# Patient Record
Sex: MALE | Race: WHITE | NOT HISPANIC OR LATINO | Employment: OTHER | URBAN - METROPOLITAN AREA
[De-identification: names, ages, dates, MRNs, and addresses within clinical notes are randomized per-mention and may not be internally consistent; named-entity substitution may affect disease eponyms.]

---

## 2017-01-02 ENCOUNTER — ANESTHESIA (OUTPATIENT)
Dept: SURGERY | Facility: AMBULATORY SURGERY CENTER | Age: 67
End: 2017-01-02

## 2017-01-02 RX ORDER — PROPOFOL 10 MG/ML
INJECTION, EMULSION INTRAVENOUS PRN
Status: DISCONTINUED | OUTPATIENT
Start: 2017-01-02 | End: 2017-01-02

## 2017-01-02 RX ORDER — SODIUM CHLORIDE, SODIUM LACTATE, POTASSIUM CHLORIDE, CALCIUM CHLORIDE 600; 310; 30; 20 MG/100ML; MG/100ML; MG/100ML; MG/100ML
INJECTION, SOLUTION INTRAVENOUS CONTINUOUS PRN
Status: DISCONTINUED | OUTPATIENT
Start: 2017-01-02 | End: 2017-01-02

## 2017-01-02 RX ORDER — FENTANYL CITRATE 50 UG/ML
INJECTION, SOLUTION INTRAMUSCULAR; INTRAVENOUS PRN
Status: DISCONTINUED | OUTPATIENT
Start: 2017-01-02 | End: 2017-01-02

## 2017-01-02 RX ADMIN — PROPOFOL 20 MG: 10 INJECTION, EMULSION INTRAVENOUS at 09:32

## 2017-01-02 RX ADMIN — FENTANYL CITRATE 25 MCG: 50 INJECTION, SOLUTION INTRAMUSCULAR; INTRAVENOUS at 09:21

## 2017-01-02 RX ADMIN — SODIUM CHLORIDE, SODIUM LACTATE, POTASSIUM CHLORIDE, CALCIUM CHLORIDE: 600; 310; 30; 20 INJECTION, SOLUTION INTRAVENOUS at 09:11

## 2017-01-06 ENCOUNTER — TELEPHONE (OUTPATIENT)
Dept: OTOLARYNGOLOGY | Facility: CLINIC | Age: 67
End: 2017-01-06

## 2017-01-06 NOTE — TELEPHONE ENCOUNTER
Patient was called to see how he was doing post operatively. He is stable, without complications, pain is well controlled. 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

## 2017-01-24 ENCOUNTER — OFFICE VISIT (OUTPATIENT)
Dept: ENDOCRINOLOGY | Facility: CLINIC | Age: 67
End: 2017-01-24

## 2017-01-24 ENCOUNTER — OFFICE VISIT (OUTPATIENT)
Dept: OTOLARYNGOLOGY | Facility: CLINIC | Age: 67
End: 2017-01-24

## 2017-01-24 VITALS
WEIGHT: 232 LBS | BODY MASS INDEX: 34.36 KG/M2 | HEIGHT: 69 IN | DIASTOLIC BLOOD PRESSURE: 78 MMHG | HEART RATE: 86 BPM | SYSTOLIC BLOOD PRESSURE: 120 MMHG

## 2017-01-24 DIAGNOSIS — K13.0 LIP LESION: Primary | ICD-10-CM

## 2017-01-24 DIAGNOSIS — R79.89 LOW TESTOSTERONE: Primary | ICD-10-CM

## 2017-01-24 ASSESSMENT — PAIN SCALES - GENERAL
PAINLEVEL: NO PAIN (0)
PAINLEVEL: NO PAIN (0)

## 2017-01-24 NOTE — NURSING NOTE
Chief Complaint   Patient presents with     RECHECK     Return Tumor      Pt states no pain today.    N John OWENS

## 2017-01-24 NOTE — MR AVS SNAPSHOT
After Visit Summary   1/24/2017    Deejay Dior    MRN: [id number]           Patient Information     Date Of Birth          1950        Visit Information        Provider Department      1/24/2017 3:15 PM Tim Yanez MD [facility] Ear Nose and Throat        Today's Diagnoses     Lip lesion    -  1      Care Instructions    Please follow up to see Dr Yanez in about 3 months.   For questions or concerns please call our nurse line @ [phone].        Follow-ups after your visit        Your next 10 appointments already scheduled     Mar 07, 2017  1:00 PM CST   [facility] with  [facility]   [facility] Draw ([facility])    [address]  2nd [facility] [id number] [phone]            Mar 07, 2017  1:30 PM CST   Return with  BMT DOM   [facility] Blood and Marrow Transplant ([facility])    [address] [place] [address]  2nd [facility] [id number] [phone]            Mar 14, 2017  2:00 PM CDT   (Arrive by 1:45 PM)   RETURN ENDOCRINE with Rd Chairez MD   [facility] Endocrinology ([facility])    [address] [place] [address]  3rd Floor  [facility] [id number] [phone]            Apr 25, 2017  3:15 PM CDT   (Arrive by 3:00 PM)   RETURN TUMOR VISIT with Tim Yanez MD   [facility] Ear Nose [facility] Throat ([facility])    [address] [place] [address]  4th Floor  [facility] [address] [phone]              Who to contact     Please call your clinic at [phone] to:    Ask questions about your health    Make or cancel appointments    Discuss your medicines    Learn about your test results    Speak to your doctor   If you have compliments or concerns about an experience at your clinic, or if you wish to file a complaint, please contact [facility] Patient Relations at [phone] or email us at  [email or web address]         Additional Information About Your Visit        MyChart Information     [employer] gives you secure access to your electronic health record. If you see a primary care provider, you can also send messages to your care team and make appointments. If you have questions, please call your primary care clinic.  If you do not have a primary care provider, please call [phone] and they will assist you.      [employer] is an electronic gateway that provides easy, online access to your medical records. With [employer], you can request a clinic appointment, read your test results, renew a prescription or communicate with your care team.     To access your existing account, please contact your [facility] or call [phone] for assistance.        Care EveryWhere ID     This is your Care EveryWhere ID. This could be used by other organizations to access your [place] medical records  [id number]         Blood Pressure from Last 3 Encounters:   02/02/17 128/78   01/24/17 120/78   01/02/17 106/69    Weight from Last 3 Encounters:   02/02/17 103 kg (227 lb)   01/24/17 105.2 kg (232 lb)   01/02/17 102.1 kg (225 lb)              Today, you had the following     No orders found for display       Primary Care Provider Office Phone # Fax #    Deejay Bonilla [phone]       [facility] [address] [place] [address]        Thank you!     Thank you for choosing [facility] EAR NOSE AND THROAT  for your care. Our goal is always to provide you with excellent care. Hearing back from our patients is one way we can continue to improve our services. Please take a few minutes to complete the written survey that you may receive in the mail after your visit with us. Thank you!             Your Updated Medication List - Protect others around you: Learn how to safely use, store and throw away your medicines at www.disposemymeds.org.           This list is accurate as of: 1/24/17 11:59 PM.  Always use your most recent med list.                   Brand Name Dispense Instructions for use    acyclovir 800 MG tablet    ZOVIRAX    150 tablet    Take 1 tablet (800 mg) by mouth 3 times daily       alfuzosin 10 MG 24 hr tablet    UROXATRAL    30 tablet    Take 1 tablet (10 mg) by mouth daily       bumetanide 0.5 MG tablet    BUMEX    60 tablet    1mg once or twice per week (w/ metolazone)       calcium carbonate-vitamin D 500-400 MG-UNIT Tabs per tablet     180 tablet    Take 1 tablet by mouth daily 2000 mg       finasteride 5 MG tablet    PROSCAR    30 tablet    Take 1 tablet (5 mg) by mouth daily       fluconazole 100 MG tablet    DIFLUCAN    60 tablet    Take 1 tablet (100 mg) by mouth daily       fluticasone 50 MCG/ACT spray    FLONASE    1 Bottle    Spray 1-2 sprays into both nostrils daily       gabapentin 300 MG capsule    NEURONTIN    270 capsule    Take 1 capsule (300 mg) by mouth 2 times daily       LEVOFLOXACIN PO          loratadine 10 MG capsule    CLARITIN    30 capsule    Take 10 mg by mouth daily       order for DME     1 Device    Please provide a NOVA cane offset with strap item number [id number]       pantoprazole 40 MG EC tablet    PROTONIX    60 tablet    Take 1 tablet (40 mg) by mouth daily       potassium chloride lee er    K-DUR     Take 15 mEq by mouth 2 times daily       predniSONE 5 MG tablet    DELTASONE    60 tablet    5 mg and 0 mg alternating every other day       sertraline 100 MG tablet    ZOLOFT    30 tablet    Take 1 tablet (100 mg) by mouth daily       sirolimus 0.5 MG tablet    RAPAMUNE - GENERIC EQUIVALENT    120 tablet    Take 3 tablets (1.5 mg) by mouth daily       sulfamethoxazole-trimethoprim 400-80 MG per tablet    BACTRIM/SEPTRA    60 tablet    Take 1 tablet by mouth daily

## 2017-01-24 NOTE — PATIENT INSTRUCTIONS
Please follow up to see Dr Yanez in about 3 months.   For questions or concerns please call our nurse line @ [phone].

## 2017-01-24 NOTE — PROGRESS NOTES
Deejay is a 66 year old male presents today for RECHECK    HPI  Deejay is a 66-year-old male who presents today for evaluation of low testosterone.  Patient has complicated past medical history including MDS status post BMT, xmpzj-ucxstp-ufhw disease   Patient was seen about a year ago by my colleague Dr. Hall for evaluation of fatigue.  Patient complains of disabling fatigue, reports that symptoms of low energy has have been ongoing for the last couple of years and have progressively gotten worse.  He has history of chronic steroid use, and in the past has had problems with fatigue with tapering prednisone dose.  He is currently taking prednisone 5 mg every other day.  He recently had a testosterone check level checked around 3 PM which was 138.  Patient has been reviewing symptoms of low testosterone online and feels that he has most of the symptoms that have been attributed to low testosterone.  He history of BPH and is treated with medications.  Patient also has history of sleep apnea which is treated with CPAP.  Patient is very frustrated with ongoing symptoms of fatigue with no clear etiology.  He is hoping that low testosterone may provide the explanation for his ongoing symptoms.  He also has history of depression and anxiety for which he takes Zoloft.  Also complains of low libido and ED  Past Medical History   Diagnosis Date     MDS (myelodysplastic syndrome) (H)      History of blood transfusion 3/2014     Pneumonia      Numbness and tingling      feet     Sleep apnea 1999     Immunosuppression (H) Sirolimus daily     Hearing problem Hearing aids 2015     Head injury 1964     Transplant July 1, 2014     Stem Cells     History of radiation therapy June 2014     Reduced vision August 2016     Cataract surgery     Obstructive sleep apnea 1999     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  A delay in compatible RBCs may occur.     Medications  Current Outpatient Prescriptions   Medication Sig Dispense Refill     potassium chloride lee er (K-DUR) Take 15 mEq by mouth 2 times daily       HYDROcodone-acetaminophen (NORCO) 5-325 MG per tablet Take 1-2 tablets by mouth every 4 hours as needed for other (Moderate to Severe Pain) 10 tablet 0     predniSONE (DELTASONE) 5 MG tablet 5 mg and 0 mg alternating every other day 60 tablet 3     fluticasone (FLONASE) 50 MCG/ACT spray Spray 1-2 sprays into both nostrils daily 1 Bottle 11     alfuzosin (UROXATRAL) 10 MG 24 hr tablet Take 1 tablet (10 mg) by mouth daily 30 tablet 3     fluconazole (DIFLUCAN) 100 MG tablet Take 1 tablet (100 mg) by mouth daily 60 tablet 6     finasteride (PROSCAR) 5 MG tablet Take 1 tablet (5 mg) by mouth daily 30 tablet 6     bumetanide (BUMEX) 0.5 MG tablet 1mg once or twice per week (w/ metolazone) 60 tablet 11     loratadine (CLARITIN) 10 MG capsule Take 10 mg by mouth daily 30 capsule 3     hydrochlorothiazide (HYDRODIURIL) 25 MG tablet Take 2 tablets (50 mg) by mouth daily 60 tablet 3     sirolimus (RAPAMUNE - GENERIC EQUIVALENT) 0.5 MG tablet Take 3 tablets (1.5 mg) by mouth daily 120 tablet 6     acyclovir (ZOVIRAX) 800 MG tablet Take 1 tablet (800 mg) by mouth 3 times daily 150 tablet 5     gabapentin (NEURONTIN) 300 MG capsule Take 1 capsule (300 mg) by mouth 2 times daily 270 capsule 3     LEVOFLOXACIN PO        sertraline (ZOLOFT) 100 MG tablet Take 1 tablet (100 mg) by mouth daily 30  "tablet 6     sulfamethoxazole-trimethoprim (BACTRIM,SEPTRA) 400-80 MG per tablet Take 1 tablet by mouth daily 60 tablet 6     pantoprazole (PROTONIX) 40 MG enteric coated tablet Take 1 tablet (40 mg) by mouth daily 60 tablet 5     ORDER FOR DME Please provide a NOVA cane offset with strap item number [id number] 1 Device 0     calcium carbonate-vitamin D 500-400 MG-UNIT TABS tablt Take 1 tablet by mouth daily 2000 mg 180 tablet 3     Family History  family history includes Breast Cancer in his mother; CANCER in his father and mother; CEREBROVASCULAR DISEASE in his mother; Depression in his brother; HEART DISEASE in his brother and brother; Hyperlipidemia in his brother; Hypertension in his brother and brother. There is no history of Glaucoma, Macular Degeneration, or Hypertension.  Social History     Social History     Marital Status:      Spouse Name: N/A     Number of Children: N/A     Years of Education: N/A     Occupational History     Not on file.     Social History Main Topics     Smoking status: Former Smoker -- 1.00 packs/day for 34 years     Types: Cigarettes     Start date: 12/01/1967     Quit date: 04/01/2001     Smokeless tobacco: Never Used      Comment: quit in 2000     Alcohol Use: 2.5 oz/week      Comment: Seldom     Drug Use: No     Sexual Activity:     Partners: Female     Birth Control/ Protection: Male Surgical, Female Surgical     Other Topics Concern     Not on file     Social History Narrative      ROS:8 point ROS neg other than the symptoms noted above in the HPI.     Physical Exam  /78 mmHg  Pulse 86  Ht 1.753 m (5' 9\")  Wt 105.235 kg (232 lb)  BMI 34.24 kg/m2  Body mass index is 34.24 kg/(m^2).    Constitutional: no distress, comfortable, pleasant   Eyes: anicteric, normal extra-ocular movements, No lig lag, retraction or proptosis  Ears, Nose and Throat: throat clear  Neck: supple, no thyromegaly.   Cardiovascular: regular rate and rhythm, normal S1 and S2, no " murmurs  Respiratory: clear to auscultation, no wheezes or crackles, normal breath sounds   Gastrointestinal: nontender, no hepatosplenomegaly, no masses   Skin: no jaundice   : testes ~ 15-20 ml b/l, no masses  Neurological: cranial nerves intact, normal strength, reflexes at patella and biceps normal, no tremor   Psychological: appropriate mood   Lymphatic: no cervical lymphadenopathy    RESULTS    TSH     0.40   10/12/2016  T4      1.03   6/30/2016             Results for TOM CARRASCO (MRN [id number]) as of 1/24/2017 17:15   Ref. Range 12/17/2015 14:12 12/14/2016 15:20   Testosterone Total Latest Ref Range: 240-950 ng/dL 223 (L) 138 (L)       ASSESSMENT:    1. Chronic fatigue:had detail discussion of multiple possible etiologies for chronic fatigue.  Will proceed with workup for possible low testosterone level at this time    2. Question of hypogonadism:testosterone level was checked in the afternoon.  Patient was counseled about the diurnal variation of testosterone level.  Will check morning total testosterone.  If morning testosterone level is low will proceed with further workup for etiology of low testosterone level.    PLAN:   Check morning testosterone level  If morning testosterone is low will repeat testosterone along with LH and prolactin level.    SEVERINO Tom  Total time 30 minutes more than 50% counseling patient on above and reviewing old records.

## 2017-01-24 NOTE — Clinical Note
1/24/2017       RE: Deejay Dior  [address]  [facility] [place] [address]     Dear Colleague,    Thank you for referring your patient, Deejay Diro, to the [facility] ENDOCRINOLOGY at [facility]. Please see a copy of my visit note below.    Deejay is a 66 year old male presents today for RECHECK    HPI  Deejay is a 66-year-old male who presents today for evaluation of low testosterone.  Patient has complicated past medical history including MDS status post BMT, tjctp-dsdndr-vzyo disease   Patient was seen about a year ago by my colleague Dr. Hall for evaluation of fatigue.  Patient complains of disabling fatigue, reports that symptoms of low energy has have been ongoing for the last couple of years and have progressively gotten worse.  He has history of chronic steroid use, and in the past has had problems with fatigue with tapering prednisone dose.  He is currently taking prednisone 5 mg every other day.  He recently had a testosterone check level checked around 3 PM which was 138.  Patient has been reviewing symptoms of low testosterone online and feels that he has most of the symptoms that have been attributed to low testosterone.  He history of BPH and is treated with medications.  Patient also has history of sleep apnea which is treated with CPAP.  Patient is very frustrated with ongoing symptoms of fatigue with no clear etiology.  He is hoping that low testosterone may provide the explanation for his ongoing symptoms.  He also has history of depression and anxiety for which he takes Zoloft.  Also complains of low libido and ED  Past Medical History   Diagnosis Date     MDS (myelodysplastic syndrome) (H)      History of blood transfusion 3/2014     Pneumonia      Numbness and tingling      feet     Sleep apnea 1999     Immunosuppression (H) Sirolimus daily     Hearing problem Hearing aids 2015     Head injury 1964     Transplant July 1, 2014     Stem Cells     History of  radiation therapy June 2014     Reduced vision August 2016     Cataract surgery     Obstructive sleep apnea 1999     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  A delay in compatible RBCs may occur.     Medications  Current Outpatient Prescriptions   Medication Sig Dispense Refill     potassium chloride lee er (K-DUR) Take 15 mEq by mouth 2 times daily       HYDROcodone-acetaminophen (NORCO) 5-325 MG per tablet Take 1-2 tablets by mouth every 4 hours as needed for other (Moderate to Severe Pain) 10 tablet 0     predniSONE (DELTASONE) 5 MG tablet 5 mg and 0 mg alternating every other day 60 tablet 3     fluticasone (FLONASE) 50 MCG/ACT spray Spray 1-2 sprays into both nostrils daily 1 Bottle 11     alfuzosin (UROXATRAL) 10 MG 24 hr tablet Take 1 tablet (10 mg) by mouth daily 30 tablet 3     fluconazole (DIFLUCAN) 100 MG tablet Take 1 tablet (100 mg) by mouth daily 60 tablet 6     finasteride (PROSCAR) 5 MG tablet Take 1 tablet (5 mg) by mouth daily 30 tablet 6     bumetanide (BUMEX) 0.5 MG tablet 1mg once or twice per week (w/ metolazone) 60 tablet 11     loratadine (CLARITIN) 10 MG capsule Take 10 mg by mouth daily 30 capsule 3     hydrochlorothiazide (HYDRODIURIL) 25 MG tablet Take 2 tablets (50 mg) by mouth daily 60 tablet 3     sirolimus (RAPAMUNE - GENERIC EQUIVALENT) 0.5 MG tablet Take 3 tablets (1.5 mg) by mouth daily 120 tablet 6     acyclovir (ZOVIRAX) 800  "MG tablet Take 1 tablet (800 mg) by mouth 3 times daily 150 tablet 5     gabapentin (NEURONTIN) 300 MG capsule Take 1 capsule (300 mg) by mouth 2 times daily 270 capsule 3     LEVOFLOXACIN PO        sertraline (ZOLOFT) 100 MG tablet Take 1 tablet (100 mg) by mouth daily 30 tablet 6     sulfamethoxazole-trimethoprim (BACTRIM,SEPTRA) 400-80 MG per tablet Take 1 tablet by mouth daily 60 tablet 6     pantoprazole (PROTONIX) 40 MG enteric coated tablet Take 1 tablet (40 mg) by mouth daily 60 tablet 5     ORDER FOR DME Please provide a NOVA cane offset with strap item number [id number] 1 Device 0     calcium carbonate-vitamin D 500-400 MG-UNIT TABS tablt Take 1 tablet by mouth daily 2000 mg 180 tablet 3     Family History  family history includes Breast Cancer in his mother; CANCER in his father and mother; CEREBROVASCULAR DISEASE in his mother; Depression in his brother; HEART DISEASE in his brother and brother; Hyperlipidemia in his brother; Hypertension in his brother and brother. There is no history of Glaucoma, Macular Degeneration, or Hypertension.  Social History     Social History     Marital Status:      Spouse Name: N/A     Number of Children: N/A     Years of Education: N/A     Occupational History     Not on file.     Social History Main Topics     Smoking status: Former Smoker -- 1.00 packs/day for 34 years     Types: Cigarettes     Start date: 12/01/1967     Quit date: 04/01/2001     Smokeless tobacco: Never Used      Comment: quit in 2000     Alcohol Use: 2.5 oz/week      Comment: Seldom     Drug Use: No     Sexual Activity:     Partners: Female     Birth Control/ Protection: Male Surgical, Female Surgical     Other Topics Concern     Not on file     Social History Narrative      ROS:8 point ROS neg other than the symptoms noted above in the HPI.     Physical Exam  /78 mmHg  Pulse 86  Ht 1.753 m (5' 9\")  Wt 105.235 kg (232 lb)  BMI 34.24 kg/m2  Body mass index is 34.24 " kg/(m^2).    Constitutional: no distress, comfortable, pleasant   Eyes: anicteric, normal extra-ocular movements, No lig lag, retraction or proptosis  Ears, Nose and Throat: throat clear  Neck: supple, no thyromegaly.   Cardiovascular: regular rate and rhythm, normal S1 and S2, no murmurs  Respiratory: clear to auscultation, no wheezes or crackles, normal breath sounds   Gastrointestinal: nontender, no hepatosplenomegaly, no masses   Skin: no jaundice   : testes ~ 15-20 ml b/l, no masses  Neurological: cranial nerves intact, normal strength, reflexes at patella and biceps normal, no tremor   Psychological: appropriate mood   Lymphatic: no cervical lymphadenopathy    RESULTS    TSH     0.40   10/12/2016  T4      1.03   6/30/2016             Results for TOM CARRASCO (MRN [id number]) as of 1/24/2017 17:15   Ref. Range 12/17/2015 14:12 12/14/2016 15:20   Testosterone Total Latest Ref Range: 240-950 ng/dL 223 (L) 138 (L)       ASSESSMENT:    1. Chronic fatigue:had detail discussion of multiple possible etiologies for chronic fatigue.  Will proceed with workup for possible low testosterone level at this time    2. Question of hypogonadism:testosterone level was checked in the afternoon.  Patient was counseled about the diurnal variation of testosterone level.  Will check morning total testosterone.  If morning testosterone level is low will proceed with further workup for etiology of low testosterone level.    PLAN:   Check morning testosterone level  If morning testosterone is low will repeat testosterone along with LH and prolactin level.    SEVERINO Tom  Total time 30 minutes more than 50% counseling patient on above and reviewing old records.      Again, thank you for allowing me to participate in the care of your patient.      Sincerely,    Rd Chairez MD

## 2017-01-24 NOTE — PROGRESS NOTES
HISTORY OF PRESENT ILLNESS:  Deejay Dior is 66 years of age.  He is a pleasant gentleman.  We did a lip shave operation on him about a month ago or so.  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  He is a transplant patient with former stem cell transplant.  He is a very light skinned individual.  He has no other new complaints at the present time today.  He feels that he has healed quite well.      PHYSICAL EXAMINATION:  The patient is alert, oriented x3 and pleasant.  Skin of the face, lips, and neck on him shows some age-related changes.  In looking at the lip today, everything is well-healed.  There is no facial adenopathy.  There is no neck adenopathy.  Oral cavity and oropharynx is clear.      ASSESSMENT:  Patient status post lip shave.  He is doing well presently.      PLAN:  We will see him again in about three months or so.  We talked today about good hygiene, and we also talked to him today about the fact that he maybe should go ahead and start seeing a dermatologist for a full body exam to make sure nothing needs to be biopsied.

## 2017-01-24 NOTE — MR AVS SNAPSHOT
After Visit Summary   1/24/2017    Deejay Dior    MRN: [id number]           Patient Information     Date Of Birth          1950        Visit Information        Provider Department      1/24/2017 3:30 PM Rd Chairez MD [facility]        Today's Diagnoses     Low testosterone    -  1        Follow-ups after your visit        Follow-up notes from your care team     Return if symptoms worsen or fail to improve.      Your next 10 appointments already scheduled     Feb 02, 2017  1:00 PM   [facility] with  [facility]   [facility] ([facility])    [address] [place] [address]  2nd [facility] [address]   [phone]            Feb 02, 2017  1:30 PM   Return with Aby Pinto MD   [facility] ([facility])    [address] [place] [address]   [phone]            Feb 07, 2017 10:15 AM   (Arrive by 10:00 AM)   RETURN TUMOR VISIT with Tim Yanez MD   [facility] ([facility])    [address] [place] [address]  4th [facility] [address] [phone]              Future tests that were ordered for you today     Open Future Orders        Priority Expected Expires Ordered    Testosterone Free and Total Routine 1/25/2017 1/24/2018 1/24/2017            Who to contact     Please call your clinic at [phone] to:    Ask questions about your health    Make or cancel appointments    Discuss your medicines    Learn about your test results    Speak to your doctor   If you have compliments or concerns about an experience at your clinic, or if you wish to file a complaint, please contact [facility] Patient Relations at [phone] or email us at [email or web address]         Additional Information About Your Visit        MyChart Information     Sheb[employer] gives you  "secure access to your electronic health record. If you see a primary care provider, you can also send messages to your care team and make appointments. If you have questions, please call your primary care clinic.  If you do not have a primary care provider, please call [phone] and they will assist you.      byUs is an electronic gateway that provides easy, online access to your medical records. With byUs, you can request a clinic appointment, read your test results, renew a prescription or communicate with your care team.     To access your existing account, please contact your [facility] or call [phone] for assistance.        Care EveryWhere ID     This is your Care EveryWhere ID. This could be used by other organizations to access your [place] medical records  [id number]        Your Vitals Were     Pulse Height BMI (Body Mass Index)             86 1.753 m (5' 9\") 34.24 kg/m2          Blood Pressure from Last 3 Encounters:   01/24/17 120/78   01/02/17 106/69   12/14/16 108/59    Weight from Last 3 Encounters:   01/24/17 105.235 kg (232 lb)   01/02/17 102.059 kg (225 lb)   12/14/16 115.168 kg (253 lb 14.4 oz)               Primary Care Provider Office Phone # Fax #    Deejay Bonilla [phone]       [facility] [address] [place] [address]        Thank you!     Thank you for choosing [facility]  for your care. Our goal is always to provide you with excellent care. Hearing back from our patients is one way we can continue to improve our services. Please take a few minutes to complete the written survey that you may receive in the mail after your visit with us. Thank you!             Your Updated Medication List - Protect others around you: Learn how to safely use, store and throw away your medicines at www.disposemymeds.org.          This list is accurate as of: 1/24/17  4:09 PM.  Always use your most recent med " list.                   Brand Name Dispense Instructions for use    acyclovir 800 MG tablet    ZOVIRAX    150 tablet    Take 1 tablet (800 mg) by mouth 3 times daily       alfuzosin 10 MG 24 hr tablet    UROXATRAL    30 tablet    Take 1 tablet (10 mg) by mouth daily       bumetanide 0.5 MG tablet    BUMEX    60 tablet    1mg once or twice per week (w/ metolazone)       calcium carbonate-vitamin D 500-400 MG-UNIT Tabs per tablet     180 tablet    Take 1 tablet by mouth daily 2000 mg       finasteride 5 MG tablet    PROSCAR    30 tablet    Take 1 tablet (5 mg) by mouth daily       fluconazole 100 MG tablet    DIFLUCAN    60 tablet    Take 1 tablet (100 mg) by mouth daily       fluticasone 50 MCG/ACT spray    FLONASE    1 Bottle    Spray 1-2 sprays into both nostrils daily       gabapentin 300 MG capsule    NEURONTIN    270 capsule    Take 1 capsule (300 mg) by mouth 2 times daily       hydrochlorothiazide 25 MG tablet    HYDRODIURIL    60 tablet    Take 2 tablets (50 mg) by mouth daily       HYDROcodone-acetaminophen 5-325 MG per tablet    NORCO    10 tablet    Take 1-2 tablets by mouth every 4 hours as needed for other (Moderate to Severe Pain)       LEVOFLOXACIN PO          loratadine 10 MG capsule    CLARITIN    30 capsule    Take 10 mg by mouth daily       order for DME     1 Device    Please provide a NOVA cane offset with strap item number [id number]       pantoprazole 40 MG EC tablet    PROTONIX    60 tablet    Take 1 tablet (40 mg) by mouth daily       potassium chloride lee er    K-DUR     Take 15 mEq by mouth 2 times daily       predniSONE 5 MG tablet    DELTASONE    60 tablet    5 mg and 0 mg alternating every other day       sertraline 100 MG tablet    ZOLOFT    30 tablet    Take 1 tablet (100 mg) by mouth daily       sirolimus 0.5 MG tablet    RAPAMUNE - GENERIC EQUIVALENT    120 tablet    Take 3 tablets (1.5 mg) by mouth daily       sulfamethoxazole-trimethoprim 400-80 MG per tablet    BACTRIM/SEPTRA    60  tablet    Take 1 tablet by mouth daily

## 2017-01-24 NOTE — LETTER
1/24/2017       RE: Deejay Dior  [address] [place] [address]     Dear Colleague,    Thank you for referring your patient, Deejay Dior, to the [facility] EAR NOSE AND THROAT at [facility]. Please see a copy of my visit note below.    HISTORY OF PRESENT ILLNESS:  Deejay Dior is 66 years of age.  He is a pleasant gentleman.  We did a lip shave operation on him about a month ago or so.  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  He is a transplant patient with former stem cell transplant.  He is a very light skinned individual.  He has no other new complaints at the present time today.  He feels that he has healed quite well.      PHYSICAL EXAMINATION:  The patient is alert, oriented x3 and pleasant.  Skin of the face, lips, and neck on him shows some age-related changes.  In looking at the lip today, everything is well-healed.  There is no facial adenopathy.  There is no neck adenopathy.  Oral cavity and oropharynx is clear.      ASSESSMENT:  Patient status post lip shave.  He is doing well presently.      PLAN:  We will see him again in about three months or so.  We talked today about good hygiene, and we also talked to him today about the fact that he maybe should go ahead and start seeing a dermatologist for a full body exam to make sure nothing needs to be biopsied.         Again, thank you for allowing me to participate in the care of your patient.      Sincerely,    Tim Yanez MD

## 2017-01-27 DIAGNOSIS — R79.89 LOW TESTOSTERONE: ICD-10-CM

## 2017-01-27 PROCEDURE — 84403 ASSAY OF TOTAL TESTOSTERONE: CPT | Performed by: INTERNAL MEDICINE

## 2017-01-27 PROCEDURE — 84270 ASSAY OF SEX HORMONE GLOBUL: CPT | Performed by: INTERNAL MEDICINE

## 2017-01-27 PROCEDURE — 36415 COLL VENOUS BLD VENIPUNCTURE: CPT | Performed by: INTERNAL MEDICINE

## 2017-01-31 LAB
SHBG SERPL-SCNC: 108 NMOL/L (ref 11–80)
TESTOST FREE SERPL-MCNC: 1.69 NG/DL (ref 4.7–24.4)
TESTOST SERPL-MCNC: 215 NG/DL (ref 240–950)

## 2017-02-02 ENCOUNTER — APPOINTMENT (OUTPATIENT)
Dept: LAB | Facility: CLINIC | Age: 67
End: 2017-02-02
Attending: INTERNAL MEDICINE
Payer: MEDICARE

## 2017-02-02 ENCOUNTER — ONCOLOGY VISIT (OUTPATIENT)
Dept: TRANSPLANT | Facility: CLINIC | Age: 67
End: 2017-02-02
Attending: INTERNAL MEDICINE
Payer: MEDICARE

## 2017-02-02 VITALS
DIASTOLIC BLOOD PRESSURE: 78 MMHG | RESPIRATION RATE: 18 BRPM | WEIGHT: 227 LBS | HEART RATE: 89 BPM | OXYGEN SATURATION: 98 % | BODY MASS INDEX: 33.51 KG/M2 | TEMPERATURE: 95.9 F | SYSTOLIC BLOOD PRESSURE: 128 MMHG

## 2017-02-02 DIAGNOSIS — D46.9 MDS (MYELODYSPLASTIC SYNDROME) (H): ICD-10-CM

## 2017-02-02 LAB
ALBUMIN SERPL-MCNC: 3.4 G/DL (ref 3.4–5)
ALP SERPL-CCNC: 91 U/L (ref 40–150)
ALT SERPL W P-5'-P-CCNC: 35 U/L (ref 0–70)
ANION GAP SERPL CALCULATED.3IONS-SCNC: 10 MMOL/L (ref 3–14)
AST SERPL W P-5'-P-CCNC: 40 U/L (ref 0–45)
BASOPHILS # BLD AUTO: 0 10E9/L (ref 0–0.2)
BASOPHILS NFR BLD AUTO: 0.5 %
BILIRUB SERPL-MCNC: 0.4 MG/DL (ref 0.2–1.3)
BUN SERPL-MCNC: 27 MG/DL (ref 7–30)
CALCIUM SERPL-MCNC: 8.8 MG/DL (ref 8.5–10.1)
CHLORIDE SERPL-SCNC: 107 MMOL/L (ref 94–109)
CO2 SERPL-SCNC: 22 MMOL/L (ref 20–32)
CREAT SERPL-MCNC: 1.09 MG/DL (ref 0.66–1.25)
DIFFERENTIAL METHOD BLD: ABNORMAL
EOSINOPHIL # BLD AUTO: 0.1 10E9/L (ref 0–0.7)
EOSINOPHIL NFR BLD AUTO: 2.3 %
ERYTHROCYTE [DISTWIDTH] IN BLOOD BY AUTOMATED COUNT: 15.3 % (ref 10–15)
GFR SERPL CREATININE-BSD FRML MDRD: 67 ML/MIN/1.7M2
GLUCOSE SERPL-MCNC: 87 MG/DL (ref 70–99)
HCT VFR BLD AUTO: 40.5 % (ref 40–53)
HGB BLD-MCNC: 13.1 G/DL (ref 13.3–17.7)
IMM GRANULOCYTES # BLD: 0 10E9/L (ref 0–0.4)
IMM GRANULOCYTES NFR BLD: 0.2 %
LYMPHOCYTES # BLD AUTO: 1.5 10E9/L (ref 0.8–5.3)
LYMPHOCYTES NFR BLD AUTO: 34.7 %
MAGNESIUM SERPL-MCNC: 2 MG/DL (ref 1.6–2.3)
MCH RBC QN AUTO: 25.9 PG (ref 26.5–33)
MCHC RBC AUTO-ENTMCNC: 32.3 G/DL (ref 31.5–36.5)
MCV RBC AUTO: 80 FL (ref 78–100)
MONOCYTES # BLD AUTO: 0.6 10E9/L (ref 0–1.3)
MONOCYTES NFR BLD AUTO: 12.8 %
NEUTROPHILS # BLD AUTO: 2.1 10E9/L (ref 1.6–8.3)
NEUTROPHILS NFR BLD AUTO: 49.5 %
NRBC # BLD AUTO: 0 10*3/UL
NRBC BLD AUTO-RTO: 0 /100
PLATELET # BLD AUTO: 140 10E9/L (ref 150–450)
POTASSIUM SERPL-SCNC: 3.7 MMOL/L (ref 3.4–5.3)
PROT SERPL-MCNC: 7.1 G/DL (ref 6.8–8.8)
RBC # BLD AUTO: 5.06 10E12/L (ref 4.4–5.9)
SIROLIMUS BLD-MCNC: 9.3 UG/L (ref 5–15)
SODIUM SERPL-SCNC: 138 MMOL/L (ref 133–144)
TME LAST DOSE: NORMAL H
WBC # BLD AUTO: 4.3 10E9/L (ref 4–11)

## 2017-02-02 PROCEDURE — 87799 DETECT AGENT NOS DNA QUANT: CPT | Performed by: INTERNAL MEDICINE

## 2017-02-02 PROCEDURE — 99212 OFFICE O/P EST SF 10 MIN: CPT | Mod: ZF

## 2017-02-02 PROCEDURE — 85025 COMPLETE CBC W/AUTO DIFF WBC: CPT | Performed by: INTERNAL MEDICINE

## 2017-02-02 PROCEDURE — 83735 ASSAY OF MAGNESIUM: CPT | Performed by: INTERNAL MEDICINE

## 2017-02-02 PROCEDURE — 36415 COLL VENOUS BLD VENIPUNCTURE: CPT

## 2017-02-02 PROCEDURE — 80195 ASSAY OF SIROLIMUS: CPT | Performed by: INTERNAL MEDICINE

## 2017-02-02 PROCEDURE — 80053 COMPREHEN METABOLIC PANEL: CPT | Performed by: INTERNAL MEDICINE

## 2017-02-02 ASSESSMENT — PAIN SCALES - GENERAL: PAINLEVEL: EXTREME PAIN (8)

## 2017-02-02 NOTE — MR AVS SNAPSHOT
After Visit Summary   2/2/2017    Deejay Dior    MRN: [id number]           Patient Information     Date Of Birth          1950        Visit Information        Provider Department      2/2/2017 1:30 PM Aby Pinto MD [facility] Blood and Marrow Transplant        Today's Diagnoses     MDS (myelodysplastic syndrome)               [facility]  [address] [place] [address]  Phone: [phone]  Clinic Hours:   Monday-Friday:    8am to 4:30pm   Weekends and holidays:    8am to noon (in general)  If your fever is 100.5  or greater,   call the clinic.  After hours call the   hospital at [phone] or   [phone]. Ask for the BMT   fellow for pediatric or adult patients           Care Instructions    3/7 1pm arrival with labs and         Follow-ups after your visit        Your next 10 appointments already scheduled     Mar 07, 2017  1:30 PM   Return with UC BMT DOM   [facility] Blood and Marrow Transplant ([facility])    [address] [place] [address] [phone]            Apr 25, 2017  3:15 PM   (Arrive by 3:00 PM)   RETURN TUMOR VISIT with Tim Yanez MD   [facility] Ear Nose and Throat ([facility])    [address] [place] [address] [phone]              Who to contact     If you have questions or need follow up information about today's clinic visit or your schedule please contact [facility]ANSPL[facility] directly at [phone].  Normal or non-critical lab and imaging results will be communicated to you by MyChart, letter or phone within 4 business days after the clinic has received the results. If you do not hear from us within 7 days, please contact the clinic through MyChart or phone. If you have a critical or abnormal lab result, we will notify you by phone as soon as possible.  Submit refill requests  through Tap2print or call your pharmacy and they will forward the refill request to us. Please allow 3 business days for your refill to be completed.          Additional Information About Your Visit        KIYATECharGruupMeet Information     Tap2print gives you secure access to your electronic health record. If you see a primary care provider, you can also send messages to your care team and make appointments. If you have questions, please call your primary care clinic.  If you do not have a primary care provider, please call [phone] and they will assist you.        Care EveryWhere ID     This is your Care EveryWhere ID. This could be used by other organizations to access your [place] medical records  [id number]        Your Vitals Were     Pulse Temperature Respirations Pulse Oximetry          89 95.9  F (35.5  C) 18 98%         Blood Pressure from Last 3 Encounters:   02/02/17 128/78   01/24/17 120/78   01/02/17 106/69    Weight from Last 3 Encounters:   02/02/17 102.967 kg (227 lb)   01/24/17 105.235 kg (232 lb)   01/02/17 102.059 kg (225 lb)              We Performed the Following     CBC with platelets differential     CMV DNA quantification     Comprehensive metabolic panel     EBV DNA PCR Quantitative Whole Blood     Magnesium     Sirolimus level          Today's Medication Changes          These changes are accurate as of: 2/2/17  2:30 PM.  If you have any questions, ask your nurse or doctor.               These medicines have changed or have updated prescriptions.        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 Please contact your care team if you have questions.     bumetanide 0.5 MG tablet   Commonly known as:  BUMEX   Stopped by:  Aby Pinto MD           potassium chloride lee er   Commonly known as:  K-DUR   Stopped by:  Aby Pinto MD                    Recent Review Flowsheet Data     BMT Recent Results Latest Ref Rng 9/8/2016 9/22/2016 10/12/2016 10/27/2016 11/17/2016 12/14/2016 2/2/2017    WBC 4.0 - 11.0 10e9/L 5.9 4.7 5.6 6.7 6.5 3.9(L) 4.3    Hemoglobin 13.3 - 17.7 g/dL 13.0(L) 13.0(L) 11.8(L) 11.9(L) 12.1(L) 12.5(L) 13.1(L)    Platelet Count 150 - 450 10e9/L 131(L) 138(L) 125(L) 143(L) 164 143(L) 140(L)    Neutrophils (Absolute) 1.6 - 8.3 10e9/L 4.9 3.8 4.1 4.5 4.9 2.2 2.1    Sodium 133 - 144 mmol/L 139 137 138 138 138 141 138    Potassium 3.4 - 5.3 mmol/L 4.5 4.1 3.6 3.6 4.0 3.2(L) 3.7    Chloride 94 - 109 mmol/L 106 103 104 102 100 105 107    Glucose 70 - 99 mg/dL 132(H) 146(H) 139(H) 114(H) 114(H) 114(H) 87    Urea Nitrogen 7 - 30 mg/dL 35(H) 27 32(H) 34(H) 34(H) 28 27    Creatinine 0.66 - 1.25 mg/dL 1.23 1.23 1.31(H) 1.48(H) 1.17 1.15 1.09    Calcium (Total) 8.5 - 10.1 mg/dL 9.0 9.2 8.9 9.1 9.5 9.1 8.8    Protein (Total) 6.8 - 8.8 g/dL 6.9 7.0 6.5(L) 7.2 7.5 6.9 7.1    Albumin 3.4 - 5.0 g/dL 3.3(L) 3.2(L) 3.0(L) 3.2(L) 3.3(L) 3.0(L) 3.4    Alkaline Phosphatase 40 - 150 U/L 58 60 62 67 77 71 91    AST 0 - 45 U/L Canceled, Test credited  Unsatisfactory specimen - hemolyzed  NOTIFIED SUDEEP ALMANZA IN BMT 9/8 1625 JM 37 37 37 41 39 40    ALT 0 - 70 U/L 47 40 30 36 40 31 35    MCV 78 - 100 fl 87 88 88 88 84 83 80               Primary Care Provider Office Phone # Fax #    Deejay REED Chad [phone]       [facility] [address] [place] [address]         Thank you!     Thank you for choosing [facility]ARROW TRANSPLANT  for your care. Our goal is always to provide you with excellent care. Hearing back from our patients is one way we can continue to improve our services. Please take a few minutes to complete the written survey that you may receive in the mail after your visit with us. Thank you!             Your Updated Medication List - Protect others around you: Learn how to safely use, store and throw away your medicines at www.disposemymeds.org.          This list is accurate as of: 2/2/17  2:30 PM.  Always use your most recent med list.                   Brand Name Dispense Instructions for use    acyclovir 800 MG tablet    ZOVIRAX    150 tablet    Take 1 tablet (800 mg) by mouth 3 times daily       alfuzosin 10 MG 24 hr tablet    UROXATRAL    30 tablet    Take 1 tablet (10 mg) by mouth daily       calcium carbonate-vitamin D 500-400 MG-UNIT Tabs per tablet     180 tablet    Take 1 tablet by mouth daily 2000 mg       finasteride 5 MG tablet    PROSCAR    30 tablet    Take 1 tablet (5 mg) by mouth daily       fluconazole 100 MG tablet    DIFLUCAN    60 tablet    Take 1 tablet (100 mg) by mouth daily       fluticasone 50 MCG/ACT spray    FLONASE    1 Bottle    Spray 1-2 sprays into both nostrils daily       gabapentin 300 MG capsule    NEURONTIN    270 capsule    Take 1 capsule (300 mg) by mouth 2 times daily       LEVOFLOXACIN PO          loratadine 10 MG capsule    CLARITIN    30 capsule    Take 10 mg by mouth daily       order for DME     1 Device    Please provide a NOVA cane offset with strap item number [id number]       pantoprazole 40 MG EC tablet    PROTONIX    60 tablet    Take 1 tablet (40 mg) by mouth daily       predniSONE 5 MG tablet    DELTASONE    60 tablet    5 mg and 0 mg alternating every other day       sertraline 100 MG tablet    ZOLOFT    30 tablet    Take 1 tablet (100 mg) by mouth daily       sirolimus 0.5 MG tablet    RAPAMUNE -  GENERIC EQUIVALENT    120 tablet    Take 3 tablets (1.5 mg) by mouth daily       sulfamethoxazole-trimethoprim 400-80 MG per tablet    BACTRIM/SEPTRA    60 tablet    Take 1 tablet by mouth daily

## 2017-02-02 NOTE — NURSING NOTE
BMT Heme Malignancy Rooming Note    Deejay Dior - 2/2/2017 1:38 PM     Chief Complaint   Patient presents with     Labs Only     venipuncture, vitals checked     RECHECK     Return: MDS        /78 mmHg  Pulse 89  Temp(Src) 95.9  F (35.5  C)  Resp 18  Wt 102.967 kg (227 lb)  SpO2 98%     Medications reviewed: Yes    Labs drawn: No    Dressing changed: Not applicable     Medications given: No    Staff time:6    Additional information if applicable: n/a    ANA MARÍA HOLM CMA

## 2017-02-03 LAB
CMV DNA SPEC NAA+PROBE-ACNC: NORMAL [IU]/ML
CMV DNA SPEC NAA+PROBE-LOG#: NORMAL {LOG_IU}/ML
SPECIMEN SOURCE: NORMAL

## 2017-02-04 LAB
EBV DNA # SPEC NAA+PROBE: 1539 {COPIES}/ML
EBV DNA SPEC NAA+PROBE-LOG#: 3.2 {LOG_COPIES}/ML

## 2017-02-05 NOTE — PROGRESS NOTES
BMT Daily Progress Note     ID/CC:  Mr. 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     HPI: Deejay is here with his wife Racquel.  Since last visit he remains on sirolimus and has tapered his pred to 5 mg EOD.   He saw a nephrologist and when off all of his diuretics.  BP has been ok and he has actually lost weight (down from 250--225 lbs).  Still using edema wraps. 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 Yanez 4/25/17.  There is a big canker sore on the inside of his lip (painful). Main issue is that he is still quite exhausted, easily tired and MERIDA. Saw endo and has low testosterone.  They are doing more tests and may start replacement therapy.  New F/C/S, on URI sx or new cough.  USing flonase/claritin.  No chest pain, no palp/dizziness. Stable bruises and skin tears. No N/V/D/C.  Sleeping ok w no change in PM urination.  Mood good (apart from being annoyed by fatigue). Planning a trip to [place] next week.    Review of Systems: 10 point ROS negative except as noted above.    Physical Exam:  /78 mmHg  Pulse 89  Temp(Src) 95.9  F (35.5  C)  Resp 18  Wt 102.967 kg (227 lb)  SpO2 98%   Wt Readings from Last 4 Encounters:   02/02/17 102.967 kg (227 lb)   01/24/17 105.235 kg (232 lb)   01/02/17 102.059 kg (225 lb)   12/14/16 115.168 kg (253 lb 14.4 oz)     General: NAD, continues to look cushingoid, but improvied  Eyes: SARIKA, sclera anicteric  Nose/Mouth/Throat: OP clear, buccal mucosa moist, no lichenoid changes.  Full upper plate  no pharyngeal erythema/drainage. Dime sized canker sore R inner lower lip, but surgical site well healed  Neck: Soft tissue swelling bilaterally has improved  Lungs: CTA Bilaterally, no wheezes or crackles.  Good air mvmt bilaterally.    Cardiovascular: RRR, no M/R/G   Abdominal: +BS, soft, NT, ND, No HSM central hernia   Lymphatics: +1-2 LE edema, stable from last visit.     Skin:   Generalized  hyperpigmentation.  Multiple ecchymoses & skin tears  Neuro: A&O, slight resting tremor (stable from last visit)  Line: NONE    Labs:  Lab Results   Component Value Date    WBC 4.3 02/02/2017    ANEU 2.1 02/02/2017    HGB 13.1* 02/02/2017    HCT 40.5 02/02/2017    * 02/02/2017     02/02/2017    POTASSIUM 3.7 02/02/2017    CHLORIDE 107 02/02/2017    CO2 22 02/02/2017    GLC 87 02/02/2017    BUN 27 02/02/2017    CR 1.09 02/02/2017    MAG 2.0 02/02/2017    INR 0.95 06/30/2016       ASSESSMENT AND PLAN:  Mr. 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 Total cell dose (from 7/1 & 7/2) 6.53 x 10^6 CD34+ cells/kg.   - 1 year anniversary (July 2015): 30% cellular, trilineage hematopoiesis, no abnormal blasts by morphology or flow , no dysplasia, 0-1 fibrosis, 100% donor (BM, CD3, CD15). CR  - 2 year anniversary (July 2016): 20-30% cellular, trilineage hematopoiesis, no abnormal blasts by morphology or flow , no dysplasia, NO fibrosis, 100% donor in BM (PB not sent). ISCN:  //46,XX[20] Complete Remission.    HEME:   4.3>13.1<140 ANC 2.1   0.1 Eos.  Stable.   -Transfuse to keep Hgb >8 & plt >50.   Not needing transfusions.  -Dx with RUE DVT 3/28/2016 and then a R femoral DVT was found incidentally on CT on 3/31/16 which developed while on Lovenox.  Bridged with Coumadin (started 4/5) through 6/30/16 (3 months).  Now off coumadin. 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 Rest of R and L venous system patent.  Follow.    GVHD: Being treated for cGVHD with fatigue, weakness, SOB/MERIDA.  No ev of pulm GVHD per Dr. Sandoval visit 6/7/16.  Worsening fatigue, which has been a major symptom of his GVHD flares. Want to taper pred as able.  - Currently  prednisone (since 8/18) and on Sirolimus to 1.5 mg daily (x8/18).  Level stable at 9.3 (2/2/17).    Continue pred 5/0 mg alternating and follow up after his trip. Try to d/c completely.    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  Rx with topical steroids/ Pred/CSA & completed prednisone taper on 11/17/14.  -- GVHD relapse: Skin bx on 1/22/15 showed Grade 2 GVH, resolved with topical steroids. Flare of GI GVH on 2/2/15 with +bx of the duodenum & rectum as well as rising LFT's. Rx with steroids with no response. S/P ATG 2/8-2/13/15  -- CGVHD: Stigmata of chronic oral GVHD but lip biopsy 8/19/15 negative. Throat pain with negative EGD. However, responded to steroids. LFTs normal. No rash, no joint sx or skin sxs of chronic GVH, but eyes are very dry & + Schirmer's test Oct 2015 and ocular GVHD per optho (11/6/15).   -- Moderate ocular GVHD: Restasis eye gtts and duct plugs per optho consult 11/6/15. Continue restasis.   --repeat lip biopsy 1/4/2016  showed GVHD.  Rx and responded well to sirolimus and Prednisone.    --Repeat lip biopsy 11/15/16 with Atypical squamous cell proliferation, transected at the base. Repeat surgery 12/28/16.  F/u Dr. Yanez 4/25/17.  --Reminded him about sun block and SPF chapstick and hats in [place]    PULM:  SOB/MERIDA/CT GGO:  Per Dr. Sandoval 6/7/16 findings and clinical course most consistent with bronchiolitis/organizing pneumonia post H1N1 infection. He followed up w/ Dr. Sandoval 7/19/16 who was happy with his good response to steroids, which can be tapered from pulm standpoing. CT much improved.   -Continue MDI  -Graduated from pulm rehab    ID:   Afebrile.  RSV/Flu/Viral culture 10/12 neg.   -  Recent hx of multiple episodes of PNA/URI/sinusitis.  Infl B on 5/5, Influenza A, H1N1 & HCAP in March 2016 & Geotrichim by BAL.  Please see Dr. Sandoval's note, he thinks his symptoms are consistent with bronchiolitis/organizing pneumonia post H1N1  infection & is recommending continuing the current dose of Pred [id number] & then re assess.    - IgG = 403, repleted 3/22/16, 5/8/16= 1270   - Prophy:  HD ACV (renal dosing), Fluconazole and SS daily Bactrim.   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  Titer has been fluctating with a low of 617 on 6/2. Up to 3180 log 3.5 on 6/16. Stable 6/30/16 (3118 log 3.5).  Follow frequently, no tx unless significantly higher or ev/concern for PTLD (none now). 3565 (log 3.6) on 7/7/16. Recheck 832 (log 2.9) on 8/18/16.  No detectable EBV (9/8/16). 660 (log 2.8) 9/22/16. Stable on  (10/12), and 629 copies (log 2.8)  (10/27) stable. 689 (log 2.8) 11/17/16.  (12/14/16) is undetectable (<500).  Today (2/2/17) 1539 (log 3.2). Follow each visit.  Flu shot 10/27/16    5. GI: No active issues. Appetite good. Occ sensation that food stuck in esophagus (less frequent). LFTs normal, alb better at 3.4.   Follow.   Cont Protonix daily.     6. FEN/Renal:  Cr much better and remains off diuretics.  Edema better, weight down (may be from pred taper).  Follow.   - He sometimes takes  K replacement but I don't think he needs it (K 3.7).  He will d/c and check next visit.    - Cont Ca/Vit D      7. CV/HTN: Echo 9/13/16 for ongoing edema, fatigue etc.  EF 60-65% w/ mild LVH and mild REMY and impaired LV relaxation. BP better today on no therapy.  Follow next visit.    8. Pain:  Neuropathy. More electrical shock feeling L foot (occ).  Follow.   - Continue  gabapentin 300 mg BID (still trying to taper)    9.  Depression: Mood seems good. Previously discussed whether fatigue could be related to depression and he is confident that is not the issue. He is looking forward to his trip.   Continue on Zoloft 100mg daily    10. Fatigue: w/u for extreme fatigue has included eval for hypothyroid & adrenal insuficciency, both of which have been normal.  Previous episodes of extreme fatigue Improved w/ steroids for cGVHD treatment.  Tapered  off and sx worsened.  He is using Bipap.  PM urinary frequency better w/ proscar.  Echo shows only mild diastolic dysfxn.  No active infections (low grade EBV- CFS?).  Thus by process of elimination, I think this is related to GVHD.  We restarted pred and increased sirolimus.  There has been some improvement, but now plateaued.  On mild diuresis w/ HCTZ and follow.   Tapering steroids.   Low testosterone and being followed in Endocrine who ordered more tests.  They will call him and perhaps try treatment.    11. Urinary frequency.   Seen by urology 8/15, doing well on Proscar 5 mg per day and Uroxatral 10 mg per day.    12. Congestion: Chronic (x years), no signs/sx infection.  Continue claritin/flonase.    13. MSK: Plantar faciitis (podiatry following, boot). Knee pain.  Better today. Wants PT/OT to also work on Shoulders.  I will ask Renu how to order that ([facility]).    PLAN:  RTC 3/7/16  Sooner if needed.

## 2017-02-08 ENCOUNTER — TELEPHONE (OUTPATIENT)
Dept: ENDOCRINOLOGY | Facility: CLINIC | Age: 67
End: 2017-02-08

## 2017-02-08 DIAGNOSIS — R79.89 LOW TESTOSTERONE: Primary | ICD-10-CM

## 2017-02-08 NOTE — TELEPHONE ENCOUNTER
Spoke with pt, the repeat testosterone was still low so he will do repeat testosterone with LH and Prolactin per Dr. Chairez last note. He will do the lab tomorrow at local .    ----- Message from Estefani Blanchard sent at 2/8/2017  1:47 PM CST -----  Regarding: Lab Results  Contact: [phone]  PT is requesting a call to discuss test results that Dr. Chairez ordered.     PT can be reached at: [phone]- okay to leave a message.    Thank you!  Estefani  Call Center    Please DO NOT send this message and/or reply back to sender.  Call Center Representatives DO NOT respond to messages.

## 2017-02-09 DIAGNOSIS — R79.89 LOW TESTOSTERONE: ICD-10-CM

## 2017-02-09 LAB
FSH SERPL-ACNC: 50.8 IU/L (ref 0.7–10.8)
LH SERPL-ACNC: 47.5 IU/L (ref 1.5–9.3)
PROLACTIN SERPL-MCNC: 10 UG/L (ref 2–18)

## 2017-02-09 PROCEDURE — 83002 ASSAY OF GONADOTROPIN (LH): CPT | Performed by: INTERNAL MEDICINE

## 2017-02-09 PROCEDURE — 36415 COLL VENOUS BLD VENIPUNCTURE: CPT | Performed by: INTERNAL MEDICINE

## 2017-02-09 PROCEDURE — 83001 ASSAY OF GONADOTROPIN (FSH): CPT | Performed by: INTERNAL MEDICINE

## 2017-02-09 PROCEDURE — 84270 ASSAY OF SEX HORMONE GLOBUL: CPT | Performed by: INTERNAL MEDICINE

## 2017-02-09 PROCEDURE — 84403 ASSAY OF TOTAL TESTOSTERONE: CPT | Performed by: INTERNAL MEDICINE

## 2017-02-09 PROCEDURE — 84146 ASSAY OF PROLACTIN: CPT | Performed by: INTERNAL MEDICINE

## 2017-02-11 LAB
SHBG SERPL-SCNC: 103 NMOL/L (ref 11–80)
TESTOST FREE SERPL-MCNC: 1.4 NG/DL (ref 4.7–24.4)
TESTOST SERPL-MCNC: 173 NG/DL (ref 240–950)

## 2017-02-16 ENCOUNTER — TELEPHONE (OUTPATIENT)
Dept: ENDOCRINOLOGY | Facility: CLINIC | Age: 67
End: 2017-02-16

## 2017-02-16 NOTE — TELEPHONE ENCOUNTER
Pt called re: testosterone labs. Received results, asking what next step is. Can be reached at [phone]. Detailed VM fine. Message sent to endo pool.

## 2017-02-20 NOTE — TELEPHONE ENCOUNTER
Message  Received: 4 days ago       Rd Chairez MD Schwendeman, Connie M RN       Caller: Unspecified (4 days ago, 11:36 AM)                     Would start on testosterone therapy, need to be seen in clinic for that.   can add on 2/20 at 11:45 MG   Or 2/28  At 2:30 CSc       Pt contacted, he is in [place] until March, scheduled 3/14 for clinic appt

## 2017-02-27 DIAGNOSIS — D89.813 GVH (GRAFT VERSUS HOST DISEASE) (H): ICD-10-CM

## 2017-02-27 RX ORDER — SIROLIMUS 0.5 MG/1
1.5 TABLET, FILM COATED ORAL DAILY
Qty: 120 TABLET | Refills: 2 | Status: SHIPPED | OUTPATIENT
Start: 2017-02-27 | End: 2017-07-09

## 2017-02-28 ENCOUNTER — TELEPHONE (OUTPATIENT)
Dept: TRANSPLANT | Facility: CLINIC | Age: 67
End: 2017-02-28

## 2017-03-01 ENCOUNTER — CARE COORDINATION (OUTPATIENT)
Dept: TRANSPLANT | Facility: CLINIC | Age: 67
End: 2017-03-01

## 2017-03-01 NOTE — PROGRESS NOTES
Contacted by patient regarding sirolimus prescription requiring a prior auth (PA). He is currently vacation in the [facility]. The PA was faxed to the PA team on 1/18/17. I called them and they indicated that they have a back load of faxed requests and have not initiated this request. They will now expedite the request. The patient purchased a 4 day supply while waiting for the PA approval. Dr Pinto was notified.

## 2017-03-06 NOTE — TELEPHONE ENCOUNTER
[facility] Prior Authorization Team   Phone: [phone]  Fax: [phone]    PA Initiation    Medication: sirolimus 0.5 MG tablet  Insurance Company: [employer] - Phone [phone] Fax [phone]  Pharmacy Filling the Rx: [address]  Filling Pharmacy Phone: [phone]  Filling Pharmacy Fax: [phone]  Start Date: 3/6/2017

## 2017-03-07 ENCOUNTER — ONCOLOGY VISIT (OUTPATIENT)
Dept: TRANSPLANT | Facility: CLINIC | Age: 67
End: 2017-03-07
Attending: INTERNAL MEDICINE
Payer: MEDICARE

## 2017-03-07 ENCOUNTER — APPOINTMENT (OUTPATIENT)
Dept: LAB | Facility: CLINIC | Age: 67
End: 2017-03-07
Attending: INTERNAL MEDICINE
Payer: MEDICARE

## 2017-03-07 VITALS
WEIGHT: 224.7 LBS | SYSTOLIC BLOOD PRESSURE: 125 MMHG | HEART RATE: 94 BPM | DIASTOLIC BLOOD PRESSURE: 74 MMHG | RESPIRATION RATE: 16 BRPM | TEMPERATURE: 97.5 F | OXYGEN SATURATION: 95 % | BODY MASS INDEX: 33.18 KG/M2

## 2017-03-07 DIAGNOSIS — R35.0 URINARY FREQUENCY: ICD-10-CM

## 2017-03-07 DIAGNOSIS — D89.813 GVH (GRAFT VERSUS HOST DISEASE) (H): ICD-10-CM

## 2017-03-07 DIAGNOSIS — J10.00 PNEUMONIA DUE TO INFLUENZA A VIRUS, UNSPECIFIED LATERALITY, UNSPECIFIED PART OF LUNG: ICD-10-CM

## 2017-03-07 DIAGNOSIS — D46.9 MDS (MYELODYSPLASTIC SYNDROME) (H): ICD-10-CM

## 2017-03-07 DIAGNOSIS — T86.09 OTHER COMPLICATION OF BONE MARROW TRANSPLANT (H): ICD-10-CM

## 2017-03-07 LAB
ALBUMIN SERPL-MCNC: 3.2 G/DL (ref 3.4–5)
ALP SERPL-CCNC: 96 U/L (ref 40–150)
ALT SERPL W P-5'-P-CCNC: 31 U/L (ref 0–70)
ANION GAP SERPL CALCULATED.3IONS-SCNC: 9 MMOL/L (ref 3–14)
AST SERPL W P-5'-P-CCNC: 31 U/L (ref 0–45)
BASOPHILS # BLD AUTO: 0 10E9/L (ref 0–0.2)
BASOPHILS NFR BLD AUTO: 0.4 %
BILIRUB SERPL-MCNC: 0.5 MG/DL (ref 0.2–1.3)
BUN SERPL-MCNC: 20 MG/DL (ref 7–30)
CALCIUM SERPL-MCNC: 8.8 MG/DL (ref 8.5–10.1)
CHLORIDE SERPL-SCNC: 110 MMOL/L (ref 94–109)
CO2 SERPL-SCNC: 22 MMOL/L (ref 20–32)
CREAT SERPL-MCNC: 0.93 MG/DL (ref 0.66–1.25)
DIFFERENTIAL METHOD BLD: ABNORMAL
EOSINOPHIL # BLD AUTO: 0.1 10E9/L (ref 0–0.7)
EOSINOPHIL NFR BLD AUTO: 2.5 %
ERYTHROCYTE [DISTWIDTH] IN BLOOD BY AUTOMATED COUNT: 16 % (ref 10–15)
GFR SERPL CREATININE-BSD FRML MDRD: 81 ML/MIN/1.7M2
GLUCOSE SERPL-MCNC: 91 MG/DL (ref 70–99)
HCT VFR BLD AUTO: 39.7 % (ref 40–53)
HGB BLD-MCNC: 12.8 G/DL (ref 13.3–17.7)
IMM GRANULOCYTES # BLD: 0 10E9/L (ref 0–0.4)
IMM GRANULOCYTES NFR BLD: 0.4 %
LYMPHOCYTES # BLD AUTO: 1.3 10E9/L (ref 0.8–5.3)
LYMPHOCYTES NFR BLD AUTO: 23.1 %
MAGNESIUM SERPL-MCNC: 2 MG/DL (ref 1.6–2.3)
MCH RBC QN AUTO: 26.3 PG (ref 26.5–33)
MCHC RBC AUTO-ENTMCNC: 32.2 G/DL (ref 31.5–36.5)
MCV RBC AUTO: 82 FL (ref 78–100)
MONOCYTES # BLD AUTO: 0.8 10E9/L (ref 0–1.3)
MONOCYTES NFR BLD AUTO: 13.9 %
NEUTROPHILS # BLD AUTO: 3.4 10E9/L (ref 1.6–8.3)
NEUTROPHILS NFR BLD AUTO: 59.7 %
NRBC # BLD AUTO: 0 10*3/UL
NRBC BLD AUTO-RTO: 0 /100
PLATELET # BLD AUTO: 153 10E9/L (ref 150–450)
POTASSIUM SERPL-SCNC: 3.6 MMOL/L (ref 3.4–5.3)
PROT SERPL-MCNC: 6.8 G/DL (ref 6.8–8.8)
RBC # BLD AUTO: 4.86 10E12/L (ref 4.4–5.9)
SIROLIMUS BLD-MCNC: 6.9 UG/L (ref 5–15)
SODIUM SERPL-SCNC: 141 MMOL/L (ref 133–144)
TME LAST DOSE: NORMAL H
WBC # BLD AUTO: 5.6 10E9/L (ref 4–11)

## 2017-03-07 PROCEDURE — 36415 COLL VENOUS BLD VENIPUNCTURE: CPT

## 2017-03-07 PROCEDURE — 80195 ASSAY OF SIROLIMUS: CPT | Performed by: INTERNAL MEDICINE

## 2017-03-07 PROCEDURE — 80053 COMPREHEN METABOLIC PANEL: CPT | Performed by: INTERNAL MEDICINE

## 2017-03-07 PROCEDURE — 87799 DETECT AGENT NOS DNA QUANT: CPT | Performed by: INTERNAL MEDICINE

## 2017-03-07 PROCEDURE — 85025 COMPLETE CBC W/AUTO DIFF WBC: CPT | Performed by: INTERNAL MEDICINE

## 2017-03-07 PROCEDURE — 99212 OFFICE O/P EST SF 10 MIN: CPT

## 2017-03-07 PROCEDURE — 83735 ASSAY OF MAGNESIUM: CPT | Performed by: INTERNAL MEDICINE

## 2017-03-07 RX ORDER — SULFAMETHOXAZOLE AND TRIMETHOPRIM 400; 80 MG/1; MG/1
1 TABLET ORAL DAILY
Qty: 60 TABLET | Refills: 6 | Status: SHIPPED | OUTPATIENT
Start: 2017-03-07 | End: 2018-02-21

## 2017-03-07 RX ORDER — SERTRALINE HYDROCHLORIDE 100 MG/1
100 TABLET, FILM COATED ORAL DAILY
Qty: 30 TABLET | Refills: 6 | Status: SHIPPED | OUTPATIENT
Start: 2017-03-07 | End: 2017-11-13

## 2017-03-07 RX ORDER — AMOXICILLIN 500 MG/1
CAPSULE ORAL
Qty: 4 CAPSULE | Refills: 11 | Status: SHIPPED | OUTPATIENT
Start: 2017-03-07 | End: 2018-04-26

## 2017-03-07 RX ORDER — PANTOPRAZOLE SODIUM 40 MG/1
40 TABLET, DELAYED RELEASE ORAL DAILY
Qty: 60 TABLET | Refills: 5 | Status: SHIPPED | OUTPATIENT
Start: 2017-03-07 | End: 2017-03-07

## 2017-03-07 RX ORDER — AMOXICILLIN 250 MG/1
CAPSULE ORAL
Qty: 4 CAPSULE | Refills: 11 | Status: SHIPPED | OUTPATIENT
Start: 2017-03-07 | End: 2017-03-07

## 2017-03-07 RX ORDER — PANTOPRAZOLE SODIUM 40 MG/1
40 TABLET, DELAYED RELEASE ORAL DAILY
Qty: 60 TABLET | Refills: 11 | Status: SHIPPED | OUTPATIENT
Start: 2017-03-07 | End: 2017-12-14

## 2017-03-07 ASSESSMENT — PAIN SCALES - GENERAL: PAINLEVEL: MODERATE PAIN (5)

## 2017-03-07 NOTE — NURSING NOTE
BMT Heme Malignancy Rooming Note    Deejay Dior - 3/7/2017 1:44 PM     Chief Complaint   Patient presents with     Labs Only     Peripheral labs     RECHECK     S/P bMT for MM--here for rtn        /74 (BP Location: Right arm, Patient Position: Chair, Cuff Size: Adult Large)  Pulse 94  Temp 97.5  F (36.4  C) (Oral)  Resp 16  Wt 101.9 kg (224 lb 11.2 oz)  SpO2 95%  BMI 33.18 kg/m2    Moderate Pain (5)     Medications reviewed: Yes    Labs drawn: Yes    Drawn by: Clinic Staff  Via: venipuncture  See Doc Flowsheet for details.      Dressing changed: Not applicable     Medications given: No    Staff time:6    Additional information if applicable: Pt is here for provider visit    Elizabeth Forrester MA

## 2017-03-07 NOTE — PROGRESS NOTES
BMT Daily Progress Note     ID/CC:  Mr. 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     HPI: Deejay is here with his wife Racqeul.  They went on vacation in [place] and had a great time. No new issues or complaints.  He continues on sirolimus and has tapered his pred to 5 mg EOD.   His main issue continues to be ongoing severe fatigue. He is easily tired and MERIDA. Saw endo and has low testosterone.  They have done more tests and may start replacement therapy next week.  He remains off all diuretics and edema is stable.  Still wrapping legs (not thighs).  Weight down to 224 lbs. 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 Yanez 4/25/17.  Canker sore has healed.  New F/C/S, on URI sx or new cough.  Using flonase/claritin.  No chest pain, no palp/dizziness. Stable bruises and skin tears. No N/V/D/C.  Sleeping ok w no change in PM urination.  Mood good (apart from being annoyed by fatigue). Has appts w/ a surgeon for abdominal hernia evaluation and EMG for bilateral arm tingling at night.     Review of Systems: 10 point ROS negative except as noted above.    Physical Exam:  /74 (BP Location: Right arm, Patient Position: Chair, Cuff Size: Adult Large)  Pulse 94  Temp 97.5  F (36.4  C) (Oral)  Resp 16  Wt 101.9 kg (224 lb 11.2 oz)  SpO2 95%  BMI 33.18 kg/m2   Wt Readings from Last 4 Encounters:   03/07/17 101.9 kg (224 lb 11.2 oz)   02/02/17 103 kg (227 lb)   01/24/17 105.2 kg (232 lb)   01/02/17 102.1 kg (225 lb)     General: NAD, continues to less cushingoid each visit  Eyes: SARIKA, sclera anicteric  Nose/Mouth/Throat: OP clear, buccal mucosa moist, no lichenoid changes.  Full upper plate  no pharyngeal erythema/drainage. Prior canker sore R inner lower lip has healed.  Surgical site well healed  Lungs: CTA Bilaterally, no wheezes or crackles.  Good air mvmt bilaterally.    Cardiovascular: RRR, no M/R/G   Abdominal: +BS, soft, NT, ND, No HSM central  hernia   Lymphatics: +2 LE edema ankles/feet, stable from last visit.     Skin:   Generalized hyperpigmentation.  Multiple ecchymoses & skin tears  Neuro: A&O, slight resting tremor (stable from last visit)  Line: NONE    Labs:  Lab Results   Component Value Date    WBC 5.6 03/07/2017    ANEU 3.4 03/07/2017    HGB 12.8 (L) 03/07/2017    HCT 39.7 (L) 03/07/2017     03/07/2017     03/07/2017    POTASSIUM 3.6 03/07/2017    CHLORIDE 110 (H) 03/07/2017    CO2 22 03/07/2017    GLC 91 03/07/2017    BUN 20 03/07/2017    CR 0.93 03/07/2017    MAG 2.0 03/07/2017    INR 0.95 06/30/2016       ASSESSMENT AND PLAN:  Mr. 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 Total cell dose (from 7/1 & 7/2) 6.53 x 10^6 CD34+ cells/kg.   - 1 year anniversary (July 2015): 30% cellular, trilineage hematopoiesis, no abnormal blasts by morphology or flow , no dysplasia, 0-1 fibrosis, 100% donor (BM, CD3, CD15). CR  - 2 year anniversary (July 2016): 20-30% cellular, trilineage hematopoiesis, no abnormal blasts by morphology or flow , no dysplasia, NO fibrosis, 100% donor in BM (PB not sent). ISCN:  //46,XX[20] Complete Remission.    HEME:   5.6>12.8<153 ANC 3.4  0.1 Eos.  Stable.   -Transfuse to keep Hgb >8 & plt >50.   Not needing transfusions.  -Dx with RUE DVT 3/28/2016 and then a R femoral DVT was found incidentally on CT on 3/31/16 which developed while on Lovenox.  Bridged with Coumadin (started 4/5) through 6/30/16 (3 months).  Now off coumadin. 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 Rest of R and L venous system patent.  Follow.    GVHD: Being treated for cGVHD with fatigue, weakness, SOB/MERIDA.  No ev of pulm GVHD per Dr. Sandoval visit 6/7/16.  Worsening  fatigue, which has been a major symptom of his GVHD flares. Want to taper pred as able.  - Currently prednisone (since 8/18) and on Sirolimus to 1.5 mg daily (x8/18).  Level stable at 9.3 (2/2/17).    Continue pred 5/0 mg alternating and follow up after his trip. Try to d/c completely. 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  Rx with topical steroids/ Pred/CSA & completed prednisone taper on 11/17/14.  -- GVHD relapse: Skin bx on 1/22/15 showed Grade 2 GVH, resolved with topical steroids. Flare of GI GVH on 2/2/15 with +bx of the duodenum & rectum as well as rising LFT's. Rx with steroids with no response. S/P ATG 2/8-2/13/15  -- CGVHD: Stigmata of chronic oral GVHD but lip biopsy 8/19/15 negative. Throat pain with negative EGD. However, responded to steroids. LFTs normal. No rash, no joint sx or skin sxs of chronic GVH, but eyes are very dry & + Schirmer's test Oct 2015 and ocular GVHD per optho (11/6/15).   -- Moderate ocular GVHD: Restasis eye gtts and duct plugs per optho consult 11/6/15. Continue restasis.   --repeat lip biopsy 1/4/2016  showed GVHD.  Rx and responded well to sirolimus and Prednisone.    --Repeat lip biopsy 11/15/16 with Atypical squamous cell proliferation, transected at the base. Repeat surgery 12/28/16.  F/u Dr. Yanez 4/25/17.    PULM:  SOB/MERIDA/CT GGO:  Per Dr. Sandoval 6/7/16 findings and clinical course most consistent with bronchiolitis/organizing pneumonia post H1N1 infection. He followed up w/ Dr. Sandoval 7/19/16 who was happy with his good response to steroids, which can be tapered from pulm standpoing. CT much improved.   -Graduated from pulm rehab    ID:   Afebrile.  RSV/Flu/Viral culture 10/12 neg.   -  Recent hx of multiple episodes of PNA/URI/sinusitis.  Infl B on 5/5, Influenza A, H1N1 & HCAP in March 2016 & Geotrichim by BAL.  Please see Dr. Sandoval's note, he thinks his symptoms are consistent with  bronchiolitis/organizing pneumonia post H1N1 infection & is recommending continuing the current dose of Pred [id number] & then re assess.    - IgG = 403, repleted 3/22/16, 5/8/16= 1270   - Prophy:  HD ACV (renal dosing), Fluconazole and SS daily Bactrim.   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  Titer has been fluctating with a low of 617 on 6/2. Up to 3180 log 3.5 on 6/16. Stable 6/30/16 (3118 log 3.5).  Follow frequently, no tx unless significantly higher or ev/concern for PTLD (none now). 3565 (log 3.6) on 7/7/16. Recheck 832 (log 2.9) on 8/18/16.  No detectable EBV (9/8/16). 660 (log 2.8) 9/22/16. Stable on  (10/12), and 629 copies (log 2.8)  (10/27) stable. 689 (log 2.8) 11/17/16.  (12/14/16) is undetectable (<500).  Today (2/2/17) 1539 (log 3.2). Follow each visit. PENDING TODAY  Flu shot 10/27/16    5. GI: No active issues. Appetite good. Occ sensation that food stuck in esophagus (less frequent). LFTs normal, alb down slightly at 3.2.   Follow.   Cont Protonix daily.     6. FEN/Renal:  Cr much better and remains off diuretics.  Edema better, weight down (may be from pred taper).  Follow.   -K and Mg good on no PO replacement.  - Cont Ca/Vit D      7. CV/HTN: Echo 9/13/16 for ongoing edema, fatigue etc.  EF 60-65% w/ mild LVH and mild REMY and impaired LV relaxation. BP better today on no therapy.  Follow next visit.    8. Pain:  Neuropathy. More electrical shock feeling L foot (occ).  Stable even after tapering off gabapentin.  Follow.     9.  Depression: Mood seems good. Previously discussed whether fatigue could be related to depression and he is confident that is not the issue. He is looking forward to his trip.   Continue on Zoloft 100mg daily    10. Fatigue: w/u for extreme fatigue has included eval for hypothyroid & adrenal insuficciency, both of which have been normal.  Previous episodes of extreme fatigue Improved w/ steroids for cGVHD treatment.  Tapered off and sx worsened.  He  is using Bipap.  PM urinary frequency better w/ proscar.  Echo shows only mild diastolic dysfxn.  No active infections (low grade EBV- CFS?).  Thus by process of elimination, I think this is related to GVHD.  We restarted pred and increased sirolimus.  There has been some improvement, but now plateaued.  On mild diuresis w/ HCTZ and follow.   Tapering steroids.   Low testosterone and being followed in Endocrine who ordered more tests.  They will call him and perhaps try treatment.    11. Urinary frequency.   Seen by urology 8/15, doing well on Proscar 5 mg per day and Uroxatral 10 mg per day.    12. Congestion: Chronic (x years), no signs/sx infection.  Continue claritin/flonase.    13. MSK: Plantar faciitis (podiatry following, boot). Knee pain.  Working with  PT/OT.    14. Bilat arm tingling when he wakes up: EMG next week    PLAN:  RTC 4/13/17  Sooner if needed.

## 2017-03-07 NOTE — TELEPHONE ENCOUNTER
Prior Authorization Approval    Authorization Effective Date: 3/6/2017  Authorization Expiration Date: 3/7/2018  Medication: sirolimus 0.5 MG tablet - Approved  Approved Dose/Quantity: Take 3 tablets (1.5 mg) by mouth daily  Reference #:     Insurance Company: [employer] [place] - Phone [phone] Fax [phone]  Expected CoPay:       CoPay Card Available:      Foundation Assistance Needed:    Which Pharmacy is filling the prescription (Not needed for infusion/clinic administered): [place] [address] [address]  Pharmacy Notified: Yes  Patient Notified: Yes

## 2017-03-07 NOTE — MR AVS SNAPSHOT
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  After hours call the   hospital at [phone] or   [phone]. Ask for the BMT   fellow for pediatric or adult patients           Care Instructions    4/13 @ 1pm        Follow-ups after your visit        Your next 10 appointments already scheduled     Mar 14, 2017  2:00 PM CDT   (Arrive by 1:45 PM)   RETURN ENDOCRINE with Rd Chairez MD   [facility] Endocrinology ([facility])    [address] [place] [address]  [address] [facility] [address] [phone]            Apr 13, 2017  1:00 PM CDT   [facility] with  [facility]   [facility] ([facility])    [address] [place] [address]   [phone]            Apr 13, 2017  1:30 PM CDT   Return with Aby Pinto MD   [facility] Blood and Marrow Transplant ([facility])    [address] [place] [address]   [phone]            Apr 25, 2017  3:15 PM CDT   (Arrive by 3:00 PM)   RETURN TUMOR VISIT with Tim Yanez  MD   [facility] Ear Nose and Throat ([facility])    [address] [facility]  4th Floor  [facility] [address] [phone]              Future tests that were ordered for you today     Open Future Orders        Priority Expected Expires Ordered    Comprehensive metabolic panel Routine 4/13/2017 6/7/2017 3/7/2017    Magnesium Routine 4/13/2017 6/7/2017 3/7/2017    CBC with platelets differential Routine 4/13/2017 6/7/2017 3/7/2017    CMV DNA quantification Routine 4/13/2017 6/7/2017 3/7/2017    EBV DNA PCR Quantitative Whole Blood Routine 4/13/2017 6/7/2017 3/7/2017    Sirolimus level Routine 4/13/2017 6/7/2017 3/7/2017            Who to contact     If you have questions or need follow up information about today's clinic visit or your schedule please contact [facility] BLOOD AND MARROW TRANSPLANT directly at [phone].  Normal or non-critical lab and imaging results will be communicated to you by [employer], letter or phone within 4 business days after the clinic has received the results. If you do not hear from us within 7 days, please contact the clinic through [employer] or phone. If you have a critical or abnormal lab result, we will notify you by phone as soon as possible.  Submit refill requests through [employer] or call your pharmacy and they will forward the refill request to us. Please allow 3 business days for your refill to be completed.          Additional Information About Your Visit        [employer] Information     The Shared Web gives you secure access to your electronic health record. If you see a primary care provider, you can also send messages to your care team and make appointments. If you have questions, please call your primary care clinic.  If you do not have a primary care provider, please call [phone] and they will assist you.        Care EveryWhere ID     This is your Care EveryWhere ID. This could be used by other organizations to access your [facility]  records  [id number]        Your Vitals Were     Pulse Temperature Respirations Pulse Oximetry BMI (Body Mass Index)       94 97.5  F (36.4  C) (Oral) 16 95% 33.18 kg/m2        Blood Pressure from Last 3 Encounters:   03/07/17 125/74   02/02/17 128/78   01/24/17 120/78    Weight from Last 3 Encounters:   03/07/17 101.9 kg (224 lb 11.2 oz)   02/02/17 103 kg (227 lb)   01/24/17 105.2 kg (232 lb)              We Performed the Following     CBC with platelets differential     CMV DNA quantification     Comprehensive metabolic panel     EBV DNA PCR Quantitative Whole Blood     Magnesium     Sirolimus level          Today's Medication Changes          These changes are accurate as of: 3/7/17  2:26 PM.  If you have any questions, ask your nurse or doctor.               Start taking these medicines.        Dose/Directions    amoxicillin 500 MG capsule   Commonly known as:  AMOXIL   Used for:  MDS (myelodysplastic syndrome) (H)        Take 4 pills (2 grams) day of dental work   Quantity:  4 capsule   Refills:  11       pantoprazole 40 MG EC tablet   Commonly known as:  PROTONIX   Used for:  MDS (myelodysplastic syndrome) (H)        Dose:  40 mg   Take 1 tablet (40 mg) by mouth daily   Quantity:  60 tablet   Refills:  11         These medicines have changed or have updated prescriptions.        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 Please contact your care team if you have questions.     gabapentin 300 MG capsule   Commonly known as:  NEURONTIN                Where to get your medicines      These medications were sent to [id number]  [facility] [address] [address]  [place] [address]     Phone:  [phone]     amoxicillin 500 MG capsule    pantoprazole 40 MG EC tablet    sertraline 100 MG tablet    sulfamethoxazole-trimethoprim 400-80 MG per tablet                Recent Review Flowsheet Data     BMT Recent Results Latest Ref Rng & Units 9/22/2016 10/12/2016 10/27/2016 11/17/2016 12/14/2016 2/2/2017 3/7/2017    WBC 4.0 - 11.0 10e9/L 4.7 5.6 6.7 6.5 3.9(L) 4.3 5.6    Hemoglobin 13.3 - 17.7 g/dL 13.0(L) 11.8(L) 11.9(L) 12.1(L) 12.5(L) 13.1(L) 12.8(L)    Platelet Count 150 - 450 10e9/L 138(L) 125(L) 143(L) 164 143(L) 140(L) 153    Neutrophils (Absolute) 1.6 - 8.3 10e9/L 3.8 4.1 4.5 4.9 2.2 2.1 3.4    INR 0.86 - 1.14 - - - - - - -    Sodium 133 - 144 mmol/L 137 138 138 138 141 138 141    Potassium 3.4 - 5.3 mmol/L 4.1 3.6 3.6 4.0 3.2(L) 3.7 3.6    Chloride 94 - 109 mmol/L 103 104 102 100 105 107 110(H)    Glucose 70 - 99 mg/dL 146(H) 139(H) 114(H) 114(H) 114(H) 87 91    Urea Nitrogen 7 - 30 mg/dL 27 32(H) 34(H) 34(H) 28 27 20    Creatinine 0.66 - 1.25 mg/dL 1.23 1.31(H) 1.48(H) 1.17 1.15 1.09 0.93    Calcium (Total) 8.5 - 10.1 mg/dL 9.2 8.9 9.1 9.5 9.1 8.8 8.8    Protein (Total) 6.8 - 8.8 g/dL 7.0 6.5(L) 7.2 7.5 6.9 7.1 6.8    Albumin 3.4 - 5.0 g/dL 3.2(L) 3.0(L) 3.2(L) 3.3(L) 3.0(L) 3.4 3.2(L)    Alkaline Phosphatase 40 - 150 U/L 60 62 67 77 71 91 96    AST 0 - 45 U/L 37 37 37 41 39 40 31    ALT 0 - 70 U/L 40 30 36 40 31 35 31    MCV 78 - 100 fl 88 88 88 84 83 80 82               Primary Care Provider Office Phone # Fax #    Deejay Bonilla [phone]       [facility] [address] [place] [address]        Thank you!     Thank you for choosing [facility] BLOOD [facility] MARROW TRANSPLANT  for your care. Our goal is always to provide you with excellent care. Hearing back from our patients is one way we can continue to improve our services. Please take a few minutes to complete the written survey that you may receive in the mail after your visit  with us. Thank you!             Your Updated Medication List - Protect others around you: Learn how to safely use, store and throw away your medicines at www.disposemymeds.org.          This list is accurate as of: 3/7/17  2:26 PM.  Always use your most recent med list.                   Brand Name Dispense Instructions for use    acyclovir 800 MG tablet    ZOVIRAX    150 tablet    Take 1 tablet (800 mg) by mouth 3 times daily       alfuzosin 10 MG 24 hr tablet    UROXATRAL    30 tablet    Take 1 tablet (10 mg) by mouth daily       amoxicillin 500 MG capsule    AMOXIL    4 capsule    Take 4 pills (2 grams) day of dental work       calcium carbonate-vitamin D 500-400 MG-UNIT Tabs per tablet     180 tablet    Take 1 tablet by mouth daily 2000 mg       finasteride 5 MG tablet    PROSCAR    30 tablet    Take 1 tablet (5 mg) by mouth daily       fluconazole 100 MG tablet    DIFLUCAN    60 tablet    Take 1 tablet (100 mg) by mouth daily       fluticasone 50 MCG/ACT spray    FLONASE    1 Bottle    Spray 1-2 sprays into both nostrils daily       LEVOFLOXACIN PO          loratadine 10 MG capsule    CLARITIN    30 capsule    Take 10 mg by mouth daily       order for DME     1 Device    Please provide a [employer] cane offset with strap item number [id number]       pantoprazole 40 MG EC tablet    PROTONIX    60 tablet    Take 1 tablet (40 mg) by mouth daily       predniSONE 5 MG tablet    DELTASONE    60 tablet    5 mg and 0 mg alternating every other day       sertraline 100 MG tablet    ZOLOFT    30 tablet    Take 1 tablet (100 mg) by mouth daily       sirolimus 0.5 MG tablet    RAPAMUNE - GENERIC EQUIVALENT    120 tablet    Take 3 tablets (1.5 mg) by mouth daily       sulfamethoxazole-trimethoprim 400-80 MG per tablet    BACTRIM/SEPTRA    60 tablet    Take 1 tablet by mouth daily

## 2017-03-08 LAB
CMV DNA SPEC NAA+PROBE-ACNC: NORMAL [IU]/ML
CMV DNA SPEC NAA+PROBE-LOG#: NORMAL {LOG_IU}/ML
EBV DNA # SPEC NAA+PROBE: ABNORMAL {COPIES}/ML
EBV DNA SPEC NAA+PROBE-LOG#: ABNORMAL {LOG_COPIES}/ML
SPECIMEN SOURCE: NORMAL

## 2017-03-14 ENCOUNTER — OFFICE VISIT (OUTPATIENT)
Dept: ENDOCRINOLOGY | Facility: CLINIC | Age: 67
End: 2017-03-14

## 2017-03-14 VITALS
HEART RATE: 94 BPM | DIASTOLIC BLOOD PRESSURE: 77 MMHG | BODY MASS INDEX: 34.24 KG/M2 | WEIGHT: 225.9 LBS | HEIGHT: 68 IN | SYSTOLIC BLOOD PRESSURE: 119 MMHG

## 2017-03-14 DIAGNOSIS — E29.1 PRIMARY HYPOGONADISM IN MALE: Primary | ICD-10-CM

## 2017-03-14 RX ORDER — TESTOSTERONE 1.62 MG/G
2 GEL TRANSDERMAL DAILY
Qty: 75 G | Refills: 3 | Status: SHIPPED | OUTPATIENT
Start: 2017-03-14 | End: 2017-03-21

## 2017-03-14 ASSESSMENT — PAIN SCALES - GENERAL: PAINLEVEL: NO PAIN (0)

## 2017-03-14 NOTE — LETTER
3/14/2017       RE: Deejay [place]ior  [address] [address] [facility] [place] [address]     Dear Colleague,    Thank you for referring your patient, Deejay Dior, to the [facility] ENDOCRINOLOGY at [facility]. Please see a copy of my visit note below.    Deejay is a 66 year old male presents today for RECHECK (low testosterone)    HPI  Deejay is a 66-year-old male who presents today to review labs done for evaluation of low testosterone.  Patient has complicated past medical history including MDS status post BMT, rgmhp-pxrllo-zyhb disease  Patient continues to complains of disabling fatigue, which has not changed much from last visit.   He history of BPH and is treated with medications.  Patient also has history of sleep apnea which is treated with CPAP.  He also has history of depression and anxiety for which he takes Zoloft.  Also complains of low libido and ED  Patient  continues to be very frustrated with ongoing symptoms of fatigue and is eager to start testosterone.     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  A delay in compatible RBCs may occur.     Medications  Current Outpatient Prescriptions   Medication Sig Dispense Refill     amoxicillin (AMOXIL) 500 MG capsule Take 4 pills (2 grams) day of dental work 4 capsule 11     pantoprazole (PROTONIX) 40 MG EC tablet Take 1 tablet (40 mg) by mouth daily 60 tablet 11     sertraline (ZOLOFT) 100 MG tablet Take 1 tablet (100 mg) by mouth daily 30 tablet 6     sulfamethoxazole-trimethoprim (BACTRIM/SEPTRA) 400-80 MG per tablet Take 1 tablet by mouth daily 60 tablet 6     sirolimus (RAPAMUNE - GENERIC EQUIVALENT) 0.5 MG tablet Take 3 tablets (1.5 mg) by mouth daily 120 tablet 2     predniSONE (DELTASONE) 5 MG tablet 5 mg and 0 mg alternating every other day 60 tablet 3     fluticasone (FLONASE) 50 MCG/ACT spray Spray 1-2 sprays into both nostrils daily 1 Bottle 11     alfuzosin (UROXATRAL) 10 MG 24 hr tablet Take 1 tablet (10 mg) by mouth daily 30 tablet 3     fluconazole (DIFLUCAN) 100 MG tablet Take 1 tablet (100 mg) by mouth daily (Patient taking differently: Take 100 mg by mouth daily Taking 2) 60 tablet 6     finasteride (PROSCAR) 5 MG tablet Take 1 tablet (5 mg) by mouth daily 30 tablet 6     loratadine (CLARITIN) 10 MG capsule Take 10 mg by mouth daily 30 capsule 3     acyclovir (ZOVIRAX) 800 MG tablet Take 1 tablet (800 mg) by mouth 3 times daily 150 tablet 5     LEVOFLOXACIN PO        ORDER FOR DME Please provide a [employer] cane offset with strap item number [id number] 1 Device 0     calcium carbonate-vitamin D 500-400 MG-UNIT TABS tablt Take 1 tablet by mouth daily 2000 mg 180 tablet 3     Family History  family history includes Breast Cancer in his mother; CANCER in his father and mother; CEREBROVASCULAR DISEASE in his mother; Depression in his brother; HEART DISEASE in his brother and brother; Hyperlipidemia in his brother; Hypertension in  "his brother and brother. There is no history of Glaucoma, Macular Degeneration, or Hypertension.  Social History     Social History     Marital status:      Spouse name: N/A     Number of children: N/A     Years of education: N/A     Occupational History     Not on file.     Social History Main Topics     Smoking status: Former Smoker     Packs/day: 1.00     Years: 34.00     Types: Cigarettes     Start date: 12/1/1967     Quit date: 4/1/2001     Smokeless tobacco: Never Used      Comment: quit in 2000     Alcohol use 2.5 oz/week      Comment: Seldom     Drug use: No     Sexual activity: Not Currently     Partners: Female     Birth control/ protection: Male Surgical, Female Surgical     Other Topics Concern     Not on file     Social History Narrative      ROS:8 point ROS neg other than the symptoms noted above in the HPI.     Physical Exam  /77  Pulse 94  Ht 1.727 m (5' 8\")  Wt 102.5 kg (225 lb 14.4 oz)  BMI 34.35 kg/m2  Body mass index is 34.35 kg/(m^2).    Constitutional: no distress, comfortable, pleasant   Neurological: alert and oriented   Psychological: appropriate mood     RESULTS  ENDO PITUITARY LABS-[facility] Latest Ref Rng & Units 2/9/2017 2/2/2017   FSH 0.7 - 10.8 IU/L 50.8 (H)    LUTROPIN 1.5 - 9.3 IU/L 47.5 (H)    TESTOSTERONE TOTAL 240 - 950 ng/dL 173 (L)      ENDO PITUITARY LABS-[facility] Latest Ref Rng & Units 1/27/2017   FSH 0.7 - 10.8 IU/L    LUTROPIN 1.5 - 9.3 IU/L    TESTOSTERONE TOTAL 240 - 950 ng/dL 215 (L)         ASSESSMENT:      1. Primary hypogonadism: labs are suggestive of primary hypogonadism. He was counseled about indications of testosterone replacement.  Side effects reviewed in detail, discussed it's effect on BPH, sleep Apnea, erthrocytosis, skin irritation, thromboembolism and possible CV risk. Different formulations reviewed, he would like to start Testosterone gel. Precaution and application method reviewed. he was shown link to application method video on Androgel website. He " was given written hand out about testosterone side effects.   Given hx of BPH he was counseled to review with his urologist before starting testosterone replacement.   Start Androgel 1.62% 2 pump daily.    2. Chronic fatigue: again discussed the multiple possible etiologies for chronic fatigue. Trial of testosterone to see if it improves his symptoms.     Recheck level in 3 months  RTC in 3 months,       SEVERINO Tom    Total time 25 minutes more than 50% counseling patient on above

## 2017-03-14 NOTE — PROGRESS NOTES
Deejay is a 66 year old male presents today for RECHECK (low testosterone)    HPI  Deejay is a 66-year-old male who presents today to review labs done for evaluation of low testosterone.  Patient has complicated past medical history including MDS status post BMT, ffobh-pxders-mzqy disease  Patient continues to complains of disabling fatigue, which has not changed much from last visit.   He history of BPH and is treated with medications.  Patient also has history of sleep apnea which is treated with CPAP.  He also has history of depression and anxiety for which he takes Zoloft.  Also complains of low libido and ED  Patient  continues to be very frustrated with ongoing symptoms of fatigue and is eager to start testosterone.     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  A delay in compatible RBCs may occur.     Medications  Current Outpatient Prescriptions   Medication Sig Dispense Refill     amoxicillin (AMOXIL)  500 MG capsule Take 4 pills (2 grams) day of dental work 4 capsule 11     pantoprazole (PROTONIX) 40 MG EC tablet Take 1 tablet (40 mg) by mouth daily 60 tablet 11     sertraline (ZOLOFT) 100 MG tablet Take 1 tablet (100 mg) by mouth daily 30 tablet 6     sulfamethoxazole-trimethoprim (BACTRIM/SEPTRA) 400-80 MG per tablet Take 1 tablet by mouth daily 60 tablet 6     sirolimus (RAPAMUNE - GENERIC EQUIVALENT) 0.5 MG tablet Take 3 tablets (1.5 mg) by mouth daily 120 tablet 2     predniSONE (DELTASONE) 5 MG tablet 5 mg and 0 mg alternating every other day 60 tablet 3     fluticasone (FLONASE) 50 MCG/ACT spray Spray 1-2 sprays into both nostrils daily 1 Bottle 11     alfuzosin (UROXATRAL) 10 MG 24 hr tablet Take 1 tablet (10 mg) by mouth daily 30 tablet 3     fluconazole (DIFLUCAN) 100 MG tablet Take 1 tablet (100 mg) by mouth daily (Patient taking differently: Take 100 mg by mouth daily Taking 2) 60 tablet 6     finasteride (PROSCAR) 5 MG tablet Take 1 tablet (5 mg) by mouth daily 30 tablet 6     loratadine (CLARITIN) 10 MG capsule Take 10 mg by mouth daily 30 capsule 3     acyclovir (ZOVIRAX) 800 MG tablet Take 1 tablet (800 mg) by mouth 3 times daily 150 tablet 5     LEVOFLOXACIN PO        ORDER FOR DME Please provide a [employer]OVA cane offset with strap item number [id number] 1 Device 0     calcium carbonate-vitamin D 500-400 MG-UNIT TABS tablt Take 1 tablet by mouth daily 2000 mg 180 tablet 3     Family History  family history includes Breast Cancer in his mother; CANCER in his father and mother; CEREBROVASCULAR DISEASE in his mother; Depression in his brother; HEART DISEASE in his brother and brother; Hyperlipidemia in his brother; Hypertension in his brother and brother. There is no history of Glaucoma, Macular Degeneration, or Hypertension.  Social History     Social History     Marital status:      Spouse name: N/A     Number of children: N/A     Years of education: N/A     Occupational History     Not on file.  "    Social History Main Topics     Smoking status: Former Smoker     Packs/day: 1.00     Years: 34.00     Types: Cigarettes     Start date: 12/1/1967     Quit date: 4/1/2001     Smokeless tobacco: Never Used      Comment: quit in 2000     Alcohol use 2.5 oz/week      Comment: Seldom     Drug use: No     Sexual activity: Not Currently     Partners: Female     Birth control/ protection: Male Surgical, Female Surgical     Other Topics Concern     Not on file     Social History Narrative      ROS:8 point ROS neg other than the symptoms noted above in the HPI.     Physical Exam  /77  Pulse 94  Ht 1.727 m (5' 8\")  Wt 102.5 kg (225 lb 14.4 oz)  BMI 34.35 kg/m2  Body mass index is 34.35 kg/(m^2).    Constitutional: no distress, comfortable, pleasant   Neurological: alert and oriented   Psychological: appropriate mood     RESULTS  ENDO PITUITARY LABS-[facility] Latest Ref Rng & Units 2/9/2017 2/2/2017   FSH 0.7 - 10.8 IU/L 50.8 (H)    LUTROPIN 1.5 - 9.3 IU/L 47.5 (H)    TESTOSTERONE TOTAL 240 - 950 ng/dL 173 (L)      ENDO PITUITARY LABS-[facility] Latest Ref Rng & Units 1/27/2017   FSH 0.7 - 10.8 IU/L    LUTROPIN 1.5 - 9.3 IU/L    TESTOSTERONE TOTAL 240 - 950 ng/dL 215 (L)         ASSESSMENT:      1. Primary hypogonadism: labs are suggestive of primary hypogonadism. He was counseled about indications of testosterone replacement.  Side effects reviewed in detail, discussed it's effect on BPH, sleep Apnea, erthrocytosis, skin irritation, thromboembolism and possible CV risk. Different formulations reviewed, he would like to start Testosterone gel. Precaution and application method reviewed. he was shown link to application method video on Androgel website. He was given written hand out about testosterone side effects.   Given hx of BPH he was counseled to review with his urologist before starting testosterone replacement.   Start Androgel 1.62% 2 pump daily.    2. Chronic fatigue: again discussed the multiple possible etiologies for " chronic fatigue. Trial of testosterone to see if it improves his symptoms.     Recheck level in 3 months  RTC in 3 months,       SEVERINO Tom    Total time 25 minutes more than 50% counseling patient on above

## 2017-03-14 NOTE — MR AVS SNAPSHOT
After Visit Summary   3/14/2017    Deejay Dior    MRN: [id number]           Patient Information     Date Of Birth          1950        Visit Information        Provider Department      3/14/2017 2:00 PM Rd Chairez MD [facility] Endocrinology        Today's Diagnoses     Primary hypogonadism in male    -  1       Follow-ups after your visit        Your next 10 appointments already scheduled     Apr 13, 2017  1:00 PM CDT   [facility] Draw with  [facility]   [facility] Lab Draw ([facility])    [address] [place] [address]  2nd [facility] [address]   [phone]            Apr 13, 2017  1:30 PM CDT   Return with Aby Pinto MD   [facility] Blood and Marrow Transplant ([facility])    [address] [place] [address]  2nd [facility] [address]   [phone]            Apr 25, 2017  3:15 PM CDT   (Arrive by 3:00 PM)   RETURN TUMOR VISIT with Tim Yanez MD   [facility] Ear Nose and Throat ([facility])    [address] [place] [address]  4th [facility] [id number] [phone]            Jun 20, 2017  3:00 PM CDT   (Arrive by 2:45 PM)   RETURN ENDOCRINE with Rd Chiarez MD   [facility] Endocrinology ([facility])    [address] [place] [address]  3rd [facility] [id number] [phone]              Who to contact     Please call your clinic at [phone] to:    Ask questions about your health    Make or cancel appointments    Discuss your medicines    Learn about your test results    Speak to your doctor   If you have compliments or concerns about an experience at your clinic, or if you wish to file a complaint, please contact [facility] Patient Relations at [phone] or email us at [email or web address]         Additional Information About Your Visit        MyChart Information     [employer] gives you secure access to  "your electronic health record. If you see a primary care provider, you can also send messages to your care team and make appointments. If you have questions, please call your primary care clinic.  If you do not have a primary care provider, please call [phone] and they will assist you.      [employer] is an electronic gateway that provides easy, online access to your medical records. With [employer], you can request a clinic appointment, read your test results, renew a prescription or communicate with your care team.     To access your existing account, please contact your [facility] or call [phone] for assistance.        Care EveryWhere ID     This is your Care EveryWhere ID. This could be used by other organizations to access your [place] medical records  [id number]        Your Vitals Were     Pulse Height BMI (Body Mass Index)             94 1.727 m (5' 8\") 34.35 kg/m2          Blood Pressure from Last 3 Encounters:   03/14/17 119/77   03/07/17 125/74   02/02/17 128/78    Weight from Last 3 Encounters:   03/14/17 102.5 kg (225 lb 14.4 oz)   03/07/17 101.9 kg (224 lb 11.2 oz)   02/02/17 103 kg (227 lb)              Today, you had the following     No orders found for display         Today's Medication Changes          These changes are accurate as of: 3/14/17  5:43 PM.  If you have any questions, ask your nurse or doctor.               Start taking these medicines.        Dose/Directions    testosterone 20.25 MG/ACT gel   Commonly known as:  ANDROGEL 1.62% PUMP   Used for:  Primary hypogonadism in male        Dose:  2 pump   Place 2 pumps (40.5 mg) onto the skin daily   Quantity:  75 g   Refills:  3         These medicines have changed or have updated prescriptions.        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  Ask your nurse if you have questions.     Bring a paper prescription for each of these medications     testosterone 20.25 MG/ACT gel                Primary Care Provider Office Phone # Fax #    Deejay Bonilla [phone]       [facility] [address] [place] [address]        Thank you!     Thank you for choosing [facility]  for your care. Our goal is always to provide you with excellent care. Hearing back from our patients is one way we can continue to improve our services. Please take a few minutes to complete the written survey that you may receive in the mail after your visit with us. Thank you!             Your Updated Medication List - Protect others around you: Learn how to safely use, store and throw away your medicines at www.disposemymeds.org.          This list is accurate as of: 3/14/17  5:43 PM.  Always use your most recent med list.                   Brand Name Dispense Instructions for use    acyclovir 800 MG tablet    ZOVIRAX    150 tablet    Take 1 tablet (800 mg) by mouth 3 times daily       alfuzosin 10 MG 24 hr tablet    UROXATRAL    30 tablet    Take 1 tablet (10 mg) by mouth daily       amoxicillin 500 MG capsule    AMOXIL    4 capsule    Take 4 pills (2 grams) day of dental work       calcium carbonate-vitamin D 500-400 MG-UNIT Tabs per tablet     180 tablet    Take 1 tablet by mouth daily 2000 mg       finasteride 5 MG tablet    PROSCAR    30 tablet    Take 1 tablet (5 mg) by mouth daily       fluconazole 100 MG tablet    DIFLUCAN    60 tablet    Take 1 tablet (100 mg) by mouth daily       fluticasone 50 MCG/ACT spray    FLONASE    1 Bottle    Spray 1-2 sprays into both nostrils daily       LEVOFLOXACIN PO           loratadine 10 MG capsule    CLARITIN    30 capsule    Take 10 mg by mouth daily       order for DME     1 Device    Please provide a [employer] cane offset with strap item number [id number]       pantoprazole 40 MG EC tablet    PROTONIX    60 tablet    Take 1 tablet (40 mg) by mouth daily       predniSONE 5 MG tablet    DELTASONE    60 tablet    5 mg and 0 mg alternating every other day       sertraline 100 MG tablet    ZOLOFT    30 tablet    Take 1 tablet (100 mg) by mouth daily       sirolimus 0.5 MG tablet    RAPAMUNE - GENERIC EQUIVALENT    120 tablet    Take 3 tablets (1.5 mg) by mouth daily       sulfamethoxazole-trimethoprim 400-80 MG per tablet    BACTRIM/SEPTRA    60 tablet    Take 1 tablet by mouth daily       testosterone 20.25 MG/ACT gel    ANDROGEL 1.62% PUMP    75 g    Place 2 pumps (40.5 mg) onto the skin daily

## 2017-03-17 ENCOUNTER — TELEPHONE (OUTPATIENT)
Dept: ENDOCRINOLOGY | Facility: CLINIC | Age: 67
End: 2017-03-17

## 2017-03-17 DIAGNOSIS — E29.1 PRIMARY HYPOGONADISM IN MALE: Primary | ICD-10-CM

## 2017-03-20 ENCOUNTER — TELEPHONE (OUTPATIENT)
Dept: ENDOCRINOLOGY | Facility: CLINIC | Age: 67
End: 2017-03-20

## 2017-03-20 DIAGNOSIS — E29.1 PRIMARY HYPOGONADISM IN MALE: Primary | ICD-10-CM

## 2017-03-20 NOTE — TELEPHONE ENCOUNTER
PRIOR AUTHORIZATION DENIED    Medication: Androgel- denied    Denial Date: 3/18/2017    Denial Rational: script is denied because pt neeeds to try/fail formulary alternative Androderm patch or Axiron (both require prior authorization)              Appeal Information:

## 2017-03-20 NOTE — TELEPHONE ENCOUNTER
Pt's insurance requiring PA for Androgel. Pt has been w/o med. He is asking if possible to get alternative to avoid situation. Please advise at [phone]. DVM fine. Sent to [employer].

## 2017-03-20 NOTE — TELEPHONE ENCOUNTER
Insurance requiring PA for Androgel. Please advise. Pt has been waiting,, w/o meds. Sent to PA pool

## 2017-03-21 RX ORDER — TESTOSTERONE 30 MG/1.5ML
SOLUTION TOPICAL
Qty: 90 ML | Refills: 3 | Status: SHIPPED | OUTPATIENT
Start: 2017-03-21 | End: 2017-04-05 | Stop reason: ALTCHOICE

## 2017-03-22 NOTE — TELEPHONE ENCOUNTER
Deejay sent a Appeal  request in for Androgel pump 1.62 %  It was approved but may cost him twice as much as the Axiron. I will notify patient and  pharmacy  of the  Approval  and if Deejay decides he wants the Axiron a PA will need to  be done but this is formulary. . Insurance  Phone   [phone] ID # [id number]

## 2017-03-23 NOTE — TELEPHONE ENCOUNTER
Prior Authorization Approval    Authorization Effective Date: 1/1/2017  Authorization Expiration Date: 3/22/2018  Medication: Androgel- APPROVED  Approved Dose/Quantity:    Reference #:     Insurance Company: Medicare [employer] - Phone [phone] Fax [phone]  Expected CoPay: $240     CoPay Card Available: Other (see comments)   Foundation Assistance Needed:    Which Pharmacy is filling the prescription (Not needed for infusion/clinic administered): [id number] - [place], [place] - [address] [address]  Pharmacy Notified: Yes  Patient Notified: Yes    SPOKE WITH PATIENT AND ADVISED HIM TO CALL INSURANCE TO SEE IF ANY OTHER TESTOSTERONE PRODUCT IS COVERED OR AVAILABLE FOR PA THAT MAY BE CHEAPER.  THEN I ADVISED PATIENT TO CALL AND DISCUSS THERAPY WITH DR. DANIEL.  PA TEAM NUMBER WAS GIVEN IN CASE OF THERAPY CHANGE AND PA WAS NEEDED.

## 2017-03-28 NOTE — TELEPHONE ENCOUNTER
Wife asking how Androgel, Axiron compare. If one had better results. Please advise at [phone]. DVM fine. Sent to endo pool.

## 2017-04-05 ENCOUNTER — TELEPHONE (OUTPATIENT)
Dept: ENDOCRINOLOGY | Facility: CLINIC | Age: 67
End: 2017-04-05

## 2017-04-05 DIAGNOSIS — E29.1 PRIMARY HYPOGONADISM IN MALE: Primary | ICD-10-CM

## 2017-04-05 RX ORDER — TESTOSTERONE CYPIONATE 200 MG/ML
200 INJECTION, SOLUTION INTRAMUSCULAR
Qty: 1 ML | Refills: 3 | Status: SHIPPED | OUTPATIENT
Start: 2017-04-05 | End: 2017-07-10

## 2017-04-05 NOTE — TELEPHONE ENCOUNTER
----- Message from Rd Chairez MD sent at 4/5/2017 10:06 AM CDT -----  Regarding: RE: prescinoemy  Would it be self administration, if so where will he get the teaching?   I have done prescription of testosterone injection  mg every 3 weeks.        ----- Message -----     From: Shea Heaton RN     Sent: 4/4/2017       To: Rd Chairez MD  Subject: presciptio                                       Spouse asking Axaron to be switched to injections. Injection is cheaper vs AXIRON--

## 2017-04-06 NOTE — TELEPHONE ENCOUNTER
[facility] Prior Authorization Team   Phone: [phone]  Fax: [phone]    PA Initiation    Medication: DEPOTESTOTERONE CYPIONATE 200 MG/ML injection  Insurance Company: Medicare Blue - Phone [phone] Fax [phone]  Pharmacy Filling the Rx: [facility][id number] - [facility] [address] [address]  Filling Pharmacy Phone: [phone]  Filling Pharmacy Fax:    Start Date: 4/6/2017

## 2017-04-13 ENCOUNTER — APPOINTMENT (OUTPATIENT)
Dept: LAB | Facility: CLINIC | Age: 67
End: 2017-04-13
Attending: INTERNAL MEDICINE
Payer: MEDICARE

## 2017-04-13 ENCOUNTER — ONCOLOGY VISIT (OUTPATIENT)
Dept: TRANSPLANT | Facility: CLINIC | Age: 67
End: 2017-04-13
Attending: INTERNAL MEDICINE
Payer: MEDICARE

## 2017-04-13 ENCOUNTER — CARE COORDINATION (OUTPATIENT)
Dept: ENDOCRINOLOGY | Facility: CLINIC | Age: 67
End: 2017-04-13

## 2017-04-13 VITALS
DIASTOLIC BLOOD PRESSURE: 76 MMHG | RESPIRATION RATE: 16 BRPM | HEART RATE: 69 BPM | TEMPERATURE: 97.7 F | BODY MASS INDEX: 33.49 KG/M2 | SYSTOLIC BLOOD PRESSURE: 122 MMHG | WEIGHT: 220.24 LBS | OXYGEN SATURATION: 95 %

## 2017-04-13 DIAGNOSIS — D46.9 MDS (MYELODYSPLASTIC SYNDROME) (H): ICD-10-CM

## 2017-04-13 LAB
ALBUMIN SERPL-MCNC: 3.5 G/DL (ref 3.4–5)
ALP SERPL-CCNC: 128 U/L (ref 40–150)
ALT SERPL W P-5'-P-CCNC: 32 U/L (ref 0–70)
ANION GAP SERPL CALCULATED.3IONS-SCNC: 8 MMOL/L (ref 3–14)
AST SERPL W P-5'-P-CCNC: 40 U/L (ref 0–45)
BASOPHILS # BLD AUTO: 0 10E9/L (ref 0–0.2)
BASOPHILS NFR BLD AUTO: 0.3 %
BILIRUB SERPL-MCNC: 0.7 MG/DL (ref 0.2–1.3)
BUN SERPL-MCNC: 27 MG/DL (ref 7–30)
CALCIUM SERPL-MCNC: 9.2 MG/DL (ref 8.5–10.1)
CHLORIDE SERPL-SCNC: 104 MMOL/L (ref 94–109)
CO2 SERPL-SCNC: 26 MMOL/L (ref 20–32)
CREAT SERPL-MCNC: 1.28 MG/DL (ref 0.66–1.25)
DIFFERENTIAL METHOD BLD: ABNORMAL
EOSINOPHIL # BLD AUTO: 0.1 10E9/L (ref 0–0.7)
EOSINOPHIL NFR BLD AUTO: 1.9 %
ERYTHROCYTE [DISTWIDTH] IN BLOOD BY AUTOMATED COUNT: 15.1 % (ref 10–15)
GFR SERPL CREATININE-BSD FRML MDRD: 56 ML/MIN/1.7M2
GLUCOSE SERPL-MCNC: 100 MG/DL (ref 70–99)
HCT VFR BLD AUTO: 38.5 % (ref 40–53)
HGB BLD-MCNC: 12.5 G/DL (ref 13.3–17.7)
IMM GRANULOCYTES # BLD: 0 10E9/L (ref 0–0.4)
IMM GRANULOCYTES NFR BLD: 0.3 %
LYMPHOCYTES # BLD AUTO: 1.6 10E9/L (ref 0.8–5.3)
LYMPHOCYTES NFR BLD AUTO: 26.4 %
MAGNESIUM SERPL-MCNC: 2.2 MG/DL (ref 1.6–2.3)
MCH RBC QN AUTO: 26.8 PG (ref 26.5–33)
MCHC RBC AUTO-ENTMCNC: 32.5 G/DL (ref 31.5–36.5)
MCV RBC AUTO: 82 FL (ref 78–100)
MONOCYTES # BLD AUTO: 0.6 10E9/L (ref 0–1.3)
MONOCYTES NFR BLD AUTO: 9.5 %
NEUTROPHILS # BLD AUTO: 3.6 10E9/L (ref 1.6–8.3)
NEUTROPHILS NFR BLD AUTO: 61.6 %
NRBC # BLD AUTO: 0 10*3/UL
NRBC BLD AUTO-RTO: 0 /100
PLATELET # BLD AUTO: 194 10E9/L (ref 150–450)
POTASSIUM SERPL-SCNC: 3.9 MMOL/L (ref 3.4–5.3)
PROT SERPL-MCNC: 7.5 G/DL (ref 6.8–8.8)
RBC # BLD AUTO: 4.67 10E12/L (ref 4.4–5.9)
SODIUM SERPL-SCNC: 137 MMOL/L (ref 133–144)
WBC # BLD AUTO: 5.9 10E9/L (ref 4–11)

## 2017-04-13 PROCEDURE — 80053 COMPREHEN METABOLIC PANEL: CPT | Performed by: INTERNAL MEDICINE

## 2017-04-13 PROCEDURE — 87799 DETECT AGENT NOS DNA QUANT: CPT | Performed by: INTERNAL MEDICINE

## 2017-04-13 PROCEDURE — 80195 ASSAY OF SIROLIMUS: CPT | Performed by: INTERNAL MEDICINE

## 2017-04-13 PROCEDURE — 99213 OFFICE O/P EST LOW 20 MIN: CPT | Mod: ZF

## 2017-04-13 PROCEDURE — 36415 COLL VENOUS BLD VENIPUNCTURE: CPT

## 2017-04-13 PROCEDURE — 83735 ASSAY OF MAGNESIUM: CPT | Performed by: INTERNAL MEDICINE

## 2017-04-13 PROCEDURE — 85025 COMPLETE CBC W/AUTO DIFF WBC: CPT | Performed by: INTERNAL MEDICINE

## 2017-04-13 RX ORDER — OXYCODONE AND ACETAMINOPHEN 5; 325 MG/1; MG/1
1-2 TABLET ORAL PRN
COMMUNITY
Start: 2017-04-04 | End: 2017-12-14

## 2017-04-13 RX ORDER — TESTOSTERONE 30 MG/1.5ML
60 SOLUTION TOPICAL DAILY
COMMUNITY
End: 2017-06-08

## 2017-04-13 RX ORDER — TESTOSTERONE 16.2 MG/G
GEL TRANSDERMAL
Refills: 3 | COMMUNITY
Start: 2017-03-20 | End: 2017-06-08

## 2017-04-13 ASSESSMENT — PAIN SCALES - GENERAL: PAINLEVEL: NO PAIN (0)

## 2017-04-13 NOTE — MR AVS SNAPSHOT
After Visit Summary   4/13/2017    Deejay Dior    MRN: [id number]           Patient Information     Date Of Birth          1950        Visit Information        Provider Department      4/13/2017 1:30 PM Aby Pinto MD [facility] Blood and Marrow Transplant        Today's Diagnoses     MDS (myelodysplastic syndrome)              [facility] Center ([facility] [place] [place])  [address] [place] [address]  Phone: [phone]  Clinic Hours:   Monday-Friday:    8am to 4:30pm   Weekends and holidays:    8am to noon (in general)  If your fever is 100.5  or greater,   call the clinic.  After hours call the   hospital at [phone] or   [phone]. Ask for the BMT   fellow for pediatric or adult patients            Follow-ups after your visit        Your next 10 appointments already scheduled     Apr 25, 2017  3:15 PM CDT   (Arrive by 3:00 PM)   RETURN TUMOR VISIT with Tim Yanez MD   [facility] Ear Nose and Throat ([facility])    [address] [place] [address] [phone]            Jun 20, 2017  3:00 PM CDT   (Arrive by 2:45 PM)   RETURN ENDOCRINE with Rd Chairez MD   [facility] Endocrinology ([facility])    [address] [place] [address] [phone]              Who to contact     If you have questions or need follow up information about today's clinic visit or your schedule please contact [facility]ARR[facility]ANSPL[facility] directly at [phone].  Normal or non-critical lab and imaging results will be communicated to you by MyChart, letter or phone within 4 business days after the clinic has received the results. If you do not hear from us within 7 days, please contact the clinic through MyChart or phone. If you have a critical or abnormal lab result, we will notify you by phone as soon as possible.  Submit refill requests through [employer] or call your  pharmacy and they will forward the refill request to us. Please allow 3 business days for your refill to be completed.          Additional Information About Your Visit        [employer] Information     [employer] gives you secure access to your electronic health record. If you see a primary care provider, you can also send messages to your care team and make appointments. If you have questions, please call your primary care clinic.  If you do not have a primary care provider, please call [phone] and they will assist you.        Care EveryWhere ID     This is your Care EveryWhere ID. This could be used by other organizations to access your [place] medical records  [id number]        Your Vitals Were     Pulse Temperature Respirations Pulse Oximetry BMI (Body Mass Index)       69 97.7  F (36.5  C) 16 95% 33.49 kg/m2        Blood Pressure from Last 3 Encounters:   04/13/17 122/76   03/14/17 119/77   03/07/17 125/74    Weight from Last 3 Encounters:   04/13/17 99.9 kg (220 lb 3.8 oz)   03/14/17 102.5 kg (225 lb 14.4 oz)   03/07/17 101.9 kg (224 lb 11.2 oz)              We Performed the Following     CBC with platelets differential     CMV DNA quantification     Comprehensive metabolic panel     EBV DNA PCR Quantitative Whole Blood     Magnesium     Sirolimus level          Today's Medication Changes          These changes are accurate as of: 4/13/17  2:33 PM.  If you have any questions, ask your nurse or doctor.               These medicines have changed or have updated prescriptions.        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)        Dose:  100 mg   Take 1 tablet (100 mg) by mouth daily   Quantity:  60 tablet   Refills:  6                Recent Review Flowsheet Data     BMT Recent Results Latest Ref Rng & Units 10/12/2016 10/27/2016 11/17/2016  12/14/2016 2/2/2017 3/7/2017 4/13/2017    WBC 4.0 - 11.0 10e9/L 5.6 6.7 6.5 3.9(L) 4.3 5.6 5.9    Hemoglobin 13.3 - 17.7 g/dL 11.8(L) 11.9(L) 12.1(L) 12.5(L) 13.1(L) 12.8(L) 12.5(L)    Platelet Count 150 - 450 10e9/L 125(L) 143(L) 164 143(L) 140(L) 153 194    Neutrophils (Absolute) 1.6 - 8.3 10e9/L 4.1 4.5 4.9 2.2 2.1 3.4 3.6    Sodium 133 - 144 mmol/L 138 138 138 141 138 141 -    Potassium 3.4 - 5.3 mmol/L 3.6 3.6 4.0 3.2(L) 3.7 3.6 -    Chloride 94 - 109 mmol/L 104 102 100 105 107 110(H) -    Glucose 70 - 99 mg/dL 139(H) 114(H) 114(H) 114(H) 87 91 -    Urea Nitrogen 7 - 30 mg/dL 32(H) 34(H) 34(H) 28 27 20 -    Creatinine 0.66 - 1.25 mg/dL 1.31(H) 1.48(H) 1.17 1.15 1.09 0.93 -    Calcium (Total) 8.5 - 10.1 mg/dL 8.9 9.1 9.5 9.1 8.8 8.8 -    Protein (Total) 6.8 - 8.8 g/dL 6.5(L) 7.2 7.5 6.9 7.1 6.8 -    Albumin 3.4 - 5.0 g/dL 3.0(L) 3.2(L) 3.3(L) 3.0(L) 3.4 3.2(L) -    Alkaline Phosphatase 40 - 150 U/L 62 67 77 71 91 96 -    AST 0 - 45 U/L 37 37 41 39 40 31 -    ALT 0 - 70 U/L 30 36 40 31 35 31 -    MCV 78 - 100 fl 88 88 84 83 80 82 82               Primary Care Provider Office Phone # Fax #    Deejay Bonilla [phone]       [facility] [address] [place] [address]        Thank you!     Thank you for choosing [facility] BLOOD AND MARROW TRANSPLANT  for your care. Our goal is always to provide you with excellent care. Hearing back from our patients is one way we can continue to improve our services. Please take a few minutes to complete the written survey that you may receive in the mail after your visit with us. Thank you!             Your Updated Medication List - Protect others around you: Learn how to safely use, store and throw away your medicines at www.disposemymeds.org.          This list is accurate as of: 4/13/17  2:33 PM.  Always use your most recent med list.                   Brand Name Dispense Instructions for use    acyclovir 800 MG tablet    ZOVIRAX    150  "tablet    Take 1 tablet (800 mg) by mouth 3 times daily       alfuzosin 10 MG 24 hr tablet    UROXATRAL    30 tablet    Take 1 tablet (10 mg) by mouth daily       amoxicillin 500 MG capsule    AMOXIL    4 capsule    Take 4 pills (2 grams) day of dental work       * testosterone 30 MG/ACT topical solution    AXIRON     60 mg daily       * ANDROGEL 1.62% PUMP 20.25 MG/ACT gel   Generic drug:  testosterone          calcium carbonate-vitamin D 500-400 MG-UNIT Tabs per tablet     180 tablet    Take 1 tablet by mouth daily 2000 mg       finasteride 5 MG tablet    PROSCAR    30 tablet    Take 1 tablet (5 mg) by mouth daily       fluconazole 100 MG tablet    DIFLUCAN    60 tablet    Take 1 tablet (100 mg) by mouth daily       fluticasone 50 MCG/ACT spray    FLONASE    1 Bottle    Spray 1-2 sprays into both nostrils daily       LEVOFLOXACIN PO          loratadine 10 MG capsule    CLARITIN    30 capsule    Take 10 mg by mouth daily       order for DME     1 Device    Please provide a [employer] cane offset with strap item number [id number]       oxyCODONE-acetaminophen 5-325 MG per tablet    PERCOCET     Take 1-2 tablets by mouth as needed       pantoprazole 40 MG EC tablet    PROTONIX    60 tablet    Take 1 tablet (40 mg) by mouth daily       predniSONE 5 MG tablet    DELTASONE    60 tablet    5 mg and 0 mg alternating every other day       sertraline 100 MG tablet    ZOLOFT    30 tablet    Take 1 tablet (100 mg) by mouth daily       sirolimus 0.5 MG tablet    RAPAMUNE - GENERIC EQUIVALENT    120 tablet    Take 3 tablets (1.5 mg) by mouth daily       sulfamethoxazole-trimethoprim 400-80 MG per tablet    BACTRIM/SEPTRA    60 tablet    Take 1 tablet by mouth daily       syringe/needle (disp) 23G X 1\" 3 ML Misc     3 each    Dispense 3ML syringes and 23Gx1\" needles for IM. Use 1 syringe every 21 days  to inject testosterone       testosterone cypionate 200 MG/ML injection    DEPOTESTOTERONE CYPIONATE    1 mL    Inject 1 mL (200 mg) into " the muscle every 21 days       * Notice:  This list has 2 medication(s) that are the same as other medications prescribed for you. Read the directions carefully, and ask your doctor or other care provider to review them with you.

## 2017-04-13 NOTE — PROGRESS NOTES
Deejay Dior comes into clinic today at the request of Dr. Chairez Ordering Provider for testosterone cypionate IM Injection.    This service provided today was under the supervising provider of the day Dr. Chairez, who was available if needed.    Reason for visit: Med Injection only testosterone cypionate    Shea Heaton      Teaching Flowsheet   Relevant Diagnosis: Primary hypogonadism in male   Teaching Topic: self injection testosterone cypionate (DEPOTESTOTERONE CYPIONATE) 1 mL (200 mg) IM   Person involved in teaching: pt  Motivation Level:  Asks Questions: YES   Eager to Learn:  YES   Cooperative: YES   Receptive (willing/able to accept information): YES   Any cultural factors/Sabianism beliefs that may influence understanding or compliance? NO   COMMENTS:Pt has many questions and is scheduled to see Dr. Chairez in clinic for further clarification   PATIENT demonstrates understanding of the following: IM testosterone cypionate Injection using vial/syringe   Reason for the appointment:  YES   Knowledge of proper use of medications and conditions for which they are ordered (with special attention to potential side effects or drug interactions): Questions to be reviewed with Dr. Chairez  Which situations necessitate calling provider and whom to contact: YES   Teaching Concerns Addressed: Use of testosterone cypionate (DEPOTESTOTERONE CYPIONATE) 200 MG/ML injection reviewed with written material and demonstrated. Pt returned demonstration with teaching pen with proficiency  Proper use and care of medication:  YES      Pain management techniques: NA   Wound Care:  YES signs of site infection reviewed  How and/when to access community resources:  YES      Instructional Materials Used/Given: Handouts testosterone IM Injection administration   Time Spent with pt 20 minutes

## 2017-04-13 NOTE — NURSING NOTE
Chief Complaint   Patient presents with     Blood Draw     Labs drawn by RN from VPT. VS taken.      IZABELA HERNADEZ RN

## 2017-04-13 NOTE — PROGRESS NOTES
BMT Daily Progress Note     ID/CC:  Mr. 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     HPI: Deejay is here with his wife Racquel.  Had umbilical hernia surgery, healing well.  Will have bilateral carpal tunnel surgery over next few weeks.  He continues on sirolimus and has tapered his pred to 5 mg EOD.   He has started testosterone, too soon to see to see if it has helped his ongoing main concern, ongoing severe fatigue. He remains off all diuretics and edema is stable.  Still wrapping legs (not thighs).  Weight down to 220 lbs. 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 Yanez 4/25/17.  New F/C/S, on URI sx or new cough.  Using flonase/claritin.  No chest pain, no palp/dizziness. Stable bruises and skin tears. No N/V/D/C.  Sleeping ok w no change in PM urination.  Mood good.     Review of Systems: 10 point ROS negative except as noted above.    Physical Exam:  /76  Pulse 69  Temp 97.7  F (36.5  C)  Resp 16  Wt 99.9 kg (220 lb 3.8 oz)  SpO2 95%  BMI 33.49 kg/m2   Wt Readings from Last 4 Encounters:   04/13/17 99.9 kg (220 lb 3.8 oz)   03/14/17 102.5 kg (225 lb 14.4 oz)   03/07/17 101.9 kg (224 lb 11.2 oz)   02/02/17 103 kg (227 lb)     General: NAD, continues to less cushingoid each visit  Eyes: SARIKA, sclera anicteric  Nose/Mouth/Throat: OP clear, buccal mucosa moist, no lichenoid changes.  Full upper plate  no pharyngeal erythema/drainage. Surgical site well healed  Lungs: CTA Bilaterally, no wheezes or crackles.  Good air mvmt bilaterally.    Cardiovascular: RRR, no M/R/G   Abdominal: +BS, soft, NT, ND, No HSM central hernia   Lymphatics: +1 LE edema ankles/feet, improved from last visit.     Skin:   Generalized hyperpigmentation.  Multiple ecchymoses & skin tears  Neuro: A&O, slight resting tremor (stable from last visit)  Line: NONE    Labs:  Lab Results   Component Value Date    WBC 5.9 04/13/2017    ANEU 3.6 04/13/2017    HGB 12.5  (L) 04/13/2017    HCT 38.5 (L) 04/13/2017     04/13/2017     03/07/2017    POTASSIUM 3.6 03/07/2017    CHLORIDE 110 (H) 03/07/2017    CO2 22 03/07/2017    GLC 91 03/07/2017    BUN 20 03/07/2017    CR 0.93 03/07/2017    MAG 2.0 03/07/2017    INR 0.95 06/30/2016       ASSESSMENT AND PLAN:  Mr. 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 Total cell dose (from 7/1 & 7/2) 6.53 x 10^6 CD34+ cells/kg.   - 1 year anniversary (July 2015): 30% cellular, trilineage hematopoiesis, no abnormal blasts by morphology or flow , no dysplasia, 0-1 fibrosis, 100% donor (BM, CD3, CD15). CR  - 2 year anniversary (July 2016): 20-30% cellular, trilineage hematopoiesis, no abnormal blasts by morphology or flow , no dysplasia, NO fibrosis, 100% donor in BM (PB not sent). ISCN:  //46,XX[20] Complete Remission.    HEME:   5.9>12.5<194 ANC 3.6  0.1 Eos.  Stable.   -Transfuse to keep Hgb >8 & plt >50.   Not needing transfusions.  -Dx with RUE DVT 3/28/2016 and then a R femoral DVT was found incidentally on CT on 3/31/16 which developed while on Lovenox.  Bridged with Coumadin (started 4/5) through 6/30/16 (3 months).  Now off coumadin. 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 Rest of R and L venous system patent.  Follow.    GVHD: Being treated for cGVHD with fatigue, weakness, SOB/MERIDA.  No ev of pulm GVHD per Dr. Sandoval visit 6/7/16.  Worsening fatigue, which has been a major symptom of his GVHD flares. Want to taper pred as able.  - Currently prednisone (since 8/18) and on Sirolimus to 1.5 mg daily (x8/18).  Level stable at 6.9 (3.7/17).    Continue pred 5/0 mg alternating and follow up after carpal tunnel surgery.  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  Rx with topical steroids/ Pred/CSA & completed prednisone taper on 11/17/14.  -- GVHD relapse: Skin bx on 1/22/15 showed Grade 2 GVH, resolved with topical steroids. Flare of GI GVH on 2/2/15 with +bx of the duodenum & rectum as well as rising LFT's. Rx with steroids with no response. S/P ATG 2/8-2/13/15  -- CGVHD: Stigmata of chronic oral GVHD but lip biopsy 8/19/15 negative. Throat pain with negative EGD. However, responded to steroids. LFTs normal. No rash, no joint sx or skin sxs of chronic GVH, but eyes are very dry & + Schirmer's test Oct 2015 and ocular GVHD per optho (11/6/15).   -- Moderate ocular GVHD: Restasis eye gtts and duct plugs per optho consult 11/6/15. Continue restasis.   --repeat lip biopsy 1/4/2016  showed GVHD.  Rx and responded well to sirolimus and Prednisone.    --Repeat lip biopsy 11/15/16 with Atypical squamous cell proliferation, transected at the base. Repeat surgery 12/28/16.  F/u Dr. Yanez 4/25/17.    PULM:  SOB/MERIDA/CT GGO:  Per Dr. Sandoval 6/7/16 findings and clinical course most consistent with bronchiolitis/organizing pneumonia post H1N1 infection. He followed up w/ Dr. Sandoval 7/19/16 who was happy with his good response to steroids, which can be tapered from pulm standpoing. CT much improved.   -Graduated from pulm rehab. No issues today.    ID:   Afebrile.  RSV/Flu/Viral culture 10/12 neg.   -  Recent hx of multiple episodes of PNA/URI/sinusitis.  Infl B on 5/5, Influenza A, H1N1 & HCAP in March 2016 & Geotrichim by BAL.  Please see Dr. Sandoval's note, he thinks his symptoms are consistent with bronchiolitis/organizing pneumonia post H1N1 infection & is recommending continuing the current dose of Pred [id number] & then re assess.    - IgG = 403, repleted 3/22/16, 5/8/16= 1270   - Prophy:  HD ACV (renal dosing), Fluconazole and SS daily Bactrim.   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  Titer has been fluctating with a  low of 617 on 6/2. Up to 3180 log 3.5 on 6/16. Stable 6/30/16 (3118 log 3.5).  Follow frequently, no tx unless significantly higher or ev/concern for PTLD (none now). 3565 (log 3.6) on 7/7/16. Recheck 832 (log 2.9) on 8/18/16.  No detectable EBV (9/8/16). 660 (log 2.8) 9/22/16. Stable on  (10/12), and 629 copies (log 2.8)  (10/27) stable. 689 (log 2.8) 11/17/16.  (12/14/16) is undetectable (<500).  Today (2/2/17) 1539 (log 3.2).  March 7, 2017 is undetectable.  Follow each visit. PENDING TODAY  Flu shot 10/27/16    5. GI: No active issues. Appetite good. Occ sensation that food stuck in esophagus (less frequent). LFTs normal, alb improved to 3.5, other LFTs normal.  Follow.   Cont Protonix daily.     6. FEN/Renal:  Cr much better off diuretics.  Cr up slightly to 1.28, lytes normal. Edema better, weight down (may be from pred taper).  Follow.   -K and Mg normal off PO replacement K 3.9, Mg 2.2  - Cont Ca/Vit D      7. CV/HTN: Echo 9/13/16 for ongoing edema, fatigue etc.  EF 60-65% w/ mild LVH and mild REMY and impaired LV relaxation. BP remains normal on therapy.  Follow Closely.    8. Pain:  Neuropathy. More electrical shock feeling L foot (occ).  Stable even after tapering off gabapentin.  Follow.     9.  Depression: Mood seems good. Previously discussed whether fatigue could be related to depression and he is confident that is not the issue. He is looking forward to his trip.   Continue on Zoloft 100mg daily    10. Fatigue: w/u for extreme fatigue has included eval for hypothyroid & adrenal insuficciency, both of which have been normal.  Previous episodes of extreme fatigue Improved w/ steroids for cGVHD treatment.  Tapered off and sx worsened.  He is using Bipap.  PM urinary frequency better w/ proscar.  Echo shows only mild diastolic dysfxn.  No active infections (low grade EBV- CFS?).  Thus by process of elimination, I think this is related to GVHD.  We restarted pred and increased sirolimus.  There has been  some improvement, but now plateaued.  On mild diuresis w/ HCTZ and follow.   Tapering steroids.   Low testosterone, therapy started by Endocrine.     11. Urinary frequency.   Seen by urology 8/15, doing well on Proscar 5 mg per day and Uroxatral 10 mg per day.    12. Congestion: Chronic (x years), no signs/sx infection.  Continue claritin/flonase.    13. MSK: Bilateral carpal tunnel surgery planned, s/p PT for plantar faciitis,  Knee pain.  F/u at [facility]        PLAN:  RTC 6/8/17  Sooner if needed.

## 2017-04-14 LAB
CMV DNA SPEC NAA+PROBE-ACNC: NORMAL [IU]/ML
CMV DNA SPEC NAA+PROBE-LOG#: NORMAL {LOG_IU}/ML
EBV DNA # SPEC NAA+PROBE: NORMAL {COPIES}/ML
EBV DNA SPEC NAA+PROBE-LOG#: NORMAL {LOG_COPIES}/ML
SIROLIMUS BLD-MCNC: 12 UG/L (ref 5–15)
SPECIMEN SOURCE: NORMAL
TME LAST DOSE: NORMAL H

## 2017-04-24 DIAGNOSIS — R35.0 URINARY FREQUENCY: ICD-10-CM

## 2017-04-24 DIAGNOSIS — N40.1 BENIGN NON-NODULAR PROSTATIC HYPERPLASIA WITH LOWER URINARY TRACT SYMPTOMS: ICD-10-CM

## 2017-04-24 RX ORDER — ALFUZOSIN HYDROCHLORIDE 10 MG/1
10 TABLET, EXTENDED RELEASE ORAL DAILY
Qty: 30 TABLET | Refills: 3 | Status: SHIPPED | OUTPATIENT
Start: 2017-04-24 | End: 2017-09-12

## 2017-04-25 ENCOUNTER — OFFICE VISIT (OUTPATIENT)
Dept: OTOLARYNGOLOGY | Facility: CLINIC | Age: 67
End: 2017-04-25

## 2017-04-25 VITALS — BODY MASS INDEX: 33.34 KG/M2 | HEIGHT: 68 IN | WEIGHT: 220 LBS

## 2017-04-25 DIAGNOSIS — K13.21 LEUKOPLAKIA, LIP: Primary | ICD-10-CM

## 2017-04-25 ASSESSMENT — PAIN SCALES - GENERAL: PAINLEVEL: NO PAIN (0)

## 2017-04-25 NOTE — PROGRESS NOTES
HISTORY OF PRESENT ILLNESS:  Deejay Dior is 66 years of age.  He is here for follow-up today.  He has a history of a lip lesion that was precancerous.  He has no new complaints at the present time today.  He is otherwise getting by quite well.  He is status post bone marrow transplant.      PHYSICAL EXAMINATION:  The patient is alert, oriented x3 and pleasant.  Skin of the face, lips, and neck on him is quite normal.  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  There are no other masses or lesions seen.  Extraocular motions are intact.  Facial nerve function is intact.  The remainder of the cranial nerve function is intact.  There is no other oral leukoplakia.  He has a little bit of a scaliness like a keratosis behind his right ear, but no other masses or adenopathy are seen.  Ear canals, tympanic membranes are normal as well.      ASSESSMENT:  Patient with a history of a lip precancerous lesion advanced.      PLAN:  We will see him again in three months.      FO/ms

## 2017-04-25 NOTE — LETTER
4/25/2017       RE: Deejay Dior  [address] [place] [address]     Dear Colleague,    Thank you for referring your patient, Deejay Dior, to the [facility] EAR NOSE AND THROAT at [facility]. Please see a copy of my visit note below.    HISTORY OF PRESENT ILLNESS:  Deejay Dior is 66 years of age.  He is here for follow-up today.  He has a history of a lip lesion that was precancerous.  He has no new complaints at the present time today.  He is otherwise getting by quite well.  He is status post bone marrow transplant.      PHYSICAL EXAMINATION:  The patient is alert, oriented x3 and pleasant.  Skin of the face, lips, and neck on him is quite normal.  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  There are no other masses or lesions seen.  Extraocular motions are intact.  Facial nerve function is intact.  The remainder of the cranial nerve function is intact.  There is no other oral leukoplakia.  He has a little bit of a scaliness like a keratosis behind his right ear, but no other masses or adenopathy are seen.  Ear canals, tympanic membranes are normal as well.      ASSESSMENT:  Patient with a history of a lip precancerous lesion advanced.      PLAN:  We will see him again in three months.      FO/ms       Again, thank you for allowing me to participate in the care of your patient.      Sincerely,    Tim Yanez MD

## 2017-04-25 NOTE — NURSING NOTE
Chief Complaint   Patient presents with     RECHECK     f/u     Lisseth Nashfer Medical Assistant

## 2017-04-25 NOTE — MR AVS SNAPSHOT
After Visit Summary   4/25/2017    Deejay Dior    MRN: [id number]           Patient Information     Date Of Birth          1950        Visit Information        Provider Department      4/25/2017 3:15 PM Tim Yanez MD [facility] Ear Nose and Throat        Care Instructions    Please follow up to see Dr Yanez in about 3 months.  Jerzy Lemon ,RN  [phone]            Follow-ups after your visit        Follow-up notes from your care team     Return in about 3 months (around 7/25/2017).      Your next 10 appointments already scheduled     Jun 08, 2017  1:00 PM CDT   [facility] with  [facility]   [facility] Draw ([facility])    [address] [place] [address]  [address] [facility] [id number] [phone]            Jun 08, 2017  1:30 PM CDT   Return with Aby Pinto MD   [facility] Blood and Marrow Transplant ([facility])    [address] [place] [id number] [phone]            Jun 20, 2017  3:00 PM CDT   (Arrive by 2:45 PM)   RETURN ENDOCRINE with Rd Chairez MD   [facility] Endocrinology ([facility])    [address] [place] [address]  [address] [facility] [id number] [phone]            Jul 25, 2017  2:00 PM CDT   (Arrive by 1:45 PM)   RETURN TUMOR VISIT with Tim Yanez MD   [facility] Ear Nose and Throat ([facility])    [address] [place] [address]  [address] [facility] [id number] [phone]              Who to contact     Please call your clinic at [phone] to:    Ask questions about your health    Make or cancel appointments    Discuss your medicines    Learn about your test results    Speak to your doctor   If you have compliments or concerns about an experience at your clinic, or if you wish to file a complaint, please contact [facility] Patient Relations at [phone] or email  "us at [email or web address]         Additional Information About Your Visit        [employer] Information     [employer] gives you secure access to your electronic health record. If you see a primary care provider, you can also send messages to your care team and make appointments. If you have questions, please call your primary care clinic.  If you do not have a primary care provider, please call [phone] and they will assist you.      [employer] is an electronic gateway that provides easy, online access to your medical records. With [employer], you can request a clinic appointment, read your test results, renew a prescription or communicate with your care team.     To access your existing account, please contact your [facility] or call [phone] for assistance.        Care EveryWhere ID     This is your Care EveryWhere ID. This could be used by other organizations to access your [facility] medical records  [id number]        Your Vitals Were     Height BMI (Body Mass Index)                1.727 m (5' 8\") 33.45 kg/m2           Blood Pressure from Last 3 Encounters:   04/13/17 122/76   03/14/17 119/77   03/07/17 125/74    Weight from Last 3 Encounters:   04/25/17 99.8 kg (220 lb)   04/13/17 99.9 kg (220 lb 3.8 oz)   03/14/17 102.5 kg (225 lb 14.4 oz)              Today, you had the following     No orders found for display         Today's Medication Changes          These changes are accurate as of: 4/25/17  3:41 PM.  If you have any questions, ask your nurse or doctor.               These medicines have changed or have updated prescriptions.        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)        Dose:  100 mg   Take 1 tablet (100 mg) by mouth daily   Quantity:  60 tablet   Refills:  6                Primary " Care Provider Office Phone # Fax #    Deejay Bonilla [phone]       [facility] [address] [place] [address]        Thank you!     Thank you for choosing [facility] EAR NOSE AND THROAT  for your care. Our goal is always to provide you with excellent care. Hearing back from our patients is one way we can continue to improve our services. Please take a few minutes to complete the written survey that you may receive in the mail after your visit with us. Thank you!             Your Updated Medication List - Protect others around you: Learn how to safely use, store and throw away your medicines at www.disposemymeds.org.          This list is accurate as of: 4/25/17  3:41 PM.  Always use your most recent med list.                   Brand Name Dispense Instructions for use    acyclovir 800 MG tablet    ZOVIRAX    150 tablet    Take 1 tablet (800 mg) by mouth 3 times daily       alfuzosin 10 MG 24 hr tablet    UROXATRAL    30 tablet    Take 1 tablet (10 mg) by mouth daily       amoxicillin 500 MG capsule    AMOXIL    4 capsule    Take 4 pills (2 grams) day of dental work       * testosterone 30 MG/ACT topical solution    AXIRON     60 mg daily       * ANDROGEL 1.62% PUMP 20.25 MG/ACT gel   Generic drug:  testosterone          calcium carbonate-vitamin D 500-400 MG-UNIT Tabs per tablet     180 tablet    Take 1 tablet by mouth daily 2000 mg       finasteride 5 MG tablet    PROSCAR    30 tablet    Take 1 tablet (5 mg) by mouth daily       fluconazole 100 MG tablet    DIFLUCAN    60 tablet    Take 1 tablet (100 mg) by mouth daily       fluticasone 50 MCG/ACT spray    FLONASE    1 Bottle    Spray 1-2 sprays into both nostrils daily       LEVOFLOXACIN PO          loratadine 10 MG capsule    CLARITIN    30 capsule    Take 10 mg by mouth daily       order for DME     1 Device    Please provide a [employer] cane offset with strap item number [id number]       oxyCODONE-acetaminophen 5-325 MG per tablet  "   PERCOCET     Take 1-2 tablets by mouth as needed       pantoprazole 40 MG EC tablet    PROTONIX    60 tablet    Take 1 tablet (40 mg) by mouth daily       predniSONE 5 MG tablet    DELTASONE    60 tablet    5 mg and 0 mg alternating every other day       sertraline 100 MG tablet    ZOLOFT    30 tablet    Take 1 tablet (100 mg) by mouth daily       sirolimus 0.5 MG tablet    RAPAMUNE - GENERIC EQUIVALENT    120 tablet    Take 3 tablets (1.5 mg) by mouth daily       sulfamethoxazole-trimethoprim 400-80 MG per tablet    BACTRIM/SEPTRA    60 tablet    Take 1 tablet by mouth daily       syringe/needle (disp) 23G X 1\" 3 ML Misc     3 each    Dispense 3ML syringes and 23Gx1\" needles for IM. Use 1 syringe every 21 days  to inject testosterone       testosterone cypionate 200 MG/ML injection    DEPOTESTOTERONE    1 mL    Inject 1 mL (200 mg) into the muscle every 21 days       * Notice:  This list has 2 medication(s) that are the same as other medications prescribed for you. Read the directions carefully, and ask your doctor or other care provider to review them with you.      "

## 2017-05-08 ENCOUNTER — TELEPHONE (OUTPATIENT)
Dept: TRANSPLANT | Facility: CLINIC | Age: 67
End: 2017-05-08

## 2017-05-08 NOTE — TELEPHONE ENCOUNTER
[facility] Prior Authorization Team   Phone: [phone]  Fax: [phone]    PA Initiation    Medication: PREDNISONE 5MG  Insurance Company: Medicare Blue - Phone [phone] Fax [phone]  Pharmacy Filling the Rx: [id number] [place] [address] [address]  Filling Pharmacy Phone: [phone]  Filling Pharmacy Fax:    Start Date: 5/8/2017

## 2017-05-09 NOTE — TELEPHONE ENCOUNTER
Called pharmacy and told pharmacist to process script under patients part B benefits. Pharmacist is getting a rejection stating pa is needed. Explained to pharm that we only do part d pa's and gave the # at bottom of letter. Pharmacist also stated that they had applied a discount card before my phone call and patients copay is $8.00- patient has been notified but hasn't p/u meds. Pharmacist will continue to try to get patients part b to process.      PRIOR AUTHORIZATION DENIED    Medication: PREDNISONE 5MG - Denied    Denial Date: 5/8/2017    Denial Rational: Drugs used to prevent transplant rejections are covered under Medicare Part B when the transplant was paid for by Medicare. Your transplant is considered a Medicare covered transplant because it was performed at a Medicare -approved facility and you were entitled to Medicare Part A at the time of your transplant. Your Medicare Part D drug plan cannot cover a drug already covered by Medicare Part B.  If you feel there is additional information related to this case that might affect this decision and an appeal is desired, please submit a written letter of medical necessity to our PA Team.              Appeal Information:

## 2017-06-08 ENCOUNTER — APPOINTMENT (OUTPATIENT)
Dept: LAB | Facility: CLINIC | Age: 67
End: 2017-06-08
Attending: INTERNAL MEDICINE
Payer: MEDICARE

## 2017-06-08 ENCOUNTER — ONCOLOGY VISIT (OUTPATIENT)
Dept: TRANSPLANT | Facility: CLINIC | Age: 67
End: 2017-06-08
Attending: INTERNAL MEDICINE
Payer: MEDICARE

## 2017-06-08 VITALS
HEART RATE: 85 BPM | WEIGHT: 215.61 LBS | BODY MASS INDEX: 32.78 KG/M2 | OXYGEN SATURATION: 97 % | DIASTOLIC BLOOD PRESSURE: 75 MMHG | TEMPERATURE: 97.1 F | RESPIRATION RATE: 18 BRPM | SYSTOLIC BLOOD PRESSURE: 125 MMHG

## 2017-06-08 DIAGNOSIS — D46.9 MDS (MYELODYSPLASTIC SYNDROME) (H): ICD-10-CM

## 2017-06-08 LAB
ALBUMIN SERPL-MCNC: 2.9 G/DL (ref 3.4–5)
ALP SERPL-CCNC: 134 U/L (ref 40–150)
ALT SERPL W P-5'-P-CCNC: 47 U/L (ref 0–70)
ANION GAP SERPL CALCULATED.3IONS-SCNC: 8 MMOL/L (ref 3–14)
AST SERPL W P-5'-P-CCNC: 44 U/L (ref 0–45)
BASOPHILS # BLD AUTO: 0 10E9/L (ref 0–0.2)
BASOPHILS NFR BLD AUTO: 0.2 %
BILIRUB SERPL-MCNC: 0.4 MG/DL (ref 0.2–1.3)
BUN SERPL-MCNC: 24 MG/DL (ref 7–30)
CALCIUM SERPL-MCNC: 8.7 MG/DL (ref 8.5–10.1)
CHLORIDE SERPL-SCNC: 107 MMOL/L (ref 94–109)
CO2 SERPL-SCNC: 24 MMOL/L (ref 20–32)
CREAT SERPL-MCNC: 1.15 MG/DL (ref 0.66–1.25)
DIFFERENTIAL METHOD BLD: ABNORMAL
EOSINOPHIL # BLD AUTO: 0 10E9/L (ref 0–0.7)
EOSINOPHIL NFR BLD AUTO: 0.9 %
ERYTHROCYTE [DISTWIDTH] IN BLOOD BY AUTOMATED COUNT: 14.8 % (ref 10–15)
GFR SERPL CREATININE-BSD FRML MDRD: 63 ML/MIN/1.7M2
GLUCOSE SERPL-MCNC: 94 MG/DL (ref 70–99)
HCT VFR BLD AUTO: 43.3 % (ref 40–53)
HGB BLD-MCNC: 13.9 G/DL (ref 13.3–17.7)
IMM GRANULOCYTES # BLD: 0 10E9/L (ref 0–0.4)
IMM GRANULOCYTES NFR BLD: 0.5 %
LYMPHOCYTES # BLD AUTO: 1.3 10E9/L (ref 0.8–5.3)
LYMPHOCYTES NFR BLD AUTO: 29.3 %
MCH RBC QN AUTO: 26.8 PG (ref 26.5–33)
MCHC RBC AUTO-ENTMCNC: 32.1 G/DL (ref 31.5–36.5)
MCV RBC AUTO: 84 FL (ref 78–100)
MONOCYTES # BLD AUTO: 0.5 10E9/L (ref 0–1.3)
MONOCYTES NFR BLD AUTO: 12.5 %
NEUTROPHILS # BLD AUTO: 2.5 10E9/L (ref 1.6–8.3)
NEUTROPHILS NFR BLD AUTO: 56.6 %
NRBC # BLD AUTO: 0 10*3/UL
NRBC BLD AUTO-RTO: 0 /100
PLATELET # BLD AUTO: 136 10E9/L (ref 150–450)
POTASSIUM SERPL-SCNC: 4 MMOL/L (ref 3.4–5.3)
PROT SERPL-MCNC: 6.7 G/DL (ref 6.8–8.8)
RBC # BLD AUTO: 5.18 10E12/L (ref 4.4–5.9)
SIROLIMUS BLD-MCNC: 9 UG/L (ref 5–15)
SODIUM SERPL-SCNC: 140 MMOL/L (ref 133–144)
TME LAST DOSE: NORMAL H
WBC # BLD AUTO: 4.3 10E9/L (ref 4–11)

## 2017-06-08 PROCEDURE — 36415 COLL VENOUS BLD VENIPUNCTURE: CPT | Performed by: INTERNAL MEDICINE

## 2017-06-08 PROCEDURE — 99212 OFFICE O/P EST SF 10 MIN: CPT | Mod: ZF

## 2017-06-08 PROCEDURE — 87799 DETECT AGENT NOS DNA QUANT: CPT | Performed by: INTERNAL MEDICINE

## 2017-06-08 PROCEDURE — 80195 ASSAY OF SIROLIMUS: CPT | Performed by: INTERNAL MEDICINE

## 2017-06-08 PROCEDURE — 80053 COMPREHEN METABOLIC PANEL: CPT | Performed by: INTERNAL MEDICINE

## 2017-06-08 PROCEDURE — 85025 COMPLETE CBC W/AUTO DIFF WBC: CPT | Performed by: INTERNAL MEDICINE

## 2017-06-08 NOTE — PROGRESS NOTES
BMT Daily Progress Note     ID/CC:  Mr. 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     HPI: Deejay is here with his wife Racquel.  Overall doing very well.  S/p carpal tunnel surgery bilaterally and healed well, sx improved.  Has had congestion x 3 weeks (no F/C/S, no cough, no sore throat).  May be allergies. Using flonase/claritin.  He continues on sirolimus and pred to 5 mg EOD.   He started testosterone, too soon to see to see if it has helped his ongoing main concern, ongoing severe fatigue. It is slightly better, finally. He will see endo 6/20/17.  He remains off all diuretics and edema is stable without needing to wrap legs.   Weight down to 215 lbs. 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 Yanez 7/25/17.  No chest pain, no palp/dizziness. Stable bruises and skin tears. No N/V/D/C.  Sleeping ok w no change in PM urination.  Mood good.  Plans a  river cruise in Aug and wedding in [place] early Sept.     Review of Systems: 10 point ROS negative except as noted above.    Physical Exam:  /75  Pulse 85  Temp 97.1  F (36.2  C) (Oral)  Resp 18  Wt 97.8 kg (215 lb 9.8 oz)  SpO2 97%  BMI 32.78 kg/m2   Wt Readings from Last 4 Encounters:   06/08/17 97.8 kg (215 lb 9.8 oz)   04/25/17 99.8 kg (220 lb)   04/13/17 99.9 kg (220 lb 3.8 oz)   03/14/17 102.5 kg (225 lb 14.4 oz)     General: NAD, continues to less cushingoid each visit  Eyes: SARIKA, sclera anicteric  Nose/Mouth/Throat: OP clear, buccal mucosa moist, no lichenoid changes.  Full upper plate  no pharyngeal erythema/drainage. Surgical site well healed  Lungs: CTA Bilaterally, no wheezes or crackles.  Good air mvmt bilaterally.    Cardiovascular: RRR, no M/R/G   Abdominal: +BS, soft, NT, ND, No HSM central hernia repaired  Lymphatics: +1 LE edema ankles/feet, stable from last visit.     Skin:   Generalized hyperpigmentation.  Multiple ecchymoses & skin tears Well healed Carpal  Tunnel scars.   Neuro: A&O, slight resting tremor (stable from last visit)  Line: NONE    Labs:  Lab Results   Component Value Date    WBC 4.3 06/08/2017    ANEU 2.5 06/08/2017    HGB 13.9 06/08/2017    HCT 43.3 06/08/2017     (L) 06/08/2017     06/08/2017    POTASSIUM 4.0 06/08/2017    CHLORIDE 107 06/08/2017    CO2 24 06/08/2017    GLC 94 06/08/2017    BUN 24 06/08/2017    CR 1.15 06/08/2017    MAG 2.2 04/13/2017    INR 0.95 06/30/2016       ASSESSMENT AND PLAN:  Mr. 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 Total cell dose (from 7/1 & 7/2) 6.53 x 10^6 CD34+ cells/kg.   - 1 year anniversary (July 2015): 30% cellular, trilineage hematopoiesis, no abnormal blasts by morphology or flow , no dysplasia, 0-1 fibrosis, 100% donor (BM, CD3, CD15). CR  - 2 year anniversary (July 2016): 20-30% cellular, trilineage hematopoiesis, no abnormal blasts by morphology or flow , no dysplasia, NO fibrosis, 100% donor in BM (PB not sent). ISCN:  //46,XX[20] Complete Remission.    HEME:   4.3>13.9<194-->136 ANC 2.5  0 Eos.  Hgb better, slight decrease in plts, follow.   -Transfuse to keep Hgb >8 & plt >50.   Not needing transfusions.  -Dx with RUE DVT 3/28/2016 and then a R femoral DVT was found incidentally on CT on 3/31/16 which developed while on Lovenox.  Bridged with Coumadin (started 4/5) through 6/30/16 (3 months).  Now off coumadin. 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 Rest of R and L venous system patent.  Follow.    GVHD: Being treated for cGVHD with fatigue, weakness, SOB/MERIDA.  No ev of pulm GVHD per Dr. Sandoval visit 6/7/16.  Stable fatigue, which has been a major symptom of his GVHD flares. No new sx of cGHVD (note plt and lower  albumin)  - Currently prednisone (since 8/18) and on Sirolimus to 1.5 mg daily (x8/18).  Level stable at 12 (4/13/17).      - Decrease and then d/c prednisone.  - If feels good July 4, (recheck labs) then drop sirolimus to 1.0 mg daily until I see him in Sept.   - Discussed signs/sx of adrenal insufficiency or cGVHD to watch for.    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  Rx with topical steroids/ Pred/CSA & completed prednisone taper on 11/17/14.  -- GVHD relapse: Skin bx on 1/22/15 showed Grade 2 GVH, resolved with topical steroids. Flare of GI GVH on 2/2/15 with +bx of the duodenum & rectum as well as rising LFT's. Rx with steroids with no response. S/P ATG 2/8-2/13/15  -- CGVHD: Stigmata of chronic oral GVHD but lip biopsy 8/19/15 negative. Throat pain with negative EGD. However, responded to steroids. LFTs normal. No rash, no joint sx or skin sxs of chronic GVH, but eyes are very dry & + Schirmer's test Oct 2015 and ocular GVHD per optho (11/6/15).   -- Moderate ocular GVHD: Restasis eye gtts and duct plugs per optho consult 11/6/15. Continue restasis.   --repeat lip biopsy 1/4/2016  showed GVHD.  Rx and responded well to sirolimus and Prednisone.    --Repeat lip biopsy 11/15/16 with Atypical squamous cell proliferation, transected at the base. Repeat surgery 12/28/16.  F/u Dr. Yanez 7/25/17.    PULM:  SOB/MERIDA/CT GGO:  Per Dr. Sandoval 6/7/16 findings and clinical course most consistent with bronchiolitis/organizing pneumonia post H1N1 infection. He followed up w/ Dr. Sandoval 7/19/16 who was happy with his good response to steroids, which can be tapered from pulm standpoing. CT much improved.   -Graduated from pulm rehab. No issues today.    ID:   Afebrile.  RSV/Flu/Viral culture 10/12 neg.   -  Recent hx of multiple episodes of PNA/URI/sinusitis.  Infl B on 5/5, Influenza A, H1N1 & HCAP in March 2016 & Geotrichim by BAL.  Please see Dr. Sandoval's note, he thinks his  symptoms are consistent with bronchiolitis/organizing pneumonia post H1N1 infection & is recommending continuing the current dose of Pred [id number] & then re assess.    - IgG = 403, repleted 3/22/16, 5/8/16= 1270   - Prophy:  HD ACV (renal dosing), Fluconazole and SS daily Bactrim.   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  Titer has been fluctating with a low of 617 on 6/2. Up to 3180 log 3.5 on 6/16. Stable 6/30/16 (3118 log 3.5).  Follow frequently, no tx unless significantly higher or ev/concern for PTLD (none now). 3565 (log 3.6) on 7/7/16. Recheck 832 (log 2.9) on 8/18/16.  No detectable EBV (9/8/16). 660 (log 2.8) 9/22/16. Stable on  (10/12), and 629 copies (log 2.8)  (10/27) stable. 689 (log 2.8) 11/17/16.  (12/14/16) is undetectable (<500).  Today (2/2/17) 1539 (log 3.2).  March 7, 2017 is undetectable.  April 13 undetectable. Follow each visit. PENDING TODAY  Flu shot 10/27/16    5. GI: No active issues. Appetite good. Occ sensation that food stuck in esophagus (less frequent). LFTs normal, alb improved to 3.5 but today down to 2.9 w/ no clear reason.  Other LFTs normal.  Recheck July and Follow.   Cont Protonix daily.     6. FEN/Renal:  Cr much better off diuretics.  Cr good at 1.15, lytes normal. Edema better, weight down.  Follow.   -K and Mg normal off PO replacement K 3.9, Mg 2.2  - Cont Ca/Vit D      7. CV/HTN: Echo 9/13/16 for ongoing edema, fatigue etc.  EF 60-65% w/ mild LVH and mild REMY and impaired LV relaxation. BP remains normal off therapy.  Follow Closely.    8. Pain:  Neuropathy. More electrical shock feeling L foot (occ).  Stable even after tapering off gabapentin.  Follow.     9.  Depression: Mood seems good. Previously discussed whether fatigue could be related to depression and he is confident that is not the issue. He is looking forward to his trip.   Continue on Zoloft 100mg daily    10. Fatigue: w/u for extreme fatigue has included eval for hypothyroid & adrenal  insuficciency, both of which have been normal.  Previous episodes of extreme fatigue Improved w/ steroids for cGVHD treatment.  Tapered off and sx worsened.  He is using Bipap.  PM urinary frequency better w/ proscar.  Echo shows only mild diastolic dysfxn.  No active infections (low grade EBV- CFS?).  Thus by process of elimination, I think this is related to GVHD.  We restarted pred and increased sirolimus.  There has been some improvement, but now plateaued.    Tapering steroids.   Low testosterone, therapy started by Endocrine.  SLIGHT IMPROVEMENT     11. Urinary frequency.   Seen by urology 8/15, doing well on Proscar 5 mg per day and Uroxatral 10 mg per day.    12. Congestion: Chronic (x years), no signs/sx infection.  Continue claritin/flonase.    13. MSK: Bilateral carpal tunnel surgery completed, s/p PT for plantar faciitis,  Knee pain.  F/u at [facility]        PLAN:  Labs July (f/u plt and Albumin)  Endo 6/20  ENT 7/25  RTC with Dr. Pinto 9/28/17  Sooner if needed.

## 2017-06-08 NOTE — PATIENT INSTRUCTIONS
Reduce pred to 5mg on 2 days off for 2 weeks and then d/c  If feel good on July 4, then decrease sirolimus to 2 pills per day (1 mg).  RTC Sept, sooner if needed.    9/28 1pm labs and visit

## 2017-06-08 NOTE — NURSING NOTE
"Oncology Rooming Note    June 8, 2017 1:45 PM   Deejay Dior is a 66 year old male who presents for:    Chief Complaint   Patient presents with     Lab Only     peripheral labs and vitals     RECHECK     MDS     Initial Vitals: /75  Pulse 85  Temp 97.1  F (36.2  C) (Oral)  Resp 18  Wt 97.8 kg (215 lb 9.8 oz)  SpO2 97%  BMI 32.78 kg/m2 Estimated body mass index is 32.78 kg/(m^2) as calculated from the following:    Height as of 4/25/17: 1.727 m (5' 8\").    Weight as of this encounter: 97.8 kg (215 lb 9.8 oz). Body surface area is 2.17 meters squared.  Data Unavailable Comment: Data Unavailable   No LMP for male patient.  Allergies reviewed: Yes  Medications reviewed: Yes    Medications: Medication refills not needed today.  Pharmacy name entered into [facility]:    [id number] - [place], [place] - [address] [id number] - [place], [place] - [address] [address] [facility]     Clinical concerns: n/a     6 minutes for nursing intake (face to face time)     ANA MARÍA HOLM CMA              "

## 2017-06-08 NOTE — NURSING NOTE
Chief Complaint   Patient presents with     Lab Only     peripheral labs and vitals   labs drawn from left arm

## 2017-06-09 LAB
EBV DNA # SPEC NAA+PROBE: NORMAL {COPIES}/ML
EBV DNA SPEC NAA+PROBE-LOG#: NORMAL {LOG_COPIES}/ML

## 2017-06-20 ENCOUNTER — OFFICE VISIT (OUTPATIENT)
Dept: ENDOCRINOLOGY | Facility: CLINIC | Age: 67
End: 2017-06-20

## 2017-06-20 VITALS
DIASTOLIC BLOOD PRESSURE: 81 MMHG | HEART RATE: 81 BPM | BODY MASS INDEX: 33.25 KG/M2 | SYSTOLIC BLOOD PRESSURE: 138 MMHG | WEIGHT: 218.7 LBS

## 2017-06-20 DIAGNOSIS — E29.1 PRIMARY HYPOGONADISM IN MALE: ICD-10-CM

## 2017-06-20 DIAGNOSIS — E29.1 PRIMARY HYPOGONADISM IN MALE: Primary | ICD-10-CM

## 2017-06-20 NOTE — PROGRESS NOTES
Deejay is a 66 year old male presents today for Fatigue    HPI  Deejay is a 66-year-old male who presents today for follow up of primary hypogonadism. Patient has complicated past medical history including MDS status post BMT, rbavq-elaqba-ygrc disease    He was started on testosterone therapy in April 2017. Initially started on T-gel but switched to injection due to cost.   Currently Testosterone 200 mg every 3 weeks.  Over all does not feel much different, may be some improvement in fatigue    Continues to struggle with fatigue     He history of BPH and is treated with medications.  Patient also has history of sleep apnea which is treated with CPAP.  He also has history of depression and anxiety for which he takes Zoloft.   Denies any worsening of BPH symptoms. Feels acne type of lesions started on face after starting testosterone.    No change in symptoms low libido and ED  Off prednisone      Past Medical History:   Diagnosis Date     Head injury 1964     Hearing problem Hearing aids 2015     History of blood transfusion 3/2014     History of radiation therapy June 2014     Immunosuppression (H) Sirolimus daily     MDS (myelodysplastic syndrome) (H)      Numbness and tingling     feet     Obstructive sleep apnea 1999     Pneumonia      Reduced vision August 2016    Cataract surgery     Sleep apnea 1999     Transplant July 1, 2014    Stem Cells     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)     Fatigue     Secondary adrenal insufficiency (H)     Influenza A (H1N1)     Fever, unspecified     Acute deep vein thrombosis (DVT) of distal vein of right lower extremity (H)     Long-term (current) use of anticoagulants [Z79.01]     Deep vein thrombosis (DVT) (H)     Primary hypogonadism in male     Allergies  Allergies   Allergen Reactions     Blood Transfusion Related (Informational Only) Other (See Comments)     Patient has a history  "of a clinically significant antibody against RBC antigens.  A delay in compatible RBCs may occur.     Medications  Current Outpatient Prescriptions   Medication Sig Dispense Refill     alfuzosin (UROXATRAL) 10 MG 24 hr tablet Take 1 tablet (10 mg) by mouth daily 30 tablet 3     oxyCODONE-acetaminophen (PERCOCET) 5-325 MG per tablet Take 1-2 tablets by mouth as needed       testosterone cypionate (DEPOTESTOTERONE CYPIONATE) 200 MG/ML injection Inject 1 mL (200 mg) into the muscle every 21 days 1 mL 3     syringe/needle, disp, 23G X 1\" 3 ML MISC Dispense 3ML syringes and 23Gx1\" needles for IM. Use 1 syringe every 21 days  to inject testosterone 3 each 3     amoxicillin (AMOXIL) 500 MG capsule Take 4 pills (2 grams) day of dental work 4 capsule 11     pantoprazole (PROTONIX) 40 MG EC tablet Take 1 tablet (40 mg) by mouth daily 60 tablet 11     sertraline (ZOLOFT) 100 MG tablet Take 1 tablet (100 mg) by mouth daily 30 tablet 6     sulfamethoxazole-trimethoprim (BACTRIM/SEPTRA) 400-80 MG per tablet Take 1 tablet by mouth daily 60 tablet 6     sirolimus (RAPAMUNE - GENERIC EQUIVALENT) 0.5 MG tablet Take 3 tablets (1.5 mg) by mouth daily 120 tablet 2     fluticasone (FLONASE) 50 MCG/ACT spray Spray 1-2 sprays into both nostrils daily 1 Bottle 11     fluconazole (DIFLUCAN) 100 MG tablet Take 1 tablet (100 mg) by mouth daily (Patient taking differently: Take 100 mg by mouth daily Taking 2) 60 tablet 6     finasteride (PROSCAR) 5 MG tablet Take 1 tablet (5 mg) by mouth daily 30 tablet 6     loratadine (CLARITIN) 10 MG capsule Take 10 mg by mouth daily 30 capsule 3     acyclovir (ZOVIRAX) 800 MG tablet Take 1 tablet (800 mg) by mouth 3 times daily 150 tablet 5     LEVOFLOXACIN PO        ORDER FOR DME Please provide a NOVA cane offset with strap item number [id number] 1 Device 0     calcium carbonate-vitamin D 500-400 MG-UNIT TABS tablt Take 1 tablet by mouth daily 2000 mg 180 tablet 3     Family History  family history includes " Breast Cancer in his mother; CANCER in his father and mother; CEREBROVASCULAR DISEASE in his mother; Depression in his brother; HEART DISEASE in his brother and brother; Hyperlipidemia in his brother; Hypertension in his brother and brother. There is no history of Glaucoma, Macular Degeneration, or Hypertension.  Social History     Social History     Marital status:      Spouse name: N/A     Number of children: N/A     Years of education: N/A     Occupational History     Not on file.     Social History Main Topics     Smoking status: Former Smoker     Packs/day: 1.00     Years: 34.00     Types: Cigarettes     Start date: 12/1/1967     Quit date: 4/1/2001     Smokeless tobacco: Never Used      Comment: quit in 2000     Alcohol use 2.5 oz/week      Comment: Seldom     Drug use: No     Sexual activity: Not Currently     Partners: Female     Birth control/ protection: Male Surgical, Female Surgical     Other Topics Concern     Not on file     Social History Narrative      ROS:8 point ROS neg other than the symptoms noted above in the HPI.     Physical Exam  /81  Pulse 81  Wt 99.2 kg (218 lb 11.2 oz)  BMI 33.25 kg/m2  Body mass index is 33.25 kg/(m^2).    Constitutional: no distress, comfortable, pleasant   Neurological: alert and oriented   Psychological: appropriate mood     RESULTS  ENDO PITUITARY LABS-[facility] Latest Ref Rng & Units 2/9/2017 2/2/2017   FSH 0.7 - 10.8 IU/L 50.8 (H)    LUTROPIN 1.5 - 9.3 IU/L 47.5 (H)    TESTOSTERONE TOTAL 240 - 950 ng/dL 173 (L)      ENDO PITUITARY LABS-P Latest Ref Rng & Units 1/27/2017   FSH 0.7 - 10.8 IU/L    LUTROPIN 1.5 - 9.3 IU/L    TESTOSTERONE TOTAL 240 - 950 ng/dL 215 (L)         ASSESSMENT:      1. Primary hypogonadism: last testosterone dose was 9 days ago. Will check testosterone level today.   Continue current regimen. Will adjust as needed.     2. Chronic fatigue:     RTC in 6 months but will check testosterone level between visit.     I spent 15 minutes  with this patient face to face and explained the conditions and plans (more than 50% of time was counseling/coordination of care, testosterone therapy)    SEVERINO Tom

## 2017-06-20 NOTE — LETTER
6/20/2017       RE: Deejay Dior  [address] [address] [place] [address]     Dear Colleague,    Thank you for referring your patient, Deejay Dior, to the [facility] ENDOCRINOLOGY at [facility]. Please see a copy of my visit note below.    Deejay is a 66 year old male presents today for Fatigue    HPI  Deejay is a 66-year-old male who presents today for follow up of primary hypogonadism. Patient has complicated past medical history including MDS status post BMT, ztvgt-mucqus-fkrz disease    He was started on testosterone therapy in April 2017. Initially started on T-gel but switched to injection due to cost.   Currently Testosterone 200 mg every 3 weeks.  Over all does not feel much different, may be some improvement in fatigue    Continues to struggle with fatigue     He history of BPH and is treated with medications.  Patient also has history of sleep apnea which is treated with CPAP.  He also has history of depression and anxiety for which he takes Zoloft.   Denies any worsening of BPH symptoms. Feels acne type of lesions started on face after starting testosterone.    No change in symptoms low libido and ED  Off prednisone      Past Medical History:   Diagnosis Date     Head injury 1964     Hearing problem Hearing aids 2015     History of blood transfusion 3/2014     History of radiation therapy June 2014     Immunosuppression (H) Sirolimus daily     MDS (myelodysplastic syndrome) (H)      Numbness and tingling     feet     Obstructive sleep apnea 1999     Pneumonia      Reduced vision August 2016    Cataract surgery     Sleep apnea 1999     Transplant July 1, 2014    Stem Cells     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)     Fatigue     Secondary adrenal insufficiency (H)     Influenza A (H1N1)     Fever, unspecified     Acute deep vein thrombosis (DVT) of distal vein of right lower  "extremity (H)     Long-term (current) use of anticoagulants [Z79.01]     Deep vein thrombosis (DVT) (H)     Primary hypogonadism in male     Allergies  Allergies   Allergen Reactions     Blood Transfusion Related (Informational Only) Other (See Comments)     Patient has a history of a clinically significant antibody against RBC antigens.  A delay in compatible RBCs may occur.     Medications  Current Outpatient Prescriptions   Medication Sig Dispense Refill     alfuzosin (UROXATRAL) 10 MG 24 hr tablet Take 1 tablet (10 mg) by mouth daily 30 tablet 3     oxyCODONE-acetaminophen (PERCOCET) 5-325 MG per tablet Take 1-2 tablets by mouth as needed       testosterone cypionate (DEPOTESTOTERONE CYPIONATE) 200 MG/ML injection Inject 1 mL (200 mg) into the muscle every 21 days 1 mL 3     syringe/needle, disp, 23G X 1\" 3 ML MISC Dispense 3ML syringes and 23Gx1\" needles for IM. Use 1 syringe every 21 days  to inject testosterone 3 each 3     amoxicillin (AMOXIL) 500 MG capsule Take 4 pills (2 grams) day of dental work 4 capsule 11     pantoprazole (PROTONIX) 40 MG EC tablet Take 1 tablet (40 mg) by mouth daily 60 tablet 11     sertraline (ZOLOFT) 100 MG tablet Take 1 tablet (100 mg) by mouth daily 30 tablet 6     sulfamethoxazole-trimethoprim (BACTRIM/SEPTRA) 400-80 MG per tablet Take 1 tablet by mouth daily 60 tablet 6     sirolimus (RAPAMUNE - GENERIC EQUIVALENT) 0.5 MG tablet Take 3 tablets (1.5 mg) by mouth daily 120 tablet 2     fluticasone (FLONASE) 50 MCG/ACT spray Spray 1-2 sprays into both nostrils daily 1 Bottle 11     fluconazole (DIFLUCAN) 100 MG tablet Take 1 tablet (100 mg) by mouth daily (Patient taking differently: Take 100 mg by mouth daily Taking 2) 60 tablet 6     finasteride (PROSCAR) 5 MG tablet Take 1 tablet (5 mg) by mouth daily 30 tablet 6     loratadine (CLARITIN) 10 MG capsule Take 10 mg by mouth daily 30 capsule 3     acyclovir (ZOVIRAX) 800 MG tablet Take 1 tablet (800 mg) by mouth 3 times daily 150 " tablet 5     LEVOFLOXACIN PO        ORDER FOR DME Please provide a NOVA cane offset with strap item number [id number] 1 Device 0     calcium carbonate-vitamin D 500-400 MG-UNIT TABS tablt Take 1 tablet by mouth daily 2000 mg 180 tablet 3     Family History  family history includes Breast Cancer in his mother; CANCER in his father and mother; CEREBROVASCULAR DISEASE in his mother; Depression in his brother; HEART DISEASE in his brother and brother; Hyperlipidemia in his brother; Hypertension in his brother and brother. There is no history of Glaucoma, Macular Degeneration, or Hypertension.  Social History     Social History     Marital status:      Spouse name: N/A     Number of children: N/A     Years of education: N/A     Occupational History     Not on file.     Social History Main Topics     Smoking status: Former Smoker     Packs/day: 1.00     Years: 34.00     Types: Cigarettes     Start date: 12/1/1967     Quit date: 4/1/2001     Smokeless tobacco: Never Used      Comment: quit in 2000     Alcohol use 2.5 oz/week      Comment: Seldom     Drug use: No     Sexual activity: Not Currently     Partners: Female     Birth control/ protection: Male Surgical, Female Surgical     Other Topics Concern     Not on file     Social History Narrative      ROS:8 point ROS neg other than the symptoms noted above in the HPI.     Physical Exam  /81  Pulse 81  Wt 99.2 kg (218 lb 11.2 oz)  BMI 33.25 kg/m2  Body mass index is 33.25 kg/(m^2).    Constitutional: no distress, comfortable, pleasant   Neurological: alert and oriented   Psychological: appropriate mood     RESULTS  ENDO PITUITARY LABS-[facility] Latest Ref Rng & Units 2/9/2017 2/2/2017   FSH 0.7 - 10.8 IU/L 50.8 (H)    LUTROPIN 1.5 - 9.3 IU/L 47.5 (H)    TESTOSTERONE TOTAL 240 - 950 ng/dL 173 (L)      ENDO PITUITARY LABS-[facility] Latest Ref Rng & Units 1/27/2017   FSH 0.7 - 10.8 IU/L    LUTROPIN 1.5 - 9.3 IU/L    TESTOSTERONE TOTAL 240 - 950 ng/dL 215 (L)          ASSESSMENT:      1. Primary hypogonadism: last testosterone dose was 9 days ago. Will check testosterone level today.   Continue current regimen. Will adjust as needed.     2. Chronic fatigue:     RTC in 6 months but will check testosterone level between visit.     I spent 15 minutes with this patient face to face and explained the conditions and plans (more than 50% of time was counseling/coordination of care, testosterone therapy)    SEVERINO Tom

## 2017-06-20 NOTE — MR AVS SNAPSHOT
After Visit Summary   6/20/2017    Deejay Dior    MRN: [id number]           Patient Information     Date Of Birth          1950        Visit Information        Provider Department      6/20/2017 3:00 PM Rd Chairez MD [facility] Endocrinology        Today's Diagnoses     Primary hypogonadism in male    -  1       Follow-ups after your visit        Follow-up notes from your care team     Return in about 6 months (around 12/20/2017).      Your next 10 appointments already scheduled     Jun 20, 2017  4:15 PM CDT   LAB with  LAB   [facility] Lab ([facility])    [address] [place] [address]  [facility] [facility] [id number] [phone]           Patient must bring picture ID.  Patient should be prepared to give a urine specimen  Please do not eat 10-12 hours before your appointment if you are coming in fasting for labs on lipids, cholesterol, or glucose (sugar).  Pregnant women should follow their Care Team instructions. Water with medications is okay. Do not drink coffee or other fluids.   If you have concerns about taking  your medications, please ask at office or if scheduling via [employer], send a message by clicking on Secure Messaging, Message Your Care Team.            Jul 25, 2017  2:00 PM CDT   (Arrive by 1:45 PM)   RETURN TUMOR VISIT with Tim Yanez MD   [facility] Ear Nose and Throat ([facility])    [address] [place] [address]  [facility] [facility] [id number] [phone]            Sep 28, 2017  1:00 PM CDT   [facility] Lab Draw with  [facility] LAB DRAW   [facility] Mason[facility] Lab Draw ([facility])    [address] [place] [id number] [phone]            Sep 28, 2017  1:30 PM CDT   Return with Aby Pinto MD   [facility] Blood and Marrow Transplant ([facility])    [address] [place] [id number] [phone]            Dec  19, 2017  1:30 PM CST   (Arrive by 1:15 PM)   RETURN ENDOCRINE with Rd Chairez MD   [facility] Endocrinology ([facility])    [address] [address] [phone]              Future tests that were ordered for you today     Open Future Orders        Priority Expected Expires Ordered    Testosterone Free and Total Routine  6/20/2018 6/20/2017            Who to contact     Please call your clinic at [phone] to:    Ask questions about your health    Make or cancel appointments    Discuss your medicines    Learn about your test results    Speak to your doctor   If you have compliments or concerns about an experience at your clinic, or if you wish to file a complaint, please contact [facility] Patient Relations at [phone] or email us at [email or web address]         Additional Information About Your Visit        [email or web address] Information     achvrt gives you secure access to your electronic health record. If you see a primary care provider, you can also send messages to your care team and make appointments. If you have questions, please call your primary care clinic.  If you do not have a primary care provider, please call [phone] and they will assist you.      ACTV8 is an electronic gateway that provides easy, online access to your medical records. With ACTV8, you can request a clinic appointment, read your test results, renew a prescription or communicate with your care team.     To access your existing account, please contact your [facility] or call [phone] for assistance.        Care EveryWhere ID     This is your Care EveryWhere ID. This could be used by other organizations to access your [place] medical records  [id number]        Your Vitals Were     Pulse BMI (Body Mass Index)                81 33.25 kg/m2           Blood Pressure from Last 3 Encounters:    06/20/17 138/81   06/08/17 125/75   04/13/17 122/76    Weight from Last 3 Encounters:   06/20/17 99.2 kg (218 lb 11.2 oz)   06/08/17 97.8 kg (215 lb 9.8 oz)   04/25/17 99.8 kg (220 lb)                 Today's Medication Changes          These changes are accurate as of: 6/20/17  3:46 PM.  If you have any questions, ask your nurse or doctor.               These medicines have changed or have updated prescriptions.        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        Thank you!     Thank you for choosing [facility]  for your care. Our goal is always to provide you with excellent care. Hearing back from our patients is one way we can continue to improve our services. Please take a few minutes to complete the written survey that you may receive in the mail after your visit with us. Thank you!             Your Updated Medication List - Protect others around you: Learn how to safely use, store and throw away your medicines at www.disposemymeds.org.          This list is accurate as of: 6/20/17  3:46 PM.  Always use your most recent med list.                   Brand Name Dispense Instructions for use    acyclovir 800 MG tablet    ZOVIRAX    150 tablet    Take 1 tablet (800 mg) by mouth 3 times daily       alfuzosin 10 MG 24 hr tablet    UROXATRAL    30 tablet    Take 1 tablet (10 mg) by mouth daily       amoxicillin 500 MG capsule    AMOXIL    4 capsule    Take 4 pills (2 grams) day of dental work       calcium carbonate-vitamin D 500-400 MG-UNIT Tabs per tablet     180 tablet    Take 1 tablet by mouth daily 2000 mg       finasteride 5 MG tablet     "PROSCAR    30 tablet    Take 1 tablet (5 mg) by mouth daily       fluconazole 100 MG tablet    DIFLUCAN    60 tablet    Take 1 tablet (100 mg) by mouth daily       fluticasone 50 MCG/ACT spray    FLONASE    1 Bottle    Spray 1-2 sprays into both nostrils daily       LEVOFLOXACIN PO          loratadine 10 MG capsule    CLARITIN    30 capsule    Take 10 mg by mouth daily       order for DME     1 Device    Please provide a NOVA cane offset with strap item number [id number]       oxyCODONE-acetaminophen 5-325 MG per tablet    PERCOCET     Take 1-2 tablets by mouth as needed       pantoprazole 40 MG EC tablet    PROTONIX    60 tablet    Take 1 tablet (40 mg) by mouth daily       sertraline 100 MG tablet    ZOLOFT    30 tablet    Take 1 tablet (100 mg) by mouth daily       sirolimus 0.5 MG tablet    RAPAMUNE - GENERIC EQUIVALENT    120 tablet    Take 3 tablets (1.5 mg) by mouth daily       sulfamethoxazole-trimethoprim 400-80 MG per tablet    BACTRIM/SEPTRA    60 tablet    Take 1 tablet by mouth daily       syringe/needle (disp) 23G X 1\" 3 ML Misc     3 each    Dispense 3ML syringes and 23Gx1\" needles for IM. Use 1 syringe every 21 days  to inject testosterone       testosterone cypionate 200 MG/ML injection    DEPOTESTOTERONE    1 mL    Inject 1 mL (200 mg) into the muscle every 21 days         "

## 2017-06-22 LAB
SHBG SERPL-SCNC: 68 NMOL/L (ref 11–80)
TESTOST FREE SERPL-MCNC: 5.54 NG/DL (ref 4.7–24.4)
TESTOST SERPL-MCNC: 447 NG/DL (ref 240–950)

## 2017-06-28 DIAGNOSIS — D46.9 MDS (MYELODYSPLASTIC SYNDROME) (H): ICD-10-CM

## 2017-06-28 DIAGNOSIS — R35.0 URINARY FREQUENCY: ICD-10-CM

## 2017-06-28 DIAGNOSIS — T86.00 COMPLICATIONS OF BONE MARROW TRANSPLANT (H): ICD-10-CM

## 2017-06-28 DIAGNOSIS — D89.813 GVH (GRAFT VERSUS HOST DISEASE) (H): ICD-10-CM

## 2017-06-28 RX ORDER — FINASTERIDE 5 MG/1
TABLET, FILM COATED ORAL
Qty: 30 TABLET | Refills: 6 | Status: SHIPPED | OUTPATIENT
Start: 2017-06-28 | End: 2018-04-26

## 2017-07-09 DIAGNOSIS — D89.813 GVH (GRAFT VERSUS HOST DISEASE) (H): ICD-10-CM

## 2017-07-10 DIAGNOSIS — E29.1 PRIMARY HYPOGONADISM IN MALE: ICD-10-CM

## 2017-07-10 RX ORDER — TESTOSTERONE CYPIONATE 200 MG/ML
200 INJECTION, SOLUTION INTRAMUSCULAR
Qty: 1 ML | Refills: 5 | Status: SHIPPED | OUTPATIENT
Start: 2017-07-10 | End: 2018-03-29

## 2017-07-10 RX ORDER — SIROLIMUS 0.5 MG/1
TABLET, FILM COATED ORAL
Qty: 90 TABLET | Refills: 3 | Status: SHIPPED | OUTPATIENT
Start: 2017-07-10 | End: 2017-09-28

## 2017-07-25 ENCOUNTER — OFFICE VISIT (OUTPATIENT)
Dept: OTOLARYNGOLOGY | Facility: CLINIC | Age: 67
End: 2017-07-25

## 2017-07-25 VITALS — BODY MASS INDEX: 33.15 KG/M2 | WEIGHT: 218 LBS

## 2017-07-25 DIAGNOSIS — K13.0 LIP LESION: Primary | ICD-10-CM

## 2017-07-25 ASSESSMENT — PAIN SCALES - GENERAL: PAINLEVEL: NO PAIN (0)

## 2017-07-25 NOTE — MR AVS SNAPSHOT
After Visit Summary   7/25/2017    Deejay Dior    MRN: [id number]           Patient Information     Date Of Birth          1950        Visit Information        Provider Department      7/25/2017 2:00 PM Tim Yanez MD [facility] Ear Nose and Throat         Follow-ups after your visit        Follow-up notes from your care team     Return in about 3 months (around 10/25/2017).      Your next 10 appointments already scheduled     Sep 28, 2017  1:00 PM CDT   [facility] Lab Draw with  MASONIC LAB DRAW   [facility] Masonic Lab Draw ([facility])    [address] [place] [address]  [address] [facility] [id number] [phone]            Sep 28, 2017  1:30 PM CDT   Return with Aby Pinto MD   [facility] Blood and Marrow Transplant ([facility])    [address] [place] [address]  [address] [facility] [id number] [phone]            Oct 31, 2017  2:00 PM CDT   (Arrive by 1:45 PM)   RETURN TUMOR VISIT with Tim Yanez MD   [facility] Ear Nose and Throat ([facility])    [address] [place] [address]  [address] [facility] [id number] [phone]            Dec 19, 2017  1:30 PM CST   (Arrive by 1:15 PM)   RETURN ENDOCRINE with Rd Chairez MD   [facility] Endocrinology ([facility])    [address] [place] [address]  [address] [facility] [id number] [phone]              Who to contact     Please call your clinic at [phone] to:    Ask questions about your health    Make or cancel appointments    Discuss your medicines    Learn about your test results    Speak to your doctor   If you have compliments or concerns about an experience at your clinic, or if you wish to file a complaint, please contact [facility] Patient Relations at [phone] or email us at [email or web address]         Additional Information About Your Visit        MyChart Information      ttwick gives you secure access to your electronic health record. If you see a primary care provider, you can also send messages to your care team and make appointments. If you have questions, please call your primary care clinic.  If you do not have a primary care provider, please call [phone] and they will assist you.      ttwick is an electronic gateway that provides easy, online access to your medical records. With ttwick, you can request a clinic appointment, read your test results, renew a prescription or communicate with your care team.     To access your existing account, please contact your [facility] or call [phone] for assistance.        Care EveryWhere ID     This is your Care EveryWhere ID. This could be used by other organizations to access your [place] medical records  [id number]        Your Vitals Were     BMI (Body Mass Index)                   33.15 kg/m2            Blood Pressure from Last 3 Encounters:   06/20/17 138/81   06/08/17 125/75   04/13/17 122/76    Weight from Last 3 Encounters:   07/25/17 98.9 kg (218 lb)   06/20/17 99.2 kg (218 lb 11.2 oz)   06/08/17 97.8 kg (215 lb 9.8 oz)              Today, you had the following     No orders found for display         Today's Medication Changes          These changes are accurate as of: 7/25/17  2:39 PM.  If you have any questions, ask your nurse or doctor.               These medicines have changed or have updated prescriptions.        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        Equal Access to Services     RABIA PEÑA AH: Edil shen  Ariellemorris, harshada luqadaha, qaybta karuth parada, corinna robledo rosalindakamron canowally snow. So [facility] [phone].    ATENCIÓN: Si myah fine, tiene a del toro disposición servicios gratuitos de asistencia lingüística. Reddy al [phone].    We comply with applicable federal civil rights laws and [place] laws. We do not discriminate on the basis of race, color, national origin, age, disability sex, sexual orientation or gender identity.            Thank you!     Thank you for choosing [facility] EAR NOSE AND THROAT  for your care. Our goal is always to provide you with excellent care. Hearing back from our patients is one way we can continue to improve our services. Please take a few minutes to complete the written survey that you may receive in the mail after your visit with us. Thank you!             Your Updated Medication List - Protect others around you: Learn how to safely use, store and throw away your medicines at www.disposemymeds.org.          This list is accurate as of: 7/25/17  2:39 PM.  Always use your most recent med list.                   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), MDS (myelodysplastic syndrome) (H)       alfuzosin 10 MG 24 hr tablet    UROXATRAL    30 tablet    Take 1 tablet (10 mg) by mouth daily    Benign non-nodular prostatic hyperplasia with lower urinary tract symptoms, Urinary frequency       amoxicillin 500 MG capsule    AMOXIL    4 capsule    Take 4 pills (2 grams) day of dental work    MDS (myelodysplastic syndrome) (H)       calcium carbonate-vitamin D 500-400 MG-UNIT Tabs per tablet     180 tablet    Take 1 tablet by mouth daily 2000 mg    MDS (myelodysplastic syndrome) (H), Acute vqsjs-qvsylt-njft disease (H), 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)       finasteride 5 MG tablet    PROSCAR    30 tablet    TAKE ONE TABLET BY  "MOUTH ONE TIME DAILY    MDS (myelodysplastic syndrome) (H), 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), Urinary frequency, Complications of bone marrow transplant (H)       fluconazole 100 MG tablet    DIFLUCAN    60 tablet    Take 1 tablet (100 mg) by mouth daily    MDS (myelodysplastic syndrome) (H), 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)       fluticasone 50 MCG/ACT spray    FLONASE    1 Bottle    Spray 1-2 sprays into both nostrils daily    MDS (myelodysplastic syndrome) (H)       LEVOFLOXACIN PO           loratadine 10 MG capsule    CLARITIN    30 capsule    Take 10 mg by mouth daily    MDS (myelodysplastic syndrome) (H)       order for DME     1 Device    Please provide a NOVA cane offset with strap item number [id number]    MDS (myelodysplastic syndrome) (H)       oxyCODONE-acetaminophen 5-325 MG per tablet    PERCOCET     Take 1-2 tablets by mouth as needed        pantoprazole 40 MG EC tablet    PROTONIX    60 tablet    Take 1 tablet (40 mg) by mouth daily    MDS (myelodysplastic syndrome) (H)       sertraline 100 MG tablet    ZOLOFT    30 tablet    Take 1 tablet (100 mg) by mouth daily    MDS (myelodysplastic syndrome) (H), 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), Urinary frequency, Other complication of bone marrow transplant (H)       sirolimus 0.5 MG tablet    RAPAMUNE - GENERIC EQUIVALENT    90 tablet    TAKE THREE TABLETS BY MOUTH DAILY    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 Use 1 syringe every 21 days  to inject testosterone    Primary hypogonadism in male       testosterone cypionate 200 MG/ML injection    DEPOTESTOTERONE    1 mL    " Inject 1 mL (200 mg) into the muscle every 21 days    Primary hypogonadism in male

## 2017-07-25 NOTE — NURSING NOTE
Chief Complaint   Patient presents with     RECHECK     3 month follow up     Contreras Fuchs LPN

## 2017-07-25 NOTE — LETTER
7/25/2017       RE: Deejay Dior   [address] [place] [address]     Dear Colleague,    Thank you for referring your patient, Deejay Dior, to the [facility] EAR NOSE AND THROAT at [facility]. Please see a copy of my visit note below.    HISTORY OF PRESENT ILLNESS:  Deejay Dior is 66 years of age.  He is here for 3 month follow-up today.  He has a history of a lip lesion that was precancerous.  He has no new complaints at the present time today except right scalp lesion. .  He is otherwise getting by quite well.  He is status post bone marrow transplant.      PHYSICAL EXAMINATION:  The patient is alert, oriented x3 and pleasant.  Skin of the face, lips, and neck on him is quite normal.  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  There are no other masses or lesions seen.  Extraocular motions are intact.  Facial nerve function is intact.  The remainder of the cranial nerve function is intact.  There is no other oral leukoplakia.  He has a little bit of a scaliness like a keratosis behind his right ear, but no other masses or adenopathy are seen.  Ear canals, tympanic membranes are normal as well. Today I used liquid N2 for the right scalp lesion, which was tolerated well.      ASSESSMENT:  Patient with a history of a lip precancerous lesion advanced.      PLAN:  We will see him again in three to 6 months.      FO/ms         Again, thank you for allowing me to participate in the care of your patient.      Sincerely,    Tim Yanez MD

## 2017-07-26 RX ORDER — LEVOFLOXACIN 250 MG/1
250 TABLET, FILM COATED ORAL DAILY
Qty: 30 TABLET | Refills: 6 | COMMUNITY
Start: 2017-07-26 | End: 2018-02-21

## 2017-08-08 ENCOUNTER — TELEPHONE (OUTPATIENT)
Dept: ENDOCRINOLOGY | Facility: CLINIC | Age: 67
End: 2017-08-08

## 2017-08-08 NOTE — TELEPHONE ENCOUNTER
Pt called re: 6/30/17 received from Dr. Chairez. Mentions testosterone levels WNL, pt says Sx's have no changed, asking if should continue w/ testosterone injections. Please advise at [phone]. Sent to [employer].

## 2017-08-08 NOTE — TELEPHONE ENCOUNTER
Deejay is questioning if he should stay on the testosterone , dose change? How should he proceed. ?

## 2017-08-18 DIAGNOSIS — D46.9 MDS (MYELODYSPLASTIC SYNDROME) (H): ICD-10-CM

## 2017-08-18 DIAGNOSIS — D89.813 GVH (GRAFT VERSUS HOST DISEASE) (H): ICD-10-CM

## 2017-08-18 RX ORDER — ACYCLOVIR 800 MG/1
800 TABLET ORAL 3 TIMES DAILY
Qty: 90 TABLET | Refills: 3 | Status: SHIPPED | OUTPATIENT
Start: 2017-08-18 | End: 2018-01-19

## 2017-09-12 ENCOUNTER — TELEPHONE (OUTPATIENT)
Dept: UROLOGY | Facility: CLINIC | Age: 67
End: 2017-09-12

## 2017-09-12 DIAGNOSIS — R35.0 URINARY FREQUENCY: ICD-10-CM

## 2017-09-12 DIAGNOSIS — N40.1 BENIGN NON-NODULAR PROSTATIC HYPERPLASIA WITH LOWER URINARY TRACT SYMPTOMS: ICD-10-CM

## 2017-09-12 RX ORDER — ALFUZOSIN HYDROCHLORIDE 10 MG/1
10 TABLET, EXTENDED RELEASE ORAL DAILY
Qty: 30 TABLET | Refills: 1 | Status: SHIPPED | OUTPATIENT
Start: 2017-09-12 | End: 2018-03-29

## 2017-09-12 NOTE — TELEPHONE ENCOUNTER
----- Message from Mimi Givens PA-C sent at 9/11/2017  5:30 PM CDT -----  Will approve refill of alfuzosin 10 mg daily x 30 days, 0 refills. Patient then needs to be seen in clinic for further refills.     Thanks!  Mimi Givens PA-C    ----- Message -----     From: Joya Rosas LPN     Sent: 9/11/2017   3:29 PM       To: Mimi Givens PA-C    Patient wants more refills it has been one year since seen  Wants alfuzosin joya

## 2017-09-28 ENCOUNTER — APPOINTMENT (OUTPATIENT)
Dept: LAB | Facility: CLINIC | Age: 67
End: 2017-09-28
Attending: INTERNAL MEDICINE
Payer: MEDICARE

## 2017-09-28 ENCOUNTER — ONCOLOGY VISIT (OUTPATIENT)
Dept: TRANSPLANT | Facility: CLINIC | Age: 67
End: 2017-09-28
Attending: INTERNAL MEDICINE
Payer: MEDICARE

## 2017-09-28 VITALS
HEART RATE: 86 BPM | TEMPERATURE: 97.8 F | SYSTOLIC BLOOD PRESSURE: 137 MMHG | BODY MASS INDEX: 33.45 KG/M2 | HEIGHT: 68 IN | DIASTOLIC BLOOD PRESSURE: 82 MMHG | OXYGEN SATURATION: 96 % | RESPIRATION RATE: 16 BRPM | WEIGHT: 220.7 LBS

## 2017-09-28 DIAGNOSIS — D89.813 GVH (GRAFT VERSUS HOST DISEASE) (H): ICD-10-CM

## 2017-09-28 DIAGNOSIS — D46.9 MDS (MYELODYSPLASTIC SYNDROME) (H): ICD-10-CM

## 2017-09-28 LAB
ALBUMIN SERPL-MCNC: 3.3 G/DL (ref 3.4–5)
ALP SERPL-CCNC: 119 U/L (ref 40–150)
ALT SERPL W P-5'-P-CCNC: 44 U/L (ref 0–70)
ANION GAP SERPL CALCULATED.3IONS-SCNC: 8 MMOL/L (ref 3–14)
AST SERPL W P-5'-P-CCNC: 39 U/L (ref 0–45)
BASOPHILS # BLD AUTO: 0 10E9/L (ref 0–0.2)
BASOPHILS NFR BLD AUTO: 0.4 %
BILIRUB SERPL-MCNC: 0.4 MG/DL (ref 0.2–1.3)
BUN SERPL-MCNC: 29 MG/DL (ref 7–30)
CALCIUM SERPL-MCNC: 8.6 MG/DL (ref 8.5–10.1)
CHLORIDE SERPL-SCNC: 105 MMOL/L (ref 94–109)
CO2 SERPL-SCNC: 23 MMOL/L (ref 20–32)
CREAT SERPL-MCNC: 1.44 MG/DL (ref 0.66–1.25)
DIFFERENTIAL METHOD BLD: ABNORMAL
EOSINOPHIL # BLD AUTO: 0.1 10E9/L (ref 0–0.7)
EOSINOPHIL NFR BLD AUTO: 1.4 %
ERYTHROCYTE [DISTWIDTH] IN BLOOD BY AUTOMATED COUNT: 15.6 % (ref 10–15)
GFR SERPL CREATININE-BSD FRML MDRD: 49 ML/MIN/1.7M2
GLUCOSE SERPL-MCNC: 89 MG/DL (ref 70–99)
HCT VFR BLD AUTO: 45.6 % (ref 40–53)
HGB BLD-MCNC: 15.1 G/DL (ref 13.3–17.7)
IMM GRANULOCYTES # BLD: 0 10E9/L (ref 0–0.4)
IMM GRANULOCYTES NFR BLD: 0.2 %
LYMPHOCYTES # BLD AUTO: 1.9 10E9/L (ref 0.8–5.3)
LYMPHOCYTES NFR BLD AUTO: 34.7 %
MCH RBC QN AUTO: 27.8 PG (ref 26.5–33)
MCHC RBC AUTO-ENTMCNC: 33.1 G/DL (ref 31.5–36.5)
MCV RBC AUTO: 84 FL (ref 78–100)
MONOCYTES # BLD AUTO: 0.7 10E9/L (ref 0–1.3)
MONOCYTES NFR BLD AUTO: 12.2 %
NEUTROPHILS # BLD AUTO: 2.8 10E9/L (ref 1.6–8.3)
NEUTROPHILS NFR BLD AUTO: 51.1 %
NRBC # BLD AUTO: 0 10*3/UL
NRBC BLD AUTO-RTO: 0 /100
PLATELET # BLD AUTO: 153 10E9/L (ref 150–450)
POTASSIUM SERPL-SCNC: 3.6 MMOL/L (ref 3.4–5.3)
PROT SERPL-MCNC: 7.5 G/DL (ref 6.8–8.8)
RBC # BLD AUTO: 5.44 10E12/L (ref 4.4–5.9)
SODIUM SERPL-SCNC: 136 MMOL/L (ref 133–144)
WBC # BLD AUTO: 5.6 10E9/L (ref 4–11)

## 2017-09-28 PROCEDURE — 87799 DETECT AGENT NOS DNA QUANT: CPT | Performed by: INTERNAL MEDICINE

## 2017-09-28 PROCEDURE — 36415 COLL VENOUS BLD VENIPUNCTURE: CPT

## 2017-09-28 PROCEDURE — 85025 COMPLETE CBC W/AUTO DIFF WBC: CPT | Performed by: INTERNAL MEDICINE

## 2017-09-28 PROCEDURE — 99211 OFF/OP EST MAY X REQ PHY/QHP: CPT | Mod: ZF

## 2017-09-28 PROCEDURE — 80195 ASSAY OF SIROLIMUS: CPT | Performed by: INTERNAL MEDICINE

## 2017-09-28 PROCEDURE — 80053 COMPREHEN METABOLIC PANEL: CPT | Performed by: INTERNAL MEDICINE

## 2017-09-28 RX ORDER — SIROLIMUS 0.5 MG/1
TABLET, FILM COATED ORAL
Qty: 90 TABLET | Refills: 3 | Status: SHIPPED | OUTPATIENT
Start: 2017-09-28 | End: 2017-12-14

## 2017-09-28 ASSESSMENT — PAIN SCALES - GENERAL: PAINLEVEL: NO PAIN (0)

## 2017-09-28 NOTE — PROGRESS NOTES
"BMT Daily Progress Note     ID/CC:  Mr. 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     HPI: Deejay is here with his wife Racquel.  Overall doing very well.  Seen in Endo 6/20/17.  Testosterone level improved (447).  Continue therapy and f/u 6 months.  Seen in ENT 7/25/17, no issues, f/u 6 months.  New CPAP machine.  Has been travelling, no new issues, just ongoing fatigue (still problematic but overall 10-15% better per Deejay).  He remains off all diuretics and edema is stable without needing to wrap legs.   Weight stable at 220 lbs. No chest pain, no palp/dizziness. Stable bruises and skin tears. No N/V/D/C.  Sleeping ok w no change in PM urination.  Mood good.      Review of Systems: 10 point ROS negative except as noted above.    Physical Exam:  /82  Pulse 86  Temp 97.8  F (36.6  C)  Resp 16  Ht 1.727 m (5' 7.99\")  Wt 100.1 kg (220 lb 11.2 oz)  SpO2 96%  BMI 33.57 kg/m2   Wt Readings from Last 4 Encounters:   09/28/17 100.1 kg (220 lb 11.2 oz)   07/25/17 98.9 kg (218 lb)   06/20/17 99.2 kg (218 lb 11.2 oz)   06/08/17 97.8 kg (215 lb 9.8 oz)     General: NAD, continues to less cushingoid each visit  Eyes: SARIKA, sclera anicteric  Nose/Mouth/Throat: OP clear, buccal mucosa moist, no lichenoid changes.  Full upper plate  no pharyngeal erythema/drainage. Surgical site well healed  Lungs: CTA Bilaterally, no wheezes or crackles.  Good air mvmt bilaterally.    Cardiovascular: RRR, no M/R/G   Abdominal: +BS, soft, NT, ND, No HSM central hernia repaired  Lymphatics: +1 LE edema ankles/feet, stable from last visit.   Wearing compression stockings.   Skin:   Generalized hyperpigmentation.  Multiple ecchymoses & skin tears   Neuro: A&O, slight resting tremor (stable from last visit)  Line: NONE    Labs:  Lab Results   Component Value Date    WBC 4.3 06/08/2017    ANEU 2.5 06/08/2017    HGB 13.9 06/08/2017    HCT 43.3 06/08/2017     (L) 06/08/2017     " 06/08/2017    POTASSIUM 4.0 06/08/2017    CHLORIDE 107 06/08/2017    CO2 24 06/08/2017    GLC 94 06/08/2017    BUN 24 06/08/2017    CR 1.15 06/08/2017    MAG 2.2 04/13/2017    INR 0.95 06/30/2016       ASSESSMENT AND PLAN:  Mr. 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 Total cell dose (from 7/1 & 7/2) 6.53 x 10^6 CD34+ cells/kg.   - 1 year anniversary (July 2015): 30% cellular, trilineage hematopoiesis, no abnormal blasts by morphology or flow , no dysplasia, 0-1 fibrosis, 100% donor (BM, CD3, CD15). CR  - 2 year anniversary (July 2016): 20-30% cellular, trilineage hematopoiesis, no abnormal blasts by morphology or flow , no dysplasia, NO fibrosis, 100% donor in BM (PB not sent). ISCN:  //46,XX[20] Complete Remission.    HEME:   5.6>15.1<153  ANC 2.8  0.1 Eos.  Stable, follow.   -Transfuse to keep Hgb >8 & plt >50.   Not needing transfusions.  -Dx with RUE DVT 3/28/2016 and then a R femoral DVT was found incidentally on CT on 3/31/16 which developed while on Lovenox.  Bridged with Coumadin (started 4/5) through 6/30/16 (3 months).  Now off coumadin. 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 Rest of R and L venous system patent.  Follow.    GVHD: Being treated for cGVHD with fatigue, weakness, SOB/MERIDA.  No ev of pulm GVHD per Dr. Sandoval visit 6/7/16.  Stable fatigue, which has been a major symptom of his GVHD flares. No new sx of cGHVD (note plt and lower albumin)  - Currently off prednisone and on Sirolimus to 1.5 mg daily (x8/18).  Level stable at 12 (4/13/17).      - Currently on sirolimus to 1.0 mg daily (since July 2017).   Taper to 1.0 and 0.5 mg alternating.   - Discussed signs/sx of adrenal insufficiency or cGVHD to watch for.    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  Rx with topical steroids/ Pred/CSA & completed prednisone taper on 11/17/14.  -- GVHD relapse: Skin bx on 1/22/15 showed Grade 2 GVH, resolved with topical steroids. Flare of GI GVH on 2/2/15 with +bx of the duodenum & rectum as well as rising LFT's. Rx with steroids with no response. S/P ATG 2/8-2/13/15  -- CGVHD: Stigmata of chronic oral GVHD but lip biopsy 8/19/15 negative. Throat pain with negative EGD. However, responded to steroids. LFTs normal. No rash, no joint sx or skin sxs of chronic GVH, but eyes are very dry & + Schirmer's test Oct 2015 and ocular GVHD per optho (11/6/15).   -- Moderate ocular GVHD: Restasis eye gtts and duct plugs per optho consult 11/6/15. Continue restasis.   --repeat lip biopsy 1/4/2016  showed GVHD.  Rx and responded well to sirolimus and Prednisone.    --Repeat lip biopsy 11/15/16 with Atypical squamous cell proliferation, transected at the base. Repeat surgery 12/28/16.  F/u Dr. Yanez 10/31/17.    PULM:  SOB/MERIDA/CT GGO:  Per Dr. Sandoval 6/7/16 findings and clinical course most consistent with bronchiolitis/organizing pneumonia post H1N1 infection. He followed up w/ Dr. Sandoval 7/19/16 who was happy with his good response to steroids, which can be tapered from pulm standpoing. CT much improved.   -Graduated from pulm rehab. No issues today.    ID:   Afebrile.  RSV/Flu/Viral culture 10/12 neg.   -  Recent hx of multiple episodes of PNA/URI/sinusitis.  Infl B on 5/5, Influenza A, H1N1 & HCAP in March 2016 & Geotrichim by BAL.  Please see Dr. Sandoval's note, he thinks his symptoms are consistent with bronchiolitis/organizing pneumonia post H1N1 infection & is recommending continuing the current dose of Pred [id number] & then re assess.    - IgG = 403, repleted 3/22/16, 5/8/16= 1270   - Prophy:  HD ACV (renal dosing), Fluconazole and SS daily Bactrim.   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   Titer has been fluctating with a low of 617 on 6/2. Up to 3180 log 3.5 on 6/16. Stable 6/30/16 (3118 log 3.5).  Follow frequently, no tx unless significantly higher or ev/concern for PTLD (none now). 3565 (log 3.6) on 7/7/16. Recheck 832 (log 2.9) on 8/18/16.  No detectable EBV (9/8/16). 660 (log 2.8) 9/22/16. Stable on  (10/12), and 629 copies (log 2.8)  (10/27) stable. 689 (log 2.8) 11/17/16.  (12/14/16) is undetectable (<500).  Today (2/2/17) 1539 (log 3.2).  March 7, 2017 is undetectable.  April 13 and June 8 undetectable. Follow each visit. PENDING TODAY 9/28/17.  Flu shot 10/27/16  Will get 2017 flu shot at [facility]    5. GI: No active issues. Appetite good. Occ sensation that food stuck in esophagus (less frequent). LFTs normal, alb improved to 3.3   Other LFTs normal.  Recheck Dec and Follow.   Cont Protonix daily.     6. FEN/Renal:  Cr up a bit today (1.44). Has fluctuated in the past.  He has been using more ibuprofen.  Hold and stay well hydrated.  Edema stable.  Follow.   -K and Mg normal off PO replacement K 3.6  - Cont Ca/Vit D      7. CV/HTN: Echo 9/13/16 for ongoing edema, fatigue etc.  EF 60-65% w/ mild LVH and mild REMY and impaired LV relaxation. BP remains normal off therapy.  Follow Closely.    8. Pain:  Neuropathy. More electrical shock feeling L foot (occ).  Stable even after tapering off gabapentin.  Follow.     9.  Depression: Mood seems good. Previously discussed whether fatigue could be related to depression and he is confident that is not the issue. He is looking forward to his trip.   Continue on Zoloft 100mg daily    10. Fatigue: w/u for extreme fatigue has included eval for hypothyroid & adrenal insuficciency, both of which have been normal.  Previous episodes of extreme fatigue Improved w/ steroids for cGVHD treatment.  Tapered off and sx worsened.  He is using Bipap.  PM urinary frequency better w/ proscar.  Echo shows only mild diastolic dysfxn.  No active infections (low grade EBV-  CFS?).  Thus by process of elimination, I think this is related to GVHD.  We restarted pred and increased sirolimus.  There has been some improvement, but now plateaued.    Tapering steroids.   Low testosterone, therapy started by Endocrine.  Repeat level normal (477 6/20/17).  F/u 12/19/17. Will repeat a level here first.     11. Urinary frequency.   Seen by urology 8/15, doing well on Proscar 5 mg per day and Uroxatral 10 mg per day.    12. Congestion: Chronic (x years), no signs/sx infection.  Continue claritin/flonase.    13. MSK: Bilateral carpal tunnel surgery completed, s/p PT for plantar faciitis,  Knee pain.  F[facility] at [facility]        PLAN:  Will get flu shot at [facility]  RTC with Dr. Pinto 12/14/2017  Sooner if needed.

## 2017-09-28 NOTE — NURSING NOTE
"Oncology Rooming Note    September 28, 2017 1:29 PM   Deejay Dior is a 66 year old male who presents for:    Chief Complaint   Patient presents with     Labs Only     venipuncture, vitals checked     RECHECK     MDS (myelodysplastic syndrome)      Initial Vitals: /82  Pulse 86  Temp 97.8  F (36.6  C)  Resp 16  Ht 1.727 m (5' 7.99\")  Wt 100.1 kg (220 lb 11.2 oz)  SpO2 96%  BMI 33.57 kg/m2 Estimated body mass index is 33.57 kg/(m^2) as calculated from the following:    Height as of this encounter: 1.727 m (5' 7.99\").    Weight as of this encounter: 100.1 kg (220 lb 11.2 oz). Body surface area is 2.19 meters squared.  No Pain (0) Comment: Data Unavailable   No LMP for male patient.  Allergies reviewed: Yes  Medications reviewed: Yes    Medications: Medication refills not needed today.  Pharmacy name entered into [facility]:    [facility] [address] [place], [place] - [address] [facility] AT [facility] [address] & [id number] - [place], [place] - [address] [place] [address] [place]     Clinical concerns: No new concerns. Pt said there were no new changes to medication list since July.  Provider was notified.    5 minutes for nursing intake (face to face time)     Rea Lacy MA              "

## 2017-09-28 NOTE — MR AVS SNAPSHOT
After Visit Summary   9/28/2017    Deejay Dior    MRN: [id number]           Patient Information     Date Of Birth          1950        Visit Information        Provider Department      9/28/2017 1:30 PM Aby Pinto MD [facility] Blood and Marrow Transplant        Today's Diagnoses     MDS (myelodysplastic syndrome)        GVH (graft versus host disease)              [facility] ([facility])  [address] [place] [address]  Phone: [phone]  Clinic Hours:   Monday-Thursday:7am to 7pm   Friday: 7am to 5pm   Weekends and holidays:    8am to noon (in general)  If your fever is 100.5  or greater,   call the clinic.  After hours call the   hospital at [phone] or   [phone]. Ask for the BMT   fellow on-call            Follow-ups after your visit        Your next 10 appointments already scheduled     Oct 31, 2017  2:00 PM CDT   (Arrive by 1:45 PM)   RETURN TUMOR VISIT with Tim Yanez MD   [facility] Ear Nose and Throat ([facility])    [address] [place] [address] [id number] [phone]            Dec 14, 2017  3:00 PM CST   [facility] with  [facility]   [facility] ([facility])    [address] [place] [id number] [phone]            Dec 14, 2017  3:30 PM CST   Return with Aby Pinto MD   [facility] Blood and Marrow Transplant ([facility])    [address] [place] [id number] [phone]            Dec 19, 2017  1:30 PM CST   (Arrive by 1:15 PM)   RETURN ENDOCRINE with Rd Chairez MD   [facility] Endocrinology ([facility])    [address] [place] [id number] [phone]              Future tests that were ordered for you today     Open Future Orders        Priority Expected Expires Ordered    Testosterone  "total Routine 12/14/2017 3/27/2018 9/28/2017    Comprehensive metabolic panel Routine 12/14/2017 12/30/2017 9/28/2017    Magnesium Routine 12/14/2017 12/30/2017 9/28/2017    CBC with platelets differential Routine 12/14/2017 12/30/2017 9/28/2017    EBV DNA PCR Quantitative Whole Blood Routine 12/14/2017 12/30/2017 9/28/2017    CMV DNA quantification Routine 12/14/2017 12/30/2017 9/28/2017            Who to contact     If you have questions or need follow up information about today's clinic visit or your schedule please contact [facility] directly at [phone].  Normal or non-critical lab and imaging results will be communicated to you by Chubbies Shortshart, letter or phone within 4 business days after the clinic has received the results. If you do not hear from us within 7 days, please contact the clinic through Pieceablet or phone. If you have a critical or abnormal lab result, we will notify you by phone as soon as possible.  Submit refill requests through femeninas or call your pharmacy and they will forward the refill request to us. Please allow 3 business days for your refill to be completed.          Additional Information About Your Visit        femeninas Information     femeninas gives you secure access to your electronic health record. If you see a primary care provider, you can also send messages to your care team and make appointments. If you have questions, please call your primary care clinic.  If you do not have a primary care provider, please call [phone] and they will assist you.        Care EveryWhere ID     This is your Care EveryWhere ID. This could be used by other organizations to access your [facility] medical records  [id number]        Your Vitals Were     Pulse Temperature Respirations Height Pulse Oximetry BMI (Body Mass Index)    86 97.8  F (36.6  C) 16 1.727 m (5' 7.99\") 96% 33.57 kg/m2       Blood Pressure from Last 3 Encounters:   09/28/17 137/82   06/20/17 138/81 "   06/08/17 125/75    Weight from Last 3 Encounters:   09/28/17 100.1 kg (220 lb 11.2 oz)   07/25/17 98.9 kg (218 lb)   06/20/17 99.2 kg (218 lb 11.2 oz)              We Performed the Following     CBC with platelets differential     Comprehensive metabolic panel     EBV DNA PCR Quantitative Whole Blood     Sirolimus level          Today's Medication Changes          These changes are accurate as of: 9/28/17 11:59 PM.  If you have any questions, ask your nurse or doctor.               These medicines have changed or have updated prescriptions.        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)        Dose:  100 mg   Take 1 tablet (100 mg) by mouth daily   Quantity:  60 tablet   Refills:  6       sirolimus 0.5 MG tablet   Commonly known as:  GENERIC EQUIVALENT   This may have changed:  See the new instructions.   Used for:  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)   Changed by:  Aby Pinto MD        Alternate 1 mg and 0.5 mg every other day   Quantity:  90 tablet   Refills:  3            Where to get your medicines      These medications [id number] [address] [address] [address]     Phone:  [phone]     sirolimus 0.5 MG tablet                Recent Review Flowsheet Data     BMT Recent Results Latest Ref Rng & Units 11/17/2016 12/14/2016 2/2/2017 3/7/2017 4/13/2017 6/8/2017 9/28/2017    WBC 4.0 - 11.0 10e9/L 6.5 3.9(L) 4.3 5.6 5.9 4.3 5.6    Hemoglobin 13.3 - 17.7 g/dL 12.1(L) 12.5(L) 13.1(L) 12.8(L) 12.5(L) 13.9 15.1    Platelet Count 150 - 450 10e9/L 164 143(L) 140(L) 153 194 136(L) 153    Neutrophils (Absolute) 1.6 - 8.3 10e9/L 4.9 2.2 2.1 3.4 3.6 2.5 2.8    INR 0.86 - 1.14 - - - - - - -    Sodium 133 - 144 mmol/L 138 141 138 141 137 140 136    Potassium 3.4 - 5.3 mmol/L 4.0  3.2(L) 3.7 3.6 3.9 4.0 3.6    Chloride 94 - 109 mmol/L 100 105 107 110(H) 104 107 105    Glucose 70 - 99 mg/dL 114(H) 114(H) 87 91 100(H) 94 89    Urea Nitrogen 7 - 30 mg/dL 34(H) 28 27 20 27 24 29    Creatinine 0.66 - 1.25 mg/dL 1.17 1.15 1.09 0.93 1.28(H) 1.15 1.44(H)    Calcium (Total) 8.5 - 10.1 mg/dL 9.5 9.1 8.8 8.8 9.2 8.7 8.6    Protein (Total) 6.8 - 8.8 g/dL 7.5 6.9 7.1 6.8 7.5 6.7(L) 7.5    Albumin 3.4 - 5.0 g/dL 3.3(L) 3.0(L) 3.4 3.2(L) 3.5 2.9(L) 3.3(L)    Bilirubin (Direct) 0.0 - 0.2 mg/dL - - - - - - -    Alkaline Phosphatase 40 - 150 U/L 77 71 91 96 128 134 119    AST 0 - 45 U/L 41 39 40 31 40 44 39    ALT 0 - 70 U/L 40 31 35 31 32 47 44    MCV 78 - 100 fl 84 83 80 82 82 84 84               Primary Care Provider    None Specified       No primary provider on file.        Equal Access to Services     RABIA PEÑA AH: Hadii aad ku wilmaro Somorris, waaxda luqadaha, qaybta kaalmada karma, corinna barlow . [facility] [phone].    ATENCIÓN: Si habla español, tiene a del toro disposición servicios gratuitos de asistencia lingüística. Llame al [phone].    We comply with applicable federal civil rights laws and [place] laws. We do not discriminate on the basis of race, color, national origin, age, disability sex, sexual orientation or gender identity.            Thank you!     Thank you for choosing [facility]  for your care. Our goal is always to provide you with excellent care. Hearing back from our patients is one way we can continue to improve our services. Please take a few minutes to complete the written survey that you may receive in the mail after your visit with us. Thank you!             Your Updated Medication List - Protect others around you: Learn how to safely use, store and throw away your medicines at www.disposemymeds.org.          This list is accurate as of: 9/28/17 11:59 PM.  Always use your most recent med list.                    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), MDS (myelodysplastic syndrome) (H)       alfuzosin 10 MG 24 hr tablet    UROXATRAL    30 tablet    Take 1 tablet (10 mg) by mouth daily    Benign non-nodular prostatic hyperplasia with lower urinary tract symptoms, Urinary frequency       amoxicillin 500 MG capsule    AMOXIL    4 capsule    Take 4 pills (2 grams) day of dental work    MDS (myelodysplastic syndrome) (H)       calcium carbonate-vitamin D 500-400 MG-UNIT Tabs per tablet     180 tablet    Take 1 tablet by mouth daily 2000 mg    MDS (myelodysplastic syndrome) (H), Acute eywnh-ncjucc-kjup disease (H), 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)       finasteride 5 MG tablet    PROSCAR    30 tablet    TAKE ONE TABLET BY MOUTH ONE TIME DAILY    MDS (myelodysplastic syndrome) (H), 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), Urinary frequency, Complications of bone marrow transplant (H)       fluconazole 100 MG tablet    DIFLUCAN    60 tablet    Take 1 tablet (100 mg) by mouth daily    MDS (myelodysplastic syndrome) (H), 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)       fluticasone 50 MCG/ACT spray    FLONASE    1 Bottle    Spray 1-2 sprays into both nostrils daily    MDS (myelodysplastic syndrome) (H)       levofloxacin 250 MG tablet    LEVAQUIN    30 tablet    Take 1 tablet (250 mg) by mouth daily        loratadine 10 MG capsule    CLARITIN    30 capsule    Take 10 mg by mouth daily    MDS (myelodysplastic syndrome) (H)       order for DME     1 Device    Please provide a [employer] cane offset with strap item number [id number]    MDS (myelodysplastic syndrome) (H)       oxyCODONE-acetaminophen 5-325 MG per tablet    PERCOCET     Take 1-2 tablets by mouth as needed        pantoprazole 40 MG EC tablet    PROTONIX    60 tablet    Take 1 tablet (40 mg) by mouth daily    MDS (myelodysplastic syndrome)  "(H)       sertraline 100 MG tablet    ZOLOFT    30 tablet    Take 1 tablet (100 mg) by mouth daily    MDS (myelodysplastic syndrome) (H), 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), Urinary frequency, Other complication of bone marrow transplant (H)       sirolimus 0.5 MG tablet    GENERIC EQUIVALENT    90 tablet    Alternate 1 mg and 0.5 mg every other day    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 Use 1 syringe every 21 days  to inject testosterone    Primary hypogonadism in male       testosterone cypionate 200 MG/ML injection    DEPOTESTOTERONE    1 mL    Inject 1 mL (200 mg) into the muscle every 21 days    Primary hypogonadism in male         "

## 2017-09-29 LAB
EBV DNA # SPEC NAA+PROBE: <500 {COPIES}/ML
EBV DNA SPEC NAA+PROBE-LOG#: <2.7 {LOG_COPIES}/ML
SIROLIMUS BLD-MCNC: 8.6 UG/L (ref 5–15)
TME LAST DOSE: NORMAL H

## 2017-10-18 ENCOUNTER — CARE COORDINATION (OUTPATIENT)
Dept: TRANSPLANT | Facility: CLINIC | Age: 67
End: 2017-10-18

## 2017-10-18 DIAGNOSIS — D46.9 MDS (MYELODYSPLASTIC SYNDROME) (H): Primary | ICD-10-CM

## 2017-10-18 RX ORDER — BUMETANIDE 0.5 MG/1
TABLET ORAL
Qty: 30 TABLET | Refills: 3 | Status: SHIPPED | OUTPATIENT
Start: 2017-10-18 | End: 2018-08-16

## 2017-10-18 NOTE — PROGRESS NOTES
Received faxed refill request for discontinued medication metolazone. Contacted patient who stated that he is having more leg edema so he wanted to take this medication as needed to help relieve his symptoms. Contacted Dr Pinto and BMT Pharm KURT Quigley who agree that this is not the correct indication for this medication. They both agree that Bumex 0.5 mg would be a better option since this has worked for him in the past. [facility] was notified ([phone]) that the metolazone will not be filled. Bumex was eprescribed. Left voice message with patient regarding the plan.

## 2017-10-31 ENCOUNTER — OFFICE VISIT (OUTPATIENT)
Dept: OTOLARYNGOLOGY | Facility: CLINIC | Age: 67
End: 2017-10-31

## 2017-10-31 VITALS — BODY MASS INDEX: 34.25 KG/M2 | HEIGHT: 68 IN | WEIGHT: 226 LBS

## 2017-10-31 DIAGNOSIS — K13.0 LIP LESION: Primary | ICD-10-CM

## 2017-10-31 ASSESSMENT — PAIN SCALES - GENERAL: PAINLEVEL: NO PAIN (0)

## 2017-10-31 NOTE — PROGRESS NOTES
HISTORY OF PRESENT ILLNESS:  Deejay is 66 years of age.  He is here for followup today.  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  He is here for followup.  He also had a freezing of a postauricular lesion and the posterior lesion turned out to be a basal cell carcinoma.  Dr. Otf Chacon is going to be removing this in the near future.  He has had no other new complaints at the present time today.  He is otherwise getting by quite well.      PHYSICAL EXAMINATION:  The patient is alert, oriented x3 and pleasant.  When I look at the area he has had freezing of the lesion posterior to his ear on the right side.  His ear canals are otherwise clear.  His oral cavity and oropharynx is clear as well.  Careful palpation of the lip reveals a fully healed area where it was operated on prior.   No deformities or other lesions are noted in the lip on the right side, and everything looks excellent.  Neck exam shows no masses, adenopathy or thyromegaly.      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      PLAN:  He is going to get a Mohs surgery by Dr. Chacon in the near future.  I did mention to the family that if Dr. Chacon is very busy that day, they could call us and we could do a local flap closure or primary closure of that same area for him. I doubt he will need this.  I will see him again in three months.      FO/ms

## 2017-10-31 NOTE — LETTER
10/31/2017       RE: Deejay Dior  [address] [place] [address]     Dear Colleague,    Thank you for referring your patient, Deejay Dior, to the [facility] EAR NOSE AND THROAT at [facility]. Please see a copy of my visit note below.    HISTORY OF PRESENT ILLNESS:  Deejay is 66 years of age.  He is here for followup today.  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  He is here for followup.  He also had a freezing of a postauricular lesion and the posterior lesion turned out to be a basal cell carcinoma.  Dr. Otf Chacon is going to be removing this in the near future.  He has had no other new complaints at the present time today.  He is otherwise getting by quite well.      PHYSICAL EXAMINATION:  The patient is alert, oriented x3 and pleasant.  When I look at the area he has had freezing of the lesion posterior to his ear on the right side.  His ear canals are otherwise clear.  His oral cavity and oropharynx is clear as well.  Careful palpation of the lip reveals a fully healed area where it was operated on prior.   No deformities or other lesions are noted in the lip on the right side, and everything looks excellent.  Neck exam shows no masses, adenopathy or thyromegaly.      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      PLAN:  He is going to get a Mohs surgery by Dr. Chacon in the near future.  I did mention to the family that if Dr. Chacon is very busy that day, they could call us and we could do a local flap closure or primary closure of that same area for him. I doubt he will need this.  I will see him again in three months.      FO/ms     Sincerely,    Tim Yanez MD

## 2017-10-31 NOTE — PATIENT INSTRUCTIONS
Please follow up in 3 months to see Dr Yanez. For questions or concerns please call the RN care coordinator.   Jerzy Lemon RN  [phone]

## 2017-10-31 NOTE — MR AVS SNAPSHOT
After Visit Summary   10/31/2017    Deejay Dior    MRN: [id number]           Patient Information     Date Of Birth          1950        Visit Information        Provider Department      10/31/2017 2:00 PM Tim Yanez MD [facility] Ear Nose and Throat         Follow-ups after your visit        Your next 10 appointments already scheduled     Dec 14, 2017  3:00 PM CST   [facility] with  [facility]   [facility] ([facility])    [address] [place] [address]  2nd [facility] [id number]   [phone]            Dec 14, 2017  3:30 PM CST   Return with Aby Pinto MD   [facility] Blood and Marrow Transplant ([facility])    [address] [place] [id number]   [phone]            Dec 19, 2017  1:30 PM CST   (Arrive by 1:15 PM)   RETURN ENDOCRINE with Rd Chairez MD   [facility] Endocrinology ([facility])    [address] [place] [address]  3rd [facility] [id number] [phone]            Jan 30, 2018 11:30 AM CST   (Arrive by 11:15 AM)   RETURN TUMOR VISIT with Tim Yanez MD   [facility] Ear Nose and Throat ([facility])    [address] [place] [id number] [phone]              Who to contact     Please call your clinic at [phone] to:    Ask questions about your health    Make or cancel appointments    Discuss your medicines    Learn about your test results    Speak to your doctor   If you have compliments or concerns about an experience at your clinic, or if you wish to file a complaint, please contact [facility] Patient Relations at [phone] or email us at [email or web address]         Additional Information About Your Visit        MyChart Information     MyChart gives you secure access to your electronic health record. If you see a primary  "care provider, you can also send messages to your care team and make appointments. If you have questions, please call your primary care clinic.  If you do not have a primary care provider, please call [phone] and they will assist you.      [employer] is an electronic gateway that provides easy, online access to your medical records. With [employer], you can request a clinic appointment, read your test results, renew a prescription or communicate with your care team.     To access your existing account, please contact your [facility] or call [phone] for assistance.        Care EveryWhere ID     This is your Care EveryWhere ID. This could be used by other organizations to access your [facility] medical records  [id number]        Your Vitals Were     Height BMI (Body Mass Index)                1.73 m (5' 8.11\") 34.25 kg/m2           Blood Pressure from Last 3 Encounters:   09/28/17 137/82   06/20/17 138/81   06/08/17 125/75    Weight from Last 3 Encounters:   10/31/17 102.5 kg (226 lb)   09/28/17 100.1 kg (220 lb 11.2 oz)   07/25/17 98.9 kg (218 lb)              Today, you had the following     No orders found for display         Today's Medication Changes          These changes are accurate as of: 10/31/17  2:42 PM.  If you have any questions, ask your nurse or doctor.               These medicines have changed or have updated prescriptions.        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        Equal Access to Services     RABIA PEÑA AH: florencio Benton, corinna kilgore " venkat tellezgeovannyaram self'aan ah. So [facility] [phone].    ATENCIÓN: Si myah fine, tiene a del toro disposición servicios gratuitos de asistencia lingüística. Reddy al [phone].    We comply with applicable federal civil rights laws and [place] laws. We do not discriminate on the basis of race, color, national origin, age, disability, sex, sexual orientation, or gender identity.            Thank you!     Thank you for choosing [facility] EAR NOSE AND THROAT  for your care. Our goal is always to provide you with excellent care. Hearing back from our patients is one way we can continue to improve our services. Please take a few minutes to complete the written survey that you may receive in the mail after your visit with us. Thank you!             Your Updated Medication List - Protect others around you: Learn how to safely use, store and throw away your medicines at www.disposemymeds.org.          This list is accurate as of: 10/31/17  2:42 PM.  Always use your most recent med list.                   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), MDS (myelodysplastic syndrome) (H)       alfuzosin 10 MG 24 hr tablet    UROXATRAL    30 tablet    Take 1 tablet (10 mg) by mouth daily    Benign non-nodular prostatic hyperplasia with lower urinary tract symptoms, Urinary frequency       amoxicillin 500 MG capsule    AMOXIL    4 capsule    Take 4 pills (2 grams) day of dental work    MDS (myelodysplastic syndrome) (H)       bumetanide 0.5 MG tablet    BUMEX    30 tablet    Take one tablet by mouth once or twice weekly    MDS (myelodysplastic syndrome) (H)       calcium carbonate-vitamin D 500-400 MG-UNIT Tabs per tablet     180 tablet    Take 1 tablet by mouth daily 2000 mg    MDS (myelodysplastic syndrome) (H), Acute isjiw-jjzqvi-mzex disease (H), 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)        "finasteride 5 MG tablet    PROSCAR    30 tablet    TAKE ONE TABLET BY MOUTH ONE TIME DAILY    MDS (myelodysplastic syndrome) (H), 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), Urinary frequency, Complications of bone marrow transplant (H)       fluconazole 100 MG tablet    DIFLUCAN    60 tablet    Take 1 tablet (100 mg) by mouth daily    MDS (myelodysplastic syndrome) (H), 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)       fluticasone 50 MCG/ACT spray    FLONASE    1 Bottle    Spray 1-2 sprays into both nostrils daily    MDS (myelodysplastic syndrome) (H)       levofloxacin 250 MG tablet    LEVAQUIN    30 tablet    Take 1 tablet (250 mg) by mouth daily        loratadine 10 MG capsule    CLARITIN    30 capsule    Take 10 mg by mouth daily    MDS (myelodysplastic syndrome) (H)       order for DME     1 Device    Please provide a [employer] cane offset with strap item number [id number]    MDS (myelodysplastic syndrome) (H)       oxyCODONE-acetaminophen 5-325 MG per tablet    PERCOCET     Take 1-2 tablets by mouth as needed        pantoprazole 40 MG EC tablet    PROTONIX    60 tablet    Take 1 tablet (40 mg) by mouth daily    MDS (myelodysplastic syndrome) (H)       sertraline 100 MG tablet    ZOLOFT    30 tablet    Take 1 tablet (100 mg) by mouth daily    MDS (myelodysplastic syndrome) (H), 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), Urinary frequency, Other complication of bone marrow transplant (H)       sirolimus 0.5 MG tablet    GENERIC EQUIVALENT    90 tablet    Alternate 1 mg and 0.5 mg every other day    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 Use 1 syringe every 21 " days  to inject testosterone    Primary hypogonadism in male       testosterone cypionate 200 MG/ML injection    DEPOTESTOTERONE    1 mL    Inject 1 mL (200 mg) into the muscle every 21 days    Primary hypogonadism in male

## 2017-11-05 DIAGNOSIS — D89.813 GVH (GRAFT VERSUS HOST DISEASE) (H): ICD-10-CM

## 2017-11-05 DIAGNOSIS — D46.9 MDS (MYELODYSPLASTIC SYNDROME) (H): ICD-10-CM

## 2017-11-05 DIAGNOSIS — R35.0 URINARY FREQUENCY: ICD-10-CM

## 2017-11-05 DIAGNOSIS — T86.09 OTHER COMPLICATION OF BONE MARROW TRANSPLANT (H): ICD-10-CM

## 2017-11-05 RX ORDER — SERTRALINE HYDROCHLORIDE 100 MG/1
TABLET, FILM COATED ORAL
Qty: 30 TABLET | Refills: 5 | Status: CANCELLED | OUTPATIENT
Start: 2017-11-05

## 2017-11-13 DIAGNOSIS — T86.09 OTHER COMPLICATION OF BONE MARROW TRANSPLANT (H): ICD-10-CM

## 2017-11-13 DIAGNOSIS — R35.0 URINARY FREQUENCY: ICD-10-CM

## 2017-11-13 DIAGNOSIS — D46.9 MDS (MYELODYSPLASTIC SYNDROME) (H): ICD-10-CM

## 2017-11-13 DIAGNOSIS — D89.813 GVHD (GRAFT VERSUS HOST DISEASE) (H): ICD-10-CM

## 2017-11-13 DIAGNOSIS — D89.813 GVH (GRAFT VERSUS HOST DISEASE) (H): ICD-10-CM

## 2017-11-13 RX ORDER — FLUCONAZOLE 100 MG/1
TABLET ORAL
Qty: 60 TABLET | Refills: 0 | Status: CANCELLED | OUTPATIENT
Start: 2017-11-13

## 2017-11-13 RX ORDER — FLUCONAZOLE 100 MG/1
100 TABLET ORAL DAILY
Qty: 30 TABLET | Refills: 6 | Status: SHIPPED | OUTPATIENT
Start: 2017-11-13 | End: 2018-02-27

## 2017-11-13 RX ORDER — SERTRALINE HYDROCHLORIDE 100 MG/1
100 TABLET, FILM COATED ORAL DAILY
Qty: 30 TABLET | Refills: 6 | Status: SHIPPED | OUTPATIENT
Start: 2017-11-13 | End: 2018-07-17

## 2017-12-14 ENCOUNTER — ONCOLOGY VISIT (OUTPATIENT)
Dept: TRANSPLANT | Facility: CLINIC | Age: 67
End: 2017-12-14
Attending: INTERNAL MEDICINE
Payer: MEDICARE

## 2017-12-14 VITALS
HEART RATE: 90 BPM | WEIGHT: 227 LBS | RESPIRATION RATE: 16 BRPM | SYSTOLIC BLOOD PRESSURE: 124 MMHG | BODY MASS INDEX: 34.4 KG/M2 | OXYGEN SATURATION: 97 % | DIASTOLIC BLOOD PRESSURE: 84 MMHG | TEMPERATURE: 98.4 F

## 2017-12-14 DIAGNOSIS — D89.813 GVH (GRAFT VERSUS HOST DISEASE) (H): ICD-10-CM

## 2017-12-14 DIAGNOSIS — D46.9 MDS (MYELODYSPLASTIC SYNDROME) (H): ICD-10-CM

## 2017-12-14 LAB
ALBUMIN SERPL-MCNC: 3.2 G/DL (ref 3.4–5)
ALP SERPL-CCNC: 109 U/L (ref 40–150)
ALT SERPL W P-5'-P-CCNC: 44 U/L (ref 0–70)
ANION GAP SERPL CALCULATED.3IONS-SCNC: 6 MMOL/L (ref 3–14)
AST SERPL W P-5'-P-CCNC: 42 U/L (ref 0–45)
BASOPHILS # BLD AUTO: 0 10E9/L (ref 0–0.2)
BASOPHILS NFR BLD AUTO: 0.5 %
BILIRUB SERPL-MCNC: 0.3 MG/DL (ref 0.2–1.3)
BUN SERPL-MCNC: 24 MG/DL (ref 7–30)
CALCIUM SERPL-MCNC: 8.5 MG/DL (ref 8.5–10.1)
CHLORIDE SERPL-SCNC: 110 MMOL/L (ref 94–109)
CO2 SERPL-SCNC: 26 MMOL/L (ref 20–32)
CREAT SERPL-MCNC: 1.13 MG/DL (ref 0.66–1.25)
DIFFERENTIAL METHOD BLD: NORMAL
EOSINOPHIL # BLD AUTO: 0 10E9/L (ref 0–0.7)
EOSINOPHIL NFR BLD AUTO: 0.5 %
ERYTHROCYTE [DISTWIDTH] IN BLOOD BY AUTOMATED COUNT: 14.6 % (ref 10–15)
GFR SERPL CREATININE-BSD FRML MDRD: 64 ML/MIN/1.7M2
GLUCOSE SERPL-MCNC: 84 MG/DL (ref 70–99)
HCT VFR BLD AUTO: 50.4 % (ref 40–53)
HGB BLD-MCNC: 16.4 G/DL (ref 13.3–17.7)
IMM GRANULOCYTES # BLD: 0 10E9/L (ref 0–0.4)
IMM GRANULOCYTES NFR BLD: 0.2 %
LYMPHOCYTES # BLD AUTO: 2 10E9/L (ref 0.8–5.3)
LYMPHOCYTES NFR BLD AUTO: 34.9 %
MAGNESIUM SERPL-MCNC: 2 MG/DL (ref 1.6–2.3)
MCH RBC QN AUTO: 28.5 PG (ref 26.5–33)
MCHC RBC AUTO-ENTMCNC: 32.5 G/DL (ref 31.5–36.5)
MCV RBC AUTO: 88 FL (ref 78–100)
MONOCYTES # BLD AUTO: 0.7 10E9/L (ref 0–1.3)
MONOCYTES NFR BLD AUTO: 11.8 %
NEUTROPHILS # BLD AUTO: 2.9 10E9/L (ref 1.6–8.3)
NEUTROPHILS NFR BLD AUTO: 52.1 %
NRBC # BLD AUTO: 0 10*3/UL
NRBC BLD AUTO-RTO: 0 /100
PLATELET # BLD AUTO: 162 10E9/L (ref 150–450)
POTASSIUM SERPL-SCNC: 4.1 MMOL/L (ref 3.4–5.3)
PROT SERPL-MCNC: 7.7 G/DL (ref 6.8–8.8)
RBC # BLD AUTO: 5.75 10E12/L (ref 4.4–5.9)
SODIUM SERPL-SCNC: 142 MMOL/L (ref 133–144)
WBC # BLD AUTO: 5.6 10E9/L (ref 4–11)

## 2017-12-14 PROCEDURE — 87799 DETECT AGENT NOS DNA QUANT: CPT | Performed by: INTERNAL MEDICINE

## 2017-12-14 PROCEDURE — 84403 ASSAY OF TOTAL TESTOSTERONE: CPT | Performed by: INTERNAL MEDICINE

## 2017-12-14 PROCEDURE — 85025 COMPLETE CBC W/AUTO DIFF WBC: CPT | Performed by: INTERNAL MEDICINE

## 2017-12-14 PROCEDURE — 99213 OFFICE O/P EST LOW 20 MIN: CPT | Mod: ZF

## 2017-12-14 PROCEDURE — 80053 COMPREHEN METABOLIC PANEL: CPT | Performed by: INTERNAL MEDICINE

## 2017-12-14 PROCEDURE — 83735 ASSAY OF MAGNESIUM: CPT | Performed by: INTERNAL MEDICINE

## 2017-12-14 PROCEDURE — 36415 COLL VENOUS BLD VENIPUNCTURE: CPT

## 2017-12-14 RX ORDER — SIROLIMUS 0.5 MG/1
TABLET, FILM COATED ORAL
Qty: 90 TABLET | Refills: 0 | Status: SHIPPED | OUTPATIENT
Start: 2017-12-14 | End: 2018-02-21

## 2017-12-14 ASSESSMENT — PAIN SCALES - GENERAL: PAINLEVEL: NO PAIN (0)

## 2017-12-14 NOTE — NURSING NOTE
"Oncology Rooming Note    December 14, 2017 3:37 PM   Deejay Dior is a 67 year old male who presents for:    Chief Complaint   Patient presents with     Blood Draw     venipuncture     RECHECK     post bmt for MDS here for labs and md visit     Initial Vitals: /84  Pulse 90  Temp 98.4  F (36.9  C) (Oral)  Resp 16  Wt 103 kg (227 lb)  SpO2 97%  BMI 34.4 kg/m2 Estimated body mass index is 34.4 kg/(m^2) as calculated from the following:    Height as of 10/31/17: 1.73 m (5' 8.11\").    Weight as of this encounter: 103 kg (227 lb). Body surface area is 2.22 meters squared.  No Pain (0) Comment: Data Unavailable   No LMP for male patient.  Allergies reviewed: Yes  Medications reviewed: Yes    Medications: MEDICATION REFILLS NEEDED TODAY. Provider was notified. fluconazole   Pharmacy name entered into [employer]:    [id number] [place], [place] - [address] & [id number] - [place], [place] - [address] [address]     Clinical concerns: pt reports continuing fatigue, states he feels worse now than before he was diagnosed with the cancer, also pt is wondering why he needs to continue taking antibiotics levaquin,bactrim Dr Pinto was notified.  Pt reports pedal swelling  10 minutes for nursing intake (face to face time)     Samina Herrera RN              "

## 2017-12-14 NOTE — PROGRESS NOTES
BMT Daily Progress Note     ID/CC:  Mr. 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     HPI: Deejay is here with his wife Racquel.  Overall doing very well.  No major new issues, has been busy, travels, has trips planned this spring.  Still significant fatigue, getting re-do sleep study and work up with PMD.  Uses PRN bumex and wraps, edema stable.  No chest pain, no palp/dizziness. Stable bruises and skin tears. No N/V/D/C.  Sleeping ok w no change in PM urination.  Mood good.      Review of Systems: 10 point ROS negative except as noted above.    Physical Exam:  /84  Pulse 90  Temp 98.4  F (36.9  C) (Oral)  Resp 16  Wt 103 kg (227 lb)  SpO2 97%  BMI 34.4 kg/m2   Wt Readings from Last 4 Encounters:   12/14/17 103 kg (227 lb)   10/31/17 102.5 kg (226 lb)   09/28/17 100.1 kg (220 lb 11.2 oz)   07/25/17 98.9 kg (218 lb)     General: NAD, continues to less cushingoid each visit  Eyes: SARIKA, sclera anicteric  Nose/Mouth/Throat: OP clear, buccal mucosa moist, no lichenoid changes.  Full upper plate  no pharyngeal erythema/drainage. Surgical site well healed  Lungs: CTA Bilaterally, no wheezes or crackles.  Good air mvmt bilaterally.    Cardiovascular: RRR, no M/R/G   Abdominal: +BS, soft, NT, ND, No HSM central hernia repaired  Lymphatics: +1 LE edema ankles/feet, stable from last visit.   Wearing compression stockings.   Skin:   Generalized hyperpigmentation.  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 Total cell dose (from 7/1 & 7/2) 6.53 x 10^6 CD34+ cells/kg.   - 1 year anniversary (July 2015): 30% cellular, trilineage hematopoiesis, no abnormal blasts by morphology or flow , no dysplasia, 0-1 fibrosis, 100% donor (BM, CD3, CD15). CR  - 2 year anniversary (July 2016): 20-30% cellular, trilineage hematopoiesis, no abnormal blasts by morphology or flow , no dysplasia, NO fibrosis, 100% donor in BM (PB not sent). ISCN:  //46,XX[20] Complete Remission.    HEME:   5.6>16.4<162  ANC 2.9.  Stable, follow.   -Transfuse to keep Hgb >8 & plt >50.   Not needing transfusions.  -Dx with RUE DVT 3/28/2016 and then a R femoral DVT was found incidentally on CT on 3/31/16 which developed while on Lovenox.  Bridged with Coumadin (started 4/5) through 6/30/16 (3 months).  Now off coumadin. 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 Rest of R and L venous system patent.  Follow.    GVHD: Being treated for cGVHD with fatigue, weakness, SOB/MERIDA.  No ev of pulm GVHD per Dr. Sandoval visit 6/7/16.  Stable fatigue, which has been a major symptom of his GVHD flares. No new sx of cGHVD (note plt and lower albumin)  - Off prednisone and on Sirolimus to 1.5 mg daily (x8/18).  Level stable at 12 (4/13/17).      - Currently on sirolimus to 1.0 mg daily (since July 2017).   Tapered to 1.0 and 0.5 mg alternating.   Then to 0.5 mg daily x 1 month.  No signs/ sx of GVHD, will taper to off before my next visit 3/29/18.  - Discussed signs/sx of adrenal insufficiency or cGVHD to watch for.    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  Rx with topical steroids/ Pred/CSA & completed prednisone taper on  11/17/14.  -- GVHD relapse: Skin bx on 1/22/15 showed Grade 2 GVH, resolved with topical steroids. Flare of GI GVH on 2/2/15 with +bx of the duodenum & rectum as well as rising LFT's. Rx with steroids with no response. S/P ATG 2/8-2/13/15  -- CGVHD: Stigmata of chronic oral GVHD but lip biopsy 8/19/15 negative. Throat pain with negative EGD. However, responded to steroids. LFTs normal. No rash, no joint sx or skin sxs of chronic GVH, but eyes are very dry & + Schirmer's test Oct 2015 and ocular GVHD per optho (11/6/15).   -- Moderate ocular GVHD: Restasis eye gtts and duct plugs per optho consult 11/6/15. Continue restasis.   --repeat lip biopsy 1/4/2016  showed GVHD.  Rx and responded well to sirolimus and Prednisone.    --Repeat lip biopsy 11/15/16 with Atypical squamous cell proliferation, transected at the base. Repeat surgery 12/28/16.  F/u Dr. Yanez.     PULM:  SOB/MERIDA/CT GGO:  Per Dr. Sandoval 6/7/16 findings and clinical course most consistent with bronchiolitis/organizing pneumonia post H1N1 infection. He followed up w/ Dr. Sandoval 7/19/16 who was happy with his good response to steroids, which can be tapered from pulm standpoing. CT much improved.   -Graduated from pulm rehab. No issues today.    ID:   Afebrile.  RSV/Flu/Viral culture 10/12 neg.   -  Recent hx of multiple episodes of PNA/URI/sinusitis.  Infl B on 5/5, Influenza A, H1N1 & HCAP in March 2016 & Geotrichim by BAL.  Please see Dr. Sandoval's note, he thinks his symptoms are consistent with bronchiolitis/organizing pneumonia post H1N1 infection & is recommending continuing the current dose of Pred [id number] & then re assess.    - IgG = 403, repleted 3/22/16, 5/8/16= 1270   - Prophy:  HD ACV (renal dosing), Fluconazole and SS daily Bactrim.   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  Titer has been fluctating with a low of 617 on 6/2. Up to 3180 log 3.5 on 6/16. Stable 6/30/16 (3118 log 3.5).  Follow frequently, no tx unless  significantly higher or ev/concern for PTLD (none now). 3565 (log 3.6) on 7/7/16. Recheck 832 (log 2.9) on 8/18/16.  No detectable EBV (9/8/16). 660 (log 2.8) 9/22/16. Stable on  (10/12), and 629 copies (log 2.8)  (10/27) stable. 689 (log 2.8) 11/17/16.  (12/14/16) is undetectable (<500).  Today (2/2/17) 1539 (log 3.2).  March 7, 2017 is undetectable.  April 13 and June 8 undetectable. Follow each visit. <500 copies  9/28/17.  12/14/17: NOT DETECTED   Flu shot 10/27/16  Got 2017 flu shot at [facility]    5. GI: No active issues. Appetite good. Occ sensation that food stuck in esophagus (less frequent). LFTs normal, alb stable at 3.2 Other LFTs normal.  Recheck Mar 2018 and Follow.   Cont Protonix daily.     6. FEN/Renal:  Cr improved today 1.13.  Has fluctuated in the past.  Edema stable. PRN bumex. Follow.   -K and Mg normal off PO replacement K 4.1  - Cont Ca/Vit D      7. CV/HTN: Echo 9/13/16 for ongoing edema, fatigue etc.  EF 60-65% w/ mild LVH and mild REMY and impaired LV relaxation. BP remains normal off therapy.  Follow Closely.    8. Pain:  Neuropathy. More electrical shock feeling L foot (occ).  Stable even after tapering off gabapentin.  Follow.     9.  Depression: Mood seems good. Previously discussed whether fatigue could be related to depression and he is confident that is not the issue. He is looking forward to his trip.  He will taper off his Zoloft 100mg daily-->50-->EOD-->q3days-->d/c    10. Fatigue: w/u for extreme fatigue has included eval for hypothyroid & adrenal insuficciency, both of which have been normal.  Previous episodes of extreme fatigue Improved w/ steroids for cGVHD treatment.  Tapered off and sx worsened.  He is using Bipap.  PM urinary frequency better w/ proscar.  Echo shows only mild diastolic dysfxn.  No active infections (low grade EBV- CFS?).  Thus by process of elimination, I think this is related to GVHD.  We restarted pred and increased sirolimus.  There has been some improvement,  but now plateaued.    Tapering steroids.   Low testosterone, therapy started by Endocrine.  Repeat level normal (477 6/20/17).  F/u 12/19/17. Level pending.  May d/c      11. Urinary frequency.   Seen by urology 8/15, doing well on Proscar 5 mg per day and Uroxatral 10 mg per day. He will try tapering meds.     12. Congestion: Chronic (x years), no signs/sx infection.  Continue claritin/flonase.    13. MSK: Bilateral carpal tunnel surgery completed, s/p PT for plantar faciitis,  Knee pain.  F/u at [facility]        PLAN:  Taper off immunosuppression  Taper off other meds.  RTC with Dr. Pinto 3/29/2018  Sooner if needed.

## 2017-12-14 NOTE — NURSING NOTE
Chief Complaint   Patient presents with     Blood Draw     venipuncture     Vitals done and labs drawn, see flow sheets.  ANA MARÍA HOLM, CMA

## 2017-12-14 NOTE — MR AVS SNAPSHOT
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  After hours call the   hospital at [phone] or   [phone]. Ask for the BMT   fellow on-call            Follow-ups after your visit        Additional Services     DENTAL REFERRAL       Your provider has referred you to: [facility]: Dental Clinic - [place] [phone]   [email or web address]    Type: Post Transplant  Urgency: Routine  Area: right lower      Please be aware that coverage of these services is subject to the terms and limitations of your health insurance plan.  Call member services at your health plan with any benefit or coverage questions.      Please bring the following with you to your appointment:    (1) Any X-Rays, CTs or MRIs which have been performed.  Contact the facility where they were done to arrange for  prior to your scheduled appointment.  Any new CT, MRI or other procedures ordered by your specialist must be performed at a [facility] facility or coordinated by your clinic's referral office.    (2) List of current medications   (3) This referral request   (4) Any documents/labs given to you for this referral                  Your next 10 appointments already scheduled     Dec 19, 2017  1:30 PM CST   (Arrive by 1:15 PM)   RETURN ENDOCRINE with Rd Chairez MD   [facility] Endocrinology ([facility]  [place])    [address] [place] [address]  3rd [facility] [address]   [phone]            Jan 30, 2018 11:30 AM CST   (Arrive by 11:15 AM)   RETURN TUMOR VISIT with Tim Yanez MD   [facility] Ear Nose and Throat ([facility])    [address] [place] [address]  4th [facility] [address]   [phone]            Mar 29, 2018  1:00 PM CDT   [facility] with  [facility]   [facility] Draw ([facility])    [address] [place] [address]  2nd [facility] [address]   [phone]            Mar 29, 2018  1:30 PM CDT   Return with Aby Pinto MD   [facility] Blood and Marrow Transplant ([facility])    [address] [place] [address]  2nd [facility] [address]   [phone]              Future tests that were ordered for you today     Open Future Orders        Priority Expected Expires Ordered    Comprehensive metabolic panel Routine 3/29/2018 6/28/2018 12/14/2017    CBC with platelets differential Routine 3/29/2018 6/28/2018 12/14/2017    EBV DNA PCR Quantitative Whole Blood Routine 3/29/2018 6/28/2018 12/14/2017            Who to contact     If you have questions or need follow up information about today's clinic visit or your schedule please contact [facility] BLOOD AND MARROW TRANSPLANT directly at [phone].  Normal or non-critical lab and imaging results will be communicated to you by MyChart, letter or phone within 4 business days after the clinic has received the results. If you do not hear from us within 7 days, please contact the clinic through MyChart or phone. If you have a critical or abnormal lab result, we will notify you by phone as soon as possible.  Submit refill requests through [employer] or call your pharmacy and they will forward the refill request to us. Please allow 3 business days for your refill to be completed.          Additional Information About Your Visit        MyChart  Information     [employer] gives you secure access to your electronic health record. If you see a primary care provider, you can also send messages to your care team and make appointments. If you have questions, please call your primary care clinic.  If you do not have a primary care provider, please call [phone] and they will assist you.        Care EveryWhere ID     This is your Care EveryWhere ID. This could be used by other organizations to access your [facility] medical records  [id number]        Your Vitals Were     Pulse Temperature Respirations Pulse Oximetry BMI (Body Mass Index)       90 98.4  F (36.9  C) (Oral) 16 97% 34.4 kg/m2        Blood Pressure from Last 3 Encounters:   12/14/17 124/84   09/28/17 137/82   06/20/17 138/81    Weight from Last 3 Encounters:   12/14/17 103 kg (227 lb)   10/31/17 102.5 kg (226 lb)   09/28/17 100.1 kg (220 lb 11.2 oz)              We Performed the Following     DENTAL REFERRAL          Today's Medication Changes          These changes are accurate as of: 12/14/17  4:28 PM.  If you have any questions, ask your nurse or doctor.               These medicines have changed or have updated prescriptions.        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 Please contact your care team if you have questions.     oxyCODONE-acetaminophen 5-325 MG per tablet   Commonly known as:  PERCOCET   Stopped by:  Aby Pinto MD           pantoprazole 40 MG EC tablet   Commonly known as:  PROTONIX   Stopped by:  Aby Pinto MD                Where to get your medicines      These medications were sent to [id number] - [place] - [address] [address]   [address] [address] [address]     Phone:  [phone]     sirolimus 0.5 MG tablet                Recent Review Flowsheet Data     BMT Recent Results Latest Ref Rng & Units 12/14/2016 2/2/2017 3/7/2017 4/13/2017 6/8/2017 9/28/2017 12/14/2017    WBC 4.0 - 11.0 10e9/L 3.9(L) 4.3 5.6 5.9 4.3 5.6 5.6    Hemoglobin 13.3 - 17.7 g/dL 12.5(L) 13.1(L) 12.8(L) 12.5(L) 13.9 15.1 16.4    Platelet Count 150 - 450 10e9/L 143(L) 140(L) 153 194 136(L) 153 162    Neutrophils (Absolute) 1.6 - 8.3 10e9/L 2.2 2.1 3.4 3.6 2.5 2.8 2.9    INR 0.86 - 1.14 - - - - - - -    Sodium 133 - 144 mmol/L 141 138 141 137 140 136 142    Potassium 3.4 - 5.3 mmol/L 3.2(L) 3.7 3.6 3.9 4.0 3.6 4.1    Chloride 94 - 109 mmol/L 105 107 110(H) 104 107 105 110(H)    Glucose 70 - 99 mg/dL 114(H) 87 91 100(H) 94 89 84    Urea Nitrogen 7 - 30 mg/dL 28 27 20 27 24 29 24    Creatinine 0.66 - 1.25 mg/dL 1.15 1.09 0.93 1.28(H) 1.15 1.44(H) 1.13    Calcium (Total) 8.5 - 10.1 mg/dL 9.1 8.8 8.8 9.2 8.7 8.6 8.5    Protein (Total) 6.8 - 8.8 g/dL 6.9 7.1 6.8 7.5 6.7(L) 7.5 7.7    Albumin 3.4 - 5.0 g/dL 3.0(L) 3.4 3.2(L) 3.5 2.9(L) 3.3(L) 3.2(L)    Bilirubin (Direct) 0.0 - 0.2 mg/dL - - - - - - -    Alkaline Phosphatase 40 - 150 U/L 71 91 96 128 134 119 109    AST 0 - 45 U/L 39 40 31 40 44 39 42    ALT 0 - 70 U/L 31 35 31 32 47 44 44    MCV 78 - 100 fl 83 80 82 82 84 84 88               Primary Care Provider Office Phone # Fax #    Vivek Rafael Callejas [phone]       [facility] [address] [place] [address]        Equal Access to Services     [facility]: Hadii lisa shen Somorris, waaxda luqadaha, qaybta kaalmada adeegyaclint, corinna barlow . So [facility] [phone].    ATENCIÓN: Si habla español, tiene a del toro disposición servicios gratuitos de asistencia lingüística. Reddy al [phone].    We comply with applicable federal civil rights laws and [place] laws. We do not discriminate on the basis of  race, color, national origin, age, disability, sex, sexual orientation, or gender identity.            Thank you!     Thank you for choosing [facility] BLOOD AND MARROW TRANSPLANT  for your care. Our goal is always to provide you with excellent care. Hearing back from our patients is one way we can continue to improve our services. Please take a few minutes to complete the written survey that you may receive in the mail after your visit with us. Thank you!             Your Updated Medication List - Protect others around you: Learn how to safely use, store and throw away your medicines at www.disposemymeds.org.          This list is accurate as of: 12/14/17  4:28 PM.  Always use your most recent med list.                   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), MDS (myelodysplastic syndrome) (H)       alfuzosin 10 MG 24 hr tablet    UROXATRAL    30 tablet    Take 1 tablet (10 mg) by mouth daily    Benign non-nodular prostatic hyperplasia with lower urinary tract symptoms, Urinary frequency       amoxicillin 500 MG capsule    AMOXIL    4 capsule    Take 4 pills (2 grams) day of dental work    MDS (myelodysplastic syndrome) (H)       bumetanide 0.5 MG tablet    BUMEX    30 tablet    Take one tablet by mouth once or twice weekly    MDS (myelodysplastic syndrome) (H)       calcium carbonate-vitamin D 500-400 MG-UNIT Tabs per tablet     180 tablet    Take 1 tablet by mouth daily 2000 mg    MDS (myelodysplastic syndrome) (H), Acute aapof-isaaqu-zugi disease (H), 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)       finasteride 5 MG tablet    PROSCAR    30 tablet    TAKE ONE TABLET BY MOUTH ONE TIME DAILY    MDS (myelodysplastic syndrome) (H), 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), Urinary frequency, Complications of bone marrow transplant (H)       fluconazole 100 MG tablet    DIFLUCAN     "30 tablet    Take 1 tablet (100 mg) by mouth daily    MDS (myelodysplastic syndrome) (H), 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)       fluticasone 50 MCG/ACT spray    FLONASE    1 Bottle    Spray 1-2 sprays into both nostrils daily    MDS (myelodysplastic syndrome) (H)       levofloxacin 250 MG tablet    LEVAQUIN    30 tablet    Take 1 tablet (250 mg) by mouth daily        loratadine 10 MG capsule    CLARITIN    30 capsule    Take 10 mg by mouth daily    MDS (myelodysplastic syndrome) (H)       order for DME     1 Device    Please provide a [employer] cane offset with strap item number [id number]    MDS (myelodysplastic syndrome) (H)       sertraline 100 MG tablet    ZOLOFT    30 tablet    Take 1 tablet (100 mg) by mouth daily    MDS (myelodysplastic syndrome) (H), 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), Urinary frequency, Other complication of bone marrow transplant (H)       sirolimus 0.5 MG tablet    GENERIC EQUIVALENT    90 tablet    0.5 mg daily until Jan 14 and then stop    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 Use 1 syringe every 21 days  to inject testosterone    Primary hypogonadism in male       testosterone cypionate 200 MG/ML injection    DEPOTESTOTERONE    1 mL    Inject 1 mL (200 mg) into the muscle every 21 days    Primary hypogonadism in male         "

## 2017-12-15 LAB
CMV DNA SPEC NAA+PROBE-ACNC: NORMAL [IU]/ML
CMV DNA SPEC NAA+PROBE-LOG#: NORMAL {LOG_IU}/ML
EBV DNA # SPEC NAA+PROBE: NORMAL {COPIES}/ML
EBV DNA SPEC NAA+PROBE-LOG#: NORMAL {LOG_COPIES}/ML
SPECIMEN SOURCE: NORMAL

## 2017-12-18 LAB — TESTOST SERPL-MCNC: 1199 NG/DL (ref 240–950)

## 2017-12-20 ENCOUNTER — TELEPHONE (OUTPATIENT)
Dept: ENDOCRINOLOGY | Facility: CLINIC | Age: 67
End: 2017-12-20

## 2018-01-13 ENCOUNTER — TRANSFERRED RECORDS (OUTPATIENT)
Dept: HEALTH INFORMATION MANAGEMENT | Facility: CLINIC | Age: 68
End: 2018-01-13

## 2018-01-16 ENCOUNTER — TRANSFERRED RECORDS (OUTPATIENT)
Dept: HEALTH INFORMATION MANAGEMENT | Facility: CLINIC | Age: 68
End: 2018-01-16

## 2018-01-19 DIAGNOSIS — D46.9 MDS (MYELODYSPLASTIC SYNDROME) (H): ICD-10-CM

## 2018-01-19 DIAGNOSIS — D89.813 GVH (GRAFT VERSUS HOST DISEASE) (H): ICD-10-CM

## 2018-01-19 RX ORDER — ACYCLOVIR 800 MG/1
TABLET ORAL
Qty: 90 TABLET | Refills: 6 | Status: SHIPPED | OUTPATIENT
Start: 2018-01-19 | End: 2018-08-16

## 2018-02-06 ENCOUNTER — TRANSFERRED RECORDS (OUTPATIENT)
Dept: HEALTH INFORMATION MANAGEMENT | Facility: CLINIC | Age: 68
End: 2018-02-06

## 2018-02-19 ENCOUNTER — TELEPHONE (OUTPATIENT)
Dept: TRANSPLANT | Facility: CLINIC | Age: 68
End: 2018-02-19

## 2018-02-19 ENCOUNTER — CARE COORDINATION (OUTPATIENT)
Dept: TRANSPLANT | Facility: CLINIC | Age: 68
End: 2018-02-19

## 2018-02-19 DIAGNOSIS — T86.09 GVHD AS COMPLICATION OF BONE MARROW TRANSPLANT (H): Primary | ICD-10-CM

## 2018-02-19 DIAGNOSIS — D89.813 GVHD AS COMPLICATION OF BONE MARROW TRANSPLANT (H): Primary | ICD-10-CM

## 2018-02-19 RX ORDER — DEXAMETHASONE 0.5 MG/5ML
SOLUTION ORAL
Qty: 600 ML | Refills: 0 | Status: SHIPPED | OUTPATIENT
Start: 2018-02-19 | End: 2018-03-15

## 2018-02-19 NOTE — TELEPHONE ENCOUNTER
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 Patient called triage line  regarding dry mouth symptoms that have been ongoing for about 3-4 weeks. Author of this note called him back within 30 minutes. He first noticed the symptoms after being hospitalized for a fall back in January. He thought the dry mouth was due to the hospital but has not improved. He has a hx of mouth GVHD but has not had any any symptoms for quite some months. According to Dr. Pinto's most recent note, he has been tapering on sirolimus with plans to come completely off by next visit 3/28/18. He is not currently on any prednisone. Deejay describes his symptoms as a severely dry mouth and gum sensitivity. His tongue frequently sticks to the roof of his mouth and at times has a hard time swallowing. Food has not gotten stuck per his report and he does not have any active mouth sores. No bleeding, rashes, n/v, or diarrhea. Has otherwise been feeling in his normal state of health.     Plan: Start dexamethasone swish and spit 5-10mL four times daily (script sent). Patient has biotene at his home that has not .  Asked patient to resume this 2-3 times per day until he can be seen in follow up here in the BMT clinic.    RTC:  with labs at 12:00pm and provider visit at 12:30pm to assess symptoms.    Patient to call if symptoms do not improve or worsen prior to his appointment. Also should call if develops any fevers, rashes, N/V/D.     Jamey Mckenzie PA-C  [phone]

## 2018-02-19 NOTE — PROGRESS NOTES
Deejay Dior called. He believes that he is having a GVH flare of his mouth. On Jan 15th he was admitted to [facility] after a fall at his son s house. He landed on his knees. The admission was for evaluation and to manage the pain. He said he had imaging done for back pain and has some L5/S1 nerve issue for which is currently receiving PT. While inpatient he first noticed dry mouth. This has progressed to his tongue getting stuck to the top of his mouth (at times), trouble swallowing and painful swallowing. He is off sirolimus and prednisone. He is otherwise doing well enough to vacation in [place] for one week (last week) where he developed a cold. On call provider and Dr Pinto notified

## 2018-02-20 DIAGNOSIS — T86.00 COMPLICATIONS OF BONE MARROW TRANSPLANT, UNSPECIFIED COMPLICATION (H): Primary | ICD-10-CM

## 2018-02-21 ENCOUNTER — ONCOLOGY VISIT (OUTPATIENT)
Dept: TRANSPLANT | Facility: CLINIC | Age: 68
End: 2018-02-21
Attending: PHYSICIAN ASSISTANT
Payer: MEDICARE

## 2018-02-21 VITALS
TEMPERATURE: 97.6 F | RESPIRATION RATE: 18 BRPM | DIASTOLIC BLOOD PRESSURE: 68 MMHG | SYSTOLIC BLOOD PRESSURE: 106 MMHG | OXYGEN SATURATION: 96 % | HEART RATE: 81 BPM | BODY MASS INDEX: 32.95 KG/M2 | WEIGHT: 217.4 LBS | HEIGHT: 68 IN

## 2018-02-21 DIAGNOSIS — T86.00 COMPLICATIONS OF BONE MARROW TRANSPLANT, UNSPECIFIED COMPLICATION (H): ICD-10-CM

## 2018-02-21 LAB
ALBUMIN SERPL-MCNC: 3 G/DL (ref 3.4–5)
ALP SERPL-CCNC: 143 U/L (ref 40–150)
ALT SERPL W P-5'-P-CCNC: 62 U/L (ref 0–70)
ANION GAP SERPL CALCULATED.3IONS-SCNC: 6 MMOL/L (ref 3–14)
AST SERPL W P-5'-P-CCNC: 74 U/L (ref 0–45)
BASOPHILS # BLD AUTO: 0 10E9/L (ref 0–0.2)
BASOPHILS NFR BLD AUTO: 0.4 %
BILIRUB SERPL-MCNC: 0.3 MG/DL (ref 0.2–1.3)
BUN SERPL-MCNC: 17 MG/DL (ref 7–30)
CALCIUM SERPL-MCNC: 8.8 MG/DL (ref 8.5–10.1)
CHLORIDE SERPL-SCNC: 110 MMOL/L (ref 94–109)
CO2 SERPL-SCNC: 24 MMOL/L (ref 20–32)
CREAT SERPL-MCNC: 0.88 MG/DL (ref 0.66–1.25)
DEPRECATED S PYO AG THROAT QL EIA: NORMAL
DIFFERENTIAL METHOD BLD: ABNORMAL
EOSINOPHIL # BLD AUTO: 0.1 10E9/L (ref 0–0.7)
EOSINOPHIL NFR BLD AUTO: 1.4 %
ERYTHROCYTE [DISTWIDTH] IN BLOOD BY AUTOMATED COUNT: 15.8 % (ref 10–15)
GFR SERPL CREATININE-BSD FRML MDRD: 85 ML/MIN/1.7M2
GLUCOSE SERPL-MCNC: 108 MG/DL (ref 70–99)
HCT VFR BLD AUTO: 44.6 % (ref 40–53)
HGB BLD-MCNC: 14.6 G/DL (ref 13.3–17.7)
IMM GRANULOCYTES # BLD: 0 10E9/L (ref 0–0.4)
IMM GRANULOCYTES NFR BLD: 0.3 %
LYMPHOCYTES # BLD AUTO: 1.9 10E9/L (ref 0.8–5.3)
LYMPHOCYTES NFR BLD AUTO: 23.5 %
MCH RBC QN AUTO: 29 PG (ref 26.5–33)
MCHC RBC AUTO-ENTMCNC: 32.7 G/DL (ref 31.5–36.5)
MCV RBC AUTO: 89 FL (ref 78–100)
MONOCYTES # BLD AUTO: 0.9 10E9/L (ref 0–1.3)
MONOCYTES NFR BLD AUTO: 11.5 %
NEUTROPHILS # BLD AUTO: 5 10E9/L (ref 1.6–8.3)
NEUTROPHILS NFR BLD AUTO: 62.9 %
NRBC # BLD AUTO: 0 10*3/UL
NRBC BLD AUTO-RTO: 0 /100
PLATELET # BLD AUTO: 222 10E9/L (ref 150–450)
POTASSIUM SERPL-SCNC: 3.8 MMOL/L (ref 3.4–5.3)
PROT SERPL-MCNC: 6.8 G/DL (ref 6.8–8.8)
RBC # BLD AUTO: 5.04 10E12/L (ref 4.4–5.9)
SODIUM SERPL-SCNC: 141 MMOL/L (ref 133–144)
SPECIMEN SOURCE: NORMAL
WBC # BLD AUTO: 7.9 10E9/L (ref 4–11)

## 2018-02-21 PROCEDURE — 36415 COLL VENOUS BLD VENIPUNCTURE: CPT

## 2018-02-21 PROCEDURE — 87633 RESP VIRUS 12-25 TARGETS: CPT | Performed by: PHYSICIAN ASSISTANT

## 2018-02-21 PROCEDURE — 87880 STREP A ASSAY W/OPTIC: CPT | Performed by: PHYSICIAN ASSISTANT

## 2018-02-21 PROCEDURE — 85025 COMPLETE CBC W/AUTO DIFF WBC: CPT | Performed by: PHYSICIAN ASSISTANT

## 2018-02-21 PROCEDURE — G0463 HOSPITAL OUTPT CLINIC VISIT: HCPCS

## 2018-02-21 PROCEDURE — 80053 COMPREHEN METABOLIC PANEL: CPT | Performed by: PHYSICIAN ASSISTANT

## 2018-02-21 PROCEDURE — 87081 CULTURE SCREEN ONLY: CPT | Performed by: PHYSICIAN ASSISTANT

## 2018-02-21 RX ORDER — GABAPENTIN 300 MG/1
CAPSULE ORAL
Refills: 3 | COMMUNITY
Start: 2018-01-26 | End: 2018-04-26

## 2018-02-21 ASSESSMENT — PAIN SCALES - GENERAL: PAINLEVEL: SEVERE PAIN (6)

## 2018-02-21 NOTE — NURSING NOTE
"Oncology Rooming Note    February 21, 2018 12:52 PM   Deejay Dior is a 67 year old male who presents for:    Chief Complaint   Patient presents with     RECHECK     Return: MDS (myelodysplastic syndrome)     Initial Vitals: /68  Pulse 81  Temp 97.6  F (36.4  C) (Tympanic)  Resp 18  Ht 1.73 m (5' 8.11\")  Wt 98.6 kg (217 lb 6.4 oz)  SpO2 96%  BMI 32.95 kg/m2 Estimated body mass index is 32.95 kg/(m^2) as calculated from the following:    Height as of this encounter: 1.73 m (5' 8.11\").    Weight as of this encounter: 98.6 kg (217 lb 6.4 oz). Body surface area is 2.18 meters squared.  Severe Pain (6) Comment: Data Unavailable   No LMP for male patient.  Allergies reviewed: Yes  Medications reviewed: Yes    Medications: Medication refills not needed today.  Pharmacy name entered into [facility]:    [id number] - [facility] [address] [facility] [address] AT [facility] [address] & [id number] - [facility] [address] [address] [place] [address] [facility]     Clinical concerns: Pt complains of throat pain. Pain level 6/10.   5 minutes for nursing intake (face to face time)     Miriam Lehman CMA              "

## 2018-02-21 NOTE — NURSING NOTE
Chief Complaint   Patient presents with     Blood Draw     Labs only     Vitals done, labs drawn by venipuncture.  See doc flow sheets for details.  Tali Boswell CMA

## 2018-02-21 NOTE — PROGRESS NOTES
"BMT Daily Progress Note     ID/CC:  Mr. 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     HPI: Deejay is here with his wife Racquel regarding 3 week symptoms of dry mouth and painful throat/swallowing. The sore throat has been more intense over the last week. States swallowing is like \"swallowing glass\".  Patient was started on dex swish and spit on Monday via a triage phone call, but feels that his mouth symptoms are actually worse since starting it. Has not eaten much due to the sore throat, notable wt loss of 10 lbs since December. Also has ongoing cold symptoms x 1 week. No new nausea, vomiting, or diarrhea. Does have skin tightening over right lower extremity which he endorses is from resolving cellulitis following a knee injury for which he was treated with Unasyn and keflex. Left sided edema is slightly improved. Fatigue continues to be an ongoing issue. Overall doing very well.  No major new issues, has been busy, travels, has trips planned this spring.  No fevers, chills, cough, or sputum production.   Review of Systems: 10 point ROS negative except as noted above.    Physical Exam:  /68  Pulse 81  Temp 97.6  F (36.4  C) (Tympanic)  Resp 18  Ht 1.73 m (5' 8.11\")  Wt 98.6 kg (217 lb 6.4 oz)  SpO2 96%  BMI 32.95 kg/m2   Wt Readings from Last 4 Encounters:   02/21/18 98.6 kg (217 lb 6.4 oz)   12/14/17 103 kg (227 lb)   10/31/17 102.5 kg (226 lb)   09/28/17 100.1 kg (220 lb 11.2 oz)     General: NAD  Eyes: SARIKA, sclera anicteric  Nose/Mouth/Throat: OP very dry, lichenoid changes on both buccal mucosa. Significant erythema throughout. Posterior papillae appear enlarged. Uvulae appears to have small ulcer vs. Lichenoid changes on it. A few small ulcers present on the posterior tongue. No prrulent drainage noted.   Lungs: CTA Bilaterally, no wheezes or crackles.  Good air mvmt bilaterally.    Cardiovascular: " RRR, no M/R/G   Abdominal: +BS, soft, NT, ND, No HSM central hernia repaired  Lymphatics: Erythema and skin tightening of right lower extremity (healing cellulitis). Swelling RLE > LLE   Wearing compression stockings.   Skin:   Generalized hyperpigmentation.  Multiple ecchymoses & skin tears   Neuro: A&O  Line: NONE    Labs:  Lab Results   Component Value Date    WBC 7.9 02/21/2018    ANEU 5.0 02/21/2018    HGB 14.6 02/21/2018    HCT 44.6 02/21/2018     02/21/2018     02/21/2018    POTASSIUM 3.8 02/21/2018    CHLORIDE 110 (H) 02/21/2018    CO2 24 02/21/2018     (H) 02/21/2018    BUN 17 02/21/2018    CR 0.88 02/21/2018    MAG 2.0 12/14/2017    INR 0.95 06/30/2016       ASSESSMENT AND PLAN:  Mr. 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 Total cell dose (from 7/1 & 7/2) 6.53 x 10^6 CD34+ cells/kg.   - 1 year anniversary (July 2015): 30% cellular, trilineage hematopoiesis, no abnormal blasts by morphology or flow , no dysplasia, 0-1 fibrosis, 100% donor (BM, CD3, CD15). CR  - 2 year anniversary (July 2016): 20-30% cellular, trilineage hematopoiesis, no abnormal blasts by morphology or flow , no dysplasia, NO fibrosis, 100% donor in BM (PB not sent). ISCN:  //46,XX[20] Complete Remission.    HEME:  Stable 2/21   -Transfuse to keep Hgb >8 & plt >50.   Not needing transfusions.  -Dx with RUE DVT 3/28/2016 and then a R femoral DVT was found incidentally on CT on 3/31/16 which developed while on Lovenox.  Bridged with Coumadin (started 4/5) through 6/30/16 (3 months).  Now off coumadin. 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 Rest of R and L venous system patent.  Follow.    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 Has been off prednisone since summer 2017 and recently tapered off Sirolimus (january 2018).   -  New significant dry mouth with lichenoid and erythematous changes to mouth 2/19. Started on dexamethasone swish and spit with little improvement (only been 2 days)  - Significant throat pain with associated weight loss.   - Appearance of ulcerations on posterior tongue and Uvulae (infectious vs. GVHD).     Plan:   - start magic mouth wash swish and swallow to help with throat and mouth pain.   - Swab mouth for strep and HSV (worsening of sore throat the last week with initiation of cold symptoms). If negative will restart dex swish and spit and discuss starting oral prednisone with Dr. Pinto.   - Refer to ENT for endoscope to evaluate sore throat and difficulty swallowing.   - Will call patient with results of swabs and update him on the plan.    ID:   Afebrile.    -  RVP, strep swab, and HSV swab to r/o infectious etiologies. If negative, plan as above.   -  Recent hx of multiple episodes of PNA/URI/sinusitis.  Infl B on 5/5, Influenza A, H1N1 & HCAP in March 2016 & Geotrichim by BAL.  Please see Dr. Sandoval's note, he thinks his symptoms are consistent with bronchiolitis/organizing pneumonia post H1N1 infection & is recommending continuing the current dose of Pred e1cdobk & then re assess.    - IgG = 403, repleted 3/22/16, 5/8/16= 1270   - Prophy:  HD ACV (renal dosing), Fluconazole and SS daily Bactrim.   - CMV neg 9/22/16      6. FEN/Renal:  Cr and lytes WNL      7. Fatigue: Continues to have ongoing fatigue. Consider evaluating for adrenal insufficiency and/or thyroid dysfunction once acute issues have been evaluated.       Plan: script given for Magic Mouthwash, swabs performed as above, will follow up swabs and call patient with plan and when to return to clinic.     Jamey Mckenzie PA-C  [phone]

## 2018-02-21 NOTE — MR AVS SNAPSHOT
After Visit Summary   2/21/2018    Deejay Dior    MRN: [id number]           Patient Information     Date Of Birth          1950        Visit Information        Provider Department      2/21/2018 12:30 PM  BMT CAROLINA #4 [facility] Blood and Marrow Transplant        Today's Diagnoses     Complications of bone marrow transplant, unspecified complication (H)              [facility] ([facility])  [address] [place] [address]  Phone: [phone]  Clinic Hours:   Monday-Thursday:7am to 7pm   Friday: 7am to 5pm   Weekends and holidays:    8am to noon (in general)  If your fever is 100.5  or greater,   call the clinic.  After hours call the   hospital at [phone] or   [phone]. 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 Now with excessive dry mouth and painful swallowing, possible lichenoid changes on uvulae vs. Infection? Would like patient to get scopes to evaluate throat for strictures vs. Ulceration.    Please be aware that coverage of these services is subject to the terms and limitations of your health insurance plan.  Call member services at your health plan with any benefit or coverage questions.      Please bring the following with you to your appointment:    (1) Any X-Rays, CTs or MRIs which have been performed.  Contact the facility where they were done to arrange for  prior to your scheduled appointment.   (2) List of current medications  (3) This referral request   (4) Any documents/labs given to you for this referral                  Your next 10 appointments already scheduled     Mar 29, 2018  1:00 PM CDT   Masonic Lab Draw with  MASONIC LAB DRAW   [facility] Masonic Lab Draw ([facility])    [address]  [address]  [facility] [address] [phone]            Mar 29, 2018  1:30 PM CDT   Return  "with Aby Pinto MD   [facility] Blood and Marrow Transplant ([facility] [place])    [address]  [address]  [facility] [place] [address] [phone]            Apr 10, 2018  2:00 PM CDT   (Arrive by 1:45 PM)   RETURN ENDOCRINE with Rd Chairez MD   [facility] Endocrinology ([facility])    [address]  3rd Floor  [facility] [place] [address] [phone]              Who to contact     If you have questions or need follow up information about today's clinic visit or your schedule please contact [facility] BLOOD AND MARROW TRANSPLANT directly at [phone].  Normal or non-critical lab and imaging results will be communicated to you by [employer], letter or phone within 4 business days after the clinic has received the results. If you do not hear from us within 7 days, please contact the clinic through [employer] or phone. If you have a critical or abnormal lab result, we will notify you by phone as soon as possible.  Submit refill requests through [employer] or call your pharmacy and they will forward the refill request to us. Please allow 3 business days for your refill to be completed.          Additional Information About Your Visit        [employer] Information     [employer] gives you secure access to your electronic health record. If you see a primary care provider, you can also send messages to your care team and make appointments. If you have questions, please call your primary care clinic.  If you do not have a primary care provider, please call [phone] and they will assist you.        Care EveryWhere ID     This is your Care EveryWhere ID. This could be used by other organizations to access your [place] medical records  [id number]        Your Vitals Were     Pulse Temperature Respirations Height Pulse Oximetry BMI (Body Mass Index)    81 97.6  F (36.4  C) (Tympanic) 18 1.73 m (5' 8.11\") 96% 32.95 kg/m2       Blood Pressure from Last 3 Encounters: "   02/21/18 106/68   12/14/17 124/84   09/28/17 137/82    Weight from Last 3 Encounters:   02/21/18 98.6 kg (217 lb 6.4 oz)   12/14/17 103 kg (227 lb)   10/31/17 102.5 kg (226 lb)              We Performed the Following     Beta strep group A culture     CBC with platelets differential     Comprehensive metabolic panel     HSV 1 and 2 DNA by PCR     OTOLARYNGOLOGY REFERRAL     Rapid strep screen     Respiratory Virus Panel by PCR          Today's Medication Changes          These changes are accurate as of 2/21/18  5:32 PM.  If you have any questions, ask your nurse or doctor.               Start taking these medicines.        Dose/Directions    lidocaine visc 2% 2.5mL/5mL & maalox/mylanta w/ simeth 2.5mL/5mL & diphenhydrAMINE 5mg/5mL Susp suspension   Commonly known as:  [facility] Mouthwash [facility]   Used for:  Complications of bone marrow transplant, unspecified complication (H)        Dose:  10 mL   Swish and swallow 10 mLs in mouth every 6 hours as needed for mouth sores   Quantity:  240 mL   Refills:  0            Where to get your medicines      Some of these will need a paper prescription and others can be bought over the counter.  Ask your nurse if you have questions.     Bring a paper prescription for each of these medications     lidocaine visc 2% 2.5mL/5mL & maalox/mylanta w/ simeth 2.5mL/5mL & diphenhydrAMINE 5mg/5mL Susp suspension                Recent Review Flowsheet Data     BMT Recent Results Latest Ref Rng & Units 2/2/2017 3/7/2017 4/13/2017 6/8/2017 9/28/2017 12/14/2017 2/21/2018    WBC 4.0 - 11.0 10e9/L 4.3 5.6 5.9 4.3 5.6 5.6 7.9    Hemoglobin 13.3 - 17.7 g/dL 13.1(L) 12.8(L) 12.5(L) 13.9 15.1 16.4 14.6    Platelet Count 150 - 450 10e9/L 140(L) 153 194 136(L) 153 162 222    Neutrophils (Absolute) 1.6 - 8.3 10e9/L 2.1 3.4 3.6 2.5 2.8 2.9 5.0    INR 0.86 - 1.14 - - - - - - -    Sodium 133 - 144 mmol/L 138 141 137 140 136 142 141    Potassium 3.4 - 5.3 mmol/L 3.7 3.6 3.9 4.0 3.6 4.1 3.8    Chloride 94  - 109 mmol/L 107 110(H) 104 107 105 110(H) 110(H)    Glucose 70 - 99 mg/dL 87 91 100(H) 94 89 84 108(H)    Urea Nitrogen 7 - 30 mg/dL 27 20 27 24 29 24 17    Creatinine 0.66 - 1.25 mg/dL 1.09 0.93 1.28(H) 1.15 1.44(H) 1.13 0.88    Calcium (Total) 8.5 - 10.1 mg/dL 8.8 8.8 9.2 8.7 8.6 8.5 8.8    Protein (Total) 6.8 - 8.8 g/dL 7.1 6.8 7.5 6.7(L) 7.5 7.7 6.8    Albumin 3.4 - 5.0 g/dL 3.4 3.2(L) 3.5 2.9(L) 3.3(L) 3.2(L) 3.0(L)    Bilirubin (Direct) 0.0 - 0.2 mg/dL - - - - - - -    Alkaline Phosphatase 40 - 150 U/L 91 96 128 134 119 109 143    AST 0 - 45 U/L 40 31 40 44 39 42 74(H)    ALT 0 - 70 U/L 35 31 32 47 44 44 62    MCV 78 - 100 fl 80 82 82 84 84 88 89               Primary Care Provider Office Phone # Fax #    Vivek Rafael Callejas [phone]       [facility] [address] [facility] [address]        Equal Access to Services     [facility] AH: Hadii aad ku hadasho Sozachali, waaxda luqadaha, qaybta kaalmada adeegyada, waxay primo langley adejose khkamron self'chris . So [facility] [phone].    ATENCIÓN: Si habla español, tiene a del toro disposición servicios gratuitos de asistencia lingüística. Llame al [phone].    We comply with applicable federal civil rights laws and [place] laws. We do not discriminate on the basis of race, color, national origin, age, disability, sex, sexual orientation, or gender identity.            Thank you!     Thank you for choosing [facility] BLOOD AND MARROW TRANSPLANT  for your care. Our goal is always to provide you with excellent care. Hearing back from our patients is one way we can continue to improve our services. Please take a few minutes to complete the written survey that you may receive in the mail after your visit with us. Thank you!             Your Updated Medication List - Protect others around you: Learn how to safely use, store and throw away your medicines at www.disposemymeds.org.          This list is accurate as of 2/21/18  5:33 PM.  Always use  your most recent med list.                   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), MDS (myelodysplastic syndrome) (H)       alfuzosin 10 MG 24 hr tablet    UROXATRAL    30 tablet    Take 1 tablet (10 mg) by mouth daily    Benign non-nodular prostatic hyperplasia with lower urinary tract symptoms, Urinary frequency       amoxicillin 500 MG capsule    AMOXIL    4 capsule    Take 4 pills (2 grams) day of dental work    MDS (myelodysplastic syndrome) (H)       bumetanide 0.5 MG tablet    BUMEX    30 tablet    Take one tablet by mouth once or twice weekly    MDS (myelodysplastic syndrome) (H)       calcium carbonate-vitamin D 500-400 MG-UNIT Tabs per tablet     180 tablet    Take 1 tablet by mouth daily 2000 mg    MDS (myelodysplastic syndrome) (H), Acute muvqh-kjnucv-zgdx disease (H), 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)       dexamethasone 0.1 MG/ML solution     600 mL    Swish and Spit 5-10 mL four times daily    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)       finasteride 5 MG tablet    PROSCAR    30 tablet    TAKE ONE TABLET BY MOUTH ONE TIME DAILY    MDS (myelodysplastic syndrome) (H), 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), Urinary frequency, Complications of bone marrow transplant (H)       fluconazole 100 MG tablet    DIFLUCAN    30 tablet    Take 1 tablet (100 mg) by mouth daily    MDS (myelodysplastic syndrome) (H), 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)       fluticasone 50 MCG/ACT spray    FLONASE    1 Bottle    Spray 1-2 sprays into both nostrils daily    MDS (myelodysplastic syndrome) (H)       gabapentin 300 MG capsule    NEURONTIN          lidocaine visc 2% 2.5mL/5mL & maalox/mylanta w/ simeth 2.5mL/5mL & diphenhydrAMINE 5mg/5mL Susp suspension    [facility]    240 mL    Swish and swallow 10 mLs in mouth every 6 hours as needed for mouth  "sores    Complications of bone marrow transplant, unspecified complication (H)       loratadine 10 MG capsule    CLARITIN    30 capsule    Take 10 mg by mouth daily    MDS (myelodysplastic syndrome) (H)       order for DME     1 Device    Please provide a NOVA cane offset with strap item number [id number]    MDS (myelodysplastic syndrome) (H)       sertraline 100 MG tablet    ZOLOFT    30 tablet    Take 1 tablet (100 mg) by mouth daily    MDS (myelodysplastic syndrome) (H), 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 Use 1 syringe every 21 days  to inject testosterone    Primary hypogonadism in male       testosterone cypionate 200 MG/ML injection    DEPOTESTOTERONE    1 mL    Inject 1 mL (200 mg) into the muscle every 21 days    Primary hypogonadism in male         "

## 2018-02-22 LAB
FLUAV H1 2009 PAND RNA SPEC QL NAA+PROBE: NEGATIVE
FLUAV H1 RNA SPEC QL NAA+PROBE: NEGATIVE
FLUAV H3 RNA SPEC QL NAA+PROBE: NEGATIVE
FLUAV RNA SPEC QL NAA+PROBE: NEGATIVE
FLUBV RNA SPEC QL NAA+PROBE: NEGATIVE
HADV DNA SPEC QL NAA+PROBE: NEGATIVE
HADV DNA SPEC QL NAA+PROBE: NEGATIVE
HMPV RNA SPEC QL NAA+PROBE: NEGATIVE
HPIV1 RNA SPEC QL NAA+PROBE: NEGATIVE
HPIV2 RNA SPEC QL NAA+PROBE: NEGATIVE
HPIV3 RNA SPEC QL NAA+PROBE: NEGATIVE
MICROBIOLOGIST REVIEW: NORMAL
RHINOVIRUS RNA SPEC QL NAA+PROBE: NEGATIVE
RSV RNA SPEC QL NAA+PROBE: NEGATIVE
RSV RNA SPEC QL NAA+PROBE: NEGATIVE
SPECIMEN SOURCE: NORMAL

## 2018-02-23 LAB
BACTERIA SPEC CULT: NORMAL
SPECIMEN SOURCE: NORMAL

## 2018-02-24 ENCOUNTER — TELEPHONE (OUTPATIENT)
Dept: MEDSURG UNIT | Facility: CLINIC | Age: 68
End: 2018-02-24

## 2018-02-24 NOTE — TELEPHONE ENCOUNTER
Pt wife called asking for prednisone. Pt swas seen on 2/21 for dry mouth, sore throat and suspicion for cGVHD. 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 RVP an drapid strep neg. Per instrucitons on note, Asked him to re-start Dex  Swish and swallow and call on Monday if no improvement for possible PO prednisone.

## 2018-02-27 ENCOUNTER — ONCOLOGY VISIT (OUTPATIENT)
Dept: TRANSPLANT | Facility: CLINIC | Age: 68
End: 2018-02-27
Attending: PHYSICIAN ASSISTANT
Payer: MEDICARE

## 2018-02-27 ENCOUNTER — APPOINTMENT (OUTPATIENT)
Dept: LAB | Facility: CLINIC | Age: 68
End: 2018-02-27
Attending: PHYSICIAN ASSISTANT
Payer: MEDICARE

## 2018-02-27 VITALS
HEIGHT: 68 IN | RESPIRATION RATE: 16 BRPM | TEMPERATURE: 98.1 F | HEART RATE: 69 BPM | BODY MASS INDEX: 32.89 KG/M2 | DIASTOLIC BLOOD PRESSURE: 73 MMHG | SYSTOLIC BLOOD PRESSURE: 115 MMHG | OXYGEN SATURATION: 98 % | WEIGHT: 217 LBS

## 2018-02-27 DIAGNOSIS — D89.813 GVH (GRAFT VERSUS HOST DISEASE) (H): ICD-10-CM

## 2018-02-27 DIAGNOSIS — Z94.81 STATUS POST BONE MARROW TRANSPLANT (H): Primary | ICD-10-CM

## 2018-02-27 DIAGNOSIS — Z79.899 OTHER LONG TERM (CURRENT) DRUG THERAPY: ICD-10-CM

## 2018-02-27 LAB
ALBUMIN SERPL-MCNC: 3.1 G/DL (ref 3.4–5)
ALP SERPL-CCNC: 169 U/L (ref 40–150)
ALT SERPL W P-5'-P-CCNC: 90 U/L (ref 0–70)
ANION GAP SERPL CALCULATED.3IONS-SCNC: 13 MMOL/L (ref 3–14)
AST SERPL W P-5'-P-CCNC: 107 U/L (ref 0–45)
BASOPHILS # BLD AUTO: 0 10E9/L (ref 0–0.2)
BASOPHILS NFR BLD AUTO: 0.5 %
BILIRUB SERPL-MCNC: 0.3 MG/DL (ref 0.2–1.3)
BUN SERPL-MCNC: 17 MG/DL (ref 7–30)
CALCIUM SERPL-MCNC: 9 MG/DL (ref 8.5–10.1)
CHLORIDE SERPL-SCNC: 108 MMOL/L (ref 94–109)
CO2 SERPL-SCNC: 19 MMOL/L (ref 20–32)
CREAT SERPL-MCNC: 0.97 MG/DL (ref 0.66–1.25)
DIFFERENTIAL METHOD BLD: ABNORMAL
EOSINOPHIL # BLD AUTO: 0.2 10E9/L (ref 0–0.7)
EOSINOPHIL NFR BLD AUTO: 2.4 %
ERYTHROCYTE [DISTWIDTH] IN BLOOD BY AUTOMATED COUNT: 15.6 % (ref 10–15)
GFR SERPL CREATININE-BSD FRML MDRD: 77 ML/MIN/1.7M2
GLUCOSE SERPL-MCNC: 99 MG/DL (ref 70–99)
HCT VFR BLD AUTO: 49.2 % (ref 40–53)
HGB BLD-MCNC: 15.8 G/DL (ref 13.3–17.7)
IMM GRANULOCYTES # BLD: 0 10E9/L (ref 0–0.4)
IMM GRANULOCYTES NFR BLD: 0.2 %
LYMPHOCYTES # BLD AUTO: 1.6 10E9/L (ref 0.8–5.3)
LYMPHOCYTES NFR BLD AUTO: 19.3 %
MCH RBC QN AUTO: 28.9 PG (ref 26.5–33)
MCHC RBC AUTO-ENTMCNC: 32.1 G/DL (ref 31.5–36.5)
MCV RBC AUTO: 90 FL (ref 78–100)
MONOCYTES # BLD AUTO: 0.9 10E9/L (ref 0–1.3)
MONOCYTES NFR BLD AUTO: 10.8 %
NEUTROPHILS # BLD AUTO: 5.6 10E9/L (ref 1.6–8.3)
NEUTROPHILS NFR BLD AUTO: 66.8 %
NRBC # BLD AUTO: 0 10*3/UL
NRBC BLD AUTO-RTO: 0 /100
PLATELET # BLD AUTO: 228 10E9/L (ref 150–450)
POTASSIUM SERPL-SCNC: 4.1 MMOL/L (ref 3.4–5.3)
PROT SERPL-MCNC: 7.6 G/DL (ref 6.8–8.8)
RBC # BLD AUTO: 5.47 10E12/L (ref 4.4–5.9)
SODIUM SERPL-SCNC: 140 MMOL/L (ref 133–144)
TSH SERPL DL<=0.005 MIU/L-ACNC: 0.7 MU/L (ref 0.4–4)
WBC # BLD AUTO: 8.4 10E9/L (ref 4–11)

## 2018-02-27 PROCEDURE — 84443 ASSAY THYROID STIM HORMONE: CPT | Performed by: PHYSICIAN ASSISTANT

## 2018-02-27 PROCEDURE — 85025 COMPLETE CBC W/AUTO DIFF WBC: CPT | Performed by: INTERNAL MEDICINE

## 2018-02-27 PROCEDURE — G0463 HOSPITAL OUTPT CLINIC VISIT: HCPCS | Mod: ZF

## 2018-02-27 PROCEDURE — 36415 COLL VENOUS BLD VENIPUNCTURE: CPT

## 2018-02-27 PROCEDURE — 84443 ASSAY THYROID STIM HORMONE: CPT | Performed by: INTERNAL MEDICINE

## 2018-02-27 PROCEDURE — 87799 DETECT AGENT NOS DNA QUANT: CPT | Performed by: INTERNAL MEDICINE

## 2018-02-27 PROCEDURE — 80053 COMPREHEN METABOLIC PANEL: CPT | Performed by: INTERNAL MEDICINE

## 2018-02-27 ASSESSMENT — PAIN SCALES - GENERAL: PAINLEVEL: NO PAIN (0)

## 2018-02-27 NOTE — MR AVS SNAPSHOT
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  After hours call the   hospital at [phone] or   [phone]. Ask for the BMT   fellow on-call            Follow-ups after your visit        Your next 10 appointments already scheduled     Mar 08, 2018  1:30 PM CST   Masonic Lab Draw with  MASONIC LAB DRAW   [facility] Masonic Lab Draw ([facility])    [address] [place] [address]  [address] [place] [address]   [phone]            Mar 08, 2018  2:00 PM CST   Return with  BMT CAROLINA #4   [facility] Blood and Marrow Transplant ([facility])    [address] [place] [address]  [address] [place] [address]   [phone]            Mar 29, 2018  1:00 PM CDT   Masonic Lab Draw with  MASONIC LAB DRAW   [facility] Masonic Lab Draw ([facility])    [address] [place] [address]  [address] [place] [address]   [phone]            Mar 29, 2018  1:30 PM CDT   Return with Aby Pinto MD   [facility] Blood and Marrow Transplant ([facility])    [address] [place] [address]  [address] [place] [address]   [phone]            Apr 10, 2018  2:00 PM CDT   (Arrive by 1:45 PM)   RETURN ENDOCRINE with Rd Chairez MD   [facility] Endocrinology ([facility]  [facility])    [address]  [address] [facility] [address] [phone]              Future tests that were ordered for you today     Open Future Orders        Priority Expected Expires Ordered    Comprehensive metabolic panel Routine 3/8/2018 3/15/2018 2/27/2018    CBC with platelets differential Routine 3/8/2018 3/15/2018 2/27/2018            Who to contact     If you have questions or need follow up information about today's clinic visit or your schedule please contact [facility] BLOOD AND MARROW TRANSPLANT directly at [phone].  Normal or non-critical lab and imaging results will be communicated to you by [employer], letter or phone within 4 business days after the clinic has received the results. If you do not hear from us within 7 days, please contact the clinic through [employer] or phone. If you have a critical or abnormal lab result, we will notify you by phone as soon as possible.  Submit refill requests through [employer] or call your pharmacy and they will forward the refill request to us. Please allow 3 business days for your refill to be completed.          Additional Information About Your Visit        [employer] Information     [employer] gives you secure access to your electronic health record. If you see a primary care provider, you can also send messages to your care team and make appointments. If you have questions, please call your primary care clinic.  If you do not have a primary care provider, please call [phone] and they will assist you.        Care EveryWhere ID     This is your Care EveryWhere ID. This could be used by other organizations to access your [place] medical records  [id number]        Your Vitals Were     Pulse Temperature Respirations Height Pulse Oximetry BMI (Body Mass Index)    69 98.1  F (36.7  C) (Tympanic) 16 1.73 m (5' 8.11\") 98% 32.89 kg/m2       Blood Pressure from Last 3 Encounters:   02/27/18 115/73   02/21/18 106/68   12/14/17 124/84    Weight " from Last 3 Encounters:   02/27/18 98.4 kg (217 lb)   02/21/18 98.6 kg (217 lb 6.4 oz)   12/14/17 103 kg (227 lb)              We Performed the Following     CBC with platelets differential     Comprehensive metabolic panel     EBV DNA PCR Quantitative Whole Blood     TSH with free T4 reflex        Recent Review Flowsheet Data     BMT Recent Results Latest Ref Rng & Units 3/7/2017 4/13/2017 6/8/2017 9/28/2017 12/14/2017 2/21/2018 2/27/2018    WBC 4.0 - 11.0 10e9/L 5.6 5.9 4.3 5.6 5.6 7.9 8.4    Hemoglobin 13.3 - 17.7 g/dL 12.8(L) 12.5(L) 13.9 15.1 16.4 14.6 15.8    Platelet Count 150 - 450 10e9/L 153 194 136(L) 153 162 222 228    Neutrophils (Absolute) 1.6 - 8.3 10e9/L 3.4 3.6 2.5 2.8 2.9 5.0 5.6    INR 0.86 - 1.14 - - - - - - -    Sodium 133 - 144 mmol/L 141 137 140 136 142 141 140    Potassium 3.4 - 5.3 mmol/L 3.6 3.9 4.0 3.6 4.1 3.8 4.1    Chloride 94 - 109 mmol/L 110(H) 104 107 105 110(H) 110(H) 108    Glucose 70 - 99 mg/dL 91 100(H) 94 89 84 108(H) 99    Urea Nitrogen 7 - 30 mg/dL 20 27 24 29 24 17 17    Creatinine 0.66 - 1.25 mg/dL 0.93 1.28(H) 1.15 1.44(H) 1.13 0.88 0.97    Calcium (Total) 8.5 - 10.1 mg/dL 8.8 9.2 8.7 8.6 8.5 8.8 9.0    Protein (Total) 6.8 - 8.8 g/dL 6.8 7.5 6.7(L) 7.5 7.7 6.8 7.6    Albumin 3.4 - 5.0 g/dL 3.2(L) 3.5 2.9(L) 3.3(L) 3.2(L) 3.0(L) 3.1(L)    Bilirubin (Direct) 0.0 - 0.2 mg/dL - - - - - - -    Alkaline Phosphatase 40 - 150 U/L 96 128 134 119 109 143 169(H)    AST 0 - 45 U/L 31 40 44 39 42 74(H) 107(H)    ALT 0 - 70 U/L 31 32 47 44 44 62 90(H)    MCV 78 - 100 fl 82 82 84 84 88 89 90               Primary Care Provider Office Phone # Fax #    Vivek Rafael Callejas [phone]       [facility] [address] [facility] [address]        Equal Access to Services     [facility]: Edil Vanegas, florencio guiod, qaybta corinna bass. [facility] [phone].    ATENCIÓN: Si sole cuellar del toro  disposición servicios gratuitos de asistencia lingüística. Reddy jean baptiste [phone].    We comply with applicable federal civil rights laws and [place] laws. We do not discriminate on the basis of race, color, national origin, age, disability, sex, sexual orientation, or gender identity.            Thank you!     Thank you for choosing [facility] BLOOD AND MARROW TRANSPLANT  for your care. Our goal is always to provide you with excellent care. Hearing back from our patients is one way we can continue to improve our services. Please take a few minutes to complete the written survey that you may receive in the mail after your visit with us. Thank you!             Your Updated Medication List - Protect others around you: Learn how to safely use, store and throw away your medicines at www.disposemymeds.org.          This list is accurate as of 2/27/18  3:24 PM.  Always use your most recent med list.                   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), MDS (myelodysplastic syndrome) (H)       alfuzosin 10 MG 24 hr tablet    UROXATRAL    30 tablet    Take 1 tablet (10 mg) by mouth daily    Benign non-nodular prostatic hyperplasia with lower urinary tract symptoms, Urinary frequency       amoxicillin 500 MG capsule    AMOXIL    4 capsule    Take 4 pills (2 grams) day of dental work    MDS (myelodysplastic syndrome) (H)       bumetanide 0.5 MG tablet    BUMEX    30 tablet    Take one tablet by mouth once or twice weekly    MDS (myelodysplastic syndrome) (H)       calcium carbonate-vitamin D 500-400 MG-UNIT Tabs per tablet     180 tablet    Take 1 tablet by mouth daily 2000 mg    MDS (myelodysplastic syndrome) (H), Acute rnxqr-vuzhaq-otge disease (H), 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)       dexamethasone 0.1 MG/ML solution     600 mL    Swish and Spit 5-10 mL four times  "daily    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)       finasteride 5 MG tablet    PROSCAR    30 tablet    TAKE ONE TABLET BY MOUTH ONE TIME DAILY    MDS (myelodysplastic syndrome) (H), 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), Urinary frequency, Complications of bone marrow transplant (H)       gabapentin 300 MG capsule    NEURONTIN          lidocaine visc 2% 2.5mL/5mL & maalox/mylanta w/ simeth 2.5mL/5mL & diphenhydrAMINE 5mg/5mL Susp suspension    [facility]    240 mL    Swish and swallow 10 mLs in mouth every 6 hours as needed for mouth sores    Complications of bone marrow transplant, unspecified complication (H)       order for DME     1 Device    Please provide a [employer]OVA cane offset with strap item number [id number]    MDS (myelodysplastic syndrome) (H)       sertraline 100 MG tablet    ZOLOFT    30 tablet    Take 1 tablet (100 mg) by mouth daily    MDS (myelodysplastic syndrome) (H), 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 Use 1 syringe every 21 days  to inject testosterone    Primary hypogonadism in male       testosterone cypionate 200 MG/ML injection    DEPOTESTOTERONE    1 mL    Inject 1 mL (200 mg) into the muscle every 21 days    Primary hypogonadism in male         "

## 2018-02-27 NOTE — NURSING NOTE
"Oncology Rooming Note    February 27, 2018 12:52 PM   Deejay Dior is a 67 year old male who presents for:    Chief Complaint   Patient presents with     Blood Draw     RECHECK     MDS (myelodysplastic syndrome)      Initial Vitals: /73  Pulse 69  Temp 98.1  F (36.7  C) (Tympanic)  Resp 16  Ht 1.73 m (5' 8.11\")  Wt 98.4 kg (217 lb)  SpO2 98%  BMI 32.89 kg/m2 Estimated body mass index is 32.89 kg/(m^2) as calculated from the following:    Height as of this encounter: 1.73 m (5' 8.11\").    Weight as of this encounter: 98.4 kg (217 lb). Body surface area is 2.17 meters squared.  No Pain (0) Comment: Data Unavailable   No LMP for male patient.  Allergies reviewed: Yes  Medications reviewed: Yes    Medications: Medication refills not needed today.  Pharmacy name entered into [facility]:    [id number] - [place], [place] - [address] [id number] [place] [place] - [address] [address] [address]    Clinical concerns:  No new concerns  Provider was notified.    5 minutes for nursing intake (face to face time)     Rea Lacy MA              "

## 2018-02-28 LAB
EBV DNA # SPEC NAA+PROBE: <500 {COPIES}/ML
EBV DNA SPEC NAA+PROBE-LOG#: <2.7 {LOG_COPIES}/ML

## 2018-03-08 ENCOUNTER — APPOINTMENT (OUTPATIENT)
Dept: LAB | Facility: CLINIC | Age: 68
End: 2018-03-08
Attending: PHYSICIAN ASSISTANT
Payer: MEDICARE

## 2018-03-08 ENCOUNTER — ONCOLOGY VISIT (OUTPATIENT)
Dept: TRANSPLANT | Facility: CLINIC | Age: 68
End: 2018-03-08
Attending: PHYSICIAN ASSISTANT
Payer: MEDICARE

## 2018-03-08 VITALS — DIASTOLIC BLOOD PRESSURE: 72 MMHG | SYSTOLIC BLOOD PRESSURE: 109 MMHG | HEART RATE: 73 BPM | OXYGEN SATURATION: 96 %

## 2018-03-08 VITALS
TEMPERATURE: 97.5 F | OXYGEN SATURATION: 94 % | HEART RATE: 98 BPM | SYSTOLIC BLOOD PRESSURE: 94 MMHG | DIASTOLIC BLOOD PRESSURE: 52 MMHG | WEIGHT: 202.4 LBS | RESPIRATION RATE: 18 BRPM | BODY MASS INDEX: 30.68 KG/M2

## 2018-03-08 DIAGNOSIS — Z94.81 STATUS POST BONE MARROW TRANSPLANT (H): ICD-10-CM

## 2018-03-08 DIAGNOSIS — D46.9 MDS (MYELODYSPLASTIC SYNDROME) (H): Primary | ICD-10-CM

## 2018-03-08 LAB
ALBUMIN SERPL-MCNC: 3 G/DL (ref 3.4–5)
ALP SERPL-CCNC: 159 U/L (ref 40–150)
ALT SERPL W P-5'-P-CCNC: 75 U/L (ref 0–70)
ANION GAP SERPL CALCULATED.3IONS-SCNC: 8 MMOL/L (ref 3–14)
AST SERPL W P-5'-P-CCNC: 82 U/L (ref 0–45)
BASOPHILS # BLD AUTO: 0.1 10E9/L (ref 0–0.2)
BASOPHILS NFR BLD AUTO: 0.8 %
BILIRUB SERPL-MCNC: 0.4 MG/DL (ref 0.2–1.3)
BUN SERPL-MCNC: 22 MG/DL (ref 7–30)
CALCIUM SERPL-MCNC: 8.7 MG/DL (ref 8.5–10.1)
CHLORIDE SERPL-SCNC: 107 MMOL/L (ref 94–109)
CO2 SERPL-SCNC: 22 MMOL/L (ref 20–32)
CREAT SERPL-MCNC: 1.07 MG/DL (ref 0.66–1.25)
DIFFERENTIAL METHOD BLD: ABNORMAL
EOSINOPHIL # BLD AUTO: 0.3 10E9/L (ref 0–0.7)
EOSINOPHIL NFR BLD AUTO: 3.4 %
ERYTHROCYTE [DISTWIDTH] IN BLOOD BY AUTOMATED COUNT: 15.9 % (ref 10–15)
GFR SERPL CREATININE-BSD FRML MDRD: 68 ML/MIN/1.7M2
GLUCOSE SERPL-MCNC: 120 MG/DL (ref 70–99)
HCT VFR BLD AUTO: 49.3 % (ref 40–53)
HGB BLD-MCNC: 15.9 G/DL (ref 13.3–17.7)
IMM GRANULOCYTES # BLD: 0 10E9/L (ref 0–0.4)
IMM GRANULOCYTES NFR BLD: 0.3 %
LYMPHOCYTES # BLD AUTO: 2.1 10E9/L (ref 0.8–5.3)
LYMPHOCYTES NFR BLD AUTO: 27.2 %
MCH RBC QN AUTO: 28.8 PG (ref 26.5–33)
MCHC RBC AUTO-ENTMCNC: 32.3 G/DL (ref 31.5–36.5)
MCV RBC AUTO: 89 FL (ref 78–100)
MONOCYTES # BLD AUTO: 0.8 10E9/L (ref 0–1.3)
MONOCYTES NFR BLD AUTO: 11 %
NEUTROPHILS # BLD AUTO: 4.3 10E9/L (ref 1.6–8.3)
NEUTROPHILS NFR BLD AUTO: 57.3 %
NRBC # BLD AUTO: 0 10*3/UL
NRBC BLD AUTO-RTO: 0 /100
PLATELET # BLD AUTO: 210 10E9/L (ref 150–450)
POTASSIUM SERPL-SCNC: 3.6 MMOL/L (ref 3.4–5.3)
PROT SERPL-MCNC: 7.3 G/DL (ref 6.8–8.8)
RBC # BLD AUTO: 5.52 10E12/L (ref 4.4–5.9)
SODIUM SERPL-SCNC: 138 MMOL/L (ref 133–144)
WBC # BLD AUTO: 7.6 10E9/L (ref 4–11)

## 2018-03-08 PROCEDURE — 87040 BLOOD CULTURE FOR BACTERIA: CPT | Mod: 91 | Performed by: PHYSICIAN ASSISTANT

## 2018-03-08 PROCEDURE — 86704 HEP B CORE ANTIBODY TOTAL: CPT | Performed by: PHYSICIAN ASSISTANT

## 2018-03-08 PROCEDURE — 87799 DETECT AGENT NOS DNA QUANT: CPT | Performed by: PHYSICIAN ASSISTANT

## 2018-03-08 PROCEDURE — G0463 HOSPITAL OUTPT CLINIC VISIT: HCPCS

## 2018-03-08 PROCEDURE — 80053 COMPREHEN METABOLIC PANEL: CPT | Performed by: PHYSICIAN ASSISTANT

## 2018-03-08 PROCEDURE — 96360 HYDRATION IV INFUSION INIT: CPT

## 2018-03-08 PROCEDURE — 86803 HEPATITIS C AB TEST: CPT | Performed by: PHYSICIAN ASSISTANT

## 2018-03-08 PROCEDURE — 85025 COMPLETE CBC W/AUTO DIFF WBC: CPT | Performed by: PHYSICIAN ASSISTANT

## 2018-03-08 PROCEDURE — 86706 HEP B SURFACE ANTIBODY: CPT | Performed by: PHYSICIAN ASSISTANT

## 2018-03-08 PROCEDURE — 87040 BLOOD CULTURE FOR BACTERIA: CPT | Performed by: PHYSICIAN ASSISTANT

## 2018-03-08 PROCEDURE — 87340 HEPATITIS B SURFACE AG IA: CPT | Performed by: PHYSICIAN ASSISTANT

## 2018-03-08 PROCEDURE — 86709 HEPATITIS A IGM ANTIBODY: CPT | Performed by: PHYSICIAN ASSISTANT

## 2018-03-08 PROCEDURE — 25000128 H RX IP 250 OP 636: Mod: ZF | Performed by: PHYSICIAN ASSISTANT

## 2018-03-08 PROCEDURE — 36415 COLL VENOUS BLD VENIPUNCTURE: CPT

## 2018-03-08 RX ORDER — POSACONAZOLE 100 MG/1
300 TABLET, DELAYED RELEASE ORAL EVERY MORNING
Qty: 90 TABLET | Refills: 0 | Status: SHIPPED | OUTPATIENT
Start: 2018-03-08 | End: 2018-03-15

## 2018-03-08 RX ORDER — FLUCONAZOLE 100 MG/1
100 TABLET ORAL DAILY
Qty: 30 TABLET | Refills: 1 | Status: SHIPPED | OUTPATIENT
Start: 2018-03-08 | End: 2018-03-29

## 2018-03-08 RX ORDER — PENICILLIN V POTASSIUM 250 MG/1
250 TABLET, FILM COATED ORAL 2 TIMES DAILY
Qty: 60 TABLET | Refills: 1 | Status: SHIPPED | OUTPATIENT
Start: 2018-03-08 | End: 2018-03-29

## 2018-03-08 RX ORDER — SIROLIMUS 1 MG/1
TABLET, FILM COATED ORAL
Qty: 31 TABLET | Refills: 1 | Status: SHIPPED | OUTPATIENT
Start: 2018-03-08 | End: 2018-03-15

## 2018-03-08 RX ORDER — PENICILLIN V POTASSIUM 250 MG/1
250 TABLET, FILM COATED ORAL 2 TIMES DAILY
Qty: 30 TABLET | Refills: 1 | Status: SHIPPED | OUTPATIENT
Start: 2018-03-08 | End: 2018-03-15

## 2018-03-08 RX ORDER — FLUCONAZOLE 100 MG/1
100 TABLET ORAL DAILY
Qty: 30 TABLET | Refills: 1 | Status: SHIPPED | OUTPATIENT
Start: 2018-03-08 | End: 2018-04-30

## 2018-03-08 RX ORDER — SULFAMETHOXAZOLE/TRIMETHOPRIM 800-160 MG
TABLET ORAL
Qty: 16 TABLET | Refills: 1 | Status: SHIPPED | OUTPATIENT
Start: 2018-03-08 | End: 2018-06-08

## 2018-03-08 RX ORDER — PREDNISONE 20 MG/1
TABLET ORAL
Qty: 60 TABLET | Refills: 1 | Status: SHIPPED | OUTPATIENT
Start: 2018-03-08 | End: 2018-03-29

## 2018-03-08 RX ORDER — SIROLIMUS 1 MG/1
1 TABLET, FILM COATED ORAL DAILY
Qty: 30 TABLET | Refills: 1 | Status: SHIPPED | OUTPATIENT
Start: 2018-03-08 | End: 2018-06-21

## 2018-03-08 RX ORDER — PREDNISONE 50 MG/1
50 TABLET ORAL EVERY OTHER DAY
Qty: 15 TABLET | Refills: 1 | Status: SHIPPED | OUTPATIENT
Start: 2018-03-08 | End: 2018-03-29

## 2018-03-08 RX ADMIN — SODIUM CHLORIDE 1000 ML: 9 INJECTION, SOLUTION INTRAVENOUS at 14:50

## 2018-03-08 ASSESSMENT — PAIN SCALES - GENERAL: PAINLEVEL: NO PAIN (0)

## 2018-03-08 NOTE — MR AVS SNAPSHOT
After Visit Summary   3/8/2018    Deejay Dior    MRN: [id number]           Patient Information     Date Of Birth          1950        Visit Information        Provider Department      3/8/2018 3:00 PM UC 4 ATC; UC BMT INFUSION [facility] Blood and Marrow Transplant        Today's Diagnoses     MDS (myelodysplastic syndrome) (H)    -  1          [facility] ([facility])  [address] [place] [address]  Phone: [phone]  Clinic Hours:   Monday-Thursday:7am to 7pm   Friday: 7am to 5pm   Weekends and holidays:    8am to noon (in general)  If your fever is 100.5  or greater,   call the clinic.  After hours call the   hospital at [phone] or   [phone]. Ask for the BMT   fellow on-call            Follow-ups after your visit        Your next 10 appointments already scheduled     Mar 15, 2018  1:15 PM CDT   [facility] Lab Draw with  MAS[facility] LAB DRAW   [facility] Masonic Lab Draw ([facility])    [address] [place] [address]  [address]  [facility] [id number] [phone]            Mar 15, 2018  2:00 PM CDT   Return with UC BMT CAROLINA #4   [facility] Blood and Marrow Transplant ([facility])    [address] [place] [address]  [address]  [facility] [id number] [phone]            Mar 29, 2018  1:00 PM CDT   Masonic Lab Draw with  MASONIC LAB DRAW   [facility] Masonic Lab Draw ([facility])    [address] [place] [address]  [address]  [facility] [id number] [phone]            Mar 29, 2018  1:30 PM CDT   Return with Aby Pinto MD   [facility] Blood and Marrow Transplant ([facility])    [address] [place] [address]  [address]  [facility] [id number] [phone]            Apr 10, 2018  2:00 PM CDT   (Arrive by 1:45 PM)   RETURN ENDOCRINE with Rd Chairez MD   [facility] Endocrinology ([facility])    [address] [place] [address]  3rd [facility] [address]    [phone]              Future tests that were ordered for you today     Open Future Orders        Priority Expected Expires Ordered    Sirolimus level Routine 3/15/2018 3/15/2018 3/8/2018    CBC with platelets differential Routine 3/15/2018 3/15/2018 3/8/2018    Comprehensive metabolic panel Routine 3/15/2018 3/15/2018 3/8/2018            Who to contact     If you have questions or need follow up information about today's clinic visit or your schedule please contact [facility] BLOOD AND MARROW TRANSPLANT directly at [phone].  Normal or non-critical lab and imaging results will be communicated to you by "[employer]hart, letter or phone within 4 business days after the clinic has received the results. If you do not hear from us within 7 days, please contact the clinic through [employer] or phone. If you have a critical or abnormal lab result, we will notify you by phone as soon as possible.  Submit refill requests through [employer] or call your pharmacy and they will forward the refill request to us. Please allow 3 business days for your refill to be completed.          Additional Information About Your Visit        "[employer]hart Information     [employer] gives you secure access to your electronic health record. If you see a primary care provider, you can also send messages to your care team and make appointments. If you have questions, please call your primary care clinic.  If you do not have a primary care provider, please call [phone] and they will assist you.        Care EveryWhere ID     This is your Care EveryWhere ID. This could be used by other organizations to access your [place] medical records  [id number]        Your Vitals Were     Pulse Pulse Oximetry                73 96%           Blood Pressure from Last 3 Encounters:   03/08/18 109/72   03/08/18 94/52   02/27/18 115/73    Weight from Last 3 Encounters:   03/08/18 91.8 kg (202 lb 6.4 oz)   02/27/18 98.4 kg (217 lb)   02/21/18 98.6 kg (217 lb 6.4 oz)               We Performed the Following     Blood culture          Today's Medication Changes          These changes are accurate as of 3/8/18  4:07 PM.  If you have any questions, ask your nurse or doctor.               Start taking these medicines.        Dose/Directions    * fluconazole 100 MG tablet   Commonly known as:  DIFLUCAN   Used for:  Status post bone marrow transplant (H)        Dose:  100 mg   Take 1 tablet (100 mg) by mouth daily   Quantity:  30 tablet   Refills:  1       * fluconazole 100 MG tablet   Commonly known as:  DIFLUCAN   Used for:  Status post bone marrow transplant (H)        Dose:  100 mg   Take 1 tablet (100 mg) by mouth daily   Quantity:  30 tablet   Refills:  1       * penicillin V potassium 250 MG tablet   Commonly known as:  VEETID   Used for:  Status post bone marrow transplant (H)        Dose:  250 mg   Take 1 tablet (250 mg) by mouth 2 times daily While on steroids   Quantity:  30 tablet   Refills:  1       * penicillin V potassium 250 MG tablet   Commonly known as:  VEETID   Used for:  Status post bone marrow transplant (H)        Dose:  250 mg   Take 1 tablet (250 mg) by mouth 2 times daily   Quantity:  60 tablet   Refills:  1       posaconazole 100 MG Tbec EC tablet   Commonly known as:  NOXAFIL   Used for:  Status post bone marrow transplant (H)        Dose:  300 mg   Take 3 tablets (300 mg) by mouth every morning   Quantity:  90 tablet   Refills:  0       * predniSONE 50 MG tablet   Commonly known as:  DELTASONE   Used for:  Status post bone marrow transplant (H)        Dose:  50 mg   Take 1 tablet (50 mg) by mouth every other day   Quantity:  15 tablet   Refills:  1       * predniSONE 20 MG tablet   Commonly known as:  DELTASONE   Used for:  Status post bone marrow transplant (H)        Take 2.5 tablets (50mg) daily for 7 days, then decrease to 2 tablets (40mg) daily for 7 days. Additional taper per BMT clinic.   Quantity:  60 tablet   Refills:  1       * sirolimus 1 MG tablet    Commonly known as:  GENERIC EQUIVALENT   Used for:  Status post bone marrow transplant (H)        Dose:  1 mg   Take 1 tablet (1 mg) by mouth daily . (Please take 2 mg on 3/8 only)   Quantity:  30 tablet   Refills:  1       * sirolimus 1 MG tablet   Commonly known as:  GENERIC EQUIVALENT   Used for:  Status post bone marrow transplant (H)        Take 2 tablets (2mg) by mouth 3/8 only. Then take 1 tablet (1mg) by mouth daily.   Quantity:  31 tablet   Refills:  1       sulfamethoxazole-trimethoprim 800-160 MG per tablet   Commonly known as:  BACTRIM DS/SEPTRA DS   Used for:  Status post bone marrow transplant (H)        Take one tab by mouth twice daily on Monday and Tuesdays only   Quantity:  16 tablet   Refills:  1       * Notice:  This list has 8 medication(s) that are the same as other medications prescribed for you. Read the directions carefully, and ask your doctor or other care provider to review them with you.         Where to get your medicines      These medications were sent to [id number] [place] [facility] [place] [place] [address] [address] [address]     Phone:  [phone]     fluconazole 100 MG tablet    penicillin V potassium 250 MG tablet    predniSONE 20 MG tablet    sirolimus 1 MG tablet    sulfamethoxazole-trimethoprim 800-160 MG per tablet         These medications were sent to [address] [address]    Hours:  TRANSPLANT PHONE NUMBER [phone] Phone:  [phone]     fluconazole 100 MG tablet    penicillin V potassium 250 MG tablet    posaconazole 100 MG Tbec EC tablet    predniSONE 50 MG tablet    sirolimus 1 MG tablet                Recent Review Flowsheet Data     BMT Recent Results Latest Ref Rng & Units 4/13/2017 6/8/2017 9/28/2017 12/14/2017 2/21/2018 2/27/2018 3/8/2018    WBC 4.0 - 11.0 10e9/L 5.9 4.3 5.6 5.6 7.9 8.4 7.6    Hemoglobin 13.3 - 17.7 g/dL  12.5(L) 13.9 15.1 16.4 14.6 15.8 15.9    Platelet Count 150 - 450 10e9/L 194 136(L) 153 162 222 228 210    Neutrophils (Absolute) 1.6 - 8.3 10e9/L 3.6 2.5 2.8 2.9 5.0 5.6 4.3    INR 0.86 - 1.14 - - - - - - -    Sodium 133 - 144 mmol/L 137 140 136 142 141 140 138    Potassium 3.4 - 5.3 mmol/L 3.9 4.0 3.6 4.1 3.8 4.1 3.6    Chloride 94 - 109 mmol/L 104 107 105 110(H) 110(H) 108 107    Glucose 70 - 99 mg/dL 100(H) 94 89 84 108(H) 99 120(H)    Urea Nitrogen 7 - 30 mg/dL 27 24 29 24 17 17 22    Creatinine 0.66 - 1.25 mg/dL 1.28(H) 1.15 1.44(H) 1.13 0.88 0.97 1.07    Calcium (Total) 8.5 - 10.1 mg/dL 9.2 8.7 8.6 8.5 8.8 9.0 8.7    Protein (Total) 6.8 - 8.8 g/dL 7.5 6.7(L) 7.5 7.7 6.8 7.6 7.3    Albumin 3.4 - 5.0 g/dL 3.5 2.9(L) 3.3(L) 3.2(L) 3.0(L) 3.1(L) 3.0(L)    Bilirubin (Direct) 0.0 - 0.2 mg/dL - - - - - - -    Alkaline Phosphatase 40 - 150 U/L 128 134 119 109 143 169(H) 159(H)    AST 0 - 45 U/L 40 44 39 42 74(H) 107(H) 82(H)    ALT 0 - 70 U/L 32 47 44 44 62 90(H) 75(H)    MCV 78 - 100 fl 82 84 84 88 89 90 89               Primary Care Provider Office Phone # Fax #    Vivek Callejas [phone]       [facility] [address] [place] [address]        Equal Access to Services     [facility] MARIANA : Hadii lisa Vanegas, waangeles guido, qacorinna hyde. So [place] [phone].    ATENCIÓN: Si habla español, tiene a del toro disposición servicios gratuitos de asistencia lingüística. Llame al [phone].    We comply with applicable federal civil rights laws and [place] laws. We do not discriminate on the basis of race, color, national origin, age, disability, sex, sexual orientation, or gender identity.            Thank you!     Thank you for choosing [facility] BLOOD AND MARROW TRANSPLANT  for your care. Our goal is always to provide you with excellent care. Hearing back from our patients is one way we can continue to improve our  services. Please take a few minutes to complete the written survey that you may receive in the mail after your visit with us. Thank you!             Your Updated Medication List - Protect others around you: Learn how to safely use, store and throw away your medicines at www.disposemymeds.org.          This list is accurate as of 3/8/18  4:07 PM.  Always use your most recent med list.                   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), MDS (myelodysplastic syndrome) (H)       alfuzosin 10 MG 24 hr tablet    UROXATRAL    30 tablet    Take 1 tablet (10 mg) by mouth daily    Benign non-nodular prostatic hyperplasia with lower urinary tract symptoms, Urinary frequency       amoxicillin 500 MG capsule    AMOXIL    4 capsule    Take 4 pills (2 grams) day of dental work    MDS (myelodysplastic syndrome) (H)       bumetanide 0.5 MG tablet    BUMEX    30 tablet    Take one tablet by mouth once or twice weekly    MDS (myelodysplastic syndrome) (H)       calcium carbonate-vitamin D 500-400 MG-UNIT Tabs per tablet     180 tablet    Take 1 tablet by mouth daily 2000 mg    MDS (myelodysplastic syndrome) (H), Acute chtem-tnhcpo-fonw disease (H), 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)       dexamethasone 0.1 MG/ML solution     600 mL    Swish and Spit 5-10 mL four times daily    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)       finasteride 5 MG tablet    PROSCAR    30 tablet    TAKE ONE TABLET BY MOUTH ONE TIME DAILY    MDS (myelodysplastic syndrome) (H), 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), Urinary frequency, Complications of bone marrow transplant (H)       * fluconazole 100 MG tablet    DIFLUCAN    30 tablet    Take 1 tablet (100 mg) by mouth daily    Status post bone marrow transplant (H)       * fluconazole 100 MG tablet    DIFLUCAN    30 tablet    Take 1 tablet (100 mg)  by mouth daily    Status post bone marrow transplant (H)       gabapentin 300 MG capsule    NEURONTIN          lidocaine visc 2% 2.5mL/5mL & maalox/mylanta w/ simeth 2.5mL/5mL & diphenhydrAMINE 5mg/5mL Susp suspension    [facility] Mouthwash [facility]    240 mL    Swish and swallow 10 mLs in mouth every 6 hours as needed for mouth sores    Complications of bone marrow transplant, unspecified complication (H)       order for DME     1 Device    Please provide a [employer] cane offset with strap item number [id number]    MDS (myelodysplastic syndrome) (H)       * penicillin V potassium 250 MG tablet    VEETID    30 tablet    Take 1 tablet (250 mg) by mouth 2 times daily While on steroids    Status post bone marrow transplant (H)       * penicillin V potassium 250 MG tablet    VEETID    60 tablet    Take 1 tablet (250 mg) by mouth 2 times daily    Status post bone marrow transplant (H)       posaconazole 100 MG Tbec EC tablet    NOXAFIL    90 tablet    Take 3 tablets (300 mg) by mouth every morning    Status post bone marrow transplant (H)       * predniSONE 50 MG tablet    DELTASONE    15 tablet    Take 1 tablet (50 mg) by mouth every other day    Status post bone marrow transplant (H)       * predniSONE 20 MG tablet    DELTASONE    60 tablet    Take 2.5 tablets (50mg) daily for 7 days, then decrease to 2 tablets (40mg) daily for 7 days. Additional taper per BMT clinic.    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 (Please take 2 mg on 3/8 only)    Status post bone marrow transplant (H)       * sirolimus 1 MG tablet    GENERIC EQUIVALENT    31 tablet    Take 2 tablets (2mg) by mouth 3/8 only. Then  "take 1 tablet (1mg) by mouth daily.    Status post bone marrow transplant (H)       sulfamethoxazole-trimethoprim 800-160 MG per tablet    BACTRIM DS/SEPTRA DS    16 tablet    Take one tab by mouth twice daily on Monday and Tuesdays only    Status post bone marrow transplant (H)       syringe/needle (disp) 23G X 1\" 3 ML Misc     3 each    Dispense 3ML syringes and 23Gx1\" needles for IM. Use 1 syringe every 21 days  to inject testosterone    Primary hypogonadism in male       testosterone cypionate 200 MG/ML injection    DEPOTESTOTERONE    1 mL    Inject 1 mL (200 mg) into the muscle every 21 days    Primary hypogonadism in male       * Notice:  This list has 8 medication(s) that are the same as other medications prescribed for you. Read the directions carefully, and ask your doctor or other care provider to review them with you.      "

## 2018-03-08 NOTE — NURSING NOTE
Chief Complaint   Patient presents with     Blood Draw     Labs drawn via  by RN. VS taken, hypotensive - ortostatic done. Hand off given to DON Navarro who brought patient directly to clinic.     Anel Isabel RN

## 2018-03-08 NOTE — PROGRESS NOTES
BMT Daily Progress Note     ID/CC:  Mr. 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 Loss visit regarding dry mouth and painful swallowing.     HPI: Deejay presents for follow up with his wife. Continues to feel very fatigued and has been light headed with standing and movement x1 week, of note he presents slightly hypotensive at 88/60. No localizing symptoms, fevers, or chills. Has been sipping on water throughout the day but does not drink many glasses of water. Dry mouth continues despite Dex swish and spit.  Appetite continues to be minimal and fatigue is quite bothersome. No n/v/d, bleeding, or rashes.   Review of Systems: 10 point ROS negative except as noted above.    Physical Exam:  BP 94/52 (BP Location: Right arm, Patient Position: Standing, Cuff Size: Adult Regular)  Pulse 98  Temp 97.5  F (36.4  C) (Oral)  Resp 18  Wt 91.8 kg (202 lb 6.4 oz)  SpO2 94%  BMI 30.68 kg/m2   Wt Readings from Last 4 Encounters:   03/08/18 91.8 kg (202 lb 6.4 oz)   02/27/18 98.4 kg (217 lb)   02/21/18 98.6 kg (217 lb 6.4 oz)   12/14/17 103 kg (227 lb)     General: NAD  Eyes: SARIKA, sclera anicteric  Nose/Mouth/Throat: OP dry, lichenoid changes on both buccal mucosa. Erythematous posterior OP. Overall improved. Posterior papillae appear enlarged.  Heart: RRR without murmurs, rubs, or gallops  Lungs: CTA bilaterally.    Skin:   Generalized hyperpigmentation.  Multiple ecchymoses & skin tears. Right lower extremity with ongoing erythema, scaling, and skin tightening.   Neuro: A&O  Line: NONE    Labs:  Lab Results   Component Value Date    WBC 7.6 03/08/2018    ANEU 4.3 03/08/2018    HGB 15.9 03/08/2018    HCT 49.3 03/08/2018     03/08/2018     03/08/2018    POTASSIUM 3.6 03/08/2018    CHLORIDE 107 03/08/2018    CO2 22 03/08/2018     (H) 03/08/2018    BUN 22 03/08/2018    CR 1.07 03/08/2018    MAG 2.0 12/14/2017     INR 0.95 06/30/2016       ASSESSMENT AND PLAN:  Mr. 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 Total cell dose (from 7/1 & 7/2) 6.53 x 10^6 CD34+ cells/kg.   - 1 year anniversary (July 2015): 30% cellular, trilineage hematopoiesis, no abnormal blasts by morphology or flow , no dysplasia, 0-1 fibrosis, 100% donor (BM, CD3, CD15). CR  - 2 year anniversary (July 2016): 20-30% cellular, trilineage hematopoiesis, no abnormal blasts by morphology or flow , no dysplasia, NO fibrosis, 100% donor in BM (PB not sent). ISCN:  //46,XX[20] Complete Remission.    HEME:  Stable 3/8. No transfusion needs.     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 Has been off prednisone since summer 2017 and recently tapered off Sirolimus (january 2018).   cgvhd flare (started 2/19/18): Ongoing wt loss, dry mouth, fatigue, and possible skin tightening of right LE (gvhd vs. Resolving cellulitis)- Per discussion with Dr. Pinto- restart treatment as follows:  - sirolimus with 2 mg loading dose followed by 1mg/day.   - Prednisone 50mg daily x 7 days followed by 40mg x7d, 20mg x7d, 10mg x7d and then slow the taper.  - Continue Dex swish and spit.     ID:   Afebrile.   - Hypotension 3/8: Blood cultures x2. No localizing symptoms of infection. Likely dehydration (see below)  - Cellulitis: had episode of cellulitis following a fall on the ice in January 2018 that provoked swelling in his right LE. S/p antibiotics. Ongoing hyper pigmentation, scaling, and skin tightening- question possible gvhd?    - IgG = 403, repleted 3/22/16, 5/8/16= 1270   - Prophy:  HD ACV (renal dosing), restart Pen VK (steroids), Fluconazole, and SS daily Bactrim 3/8.   - Repeat viral studies performed 3/8 due to LFT abnormalities: Adneo, CMV, and hepatitis labs pending.       6. FEN/Renal:   Cr and lytes WNL  - Wt. Loss likely secondary to cgvhd (see above)   - Dehydration: 1L IVF for hypotension. Orthostatic BP improved following infusion.     7. Fatigue: Continues to have ongoing fatigue, likely gvhd vs. adrneal insufficiency. TSH normal 2/28.     Plan: Start on sirolimus, prednisone, fluconazole, pen VK, and bactrim as above. 1L IVF in infusion.     RTC: 3/15 with labs and CAROLINA   3/29 with Dr. Pinto.       Jamey Mckenzie PA-C  [phone]

## 2018-03-08 NOTE — NURSING NOTE
"Oncology Rooming Note    March 8, 2018 2:04 PM   Deejay Dior is a 67 year old male who presents for:    Chief Complaint   Patient presents with     Blood Draw     Labs drawn via  by RN. VS taken, hypotensive - ortostatic done. Hand off given to DON Navarro who brought patient directly to clinic.     RECHECK     post bmt for MDS here for labs and md visit     Initial Vitals: BP (!) 88/60 (BP Location: Right arm)  Pulse 98  Temp 97.5  F (36.4  C) (Oral)  Resp 18  Wt 91.8 kg (202 lb 6.4 oz)  SpO2 94%  BMI 30.68 kg/m2 Estimated body mass index is 30.68 kg/(m^2) as calculated from the following:    Height as of 2/27/18: 1.73 m (5' 8.11\").    Weight as of this encounter: 91.8 kg (202 lb 6.4 oz). Body surface area is 2.1 meters squared.  No Pain (0) Comment: Data Unavailable   No LMP for male patient.  Allergies reviewed: Yes  Medications reviewed: Yes    Medications:   Pharmacy name entered into [employer]:    [id number] - [place], [place] - [address] [facility] [address] [id number] - [place], [place] - [address] [place] [address]     Clinical concerns: pt reports 20 lb weight loss in last 2 weeks, feels like mouth GVHD is back, pt is also orthostatic, md aware     10 minutes for nursing intake (face to face time)     Smaina Herrera RN              "

## 2018-03-08 NOTE — PROGRESS NOTES
Infusion Nursing Note:  Deejay Dior presents today for IV fluids and blood cultures.    Patient seen by provider today: Yes: Jamey Mckenzie   present during visit today: Not Applicable.    Note: Pt was brought to infusion after seeing Jamey Mckenzie in clinic. Blood cultures were drawn in each arm. Peripheral IV was started by RN and 1 L of normal saline was given to the patient. Orthostatic blood pressures were checked again: sittin/65 hr: 67 and standin/72 HR: 73. Pt reported feeling much better and not light headed like before. Results were discussed with Jamey Mckenzie and she said he was okay to leave. Patient verbalized understanding and agreed with plan.     Intravenous Access:  Peripheral IV placed.    Treatment Conditions:  Lab Results   Component Value Date    HGB 15.9 2018     Lab Results   Component Value Date    WBC 7.6 2018      Lab Results   Component Value Date    ANEU 4.3 2018     Lab Results   Component Value Date     2018      Lab Results   Component Value Date     2018                   Lab Results   Component Value Date    POTASSIUM 3.6 2018           Lab Results   Component Value Date    MAG 2.0 2017            Lab Results   Component Value Date    CR 1.07 2018                   Lab Results   Component Value Date    JOE 8.7 2018                Lab Results   Component Value Date    BILITOTAL 0.4 2018           Lab Results   Component Value Date    ALBUMIN 3.0 2018                    Lab Results   Component Value Date    ALT 75 2018           Lab Results   Component Value Date    AST 82 2018           Post Infusion Assessment:  Patient tolerated infusion without incident.  Access discontinued per protocol.    Discharge Plan:   Patient discharged in stable condition accompanied by: wife.  Departure Mode: Ambulatory.    JAYY TELLEZ RN

## 2018-03-08 NOTE — MR AVS SNAPSHOT
After Visit Summary   3/8/2018    Deejay Dior    MRN: [id number]           Patient Information     Date Of Birth          1950        Visit Information        Provider Department      3/8/2018 2:00 PM UC BMT [facility] #4 [facility] Blood and Marrow Transplant        Today's Diagnoses     Status post bone marrow transplant (H)              [facility] ([facility])  [address] [place] [address]  Phone: [phone]  Clinic Hours:   Monday-Thursday:7am to 7pm   Friday: 7am to 5pm   Weekends and holidays:    8am to noon (in general)  If your fever is 100.5  or greater,   call the clinic.  After hours call the   hospital at [phone] or   [phone]. Ask for the BMT   fellow on-call            Follow-ups after your visit        Your next 10 appointments already scheduled     Mar 15, 2018  1:15 PM CDT   [facility] Draw with  [facility]RAW   [facility] Draw ([facility])    [address] [place] [address]  [address]  [facility] [id number] [phone]            Mar 15, 2018  2:00 PM CDT   Return with  BMT [facility] #4   [facility] Blood and Marrow Transplant ([facility])    [address] [place] [address]  [address]  [facility] [id number] [phone]            Mar 29, 2018  1:00 PM CDT   [facility] Draw with  [facility] DRAW   [facility] Draw ([facility])    [address] [place] [address]  [address]  [facility] [id number] [phone]            Mar 29, 2018  1:30 PM CDT   Return with Aby Pinto MD   [facility] Blood and Marrow Transplant ([facility])    [address] [place] [address]  [address]  [facility] [id number] [phone]            Apr 10, 2018  2:00 PM CDT   (Arrive by 1:45 PM)   RETURN ENDOCRINE with Rd Chairez MD   [facility] Endocrinology ([facility])    [address] [place] [address]  3rd Floor  [facility] [address]    [phone]              Future tests that were ordered for you today     Open Future Orders        Priority Expected Expires Ordered    Sirolimus level Routine 3/15/2018 3/15/2018 3/8/2018    CBC with platelets differential Routine 3/15/2018 3/15/2018 3/8/2018    Comprehensive metabolic panel Routine 3/15/2018 3/15/2018 3/8/2018            Who to contact     If you have questions or need follow up information about today's clinic visit or your schedule please contact [facility] BLOOD AND MARROW TRANSPLANT directly at [phone].  Normal or non-critical lab and imaging results will be communicated to you by [employer], letter or phone within 4 business days after the clinic has received the results. If you do not hear from us within 7 days, please contact the clinic through [employer] or phone. If you have a critical or abnormal lab result, we will notify you by phone as soon as possible.  Submit refill requests through [employer] or call your pharmacy and they will forward the refill request to us. Please allow 3 business days for your refill to be completed.          Additional Information About Your Visit        [employer] Information     [employer] gives you secure access to your electronic health record. If you see a primary care provider, you can also send messages to your care team and make appointments. If you have questions, please call your primary care clinic.  If you do not have a primary care provider, please call [phone] and they will assist you.        Care EveryWhere ID     This is your Care EveryWhere ID. This could be used by other organizations to access your [facility] medical records  [id number]        Your Vitals Were     Pulse Temperature Respirations Pulse Oximetry BMI (Body Mass Index)       98 97.5  F (36.4  C) (Oral) 18 94% 30.68 kg/m2        Blood Pressure from Last 3 Encounters:   03/08/18 94/52   02/27/18 115/73   02/21/18 106/68    Weight from Last 3 Encounters:   03/08/18 91.8 kg (202 lb 6.4  oz)   02/27/18 98.4 kg (217 lb)   02/21/18 98.6 kg (217 lb 6.4 oz)              We Performed the Following     Adenovirus DNA QT PCR     Blood culture     CBC with platelets differential     CMV DNA quantification     Comprehensive metabolic panel     Hepatitis A antibody IgM     Hepatitis B core antibody     Hepatitis B Surface Antibody     Hepatitis B surface antigen     Hepatitis C antibody          Today's Medication Changes          These changes are accurate as of 3/8/18  4:00 PM.  If you have any questions, ask your nurse or doctor.               Start taking these medicines.        Dose/Directions    * fluconazole 100 MG tablet   Commonly known as:  DIFLUCAN   Used for:  Status post bone marrow transplant (H)        Dose:  100 mg   Take 1 tablet (100 mg) by mouth daily   Quantity:  30 tablet   Refills:  1       * fluconazole 100 MG tablet   Commonly known as:  DIFLUCAN   Used for:  Status post bone marrow transplant (H)        Dose:  100 mg   Take 1 tablet (100 mg) by mouth daily   Quantity:  30 tablet   Refills:  1       * penicillin V potassium 250 MG tablet   Commonly known as:  VEETID   Used for:  Status post bone marrow transplant (H)        Dose:  250 mg   Take 1 tablet (250 mg) by mouth 2 times daily While on steroids   Quantity:  30 tablet   Refills:  1       * penicillin V potassium 250 MG tablet   Commonly known as:  VEETID   Used for:  Status post bone marrow transplant (H)        Dose:  250 mg   Take 1 tablet (250 mg) by mouth 2 times daily   Quantity:  60 tablet   Refills:  1       posaconazole 100 MG Tbec EC tablet   Commonly known as:  NOXAFIL   Used for:  Status post bone marrow transplant (H)        Dose:  300 mg   Take 3 tablets (300 mg) by mouth every morning   Quantity:  90 tablet   Refills:  0       * predniSONE 50 MG tablet   Commonly known as:  DELTASONE   Used for:  Status post bone marrow transplant (H)        Dose:  50 mg   Take 1 tablet (50 mg) by mouth every other day   Quantity:   15 tablet   Refills:  1       * predniSONE 20 MG tablet   Commonly known as:  DELTASONE   Used for:  Status post bone marrow transplant (H)        Take 2.5 tablets (50mg) daily for 7 days, then decrease to 2 tablets (40mg) daily for 7 days. Additional taper per BMT clinic.   Quantity:  60 tablet   Refills:  1       * sirolimus 1 MG tablet   Commonly known as:  GENERIC EQUIVALENT   Used for:  Status post bone marrow transplant (H)        Dose:  1 mg   Take 1 tablet (1 mg) by mouth daily . (Please take 2 mg on 3/8 only)   Quantity:  30 tablet   Refills:  1       * sirolimus 1 MG tablet   Commonly known as:  GENERIC EQUIVALENT   Used for:  Status post bone marrow transplant (H)        Take 2 tablets (2mg) by mouth 3/8 only. Then take 1 tablet (1mg) by mouth daily.   Quantity:  31 tablet   Refills:  1       sulfamethoxazole-trimethoprim 800-160 MG per tablet   Commonly known as:  BACTRIM DS/SEPTRA DS   Used for:  Status post bone marrow transplant (H)        Take one tab by mouth twice daily on Monday and Tuesdays only   Quantity:  16 tablet   Refills:  1       * Notice:  This list has 8 medication(s) that are the same as other medications prescribed for you. Read the directions carefully, and ask your doctor or other care provider to review them with you.         Where to get your medicines      These medications were sent to [id number] [address] [address] [address]     Phone:  [phone]     fluconazole 100 MG tablet    penicillin V potassium 250 MG tablet    predniSONE 20 MG tablet    sirolimus 1 MG tablet    sulfamethoxazole-trimethoprim 800-160 MG per tablet         These medications were sent to [place], [place] - [address] [facility] [address] [facility] [address] [place] [address]    Hours:  TRANSPLANT PHONE NUMBER [phone] Phone:  [phone]     fluconazole 100 MG tablet    penicillin V potassium 250 MG  tablet    posaconazole 100 MG Tbec EC tablet    predniSONE 50 MG tablet    sirolimus 1 MG tablet                Recent Review Flowsheet Data     BMT Recent Results Latest Ref Rng & Units 4/13/2017 6/8/2017 9/28/2017 12/14/2017 2/21/2018 2/27/2018 3/8/2018    WBC 4.0 - 11.0 10e9/L 5.9 4.3 5.6 5.6 7.9 8.4 7.6    Hemoglobin 13.3 - 17.7 g/dL 12.5(L) 13.9 15.1 16.4 14.6 15.8 15.9    Platelet Count 150 - 450 10e9/L 194 136(L) 153 162 222 228 210    Neutrophils (Absolute) 1.6 - 8.3 10e9/L 3.6 2.5 2.8 2.9 5.0 5.6 4.3    INR 0.86 - 1.14 - - - - - - -    Sodium 133 - 144 mmol/L 137 140 136 142 141 140 138    Potassium 3.4 - 5.3 mmol/L 3.9 4.0 3.6 4.1 3.8 4.1 3.6    Chloride 94 - 109 mmol/L 104 107 105 110(H) 110(H) 108 107    Glucose 70 - 99 mg/dL 100(H) 94 89 84 108(H) 99 120(H)    Urea Nitrogen 7 - 30 mg/dL 27 24 29 24 17 17 22    Creatinine 0.66 - 1.25 mg/dL 1.28(H) 1.15 1.44(H) 1.13 0.88 0.97 1.07    Calcium (Total) 8.5 - 10.1 mg/dL 9.2 8.7 8.6 8.5 8.8 9.0 8.7    Protein (Total) 6.8 - 8.8 g/dL 7.5 6.7(L) 7.5 7.7 6.8 7.6 7.3    Albumin 3.4 - 5.0 g/dL 3.5 2.9(L) 3.3(L) 3.2(L) 3.0(L) 3.1(L) 3.0(L)    Bilirubin (Direct) 0.0 - 0.2 mg/dL - - - - - - -    Alkaline Phosphatase 40 - 150 U/L 128 134 119 109 143 169(H) 159(H)    AST 0 - 45 U/L 40 44 39 42 74(H) 107(H) 82(H)    ALT 0 - 70 U/L 32 47 44 44 62 90(H) 75(H)    MCV 78 - 100 fl 82 84 84 88 89 90 89               Primary Care Provider Office Phone # Fax #    Vivek Rafael Callejas [phone]       [facility] [address] [facility] [address]        Equal Access to Services     RABIA PEÑA : Hadii lisa urbano Somorris, waaxda luqadaha, qaybta kaalmada adeegyaclint, corinna barlwo . So [facility] [phone].    ATENCIÓN: Si habla español, tiene a del toro disposición servicios gratuitos de asistencia lingüística. Reddy al [phone].    We comply with applicable federal civil rights laws and [place] laws. We do not discriminate  on the basis of race, color, national origin, age, disability, sex, sexual orientation, or gender identity.            Thank you!     Thank you for choosing [facility]  for your care. Our goal is always to provide you with excellent care. Hearing back from our patients is one way we can continue to improve our services. Please take a few minutes to complete the written survey that you may receive in the mail after your visit with us. Thank you!             Your Updated Medication List - Protect others around you: Learn how to safely use, store and throw away your medicines at www.disposemymeds.org.          This list is accurate as of 3/8/18  4:00 PM.  Always use your most recent med list.                   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), MDS (myelodysplastic syndrome) (H)       alfuzosin 10 MG 24 hr tablet    UROXATRAL    30 tablet    Take 1 tablet (10 mg) by mouth daily    Benign non-nodular prostatic hyperplasia with lower urinary tract symptoms, Urinary frequency       amoxicillin 500 MG capsule    AMOXIL    4 capsule    Take 4 pills (2 grams) day of dental work    MDS (myelodysplastic syndrome) (H)       bumetanide 0.5 MG tablet    BUMEX    30 tablet    Take one tablet by mouth once or twice weekly    MDS (myelodysplastic syndrome) (H)       calcium carbonate-vitamin D 500-400 MG-UNIT Tabs per tablet     180 tablet    Take 1 tablet by mouth daily 2000 mg    MDS (myelodysplastic syndrome) (H), Acute fhvgz-erechf-vrme disease (H), 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)       dexamethasone 0.1 MG/ML solution     600 mL    Swish and Spit 5-10 mL four times daily    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)       finasteride 5 MG tablet    PROSCAR    30 tablet    TAKE ONE TABLET BY MOUTH ONE TIME DAILY    MDS (myelodysplastic  syndrome) (H), 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), Urinary frequency, Complications of bone marrow transplant (H)       * fluconazole 100 MG tablet    DIFLUCAN    30 tablet    Take 1 tablet (100 mg) by mouth daily    Status post bone marrow transplant (H)       * fluconazole 100 MG tablet    DIFLUCAN    30 tablet    Take 1 tablet (100 mg) by mouth daily    Status post bone marrow transplant (H)       gabapentin 300 MG capsule    NEURONTIN          lidocaine visc 2% 2.5mL/5mL & maalox/mylanta w/ simeth 2.5mL/5mL & diphenhydrAMINE 5mg/5mL Susp suspension    [facility] Mouthwash [facility]    240 mL    Swish and swallow 10 mLs in mouth every 6 hours as needed for mouth sores    Complications of bone marrow transplant, unspecified complication (H)       order for DME     1 Device    Please provide a [employer] cane offset with strap item number [id number]    MDS (myelodysplastic syndrome) (H)       * penicillin V potassium 250 MG tablet    VEETID    30 tablet    Take 1 tablet (250 mg) by mouth 2 times daily While on steroids    Status post bone marrow transplant (H)       * penicillin V potassium 250 MG tablet    VEETID    60 tablet    Take 1 tablet (250 mg) by mouth 2 times daily    Status post bone marrow transplant (H)       posaconazole 100 MG Tbec EC tablet    NOXAFIL    90 tablet    Take 3 tablets (300 mg) by mouth every morning    Status post bone marrow transplant (H)       * predniSONE 50 MG tablet    DELTASONE    15 tablet    Take 1 tablet (50 mg) by mouth every other day    Status post bone marrow transplant (H)       * predniSONE 20 MG tablet    DELTASONE    60 tablet    Take 2.5 tablets (50mg) daily for 7 days, then decrease to 2 tablets (40mg) daily for 7 days. Additional taper per BMT clinic.    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 (Please take 2 mg on 3/8 only)    Status post bone marrow transplant (H)       * sirolimus 1 MG tablet    GENERIC EQUIVALENT    31 tablet    Take 2 tablets (2mg) by mouth 3/8 only. Then take 1 tablet (1mg) by mouth daily.    Status post bone marrow transplant (H)       sulfamethoxazole-trimethoprim 800-160 MG per tablet    BACTRIM DS/SEPTRA DS    16 tablet    Take one tab by mouth twice daily on Monday and Tuesdays only    Status post bone marrow transplant (H)       syringe/needle (disp) 23G X 1\" 3 ML Misc     3 each    Dispense 3ML syringes and 23Gx1\" needles for IM. Use 1 syringe every 21 days  to inject testosterone    Primary hypogonadism in male       testosterone cypionate 200 MG/ML injection    DEPOTESTOTERONE    1 mL    Inject 1 mL (200 mg) into the muscle every 21 days    Primary hypogonadism in male       * Notice:  This list has 8 medication(s) that are the same as other medications prescribed for you. Read the directions carefully, and ask your doctor or other care provider to review them with you.      "

## 2018-03-09 LAB
CMV DNA SPEC NAA+PROBE-ACNC: NORMAL [IU]/ML
CMV DNA SPEC NAA+PROBE-LOG#: NORMAL {LOG_IU}/ML
HADV DNA # SPEC NAA+PROBE: NORMAL COPIES/ML
HADV DNA SPEC NAA+PROBE-LOG#: NORMAL LOG COPIES/ML
HAV IGM SERPL QL IA: NONREACTIVE
HBV CORE AB SERPL QL IA: NONREACTIVE
HBV SURFACE AB SERPL IA-ACNC: 3.57 M[IU]/ML
HBV SURFACE AG SERPL QL IA: NONREACTIVE
HCV AB SERPL QL IA: NONREACTIVE
SPECIMEN SOURCE: NORMAL
SPECIMEN SOURCE: NORMAL

## 2018-03-14 LAB
BACTERIA SPEC CULT: NO GROWTH
BACTERIA SPEC CULT: NO GROWTH
SPECIMEN SOURCE: NORMAL
SPECIMEN SOURCE: NORMAL

## 2018-03-15 ENCOUNTER — ONCOLOGY VISIT (OUTPATIENT)
Dept: TRANSPLANT | Facility: CLINIC | Age: 68
End: 2018-03-15
Attending: PHYSICIAN ASSISTANT
Payer: MEDICARE

## 2018-03-15 VITALS
HEIGHT: 68 IN | WEIGHT: 209.4 LBS | OXYGEN SATURATION: 96 % | RESPIRATION RATE: 18 BRPM | BODY MASS INDEX: 31.74 KG/M2 | TEMPERATURE: 98.6 F | SYSTOLIC BLOOD PRESSURE: 123 MMHG | DIASTOLIC BLOOD PRESSURE: 69 MMHG | HEART RATE: 79 BPM

## 2018-03-15 DIAGNOSIS — T86.09 GVHD AS COMPLICATION OF BONE MARROW TRANSPLANT (H): ICD-10-CM

## 2018-03-15 DIAGNOSIS — D89.813 GVHD AS COMPLICATION OF BONE MARROW TRANSPLANT (H): ICD-10-CM

## 2018-03-15 DIAGNOSIS — Z94.81 STATUS POST BONE MARROW TRANSPLANT (H): ICD-10-CM

## 2018-03-15 LAB
ALBUMIN SERPL-MCNC: 2.7 G/DL (ref 3.4–5)
ALP SERPL-CCNC: 111 U/L (ref 40–150)
ALT SERPL W P-5'-P-CCNC: 46 U/L (ref 0–70)
ANION GAP SERPL CALCULATED.3IONS-SCNC: 5 MMOL/L (ref 3–14)
AST SERPL W P-5'-P-CCNC: 29 U/L (ref 0–45)
BASOPHILS # BLD AUTO: 0 10E9/L (ref 0–0.2)
BASOPHILS NFR BLD AUTO: 0 %
BILIRUB SERPL-MCNC: 0.2 MG/DL (ref 0.2–1.3)
BUN SERPL-MCNC: 25 MG/DL (ref 7–30)
CALCIUM SERPL-MCNC: 8.7 MG/DL (ref 8.5–10.1)
CHLORIDE SERPL-SCNC: 108 MMOL/L (ref 94–109)
CO2 SERPL-SCNC: 26 MMOL/L (ref 20–32)
CREAT SERPL-MCNC: 0.88 MG/DL (ref 0.66–1.25)
DIFFERENTIAL METHOD BLD: ABNORMAL
EOSINOPHIL # BLD AUTO: 0.2 10E9/L (ref 0–0.7)
EOSINOPHIL NFR BLD AUTO: 2.1 %
ERYTHROCYTE [DISTWIDTH] IN BLOOD BY AUTOMATED COUNT: 16.2 % (ref 10–15)
GFR SERPL CREATININE-BSD FRML MDRD: 86 ML/MIN/1.7M2
GLUCOSE SERPL-MCNC: 109 MG/DL (ref 70–99)
HCT VFR BLD AUTO: 48.4 % (ref 40–53)
HGB BLD-MCNC: 15.6 G/DL (ref 13.3–17.7)
IMM GRANULOCYTES # BLD: 0 10E9/L (ref 0–0.4)
IMM GRANULOCYTES NFR BLD: 0.2 %
LYMPHOCYTES # BLD AUTO: 1 10E9/L (ref 0.8–5.3)
LYMPHOCYTES NFR BLD AUTO: 10.7 %
MCH RBC QN AUTO: 28.8 PG (ref 26.5–33)
MCHC RBC AUTO-ENTMCNC: 32.2 G/DL (ref 31.5–36.5)
MCV RBC AUTO: 89 FL (ref 78–100)
MONOCYTES # BLD AUTO: 0.5 10E9/L (ref 0–1.3)
MONOCYTES NFR BLD AUTO: 5.1 %
NEUTROPHILS # BLD AUTO: 7.7 10E9/L (ref 1.6–8.3)
NEUTROPHILS NFR BLD AUTO: 81.9 %
NRBC # BLD AUTO: 0 10*3/UL
NRBC BLD AUTO-RTO: 0 /100
PLATELET # BLD AUTO: 205 10E9/L (ref 150–450)
POTASSIUM SERPL-SCNC: 4.2 MMOL/L (ref 3.4–5.3)
PROT SERPL-MCNC: 6.7 G/DL (ref 6.8–8.8)
RBC # BLD AUTO: 5.42 10E12/L (ref 4.4–5.9)
SODIUM SERPL-SCNC: 139 MMOL/L (ref 133–144)
WBC # BLD AUTO: 9.4 10E9/L (ref 4–11)

## 2018-03-15 PROCEDURE — 80195 ASSAY OF SIROLIMUS: CPT | Performed by: PHYSICIAN ASSISTANT

## 2018-03-15 PROCEDURE — 85025 COMPLETE CBC W/AUTO DIFF WBC: CPT | Performed by: PHYSICIAN ASSISTANT

## 2018-03-15 PROCEDURE — 80053 COMPREHEN METABOLIC PANEL: CPT | Performed by: PHYSICIAN ASSISTANT

## 2018-03-15 PROCEDURE — G0463 HOSPITAL OUTPT CLINIC VISIT: HCPCS | Mod: ZF

## 2018-03-15 PROCEDURE — 36415 COLL VENOUS BLD VENIPUNCTURE: CPT

## 2018-03-15 RX ORDER — DEXAMETHASONE 0.5 MG/5ML
SOLUTION ORAL
Qty: 600 ML | Refills: 0 | Status: SHIPPED | OUTPATIENT
Start: 2018-03-15 | End: 2018-03-29

## 2018-03-15 RX ORDER — PANTOPRAZOLE SODIUM 20 MG/1
20 TABLET, DELAYED RELEASE ORAL DAILY
Qty: 30 TABLET | Refills: 1 | Status: SHIPPED | OUTPATIENT
Start: 2018-03-15 | End: 2018-08-16

## 2018-03-15 ASSESSMENT — PAIN SCALES - GENERAL: PAINLEVEL: NO PAIN (0)

## 2018-03-15 NOTE — MR AVS SNAPSHOT
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  After hours call the   hospital at [phone] or   [phone]. Ask for the BMT   fellow on-call            Follow-ups after your visit        Your next 10 appointments already scheduled     Mar 29, 2018  1:00 PM CDT   [facility] with  [facility]   [facility] ([facility])    [address] [place] [address]  [address] [place] [id number] [phone]            Mar 29, 2018  1:30 PM CDT   Return with Aby Pinto MD   [facility] Blood and Marrow Transplant ([facility])    [address] [place] [id number] [phone]            Apr 10, 2018  2:00 PM CDT   (Arrive by 1:45 PM)   RETURN ENDOCRINE with Rd Chairez MD   [facility] Endocrinology ([facility])    [address] [place] [address]  3rd [facility] [id number] [phone]              Future tests that were ordered for you today     Open Future Orders        Priority Expected Expires Ordered    CBC with platelets differential Routine 3/29/2018 4/5/2018 3/15/2018    Comprehensive metabolic panel Routine 3/29/2018 4/5/2018 3/15/2018    Sirolimus level Routine 3/29/2018 4/5/2018 3/15/2018            Who to contact     If you have questions or need  "follow up information about today's clinic visit or your schedule please contact [facility] directly at [phone].  Normal or non-critical lab and imaging results will be communicated to you by [employer], letter or phone within 4 business days after the clinic has received the results. If you do not hear from us within 7 days, please contact the clinic through [employer] or phone. If you have a critical or abnormal lab result, we will notify you by phone as soon as possible.  Submit refill requests through [email or web address] or call your pharmacy and they will forward the refill request to us. Please allow 3 business days for your refill to be completed.          Additional Information About Your Visit        [email or web address] Information     [email or web address] gives you secure access to your electronic health record. If you see a primary care provider, you can also send messages to your care team and make appointments. If you have questions, please call your primary care clinic.  If you do not have a primary care provider, please call [phone] and they will assist you.        Care EveryWhere ID     This is your Care EveryWhere ID. This could be used by other organizations to access your [place] medical records  [id number]        Your Vitals Were     Pulse Temperature Respirations Height Pulse Oximetry BMI (Body Mass Index)    79 98.6  F (37  C) (Oral) 18 1.73 m (5' 8.11\") 96% 31.74 kg/m2       Blood Pressure from Last 3 Encounters:   03/15/18 123/69   03/08/18 109/72   03/08/18 94/52    Weight from Last 3 Encounters:   03/15/18 95 kg (209 lb 6.4 oz)   03/08/18 91.8 kg (202 lb 6.4 oz)   02/27/18 98.4 kg (217 lb)              We Performed the Following     CBC with platelets differential     Comprehensive metabolic panel     Sirolimus level          Today's Medication Changes          These changes are accurate as of 3/15/18  2:40 PM.  If you have any questions, ask your nurse or doctor.               These medicines " have changed or have updated prescriptions.        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), Status post bone marrow transplant (H)        Dose:  20 mg   Take 1 tablet (20 mg) by mouth daily   Quantity:  30 tablet   Refills:  1            Where to get your medicines      These medications were sent to [id number] - [place], [place] - [address] [address]  [address] [address] [place] [address]     Phone:  [phone]     dexamethasone 0.1 MG/ML solution    pantoprazole 20 MG EC tablet                Recent Review Flowsheet Data     BMT Recent Results Latest Ref Rng & Units 6/8/2017 9/28/2017 12/14/2017 2/21/2018 2/27/2018 3/8/2018 3/15/2018    WBC 4.0 - 11.0 10e9/L 4.3 5.6 5.6 7.9 8.4 7.6 9.4    Hemoglobin 13.3 - 17.7 g/dL 13.9 15.1 16.4 14.6 15.8 15.9 15.6    Platelet Count 150 - 450 10e9/L 136(L) 153 162 222 228 210 205    Neutrophils (Absolute) 1.6 - 8.3 10e9/L 2.5 2.8 2.9 5.0 5.6 4.3 7.7    INR 0.86 - 1.14 - - - - - - -    Sodium 133 - 144 mmol/L 140 136 142 141 140 138 139    Potassium 3.4 - 5.3 mmol/L 4.0 3.6 4.1 3.8 4.1 3.6 4.2    Chloride 94 - 109 mmol/L 107 105 110(H) 110(H) 108 107 108    Glucose 70 - 99 mg/dL 94 89 84 108(H) 99 120(H) 109(H)    Urea Nitrogen 7 - 30 mg/dL 24 29 24 17 17 22 25    Creatinine 0.66 - 1.25 mg/dL 1.15 1.44(H) 1.13 0.88 0.97 1.07 0.88    Calcium (Total) 8.5 - 10.1 mg/dL 8.7 8.6 8.5 8.8 9.0 8.7 8.7    Protein (Total) 6.8 - 8.8 g/dL 6.7(L) 7.5 7.7 6.8 7.6 7.3 6.7(L)    Albumin 3.4 - 5.0 g/dL 2.9(L) 3.3(L) 3.2(L) 3.0(L) 3.1(L) 3.0(L) 2.7(L)    Bilirubin (Direct) 0.0 - 0.2 mg/dL - - - - - - -    Alkaline Phosphatase 40 - 150 U/L 134 119 109 143 169(H) 159(H) 111    AST 0 - 45 U/L 44 39 42 74(H) 107(H) 82(H) 29    ALT 0 - 70 U/L 47 44 44 62 90(H) 75(H) 46    MCV 78 - 100 fl 84 84 88 89 90 89 89                Primary Care Provider Office Phone # Fax #    Vivek Rafael Callejas [phone]       [facility] [address] [place] [address]        Equal Access to Services     RABIA PEÑA : Edil lisa kincaid wilmaro Sozachali, waaxda luqadaha, qaybta kaalmada adeegyada, corinna lock laLunachris adamson. So [facility] [phone].    ATENCIÓN: Si habla español, tiene a del toro disposición servicios gratuitos de asistencia lingüística. Llame al [phone].    We comply with applicable federal civil rights laws and [place] laws. We do not discriminate on the basis of race, color, national origin, age, disability, sex, sexual orientation, or gender identity.            Thank you!     Thank you for choosing [facility] BLOOD [facility]ARROW TRANSPL[facility]  for your care. Our goal is always to provide you with excellent care. Hearing back from our patients is one way we can continue to improve our services. Please take a few minutes to complete the written survey that you may receive in the mail after your visit with us. Thank you!             Your Updated Medication List - Protect others around you: Learn how to safely use, store and throw away your medicines at www.disposemymeds.org.          This list is accurate as of 3/15/18  2:40 PM.  Always use your most recent med list.                   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), MDS (myelodysplastic syndrome) (H)       alfuzosin 10 MG 24 hr tablet    UROXATRAL    30 tablet    Take 1 tablet (10 mg) by mouth daily    Benign non-nodular prostatic hyperplasia with lower urinary tract symptoms, Urinary frequency       amoxicillin 500 MG capsule    AMOXIL    4 capsule    Take 4 pills (2 grams) day of dental work    MDS (myelodysplastic syndrome) (H)       bumetanide 0.5 MG tablet    BUMEX    30 tablet    Take one tablet by  mouth once or twice weekly    MDS (myelodysplastic syndrome) (H)       calcium carbonate-vitamin D 500-400 MG-UNIT Tabs per tablet     180 tablet    Take 1 tablet by mouth daily 2000 mg    MDS (myelodysplastic syndrome) (H), Acute qixqn-wkwzeo-wmgr disease (H), 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)       dexamethasone 0.1 MG/ML solution     600 mL    Swish and Spit 5-10 mL four times daily    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), Status post bone marrow transplant (H)       finasteride 5 MG tablet    PROSCAR    30 tablet    TAKE ONE TABLET BY MOUTH ONE TIME DAILY    MDS (myelodysplastic syndrome) (H), 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), Urinary frequency, Complications of bone marrow transplant (H)       * fluconazole 100 MG tablet    DIFLUCAN    30 tablet    Take 1 tablet (100 mg) by mouth daily    Status post bone marrow transplant (H)       * fluconazole 100 MG tablet    DIFLUCAN    30 tablet    Take 1 tablet (100 mg) by mouth daily    Status post bone marrow transplant (H)       gabapentin 300 MG capsule    NEURONTIN          order for DME     1 Device    Please provide a [employer] cane offset with strap item number [id number]    MDS (myelodysplastic syndrome) (H)       pantoprazole 20 MG EC tablet    PROTONIX    30 tablet    Take 1 tablet (20 mg) by mouth daily    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 Additional taper per BMT clinic.    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 (Please take 2 mg on 3/8 only)    Status post bone marrow transplant (H)       sulfamethoxazole-trimethoprim 800-160 MG per tablet    BACTRIM DS/SEPTRA DS    16 tablet    Take one tab by mouth twice daily on Monday and Tuesdays only    Status post bone marrow transplant (H)       syringe/needle (disp) 23G X 1\" 3 ML Misc     3 each    Dispense 3ML syringes and 23Gx1\" needles for IM. Use 1 syringe every 21 days  to inject testosterone    Primary hypogonadism in male       testosterone cypionate 200 MG/ML injection    DEPOTESTOTERONE    1 mL    Inject 1 mL (200 mg) into the muscle every 21 days    Primary hypogonadism in male       * Notice:  This list has 4 medication(s) that are the same as other medications prescribed for you. Read the directions carefully, and ask your doctor or other care provider to review them with you.      "

## 2018-03-15 NOTE — PROGRESS NOTES
"BMT Daily Progress Note     ID/CC:  Mr. 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     HPI: Deejay presents for follow up with his wife. Feels much better after starting prednisone last week. Fatigue and appetite are much improved. Continues to have some pain in his mouth where his top denture sits but his mouth symptoms are overall somewhat improved. Prednisone has made him feel a little jittery but is not impairing his sleep. No fevers, chills, or infectious symptoms. Wt is up this week.   Review of Systems: 10 point ROS negative except as noted above.    Physical Exam:  /69  Pulse 79  Temp 98.6  F (37  C) (Oral)  Resp 18  Ht 1.73 m (5' 8.11\")  Wt 95 kg (209 lb 6.4 oz)  SpO2 96%  BMI 31.74 kg/m2     Wt Readings from Last 4 Encounters:   03/08/18 91.8 kg (202 lb 6.4 oz)   02/27/18 98.4 kg (217 lb)   02/21/18 98.6 kg (217 lb 6.4 oz)   12/14/17 103 kg (227 lb)     General: NAD  Eyes: SARIKA, sclera anicteric  Nose/Mouth/Throat: OP dry, small ulcers present on posterior aspect of maxilla from rubbing of top denture. Small ulceration also present on lower left buccal mucosa.  Erythema of oropharynx is overall improved.  Lungs: breathing is non-labored, no cough present  Skin:   Generalized hyperpigmentation.  Multiple ecchymoses & skin tears. Right lower extremity with ongoing erythema, scaling, and skin tightening but overall improved from previous.   Neuro: A&O  Line: NONE    Labs:  Lab Results   Component Value Date    WBC 7.6 03/08/2018    ANEU 4.3 03/08/2018    HGB 15.9 03/08/2018    HCT 49.3 03/08/2018     03/08/2018     03/08/2018    POTASSIUM 3.6 03/08/2018    CHLORIDE 107 03/08/2018    CO2 22 03/08/2018     (H) 03/08/2018    BUN 22 03/08/2018    CR 1.07 03/08/2018    MAG 2.0 12/14/2017    INR 0.95 06/30/2016       ASSESSMENT AND PLAN:  Mr. Dior is a 64 y/o male, " 3 Years  s/p NMA allo sib PBSCT w/ cGVHD for MDS here for f/u.     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 Total cell dose (from 7/1 & 7/2) 6.53 x 10^6 CD34+ cells/kg.   - 1 year anniversary (July 2015): 30% cellular, trilineage hematopoiesis, no abnormal blasts by morphology or flow , no dysplasia, 0-1 fibrosis, 100% donor (BM, CD3, CD15). CR  - 2 year anniversary (July 2016): 20-30% cellular, trilineage hematopoiesis, no abnormal blasts by morphology or flow , no dysplasia, No fibrosis, 100% donor in BM (PB not sent). ISCN:  //46,XX[20] Complete Remission.    HEME:  Stable 3/15. No transfusion needs.     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 Has been off prednisone since summer 2017 and recently tapered off Sirolimus (january 2018).   cgvhd flare (started 2/19/18): Ongoing wt loss, dry mouth, fatigue, and possible skin tightening of right LE (gvhd vs. Resolving cellulitis)- Per discussion with Dr. Pinto- restarted sirolimus and pred taper 3/8  - Sirolimus 1 mg daily- level drawn 3/15 but pt forgot to hold therefore will disregard and re-draw level 3/29.   - Pred taper: decrease to 40mg QD on 3/15, pt will decrease to 20mg QD 3/22 and will remain on this until visit with Dr. Pinto. Anticipate a slower pred taper after down to 10mg qd.  - Continue Dex swish and spit.   - Oral ulcers: appears to be from top denture rubbing on gum line. Encouraged saline rinses and increasing time that denture is out of mouth to allow to heal.     ID:   Afebrile.   - Cellulitis: had episode of cellulitis following a fall on the ice in January 2018 that provoked swelling in his right LE. S/p antibiotics. Ongoing hyper pigmentation, scaling, and skin tightening- question possible gvhd?    - IgG = 403, repleted 3/22/16, 5/8/16= 1270   - Prophy:  HD ACV (renal dosing), Pen VK (steroids), Fluconazole, and SS daily Bactrim.     GI: LFTs  normalized 3/15.     FEN/Renal:  Cr and lytes WNL  - Wt. Loss likely secondary to cgvhd (see above) . Improved 3/15.     Fatigue: Continues to have ongoing fatigue, likely gvhd vs. adrneal insufficiency. TSH normal 2/28.     Plan: Continue sirolimus and prednisone, knows to taper prednisone 3/22 as outlined above.    RTC: 3/29 with Dr. Pinto. Can be seen sooner if needed. Pt encouraged to call with questions or concerns that arise.       Jamey Mckenzie PA-C  [phone]

## 2018-03-16 LAB
SIROLIMUS BLD-MCNC: 13 UG/L (ref 5–15)
TME LAST DOSE: NORMAL H

## 2018-03-23 ENCOUNTER — TELEPHONE (OUTPATIENT)
Dept: TRANSPLANT | Facility: CLINIC | Age: 68
End: 2018-03-23

## 2018-03-23 NOTE — TELEPHONE ENCOUNTER
Deejay called today to report diarrhea & upset stomach.  No other symptoms.  He thinks it may be due to PCN which was started about 10 days ago as proph.  Will have him stop PCN & begin Levaquin 250mg QD (he has this at home).  He will call if his symptoms do not improve with this change.    Jyoti Kohler

## 2018-03-29 ENCOUNTER — ONCOLOGY VISIT (OUTPATIENT)
Dept: TRANSPLANT | Facility: CLINIC | Age: 68
End: 2018-03-29
Attending: INTERNAL MEDICINE
Payer: MEDICARE

## 2018-03-29 ENCOUNTER — APPOINTMENT (OUTPATIENT)
Dept: LAB | Facility: CLINIC | Age: 68
End: 2018-03-29
Attending: INTERNAL MEDICINE
Payer: MEDICARE

## 2018-03-29 ENCOUNTER — CARE COORDINATION (OUTPATIENT)
Dept: TRANSPLANT | Facility: CLINIC | Age: 68
End: 2018-03-29

## 2018-03-29 VITALS
WEIGHT: 210.1 LBS | RESPIRATION RATE: 16 BRPM | HEART RATE: 59 BPM | BODY MASS INDEX: 31.84 KG/M2 | HEIGHT: 68 IN | OXYGEN SATURATION: 98 % | DIASTOLIC BLOOD PRESSURE: 68 MMHG | TEMPERATURE: 98.3 F | SYSTOLIC BLOOD PRESSURE: 118 MMHG

## 2018-03-29 DIAGNOSIS — T86.09 GVHD AS COMPLICATION OF BONE MARROW TRANSPLANT (H): ICD-10-CM

## 2018-03-29 DIAGNOSIS — D89.813 GVHD AS COMPLICATION OF BONE MARROW TRANSPLANT (H): ICD-10-CM

## 2018-03-29 DIAGNOSIS — Z94.81 STATUS POST BONE MARROW TRANSPLANT (H): ICD-10-CM

## 2018-03-29 LAB
ALBUMIN SERPL-MCNC: 2.8 G/DL (ref 3.4–5)
ALP SERPL-CCNC: 94 U/L (ref 40–150)
ALT SERPL W P-5'-P-CCNC: 58 U/L (ref 0–70)
ANION GAP SERPL CALCULATED.3IONS-SCNC: 9 MMOL/L (ref 3–14)
AST SERPL W P-5'-P-CCNC: 33 U/L (ref 0–45)
BASOPHILS # BLD AUTO: 0 10E9/L (ref 0–0.2)
BASOPHILS NFR BLD AUTO: 0.3 %
BILIRUB SERPL-MCNC: 0.4 MG/DL (ref 0.2–1.3)
BUN SERPL-MCNC: 25 MG/DL (ref 7–30)
CALCIUM SERPL-MCNC: 8.2 MG/DL (ref 8.5–10.1)
CHLORIDE SERPL-SCNC: 108 MMOL/L (ref 94–109)
CO2 SERPL-SCNC: 22 MMOL/L (ref 20–32)
CREAT SERPL-MCNC: 0.87 MG/DL (ref 0.66–1.25)
DIFFERENTIAL METHOD BLD: ABNORMAL
EOSINOPHIL # BLD AUTO: 0.2 10E9/L (ref 0–0.7)
EOSINOPHIL NFR BLD AUTO: 2.3 %
ERYTHROCYTE [DISTWIDTH] IN BLOOD BY AUTOMATED COUNT: 16 % (ref 10–15)
GFR SERPL CREATININE-BSD FRML MDRD: 87 ML/MIN/1.7M2
GLUCOSE SERPL-MCNC: 91 MG/DL (ref 70–99)
HCT VFR BLD AUTO: 47.5 % (ref 40–53)
HGB BLD-MCNC: 15.7 G/DL (ref 13.3–17.7)
IMM GRANULOCYTES # BLD: 0 10E9/L (ref 0–0.4)
IMM GRANULOCYTES NFR BLD: 0.3 %
LYMPHOCYTES # BLD AUTO: 2.3 10E9/L (ref 0.8–5.3)
LYMPHOCYTES NFR BLD AUTO: 34.8 %
MCH RBC QN AUTO: 28.9 PG (ref 26.5–33)
MCHC RBC AUTO-ENTMCNC: 33.1 G/DL (ref 31.5–36.5)
MCV RBC AUTO: 88 FL (ref 78–100)
MONOCYTES # BLD AUTO: 0.5 10E9/L (ref 0–1.3)
MONOCYTES NFR BLD AUTO: 7.1 %
NEUTROPHILS # BLD AUTO: 3.6 10E9/L (ref 1.6–8.3)
NEUTROPHILS NFR BLD AUTO: 55.2 %
NRBC # BLD AUTO: 0 10*3/UL
NRBC BLD AUTO-RTO: 0 /100
PLATELET # BLD AUTO: 96 10E9/L (ref 150–450)
POTASSIUM SERPL-SCNC: 3.5 MMOL/L (ref 3.4–5.3)
PROT SERPL-MCNC: 6.4 G/DL (ref 6.8–8.8)
RBC # BLD AUTO: 5.43 10E12/L (ref 4.4–5.9)
SIROLIMUS BLD-MCNC: 27.6 UG/L (ref 5–15)
SODIUM SERPL-SCNC: 140 MMOL/L (ref 133–144)
TME LAST DOSE: ABNORMAL H
WBC # BLD AUTO: 6.5 10E9/L (ref 4–11)

## 2018-03-29 PROCEDURE — 36415 COLL VENOUS BLD VENIPUNCTURE: CPT

## 2018-03-29 PROCEDURE — 85025 COMPLETE CBC W/AUTO DIFF WBC: CPT | Performed by: PHYSICIAN ASSISTANT

## 2018-03-29 PROCEDURE — 80053 COMPREHEN METABOLIC PANEL: CPT | Performed by: PHYSICIAN ASSISTANT

## 2018-03-29 PROCEDURE — 80195 ASSAY OF SIROLIMUS: CPT | Performed by: PHYSICIAN ASSISTANT

## 2018-03-29 PROCEDURE — G0463 HOSPITAL OUTPT CLINIC VISIT: HCPCS

## 2018-03-29 RX ORDER — PREDNISONE 5 MG/1
5 TABLET ORAL DAILY
Qty: 12 TABLET | Refills: 0 | Status: SHIPPED | OUTPATIENT
Start: 2018-03-29 | End: 2018-04-26

## 2018-03-29 RX ORDER — LEVOFLOXACIN 250 MG/1
TABLET, FILM COATED ORAL
COMMUNITY
Start: 2018-03-26 | End: 2018-04-27

## 2018-03-29 ASSESSMENT — PAIN SCALES - GENERAL: PAINLEVEL: NO PAIN (0)

## 2018-03-29 NOTE — MR AVS SNAPSHOT
After Visit Summary   3/29/2018    Deejay Dior    MRN: [id number]           Patient Information     Date Of Birth          1950        Visit Information        Provider Department      3/29/2018 1:30 PM Aby Pinto MD 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  After hours call the   hospital at [phone] or   [phone]. Ask for the BMT   fellow on-call           Care Instructions    RTC BMT with labs and Dr. Pinto 4/26/18           Follow-ups after your visit        Your next 10 appointments already scheduled     Apr 26, 2018 12:45 PM CDT   [facility] with  [facility]   [facility] ([facility])    [address] [place] [address]  [address] [place] [id number] [phone]            Apr 26, 2018  1:30 PM CDT   Return with Aby Pinto MD   [facility] Blood and Marrow Transplant ([facility])    [address] [place] [address]  [address] [place] [id number] [phone]              Who to contact     If you have questions or need follow up information about today's clinic visit or your schedule please contact [facility] BLOOD [facility] MARROW TRANSPLANT directly at [phone].  Normal or non-critical lab and imaging results will be communicated to you by MyChart, letter or phone within 4 business days after the clinic has received the results. If you do not hear from us within 7 days, please contact the clinic through MyChart or phone. If you have a critical or abnormal lab result, we will notify you by  "phone as soon as possible.  Submit refill requests through [employer] or call your pharmacy and they will forward the refill request to us. Please allow 3 business days for your refill to be completed.          Additional Information About Your Visit        [employer] Information     [employer] gives you secure access to your electronic health record. If you see a primary care provider, you can also send messages to your care team and make appointments. If you have questions, please call your primary care clinic.  If you do not have a primary care provider, please call [phone] and they will assist you.        Care EveryWhere ID     This is your Care EveryWhere ID. This could be used by other organizations to access your [facility] medical records  [id number]        Your Vitals Were     Pulse Temperature Respirations Height Pulse Oximetry BMI (Body Mass Index)    59 98.3  F (36.8  C) (Tympanic) 16 1.73 m (5' 8.11\") 98% 31.84 kg/m2       Blood Pressure from Last 3 Encounters:   03/29/18 118/68   03/15/18 123/69   03/08/18 109/72    Weight from Last 3 Encounters:   03/29/18 95.3 kg (210 lb 1.6 oz)   03/15/18 95 kg (209 lb 6.4 oz)   03/08/18 91.8 kg (202 lb 6.4 oz)              We Performed the Following     - HIM Screen Colonoscopy Scan     CBC with platelets differential     Comprehensive metabolic panel     Sirolimus level          Today's Medication Changes          These changes are accurate as of 3/29/18 11:59 PM.  If you have any questions, ask your nurse or doctor.               These medicines have changed or have updated prescriptions.        Dose/Directions    fluconazole 100 MG tablet   Commonly known as:  DIFLUCAN   This may have changed:  Another medication with the same name was removed. Continue taking this medication, and follow the directions you see here.   Used for:  Status post bone marrow transplant (H)   Changed by:  Aby Pinto MD        Dose:  100 mg   Take 1 tablet (100 mg) by mouth daily " "  Quantity:  30 tablet   Refills:  1       predniSONE 5 MG tablet   Commonly known as:  DELTASONE   This may have changed:    - medication strength  - how much to take  - when to take this  - additional instructions  - Another medication with the same name was removed. Continue taking this medication, and follow the directions you see here.   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 Please contact your care team if you have questions.     alfuzosin 10 MG 24 hr tablet   Commonly known as:  UROXATRAL   Stopped by:  Aby Pinto MD           dexamethasone 0.1 MG/ML solution   Stopped by:  Aby Pinto MD           penicillin V potassium 250 MG tablet   Commonly known as:  VEETID   Stopped by:  Aby Pinto MD           syringe/needle (disp) 23G X 1\" 3 ML Misc   Stopped by:  Aby Pinto MD           testosterone cypionate 200 MG/ML injection   Commonly known as:  DEPOTESTOTERONE   Stopped by:  Aby Pinto MD                Where to get your medicines      These medications were sent to [id number] - [place], [place] - [address] [address] [address]     Phone:  [phone]     predniSONE 5 MG tablet                Recent Review Flowsheet Data     BMT Recent Results Latest Ref Rng & Units 9/28/2017 12/14/2017 2/21/2018 2/27/2018 3/8/2018 3/15/2018 3/29/2018    WBC 4.0 - 11.0 10e9/L 5.6 5.6 7.9 8.4 7.6 9.4 6.5    Hemoglobin 13.3 - 17.7 g/dL 15.1 16.4 14.6 15.8 15.9 15.6 15.7    Platelet Count 150 - 450 10e9/L 153 162 222 228 210 205 96(L)    Neutrophils (Absolute) 1.6 - 8.3 10e9/L 2.8 2.9 5.0 5.6 4.3 7.7 3.6    INR 0.86 - 1.14 - - - - - - -    Sodium 133 - 144 mmol/L 136 142 141 140 138 139 140    " Potassium 3.4 - 5.3 mmol/L 3.6 4.1 3.8 4.1 3.6 4.2 3.5    Chloride 94 - 109 mmol/L 105 110(H) 110(H) 108 107 108 108    Glucose 70 - 99 mg/dL 89 84 108(H) 99 120(H) 109(H) 91    Urea Nitrogen 7 - 30 mg/dL 29 24 17 17 22 25 25    Creatinine 0.66 - 1.25 mg/dL 1.44(H) 1.13 0.88 0.97 1.07 0.88 0.87    Calcium (Total) 8.5 - 10.1 mg/dL 8.6 8.5 8.8 9.0 8.7 8.7 8.2(L)    Protein (Total) 6.8 - 8.8 g/dL 7.5 7.7 6.8 7.6 7.3 6.7(L) 6.4(L)    Albumin 3.4 - 5.0 g/dL 3.3(L) 3.2(L) 3.0(L) 3.1(L) 3.0(L) 2.7(L) 2.8(L)    Bilirubin (Direct) 0.0 - 0.2 mg/dL - - - - - - -    Alkaline Phosphatase 40 - 150 U/L 119 109 143 169(H) 159(H) 111 94    AST 0 - 45 U/L 39 42 74(H) 107(H) 82(H) 29 33    ALT 0 - 70 U/L 44 44 62 90(H) 75(H) 46 58    MCV 78 - 100 fl 84 88 89 90 89 89 88               Primary Care Provider Office Phone # Fax #    Vivek Rafael Callejas [phone]       [facility] [address] [place] [address]        Equal Access to Services     [facility] AH: Hadii lisa Vanegas, waaxda luqadaha, qaybta kaalmada adeegyada, corinna adamson. So [facility] [phone].    ATENCIÓN: Si habla español, tiene a del toro disposición servicios gratuitos de asistencia lingüística. Llame al [phone].    We comply with applicable federal civil rights laws and [place] laws. We do not discriminate on the basis of race, color, national origin, age, disability, sex, sexual orientation, or gender identity.            Thank you!     Thank you for choosing [facility] BLOOD AND MARROW TRANSPLANT  for your care. Our goal is always to provide you with excellent care. Hearing back from our patients is one way we can continue to improve our services. Please take a few minutes to complete the written survey that you may receive in the mail after your visit with us. Thank you!             Your Updated Medication List - Protect others around you: Learn how to safely use, store and throw away your  medicines at www.disposemymeds.org.          This list is accurate as of 3/29/18 11:59 PM.  Always use your most recent med list.                   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), MDS (myelodysplastic syndrome) (H)       amoxicillin 500 MG capsule    AMOXIL    4 capsule    Take 4 pills (2 grams) day of dental work    MDS (myelodysplastic syndrome) (H)       bumetanide 0.5 MG tablet    BUMEX    30 tablet    Take one tablet by mouth once or twice weekly    MDS (myelodysplastic syndrome) (H)       calcium carbonate-vitamin D 500-400 MG-UNIT Tabs per tablet     180 tablet    Take 1 tablet by mouth daily 2000 mg    MDS (myelodysplastic syndrome) (H), Acute suhlx-qpfied-ljdd disease (H), 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)       finasteride 5 MG tablet    PROSCAR    30 tablet    TAKE ONE TABLET BY MOUTH ONE TIME DAILY    MDS (myelodysplastic syndrome) (H), 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), Urinary frequency, Complications of bone marrow transplant (H)       fluconazole 100 MG tablet    DIFLUCAN    30 tablet    Take 1 tablet (100 mg) by mouth daily    Status post bone marrow transplant (H)       gabapentin 300 MG capsule    NEURONTIN          levofloxacin 250 MG tablet    LEVAQUIN          order for DME     1 Device    Please provide a [employer] cane offset with strap item number [id number]    MDS (myelodysplastic syndrome) (H)       pantoprazole 20 MG EC tablet    PROTONIX    30 tablet    Take 1 tablet (20 mg) by mouth daily    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 (Please take 2 mg on 3/8 only)    Status post bone marrow transplant (H)       sulfamethoxazole-trimethoprim 800-160 MG per tablet    BACTRIM DS/SEPTRA DS    16 tablet    Take one tab by mouth twice daily on Monday and Tuesdays only    Status post bone marrow transplant (H)

## 2018-03-29 NOTE — PROGRESS NOTES
Dr Pinto has requested that the patient have labs drawn on Monday 4/2/18 at [facility]. Dr Pinto ordered the labs. I called [facility] lab. They are a drop in lab for blood draws but the patient needs to stop buy the  to register for the appointment. Patient has been instructed accordingly and indicated that he will have the labs drawn at 3 PM on 4/2/18.

## 2018-03-29 NOTE — NURSING NOTE
"Oncology Rooming Note    March 29, 2018 1:36 PM   Deejay Dior is a 67 year old male who presents for:    Chief Complaint   Patient presents with     Blood Draw     pt here for venipuncture, vitals completed, pt checked in for appt.      RECHECK     Patinet here for provider visit r/t MDS post transplant.      Initial Vitals: /68  Pulse 59  Temp 98.3  F (36.8  C) (Tympanic)  Resp 16  Ht 1.73 m (5' 8.11\")  Wt 95.3 kg (210 lb 1.6 oz)  SpO2 98%  BMI 31.84 kg/m2 Estimated body mass index is 31.84 kg/(m^2) as calculated from the following:    Height as of this encounter: 1.73 m (5' 8.11\").    Weight as of this encounter: 95.3 kg (210 lb 1.6 oz). Body surface area is 2.14 meters squared.  No Pain (0) Comment: Data Unavailable   No LMP for male patient.  Allergies reviewed: Yes  Medications reviewed: Yes    Medications: Medication refills not needed today.  Pharmacy name entered into [employer]:    [id number] - [place], [place] - [address] AT [facility] [address] & [id number] - [place], [place] - [address] [place] [address] [facility]     Clinical concerns: NA     5 minutes for nursing intake (face to face time)     Leah Cloud RN              "

## 2018-03-29 NOTE — NURSING NOTE
Chief Complaint   Patient presents with     Blood Draw     pt here for venipuncture, vitals completed, pt checked in for appt.      Michelle Mcallister CMA

## 2018-03-29 NOTE — PROGRESS NOTES
"BMT Daily Progress Note     ID/CC:  Mr. 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     HPI: Deejay presents for follow up with his wife. Overall symptoms much better.  Prednisone dose down to 20 mg.  Feels much better after starting prednisone last week. No F/C/S, no cough/SOB.  Eating well, minimal edema, mouth feels better.  Is jittery.  Mood good. No rash.  He is leaving Wed for [place] for 2 week vacation.     Review of Systems: 10 point ROS negative except as noted above.    Physical Exam:  /68  Pulse 59  Temp 98.3  F (36.8  C) (Tympanic)  Resp 16  Ht 1.73 m (5' 8.11\")  Wt 95.3 kg (210 lb 1.6 oz)  SpO2 98%  BMI 31.84 kg/m2     Wt Readings from Last 4 Encounters:   03/29/18 95.3 kg (210 lb 1.6 oz)   03/15/18 95 kg (209 lb 6.4 oz)   03/08/18 91.8 kg (202 lb 6.4 oz)   02/27/18 98.4 kg (217 lb)     General: NAD  Eyes: SARIKA, sclera anicteric  Nose/Mouth/Throat: OP clear, no ulcerations, some redness posterior upper palate where dentures rub.  Lungs:CTA B  CV: RRR S1S2 no MRG  Abd: S/NT/ND +BS  Skin:  Scattered ecchymoses & skin tears.   Neuro: A&O, mild resting tremor  Line: NONE    Labs:  Lab Results   Component Value Date    WBC 6.5 03/29/2018    ANEU 3.6 03/29/2018    HGB 15.7 03/29/2018    HCT 47.5 03/29/2018    PLT 96 (L) 03/29/2018     03/29/2018    POTASSIUM 3.5 03/29/2018    CHLORIDE 108 03/29/2018    CO2 22 03/29/2018    GLC 91 03/29/2018    BUN 25 03/29/2018    CR 0.87 03/29/2018    MAG 2.0 12/14/2017    INR 0.95 06/30/2016       ASSESSMENT AND PLAN:  Mr. 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 Total cell dose (from 7/1 & 7/2) 6.53 x 10^6 CD34+ cells/kg.   - 1 year anniversary (July 2015): 30% cellular, trilineage hematopoiesis, no " abnormal blasts by morphology or flow , no dysplasia, 0-1 fibrosis, 100% donor (BM, CD3, CD15). CR  - 2 year anniversary (July 2016): 20-30% cellular, trilineage hematopoiesis, no abnormal blasts by morphology or flow , no dysplasia, No fibrosis, 100% donor in BM (PB not sent). ISCN:  //46,XX[20] Complete Remission.    HEME:  6.5>15.7<96  ANC 3.6.  New thrombocytopenia.  ? From high Sirolimua?  Recheck Monday and adjust dose as needed.    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 Has been off prednisone since summer 2017 and recently tapered off Sirolimus (january 2018).   cgvhd flare (started 2/19/18): Ongoing wt loss, dry mouth, fatigue, and possible skin tightening of right LE (gvhd vs. Resolving cellulitis)- Per discussion with Dr. Pinto- restarted sirolimus and pred taper 3/8  - Sirolimus 1 mg daily- level drawn 3/15 but pt forgot to hold therefore repeat level today 3/29. Level high: Hold and recheck 4/2.   - Pred taper: On prednisone 20mg QD. Taper 10 mg x 1 week, 5 mg x 1 week, 5 mg EOD x 1 week and then stop.    - Continue Dex swish and spit.     ID:   Afebrile.   - Cellulitis: had episode of cellulitis following a fall on the ice in January 2018 that provoked swelling in his right LE. S/p antibiotics. Ongoing hyper pigmentation, scaling, and skin tightening, improved.  - IgG = 403, repleted 3/22/16, 5/8/16= 1270   - Prophy:  HD ACV (renal dosing), Pen VK (steroids), Fluconazole, and SS daily Bactrim.     GI: LFTs normal but alb low at 2.8. Follow.     FEN/Renal:  Cr and lytes WNL    Fatigue: Continues to have ongoing fatigue, improved w/ treatment of GVHD. TSH normal 2/28.     Plan: Continue sirolimus and prednisone taper.      RTC: 4/26 with Dr. Pinto. Can be seen sooner if needed.   Recheck CBC and plt and sirolimus 4/2, may need to adjust dose prior to travel to [place]

## 2018-03-30 ENCOUNTER — TELEPHONE (OUTPATIENT)
Dept: TRANSPLANT | Facility: CLINIC | Age: 68
End: 2018-03-30

## 2018-03-30 DIAGNOSIS — D89.813 GVH (GRAFT VERSUS HOST DISEASE) (H): Primary | ICD-10-CM

## 2018-03-30 RX ORDER — SIROLIMUS 0.5 MG/1
0.5 TABLET, FILM COATED ORAL DAILY
Qty: 30 TABLET | Refills: 0 | Status: SHIPPED | OUTPATIENT
Start: 2018-03-27 | End: 2018-04-03

## 2018-03-30 NOTE — TELEPHONE ENCOUNTER
I spoke with Deejay regarding his sirolimus level from his clinic visit dated 3/29. Per Dr Pinto, Deejay is to hold his sirolimus starting today, have a level drawn on Monday at his previously scheduled lab draw appointment, and await instructions from the BMT clinic. Dr Pinto also asked that Deejay be prescribed 0.5mg tablets in anticipation of a dose reduction. Deejay repeated these directions to me and voiced his understanding.     Sal Quigley, CharitoD

## 2018-04-02 DIAGNOSIS — D89.813 GVHD AS COMPLICATION OF BONE MARROW TRANSPLANT (H): ICD-10-CM

## 2018-04-02 DIAGNOSIS — T86.09 GVHD AS COMPLICATION OF BONE MARROW TRANSPLANT (H): ICD-10-CM

## 2018-04-02 DIAGNOSIS — D89.813 GVH (GRAFT VERSUS HOST DISEASE) (H): ICD-10-CM

## 2018-04-02 LAB
BASOPHILS # BLD AUTO: 0 10E9/L (ref 0–0.2)
BASOPHILS NFR BLD AUTO: 0.2 %
DIFFERENTIAL METHOD BLD: ABNORMAL
EOSINOPHIL # BLD AUTO: 0 10E9/L (ref 0–0.7)
EOSINOPHIL NFR BLD AUTO: 0.2 %
ERYTHROCYTE [DISTWIDTH] IN BLOOD BY AUTOMATED COUNT: 16.6 % (ref 10–15)
HCT VFR BLD AUTO: 44.6 % (ref 40–53)
HGB BLD-MCNC: 14.6 G/DL (ref 13.3–17.7)
LYMPHOCYTES # BLD AUTO: 1.3 10E9/L (ref 0.8–5.3)
LYMPHOCYTES NFR BLD AUTO: 31.2 %
MCH RBC QN AUTO: 28.3 PG (ref 26.5–33)
MCHC RBC AUTO-ENTMCNC: 32.7 G/DL (ref 31.5–36.5)
MCV RBC AUTO: 87 FL (ref 78–100)
MONOCYTES # BLD AUTO: 0.3 10E9/L (ref 0–1.3)
MONOCYTES NFR BLD AUTO: 6.9 %
NEUTROPHILS # BLD AUTO: 2.6 10E9/L (ref 1.6–8.3)
NEUTROPHILS NFR BLD AUTO: 61.5 %
PLATELET # BLD AUTO: 105 10E9/L (ref 150–450)
RBC # BLD AUTO: 5.15 10E12/L (ref 4.4–5.9)
WBC # BLD AUTO: 4.2 10E9/L (ref 4–11)

## 2018-04-02 PROCEDURE — 36415 COLL VENOUS BLD VENIPUNCTURE: CPT | Performed by: INTERNAL MEDICINE

## 2018-04-02 PROCEDURE — 85025 COMPLETE CBC W/AUTO DIFF WBC: CPT | Performed by: INTERNAL MEDICINE

## 2018-04-02 PROCEDURE — 80195 ASSAY OF SIROLIMUS: CPT | Performed by: INTERNAL MEDICINE

## 2018-04-03 ENCOUNTER — TELEPHONE (OUTPATIENT)
Dept: TRANSPLANT | Facility: CLINIC | Age: 68
End: 2018-04-03

## 2018-04-03 DIAGNOSIS — Z94.81 STATUS POST BONE MARROW TRANSPLANT (H): ICD-10-CM

## 2018-04-03 DIAGNOSIS — D89.813 GVH (GRAFT VERSUS HOST DISEASE) (H): ICD-10-CM

## 2018-04-03 LAB
SIROLIMUS BLD-MCNC: 7.6 UG/L (ref 5–15)
TME LAST DOSE: NORMAL H

## 2018-04-03 RX ORDER — SIROLIMUS 0.5 MG/1
0.5 TABLET, FILM COATED ORAL DAILY
Qty: 30 TABLET | Refills: 0 | COMMUNITY
Start: 2018-04-03 | End: 2018-05-04

## 2018-04-26 ENCOUNTER — ONCOLOGY VISIT (OUTPATIENT)
Dept: TRANSPLANT | Facility: CLINIC | Age: 68
End: 2018-04-26
Attending: INTERNAL MEDICINE
Payer: MEDICARE

## 2018-04-26 ENCOUNTER — APPOINTMENT (OUTPATIENT)
Dept: LAB | Facility: CLINIC | Age: 68
End: 2018-04-26
Attending: INTERNAL MEDICINE
Payer: MEDICARE

## 2018-04-26 VITALS
OXYGEN SATURATION: 93 % | DIASTOLIC BLOOD PRESSURE: 68 MMHG | RESPIRATION RATE: 22 BRPM | SYSTOLIC BLOOD PRESSURE: 105 MMHG | TEMPERATURE: 97.5 F | WEIGHT: 217.7 LBS | HEART RATE: 92 BPM | BODY MASS INDEX: 32.99 KG/M2

## 2018-04-26 DIAGNOSIS — D89.813 GVHD AS COMPLICATION OF BONE MARROW TRANSPLANT (H): ICD-10-CM

## 2018-04-26 DIAGNOSIS — T86.09 GVHD AS COMPLICATION OF BONE MARROW TRANSPLANT (H): ICD-10-CM

## 2018-04-26 DIAGNOSIS — Z94.81 STATUS POST BONE MARROW TRANSPLANT (H): ICD-10-CM

## 2018-04-26 DIAGNOSIS — D46.9 MDS (MYELODYSPLASTIC SYNDROME) (H): ICD-10-CM

## 2018-04-26 LAB
ALBUMIN SERPL-MCNC: 3 G/DL (ref 3.4–5)
ALP SERPL-CCNC: 102 U/L (ref 40–150)
ALT SERPL W P-5'-P-CCNC: 33 U/L (ref 0–70)
ANION GAP SERPL CALCULATED.3IONS-SCNC: 9 MMOL/L (ref 3–14)
AST SERPL W P-5'-P-CCNC: 38 U/L (ref 0–45)
BASOPHILS # BLD AUTO: 0 10E9/L (ref 0–0.2)
BASOPHILS NFR BLD AUTO: 0.5 %
BILIRUB SERPL-MCNC: 0.4 MG/DL (ref 0.2–1.3)
BUN SERPL-MCNC: 18 MG/DL (ref 7–30)
CALCIUM SERPL-MCNC: 9 MG/DL (ref 8.5–10.1)
CHLORIDE SERPL-SCNC: 106 MMOL/L (ref 94–109)
CO2 SERPL-SCNC: 24 MMOL/L (ref 20–32)
CREAT SERPL-MCNC: 0.99 MG/DL (ref 0.66–1.25)
DIFFERENTIAL METHOD BLD: ABNORMAL
EOSINOPHIL # BLD AUTO: 0.1 10E9/L (ref 0–0.7)
EOSINOPHIL NFR BLD AUTO: 0.8 %
ERYTHROCYTE [DISTWIDTH] IN BLOOD BY AUTOMATED COUNT: 15.1 % (ref 10–15)
GFR SERPL CREATININE-BSD FRML MDRD: 75 ML/MIN/1.7M2
GLUCOSE SERPL-MCNC: 94 MG/DL (ref 70–99)
HCT VFR BLD AUTO: 44.9 % (ref 40–53)
HGB BLD-MCNC: 14.5 G/DL (ref 13.3–17.7)
IMM GRANULOCYTES # BLD: 0 10E9/L (ref 0–0.4)
IMM GRANULOCYTES NFR BLD: 0.5 %
LYMPHOCYTES # BLD AUTO: 2.1 10E9/L (ref 0.8–5.3)
LYMPHOCYTES NFR BLD AUTO: 31.6 %
MAGNESIUM SERPL-MCNC: 2.1 MG/DL (ref 1.6–2.3)
MCH RBC QN AUTO: 28.3 PG (ref 26.5–33)
MCHC RBC AUTO-ENTMCNC: 32.3 G/DL (ref 31.5–36.5)
MCV RBC AUTO: 88 FL (ref 78–100)
MONOCYTES # BLD AUTO: 1.1 10E9/L (ref 0–1.3)
MONOCYTES NFR BLD AUTO: 16.1 %
NEUTROPHILS # BLD AUTO: 3.4 10E9/L (ref 1.6–8.3)
NEUTROPHILS NFR BLD AUTO: 50.5 %
NRBC # BLD AUTO: 0 10*3/UL
NRBC BLD AUTO-RTO: 0 /100
PLATELET # BLD AUTO: 183 10E9/L (ref 150–450)
POTASSIUM SERPL-SCNC: 4.1 MMOL/L (ref 3.4–5.3)
PROT SERPL-MCNC: 7.3 G/DL (ref 6.8–8.8)
RBC # BLD AUTO: 5.13 10E12/L (ref 4.4–5.9)
SIROLIMUS BLD-MCNC: 4.8 UG/L (ref 5–15)
SODIUM SERPL-SCNC: 139 MMOL/L (ref 133–144)
TME LAST DOSE: ABNORMAL H
WBC # BLD AUTO: 6.6 10E9/L (ref 4–11)

## 2018-04-26 PROCEDURE — 36415 COLL VENOUS BLD VENIPUNCTURE: CPT

## 2018-04-26 PROCEDURE — 80195 ASSAY OF SIROLIMUS: CPT | Performed by: INTERNAL MEDICINE

## 2018-04-26 PROCEDURE — 85025 COMPLETE CBC W/AUTO DIFF WBC: CPT | Performed by: INTERNAL MEDICINE

## 2018-04-26 PROCEDURE — 80053 COMPREHEN METABOLIC PANEL: CPT | Performed by: INTERNAL MEDICINE

## 2018-04-26 PROCEDURE — G0463 HOSPITAL OUTPT CLINIC VISIT: HCPCS | Mod: ZF

## 2018-04-26 PROCEDURE — 83735 ASSAY OF MAGNESIUM: CPT | Performed by: INTERNAL MEDICINE

## 2018-04-26 RX ORDER — AMOXICILLIN 500 MG/1
CAPSULE ORAL
Qty: 16 CAPSULE | Refills: 3 | Status: SHIPPED | OUTPATIENT
Start: 2018-04-26 | End: 2019-09-06

## 2018-04-26 ASSESSMENT — PAIN SCALES - GENERAL: PAINLEVEL: MILD PAIN (2)

## 2018-04-26 NOTE — MR AVS SNAPSHOT
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  After hours call the   hospital at [phone] or   [phone]. Ask for the BMT   fellow on-call           Care Instructions    CC'd chart: RTC BMT and labs w/ Dr. Pinto June 21 1:30 clinic, 1 pm labs           Follow-ups after your visit        Your next 10 appointments already scheduled     Jun 21, 2018  1:00 PM CDT   [facility] Draw with  [facility]RAW   [facility] Draw ([facility])    [address] [place] [address]  [address] [place] [id number] [phone]            Jun 21, 2018  1:30 PM CDT   Return with Aby Pinto MD   [facility] Blood and Marrow Transplant ([facility])    [address] [place] [address]  [address] [place] [id number] [phone]              Who to contact     If you have questions or need follow up information about today's clinic visit or your schedule please contact [facility] BLOOD AND MARROW TRANSPLANT directly at [phone].  Normal or non-critical lab and imaging results will be communicated to you by MyChart, letter or phone within 4 business days after the clinic has received the results. If you do not hear from us within 7 days, please contact the clinic through MyChart or phone. If  you have a critical or abnormal lab result, we will notify you by phone as soon as possible.  Submit refill requests through [employer] or call your pharmacy and they will forward the refill request to us. Please allow 3 business days for your refill to be completed.          Additional Information About Your Visit        At[employer]art Information     [employer] gives you secure access to your electronic health record. If you see a primary care provider, you can also send messages to your care team and make appointments. If you have questions, please call your primary care clinic.  If you do not have a primary care provider, please call [phone] and they will assist you.        Care EveryWhere ID     This is your Care EveryWhere ID. This could be used by other organizations to access your [place] medical records  [id number]        Your Vitals Were     Pulse Temperature Respirations Pulse Oximetry BMI (Body Mass Index)       92 97.5  F (36.4  C) (Oral) 22 93% 32.99 kg/m2        Blood Pressure from Last 3 Encounters:   04/26/18 105/68   03/29/18 118/68   03/15/18 123/69    Weight from Last 3 Encounters:   04/26/18 98.7 kg (217 lb 11.2 oz)   03/29/18 95.3 kg (210 lb 1.6 oz)   03/15/18 95 kg (209 lb 6.4 oz)              We Performed the Following     CBC with platelets differential     Comprehensive metabolic panel     Magnesium     Sirolimus level          Today's Medication Changes          These changes are accurate as of 4/26/18 11:59 PM.  If you have any questions, ask your nurse or doctor.               Stop taking these medicines if you haven't already. Please contact your care team if you have questions.     finasteride 5 MG tablet   Commonly known as:  PROSCAR   Stopped by:  Aby Pinto MD           gabapentin 300 MG capsule   Commonly known as:  NEURONTIN   Stopped by:  Aby Pinto MD                Where to get your medicines      These medications were sent to [id number] - [place], [place] -  [address]  [address], [place] [address]     Phone:  [phone]     amoxicillin 500 MG capsule                Recent Review Flowsheet Data     BMT Recent Results Latest Ref Rng & Units 2/21/2018 2/27/2018 3/8/2018 3/15/2018 3/29/2018 4/2/2018 4/26/2018    WBC 4.0 - 11.0 10e9/L 7.9 8.4 7.6 9.4 6.5 4.2 6.6    Hemoglobin 13.3 - 17.7 g/dL 14.6 15.8 15.9 15.6 15.7 14.6 14.5    Platelet Count 150 - 450 10e9/L 222 228 210 205 96(L) 105(L) 183    Neutrophils (Absolute) 1.6 - 8.3 10e9/L 5.0 5.6 4.3 7.7 3.6 2.6 3.4    INR 0.86 - 1.14 - - - - - - -    Sodium 133 - 144 mmol/L 141 140 138 139 140 - 139    Potassium 3.4 - 5.3 mmol/L 3.8 4.1 3.6 4.2 3.5 - 4.1    Chloride 94 - 109 mmol/L 110(H) 108 107 108 108 - 106    Glucose 70 - 99 mg/dL 108(H) 99 120(H) 109(H) 91 - 94    Urea Nitrogen 7 - 30 mg/dL 17 17 22 25 25 - 18    Creatinine 0.66 - 1.25 mg/dL 0.88 0.97 1.07 0.88 0.87 - 0.99    Calcium (Total) 8.5 - 10.1 mg/dL 8.8 9.0 8.7 8.7 8.2(L) - 9.0    Protein (Total) 6.8 - 8.8 g/dL 6.8 7.6 7.3 6.7(L) 6.4(L) - 7.3    Albumin 3.4 - 5.0 g/dL 3.0(L) 3.1(L) 3.0(L) 2.7(L) 2.8(L) - 3.0(L)    Bilirubin (Direct) 0.0 - 0.2 mg/dL - - - - - - -    Alkaline Phosphatase 40 - 150 U/L 143 169(H) 159(H) 111 94 - 102    AST 0 - 45 U/L 74(H) 107(H) 82(H) 29 33 - 38    ALT 0 - 70 U/L 62 90(H) 75(H) 46 58 - 33    MCV 78 - 100 fl 89 90 89 89 88 87 88               Primary Care Provider Office Phone # Fax #    Vivek Rafael Callejas [phone]       [facility] [address] [facility] [address]        Equal Access to Services     SHER PEÑA : Hadii lisa urbano Somorris, waaxda luqadaha, qaybta kaalmada adeegyaclint, corinna barlow . [facility] [phone].    ATENCIÓN: Si habla español, tiene a del toro disposición servicios gratuitos de asistencia lingüística. Reddy al [phone].    We comply with applicable federal civil rights laws and [place] laws. We do not discriminate on  the basis of race, color, national origin, age, disability, sex, sexual orientation, or gender identity.            Thank you!     Thank you for choosing [facility] BLOOD AND MARROW TRANSPLANT  for your care. Our goal is always to provide you with excellent care. Hearing back from our patients is one way we can continue to improve our services. Please take a few minutes to complete the written survey that you may receive in the mail after your visit with us. Thank you!             Your Updated Medication List - Protect others around you: Learn how to safely use, store and throw away your medicines at www.disposemymeds.org.          This list is accurate as of 4/26/18 11:59 PM.  Always use your most recent med list.                   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), MDS (myelodysplastic syndrome) (H)       amoxicillin 500 MG capsule    AMOXIL    16 capsule    Take 4 pills (2 grams) day of dental work    MDS (myelodysplastic syndrome) (H)       bumetanide 0.5 MG tablet    BUMEX    30 tablet    Take one tablet by mouth once or twice weekly    MDS (myelodysplastic syndrome) (H)       calcium carbonate-vitamin D 500-400 MG-UNIT Tabs per tablet     180 tablet    Take 1 tablet by mouth daily 2000 mg    MDS (myelodysplastic syndrome) (H), Acute qcvro-vdkshg-hoqv disease (H), 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 Read the directions carefully, and ask your doctor or other care provider to review them with you.

## 2018-04-26 NOTE — NURSING NOTE
"Oncology Rooming Note    April 26, 2018 2:03 PM   Deejay Dior is a 67 year old male who presents for:    Chief Complaint   Patient presents with     Blood Draw     Venipuncture labs collected by RN.      RECHECK     MDS (myelodysplastic syndrome)      Initial Vitals: /68 (BP Location: Right arm, Patient Position: Sitting)  Pulse 92  Temp 97.5  F (36.4  C) (Oral)  Resp 22  Wt 98.7 kg (217 lb 11.2 oz)  SpO2 93%  BMI 32.99 kg/m2 Estimated body mass index is 32.99 kg/(m^2) as calculated from the following:    Height as of 3/29/18: 1.73 m (5' 8.11\").    Weight as of this encounter: 98.7 kg (217 lb 11.2 oz). Body surface area is 2.18 meters squared.  Mild Pain (2) Comment: Data Unavailable   No LMP for male patient.  Allergies reviewed: Yes  Medications reviewed: Yes    Medications: Medication refills not needed today.  Pharmacy name entered into [employer]:    [id number] [address] [facility] [address] [id number] - [place], [place] - [address] [place] [address]     Clinical concerns: PT reports recent travel to [place] with wife.  PT encountered cellulitis to right lower leg.  Went to seek medical care for the skin irritation and was prescribed Clindamycin for 10 day course.  PT denies pain, nausea, or other complaints at this time.      5 minutes for nursing intake (face to face time)     Ivett Andrade RN              "

## 2018-04-26 NOTE — PROGRESS NOTES
BMT Daily Progress Note     ID/CC:  Mr. iDor is a 66 y/o male, 3+ Years s/p NMA allo sib PBSCT for MDS w/ cGVHD, recently re-started on sirolimus and prednisone 3/8 for mouth, fatigue, and wt.     HPI: Deejay presents for follow up with his wife. He had a good trip to [facility].  He did cut his leg (also sunburn) and is completing a course of abx.  Overall symptoms much better.  Has completed steroid taper.  Remains feeling good, better energy.  No F/C/S, no cough/SOB.  Eating well, minimal edema, mouth feels better.  Mood good. No rash.      Review of Systems: 10 point ROS negative except as noted above.    Physical Exam:  /68 (BP Location: Right arm, Patient Position: Sitting)  Pulse 92  Temp 97.5  F (36.4  C) (Oral)  Resp 22  Wt 98.7 kg (217 lb 11.2 oz)  SpO2 93%  BMI 32.99 kg/m2     Wt Readings from Last 4 Encounters:   04/26/18 98.7 kg (217 lb 11.2 oz)   03/29/18 95.3 kg (210 lb 1.6 oz)   03/15/18 95 kg (209 lb 6.4 oz)   03/08/18 91.8 kg (202 lb 6.4 oz)     General: NAD  Eyes: SARIKA, sclera anicteric  Nose/Mouth/Throat: OP clear, no ulcerations, some redness posterior upper palate where dentures rub.  Lungs:CTA B  CV: RRR S1S2 no MRG  Abd: S/NT/ND +BS  Skin:  Scattered ecchymoses & skin tears.  R leg dressed, I unwrapped the dressing, healing well, no drainage, erythema or warmth  Neuro: A&O, mild resting tremor  Line: NONE    Labs:  Lab Results   Component Value Date    WBC 6.6 04/26/2018    ANEU 3.4 04/26/2018    HGB 14.5 04/26/2018    HCT 44.9 04/26/2018     04/26/2018     04/26/2018    POTASSIUM 4.1 04/26/2018    CHLORIDE 106 04/26/2018    CO2 24 04/26/2018    GLC 94 04/26/2018    BUN 18 04/26/2018    CR 0.99 04/26/2018    MAG 2.1 04/26/2018    INR 0.95 06/30/2016       ASSESSMENT AND PLAN:  Mr. 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 Total  cell dose (from 7/1 & 7/2) 6.53 x 10^6 CD34+ cells/kg.   - 1 year anniversary (July 2015): 30% cellular, trilineage hematopoiesis, no abnormal blasts by morphology or flow , no dysplasia, 0-1 fibrosis, 100% donor (BM, CD3, CD15). CR  - 2 year anniversary (July 2016): 20-30% cellular, trilineage hematopoiesis, no abnormal blasts by morphology or flow , no dysplasia, No fibrosis, 100% donor in BM (PB not sent). ISCN:  //46,XX[20] Complete Remission.    HEME:  6.6>14.5<183  ANC 3.6.  Thrombocytopenia resolved, I think was due to high sirolimus. Counts now normal.     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 Has been off prednisone since summer 2017 and recently tapered off Sirolimus (january 2018).   cgvhd flare (started 2/19/18): Ongoing wt loss, dry mouth, fatigue, and possible skin tightening of right LE (gvhd vs. Resolving cellulitis)- Per discussion with Dr. Pinto- restarted sirolimus and pred taper 3/8 (now complete).   - Sirolimus level was too high on 1 mg daily.  Now on 0.5 mg.  Level today (4/26) ok at 4.8.  No change, recheck next visit.  Will continue indefinitely. Sx clearly better back on GVHD treatment.     ID:   Afebrile. Recurrent cellulitis, completing a course of abx.   - Cellulitis: had episode of cellulitis following a fall on the ice in January 2018 that provoked swelling in his right LE. S/p antibiotics. Ongoing hyper pigmentation, scaling, and skin tightening, improved.  - IgG = 403, repleted 3/22/16, 5/8/16= 1270   - Prophy:  HD ACV (renal dosing), Pen VK (steroids), Fluconazole, and SS daily Bactrim.  D/c some antimicrobials next visit if stable off pred.    GI: LFTs normal and alb improved to 3.0. Follow.   -will try to hold protonix, f/u next visit.    FEN/Renal:  Cr and lytes WNL  PRN bumex.0.5    Fatigue: Fatigue, improved w/ treatment of GVHD. TSH normal 2/28. Off Gabapentin.    Plan: Continue sirolimus and prednisone taper.      RTC:  6/21 with Dr. Pinto. Can be seen sooner if needed.

## 2018-04-27 DIAGNOSIS — D46.9 MDS (MYELODYSPLASTIC SYNDROME) (H): Primary | ICD-10-CM

## 2018-04-30 DIAGNOSIS — Z94.81 STATUS POST BONE MARROW TRANSPLANT (H): ICD-10-CM

## 2018-04-30 RX ORDER — FLUCONAZOLE 100 MG/1
100 TABLET ORAL DAILY
Qty: 30 TABLET | Refills: 5 | Status: SHIPPED | OUTPATIENT
Start: 2018-04-30 | End: 2018-11-02

## 2018-04-30 RX ORDER — LEVOFLOXACIN 250 MG/1
TABLET, FILM COATED ORAL
Qty: 30 TABLET | Refills: 5 | Status: SHIPPED | OUTPATIENT
Start: 2018-04-30 | End: 2018-08-16

## 2018-05-04 DIAGNOSIS — D89.813 GVH (GRAFT VERSUS HOST DISEASE) (H): ICD-10-CM

## 2018-05-04 RX ORDER — SIROLIMUS 0.5 MG/1
TABLET, FILM COATED ORAL
Qty: 30 TABLET | Refills: 3 | Status: SHIPPED | OUTPATIENT
Start: 2018-05-04 | End: 2018-09-27

## 2018-05-04 RX ORDER — SIROLIMUS 0.5 MG/1
0.5 TABLET, FILM COATED ORAL DAILY
Qty: 30 TABLET | Refills: 3 | Status: SHIPPED | OUTPATIENT
Start: 2018-05-04 | End: 2018-09-27

## 2018-06-08 DIAGNOSIS — Z94.81 STATUS POST BONE MARROW TRANSPLANT (H): ICD-10-CM

## 2018-06-08 RX ORDER — SULFAMETHOXAZOLE/TRIMETHOPRIM 800-160 MG
TABLET ORAL
Qty: 16 TABLET | Refills: 3 | Status: SHIPPED | OUTPATIENT
Start: 2018-06-08 | End: 2018-11-10

## 2018-06-21 ENCOUNTER — ONCOLOGY VISIT (OUTPATIENT)
Dept: TRANSPLANT | Facility: CLINIC | Age: 68
End: 2018-06-21
Attending: INTERNAL MEDICINE
Payer: MEDICARE

## 2018-06-21 ENCOUNTER — TELEPHONE (OUTPATIENT)
Dept: WOUND CARE | Facility: CLINIC | Age: 68
End: 2018-06-21

## 2018-06-21 ENCOUNTER — APPOINTMENT (OUTPATIENT)
Dept: LAB | Facility: CLINIC | Age: 68
End: 2018-06-21
Attending: INTERNAL MEDICINE
Payer: MEDICARE

## 2018-06-21 VITALS
HEIGHT: 68 IN | SYSTOLIC BLOOD PRESSURE: 100 MMHG | TEMPERATURE: 97.5 F | WEIGHT: 212.8 LBS | DIASTOLIC BLOOD PRESSURE: 67 MMHG | HEART RATE: 78 BPM | OXYGEN SATURATION: 96 % | BODY MASS INDEX: 32.25 KG/M2

## 2018-06-21 DIAGNOSIS — D46.9 MDS (MYELODYSPLASTIC SYNDROME) (H): ICD-10-CM

## 2018-06-21 LAB
ALBUMIN SERPL-MCNC: 3.1 G/DL (ref 3.4–5)
ALP SERPL-CCNC: 106 U/L (ref 40–150)
ALT SERPL W P-5'-P-CCNC: 29 U/L (ref 0–70)
ANION GAP SERPL CALCULATED.3IONS-SCNC: 6 MMOL/L (ref 3–14)
AST SERPL W P-5'-P-CCNC: 44 U/L (ref 0–45)
BASOPHILS # BLD AUTO: 0 10E9/L (ref 0–0.2)
BASOPHILS NFR BLD AUTO: 0.7 %
BILIRUB SERPL-MCNC: 0.3 MG/DL (ref 0.2–1.3)
BUN SERPL-MCNC: 16 MG/DL (ref 7–30)
CALCIUM SERPL-MCNC: 8.7 MG/DL (ref 8.5–10.1)
CHLORIDE SERPL-SCNC: 109 MMOL/L (ref 94–109)
CO2 SERPL-SCNC: 24 MMOL/L (ref 20–32)
CREAT SERPL-MCNC: 0.91 MG/DL (ref 0.66–1.25)
DIFFERENTIAL METHOD BLD: NORMAL
EOSINOPHIL # BLD AUTO: 0.2 10E9/L (ref 0–0.7)
EOSINOPHIL NFR BLD AUTO: 2.8 %
ERYTHROCYTE [DISTWIDTH] IN BLOOD BY AUTOMATED COUNT: 14.7 % (ref 10–15)
GFR SERPL CREATININE-BSD FRML MDRD: 82 ML/MIN/1.7M2
GLUCOSE SERPL-MCNC: 107 MG/DL (ref 70–99)
HCT VFR BLD AUTO: 41.3 % (ref 40–53)
HGB BLD-MCNC: 13.7 G/DL (ref 13.3–17.7)
IMM GRANULOCYTES # BLD: 0 10E9/L (ref 0–0.4)
IMM GRANULOCYTES NFR BLD: 0.2 %
LYMPHOCYTES # BLD AUTO: 1.8 10E9/L (ref 0.8–5.3)
LYMPHOCYTES NFR BLD AUTO: 29.5 %
MCH RBC QN AUTO: 28.9 PG (ref 26.5–33)
MCHC RBC AUTO-ENTMCNC: 33.2 G/DL (ref 31.5–36.5)
MCV RBC AUTO: 87 FL (ref 78–100)
MONOCYTES # BLD AUTO: 0.9 10E9/L (ref 0–1.3)
MONOCYTES NFR BLD AUTO: 14.2 %
NEUTROPHILS # BLD AUTO: 3.2 10E9/L (ref 1.6–8.3)
NEUTROPHILS NFR BLD AUTO: 52.6 %
NRBC # BLD AUTO: 0 10*3/UL
NRBC BLD AUTO-RTO: 0 /100
PLATELET # BLD AUTO: 172 10E9/L (ref 150–450)
POTASSIUM SERPL-SCNC: 3.9 MMOL/L (ref 3.4–5.3)
PROT SERPL-MCNC: 7.2 G/DL (ref 6.8–8.8)
RBC # BLD AUTO: 4.74 10E12/L (ref 4.4–5.9)
SIROLIMUS BLD-MCNC: 4.6 UG/L (ref 5–15)
SODIUM SERPL-SCNC: 138 MMOL/L (ref 133–144)
TME LAST DOSE: ABNORMAL H
WBC # BLD AUTO: 6.1 10E9/L (ref 4–11)

## 2018-06-21 PROCEDURE — 80195 ASSAY OF SIROLIMUS: CPT | Performed by: INTERNAL MEDICINE

## 2018-06-21 PROCEDURE — 36415 COLL VENOUS BLD VENIPUNCTURE: CPT

## 2018-06-21 PROCEDURE — 85025 COMPLETE CBC W/AUTO DIFF WBC: CPT | Performed by: INTERNAL MEDICINE

## 2018-06-21 PROCEDURE — 87799 DETECT AGENT NOS DNA QUANT: CPT | Performed by: INTERNAL MEDICINE

## 2018-06-21 PROCEDURE — G0463 HOSPITAL OUTPT CLINIC VISIT: HCPCS

## 2018-06-21 PROCEDURE — 80053 COMPREHEN METABOLIC PANEL: CPT | Performed by: INTERNAL MEDICINE

## 2018-06-21 RX ORDER — CEPHALEXIN 500 MG/1
500 CAPSULE ORAL 2 TIMES DAILY
Qty: 20 CAPSULE | Refills: 0 | Status: SHIPPED | OUTPATIENT
Start: 2018-06-21 | End: 2018-08-16

## 2018-06-21 ASSESSMENT — PAIN SCALES - GENERAL: PAINLEVEL: MODERATE PAIN (5)

## 2018-06-21 NOTE — Clinical Note
Please call IR venous stasis clinic and see if they can get him in today  [phone]  Also call wound care and see if he can get in today.  Skin tears on R arm/leg

## 2018-06-21 NOTE — MR AVS SNAPSHOT
After Visit Summary   6/21/2018    Deejay Dior    MRN: [id number]           Patient Information     Date Of Birth          1950        Visit Information        Provider Department      6/21/2018 1:30 PM Aby Pinto MD [facility] Blood and Marrow Transplant        Today's Diagnoses     MDS (myelodysplastic syndrome)              [facility]  [address] [place] [address]  Phone: [phone]  Clinic Hours:   Monday-Thursday:7am to 7pm   Friday: 7am to 5pm   Weekends and holidays:    8am to noon (in general)  If your fever is 100.5  or greater,   call the clinic.  After hours call the   hospital at [phone] or   [phone]. Ask for the BMT   fellow on-call            Follow-ups after your visit        Future tests that were ordered for you today     Open Future Orders        Priority Expected Expires Ordered    Comprehensive metabolic panel Routine 8/16/2018 12/21/2018 6/21/2018    CBC with platelets differential Routine 8/16/2018 12/21/2018 6/21/2018    Sirolimus level Routine 8/16/2018 12/21/2018 6/21/2018    EBV DNA PCR Quantitative Whole Blood Routine 8/16/2018 12/21/2018 6/21/2018    CMV DNA quantification Routine 8/16/2018 12/21/2018 6/21/2018    IR Consultation for IR Exam Routine  6/21/2019 6/21/2018            Who to contact     If you have questions or need follow up information about today's clinic visit or your schedule please contact [facility] BLOOD AND MARROW TRANSPLANT directly at [phone].  Normal or non-critical lab and imaging results will be communicated to you by MyChart, letter or phone within 4 business days after the clinic has received the results. If you do not hear from us within 7 days, please contact the clinic through MyChart or phone. If you have a critical or abnormal lab result, we will notify you by phone as soon as possible.  Submit refill requests through [employer] or call your pharmacy and they will forward the  "refill request to us. Please allow 3 business days for your refill to be completed.          Additional Information About Your Visit        [employer] Information     [employer] gives you secure access to your electronic health record. If you see a primary care provider, you can also send messages to your care team and make appointments. If you have questions, please call your primary care clinic.  If you do not have a primary care provider, please call [phone] and they will assist you.        Care EveryWhere ID     This is your Care EveryWhere ID. This could be used by other organizations to access your [facility] medical records  [id number]        Your Vitals Were     Pulse Temperature Height Pulse Oximetry BMI (Body Mass Index)       78 97.5  F (36.4  C) (Oral) 1.73 m (5' 8.11\") 96% 32.25 kg/m2        Blood Pressure from Last 3 Encounters:   06/21/18 100/67   04/26/18 105/68   03/29/18 118/68    Weight from Last 3 Encounters:   06/21/18 96.5 kg (212 lb 12.8 oz)   04/26/18 98.7 kg (217 lb 11.2 oz)   03/29/18 95.3 kg (210 lb 1.6 oz)              We Performed the Following     CBC with platelets differential     Comprehensive metabolic panel     EBV DNA PCR Quantitative Whole Blood     Sirolimus level          Today's Medication Changes          These changes are accurate as of 6/21/18  2:17 PM.  If you have any questions, ask your nurse or doctor.               Start taking these medicines.        Dose/Directions    cephALEXin 500 MG capsule   Commonly known as:  KEFLEX   Used for:  MDS (myelodysplastic syndrome) (H)   Started by:  Aby Pinto MD        Dose:  500 mg   Take 1 capsule (500 mg) by mouth 2 times daily   Quantity:  20 capsule   Refills:  0         These medicines have changed or have updated prescriptions.        Dose/Directions    * sirolimus 0.5 MG tablet   Commonly known as:  GENERIC EQUIVALENT   This may have changed:  Another medication with the same name was removed. Continue taking this " medication, and follow the directions you see here.   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 Continue taking this medication, and follow the directions you see here.   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 Read the directions carefully, and ask your doctor or other care provider to review them with you.         Where to get your medicines      These medications were sent to [id number] - [place], [place] - [address] [address]  [address] [place], [place] [address] [place] [address]     Phone:  [phone]     cephALEXin 500 MG capsule                Recent Review Flowsheet Data     BMT Recent Results Latest Ref Rng & Units 2/27/2018 3/8/2018 3/15/2018 3/29/2018 4/2/2018 4/26/2018 6/21/2018    WBC 4.0 - 11.0 10e9/L 8.4 7.6 9.4 6.5 4.2 6.6 6.1    Hemoglobin 13.3 - 17.7 g/dL 15.8 15.9 15.6 15.7 14.6 14.5 13.7    Platelet Count 150 - 450 10e9/L 228 210 205 96(L) 105(L) 183 172    Neutrophils (Absolute) 1.6 - 8.3 10e9/L 5.6 4.3 7.7 3.6 2.6 3.4 3.2    INR 0.86 - 1.14 - - - - - - -    Sodium 133 - 144 mmol/L 140 138 139 140 - 139 138    Potassium 3.4 - 5.3 mmol/L 4.1 3.6 4.2 3.5 - 4.1 3.9    Chloride 94 - 109 mmol/L 108 107 108 108 - 106 109    Glucose 70 - 99 mg/dL 99 120(H) 109(H) 91 - 94 107(H)    Urea Nitrogen 7 - 30 mg/dL 17 22 25 25 - 18 16    Creatinine 0.66 - 1.25 mg/dL 0.97 1.07 0.88 0.87 - 0.99 0.91    Calcium (Total) 8.5 - 10.1 mg/dL 9.0  8.7 8.7 8.2(L) - 9.0 8.7    Protein (Total) 6.8 - 8.8 g/dL 7.6 7.3 6.7(L) 6.4(L) - 7.3 7.2    Albumin 3.4 - 5.0 g/dL 3.1(L) 3.0(L) 2.7(L) 2.8(L) - 3.0(L) 3.1(L)    Bilirubin (Direct) 0.0 - 0.2 mg/dL - - - - - - -    Alkaline Phosphatase 40 - 150 U/L 169(H) 159(H) 111 94 - 102 106    AST 0 - 45 U/L 107(H) 82(H) 29 33 - 38 44    ALT 0 - 70 U/L 90(H) 75(H) 46 58 - 33 29    MCV 78 - 100 fl 90 89 89 88 87 88 87               Primary Care Provider Office Phone # Fax #    Vivek Rafael Callejas [phone]       [facility] [address] [place] [address]        Equal Access to Services     [facility]: Hadii lisa ku hadasho Soomaali, waaxda luqadaha, qaybta kaalmada adejose, corinna barlow . So [facility] [phone].    ATENCIÓN: Si habla español, tiene a del toro disposición servicios gratuitos de asistencia lingüística. [facility] [phone].    We comply with applicable federal civil rights laws and [place] laws. We do not discriminate on the basis of race, color, national origin, age, disability, sex, sexual orientation, or gender identity.            Thank you!     Thank you for choosing [facility] BLOOD AND MARROW TRANSPLANT  for your care. Our goal is always to provide you with excellent care. Hearing back from our patients is one way we can continue to improve our services. Please take a few minutes to complete the written survey that you may receive in the mail after your visit with us. Thank you!             Your Updated Medication List - Protect others around you: Learn how to safely use, store and throw away your medicines at www.disposemymeds.org.          This list is accurate as of 6/21/18  2:17 PM.  Always use your most recent med list.                   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), MDS (myelodysplastic  syndrome) (H)       amoxicillin 500 MG capsule    AMOXIL    16 capsule    Take 4 pills (2 grams) day of dental work    MDS (myelodysplastic syndrome) (H)       bumetanide 0.5 MG tablet    BUMEX    30 tablet    Take one tablet by mouth once or twice weekly    MDS (myelodysplastic syndrome) (H)       calcium carbonate-vitamin D 500-400 MG-UNIT Tabs per tablet     180 tablet    Take 1 tablet by mouth daily 2000 mg    MDS (myelodysplastic syndrome) (H), Acute utwqb-qtueth-qaoy disease (H), 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), Status post bone marrow transplant (H)       sertraline 100 MG tablet    ZOLOFT    30 tablet    Take 1 tablet (100 mg) by mouth daily    MDS (myelodysplastic syndrome) (H), 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), Urinary frequency, Other complication of bone marrow transplant (H)       * sirolimus 0.5 MG tablet    GENERIC EQUIVALENT    30 tablet    Take 1 tablet (0.5 mg) by mouth daily once instructed by the BMT clinic    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 Read the directions carefully, and ask your doctor or other care provider to review them with you.

## 2018-06-21 NOTE — PROGRESS NOTES
"BMT Daily Progress Note     ID/CC:  Mr. 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    HPI: Deejay has overall been doing well.  He hit his R leg on a tow hitch on a truck and has a healing sore.  He fell (tripped on gravel trail) yesterday and scraped his R knee, R arm and has R hip bruise/pain.  Can bear weight.  Otherwise has been ok. Other sx is worsening dry mouth, no ulcers.  Eyes stable.  Feet hurt so he restarted gabapentin, but did not help so he will taper. Energy is not not ideal, but overall better than a year ago.  No F/C/S, no cough/SOB.  Eating well no N/V/D/C.  Stable edema, wraps legs when not too hot.  Mood good. No rash.      Review of Systems: 10 point ROS negative except as noted above.    Physical Exam:  /67 (BP Location: Right arm, Patient Position: Chair, Cuff Size: Adult Large)  Pulse 78  Temp 97.5  F (36.4  C) (Oral)  Ht 1.73 m (5' 8.11\")  Wt 96.5 kg (212 lb 12.8 oz)  SpO2 96%  BMI 32.25 kg/m2     Wt Readings from Last 4 Encounters:   06/21/18 96.5 kg (212 lb 12.8 oz)   04/26/18 98.7 kg (217 lb 11.2 oz)   03/29/18 95.3 kg (210 lb 1.6 oz)   03/15/18 95 kg (209 lb 6.4 oz)     General: NAD  Eyes: SARIKA, sclera anicteric  Nose/Mouth/Throat: OP clear, no ulcerations, very dry (compared to prior), upper plate  Lungs:CTA B  CV: RRR S1S2 no MRG  Abd: S/NT/ND +BS  Skin:  Scattered ecchymoses & skin tears. R elbow/arm w/ skin torn off and bruise.  R leg w/ healing sores x 2 on shin, peeling flaking skin and erythema. No drainage warmth.   Neuro: A&O, mild resting tremor  Line: NONE    Labs:  Lab Results   Component Value Date    WBC 6.1 06/21/2018    ANEU 3.2 06/21/2018    HGB 13.7 06/21/2018    HCT 41.3 06/21/2018     06/21/2018     06/21/2018    POTASSIUM 3.9 06/21/2018    CHLORIDE 109 06/21/2018    CO2 24 06/21/2018     (H) 06/21/2018    BUN 16 06/21/2018    CR 0.91 06/21/2018    MAG 2.1 04/26/2018    INR 0.95 06/30/2016 "       ASSESSMENT AND PLAN:  Mr. 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 Total cell dose (from 7/1 & 7/2) 6.53 x 10^6 CD34+ cells/kg.   - 1 year anniversary (July 2015): 30% cellular, trilineage hematopoiesis, no abnormal blasts by morphology or flow , no dysplasia, 0-1 fibrosis, 100% donor (BM, CD3, CD15). CR  - 2 year anniversary (July 2016): 20-30% cellular, trilineage hematopoiesis, no abnormal blasts by morphology or flow , no dysplasia, No fibrosis, 100% donor in BM (PB not sent). ISCN:  //46,XX[20] Complete Remission.    HEME:  6.1>13.7<172  ANC 3.2.   Counts now normal. Eos 0.2.    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 Has been off prednisone since summer 2017 and recently tapered off Sirolimus (january 2018).   cgvhd flare (started 2/19/18): Ongoing wt loss, dry mouth, fatigue, and possible skin tightening of right LE (gvhd vs. Resolving cellulitis)- Per discussion with Dr. Pinto- restarted sirolimus and pred taper 3/8 (now complete).   - Sirolimus level was too high on 1 mg daily.  Now on 0.5 mg.  Level on 4/26 ok at 4.8.  Recheck pending today (6/21/18).  Will continue indefinitely. Sx clearly better back on GVHD treatment.   -Restart dex S/S for dry mouth and biotene    ID:   Afebrile. Just fell and has large skin tears on R arm. Given history of multiple bouts of cellulitis, will give Keflex x 10 days as prophylaxis. Refer to wound clinic and also to IR Venous stasis clinic  - Cellulitis: had episode of cellulitis following a fall on the ice in January 2018 that provoked swelling in his right LE. S/p antibiotics. Ongoing hyper pigmentation, scaling, and skin tightening, improved.  - IgG = 403, repleted 3/22/16, 5/8/16= 9560   - Prophy:  HD ACV (renal dosing), Pen VK (steroids), Fluconazole, and SS daily Bactrim.   D/c some antimicrobials next visit if stable off pred.    GI: No symptoms. LFTs normal and alb improved to 3.1. Follow.   -Continues on protonox    FEN/Renal:  Cr and lytes WNL  PRN bumex.0.5     Fatigue: Fatigue, improved w/ treatment of GVHD. TSH normal 2/28. Restarted Gabapentin but will taper.    Needs ENT and Derm f/u    Plan:     RTC: 8/16 with Dr. Pinto. Can be seen sooner if needed.   Suggest use Cane. Refer to wound care,

## 2018-06-21 NOTE — TELEPHONE ENCOUNTER
Spoke with pt. He told me he had scrapped his arm after falling on cement a couple of days ago. It was cleaned wrapped right away. Yesterday it was seen in BMT clinic who asked me to see the pt.    Pt prefers not to come in for this. His wife is a plastic surgery nurse and they are comfortable taking care of this themselves. I did make the suggestion to make sure it is cleaned with soap or wound cleanser and to use a vaseline dressing: his wife was familiar with this. I encouraged to call me back if the area becomes painful, red swollen or gets more drainage. Pt understands these orders. Megan Benoit RN [facility]      ----- Message from Dung Jon sent at 6/21/2018  2:36 PM CDT -----  Regarding: R Arm and Leg skin tears  Dr. Millie Lowery was wondering if there's any way your could see Deejay today. If not, are you able to see him on Monday? I saw you have an 0830, but he is seeing IR venous stasis at 0820 (it's the only time they had).     ~Dung (BMT Sched)

## 2018-06-21 NOTE — NURSING NOTE
"Oncology Rooming Note    June 21, 2018 1:28 PM   Deejay Dior is a 67 year old male who presents for:    Chief Complaint   Patient presents with     Blood Draw     labs drawn via venipuncture by RN     RECHECK     Return: MDS (myelodysplastic syndrome)     Initial Vitals: /67 (BP Location: Right arm, Patient Position: Chair, Cuff Size: Adult Large)  Pulse 78  Temp 97.5  F (36.4  C) (Oral)  Ht 1.73 m (5' 8.11\")  Wt 96.5 kg (212 lb 12.8 oz)  SpO2 96%  BMI 32.25 kg/m2 Estimated body mass index is 32.25 kg/(m^2) as calculated from the following:    Height as of this encounter: 1.73 m (5' 8.11\").    Weight as of this encounter: 96.5 kg (212 lb 12.8 oz). Body surface area is 2.15 meters squared.  Moderate Pain (5) Comment: Data Unavailable   No LMP for male patient.  Allergies reviewed: Yes  Medications reviewed: Yes    Medications: Medication refills not needed today.  Pharmacy name entered into [employer]:    [id number] - [place], [place] - [address] [facility] [address] [facility] [address] [id number] - [place], [place] - [address] [place] [address]     Clinical concerns: N/A    5 minutes for nursing intake (face to face time)     Miriam Lehman CMA              "

## 2018-06-21 NOTE — NURSING NOTE
Patient had wound on his right arm, skin tear all the way down the outside of the arm. There was no drainage. There is some loose skin. Wound was cleaned with normal saline, and it was wrapped with non-adherence guaze and bacitracin then wrapped as well. Patient also has a wound on his right leg as well, and RN put Bacitracin on it and then a non-adherence dressing on it as well and that was wrapped. Patient tolerated it well without any issues or side affects. Also the patient was sent home with extra supplies as well.

## 2018-06-22 LAB
EBV DNA # SPEC NAA+PROBE: NORMAL {COPIES}/ML
EBV DNA SPEC NAA+PROBE-LOG#: NORMAL {LOG_COPIES}/ML

## 2018-06-23 DIAGNOSIS — R60.0 BILATERAL LOWER EXTREMITY EDEMA: Primary | ICD-10-CM

## 2018-06-25 ENCOUNTER — CARE COORDINATION (OUTPATIENT)
Dept: TRANSPLANT | Facility: CLINIC | Age: 68
End: 2018-06-25

## 2018-06-25 ENCOUNTER — OFFICE VISIT (OUTPATIENT)
Dept: RADIOLOGY | Facility: CLINIC | Age: 68
End: 2018-06-25
Payer: COMMERCIAL

## 2018-06-25 VITALS
SYSTOLIC BLOOD PRESSURE: 114 MMHG | HEART RATE: 62 BPM | OXYGEN SATURATION: 100 % | RESPIRATION RATE: 17 BRPM | DIASTOLIC BLOOD PRESSURE: 70 MMHG

## 2018-06-25 DIAGNOSIS — D46.9 MDS (MYELODYSPLASTIC SYNDROME) (H): ICD-10-CM

## 2018-06-25 ASSESSMENT — PAIN SCALES - GENERAL: PAINLEVEL: MILD PAIN (2)

## 2018-06-25 NOTE — LETTER
6/25/2018       RE: Deejay Dior  [id number] [address] [facility] [address]     Dear Colleague,    Thank you for referring your patient, Deejay Dior, to the [facility] at [facility]. Please see a copy of my visit note below.        INTERVENTIONAL RADIOLOGY CONSULTATION    Name: Deejay Dior  Age: 67 year old   Referring Physician: Dr. Pinto   REASON FOR REFERRAL: possible venous insufficiency     HPI:     Mr. 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  He is referred by Dr. Pinto of Hematology for complaints of leg swelling, shin sores, peeling/flaking skin, and erythema. More bothersome to him is the leg heaviness and fatigue that accompanies this. There is also sharp nagging pain over his medial shins which worsens with other symptoms.  Symptoms have been present and worsening for several years. He reports prior venous stripping at another facility several years back, but cannot tell me which side, or exactly when this occurred.      He has a positive family history of VVs in mother and father. No history of trauma.  He has had upper extremity DVT which was line associated.  He completed 6 months of coumadin therapy for this.  No LE DVT or PE. No personal or family history of clotting disorder?      He does have a history of sleep apnea, using Bipap since 2004.     He has a long history of compression therapy with both compression garments and lymphedema wraps. These provide incomplete relief.    No history of claudication, chest pain/MI, TIA/Stroke, post prandial angina.     He has had LE wounds due to numerous falls, but these seem to heal appropriately. No spontaneous ulcers.     PAST MEDICAL HISTORY:   Past Medical History:   Diagnosis Date     Head injury 1964     Hearing problem Hearing aids 2015     History of blood transfusion 3/2014     History of radiation therapy June 2014     Immunosuppression (H) Sirolimus  daily     MDS (myelodysplastic syndrome) (H)      Numbness and tingling     feet     Obstructive sleep apnea 1999     Pneumonia      Reduced vision August 2016    Cataract surgery     Sleep apnea 1999     Transplant July 1, 2014    Stem Cells       PAST SURGICAL HISTORY:   Past Surgical History:   Procedure Laterality Date     BACK SURGERY       BIOPSY       BMT CELL PRODUCT INFUSION       ESOPHAGOSCOPY, GASTROSCOPY, DUODENOSCOPY (EGD), COMBINED N/A 2/2/2015     ESOPHAGOSCOPY, GASTROSCOPY, DUODENOSCOPY (EGD), COMBINED Left 8/6/2015    Procedure: COMBINED ESOPHAGOSCOPY, GASTROSCOPY, DUODENOSCOPY (EGD), BIOPSY SINGLE OR MULTIPLE;  Surgeon: Amber Francis MD;  Location: [facility]     EXCISE LESION LIP N/A 1/2/2017    Procedure: EXCISE LESION LIP;  Surgeon: Tim Yanez MD;  [facility]     EYE SURGERY       FLEXIBLE SIGMOIDOSCOPY  2/2/15     HEMORRHOIDECTOMY  2001     PHACOEMULSIFICATION CLEAR CORNEA WITH STANDARD INTRAOCULAR LENS IMPLANT Left 7/18/2016    Procedure: PHACOEMULSIFICATION CLEAR CORNEA WITH STANDARD INTRAOCULAR LENS IMPLANT;  Surgeon: Randolph Giles MD;  [facility]     TONSILLECTOMY  1953     TRANSPLANT  7-1-2014    Stem Cell Transplant [facility] BMT     VASCULAR SURGERY Left 2009       FAMILY HISTORY:   Family History   Problem Relation Age of Onset     Breast Cancer Mother      Cancer Mother      1977     Cerebrovascular Disease Mother      1977     Cancer Father      1987     Hypertension Brother      HEART DISEASE Brother      Hyperlipidemia Brother      Depression Brother      HEART DISEASE Brother      2016     Hypertension Brother      1985     Glaucoma No family hx of      Macular Degeneration No family hx of        SOCIAL HISTORY:   Social History   Substance Use Topics     Smoking status: Former Smoker     Packs/day: 1.00     Years: 34.00     Types: Cigarettes     Start date: 12/1/1967     Quit date: 4/1/2001     Smokeless tobacco: Never Used      Comment: quit in  2000     Alcohol use 2.5 oz/week      Comment: Seldom       PROBLEM LIST:   Patient Active Problem List    Diagnosis Date Noted     Primary hypogonadism in male 03/14/2017     Priority: Medium     Deep vein thrombosis (DVT) (H) 06/22/2016     Priority: Medium     Long-term (current) use of anticoagulants [Z79.01] 05/27/2016     Priority: Medium     Acute deep vein thrombosis (DVT) of distal vein of right lower extremity (H) 05/16/2016     Priority: Medium     Fever, unspecified 05/07/2016     Priority: Medium     Influenza A (H1N1) 03/18/2016     Priority: Medium     Fatigue 12/11/2015     Priority: Medium     Secondary adrenal insufficiency (H) 12/11/2015     Priority: Medium     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     Priority: Medium     Pneumonia 11/30/2014     Priority: Medium     MDS (myelodysplastic syndrome) (H) 06/11/2014     Priority: Medium       MEDICATIONS:   Prescription Medications as of 6/25/2018             acyclovir (ZOVIRAX) 800 MG tablet TAKE ONE TABLET BY MOUTH THREE TIMES DAILY     amoxicillin (AMOXIL) 500 MG capsule Take 4 pills (2 grams) day of dental work    bumetanide (BUMEX) 0.5 MG tablet Take one tablet by mouth once or twice weekly    calcium carbonate-vitamin D 500-400 MG-UNIT TABS tablt Take 1 tablet by mouth daily 2000 mg    cephALEXin (KEFLEX) 500 MG capsule Take 1 capsule (500 mg) by mouth 2 times daily    CLINDAMYCIN HCL PO Take by mouth 4 times daily Ten day course only, started 4/20/18.    fluconazole (DIFLUCAN) 100 MG tablet Take 1 tablet (100 mg) by mouth daily    levofloxacin (LEVAQUIN) 250 MG tablet TAKE ONE TABLET BY MOUTH ONE TIME DAILY     ORDER FOR DME Please provide a [employer] cane offset with [facility] item number [id number]    pantoprazole (PROTONIX) 20 MG EC tablet Take 1 tablet (20 mg) by mouth daily    sertraline (ZOLOFT) 100 MG tablet Take 1 tablet (100 mg) by mouth daily    sirolimus (GENERIC EQUIVALENT) 0.5 MG tablet Take 1 tablet (0.5 mg) by mouth daily once  instructed by the [facility] clinic    sirolimus (GENERIC EQUIVALENT) 0.5 MG tablet Take 1 tablet (0.5 mg) by mouth daily once instructed by the [facility] clinic    sulfamethoxazole-trimethoprim (BACTRIM DS/SEPTRA DS) 800-160 MG per tablet Take one tab by mouth twice daily on Monday and Tuesdays only          ALLERGIES:   Blood transfusion related (informational only)    ROS:  See HPI for relevant ROS    Physical Examination:   /70 (BP Location: Left arm)  Pulse 62  Resp 17  SpO2 100%  Constitutional: healthy, alert and no distress  Musculoskeletal: No gross deformities.   Skin: hyperpigmentation over both shins, R>L.  Additionally, there is lipodermatosclerosis and scaling over the right anterior and medial shin. Healing wounds over the right shin and knee related to recent injuries.   Psychiatric: affect normal/bright and mentation appears normal.  Hematologic/Lymphatic/Immunologic: 1+ pitting edema of the left calf and foot. Non pitting right calf and foot edema.   Vascular: bulging varicosities of medial thighs bilaterally L>R. Bulging varicosities over the medial left calf and anterior shin.       Labs:    BMP RESULTS:  Lab Results   Component Value Date     06/21/2018    POTASSIUM 3.9 06/21/2018    CHLORIDE 109 06/21/2018    CO2 24 06/21/2018    ANIONGAP 6 06/21/2018     (H) 06/21/2018    BUN 16 06/21/2018    CR 0.91 06/21/2018    GFRESTIMATED 82 06/21/2018    GFRESTBLACK >90 06/21/2018    JOE 8.7 06/21/2018        CBC RESULTS:  Lab Results   Component Value Date    WBC 6.1 06/21/2018    RBC 4.74 06/21/2018    HGB 13.7 06/21/2018    HCT 41.3 06/21/2018    MCV 87 06/21/2018    MCH 28.9 06/21/2018    MCHC 33.2 06/21/2018    RDW 14.7 06/21/2018     06/21/2018       INR/PTT:  Lab Results   Component Value Date    INR 0.95 06/30/2016    PTT 47 (H) 04/04/2016       Diagnostic studies: NO comp study currently available.    Photos:                                Assessment: Bilateral changes consistent  with C4 venous insufficiency.  Accompanying symptoms are lifestyle limiting. This has had incomplete response to long term compression therapy with both stockings and lymphedema specialty wraps.  He has an uncertain history of venous stripping.  We spoke in generics about endovenous therapy, but have no current comp study on which to base therapy.     Plan Bilateral venous comp study. We will review this once complete.     I spent a total of 30 minutes with this patient, > 50% of which was spent counseling.     Aston Dumont MD      Patient Care Team:  Vivek Callejas MD as PCP - General (Internal Medicine)  Tabatha Mckeon as Referring Physician (Hematology & Oncology)  Aby Pinto MD as BMT Physician (Transplant)  Josey Means MD as MD (Ophthalmology)  Jyoti Borjas APRN CNP as Nurse Practitioner (Oncology)  Kenia Westbrook MD as MD (INTERNAL MEDICINE - ENDOCRINOLOGY, DIABETES & METABOLISM)  Kya Arellano MD as Resident  Julisa Hernanedz LICSW as   Aston Dumont MD as Resident (Radiology)

## 2018-06-25 NOTE — PROGRESS NOTES
INTERVENTIONAL RADIOLOGY CONSULTATION    Name: Deejay Dior  Age: 67 year old   Referring Physician: Dr. Pinto   REASON FOR REFERRAL: possible venous insufficiency     HPI:     Mr. 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  He is referred by Dr. Pinto of Hematology for complaints of leg swelling, shin sores, peeling/flaking skin, and erythema. More bothersome to him is the leg heaviness and fatigue that accompanies this. There is also sharp nagging pain over his medial shins which worsens with other symptoms.  Symptoms have been present and worsening for several years. He reports prior venous stripping at another facility several years back, but cannot tell me which side, or exactly when this occurred.      He has a positive family history of VVs in mother and father. No history of trauma.  He has had upper extremity DVT which was line associated.  He completed 6 months of coumadin therapy for this.  No LE DVT or PE. No personal or family history of clotting disorder?      He does have a history of sleep apnea, using Bipap since 2004.     He has a long history of compression therapy with both compression garments and lymphedema wraps. These provide incomplete relief.    No history of claudication, chest pain/MI, TIA/Stroke, post prandial angina.     He has had LE wounds due to numerous falls, but these seem to heal appropriately. No spontaneous ulcers.     PAST MEDICAL HISTORY:   Past Medical History:   Diagnosis Date     Head injury 1964     Hearing problem Hearing aids 2015     History of blood transfusion 3/2014     History of radiation therapy June 2014     Immunosuppression (H) Sirolimus daily     MDS (myelodysplastic syndrome) (H)      Numbness and tingling     feet     Obstructive sleep apnea 1999     Pneumonia      Reduced vision August 2016    Cataract surgery     Sleep apnea 1999     Transplant July 1, 2014    Stem Cells       PAST SURGICAL HISTORY:   Past  Surgical History:   Procedure Laterality Date     BACK SURGERY       BIOPSY       BMT CELL PRODUCT INFUSION       ESOPHAGOSCOPY, GASTROSCOPY, DUODENOSCOPY (EGD), COMBINED N/A 2/2/2015     ESOPHAGOSCOPY, GASTROSCOPY, DUODENOSCOPY (EGD), COMBINED Left 8/6/2015    Procedure: COMBINED ESOPHAGOSCOPY, GASTROSCOPY, DUODENOSCOPY (EGD), BIOPSY SINGLE OR MULTIPLE;  Surgeon: Amber Francis MD;  Location: [facility]     EXCISE LESION LIP N/A 1/2/2017    Procedure: EXCISE LESION LIP;  Surgeon: Tim Yanez MD;  [facility]     EYE SURGERY       FLEXIBLE SIGMOIDOSCOPY  2/2/15     HEMORRHOIDECTOMY  2001     PHACOEMULSIFICATION CLEAR CORNEA WITH STANDARD INTRAOCULAR LENS IMPLANT Left 7/18/2016    Procedure: PHACOEMULSIFICATION CLEAR CORNEA WITH STANDARD INTRAOCULAR LENS IMPLANT;  Surgeon: Randolph Giles MD;  [facility]     TONSILLECTOMY  1953     TRANSPLANT  7-1-2014    Stem Cell Transplant [facility] BMT     VASCULAR SURGERY Left 2009       FAMILY HISTORY:   Family History   Problem Relation Age of Onset     Breast Cancer Mother      Cancer Mother      1977     Cerebrovascular Disease Mother      1977     Cancer Father      1987     Hypertension Brother      HEART DISEASE Brother      Hyperlipidemia Brother      Depression Brother      HEART DISEASE Brother      2016     Hypertension Brother      1985     Glaucoma No family hx of      Macular Degeneration No family hx of        SOCIAL HISTORY:   Social History   Substance Use Topics     Smoking status: Former Smoker     Packs/day: 1.00     Years: 34.00     Types: Cigarettes     Start date: 12/1/1967     Quit date: 4/1/2001     Smokeless tobacco: Never Used      Comment: quit in 2000     Alcohol use 2.5 oz/week      Comment: Seldom       PROBLEM LIST:   Patient Active Problem List    Diagnosis Date Noted     Primary hypogonadism in male 03/14/2017     Priority: Medium     Deep vein thrombosis (DVT) (H) 06/22/2016     Priority: Medium     Long-term  (current) use of anticoagulants [Z79.01] 05/27/2016     Priority: Medium     Acute deep vein thrombosis (DVT) of distal vein of right lower extremity (H) 05/16/2016     Priority: Medium     Fever, unspecified 05/07/2016     Priority: Medium     Influenza A (H1N1) 03/18/2016     Priority: Medium     Fatigue 12/11/2015     Priority: Medium     Secondary adrenal insufficiency (H) 12/11/2015     Priority: Medium     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     Priority: Medium     Pneumonia 11/30/2014     Priority: Medium     MDS (myelodysplastic syndrome) (H) 06/11/2014     Priority: Medium       MEDICATIONS:   Prescription Medications as of 6/25/2018             acyclovir (ZOVIRAX) 800 MG tablet TAKE ONE TABLET BY MOUTH THREE TIMES DAILY     amoxicillin (AMOXIL) 500 MG capsule Take 4 pills (2 grams) day of dental work    bumetanide (BUMEX) 0.5 MG tablet Take one tablet by mouth once or twice weekly    calcium carbonate-vitamin D 500-400 MG-UNIT TABS tablt Take 1 tablet by mouth daily 2000 mg    cephALEXin (KEFLEX) 500 MG capsule Take 1 capsule (500 mg) by mouth 2 times daily    CLINDAMYCIN HCL PO Take by mouth 4 times daily Ten day course only, started 4/20/18.    fluconazole (DIFLUCAN) 100 MG tablet Take 1 tablet (100 mg) by mouth daily    levofloxacin (LEVAQUIN) 250 MG tablet TAKE ONE TABLET BY MOUTH ONE TIME DAILY     ORDER FOR DME Please provide a [employer] cane offset with strap item number [id number]    pantoprazole (PROTONIX) 20 MG EC tablet Take 1 tablet (20 mg) by mouth daily    sertraline (ZOLOFT) 100 MG tablet Take 1 tablet (100 mg) by mouth daily    sirolimus (GENERIC EQUIVALENT) 0.5 MG tablet Take 1 tablet (0.5 mg) by mouth daily once instructed by the BMT clinic    sirolimus (GENERIC EQUIVALENT) 0.5 MG tablet Take 1 tablet (0.5 mg) by mouth daily once instructed by the BMT clinic    sulfamethoxazole-trimethoprim (BACTRIM DS/SEPTRA DS) 800-160 MG per tablet Take one tab by mouth twice daily on Monday and  Tuesdays only          ALLERGIES:   Blood transfusion related (informational only)    ROS:  See HPI for relevant ROS    Physical Examination:   /70 (BP Location: Left arm)  Pulse 62  Resp 17  SpO2 100%  Constitutional: healthy, alert and no distress  Musculoskeletal: No gross deformities.   Skin: hyperpigmentation over both shins, R>L.  Additionally, there is lipodermatosclerosis and scaling over the right anterior and medial shin. Healing wounds over the right shin and knee related to recent injuries.   Psychiatric: affect normal/bright and mentation appears normal.  Hematologic/Lymphatic/Immunologic: 1+ pitting edema of the left calf and foot. Non pitting right calf and foot edema.   Vascular: bulging varicosities of medial thighs bilaterally L>R. Bulging varicosities over the medial left calf and anterior shin.       Labs:    BMP RESULTS:  Lab Results   Component Value Date     06/21/2018    POTASSIUM 3.9 06/21/2018    CHLORIDE 109 06/21/2018    CO2 24 06/21/2018    ANIONGAP 6 06/21/2018     (H) 06/21/2018    BUN 16 06/21/2018    CR 0.91 06/21/2018    GFRESTIMATED 82 06/21/2018    GFRESTBLACK >90 06/21/2018    JOE 8.7 06/21/2018        CBC RESULTS:  Lab Results   Component Value Date    WBC 6.1 06/21/2018    RBC 4.74 06/21/2018    HGB 13.7 06/21/2018    HCT 41.3 06/21/2018    MCV 87 06/21/2018    MCH 28.9 06/21/2018    MCHC 33.2 06/21/2018    RDW 14.7 06/21/2018     06/21/2018       INR/PTT:  Lab Results   Component Value Date    INR 0.95 06/30/2016    PTT 47 (H) 04/04/2016       Diagnostic studies: NO comp study currently available.    Photos:                                Assessment: Bilateral changes consistent with C4 venous insufficiency.  Accompanying symptoms are lifestyle limiting. This has had incomplete response to long term compression therapy with both stockings and lymphedema specialty wraps.  He has an uncertain history of venous stripping.  We spoke in generics about  endovenous therapy, but have no current comp study on which to base therapy.     Plan Bilateral venous comp study. We will review this once complete.     I spent a total of 30 minutes with this patient, > 50% of which was spent counseling.         CC  Patient Care Team:  Vivek Callejas MD as PCP - General (Internal Medicine)  Tabatha Mckeon as Referring Physician (Hematology & Oncology)  Aby Porras MD as BMT Physician (Transplant)  Josey Means MD as MD (Ophthalmology)  Jyoti Borjas APRN CNP as Nurse Practitioner (Oncology)  Jyoti Borjas APRN CNP as Referring Physician (Oncology)  Kenia Westbrook MD as MD (INTERNAL MEDICINE - ENDOCRINOLOGY, DIABETES & METABOLISM)  Kya Arellano MD as Resident  Julisa Hernandez LICSW as   Rea Garcia MS as   Aston Dumont MD as Resident (Radiology)  EMILY PORRAS    Addendum 7/13/2018:     Venous competency study was completed on 7/2/2018 revealing:     Right:   - No DVT.   - Minor segmental deep venous incompetence.   - Superficial incompetence involving the GSV in the prox and mid thigh, prox calf, and ankle. All of these incompetent segments have associated incompetent branches. There are also incompetent GSV associated perforators in the mid thigh and at the ankle.      Left:     - No DVT.   - Superficial incompetence involving the GSV in from the SFJ through mid thigh, and from the knee through the ankle.  The mid and distal thigh GSV is not seen presumably related to prior reported stripping. There are associated incompetent branches of the GSV. There is an incompetent GSV associated  just above the ankle.   - Partial thrombosis of the SSV in the calf, chronic appearing.     Based on the clinical history and the completed competency study, this patient would benefit from bilateral therapy. This would include:    Right:   Laser ablation if the thigh  GSV.   Direct stick sclerotherapy of the GSV in the calf and ankle.    Direct stick sclerotherapy of GSV associated branches and perforators.     Left:    laser ablation of the remainder of the left thigh GSV  Direct stick sclerotherapy of the GSV of the knee, calf, and ankle GSV.    Direct stick sclerotherapy of GSV associated branches and ankle .     It should be noted that the patient does have some degree of deep venous insufficiency bilaterally and will likely need long term compression therapy to prevent recurrent issues related to venous hypertension.

## 2018-06-25 NOTE — NURSING NOTE
Chief Complaint   Patient presents with     Consult     Left leg swelling and pain        Vitals:    06/25/18 0816   BP: 114/70   BP Location: Left arm   Pulse: 62   Resp: 17   SpO2: 100%       There is no height or weight on file to calculate BMI.            Isis Templeton LPN

## 2018-06-25 NOTE — MR AVS SNAPSHOT
After Visit Summary   6/25/2018    Deejay Dior    MRN: [id number]           Patient Information     Date Of Birth          1950        Visit Information        Provider Department      6/25/2018 8:20 AM Aston Dumont MD [facility]        Today's Diagnoses     MDS (myelodysplastic syndrome)          Care Instructions      At your visit today, we discussed your risk for falls and preventive options.    Fall Prevention  Falls often occur due to slipping, tripping or losing your balance. Millions of people fall every year and injure themselves. Here are ways to reduce your risk of falling again.    Think about your fall, was there anything that caused your fall that can be fixed, removed, or replaced?    Make your home safe by keeping walkways clear of objects you may trip over.    Use non-slip pads under rugs. Do not use area rugs or small throw rugs.    Use non-slip mats in bathtubs and showers.    Install handrails and lights on staircases.    Do not walk in poorly lit areas.    Do not stand on chairs or wobbly ladders.    Use caution when reaching overhead or looking upward. This position can cause a loss of balance.    Be sure your shoes fit properly, have non-slip bottoms and are in good condition.     Wear shoes both inside and out. Avoid going barefoot or wearing slippers.    Be cautious when going up and down stairs, curbs, and when walking on uneven sidewalks.    If your balance is poor, consider using a cane or walker.    If your fall was related to alcohol use, stop or limit alcohol intake.     If your fall was related to use of sleeping medicines, talk to your doctor about this. You may need to reduce your dosage at bedtime if you awaken during the night to go to the bathroom.      To reduce the need for nighttime bathroom trips:  ? Avoid drinking fluids for several hours before going to bed  ? Empty your bladder before going to bed  ? Men can keep a urinal at  the bedside    Stay as active as you can. Balance, flexibility, strength, and endurance all come from exercise. They all play a role in preventing falls. Ask your healthcare provider which types of activity are right for you.    Get your vision checked on a regular basis.    If you have pets, know where they are before you stand up or walk so you don't trip over them.    Use night lights.  Date Last Reviewed: 11/5/2015 2000-2017 [employer]. [address] [place] [address]. All rights reserved. This information is not intended as a substitute for professional medical care. Always follow your healthcare professional's instructions.        *We have scheduled you for your US Comp test tomorrow at 4pm. This is the soonest that we could get you scheduled for.     *please check into the 1st floor Imaging at 345pm.   Location: [address] [address].     Should you need to reschedule then please call the Imaging line at [phone].     Thank you,   Radha Nix RN, BSN  Interventional Radiology Nurse Coordinator   Phone: [phone]                  Follow-ups after your visit        Your next 10 appointments already scheduled     Jun 26, 2018  4:00 PM CDT   US LOWER EXTREMITY VENOUS COMPETENCY BILATERAL with [id number]   [facility] US ([facility])    [address] [place] [address] [phone]           Please bring a list of your medicines (including vitamins, minerals and over-the-counter drugs). Also, tell your doctor about any allergies you may have. Wear comfortable clothes and leave your valuables at home.  You do not need to do anything special to prepare for your exam.  Please call the Imaging Department at your exam site with any questions.            Aug 14, 2018 10:30 AM CDT   (Arrive by 10:15 AM)   RETURN TUMOR VISIT with Tim Yanez MD   [facility] Ear Nose [facility] Throat ([facility] [place])    [address]  4th Floor  [facility] [id number] [phone]            Aug 16, 2018 12:00 PM CDT   Return with Aby Pinto MD   [facility] Blood and Marrow Transplant ([facility] [place])    [address]  [address]  [facility] [id number] [phone]              Who to contact     Please call your clinic at [phone] to:    Ask questions about your health    Make or cancel appointments    Discuss your medicines    Learn about your test results    Speak to your doctor            Additional Information About Your Visit        [email or web address] Information     [employer] gives you secure access to your electronic health record. If you see a primary care provider, you can also send messages to your care team and make appointments. If you have questions, please call your primary care clinic.  If you do not have a primary care provider, please call [phone] and they will assist you.      [employer] is an electronic gateway that provides easy, online access to your medical records. With [employer], you can request a clinic appointment, read your test results, renew a prescription or communicate with your care team.     To access your existing account, please contact your [facility] or call [phone] for assistance.        Care EveryWhere ID     This is your Care EveryWhere ID. This could be used by other organizations to access your [place] medical records  [id number]        Your Vitals Were     Pulse Respirations Pulse Oximetry             62 17 100%          Blood Pressure from Last 3 Encounters:   06/25/18 114/70   06/21/18 100/67   04/26/18 105/68    Weight from Last 3 Encounters:   06/21/18 96.5 kg (212 lb 12.8 oz)   04/26/18 98.7 kg (217 lb 11.2 oz)   03/29/18 95.3 kg (210 lb 1.6 oz)              We Performed the Following     IR Consultation for IR Exam        Primary Care Provider Office Phone # Fax #    Aok Omlo  MD Britta [phone]       [facility] [address] [place] [address]        Equal Access to Services     RABIA PEÑA : Edil lisa kincaid ac Doali, waairamda shannanfrantzha, qasofita kaalmada karma, corinna langley alexjose lock langhiawally adamson. So [facility] [phone].    ATENCIÓN: Si habla español, tiene a del toro disposición servicios gratuitos de asistencia lingüística. Llame al [phone].    We comply with applicable federal civil rights laws and [place] laws. We do not discriminate on the basis of race, color, national origin, age, disability, sex, sexual orientation, or gender identity.            Thank you!     Thank you for choosing [facility]  for your care. Our goal is always to provide you with excellent care. Hearing back from our patients is one way we can continue to improve our services. Please take a few minutes to complete the written survey that you may receive in the mail after your visit with us. Thank you!             Your Updated Medication List - Protect others around you: Learn how to safely use, store and throw away your medicines at www.disposemymeds.org.          This list is accurate as of 6/25/18  8:57 AM.  Always use your most recent med list.                   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), MDS (myelodysplastic syndrome) (H)       amoxicillin 500 MG capsule    AMOXIL    16 capsule    Take 4 pills (2 grams) day of dental work    MDS (myelodysplastic syndrome) (H)       bumetanide 0.5 MG tablet    BUMEX    30 tablet    Take one tablet by mouth once or twice weekly    MDS (myelodysplastic syndrome) (H)       calcium carbonate-vitamin D 500-400 MG-UNIT Tabs per tablet     180 tablet    Take 1 tablet by mouth daily 2000 mg    MDS (myelodysplastic syndrome) (H), Acute sftxf-qgpwzw-jycu disease (H),  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), Status post bone marrow transplant (H)       sertraline 100 MG tablet    ZOLOFT    30 tablet    Take 1 tablet (100 mg) by mouth daily    MDS (myelodysplastic syndrome) (H), 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), Urinary frequency, Other complication of bone marrow transplant (H)       * sirolimus 0.5 MG tablet    GENERIC EQUIVALENT    30 tablet    Take 1 tablet (0.5 mg) by mouth daily once instructed by the BMT clinic    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 Read the directions carefully, and ask your doctor or other care provider to review them with you.

## 2018-06-25 NOTE — PROGRESS NOTES
Wife called asking for a referral to [facility] in [facility] [phone] for physical therapy for weakness and balance issues. Referral was faxed to [phone]. Patient has appointment on 6/28/18.

## 2018-06-25 NOTE — PATIENT INSTRUCTIONS
At your visit today, we discussed your risk for falls and preventive options.    Fall Prevention  Falls often occur due to slipping, tripping or losing your balance. Millions of people fall every year and injure themselves. Here are ways to reduce your risk of falling again.    Think about your fall, was there anything that caused your fall that can be fixed, removed, or replaced?    Make your home safe by keeping walkways clear of objects you may trip over.    Use non-slip pads under rugs. Do not use area rugs or small throw rugs.    Use non-slip mats in bathtubs and showers.    Install handrails and lights on staircases.    Do not walk in poorly lit areas.    Do not stand on chairs or wobbly ladders.    Use caution when reaching overhead or looking upward. This position can cause a loss of balance.    Be sure your shoes fit properly, have non-slip bottoms and are in good condition.     Wear shoes both inside and out. Avoid going barefoot or wearing slippers.    Be cautious when going up and down stairs, curbs, and when walking on uneven sidewalks.    If your balance is poor, consider using a cane or walker.    If your fall was related to alcohol use, stop or limit alcohol intake.     If your fall was related to use of sleeping medicines, talk to your doctor about this. You may need to reduce your dosage at bedtime if you awaken during the night to go to the bathroom.      To reduce the need for nighttime bathroom trips:  ? Avoid drinking fluids for several hours before going to bed  ? Empty your bladder before going to bed  ? Men can keep a urinal at the bedside    Stay as active as you can. Balance, flexibility, strength, and endurance all come from exercise. They all play a role in preventing falls. Ask your healthcare provider which types of activity are right for you.    Get your vision checked on a regular basis.    If you have pets, know where they are before you stand up or walk so you don't trip over  them.    Use night lights.  Date Last Reviewed: 11/5/2015 2000-2017 [employer]. [address] [place] [address], [place] [address]. All rights reserved. This information is not intended as a substitute for professional medical care. Always follow your healthcare professional's instructions.        *We have scheduled you for your US Comp test tomorrow at 4pm. This is the soonest that we could get you scheduled for.     *please check into the 1st floor Imaging at 345pm.   Location: [place] [address].     Should you need to reschedule then please call the Imaging line at [phone].     Thank you,   Radha Nix , RN, BSN  Interventional Radiology Nurse Coordinator   Phone: [phone]

## 2018-07-02 ENCOUNTER — RADIANT APPOINTMENT (OUTPATIENT)
Dept: ULTRASOUND IMAGING | Facility: CLINIC | Age: 68
End: 2018-07-02
Attending: RADIOLOGY
Payer: COMMERCIAL

## 2018-07-02 DIAGNOSIS — R60.0 BILATERAL LOWER EXTREMITY EDEMA: ICD-10-CM

## 2018-07-10 NOTE — PROGRESS NOTES
HISTORY OF PRESENT ILLNESS:  Deejay Dior is 66 years of age.  He is here for 3 month follow-up today.  He has a history of a lip lesion that was precancerous.  He has no new complaints at the present time today except right scalp lesion. .  He is otherwise getting by quite well.  He is status post bone marrow transplant.      PHYSICAL EXAMINATION:  The patient is alert, oriented x3 and pleasant.  Skin of the face, lips, and neck on him is quite normal.  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  There are no other masses or lesions seen.  Extraocular motions are intact.  Facial nerve function is intact.  The remainder of the cranial nerve function is intact.  There is no other oral leukoplakia.  He has a little bit of a scaliness like a keratosis behind his right ear, but no other masses or adenopathy are seen.  Ear canals, tympanic membranes are normal as well. Today I used liquid N2 for the right scalp lesion, which was tolerated well.      ASSESSMENT:  Patient with a history of a lip precancerous lesion advanced.      PLAN:  We will see him again in three to 6 months.      FO/ms        119.9

## 2018-07-13 ENCOUNTER — TELEPHONE (OUTPATIENT)
Dept: INTERVENTIONAL RADIOLOGY/VASCULAR | Facility: CLINIC | Age: 68
End: 2018-07-13

## 2018-07-16 NOTE — TELEPHONE ENCOUNTER
Called and informed pt that Dr. Dumont did review his US comp test and that there is treatment for both legs.     Informed him that we will submit for Prior Auth and then once I hear back I will call him and we can decide on treatment.     He agrees to plan.     Radha Rodney RN, BSN  Interventional Radiology Nurse Coordinator   Phone: [phone]

## 2018-07-17 DIAGNOSIS — D46.9 MDS (MYELODYSPLASTIC SYNDROME) (H): ICD-10-CM

## 2018-07-17 DIAGNOSIS — R35.0 URINARY FREQUENCY: ICD-10-CM

## 2018-07-17 DIAGNOSIS — T86.09 OTHER COMPLICATION OF BONE MARROW TRANSPLANT (H): ICD-10-CM

## 2018-07-17 DIAGNOSIS — D89.813 GVH (GRAFT VERSUS HOST DISEASE) (H): ICD-10-CM

## 2018-07-17 RX ORDER — SERTRALINE HYDROCHLORIDE 100 MG/1
TABLET, FILM COATED ORAL
Qty: 30 TABLET | Refills: 5 | Status: SHIPPED | OUTPATIENT
Start: 2018-07-17 | End: 2019-03-11

## 2018-07-27 ENCOUNTER — VIRTUAL VISIT (OUTPATIENT)
Dept: TRANSPLANT | Facility: CLINIC | Age: 68
End: 2018-07-27
Payer: COMMERCIAL

## 2018-07-27 DIAGNOSIS — Z00.6 CLINICAL TRIAL PARTICIPANT: Primary | ICD-10-CM

## 2018-07-27 NOTE — MR AVS SNAPSHOT
After Visit Summary   7/27/2018    Deejay Dior    MRN: [id number]           Patient Information     Date Of Birth          1950        Visit Information        Provider Department      7/27/2018 2:20 PM Samantha Cracamo MD [facility] Blood and Marrow Transplant        Today's Diagnoses     Clinical trial participant    -  1          [facility] [place]  [address] [place] [address]  Phone: [phone]  Clinic Hours:   Monday-Thursday:7am to 7pm   Friday: 7am to 5pm   Weekends and holidays:    8am to noon (in general)  If your fever is 100.5  or greater,   call the clinic.  After hours call the   hospital at [phone] or   [phone]. Ask for the BMT   fellow on-call            Follow-ups after your visit        Your next 10 appointments already scheduled     Aug 16, 2018 12:00 PM CDT   Return with Aby Pinto MD   [facility] Blood and Marrow Transplant ([facility] [place])    [address] [place] [address]  [address] [place] [address] [phone]              Who to contact     If you have questions or need follow up information about today's clinic visit or your schedule please contact [facility]OD [facility]ANSPL[facility] directly at [phone].  Normal or non-critical lab and imaging results will be communicated to you by WayConnectedhart, letter or phone within 4 business days after the clinic has received the results. If you do not hear from us within 7 days, please contact the clinic through MyChart or phone. If you have a critical or abnormal lab result, we will notify you by phone as soon as possible.  Submit refill requests through [employer]ryMar[employer] or call your pharmacy and they will forward the refill request to us. Please allow 3 business days for your refill to be completed.          Additional Information About Your Visit        [employer] Information     [employer] gives you secure access to your electronic health record. If you see a  primary care provider, you can also send messages to your care team and make appointments. If you have questions, please call your primary care clinic.  If you do not have a primary care provider, please call [phone] and they will assist you.        Care EveryWhere ID     This is your Care EveryWhere ID. This could be used by other organizations to access your [place] medical records  [id number]         Blood Pressure from Last 3 Encounters:   06/25/18 114/70   06/21/18 100/67   04/26/18 105/68    Weight from Last 3 Encounters:   08/14/18 93 kg (205 lb)   06/21/18 96.5 kg (212 lb 12.8 oz)   04/26/18 98.7 kg (217 lb 11.2 oz)              Today, you had the following     No orders found for display       Recent Review Flowsheet Data     BMT Recent Results Latest Ref Rng & Units 2/27/2018 3/8/2018 3/15/2018 3/29/2018 4/2/2018 4/26/2018 6/21/2018    WBC 4.0 - 11.0 10e9/L 8.4 7.6 9.4 6.5 4.2 6.6 6.1    Hemoglobin 13.3 - 17.7 g/dL 15.8 15.9 15.6 15.7 14.6 14.5 13.7    Platelet Count 150 - 450 10e9/L 228 210 205 96(L) 105(L) 183 172    Neutrophils (Absolute) 1.6 - 8.3 10e9/L 5.6 4.3 7.7 3.6 2.6 3.4 3.2    INR 0.86 - 1.14 - - - - - - -    Sodium 133 - 144 mmol/L 140 138 139 140 - 139 138    Potassium 3.4 - 5.3 mmol/L 4.1 3.6 4.2 3.5 - 4.1 3.9    Chloride 94 - 109 mmol/L 108 107 108 108 - 106 109    Glucose 70 - 99 mg/dL 99 120(H) 109(H) 91 - 94 107(H)    Urea Nitrogen 7 - 30 mg/dL 17 22 25 25 - 18 16    Creatinine 0.66 - 1.25 mg/dL 0.97 1.07 0.88 0.87 - 0.99 0.91    Calcium (Total) 8.5 - 10.1 mg/dL 9.0 8.7 8.7 8.2(L) - 9.0 8.7    Protein (Total) 6.8 - 8.8 g/dL 7.6 7.3 6.7(L) 6.4(L) - 7.3 7.2    Albumin 3.4 - 5.0 g/dL 3.1(L) 3.0(L) 2.7(L) 2.8(L) - 3.0(L) 3.1(L)    Bilirubin (Direct) 0.0 - 0.2 mg/dL - - - - - - -    Alkaline Phosphatase 40 - 150 U/L 169(H) 159(H) 111 94 - 102 106    AST 0 - 45 U/L 107(H) 82(H) 29 33 - 38 44    ALT 0 - 70 U/L 90(H) 75(H) 46 58 - 33 29    MCV 78 - 100 fl 90 89 89 88 87 88 87                Primary Care Provider Office Phone # Fax #    Vivek Rafael Callejas [phone]       [facility] [address] [place] [address]        Equal Access to Services     RABIA PEÑA : Hadjennifer lisa kincaid wilmaro Sozachali, waaxda luqadaha, qaybta kaalmada adeegyada, corinna lock laLunachris adamson. So [facility] [phone].    ATENCIÓN: Si habla español, tiene a del toro disposición servicios gratuitos de asistencia lingüística. Llame al [phone].    We comply with applicable federal civil rights laws and [place] laws. We do not discriminate on the basis of race, color, national origin, age, disability, sex, sexual orientation, or gender identity.            Thank you!     Thank you for choosing [facility]ARROW [facility]ANSPL[facility]  for your care. Our goal is always to provide you with excellent care. Hearing back from our patients is one way we can continue to improve our services. Please take a few minutes to complete the written survey that you may receive in the mail after your visit with us. Thank you!             Your Updated Medication List - Protect others around you: Learn how to safely use, store and throw away your medicines at www.disposemymeds.org.          This list is accurate as of 7/27/18 11:59 PM.  Always use your most recent med list.                   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), MDS (myelodysplastic syndrome) (H)       amoxicillin 500 MG capsule    AMOXIL    16 capsule    Take 4 pills (2 grams) day of dental work    MDS (myelodysplastic syndrome) (H)       bumetanide 0.5 MG tablet    BUMEX    30 tablet    Take one tablet by mouth once or twice weekly    MDS (myelodysplastic syndrome) (H)       calcium carbonate-vitamin D 500-400 MG-UNIT Tabs per tablet     180 tablet    Take 1 tablet by mouth daily 2000 mg    MDS  (myelodysplastic syndrome) (H), Acute vihct-lasprp-xelx disease (H), 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), Status post bone marrow transplant (H)       sertraline 100 MG tablet    ZOLOFT    30 tablet    Take 1 tablet (100 mg) by mouth daily    MDS (myelodysplastic syndrome) (H), 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), Urinary frequency, Other complication of bone marrow transplant (H)       * sirolimus 0.5 MG tablet    GENERIC EQUIVALENT    30 tablet    Take 1 tablet (0.5 mg) by mouth daily once instructed by the BMT clinic    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 Read the directions carefully, and ask your doctor or other care provider to  review them with you.

## 2018-07-27 NOTE — PROGRESS NOTES
[id number]: Informed Consent Note   IRB Study Number: [id number]  The patient was provided with a copy of the IRB-approved consent form. The form was reviewed, and all questions were answered over the telephone. A copy of the signed form was provided to the patient. A second consent form will be signed by the patient when the patient provides the clinical sample from their home, and will be sent in with the sample. No procedures specific to this study were performed prior to the patient signing the consent form.    Consent Version Date: 07/27/18  Consent obtained by: Samantha Carcamo MD, PhD    Date: 07/27/18  HIPAA authorization signed?: Will be signed by patient in their home and returned with clinical sample collected by the patient   HIPAA authorization version date: 11/30/2017    Samantha Carcamo    Form 503.03.01 (Version 1)     Effective date: 0

## 2018-07-31 ENCOUNTER — TELEPHONE (OUTPATIENT)
Dept: INTERVENTIONAL RADIOLOGY/VASCULAR | Facility: CLINIC | Age: 68
End: 2018-07-31

## 2018-07-31 NOTE — TELEPHONE ENCOUNTER
Returned wife's phone call regarding next steps.     Informed her that I did submit this information to see if we can get a PA for treatment.    Informed her this can take awhile 7-14 days. Once this is approved then we will move forward and schedule the procedure.     Once I do heard back from their insurance company then I will call them and let them know.     She agrees to plan.     Radha Rodney RN, BSN  Interventional Radiology Nurse Coordinator   Phone: [phone]

## 2018-08-14 ENCOUNTER — OFFICE VISIT (OUTPATIENT)
Dept: OTOLARYNGOLOGY | Facility: CLINIC | Age: 68
End: 2018-08-14
Payer: COMMERCIAL

## 2018-08-14 ENCOUNTER — TELEPHONE (OUTPATIENT)
Dept: INTERVENTIONAL RADIOLOGY/VASCULAR | Facility: CLINIC | Age: 68
End: 2018-08-14

## 2018-08-14 VITALS — HEIGHT: 68 IN | BODY MASS INDEX: 31.07 KG/M2 | WEIGHT: 205 LBS

## 2018-08-14 DIAGNOSIS — K13.0 LIP LESION: Primary | ICD-10-CM

## 2018-08-14 ASSESSMENT — PAIN SCALES - GENERAL: PAINLEVEL: MILD PAIN (2)

## 2018-08-14 NOTE — PATIENT INSTRUCTIONS
Schmitz or cocoa butter to lip    Kayak for travel     Plan of care:  Follow up with Dr Yanez in 4 months    Clinic contact information:  1. To schedule an appointment or to speak to someone regarding your medical care, please call [phone], option #1  2. RadhaRN: [phone]  3. Surgery scheduling:      Tiffani Rodriguez: [phone]      Christina Villatoro: [phone]  4. Fax: [phone]  5. [phone]

## 2018-08-14 NOTE — MR AVS SNAPSHOT
After Visit Summary   8/14/2018    Deejay Dior    MRN: [id number]           Patient Information     Date Of Birth          1950        Visit Information        Provider Department      8/14/2018 10:30 AM Tim Yanez MD [facility] Ear Nose and Throat        Care Instructions    Shea or cocoa butter to lip    Kayak for travel           Follow-ups after your visit        Follow-up notes from your care team     Return in about 4 months (around 12/14/2018).      Your next 10 appointments already scheduled     Aug 16, 2018 12:00 PM CDT   Return with Aby Pinto MD   [facility] Blood and Marrow Transplant ([facility] [place])    [address]  [address]  [facility] [address] [phone]              Who to contact     Please call your clinic at [phone] to:    Ask questions about your health    Make or cancel appointments    Discuss your medicines    Learn about your test results    Speak to your doctor            Additional Information About Your Visit        [email or web address] Information     Convio gives you secure access to your electronic health record. If you see a primary care provider, you can also send messages to your care team and make appointments. If you have questions, please call your primary care clinic.  If you do not have a primary care provider, please call [phone] and they will assist you.      Convio is an electronic gateway that provides easy, online access to your medical records. With Convio, you can request a clinic appointment, read your test results, renew a prescription or communicate with your care team.     To access your existing account, please contact your [facility] or call [phone] for assistance.        Care EveryWhere ID     This is your Care EveryWhere ID. This could be used by other organizations to access your [place] medical records  [id number]        Your Vitals Were      "Height BMI (Body Mass Index)                1.727 m (5' 8\") 31.17 kg/m2           Blood Pressure from Last 3 Encounters:   06/25/18 114/70   06/21/18 100/67   04/26/18 105/68    Weight from Last 3 Encounters:   08/14/18 93 kg (205 lb)   06/21/18 96.5 kg (212 lb 12.8 oz)   04/26/18 98.7 kg (217 lb 11.2 oz)              Today, you had the following     No orders found for display       Primary Care Provider Office Phone # Fax #    Vivek Rafael Callejas [phone]       [facility] [address] [place] [address]        Equal Access to Services     [facility] : Hadii lisa urbano Somorris, waaxda luqadaha, qaybta kaalmada adeegyada, corinna barlow . So [facility] [phone].    ATENCIÓN: Si habla español, tiene a del toro disposición servicios gratuitos de asistencia lingüística. [facility] [phone].    We comply with applicable federal civil rights laws and [place] laws. We do not discriminate on the basis of race, color, national origin, age, disability, sex, sexual orientation, or gender identity.            Thank you!     Thank you for choosing [facility] EAR NOSE AND THROAT  for your care. Our goal is always to provide you with excellent care. Hearing back from our patients is one way we can continue to improve our services. Please take a few minutes to complete the written survey that you may receive in the mail after your visit with us. Thank you!             Your Updated Medication List - Protect others around you: Learn how to safely use, store and throw away your medicines at www.disposemymeds.org.          This list is accurate as of 8/14/18 11:10 AM.  Always use your most recent med list.                   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), MDS (myelodysplastic syndrome) (H)       amoxicillin 500 MG capsule "    AMOXIL    16 capsule    Take 4 pills (2 grams) day of dental work    MDS (myelodysplastic syndrome) (H)       bumetanide 0.5 MG tablet    BUMEX    30 tablet    Take one tablet by mouth once or twice weekly    MDS (myelodysplastic syndrome) (H)       calcium carbonate-vitamin D 500-400 MG-UNIT Tabs per tablet     180 tablet    Take 1 tablet by mouth daily 2000 mg    MDS (myelodysplastic syndrome) (H), Acute pnnbe-hssfls-yicj disease (H), 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), Status post bone marrow transplant (H)       sertraline 100 MG tablet    ZOLOFT    30 tablet    Take 1 tablet (100 mg) by mouth daily    MDS (myelodysplastic syndrome) (H), 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), Urinary frequency, Other complication of bone marrow transplant (H)       * sirolimus 0.5 MG tablet    GENERIC EQUIVALENT    30 tablet    Take 1 tablet (0.5 mg) by mouth daily once instructed by the BMT clinic    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 Read the directions carefully, and ask your doctor or other care provider to review them with you.

## 2018-08-14 NOTE — PROGRESS NOTES
HISTORY OF PRESENT ILLNESS:  Deejay is 67 years of age.  He is here for follow-up today.  He had an early lip cancer and is here for a routine followup after about four or five months.  No new complaints at the present time today.  He is otherwise getting by quite well.  He is, of course, a bone marrow transplant patient from the past.      PHYSICAL EXAMINATION:  The patient is alert, oriented x3 and pleasant.  Skin of the face, lips, and neck on him is reasonably normal.  The lip actually is very well-healed.  There is a little area of erythema where we did the resection, but nothing else that needs to be biopsied or anything at the present time today.  Bimanual examination of the whole area is normal.  Neck exam shows no masses or adenopathy.      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  We removed this.  He is doing well presently.      PLAN:  We will see him again over the course of the next four to six months or so.      FO/ms

## 2018-08-14 NOTE — TELEPHONE ENCOUNTER
"Called pt and informed him that we are working on getting his procedure approved.     Informed him and explained to him that since he has Medicare we need to be specific and clear in regards to making sure that we dictate clearly in terms of why we are choosing the treatment procedure appropriate with his symptoms.     Informed him that the diagnosis that we are choosing is more \"generalized\" and that we need to be more specific.     Informed him that we will see what we can do with a new dx and submit again.     Will call him should I have any further questions.     Radha Rodney RN, BSN  Interventional Radiology Nurse Coordinator   Phone: [phone]    "

## 2018-08-14 NOTE — LETTER
8/14/2018       RE: Deejay Dior  [address] [place] [address]     Dear Colleague,    Thank you for referring your patient, Deejay Dior, to the [facility] EAR NOSE AND THROAT at [facility]. Please see a copy of my visit note below.    HISTORY OF PRESENT ILLNESS:  Deejay is 67 years of age.  He is here for follow-up today.  He had an early lip cancer and is here for a routine followup after about four or five months.  No new complaints at the present time today.  He is otherwise getting by quite well.  He is, of course, a bone marrow transplant patient from the past.      PHYSICAL EXAMINATION:  The patient is alert, oriented x3 and pleasant.  Skin of the face, lips, and neck on him is reasonably normal.  The lip actually is very well-healed.  There is a little area of erythema where we did the resection, but nothing else that needs to be biopsied or anything at the present time today.  Bimanual examination of the whole area is normal.  Neck exam shows no masses or adenopathy.      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  We removed this.  He is doing well presently.      PLAN:  We will see him again over the course of the next four to six months or so.      FO/ms       Again, thank you for allowing me to participate in the care of your patient.      Sincerely,    Tim Yanez MD

## 2018-08-16 ENCOUNTER — ONCOLOGY VISIT (OUTPATIENT)
Dept: TRANSPLANT | Facility: CLINIC | Age: 68
End: 2018-08-16
Attending: INTERNAL MEDICINE
Payer: MEDICARE

## 2018-08-16 VITALS
RESPIRATION RATE: 16 BRPM | DIASTOLIC BLOOD PRESSURE: 70 MMHG | BODY MASS INDEX: 31.52 KG/M2 | HEART RATE: 79 BPM | WEIGHT: 207.3 LBS | SYSTOLIC BLOOD PRESSURE: 122 MMHG | OXYGEN SATURATION: 96 % | TEMPERATURE: 97.1 F

## 2018-08-16 DIAGNOSIS — D89.813 GVH (GRAFT VERSUS HOST DISEASE) (H): ICD-10-CM

## 2018-08-16 DIAGNOSIS — Z94.81 STATUS POST BONE MARROW TRANSPLANT (H): ICD-10-CM

## 2018-08-16 DIAGNOSIS — D89.813 GVHD AS COMPLICATION OF BONE MARROW TRANSPLANT (H): ICD-10-CM

## 2018-08-16 DIAGNOSIS — D46.9 MDS (MYELODYSPLASTIC SYNDROME) (H): ICD-10-CM

## 2018-08-16 DIAGNOSIS — T86.09 GVHD AS COMPLICATION OF BONE MARROW TRANSPLANT (H): ICD-10-CM

## 2018-08-16 LAB
ALBUMIN SERPL-MCNC: 3.1 G/DL (ref 3.4–5)
ALP SERPL-CCNC: 109 U/L (ref 40–150)
ALT SERPL W P-5'-P-CCNC: 30 U/L (ref 0–70)
ANION GAP SERPL CALCULATED.3IONS-SCNC: 6 MMOL/L (ref 3–14)
AST SERPL W P-5'-P-CCNC: ABNORMAL U/L (ref 0–45)
BASOPHILS # BLD AUTO: 0.1 10E9/L (ref 0–0.2)
BASOPHILS NFR BLD AUTO: 0.8 %
BILIRUB SERPL-MCNC: 0.4 MG/DL (ref 0.2–1.3)
BUN SERPL-MCNC: 18 MG/DL (ref 7–30)
CALCIUM SERPL-MCNC: 8.6 MG/DL (ref 8.5–10.1)
CHLORIDE SERPL-SCNC: 108 MMOL/L (ref 94–109)
CO2 SERPL-SCNC: 25 MMOL/L (ref 20–32)
CREAT SERPL-MCNC: 0.92 MG/DL (ref 0.66–1.25)
DIFFERENTIAL METHOD BLD: ABNORMAL
EOSINOPHIL # BLD AUTO: 0.5 10E9/L (ref 0–0.7)
EOSINOPHIL NFR BLD AUTO: 7.6 %
ERYTHROCYTE [DISTWIDTH] IN BLOOD BY AUTOMATED COUNT: 15.5 % (ref 10–15)
GFR SERPL CREATININE-BSD FRML MDRD: 81 ML/MIN/1.7M2
GLUCOSE SERPL-MCNC: 92 MG/DL (ref 70–99)
HCT VFR BLD AUTO: 46.2 % (ref 40–53)
HGB BLD-MCNC: 15.1 G/DL (ref 13.3–17.7)
IMM GRANULOCYTES # BLD: 0 10E9/L (ref 0–0.4)
IMM GRANULOCYTES NFR BLD: 0.2 %
LYMPHOCYTES # BLD AUTO: 2 10E9/L (ref 0.8–5.3)
LYMPHOCYTES NFR BLD AUTO: 32.8 %
MCH RBC QN AUTO: 28.3 PG (ref 26.5–33)
MCHC RBC AUTO-ENTMCNC: 32.7 G/DL (ref 31.5–36.5)
MCV RBC AUTO: 87 FL (ref 78–100)
MONOCYTES # BLD AUTO: 0.9 10E9/L (ref 0–1.3)
MONOCYTES NFR BLD AUTO: 14.4 %
NEUTROPHILS # BLD AUTO: 2.7 10E9/L (ref 1.6–8.3)
NEUTROPHILS NFR BLD AUTO: 44.2 %
NRBC # BLD AUTO: 0 10*3/UL
NRBC BLD AUTO-RTO: 0 /100
PLATELET # BLD AUTO: 172 10E9/L (ref 150–450)
POTASSIUM SERPL-SCNC: 4 MMOL/L (ref 3.4–5.3)
PROT SERPL-MCNC: 7.2 G/DL (ref 6.8–8.8)
RBC # BLD AUTO: 5.33 10E12/L (ref 4.4–5.9)
SIROLIMUS BLD-MCNC: 3.9 UG/L (ref 5–15)
SODIUM SERPL-SCNC: 139 MMOL/L (ref 133–144)
TME LAST DOSE: ABNORMAL H
WBC # BLD AUTO: 6.1 10E9/L (ref 4–11)

## 2018-08-16 PROCEDURE — 82947 ASSAY GLUCOSE BLOOD QUANT: CPT | Mod: ZF | Performed by: INTERNAL MEDICINE

## 2018-08-16 PROCEDURE — 84155 ASSAY OF PROTEIN SERUM: CPT | Performed by: INTERNAL MEDICINE

## 2018-08-16 PROCEDURE — 84132 ASSAY OF SERUM POTASSIUM: CPT | Performed by: INTERNAL MEDICINE

## 2018-08-16 PROCEDURE — 82565 ASSAY OF CREATININE: CPT | Performed by: INTERNAL MEDICINE

## 2018-08-16 PROCEDURE — G0463 HOSPITAL OUTPT CLINIC VISIT: HCPCS

## 2018-08-16 PROCEDURE — 84520 ASSAY OF UREA NITROGEN: CPT | Performed by: INTERNAL MEDICINE

## 2018-08-16 PROCEDURE — 87799 DETECT AGENT NOS DNA QUANT: CPT | Performed by: INTERNAL MEDICINE

## 2018-08-16 PROCEDURE — 82374 ASSAY BLOOD CARBON DIOXIDE: CPT | Performed by: INTERNAL MEDICINE

## 2018-08-16 PROCEDURE — 84075 ASSAY ALKALINE PHOSPHATASE: CPT | Performed by: INTERNAL MEDICINE

## 2018-08-16 PROCEDURE — 80195 ASSAY OF SIROLIMUS: CPT | Performed by: INTERNAL MEDICINE

## 2018-08-16 PROCEDURE — 82247 BILIRUBIN TOTAL: CPT | Performed by: INTERNAL MEDICINE

## 2018-08-16 PROCEDURE — 85025 COMPLETE CBC W/AUTO DIFF WBC: CPT | Performed by: INTERNAL MEDICINE

## 2018-08-16 PROCEDURE — 36415 COLL VENOUS BLD VENIPUNCTURE: CPT

## 2018-08-16 PROCEDURE — 82310 ASSAY OF CALCIUM: CPT | Performed by: INTERNAL MEDICINE

## 2018-08-16 PROCEDURE — 84460 ALANINE AMINO (ALT) (SGPT): CPT | Performed by: INTERNAL MEDICINE

## 2018-08-16 PROCEDURE — 84295 ASSAY OF SERUM SODIUM: CPT | Performed by: INTERNAL MEDICINE

## 2018-08-16 PROCEDURE — 82040 ASSAY OF SERUM ALBUMIN: CPT | Performed by: INTERNAL MEDICINE

## 2018-08-16 PROCEDURE — 82435 ASSAY OF BLOOD CHLORIDE: CPT | Performed by: INTERNAL MEDICINE

## 2018-08-16 RX ORDER — ACYCLOVIR 800 MG/1
800 TABLET ORAL 3 TIMES DAILY
Qty: 180 TABLET | Refills: 6 | Status: SHIPPED | OUTPATIENT
Start: 2018-08-16 | End: 2019-09-06

## 2018-08-16 RX ORDER — PANTOPRAZOLE SODIUM 20 MG/1
20 TABLET, DELAYED RELEASE ORAL DAILY
Qty: 30 TABLET | Refills: 11 | Status: SHIPPED | OUTPATIENT
Start: 2018-08-16 | End: 2019-08-21

## 2018-08-16 NOTE — MR AVS SNAPSHOT
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  After hours call the   hospital at [phone] or   [phone]. Ask for the BMT   fellow on-call            Follow-ups after your visit        Your next 10 appointments already scheduled     Nov 15, 2018  1:00 PM CST   [facility] Draw with  [facility]RAW   [facility] Lab Draw ([facility])    [address] [place] [address]  [address] [place] [address] [phone]            Nov 15, 2018  1:30 PM CST   Return with Aby Pinto MD   [facility] Blood and Marrow Transplant ([facility])    [address] [place] [address]  [address] [place] [address] [phone]              Who to contact     If you have questions or need follow up information about today's clinic visit or your schedule please contact [facility] BLOOD AND MARROW TRANSPLANT directly at [phone].  Normal or non-critical lab and imaging results will be communicated to you by MyChart, letter or phone within 4 business days after the clinic has received the results. If you do not hear from us within 7 days, please contact the clinic through MyChart or phone. If you have a  critical or abnormal lab result, we will notify you by phone as soon as possible.  Submit refill requests through [employer] or call your pharmacy and they will forward the refill request to us. Please allow 3 business days for your refill to be completed.          Additional Information About Your Visit        Giftangohart Information     [employer] gives you secure access to your electronic health record. If you see a primary care provider, you can also send messages to your care team and make appointments. If you have questions, please call your primary care clinic.  If you do not have a primary care provider, please call [phone] and they will assist you.        Care EveryWhere ID     This is your Care EveryWhere ID. This could be used by other organizations to access your [place] medical records  [id number]        Your Vitals Were     Pulse Temperature Respirations Pulse Oximetry BMI (Body Mass Index)       79 97.1  F (36.2  C) (Oral) 16 96% 31.52 kg/m2        Blood Pressure from Last 3 Encounters:   08/16/18 122/70   06/25/18 114/70   06/21/18 100/67    Weight from Last 3 Encounters:   08/16/18 94 kg (207 lb 4.8 oz)   08/14/18 93 kg (205 lb)   06/21/18 96.5 kg (212 lb 12.8 oz)              We Performed the Following     CBC with platelets differential     CMV DNA quantification     Comprehensive metabolic panel     EBV DNA PCR Quantitative Whole Blood     Sirolimus level          Today's Medication Changes          These changes are accurate as of 8/16/18  1:00 PM.  If you have any questions, ask your nurse or doctor.               These medicines have changed or have updated prescriptions.        Dose/Directions    acyclovir 800 MG tablet   Commonly known as:  ZOVIRAX   This may have changed:  See the new instructions.   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 Please contact your care team if you have questions.     bumetanide 0.5 MG tablet   Commonly known as:  BUMEX           cephALEXin 500 MG capsule   Commonly known as:  KEFLEX           CLINDAMYCIN HCL PO           levofloxacin 250 MG tablet   Commonly known as:  LEVAQUIN                Where to get your medicines      These medications were sent to [id number] - [place], [place] - [address]  [address] [address], [place] [address]     Phone:  [phone]     acyclovir 800 MG tablet    pantoprazole 20 MG EC tablet                Recent Review Flowsheet Data     BMT Recent Results Latest Ref Rng & Units 3/8/2018 3/15/2018 3/29/2018 4/2/2018 4/26/2018 6/21/2018 8/16/2018    WBC 4.0 - 11.0 10e9/L 7.6 9.4 6.5 4.2 6.6 6.1 6.1    Hemoglobin 13.3 - 17.7 g/dL 15.9 15.6 15.7 14.6 14.5 13.7 15.1    Platelet Count 150 - 450 10e9/L 210 205 96(L) 105(L) 183 172 172    Neutrophils (Absolute) 1.6 - 8.3 10e9/L 4.3 7.7 3.6 2.6 3.4 3.2 2.7    INR 0.86 - 1.14 - - - - - - -    Sodium 133 - 144 mmol/L 138 139 140 - 139 138 -    Potassium 3.4 - 5.3 mmol/L 3.6 4.2 3.5 - 4.1 3.9 -    Chloride 94 - 109 mmol/L 107 108 108 - 106 109 -    Glucose 70 - 99 mg/dL 120(H) 109(H) 91 - 94 107(H) -    Urea Nitrogen 7 - 30 mg/dL 22 25 25 - 18 16 -    Creatinine 0.66 - 1.25 mg/dL 1.07 0.88 0.87 - 0.99 0.91 -    Calcium (Total) 8.5 - 10.1 mg/dL 8.7 8.7 8.2(L) - 9.0 8.7 -    Protein (Total) 6.8 - 8.8 g/dL 7.3 6.7(L) 6.4(L) - 7.3 7.2 -    Albumin 3.4 - 5.0 g/dL 3.0(L) 2.7(L) 2.8(L) - 3.0(L) 3.1(L) -    Bilirubin (Direct) 0.0 - 0.2 mg/dL - - - - - - -    Alkaline Phosphatase 40 - 150 U/L 159(H) 111 94 - 102 106 -    AST 0 - 45 U/L 82(H) 29 33 - 38 44 -    ALT 0 - 70 U/L 75(H) 46 58 - 33 29 -    MCV 78 - 100 fl 89 89 88 87 88 87 87               Primary Care Provider Office Phone # Fax #    Son Rafael Callejas [phone]       [facility] [address] [place]  [place] [address]        Equal Access to Services     [facility] MARIANA : Hadii aad ku hadcecilialuisa Ernestinaali, waairamclint campbellfrantzha, qapenelope jocelyneadarshcorinna marques. So [facility] [phone].    ATENCIÓN: Si habla español, tiene a del toro disposición servicios gratuitos de asistencia lingüística. Elmerame al [phone].    We comply with applicable federal civil rights laws and [place] laws. We do not discriminate on the basis of race, color, national origin, age, disability, sex, sexual orientation, or gender identity.            Thank you!     Thank you for choosing [facility] BLOOD AND MARROW TRANSPLANT  for your care. Our goal is always to provide you with excellent care. Hearing back from our patients is one way we can continue to improve our services. Please take a few minutes to complete the written survey that you may receive in the mail after your visit with us. Thank you!             Your Updated Medication List - Protect others around you: Learn how to safely use, store and throw away your medicines at www.disposemymeds.org.          This list is accurate as of 8/16/18  1:00 PM.  Always use your most recent med list.                   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)       fluconazole 100 MG tablet    DIFLUCAN    30 tablet    Take 1 tablet (100 mg) by mouth daily    Status post bone marrow transplant (H)       order for DME     1 Device    Please  provide a NOVA cane offset with WhoKnows item number [id number]    MDS (myelodysplastic syndrome) (H)       pantoprazole 20 MG EC tablet    PROTONIX    30 tablet    Take 1 tablet (20 mg) by mouth daily    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), Status post bone marrow transplant (H)       sertraline 100 MG tablet    ZOLOFT    30 tablet    Take 1 tablet (100 mg) by mouth daily    MDS (myelodysplastic syndrome) (H), 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), Urinary frequency, Other complication of bone marrow transplant (H)       * sirolimus 0.5 MG tablet    GENERIC EQUIVALENT    30 tablet    Take 1 tablet (0.5 mg) by mouth daily once instructed by the BMT clinic    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 Read the directions carefully, and ask your doctor or other care provider to review them with you.

## 2018-08-16 NOTE — PROGRESS NOTES
BMT Daily Progress Note     ID/CC:  Mr. 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    HPI: Deejay is here with his wife.  Since last visit leg has healed well.  Will see derm next week. Has plans for vein sclerotherapy.  Working with strength training and on balance (at a place called [facility]).  Also saw ENT (no new issues).  Still dry mouth (using biotene).  Otherwise has been doing well.  Eyes stable.  Energy is not not ideal, but overall better than a year ago. Stable since last visit.  No F/C/S, no cough/SOB.  Eating well no N/V/D/C.  Some heartburn since stopping protonix. Wants to restart. Stable edema, wraps legs when needed.  Not using bumex.  Mood good. No rash.      Review of Systems: 10 point ROS negative except as noted above.    Physical Exam:  /70 (BP Location: Right arm, Patient Position: Right side, Cuff Size: Adult Small)  Pulse 79  Temp 97.1  F (36.2  C) (Oral)  Resp 16  Wt 94 kg (207 lb 4.8 oz)  SpO2 96%  BMI 31.52 kg/m2     Wt Readings from Last 4 Encounters:   08/16/18 94 kg (207 lb 4.8 oz)   08/14/18 93 kg (205 lb)   06/21/18 96.5 kg (212 lb 12.8 oz)   04/26/18 98.7 kg (217 lb 11.2 oz)     General: NAD  Eyes: SARIKA, sclera anicteric  Nose/Mouth/Throat: OP clear, no ulcerations, very dry (compared to prior), upper plate  Lungs:CTA B  CV: RRR S1S2 no MRG  Abd: S/NT/ND +BS  Skin:  Scattered ecchymoses & skin tears. R elbow/arm w/ skin torn off and bruise.  R leg w/ healing sores x 2 on shin, peeling flaking skin and erythema. No drainage warmth.   Neuro: A&O, mild resting tremor  Line: NONE    Labs:  Lab Results   Component Value Date    WBC 6.1 08/16/2018    ANEU 2.7 08/16/2018    HGB 15.1 08/16/2018    HCT 46.2 08/16/2018     08/16/2018     06/21/2018    POTASSIUM 3.9 06/21/2018    CHLORIDE 109 06/21/2018    CO2 24 06/21/2018     (H) 06/21/2018    BUN 16 06/21/2018    CR 0.91 06/21/2018    MAG 2.1 04/26/2018    INR  0.95 06/30/2016       ASSESSMENT AND PLAN:  Mr. 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 Total cell dose (from 7/1 & 7/2) 6.53 x 10^6 CD34+ cells/kg.   - 1 year anniversary (July 2015): 30% cellular, trilineage hematopoiesis, no abnormal blasts by morphology or flow , no dysplasia, 0-1 fibrosis, 100% donor (BM, CD3, CD15). CR  - 2 year anniversary (July 2016): 20-30% cellular, trilineage hematopoiesis, no abnormal blasts by morphology or flow , no dysplasia, No fibrosis, 100% donor in BM (PB not sent). ISCN:  //46,XX[20] Complete Remission.    HEME:  6.1>15.1<172  ANC 2.7.   Counts now normal. Eos 0.5.    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 Has been off prednisone since summer 2017 and recently tapered off Sirolimus (january 2018).   cgvhd flare (started 2/19/18): Ongoing wt loss, dry mouth, fatigue, and possible skin tightening of right LE (gvhd vs. Resolving cellulitis)- Per discussion with Dr. Pinto- restarted sirolimus and pred taper 3/8 (now complete).   - Sirolimus 0.5 mg daily.  Level on 6/21 ok at 4.6.  Will continue indefinitely. Sx clearly better back on GVHD treatment.   - Will try dex S/S for dry mouth (did not try last visit) and continue biotene.  ENT last week no issues.    ID:   Afebrile No signs/sx of infection.   - H/o Cellulitis: had episode of cellulitis following a fall on the ice in January 2018 that provoked swelling in his right LE. Several recurrences with trauma.  - IgG = 403, repleted 3/22/16, 5/8/16= 1270   - Prophy:  HD ACV (renal dosing), Fluconazole, and SS daily Bactrim.  D/c levaquin as stable off pred.    GI: No symptoms. LFTs normal and alb improved to 3.1. Follow.   -Restart protonix    FEN/Renal:  Cr and lytes PENDING.  No longer using PRN bumex.    Fatigue: Fatigue, improved w/  treatment of GVHD. TSH normal 2/28. Off gabapentin.     Derm f/u 8/29    Venous statis: will get sclerotherapy    Plan:     RTC: 11/15 with Dr. Pinto. Can be seen sooner if needed.

## 2018-08-16 NOTE — NURSING NOTE
"Oncology Rooming Note    August 16, 2018 12:15 PM   Deejay Dior is a 67 year old male who presents for:    No chief complaint on file.    Initial Vitals: /70 (BP Location: Right arm, Patient Position: Right side, Cuff Size: Adult Small)  Pulse 79  Temp 97.1  F (36.2  C) (Oral)  Resp 16  Wt 94 kg (207 lb 4.8 oz)  SpO2 96%  BMI 31.52 kg/m2 Estimated body mass index is 31.52 kg/(m^2) as calculated from the following:    Height as of 8/14/18: 1.727 m (5' 8\").    Weight as of this encounter: 94 kg (207 lb 4.8 oz). Body surface area is 2.12 meters squared.  Data Unavailable Comment: Data Unavailable   No LMP for male patient.  Allergies reviewed: Yes  Medications reviewed: Yes    Medications: Medication refills not needed today.  Pharmacy name entered into [employer]:    [id number] - [place], [place] - [address] [id number] - [place], [place] [facility] [address] [address] [facility]     Clinical concerns: none     6 minutes for nursing intake (face to face time)     Elizabeth Forrester MA                "

## 2018-08-17 ENCOUNTER — TELEPHONE (OUTPATIENT)
Dept: INTERVENTIONAL RADIOLOGY/VASCULAR | Facility: CLINIC | Age: 68
End: 2018-08-17

## 2018-08-17 DIAGNOSIS — I83.893 VARICOSE VEINS OF BOTH LOWER EXTREMITIES WITH COMPLICATIONS: Primary | ICD-10-CM

## 2018-08-17 NOTE — TELEPHONE ENCOUNTER
Called and informed pt that he has been approved for his lower extremity varicose vein procedure with Dr. Dumont.    Informed him that we will schedule for treatment as soon as we can.     He agrees to plan.     Will update once scheduled.     Radha Rodney RN, BSN  Interventional Radiology Nurse Coordinator   Phone: [phone]

## 2018-08-18 LAB
EBV DNA # SPEC NAA+PROBE: NORMAL {COPIES}/ML
EBV DNA SPEC NAA+PROBE-LOG#: NORMAL {LOG_COPIES}/ML

## 2018-08-22 DIAGNOSIS — I83.893 VARICOSE VEINS OF BILATERAL LOWER EXTREMITIES WITH OTHER COMPLICATIONS: Primary | ICD-10-CM

## 2018-08-22 NOTE — PROGRESS NOTES
"Called and spoke to pt regarding his RIGHT leg varicose vein treatment.     Informed him that we did schedule this for 9/4.  He is to check in 1130am for a 1pm start time.     Also informed him that he is to f/u in 1 week post treatment for a right leg ultrasound to rule out DVT.     He is to also wear his compression stocking for 3 days post 24 hours and then 3 weeks post.     He states that his right leg hurts the most however this is due to a fall.   He states that he also has made an appt with Dermatology to look at why his skin is peeling as well as feeling \"blotchy and red\". He states he will keep us posted.     We also talked about prep for the procedure as well as sending a letter.     Wife also informed me that pt is having a pre-op on Monday 8/27 in which they will hand carry to the procedure date     Also, wife will be attending appt with them.    They will call with any other questions.     Radha Rodney RN, BSN  Interventional Radiology Nurse Coordinator   Phone: [phone]        "

## 2018-08-27 DIAGNOSIS — I83.893 VARICOSE VEINS OF BOTH LOWER EXTREMITIES WITH COMPLICATIONS: Primary | ICD-10-CM

## 2018-08-31 ENCOUNTER — TELEPHONE (OUTPATIENT)
Dept: INTERVENTIONAL RADIOLOGY/VASCULAR | Facility: CLINIC | Age: 68
End: 2018-08-31

## 2018-08-31 RX ORDER — DIAZEPAM 5 MG
10 TABLET ORAL ONCE
Status: CANCELLED | OUTPATIENT
Start: 2018-08-31

## 2018-09-07 ENCOUNTER — TELEPHONE (OUTPATIENT)
Dept: INTERVENTIONAL RADIOLOGY/VASCULAR | Facility: CLINIC | Age: 68
End: 2018-09-07

## 2018-09-07 NOTE — TELEPHONE ENCOUNTER
Pt's wife calling to reschedule pt's procedure as pt was sick and therefore not able to make it on 9/4.     Informed her that Dr. Dumont is booked out til October now. Informed her that I am looking at the date of October 4th. Once I do get pt scheduled then I'll call her back.   She agrees to plan.     Radha Rodney RN, BSN  Interventional Radiology Nurse Coordinator   Phone: [phone]

## 2018-09-07 NOTE — TELEPHONE ENCOUNTER
Called Racquel.   Informed her that we have her scheduled for his procedures.     Right leg EVLA and sclerotherapy on THurs 10/4. Check in at 930am for an 11am .    He will need to come back in one week for an US to rule out DVT. This has been scheduled for Thurs 10/11 @ 10am at the [facility].  He will need to bring his compression stockings.     She verbalized understanding.     Vacation starting 10/29 for 2 weeks.   They will be back on Mon 11/12.     I informed her that we will go ahead and preschedule LEFT LEG treatment on 11/12 with an US to follow in one week.     She states that pt has a lot of skin sloughing on the lower part of his right leg.     I informed her that the injections will probably take place around the knee area and thigh area however will let Dr Dumont know about this as well.     They agree to plan.     Radha Rodney RN, BSN  Interventional Radiology Nurse Coordinator   Phone: [phone]

## 2018-09-26 ENCOUNTER — CARE COORDINATION (OUTPATIENT)
Dept: TRANSPLANT | Facility: CLINIC | Age: 68
End: 2018-09-26

## 2018-09-26 DIAGNOSIS — D46.9 MDS (MYELODYSPLASTIC SYNDROME) (H): Primary | ICD-10-CM

## 2018-09-26 NOTE — PROGRESS NOTES
Received a call from patient who indicated that he has had a long standing issue with fatigue. He has had this addressed and worked up. However, the fatigue is becoming worse along with decreased stamina. He said that he is becoming short of breath while showering. These symptoms are troubling and he prefers to be seen soon rather than wait for his next appointment with Dr Pinto on 11/15/18. I contacted Dr Pinto who agreed that this should be evaluated by an [facility] in BMT clinic. I asked the  to schedule an appointment on 9/27/18. Patient is aware of the lab and CAROLINA appointment.

## 2018-09-27 ENCOUNTER — APPOINTMENT (OUTPATIENT)
Dept: LAB | Facility: CLINIC | Age: 68
End: 2018-09-27
Attending: PHYSICIAN ASSISTANT
Payer: MEDICARE

## 2018-09-27 ENCOUNTER — ONCOLOGY VISIT (OUTPATIENT)
Dept: TRANSPLANT | Facility: CLINIC | Age: 68
End: 2018-09-27
Attending: PHYSICIAN ASSISTANT
Payer: MEDICARE

## 2018-09-27 ENCOUNTER — RADIANT APPOINTMENT (OUTPATIENT)
Dept: CT IMAGING | Facility: CLINIC | Age: 68
End: 2018-09-27
Attending: NURSE PRACTITIONER
Payer: COMMERCIAL

## 2018-09-27 VITALS
TEMPERATURE: 97.6 F | WEIGHT: 202.6 LBS | SYSTOLIC BLOOD PRESSURE: 100 MMHG | RESPIRATION RATE: 18 BRPM | HEART RATE: 79 BPM | BODY MASS INDEX: 30.71 KG/M2 | DIASTOLIC BLOOD PRESSURE: 63 MMHG | OXYGEN SATURATION: 95 % | HEIGHT: 68 IN

## 2018-09-27 DIAGNOSIS — R06.02 SOB (SHORTNESS OF BREATH): Primary | ICD-10-CM

## 2018-09-27 DIAGNOSIS — E63.8 BODY NUTRITION DEFICIT: ICD-10-CM

## 2018-09-27 DIAGNOSIS — R53.83 OTHER FATIGUE: ICD-10-CM

## 2018-09-27 DIAGNOSIS — D46.9 MDS (MYELODYSPLASTIC SYNDROME) (H): ICD-10-CM

## 2018-09-27 DIAGNOSIS — D89.813 GVHD (GRAFT VERSUS HOST DISEASE) (H): ICD-10-CM

## 2018-09-27 LAB
ALBUMIN SERPL-MCNC: 2.7 G/DL (ref 3.4–5)
ALP SERPL-CCNC: 101 U/L (ref 40–150)
ALT SERPL W P-5'-P-CCNC: 28 U/L (ref 0–70)
ANION GAP SERPL CALCULATED.3IONS-SCNC: 7 MMOL/L (ref 3–14)
AST SERPL W P-5'-P-CCNC: 39 U/L (ref 0–45)
BASOPHILS # BLD AUTO: 0.1 10E9/L (ref 0–0.2)
BASOPHILS NFR BLD AUTO: 1 %
BILIRUB SERPL-MCNC: 0.2 MG/DL (ref 0.2–1.3)
BUN SERPL-MCNC: 22 MG/DL (ref 7–30)
CA-I BLD-MCNC: 4.7 MG/DL (ref 4.4–5.2)
CALCIUM SERPL-MCNC: 8.5 MG/DL (ref 8.5–10.1)
CHLORIDE SERPL-SCNC: 108 MMOL/L (ref 94–109)
CO2 SERPL-SCNC: 22 MMOL/L (ref 20–32)
CREAT SERPL-MCNC: 1.03 MG/DL (ref 0.66–1.25)
DIFFERENTIAL METHOD BLD: ABNORMAL
EOSINOPHIL # BLD AUTO: 0.5 10E9/L (ref 0–0.7)
EOSINOPHIL NFR BLD AUTO: 8.4 %
ERYTHROCYTE [DISTWIDTH] IN BLOOD BY AUTOMATED COUNT: 16 % (ref 10–15)
FOLATE SERPL-MCNC: 15.9 NG/ML
GFR SERPL CREATININE-BSD FRML MDRD: 71 ML/MIN/1.7M2
GLUCOSE SERPL-MCNC: 108 MG/DL (ref 70–99)
HCT VFR BLD AUTO: 41.6 % (ref 40–53)
HGB BLD-MCNC: 13.8 G/DL (ref 13.3–17.7)
IMM GRANULOCYTES # BLD: 0 10E9/L (ref 0–0.4)
IMM GRANULOCYTES NFR BLD: 0.3 %
LIPASE SERPL-CCNC: 158 U/L (ref 73–393)
LYMPHOCYTES # BLD AUTO: 2.1 10E9/L (ref 0.8–5.3)
LYMPHOCYTES NFR BLD AUTO: 34.2 %
MCH RBC QN AUTO: 28.6 PG (ref 26.5–33)
MCHC RBC AUTO-ENTMCNC: 33.2 G/DL (ref 31.5–36.5)
MCV RBC AUTO: 86 FL (ref 78–100)
MONOCYTES # BLD AUTO: 0.9 10E9/L (ref 0–1.3)
MONOCYTES NFR BLD AUTO: 14.3 %
NEUTROPHILS # BLD AUTO: 2.6 10E9/L (ref 1.6–8.3)
NEUTROPHILS NFR BLD AUTO: 41.8 %
NRBC # BLD AUTO: 0 10*3/UL
NRBC BLD AUTO-RTO: 0 /100
PLATELET # BLD AUTO: 223 10E9/L (ref 150–450)
POTASSIUM SERPL-SCNC: 4 MMOL/L (ref 3.4–5.3)
PROT SERPL-MCNC: 6.6 G/DL (ref 6.8–8.8)
RBC # BLD AUTO: 4.83 10E12/L (ref 4.4–5.9)
SIROLIMUS BLD-MCNC: 4.5 UG/L (ref 5–15)
SODIUM SERPL-SCNC: 138 MMOL/L (ref 133–144)
TME LAST DOSE: ABNORMAL H
TSH SERPL DL<=0.005 MIU/L-ACNC: 1.01 MU/L (ref 0.4–4)
VIT B12 SERPL-MCNC: 698 PG/ML (ref 193–986)
WBC # BLD AUTO: 6.2 10E9/L (ref 4–11)

## 2018-09-27 PROCEDURE — 84403 ASSAY OF TOTAL TESTOSTERONE: CPT | Performed by: NURSE PRACTITIONER

## 2018-09-27 PROCEDURE — 85025 COMPLETE CBC W/AUTO DIFF WBC: CPT | Performed by: INTERNAL MEDICINE

## 2018-09-27 PROCEDURE — 82330 ASSAY OF CALCIUM: CPT | Performed by: NURSE PRACTITIONER

## 2018-09-27 PROCEDURE — 80195 ASSAY OF SIROLIMUS: CPT | Performed by: INTERNAL MEDICINE

## 2018-09-27 PROCEDURE — 82306 VITAMIN D 25 HYDROXY: CPT | Performed by: NURSE PRACTITIONER

## 2018-09-27 PROCEDURE — 87305 ASPERGILLUS AG IA: CPT | Performed by: NURSE PRACTITIONER

## 2018-09-27 PROCEDURE — 84443 ASSAY THYROID STIM HORMONE: CPT | Performed by: NURSE PRACTITIONER

## 2018-09-27 PROCEDURE — 87449 NOS EACH ORGANISM AG IA: CPT | Performed by: NURSE PRACTITIONER

## 2018-09-27 PROCEDURE — 36415 COLL VENOUS BLD VENIPUNCTURE: CPT

## 2018-09-27 PROCEDURE — 82607 VITAMIN B-12: CPT | Performed by: NURSE PRACTITIONER

## 2018-09-27 PROCEDURE — 82746 ASSAY OF FOLIC ACID SERUM: CPT | Performed by: NURSE PRACTITIONER

## 2018-09-27 PROCEDURE — G0463 HOSPITAL OUTPT CLINIC VISIT: HCPCS | Mod: ZF

## 2018-09-27 PROCEDURE — 83690 ASSAY OF LIPASE: CPT | Performed by: NURSE PRACTITIONER

## 2018-09-27 PROCEDURE — 80053 COMPREHEN METABOLIC PANEL: CPT | Performed by: INTERNAL MEDICINE

## 2018-09-27 RX ORDER — PREDNISONE 50 MG/1
50 TABLET ORAL DAILY
Qty: 30 TABLET | Refills: 1 | Status: SHIPPED | OUTPATIENT
Start: 2018-09-27 | End: 2018-09-27

## 2018-09-27 RX ORDER — TRIAMCINOLONE ACETONIDE 1 MG/G
OINTMENT TOPICAL
Status: ON HOLD | COMMUNITY
Start: 2018-08-29 | End: 2021-03-01

## 2018-09-27 RX ORDER — PREDNISONE 20 MG/1
20 TABLET ORAL DAILY
Qty: 60 TABLET | Refills: 1 | Status: SHIPPED | OUTPATIENT
Start: 2018-09-27 | End: 2018-11-28

## 2018-09-27 RX ORDER — LEVOFLOXACIN 250 MG/1
250 TABLET, FILM COATED ORAL DAILY
Qty: 30 TABLET | Refills: 1 | Status: SHIPPED | OUTPATIENT
Start: 2018-09-27 | End: 2018-11-02

## 2018-09-27 RX ORDER — PREDNISONE 50 MG/1
50 TABLET ORAL DAILY
Qty: 30 TABLET | Refills: 1 | Status: SHIPPED | OUTPATIENT
Start: 2018-09-27 | End: 2018-11-28

## 2018-09-27 RX ORDER — LEVOFLOXACIN 250 MG/1
TABLET, FILM COATED ORAL
COMMUNITY
Start: 2018-09-11 | End: 2018-09-27

## 2018-09-27 ASSESSMENT — PAIN SCALES - GENERAL: PAINLEVEL: EXTREME PAIN (8)

## 2018-09-27 NOTE — PROGRESS NOTES
BMT Daily Progress Note     ID/CC:  Mr. 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    HPI: Deejay was seen in the BMT clinic today by request for increasing fatigue, weight loss, and worsening MERIDA/SOB with activity. He denied any new constitutional symptoms but has had a productive cough in the mornings. Denied any hemoptysis. States he has struggled with fatigue since transplant but symptoms have been worse the last few months. Discussed current management strategies and recommended limiting daytime naps to 20 min or less and conserving energy. Also, recommended resuming therapy with a  to address steroid induced myopathy and ongoing fatigue. With regards to nutrition, has only been eating 2 meals per day and wife notes he snacks on empty calories like ice cream and cookies instead of selecting more nutritious options. Discussed increasing dietary intake of protien dense foods and making sure he eats 3 meals per day. With the weight loss and low albumin, there is concern he may have pancreatic insufficiency with cGVH of the gut. He was agreeable to checking pancreatic enzymes, endocrine function studies including repeating testosterone, and checking markers of diet. In addition, we will proceed with PFT's, a chest CT, and check for fungal abs in the blood stream to assess for pulmonary GVH vs infectious process such as mold on chronic immunosuppression. A referral was placed to Dr. Sandoval. Finally with ongoing BLE edema will repeat an echocardiogram to rule out cardiac etiology for increasing SOB. Denied any flares in cGVH at this time. However, inflammation of RLE appears to be GVH and recently responded to steroid therapy. If work up indicative of worsening cGVH (lung involvement) and RLE skin changes do not improve with topical steroids he may require an additional line of therapy. Will see back in clinic in 2 weeks to discuss work up results and  creat plan with Dr. Pinto.     Review of Systems: 10 point ROS negative except as noted above.    Physical Exam:  There were no vitals taken for this visit.     Wt Readings from Last 4 Encounters:   08/16/18 94 kg (207 lb 4.8 oz)   08/14/18 93 kg (205 lb)   06/21/18 96.5 kg (212 lb 12.8 oz)   04/26/18 98.7 kg (217 lb 11.2 oz)     General: NAD, interactive, thin   Eyes: SARIKA, sclera anicteric  Nose/Mouth/Throat: OP clear, no ulcerations, very dry (compared to prior), upper plate, chronic lichenoid changes   Lungs:CTA B, decreased throughout lung fields   CV: RRR S1S2 no MRG  Abd: S/NT/ND +BS  Skin:  Scattered ecchymoses & skin tears. R elbow/arm w/ skin torn off and bruise.  R leg w/ healing sores x 2 on shin, peeling flaking skin and erythema. No drainage warmth.   Neuro: A&O, mild resting tremor  Line: NONE    Labs:  Lab Results   Component Value Date    WBC 6.1 08/16/2018    ANEU 2.7 08/16/2018    HGB 15.1 08/16/2018    HCT 46.2 08/16/2018     08/16/2018     08/16/2018    POTASSIUM 4.0 08/16/2018    CHLORIDE 108 08/16/2018    CO2 25 08/16/2018    GLC 92 08/16/2018    BUN 18 08/16/2018    CR 0.92 08/16/2018    MAG 2.1 04/26/2018    INR 0.95 06/30/2016     Imaging:  Ct Chest W/o Contrast    Result Date: 9/27/2018  Exam: CT Chest without contrast 9/27/2018 1:01 PM History: Progressive SOB 3+ yrs s/p stem cell transplant on immunosuppression for GVH. Concern for infectious process vs pulmonary GVH vs sirolimus lung toxicity.; MDS (myelodysplastic syndrome) (H) Comparison: Chest CT 7/7/2016, 6/1/2016. TECHNIQUE: Helical acquisition of CT images from the lung apices to the kidneys without IV contrast. Multiplanar images and axial MIP images were reconstructed from the source data. FINDINGS: Visualized thyroid is unremarkable. Heart size is normal, without pericardial effusion. Moderate coronary artery calcifications. Aorta and main pulmonary artery are normal in caliber. Scattered mediastinal lymph nodes  are not enlarged by size criteria. No axillary or hilar lymphadenopathy. The central tracheobronchial tree is patent. No pleural effusion or pneumothorax. Slight progression of bilateral basal predominant peripheral reticulation and traction bronchiolectasis compared to 7/7/2016. Mild associated ground glass opacities, primarily in the left lung base. No pulmonary consolidation. Right lower lobe paramediastinal calcified granuloma. No new or enlarging noncalcified nodes. Suboptimal expiratory images. Limited evaluation of the upper abdomen is unremarkable. No suspicious osseous lesions. Mild degenerative change in the thoracic spine.     IMPRESSION: 1. Slight progression of bilateral basal predominant subpleural reticulation and traction bronchiolectasis with mild associated groundglass opacification compared to 7/7/2016. Findings consistent with interstitial lung disease in a nonspecific interstitial pneumonia pattern secondary to drug toxicity with superimposed features of chronic nfwgh-ymjpqa-rcdw disease. Differential also includes idiopathic pneumonia syndrome and infection. I have personally reviewed the examination and initial interpretation and I agree with the findings. CARY BAILEY MD    ASSESSMENT AND PLAN:  Mr. 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 Total cell dose (from 7/1 & 7/2) 6.53 x 10^6 CD34+ cells/kg.   - 1 year anniversary (July 2015): 30% cellular, trilineage hematopoiesis, no abnormal blasts by morphology or flow , no dysplasia, 0-1 fibrosis, 100% donor (BM, CD3, CD15). CR  - 2 year anniversary (July 2016): 20-30% cellular, trilineage hematopoiesis, no abnormal blasts by morphology or flow , no dysplasia, No fibrosis, 100% donor in BM (PB not sent). ISCN:  //46,XX[20] Complete Remission.    HEME: No transfusion needs, counts stable     - Counts now  normal. Eos 0.5 9/27.    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 Has been off prednisone since summer 2017 and briefly tapered off Sirolimus (january 2018), however resumed with recent flare in February. 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 Ordered PFT's and placed pulmonary referral 9/27. Sent in-basket to Dr. Sandoval to arrange for appointment earlier than 12/18. STOP Sirolimus 9/27, start prednisone 1mg/kg at 90 mg daily (called into [facility] pharmacy) and will have the research team for ibrutinib study meet with him on Monday.   - Recent cgvhd flare (started 2/19/18): Ongoing wt loss, dry mouth, fatigue, and possible skin tightening of right LE (gvhd vs. Resolving cellulitis)- Per discussion with Dr. Pinto- restarted sirolimus and pred taper 3/8 (now complete).   - Sirolimus 0.5 mg daily.  Level on 8/16 was 3.9.  Discontinued today 9/27. See above.  - Continue dex S/S for dry mouth (did not try last visit) and continue biotene.  ENT last week no issues.    ID:   Afebrile No signs/sx of infection.   - Sent 1, 3 Beta D & Aspergillus Galacto 9/27, no mold coverage and chronic immunosuppression with new cough and SOB - pending  - Declined influenza vaccine 9/27  - H/o Cellulitis: had episode of cellulitis following a fall on the ice in January 2018 that provoked swelling in his right LE. Several recurrences with trauma.  - IgG = 403, repleted 3/22/16, 5/8/16= 1270   - Prophy:  HD ACV (renal dosing), Fluconazole, and SS daily Bactrim.  Resumed Levaquin 9/27 with steroids.    GI: No symptoms. LFTs normal, ablumin low 2/2 GVH  - Check amylase/lipase. If low begin Creon. Results pending 9/27.  - Restart protonix    FEN/Renal:  Cr and lytes WNL.    - No longer using PRN bumex.  - Edema management with lower extremity wraps, compression, and  elevation  - 10 lb weight loss since April. Concern for chronic malnutrition due to poor absorption from GVH. Albumin low 2.7 today. Will check pancreatic enzymes and consider Creon therapy to improve absorption. Encouraged to eat 3 meals per day, increase caloric intake of protein, and limit discretionary calories that could be replaced with more nutritious options.  - Check Calcium, vitamin D, B12, and folate today     Fatigue: Ongoing chronic, worsening fatigue of unknown etiology  - History of testosterone deficiency. Will recheck today. If low will refer back to endocrinology for management  - TSH normal 2/28 and again on repeat today 9/27 at 1.01.    Derm f/u 8/29    Venous statis: will get sclerotherapy     Diagnostic Studies 9/27:  Orders Placed This Encounter   Procedures     CT Chest w/o contrast     TSH     Testosterone     Calcium     Vitamin D panel     Vitamin B12     Folate     Amylase     Lipase     Pulmonary referral     Echocardiogram Complete     General PFT Lab (Please always keep checked)     Pulmonary Function Test       Plan:   - Stop Siro therapy  - Start HD steroids 1 mg/kg PO prednisone. No weekly boluses at this time as may be eligible for ibrutinib study  - Referral to Dr. Sandoval & PFT's  - Meeting with ibrutinib research RN on Monday  - RTC Monday for provider visit and labs  - Testosterone and pancreatic enzymes pending  - 11/15 with Dr. Pinto. Can be seen sooner if needed  - Resume Kodi Jj, MSN, APRN, ACNP-BC  Pager: [phone]

## 2018-09-27 NOTE — MR AVS SNAPSHOT
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  After hours call the   hospital at [phone] or   [phone]. Ask for the BMT   fellow on-call           Care Instructions    - RTC in 2 weeks for provider visit and labs  - Schedule chest CT, today if possible  - Schedule appointment with Dr. Sandoval in pulmonary medicine and PFT's. Referrals in the system.  - Increase protein in diet, eat 3 nutritious meals per day  - Schedule an echocardiogram   - We will re-check your testosterone, if low you can resume treatment in the endocrine clinic  - Continue current does of sirolimus    Rebecca Jj, MSN, APRN, ACNP-BC  Pager: [phone]            Follow-ups after your visit        Additional Services     Pulmonary referral       Your provider has referred you to: [facility]: [facility] [place] [phone]   [email or web address]    Please be aware that coverage of these services is subject to the terms and limitations of your health insurance plan.  Call member services at your health plan with any benefit or coverage questions.      Please bring the following with you to your appointment:    (1) Any X-Rays, CTs or MRIs which have been  performed.  Contact the facility where they were done to arrange for  prior to your scheduled appointment.    (2) List of current medications   (3) This referral request   (4) Any documents/labs given to you for this referral                  Your next 10 appointments already scheduled     Sep 27, 2018  8:00 PM CDT   CT CHEST W/O CONTRAST with UCCT1   [facility] CT ([facility])    [address]  1st Floor  [facility] [address] [phone]           How do I prepare for my exam? (Food and drink instructions) No Food and Drink Restrictions.  How do I prepare for my exam? (Other instructions) You do not need to do anything special to prepare for this exam. For a sinus scan: Use your nose spray (nasal decongestant spray) as directed.  What should I wear: Please wear loose clothing, such as a sweat suit or jogging clothes. Avoid snaps, zippers and other metal. We may ask you to undress and put on a hospital gown.  How long does the exam take: Most scans take less than 20 minutes.  What should I bring: Please bring any scans or X-rays taken at other hospitals, if similar tests were done. Also bring a list of your medicines, including vitamins, minerals and over-the-counter drugs. It is safest to leave personal items at home.  Do I need a : No  is needed.  What do I need to tell my doctor? Be sure to tell your doctor: * If you have any allergies. * If there s any chance you are pregnant. * If you are breastfeeding.  What should I do after the exam: No restrictions, You may resume normal activities.  What is this test: A CT (computed tomography) scan is a series of pictures that allows us to look inside your body. The scanner creates images of the body in cross sections, much like slices of bread. This helps us see any problems more clearly.  Who should I call with questions: If you have any questions, please call the Imaging Department where you will  have your exam. Directions, parking instructions, and other information is available on our website, [email or web address].            Oct 04, 2018 10:30 AM CDT   Procedure 5.5 hour with U2A ROOM 11   Unit 2A [facility] ([facility])    [address] [address]               Oct 04, 2018 12:00 PM CDT   IR STAB PHLEBECTOMY < 10 STABS with UUIR5   [facility] [place], Interventional Radiology ([facility])    [address] [facility] [address]   [phone]           The day before the exam:   You may eat your regular diet.   You are encouraged to drink at least 8 eight ounce glasses of clear liquids.   Drink no alcoholic beverages for 24 hours before or after the exam.  The day of the exam:   Do not eat any solid food or milk products for 6 hours prior to the exam. You may drink clear liquids until 2 hours prior to the exam. Clear liquids include the following: water, Jell-O, clear broth, apple juice or any non-carbonated drink that you can see through (no pop!)   The morning of the exam you may take medications as directed with a sip of water.  Please wear loose clothing, such as a sweat suit or jogging clothes. Avoid snaps, zippers and other metal. We may ask you to undress and put on a hospital gown.  Please bring any scans or X-rays taken at other hospitals, if similar tests were done. Also bring a list of your medicines, including vitamins, minerals and over-the-counter drugs. It is safest to leave personal items at home.  Someone will need to drive you to and from the hospital.  Tell your doctor in advance:   If you have allergies to x-ray contrast or iodine.   If you are or may be pregnant.   If you are taking Coumadin (or any other blood thinners) 5 days prior to the exam for any special instructions.   If you are diabetic to determine if your insulin needs have to be adjusted for the exam.   Your doctor will:   Need to do a history and physical within 30 days before this procedure.   Obtain necessary laboratory tests prior to the exam (CBCP, INR and PTT).  If you were given sedation, you cannot drive for 24 hours after the procedure, and an adult must be with you until then.  If you have any questions, please call the Imaging Department where you will have your exam. Directions, parking instructions, and other information are available on our website, [email or web address].            Oct 11, 2018 10:00 AM CDT   (Arrive by 9:45 AM)   US LOWER EXTREMITY VENOUS DUPLEX RIGHT with UCUSV2   [facility]  ([facility])    [address]  1st Floor  [facility] [address] [phone]           How do I prepare for my exam? (Food and drink instructions) No Food and Drink Restrictions.  How do I prepare for my exam? (Other instructions) You do not need to do anything special to prepare for your exam.  What should I wear: Wear comfortable clothes.  How long does the exam take: Most ultrasounds take 30 to 60 minutes.  What should I bring: Bring a list of your medicines, including vitamins, minerals and over-the-counter drugs. It is safest to leave personal items at home.  Do I need a :  No  is needed.  What do I need to tell my doctor: Tell your doctor about any allergies you may have.  What should I do after the exam: No restrictions, You may resume normal activities.  What is this test: An ultrasound uses sound waves to make pictures of the body. Sound waves do not cause pain. The only discomfort may be the pressure of the wand against your skin or full bladder.  Who should I call with questions: If you have any questions, please call the Imaging Department where you will have your exam. Directions, parking instructions, and other information is available on our website, [email or web address].            Oct 11, 2018 11:15 AM CDT   [facility] Lab Draw with   MASONIC LAB DRAW   [facility] Draw ([facility])    [address]  [address]  [facility] [place] [address]   [phone]            Oct 11, 2018 12:00 PM CDT   Return with  BMT CAROLINA #1   [facility] Blood and Marrow Transplant ([facility])    [address]  [address]  [facility] [place] [address]   [phone]            Nov 12, 2018 10:30 AM CST   Procedure 5.5 hour with U2A ROOM 10   Unit 2A [facility] [place] ([facility])    [address] [facility] [address]               Nov 12, 2018 12:00 PM CST   IR STAB PHLEBECTOMY < 10 STABS with UUIR5   [facility] [place], Interventional Radiology ([facility])    [address] [address]   [phone]           The day before the exam:   You may eat your regular diet.   You are encouraged to drink at least 8 eight ounce glasses of clear liquids.   Drink no alcoholic beverages for 24 hours before or after the exam.  The day of the exam:   Do not eat any solid food or milk products for 6 hours prior to the exam. You may drink clear liquids until 2 hours prior to the exam. Clear liquids include the following: water, Jell-O, clear broth, apple juice or any non-carbonated drink that you can see through (no pop!)   The morning of the exam you may take medications as directed with a sip of water.  Please wear loose clothing, such as a sweat suit or jogging clothes. Avoid snaps, zippers and other metal. We may ask you to undress and put on a hospital gown.  Please bring any scans or X-rays taken at other hospitals, if similar tests were done. Also bring a list of your medicines, including vitamins, minerals and over-the-counter drugs. It is safest to leave personal items at home.  Someone will need to drive you to and from the hospital.  Tell your doctor in advance:   If you have allergies to x-ray  contrast or iodine.   If you are or may be pregnant.   If you are taking Coumadin (or any other blood thinners) 5 days prior to the exam for any special instructions.   If you are diabetic to determine if your insulin needs have to be adjusted for the exam.  Your doctor will:   Need to do a history and physical within 30 days before this procedure.   Obtain necessary laboratory tests prior to the exam (CBCP, INR and PTT).  If you were given sedation, you cannot drive for 24 hours after the procedure, and an adult must be with you until then.  If you have any questions, please call the Imaging Department where you will have your exam. Directions, parking instructions, and other information are available on our website, [email or web address].            Nov 15, 2018  1:00 PM CST   [facility]ason[facility] Lab Draw with  MASONIC LAB DRAW   [facility] Draw ([facility])    [address] [place] [address]  [address] [place] [address] [phone]            Nov 15, 2018  1:30 PM CST   Return with Aby Pinto MD   [facility] Blood and Marrow Transplant ([facility])    [address] [place] [address]  [address] [place] [address] [phone]              Future tests that were ordered for you today     Open Future Orders        Priority Expected Expires Ordered    Echocardiogram Complete Routine  9/27/2019 9/27/2018    Amylase Routine  3/26/2019 9/27/2018    General PFT Lab (Please always keep checked) Routine  9/27/2019 9/27/2018    Pulmonary Function Test Routine  9/27/2019 9/27/2018    CT Chest w/o contrast Routine  9/27/2019 9/27/2018            Who to contact     If you have questions or need follow up information about today's clinic visit or your schedule please contact [facility] BLOOD AND MARROW TRANSPLANT directly at [phone].  Normal or non-critical lab and imaging results will be communicated to you by MyChart, letter or phone within 4 business days after the  "clinic has received the results. If you do not hear from us within 7 days, please contact the clinic through NodePing or phone. If you have a critical or abnormal lab result, we will notify you by phone as soon as possible.  Submit refill requests through NodePing or call your pharmacy and they will forward the refill request to us. Please allow 3 business days for your refill to be completed.          Additional Information About Your Visit        [facility] Information     NodePing gives you secure access to your electronic health record. If you see a primary care provider, you can also send messages to your care team and make appointments. If you have questions, please call your primary care clinic.  If you do not have a primary care provider, please call [phone] and they will assist you.        Care EveryWhere ID     This is your Care EveryWhere ID. This could be used by other organizations to access your [place] medical records  [id number]        Your Vitals Were     Pulse Temperature Respirations Height Pulse Oximetry BMI (Body Mass Index)    79 97.6  F (36.4  C) (Oral) 18 1.727 m (5' 7.99\") 95% 30.81 kg/m2       Blood Pressure from Last 3 Encounters:   09/27/18 100/63   08/16/18 122/70   06/25/18 114/70    Weight from Last 3 Encounters:   09/27/18 91.9 kg (202 lb 9.6 oz)   08/16/18 94 kg (207 lb 4.8 oz)   08/14/18 93 kg (205 lb)              We Performed the Following     1,3 Beta D glucan fungitell     Aspergillus Galactomannan Antigen     Calcium     CBC with platelets differential     Comprehensive metabolic panel     Folate     Lipase     Pulmonary referral     Sirolimus level     Testosterone     TSH     Vitamin B12     Vitamin D panel        Recent Review Flowsheet Data     BMT Recent Results Latest Ref Rng & Units 3/15/2018 3/29/2018 4/2/2018 4/26/2018 6/21/2018 8/16/2018 9/27/2018    WBC 4.0 - 11.0 10e9/L 9.4 6.5 4.2 6.6 6.1 6.1 6.2    Hemoglobin 13.3 - 17.7 g/dL 15.6 15.7 14.6 14.5 13.7 15.1 13.8 "    Platelet Count 150 - 450 10e9/L 205 96(L) 105(L) 183 172 172 223    Neutrophils (Absolute) 1.6 - 8.3 10e9/L 7.7 3.6 2.6 3.4 3.2 2.7 2.6    INR 0.86 - 1.14 - - - - - - -    Sodium 133 - 144 mmol/L 139 140 - 139 138 139 138    Potassium 3.4 - 5.3 mmol/L 4.2 3.5 - 4.1 3.9 4.0 4.0    Chloride 94 - 109 mmol/L 108 108 - 106 109 108 108    Glucose 70 - 99 mg/dL 109(H) 91 - 94 107(H) 92 108(H)    Urea Nitrogen 7 - 30 mg/dL 25 25 - 18 16 18 22    Creatinine 0.66 - 1.25 mg/dL 0.88 0.87 - 0.99 0.91 0.92 1.03    Calcium (Total) 8.5 - 10.1 mg/dL 8.7 8.2(L) - 9.0 8.7 8.6 8.5    Protein (Total) 6.8 - 8.8 g/dL 6.7(L) 6.4(L) - 7.3 7.2 7.2 6.6(L)    Albumin 3.4 - 5.0 g/dL 2.7(L) 2.8(L) - 3.0(L) 3.1(L) 3.1(L) 2.7(L)    Bilirubin (Direct) 0.0 - 0.2 mg/dL - - - - - - -    Alkaline Phosphatase 40 - 150 U/L 111 94 - 102 106 109 101    AST 0 - 45 U/L 29 33 - 38 44 Canceled, Test credited 39    ALT 0 - 70 U/L 46 58 - 33 29 30 28    MCV 78 - 100 fl 89 88 87 88 87 87 86               Primary Care Provider Office Phone # Fax #    Vivek Callejas [phone]       [facility] [address] [place] [address]        Equal Access to Services     SHER PEÑA AH: Hadii lisa Vanegas, waaxda luqadaha, qaybta kaaladeola parada, corinna lock la'aan ah. So [facility] [phone].    ATENCIÓN: Si habla español, tiene a del toro disposición servicios gratuitos de asistencia lingüística. Llame al [phone].    We comply with applicable federal civil rights laws and [place] laws. We do not discriminate on the basis of race, color, national origin, age, disability, sex, sexual orientation, or gender identity.            Thank you!     Thank you for choosing [facility] BLOOD AND MARROW TRANSPLANT  for your care. Our goal is always to provide you with excellent care. Hearing back from our patients is one way we can continue to improve our services. Please take a few minutes to complete the written  survey that you may receive in the mail after your visit with us. Thank you!             Your Updated Medication List - Protect others around you: Learn how to safely use, store and throw away your medicines at www.disposemymeds.org.          This list is accurate as of 9/27/18 12:28 PM.  Always use your most recent med list.                   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 Jobst ultrasheer or equivalent.    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), Urinary frequency, Other complication of bone marrow transplant (H)        * sirolimus 0.5 MG tablet    GENERIC EQUIVALENT    30 tablet    Take 1 tablet (0.5 mg) by mouth daily once instructed by the BMT clinic    [facility] (graft versus host disease) (H)       * sirolimus 0.5 MG tablet    GENERIC EQUIVALENT    30 tablet    Take 1 tablet (0.5 mg) by mouth daily once instructed by the BMT clinic    [facility] (graft versus host disease) (H)       sulfamethoxazole-trimethoprim 800-160 MG per tablet    BACTRIM DS/SEPTRA DS    16 tablet    Take one tab by mouth twice daily on Monday and Tuesdays only    Status post bone marrow transplant (H)       triamcinolone 0.1 % ointment    KENALOG     Apply twice a day to lower leg        * Notice:  This list has 2 medication(s) that are the same as other medications prescribed for you. Read the directions carefully, and ask your doctor or other care provider to review them with you.

## 2018-09-27 NOTE — PATIENT INSTRUCTIONS
- RTC in 2 weeks for provider visit and labs  - Schedule chest CT, today if possible  - Schedule appointment with Dr. Sandoval in pulmonary medicine and PFT's. Referrals in the system.  - Increase protein in diet, eat 3 nutritious meals per day  - Schedule an echocardiogram   - We will re-check your testosterone, if low you can resume treatment in the endocrine clinic  - Continue current does of sirolimus    Rebecca Jj, MSN, APRN, ACNP-BC  Pager: [phone]

## 2018-09-27 NOTE — NURSING NOTE
Chief Complaint   Patient presents with     Oncology Clinic Visit     Blood Draw     Labs drawn, vitals taken and patient checked into next appt.   Maricel Lemon LPN

## 2018-09-27 NOTE — NURSING NOTE
"Oncology Rooming Note    September 27, 2018 11:10 AM   Deejay Dior is a 67 year old male who presents for:    Chief Complaint   Patient presents with     Blood Draw     Labs drawn, vitals taken and patient checked into next appt.     RECHECK     RTN- MDS     Initial Vitals: /63 (BP Location: Left arm, Patient Position: Sitting, Cuff Size: Adult Regular)  Pulse 79  Temp 97.6  F (36.4  C) (Oral)  Resp 18  Ht 1.727 m (5' 7.99\")  Wt 91.9 kg (202 lb 9.6 oz)  SpO2 95%  BMI 30.81 kg/m2 Estimated body mass index is 30.81 kg/(m^2) as calculated from the following:    Height as of this encounter: 1.727 m (5' 7.99\").    Weight as of this encounter: 91.9 kg (202 lb 9.6 oz). Body surface area is 2.1 meters squared.  Extreme Pain (8) Comment: knee's, when standing   No LMP for male patient.  Allergies reviewed: Yes  Medications reviewed: Yes    Medications: Medication refills not needed today.  Pharmacy name entered into Cultural[facility]:    [id number] [place], [place] - [address] [id number] - [place], [place] - [address]     Clinical concerns: None     8 minutes for nursing intake (face to face time)     Chandrika Méndez CMA              "

## 2018-09-28 LAB
1,3 BETA GLUCAN SER-MCNC: 35 PG/ML
B-D GLUCAN INTERPRETATION (1,3): NEGATIVE
DEPRECATED CALCIDIOL+CALCIFEROL SERPL-MC: 39 UG/L (ref 20–75)
GALACTOMANNAN AG SERPL QL IA: NEGATIVE
GALACTOMANNAN AG SERPL-ACNC: 0.05

## 2018-09-29 LAB — TESTOST SERPL-MCNC: 192 NG/DL (ref 240–950)

## 2018-10-01 ENCOUNTER — APPOINTMENT (OUTPATIENT)
Dept: LAB | Facility: CLINIC | Age: 68
End: 2018-10-01
Attending: NURSE PRACTITIONER
Payer: MEDICARE

## 2018-10-01 ENCOUNTER — ONCOLOGY VISIT (OUTPATIENT)
Dept: TRANSPLANT | Facility: CLINIC | Age: 68
End: 2018-10-01
Attending: NURSE PRACTITIONER
Payer: MEDICARE

## 2018-10-01 VITALS
SYSTOLIC BLOOD PRESSURE: 120 MMHG | TEMPERATURE: 97.5 F | OXYGEN SATURATION: 96 % | HEART RATE: 80 BPM | BODY MASS INDEX: 30.95 KG/M2 | WEIGHT: 203.5 LBS | RESPIRATION RATE: 18 BRPM | DIASTOLIC BLOOD PRESSURE: 72 MMHG

## 2018-10-01 DIAGNOSIS — D46.9 MDS (MYELODYSPLASTIC SYNDROME) (H): Primary | ICD-10-CM

## 2018-10-01 LAB
ANION GAP SERPL CALCULATED.3IONS-SCNC: 7 MMOL/L (ref 3–14)
BASOPHILS # BLD AUTO: 0 10E9/L (ref 0–0.2)
BASOPHILS NFR BLD AUTO: 0 %
BUN SERPL-MCNC: 27 MG/DL (ref 7–30)
CALCIUM SERPL-MCNC: 8.6 MG/DL (ref 8.5–10.1)
CHLORIDE SERPL-SCNC: 106 MMOL/L (ref 94–109)
CO2 SERPL-SCNC: 24 MMOL/L (ref 20–32)
CREAT SERPL-MCNC: 0.92 MG/DL (ref 0.66–1.25)
DIFFERENTIAL METHOD BLD: ABNORMAL
EOSINOPHIL # BLD AUTO: 0 10E9/L (ref 0–0.7)
EOSINOPHIL NFR BLD AUTO: 0 %
ERYTHROCYTE [DISTWIDTH] IN BLOOD BY AUTOMATED COUNT: 16.5 % (ref 10–15)
GFR SERPL CREATININE-BSD FRML MDRD: 82 ML/MIN/1.7M2
GLUCOSE SERPL-MCNC: 125 MG/DL (ref 70–99)
HCT VFR BLD AUTO: 44 % (ref 40–53)
HGB BLD-MCNC: 14.4 G/DL (ref 13.3–17.7)
IGG SERPL-MCNC: 1300 MG/DL (ref 695–1620)
IMM GRANULOCYTES # BLD: 0 10E9/L (ref 0–0.4)
IMM GRANULOCYTES NFR BLD: 0.4 %
LYMPHOCYTES # BLD AUTO: 1.1 10E9/L (ref 0.8–5.3)
LYMPHOCYTES NFR BLD AUTO: 20.1 %
MCH RBC QN AUTO: 28.5 PG (ref 26.5–33)
MCHC RBC AUTO-ENTMCNC: 32.7 G/DL (ref 31.5–36.5)
MCV RBC AUTO: 87 FL (ref 78–100)
MONOCYTES # BLD AUTO: 0.3 10E9/L (ref 0–1.3)
MONOCYTES NFR BLD AUTO: 5.9 %
NEUTROPHILS # BLD AUTO: 4 10E9/L (ref 1.6–8.3)
NEUTROPHILS NFR BLD AUTO: 73.6 %
NRBC # BLD AUTO: 0 10*3/UL
NRBC BLD AUTO-RTO: 0 /100
PLATELET # BLD AUTO: 266 10E9/L (ref 150–450)
POTASSIUM SERPL-SCNC: 4 MMOL/L (ref 3.4–5.3)
RBC # BLD AUTO: 5.05 10E12/L (ref 4.4–5.9)
SODIUM SERPL-SCNC: 137 MMOL/L (ref 133–144)
WBC # BLD AUTO: 5.4 10E9/L (ref 4–11)

## 2018-10-01 PROCEDURE — 85025 COMPLETE CBC W/AUTO DIFF WBC: CPT | Performed by: STUDENT IN AN ORGANIZED HEALTH CARE EDUCATION/TRAINING PROGRAM

## 2018-10-01 PROCEDURE — G0463 HOSPITAL OUTPT CLINIC VISIT: HCPCS

## 2018-10-01 PROCEDURE — 82784 ASSAY IGA/IGD/IGG/IGM EACH: CPT | Performed by: STUDENT IN AN ORGANIZED HEALTH CARE EDUCATION/TRAINING PROGRAM

## 2018-10-01 PROCEDURE — 80048 BASIC METABOLIC PNL TOTAL CA: CPT | Performed by: STUDENT IN AN ORGANIZED HEALTH CARE EDUCATION/TRAINING PROGRAM

## 2018-10-01 RX ORDER — CEPHALEXIN 500 MG/1
500 CAPSULE ORAL 2 TIMES DAILY
Qty: 10 CAPSULE | Refills: 0 | Status: SHIPPED | OUTPATIENT
Start: 2018-10-01 | End: 2018-10-16

## 2018-10-01 ASSESSMENT — PAIN SCALES - GENERAL: PAINLEVEL: NO PAIN (0)

## 2018-10-01 NOTE — NURSING NOTE
"Oncology Rooming Note    October 1, 2018 9:16 AM   Deejay Dior is a 67 year old male who presents for:    Chief Complaint   Patient presents with     Blood Draw     No lab orders. VS taken.     RECHECK     Pt is here for MDS     Initial Vitals: /72 (BP Location: Right arm, Patient Position: Sitting, Cuff Size: Adult Regular)  Pulse 80  Temp 97.5  F (36.4  C) (Oral)  Resp 18  Wt 92.3 kg (203 lb 8 oz)  SpO2 96%  BMI 30.95 kg/m2 Estimated body mass index is 30.95 kg/(m^2) as calculated from the following:    Height as of 9/27/18: 1.727 m (5' 7.99\").    Weight as of this encounter: 92.3 kg (203 lb 8 oz). Body surface area is 2.1 meters squared.  No Pain (0) Comment: Data Unavailable   No LMP for male patient.  Allergies reviewed: Yes  Medications reviewed: Yes    Medications: Medication refills not needed today.  Pharmacy name entered into Self Point:    [id number] [place], [place] - [address] [id number] [place] [place] [place] [address] [place] [address] [facility]     Clinical concerns: none     6 minutes for nursing intake (face to face time)     Elizabeth Forrester MA              "

## 2018-10-01 NOTE — PROGRESS NOTES
BMT Daily Progress Note     ID/CC:  Mr. 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    HPI: Deejay returns after starting oral steroids to follow up. He is feeling okay. About the same to a little improved. Of note, his SOB and slight cough has been present since early September since his grandchild exposed him to a cold. Child without fevers, but cough/sneezing in his face. Goes to . Notes cold sx since then. He still endorses fatigue. No dizziness or falls. No chest pain. No fevers. Stools are soft, 3-4x/day. No n/v/d/c. Increasing dietary intake as discussed last visit. Endorses persistent dry mouth, not using dex swish and spit or any other artificial saliva. Lower legs stable to sightly improved using topical steroids twice daily.  Has follow up visit with radiation/vascular for EVLA therapy Thursday. Wants to make sure he can go to that.     Review of Systems: 10 point ROS negative except as noted above.    Physical Exam:  /72 (BP Location: Right arm, Patient Position: Sitting, Cuff Size: Adult Regular)  Pulse 80  Temp 97.5  F (36.4  C) (Oral)  Resp 18  Wt 92.3 kg (203 lb 8 oz)  SpO2 96%  BMI 30.95 kg/m2     Wt Readings from Last 4 Encounters:   10/01/18 92.3 kg (203 lb 8 oz)   09/27/18 91.9 kg (202 lb 9.6 oz)   08/16/18 94 kg (207 lb 4.8 oz)   08/14/18 93 kg (205 lb)     General: NAD, interactive  Eyes: SARIKA, sclera anicteric  Nose/Mouth/Throat: OP clear, no ulcerations, very dry, upper plate, chronic lichenoid changes   Lungs:CTA B, no crackles or wheezes   CV: RRR S1S2 no MRG  Abd: S/NT/ND +BS  Skin:  Bilat LE shin skin changes, peeling flaking skin and scattered erythema. No blisters, no drainage warmth.   Neuro: A&O, mild resting tremor  Line: NONE    Labs:  Lab Results   Component Value Date    WBC 5.4 10/01/2018    ANEU 4.0 10/01/2018    HGB 14.4 10/01/2018    HCT 44.0 10/01/2018     10/01/2018     10/01/2018    POTASSIUM 4.0  10/01/2018    CHLORIDE 106 10/01/2018    CO2 24 10/01/2018     (H) 10/01/2018    BUN 27 10/01/2018    CR 0.92 10/01/2018    MAG 2.1 04/26/2018    INR 0.95 06/30/2016     Imaging:  Ct Chest W/o Contrast  Result Date: 9/27/2018  IMPRESSION: 1. Slight progression of bilateral basal predominant subpleural reticulation and traction bronchiolectasis with mild associated groundglass opacification compared to 7/7/2016. Findings consistent with interstitial lung disease in a nonspecific interstitial pneumonia pattern secondary to drug toxicity with superimposed features of chronic rzill-uzvvzl-woxv disease. Differential also includes idiopathic pneumonia syndrome and infection.     ASSESSMENT AND PLAN:  Mr. 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 Total cell dose (from 7/1 & 7/2) 6.53 x 10^6 CD34+ cells/kg.   - 1 year anniversary (July 2015): 30% cellular, trilineage hematopoiesis, no abnormal blasts by morphology or flow , no dysplasia, 0-1 fibrosis, 100% donor (BM, CD3, CD15). CR  - 2 year anniversary (July 2016): 20-30% cellular, trilineage hematopoiesis, no abnormal blasts by morphology or flow , no dysplasia, No fibrosis, 100% donor in BM (PB not sent). ISCN: //46,XX[20] Complete Remission.    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 Has been off prednisone since summer 2017 and briefly tapered off Sirolimus (january 2018), however resumed with recent flare in February. 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 STOP Sirolimus 9/27, start prednisone 1mg/kg at 90 mg daily. PFT and Dr Sandoval 10/2  - Recent cgvhd flare (started 2/19/18):  Ongoing wt loss, dry mouth, fatigue, and possible skin tightening of right LE (gvhd vs. Resolving cellulitis)- Per discussion with Dr. Pinto- restarted sirolimus and pred taper 3/8 (now complete).   - Sirolimus 0.5 mg daily. Level on 8/16 was 3.9. Discontinued 9/27  - Suggested again dex S/S for dry mouth, and continue biotene.     ID:  Afebrile No signs/sx of infection.   - Sent 1, 3 Beta D & Aspergillus Galacto 9/27, no mold coverage and chronic immunosuppression with new cough and SOB - all neg. Consider exposure to grandchild viral infection early Sept  - No flu shot on HD steroids  - H/o Cellulitis: had episode of cellulitis following a fall on the ice in January 2018 that provoked swelling in his right LE. Several recurrences with trauma.  - IgG = 403, repleted 3/22/16, 5/8/16= 1270; recheck with recent viral exposures pending 10/1  - Prophy:  HD ACV (renal dosing), Fluconazole, and SS daily Bactrim.  Resumed Levaquin 9/27 with steroids. Switch to keflex dom vasc procedure (10/3-7) rosio in light of historical cellulitis, hold levaquin those dates.     GI:  No symptoms  - Lipase wnl 9/27.  - Restart protonix 9/27 (steroids)    FEN/Renal:  Cr and lytes WNL.    - No longer using PRN bumex.  - Edema management with lower extremity wraps, compression, and elevation. - 10 lb weight loss since April. Concern for chronic malnutrition due to poor absorption from GVH. Albumin low 2.7 today. Encouraged to eat 3 meals per day, increase caloric intake of protein, and limit discretionary calories that could be replaced with more nutritious options. If GI GVHD, increased steroids 9/27, no weight loss 10/1.  - Check Calcium, vitamin D, B12, and folate 9/27, all wnl.    Fatigue:  - History of testosterone deficiency, rechecked and modestly low. Endo referral whenever pt requests  - TSH normal 2/28 and again on repeat 9/27 at 1.01.    Derm f/u 8/29    Venous statis: will get sclerotherapy 10/4, kelfex day prior and 4 days  following        Plan: Keflex 10/3-10/7, holding levaquin those days, then return to levaquin 10/8  Tomorrow with Dr. Sandoval & PFT's  10/11 BMT follow up  11/15 with Dr. Millie Melendez PAC  [phone]

## 2018-10-01 NOTE — MR AVS SNAPSHOT
After Visit Summary   10/1/2018    Deejay Dior    MRN: [id number]           Patient Information     Date Of Birth          1950        Visit Information        Provider Department      10/1/2018 9:00 AM  BMT CAROLINA #4 [facility] Blood and Marrow Transplant        Today's Diagnoses     MDS (myelodysplastic syndrome) (H)    -  1          [facility] ([facility])  [address] [place] [address]  Phone: [phone]  Clinic Hours:   Monday-Thursday:7am to 7pm   Friday: 7am to 5pm   Weekends and holidays:    8am to noon (in general)  If your fever is 100.5  or greater,   call the clinic.  After hours call the   hospital at [phone] or   [phone]. Ask for the BMT   fellow on-call            Follow-ups after your visit        Your next 10 appointments already scheduled     Oct 02, 2018 11:30 AM CDT   FULL PULMONARY FUNCTION with  PFL DEREK   [facility] Pulmonary Function Testing ([facility])    [address]  3rd Floor  [facility] [id number] [phone]            Oct 02, 2018  3:00 PM CDT   (Arrive by 2:45 PM)   Return Visit with Arsh Sandoval MD   [facility] ([facility])    [address]  [address]  [facility] [id number] [phone]            Oct 04, 2018 10:30 AM CDT   Procedure 5.5 hour with U2A ROOM 11   [address] [facility] ([facility])    [address] [address]               Oct 04, 2018 12:00 PM CDT   IR STAB PHLEBECTOMY < 10 STABS with UUIR5   [facility] [place], Interventional Radiology ([facility])    [address] [facility] [id number] [phone]           The day before the exam:   You may eat your regular diet.   You are encouraged to drink at least 8 eight ounce glasses of clear liquids.   Drink no alcoholic beverages for 24 hours before  or after the exam.  The day of the exam:   Do not eat any solid food or milk products for 6 hours prior to the exam. You may drink clear liquids until 2 hours prior to the exam. Clear liquids include the following: water, Jell-O, clear broth, apple juice or any non-carbonated drink that you can see through (no pop!)   The morning of the exam you may take medications as directed with a sip of water.  Please wear loose clothing, such as a sweat suit or jogging clothes. Avoid snaps, zippers and other metal. We may ask you to undress and put on a hospital gown.  Please bring any scans or X-rays taken at other hospitals, if similar tests were done. Also bring a list of your medicines, including vitamins, minerals and over-the-counter drugs. It is safest to leave personal items at home.  Someone will need to drive you to and from the hospital.  Tell your doctor in advance:   If you have allergies to x-ray contrast or iodine.   If you are or may be pregnant.   If you are taking Coumadin (or any other blood thinners) 5 days prior to the exam for any special instructions.   If you are diabetic to determine if your insulin needs have to be adjusted for the exam.  Your doctor will:   Need to do a history and physical within 30 days before this procedure.   Obtain necessary laboratory tests prior to the exam (CBCP, INR and PTT).  If you were given sedation, you cannot drive for 24 hours after the procedure, and an adult must be with you until then.  If you have any questions, please call the Imaging Department where you will have your exam. Directions, parking instructions, and other information are available on our website, [email or web address].            Oct 11, 2018 10:00 AM CDT   (Arrive by 9:45 AM)   US LOWER EXTREMITY VENOUS DUPLEX RIGHT with UCUSV2   [facility]  ([facility])    [address]  1st Floor  [facility] [address] [phone]           How do I prepare for  my exam? (Food and drink instructions) No Food and Drink Restrictions.  How do I prepare for my exam? (Other instructions) You do not need to do anything special to prepare for your exam.  What should I wear: Wear comfortable clothes.  How long does the exam take: Most ultrasounds take 30 to 60 minutes.  What should I bring: Bring a list of your medicines, including vitamins, minerals and over-the-counter drugs. It is safest to leave personal items at home.  Do I need a :  No  is needed.  What do I need to tell my doctor: Tell your doctor about any allergies you may have.  What should I do after the exam: No restrictions, You may resume normal activities.  What is this test: An ultrasound uses sound waves to make pictures of the body. Sound waves do not cause pain. The only discomfort may be the pressure of the wand against your skin or full bladder.  Who should I call with questions: If you have any questions, please call the Imaging Department where you will have your exam. Directions, parking instructions, and other information is available on our website, [email or web address].            Oct 11, 2018 11:15 AM CDT   [facility] Lab Draw with  MASONIC LAB DRAW   [facility] Lab Draw ([facility])    [address] [place] [address]  [address]  [facility] [address]   [phone]            Oct 11, 2018 12:00 PM CDT   Return with  BMT CAROLINA #1   [facility] Blood and Marrow Transplant ([facility])    [address] [place] [address]  [address]  [facility] [address]   [phone]            Nov 12, 2018 10:30 AM CST   Procedure 5.5 hour with U2A ROOM 10   [address] [facility] ([facility])    [address] [address]               Nov 12, 2018 12:00 PM CST   IR STAB PHLEBECTOMY < 10 STABS with UUIR5   [facility] ([facility])    [address] [address]   [phone]           The day before the exam:   You may eat your regular diet.   You are encouraged to drink at least 8 eight ounce glasses of clear liquids.   Drink no alcoholic beverages for 24 hours before or after the exam.  The day of the exam:   Do not eat any solid food or milk products for 6 hours prior to the exam. You may drink clear liquids until 2 hours prior to the exam. Clear liquids include the following: water, Jell-O, clear broth, apple juice or any non-carbonated drink that you can see through (no pop!)   The morning of the exam you may take medications as directed with a sip of water.  Please wear loose clothing, such as a sweat suit or jogging clothes. Avoid snaps, zippers and other metal. We may ask you to undress and put on a hospital gown.  Please bring any scans or X-rays taken at other hospitals, if similar tests were done. Also bring a list of your medicines, including vitamins, minerals and over-the-counter drugs. It is safest to leave personal items at home.  Someone will need to drive you to and from the hospital.  Tell your doctor in advance:   If you have allergies to x-ray contrast or iodine.   If you are or may be pregnant.   If you are taking Coumadin (or any other blood thinners) 5 days prior to the exam for any special instructions.   If you are diabetic to determine if your insulin needs have to be adjusted for the exam.  Your doctor will:   Need to do a history and physical within 30 days before this procedure.   Obtain necessary laboratory tests prior to the exam (CBCP, INR and PTT).  If you were given sedation, you cannot drive for 24 hours after the procedure, and an adult must be with you until then.  If you have any questions, please call the Imaging Department where you will have your exam. Directions, parking instructions, and other information are available on our website, [email or web address].            Nov 15, 2018   1:30 PM CST   Return with Aby Pinto MD   [facility] Blood and Marrow Transplant ([facility])    [address]  [address]  [facility] [address] [phone]              Future tests that were ordered for you today     Open Future Orders        Priority Expected Expires Ordered    CBC with platelets differential Routine 10/11/2018 3/30/2019 10/1/2018    Comprehensive metabolic panel Routine 10/11/2018 3/30/2019 10/1/2018            Who to contact     If you have questions or need follow up information about today's clinic visit or your schedule please contact [facility]ARR[facility]PL[facility] directly at [phone].  Normal or non-critical lab and imaging results will be communicated to you by [employer], letter or phone within 4 business days after the clinic has received the results. If you do not hear from us within 7 days, please contact the clinic through [employer] or phone. If you have a critical or abnormal lab result, we will notify you by phone as soon as possible.  Submit refill requests through [employer] or call your pharmacy and they will forward the refill request to us. Please allow 3 business days for your refill to be completed.          Additional Information About Your Visit        [employer] Information     [employer] gives you secure access to your electronic health record. If you see a primary care provider, you can also send messages to your care team and make appointments. If you have questions, please call your primary care clinic.  If you do not have a primary care provider, please call [phone] and they will assist you.        Care EveryWhere ID     This is your Care EveryWhere ID. This could be used by other organizations to access your [place] medical records  [id number]        Your Vitals Were     Pulse Temperature Respirations Pulse Oximetry BMI (Body Mass Index)       80 97.5  F (36.4  C) (Oral) 18 96% 30.95 kg/m2        Blood Pressure from  Last 3 Encounters:   10/01/18 120/72   09/27/18 100/63   08/16/18 122/70    Weight from Last 3 Encounters:   10/01/18 92.3 kg (203 lb 8 oz)   09/27/18 91.9 kg (202 lb 9.6 oz)   08/16/18 94 kg (207 lb 4.8 oz)              We Performed the Following     Basic metabolic panel     CBC with platelets differential     IgG          Today's Medication Changes          These changes are accurate as of 10/1/18  2:38 PM.  If you have any questions, ask your nurse or doctor.               Start taking these medicines.        Dose/Directions    cephALEXin 500 MG capsule   Commonly known as:  KEFLEX   Used for:  MDS (myelodysplastic syndrome) (H)        Dose:  500 mg   Take 1 capsule (500 mg) by mouth 2 times daily   Quantity:  10 capsule   Refills:  0            Where to get your medicines      These medications were sent to [id number] - [facility] [address] [address] [address]     Phone:  [phone]     cephALEXin 500 MG capsule                Recent Review Flowsheet Data     BMT Recent Results Latest Ref Rng & Units 3/29/2018 4/2/2018 4/26/2018 6/21/2018 8/16/2018 9/27/2018 10/1/2018    WBC 4.0 - 11.0 10e9/L 6.5 4.2 6.6 6.1 6.1 6.2 5.4    Hemoglobin 13.3 - 17.7 g/dL 15.7 14.6 14.5 13.7 15.1 13.8 14.4    Platelet Count 150 - 450 10e9/L 96(L) 105(L) 183 172 172 223 266    Neutrophils (Absolute) 1.6 - 8.3 10e9/L 3.6 2.6 3.4 3.2 2.7 2.6 4.0    INR 0.86 - 1.14 - - - - - - -    Sodium 133 - 144 mmol/L 140 - 139 138 139 138 137    Potassium 3.4 - 5.3 mmol/L 3.5 - 4.1 3.9 4.0 4.0 4.0    Chloride 94 - 109 mmol/L 108 - 106 109 108 108 106    Glucose 70 - 99 mg/dL 91 - 94 107(H) 92 108(H) 125(H)    Urea Nitrogen 7 - 30 mg/dL 25 - 18 16 18 22 27    Creatinine 0.66 - 1.25 mg/dL 0.87 - 0.99 0.91 0.92 1.03 0.92    Calcium (Total) 8.5 - 10.1 mg/dL 8.2(L) - 9.0 8.7 8.6 8.5 8.6    Protein (Total) 6.8 - 8.8 g/dL 6.4(L) - 7.3 7.2 7.2 6.6(L) -    Albumin 3.4 - 5.0 g/dL 2.8(L) - 3.0(L) 3.1(L) 3.1(L)  2.7(L) -    Bilirubin (Direct) 0.0 - 0.2 mg/dL - - - - - - -    Alkaline Phosphatase 40 - 150 U/L 94 - 102 106 109 101 -    AST 0 - 45 U/L 33 - 38 44 Canceled, Test credited 39 -    ALT 0 - 70 U/L 58 - 33 29 30 28 -    MCV 78 - 100 fl 88 87 88 87 87 86 87               Primary Care Provider Office Phone # Fax #    Vivek Rafael Callejas [phone]       [facility] [address] [place] [address]        Equal Access to Services     [facility] HospitalKRISTINE : Hadii aad ku hadasho Soomaali, waaxda luqadaha, qaybta kaalmada adejoseyaclint, corinna barlow . So [facility] [phone].    ATENCIÓN: Si habla español, tiene a del toro disposición servicios gratuitos de asistencia lingüística. [facility] [phone].    We comply with applicable federal civil rights laws and [place] laws. We do not discriminate on the basis of race, color, national origin, age, disability, sex, sexual orientation, or gender identity.            Thank you!     Thank you for choosing [facility]  for your care. Our goal is always to provide you with excellent care. Hearing back from our patients is one way we can continue to improve our services. Please take a few minutes to complete the written survey that you may receive in the mail after your visit with us. Thank you!             Your Updated Medication List - Protect others around you: Learn how to safely use, store and throw away your medicines at www.disposemymeds.org.          This list is accurate as of 10/1/18  2:39 PM.  Always use your most recent med list.                   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), MDS (myelodysplastic syndrome) (H)       amoxicillin 500 MG capsule    AMOXIL    16 capsule    Take 4 pills (2 grams) day of dental work    MDS (myelodysplastic syndrome)  (H)       calcium carbonate-vitamin D 500-400 MG-UNIT Tabs per tablet     180 tablet    Take 1 tablet by mouth daily 2000 mg    MDS (myelodysplastic syndrome) (H), Acute doezj-baclax-gemw disease (H), 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)       cephALEXin 500 MG capsule    KEFLEX    10 capsule    Take 1 capsule (500 mg) by mouth 2 times daily    MDS (myelodysplastic syndrome) (H)       fluconazole 100 MG tablet    DIFLUCAN    30 tablet    Take 1 tablet (100 mg) by mouth daily    Status post bone marrow transplant (H)       levofloxacin 250 MG tablet    LEVAQUIN    30 tablet    Take 1 tablet (250 mg) by mouth daily    MDS (myelodysplastic syndrome) (H)       order for DME     1 Device    Please provide a [employer] cane offset with strap item number [id number]    MDS (myelodysplastic syndrome) (H)       order for DME     1 each    Please measure and distribute 1 pair of 20mmHg - 30mmHg thigh high open or closed toe compression stockings. Jobst ultrasheer or equivalent.    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), Status post bone marrow transplant (H)       * predniSONE 20 MG tablet    DELTASONE    60 tablet    Take 1 tablet (20 mg) by mouth daily    SOB (shortness of breath)       * predniSONE 50 MG tablet    DELTASONE    30 tablet    Take 1 tablet (50 mg) by mouth daily Take a combination of 1 if the 50 mg and 2 of the 20 mg tablets for a total of 90 mg prednisone by mouth daily    SOB (shortness of breath)       sertraline 100 MG tablet    ZOLOFT    30 tablet    Take 1 tablet (100 mg) by mouth daily    MDS (myelodysplastic syndrome) (H), 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), Urinary frequency, Other complication of bone marrow transplant (H)       sulfamethoxazole-trimethoprim 800-160 MG per tablet    BACTRIM DS/SEPTRA DS    16 tablet    Take one tab by  mouth twice daily on Monday and Tuesdays only    Status post bone marrow transplant (H)       triamcinolone 0.1 % ointment    KENALOG     Apply twice a day to lower leg        * Notice:  This list has 2 medication(s) that are the same as other medications prescribed for you. Read the directions carefully, and ask your doctor or other care provider to review them with you.

## 2018-10-01 NOTE — NURSING NOTE
Chief Complaint   Patient presents with     Blood Draw     No lab orders. VS taken.     Anel Isabel RN

## 2018-10-02 ENCOUNTER — OFFICE VISIT (OUTPATIENT)
Dept: PULMONOLOGY | Facility: CLINIC | Age: 68
End: 2018-10-02
Attending: INTERNAL MEDICINE
Payer: MEDICARE

## 2018-10-02 ENCOUNTER — TELEPHONE (OUTPATIENT)
Dept: INTERVENTIONAL RADIOLOGY/VASCULAR | Facility: CLINIC | Age: 68
End: 2018-10-02

## 2018-10-02 VITALS
TEMPERATURE: 96.8 F | HEART RATE: 65 BPM | HEIGHT: 68 IN | OXYGEN SATURATION: 94 % | WEIGHT: 204.2 LBS | SYSTOLIC BLOOD PRESSURE: 126 MMHG | BODY MASS INDEX: 30.95 KG/M2 | RESPIRATION RATE: 16 BRPM | DIASTOLIC BLOOD PRESSURE: 78 MMHG

## 2018-10-02 DIAGNOSIS — R06.02 SOB (SHORTNESS OF BREATH): ICD-10-CM

## 2018-10-02 DIAGNOSIS — D89.813 GVHD (GRAFT VERSUS HOST DISEASE) (H): ICD-10-CM

## 2018-10-02 DIAGNOSIS — E63.8 BODY NUTRITION DEFICIT: ICD-10-CM

## 2018-10-02 DIAGNOSIS — R53.83 OTHER FATIGUE: ICD-10-CM

## 2018-10-02 DIAGNOSIS — D89.813 GVHD (GRAFT VERSUS HOST DISEASE) (H): Primary | ICD-10-CM

## 2018-10-02 DIAGNOSIS — D46.9 MDS (MYELODYSPLASTIC SYNDROME) (H): ICD-10-CM

## 2018-10-02 DIAGNOSIS — R93.89 ABNORMAL FINDING ON IMAGING: Primary | ICD-10-CM

## 2018-10-02 PROCEDURE — G0463 HOSPITAL OUTPT CLINIC VISIT: HCPCS | Mod: ZF

## 2018-10-02 ASSESSMENT — PAIN SCALES - GENERAL: PAINLEVEL: NO PAIN (0)

## 2018-10-02 NOTE — PROGRESS NOTES
Reason for Visit  Deejay Dior is a 67 year old year old male who is being seen for No chief complaint on file.    Pulmonary HPI  66 YO male with history of MDS s/p NMA allo sib BMT (Day + 707) who is referred to the Pulmonary BMT clinic by Dr. Pinto for evaluation of SOB and abnormal chest CT.  Developed influenza A (H1N1) in 3-16 with significant SOB/MERIDA requiring hospital admission with ICU stay and use of Hi-flow. 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  After discharge he was slowly feeling better- could initially walk 40-50 feet without oxygen ( feet with oxygen) but improved to 3-4 blocks without oxygen at end of April. Early May developed weakness, fatigue and increased SOB.  Diagnosed with Influenza B; not hospitalized. After that episode had continued SOB/MERIDA, using oxygen at home.  CT chest in 5-16 showed bilateral GGO, repeat scan on 6-1 showed a decrease in GGO.   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  Since that time he feels 70-80% better with decreased MERIDA.  URI with nasal drainage and congestion the last two days, not much cough.  No prior history of lung disease as youth or young adult; no asthma.  Episode of pneumonia 11-14 hospitalized for 2 days.  No tobacco x 16 years, 32-48 pack year history.   + second hand smoke.  Denies significant mold exposure.    Last seen over two years ago.  History of organizing pneumonia due to H1N1 and also Geotrichum infection found on past bronchoscopy (not treated). Recent CT showed an increase in GGO in the LLL with increased bronchiectasis.  Developed onset of chest congestion and cough one month ago, was coughing up green sputum.  Seen in Urgent care, started on doxycycline, no imaging was performed  per patient.  Productive cough lasted for two weeks and has resolved for the past two weeks; afterward noticed cough if he tried to take a deep breath.  This has also resolved.  Ten days ago noticed MERIDA that was severe in nature with SOB walking short distances or a flight of stairs.  This has also resolved in the last few days.  No fevers or chills.  States over the last two years he has not had any significant respiratory illnesses. Does get URI's every 3-4 months, these last for 2-3 weeks but completely resolve.  No residual cough or sputum production after resolution.  No daily cough or sputum production.     The patient was seen and examined by Arsh Sandoval           Current Outpatient Prescriptions   Medication     acyclovir (ZOVIRAX) 800 MG tablet     amoxicillin (AMOXIL) 500 MG capsule     calcium carbonate-vitamin D 500-400 MG-UNIT TABS tablt     cephALEXin (KEFLEX) 500 MG capsule     fluconazole (DIFLUCAN) 100 MG tablet     levofloxacin (LEVAQUIN) 250 MG tablet     order for DME     ORDER FOR DME     pantoprazole (PROTONIX) 20 MG [facility] tablet     predniSONE (DELTASONE) 20 MG tablet     predniSONE (DELTASONE) 50 MG tablet     sertraline (ZOLOFT) 100 MG tablet     sulfamethoxazole-trimethoprim (BACTRIM DS/SEPTRA DS) 800-160 MG per tablet     triamcinolone (KENALOG) 0.1 % ointment     No current facility-administered medications for this visit.      Allergies   Allergen Reactions     Blood Transfusion Related (Informational Only) Other (See Comments)     Patient has a history of a clinically significant antibody against RBC antigens.  A delay in compatible RBCs may occur.     Past Medical History:   Diagnosis Date     Head injury 1964     Hearing problem Hearing aids 2015     History of blood transfusion 3/2014     History of radiation therapy June 2014     Immunosuppression (H) Sirolimus daily     MDS (myelodysplastic syndrome) (H)      Numbness and tingling     feet     Obstructive sleep apnea 1999      Pneumonia      Reduced vision August 2016    Cataract surgery     Sleep apnea 1999     Transplant July 1, 2014    Stem Cells       Past Surgical History:   Procedure Laterality Date     BACK SURGERY       BIOPSY       BMT CELL PRODUCT INFUSION       ESOPHAGOSCOPY, GASTROSCOPY, DUODENOSCOPY (EGD), COMBINED N/A 2/2/2015     ESOPHAGOSCOPY, GASTROSCOPY, DUODENOSCOPY (EGD), COMBINED Left 8/6/2015    Procedure: COMBINED ESOPHAGOSCOPY, GASTROSCOPY, DUODENOSCOPY (EGD), BIOPSY SINGLE OR MULTIPLE;  Surgeon: Amber Francis MD;  Location:  GI     EXCISE LESION LIP N/A 1/2/2017    Procedure: EXCISE LESION LIP;  Surgeon: Tim Yanez MD;  Location: [facility]     EYE SURGERY       FLEXIBLE SIGMOIDOSCOPY  2/2/15     HEMORRHOIDECTOMY  2001     PHACOEMULSIFICATION CLEAR CORNEA WITH STANDARD INTRAOCULAR LENS IMPLANT Left 7/18/2016    Procedure: PHACOEMULSIFICATION CLEAR CORNEA WITH STANDARD INTRAOCULAR LENS IMPLANT;  Surgeon: Randolph Giles MD;  Location:  [facility]     TONSILLECTOMY  1953     TRANSPLANT  7-1-2014    Stem Cell Transplant [facility] BMT     VASCULAR SURGERY Left 2009       Social History     Social History     Marital status:      Spouse name: N/A     Number of children: N/A     Years of education: N/A     Occupational History     Not on file.     Social History Main Topics     Smoking status: Former Smoker     Packs/day: 1.00     Years: 34.00     Types: Cigarettes     Start date: 12/1/1967     Quit date: 4/1/2001     Smokeless tobacco: Never Used      Comment: quit in 2000     Alcohol use 2.5 oz/week      Comment: Seldom     Drug use: No     Sexual activity: Not Currently     Partners: Female     Birth control/ protection: Male Surgical, Female Surgical     Other Topics Concern     Not on file     Social History Narrative       ROS Pulmonary  A complete ROS was otherwise negative except as noted in the HPI.  There were no vitals taken for this visit.  Exam:   GENERAL APPEARANCE: Well developed,  well nourished, alert, and in no apparent distress.  EYES: PERRL, EOMI  HENT: Nasal mucosa with no edema and no hyperemia. No nasal polyps.  EARS: Canals clear, TMs normal  MOUTH: Oral mucosa is moist, without any lesions, no tonsillar enlargement, no oropharyngeal exudate.  NECK: supple, no masses, no thyromegaly.  LYMPHATICS: No significant axillary, cervical, or supraclavicular nodes.  RESP: Good air flow throughout.  No crackles. Few rhonchi in right base. No wheezes.  CV: Normal S1, S2, regular rhythm, normal rate. No murmur.  No rub. No gallop. No LE edema.   ABDOMEN:  Bowel sounds normal, soft, nontender, no HSM or masses.   MS: extremities normal. No clubbing. No cyanosis.  SKIN: no rash on limited exam  NEURO: Mentation intact, speech normal, normal strength and tone, normal gait and stance  PSYCH: mentation appears normal. and affect normal/bright  Results:  PFT's were personally reviewed in clinic  FVC 4.56 (114%), FEV-1 3.58 (118%), FEV-1/FVC 79%  %, RV 73%, TLC 97%  cDLCO 78%  No airflow limitation.  Normal lung volumes.  Normal diffusion capacity  Recent Results (from the past 168 hour(s))   Comprehensive metabolic panel    Collection Time: 09/27/18 10:59 AM   Result Value Ref Range    Sodium 138 133 - 144 mmol/L    Potassium 4.0 3.4 - 5.3 mmol/L    Chloride 108 94 - 109 mmol/L    Carbon Dioxide 22 20 - 32 mmol/L    Anion Gap 7 3 - 14 mmol/L    Glucose 108 (H) 70 - 99 mg/dL    Urea Nitrogen 22 7 - 30 mg/dL    Creatinine 1.03 0.66 - 1.25 mg/dL    GFR Estimate 71 >60 mL/min/1.7m2    GFR Estimate If Black 86 >60 mL/min/1.7m2    Calcium 8.5 8.5 - 10.1 mg/dL    Bilirubin Total 0.2 0.2 - 1.3 mg/dL    Albumin 2.7 (L) 3.4 - 5.0 g/dL    Protein Total 6.6 (L) 6.8 - 8.8 g/dL    Alkaline Phosphatase 101 40 - 150 U/L    ALT 28 0 - 70 U/L    AST 39 0 - 45 U/L   Sirolimus level    Collection Time: 09/27/18 10:59 AM   Result Value Ref Range    Sirolimus Last Dose (Note)     Sirolimus Level 4.5 (L) 5.0 - 15.0  ug/L   CBC with platelets differential    Collection Time: 09/27/18 10:59 AM   Result Value Ref Range    WBC 6.2 4.0 - 11.0 10e9/L    RBC Count 4.83 4.4 - 5.9 10e12/L    Hemoglobin 13.8 13.3 - 17.7 g/dL    Hematocrit 41.6 40.0 - 53.0 %    MCV 86 78 - 100 fl    MCH 28.6 26.5 - 33.0 pg    MCHC 33.2 31.5 - 36.5 g/dL    RDW 16.0 (H) 10.0 - 15.0 %    Platelet Count 223 150 - 450 10e9/L    Diff Method Automated Method     % Neutrophils 41.8 %    % Lymphocytes 34.2 %    % Monocytes 14.3 %    % Eosinophils 8.4 %    % Basophils 1.0 %    % Immature Granulocytes 0.3 %    Nucleated RBCs 0 0 /100    Absolute Neutrophil 2.6 1.6 - 8.3 10e9/L    Absolute Lymphocytes 2.1 0.8 - 5.3 10e9/L    Absolute Monocytes 0.9 0.0 - 1.3 10e9/L    Absolute Eosinophils 0.5 0.0 - 0.7 10e9/L    Absolute Basophils 0.1 0.0 - 0.2 10e9/L    Abs Immature Granulocytes 0.0 0 - 0.4 10e9/L    Absolute Nucleated RBC 0.0    TSH    Collection Time: 09/27/18 12:17 PM   Result Value Ref Range    TSH 1.01 0.40 - 4.00 mU/L   Testosterone    Collection Time: 09/27/18 12:17 PM   Result Value Ref Range    Testosterone Total 192 (L) 240 - 950 ng/dL   Vitamin D panel    Collection Time: 09/27/18 12:17 PM   Result Value Ref Range    Vitamin D Deficiency screening 39 20 - 75 ug/L   Vitamin B12    Collection Time: 09/27/18 12:17 PM   Result Value Ref Range    Vitamin B12 698 193 - 986 pg/mL   Lipase    Collection Time: 09/27/18 12:17 PM   Result Value Ref Range    Lipase 158 73 - 393 U/L   Aspergillus Galactomannan Antigen    Collection Time: 09/27/18 12:17 PM   Result Value Ref Range    Aspergillus Galactomannan Index 0.05       Aspergillus Galact AG Negative Negative      1,3 Beta D glucan fungitell    Collection Time: 09/27/18 12:17 PM   Result Value Ref Range    (1,3)-Beta-D-Glucan 35 pg/mL    B-D GLUCAN INTERPRETATION (1,3) Negative Negative      Calcium    Collection Time: 09/27/18 12:18 PM   Result Value Ref Range    Calcium Ionized Whole Blood 4.7 4.4 - 5.2 mg/dL    Folate    Collection Time: 09/27/18 12:18 PM   Result Value Ref Range    Folate 15.9 >5.4 ng/mL   CBC with platelets differential    Collection Time: 10/01/18  9:54 AM   Result Value Ref Range    WBC 5.4 4.0 - 11.0 10e9/L    RBC Count 5.05 4.4 - 5.9 10e12/L    Hemoglobin 14.4 13.3 - 17.7 g/dL    Hematocrit 44.0 40.0 - 53.0 %    MCV 87 78 - 100 fl    MCH 28.5 26.5 - 33.0 pg    MCHC 32.7 31.5 - 36.5 g/dL    RDW 16.5 (H) 10.0 - 15.0 %    Platelet Count 266 150 - 450 10e9/L    Diff Method Automated Method     % Neutrophils 73.6 %    % Lymphocytes 20.1 %    % Monocytes 5.9 %    % Eosinophils 0.0 %    % Basophils 0.0 %    % Immature Granulocytes 0.4 %    Nucleated RBCs 0 0 /100    Absolute Neutrophil 4.0 1.6 - 8.3 10e9/L    Absolute Lymphocytes 1.1 0.8 - 5.3 10e9/L    Absolute Monocytes 0.3 0.0 - 1.3 10e9/L    Absolute Eosinophils 0.0 0.0 - 0.7 10e9/L    Absolute Basophils 0.0 0.0 - 0.2 10e9/L    Abs Immature Granulocytes 0.0 0 - 0.4 10e9/L    Absolute Nucleated RBC 0.0    Basic metabolic panel    Collection Time: 10/01/18  9:54 AM   Result Value Ref Range    Sodium 137 133 - 144 mmol/L    Potassium 4.0 3.4 - 5.3 mmol/L    Chloride 106 94 - 109 mmol/L    Carbon Dioxide 24 20 - 32 mmol/L    Anion Gap 7 3 - 14 mmol/L    Glucose 125 (H) 70 - 99 mg/dL    Urea Nitrogen 27 7 - 30 mg/dL    Creatinine 0.92 0.66 - 1.25 mg/dL    GFR Estimate 82 >60 mL/min/1.7m2    GFR Estimate If Black >90 >60 mL/min/1.7m2    Calcium 8.6 8.5 - 10.1 mg/dL   IgG    Collection Time: 10/01/18  9:55 AM   Result Value Ref Range    IGG 1300 695 - 1620 mg/dL   General PFT Lab (Please always keep checked)    Collection Time: 10/02/18 11:51 AM   Result Value Ref Range    FVC-Pred 3.97 L    FVC-Pre 4.56 L    FVC-%Pred-Pre 114 %    FEV1-Pre 3.58 L    FEV1-%Pred-Pre 118 %    FEV1FVC-Pred 74 %    FEV1FVC-Pre 79 %    FEFMax-Pred 7.92 L/sec    FEFMax-Pre 10.21 L/sec    FEFMax-%Pred-Pre 128 %    FEF2575-Pred 2.34 L/sec    FEF2575-Pre 3.36 L/sec     OGP4338-%Pred-Pre 143 %    ExpTime-Pre 11.75 sec    FIFMax-Pre 8.04 L/sec    VC-Pred 4.41 L    VC-Pre 4.67 L    VC-%Pred-Pre 105 %    IC-Pred 3.69 L    IC-Pre 2.64 L    IC-%Pred-Pre 71 %    ERV-Pred 0.72 L    ERV-Pre 2.03 L    ERV-%Pred-Pre 282 %    FEV1FEV6-Pred 78 %    FEV1FEV6-Pre 81 %    FRCPleth-Pred 3.58 L    FRCPleth-Pre 3.93 L    FRCPleth-%Pred-Pre 109 %    RVPleth-Pred 2.57 L    RVPleth-Pre 1.89 L    RVPleth-%Pred-Pre 73 %    TLCPleth-Pred 6.72 L    TLCPleth-Pre 6.57 L    TLCPleth-%Pred-Pre 97 %    DLCOunc-Pred 25.33 ml/min/mmHg    DLCOunc-Pre 19.86 ml/min/mmHg    DLCOunc-%Pred-Pre 78 %    DLCOcor-Pre 19.97 ml/min/mmHg    DLCOcor-%Pred-Pre 78 %    VA-Pre 6.21 L    VA-%Pred-Pre 96 %    FEV1SVC-Pred 68 %    FEV1SVC-Pre 77 %       Assessment and plan:   66 YO with recent history of Influenza A and B infections 6 weeks apart with continued significant MERIDA.  CT chest with presence of GGO with air trapping and ? Small airway inflammation.  No change in spirometry compared to pre-transplant testing. Alhough spirometry shows mild airflow limitation, I did not think he had cGVHD of the lung (NOE) due to no change in spirometry compared to pre-transplant, with only a small drop in lung function compared to normal (usual drop with NOE is 30-50%). A viral infection with GGO on CT was an alternative explanation for his recent SOB/MERIDA.  Findings and clinical course were most consistent with bronchiolitis/organizing pneumonia post H1N1 infection; this was a common occurrence this year in post-BMT patients who contracted H1N1. Since his initial evaluation, he developed an acute respiratory illness one month ago with systems all resolved at present.  CT findings are likely due to resolving pneumonia.  Frequent URI's but no respiratory symptoms between episodes and no chronic sputum production with no symptoms suggestive of bronchiectasis.  No evidence of lung cGvHD based on symptoms or spirometry.  Unlikely sirolimus  toxicity with GGO only in LLL, would expect to be diffuse.  1.  No indication for bronchoscopy at this time  2. Repeat CT in 2 months; if LLL changes are not resolved will proceed with bronchoscopy at that time  3. Ok to taper steroids from a pulmonary standpoint     RTC in two months with repeat chest CT.  Patient to call with any questions or concerns prior to his next clinic visit.

## 2018-10-02 NOTE — NURSING NOTE
"Oncology Rooming Note    October 2, 2018 3:01 PM   Deejay Dior is a 67 year old male who presents for:    Chief Complaint   Patient presents with     RECHECK     Return f/up     Initial Vitals: /78 (BP Location: Right arm, Patient Position: Sitting, Cuff Size: Adult Regular)  Pulse 65  Temp 96.8  F (36  C) (Oral)  Resp 16  Ht 1.727 m (5' 7.99\")  Wt 92.6 kg (204 lb 3.2 oz)  SpO2 94%  BMI 31.06 kg/m2 Estimated body mass index is 31.06 kg/(m^2) as calculated from the following:    Height as of this encounter: 1.727 m (5' 7.99\").    Weight as of this encounter: 92.6 kg (204 lb 3.2 oz). Body surface area is 2.11 meters squared.  No Pain (0) Comment: Data Unavailable   No LMP for male patient.  Allergies reviewed: Yes  Medications reviewed: Yes    Medications: Medication refills not needed today.  Pharmacy name entered into [employer]:    [id number] [place], [place] - [address] [facility] [address] & [id number] - [place], [place] [address] [address] [facility]     Clinical concerns: none      8 minutes for nursing intake (face to face time)     Carla ROOSEVELT Silveira              "

## 2018-10-02 NOTE — MR AVS SNAPSHOT
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      Your next 10 appointments already scheduled     Oct 04, 2018 10:30 AM CDT   Procedure 5.5 hour with U2A ROOM 11   Unit 2A [facility] ([facility])    [address] [address]               Oct 04, 2018   Procedure with Arsh Sandoval MD   [facility] [place], Endoscopy ([facility])    [address] [id number] [phone]           The [facility] is located on the corner of [address] and [address] on the [facility]. It is easily accessible from virtually any point in the [facility] area, via [address].            Oct 04, 2018 12:00 PM CDT   IR STAB PHLEBECTOMY < 10 STABS with UUIR5   [facility] [place], Interventional Radiology ([facility])    [address] [facility] [address] [phone]           The day before the exam:   You may eat your regular diet.   You are encouraged to drink at least 8 eight ounce glasses of clear liquids.   Drink no alcoholic beverages for 24 hours before or after the exam.  The day of the exam:   Do not eat any solid food or milk products for 6 hours prior to the exam. You may drink clear liquids until 2 hours prior to the exam. Clear liquids include the  following: water, Jell-O, clear broth, apple juice or any non-carbonated drink that you can see through (no pop!)   The morning of the exam you may take medications as directed with a sip of water.  Please wear loose clothing, such as a sweat suit or jogging clothes. Avoid snaps, zippers and other metal. We may ask you to undress and put on a hospital gown.  Please bring any scans or X-rays taken at other hospitals, if similar tests were done. Also bring a list of your medicines, including vitamins, minerals and over-the-counter drugs. It is safest to leave personal items at home.  Someone will need to drive you to and from the hospital.  Tell your doctor in advance:   If you have allergies to x-ray contrast or iodine.   If you are or may be pregnant.   If you are taking Coumadin (or any other blood thinners) 5 days prior to the exam for any special instructions.   If you are diabetic to determine if your insulin needs have to be adjusted for the exam.  Your doctor will:   Need to do a history and physical within 30 days before this procedure.   Obtain necessary laboratory tests prior to the exam (CBCP, INR and PTT).  If you were given sedation, you cannot drive for 24 hours after the procedure, and an adult must be with you until then.  If you have any questions, please call the Imaging Department where you will have your exam. Directions, parking instructions, and other information are available on our website, [email or web address].            Oct 11, 2018 10:00 AM CDT   (Arrive by 9:45 AM)    LOWER EXTREMITY VENOUS DUPLEX RIGHT with UCUSV2   [facility])    [address] [address] [phone]           How do I prepare for my exam? (Food and drink instructions) No Food and Drink Restrictions.  How do I prepare for my exam? (Other instructions) You do not need to do anything special to prepare for your exam.  What should I  wear: Wear comfortable clothes.  How long does the exam take: Most ultrasounds take 30 to 60 minutes.  What should I bring: Bring a list of your medicines, including vitamins, minerals and over-the-counter drugs. It is safest to leave personal items at home.  Do I need a :  No  is needed.  What do I need to tell my doctor: Tell your doctor about any allergies you may have.  What should I do after the exam: No restrictions, You may resume normal activities.  What is this test: An ultrasound uses sound waves to make pictures of the body. Sound waves do not cause pain. The only discomfort may be the pressure of the wand against your skin or full bladder.  Who should I call with questions: If you have any questions, please call the Imaging Department where you will have your exam. Directions, parking instructions, and other information is available on our website, [email or web address].            Oct 11, 2018 11:15 AM CDT   Masonic Lab Draw with  MASONIC LAB DRAW   [facility] Masonic Lab Draw ([place] [facility])    [address] [place] [address]  [address] [place] [id number]   [phone]            Oct 11, 2018 12:00 PM CDT   Return with  BMT CAROLINA #1   [facility] Blood and Marrow Transplant ([place] campus)    [address] [place] [address]  [address] [place] [id number]   [phone]            Nov 12, 2018 10:30 AM CST   Procedure 5.5 hour with U2A ROOM 10   Unit 2A [facility] ([facility])    [address] [address]               Nov 12, 2018 12:00 PM CST   IR STAB PHLEBECTOMY < 10 STABS with UUIR5   [facility], Interventional Radiology ([facility])    [address] [facility] [address] [phone]           The day before the exam:   You may eat your regular diet.   You are encouraged to drink at least 8 eight ounce glasses of clear  liquids.   Drink no alcoholic beverages for 24 hours before or after the exam.  The day of the exam:   Do not eat any solid food or milk products for 6 hours prior to the exam. You may drink clear liquids until 2 hours prior to the exam. Clear liquids include the following: water, Jell-O, clear broth, apple juice or any non-carbonated drink that you can see through (no pop!)   The morning of the exam you may take medications as directed with a sip of water.  Please wear loose clothing, such as a sweat suit or jogging clothes. Avoid snaps, zippers and other metal. We may ask you to undress and put on a hospital gown.  Please bring any scans or X-rays taken at other hospitals, if similar tests were done. Also bring a list of your medicines, including vitamins, minerals and over-the-counter drugs. It is safest to leave personal items at home.  Someone will need to drive you to and from the hospital.  Tell your doctor in advance:   If you have allergies to x-ray contrast or iodine.   If you are or may be pregnant.   If you are taking Coumadin (or any other blood thinners) 5 days prior to the exam for any special instructions.   If you are diabetic to determine if your insulin needs have to be adjusted for the exam.  Your doctor will:   Need to do a history and physical within 30 days before this procedure.   Obtain necessary laboratory tests prior to the exam (CBCP, INR and PTT).  If you were given sedation, you cannot drive for 24 hours after the procedure, and an adult must be with you until then.  If you have any questions, please call the Imaging Department where you will have your exam. Directions, parking instructions, and other information are available on our website, [email or web address].            Nov 15, 2018  1:00 PM RUST   Masonic Lab Draw with  MASONIC LAB DRAW   [facility] Masonic Lab Draw ([facility])    [address] [facility]  [address]  [facility] [address] [phone]             Nov 15, 2018  1:30 PM CST   Return with Aby Pinto MD   [facility] Blood and Marrow Transplant ([facility])    [address] [facility]  [address]  [facility] [address] [phone]              Future tests that were ordered for you today     Open Future Orders        Priority Expected Expires Ordered    CT Chest w/o contrast Routine  10/2/2019 10/2/2018    BRONCHOSCOPY W LAVAGE Routine 10/2/2018 10/2/2019 10/2/2018    CBC with platelets differential Routine 10/11/2018 3/30/2019 10/1/2018    Comprehensive metabolic panel Routine 10/11/2018 3/30/2019 10/1/2018            Who to contact     If you have questions or need follow up information about today's clinic visit or your schedule please contact [facility] directly at [phone].  Normal or non-critical lab and imaging results will be communicated to you by MyChart, letter or phone within 4 business days after the clinic has received the results. If you do not hear from us within 7 days, please contact the clinic through [facility] or phone. If you have a critical or abnormal lab result, we will notify you by phone as soon as possible.  Submit refill requests through [employer] or call your pharmacy and they will forward the refill request to us. Please allow 3 business days for your refill to be completed.          Additional Information About Your Visit        MyChart Information     [employer] gives you secure access to your electronic health record. If you see a primary care provider, you can also send messages to your care team and make appointments. If you have questions, please call your primary care clinic.  If you do not have a primary care provider, please call [phone] and they will assist you.        Care EveryWhere ID     This is your Care EveryWhere ID. This could be used by other organizations to access your [place] medical records  [id number]        Your Vitals Were     Pulse  "Temperature Respirations Height Pulse Oximetry BMI (Body Mass Index)    65 96.8  F (36  C) (Oral) 16 1.727 m (5' 7.99\") 94% 31.06 kg/m2       Blood Pressure from Last 3 Encounters:   10/02/18 126/78   10/01/18 120/72   09/27/18 100/63    Weight from Last 3 Encounters:   10/02/18 92.6 kg (204 lb 3.2 oz)   10/01/18 92.3 kg (203 lb 8 oz)   09/27/18 91.9 kg (202 lb 9.6 oz)               Primary Care Provider Office Phone # Fax #    Vivek Rafael Callejas [phone]       [facility] [address] [place] [address]        Equal Access to Services     SHER PEÑA : Edil Vanegas, waaxclint luqadaha, qaybta kaalmada adejoseyaclint, corinna barlow . So [facility] [phone].    ATENCIÓN: Si habla español, tiene a del toro disposición servicios gratuitos de asistencia lingüística. Reddy al [phone].    We comply with applicable federal civil rights laws and [place] laws. We do not discriminate on the basis of race, color, national origin, age, disability, sex, sexual orientation, or gender identity.            Thank you!     Thank you for choosing [facility]  for your care. Our goal is always to provide you with excellent care. Hearing back from our patients is one way we can continue to improve our services. Please take a few minutes to complete the written survey that you may receive in the mail after your visit with us. Thank you!             Your Updated Medication List - Protect others around you: Learn how to safely use, store and throw away your medicines at www.disposemymeds.org.          This list is accurate as of 10/2/18  3:28 PM.  Always use your most recent med list.                   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)       cephALEXin 500 MG capsule    KEFLEX    10 capsule    Take 1 capsule (500 mg) by mouth 2 times daily    MDS (myelodysplastic syndrome) (H)       fluconazole 100 MG tablet    DIFLUCAN    30 tablet    Take 1 tablet (100 mg) by mouth daily    Status post bone marrow transplant (H)       levofloxacin 250 MG tablet    LEVAQUIN    30 tablet    Take 1 tablet (250 mg) by mouth daily    MDS (myelodysplastic syndrome) (H)       order for DME     1 Device    Please provide a [employer] cane offset with strap item number [id number]    MDS (myelodysplastic syndrome) (H)       order for DME     1 each    Please measure and distribute 1 pair of 20mmHg - 30mmHg thigh high open or closed toe compression stockings. Jobst ultrasheer or equivalent.    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), Status post bone marrow transplant (H)       * predniSONE 20 MG tablet    DELTASONE    60 tablet    Take 1 tablet (20 mg) by mouth daily    SOB (shortness of breath)       * predniSONE 50 MG tablet    DELTASONE    30 tablet    Take 1 tablet (50 mg) by mouth daily Take a combination of 1 if the 50 mg and 2 of the 20 mg tablets for a total of 90 mg prednisone by mouth daily    SOB (shortness of breath)       sertraline 100 MG tablet    ZOLOFT    30 tablet    Take 1 tablet (100 mg) by mouth daily    MDS (myelodysplastic syndrome) (H), 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 Read the directions carefully, and ask your doctor or other care provider to review them with you.

## 2018-10-02 NOTE — LETTER
10/2/2018       RE: Deejay Dior  [id number]  [facility] [address]     Dear Colleague,    Thank you for referring your patient, Deejay Dior, to the [facility] at [facility]. Please see a copy of my visit note below.    Reason for Visit  Deejay Dior is a 67 year old year old male who is being seen for No chief complaint on file.    Pulmonary HPI  68 YO male with history of MDS s/p NMA allo sib BMT (Day + 707) who is referred to the Pulmonary BMT clinic by Dr. Pinto for evaluation of SOB and abnormal chest CT.  Developed influenza A (H1N1) in 3-16 with significant SOB/MERIDA requiring hospital admission with ICU stay and use of Hi-flow. 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  After discharge he was slowly feeling better- could initially walk 40-50 feet without oxygen ( feet with oxygen) but improved to 3-4 blocks without oxygen at end of April. Early May developed weakness, fatigue and increased SOB.  Diagnosed with Influenza B; not hospitalized. After that episode had continued SOB/MERIDA, using oxygen at home.  CT chest in 5-16 showed bilateral GGO, repeat scan on 6-1 showed a decrease in GGO.   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  Since that time he feels 70-80% better with decreased MERIDA.  URI with nasal drainage and congestion the last two days, not much cough.  No prior history of lung disease as youth or young adult; no asthma.  Episode of pneumonia 11-14 hospitalized for 2 days.  No tobacco x 16 years, 32-48 pack year history.   + second hand smoke.  Denies significant mold exposure.    Last seen over two years ago.  History of organizing pneumonia due to H1N1 and also Geotrichum  infection found on past bronchoscopy (not treated). Recent CT showed an increase in GGO in the LLL with increased bronchiectasis.  Developed onset of chest congestion and cough one month ago, was coughing up green sputum.  Seen in Urgent care, started on doxycycline, no imaging was performed per patient.  Productive cough lasted for two weeks and has resolved for the past two weeks; afterward noticed cough if he tried to take a deep breath.  This has also resolved.  Ten days ago noticed MERIDA that was severe in nature with SOB walking short distances or a flight of stairs.  This has also resolved in the last few days.  No fevers or chills.  States over the last two years he has not had any significant respiratory illnesses. Does get URI's every 3-4 months, these last for 2-3 weeks but completely resolve.  No residual cough or sputum production after resolution.  No daily cough or sputum production.     The patient was seen and examined by Arsh Sandoval           Current Outpatient Prescriptions   Medication     acyclovir (ZOVIRAX) 800 MG tablet     amoxicillin (AMOXIL) 500 MG capsule     calcium carbonate-vitamin D 500-400 MG-UNIT TABS tablt     cephALEXin (KEFLEX) 500 MG capsule     fluconazole (DIFLUCAN) 100 MG tablet     levofloxacin (LEVAQUIN) 250 MG tablet     order for DME     ORDER FOR DME     pantoprazole (PROTONIX) 20 MG [facility] tablet     predniSONE (DELTASONE) 20 MG tablet     predniSONE (DELTASONE) 50 MG tablet     sertraline (ZOLOFT) 100 MG tablet     sulfamethoxazole-trimethoprim (BACTRIM DS/SEPTRA DS) 800-160 MG per tablet     triamcinolone (KENALOG) 0.1 % ointment     No current facility-administered medications for this visit.      Allergies   Allergen Reactions     Blood Transfusion Related (Informational Only) Other (See Comments)     Patient has a history of a clinically significant antibody against RBC antigens.  A delay in compatible RBCs may occur.     Past Medical History:   Diagnosis Date      Head injury 1964     Hearing problem Hearing aids 2015     History of blood transfusion 3/2014     History of radiation therapy June 2014     Immunosuppression (H) Sirolimus daily     MDS (myelodysplastic syndrome) (H)      Numbness and tingling     feet     Obstructive sleep apnea 1999     Pneumonia      Reduced vision August 2016    Cataract surgery     Sleep apnea 1999     Transplant July 1, 2014    Stem Cells       Past Surgical History:   Procedure Laterality Date     BACK SURGERY       BIOPSY       BMT CELL PRODUCT INFUSION       ESOPHAGOSCOPY, GASTROSCOPY, DUODENOSCOPY (EGD), COMBINED N/A 2/2/2015     ESOPHAGOSCOPY, GASTROSCOPY, DUODENOSCOPY (EGD), COMBINED Left 8/6/2015    Procedure: COMBINED ESOPHAGOSCOPY, GASTROSCOPY, DUODENOSCOPY (EGD), BIOPSY SINGLE OR MULTIPLE;  Surgeon: Amber Francis MD;  Location:  GI     EXCISE LESION LIP N/A 1/2/2017    Procedure: EXCISE LESION LIP;  Surgeon: Tim Yanez MD;  Location: [facility]     EYE SURGERY       FLEXIBLE SIGMOIDOSCOPY  2/2/15     HEMORRHOIDECTOMY  2001     PHACOEMULSIFICATION CLEAR CORNEA WITH STANDARD INTRAOCULAR LENS IMPLANT Left 7/18/2016    Procedure: PHACOEMULSIFICATION CLEAR CORNEA WITH STANDARD INTRAOCULAR LENS IMPLANT;  Surgeon: Randolph Giles MD;  Location:  [facility]     TONSILLECTOMY  1953     TRANSPLANT  7-1-2014    Stem Cell Transplant [facility] BMT     VASCULAR SURGERY Left 2009       Social History     Social History     Marital status:      Spouse name: N/A     Number of children: N/A     Years of education: N/A     Occupational History     Not on file.     Social History Main Topics     Smoking status: Former Smoker     Packs/day: 1.00     Years: 34.00     Types: Cigarettes     Start date: 12/1/1967     Quit date: 4/1/2001     Smokeless tobacco: Never Used      Comment: quit in 2000     Alcohol use 2.5 oz/week      Comment: Seldom     Drug use: No     Sexual activity: Not Currently     Partners: Female     Birth  control/ protection: Male Surgical, Female Surgical     Other Topics Concern     Not on file     Social History Narrative       ROS Pulmonary  A complete ROS was otherwise negative except as noted in the HPI.  There were no vitals taken for this visit.  Exam:   GENERAL APPEARANCE: Well developed, well nourished, alert, and in no apparent distress.  EYES: PERRL, EOMI  HENT: Nasal mucosa with no edema and no hyperemia. No nasal polyps.  EARS: Canals clear, TMs normal  MOUTH: Oral mucosa is moist, without any lesions, no tonsillar enlargement, no oropharyngeal exudate.  NECK: supple, no masses, no thyromegaly.  LYMPHATICS: No significant axillary, cervical, or supraclavicular nodes.  RESP: Good air flow throughout.  No crackles. Few rhonchi in right base. No wheezes.  CV: Normal S1, S2, regular rhythm, normal rate. No murmur.  No rub. No gallop. No LE edema.   ABDOMEN:  Bowel sounds normal, soft, nontender, no HSM or masses.   MS: extremities normal. No clubbing. No cyanosis.  SKIN: no rash on limited exam  NEURO: Mentation intact, speech normal, normal strength and tone, normal gait and stance  PSYCH: mentation appears normal. and affect normal/bright  Results:  PFT's were personally reviewed in clinic  FVC 4.56 (114%), FEV-1 3.58 (118%), FEV-1/FVC 79%  %, RV 73%, TLC 97%  cDLCO 78%  No airflow limitation.  Normal lung volumes.  Normal diffusion capacity  Recent Results (from the past 168 hour(s))   Comprehensive metabolic panel    Collection Time: 09/27/18 10:59 AM   Result Value Ref Range    Sodium 138 133 - 144 mmol/L    Potassium 4.0 3.4 - 5.3 mmol/L    Chloride 108 94 - 109 mmol/L    Carbon Dioxide 22 20 - 32 mmol/L    Anion Gap 7 3 - 14 mmol/L    Glucose 108 (H) 70 - 99 mg/dL    Urea Nitrogen 22 7 - 30 mg/dL    Creatinine 1.03 0.66 - 1.25 mg/dL    GFR Estimate 71 >60 mL/min/1.7m2    GFR Estimate If Black 86 >60 mL/min/1.7m2    Calcium 8.5 8.5 - 10.1 mg/dL    Bilirubin Total 0.2 0.2 - 1.3 mg/dL    Albumin  2.7 (L) 3.4 - 5.0 g/dL    Protein Total 6.6 (L) 6.8 - 8.8 g/dL    Alkaline Phosphatase 101 40 - 150 U/L    ALT 28 0 - 70 U/L    AST 39 0 - 45 U/L   Sirolimus level    Collection Time: 09/27/18 10:59 AM   Result Value Ref Range    Sirolimus Last Dose (Note)     Sirolimus Level 4.5 (L) 5.0 - 15.0 ug/L   CBC with platelets differential    Collection Time: 09/27/18 10:59 AM   Result Value Ref Range    WBC 6.2 4.0 - 11.0 10e9/L    RBC Count 4.83 4.4 - 5.9 10e12/L    Hemoglobin 13.8 13.3 - 17.7 g/dL    Hematocrit 41.6 40.0 - 53.0 %    MCV 86 78 - 100 fl    MCH 28.6 26.5 - 33.0 pg    MCHC 33.2 31.5 - 36.5 g/dL    RDW 16.0 (H) 10.0 - 15.0 %    Platelet Count 223 150 - 450 10e9/L    Diff Method Automated Method     % Neutrophils 41.8 %    % Lymphocytes 34.2 %    % Monocytes 14.3 %    % Eosinophils 8.4 %    % Basophils 1.0 %    % Immature Granulocytes 0.3 %    Nucleated RBCs 0 0 /100    Absolute Neutrophil 2.6 1.6 - 8.3 10e9/L    Absolute Lymphocytes 2.1 0.8 - 5.3 10e9/L    Absolute Monocytes 0.9 0.0 - 1.3 10e9/L    Absolute Eosinophils 0.5 0.0 - 0.7 10e9/L    Absolute Basophils 0.1 0.0 - 0.2 10e9/L    Abs Immature Granulocytes 0.0 0 - 0.4 10e9/L    Absolute Nucleated RBC 0.0    TSH    Collection Time: 09/27/18 12:17 PM   Result Value Ref Range    TSH 1.01 0.40 - 4.00 mU/L   Testosterone    Collection Time: 09/27/18 12:17 PM   Result Value Ref Range    Testosterone Total 192 (L) 240 - 950 ng/dL   Vitamin D panel    Collection Time: 09/27/18 12:17 PM   Result Value Ref Range    Vitamin D Deficiency screening 39 20 - 75 ug/L   Vitamin B12    Collection Time: 09/27/18 12:17 PM   Result Value Ref Range    Vitamin B12 698 193 - 986 pg/mL   Lipase    Collection Time: 09/27/18 12:17 PM   Result Value Ref Range    Lipase 158 73 - 393 U/L   Aspergillus Galactomannan Antigen    Collection Time: 09/27/18 12:17 PM   Result Value Ref Range    Aspergillus Galactomannan Index 0.05       Aspergillus Galact AG Negative Negative      1,3 Beta  D glucan fungitell    Collection Time: 09/27/18 12:17 PM   Result Value Ref Range    (1,3)-Beta-D-Glucan 35 pg/mL    B-D GLUCAN INTERPRETATION (1,3) Negative Negative      Calcium    Collection Time: 09/27/18 12:18 PM   Result Value Ref Range    Calcium Ionized Whole Blood 4.7 4.4 - 5.2 mg/dL   Folate    Collection Time: 09/27/18 12:18 PM   Result Value Ref Range    Folate 15.9 >5.4 ng/mL   CBC with platelets differential    Collection Time: 10/01/18  9:54 AM   Result Value Ref Range    WBC 5.4 4.0 - 11.0 10e9/L    RBC Count 5.05 4.4 - 5.9 10e12/L    Hemoglobin 14.4 13.3 - 17.7 g/dL    Hematocrit 44.0 40.0 - 53.0 %    MCV 87 78 - 100 fl    MCH 28.5 26.5 - 33.0 pg    MCHC 32.7 31.5 - 36.5 g/dL    RDW 16.5 (H) 10.0 - 15.0 %    Platelet Count 266 150 - 450 10e9/L    Diff Method Automated Method     % Neutrophils 73.6 %    % Lymphocytes 20.1 %    % Monocytes 5.9 %    % Eosinophils 0.0 %    % Basophils 0.0 %    % Immature Granulocytes 0.4 %    Nucleated RBCs 0 0 /100    Absolute Neutrophil 4.0 1.6 - 8.3 10e9/L    Absolute Lymphocytes 1.1 0.8 - 5.3 10e9/L    Absolute Monocytes 0.3 0.0 - 1.3 10e9/L    Absolute Eosinophils 0.0 0.0 - 0.7 10e9/L    Absolute Basophils 0.0 0.0 - 0.2 10e9/L    Abs Immature Granulocytes 0.0 0 - 0.4 10e9/L    Absolute Nucleated RBC 0.0    Basic metabolic panel    Collection Time: 10/01/18  9:54 AM   Result Value Ref Range    Sodium 137 133 - 144 mmol/L    Potassium 4.0 3.4 - 5.3 mmol/L    Chloride 106 94 - 109 mmol/L    Carbon Dioxide 24 20 - 32 mmol/L    Anion Gap 7 3 - 14 mmol/L    Glucose 125 (H) 70 - 99 mg/dL    Urea Nitrogen 27 7 - 30 mg/dL    Creatinine 0.92 0.66 - 1.25 mg/dL    GFR Estimate 82 >60 mL/min/1.7m2    GFR Estimate If Black >90 >60 mL/min/1.7m2    Calcium 8.6 8.5 - 10.1 mg/dL   IgG    Collection Time: 10/01/18  9:55 AM   Result Value Ref Range    IGG 1300 695 - 1620 mg/dL   General PFT Lab (Please always keep checked)    Collection Time: 10/02/18 11:51 AM   Result Value Ref Range     FVC-Pred 3.97 L    FVC-Pre 4.56 L    FVC-%Pred-Pre 114 %    FEV1-Pre 3.58 L    FEV1-%Pred-Pre 118 %    FEV1FVC-Pred 74 %    FEV1FVC-Pre 79 %    FEFMax-Pred 7.92 L/sec    FEFMax-Pre 10.21 L/sec    FEFMax-%Pred-Pre 128 %    FEF2575-Pred 2.34 L/sec    FEF2575-Pre 3.36 L/sec    UFR4238-%Pred-Pre 143 %    ExpTime-Pre 11.75 sec    FIFMax-Pre 8.04 L/sec    VC-Pred 4.41 L    VC-Pre 4.67 L    VC-%Pred-Pre 105 %    IC-Pred 3.69 L    IC-Pre 2.64 L    IC-%Pred-Pre 71 %    ERV-Pred 0.72 L    ERV-Pre 2.03 L    ERV-%Pred-Pre 282 %    FEV1FEV6-Pred 78 %    FEV1FEV6-Pre 81 %    FRCPleth-Pred 3.58 L    FRCPleth-Pre 3.93 L    FRCPleth-%Pred-Pre 109 %    RVPleth-Pred 2.57 L    RVPleth-Pre 1.89 L    RVPleth-%Pred-Pre 73 %    TLCPleth-Pred 6.72 L    TLCPleth-Pre 6.57 L    TLCPleth-%Pred-Pre 97 %    DLCOunc-Pred 25.33 ml/min/mmHg    DLCOunc-Pre 19.86 ml/min/mmHg    DLCOunc-%Pred-Pre 78 %    DLCOcor-Pre 19.97 ml/min/mmHg    DLCOcor-%Pred-Pre 78 %    VA-Pre 6.21 L    VA-%Pred-Pre 96 %    FEV1SVC-Pred 68 %    FEV1SVC-Pre 77 %       Assessment and plan:   68 YO with recent history of Influenza A and B infections 6 weeks apart with continued significant MERIDA.  CT chest with presence of GGO with air trapping and ? Small airway inflammation.  No change in spirometry compared to pre-transplant testing. Alhough spirometry shows mild airflow limitation, I did not think he had cGVHD of the lung (NOE) due to no change in spirometry compared to pre-transplant, with only a small drop in lung function compared to normal (usual drop with NOE is 30-50%). A viral infection with GGO on CT was an alternative explanation for his recent SOB/MERIDA.  Findings and clinical course were most consistent with bronchiolitis/organizing pneumonia post H1N1 infection; this was a common occurrence this year in post-BMT patients who contracted H1N1. Since his initial evaluation, he developed an acute respiratory illness one month ago with systems all resolved at present.   CT findings are likely due to resolving pneumonia.  Frequent URI's but no respiratory symptoms between episodes and no chronic sputum production with no symptoms suggestive of bronchiectasis.  No evidence of lung cGvHD based on symptoms or spirometry.  Unlikely sirolimus toxicity with GGO only in LLL, would expect to be diffuse.  1.  No indication for bronchoscopy at this time  2. Repeat CT in 2 months; if LLL changes are not resolved will proceed with bronchoscopy at that time  3. Ok to taper steroids from a pulmonary standpoint     RTC in two months with repeat chest CT.  Patient to call with any questions or concerns prior to his next clinic visit.      Again, thank you for allowing me to participate in the care of your patient.      Sincerely,    Arsh Sandoval MD

## 2018-10-04 ENCOUNTER — HOSPITAL ENCOUNTER (OUTPATIENT)
Facility: CLINIC | Age: 68
Discharge: HOME | End: 2018-10-04
Attending: INTERNAL MEDICINE | Admitting: RADIOLOGY
Payer: MEDICARE

## 2018-10-04 ENCOUNTER — APPOINTMENT (OUTPATIENT)
Dept: INTERVENTIONAL RADIOLOGY/VASCULAR | Facility: CLINIC | Age: 68
End: 2018-10-04
Attending: RADIOLOGY
Payer: MEDICARE

## 2018-10-04 ENCOUNTER — APPOINTMENT (OUTPATIENT)
Dept: MEDSURG UNIT | Facility: CLINIC | Age: 68
End: 2018-10-04
Attending: INTERNAL MEDICINE
Payer: MEDICARE

## 2018-10-04 VITALS
SYSTOLIC BLOOD PRESSURE: 138 MMHG | OXYGEN SATURATION: 92 % | BODY MASS INDEX: 30.16 KG/M2 | WEIGHT: 199 LBS | TEMPERATURE: 97.5 F | RESPIRATION RATE: 18 BRPM | HEIGHT: 68 IN | HEART RATE: 60 BPM | DIASTOLIC BLOOD PRESSURE: 75 MMHG

## 2018-10-04 DIAGNOSIS — I83.893 VARICOSE VEINS OF BOTH LOWER EXTREMITIES WITH COMPLICATIONS: ICD-10-CM

## 2018-10-04 PROCEDURE — 25000125 ZZHC RX 250: Performed by: RADIOLOGY

## 2018-10-04 PROCEDURE — 25000128 H RX IP 250 OP 636: Performed by: PHYSICIAN ASSISTANT

## 2018-10-04 PROCEDURE — 36471 NJX SCLRSNT MLT INCMPTNT VN: CPT

## 2018-10-04 PROCEDURE — 27210732 ZZH ACCESSORY CR1

## 2018-10-04 PROCEDURE — 25000132 ZZH RX MED GY IP 250 OP 250 PS 637: Mod: GY | Performed by: PHYSICIAN ASSISTANT

## 2018-10-04 PROCEDURE — A9270 NON-COVERED ITEM OR SERVICE: HCPCS | Mod: GY | Performed by: PHYSICIAN ASSISTANT

## 2018-10-04 PROCEDURE — 40000166 ZZH STATISTIC PP CARE STAGE 1

## 2018-10-04 PROCEDURE — 27210903 ZZH KIT CR5

## 2018-10-04 PROCEDURE — 25000125 ZZHC RX 250: Performed by: PHYSICIAN ASSISTANT

## 2018-10-04 PROCEDURE — C1769 GUIDE WIRE: HCPCS

## 2018-10-04 RX ORDER — DIAZEPAM 5 MG
10 TABLET ORAL
Status: COMPLETED | OUTPATIENT
Start: 2018-10-04 | End: 2018-10-04

## 2018-10-04 RX ADMIN — TETRADECYL HYDROGEN SULFATE (ESTER) 30 MG: 10 INJECTION, SOLUTION INTRAVENOUS at 14:08

## 2018-10-04 RX ADMIN — LIDOCAINE HYDROCHLORIDE 5 ML: 10 INJECTION, SOLUTION INFILTRATION; PERINEURAL at 14:09

## 2018-10-04 RX ADMIN — SODIUM BICARBONATE: 84 INJECTION, SOLUTION INTRAVENOUS at 14:10

## 2018-10-04 RX ADMIN — DIAZEPAM 10 MG: 5 TABLET ORAL at 12:37

## 2018-10-04 NOTE — PROGRESS NOTES
Pt received post right leg laser ablation and sclerotherapy; elastic stocking placed over right leg; sites are dry and intact. Pt voided per urinal; taking po, both food and drink without problem.

## 2018-10-04 NOTE — PROGRESS NOTES
Prep and teaching complete for right leg laser ablation and stab phlebectomy; Wife, Racquel at bedside, will drive pt home. Awaiting consent and call to give oral sedative. Pt wearing bilateral hearing aids and upper dentures.

## 2018-10-04 NOTE — PROGRESS NOTES
Right GSV 31 cm treatment length,  7Watts, 1572 Joules and 225 secounds    Right Mid medial thigh branches 2cc Sotradecyl    Right Medial ankle  and branches

## 2018-10-04 NOTE — PROGRESS NOTES
Patient Name: Deejay Dior  Medical Record Number: [id number]  Today's Date: 10/4/2018    Procedure: Right lower extremity laser ablation with sclerotherapy  Proceduralist: Dr. Maico Dumont    Procedure start time: 1315  Puncture time: 1317  Procedure end time: 1440    Report given to: DON Brown 2A      Other Notes: Pt arrived to IR room 2 from . Consent reviewed. Pt denies any questions or concerns regarding procedure. Pt positioned supine and monitored per protocol. Target vessels of the right lower extremity identified and treated. Pt tolerated procedure without any noted complications. Upon departure- alert, oriented, calm. Respirations eupneic on room air. Pt transferred back to .

## 2018-10-04 NOTE — PROCEDURES
Interventional Radiology Brief Post Procedure Note    Procedure: IR STAB PHLEBECTOMY < 10 STABS    Proceduralist: Aston Dumont MD    Assistant: None    Time Out: Prior to the start of the procedure and with procedural staff participation, I verbally confirmed the patient s identity using two indicators, relevant allergies, that the procedure was appropriate and matched the consent or emergent situation, and that the correct equipment/implants were available. Immediately prior to starting the procedure I conducted the Time Out with the procedural staff and re-confirmed the patient s name, procedure, and site/side. (The Joint Commission universal protocol was followed.)  Yes    Medications   Medication Event Details Admin User Admin Time   sodium tetradecyl sulfate (SOTRADECOL) injection 10-50 mg Medication Given Dose: 30 mg; Route: Intravenous Daniela Hernandez RN 10/4/2018  2:08 PM   lidocaine 1 % 1-30 mL Medication Given Dose: 5 mL; Route: Intradermal Daniela Hernandez RN 10/4/2018  2:09 PM   sodium chloride 0.9 % 445 mL with lidocaine 1% with EPINEPHrine 1:100,000 50 mL, sodium bicarbonate 5 mL Medication Given by Other Route: Intracatheter; Scheduled Time:  2:00 PM Daniela Hernandez RN 10/4/2018  2:10 PM       Sedation: None. Local Anesthestic used    Findings: see dictated report.     Estimated Blood Loss: Minimal    Fluoroscopy Time:  minute(s)    SPECIMENS: None    Complications: 1. None     Condition: Stable    Plan: routine post procedure cares.     Comments: See dictated procedure note for full details.    Aston Dumont MD

## 2018-10-04 NOTE — IP AVS SNAPSHOT
MRN:[id number]                      After Visit Summary   10/4/2018    Deejay Dior    MRN: [id number]           Visit Information        Department      10/4/2018 10:35 AM [address] [facility]          Review of your medicines      UNREVIEWED medicines. 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), MDS (myelodysplastic syndrome) (H)        Dose:  800 mg   Take 1 tablet (800 mg) by mouth 3 times daily   Quantity:  180 tablet   Refills:  6       amoxicillin 500 MG capsule   Commonly known as:  AMOXIL   Used for:  MDS (myelodysplastic syndrome) (H)        Take 4 pills (2 grams) day of dental work   Quantity:  16 capsule   Refills:  3       calcium carbonate-vitamin D 500-400 MG-UNIT Tabs per tablet   Used for:  MDS (myelodysplastic syndrome) (H), Acute mtdoz-dgwoqq-msqi disease (H), 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)        Dose:  1 tablet   Take 1 tablet by mouth daily 2000 mg   Quantity:  180 tablet   Refills:  3       cephALEXin 500 MG capsule   Commonly known as:  KEFLEX   Used for:  MDS (myelodysplastic syndrome) (H)        Dose:  500 mg   Take 1 capsule (500 mg) by mouth 2 times daily   Quantity:  10 capsule   Refills:  0       fluconazole 100 MG tablet   Commonly known as:  DIFLUCAN   Used for:  Status post bone marrow transplant (H)        Dose:  100 mg   Take 1 tablet (100 mg) by mouth daily   Quantity:  30 tablet   Refills:  5       levofloxacin 250 MG tablet   Commonly known as:  LEVAQUIN   Used for:  MDS (myelodysplastic syndrome) (H)        Dose:  250 mg   Take 1 tablet (250 mg) by mouth daily   Quantity:  30 tablet   Refills:  1       pantoprazole 20 MG EC tablet   Commonly known as:  PROTONIX   Used for:  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), Status post bone marrow transplant (H)        Dose:  20 mg   Take 1 tablet (20 mg) by mouth daily    Quantity:  30 tablet   Refills:  11       * predniSONE 20 MG tablet   Commonly known as:  DELTASONE   Used for:  SOB (shortness of breath)        Dose:  20 mg   Take 1 tablet (20 mg) by mouth daily   Quantity:  60 tablet   Refills:  1       * predniSONE 50 MG tablet   Commonly known as:  DELTASONE   Used for:  SOB (shortness of breath)        Dose:  50 mg   Take 1 tablet (50 mg) by mouth daily Take a combination of 1 if the 50 mg and 2 of the 20 mg tablets for a total of 90 mg prednisone by mouth daily   Quantity:  30 tablet   Refills:  1       sertraline 100 MG tablet   Commonly known as:  ZOLOFT   Used for:  MDS (myelodysplastic syndrome) (H), 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), Urinary frequency, Other complication of bone marrow transplant (H)        Take 1 tablet (100 mg) by mouth daily   Quantity:  30 tablet   Refills:  5       sulfamethoxazole-trimethoprim 800-160 MG per tablet   Commonly known as:  BACTRIM DS/SEPTRA DS   Used for:  Status post bone marrow transplant (H)        Take one tab by mouth twice daily on Monday and Tuesdays only   Quantity:  16 tablet   Refills:  3       triamcinolone 0.1 % ointment   Commonly known as:  KENALOG        Apply twice a day to lower leg   Refills:  0       * Notice:  This list has 2 medication(s) that are the same as other medications prescribed for you. Read the directions carefully, and ask your doctor or other care provider to review them with you.      CONTINUE these medicines which have NOT CHANGED        Dose / Directions    order for DME   Used for:  MDS (myelodysplastic syndrome) (H)        Please provide a NOVA cane offset with strap item number [id number]   Quantity:  1 Device   Refills:  0       order for DME   Used for:  Varicose veins of both lower extremities with complications        Please measure and distribute 1 pair of 20mmHg - 30mmHg thigh high open or closed toe compression stockings. Jobst ultrasheer or equivalent.   Quantity:  1 each   Refills:  1                 Protect others around you: Learn how to safely use, store and throw away your medicines at www.disposemymeds.org.         Follow-ups after your visit        Your next 10 appointments already scheduled     Oct 11, 2018 10:00 AM CDT   (Arrive by 9:45 AM)   US LOWER EXTREMITY VENOUS DUPLEX RIGHT with UCUSV1   [facility] US ([facility])    [address]  1st Floor  [facility] [id number] [phone]           How do I prepare for my exam? (Food and drink instructions) No Food and Drink Restrictions.  How do I prepare for my exam? (Other instructions) You do not need to do anything special to prepare for your exam.  What should I wear: Wear comfortable clothes.  How long does the exam take: Most ultrasounds take 30 to 60 minutes.  What should I bring: Bring a list of your medicines, including vitamins, minerals and over-the-counter drugs. It is safest to leave personal items at home.  Do I need a :  No  is needed.  What do I need to tell my doctor: Tell your doctor about any allergies you may have.  What should I do after the exam: No restrictions, You may resume normal activities.  What is this test: An ultrasound uses sound waves to make pictures of the body. Sound waves do not cause pain. The only discomfort may be the pressure of the wand against your skin or full bladder.  Who should I call with questions: If you have any questions, please call the Imaging Department where you will have your exam. Directions, parking instructions, and other information is available on our website, [email or web address].            Oct 11, 2018 11:15 AM CDT   [facility] Lab Draw with  MASONIC LAB DRAW   [facility] Draw ([facility])    [address]  [address]  [facility] [id number] [phone]            Oct 11, 2018 12:00 PM CDT   Return with  BMT CAROLINA #1   [facility] Blood and Marrow Transplant ([facility]  [facility])    [address]  [address]  [facility] [address]   [phone]            Nov 12, 2018 10:30 AM CST   Procedure 5.5 hour with U2A ROOM 10   [address] [facility] ([facility])    [address] [place] [address]               Nov 12, 2018 12:00 PM CST   IR STAB PHLEBECTOMY < 10 STABS with UUIR5   [facility] [place], Interventional Radiology ([facility])    [address]  [facility] [address]   [phone]           The day before the exam:   You may eat your regular diet.   You are encouraged to drink at least 8 eight ounce glasses of clear liquids.   Drink no alcoholic beverages for 24 hours before or after the exam.  The day of the exam:   Do not eat any solid food or milk products for 6 hours prior to the exam. You may drink clear liquids until 2 hours prior to the exam. Clear liquids include the following: water, Jell-O, clear broth, apple juice or any non-carbonated drink that you can see through (no pop!)   The morning of the exam you may take medications as directed with a sip of water.  Please wear loose clothing, such as a sweat suit or jogging clothes. Avoid snaps, zippers and other metal. We may ask you to undress and put on a hospital gown.  Please bring any scans or X-rays taken at other hospitals, if similar tests were done. Also bring a list of your medicines, including vitamins, minerals and over-the-counter drugs. It is safest to leave personal items at home.  Someone will need to drive you to and from the hospital.  Tell your doctor in advance:   If you have allergies to x-ray contrast or iodine.   If you are or may be pregnant.   If you are taking Coumadin (or any other blood thinners) 5 days prior to the exam for any special instructions.   If you are diabetic to determine if your insulin needs have to be adjusted for the exam.  Your doctor will:    Need to do a history and physical within 30 days before this procedure.   Obtain necessary laboratory tests prior to the exam (CBCP, INR and PTT).  If you were given sedation, you cannot drive for 24 hours after the procedure, and an adult must be with you until then.  If you have any questions, please call the Imaging Department where you will have your exam. Directions, parking instructions, and other information are available on our website, [email or web address].            Nov 15, 2018  1:00 PM CST   Masonic Lab Draw with  MASONIC LAB DRAW   [facility] Masonic Lab Draw ([facility])    [address]  [address]  [facility] [address]   [phone]            Nov 15, 2018  1:30 PM CST   Return with Aby Pinto MD   [facility] Blood and Marrow Transplant ([facility])    [address] [place] [address]  [address]  [facility] [address]   [phone]            Dec 04, 2018  1:20 PM CST   CT CHEST W/O CONTRAST with UCCT1   [facility] CT ([facility])    [address] [place] [address]  1st Floor  [facility] [address]   [phone]           How do I prepare for my exam? (Food and drink instructions) No Food and Drink Restrictions.  How do I prepare for my exam? (Other instructions) You do not need to do anything special to prepare for this exam. For a sinus scan: Use your nose spray (nasal decongestant spray) as directed.  What should I wear: Please wear loose clothing, such as a sweat suit or jogging clothes. Avoid snaps, zippers and other metal. We may ask you to undress and put on a hospital gown.  How long does the exam take: Most scans take less than 20 minutes.  What should I bring: Please bring any scans or X-rays taken at other hospitals, if similar tests were done. Also bring a list of your medicines, including vitamins, minerals and over-the-counter drugs. It is safest to leave personal items at home.  Do I need a  : No  is needed.  What do I need to tell my doctor? Be sure to tell your doctor: * If you have any allergies. * If there s any chance you are pregnant. * If you are breastfeeding.  What should I do after the exam: No restrictions, You may resume normal activities.  What is this test: A CT (computed tomography) scan is a series of pictures that allows us to look inside your body. The scanner creates images of the body in cross sections, much like slices of bread. This helps us see any problems more clearly.  Who should I call with questions: If you have any questions, please call the Imaging Department where you will have your exam. Directions, parking instructions, and other information is available on our website, [email or web address].            Dec 04, 2018  3:00 PM CST   (Arrive by 2:45 PM)   Return Visit with Arsh Sandoval MD   [facility] ([facility])    [address] [place] [address] [phone]               Care Instructions        After Care Instructions     Discharge Instructions to print to the AVS       PRIOR TO DISCHARGE   - Please keep compression stocking on day and night for the first 3 days. The compression stocking may be removed after the first 24 hours to shower, but should then be put back on immediately afterwards. After the first 3 days, wear the compression stocking daily for the remainder of the 3 weeks, but may remove at night.    - Activity:  Please remain active following discharge and walk at least 20 minutes every day.    - Pain Medications  Ibuprofen 600 mg oral every 8 hours for mild pain or discomfort  Oxycodone/acetaminophen 5/325 1-2 oral every 6 hours as needed for more severe pain. You will receive a prescription before discharge.  - No hot tubs or hot baths and no vigorous lower extremity exercise (ie stair stepper, running or biking) for 1 week.   - You will have a follow up ultrasound of  "the treated leg in one week which will be scheduled before you leave.  - You will have a follow up IR clinic appointment and ultrasound in one month. You will be contacted by the IR clinic RN with this appointment.  [phone].                   Additional Information About Your Visit        [employer] Information     [employer] gives you secure access to your electronic health record. If you see a primary care provider, you can also send messages to your care team and make appointments. If you have questions, please call your primary care clinic.  If you do not have a primary care provider, please call [phone] and they will assist you.        Care EveryWhere ID     This is your Care EveryWhere ID. This could be used by other organizations to access your [facility] medical records  [id number]        Your Vitals Were     Blood Pressure Pulse Temperature Respirations Height Weight    138/78 60 97.5  F (36.4  C) (Oral) 18 1.727 m (5' 8\") 90.3 kg (199 lb)    Pulse Oximetry BMI (Body Mass Index)                93% 30.26 kg/m2           Primary Care Provider Office Phone # Fax #    Vivek Rafael Callejas [phone]      Equal Access to Services     [facility]: Hadii aad ku hadasho Soomaali, waaxda luqadaha, qaybta kaalmada adeegyada, corinna adamson. So [facility] [phone].    ATENCIÓN: Si habla español, tiene a del toro disposición servicios gratuitos de asistencia lingüística. Llame al [phone].    We comply with applicable federal civil rights laws and [place] laws. We do not discriminate on the basis of race, color, national origin, age, disability, sex, sexual orientation, or gender identity.            Thank you!     Thank you for choosing [facility] for your care. Our goal is always to provide you with excellent care. Hearing back from our patients is one way we can continue to improve our services. Please take a few minutes to complete the written survey that you may receive " in the mail after you visit with us. Thank you!             Medication List: This is a list of all your medications and when to take them. Check marks below indicate your daily home schedule. Keep this list as a reference.      Medications           Morning Afternoon Evening Bedtime As Needed    acyclovir 800 MG tablet   Commonly known as:  ZOVIRAX   Take 1 tablet (800 mg) by mouth 3 times daily                                amoxicillin 500 MG capsule   Commonly known as:  AMOXIL   Take 4 pills (2 grams) day of dental work                                calcium carbonate-vitamin D 500-400 MG-UNIT Tabs per tablet   Take 1 tablet by mouth daily 2000 mg                                cephALEXin 500 MG capsule   Commonly known as:  KEFLEX   Take 1 capsule (500 mg) by mouth 2 times daily                                fluconazole 100 MG tablet   Commonly known as:  DIFLUCAN   Take 1 tablet (100 mg) by mouth daily                                levofloxacin 250 MG tablet   Commonly known as:  LEVAQUIN   Take 1 tablet (250 mg) by mouth daily                                order for DME   Please provide a NOVA cane offset with strap item number [id number]                                order for DME   Please measure and distribute 1 pair of 20mmHg - 30mmHg thigh high open or closed toe compression stockings. Jobst ultrasheer or equivalent.                                pantoprazole 20 MG EC tablet   Commonly known as:  PROTONIX   Take 1 tablet (20 mg) by mouth daily                                * predniSONE 20 MG tablet   Commonly known as:  DELTASONE   Take 1 tablet (20 mg) by mouth daily                                * predniSONE 50 MG tablet   Commonly known as:  DELTASONE   Take 1 tablet (50 mg) by mouth daily Take a combination of 1 if the 50 mg and 2 of the 20 mg tablets for a total of 90 mg prednisone by mouth daily                                sertraline 100 MG tablet   Commonly known as:  ZOLOFT   Take 1 tablet  (100 mg) by mouth daily                                sulfamethoxazole-trimethoprim 800-160 MG per tablet   Commonly known as:  BACTRIM DS/SEPTRA DS   Take one tab by mouth twice daily on Monday and Tuesdays only                                triamcinolone 0.1 % ointment   Commonly known as:  KENALOG   Apply twice a day to lower leg                                * Notice:  This list has 2 medication(s) that are the same as other medications prescribed for you. Read the directions carefully, and ask your doctor or other care provider to review them with you.

## 2018-10-04 NOTE — IP AVS SNAPSHOT
[address] [address]                                       After Visit Summary   10/4/2018    Deejay Dior    MRN: [id number]           After Visit Summary Signature Page     I have received my discharge instructions, and my questions have been answered. I have discussed any challenges I see with this plan with the nurse or doctor.    ..........................................................................................................................................  Patient/Patient Representative Signature      ..........................................................................................................................................  Patient Representative Print Name and Relationship to Patient    ..................................................               ................................................  Date                                   Time    ..........................................................................................................................................  Reviewed by Signature/Title    ...................................................              ..............................................  Date                                               Time          22EPIC Rev 08/18

## 2018-10-04 NOTE — PROGRESS NOTES
Pt up walking, steady on his feet; denies pain. Sites dry and intact. Voided. Tolerated PO, both food and drink. Discharge instructions reviewed, no prescription for percocet ordered, pt okay with this. Pt stated understanding and denied questions. Discharge to home with wife.

## 2018-10-08 ENCOUNTER — TELEPHONE (OUTPATIENT)
Dept: INTERVENTIONAL RADIOLOGY/VASCULAR | Facility: CLINIC | Age: 68
End: 2018-10-08

## 2018-10-08 NOTE — PHARMACY-TAPERING SERVICE
The following prednisone taper was created at the request of Jyoti Borjas, tapering prednisone over the course of 4 weeks:

## 2018-10-08 NOTE — TELEPHONE ENCOUNTER
Called and left a msg for pt to return my call. This is for follow up regarding his RIGHT LEG varicose vein treatment   I wanted to verify if he is still wearing his compression stocking and to remind him that we have him scheduled for his 1 week US to rule out DVT.      I asked that he return my call to further follow up.    Left direct line for him.     *pt did return my call and left a VM He states that he's doing well. He does have his compression stockings on. He is also aware of his US     He also asks about his use of prednisone in which he will f/u with his BMT team. He states that he's doing well overall.       Radha Rodney, RN, BSN  Interventional Radiology Nurse Coordinator   Phone: [phone]

## 2018-10-08 NOTE — TELEPHONE ENCOUNTER
Returned pt's phone call. Left msg for him.     I informed him that I am glad to hear that he's doing well. I informed him that I am also calling to inquire on his next treatment appt. Informed him that Dr. Dumont is leaving our practice and that I will have to switch over his care to another colleague of Dr. Dumont's. Informed him that I am calling to see what his thoughts are.     Left direct line for him to return my call.     Radha Rodney, RN, BSN  Interventional Radiology Nurse Coordinator   Phone: [phone]

## 2018-10-09 ENCOUNTER — CARE COORDINATION (OUTPATIENT)
Dept: TRANSPLANT | Facility: CLINIC | Age: 68
End: 2018-10-09

## 2018-10-09 ENCOUNTER — TELEPHONE (OUTPATIENT)
Dept: INTERVENTIONAL RADIOLOGY/VASCULAR | Facility: CLINIC | Age: 68
End: 2018-10-09

## 2018-10-09 NOTE — PROGRESS NOTES
Patient called yesterday called. He said that Rebecca put him on 90 mg of prednisone on 9/27/18 for suspected GVHD flair. This dose is giving him the shakes, making him jittery and he reports that he has gained 10 pounds. I contacted Dr Pinto, Jyoti Borjas NP and Sal McneillD. Dr Pinto agrees that he needs a prednisone taper. He will see Jyoti on Thursday and will receive the taper schedule at that time. I called the patient today to let him know the plan.

## 2018-10-10 NOTE — TELEPHONE ENCOUNTER
Called pt back and left a msg. I informed him that we did reschedule pt for Dr. Baez for his varicose vein treatment at 12pm. So therefore he will need to check into [facility] waiting room at 1030am. This appt date is for Fri 11/16.     He did have to reschedule due to his vacation time.     Asked him to return my call should he have any other questions.     Radha Rodney RN, BSN  Interventional Radiology Nurse Coordinator   Phone: [phone]

## 2018-10-10 NOTE — PROGRESS NOTES
BMT Clinic Note     ID/CC:  Mr. 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    HPI: Deejay remains on Prednisone 90mg every day.  He has been taking it at night & has some insomnia.  He underwent sclerotherapy to the RLE on 10/4.  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  During the summer he had skin peeling to both legs & in the past 2-3 days he has developed blisters & weeping to his R leg & the leg is painful & swollen.   Endorses persistent dry mouth, slightly improved.  Afebrile with no cough, congestion or dyspnea.  No bleeding.     Review of Systems: 10 point ROS negative except as noted above.    Physical Exam:  /77  Pulse 88  Temp 97.6  F (36.4  C)  Resp 16  Wt 97.1 kg (214 lb)  SpO2 95%  BMI 32.54 kg/m2     Wt Readings from Last 4 Encounters:   10/11/18 97.1 kg (214 lb)   10/04/18 90.3 kg (199 lb)   10/02/18 92.6 kg (204 lb 3.2 oz)   10/01/18 92.3 kg (203 lb 8 oz)     General: NAD, interactive  Eyes: SARIKA, sclera anicteric  Nose/Mouth/Throat: OP clear, no ulcerations, very dry, upper plate, chronic lichenoid changes   Lungs:CTA B, no crackles or wheezes   CV: RRR S1S2 no MRG  Abd: S/NT/ND +BS  Skin:  Bilat LE edema.  RLE skin thin with scattered erythema & weeping blisters.  Very tender   Neuro: A&O, mild resting tremor  Line: NONE    Labs:  Lab Results   Component Value Date    WBC 10.7 10/11/2018    ANEU 9.0 (H) 10/11/2018    HGB 14.2 10/11/2018    HCT 43.5 10/11/2018     10/11/2018     10/11/2018    POTASSIUM 4.3 10/11/2018    CHLORIDE 104 10/11/2018    CO2 23 10/11/2018     (H) 10/11/2018    BUN 34 (H) 10/11/2018    CR 1.18 10/11/2018    MAG 2.1 04/26/2018    INR 0.95 06/30/2016     CCT 9/27:  Slight progression of bilateral basal predominant subpleural reticulation and traction bronchiolectasis with mild associated  groundglass opacification compared to 7/7/2016. Findings  consistent with interstitial lung disease in a nonspecific interstitial pneumonia  pattern secondary to drug toxicity with superimposed features of chronic tltlh-rtfthw-ywvp disease. Differential also includes  idiopathic pneumonia syndrome and infection.    ASSESSMENT AND PLAN:  Mr. 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 Total cell dose (from 7/1 & 7/2) 6.53 x 10^6 CD34+ cells/kg.   - 1 year anniversary (July 2015): 30% cellular, trilineage hematopoiesis, no abnormal blasts by morphology or flow , no dysplasia, 0-1 fibrosis, 100% donor (BM, CD3, CD15). CR  - 2 year anniversary (July 2016): 20-30% cellular, trilineage hematopoiesis, no abnormal blasts by morphology or flow , no dysplasia, No fibrosis, 100% donor in BM (PB not sent). ISCN: //46,XX[20] Complete Remission.    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 Had been off prednisone since summer 2017 and briefly tapered off Sirolimus (january 2018), however resumed with flare in February. 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  Sirolimus was stopped on 9/27 & Pred 90mg was started.  Seen by Dr. Sandoval on 10/2, please see his note.  He does not think his symptoms or CT findings are c/w Sirolimus or GVH & he was  In favor of tapering Prednisone.  Now he has worsening skin thickening & desquamation to the RLE, c/w GVH.  Discussed with Dr. Pinto & will resume Sirolimus 0.5mg every day & begin the process for approval of Jakafi 5mg BID.  Will begin a rapid steroid taper.     ID:  Afebrile   - IgG = 403, repleted 3/22/16, 5/8/16= 1270; recheck with recent viral exposures pending 10/1  - Prophy:  HD ACV (renal dosing), Fluconazole, Levaquin (while on steroids) and  Bactrim.    - Takes Keflex prior to &  after his vascular procedures due to hx of cellulitis.   - Flu shot on 10/11/18      GI:  On protonix proph while on steroids    FEN/Renal:  Cr and lytes WNL.      Fatigue:  - History of testosterone deficiency, rechecked and modestly low. Endo referral whenever pt requests  - TSH normal 2/28 and again on repeat 9/27 at 1.01.    Derm:  Schedule derm referral for RLE skin changes    Venous statis: scheduled for sclerotherapy to the L leg.  kelfex day prior and 4 days following        RTC 10/24 for provider & lab, prior to his vacation  11/15 with Dr. Millie Borjas

## 2018-10-11 ENCOUNTER — RADIANT APPOINTMENT (OUTPATIENT)
Dept: ULTRASOUND IMAGING | Facility: CLINIC | Age: 68
End: 2018-10-11
Attending: RADIOLOGY
Payer: COMMERCIAL

## 2018-10-11 ENCOUNTER — APPOINTMENT (OUTPATIENT)
Dept: LAB | Facility: CLINIC | Age: 68
End: 2018-10-11
Attending: NURSE PRACTITIONER
Payer: MEDICARE

## 2018-10-11 ENCOUNTER — ONCOLOGY VISIT (OUTPATIENT)
Dept: TRANSPLANT | Facility: CLINIC | Age: 68
End: 2018-10-11
Attending: NURSE PRACTITIONER
Payer: MEDICARE

## 2018-10-11 VITALS
RESPIRATION RATE: 16 BRPM | DIASTOLIC BLOOD PRESSURE: 77 MMHG | TEMPERATURE: 97.6 F | WEIGHT: 214 LBS | OXYGEN SATURATION: 95 % | HEART RATE: 88 BPM | SYSTOLIC BLOOD PRESSURE: 135 MMHG | BODY MASS INDEX: 32.54 KG/M2

## 2018-10-11 DIAGNOSIS — I83.893 VARICOSE VEINS OF BILATERAL LOWER EXTREMITIES WITH OTHER COMPLICATIONS: ICD-10-CM

## 2018-10-11 DIAGNOSIS — D46.9 MDS (MYELODYSPLASTIC SYNDROME) (H): ICD-10-CM

## 2018-10-11 LAB
ALBUMIN SERPL-MCNC: 2.8 G/DL (ref 3.4–5)
ALP SERPL-CCNC: 72 U/L (ref 40–150)
ALT SERPL W P-5'-P-CCNC: 33 U/L (ref 0–70)
ANION GAP SERPL CALCULATED.3IONS-SCNC: 8 MMOL/L (ref 3–14)
AST SERPL W P-5'-P-CCNC: 21 U/L (ref 0–45)
BASOPHILS # BLD AUTO: 0 10E9/L (ref 0–0.2)
BASOPHILS NFR BLD AUTO: 0.1 %
BILIRUB SERPL-MCNC: 0.4 MG/DL (ref 0.2–1.3)
BUN SERPL-MCNC: 34 MG/DL (ref 7–30)
CALCIUM SERPL-MCNC: 8 MG/DL (ref 8.5–10.1)
CHLORIDE SERPL-SCNC: 104 MMOL/L (ref 94–109)
CO2 SERPL-SCNC: 23 MMOL/L (ref 20–32)
CREAT SERPL-MCNC: 1.18 MG/DL (ref 0.66–1.25)
DIFFERENTIAL METHOD BLD: ABNORMAL
EOSINOPHIL # BLD AUTO: 0 10E9/L (ref 0–0.7)
EOSINOPHIL NFR BLD AUTO: 0 %
ERYTHROCYTE [DISTWIDTH] IN BLOOD BY AUTOMATED COUNT: 18.8 % (ref 10–15)
GFR SERPL CREATININE-BSD FRML MDRD: 61 ML/MIN/1.7M2
GLUCOSE SERPL-MCNC: 152 MG/DL (ref 70–99)
HCT VFR BLD AUTO: 43.5 % (ref 40–53)
HGB BLD-MCNC: 14.2 G/DL (ref 13.3–17.7)
IMM GRANULOCYTES # BLD: 0.1 10E9/L (ref 0–0.4)
IMM GRANULOCYTES NFR BLD: 0.8 %
LYMPHOCYTES # BLD AUTO: 1.2 10E9/L (ref 0.8–5.3)
LYMPHOCYTES NFR BLD AUTO: 11 %
MCH RBC QN AUTO: 28.7 PG (ref 26.5–33)
MCHC RBC AUTO-ENTMCNC: 32.6 G/DL (ref 31.5–36.5)
MCV RBC AUTO: 88 FL (ref 78–100)
MONOCYTES # BLD AUTO: 0.5 10E9/L (ref 0–1.3)
MONOCYTES NFR BLD AUTO: 4.4 %
NEUTROPHILS # BLD AUTO: 9 10E9/L (ref 1.6–8.3)
NEUTROPHILS NFR BLD AUTO: 83.7 %
NRBC # BLD AUTO: 0 10*3/UL
NRBC BLD AUTO-RTO: 0 /100
PLATELET # BLD AUTO: 180 10E9/L (ref 150–450)
POTASSIUM SERPL-SCNC: 4.3 MMOL/L (ref 3.4–5.3)
PROT SERPL-MCNC: 6.3 G/DL (ref 6.8–8.8)
RBC # BLD AUTO: 4.95 10E12/L (ref 4.4–5.9)
SODIUM SERPL-SCNC: 135 MMOL/L (ref 133–144)
WBC # BLD AUTO: 10.7 10E9/L (ref 4–11)

## 2018-10-11 PROCEDURE — G0463 HOSPITAL OUTPT CLINIC VISIT: HCPCS | Mod: 25

## 2018-10-11 PROCEDURE — 36415 COLL VENOUS BLD VENIPUNCTURE: CPT

## 2018-10-11 PROCEDURE — 80053 COMPREHEN METABOLIC PANEL: CPT | Performed by: STUDENT IN AN ORGANIZED HEALTH CARE EDUCATION/TRAINING PROGRAM

## 2018-10-11 PROCEDURE — G0008 ADMIN INFLUENZA VIRUS VAC: HCPCS

## 2018-10-11 PROCEDURE — 85025 COMPLETE CBC W/AUTO DIFF WBC: CPT | Performed by: STUDENT IN AN ORGANIZED HEALTH CARE EDUCATION/TRAINING PROGRAM

## 2018-10-11 PROCEDURE — 25000128 H RX IP 250 OP 636: Mod: ZF | Performed by: NURSE PRACTITIONER

## 2018-10-11 PROCEDURE — 90662 IIV NO PRSV INCREASED AG IM: CPT | Mod: ZF | Performed by: NURSE PRACTITIONER

## 2018-10-11 RX ORDER — SIROLIMUS 0.5 MG/1
0.5 TABLET, FILM COATED ORAL DAILY
Qty: 120 TABLET | Status: ON HOLD | COMMUNITY
Start: 2018-10-11 | End: 2018-11-20

## 2018-10-11 RX ADMIN — INFLUENZA A VIRUS A/MICHIGAN/45/2015 X-275 (H1N1) ANTIGEN (FORMALDEHYDE INACTIVATED), INFLUENZA A VIRUS A/SINGAPORE/INFIMH-16-0019/2016 IVR-186 (H3N2) ANTIGEN (FORMALDEHYDE INACTIVATED), AND INFLUENZA B VIRUS B/MARYLAND/15/2016 BX-69A (A B/COLORADO/6/2017-LIKE VIRUS) ANTIGEN (FORMALDEHYDE INACTIVATED) 0.5 ML: 60; 60; 60 INJECTION, SUSPENSION INTRAMUSCULAR at 13:33

## 2018-10-11 ASSESSMENT — PAIN SCALES - GENERAL: PAINLEVEL: MODERATE PAIN (5)

## 2018-10-11 NOTE — NURSING NOTE
"Oncology Rooming Note    October 11, 2018 12:13 PM   Deejay Dior is a 67 year old male who presents for:    Chief Complaint   Patient presents with     Labs Only     venipuncture, vitals chekced     RECHECK     Pt is here for labs and to see NP for MDS     Initial Vitals: /77  Pulse 88  Temp 97.6  F (36.4  C)  Resp 16  Wt 97.1 kg (214 lb)  SpO2 95%  BMI 32.54 kg/m2 Estimated body mass index is 32.54 kg/(m^2) as calculated from the following:    Height as of 10/4/18: 1.727 m (5' 8\").    Weight as of this encounter: 97.1 kg (214 lb). Body surface area is 2.16 meters squared.  Moderate Pain (5) Comment: Data Unavailable   No LMP for male patient.  Allergies reviewed: Yes  Medications reviewed: Yes    Medications: Medication refills not needed today.  Pharmacy name entered into [employer]:    [id number] - [place], [place] - [address] [facility] [address] & [id number] - [place], [place] - [address] [address] [address]    Clinical concerns: Pt right leg is now is very tender and sore     6 minutes for nursing intake (face to face time)     Elizabeth Forrester MA              "

## 2018-10-11 NOTE — MR AVS SNAPSHOT
After Visit Summary   10/11/2018    Deejay Dior    MRN: [id number]           Patient Information     Date Of Birth          1950        Visit Information        Provider Department      10/11/2018 12:00 PM  BMT CAROLINA #1 [facility] Blood and Marrow Transplant        Today's Diagnoses     MDS (myelodysplastic syndrome) (H)              [facility] ([facility])  [address] [place] [address]  Phone: [phone]  Clinic Hours:   Monday-Thursday:7am to 7pm   Friday: 7am to 5pm   Weekends and holidays:    8am to noon (in general)  If your fever is 100.5  or greater,   call the clinic.  After hours call the   hospital at [phone] or   [phone]. Ask for the BMT   fellow on-call            Follow-ups after your visit        Additional Services     Dermatology Referral                 Your next 10 appointments already scheduled     Oct 24, 2018 10:00 AM CDT   (Arrive by 9:45 AM)   New Patient Visit with Edu Corral MD   [facility] Dermatology ([facility])    [address] [place] [address]  [address] [facility] [address]   [phone]            Oct 24, 2018 11:00 AM CDT   [facility] Lab Draw with  [facility] LAB DRAW   [facility] Lab Draw ([facility])    [address] [place] [address]  [address] [place] [address]   [phone]            Oct 24, 2018 11:30 AM CDT   Return with  BMT CAROLINA #4   [facility] Blood and Marrow Transplant ([facility])    [address] [place] [address]  [address] [place] [address]   [phone]            Nov 15, 2018  1:00 PM CST   [facility] Lab Draw with  MASON[facility] LAB DRAW   [facility] Lab Draw ([facility])    [address] [place] [address]   [phone]            Nov 15, 2018  1:30 PM CST   Return with Aby Pinto MD   [facility] Blood and Marrow Transplant ([facility])    [address] [place] [address]  [address]  [facility] [address]   [phone]            Nov 16, 2018 10:30 AM CST   Procedure 5.5 hour with U2A ROOM 10   Unit [address]A [facility] ([facility])    [address] [address]               Nov 16, 2018 12:00 PM CST   IR STAB PHLEBECTOMY < 10 STABS with UUIR5   [facility] Interventional Radiology ([facility])    [address]  [facility] [address]   [phone]           The day before the exam:   You may eat your regular diet.   You are encouraged to drink at least 8 eight ounce glasses of clear liquids.   Drink no alcoholic beverages for 24 hours before or after the exam.  The day of the exam:   Do not eat any solid food or milk products for 6 hours prior to the exam. You may drink clear liquids until 2 hours prior to the exam. Clear liquids include the following: water, Jell-O, clear broth, apple juice or any non-carbonated drink that you can see through (no pop!)   The morning of the exam you may take medications as directed with a sip of water.  Please wear loose clothing, such as a sweat suit or jogging clothes. Avoid snaps, zippers and other metal. We may ask you to undress and put on a hospital gown.  Please bring any scans or X-rays taken at other hospitals, if similar tests were done. Also bring a list of your medicines, including vitamins, minerals and over-the-counter drugs. It is safest to leave personal items at home.  Someone will need to drive you to and from the hospital.  Tell your doctor in advance:   If you have allergies to x-ray contrast or iodine.   If you are or may be pregnant.   If you are taking Coumadin (or any other blood thinners) 5 days prior to the exam for any special instructions.   If you are diabetic to determine if your insulin needs have to be adjusted for the exam.  Your doctor will:   Need to do a history and physical within  30 days before this procedure.   Obtain necessary laboratory tests prior to the exam (CBCP, INR and PTT).  If you were given sedation, you cannot drive for 24 hours after the procedure, and an adult must be with you until then.  If you have any questions, please call the Imaging Department where you will have your exam. Directions, parking instructions, and other information are available on our website, [email or web address].            Nov 23, 2018 11:00 AM CST   US LOWER EXTREMITY VENOUS DUPLEX LEFT with UCUSV2   [facility] US ([facility])    [address] [address] [phone]           How do I prepare for my exam? (Food and drink instructions) No Food and Drink Restrictions.  How do I prepare for my exam? (Other instructions) You do not need to do anything special to prepare for your exam.  What should I wear: Wear comfortable clothes.  How long does the exam take: Most ultrasounds take 30 to 60 minutes.  What should I bring: Bring a list of your medicines, including vitamins, minerals and over-the-counter drugs. It is safest to leave personal items at home.  Do I need a :  No  is needed.  What do I need to tell my doctor: Tell your doctor about any allergies you may have.  What should I do after the exam: No restrictions, You may resume normal activities.  What is this test: An ultrasound uses sound waves to make pictures of the body. Sound waves do not cause pain. The only discomfort may be the pressure of the wand against your skin or full bladder.  Who should I call with questions: If you have any questions, please call the Imaging Department where you will have your exam. Directions, parking instructions, and other information is available on our website, [email or web address].            Dec 04, 2018  1:20 PM CST   CT CHEST W/O CONTRAST with UCCT1   [facility] CT ([facility])    [address]  1st Floor  [facility] [id number] [phone]           How do I prepare for my exam? (Food and drink instructions) No Food and Drink Restrictions.  How do I prepare for my exam? (Other instructions) You do not need to do anything special to prepare for this exam. For a sinus scan: Use your nose spray (nasal decongestant spray) as directed.  What should I wear: Please wear loose clothing, such as a sweat suit or jogging clothes. Avoid snaps, zippers and other metal. We may ask you to undress and put on a hospital gown.  How long does the exam take: Most scans take less than 20 minutes.  What should I bring: Please bring any scans or X-rays taken at other hospitals, if similar tests were done. Also bring a list of your medicines, including vitamins, minerals and over-the-counter drugs. It is safest to leave personal items at home.  Do I need a : No  is needed.  What do I need to tell my doctor? Be sure to tell your doctor: * If you have any allergies. * If there s any chance you are pregnant. * If you are breastfeeding.  What should I do after the exam: No restrictions, You may resume normal activities.  What is this test: A CT (computed tomography) scan is a series of pictures that allows us to look inside your body. The scanner creates images of the body in cross sections, much like slices of bread. This helps us see any problems more clearly.  Who should I call with questions: If you have any questions, please call the Imaging Department where you will have your exam. Directions, parking instructions, and other information is available on our website, [email or web address].            Dec 04, 2018  3:00 PM CST   (Arrive by 2:45 PM)   Return Visit with Arsh Sandoval MD   [facility] ([facility])    [address]  [address]  [facility] [id number] [phone]              Who to contact     If you have questions or need  follow up information about today's clinic visit or your schedule please contact [facility] directly at [phone].  Normal or non-critical lab and imaging results will be communicated to you by [employer], letter or phone within 4 business days after the clinic has received the results. If you do not hear from us within 7 days, please contact the clinic through [employer] or phone. If you have a critical or abnormal lab result, we will notify you by phone as soon as possible.  Submit refill requests through [employer] or call your pharmacy and they will forward the refill request to us. Please allow 3 business days for your refill to be completed.          Additional Information About Your Visit        [employer] Information     [employer] gives you secure access to your electronic health record. If you see a primary care provider, you can also send messages to your care team and make appointments. If you have questions, please call your primary care clinic.  If you do not have a primary care provider, please call [phone] and they will assist you.        Care EveryWhere ID     This is your Care EveryWhere ID. This could be used by other organizations to access your [facility] medical records  [id number]        Your Vitals Were     Pulse Temperature Respirations Pulse Oximetry BMI (Body Mass Index)       88 97.6  F (36.4  C) 16 95% 32.54 kg/m2        Blood Pressure from Last 3 Encounters:   10/11/18 135/77   10/04/18 138/75   10/02/18 126/78    Weight from Last 3 Encounters:   10/11/18 97.1 kg (214 lb)   10/04/18 90.3 kg (199 lb)   10/02/18 92.6 kg (204 lb 3.2 oz)              We Performed the Following     CBC with platelets differential     Comprehensive metabolic panel     Dermatology Referral          Today's Medication Changes          These changes are accurate as of 10/11/18  1:05 PM.  If you have any questions, ask your nurse or doctor.               Start taking these medicines.         Dose/Directions    ruxolitinib 5 MG Tabs tablet CHEMO   Commonly known as:  JAKAFI   Used for:  MDS (myelodysplastic syndrome) (H)        Dose:  5 mg   Take 1 tablet (5 mg) by mouth 2 times daily   Quantity:  60 tablet   Refills:  1            Where to get your medicines      Call your pharmacy to confirm that your medication is ready for pickup. It may take up to 24 hours for them to receive the prescription. If the prescription is not ready within 3 business days, please contact your clinic or your provider.     We will let you know when these medications are ready. If you don't hear back within 3 business days, please contact us.     ruxolitinib 5 MG Tabs tablet CHEMO                Recent Review Flowsheet Data     BMT Recent Results Latest Ref Rng & Units 4/2/2018 4/26/2018 6/21/2018 8/16/2018 9/27/2018 10/1/2018 10/11/2018    WBC 4.0 - 11.0 10e9/L 4.2 6.6 6.1 6.1 6.2 5.4 10.7    Hemoglobin 13.3 - 17.7 g/dL 14.6 14.5 13.7 15.1 13.8 14.4 14.2    Platelet Count 150 - 450 10e9/L 105(L) 183 172 172 223 266 180    Neutrophils (Absolute) 1.6 - 8.3 10e9/L 2.6 3.4 3.2 2.7 2.6 4.0 9.0(H)    INR 0.86 - 1.14 - - - - - - -    Sodium 133 - 144 mmol/L - 139 138 139 138 137 135    Potassium 3.4 - 5.3 mmol/L - 4.1 3.9 4.0 4.0 4.0 4.3    Chloride 94 - 109 mmol/L - 106 109 108 108 106 104    Glucose 70 - 99 mg/dL - 94 107(H) 92 108(H) 125(H) 152(H)    Urea Nitrogen 7 - 30 mg/dL - 18 16 18 22 27 34(H)    Creatinine 0.66 - 1.25 mg/dL - 0.99 0.91 0.92 1.03 0.92 1.18    Calcium (Total) 8.5 - 10.1 mg/dL - 9.0 8.7 8.6 8.5 8.6 8.0(L)    Protein (Total) 6.8 - 8.8 g/dL - 7.3 7.2 7.2 6.6(L) - 6.3(L)    Albumin 3.4 - 5.0 g/dL - 3.0(L) 3.1(L) 3.1(L) 2.7(L) - 2.8(L)    Bilirubin (Direct) 0.0 - 0.2 mg/dL - - - - - - -    Alkaline Phosphatase 40 - 150 U/L - 102 106 109 101 - 72    AST 0 - 45 U/L - 38 44 Canceled, Test credited 39 - 21    ALT 0 - 70 U/L - 33 29 30 28 - 33    MCV 78 - 100 fl 87 88 87 87 86 87 88               Primary Care  Provider Office Phone # Fax #    Vivek Rafael Callejas [phone]       [facility] [address] [place] [address]        Equal Access to Services     RABIA PEÑA : Edil lisa kincaid wilmaro Sozachali, waaxda luqadaha, qaybta kaalmada adejose, corinna langley alexjose lock laLunachris adamson. So [facility] [phone].    ATENCIÓN: Si habla español, tiene a del toro disposición servicios gratuitos de asistencia lingüística. Llame al [phone].    We comply with applicable federal civil rights laws and [place] laws. We do not discriminate on the basis of race, color, national origin, age, disability, sex, sexual orientation, or gender identity.            Thank you!     Thank you for choosing [facility]  for your care. Our goal is always to provide you with excellent care. Hearing back from our patients is one way we can continue to improve our services. Please take a few minutes to complete the written survey that you may receive in the mail after your visit with us. Thank you!             Your Updated Medication List - Protect others around you: Learn how to safely use, store and throw away your medicines at www.disposemymeds.org.          This list is accurate as of 10/11/18  1:05 PM.  Always use your most recent med list.                   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), MDS (myelodysplastic syndrome) (H)       amoxicillin 500 MG capsule    AMOXIL    16 capsule    Take 4 pills (2 grams) day of dental work    MDS (myelodysplastic syndrome) (H)       calcium carbonate-vitamin D 500-400 MG-UNIT Tabs per tablet     180 tablet    Take 1 tablet by mouth daily 2000 mg    MDS (myelodysplastic syndrome) (H), Acute xvtja-rsfekd-vojg disease (H), 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)       cephALEXin 500 MG capsule    KEFLEX     10 capsule    Take 1 capsule (500 mg) by mouth 2 times daily    MDS (myelodysplastic syndrome) (H)       fluconazole 100 MG tablet    DIFLUCAN    30 tablet    Take 1 tablet (100 mg) by mouth daily    Status post bone marrow transplant (H)       levofloxacin 250 MG tablet    LEVAQUIN    30 tablet    Take 1 tablet (250 mg) by mouth daily    MDS (myelodysplastic syndrome) (H)       order for DME     1 Device    Please provide a [employer] cane offset with strap item number [id number]    MDS (myelodysplastic syndrome) (H)       order for DME     1 each    Please measure and distribute 1 pair of 20mmHg - 30mmHg thigh high open or closed toe compression stockings. Jobst ultrasheer or equivalent.    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), Status post bone marrow transplant (H)       * predniSONE 20 MG tablet    DELTASONE    60 tablet    Take 1 tablet (20 mg) by mouth daily    SOB (shortness of breath)       * predniSONE 50 MG tablet    DELTASONE    30 tablet    Take 1 tablet (50 mg) by mouth daily Take a combination of 1 if the 50 mg and 2 of the 20 mg tablets for a total of 90 mg prednisone by mouth daily    SOB (shortness of breath)       ruxolitinib 5 MG Tabs tablet CHEMO    JAKAFI    60 tablet    Take 1 tablet (5 mg) by mouth 2 times daily    MDS (myelodysplastic syndrome) (H)       sertraline 100 MG tablet    ZOLOFT    30 tablet    Take 1 tablet (100 mg) by mouth daily    MDS (myelodysplastic syndrome) (H), 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), Urinary frequency, Other complication of bone marrow transplant (H)       sirolimus 0.5 MG tablet    GENERIC EQUIVALENT    120 tablet    Take 1 tablet (0.5 mg) by mouth daily        sulfamethoxazole-trimethoprim 800-160 MG per tablet    BACTRIM DS/SEPTRA DS    16 tablet    Take one tab by mouth twice daily on Monday  and Tuesdays only    Status post bone marrow transplant (H)       triamcinolone 0.1 % ointment    KENALOG     Apply twice a day to lower leg        * Notice:  This list has 2 medication(s) that are the same as other medications prescribed for you. Read the directions carefully, and ask your doctor or other care provider to review them with you.

## 2018-10-11 NOTE — NURSING NOTE
"Injectable Influenza Immunization Documentation    1.  Has the patient received the information for the injectable influenza vaccine? YES     2. Is the patient 6 months of age or older? YES     3. Does the patient have any of the following contraindications?         Severe allergy to eggs? No     Severe allergic reaction to previous influenza vaccines? No   Severe allergy to latex? No       History of Guillain-[place]burgh syndrome? No     Currently have a temperature greater than 100.4F? No        4.  Severely egg allergic patients should have flu vaccine eligibility assessed by an MD, RN, or pharmacist, and those who received flu vaccine should be observed for 15 min by an MD, RN, Pharmacist, Medical Technician, or member of clinic staff.\": YES    5. Latex-allergic patients should be given latex-free influenza vaccine Yes. Please reference the Vaccine latex table to determine if your clinic s product is latex-containing.       Vaccination given by Elizabeth Forrester MA        "

## 2018-10-12 ENCOUNTER — TELEPHONE (OUTPATIENT)
Dept: ONCOLOGY | Facility: CLINIC | Age: 68
End: 2018-10-12

## 2018-10-15 LAB
DLCOCOR-%PRED-PRE: 78 %
DLCOCOR-PRE: 19.97 ML/MIN/MMHG
DLCOUNC-%PRED-PRE: 78 %
DLCOUNC-PRE: 19.86 ML/MIN/MMHG
DLCOUNC-PRED: 25.33 ML/MIN/MMHG
ERV-%PRED-PRE: 282 %
ERV-PRE: 2.03 L
ERV-PRED: 0.72 L
EXPTIME-PRE: 11.75 SEC
FEF2575-%PRED-PRE: 143 %
FEF2575-PRE: 3.36 L/SEC
FEF2575-PRED: 2.34 L/SEC
FEFMAX-%PRED-PRE: 128 %
FEFMAX-PRE: 10.21 L/SEC
FEFMAX-PRED: 7.92 L/SEC
FEV1-%PRED-PRE: 118 %
FEV1-PRE: 3.58 L
FEV1FEV6-PRE: 81 %
FEV1FEV6-PRED: 78 %
FEV1FVC-PRE: 79 %
FEV1FVC-PRED: 74 %
FEV1SVC-PRE: 77 %
FEV1SVC-PRED: 68 %
FIFMAX-PRE: 8.04 L/SEC
FRCPLETH-%PRED-PRE: 109 %
FRCPLETH-PRE: 3.93 L
FRCPLETH-PRED: 3.58 L
FVC-%PRED-PRE: 114 %
FVC-PRE: 4.56 L
FVC-PRED: 3.97 L
IC-%PRED-PRE: 71 %
IC-PRE: 2.64 L
IC-PRED: 3.69 L
RVPLETH-%PRED-PRE: 73 %
RVPLETH-PRE: 1.89 L
RVPLETH-PRED: 2.57 L
TLCPLETH-%PRED-PRE: 97 %
TLCPLETH-PRE: 6.57 L
TLCPLETH-PRED: 6.72 L
VA-%PRED-PRE: 96 %
VA-PRE: 6.21 L
VC-%PRED-PRE: 105 %
VC-PRE: 4.67 L
VC-PRED: 4.41 L

## 2018-10-16 ENCOUNTER — RADIANT APPOINTMENT (OUTPATIENT)
Dept: ULTRASOUND IMAGING | Facility: CLINIC | Age: 68
End: 2018-10-16
Attending: STUDENT IN AN ORGANIZED HEALTH CARE EDUCATION/TRAINING PROGRAM
Payer: COMMERCIAL

## 2018-10-16 ENCOUNTER — ONCOLOGY VISIT (OUTPATIENT)
Dept: TRANSPLANT | Facility: CLINIC | Age: 68
End: 2018-10-16
Attending: STUDENT IN AN ORGANIZED HEALTH CARE EDUCATION/TRAINING PROGRAM
Payer: MEDICARE

## 2018-10-16 ENCOUNTER — TELEPHONE (OUTPATIENT)
Dept: TRANSPLANT | Facility: CLINIC | Age: 68
End: 2018-10-16

## 2018-10-16 VITALS
DIASTOLIC BLOOD PRESSURE: 83 MMHG | SYSTOLIC BLOOD PRESSURE: 139 MMHG | TEMPERATURE: 97.5 F | BODY MASS INDEX: 33.06 KG/M2 | OXYGEN SATURATION: 95 % | WEIGHT: 217.4 LBS | HEART RATE: 76 BPM

## 2018-10-16 DIAGNOSIS — D46.9 MDS (MYELODYSPLASTIC SYNDROME) (H): Primary | ICD-10-CM

## 2018-10-16 DIAGNOSIS — I83.811 VARICOSE VEINS OF RIGHT LOWER EXTREMITY WITH PAIN: ICD-10-CM

## 2018-10-16 DIAGNOSIS — D46.9 MDS (MYELODYSPLASTIC SYNDROME) (H): ICD-10-CM

## 2018-10-16 DIAGNOSIS — I50.89 OTHER HEART FAILURE (H): ICD-10-CM

## 2018-10-16 DIAGNOSIS — C79.89 SECONDARY MALIGNANT NEOPLASM OF OTHER SPECIFIED SITES (H): ICD-10-CM

## 2018-10-16 LAB
ANION GAP SERPL CALCULATED.3IONS-SCNC: 6 MMOL/L (ref 3–14)
BASOPHILS # BLD AUTO: 0 10E9/L (ref 0–0.2)
BASOPHILS NFR BLD AUTO: 0.1 %
BUN SERPL-MCNC: 26 MG/DL (ref 7–30)
CALCIUM SERPL-MCNC: 8.1 MG/DL (ref 8.5–10.1)
CHLORIDE SERPL-SCNC: 106 MMOL/L (ref 94–109)
CO2 SERPL-SCNC: 24 MMOL/L (ref 20–32)
CREAT SERPL-MCNC: 1.14 MG/DL (ref 0.66–1.25)
DIFFERENTIAL METHOD BLD: ABNORMAL
EOSINOPHIL # BLD AUTO: 0 10E9/L (ref 0–0.7)
EOSINOPHIL NFR BLD AUTO: 0.4 %
ERYTHROCYTE [DISTWIDTH] IN BLOOD BY AUTOMATED COUNT: 19.7 % (ref 10–15)
GFR SERPL CREATININE-BSD FRML MDRD: 64 ML/MIN/1.7M2
GLUCOSE SERPL-MCNC: 111 MG/DL (ref 70–99)
HCT VFR BLD AUTO: 42.9 % (ref 40–53)
HGB BLD-MCNC: 14.4 G/DL (ref 13.3–17.7)
IMM GRANULOCYTES # BLD: 0.1 10E9/L (ref 0–0.4)
IMM GRANULOCYTES NFR BLD: 0.5 %
LYMPHOCYTES # BLD AUTO: 1.4 10E9/L (ref 0.8–5.3)
LYMPHOCYTES NFR BLD AUTO: 12.2 %
MCH RBC QN AUTO: 29.4 PG (ref 26.5–33)
MCHC RBC AUTO-ENTMCNC: 33.6 G/DL (ref 31.5–36.5)
MCV RBC AUTO: 88 FL (ref 78–100)
MONOCYTES # BLD AUTO: 0.4 10E9/L (ref 0–1.3)
MONOCYTES NFR BLD AUTO: 3.9 %
NEUTROPHILS # BLD AUTO: 9.2 10E9/L (ref 1.6–8.3)
NEUTROPHILS NFR BLD AUTO: 82.9 %
NRBC # BLD AUTO: 0 10*3/UL
NRBC BLD AUTO-RTO: 0 /100
NT-PROBNP SERPL-MCNC: 475 PG/ML (ref 0–125)
PLATELET # BLD AUTO: 163 10E9/L (ref 150–450)
POTASSIUM SERPL-SCNC: 4.5 MMOL/L (ref 3.4–5.3)
RBC # BLD AUTO: 4.89 10E12/L (ref 4.4–5.9)
SODIUM SERPL-SCNC: 135 MMOL/L (ref 133–144)
WBC # BLD AUTO: 11.1 10E9/L (ref 4–11)

## 2018-10-16 PROCEDURE — G0463 HOSPITAL OUTPT CLINIC VISIT: HCPCS | Mod: ZF

## 2018-10-16 PROCEDURE — 80048 BASIC METABOLIC PNL TOTAL CA: CPT | Performed by: STUDENT IN AN ORGANIZED HEALTH CARE EDUCATION/TRAINING PROGRAM

## 2018-10-16 PROCEDURE — 93010 ELECTROCARDIOGRAM REPORT: CPT | Mod: ZP | Performed by: INTERNAL MEDICINE

## 2018-10-16 PROCEDURE — 85025 COMPLETE CBC W/AUTO DIFF WBC: CPT | Performed by: STUDENT IN AN ORGANIZED HEALTH CARE EDUCATION/TRAINING PROGRAM

## 2018-10-16 PROCEDURE — 83880 ASSAY OF NATRIURETIC PEPTIDE: CPT | Performed by: STUDENT IN AN ORGANIZED HEALTH CARE EDUCATION/TRAINING PROGRAM

## 2018-10-16 RX ORDER — LORAZEPAM 1 MG/1
1 TABLET ORAL EVERY 6 HOURS PRN
Qty: 30 TABLET | Refills: 0 | Status: SHIPPED | OUTPATIENT
Start: 2018-10-16 | End: 2018-11-15

## 2018-10-16 RX ORDER — METOLAZONE 5 MG/1
5 TABLET ORAL DAILY
Qty: 90 TABLET | Refills: 3 | Status: SHIPPED | OUTPATIENT
Start: 2018-10-16 | End: 2018-10-17

## 2018-10-16 RX ORDER — BUMETANIDE 1 MG/1
1 TABLET ORAL DAILY
Qty: 60 TABLET | Refills: 1 | Status: SHIPPED | OUTPATIENT
Start: 2018-10-16 | End: 2018-10-17

## 2018-10-16 RX ORDER — ASPIRIN 325 MG
325 TABLET ORAL DAILY
Qty: 30 TABLET | Refills: 1 | Status: SHIPPED | OUTPATIENT
Start: 2018-10-16 | End: 2019-07-25

## 2018-10-16 ASSESSMENT — PAIN SCALES - GENERAL: PAINLEVEL: NO PAIN (0)

## 2018-10-16 NOTE — MR AVS SNAPSHOT
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  After hours call the   hospital at [phone] or   [phone]. Ask for the BMT   fellow on-call           Care Instructions    Start aspirin  Start bumex and metolazone (see bottle for directions)  Call with weight gain of >4 lb  Use lymphedema wraps  Call with any fevers, leg pain, SOB or chest pain              Follow-ups after your visit        Your next 10 appointments already scheduled     Oct 17, 2018 12:45 PM CDT   Masonic Lab Draw with  Drive Power LAB DRAW   [facility] Masonic Lab Draw ([facility])    [address] [place] [address]  [address] [place] [address] [phone]            Oct 17, 2018  1:00 PM CDT   Ech Complete with UCECHCR4   [facility] Echo ([facility])    [address] [place] [address]  3rd Floor  [facility] [id number] [phone]           1.  Please bring or wear a comfortable two-piece outfit. 2.  You may eat, drink and take your normal medicines. 3.  For any questions that cannot be answered, please contact the ordering physician 4.  Please do not wear perfumes or scented lotions on the day of your exam.            Oct 22, 2018  2:00 PM CDT   Return with Postmates BMT CAROLINA  #4   [facility] Blood and Marrow Transplant ([facility])    [address]  [address]  [facility] [address]   [phone]            Oct 24, 2018 10:00 AM CDT   (Arrive by 9:45 AM)   New Patient Visit with Edu Corral MD   [facility] Dermatology ([facility])    [address]  3rd Floor  [facility] [address]   [phone]            Oct 24, 2018 11:00 AM CDT   Masonic Lab Draw with  MASONIC LAB DRAW   [facility] Masonic Lab Draw ([facility])    [address]  [address]  [facility] [address]   [phone]            Oct 24, 2018 11:30 AM CDT   Return with  BMT CAROLINA #4   [facility] Blood and Marrow Transplant ([facility])    [address]  [address]  [facility] [address]   [phone]            Nov 15, 2018  1:00 PM CST   Masonic Lab Draw with  MASONIC LAB DRAW   [facility] Masonic Lab Draw ([facility])    [address]  [address]  [facility] [address]   [phone]            Nov 15, 2018  1:30 PM CST   Return with Aby Pinto MD   [facility] Blood and Marrow Transplant ([facility])    [address]  [address]  [facility] [address]   [phone]            Nov 16, 2018 10:30 AM CST   Procedure 5.5 hour with U2A ROOM 10   [address] [facility] ([facility])    [address] [address]               Nov 16, 2018 12:00 PM CST   IR STAB PHLEBECTOMY < 10 STABS with UUIR5   [facility] [place] Interventional Radiology ([facility])    [address] [facility] [address] [phone]           The day before the exam:   You may eat your regular diet.   You are encouraged to drink at least 8 eight ounce glasses of clear liquids.   Drink no alcoholic beverages for 24 hours before or  after the exam.  The day of the exam:   Do not eat any solid food or milk products for 6 hours prior to the exam. You may drink clear liquids until 2 hours prior to the exam. Clear liquids include the following: water, Jell-O, clear broth, apple juice or any non-carbonated drink that you can see through (no pop!)   The morning of the exam you may take medications as directed with a sip of water.  Please wear loose clothing, such as a sweat suit or jogging clothes. Avoid snaps, zippers and other metal. We may ask you to undress and put on a hospital gown.  Please bring any scans or X-rays taken at other hospitals, if similar tests were done. Also bring a list of your medicines, including vitamins, minerals and over-the-counter drugs. It is safest to leave personal items at home.  Someone will need to drive you to and from the hospital.  Tell your doctor in advance:   If you have allergies to x-ray contrast or iodine.   If you are or may be pregnant.   If you are taking Coumadin (or any other blood thinners) 5 days prior to the exam for any special instructions.   If you are diabetic to determine if your insulin needs have to be adjusted for the exam.  Your doctor will:   Need to do a history and physical within 30 days before this procedure.   Obtain necessary laboratory tests prior to the exam (CBCP, INR and PTT).  If you were given sedation, you cannot drive for 24 hours after the procedure, and an adult must be with you until then.  If you have any questions, please call the Imaging Department where you will have your exam. Directions, parking instructions, and other information are available on our website, [email or web address].              Future tests that were ordered for you today     Open Future Orders        Priority Expected Expires Ordered    Echocardiogram Complete Routine 10/17/2018 10/16/2019 10/16/2018     Venous extremity lower RT Routine  10/16/2019 10/16/2018            Who to contact     If you  have questions or need follow up information about today's clinic visit or your schedule please contact [facility] BLOOD AND MARROW TRANSPLANT directly at [phone].  Normal or non-critical lab and imaging results will be communicated to you by [employer] Springs[employer], letter or phone within 4 business days after the clinic has received the results. If you do not hear from us within 7 days, please contact the clinic through [employer] or phone. If you have a critical or abnormal lab result, we will notify you by phone as soon as possible.  Submit refill requests through [employer] or call your pharmacy and they will forward the refill request to us. Please allow 3 business days for your refill to be completed.          Additional Information About Your Visit        [employer]ol[employer] Information     iTwin gives you secure access to your electronic health record. If you see a primary care provider, you can also send messages to your care team and make appointments. If you have questions, please call your primary care clinic.  If you do not have a primary care provider, please call [phone] and they will assist you.        Care EveryWhere ID     This is your Care EveryWhere ID. This could be used by other organizations to access your [facility] medical records  [id number]        Your Vitals Were     Pulse Temperature Pulse Oximetry BMI (Body Mass Index)          76 97.5  F (36.4  C) (Oral) 95% 33.06 kg/m2         Blood Pressure from Last 3 Encounters:   10/16/18 139/83   10/11/18 135/77   10/04/18 138/75    Weight from Last 3 Encounters:   10/16/18 98.6 kg (217 lb 6.4 oz)   10/11/18 97.1 kg (214 lb)   10/04/18 90.3 kg (199 lb)              We Performed the Following     Basic metabolic panel     CBC with platelets differential     EKG 12-lead complete w/read - Clinics - NOW     N terminal pro BNP outpatient          Today's Medication Changes          These changes are accurate as of 10/16/18  5:11 PM.  If you have any questions, ask your  nurse or doctor.               Start taking these medicines.        Dose/Directions    LORazepam 1 MG tablet   Commonly known as:  ATIVAN   Used for:  MDS (myelodysplastic syndrome) (H)        Dose:  1 mg   Take 1 tablet (1 mg) by mouth every 6 hours as needed for anxiety   Quantity:  30 tablet   Refills:  0         Stop taking these medicines if you haven't already. Please contact your care team if you have questions.     cephALEXin 500 MG capsule   Commonly known as:  KEFLEX                Where to get your medicines      Some of these will need a paper prescription and others can be bought over the counter.  Ask your nurse if you have questions.     Bring a paper prescription for each of these medications     LORazepam 1 MG tablet                Recent Review Flowsheet Data     BMT Recent Results Latest Ref Rng & Units 4/26/2018 6/21/2018 8/16/2018 9/27/2018 10/1/2018 10/11/2018 10/16/2018    WBC 4.0 - 11.0 10e9/L 6.6 6.1 6.1 6.2 5.4 10.7 11.1(H)    Hemoglobin 13.3 - 17.7 g/dL 14.5 13.7 15.1 13.8 14.4 14.2 14.4    Platelet Count 150 - 450 10e9/L 183 172 172 223 266 180 163    Neutrophils (Absolute) 1.6 - 8.3 10e9/L 3.4 3.2 2.7 2.6 4.0 9.0(H) 9.2(H)    INR 0.86 - 1.14 - - - - - - -    Sodium 133 - 144 mmol/L 139 138 139 138 137 135 135    Potassium 3.4 - 5.3 mmol/L 4.1 3.9 4.0 4.0 4.0 4.3 4.5    Chloride 94 - 109 mmol/L 106 109 108 108 106 104 106    Glucose 70 - 99 mg/dL 94 107(H) 92 108(H) 125(H) 152(H) 111(H)    Urea Nitrogen 7 - 30 mg/dL 18 16 18 22 27 34(H) 26    Creatinine 0.66 - 1.25 mg/dL 0.99 0.91 0.92 1.03 0.92 1.18 1.14    Calcium (Total) 8.5 - 10.1 mg/dL 9.0 8.7 8.6 8.5 8.6 8.0(L) 8.1(L)    Protein (Total) 6.8 - 8.8 g/dL 7.3 7.2 7.2 6.6(L) - 6.3(L) -    Albumin 3.4 - 5.0 g/dL 3.0(L) 3.1(L) 3.1(L) 2.7(L) - 2.8(L) -    Bilirubin (Direct) 0.0 - 0.2 mg/dL - - - - - - -    Alkaline Phosphatase 40 - 150 U/L 102 106 109 101 - 72 -    AST 0 - 45 U/L 38 44 Canceled, Test credited 39 - 21 -    ALT 0 - 70 U/L 33  29 30 28 - 33 -    MCV 78 - 100 fl 88 87 87 86 87 88 88               Primary Care Provider Office Phone # Fax #    Vivek Rafael Callejas [phone]       [facility] [address] [place] [address]        Equal Access to Services     [facility] MARIANA : Hadii aad ku hadasho Soomaali, waaxda luqadaha, qaybta kaalmada adeegyada, waxay primo amberlywally robledo osmanaram adamson. So [facility] [phone].    ATENCIÓN: Si habla español, tiene a del toro disposición servicios gratuitos de asistencia lingüística. [facility] [phone].    We comply with applicable federal civil rights laws and [place] laws. We do not discriminate on the basis of race, color, national origin, age, disability, sex, sexual orientation, or gender identity.            Thank you!     Thank you for choosing [facility] BLOOD AND MARROW TRANSPLANT  for your care. Our goal is always to provide you with excellent care. Hearing back from our patients is one way we can continue to improve our services. Please take a few minutes to complete the written survey that you may receive in the mail after your visit with us. Thank you!             Your Updated Medication List - Protect others around you: Learn how to safely use, store and throw away your medicines at www.disposemymeds.org.          This list is accurate as of 10/16/18  5:11 PM.  Always use your most recent med list.                   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)       fluconazole 100 MG tablet    DIFLUCAN    30 tablet    Take 1 tablet (100 mg) by mouth daily    Status post bone marrow transplant (H)       levofloxacin 250 MG tablet    LEVAQUIN    30 tablet    Take 1 tablet (250 mg) by mouth daily    MDS (myelodysplastic syndrome) (H)       LORazepam 1 MG tablet    ATIVAN    30 tablet    Take 1 tablet (1 mg) by mouth every 6 hours as needed for anxiety    MDS (myelodysplastic syndrome) (H)       order for DME     1 Device    Please provide a NOVA cane offset with strap item number [id number]    MDS (myelodysplastic syndrome) (H)       order for DME     1 each    Please measure and distribute 1 pair of 20mmHg - 30mmHg thigh high open or closed toe compression stockings. Jobst ultrasheer or equivalent.    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), Status post bone marrow transplant (H)       * predniSONE 20 MG tablet    DELTASONE    60 tablet    Take 1 tablet (20 mg) by mouth daily    SOB (shortness of breath)       * predniSONE 50 MG tablet    DELTASONE    30 tablet    Take 1 tablet (50 mg) by mouth daily Take a combination of 1 if the 50 mg and 2 of the 20 mg tablets for a total of 90 mg prednisone by mouth daily    SOB (shortness of breath)       ruxolitinib 5 MG Tabs tablet CHEMO    JAKAFI    60 tablet    Take 1 tablet (5 mg) by mouth 2 times daily    MDS (myelodysplastic syndrome) (H)       sertraline 100 MG tablet    ZOLOFT    30 tablet    Take 1 tablet (100 mg) by mouth daily    MDS (myelodysplastic syndrome) (H), 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 Read the directions carefully, and ask your doctor or other care provider to review them with you.

## 2018-10-16 NOTE — PROGRESS NOTES
BMT Clinic Note     ID:  Mr. 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 Also yesterday had dramatic swelling in his left leg. Feels his calf is bigger than his thigh.He endorses leg tenderness but this is also chronic.  His right leg is dripping watery and wets his sock some. He denies palpitations or chest pain, no SOB. Not short of breath while out walking around and shopping today. He notes oxy has helped his tremors/shakes in the past.   Afebrile with no cough. No bleeding.     Review of Systems: 10 point ROS negative except as noted above.    Physical Exam:  /83  Pulse 76  Temp 97.5  F (36.4  C) (Oral)  Wt 98.6 kg (217 lb 6.4 oz)  SpO2 95%  BMI 33.06 kg/m2     Wt Readings from Last 4 Encounters:   10/16/18 98.6 kg (217 lb 6.4 oz)   10/11/18 97.1 kg (214 lb)   10/04/18 90.3 kg (199 lb)   10/02/18 92.6 kg (204 lb 3.2 oz)     General: NAD, interactive  Eyes: SARIKA, sclera anicteric  Nose/Mouth/Throat: OP clear, no ulcerations, very dry, upper plate, chronic lichenoid changes   Lungs:CTA B, no crackles or wheezes   CV: Irregular run of beats, then regular rhythm,   Abd: S/NT/ND +BS  Skin:  Bilat LE edema +++, left leg skin doughy. Right leg weeping. RLE skin thin with scattered erythema & weeping blisters. Very tender   Neuro: A&O, moderate resting tremor  Line: NONE    Labs:  Lab Results   Component Value Date    WBC 11.1 (H) 10/16/2018    ANEU 9.2 (H) 10/16/2018    HGB 14.4 10/16/2018    HCT 42.9 10/16/2018     10/16/2018     10/16/2018    POTASSIUM 4.5 10/16/2018    CHLORIDE 106 10/16/2018    CO2 24 10/16/2018     (H) 10/16/2018    BUN 26 10/16/2018    CR 1.14 10/16/2018    MAG 2.1 04/26/2018    INR 0.95 06/30/2016       ASSESSMENT AND PLAN:  Mr. Dior is a 64 y/o male, 4 Years  s/p  NMA allo sib PBSCT w/ cGVHD for MDS. Here for acute onset lower extremity swelling and shakiness.     Vasc:   Bilateral lower extremity swelling x24 hours. Right leg US with small clot in posterior tibial vein: per Millie, start reg dose aspirin daily 325mg and recheck US in 2 weeks or symptomatically  Use lymphedema wraps  Venous statis: scheduled for sclerotherapy to the L leg.  kelfex day prior and 4 days following.     CV:  Mildly irregular HR on exam, EKG sinus tachy only  Edema 2/2 heart failure? BNP pending. Use bumex/metolazone (has worked best historically); recheck labs this week for Cr/K tolerance   ECHO ordered for this week    Derm:  Derm sees next Wednesday for RLE skin changes. Right leg weeping, no evidence of infection at this time, tender however this is chronic.     ID: Afebrile  Mild leukocytosis: WBC 11 (last 10) and no fevers, monitor.    Shakes: likely 2/2 rapid steroid taper. Oxy has worked historically, will try ativan first. Script given      Plan: start bumex/metolazone, aspirin and lymphedema wraps. Use ativan for shakes. Call with fevers, chills, SOB or any other new or worsening symptoms.    RTC this week: BNP follow up, ECHO and recheck labs and edema  Has trip planned next Thursday, consider repeat imaging prior to that long road trip.   11/15 with Dr. Millie Melendez PAC  [phone]

## 2018-10-16 NOTE — TELEPHONE ENCOUNTER
"Pt calls to report new shakes with lower leg swelling. Since 10/11 dropped prednisone from 90mg to 50mg. Notes left leg is swollen and calf is larger than his thigh. Calf is \"leaking\" some wetness on sock. Lower leg is painful to the touch and red in areas. Recently had right leg laser ablation and stab phlebectomy. Left leg is the issue today. Right leg still has gvhd skin changes but not swollen. He denies shortness of breath, fevers or chest pain. Able to walk.   Pt advised to be seen today to rule out a clot. He is agreeable to present to clinic and is able to arrive within the next hour. If any new or worsening symptoms occur before his arrival he should call 9-1-1. Pt verbalizes understanding of the plan.  Tamar Melendez PAC  [phone]  "

## 2018-10-16 NOTE — PATIENT INSTRUCTIONS
Start aspirin  Start bumex and metolazone (see bottle for directions)  Call with weight gain of >4 lb  Use lymphedema wraps  Call with any fevers, leg pain, SOB or chest pain

## 2018-10-16 NOTE — NURSING NOTE
"Oncology Rooming Note    October 16, 2018 3:34 PM   Deejay Dior is a 67 year old male who presents for:    Chief Complaint   Patient presents with     RECHECK     RTN- MDS      Initial Vitals: There were no vitals taken for this visit. Estimated body mass index is 32.54 kg/(m^2) as calculated from the following:    Height as of 10/4/18: 1.727 m (5' 8\").    Weight as of 10/11/18: 97.1 kg (214 lb). There is no height or weight on file to calculate BSA.  Data Unavailable Comment: Data Unavailable   No LMP for male patient.  Allergies reviewed: Yes  Medications reviewed: Yes    Medications: Medication refills not needed today.  Pharmacy name entered into [employer]:    [id number] - [place], [place] - [address] [facility] [address] [id number] - [place], [place] - [address] [place] [address] [facility]     Clinical concerns: Yes patient is having swelling in both legs, swelling is worse in the left leg.     8 minutes for nursing intake (face to face time)     Chandrika Méndez CMA              "

## 2018-10-17 ENCOUNTER — RADIANT APPOINTMENT (OUTPATIENT)
Dept: CARDIOLOGY | Facility: CLINIC | Age: 68
End: 2018-10-17
Attending: STUDENT IN AN ORGANIZED HEALTH CARE EDUCATION/TRAINING PROGRAM
Payer: COMMERCIAL

## 2018-10-17 ENCOUNTER — ONCOLOGY VISIT (OUTPATIENT)
Dept: TRANSPLANT | Facility: CLINIC | Age: 68
End: 2018-10-17
Attending: STUDENT IN AN ORGANIZED HEALTH CARE EDUCATION/TRAINING PROGRAM
Payer: MEDICARE

## 2018-10-17 ENCOUNTER — APPOINTMENT (OUTPATIENT)
Dept: LAB | Facility: CLINIC | Age: 68
End: 2018-10-17
Attending: STUDENT IN AN ORGANIZED HEALTH CARE EDUCATION/TRAINING PROGRAM
Payer: MEDICARE

## 2018-10-17 VITALS
WEIGHT: 209 LBS | OXYGEN SATURATION: 95 % | DIASTOLIC BLOOD PRESSURE: 67 MMHG | HEART RATE: 78 BPM | RESPIRATION RATE: 16 BRPM | SYSTOLIC BLOOD PRESSURE: 125 MMHG | BODY MASS INDEX: 31.67 KG/M2 | HEIGHT: 68 IN | TEMPERATURE: 97.1 F

## 2018-10-17 DIAGNOSIS — D46.9 MDS (MYELODYSPLASTIC SYNDROME) (H): ICD-10-CM

## 2018-10-17 DIAGNOSIS — C79.89 SECONDARY MALIGNANT NEOPLASM OF OTHER SPECIFIED SITES (H): ICD-10-CM

## 2018-10-17 LAB
ALBUMIN SERPL-MCNC: 2.8 G/DL (ref 3.4–5)
ALP SERPL-CCNC: 65 U/L (ref 40–150)
ALT SERPL W P-5'-P-CCNC: 36 U/L (ref 0–70)
ANION GAP SERPL CALCULATED.3IONS-SCNC: 6 MMOL/L (ref 3–14)
AST SERPL W P-5'-P-CCNC: 20 U/L (ref 0–45)
BASOPHILS # BLD AUTO: 0 10E9/L (ref 0–0.2)
BASOPHILS NFR BLD AUTO: 0.1 %
BILIRUB SERPL-MCNC: 0.6 MG/DL (ref 0.2–1.3)
BUN SERPL-MCNC: 31 MG/DL (ref 7–30)
CALCIUM SERPL-MCNC: 8.2 MG/DL (ref 8.5–10.1)
CHLORIDE SERPL-SCNC: 104 MMOL/L (ref 94–109)
CO2 SERPL-SCNC: 24 MMOL/L (ref 20–32)
CREAT SERPL-MCNC: 1.22 MG/DL (ref 0.66–1.25)
DIFFERENTIAL METHOD BLD: ABNORMAL
EOSINOPHIL # BLD AUTO: 0 10E9/L (ref 0–0.7)
EOSINOPHIL NFR BLD AUTO: 0.2 %
ERYTHROCYTE [DISTWIDTH] IN BLOOD BY AUTOMATED COUNT: 19.7 % (ref 10–15)
GFR SERPL CREATININE-BSD FRML MDRD: 59 ML/MIN/1.7M2
GLUCOSE SERPL-MCNC: 103 MG/DL (ref 70–99)
HCT VFR BLD AUTO: 42 % (ref 40–53)
HGB BLD-MCNC: 13.8 G/DL (ref 13.3–17.7)
IMM GRANULOCYTES # BLD: 0.1 10E9/L (ref 0–0.4)
IMM GRANULOCYTES NFR BLD: 0.6 %
INTERPRETATION ECG - MUSE: NORMAL
LYMPHOCYTES # BLD AUTO: 1 10E9/L (ref 0.8–5.3)
LYMPHOCYTES NFR BLD AUTO: 7.5 %
MAGNESIUM SERPL-MCNC: 2.1 MG/DL (ref 1.6–2.3)
MCH RBC QN AUTO: 29 PG (ref 26.5–33)
MCHC RBC AUTO-ENTMCNC: 32.9 G/DL (ref 31.5–36.5)
MCV RBC AUTO: 88 FL (ref 78–100)
MONOCYTES # BLD AUTO: 0.4 10E9/L (ref 0–1.3)
MONOCYTES NFR BLD AUTO: 3.2 %
NEUTROPHILS # BLD AUTO: 11.2 10E9/L (ref 1.6–8.3)
NEUTROPHILS NFR BLD AUTO: 88.4 %
NRBC # BLD AUTO: 0 10*3/UL
NRBC BLD AUTO-RTO: 0 /100
PLATELET # BLD AUTO: 148 10E9/L (ref 150–450)
POTASSIUM SERPL-SCNC: 4.2 MMOL/L (ref 3.4–5.3)
PROT SERPL-MCNC: 6.2 G/DL (ref 6.8–8.8)
RADIOLOGIST FLAGS: ABNORMAL
RBC # BLD AUTO: 4.76 10E12/L (ref 4.4–5.9)
SODIUM SERPL-SCNC: 135 MMOL/L (ref 133–144)
WBC # BLD AUTO: 12.7 10E9/L (ref 4–11)

## 2018-10-17 PROCEDURE — 80053 COMPREHEN METABOLIC PANEL: CPT | Performed by: NURSE PRACTITIONER

## 2018-10-17 PROCEDURE — G0463 HOSPITAL OUTPT CLINIC VISIT: HCPCS | Mod: ZF

## 2018-10-17 PROCEDURE — 83735 ASSAY OF MAGNESIUM: CPT | Performed by: NURSE PRACTITIONER

## 2018-10-17 PROCEDURE — 36415 COLL VENOUS BLD VENIPUNCTURE: CPT

## 2018-10-17 PROCEDURE — 85025 COMPLETE CBC W/AUTO DIFF WBC: CPT | Performed by: NURSE PRACTITIONER

## 2018-10-17 RX ORDER — BUMETANIDE 1 MG/1
1 TABLET ORAL EVERY OTHER DAY
Qty: 60 TABLET | Refills: 1 | COMMUNITY
Start: 2018-10-17 | End: 2018-11-28

## 2018-10-17 RX ORDER — CEPHALEXIN 500 MG/1
500 CAPSULE ORAL 2 TIMES DAILY
Qty: 14 CAPSULE | Refills: 0 | Status: ON HOLD | OUTPATIENT
Start: 2018-10-17 | End: 2018-11-20

## 2018-10-17 RX ORDER — OXYCODONE HYDROCHLORIDE 5 MG/1
5 CAPSULE ORAL EVERY 4 HOURS PRN
Qty: 10 CAPSULE | Refills: 0 | Status: SHIPPED | OUTPATIENT
Start: 2018-10-17 | End: 2018-10-19

## 2018-10-17 ASSESSMENT — PAIN SCALES - GENERAL: PAINLEVEL: NO PAIN (0)

## 2018-10-17 NOTE — NURSING NOTE
"Oncology Rooming Note    October 17, 2018 2:14 PM   Deejay Dior is a 67 year old male who presents for:    Chief Complaint   Patient presents with     Blood Draw     vitals and venipuncture done by CMA     RECHECK     Return : MDS     Initial Vitals: /67 (BP Location: Right arm, Patient Position: Sitting, Cuff Size: Adult Regular)  Pulse 78  Temp 97.1  F (36.2  C) (Oral)  Resp 16  Ht 1.727 m (5' 7.99\")  Wt 94.8 kg (209 lb)  SpO2 95%  BMI 31.79 kg/m2 Estimated body mass index is 31.79 kg/(m^2) as calculated from the following:    Height as of this encounter: 1.727 m (5' 7.99\").    Weight as of this encounter: 94.8 kg (209 lb). Body surface area is 2.13 meters squared.  No Pain (0) Comment: Data Unavailable   No LMP for male patient.  Allergies reviewed: Yes  Medications reviewed: Yes    Medications: Medication refills not needed today.  Pharmacy name entered into [employer]:    [id number] - [place], [place] - [address] [facility] [address] & [id number] - [place] [address] [address] [facility]     Clinical concerns: Patient states no further concerns at this time.    8 minutes for nursing intake (face to face time)     Lydia Valdovinos CMA              "

## 2018-10-17 NOTE — MR AVS SNAPSHOT
After Visit Summary   10/17/2018    Deejay Dior    MRN: [id number]           Patient Information     Date Of Birth          1950        Visit Information        Provider Department      10/17/2018 2:00 PM  BMT [facility]        Today's Diagnoses     MDS (myelodysplastic syndrome) (H)              [facility] ([facility])  [address] [place] [address]  Phone: [phone]  Clinic Hours:   Monday-Thursday:7am to 7pm   Friday: 7am to 5pm   Weekends and holidays:    8am to noon (in general)  If your fever is 100.5  or greater,   call the clinic.  After hours call the   hospital at [phone] or   [phone]. Ask for the BMT   fellow on-call           Care Instructions    No metolazone  Bumex every other day  Start keflex  Ativan only at night, 6 hours away from oxycodone            Follow-ups after your visit        Your next 10 appointments already scheduled     Oct 24, 2018 10:00 AM CDT   (Arrive by 9:45 AM)   New Patient Visit with Edu Corral MD   [facility] ([facility])    [address] [place] [address]  [address] [facility] [address]   [phone]            Oct 24, 2018 11:00 AM CDT   [facility] Draw with  [facility] DRAW   [facility] ([facility])    [address] [place] [address]  [address] [place] [address]   [phone]            Oct 24, 2018 11:30 AM CDT   Return with  BMT CAROLINA #4   [facility] ([facility])    [address] [place] [address]  [address] [place] [address]   [phone]            Nov 15, 2018  1:00 PM CST   [facility]ic Lab Draw with  MASONIC LAB DRAW   [facility] ([facility])    [address] [place] [address]  [address] [place] [address]   [phone]            Nov 15, 2018  1:30 PM CST   Return with Aby Pinto MD   [facility]  and Marrow Transplant ([facility])    [address]  [address]  [facility] [address]   [phone]            Nov 16, 2018 10:30 AM CST   Procedure 5.5 hour with U2A ROOM 10   Unit [address]A [facility] ([facility])    [address] [facility] [place] [address]               Nov 16, 2018 12:00 PM CST   IR STAB PHLEBECTOMY < 10 STABS with UUIR5   [facility], Interventional Radiology ([facility])    [address]  [facility] [address]   [phone]           The day before the exam:   You may eat your regular diet.   You are encouraged to drink at least 8 eight ounce glasses of clear liquids.   Drink no alcoholic beverages for 24 hours before or after the exam.  The day of the exam:   Do not eat any solid food or milk products for 6 hours prior to the exam. You may drink clear liquids until 2 hours prior to the exam. Clear liquids include the following: water, Jell-O, clear broth, apple juice or any non-carbonated drink that you can see through (no pop!)   The morning of the exam you may take medications as directed with a sip of water.  Please wear loose clothing, such as a sweat suit or jogging clothes. Avoid snaps, zippers and other metal. We may ask you to undress and put on a hospital gown.  Please bring any scans or X-rays taken at other hospitals, if similar tests were done. Also bring a list of your medicines, including vitamins, minerals and over-the-counter drugs. It is safest to leave personal items at home.  Someone will need to drive you to and from the hospital.  Tell your doctor in advance:   If you have allergies to x-ray contrast or iodine.   If you are or may be pregnant.   If you are taking Coumadin (or any other blood thinners) 5 days prior to the exam for any special instructions.   If you are diabetic to determine if your insulin needs have to be adjusted  for the exam.  Your doctor will:   Need to do a history and physical within 30 days before this procedure.   Obtain necessary laboratory tests prior to the exam (CBCP, INR and PTT).  If you were given sedation, you cannot drive for 24 hours after the procedure, and an adult must be with you until then.  If you have any questions, please call the Imaging Department where you will have your exam. Directions, parking instructions, and other information are available on our website, [email or web address].            Nov 23, 2018 11:00 AM CST   US LOWER EXTREMITY VENOUS DUPLEX LEFT with UCUSV2   [facility] US ([facility])    [address]  1st Floor  [facility] [address] [phone]           How do I prepare for my exam? (Food and drink instructions) No Food and Drink Restrictions.  How do I prepare for my exam? (Other instructions) You do not need to do anything special to prepare for your exam.  What should I wear: Wear comfortable clothes.  How long does the exam take: Most ultrasounds take 30 to 60 minutes.  What should I bring: Bring a list of your medicines, including vitamins, minerals and over-the-counter drugs. It is safest to leave personal items at home.  Do I need a :  No  is needed.  What do I need to tell my doctor: Tell your doctor about any allergies you may have.  What should I do after the exam: No restrictions, You may resume normal activities.  What is this test: An ultrasound uses sound waves to make pictures of the body. Sound waves do not cause pain. The only discomfort may be the pressure of the wand against your skin or full bladder.  Who should I call with questions: If you have any questions, please call the Imaging Department where you will have your exam. Directions, parking instructions, and other information is available on our website, [email or web address].            Dec 04, 2018  1:20 PM CST   CT CHEST W/O CONTRAST with  UCCT1   [facility] CT ([facility])    [address]  1st Floor  [facility] [address] [phone]           How do I prepare for my exam? (Food and drink instructions) No Food and Drink Restrictions.  How do I prepare for my exam? (Other instructions) You do not need to do anything special to prepare for this exam. For a sinus scan: Use your nose spray (nasal decongestant spray) as directed.  What should I wear: Please wear loose clothing, such as a sweat suit or jogging clothes. Avoid snaps, zippers and other metal. We may ask you to undress and put on a hospital gown.  How long does the exam take: Most scans take less than 20 minutes.  What should I bring: Please bring any scans or X-rays taken at other hospitals, if similar tests were done. Also bring a list of your medicines, including vitamins, minerals and over-the-counter drugs. It is safest to leave personal items at home.  Do I need a : No  is needed.  What do I need to tell my doctor? Be sure to tell your doctor: * If you have any allergies. * If there s any chance you are pregnant. * If you are breastfeeding.  What should I do after the exam: No restrictions, You may resume normal activities.  What is this test: A CT (computed tomography) scan is a series of pictures that allows us to look inside your body. The scanner creates images of the body in cross sections, much like slices of bread. This helps us see any problems more clearly.  Who should I call with questions: If you have any questions, please call the Imaging Department where you will have your exam. Directions, parking instructions, and other information is available on our website, [email or web address].            Dec 04, 2018  3:00 PM CST   (Arrive by 2:45 PM)   Return Visit with Arsh Sandoval MD   [facility] ([facility])    [address]  [address]  [facility] [address]  "  [phone]              Future tests that were ordered for you today     Open Future Orders        Priority Expected Expires Ordered    US Venous extremity lower RT Routine  10/16/2019 10/16/2018            Who to contact     If you have questions or need follow up information about today's clinic visit or your schedule please contact [facility] directly at [phone].  Normal or non-critical lab and imaging results will be communicated to you by [employer], letter or phone within 4 business days after the clinic has received the results. If you do not hear from us within 7 days, please contact the clinic through [employer] or phone. If you have a critical or abnormal lab result, we will notify you by phone as soon as possible.  Submit refill requests through [employer] or call your pharmacy and they will forward the refill request to us. Please allow 3 business days for your refill to be completed.          Additional Information About Your Visit        [employer] Information     [employer] gives you secure access to your electronic health record. If you see a primary care provider, you can also send messages to your care team and make appointments. If you have questions, please call your primary care clinic.  If you do not have a primary care provider, please call [phone] and they will assist you.        Care EveryWhere ID     This is your Care EveryWhere ID. This could be used by other organizations to access your [place] medical records  [id number]        Your Vitals Were     Pulse Temperature Respirations Height Pulse Oximetry BMI (Body Mass Index)    78 97.1  F (36.2  C) (Oral) 16 1.727 m (5' 7.99\") 95% 31.79 kg/m2       Blood Pressure from Last 3 Encounters:   10/17/18 125/67   10/16/18 139/83   10/11/18 135/77    Weight from Last 3 Encounters:   10/17/18 94.8 kg (209 lb)   10/16/18 98.6 kg (217 lb 6.4 oz)   10/11/18 97.1 kg (214 lb)              We Performed the Following     CBC " with platelets differential     CMV DNA quantification     Comprehensive metabolic panel     Magnesium          Today's Medication Changes          These changes are accurate as of 10/17/18  3:25 PM.  If you have any questions, ask your nurse or doctor.               Start taking these medicines.        Dose/Directions    cephALEXin 500 MG capsule   Commonly known as:  KEFLEX   Used for:  MDS (myelodysplastic syndrome) (H)        Dose:  500 mg   Take 1 capsule (500 mg) by mouth 2 times daily   Quantity:  14 capsule   Refills:  0       oxyCODONE 5 MG capsule   Commonly known as:  OXY-IR   Used for:  MDS (myelodysplastic syndrome) (H)        Dose:  5 mg   Take 1 capsule (5 mg) by mouth every 4 hours as needed for severe pain   Quantity:  10 capsule   Refills:  0         These medicines have changed or have updated prescriptions.        Dose/Directions    bumetanide 1 MG tablet   Commonly known as:  BUMEX   This may have changed:  when to take this   Used for:  MDS (myelodysplastic syndrome) (H)        Dose:  1 mg   Take 1 tablet (1 mg) by mouth every other day Take 1 tablet 1mg by mouth daily 30 minutes prior to bumex   Quantity:  60 tablet   Refills:  1         Stop taking these medicines if you haven't already. Please contact your care team if you have questions.     metolazone 5 MG tablet   Commonly known as:  ZAROXOLYN                Where to get your medicines      These medications were [facility] to [id number] [place] [facility] [place] [address] [address] [address]     Phone:  [phone]     cephALEXin 500 MG capsule         Some of these will need a paper prescription and others can be bought over the counter.  Ask your nurse if you have questions.     Bring a paper prescription for each of these medications     oxyCODONE 5 MG capsule               Information about OPIOIDS     PRESCRIPTION OPIOIDS: WHAT YOU NEED TO KNOW   We gave you an opioid (narcotic) pain medicine. It is  important to manage your pain, but opioids are not always the best choice. You should first try all the other options your care team gave you. Take this medicine for as short a time (and as few doses) as possible.    Some activities can increase your pain, such as bandage changes or therapy sessions. It may help to take your pain medicine 30 to 60 minutes before these activities. Reduce your stress by getting enough sleep, working on hobbies you enjoy and practicing relaxation or meditation. Talk to your care team about ways to manage your pain beyond prescription opioids.    These medicines have risks:    DO NOT drive when on new or higher doses of pain medicine. These medicines can affect your alertness and reaction times, and you could be arrested for driving under the influence (DUI). If you need to use opioids long-term, talk to your care team about driving.    DO NOT operate heavy machinery    DO NOT do any other dangerous activities while taking these medicines.    DO NOT drink any alcohol while taking these medicines.     If the opioid prescribed includes acetaminophen, DO NOT take with any other medicines that contain acetaminophen. Read all labels carefully. Look for the word  acetaminophen  or  Tylenol.  Ask your pharmacist if you have questions or are unsure.    You can get addicted to pain medicines, especially if you have a history of addiction (chemical, alcohol or substance dependence). Talk to your care team about ways to reduce this risk.    All opioids tend to cause constipation. Drink plenty of water and eat foods that have a lot of fiber, such as fruits, vegetables, prune juice, apple juice and high-fiber cereal. Take a laxative (Miralax, milk of magnesia, Colace, Senna) if you don t move your bowels at least every other day. Other side effects include upset stomach, sleepiness, dizziness, throwing up, tolerance (needing more of the medicine to have the same effect), physical dependence and  slowed breathing.    Store your pills in a secure place, locked if possible. We will not replace any lost or stolen medicine. If you don t finish your medicine, please throw away (dispose) as directed by your pharmacist. The [place] Pollution Control Agency has more information about safe disposal: https://www.pca.[facility].mn.us/living-green/managing-unwanted-medications         Recent Review Flowsheet Data     BMT Recent Results Latest Ref Rng & Units 6/21/2018 8/16/2018 9/27/2018 10/1/2018 10/11/2018 10/16/2018 10/17/2018    WBC 4.0 - 11.0 10e9/L 6.1 6.1 6.2 5.4 10.7 11.1(H) 12.7(H)    Hemoglobin 13.3 - 17.7 g/dL 13.7 15.1 13.8 14.4 14.2 14.4 13.8    Platelet Count 150 - 450 10e9/L 172 172 223 266 180 163 148(L)    Neutrophils (Absolute) 1.6 - 8.3 10e9/L 3.2 2.7 2.6 4.0 9.0(H) 9.2(H) 11.2(H)    INR 0.86 - 1.14 - - - - - - -    Sodium 133 - 144 mmol/L 138 139 138 137 135 135 135    Potassium 3.4 - 5.3 mmol/L 3.9 4.0 4.0 4.0 4.3 4.5 4.2    Chloride 94 - 109 mmol/L 109 108 108 106 104 106 104    Glucose 70 - 99 mg/dL 107(H) 92 108(H) 125(H) 152(H) 111(H) 103(H)    Urea Nitrogen 7 - 30 mg/dL 16 18 22 27 34(H) 26 31(H)    Creatinine 0.66 - 1.25 mg/dL 0.91 0.92 1.03 0.92 1.18 1.14 1.22    Calcium (Total) 8.5 - 10.1 mg/dL 8.7 8.6 8.5 8.6 8.0(L) 8.1(L) 8.2(L)    Protein (Total) 6.8 - 8.8 g/dL 7.2 7.2 6.6(L) - 6.3(L) - 6.2(L)    Albumin 3.4 - 5.0 g/dL 3.1(L) 3.1(L) 2.7(L) - 2.8(L) - 2.8(L)    Bilirubin (Direct) 0.0 - 0.2 mg/dL - - - - - - -    Alkaline Phosphatase 40 - 150 U/L 106 109 101 - 72 - 65    AST 0 - 45 U/L 44 Canceled, Test credited 39 - 21 - 20    ALT 0 - 70 U/L 29 30 28 - 33 - 36    MCV 78 - 100 fl 87 87 86 87 88 88 88               Primary Care Provider Office Phone # Fax #    Vivek Rafael Callejas [phone]       [facility] [address] [place] [address]        Equal Access to Services     RABIA PEÑA AH: Edil Vanegas, florencio guido, edin parada,  corinna johnsonjose self'aan ah. So [place] [phone].    ATENCIÓN: Si myah fine, tiene a del toro disposición servicios gratuitos de asistencia lingüística. Reddy jean baptiste [phone].    We comply with applicable federal civil rights laws and [place] laws. We do not discriminate on the basis of race, color, national origin, age, disability, sex, sexual orientation, or gender identity.            Thank you!     Thank you for choosing [facility]  for your care. Our goal is always to provide you with excellent care. Hearing back from our patients is one way we can continue to improve our services. Please take a few minutes to complete the written survey that you may receive in the mail after your visit with us. Thank you!             Your Updated Medication List - Protect others around you: Learn how to safely use, store and throw away your medicines at www.disposemymeds.org.          This list is accurate as of 10/17/18  3:25 PM.  Always use your most recent med list.                   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), MDS (myelodysplastic syndrome) (H)       amoxicillin 500 MG capsule    AMOXIL    16 capsule    Take 4 pills (2 grams) day of dental work    MDS (myelodysplastic syndrome) (H)       aspirin 325 MG tablet     30 tablet    Take 1 tablet (325 mg) by mouth daily    MDS (myelodysplastic syndrome) (H)       bumetanide 1 MG tablet    BUMEX    60 tablet    Take 1 tablet (1 mg) by mouth every other day Take 1 tablet 1mg by mouth daily 30 minutes prior to bumex    MDS (myelodysplastic syndrome) (H)       calcium carbonate-vitamin D 500-400 MG-UNIT Tabs per tablet     180 tablet    Take 1 tablet by mouth daily 2000 mg    MDS (myelodysplastic syndrome) (H), Acute bzsiy-itybwz-yvjz disease (H), GVHD (graft versus host disease)  (H)       cephALEXin 500 MG capsule    KEFLEX    14 capsule    Take 1 capsule (500 mg) by mouth 2 times daily    MDS (myelodysplastic syndrome) (H)       fluconazole 100 MG tablet    DIFLUCAN    30 tablet    Take 1 tablet (100 mg) by mouth daily    Status post bone marrow transplant (H)       levofloxacin 250 MG tablet    LEVAQUIN    30 tablet    Take 1 tablet (250 mg) by mouth daily    MDS (myelodysplastic syndrome) (H)       LORazepam 1 MG tablet    ATIVAN    30 tablet    Take 1 tablet (1 mg) by mouth every 6 hours as needed for anxiety    MDS (myelodysplastic syndrome) (H)       order for DME     1 Device    Please provide a NOVA cane offset with strap item number [id number]    MDS (myelodysplastic syndrome) (H)       order for DME     1 each    Please measure and distribute 1 pair of 20mmHg - 30mmHg thigh high open or closed toe compression stockings. Jobst ultrasheer or equivalent.    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), Status post bone marrow transplant (H)       * predniSONE 20 MG tablet    DELTASONE    60 tablet    Take 1 tablet (20 mg) by mouth daily    SOB (shortness of breath)       * predniSONE 50 MG tablet    DELTASONE    30 tablet    Take 1 tablet (50 mg) by mouth daily Take a combination of 1 if the 50 mg and 2 of the 20 mg tablets for a total of 90 mg prednisone by mouth daily    SOB (shortness of breath)       ruxolitinib 5 MG Tabs tablet CHEMO    JAKAFI    60 tablet    Take 1 tablet (5 mg) by mouth 2 times daily    MDS (myelodysplastic syndrome) (H)       sertraline 100 MG tablet    ZOLOFT    30 tablet    Take 1 tablet (100 mg) by mouth daily    MDS  (myelodysplastic syndrome) (H), 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 Read the directions carefully, and ask your doctor or other care provider to review them with you.

## 2018-10-17 NOTE — NURSING NOTE
Chief Complaint   Patient presents with     Blood Draw     vitals and venipuncture done by ROOSEVELT Kwong CMA on 10/17/2018 at 12:56 PM

## 2018-10-17 NOTE — PROGRESS NOTES
BMT Clinic Note     ID:  Mr. 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 He started bumex only last night. Urinating well. Using lymphedema wraps. No new SOB, can lie flat. Legs still weeping, still very tender to touch. No fevers. Still with shakes. He notes oxy has helped his tremors/shakes in the past.   Afebrile with no cough. No bleeding.     Review of Systems: 10 point ROS negative except as noted above.    Physical Exam:  /67 (BP Location: Right arm, Patient Position: Sitting, Cuff Size: Adult Regular)  Pulse 78  Temp 97.1  F (36.2  C) (Oral)  Resp 16  Wt 94.8 kg (209 lb)  SpO2 95%  BMI 31.78 kg/m2     Wt Readings from Last 4 Encounters:   10/17/18 94.8 kg (209 lb)   10/16/18 98.6 kg (217 lb 6.4 oz)   10/11/18 97.1 kg (214 lb)   10/04/18 90.3 kg (199 lb)     General: NAD, interactive  Eyes: SARIKA, sclera anicteric  Nose/Mouth/Throat: OP clear, no ulcerations, very dry, upper plate, chronic lichenoid changes   Lungs:CTA B, no crackles or wheezes   CV: Irregular run of beats, then regular rhythm,   Abd: S/NT/ND +BS  Skin:  Bilat LE edema ++, left leg skin doughy. Right leg weeping. RLE skin thin with scattered erythema & weeping blisters. Very tender   Neuro: A&O, moderate resting tremor  Line: NONE    Labs:  Lab Results   Component Value Date    WBC 12.7 (H) 10/17/2018    ANEU 11.2 (H) 10/17/2018    HGB 13.8 10/17/2018    HCT 42.0 10/17/2018     (L) 10/17/2018     10/17/2018    POTASSIUM 4.2 10/17/2018    CHLORIDE 104 10/17/2018    CO2 24 10/17/2018     (H) 10/17/2018    BUN 31 (H) 10/17/2018    CR 1.22 10/17/2018    MAG 2.1 10/17/2018    INR 0.95 06/30/2016     ASSESSMENT AND PLAN:  Mr. 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 Total cell  dose (from 7/1 & 7/2) 6.53 x 10^6 CD34+ cells/kg.   - 1 year anniversary (July 2015): 30% cellular, trilineage hematopoiesis, no abnormal blasts by morphology or flow , no dysplasia, 0-1 fibrosis, 100% donor (BM, CD3, CD15). CR  - 2 year anniversary (July 2016): 20-30% cellular, trilineage hematopoiesis, no abnormal blasts by morphology or flow , no dysplasia, No fibrosis, 100% donor in BM (PB not sent). ISCN: //46,XX[20] Complete Remission.    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 Had been off prednisone since summer 2017 and briefly tapered off Sirolimus (january 2018), however resumed with flare in February. 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 Sirolimus was stopped on 9/27 & Pred 90mg was started. Seen by Dr. Sandoval on 10/2, please see his note. He does not think his symptoms or CT findings are c/w Sirolimus or GVH & he was in favor of tapering Prednisone. Now he has worsening skin thickening & desquamation to the RLE, c/w GVH. Discussed with Dr. Pinto & will resume Sirolimus 0.5mg every day & begin the process for approval of Jakafi 5mg BID. Continues on a rapid steroid taper. Jakafi initially denied, will start appeal process    ID:  Afebrile. Mild leukocytosis and trending up, start keflex today (history of cellulitis)   - IgG = 403, repleted 3/22/16, 5/8/16= 1270; recheck with recent viral exposures 1300 10/1  - Prophy: HD ACV (renal dosing), Fluconazole, Levaquin (while on steroids) and  Bactrim.    - Flu shot given 10/11/18      GI:  On protonix proph while on steroids    FEN/Renal:  Cr up with only one dose bumex, take every other day and no metolazone     Fatigue:  - History of testosterone deficiency, rechecked and modestly low. Endo referral whenever pt requests  - TSH normal 2/28 and again on repeat 9/27 at 1.01.    Derm:  Schedule derm referral for RLE  skin changes  - Derm sees next Wednesday for RLE skin changes. Right leg weeping, no evidence of infection at this time, tender however this is chronic.   Venous statis: scheduled for sclerotherapy to the L leg.  kelfex day prior and 4 days following    Shakes: likely 2/2 rapid steroid taper. Oxy has worked historically, will try ativan first. Script given 10/16    Vasc:   Bilateral lower extremity swelling x24 hours. Right leg US with small (technically DVT) clot in posterior tibial vein: per Millie, start reg dose aspirin daily 325mg and recheck US in 2 weeks or symptomatically  Use lymphedema wraps  Venous statis: scheduled for sclerotherapy to the L leg.  kelfex day prior and 4 days following.   Discomfort since edema/shakes: oxy 1-2 tab during day and ativan 6 hours away from oxy for sleep.    CV:  Mildly irregular HR on exam, EKG sinus tachy 10/16  Edema 2/2 heart failure? BNP neg compared to baseline. Use bumex (has worked best historically); recheck labs this week for Cr/K tolerance   ECHO result pending      Plan: use bumex every other day, cont aspirin and lymphedema wraps. Continue pred taper. Oxycodone #10 for sx surrounding edema. Keflex x10 days.    Call with fevers, chills, SOB or any other new or worsening symptoms.    RTC Monday for Cr check on diuretics, possible re imaging of DVT prior to road trip next Thurs  11/15 with Dr. Millie Melendez PAC  [phone]

## 2018-10-17 NOTE — PATIENT INSTRUCTIONS
No metolazone  Bumex every other day  Start keflex  Ativan only at night, 6 hours away from oxycodone      Monday is scheduled and pt is called w/[facility] infor.    Yola  10/18/18  956am

## 2018-10-19 DIAGNOSIS — D46.9 MDS (MYELODYSPLASTIC SYNDROME) (H): Primary | ICD-10-CM

## 2018-10-19 RX ORDER — OXYCODONE HYDROCHLORIDE 5 MG/1
5 TABLET ORAL EVERY 6 HOURS PRN
Qty: 20 TABLET | Refills: 0 | Status: SHIPPED | OUTPATIENT
Start: 2018-10-19 | End: 2018-11-28

## 2018-10-22 ENCOUNTER — ONCOLOGY VISIT (OUTPATIENT)
Dept: ONCOLOGY | Facility: CLINIC | Age: 68
End: 2018-10-22

## 2018-10-22 ENCOUNTER — ONCOLOGY VISIT (OUTPATIENT)
Dept: TRANSPLANT | Facility: CLINIC | Age: 68
End: 2018-10-22
Attending: STUDENT IN AN ORGANIZED HEALTH CARE EDUCATION/TRAINING PROGRAM
Payer: MEDICARE

## 2018-10-22 ENCOUNTER — APPOINTMENT (OUTPATIENT)
Dept: LAB | Facility: CLINIC | Age: 68
End: 2018-10-22
Attending: STUDENT IN AN ORGANIZED HEALTH CARE EDUCATION/TRAINING PROGRAM
Payer: MEDICARE

## 2018-10-22 VITALS
RESPIRATION RATE: 16 BRPM | HEIGHT: 68 IN | HEART RATE: 92 BPM | OXYGEN SATURATION: 95 % | BODY MASS INDEX: 31.6 KG/M2 | SYSTOLIC BLOOD PRESSURE: 129 MMHG | WEIGHT: 208.5 LBS | DIASTOLIC BLOOD PRESSURE: 70 MMHG | TEMPERATURE: 97.8 F

## 2018-10-22 DIAGNOSIS — Z94.81 STATUS POST BONE MARROW TRANSPLANT (H): ICD-10-CM

## 2018-10-22 DIAGNOSIS — D46.9 MDS (MYELODYSPLASTIC SYNDROME) (H): Primary | ICD-10-CM

## 2018-10-22 DIAGNOSIS — I83.018 VARICOSE VEINS OF RIGHT LOWER EXTREMITY WITH ULCER OTHER PART OF LOWER LEG (CODE) (H): ICD-10-CM

## 2018-10-22 DIAGNOSIS — D89.813 GVH (GRAFT VERSUS HOST DISEASE) (H): Primary | ICD-10-CM

## 2018-10-22 DIAGNOSIS — Z79.899 ENCOUNTER FOR LONG-TERM (CURRENT) USE OF MEDICATIONS: ICD-10-CM

## 2018-10-22 LAB
ALBUMIN SERPL-MCNC: 2.9 G/DL (ref 3.4–5)
ALP SERPL-CCNC: 72 U/L (ref 40–150)
ALT SERPL W P-5'-P-CCNC: 37 U/L (ref 0–70)
ANION GAP SERPL CALCULATED.3IONS-SCNC: 7 MMOL/L (ref 3–14)
AST SERPL W P-5'-P-CCNC: 25 U/L (ref 0–45)
BASOPHILS # BLD AUTO: 0 10E9/L (ref 0–0.2)
BASOPHILS NFR BLD AUTO: 0.1 %
BILIRUB SERPL-MCNC: 0.5 MG/DL (ref 0.2–1.3)
BUN SERPL-MCNC: 29 MG/DL (ref 7–30)
CALCIUM SERPL-MCNC: 8.2 MG/DL (ref 8.5–10.1)
CHLORIDE SERPL-SCNC: 105 MMOL/L (ref 94–109)
CO2 SERPL-SCNC: 25 MMOL/L (ref 20–32)
CREAT SERPL-MCNC: 1.06 MG/DL (ref 0.66–1.25)
DIFFERENTIAL METHOD BLD: ABNORMAL
EOSINOPHIL # BLD AUTO: 0.1 10E9/L (ref 0–0.7)
EOSINOPHIL NFR BLD AUTO: 0.8 %
ERYTHROCYTE [DISTWIDTH] IN BLOOD BY AUTOMATED COUNT: 20.6 % (ref 10–15)
GFR SERPL CREATININE-BSD FRML MDRD: 69 ML/MIN/1.7M2
GLUCOSE SERPL-MCNC: 93 MG/DL (ref 70–99)
HCT VFR BLD AUTO: 44.2 % (ref 40–53)
HGB BLD-MCNC: 14.8 G/DL (ref 13.3–17.7)
IMM GRANULOCYTES # BLD: 0 10E9/L (ref 0–0.4)
IMM GRANULOCYTES NFR BLD: 0.4 %
LYMPHOCYTES # BLD AUTO: 3 10E9/L (ref 0.8–5.3)
LYMPHOCYTES NFR BLD AUTO: 28.1 %
MCH RBC QN AUTO: 30.1 PG (ref 26.5–33)
MCHC RBC AUTO-ENTMCNC: 33.5 G/DL (ref 31.5–36.5)
MCV RBC AUTO: 90 FL (ref 78–100)
MONOCYTES # BLD AUTO: 0.7 10E9/L (ref 0–1.3)
MONOCYTES NFR BLD AUTO: 6.6 %
NEUTROPHILS # BLD AUTO: 6.8 10E9/L (ref 1.6–8.3)
NEUTROPHILS NFR BLD AUTO: 64 %
NRBC # BLD AUTO: 0 10*3/UL
NRBC BLD AUTO-RTO: 0 /100
PLATELET # BLD AUTO: 167 10E9/L (ref 150–450)
POTASSIUM SERPL-SCNC: 3.4 MMOL/L (ref 3.4–5.3)
PROT SERPL-MCNC: 6.5 G/DL (ref 6.8–8.8)
RBC # BLD AUTO: 4.92 10E12/L (ref 4.4–5.9)
SODIUM SERPL-SCNC: 137 MMOL/L (ref 133–144)
WBC # BLD AUTO: 10.7 10E9/L (ref 4–11)

## 2018-10-22 PROCEDURE — 80053 COMPREHEN METABOLIC PANEL: CPT | Performed by: NURSE PRACTITIONER

## 2018-10-22 PROCEDURE — 85025 COMPLETE CBC W/AUTO DIFF WBC: CPT | Performed by: NURSE PRACTITIONER

## 2018-10-22 PROCEDURE — 36415 COLL VENOUS BLD VENIPUNCTURE: CPT

## 2018-10-22 PROCEDURE — G0463 HOSPITAL OUTPT CLINIC VISIT: HCPCS | Mod: ZF

## 2018-10-22 RX ORDER — FLUCONAZOLE 100 MG/1
TABLET ORAL
Qty: 30 TABLET | Refills: 4 | Status: CANCELLED | OUTPATIENT
Start: 2018-10-22

## 2018-10-22 ASSESSMENT — PAIN SCALES - GENERAL: PAINLEVEL: NO PAIN (0)

## 2018-10-22 NOTE — PROGRESS NOTES
"BMT Clinic Note     ID:  Mr. 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 Urinating frequently on bumex every other day. Legs are much less swollen today than last week. Right leg is , weeping has stopped. Still some raw spots that are tender. No fevers. Still with shakes. He states he has persistent fatigue and feels \"like crap\" for many months, probably since transplant. He and his wife are eager to go [place] trip [place].     Review of Systems: 10 point ROS negative except as noted above.    Physical Exam:  /70 (BP Location: Left arm, Patient Position: Sitting, Cuff Size: Adult Regular)  Pulse 92  Temp 97.8  F (36.6  C)  Resp 16  Ht 1.727 m (5' 7.99\")  Wt 94.6 kg (208 lb 8 oz)  SpO2 95%  BMI 31.71 kg/m2     Wt Readings from Last 4 Encounters:   10/17/18 94.8 kg (209 lb)   10/16/18 98.6 kg (217 lb 6.4 oz)   10/11/18 97.1 kg (214 lb)   10/04/18 90.3 kg (199 lb)     General: NAD, interactive  Eyes: SARIKA, sclera anicteric  Nose/Mouth/Throat: OP clear, no ulcerations, very dry, upper plate, chronic lichenoid changes   Lungs:CTA B, no crackles or wheezes   CV: RRR without murmurs rubs or gallops  Abd: S/NT/ND +BS  Skin:  Bilat LE edema 1+. RLE skin thin with scattered shallow ulcers. Very tender. Less edematous, no weeping compared to last visit   Neuro: A&O, moderate resting tremor  Access: peripheral    Labs:  Lab Results   Component Value Date    WBC 10.7 10/22/2018    ANEU 6.8 10/22/2018    HGB 14.8 10/22/2018    HCT 44.2 10/22/2018     10/22/2018     10/22/2018    POTASSIUM 3.4 10/22/2018    CHLORIDE 105 10/22/2018    CO2 25 10/22/2018    GLC 93 10/22/2018    BUN 29 10/22/2018    CR 1.06 10/22/2018    MAG 2.1 10/17/2018    INR 0.95 06/30/2016     ASSESSMENT AND PLAN:  Mr. 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 Total cell dose (from 7/1 & 7/2) 6.53 x 10^6 CD34+ cells/kg.   - 1 year anniversary (July 2015): 30% cellular, trilineage hematopoiesis, no abnormal blasts by morphology or flow , no dysplasia, 0-1 fibrosis, 100% donor (BM, CD3, CD15). CR  - 2 year anniversary (July 2016): 20-30% cellular, trilineage hematopoiesis, no abnormal blasts by morphology or flow , no dysplasia, No fibrosis, 100% donor in BM (PB not sent). ISCN: //46,XX[20] Complete Remission.    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 Had been off prednisone since summer 2017 and briefly tapered off Sirolimus (january 2018), however resumed with flare in February. 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 Sirolimus was stopped on 9/27 & Pred 90mg was started. Seen by Dr. Sandoval on 10/2, please see his note. He does not think his symptoms or CT findings are c/w Sirolimus or GVH & he was in favor of tapering Prednisone. Now he has worsening skin thickening & desquamation to the RLE, c/w GVH. Discussed with Dr. Pinto & will resume Sirolimus 0.5mg every day & begin the process for approval of Jakafi 5mg BID, drug approved and will start jakafi today 10/22 at 5mg BID. Continues on a rapid steroid taper. Pt made aware of GI sx, decreased plt/wbc    ID:  Afebrile.   10/17 Mild leukocytosis and trending up, started keflex 10/17 (history of cellulitis), possible skin infection around right leg gvhd/skin changes. Appears significantly improved 10/22  - IgG = 403, repleted 3/22/16, 5/8/16= 1270; recheck with recent viral exposures 1300 10/1  - Prophy: HD ACV (renal dosing), Fluconazole, Levaquin (while on steroids) and  Bactrim.    - Flu shot given 10/11/18      GI:  On protonix proph while on steroids    FEN/Renal:  Cr significantly improved with bumex (no metolazone) every other day.  Edema better, continue     Fatigue:  - History of testosterone deficiency, rechecked and modestly low. Endo referral whenever pt requests  - TSH normal 2/28 and again on repeat 9/27 at 1.01.    Derm:  Schedule derm referral for RLE skin changes  - Derm sees Wednesday for chronic RLE skin changes and gvhd. Skin is improved 10/22 from 10/17. On 10/17 Right leg was edematous, weeping, tender.   Venous statis: see below    Shakes: likely 2/2 rapid steroid taper. Oxy has worked historically, will try ativan first. Script given 10/16    Vasc:   10/15 Bilateral lower extremity swelling x24 hours. Right leg US with small (technically DVT) clot in posterior tibial vein: per Millie, start reg dose aspirin daily 325mg and recheck US in 2 weeks or symptomatically  Use lymphedema wraps  Discomfort since edema/shakes: oxy 1-2 tab during day and ativan 6 hours away from oxy for sleep.  H/o chronic venous statis: scheduled for sclerotherapy to the L leg.  kelfex day prior and 4 days following.     CV:  Mildly irregular HR on exam, EKG sinus tachy 10/16  Edema 2/2 heart failure? BNP neg compared to baseline. Use bumex (has worked best historically); recheck labs this week for Cr/K tolerance   ECHO results no change from previous EF 60-65%      Plan:  Start jakafi  Check counts once this week prior to trip  Use lymphedema wraps daily, especially while on trip  Call with any bleeding, worsening LE edema, fevers, chills, SOB or any other new or worsening symptoms.    RTC 11/15 with Dr. Pinto, schedule repeat LE US that day     Tamar Melendez PAC  [phone]

## 2018-10-22 NOTE — NURSING NOTE
"Oncology Rooming Note    October 22, 2018 12:37 PM   Deejay Dior is a 67 year old male who presents for:    Chief Complaint   Patient presents with     Blood Draw     labs drawn, vitals taken and patient checked into next appt.     RECHECK     RTN- MDS     Initial Vitals: /70 (BP Location: Left arm, Patient Position: Sitting, Cuff Size: Adult Regular)  Pulse 92  Temp 97.8  F (36.6  C)  Resp 16  Ht 1.727 m (5' 7.99\")  Wt 94.6 kg (208 lb 8 oz)  SpO2 95%  BMI 31.71 kg/m2 Estimated body mass index is 31.71 kg/(m^2) as calculated from the following:    Height as of this encounter: 1.727 m (5' 7.99\").    Weight as of this encounter: 94.6 kg (208 lb 8 oz). Body surface area is 2.13 meters squared.  No Pain (0) Comment: Data Unavailable   No LMP for male patient.  Allergies reviewed: Yes  Medications reviewed: Yes    Medications: Medication refills not needed today.  Pharmacy name entered into [employer]:    [id number] - [place], [place] - [address] [facility] [address] [facility] [address] [id number] - [place], [place] - [address] [place] [address] [facility]     Clinical concerns: None     8 minutes for nursing intake (face to face time)     Chandrika Méndez CMA              "

## 2018-10-22 NOTE — TELEPHONE ENCOUNTER
MEDICATION APPEAL APPROVED    Medication: Jakafi- Appeal Approved   Authorization Effective Date:  07/22/2018   Authorization Expiration Date:  10/20/2019  Approved Dose/Quantity: 5mg (2/Day)   Reference #:  None   Insurance Company: [employer] - Phone [phone] Fax [phone]  Expected CoPay: $2,819.09     CoPay Card Available:      Foundation Assistance Needed:    Which Pharmacy is filling the prescription (Not needed for infusion/clinic administered):

## 2018-10-22 NOTE — MR AVS SNAPSHOT
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)    -  1    Encounter for long-term (current) use of medications           Follow-ups after your visit        Your next 10 appointments already scheduled     Oct 24, 2018 10:00 AM CDT   (Arrive by 9:45 AM)   New Patient Visit with Edu Corral MD   [facility] Dermatology ([facility])    [address]  3rd Floor  [facility] [address]   [phone]            Oct 24, 2018 11:00 AM CDT   [facility] Lab Draw with  [facility] LAB DRAW   [facility] Lab Draw ([facility])    [address]  [address]  [facility] [address]   [phone]            Nov 15, 2018  1:00 PM CST   M[facility]ic Lab Draw with  MASONIC LAB DRAW   [facility] Lab Draw ([facility])    [address]  [address]  [facility] [address]   [phone]            Nov 15, 2018  1:30 PM CST   Return with Aby Pinto MD   [facility] Blood and Marrow Transplant ([facility])    [address]  [address]  [facility] [address]   [phone]            Nov 16, 2018 10:30 AM CST   Procedure 5.5 hour with U2A ROOM 10   [address] [facility] ([facility])    [address] [facility] [address]               Nov 16, 2018 12:00 PM CST   IR STAB PHLEBECTOMY < 10 STABS with UUIR5   [facility] Interventional Radiology ([facility])    [address] [facility] [address] [phone]           The day before the exam:   You may eat your regular diet.   You are  encouraged to drink at least 8 eight ounce glasses of clear liquids.   Drink no alcoholic beverages for 24 hours before or after the exam.  The day of the exam:   Do not eat any solid food or milk products for 6 hours prior to the exam. You may drink clear liquids until 2 hours prior to the exam. Clear liquids include the following: water, Jell-O, clear broth, apple juice or any non-carbonated drink that you can see through (no pop!)   The morning of the exam you may take medications as directed with a sip of water.  Please wear loose clothing, such as a sweat suit or jogging clothes. Avoid snaps, zippers and other metal. We may ask you to undress and put on a hospital gown.  Please bring any scans or X-rays taken at other hospitals, if similar tests were done. Also bring a list of your medicines, including vitamins, minerals and over-the-counter drugs. It is safest to leave personal items at home.  Someone will need to drive you to and from the hospital.  Tell your doctor in advance:   If you have allergies to x-ray contrast or iodine.   If you are or may be pregnant.   If you are taking Coumadin (or any other blood thinners) 5 days prior to the exam for any special instructions.   If you are diabetic to determine if your insulin needs have to be adjusted for the exam.  Your doctor will:   Need to do a history and physical within 30 days before this procedure.   Obtain necessary laboratory tests prior to the exam (CBCP, INR and PTT).  If you were given sedation, you cannot drive for 24 hours after the procedure, and an adult must be with you until then.  If you have any questions, please call the Imaging Department where you will have your exam. Directions, parking instructions, and other information are available on our website, "[email or web address].            Nov 23, 2018 11:00 AM CST   US LOWER EXTREMITY VENOUS DUPLEX LEFT with UCUSV2   [facility] ([facility])    [address] [address]   [phone]           How do I prepare for my exam? (Food and drink instructions) No Food and Drink Restrictions.  How do I prepare for my exam? (Other instructions) You do not need to do anything special to prepare for your exam.  What should I wear: Wear comfortable clothes.  How long does the exam take: Most ultrasounds take 30 to 60 minutes.  What should I bring: Bring a list of your medicines, including vitamins, minerals and over-the-counter drugs. It is safest to leave personal items at home.  Do I need a :  No  is needed.  What do I need to tell my doctor: Tell your doctor about any allergies you may have.  What should I do after the exam: No restrictions, You may resume normal activities.  What is this test: An ultrasound uses sound waves to make pictures of the body. Sound waves do not cause pain. The only discomfort may be the pressure of the wand against your skin or full bladder.  Who should I call with questions: If you have any questions, please call the Imaging Department where you will have your exam. Directions, parking instructions, and other information is available on our website, [email or web address].            Dec 04, 2018  1:20 PM CST   CT CHEST W/O CONTRAST with UCCT1   [facility] [address] ([facility])    [address] [address] [phone]           How do I prepare for my exam? (Food and drink instructions) No Food and Drink Restrictions.  How do I prepare for my exam? (Other instructions) You do not need to do anything special to prepare for this exam. For a sinus scan: Use your nose spray (nasal decongestant spray) as directed.  What should I wear: Please wear loose clothing, such as a sweat suit or jogging clothes. Avoid snaps, zippers and other metal. We may ask you to undress and put on a hospital gown.  How long does the exam take: Most scans  take less than 20 minutes.  What should I bring: Please bring any scans or X-rays taken at other hospitals, if similar tests were done. Also bring a list of your medicines, including vitamins, minerals and over-the-counter drugs. It is safest to leave personal items at home.  Do I need a : No  is needed.  What do I need to tell my doctor? Be sure to tell your doctor: * If you have any allergies. * If there s any chance you are pregnant. * If you are breastfeeding.  What should I do after the exam: No restrictions, You may resume normal activities.  What is this test: A CT (computed tomography) scan is a series of pictures that allows us to look inside your body. The scanner creates images of the body in cross sections, much like slices of bread. This helps us see any problems more clearly.  Who should I call with questions: If you have any questions, please call the Imaging Department where you will have your exam. Directions, parking instructions, and other information is available on our website, [email or web address].            Dec 04, 2018  3:00 PM CST   (Arrive by 2:45 PM)   Return Visit with Arsh Sandoval MD   [facility] ([facility])    [address] [facility] [address]  [address] [place] [address] [phone]              Who to contact     If you have questions or need follow up information about today's clinic visit or your schedule please contact [facility] directly at [phone].  Normal or non-critical lab and imaging results will be communicated to you by MyChart, letter or phone within 4 business days after the clinic has received the results. If you do not hear from us within 7 days, please contact the clinic through MyChart or phone. If you have a critical or abnormal lab result, we will notify you by phone as soon as possible.  Submit refill requests through [employer] or call your pharmacy and they will forward the refill  request to us. Please allow 3 business days for your refill to be completed.          Additional Information About Your Visit        MyChart Information     M:[employer] gives you secure access to your electronic health record. If you see a primary care provider, you can also send messages to your care team and make appointments. If you have questions, please call your primary care clinic.  If you do not have a primary care provider, please call [phone] and they will assist you.        Care EveryWhere ID     This is your Care EveryWhere ID. This could be used by other organizations to access your [facility] medical records  [id number]         Blood Pressure from Last 3 Encounters:   10/22/18 129/70   10/17/18 125/67   10/16/18 139/83    Weight from Last 3 Encounters:   10/22/18 94.6 kg (208 lb 8 oz)   10/17/18 94.8 kg (209 lb)   10/16/18 98.6 kg (217 lb 6.4 oz)              Today, you had the following     No orders found for display       Primary Care Provider Office Phone # Fax #    Son Rafael Callejas [phone]       [facility] [address] [place] [address]        Equal Access to Services     SHER PEÑA AH: Hadii aad ku hadasho Soomaali, waaxda luqadaha, qaybta kaalmada adeegyada, waxay primo hayelviran venkat adamson. So [facility] [phone].    ATENCIÓN: Si habla español, tiene a del toro disposición servicios gratuitos de asistencia lingüística. [facility] [phone].    We comply with applicable federal civil rights laws and [place] laws. We do not discriminate on the basis of race, color, national origin, age, disability, sex, sexual orientation, or gender identity.            Thank you!     Thank you for choosing [facility]  for your care. Our goal is always to provide you with excellent care. Hearing back from our patients is one way we can continue to improve our services. Please take a few minutes to complete the written survey that you may receive in  the mail after your visit with us. Thank you!             Your Updated Medication List - Protect others around you: Learn how to safely use, store and throw away your medicines at www.disposemymeds.org.          This list is accurate as of 10/22/18  2:17 PM.  Always use your most recent med list.                   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), MDS (myelodysplastic syndrome) (H)       amoxicillin 500 MG capsule    AMOXIL    16 capsule    Take 4 pills (2 grams) day of dental work    MDS (myelodysplastic syndrome) (H)       aspirin 325 MG tablet     30 tablet    Take 1 tablet (325 mg) by mouth daily    MDS (myelodysplastic syndrome) (H)       bumetanide 1 MG tablet    BUMEX    60 tablet    Take 1 tablet (1 mg) by mouth every other day Take 1 tablet 1mg by mouth daily 30 minutes prior to bumex    MDS (myelodysplastic syndrome) (H)       calcium carbonate-vitamin D 500-400 MG-UNIT Tabs per tablet     180 tablet    Take 1 tablet by mouth daily 2000 mg    MDS (myelodysplastic syndrome) (H), Acute dbdrb-ioznfe-yxbb disease (H), 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)       cephALEXin 500 MG capsule    KEFLEX    14 capsule    Take 1 capsule (500 mg) by mouth 2 times daily    MDS (myelodysplastic syndrome) (H)       fluconazole 100 MG tablet    DIFLUCAN    30 tablet    Take 1 tablet (100 mg) by mouth daily    Status post bone marrow transplant (H)       levofloxacin 250 MG tablet    LEVAQUIN    30 tablet    Take 1 tablet (250 mg) by mouth daily    MDS (myelodysplastic syndrome) (H)       LORazepam 1 MG tablet    ATIVAN    30 tablet    Take 1 tablet (1 mg) by mouth every 6 hours as needed for anxiety    MDS (myelodysplastic syndrome) (H)       order for DME     1 Device    Please provide a [employer] cane offset with strap item number [id number]    MDS (myelodysplastic syndrome)  (H)       order for DME     1 each    Please measure and distribute 1 pair of 20mmHg - 30mmHg thigh high open or closed toe compression stockings. Jobst ultrasheer or equivalent.    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), Status post bone marrow transplant (H)       * predniSONE 20 MG tablet    DELTASONE    60 tablet    Take 1 tablet (20 mg) by mouth daily    SOB (shortness of breath)       * predniSONE 50 MG tablet    DELTASONE    30 tablet    Take 1 tablet (50 mg) by mouth daily Take a combination of 1 if the 50 mg and 2 of the 20 mg tablets for a total of 90 mg prednisone by mouth daily    SOB (shortness of breath)       ruxolitinib 5 MG Tabs tablet CHEMO    JAKAFI    60 tablet    Take 1 tablet (5 mg) by mouth 2 times daily    MDS (myelodysplastic syndrome) (H)       sertraline 100 MG tablet    ZOLOFT    30 tablet    Take 1 tablet (100 mg) by mouth daily    MDS (myelodysplastic syndrome) (H), 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 Read the directions carefully, and ask your doctor or other care provider to review them with  you.

## 2018-10-22 NOTE — LETTER
10/22/2018       RE: Deejay Dior  [address] [place] [address]     Dear Colleague,    Thank you for referring your patient, Deejay Dior, to the [facility]. Please see a copy of my visit note below.    See Oral Onc Mgmt note.    Again, thank you for allowing me to participate in the care of your patient.      Sincerely,    Vidal Ballard RPH

## 2018-10-22 NOTE — NURSING NOTE
Chief Complaint   Patient presents with     Blood Draw     labs drawn, vitals taken and patient checked into next appt.   Maricel Lemon LPN

## 2018-10-22 NOTE — ORAL ONC MGMT
"Oral Chemotherapy Monitoring Program    Primary Oncologist: Dr. Pinto  Primary Oncology Clinic: [facility]  Cancer Diagnosis: GVHD    Drug: Jakafi  Start Date: expected 10/22/2018  Dose is appropriate for patient. Will monitor for drug interaction below.  Expected duration of therapy: Until disease progression or unacceptable toxicity    Drug Interaction Assessment: Fluconazole may increase serum levels of Jakaf. However dose of Fluconazole is only 100mg per day, so the recommendation is to monitor for adverse effects from Jakafi. No dose adjustment needed prior to start of Jakafi. Deejay is aware and will monitor.    Drugs checked include: Acyclovir -Amoxicillin -Aspirin -Bumetanide -Calcium Carbonate and Vitamin D -Cephalexin -Fluconazole -LevoFLOXacin -LORazepam -OxyCODONE -Pantoprazole -PredniSONE -Ruxolitinib -Sertraline -Sirolimus -Bactrim DS -Triamcinolone      Lab Monitoring Plan  Plan for CBC every 2 weeks for first month, then CBC and CMP monthly.  Subjective/Objective:  Deejay Dior is a 67 year old male seen in clinic for an initial visit for oral chemotherapy education.      ORAL CHEMOTHERAPY 10/22/2018   Drug Name Jakafi (Ruxolitinib)   Current Dosage 5mg   Current Schedule BID   Cycle Details Continuous   Any new drug interactions? Yes   Pharmacist Intervention? Yes   Intervention(s) Patient Education       Vitals:  BP:   BP Readings from Last 1 Encounters:   10/22/18 129/70     Wt Readings from Last 1 Encounters:   10/22/18 94.6 kg (208 lb 8 oz)     Estimated body surface area is 2.13 meters squared as calculated from the following:    Height as of an earlier encounter on 10/22/18: 1.727 m (5' 7.99\").    Weight as of an earlier encounter on 10/22/18: 94.6 kg (208 lb 8 oz).      Labs:  _  Result Component Current Result Ref Range   Sodium 137 (10/22/2018) 133 - 144 mmol/L     _  Result Component Current Result Ref Range   Potassium 3.4 (10/22/2018) 3.4 - 5.3 mmol/L     _  Result Component " Current Result Ref Range   Calcium 8.2 (L) (10/22/2018) 8.5 - 10.1 mg/dL     _  Result Component Current Result Ref Range   Magnesium 2.1 (10/17/2018) 1.6 - 2.3 mg/dL     No results found for Phos within last 30 days.     _  Result Component Current Result Ref Range   Albumin 2.9 (L) (10/22/2018) 3.4 - 5.0 g/dL     _  Result Component Current Result Ref Range   Urea Nitrogen 29 (10/22/2018) 7 - 30 mg/dL     _  Result Component Current Result Ref Range   Creatinine 1.06 (10/22/2018) 0.66 - 1.25 mg/dL       _  Result Component Current Result Ref Range   AST 25 (10/22/2018) 0 - 45 U/L     _  Result Component Current Result Ref Range   ALT 37 (10/22/2018) 0 - 70 U/L     _  Result Component Current Result Ref Range   Bilirubin Total 0.5 (10/22/2018) 0.2 - 1.3 mg/dL       _  Result Component Current Result Ref Range   WBC 10.7 (10/22/2018) 4.0 - 11.0 10e9/L     _  Result Component Current Result Ref Range   Hemoglobin 14.8 (10/22/2018) 13.3 - 17.7 g/dL     _  Result Component Current Result Ref Range   Platelet Count 167 (10/22/2018) 150 - 450 10e9/L     _  Result Component Current Result Ref Range   Absolute Neutrophil 6.8 (10/22/2018) 1.6 - 8.3 10e9/L       Assessment:  Patient is appropriate to start therapy.    Plan:  Basic chemotherapy teaching was reviewed with the patient and the patient's family including indication, start date of therapy, dose, administration, adverse effects, missed doses, food and drug interactions, monitoring, side effect management, office contact information, and safe handling. Written materials were provided and all questions answered to Deejay's stated satisfaction. Deejay said he planned to start today with his first dose. He said he plans to travel to [place] on Friday for a vacation.    Follow-Up:  In about one week.     Vidal Ballard PharmD  [facility]  [phone]  October 22, 2018

## 2018-10-24 ENCOUNTER — OFFICE VISIT (OUTPATIENT)
Dept: DERMATOLOGY | Facility: CLINIC | Age: 68
End: 2018-10-24
Payer: COMMERCIAL

## 2018-10-24 VITALS — HEART RATE: 75 BPM | DIASTOLIC BLOOD PRESSURE: 85 MMHG | SYSTOLIC BLOOD PRESSURE: 145 MMHG

## 2018-10-24 DIAGNOSIS — D46.9 MDS (MYELODYSPLASTIC SYNDROME) (H): ICD-10-CM

## 2018-10-24 DIAGNOSIS — Z85.828 HISTORY OF SKIN CANCER: ICD-10-CM

## 2018-10-24 DIAGNOSIS — Z86.2 HISTORY OF GRAFT VERSUS HOST DISEASE: ICD-10-CM

## 2018-10-24 DIAGNOSIS — H02.60 XANTHELASMA: ICD-10-CM

## 2018-10-24 DIAGNOSIS — I87.2 VENOUS INSUFFICIENCY: ICD-10-CM

## 2018-10-24 DIAGNOSIS — L30.9 DERMATITIS, UNSPECIFIED: Primary | ICD-10-CM

## 2018-10-24 LAB
ANION GAP SERPL CALCULATED.3IONS-SCNC: 7 MMOL/L (ref 3–14)
BASOPHILS # BLD AUTO: 0 10E9/L (ref 0–0.2)
BASOPHILS NFR BLD AUTO: 0.1 %
BUN SERPL-MCNC: 27 MG/DL (ref 7–30)
CALCIUM SERPL-MCNC: 8.2 MG/DL (ref 8.5–10.1)
CHLORIDE SERPL-SCNC: 104 MMOL/L (ref 94–109)
CO2 SERPL-SCNC: 24 MMOL/L (ref 20–32)
CREAT SERPL-MCNC: 1.1 MG/DL (ref 0.66–1.25)
DIFFERENTIAL METHOD BLD: ABNORMAL
EOSINOPHIL # BLD AUTO: 0.1 10E9/L (ref 0–0.7)
EOSINOPHIL NFR BLD AUTO: 0.7 %
ERYTHROCYTE [DISTWIDTH] IN BLOOD BY AUTOMATED COUNT: 20.8 % (ref 10–15)
GFR SERPL CREATININE-BSD FRML MDRD: 66 ML/MIN/1.7M2
GLUCOSE SERPL-MCNC: 94 MG/DL (ref 70–99)
HCT VFR BLD AUTO: 44.6 % (ref 40–53)
HGB BLD-MCNC: 15.1 G/DL (ref 13.3–17.7)
IMM GRANULOCYTES # BLD: 0.1 10E9/L (ref 0–0.4)
IMM GRANULOCYTES NFR BLD: 0.5 %
LYMPHOCYTES # BLD AUTO: 3.6 10E9/L (ref 0.8–5.3)
LYMPHOCYTES NFR BLD AUTO: 37.8 %
MCH RBC QN AUTO: 30 PG (ref 26.5–33)
MCHC RBC AUTO-ENTMCNC: 33.9 G/DL (ref 31.5–36.5)
MCV RBC AUTO: 89 FL (ref 78–100)
MONOCYTES # BLD AUTO: 0.6 10E9/L (ref 0–1.3)
MONOCYTES NFR BLD AUTO: 6.5 %
NEUTROPHILS # BLD AUTO: 5.1 10E9/L (ref 1.6–8.3)
NEUTROPHILS NFR BLD AUTO: 54.4 %
NRBC # BLD AUTO: 0 10*3/UL
NRBC BLD AUTO-RTO: 0 /100
PLATELET # BLD AUTO: 161 10E9/L (ref 150–450)
POTASSIUM SERPL-SCNC: 3.8 MMOL/L (ref 3.4–5.3)
RBC # BLD AUTO: 5.03 10E12/L (ref 4.4–5.9)
SODIUM SERPL-SCNC: 135 MMOL/L (ref 133–144)
WBC # BLD AUTO: 9.4 10E9/L (ref 4–11)

## 2018-10-24 PROCEDURE — 85025 COMPLETE CBC W/AUTO DIFF WBC: CPT | Performed by: STUDENT IN AN ORGANIZED HEALTH CARE EDUCATION/TRAINING PROGRAM

## 2018-10-24 PROCEDURE — 36415 COLL VENOUS BLD VENIPUNCTURE: CPT

## 2018-10-24 PROCEDURE — 80048 BASIC METABOLIC PNL TOTAL CA: CPT | Performed by: STUDENT IN AN ORGANIZED HEALTH CARE EDUCATION/TRAINING PROGRAM

## 2018-10-24 RX ORDER — FLUOCINONIDE 0.5 MG/G
OINTMENT TOPICAL 2 TIMES DAILY
Qty: 60 G | Refills: 1 | Status: SHIPPED | OUTPATIENT
Start: 2018-10-24 | End: 2018-11-28

## 2018-10-24 RX ORDER — METOLAZONE 5 MG/1
5 TABLET ORAL DAILY
Refills: 3 | COMMUNITY
Start: 2018-10-17 | End: 2018-11-28

## 2018-10-24 ASSESSMENT — PAIN SCALES - GENERAL: PAINLEVEL: NO PAIN (0)

## 2018-10-24 NOTE — NURSING NOTE
Chief Complaint   Patient presents with     Blood Draw     labs drawn by venipuncture by rn.       Lab only appointment.  Labs drawn by venipuncture by nika.    Edilia Melendez RN

## 2018-10-24 NOTE — PROGRESS NOTES
[facility] Dermatology Note      Dermatology Problem List:  1. Edematous, firm, indurated, shiny plaque extending from the right knee to the right ankle, with overlying erythema and few small shallow ulcerations. 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 Differential diagnosis includes chronic sclerodermoid GVHD vs. venous stasis dermatitis with lipodermatosclerosis vs overlap of both.   - Punch biopsy 10/24/18.   - Fluocinonide 0.05% ointment BID, vaseline/adaptic to ulcerations. Hold off on compression for now due to DVT--> frequent elevation.   - Recently started on ruxolitinib 5 mg BID by Onc due to concern for GVHD; also completing a prednisone taper.  2. H/o multiple NMSCs - recent skin check in 8/2018 by his outside dermatologist Dr. Shafer.    Encounter Date: Oct 24, 2018    CC:   Chief Complaint   Patient presents with     Derm Problem     GVH on R lower leg issue for several months     History of Present Illness:  Mr. 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 More recently, there has been concern for possible chronic pulmonary GVHD vs sirolimus lung toxicity, for which patient was started on prednisone that is now being tapered (currently on 50 mg daily alternating with 20 mg daily). Heme/Onc is now also concerned for potential chronic GVHD of the skin of the right lower extremity, and started patient on ruxolitinib 5 mg BID on 10/22. Patient reports he is tolerating this well.   According to the patient, he first noticed changes of his right  "lower extremity in April. He describes this as redness and peeling, \"like a sunburn\", that lasted throughout the summer. Over time, he also developed swelling, an itchy/burning red rash, weeping, and several small ulcerations on the right anterior shin. He has been following with an outside dermatologist, Dr. Shafer, who felt these changes were most consistent with venous stasis dermatitis and started the patient on triamcinolone ointment twice daily and compression stockings. This resulted in significant improvement over the last 1-2 months; the edema and redness are much improved, there is no longer drainage/weeping, and the small ulcers are slowly healing. He continues to have some redness, burning, and itching of the anterior shin, and has also noticed that his entire lower leg is very shiny and firm. He is no longer using the triamcinolone, and has now switched to plain vaseline. His wife, who is a nurse, applies adaptic dressings to the small ulcerations and wraps his leg in ACE wraps overnight.   Of note, patient has a history of significant venous insufficiency for which he has had a prior ablation. He has an ablation planned to the left leg as well in the near future. Earlier this month, he was also diagnosed with a small RLE DVT, for which he was not started on anticoagulation aside from a full dose aspirin daily.   He has a history of BCC and SCC treated at [facility] in the past. He had a recent skin check this fall with his outside dermatologist, Dr. Shafer, which was unremarkable. He points out two areas today - a healing scar on his left lower leg at site of recent cryotherapy per him, as well as a raised area on his left medial cheek that has been present for years but is slowly growing. No other lesions of concern today; he denies any new, growing, changing, or symptomatic lesions. Admits he is not very diligent with sun protection.     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)     Fatigue     Secondary adrenal insufficiency (H)     Influenza A (H1N1)     Fever, unspecified     Acute deep vein thrombosis (DVT) of distal vein of right lower extremity (H)     Long-term (current) use of anticoagulants [Z79.01]     Deep vein thrombosis (DVT) (H)     Primary hypogonadism in male     Past Medical History:   Diagnosis Date     Head injury 1964     Hearing problem Hearing aids 2015     History of blood transfusion 3/2014     History of radiation therapy June 2014     Immunosuppression (H) Sirolimus daily     MDS (myelodysplastic syndrome) (H)      Numbness and tingling     feet     Obstructive sleep apnea 1999     Pneumonia      Reduced vision August 2016    Cataract surgery     Sleep apnea 1999     Transplant July 1, 2014    Stem Cells     Past Surgical History:   Procedure Laterality Date     BACK SURGERY       BIOPSY       BMT CELL PRODUCT INFUSION       ESOPHAGOSCOPY, GASTROSCOPY, DUODENOSCOPY (EGD), COMBINED N/A 2/2/2015     ESOPHAGOSCOPY, GASTROSCOPY, DUODENOSCOPY (EGD), COMBINED Left 8/6/2015    Procedure: COMBINED ESOPHAGOSCOPY, GASTROSCOPY, DUODENOSCOPY (EGD), BIOPSY SINGLE OR MULTIPLE;  Surgeon: Amber Francis MD;  Location:  GI     EXCISE LESION LIP N/A 1/2/2017    Procedure: EXCISE LESION LIP;  Surgeon: Tim Yanez MD;  Location:  OR     EYE SURGERY       FLEXIBLE SIGMOIDOSCOPY  2/2/15     HEMORRHOIDECTOMY  2001     PHACOEMULSIFICATION CLEAR CORNEA WITH STANDARD INTRAOCULAR LENS IMPLANT Left 7/18/2016    Procedure: PHACOEMULSIFICATION CLEAR CORNEA WITH STANDARD INTRAOCULAR LENS IMPLANT;  Surgeon: Randolph Giles MD;  Location:  EC     TONSILLECTOMY  1953     TRANSPLANT  7-1-2014    Stem Cell Transplant [facility] BMT     VASCULAR SURGERY Left 2009       Social History:  The patient is retired. The patient denies use of tanning beds. His wife is a nurse  who used to work in Plastic Surgery and Dermatology.     Family History:  Reviewed in Epic.     Medications:  Current Outpatient Prescriptions   Medication Sig Dispense Refill     acyclovir (ZOVIRAX) 800 MG tablet Take 1 tablet (800 mg) by mouth 3 times daily 180 tablet 6     amoxicillin (AMOXIL) 500 MG capsule Take 4 pills (2 grams) day of dental work 16 capsule 3     aspirin 325 MG tablet Take 1 tablet (325 mg) by mouth daily 30 tablet 1     bumetanide (BUMEX) 1 MG tablet Take 1 tablet (1 mg) by mouth every other day Take 1 tablet 1mg by mouth daily 30 minutes prior to bumex 60 tablet 1     calcium carbonate-vitamin D 500-400 MG-UNIT TABS tablt Take 1 tablet by mouth daily 2000 mg 180 tablet 3     cephALEXin (KEFLEX) 500 MG capsule Take 1 capsule (500 mg) by mouth 2 times daily 14 capsule 0     fluconazole (DIFLUCAN) 100 MG tablet Take 1 tablet (100 mg) by mouth daily 30 tablet 5     fluocinonide (LIDEX) 0.05 % ointment Apply topically 2 times daily 60 g 1     levofloxacin (LEVAQUIN) 250 MG tablet Take 1 tablet (250 mg) by mouth daily 30 tablet 1     LORazepam (ATIVAN) 1 MG tablet Take 1 tablet (1 mg) by mouth every 6 hours as needed for anxiety 30 tablet 0     metolazone (ZAROXOLYN) 5 MG tablet Take 5 mg by mouth daily  3     order for DME Please measure and distribute 1 pair of 20mmHg - 30mmHg thigh high open or closed toe compression stockings. Jobst ultrasheer or equivalent. 1 each 1     ORDER FOR DME Please provide a NOVA cane offset with strap item number [id number] 1 Device 0     oxyCODONE IR (ROXICODONE) 5 MG tablet Take 1 tablet (5 mg) by mouth every 6 hours as needed for severe pain 20 tablet 0     pantoprazole (PROTONIX) 20 MG EC tablet Take 1 tablet (20 mg) by mouth daily 30 tablet 11     predniSONE (DELTASONE) 20 MG tablet Take 1 tablet (20 mg) by mouth daily 60 tablet 1     predniSONE (DELTASONE) 50 MG tablet Take 1 tablet (50 mg) by mouth daily Take a combination of 1 if the 50 mg and 2 of the 20 mg  tablets for a total of 90 mg prednisone by mouth daily 30 tablet 1     ruxolitinib (JAKAFI) 5 MG TABS tablet CHEMO Take 1 tablet (5 mg) by mouth 2 times daily 60 tablet 1     sertraline (ZOLOFT) 100 MG tablet Take 1 tablet (100 mg) by mouth daily  30 tablet 5     sirolimus (GENERIC EQUIVALENT) 0.5 MG tablet Take 1 tablet (0.5 mg) by mouth daily 120 tablet      sulfamethoxazole-trimethoprim (BACTRIM DS/SEPTRA DS) 800-160 MG per tablet Take one tab by mouth twice daily on Monday and Tuesdays only 16 tablet 3     triamcinolone (KENALOG) 0.1 % ointment Apply twice a day to lower leg          Allergies   Allergen Reactions     Blood Transfusion Related (Informational Only) Other (See Comments)     Patient has a history of a clinically significant antibody against RBC antigens.  A delay in compatible RBCs may occur.       Review of Systems:  -As per HPI.  -Constitutional: Otherwise well, in baseline state of general health.   -Skin: As above in HPI. No additional skin concerns.    Physical exam:  Vitals: /85 (BP Location: Left arm)  Pulse 75  GEN: This is a well developed, well-nourished male in no acute distress, in a pleasant mood.    SKIN: Examination of the head, neck, chest, abdomen, back, upper extremities, and lower extremities was performed.   -Left upper medial cheek with oval shaped, flesh colored to slightly yellow, smooth plaque.  -Right lower extremity with edematous, firm, indurated, shiny plaque extending from the right knee to the right ankle, with overlying erythema and few small shallow ulcerations.   -Left lower extremity with edema, varicosities, and mottled hyperpigmentation. Healing hyperpigmented scar at site of recent cryotherapy.  -Pitting edema apparent on both lower legs.  -Well healed scars at sites of prior NMSCs; no evidence of recurrence.   -No other lesions of concern on areas examined.     Impression/Plan:  1. Edematous, firm, indurated, shiny plaque extending from the right knee to  the right ankle, with overlying erythema and few small shallow ulcerations. 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 Differential diagnosis includes chronic sclerodermoid GVHD vs. venous stasis dermatitis with lipodermatosclerosis vs overlap of both.     Punch biopsy:  After discussion of benefits and risks including but not limited to bleeding/bruising, pain/swelling, infection, scar, incomplete removal, nerve damage/numbness, recurrence, and non-diagnostic biopsy, written consent, verbal consent and photographs were obtained. Time-out was performed. The area was cleaned with isopropyl alcohol. 0.5mL of 1% lidocaine with 1:100,000 epinephrine was injected to obtain adequate anesthesia of the lesion on the right shin. A 4 mm punch biopsy was performed.  4-0 prolene sutures were utilized to approximate the epidermal edges.  White petroleum jelly/VaselineTM and a bandage was applied to the wound.  Explicit verbal and written wound care instructions were provided.  The patient left the Dermatology Clinic in good condition. The patient was counseled to have suture removal in approximately 2-3 weeks (his wife will perform the suture removal since they will be out of town and she is a nurse; provided removal kit).    Start fluocinonide 0.05% ointment twice daily to the entire red, inflamed area.    OK to continue Vaseline and Adaptic to shallow ulcerations.     Recommend holding off on compression for now given recently diagnosed DVT. Currently only on full dose aspirin as per Heme/Onc; would consider therapeutic anticoagulation. Recommend frequent leg elevation in the meantime.     Continue ruxolitinib and prednisone taper per Heme/Onc for now, pending results of skin biopsy.    Continue compression stocking to  the left lower extremity, which has evidence of mild venous stasis changes.       2. Xanthelasma - reassured of benign etiology. No treatment is needed.     3. H/o NMSC     No evidence of recurrence on today's examination.     Counseled on sun protection.     CC Jyoti Guardado CNP, Tamar GAMA, Dr. Pinto, and Dr. Shafer on close of this encounter.    Follow-up to be determined based on biopsy results.     Dr. Corral staffed the patient.    Staff Involved:  Resident(sIis Mahan)/Staff(as above)    Staff Physician Comments:   I saw and evaluated the patient with the resident and I agree with the assessment and plan. I was present for the key portions of the above major procedure and examination.    Edu Corral MD   of Dermatology  Department of Dermatology  [facility]

## 2018-10-24 NOTE — NURSING NOTE
Chief Complaint   Patient presents with     Blood Draw     labs drawn by venipuncture by rn.       Labs drawn by venipuncture by rn.  Vital signs taken.  Pt checked in to next appointment.  Edilia Melendez RN

## 2018-10-24 NOTE — NURSING NOTE
Chief Complaint   Patient presents with     Derm Problem     GVH on R lower leg issue for several months       Vitals:    10/24/18 1019   BP: 145/85   BP Location: Left arm   Pulse: 75       There is no height or weight on file to calculate BMI.                            Ashley Qureshi, EMT

## 2018-10-24 NOTE — LETTER
10/24/2018       RE: Deejay Dior  [id number] [place] [address]     Dear Colleague,    Thank you for referring your patient, Deejay Dior, to the [facility] DERMATOLOGY at [facility]. Please see a copy of my visit note below.    [facility] Dermatology Note      Dermatology Problem List:  1. Edematous, firm, indurated, shiny plaque extending from the right knee to the right ankle, with overlying erythema and few small shallow ulcerations. 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 Differential diagnosis includes chronic sclerodermoid GVHD vs. venous stasis dermatitis with lipodermatosclerosis vs overlap of both.   - Punch biopsy 10/24/18.   - Fluocinonide 0.05% ointment BID, vaseline/adaptic to ulcerations. Hold off on compression for now due to DVT--> frequent elevation.   - Recently started on ruxolitinib 5 mg BID by Onc due to concern for GVHD; also completing a prednisone taper.  2. H/o multiple NMSCs - recent skin check in 8/2018 by his outside dermatologist Dr. Shafer.    Encounter Date: Oct 24, 2018    CC:   Chief Complaint   Patient presents with     Derm Problem     GVH on R lower leg issue for several months     History of Present Illness:  Mr. 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 More recently, there has been concern for possible chronic pulmonary GVHD vs sirolimus lung toxicity, for which patient was started on prednisone that is now being tapered (currently on 50 mg daily alternating with 20 mg  "daily). Heme/Onc is now also concerned for potential chronic GVHD of the skin of the right lower extremity, and started patient on ruxolitinib 5 mg BID on 10/22. Patient reports he is tolerating this well.   According to the patient, he first noticed changes of his right lower extremity in April. He describes this as redness and peeling, \"like a sunburn\", that lasted throughout the summer. Over time, he also developed swelling, an itchy/burning red rash, weeping, and several small ulcerations on the right anterior shin. He has been following with an outside dermatologist, Dr. Shafer, who felt these changes were most consistent with venous stasis dermatitis and started the patient on triamcinolone ointment twice daily and compression stockings. This resulted in significant improvement over the last 1-2 months; the edema and redness are much improved, there is no longer drainage/weeping, and the small ulcers are slowly healing. He continues to have some redness, burning, and itching of the anterior shin, and has also noticed that his entire lower leg is very shiny and firm. He is no longer using the triamcinolone, and has now switched to plain vaseline. His wife, who is a nurse, applies adaptic dressings to the small ulcerations and wraps his leg in ACE wraps overnight.   Of note, patient has a history of significant venous insufficiency for which he has had a prior ablation. He has an ablation planned to the left leg as well in the near future. Earlier this month, he was also diagnosed with a small RLE DVT, for which he was not started on anticoagulation aside from a full dose aspirin daily.   He has a history of BCC and SCC treated at [facility] in the past. He had a recent skin check this fall with his outside dermatologist, Dr. Shafer, which was unremarkable. He points out two areas today - a healing scar on his left lower leg at site of recent cryotherapy per him, as well as a raised area on his left " medial cheek that has been present for years but is slowly growing. No other lesions of concern today; he denies any new, growing, changing, or symptomatic lesions. Admits he is not very diligent with sun protection.     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)     Fatigue     Secondary adrenal insufficiency (H)     Influenza A (H1N1)     Fever, unspecified     Acute deep vein thrombosis (DVT) of distal vein of right lower extremity (H)     Long-term (current) use of anticoagulants [Z79.01]     Deep vein thrombosis (DVT) (H)     Primary hypogonadism in male     Past Medical History:   Diagnosis Date     Head injury 1964     Hearing problem Hearing aids 2015     History of blood transfusion 3/2014     History of radiation therapy June 2014     Immunosuppression (H) Sirolimus daily     MDS (myelodysplastic syndrome) (H)      Numbness and tingling     feet     Obstructive sleep apnea 1999     Pneumonia      Reduced vision August 2016    Cataract surgery     Sleep apnea 1999     Transplant July 1, 2014    Stem Cells     Past Surgical History:   Procedure Laterality Date     BACK SURGERY       BIOPSY       BMT CELL PRODUCT INFUSION       ESOPHAGOSCOPY, GASTROSCOPY, DUODENOSCOPY (EGD), COMBINED N/A 2/2/2015     ESOPHAGOSCOPY, GASTROSCOPY, DUODENOSCOPY (EGD), COMBINED Left 8/6/2015    Procedure: COMBINED ESOPHAGOSCOPY, GASTROSCOPY, DUODENOSCOPY (EGD), BIOPSY SINGLE OR MULTIPLE;  Surgeon: Amber Francis MD;  Location: [facility]     EXCISE LESION LIP N/A 1/2/2017    Procedure: EXCISE LESION LIP;  Surgeon: Tim Yanez MD;  Location: [facility] OR     EYE SURGERY       FLEXIBLE SIGMOIDOSCOPY  2/2/15     HEMORRHOIDECTOMY  2001     PHACOEMULSIFICATION CLEAR CORNEA WITH STANDARD INTRAOCULAR LENS IMPLANT Left 7/18/2016    Procedure: PHACOEMULSIFICATION CLEAR CORNEA WITH STANDARD INTRAOCULAR LENS IMPLANT;   Surgeon: Randolph Giles MD;  Location:  [facility]     TONSILLECTOMY  1953     TRANSPLANT  7-1-2014    Stem Cell Transplant [facility] BMT     VASCULAR SURGERY Left 2009       Social History:  The patient is retired. The patient denies use of tanning beds. His wife is a nurse who used to work in Plastic Surgery and Dermatology.     Family History:  Reviewed in Epic.     Medications:  Current Outpatient Prescriptions   Medication Sig Dispense Refill     acyclovir (ZOVIRAX) 800 MG tablet Take 1 tablet (800 mg) by mouth 3 times daily 180 tablet 6     amoxicillin (AMOXIL) 500 MG capsule Take 4 pills (2 grams) day of dental work 16 capsule 3     aspirin 325 MG tablet Take 1 tablet (325 mg) by mouth daily 30 tablet 1     bumetanide (BUMEX) 1 MG tablet Take 1 tablet (1 mg) by mouth every other day Take 1 tablet 1mg by mouth daily 30 minutes prior to bumex 60 tablet 1     calcium carbonate-vitamin D 500-400 MG-UNIT TABS tablt Take 1 tablet by mouth daily 2000 mg 180 tablet 3     cephALEXin (KEFLEX) 500 MG capsule Take 1 capsule (500 mg) by mouth 2 times daily 14 capsule 0     fluconazole (DIFLUCAN) 100 MG tablet Take 1 tablet (100 mg) by mouth daily 30 tablet 5     fluocinonide (LIDEX) 0.05 % ointment Apply topically 2 times daily 60 g 1     levofloxacin (LEVAQUIN) 250 MG tablet Take 1 tablet (250 mg) by mouth daily 30 tablet 1     LORazepam (ATIVAN) 1 MG tablet Take 1 tablet (1 mg) by mouth every 6 hours as needed for anxiety 30 tablet 0     metolazone (ZAROXOLYN) 5 MG tablet Take 5 mg by mouth daily  3     order for DME Please measure and distribute 1 pair of 20mmHg - 30mmHg thigh high open or closed toe compression stockings. Jobst ultrasheer or equivalent. 1 each 1     ORDER FOR DME Please provide a NOVA cane offset with strap item number [id number] 1 Device 0     oxyCODONE IR (ROXICODONE) 5 MG tablet Take 1 tablet (5 mg) by mouth every 6 hours as needed for severe pain 20 tablet 0     pantoprazole (PROTONIX) 20 MG EC tablet Take  1 tablet (20 mg) by mouth daily 30 tablet 11     predniSONE (DELTASONE) 20 MG tablet Take 1 tablet (20 mg) by mouth daily 60 tablet 1     predniSONE (DELTASONE) 50 MG tablet Take 1 tablet (50 mg) by mouth daily Take a combination of 1 if the 50 mg and 2 of the 20 mg tablets for a total of 90 mg prednisone by mouth daily 30 tablet 1     ruxolitinib (JAKAFI) 5 MG TABS tablet CHEMO Take 1 tablet (5 mg) by mouth 2 times daily 60 tablet 1     sertraline (ZOLOFT) 100 MG tablet Take 1 tablet (100 mg) by mouth daily  30 tablet 5     sirolimus (GENERIC EQUIVALENT) 0.5 MG tablet Take 1 tablet (0.5 mg) by mouth daily 120 tablet      sulfamethoxazole-trimethoprim (BACTRIM DS/SEPTRA DS) 800-160 MG per tablet Take one tab by mouth twice daily on Monday and Tuesdays only 16 tablet 3     triamcinolone (KENALOG) 0.1 % ointment Apply twice a day to lower leg          Allergies   Allergen Reactions     Blood Transfusion Related (Informational Only) Other (See Comments)     Patient has a history of a clinically significant antibody against RBC antigens.  A delay in compatible RBCs may occur.       Review of Systems:  -As per HPI.  -Constitutional: Otherwise well, in baseline state of general health.   -Skin: As above in HPI. No additional skin concerns.    Physical exam:  Vitals: /85 (BP Location: Left arm)  Pulse 75  GEN: This is a well developed, well-nourished male in no acute distress, in a pleasant mood.    SKIN: Examination of the head, neck, chest, abdomen, back, upper extremities, and lower extremities was performed.   -Left upper medial cheek with oval shaped, flesh colored to slightly yellow, smooth plaque.  -Right lower extremity with edematous, firm, indurated, shiny plaque extending from the right knee to the right ankle, with overlying erythema and few small shallow ulcerations.   -Left lower extremity with edema, varicosities, and mottled hyperpigmentation. Healing hyperpigmented scar at site of recent  cryotherapy.  -Pitting edema apparent on both lower legs.  -Well healed scars at sites of prior NMSCs; no evidence of recurrence.   -No other lesions of concern on areas examined.     Impression/Plan:  1. Edematous, firm, indurated, shiny plaque extending from the right knee to the right ankle, with overlying erythema and few small shallow ulcerations. 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 Differential diagnosis includes chronic sclerodermoid GVHD vs. venous stasis dermatitis with lipodermatosclerosis vs overlap of both.     Punch biopsy:  After discussion of benefits and risks including but not limited to bleeding/bruising, pain/swelling, infection, scar, incomplete removal, nerve damage/numbness, recurrence, and non-diagnostic biopsy, written consent, verbal consent and photographs were obtained. Time-out was performed. The area was cleaned with isopropyl alcohol. 0.5mL of 1% lidocaine with 1:100,000 epinephrine was injected to obtain adequate anesthesia of the lesion on the right shin. A 4 mm punch biopsy was performed.  4-0 prolene sutures were utilized to approximate the epidermal edges.  White petroleum jelly/VaselineTM and a bandage was applied to the wound.  Explicit verbal and written wound care instructions were provided.  The patient left the Dermatology Clinic in good condition. The patient was counseled to have suture removal in approximately 2-3 weeks (his wife will perform the suture removal since they will be out of town and she is a nurse; provided removal kit).    Start fluocinonide 0.05% ointment twice daily to the entire red, inflamed area.    OK to continue Vaseline and Adaptic to shallow ulcerations.     Recommend holding off on compression for now given recently diagnosed DVT.  Currently only on full dose aspirin as per Heme/Onc; would consider therapeutic anticoagulation. Recommend frequent leg elevation in the meantime.     Continue ruxolitinib and prednisone taper per Heme/Onc for now, pending results of skin biopsy.    Continue compression stocking to the left lower extremity, which has evidence of mild venous stasis changes.       2. Xanthelasma - reassured of benign etiology. No treatment is needed.     3. H/o NMSC     No evidence of recurrence on today's examination.     Counseled on sun protection.     CC Jyoti Guardado CNP, Tamar GAMA, Dr. Pinto, and Dr. Shafer on close of this encounter.    Follow-up to be determined based on biopsy results.     Dr. Corral staffed the patient.    Staff Involved:  Resident(Isis Mahan)/Staff(as above)    Staff Physician Comments:   I saw and evaluated the patient with the resident and I agree with the assessment and plan. I was present for the key portions of the above major procedure and examination.    Edu Corral MD   of Dermatology  Department of Dermatology  [facility] [employer]

## 2018-10-24 NOTE — MR AVS SNAPSHOT
After Visit Summary   10/24/2018    Deejay Dior    MRN: [id number]           Patient Information     Date Of Birth          1950        Visit Information        Provider Department      10/24/2018 10:00 AM Edu Corral MD [facility] Dermatology        Today's Diagnoses     Dermatitis, unspecified    -  1      Care Instructions    Start lidex ointment (fluocinonide) twice daily to all the red areas.   Stop compression (ACE wraps/stockings) to the right leg given the blood clot. Elevate your legs as much possible. We will discuss with your other doctors.       Wound Care After a Biopsy    What is a skin biopsy?  A skin biopsy allows the doctor to examine a very small piece of tissue under the microscope to determine the diagnosis and the best treatment for the skin condition. A local anesthetic (numbing medicine)  is injected with a very small needle into the skin area to be tested. A small piece of skin is taken from the area. Sometimes a suture (stitch) is used.     What are the risks of a skin biopsy?  I will experience scar, bleeding, swelling, pain, crusting and redness. I may experience incomplete removal or recurrence. Risks of this procedure are excessive bleeding, bruising, infection, nerve damage, numbness, thick (hypertrophic or keloidal) scar and non-diagnostic biopsy.    How should I care for my wound for the first 24 hours?    Keep the wound dry and covered for 24 hours    If it bleeds, hold direct pressure on the area for 15 minutes. If bleeding does not stop then go to the emergency room    Avoid strenuous exercise the first 1-2 days or as your doctor instructs you    How should I care for the wound after 24 hours?    After 24 hours, remove the bandage    You may bathe or shower as normal    If you had a scalp biopsy, you can shampoo as usual and can use shower water to clean the biopsy site daily    Clean the wound twice a day with gentle soap and water    Do not scrub, be  gentle    Apply white petroleum/Vaseline after cleaning the wound with a cotton swab or a clean finger, and keep the site covered with a Bandaid /bandage. Bandages are not necessary with a scalp biopsy    If you are unable to cover the site with a Bandaid /bandage, re-apply ointment 2-3 times a day to keep the site moist. Moisture will help with healing    Avoid strenuous activity for first 1-2 days    Avoid lakes, rivers, pools, and oceans until the stitches are removed or the site is healed    How do I clean my wound?    Wash hands thoroughly with soap or use hand  before all wound care    Clean the wound with gentle soap and water    Apply white petroleum/Vaseline  to wound after it is clean    Replace the Bandaid /bandage to keep the wound covered for the first few days or as instructed by your doctor    If you had a scalp biopsy, warm shower water to the area on a daily basis should suffice    What should I use to clean my wound?     Cotton-tipped applicators (Qtips )    White petroleum jelly (Vaseline ). Use a clean new container and use Q-tips to apply.    Bandaids   as needed    Gentle soap     How should I care for my wound long term?    Do not get your wound dirty    Keep up with wound care for one week or until the area is healed.    A small scab will form and fall off by itself when the area is completely healed. The area will be red and will become pink in color as it heals. Sun protection is very important for how your scar will turn out. Sunscreen with an SPF 30 or greater is recommended once the area is healed.    If you have stitches, stitches need to be removed in 2-3 weeks. You may return to our clinic for this or you may have it done locally at your doctor s office.    You should have some soreness but it should be mild and slowly go away over several days. Talk to your doctor about using tylenol for pain,    When should I call my doctor?  If you have increased:     Pain or  swelling    Pus or drainage (clear or slightly yellow drainage is ok)    Temperature over 100F    Spreading redness or warmth around wound    When will I hear about my results?  The biopsy results can take 2-3 weeks to come back. The clinic will call you with the results, send you a [employer] message, or have you schedule a follow-up clinic or phone time to discuss the results. Contact our clinics if you do not hear from us in 3 weeks.     Who should I call with questions?    [facility]: [phone]     [facility]: [phone]    For urgent needs outside of business hours call the [facility] at [phone] and ask for the dermatology resident on call              Follow-ups after your visit        Your next 10 appointments already scheduled     Nov 15, 2018  1:00 PM CST   Masonic Lab Draw with  MASONIC LAB DRAW   [facility] Masonic Lab Draw ([facility])    [address]  [address]  [facility] [id number] [phone]            Nov 15, 2018  1:30 PM CST   Return with Aby Pinto MD   [facility] Blood and Marrow Transplant ([facility])    [address]  [address]  [facility] [id number] [phone]            Nov 16, 2018 10:30 AM CST   Procedure 5.5 hour with U2A ROOM 10   Unit 2A [facility] ([facility])    [address] [address]               Nov 16, 2018 12:00 PM CST   IR STAB PHLEBECTOMY < 10 STABS with UUIR5   [facility] [place], Interventional Radiology ([facility])    [address] [facility] [address] [phone]           The day before the exam:   You may eat your regular diet.   You are encouraged to drink at least 8 eight ounce glasses of clear liquids.   Drink no alcoholic beverages for 24 hours before or after the exam.  The day  of the exam:   Do not eat any solid food or milk products for 6 hours prior to the exam. You may drink clear liquids until 2 hours prior to the exam. Clear liquids include the following: water, Jell-O, clear broth, apple juice or any non-carbonated drink that you can see through (no pop!)   The morning of the exam you may take medications as directed with a sip of water.  Please wear loose clothing, such as a sweat suit or jogging clothes. Avoid snaps, zippers and other metal. We may ask you to undress and put on a hospital gown.  Please bring any scans or X-rays taken at other hospitals, if similar tests were done. Also bring a list of your medicines, including vitamins, minerals and over-the-counter drugs. It is safest to leave personal items at home.  Someone will need to drive you to and from the hospital.  Tell your doctor in advance:   If you have allergies to x-ray contrast or iodine.   If you are or may be pregnant.   If you are taking Coumadin (or any other blood thinners) 5 days prior to the exam for any special instructions.   If you are diabetic to determine if your insulin needs have to be adjusted for the exam.  Your doctor will:   Need to do a history and physical within 30 days before this procedure.   Obtain necessary laboratory tests prior to the exam (CBCP, INR and PTT).  If you were given sedation, you cannot drive for 24 hours after the procedure, and an adult must be with you until then.  If you have any questions, please call the Imaging Department where you will have your exam. Directions, parking instructions, and other information are available on our website, [email or web address].            Nov 23, 2018 11:00 AM CST   US LOWER EXTREMITY VENOUS DUPLEX LEFT with UCUSV2   [facility]  ([facility])    [address]  1st Floor  [facility] [address] [phone]           How do I prepare for my exam? (Food and drink instructions) No Food and  Drink Restrictions.  How do I prepare for my exam? (Other instructions) You do not need to do anything special to prepare for your exam.  What should I wear: Wear comfortable clothes.  How long does the exam take: Most ultrasounds take 30 to 60 minutes.  What should I bring: Bring a list of your medicines, including vitamins, minerals and over-the-counter drugs. It is safest to leave personal items at home.  Do I need a :  No  is needed.  What do I need to tell my doctor: Tell your doctor about any allergies you may have.  What should I do after the exam: No restrictions, You may resume normal activities.  What is this test: An ultrasound uses sound waves to make pictures of the body. Sound waves do not cause pain. The only discomfort may be the pressure of the wand against your skin or full bladder.  Who should I call with questions: If you have any questions, please call the Imaging Department where you will have your exam. Directions, parking instructions, and other information is available on our website, [email or web address].            Dec 04, 2018  1:20 PM CST   CT CHEST W/O CONTRAST with UCCT1   [facility] CT ([facility])    [address] [address] [phone]           How do I prepare for my exam? (Food and drink instructions) No Food and Drink Restrictions.  How do I prepare for my exam? (Other instructions) You do not need to do anything special to prepare for this exam. For a sinus scan: Use your nose spray (nasal decongestant spray) as directed.  What should I wear: Please wear loose clothing, such as a sweat suit or jogging clothes. Avoid snaps, zippers and other metal. We may ask you to undress and put on a hospital gown.  How long does the exam take: Most scans take less than 20 minutes.  What should I bring: Please bring any scans or X-rays taken at other hospitals, if similar tests were done. Also bring a list  of your medicines, including vitamins, minerals and over-the-counter drugs. It is safest to leave personal items at home.  Do I need a : No  is needed.  What do I need to tell my doctor? Be sure to tell your doctor: * If you have any allergies. * If there s any chance you are pregnant. * If you are breastfeeding.  What should I do after the exam: No restrictions, You may resume normal activities.  What is this test: A CT (computed tomography) scan is a series of pictures that allows us to look inside your body. The scanner creates images of the body in cross sections, much like slices of bread. This helps us see any problems more clearly.  Who should I call with questions: If you have any questions, please call the Imaging Department where you will have your exam. Directions, parking instructions, and other information is available on our website, [email or web address].            Dec 04, 2018  3:00 PM CST   (Arrive by 2:45 PM)   Return Visit with Arsh Sandoval MD   [facility] ([facility])    [address] [place] [address]  [address] [place] [address] [phone]              Who to contact     Please call your clinic at [phone] to:    Ask questions about your health    Make or cancel appointments    Discuss your medicines    Learn about your test results    Speak to your doctor            Additional Information About Your Visit        MyChart Information     [employer] gives you secure access to your electronic health record. If you see a primary care provider, you can also send messages to your care team and make appointments. If you have questions, please call your primary care clinic.  If you do not have a primary care provider, please call [phone] and they will assist you.      [employer] is an electronic gateway that provides easy, online access to your medical records. With [employer], you can request a clinic appointment, read your test results,  renew a prescription or communicate with your care team.     To access your existing account, please contact your [facility] or call [phone] for assistance.        Care EveryWhere ID     This is your Care EveryWhere ID. This could be used by other organizations to access your [facility] medical records  [id number]        Your Vitals Were     Pulse                   75            Blood Pressure from Last 3 Encounters:   10/24/18 145/85   10/22/18 129/70   10/17/18 125/67    Weight from Last 3 Encounters:   10/22/18 94.6 kg (208 lb 8 oz)   10/17/18 94.8 kg (209 lb)   10/16/18 98.6 kg (217 lb 6.4 oz)              We Performed the Following     Dermatological path order and indications          Today's Medication Changes          These changes are accurate as of 10/24/18 11:21 AM.  If you have any questions, ask your nurse or doctor.               Start taking these medicines.        Dose/Directions    fluocinonide 0.05 % ointment   Commonly known as:  LIDEX   Used for:  Dermatitis, unspecified   Started by:  Edu Corral MD        Apply topically 2 times daily   Quantity:  60 g   Refills:  1            Where to get your medicines      These medications were sent to [id number] [place] [id number] [address]     Phone:  [phone]     fluocinonide 0.05 % ointment                Primary Care Provider Office Phone # Fax #    Vivek Rafael Callejas [phone]       [facility] [address] [place] [address]        Equal Access to Services     RABIA PEÑA : Hadii lisa ku hadasho Soomaali, waaxda luqadaha, qaybta kaalmada adeegyaclint, corinna adamson. So [facility] [phone].    ATENCIÓN: Si habla español, tiene a del toro disposición servicios gratuitos de asistencia lingüística. Llame al [phone].    We comply with applicable federal civil rights laws and [place] laws. We do  not discriminate on the basis of race, color, national origin, age, disability, sex, sexual orientation, or gender identity.            Thank you!     Thank you for choosing [facility] DERMATOLOGY  for your care. Our goal is always to provide you with excellent care. Hearing back from our patients is one way we can continue to improve our services. Please take a few minutes to complete the written survey that you may receive in the mail after your visit with us. Thank you!             Your Updated Medication List - Protect others around you: Learn how to safely use, store and throw away your medicines at www.disposemymeds.org.          This list is accurate as of 10/24/18 11:21 AM.  Always use your most recent med list.                   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), MDS (myelodysplastic syndrome) (H)       amoxicillin 500 MG capsule    AMOXIL    16 capsule    Take 4 pills (2 grams) day of dental work    MDS (myelodysplastic syndrome) (H)       aspirin 325 MG tablet     30 tablet    Take 1 tablet (325 mg) by mouth daily    MDS (myelodysplastic syndrome) (H)       bumetanide 1 MG tablet    BUMEX    60 tablet    Take 1 tablet (1 mg) by mouth every other day Take 1 tablet 1mg by mouth daily 30 minutes prior to bumex    MDS (myelodysplastic syndrome) (H)       calcium carbonate-vitamin D 500-400 MG-UNIT Tabs per tablet     180 tablet    Take 1 tablet by mouth daily 2000 mg    MDS (myelodysplastic syndrome) (H), Acute xpwcv-ucpjfz-oekd disease (H), 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)       cephALEXin 500 MG capsule    KEFLEX    14 capsule    Take 1 capsule (500 mg) by mouth 2 times daily    MDS (myelodysplastic syndrome) (H)       fluconazole 100 MG tablet    DIFLUCAN    30 tablet    Take 1 tablet (100 mg) by mouth daily    Status post bone marrow transplant (H)        fluocinonide 0.05 % ointment    LIDEX    60 g    Apply topically 2 times daily    Dermatitis, unspecified       levofloxacin 250 MG tablet    LEVAQUIN    30 tablet    Take 1 tablet (250 mg) by mouth daily    MDS (myelodysplastic syndrome) (H)       LORazepam 1 MG tablet    ATIVAN    30 tablet    Take 1 tablet (1 mg) by mouth every 6 hours as needed for anxiety    MDS (myelodysplastic syndrome) (H)       metolazone 5 MG tablet    ZAROXOLYN     Take 5 mg by mouth daily        order for DME     1 Device    Please provide a NOVA cane offset with strap item number [id number]    MDS (myelodysplastic syndrome) (H)       order for DME     1 each    Please measure and distribute 1 pair of 20mmHg - 30mmHg thigh high open or closed toe compression stockings. Jobst ultrasheer or equivalent.    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), Status post bone marrow transplant (H)       * predniSONE 20 MG tablet    DELTASONE    60 tablet    Take 1 tablet (20 mg) by mouth daily    SOB (shortness of breath)       * predniSONE 50 MG tablet    DELTASONE    30 tablet    Take 1 tablet (50 mg) by mouth daily Take a combination of 1 if the 50 mg and 2 of the 20 mg tablets for a total of 90 mg prednisone by mouth daily    SOB (shortness of breath)       ruxolitinib 5 MG Tabs tablet CHEMO    JAKAFI    60 tablet    Take 1 tablet (5 mg) by mouth 2 times daily    MDS (myelodysplastic syndrome) (H)       sertraline 100 MG tablet    ZOLOFT    30 tablet    Take 1 tablet (100 mg) by mouth daily    MDS (myelodysplastic syndrome) (H), 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 Read the directions carefully, and ask your doctor or other care provider to review them with you.

## 2018-10-24 NOTE — PATIENT INSTRUCTIONS
Start lidex ointment (fluocinonide) twice daily to all the red areas.   Stop compression (ACE wraps/stockings) to the right leg given the blood clot. Elevate your legs as much possible. We will discuss with your other doctors.       Wound Care After a Biopsy    What is a skin biopsy?  A skin biopsy allows the doctor to examine a very small piece of tissue under the microscope to determine the diagnosis and the best treatment for the skin condition. A local anesthetic (numbing medicine)  is injected with a very small needle into the skin area to be tested. A small piece of skin is taken from the area. Sometimes a suture (stitch) is used.     What are the risks of a skin biopsy?  I will experience scar, bleeding, swelling, pain, crusting and redness. I may experience incomplete removal or recurrence. Risks of this procedure are excessive bleeding, bruising, infection, nerve damage, numbness, thick (hypertrophic or keloidal) scar and non-diagnostic biopsy.    How should I care for my wound for the first 24 hours?    Keep the wound dry and covered for 24 hours    If it bleeds, hold direct pressure on the area for 15 minutes. If bleeding does not stop then go to the emergency room    Avoid strenuous exercise the first 1-2 days or as your doctor instructs you    How should I care for the wound after 24 hours?    After 24 hours, remove the bandage    You may bathe or shower as normal    If you had a scalp biopsy, you can shampoo as usual and can use shower water to clean the biopsy site daily    Clean the wound twice a day with gentle soap and water    Do not scrub, be gentle    Apply white petroleum/Vaseline after cleaning the wound with a cotton swab or a clean finger, and keep the site covered with a Bandaid /bandage. Bandages are not necessary with a scalp biopsy    If you are unable to cover the site with a Bandaid /bandage, re-apply ointment 2-3 times a day to keep the site moist. Moisture will help with  healing    Avoid strenuous activity for first 1-2 days    Avoid lakes, rivers, pools, and oceans until the stitches are removed or the site is healed    How do I clean my wound?    Wash hands thoroughly with soap or use hand  before all wound care    Clean the wound with gentle soap and water    Apply white petroleum/Vaseline  to wound after it is clean    Replace the Bandaid /bandage to keep the wound covered for the first few days or as instructed by your doctor    If you had a scalp biopsy, warm shower water to the area on a daily basis should suffice    What should I use to clean my wound?     Cotton-tipped applicators (Qtips )    White petroleum jelly (Vaseline ). Use a clean new container and use Q-tips to apply.    Bandaids   as needed    Gentle soap     How should I care for my wound long term?    Do not get your wound dirty    Keep up with wound care for one week or until the area is healed.    A small scab will form and fall off by itself when the area is completely healed. The area will be red and will become pink in color as it heals. Sun protection is very important for how your scar will turn out. Sunscreen with an SPF 30 or greater is recommended once the area is healed.    If you have stitches, stitches need to be removed in 2-3 weeks. You may return to our clinic for this or you may have it done locally at your doctor s office.    You should have some soreness but it should be mild and slowly go away over several days. Talk to your doctor about using tylenol for pain,    When should I call my doctor?  If you have increased:     Pain or swelling    Pus or drainage (clear or slightly yellow drainage is ok)    Temperature over 100F    Spreading redness or warmth around wound    When will I hear about my results?  The biopsy results can take 2-3 weeks to come back. The clinic will call you with the results, send you a [employer] message, or have you schedule a follow-up clinic or phone time to  discuss the results. Contact our clinics if you do not hear from us in 3 weeks.     Who should I call with questions?    [facility]: [phone]     [facility]: [phone]    For urgent needs outside of business hours call the [facility] at [phone] and ask for the dermatology resident on call

## 2018-10-26 ENCOUNTER — TELEPHONE (OUTPATIENT)
Dept: DERMATOLOGY | Facility: CLINIC | Age: 68
End: 2018-10-26

## 2018-10-26 LAB — COPATH REPORT: NORMAL

## 2018-10-26 NOTE — TELEPHONE ENCOUNTER
Central Prior Authorization Team   Phone: [phone]    PA Initiation    Medication: Fluocinonide  Insurance Company: [employer] Part D - Phone [phone] Fax [phone]  Pharmacy Filling the Rx: [id number] - [place], [place] - [address] [address]  Filling Pharmacy Phone: [phone]  Filling Pharmacy Fax:    Start Date: 10/26/2018

## 2018-10-26 NOTE — TELEPHONE ENCOUNTER
Prior Authorization Retail Medication Request    Medication/Dose: Fluocinonide 0.05% ointment  ICD code (if different than what is on RX):  Dermatitis, unspecified [L30.9]  Previously Tried and Failed:  See chart  Rationale:      Insurance Name:    Insurance ID:        Pharmacy Information (if different than what is on RX)  Name:    Phone:      Cover My Meds key: [id number]    LEXX Wilson

## 2018-10-29 ENCOUNTER — TELEPHONE (OUTPATIENT)
Dept: ONCOLOGY | Facility: CLINIC | Age: 68
End: 2018-10-29

## 2018-10-29 NOTE — TELEPHONE ENCOUNTER
"Oral Chemotherapy Monitoring Program    Primary Oncologist: Dr. Pinto  Primary Oncology Clinic: [facility]  Cancer Diagnosis: GVHD    Start Date: C1D1=10/22/2018, Expected Z3L8=0611/19/2018  Therapy: Jakafi (ruxolitinib) 5 mg (1 x 5 mg) twice daily continuously    Lab Monitoring Plan  CBC every 2 weeks for first 2 months, then CBC and CMP monthly  Subjective/Objective:  Deejay Dior is a 67 year old male contacted by phone for a follow-up visit for oral chemotherapy. When asked how Deejay is doing since starting the new therapy, he chuckled and said \"hmm I don't know\". He said he thinks the prednisone is wearing off, which made him feel like he was \"sitting on a vibrating chair all day\" aka he felt quite jittery. He was given ativan to help with this side effect. He quickly found out that a side effect of ativan is sleepiness so he has cut back from 3 doses a day to only 1 dose later in the day to help sleep. He denies diarrhea, fatigue, abdominal pain. Deejay is in [facility] and is pleased that he is feeling okay.     ORAL CHEMOTHERAPY 10/22/2018 10/29/2018   Drug Name Jakafi (Ruxolitinib) Jakafi (Ruxolitinib)   Current Dosage 5mg 5mg   Current Schedule BID BID   Cycle Details Continuous Continuous   Start Date of Last Cycle - 10/22/2018   Planned next cycle start date - 11/19/2018   Doses missed in last 2 weeks - 0   Adherence Assessment - Adherent   Adverse Effects - No AE identified during assessment   Home BPs - not needed   Any new drug interactions? Yes -   Pharmacist Intervention? Yes -   Intervention(s) Patient Education -     Assessment:  Patient is tolerating therapy well with no side effects. No pharmacological interventions made at this time.     Plan:  -Continue Jakafi therapy   -Review labs on 11/15    Follow-Up:  Review appointment with Dr. Pinto on 11/15/18     Refill Due:  11/19/18     Radha Aj   Pharmacy Intern  [facility]   [phone]     "

## 2018-10-29 NOTE — TELEPHONE ENCOUNTER
PRIOR AUTHORIZATION DENIED    Medication: Fluocinonide - denied    Denial Date: 10/29/2018    Denial Rational: script is denied because pt must try/fail formulary alternatives listed below                Appeal Information:

## 2018-11-02 DIAGNOSIS — Z94.81 STATUS POST BONE MARROW TRANSPLANT (H): ICD-10-CM

## 2018-11-02 DIAGNOSIS — D46.9 MDS (MYELODYSPLASTIC SYNDROME) (H): ICD-10-CM

## 2018-11-02 RX ORDER — LEVOFLOXACIN 250 MG/1
250 TABLET, FILM COATED ORAL DAILY
Qty: 30 TABLET | Refills: 1 | COMMUNITY
Start: 2018-11-02 | End: 2018-12-04

## 2018-11-02 RX ORDER — FLUCONAZOLE 100 MG/1
100 TABLET ORAL DAILY
Qty: 30 TABLET | Refills: 5 | COMMUNITY
Start: 2018-11-02 | End: 2019-04-05

## 2018-11-02 NOTE — TELEPHONE ENCOUNTER
Patient called while on vacation in [place], [place] asking to have two prescriptions called into [facility] at [phone]. He stated that he needed fluconazole 100 mg daily and levaquin 250 mg daily. I called this into [facility] and called Ania in [place] so he will have refills when he is back in town.

## 2018-11-05 NOTE — PROGRESS NOTES
BMT Clinic Note     ID:  Mr. 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 Went out east to [place], had a great trip. Fewer naps while on prednisone, now completely tapered.  Dry mouth has been a problem since he is off the prednisone. Tremors gone.  On jakafi.  Occ uses biotene. No F/C/S, no cough/SOB, no infx, no N/V/D/C, eating well.  Edema ok, biopsy did not show GVHD. L leg vein surgery next Tuesday.       Review of Systems: 10 point ROS negative except as noted above.    Physical Exam:  /70 (BP Location: Left arm, Patient Position: Chair, Cuff Size: Adult Large)  Pulse 75  Temp 97.8  F (36.6  C) (Oral)  Wt 97.1 kg (214 lb 1.6 oz)  SpO2 95%  BMI 32.56 kg/m2     Wt Readings from Last 4 Encounters:   11/15/18 97.1 kg (214 lb 1.6 oz)   10/22/18 94.6 kg (208 lb 8 oz)   10/17/18 94.8 kg (209 lb)   10/16/18 98.6 kg (217 lb 6.4 oz)     General: NAD, interactive  Eyes: SARIKA, sclera anicteric  Nose/Mouth/Throat: OP clear, no ulcerations, very dry, upper plate, chronic lichenoid changes   Lungs:CTA B, no crackles or wheezes   CV: RRR without murmurs rubs or gallops  Abd: S/NT/ND +BS  Skin:  Bilat LE edema 1+. RLE skin thin with scattered shallow ulcers. Very tender. Less edematous, no weeping compared to last visit   Neuro: A&O, moderate resting tremor  Access: peripheral    Labs:  Lab Results   Component Value Date    WBC 6.3 11/15/2018    ANEU 2.8 11/15/2018    HGB 14.0 11/15/2018    HCT 42.0 11/15/2018     11/15/2018     11/15/2018    POTASSIUM 4.2 11/15/2018    CHLORIDE 108 11/15/2018    CO2 23 11/15/2018    GLC 90 11/15/2018    BUN 22 11/15/2018    CR 1.23 11/15/2018    MAG 2.1 10/17/2018    INR 0.95 06/30/2016     ASSESSMENT AND PLAN:  Mr. 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 Total cell dose  (from 7/1 & 7/2) 6.53 x 10^6 CD34+ cells/kg.   - 1 year anniversary (July 2015): 30% cellular, trilineage hematopoiesis, no abnormal blasts by morphology or flow , no dysplasia, 0-1 fibrosis, 100% donor (BM, CD3, CD15). CR  - 2 year anniversary (July 2016): 20-30% cellular, trilineage hematopoiesis, no abnormal blasts by morphology or flow , no dysplasia, No fibrosis, 100% donor in BM (PB not sent). ISCN: //46,XX[20] Complete Remission.    HEME: 6.3>14.0<227  (ANC 2.8), Eos 0.1.   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 Had been off prednisone since summer 2017 and briefly tapered off Sirolimus (january 2018), however resumed with flare in February. 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 Sirolimus was stopped on 9/27 & Pred 90mg was started. Seen by Dr. Sandoval on 10/2, please see his note. He does not think his symptoms or CT findings are c/w Sirolimus or GVH & he was in favor of tapering Prednisone. Now he has worsening skin thickening & desquamation to the RLE, c/w GVH.   Started Jakafi 10/22 at 5 mg BID.  Completed steroid taper 1 week ago.    Follow for sx.     ID:  Afebrile no signs/sx of infection.   - IgG = 403, repleted 3/22/16, 5/8/16= 1270; recheck with recent viral exposures 1300 10/1  - Prophy: HD ACV (renal dosing), Fluconazole, Levaquin (while on steroids) and  Bactrim.    - Flu shot given 10/11/18      GI:    On protonix (has heartburn)    FEN/Renal:  Cr significantly improved with bumex (no metolazone) every other day. Edema better, continue     Fatigue: Better recently.  - History of testosterone deficiency, rechecked and modestly low. Endo referral whenever pt requests  - TSH normal 2/28 and again on repeat 9/27 at 1.01.    Derm:  Schedule derm referral for RLE skin changes  - Derm sees Wednesday for chronic RLE skin changes and gvhd. Skin is improved 10/22  from 10/17. On 10/17 Right leg was edematous, weeping, tender.   Venous statis: see below      Vasc:   10/15 Bilateral lower extremity swelling x24 hours. Right leg US with small (technically DVT) clot in posterior tibial vein: per Millie, start reg dose aspirin daily 325mg and recheck US in 2 weeks or symptomatically  Use lymphedema wraps  Discomfort since edema/shakes: oxy 1-2 tab during day and ativan 6 hours away from oxy for sleep.  H/o chronic venous statis: scheduled for sclerotherapy to the L leg.  kelfex day prior and 4 days following.     CV:  Mildly irregular HR on exam, EKG sinus tachy 10/16  Edema 2/2 heart failure? BNP neg compared to baseline. Use bumex (has worked best historically); recheck labs this week for Cr/K tolerance   ECHO results no change from previous EF 60-65%      Plan:  Continue jakafi  RTC 11/15 with Dr. Pinto, schedule repeat LE US that day

## 2018-11-10 DIAGNOSIS — Z94.81 STATUS POST BONE MARROW TRANSPLANT (H): ICD-10-CM

## 2018-11-12 RX ORDER — SULFAMETHOXAZOLE/TRIMETHOPRIM 800-160 MG
TABLET ORAL
Qty: 16 TABLET | Refills: 2 | Status: SHIPPED | OUTPATIENT
Start: 2018-11-12 | End: 2019-02-04

## 2018-11-12 RX ORDER — DIAZEPAM 5 MG
10 TABLET ORAL
Status: CANCELLED | OUTPATIENT
Start: 2018-11-12

## 2018-11-13 ENCOUNTER — TELEPHONE (OUTPATIENT)
Dept: INTERVENTIONAL RADIOLOGY/VASCULAR | Facility: CLINIC | Age: 68
End: 2018-11-13

## 2018-11-13 NOTE — TELEPHONE ENCOUNTER
Called pt to inform him that we have to reschedule him from Friday 11/16 to Tues 11/20.     Informed him that the first provider went on paternity leave and then the second provider was not going to be around.     Informed him that he is to check into [facility] waiting room at 930am for an 11am. The providers' name is Dr. Godwin.     I did inquire on his ultrasound. Informed him taht he did have his procedure, 1 week US indicated on DVT, however a couple of days after that, he did have another Ultrasound which indicated that he did have a clot at the level of his ankle.     I informed him that I noticed that he was then asked to take ASA and another f/u US was completed.     I informed him that this could be b/c of his varicose vein treatment.   He also informs me that he has a right thigh pain in which it was previously tender to the touch. He states that it's gotten better now however it's still a little sore but better.     I informed him that he may need a trapped blood release that we can address when he comes in next week Tues 11/20. I will also consult with the other providers as well to see what their thoughts are.     He agrees to plan.     We will see him on Tues 11/20 @ 11am. All appt details and location provided to him.     Radha Rodney RN, BSN  Interventional Radiology Nurse Coordinator   Phone: [phone]

## 2018-11-14 DIAGNOSIS — I82.409 DVT (DEEP VENOUS THROMBOSIS) (H): Primary | ICD-10-CM

## 2018-11-14 DIAGNOSIS — I83.893 VARICOSE VEINS OF BILATERAL LOWER EXTREMITIES WITH OTHER COMPLICATIONS: ICD-10-CM

## 2018-11-14 NOTE — PROGRESS NOTES
Consulted with Dr. Godwin regarding pt's right lower extremity DVT that was found noted on 10/16.     Pt is coming back on 11/20 for right leg sclero check and left leg laser ablation .    He would like to f/u on this DVT that was noted s/p Right leg varicose vein treatment. We will schedule at the time of when he returns for his 1 week ultrasound check for his left leg treatment.     Radha Rodney RN, BSN  Interventional Radiology Nurse Coordinator   Phone: [phone]

## 2018-11-15 ENCOUNTER — TELEPHONE (OUTPATIENT)
Dept: INTERVENTIONAL RADIOLOGY/VASCULAR | Facility: CLINIC | Age: 68
End: 2018-11-15

## 2018-11-15 ENCOUNTER — ONCOLOGY VISIT (OUTPATIENT)
Dept: TRANSPLANT | Facility: CLINIC | Age: 68
End: 2018-11-15
Attending: INTERNAL MEDICINE
Payer: MEDICARE

## 2018-11-15 ENCOUNTER — APPOINTMENT (OUTPATIENT)
Dept: LAB | Facility: CLINIC | Age: 68
End: 2018-11-15
Attending: INTERNAL MEDICINE
Payer: MEDICARE

## 2018-11-15 ENCOUNTER — TELEPHONE (OUTPATIENT)
Dept: TRANSPLANT | Facility: CLINIC | Age: 68
End: 2018-11-15

## 2018-11-15 VITALS
WEIGHT: 214.1 LBS | BODY MASS INDEX: 32.56 KG/M2 | TEMPERATURE: 97.8 F | OXYGEN SATURATION: 95 % | SYSTOLIC BLOOD PRESSURE: 119 MMHG | DIASTOLIC BLOOD PRESSURE: 70 MMHG | HEART RATE: 75 BPM

## 2018-11-15 DIAGNOSIS — D46.9 MDS (MYELODYSPLASTIC SYNDROME) (H): ICD-10-CM

## 2018-11-15 DIAGNOSIS — I87.2 VENOUS STASIS DERMATITIS, UNSPECIFIED LATERALITY: Primary | ICD-10-CM

## 2018-11-15 LAB
ALBUMIN SERPL-MCNC: 3.2 G/DL (ref 3.4–5)
ALP SERPL-CCNC: 66 U/L (ref 40–150)
ALT SERPL W P-5'-P-CCNC: 28 U/L (ref 0–70)
ANION GAP SERPL CALCULATED.3IONS-SCNC: 8 MMOL/L (ref 3–14)
AST SERPL W P-5'-P-CCNC: 31 U/L (ref 0–45)
BASOPHILS # BLD AUTO: 0 10E9/L (ref 0–0.2)
BASOPHILS NFR BLD AUTO: 0.5 %
BILIRUB SERPL-MCNC: 0.3 MG/DL (ref 0.2–1.3)
BUN SERPL-MCNC: 22 MG/DL (ref 7–30)
CALCIUM SERPL-MCNC: 8.6 MG/DL (ref 8.5–10.1)
CHLORIDE SERPL-SCNC: 108 MMOL/L (ref 94–109)
CO2 SERPL-SCNC: 23 MMOL/L (ref 20–32)
CREAT SERPL-MCNC: 1.23 MG/DL (ref 0.66–1.25)
DIFFERENTIAL METHOD BLD: ABNORMAL
EOSINOPHIL # BLD AUTO: 0.1 10E9/L (ref 0–0.7)
EOSINOPHIL NFR BLD AUTO: 1 %
ERYTHROCYTE [DISTWIDTH] IN BLOOD BY AUTOMATED COUNT: 19.3 % (ref 10–15)
GFR SERPL CREATININE-BSD FRML MDRD: 58 ML/MIN/1.7M2
GLUCOSE SERPL-MCNC: 90 MG/DL (ref 70–99)
HCT VFR BLD AUTO: 42 % (ref 40–53)
HGB BLD-MCNC: 14 G/DL (ref 13.3–17.7)
IMM GRANULOCYTES # BLD: 0 10E9/L (ref 0–0.4)
IMM GRANULOCYTES NFR BLD: 0.3 %
LYMPHOCYTES # BLD AUTO: 2.5 10E9/L (ref 0.8–5.3)
LYMPHOCYTES NFR BLD AUTO: 39.5 %
MCH RBC QN AUTO: 30.7 PG (ref 26.5–33)
MCHC RBC AUTO-ENTMCNC: 33.3 G/DL (ref 31.5–36.5)
MCV RBC AUTO: 92 FL (ref 78–100)
MONOCYTES # BLD AUTO: 1 10E9/L (ref 0–1.3)
MONOCYTES NFR BLD AUTO: 15.1 %
NEUTROPHILS # BLD AUTO: 2.8 10E9/L (ref 1.6–8.3)
NEUTROPHILS NFR BLD AUTO: 43.6 %
NRBC # BLD AUTO: 0 10*3/UL
NRBC BLD AUTO-RTO: 0 /100
PLATELET # BLD AUTO: 227 10E9/L (ref 150–450)
POTASSIUM SERPL-SCNC: 4.2 MMOL/L (ref 3.4–5.3)
PROT SERPL-MCNC: 6.8 G/DL (ref 6.8–8.8)
RBC # BLD AUTO: 4.56 10E12/L (ref 4.4–5.9)
SODIUM SERPL-SCNC: 139 MMOL/L (ref 133–144)
WBC # BLD AUTO: 6.3 10E9/L (ref 4–11)

## 2018-11-15 PROCEDURE — 36415 COLL VENOUS BLD VENIPUNCTURE: CPT

## 2018-11-15 PROCEDURE — 85025 COMPLETE CBC W/AUTO DIFF WBC: CPT | Performed by: STUDENT IN AN ORGANIZED HEALTH CARE EDUCATION/TRAINING PROGRAM

## 2018-11-15 PROCEDURE — G0463 HOSPITAL OUTPT CLINIC VISIT: HCPCS

## 2018-11-15 PROCEDURE — 80053 COMPREHEN METABOLIC PANEL: CPT | Performed by: STUDENT IN AN ORGANIZED HEALTH CARE EDUCATION/TRAINING PROGRAM

## 2018-11-15 RX ORDER — LORAZEPAM 1 MG/1
1 TABLET ORAL AT BEDTIME
Qty: 60 TABLET | Refills: 0 | Status: ON HOLD | OUTPATIENT
Start: 2018-11-15 | End: 2019-09-03

## 2018-11-15 ASSESSMENT — PAIN SCALES - GENERAL: PAINLEVEL: NO PAIN (0)

## 2018-11-15 NOTE — Clinical Note
RTC 12/4 to BMT w/ labs and [place] (prefer Jyoti or Tamar who have seen him recently) RTC 1/31/19 with Dr. Pinto and BMT/labs

## 2018-11-15 NOTE — NURSING NOTE
Chief Complaint   Patient presents with     Blood Draw     labs drawn via venipuncture by RN     /70 (BP Location: Left arm, Patient Position: Chair, Cuff Size: Adult Large)  Pulse 75  Temp 97.8  F (36.6  C) (Oral)  Wt 97.1 kg (214 lb 1.6 oz)  SpO2 95%  BMI 32.56 kg/m2    Vitals taken.  Labs collected and sent from left antecubital venipuncture. Pt tolerated well. Pt checked in for next appointment.    Abril Madrid RN

## 2018-11-15 NOTE — MR AVS SNAPSHOT
After Visit Summary   11/15/2018    Deejay Dior    MRN: [id number]           Patient Information     Date Of Birth          1950        Visit Information        Provider Department      11/15/2018 1:30 PM Aby Pinto MD [facility] Blood and Marrow Transplant        Today's Diagnoses     MDS (myelodysplastic syndrome) (H)              [facility] [place]  [address] [place] [address]  Phone: [phone]  Clinic Hours:   Monday-Thursday:7am to 7pm   Friday: 7am to 5pm   Weekends and holidays:    8am to noon (in general)  If your fever is 100.5  or greater,   call the clinic.  After hours call the   hospital at [phone] or   [phone]. Ask for the BMT   fellow on-call           Care Instructions    RTC 12/4 to BMT w/ labs and CAROLINA (prefer Jyoti or Tamar who have seen him recently)  RTC 1/31/19 with Dr. Pinto and BMT/labs            Follow-ups after your visit        Your next 10 appointments already scheduled     Nov 20, 2018  9:30 AM CST   Procedure 5.5 hour with U2A ROOM 1   Unit 2A [facility] ([facility])    [address] [facility] [address]               Nov 20, 2018 11:00 AM CST   IR STAB PHLEBECTOMY < 10 STABS with UUIR5   [facility] [place], Interventional Radiology ([facility])    [address] [facility] [id number] [phone]           The day before the exam:   You may eat your regular diet.   You are encouraged to drink at least 8 eight ounce glasses of clear liquids.   Drink no alcoholic beverages for 24 hours before or after the exam.  The day of the exam:   Do not eat any solid food or milk products for 6 hours prior to the exam. You may drink clear liquids until 2 hours prior to the exam. Clear liquids include the following: water, Jell-O, clear broth, apple juice or any non-carbonated drink that you can see through (no  pop!)   The morning of the exam you may take medications as directed with a sip of water.  Please wear loose clothing, such as a sweat suit or jogging clothes. Avoid snaps, zippers and other metal. We may ask you to undress and put on a hospital gown.  Please bring any scans or X-rays taken at other hospitals, if similar tests were done. Also bring a list of your medicines, including vitamins, minerals and over-the-counter drugs. It is safest to leave personal items at home.  Someone will need to drive you to and from the hospital.  Tell your doctor in advance:   If you have allergies to x-ray contrast or iodine.   If you are or may be pregnant.   If you are taking Coumadin (or any other blood thinners) 5 days prior to the exam for any special instructions.   If you are diabetic to determine if your insulin needs have to be adjusted for the exam.  Your doctor will:   Need to do a history and physical within 30 days before this procedure.   Obtain necessary laboratory tests prior to the exam (CBCP, INR and PTT).  If you were given sedation, you cannot drive for 24 hours after the procedure, and an adult must be with you until then.  If you have any questions, please call the Imaging Department where you will have your exam. Directions, parking instructions, and other information are available on our website, [email or web address].            Nov 23, 2018 11:00 AM CST   US LOWER EXTREMITY VENOUS DUPLEX BILATERAL with UCUSV2   [facility] [place])    [address] [place] [address] [phone]           How do I prepare for my exam? (Food and drink instructions) No Food and Drink Restrictions.  How do I prepare for my exam? (Other instructions) You do not need to do anything special to prepare for your exam.  What should I wear: Wear comfortable clothes.  How long does the exam take: Most ultrasounds take 30 to 60 minutes.  What should I bring:  Bring a list of your medicines, including vitamins, minerals and over-the-counter drugs. It is safest to leave personal items at home.  Do I need a :  No  is needed.  What do I need to tell my doctor: Tell your doctor about any allergies you may have.  What should I do after the exam: No restrictions, You may resume normal activities.  What is this test: An ultrasound uses sound waves to make pictures of the body. Sound waves do not cause pain. The only discomfort may be the pressure of the wand against your skin or full bladder.  Who should I call with questions: If you have any questions, please call the Imaging Department where you will have your exam. Directions, parking instructions, and other information is available on our website, [email or web address].            Dec 04, 2018 11:30 AM CST   Masonic Lab Draw with  MASONIC LAB DRAW   [facility] Masonic Lab Draw ([facility])    [address] [place] [address]  [address]  [facility] [address]   [phone]            Dec 04, 2018 12:15 PM CST   Return with  BMT CAROLINA #3   [facility] Blood and Marrow Transplant ([facility])    [address] [facility]  [address]  [facility] [address]   [phone]            Dec 04, 2018  1:20 PM CST   CT CHEST W/O CONTRAST with UCCT1   [facility] CT ([facility])    [address] [place] [address]  1st Floor  [facility] [address]   [phone]           How do I prepare for my exam? (Food and drink instructions) No Food and Drink Restrictions.  How do I prepare for my exam? (Other instructions) You do not need to do anything special to prepare for this exam. For a sinus scan: Use your nose spray (nasal decongestant spray) as directed.  What should I wear: Please wear loose clothing, such as a sweat suit or jogging clothes. Avoid snaps, zippers and other metal. We may ask you to undress and put on a hospital gown.  How long does the exam  take: Most scans take less than 20 minutes.  What should I bring: Please bring any scans or X-rays taken at other hospitals, if similar tests were done. Also bring a list of your medicines, including vitamins, minerals and over-the-counter drugs. It is safest to leave personal items at home.  Do I need a : No  is needed.  What do I need to tell my doctor? Be sure to tell your doctor: * If you have any allergies. * If there s any chance you are pregnant. * If you are breastfeeding.  What should I do after the exam: No restrictions, You may resume normal activities.  What is this test: A CT (computed tomography) scan is a series of pictures that allows us to look inside your body. The scanner creates images of the body in cross sections, much like slices of bread. This helps us see any problems more clearly.  Who should I call with questions: If you have any questions, please call the Imaging Department where you will have your exam. Directions, parking instructions, and other information is available on our website, [email or web address].            Dec 04, 2018  3:00 PM CST   (Arrive by 2:45 PM)   Return Visit with Arsh Sandoval MD   [facility] ([facility])    [address] [place] [address]  [address]  [facility] [address]   [phone]            Jan 31, 2019  1:30 PM CST   Masonic Lab Draw with  MASONIC LAB DRAW   [facility])    [address] [place] [address]  [address]  [facility] [address]   [phone]            Jan 31, 2019  2:15 PM CST   Return with Aby Pinto MD   [facility] Blood and Marrow Transplant ([facility])    [address] [place] [address]  [address]  [facility] [address]   [phone]              Future tests that were ordered for you today     Open Future Orders        Priority Expected Expires Ordered    US Lower Extremity Venous Duplex Bilateral Routine   11/14/2019 11/14/2018            Who to contact     If you have questions or need follow up information about today's clinic visit or your schedule please contact [facility] BLOOD AND MARROW TRANSPLANT directly at [phone].  Normal or non-critical lab and imaging results will be communicated to you by [employer]hart, letter or phone within 4 business days after the clinic has received the results. If you do not hear from us within 7 days, please contact the clinic through [employer]hart or phone. If you have a critical or abnormal lab result, we will notify you by phone as soon as possible.  Submit refill requests through [employer] or call your pharmacy and they will forward the refill request to us. Please allow 3 business days for your refill to be completed.          Additional Information About Your Visit        [employer] Information     [employer] gives you secure access to your electronic health record. If you see a primary care provider, you can also send messages to your care team and make appointments. If you have questions, please call your primary care clinic.  If you do not have a primary care provider, please call [phone] and they will assist you.        Care EveryWhere ID     This is your Care EveryWhere ID. This could be used by other organizations to access your [place] medical records  [id number]        Your Vitals Were     Pulse Temperature Pulse Oximetry BMI (Body Mass Index)          75 97.8  F (36.6  C) (Oral) 95% 32.56 kg/m2         Blood Pressure from Last 3 Encounters:   11/15/18 119/70   10/24/18 145/85   10/22/18 129/70    Weight from Last 3 Encounters:   11/15/18 97.1 kg (214 lb 1.6 oz)   10/22/18 94.6 kg (208 lb 8 oz)   10/17/18 94.8 kg (209 lb)              We Performed the Following     CBC with platelets differential     Comprehensive metabolic panel          Today's Medication Changes          These changes are accurate as of 11/15/18  2:20 PM.  If you have any questions, ask your nurse or  doctor.               These medicines have changed or have updated prescriptions.        Dose/Directions    LORazepam 1 MG tablet   Commonly known as:  ATIVAN   This may have changed:    - when to take this  - reasons to take this   Used for:  MDS (myelodysplastic syndrome) (H)   Changed by:  Aby Pinto MD        Dose:  1 mg   Take 1 tablet (1 mg) by mouth At Bedtime   Quantity:  60 tablet   Refills:  0            Where to get your medicines      Some of these will need a paper prescription and others can be bought over the counter.  Ask your nurse if you have questions.     Bring a paper prescription for each of these medications     LORazepam 1 MG tablet                Recent Review Flowsheet Data     BMT Recent Results Latest Ref Rng & Units 10/1/2018 10/11/2018 10/16/2018 10/17/2018 10/22/2018 10/24/2018 11/15/2018    WBC 4.0 - 11.0 10e9/L 5.4 10.7 11.1(H) 12.7(H) 10.7 9.4 6.3    Hemoglobin 13.3 - 17.7 g/dL 14.4 14.2 14.4 13.8 14.8 15.1 14.0    Platelet Count 150 - 450 10e9/L 266 180 163 148(L) 167 161 227    Neutrophils (Absolute) 1.6 - 8.3 10e9/L 4.0 9.0(H) 9.2(H) 11.2(H) 6.8 5.1 2.8    INR 0.86 - 1.14 - - - - - - -    Sodium 133 - 144 mmol/L 137 135 135 135 137 135 139    Potassium 3.4 - 5.3 mmol/L 4.0 4.3 4.5 4.2 3.4 3.8 4.2    Chloride 94 - 109 mmol/L 106 104 106 104 105 104 108    Glucose 70 - 99 mg/dL 125(H) 152(H) 111(H) 103(H) 93 94 90    Urea Nitrogen 7 - 30 mg/dL 27 34(H) 26 31(H) 29 27 22    Creatinine 0.66 - 1.25 mg/dL 0.92 1.18 1.14 1.22 1.06 1.10 1.23    Calcium (Total) 8.5 - 10.1 mg/dL 8.6 8.0(L) 8.1(L) 8.2(L) 8.2(L) 8.2(L) 8.6    Protein (Total) 6.8 - 8.8 g/dL - 6.3(L) - 6.2(L) 6.5(L) - 6.8    Albumin 3.4 - 5.0 g/dL - 2.8(L) - 2.8(L) 2.9(L) - 3.2(L)    Bilirubin (Direct) 0.0 - 0.2 mg/dL - - - - - - -    Alkaline Phosphatase 40 - 150 U/L - 72 - 65 72 - 66    AST 0 - 45 U/L - 21 - 20 25 - 31    ALT 0 - 70 U/L - 33 - 36 37 - 28    MCV 78 - 100 fl 87 88 88 88 90 89 92               Primary  Care Provider Office Phone # Fax #    Vivek Rafael Callejas [phone]       [facility] [address] [place] [address]        Equal Access to Services     RABIA PEÑA : Edil lisa kincaid wilmaro Sozachali, waaxda luqadaha, qaybta kaalmada adeegyada, corinna lock laLunachris adamson. So [facility] [phone].    ATENCIÓN: Si habla español, tiene a del toro disposición servicios gratuitos de asistencia lingüística. Llame al [phone].    We comply with applicable federal civil rights laws and [place] laws. We do not discriminate on the basis of race, color, national origin, age, disability, sex, sexual orientation, or gender identity.            Thank you!     Thank you for choosing [facility] BLOOD AND MARROW TRANSPLANT  for your care. Our goal is always to provide you with excellent care. Hearing back from our patients is one way we can continue to improve our services. Please take a few minutes to complete the written survey that you may receive in the mail after your visit with us. Thank you!             Your Updated Medication List - Protect others around you: Learn how to safely use, store and throw away your medicines at www.disposemymeds.org.          This list is accurate as of 11/15/18  2:20 PM.  Always use your most recent med list.                   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), MDS (myelodysplastic syndrome) (H)       amoxicillin 500 MG capsule    AMOXIL    16 capsule    Take 4 pills (2 grams) day of dental work    MDS (myelodysplastic syndrome) (H)       aspirin 325 MG tablet     30 tablet    Take 1 tablet (325 mg) by mouth daily    MDS (myelodysplastic syndrome) (H)       bumetanide 1 MG tablet    BUMEX    60 tablet    Take 1 tablet (1 mg) by mouth every other day Take 1 tablet 1mg by mouth daily 30 minutes prior to  bumex    MDS (myelodysplastic syndrome) (H)       calcium carbonate-vitamin D 500-400 MG-UNIT Tabs per tablet     180 tablet    Take 1 tablet by mouth daily 2000 mg    MDS (myelodysplastic syndrome) (H), Acute rpnjh-kfhxhl-iguz disease (H), 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)       cephALEXin 500 MG capsule    KEFLEX    14 capsule    Take 1 capsule (500 mg) by mouth 2 times daily    MDS (myelodysplastic syndrome) (H)       fluconazole 100 MG tablet    DIFLUCAN    30 tablet    Take 1 tablet (100 mg) by mouth daily    Status post bone marrow transplant (H)       fluocinonide 0.05 % ointment    LIDEX    60 g    Apply topically 2 times daily    Dermatitis, unspecified       levofloxacin 250 MG tablet    LEVAQUIN    30 tablet    Take 1 tablet (250 mg) by mouth daily    MDS (myelodysplastic syndrome) (H)       LORazepam 1 MG tablet    ATIVAN    60 tablet    Take 1 tablet (1 mg) by mouth At Bedtime    MDS (myelodysplastic syndrome) (H)       metolazone 5 MG tablet    ZAROXOLYN     Take 5 mg by mouth daily        order for DME     1 Device    Please provide a NOVA cane offset with strap item number [id number]    MDS (myelodysplastic syndrome) (H)       order for DME     1 each    Please measure and distribute 1 pair of 20mmHg - 30mmHg thigh high open or closed toe compression stockings. Jobst ultrasheer or equivalent.    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), Status post bone marrow transplant (H)       * predniSONE 20 MG tablet    DELTASONE    60 tablet    Take 1 tablet (20 mg) by mouth daily    SOB (shortness of breath)       * predniSONE 50 MG tablet    DELTASONE    30 tablet    Take 1  tablet (50 mg) by mouth daily Take a combination of 1 if the 50 mg and 2 of the 20 mg tablets for a total of 90 mg prednisone by mouth daily    SOB (shortness of breath)       ruxolitinib 5 MG Tabs tablet CHEMO    JAKAFI    60 tablet    Take 1 tablet (5 mg) by mouth 2 times daily    MDS (myelodysplastic syndrome) (H)       sertraline 100 MG tablet    ZOLOFT    30 tablet    Take 1 tablet (100 mg) by mouth daily    MDS (myelodysplastic syndrome) (H), 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    Status post bone marrow transplant (H)       triamcinolone 0.1 % ointment    KENALOG     Apply twice a day to lower leg        * Notice:  This list has 2 medication(s) that are the same as other medications prescribed for you. Read the directions carefully, and ask your doctor or other care provider to review them with you.

## 2018-11-15 NOTE — TELEPHONE ENCOUNTER
[facility] Prior Authorization Team Request    Requested Action: Prior Authorization  Medication: fluocinonide 0.05% ointment  Insurance ID: Medicare [id number]  Insurance Phone: [phone]  Person Requesting Action: Insurance  Any additional information:

## 2018-11-16 NOTE — TELEPHONE ENCOUNTER
Prior Authorization Not Needed per Insurance    Medication: fluocinonide 0.05% ointment-PA NOT NEEDED   Insurance Company: Berta Kaminski - Phone [phone] Fax [phone]  Expected CoPay:      Pharmacy Filling the Rx: [id number] - [place], [place] - [address] [address]  Pharmacy Notified: No  Patient Notified: No    Initiated PA and insurance states that a previous PA was request and was Denied. Please see encounter on 10/26/2018 on Denial. If you would like to appeal. Please supply letter medical necessity and route back PA team.

## 2018-11-20 ENCOUNTER — APPOINTMENT (OUTPATIENT)
Dept: INTERVENTIONAL RADIOLOGY/VASCULAR | Facility: CLINIC | Age: 68
End: 2018-11-20
Attending: RADIOLOGY
Payer: MEDICARE

## 2018-11-20 ENCOUNTER — HOSPITAL ENCOUNTER (OUTPATIENT)
Facility: CLINIC | Age: 68
Discharge: HOME | End: 2018-11-20
Attending: RADIOLOGY | Admitting: RADIOLOGY
Payer: MEDICARE

## 2018-11-20 ENCOUNTER — APPOINTMENT (OUTPATIENT)
Dept: MEDSURG UNIT | Facility: CLINIC | Age: 68
End: 2018-11-20
Attending: RADIOLOGY
Payer: MEDICARE

## 2018-11-20 VITALS
WEIGHT: 208 LBS | SYSTOLIC BLOOD PRESSURE: 122 MMHG | TEMPERATURE: 98.7 F | BODY MASS INDEX: 30.81 KG/M2 | OXYGEN SATURATION: 97 % | DIASTOLIC BLOOD PRESSURE: 78 MMHG | RESPIRATION RATE: 16 BRPM | HEIGHT: 69 IN

## 2018-11-20 DIAGNOSIS — I83.893 VARICOSE VEINS OF BOTH LOWER EXTREMITIES WITH COMPLICATIONS: ICD-10-CM

## 2018-11-20 PROCEDURE — 25000128 H RX IP 250 OP 636: Performed by: RADIOLOGY

## 2018-11-20 PROCEDURE — A9270 NON-COVERED ITEM OR SERVICE: HCPCS | Mod: GY | Performed by: RADIOLOGY

## 2018-11-20 PROCEDURE — 37241 VASC EMBOLIZE/OCCLUDE VENOUS: CPT

## 2018-11-20 PROCEDURE — 25000125 ZZHC RX 250

## 2018-11-20 PROCEDURE — 27210732 ZZH ACCESSORY CR1

## 2018-11-20 PROCEDURE — 36478 ENDOVENOUS LASER 1ST VEIN: CPT

## 2018-11-20 PROCEDURE — 25000125 ZZHC RX 250: Performed by: RADIOLOGY

## 2018-11-20 PROCEDURE — 40000166 ZZH STATISTIC PP CARE STAGE 1

## 2018-11-20 PROCEDURE — 27210775 ZZH KIT CR11

## 2018-11-20 PROCEDURE — 25000132 ZZH RX MED GY IP 250 OP 250 PS 637: Mod: GY | Performed by: RADIOLOGY

## 2018-11-20 PROCEDURE — 27210738 ZZH ACCESSORY CR2

## 2018-11-20 RX ORDER — DEXTROSE MONOHYDRATE 25 G/50ML
25-50 INJECTION, SOLUTION INTRAVENOUS
Status: DISCONTINUED | OUTPATIENT
Start: 2018-11-20 | End: 2018-11-20 | Stop reason: HOSPADM

## 2018-11-20 RX ORDER — NICOTINE POLACRILEX 4 MG
15-30 LOZENGE BUCCAL
Status: DISCONTINUED | OUTPATIENT
Start: 2018-11-20 | End: 2018-11-20 | Stop reason: HOSPADM

## 2018-11-20 RX ORDER — DIAZEPAM 5 MG
10 TABLET ORAL
Status: COMPLETED | OUTPATIENT
Start: 2018-11-20 | End: 2018-11-20

## 2018-11-20 RX ADMIN — DIAZEPAM 10 MG: 5 TABLET ORAL at 11:53

## 2018-11-20 RX ADMIN — TETRADECYL HYDROGEN SULFATE (ESTER) 20 MG: 10 INJECTION, SOLUTION INTRAVENOUS at 13:43

## 2018-11-20 RX ADMIN — SODIUM BICARBONATE: 84 INJECTION, SOLUTION INTRAVENOUS at 13:44

## 2018-11-20 NOTE — DISCHARGE INSTRUCTIONS
[facility]  Interventional Radiology  Discharge Instructions for Sclerotherapy, Micro-Phlebectomy and/or Endovenous Laser Ablation of Legs        AFTER YOU GO HOME:       Drink plenty of fluids.       Resume your regular diet, unless otherwise instructed by your Primary Physician.    IF YOU RECEIVED SEDATION:            DO NOT smoke for at least 24 hours       DO NOT drink alcoholic beverages the day of your procedure       DO NOT drive or operate machinery at home or at work for 24 hours.          DO NOT take a bath or shower for at least 12 hours following your procedure.       DO NOT make any important or legal decisions for 24 hours following your procedure.        No tub baths, hot tubs or swimming for one week.        No vigorous lower extremity exercise (i.e. Stair stepper, running, or biking) for one week.    ADDITIONAL INFORMATION:    Sclerotherapy only  After you go home:  Resume regular diet  Resume normal activity, wear stockings and a 20 minute a day walk is encouraged.  Hot tubs, baths, use of leg weights and strenuous leg exercises should be avoided for 1 week.   Wear compression stockings for 24 hours straight, then during the daytime hours for the remainder of 2 weeks. You may remove to shower and if you have your legs elevated.  For discomfort try moist heat using a washcloth and ibuprofen.   For follow up appointments:  You will be called and scheduled.  You will have a sclerotherapy check every 3 weeks until treatment completed.  Endovenous laser ablation (usually in combination with microphlebectomy or sclerotherapy)  After you go home:  Drink plenty of fluids  Resume regular diet  Resume normal activity, wear stockings and a 20 minute a day walk is encouraged.  Hot tubs, baths, use of leg weights and strenuous leg exercises should be avoided for 1 week.   Do not take a shower for at least 12 hours following your procedure  Wear compression stockings for 3 days straight,  then wear during day time hours for remainder of 3 weeks.  You may remove briefly to shower and if you have your legs elevated.  For mild pain or discomfort Ibuprofen 600 mg oral every 8 hours as needed, for moderate to severe pain oxycodone 5 mg 1-2 tabs every 6 hours as needed.  IF you received sedation:  Do not smoke for 24 hours following your procedure  Do not drink alcoholic beverages day of your procedure  Do not drive or operate machinery at home or work for 24 hours following your procedure  Do not make any important or legal decisions for 24 hours following your procedure  For follow up appointments you will be called to schedule:  If you received sclerotherapy, you will have a sclerotherapy check every 3 weeks until treatment completed.  If EVLA only you will have an ultrasound scheduled for 1 week after procedure and an ultrasound followed by clinic appointment with your provider at 1 month post procedure  Microphlebectomy (usually in combination with EVLA)  After you go home:  Drink plenty of fluids  Resume regular diet  Activity after microphlebectomy is bedrest or legs elevated for 24 hours or until your microsite check appointment next day.  After that activity as tolerated, walking 20 minutes a day is encouraged.  Hot tubs, baths, use of leg weights and strenuous leg exercises should be avoided for 1 week.   Do not take a shower for at least 12 hours following your procedure  For mild pain or discomfort Ibuprofen 600 mg oral every 8 hours as needed, for moderate to severe pain oxycodone 5 mg 1-2 tabs every 6 hours as needed.  IF you received sedation:  Do not smoke for 24 hours following your procedure  Do not drink alcoholic beverages day of your procedure  Do not drive or operate machinery at home or work for 24 hours following your procedure  Do not make any important or legal decisions for 24 hours following your procedure  For follow up appointments you will be called to schedule:  After  microphlebectomy you have already been scheduled for microsite check day after your procedure with advanced practice provider in clinic.  There they will unwrap your legs, assess your wounds and place you into compression stockings.  Please bring your stockings to this procedure.  Wear compression stockings for 3 days straight, then wear during day time hours for remainder of 3 weeks.  You may remove briefly to shower and if you have your legs elevated.  If EVLA you will have an ultrasound scheduled for 1 week after procedure and an ultrasound followed by clinic appointment with your provider at 1 month post procedure      Procedural Physician: ELZA Godwin MD                               Date:November 20, 2018  Telephone Number:    [phone] Monday-Friday 8-4:30                                       [phone]    IR clinic RN                                       [phone] After 4:30 PM Monday-Friday, Weekends and Holidays, The hospital  will answer, ask for the Interventional Radiologist on call. Someone is available 24 hours /day

## 2018-11-20 NOTE — PROGRESS NOTES
Patient Name: Deejay Dior  Medical Record Number: [id number]  Today's Date: 11/20/2018    Procedure: Left great saphenous ablation, Left lower leg sclerotherapy  Proceduralist: ,     Sedation start time: none    Procedure start time: 1255  Puncture time: 1300  Procedure end time: 1355    Report given to: Amber FLEMING RN  : none    Other Notes: Pt arrived to IR room 5 from . Consent reviewed. Pt denies any questions or concerns regarding procedure. Pt positioned supine and monitored per protocol. Pt tolerated procedure without any noted complications.  Primapore placed on 4 puncture sites on left leg.  Compression stocking placed to left leg. Pt transferred back to .    Rosalia Campbell RN

## 2018-11-20 NOTE — PROGRESS NOTES
1025  Prep is complete for procedure.  Deejay is here for Stab Phlebectomy of Left Leg.  Wife, Racquel, is here as .  CTRN

## 2018-11-20 NOTE — PROGRESS NOTES
Returned to the unit at 1405 after having a sclerotherapy of his left leg, has compression stocking on his leg, denies pain. Wife with him. Eating and taking fluids. Ambulated in the hallway to the bathroom without any problems. Discharge instructions reviewed with patient, left unit via wheelchair to awaiting car driven by his wife.

## 2018-11-20 NOTE — IP AVS SNAPSHOT
MRN:[id number]                      After Visit Summary   11/20/2018    Deejay Dior    MRN: [id number]           Visit Information        Department      11/20/2018  9:31 AM [address] [facility] [place]          Review of your medicines      UNREVIEWED medicines. 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), MDS (myelodysplastic syndrome) (H)        Dose:  800 mg   Take 1 tablet (800 mg) by mouth 3 times daily   Quantity:  180 tablet   Refills:  6       amoxicillin 500 MG capsule   Commonly known as:  AMOXIL   Used for:  MDS (myelodysplastic syndrome) (H)        Take 4 pills (2 grams) day of dental work   Quantity:  16 capsule   Refills:  3       aspirin 325 MG tablet   Used for:  MDS (myelodysplastic syndrome) (H)        Dose:  325 mg   Take 1 tablet (325 mg) by mouth daily   Quantity:  30 tablet   Refills:  1       bumetanide 1 MG tablet   Commonly known as:  BUMEX   Used for:  MDS (myelodysplastic syndrome) (H)        Dose:  1 mg   Take 1 tablet (1 mg) by mouth every other day Take 1 tablet 1mg by mouth daily 30 minutes prior to bumex   Quantity:  60 tablet   Refills:  1       calcium carbonate-vitamin D 500-400 MG-UNIT Tabs per tablet   Used for:  MDS (myelodysplastic syndrome) (H), Acute brnto-zxxdqw-btfz disease (H), 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)        Dose:  1 tablet   Take 1 tablet by mouth daily 2000 mg   Quantity:  180 tablet   Refills:  3       fluconazole 100 MG tablet   Commonly known as:  DIFLUCAN   Used for:  Status post bone marrow transplant (H)        Dose:  100 mg   Take 1 tablet (100 mg) by mouth daily   Quantity:  30 tablet   Refills:  5       fluocinonide 0.05 % ointment   Commonly known as:  LIDEX   Used for:  Dermatitis, unspecified        Apply topically 2 times daily   Quantity:  60 g   Refills:  1       levofloxacin 250 MG tablet   Commonly  known as:  LEVAQUIN   Used for:  MDS (myelodysplastic syndrome) (H)        Dose:  250 mg   Take 1 tablet (250 mg) by mouth daily   Quantity:  30 tablet   Refills:  1       LORazepam 1 MG tablet   Commonly known as:  ATIVAN   Used for:  MDS (myelodysplastic syndrome) (H)        Dose:  1 mg   Take 1 tablet (1 mg) by mouth At Bedtime   Quantity:  60 tablet   Refills:  0       metolazone 5 MG tablet   Commonly known as:  ZAROXOLYN        Dose:  5 mg   Take 5 mg by mouth daily   Refills:  3       oxyCODONE IR 5 MG tablet   Commonly known as:  ROXICODONE   Used for:  MDS (myelodysplastic syndrome) (H)        Dose:  5 mg   Take 1 tablet (5 mg) by mouth every 6 hours as needed for severe pain   Quantity:  20 tablet   Refills:  0       pantoprazole 20 MG EC tablet   Commonly known as:  PROTONIX   Used for:  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), Status post bone marrow transplant (H)        Dose:  20 mg   Take 1 tablet (20 mg) by mouth daily   Quantity:  30 tablet   Refills:  11       * predniSONE 20 MG tablet   Commonly known as:  DELTASONE   Used for:  SOB (shortness of breath)        Dose:  20 mg   Take 1 tablet (20 mg) by mouth daily   Quantity:  60 tablet   Refills:  1       * predniSONE 50 MG tablet   Commonly known as:  DELTASONE   Used for:  SOB (shortness of breath)        Dose:  50 mg   Take 1 tablet (50 mg) by mouth daily Take a combination of 1 if the 50 mg and 2 of the 20 mg tablets for a total of 90 mg prednisone by mouth daily   Quantity:  30 tablet   Refills:  1       ruxolitinib 5 MG Tabs tablet CHEMO   Commonly known as:  JAKAFI   Used for:  MDS (myelodysplastic syndrome) (H)        Dose:  5 mg   Take 1 tablet (5 mg) by mouth 2 times daily   Quantity:  60 tablet   Refills:  1       sertraline 100 MG tablet   Commonly known as:  ZOLOFT   Used for:  MDS (myelodysplastic syndrome) (H), 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   Quantity:  16 tablet   Refills:  2       triamcinolone 0.1 % ointment   Commonly known as:  KENALOG        Apply twice a day to lower leg   Refills:  0       * Notice:  This list has 2 medication(s) that are the same as other medications prescribed for you. Read the directions carefully, and ask your doctor or other care provider to review them with you.      CONTINUE these medicines which have NOT CHANGED        Dose / Directions    order for DME   Used for:  MDS (myelodysplastic syndrome) (H)        Please provide a NOVA cane offset with strap item number [id number]   Quantity:  1 Device   Refills:  0       order for DME   Used for:  Varicose veins of both lower extremities with complications        Please measure and distribute 1 pair of 20mmHg - 30mmHg thigh high open or closed toe compression stockings. Jobst ultrasheer or equivalent.   Quantity:  1 each   Refills:  1                Protect others around you: Learn how to safely use, store and throw away your medicines at www.disposemymeds.org.         Follow-ups after your visit        Your next 10 appointments already scheduled     Nov 23, 2018 11:00 AM CST   US LOWER EXTREMITY VENOUS DUPLEX BILATERAL with UCUSV2   [facility]  ([facility])    [address] [address] [phone]           How do I prepare for my exam? (Food and drink instructions) No Food and Drink Restrictions.  How do I prepare for my exam? (Other instructions) You do not need to do anything special to prepare for your exam.  What should I wear: Wear comfortable clothes.  How long does the exam take: Most ultrasounds  take 30 to 60 minutes.  What should I bring: Bring a list of your medicines, including vitamins, minerals and over-the-counter drugs. It is safest to leave personal items at home.  Do I need a :  No  is needed.  What do I need to tell my doctor: Tell your doctor about any allergies you may have.  What should I do after the exam: No restrictions, You may resume normal activities.  What is this test: An ultrasound uses sound waves to make pictures of the body. Sound waves do not cause pain. The only discomfort may be the pressure of the wand against your skin or full bladder.  Who should I call with questions: If you have any questions, please call the Imaging Department where you will have your exam. Directions, parking instructions, and other information is available on our website, [email or web address].            Dec 04, 2018 11:30 AM CST   Masonic Lab Draw with  MASONIC LAB DRAW   [facility] Masonic Lab Draw ([facility])    [address]  [address]  [place] [address]   [phone]            Dec 04, 2018 12:15 PM CST   Return with  BMT CAROLINA #3   [facility] Blood and Marrow Transplant ([facility])    [address]  [address]  [place] [address]   [phone]            Dec 04, 2018  1:20 PM CST   CT CHEST W/O CONTRAST with UCCT1   [facility] ([facility])    [address]  1st Floor  [place] [address]   [phone]           How do I prepare for my exam? (Food and drink instructions) No Food and Drink Restrictions.  How do I prepare for my exam? (Other instructions) You do not need to do anything special to prepare for this exam. For a sinus scan: Use your nose spray (nasal decongestant spray) as directed.  What should I wear: Please wear loose clothing, such as a sweat suit or jogging clothes. Avoid snaps, zippers and other metal. We may ask you to undress and put on  a hospital gown.  How long does the exam take: Most scans take less than 20 minutes.  What should I bring: Please bring any scans or X-rays taken at other hospitals, if similar tests were done. Also bring a list of your medicines, including vitamins, minerals and over-the-counter drugs. It is safest to leave personal items at home.  Do I need a : No  is needed.  What do I need to tell my doctor? Be sure to tell your doctor: * If you have any allergies. * If there s any chance you are pregnant. * If you are breastfeeding.  What should I do after the exam: No restrictions, You may resume normal activities.  What is this test: A CT (computed tomography) scan is a series of pictures that allows us to look inside your body. The scanner creates images of the body in cross sections, much like slices of bread. This helps us see any problems more clearly.  Who should I call with questions: If you have any questions, please call the Imaging Department where you will have your exam. Directions, parking instructions, and other information is available on our website, [email or web address].            Dec 04, 2018  3:00 PM CST   (Arrive by 2:45 PM)   Return Visit with Arsh Sandoval MD   [facility] ([facility])    [address] [place] [address]  [address] [place] [id number] [phone]            Jan 31, 2019  1:30 PM CST   Masonic Lab Draw with  MASONIC LAB DRAW   [facility])    [address] [place] [address]  [address] [place] [address]   [phone]            Jan 31, 2019  2:15 PM CST   Return with Aby Pinto MD   [facility] Blood and Marrow Transplant ([facility])    [address] [place] [address]  [address] [place] [address] [phone]               Care Instructions        After Care Instructions     Discharge Instructions       Activity:  Resume your normal activity and go for  at least a 20 min walk per day.     Dressings and compression hose:    Stocking needs to be kept on over the first night and then worn daily for 2 weeks. Takeoff stockings at night to shower after first night and continue to do so for two weeks.     Apply moist heat to treated areas twice a day as needed for discomfort.   Warm compress - warm wash cloth     Medication:  Ibuprofen 600 mg by mouth every 8 hours as needed for leg discomfort.     Follow up visits:  You will have a follow up IR clinic appointment in three weeks which should already be set up for you.    For any questions contact the clinic number: [phone]. ([facility])                  Further instructions from your care team       [facility]  Interventional Radiology  Discharge Instructions for Sclerotherapy, Micro-Phlebectomy and/or Endovenous Laser Ablation of Legs        AFTER YOU GO HOME:       Drink plenty of fluids.       Resume your regular diet, unless otherwise instructed by your Primary Physician.    IF YOU RECEIVED SEDATION:            DO NOT smoke for at least 24 hours       DO NOT drink alcoholic beverages the day of your procedure       DO NOT drive or operate machinery at home or at work for 24 hours.          DO NOT take a bath or shower for at least 12 hours following your procedure.       DO NOT make any important or legal decisions for 24 hours following your procedure.        No tub baths, hot tubs or swimming for one week.        No vigorous lower extremity exercise (i.e. Stair stepper, running, or biking) for one week.    ADDITIONAL INFORMATION:    Sclerotherapy only  After you go home:  Resume regular diet  Resume normal activity, wear stockings and a 20 minute a day walk is encouraged.  Hot tubs, baths, use of leg weights and strenuous leg exercises should be avoided for 1 week.   Wear compression stockings for 24 hours straight, then during the daytime hours for the remainder of 2 weeks. You may remove to  shower and if you have your legs elevated.  For discomfort try moist heat using a washcloth and ibuprofen.   For follow up appointments:  You will be called and scheduled.  You will have a sclerotherapy check every 3 weeks until treatment completed.  Endovenous laser ablation (usually in combination with microphlebectomy or sclerotherapy)  After you go home:  Drink plenty of fluids  Resume regular diet  Resume normal activity, wear stockings and a 20 minute a day walk is encouraged.  Hot tubs, baths, use of leg weights and strenuous leg exercises should be avoided for 1 week.   Do not take a shower for at least 12 hours following your procedure  Wear compression stockings for 3 days straight, then wear during day time hours for remainder of 3 weeks.  You may remove briefly to shower and if you have your legs elevated.  For mild pain or discomfort Ibuprofen 600 mg oral every 8 hours as needed, for moderate to severe pain oxycodone 5 mg 1-2 tabs every 6 hours as needed.  IF you received sedation:  Do not smoke for 24 hours following your procedure  Do not drink alcoholic beverages day of your procedure  Do not drive or operate machinery at home or work for 24 hours following your procedure  Do not make any important or legal decisions for 24 hours following your procedure  For follow up appointments you will be called to schedule:  If you received sclerotherapy, you will have a sclerotherapy check every 3 weeks until treatment completed.  If EVLA only you will have an ultrasound scheduled for 1 week after procedure and an ultrasound followed by clinic appointment with your provider at 1 month post procedure  Microphlebectomy (usually in combination with EVLA)  After you go home:  Drink plenty of fluids  Resume regular diet  Activity after microphlebectomy is bedrest or legs elevated for 24 hours or until your microsite check appointment next day.  After that activity as tolerated, walking 20 minutes a day is  encouraged.  Hot tubs, baths, use of leg weights and strenuous leg exercises should be avoided for 1 week.   Do not take a shower for at least 12 hours following your procedure  For mild pain or discomfort Ibuprofen 600 mg oral every 8 hours as needed, for moderate to severe pain oxycodone 5 mg 1-2 tabs every 6 hours as needed.  IF you received sedation:  Do not smoke for 24 hours following your procedure  Do not drink alcoholic beverages day of your procedure  Do not drive or operate machinery at home or work for 24 hours following your procedure  Do not make any important or legal decisions for 24 hours following your procedure  For follow up appointments you will be called to schedule:  After microphlebectomy you have already been scheduled for microsite check day after your procedure with advanced practice provider in clinic.  There they will unwrap your legs, assess your wounds and place you into compression stockings.  Please bring your stockings to this procedure.  Wear compression stockings for 3 days straight, then wear during day time hours for remainder of 3 weeks.  You may remove briefly to shower and if you have your legs elevated.  If EVLA you will have an ultrasound scheduled for 1 week after procedure and an ultrasound followed by clinic appointment with your provider at 1 month post procedure      Procedural Physician: ELZA Godwin MD                               Date:November 20, 2018  Telephone Number:    [phone] Monday-Friday 8-4:30                                       [phone]    IR clinic RN                                       [phone] After 4:30 PM Monday-Friday, Weekends and Holidays, The hospital  will answer, ask for the Interventional Radiologist on call. Someone is available 24 hours /day                    Additional Information About Your Visit        [email or web address] Information     [facility] gives you secure access to your electronic health record. If you see a primary  "care provider, you can also send messages to your care team and make appointments. If you have questions, please call your primary care clinic.  If you do not have a primary care provider, please call [phone] and they will assist you.        Care EveryWhere ID     This is your Care EveryWhere ID. This could be used by other organizations to access your [facility] medical records  [id number]        Your Vitals Were     Blood Pressure Temperature Respirations Height Weight Pulse Oximetry    120/83 (BP Location: Left arm) 98.7  F (37.1  C) (Oral) 16 1.753 m (5' 9\") 94.3 kg (208 lb) 95%    BMI (Body Mass Index)                   30.72 kg/m2            Primary Care Provider Office Phone # Fax #    Vivek Rafael Callejas [phone]      Equal Access to Services     RABIA PEÑA : Hadii lisa kincaid hadasho Soomaali, waaxda luqadaha, qaybta kaalmada adejoseyada, corinna barlow . So [facility] [phone].    ATENCIÓN: Si habla español, tiene a del toro disposición servicios gratuitos de asistencia lingüística. Reddy al [phone].    We comply with applicable federal civil rights laws and [place] laws. We do not discriminate on the basis of race, color, national origin, age, disability, sex, sexual orientation, or gender identity.            Thank you!     Thank you for choosing [facility] for your care. Our goal is always to provide you with excellent care. Hearing back from our patients is one way we can continue to improve our services. Please take a few minutes to complete the written survey that you may receive in the mail after you visit with us. Thank you!             Medication List: This is a list of all your medications and when to take them. Check marks below indicate your daily home schedule. Keep this list as a reference.      Medications           Morning Afternoon Evening Bedtime As Needed    acyclovir 800 MG tablet   Commonly known as:  ZOVIRAX   Take 1 tablet (800 mg) by mouth 3 " times daily                                amoxicillin 500 MG capsule   Commonly known as:  AMOXIL   Take 4 pills (2 grams) day of dental work                                aspirin 325 MG tablet   Take 1 tablet (325 mg) by mouth daily                                bumetanide 1 MG tablet   Commonly known as:  BUMEX   Take 1 tablet (1 mg) by mouth every other day Take 1 tablet 1mg by mouth daily 30 minutes prior to bumex                                calcium carbonate-vitamin D 500-400 MG-UNIT Tabs per tablet   Take 1 tablet by mouth daily 2000 mg                                fluconazole 100 MG tablet   Commonly known as:  DIFLUCAN   Take 1 tablet (100 mg) by mouth daily                                fluocinonide 0.05 % ointment   Commonly known as:  LIDEX   Apply topically 2 times daily                                levofloxacin 250 MG tablet   Commonly known as:  LEVAQUIN   Take 1 tablet (250 mg) by mouth daily                                LORazepam 1 MG tablet   Commonly known as:  ATIVAN   Take 1 tablet (1 mg) by mouth At Bedtime                                metolazone 5 MG tablet   Commonly known as:  ZAROXOLYN   Take 5 mg by mouth daily                                order for DME   Please provide a NOVA cane offset with strap item number [id number]                                order for DME   Please measure and distribute 1 pair of 20mmHg - 30mmHg thigh high open or closed toe compression stockings. Jobst ultrasheer or equivalent.                                oxyCODONE IR 5 MG tablet   Commonly known as:  ROXICODONE   Take 1 tablet (5 mg) by mouth every 6 hours as needed for severe pain                                pantoprazole 20 MG EC tablet   Commonly known as:  PROTONIX   Take 1 tablet (20 mg) by mouth daily                                * predniSONE 20 MG tablet   Commonly known as:  DELTASONE   Take 1 tablet (20 mg) by mouth daily                                * predniSONE 50 MG tablet    Commonly known as:  DELTASONE   Take 1 tablet (50 mg) by mouth daily Take a combination of 1 if the 50 mg and 2 of the 20 mg tablets for a total of 90 mg prednisone by mouth daily                                ruxolitinib 5 MG Tabs tablet CHEMO   Commonly known as:  JAKAFI   Take 1 tablet (5 mg) by mouth 2 times daily                                sertraline 100 MG tablet   Commonly known as:  ZOLOFT   Take 1 tablet (100 mg) by mouth daily                                sulfamethoxazole-trimethoprim 800-160 MG per tablet   Commonly known as:  BACTRIM DS/SEPTRA DS   Take one tablet by mouth twice daily on Mondays and Tuesdays only.                                triamcinolone 0.1 % ointment   Commonly known as:  KENALOG   Apply twice a day to lower leg                                * Notice:  This list has 2 medication(s) that are the same as other medications prescribed for you. Read the directions carefully, and ask your doctor or other care provider to review them with you.

## 2018-11-20 NOTE — PROCEDURES
Interventional Radiology Brief Post Procedure Note    Procedure: L GSV ablation and foam sclerotherapy.      Proceduralist: Truman Walden MD and Leland Godwin MD    Assistant: None    Time Out: Prior to the start of the procedure and with procedural staff participation, I verbally confirmed the patient s identity using two indicators, relevant allergies, that the procedure was appropriate and matched the consent or emergent situation, and that the correct equipment/implants were available. Immediately prior to starting the procedure I conducted the Time Out with the procedural staff and re-confirmed the patient s name, procedure, and site/side. (The Joint Commission universal protocol was followed.)  Yes    Medications   Medication Event Details Admin User Admin Time   sodium tetradecyl sulfate (SOTRADECOL) injection 20 mg Medication Given by Other Dose: 20 mg; Route: Intravenous; Scheduled Time:  1:45 PM; Comment: left legDr Iram/Rosalia Rojas, RN 11/20/2018  1:43 PM   sodium chloride 0.9 % 445 mL with lidocaine 1% with EPINEPHrine 1:100,000 50 mL, sodium bicarbonate 5 mL Medication Given by Other Route: Intracatheter; Scheduled Time: 10:00 AM; Comment:  & Dr. Negrete leg venus ablation Rosalia Cain, RN 11/20/2018  1:44 PM       Sedation: None. Local Anesthestic used, with oral valium.     Findings: Routine L GSV ablation and sclerotherapy to VV.     Estimated Blood Loss: None    SPECIMENS: None    Complications: 1. None     Condition: Stable    Plan:   -Patient will wear compression stockings for 3 days nonstop and then 3 weeks during the waking hour  -He will get a Venous US to check for DVT in 1 week, and return for slcero check in 3 weeks. We will likely continue LLE sclero at that time.    Comments: See dictated procedure note for full details.    Truman Walden MD

## 2018-11-21 ENCOUNTER — TELEPHONE (OUTPATIENT)
Dept: INTERVENTIONAL RADIOLOGY/VASCULAR | Facility: CLINIC | Age: 68
End: 2018-11-21

## 2018-11-21 DIAGNOSIS — I83.899 VARICOSE VEINS OF LOWER EXTREMITIES WITH COMPLICATIONS: Primary | ICD-10-CM

## 2018-11-21 NOTE — TELEPHONE ENCOUNTER
Called pt to f/u on him s/p evla and sclerotherapy with Dr Godwin    He states that small sharp pain on the inside of his left thigh. He doesn't feel it this morning.     Otherwise he is wearing his compression stockings and doing well. I've advised to slowly ease back into activities and to continue walking.   Reminded him about his US in which we will r/s to the following week on Tues 11/27 or Weds 11/28.     We will also schedule him for his 3 week sclero check with Dr Godwin in which I will call about him about this when this is scheduled.     He does have the hospital phone number to call with any other questions over the weekend as it is a Thanksgiving holiday.   He will call me with any other questions.     Radha Rodney RN, BSN  Interventional Radiology Nurse Coordinator   Phone: [phone]

## 2018-11-27 ENCOUNTER — RADIANT APPOINTMENT (OUTPATIENT)
Dept: ULTRASOUND IMAGING | Facility: CLINIC | Age: 68
End: 2018-11-27
Attending: RADIOLOGY
Payer: COMMERCIAL

## 2018-11-27 DIAGNOSIS — I82.409 DVT (DEEP VENOUS THROMBOSIS) (H): ICD-10-CM

## 2018-11-27 DIAGNOSIS — I83.893 VARICOSE VEINS OF BILATERAL LOWER EXTREMITIES WITH OTHER COMPLICATIONS: ICD-10-CM

## 2018-11-28 ENCOUNTER — ALLIED HEALTH/NURSE VISIT (OUTPATIENT)
Dept: PHARMACY | Facility: CLINIC | Age: 68
End: 2018-11-28
Attending: INTERNAL MEDICINE
Payer: COMMERCIAL

## 2018-11-28 DIAGNOSIS — K21.9 GASTROESOPHAGEAL REFLUX DISEASE, ESOPHAGITIS PRESENCE NOT SPECIFIED: ICD-10-CM

## 2018-11-28 DIAGNOSIS — F41.9 ANXIETY: ICD-10-CM

## 2018-11-28 DIAGNOSIS — E63.9 NUTRITIONAL DEFICIENCY: ICD-10-CM

## 2018-11-28 DIAGNOSIS — G47.00 INSOMNIA, UNSPECIFIED TYPE: ICD-10-CM

## 2018-11-28 DIAGNOSIS — D84.9 IMMUNOCOMPROMISED STATE (H): ICD-10-CM

## 2018-11-28 DIAGNOSIS — D89.813 GVH (GRAFT VERSUS HOST DISEASE) (H): Primary | ICD-10-CM

## 2018-11-28 DIAGNOSIS — I82.90 VTE (VENOUS THROMBOEMBOLISM): ICD-10-CM

## 2018-11-28 PROCEDURE — 99607 MTMS BY PHARM ADDL 15 MIN: CPT | Performed by: PHARMACIST

## 2018-11-28 PROCEDURE — 99605 MTMS BY PHARM NP 15 MIN: CPT | Performed by: PHARMACIST

## 2018-11-28 NOTE — MR AVS SNAPSHOT
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   Today we reviewed what your medicines are for, how to know if they are working, that your medicines are safe and how to make your medicine regimen as easy as possible.     1. No medication changes today.      To schedule another MTM appointment, please call the clinic directly or you may call the MTM scheduling line at [phone] or toll-free at [phone].     My Clinical Pharmacist's contact information:                                                      It was a pleasure talking with you today!  Please feel free to contact me with any questions or concerns you have.      Deedee Oliver, PharmD, BCOP, BCPS  Clinical Pharmacy Specialist/  Oncology Medication Therapy Management Pharmacist  [facility]  Pager [phone]  Phone [phone]          You may receive a survey about the MTM services you received.  I would appreciate your feedback to help me serve you better in the future. Please fill it out and return it when you can. Your comments will be anonymous.                  Follow-ups after your visit        Your next 10 appointments already scheduled     Dec 04,  2018 11:30 AM CST   Masonic Lab Draw with  MASONIC LAB DRAW   [facility] Masonic Lab Draw ([facility])    [address]  [address]  [place] [address]   [phone]            Dec 04, 2018 12:15 PM CST   Return with  BMT CAROLINA #3   [facility] Blood and Marrow Transplant ([facility])    [address]  [address]  [place] [address]   [phone]            Dec 04, 2018  1:20 PM CST   CT CHEST W/O CONTRAST with UCCT1   [facility] CT ([facility])    [address]  1st [address]  [place] [address]   [phone]           How do I prepare for my exam? (Food and drink instructions) No Food and Drink Restrictions.  How do I prepare for my exam? (Other instructions) You do not need to do anything special to prepare for this exam. For a sinus scan: Use your nose spray (nasal decongestant spray) as directed.  What should I wear: Please wear loose clothing, such as a sweat suit or jogging clothes. Avoid snaps, zippers and other metal. We may ask you to undress and put on a hospital gown.  How long does the exam take: Most scans take less than 20 minutes.  What should I bring: Please bring any scans or X-rays taken at other hospitals, if similar tests were done. Also bring a list of your medicines, including vitamins, minerals and over-the-counter drugs. It is safest to leave personal items at home.  Do I need a : No  is needed.  What do I need to tell my doctor? Be sure to tell your doctor: * If you have any allergies. * If there s any chance you are pregnant. * If you are breastfeeding.  What should I do after the exam: No restrictions, You may resume normal activities.  What is this test: A CT (computed tomography) scan is a series of pictures that allows us to look inside your body. The scanner creates images of the body in cross sections, much like slices of bread. This helps us see any  problems more clearly.  Who should I call with questions: If you have any questions, please call the Imaging Department where you will have your exam. Directions, parking instructions, and other information is available on our website, [email or web address].            Dec 04, 2018  3:00 PM CST   (Arrive by 2:45 PM)   Return Visit with Arsh Sandoval MD   [facility] ([facility])    [address]  [address]  [place] [address]   [phone]            Dec 14, 2018 10:00 AM CST   IR FOLLOW UP VISIT OUTPAT[facility]UC[facility]   [facility] [place]    [address] [address] [phone]            Jan 31, 2019  1:30 PM CST   Masonic Lab Draw with  MASONIC LAB DRAW   [facility] Masonic Lab Draw ([facility])    [address]  [address]  [place] [facility] [id number] [phone]            Jan 31, 2019  2:15 PM CST   Return with Aby Pinto MD   [facility] Blood and Marrow Transplant ([facility])    [address] [place] [address]  [address]  [facility] [id number] [phone]              Who to contact     If you have questions or need follow up information about today's clinic visit or your schedule please contact [facility] MEDICATION THERAPY MANAGEMENT directly at [phone].  Normal or non-critical lab and imaging results will be communicated to you by MyChart, letter or phone within 4 business days after the clinic has received the results. If you do not hear from us within 7 days, please contact the clinic through MyChart or phone. If you have a critical or abnormal lab result, we will notify you by phone as soon as possible.  Submit refill requests through [employer] or call your pharmacy and they will forward the refill request to us. Please allow 3 business days for your refill to be completed.           Additional Information About Your Visit        Mitralignhart Information     [employer] gives you secure access to your electronic health record. If you see a primary care provider, you can also send messages to your care team and make appointments. If you have questions, please call your primary care clinic.  If you do not have a primary care provider, please call [phone] and they will assist you.        Care EveryWhere ID     This is your Care EveryWhere ID. This could be used by other organizations to access your [facility] medical records  [id number]         Blood Pressure from Last 3 Encounters:   11/20/18 122/78   11/15/18 119/70   10/24/18 145/85    Weight from Last 3 Encounters:   11/20/18 208 lb (94.3 kg)   11/15/18 214 lb 1.6 oz (97.1 kg)   10/22/18 208 lb 8 oz (94.6 kg)              Today, you had the following     No orders found for display         Today's Medication Changes          These changes are accurate as of 11/28/18 11:59 PM.  If you have any questions, ask your nurse or doctor.               Stop taking these medicines if you haven't already. Please contact your care team if you have questions.     bumetanide 1 MG tablet   Commonly known as:  BUMEX   Stopped by:  Deedee Oliver RPH           fluocinonide 0.05 % external ointment   Commonly known as:  LIDEX   Stopped by:  Deedee Oliver RPH           metolazone 5 MG tablet   Commonly known as:  ZAROXOLYN   Stopped by:  Deedee Oliver RPH           oxyCODONE 5 MG tablet   Commonly known as:  ROXICODONE   Stopped by:  Deedee Oliver RPH           predniSONE 20 MG tablet   Commonly known as:  DELTASONE   Stopped by:  Deedee Oliver RPH           predniSONE 50 MG tablet   Commonly known as:  DELTASONE   Stopped by:  Deedee Oliver RPH                    Primary Care Provider Office Phone # Fax #    Vivek Rafael Callejas [phone]       [facility] [address] [place] [address]        Equal Access to  Services     [facility]: Hadii lisa Vanegas, waairamda luqfrantzha, qaybta kaadarshclint parada, corinna bralow . So [facility] [phone].    ATENCIÓN: Si myah fine, tiene a del toro disposición servicios gratuitos de asistencia lingüística. Llame al [phone].    We comply with applicable federal civil rights laws and [place] laws. We do not discriminate on the basis of race, color, national origin, age, disability, sex, sexual orientation, or gender identity.            Thank you!     Thank you for choosing [facility] MEDICATION THERAPY MANAGEMENT  for your care. Our goal is always to provide you with excellent care. Hearing back from our patients is one way we can continue to improve our services. Please take a few minutes to complete the written survey that you may receive in the mail after your visit with us. Thank you!             Your Updated Medication List - Protect others around you: Learn how to safely use, store and throw away your medicines at www.disposemymeds.org.          This list is accurate as of 11/28/18 11:59 PM.  Always use your most recent med list.                   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), MDS (myelodysplastic syndrome) (H)       amoxicillin 500 MG capsule    AMOXIL    16 capsule    Take 4 pills (2 grams) day of dental work    MDS (myelodysplastic syndrome) (H)       aspirin 325 MG tablet    ASA    30 tablet    Take 1 tablet (325 mg) by mouth daily    MDS (myelodysplastic syndrome) (H)       augmented betamethasone dipropionate 0.05 % external ointment    DIPROLENE-AF    50 g    Apply topically 2 times daily For areas of rash on the lower leg    Venous stasis dermatitis, unspecified laterality       calcium carbonate-vitamin D 500-400 MG-UNIT tablet    OS-JOE    180 tablet    Take 1 tablet by mouth  daily 2000 mg    MDS (myelodysplastic syndrome) (H), Acute elwgn-bfjutk-cmpr disease (H), 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)       fluconazole 100 MG tablet    DIFLUCAN    30 tablet    Take 1 tablet (100 mg) by mouth daily    Status post bone marrow transplant (H)       levofloxacin 250 MG tablet    LEVAQUIN    30 tablet    Take 1 tablet (250 mg) by mouth daily    MDS (myelodysplastic syndrome) (H)       LORazepam 1 MG tablet    ATIVAN    60 tablet    Take 1 tablet (1 mg) by mouth At Bedtime    MDS (myelodysplastic syndrome) (H)       order for DME     1 Device    Please provide a [employer] cane offset with strap item number [id number]    MDS (myelodysplastic syndrome) (H)       order for DME     1 each    Please measure and distribute 1 pair of 20mmHg - 30mmHg thigh high open or closed toe compression stockings. Jobst ultrasheer or equivalent.    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    Status post bone marrow transplant (H)       triamcinolone 0.1 % external ointment    KENALOG     Apply twice a day to lower leg

## 2018-11-28 NOTE — PROGRESS NOTES
"SUBJECTIVE/OBJECTIVE:                Deejay Dior is a 68 year old male called for a transitions of care visit.  He was discharged from 11/20/18 on procedure.       Chief Complaint: medication review, Jakafi.    The primary oncologist for this patient is Dr Aby Pinto.  The PCP for this patient is Dr Vivek Callejas.    Cancer diagnosis: MDS, but S/P allo-sib HSCT in July 2014    Allergies/ADRs: None  Tobacco: No tobacco use   Alcohol: seldom/occaisionally    PMH: Reviewed in Epic    Medication Adherence/Access:  Patient uses pill box(es).  Patient takes medications 3 time(s) per day.   Per patient, misses medication 0-1 times per week.     GVHD:  Current medications include: Jakafi 5mg BID.  He has tapered off all of the prednisone.  He denies side effects from the Jakafi(\"It hasn't made me better looking!\"), and is glad to be off the prednisone. He is also using triamcinolone ointment BID to legs.  The Lidex was too expensive for him.    Infection prophylaxis:  Current medications include: acyclovir 800mg TID, fluconazole 100mg daily, Bactrim DS BID on Mondays and Tuesdays, and levofloxacin 250mg daily.  He also takes amoxicillin 2000mg prior to dental procedures.  He denies rash, nausea and diarrhea from these medications.    VTE prophylaxis:  Current medications include: aspirin 325mg PO daily.  He was on enoxaparin and warfarin in the past for VTE, but is currently on aspirin for prophylaxis.    Insomnia:  Current medications include: lorazepam 1mg PO at bedtime.  Patient says that this works well for him, and does not cause a morning hangover.    GERD: Current medications include: Protonix (pantoprazole) 20mg daily.  The patient does notice symptoms if they miss a dose. Patient feels that current regimen is effective.    Anxiety:  Current medications include: sertraline 100mg PO daily.  He reports that this is working well form him, and denies side effects.    Vitamins/supplements:  Current medications " "include: calcium/vitamin D 2 tabs daily.  No problems reported.        Of note, patient is no longer needing the following medications and these were taken off the medication list: prednisone, bumetanide, metolazone, and Lidex.      Latest Ht and Wt:  Wt Readings from Last 2 Encounters:   11/20/18 208 lb (94.3 kg)   11/15/18 214 lb 1.6 oz (97.1 kg)     Ht Readings from Last 2 Encounters:   11/20/18 5' 9\" (1.753 m)   10/22/18 5' 7.99\" (1.727 m)        BP Readings from Last 3 Encounters:   11/20/18 122/78   11/15/18 119/70   10/24/18 145/85       Current labs include:  Lab Results   Component Value Date    BUN 22 11/15/2018     Lab Results   Component Value Date    CR 1.23 11/15/2018     Lab Results   Component Value Date    POTASSIUM 4.2 11/15/2018     Lab Results   Component Value Date    ALT 28 11/15/2018     Lab Results   Component Value Date    AST 31 11/15/2018     Lab Results   Component Value Date    BILITOTAL 0.3 11/15/2018     Lab Results   Component Value Date    ALBUMIN 3.2 11/15/2018     Lab Results   Component Value Date    WBC 6.3 11/15/2018     Lab Results   Component Value Date    HGB 14.0 11/15/2018     Lab Results   Component Value Date     11/15/2018     Absolute Neutrophil   Date Value Ref Range Status   11/15/2018 2.8 1.6 - 8.3 10e9/L Final         ASSESSMENT:                 Current medications were reviewed today.      Medication Adherence: good, no issues identified    GVHD: Stable. Patient is tolerating the Jakafi treatment well.    Infection prophylaxis: Stable. Antiviral, antifungal, anti-PCP and anti-bacterial coverage is appropriate.    VTE  Prophylaxis: Stable. Patient's PLT count has increased and continued aspirin treatment is appropriate.    Insomnia: Stable. Current treatment seems effective.    GERD: Stable.  Current treatment is effective.    Anxiety: Stable. No change needed.    Vitamins/supplements: Stable. No change needed.      PLAN:                  No medication " changes today.    I spent 30 minutes with this patient today. A copy of the visit note was provided to the patient's primary care and oncology provider.    The patient was sent via Circle a summary of these recommendations as an after visit summary.        Deedee Oliver, PharmD, BCOP, BCPS  Clinical Pharmacy Specialist/  Oncology Medication Therapy Management Pharmacist  [facility]  Pager [phone]  Phone [phone]

## 2018-11-29 ENCOUNTER — TELEPHONE (OUTPATIENT)
Dept: INTERVENTIONAL RADIOLOGY/VASCULAR | Facility: CLINIC | Age: 68
End: 2018-11-29

## 2018-11-29 NOTE — TELEPHONE ENCOUNTER
Called and left a  VM for pt regarding his f/u US to rule out DVT post evla/sclero treatment with Dr. Godwin.     Informed him that the US looks good and that there is  No DVT noted.   Informed him that the next sclero check has been scheduled for Fri 12/14 with Dr. Godwin @ 10am. He is to check into [facility] waiting room at 945am.     Asked that he return my call to confirm that he did get my VM regarding his next treatment.     Left direct line for him.     Radha Rodney RN, BSN  Interventional Radiology Nurse Coordinator   Phone: [phone]

## 2018-11-30 RX ORDER — BETAMETHASONE DIPROPIONATE 0.5 MG/G
OINTMENT, AUGMENTED TOPICAL 2 TIMES DAILY
Qty: 50 G | Refills: 1 | Status: ON HOLD | OUTPATIENT
Start: 2018-11-30 | End: 2021-02-09

## 2018-11-30 NOTE — PATIENT INSTRUCTIONS
Recommendations from today's MTM visit:                                                    MTM (medication therapy management) is a service provided by a clinical pharmacist designed to help you get the most of out of your medicines.   Today we reviewed what your medicines are for, how to know if they are working, that your medicines are safe and how to make your medicine regimen as easy as possible.     1. No medication changes today.      To schedule another MTM appointment, please call the clinic directly or you may call the MTM scheduling line at [phone] or toll-free at [phone].     My Clinical Pharmacist's contact information:                                                      It was a pleasure talking with you today!  Please feel free to contact me with any questions or concerns you have.      Deedee Oliver, PharmD, BCOP, BCPS  Clinical Pharmacy Specialist/  Oncology Medication Therapy Management Pharmacist  [facility]  Pager [phone]  Phone [phone]          You may receive a survey about the MTM services you received.  I would appreciate your feedback to help me serve you better in the future. Please fill it out and return it when you can. Your comments will be anonymous.

## 2018-12-04 ENCOUNTER — ONCOLOGY VISIT (OUTPATIENT)
Dept: TRANSPLANT | Facility: CLINIC | Age: 68
End: 2018-12-04
Attending: PHYSICIAN ASSISTANT
Payer: MEDICARE

## 2018-12-04 ENCOUNTER — APPOINTMENT (OUTPATIENT)
Dept: LAB | Facility: CLINIC | Age: 68
End: 2018-12-04
Attending: PHYSICIAN ASSISTANT
Payer: MEDICARE

## 2018-12-04 ENCOUNTER — OFFICE VISIT (OUTPATIENT)
Dept: PULMONOLOGY | Facility: CLINIC | Age: 68
End: 2018-12-04
Attending: INTERNAL MEDICINE
Payer: MEDICARE

## 2018-12-04 ENCOUNTER — RADIANT APPOINTMENT (OUTPATIENT)
Dept: CT IMAGING | Facility: CLINIC | Age: 68
End: 2018-12-04
Attending: INTERNAL MEDICINE
Payer: COMMERCIAL

## 2018-12-04 VITALS
BODY MASS INDEX: 32.24 KG/M2 | DIASTOLIC BLOOD PRESSURE: 62 MMHG | TEMPERATURE: 96.9 F | WEIGHT: 217.7 LBS | HEIGHT: 69 IN | RESPIRATION RATE: 16 BRPM | HEART RATE: 82 BPM | SYSTOLIC BLOOD PRESSURE: 118 MMHG | OXYGEN SATURATION: 95 %

## 2018-12-04 DIAGNOSIS — R06.02 SOB (SHORTNESS OF BREATH): ICD-10-CM

## 2018-12-04 DIAGNOSIS — R05.9 COUGH: Primary | ICD-10-CM

## 2018-12-04 DIAGNOSIS — D46.9 MDS (MYELODYSPLASTIC SYNDROME) (H): Primary | ICD-10-CM

## 2018-12-04 LAB
ALBUMIN SERPL-MCNC: 3.1 G/DL (ref 3.4–5)
ALP SERPL-CCNC: 70 U/L (ref 40–150)
ALT SERPL W P-5'-P-CCNC: 34 U/L (ref 0–70)
ANION GAP SERPL CALCULATED.3IONS-SCNC: 8 MMOL/L (ref 3–14)
AST SERPL W P-5'-P-CCNC: 41 U/L (ref 0–45)
BASOPHILS # BLD AUTO: 0 10E9/L (ref 0–0.2)
BASOPHILS NFR BLD AUTO: 0.5 %
BILIRUB SERPL-MCNC: 0.2 MG/DL (ref 0.2–1.3)
BUN SERPL-MCNC: 20 MG/DL (ref 7–30)
CALCIUM SERPL-MCNC: 8.6 MG/DL (ref 8.5–10.1)
CHLORIDE SERPL-SCNC: 107 MMOL/L (ref 94–109)
CO2 SERPL-SCNC: 24 MMOL/L (ref 20–32)
CREAT SERPL-MCNC: 0.95 MG/DL (ref 0.66–1.25)
DIFFERENTIAL METHOD BLD: ABNORMAL
EOSINOPHIL # BLD AUTO: 0.1 10E9/L (ref 0–0.7)
EOSINOPHIL NFR BLD AUTO: 2.2 %
ERYTHROCYTE [DISTWIDTH] IN BLOOD BY AUTOMATED COUNT: 18.5 % (ref 10–15)
GFR SERPL CREATININE-BSD FRML MDRD: 78 ML/MIN/1.7M2
GLUCOSE SERPL-MCNC: 90 MG/DL (ref 70–99)
HCT VFR BLD AUTO: 41.4 % (ref 40–53)
HGB BLD-MCNC: 13.8 G/DL (ref 13.3–17.7)
IMM GRANULOCYTES # BLD: 0 10E9/L (ref 0–0.4)
IMM GRANULOCYTES NFR BLD: 0 %
LYMPHOCYTES # BLD AUTO: 2.1 10E9/L (ref 0.8–5.3)
LYMPHOCYTES NFR BLD AUTO: 35.5 %
MAGNESIUM SERPL-MCNC: 2 MG/DL (ref 1.6–2.3)
MCH RBC QN AUTO: 31.3 PG (ref 26.5–33)
MCHC RBC AUTO-ENTMCNC: 33.3 G/DL (ref 31.5–36.5)
MCV RBC AUTO: 94 FL (ref 78–100)
MONOCYTES # BLD AUTO: 1 10E9/L (ref 0–1.3)
MONOCYTES NFR BLD AUTO: 16.3 %
NEUTROPHILS # BLD AUTO: 2.7 10E9/L (ref 1.6–8.3)
NEUTROPHILS NFR BLD AUTO: 45.5 %
NRBC # BLD AUTO: 0 10*3/UL
NRBC BLD AUTO-RTO: 0 /100
PLATELET # BLD AUTO: 254 10E9/L (ref 150–450)
POTASSIUM SERPL-SCNC: 4.4 MMOL/L (ref 3.4–5.3)
PROT SERPL-MCNC: 6.9 G/DL (ref 6.8–8.8)
RBC # BLD AUTO: 4.41 10E12/L (ref 4.4–5.9)
SODIUM SERPL-SCNC: 138 MMOL/L (ref 133–144)
WBC # BLD AUTO: 5.9 10E9/L (ref 4–11)

## 2018-12-04 PROCEDURE — 80053 COMPREHEN METABOLIC PANEL: CPT | Performed by: NURSE PRACTITIONER

## 2018-12-04 PROCEDURE — 85025 COMPLETE CBC W/AUTO DIFF WBC: CPT | Performed by: NURSE PRACTITIONER

## 2018-12-04 PROCEDURE — G0463 HOSPITAL OUTPT CLINIC VISIT: HCPCS | Mod: ZF

## 2018-12-04 PROCEDURE — 83735 ASSAY OF MAGNESIUM: CPT | Performed by: NURSE PRACTITIONER

## 2018-12-04 PROCEDURE — 36415 COLL VENOUS BLD VENIPUNCTURE: CPT

## 2018-12-04 RX ORDER — OXYCODONE HYDROCHLORIDE 5 MG/1
5 TABLET ORAL EVERY 6 HOURS PRN
Qty: 30 TABLET | Refills: 0 | Status: ON HOLD | OUTPATIENT
Start: 2018-12-04 | End: 2019-09-03

## 2018-12-04 ASSESSMENT — PAIN SCALES - GENERAL: PAINLEVEL: WORST PAIN (10)

## 2018-12-04 NOTE — PROGRESS NOTES
"BMT Clinic Note     ID:  Mr. 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 Off prednisone now and notices that his \"aches/pains\" have returned as well as dry mouth. No n/v/d/c. Good appetite. Recent sclerotherapy with Dr. Godwin to left leg. Feels that it's helping control the swelling. No fevers. No bleeding.       Review of Systems: 10 point ROS negative except as noted above.    Physical Exam:  /62 (BP Location: Right arm, Patient Position: Sitting, Cuff Size: Adult Regular)  Pulse 82  Temp 96.9  F (36.1  C) (Oral)  Resp 16  Wt 98.7 kg (217 lb 11.2 oz)  SpO2 95%  BMI 32.15 kg/m2     Wt Readings from Last 4 Encounters:   12/04/18 98.7 kg (217 lb 11.2 oz)   11/20/18 94.3 kg (208 lb)   11/15/18 97.1 kg (214 lb 1.6 oz)   10/22/18 94.6 kg (208 lb 8 oz)     General: NAD, interactive  Eyes: SARIKA, sclera anicteric  Nose/Mouth/Throat: OP clear, no ulcerations, very dry, upper plate, chronic lichenoid changes   Lungs:CTA B, no crackles or wheezes   CV: RRR without murmurs rubs or gallops  Abd: S/NT/ND +BS  Skin:   RLE skin shiny with some reddened areas. Nontender. No edema. No skin desquamation  Neuro: A&O, moderate resting tremor  Access: peripheral    Labs:  Lab Results   Component Value Date    WBC 6.3 11/15/2018    ANEU 2.8 11/15/2018    HGB 14.0 11/15/2018    HCT 42.0 11/15/2018     11/15/2018     11/15/2018    POTASSIUM 4.2 11/15/2018    CHLORIDE 108 11/15/2018    CO2 23 11/15/2018    GLC 90 11/15/2018    BUN 22 11/15/2018    CR 1.23 11/15/2018    MAG 2.1 10/17/2018    INR 0.95 06/30/2016     ASSESSMENT AND PLAN:  Mr. 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 Total cell dose (from 7/1 & 7/2) 6.53 x 10^6 CD34+ cells/kg.   - 1 year anniversary (July 2015): 30% cellular, trilineage hematopoiesis, no " abnormal blasts by morphology or flow , no dysplasia, 0-1 fibrosis, 100% donor (BM, CD3, CD15). CR  - 2 year anniversary (July 2016): 20-30% cellular, trilineage hematopoiesis, no abnormal blasts by morphology or flow , no dysplasia, No fibrosis, 100% donor in BM (PB not sent). ISCN: //46,XX[20] Complete Remission.    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 Had been off prednisone since summer 2017 and briefly tapered off Sirolimus (january 2018), however resumed with flare in February. 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 Sirolimus was stopped on 9/27 & Pred 90mg was started. Seen by Dr. Sandoval on 10/2, please see his note. He does not think his symptoms or CT findings are c/w Sirolimus or GVH & he was in favor of tapering Prednisone. Recently he has worsening skin thickening & desquamation to the RLE, c/w GVH.   Started Jakafi 10/22 at 5 mg BID with symptom improvement in lower extremities.  Completed steroid taper>1mo ago.    Follow for sx.   F/u with Dr. Sandoval this afternoon    ID:  Afebrile no signs/sx of infection.   - IgG = 403, repleted 3/22/16, 5/8/16= 1270; recheck with recent viral exposures 1300 10/1  - Prophy: HD ACV (renal dosing), Fluconazole and  Bactrim.    - Flu shot given 10/11/18      GI:    On protonix (has heartburn)    FEN/Renal:  Cr significantly improved with bumex (no metolazone) every other day. Edema better, continue     Fatigue: Better recently.  - History of testosterone deficiency, rechecked and modestly low. Endo referral whenever pt requests  - TSH normal 2/28 and again on repeat 9/27 at 1.01.    Derm:  Schedule derm referral for RLE skin changes-see note 10/24  -both LE improved per pt  Venous statis: see below-most recently had sclerotherapy to left LE      Vasc:   10/15 Right leg US with small (technically DVT) clot in  posterior tibial vein: per Millie, started reg dose aspirin daily 325mg and recheck US in 2 weeks or symptomatically. U/S on 11/27 without DVT  Off lymphedema wraps  Discomfort since edema/shakes: oxy 1-2 tab during day and ativan 6 hours away from oxy for sleep.  H/o chronic venous statis: s/p sclerotherapy to the L leg.  kelfex day prior and 4 days following.     CV:  Edema 2/2 heart failure? BNP neg compared to baseline. Use bumex (has worked best historically); recheck labs this week for Cr/K tolerance   ECHO results no change from previous EF 60-65%      Plan:  F/u with Dr. Sandoval this afternoon  Continue jakafi  Given Rx oxycodone 5mg #30 today  RTC 1/31 with Dr. Pinto or sooner prn    Ann-Marie Zee NP

## 2018-12-04 NOTE — PROGRESS NOTES
Reason for Visit  Deejay Dior is a 68 year old year old male who is being seen for No chief complaint on file.    Pulmonary HPI  68 YO male with history of MDS s/p NMA allo sib BMT (Day + 707) who is referred to the Pulmonary BMT clinic by Dr. Pinto for evaluation of SOB and abnormal chest CT.  Developed influenza A (H1N1) in 3-16 with significant SOB/MERIDA requiring hospital admission with ICU stay and use of Hi-flow. 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  After discharge he was slowly feeling better- could initially walk 40-50 feet without oxygen ( feet with oxygen) but improved to 3-4 blocks without oxygen at end of April. Early May developed weakness, fatigue and increased SOB.  Diagnosed with Influenza B; not hospitalized. After that episode had continued SOB/MERIDA, using oxygen at home.  CT chest in 5-16 showed bilateral GGO, repeat scan on 6-1 showed a decrease in GGO.   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  Since that time he feels 70-80% better with decreased MERIDA.  URI with nasal drainage and congestion the last two days, not much cough.  No prior history of lung disease as youth or young adult; no asthma.  Episode of pneumonia 11-14 hospitalized for 2 days.  No tobacco x 16 years, 32-48 pack year history.   + second hand smoke.  Denies significant mold exposure.    Last seen 2 months ago. At that visit he had an abnormal CT but his respiratory symptoms had all resolved.  Since his last visit he has been doing well from a respiratory standpoint. No cough, no SOB, no MERIDA.  No respiratory illness since his last visit.  CT chest from today reviewed. Acute changes on his recent CT are resolved, chronic changes in his lingula and RLL are unchanged  compared to 7-16, these are likely fibrotic changes from his past influenza.    The patient was seen and examined by Arsh Sandoval           Current Outpatient Prescriptions   Medication     acyclovir (ZOVIRAX) 800 MG tablet     amoxicillin (AMOXIL) 500 MG capsule     aspirin 325 MG tablet     augmented betamethasone dipropionate (DIPROLENE-AF) 0.05 % external ointment     calcium carbonate-vitamin D 500-400 MG-UNIT TABS tablt     fluconazole (DIFLUCAN) 100 MG tablet     LORazepam (ATIVAN) 1 MG tablet     order for DME     ORDER FOR DME     oxyCODONE (ROXICODONE) 5 MG tablet     pantoprazole (PROTONIX) 20 MG EC tablet     ruxolitinib (JAKAFI) 5 MG TABS tablet CHEMO     sertraline (ZOLOFT) 100 MG tablet     sulfamethoxazole-trimethoprim (BACTRIM DS/SEPTRA DS) 800-160 MG per tablet     triamcinolone (KENALOG) 0.1 % ointment     No current facility-administered medications for this visit.      Allergies   Allergen Reactions     Blood Transfusion Related (Informational Only) Other (See Comments)     Patient has a history of a clinically significant antibody against RBC antigens.  A delay in compatible RBCs may occur.     Past Medical History:   Diagnosis Date     Head injury 1964     Hearing problem Hearing aids 2015     History of blood transfusion 3/2014     History of radiation therapy June 2014     Immunosuppression (H) Sirolimus daily     MDS (myelodysplastic syndrome) (H)      Numbness and tingling     feet     Obstructive sleep apnea 1999     Pneumonia      Reduced vision August 2016    Cataract surgery     Sleep apnea 1999     Transplant July 1, 2014    Stem Cells       Past Surgical History:   Procedure Laterality Date     BACK SURGERY       BIOPSY       BMT CELL PRODUCT INFUSION       ESOPHAGOSCOPY, GASTROSCOPY, DUODENOSCOPY (EGD), COMBINED N/A 2/2/2015     ESOPHAGOSCOPY, GASTROSCOPY, DUODENOSCOPY (EGD), COMBINED Left 8/6/2015    Procedure: COMBINED ESOPHAGOSCOPY, GASTROSCOPY, DUODENOSCOPY (EGD), BIOPSY  SINGLE OR MULTIPLE;  Surgeon: Amber Francis MD;  [facility]     EXCISE LESION LIP N/A 1/2/2017    Procedure: EXCISE LESION LIP;  Surgeon: Tim Yanez MD;  Location: [facility]     EYE SURGERY       FLEXIBLE SIGMOIDOSCOPY  2/2/15     HEMORRHOIDECTOMY  2001     PHACOEMULSIFICATION CLEAR CORNEA WITH STANDARD INTRAOCULAR LENS IMPLANT Left 7/18/2016    Procedure: PHACOEMULSIFICATION CLEAR CORNEA WITH STANDARD INTRAOCULAR LENS IMPLANT;  Surgeon: Randolph Giles MD;  Location:  [facility]     TONSILLECTOMY  1953     TRANSPLANT  7-1-2014    Stem Cell Transplant [facility] B[facility]     VASCULAR SURGERY Left 2009       Social History     Social History     Marital status:      Spouse name: N/A     Number of children: N/A     Years of education: N/A     Occupational History     Not on file.     Social History Main Topics     Smoking status: Former Smoker     Packs/day: 1.00     Years: 34.00     Types: Cigarettes     Start date: 12/1/1967     Quit date: 4/1/2001     Smokeless tobacco: Never Used      Comment: quit in 2000     Alcohol use 2.5 oz/week      Comment: Seldom     Drug use: No     Sexual activity: Not Currently     Partners: Female     Birth control/ protection: Male Surgical, Female Surgical     Other Topics Concern     Not on file     Social History Narrative       ROS Pulmonary  A complete ROS was otherwise negative except as noted in the HPI.  There were no vitals taken for this visit.  Exam:   GENERAL APPEARANCE: Well developed, well nourished, alert, and in no apparent distress.  EYES: PERRL, EOMI  HENT: Nasal mucosa with no edema and no hyperemia. No nasal polyps.  EARS: Canals clear, TMs normal  MOUTH: Oral mucosa is moist, without any lesions, no tonsillar enlargement, no oropharyngeal exudate.  NECK: supple, no masses, no thyromegaly.  LYMPHATICS: No significant axillary, cervical, or supraclavicular nodes.  RESP:  Good air flow throughout.  No crackles. No rhonchi. No wheezes.  CV:  Normal S1, S2, regular rhythm, normal rate. No murmur.  No rub. No gallop. No LE edema.   ABDOMEN:  Bowel sounds normal, soft, nontender, no HSM or masses.   MS: extremities normal. No clubbing. No cyanosis.  SKIN: no rash on limited exam  NEURO: Mentation intact, speech normal, normal strength and tone, normal gait and stance  PSYCH: mentation appears normal. and affect normal/bright  Results:  Recent Results (from the past 168 hour(s))   CBC with platelets differential - NOW    Collection Time: 12/04/18  1:18 PM   Result Value Ref Range    WBC 5.9 4.0 - 11.0 10e9/L    RBC Count 4.41 4.4 - 5.9 10e12/L    Hemoglobin 13.8 13.3 - 17.7 g/dL    Hematocrit 41.4 40.0 - 53.0 %    MCV 94 78 - 100 fl    MCH 31.3 26.5 - 33.0 pg    MCHC 33.3 31.5 - 36.5 g/dL    RDW 18.5 (H) 10.0 - 15.0 %    Platelet Count 254 150 - 450 10e9/L    Diff Method Automated Method     % Neutrophils 45.5 %    % Lymphocytes 35.5 %    % Monocytes 16.3 %    % Eosinophils 2.2 %    % Basophils 0.5 %    % Immature Granulocytes 0.0 %    Nucleated RBCs 0 0 /100    Absolute Neutrophil 2.7 1.6 - 8.3 10e9/L    Absolute Lymphocytes 2.1 0.8 - 5.3 10e9/L    Absolute Monocytes 1.0 0.0 - 1.3 10e9/L    Absolute Eosinophils 0.1 0.0 - 0.7 10e9/L    Absolute Basophils 0.0 0.0 - 0.2 10e9/L    Abs Immature Granulocytes 0.0 0 - 0.4 10e9/L    Absolute Nucleated RBC 0.0    Comprehensive metabolic panel - NOW    Collection Time: 12/04/18  1:18 PM   Result Value Ref Range    Sodium 138 133 - 144 mmol/L    Potassium 4.4 3.4 - 5.3 mmol/L    Chloride 107 94 - 109 mmol/L    Carbon Dioxide 24 20 - 32 mmol/L    Anion Gap 8 3 - 14 mmol/L    Glucose 90 70 - 99 mg/dL    Urea Nitrogen 20 7 - 30 mg/dL    Creatinine 0.95 0.66 - 1.25 mg/dL    GFR Estimate 78 >60 mL/min/1.7m2    GFR Estimate If Black >90 >60 mL/min/1.7m2    Calcium 8.6 8.5 - 10.1 mg/dL    Bilirubin Total 0.2 0.2 - 1.3 mg/dL    Albumin 3.1 (L) 3.4 - 5.0 g/dL    Protein Total 6.9 6.8 - 8.8 g/dL    Alkaline Phosphatase 70 40 -  150 U/L    ALT 34 0 - 70 U/L    AST 41 0 - 45 U/L   Magnesium - NOW    Collection Time: 12/04/18  1:18 PM   Result Value Ref Range    Magnesium 2.0 1.6 - 2.3 mg/dL       Assessment and plan:   69 YO with past history of Influenza A and B infections 6 weeks apart with continued significant MERIDA.  CT chest at that time had the presence of GGO with air trapping and ? Small airway inflammation.  No change in spirometry compared to pre-transplant testing. Alhough spirometry shows mild airflow limitation, I did not think he had cGVHD of the lung (NOE) due to no change in spirometry compared to pre-transplant, with only a small drop in lung function compared to normal (usual drop with NOE is 30-50%). A viral infection with GGO on CT was an alternative explanation for his recent SOB/MERIDA.  Findings and clinical course were most consistent with bronchiolitis/organizing pneumonia post H1N1 infection; this was a common occurrence that year in post-BMT patients who contracted H1N1. Since his initial evaluation in 2016, he developed an acute respiratory illness one month prior to his repeat evaluation in 2018 with systems all resolved at the time of that visit.  CT findings were likely due to resolving pneumonia.  He has frequent URI's but no respiratory symptoms between episodes and no chronic sputum production with no symptoms suggestive of bronchiectasis.  No evidence of lung cGvHD based on symptoms or spirometry.  Unlikely sirolimus toxicity with GGO only in LLL, would expect to be diffuse.  Is now off Sirolimus.  Overall the acute changes on his CT have completely resolved.     RTC prn.  Patient to call with any questions or concerns prior to his next clinic visit.

## 2018-12-04 NOTE — MR AVS SNAPSHOT
After Visit Summary   12/4/2018    Deejay Dior    MRN: [id number]           Patient Information     Date Of Birth          1950        Visit Information        Provider Department      12/4/2018 3:00 PM Arsh Sandoval MD [facility]        Today's Diagnoses     Cough    -  1       Follow-ups after your visit        Follow-up notes from your care team     Return if symptoms worsen or fail to improve.      Your next 10 appointments already scheduled     Dec 14, 2018 10:00 AM CST   IR FOLLOW UP VISIT OUTPATIENT with LUCAS   [facility] [place], Interventional Radiology ([facility], [facility])    [address] [id number]   [phone]            Jan 31, 2019  1:30 PM CST   [facility] Draw with  [facility] DRAW   [facility] Lab Draw ([facility])    [address]  [address] [place] [id number] [phone]            Jan 31, 2019  2:15 PM CST   Return with Aby Pinto MD   [facility] Blood and Marrow Transplant ([facility])    [address] [place] [address]  [address] [place] [id number] [phone]              Who to contact     If you have questions or need follow up information about today's clinic visit or your schedule please contact [facility] directly at [phone].  Normal or non-critical lab and imaging results will be communicated to you by MyChart, letter or phone within 4 business days after the clinic has received the results. If you do not hear from us within 7 days, please contact the clinic through MyChart or phone. If you have a critical or abnormal lab result, we will notify you by phone as soon as possible.  Submit refill requests through [employer] or call your pharmacy and they will forward the refill request to us. Please allow 3 business days for your refill to be completed.          Additional  Information About Your Visit        [employer] Information     [employer] gives you secure access to your electronic health record. If you see a primary care provider, you can also send messages to your care team and make appointments. If you have questions, please call your primary care clinic.  If you do not have a primary care provider, please call [phone] and they will assist you.        Care EveryWhere ID     This is your Care EveryWhere ID. This could be used by other organizations to access your [place] medical records  [id number]         Blood Pressure from Last 3 Encounters:   12/04/18 118/62   11/20/18 122/78   11/15/18 119/70    Weight from Last 3 Encounters:   12/04/18 98.7 kg (217 lb 11.2 oz)   11/20/18 94.3 kg (208 lb)   11/15/18 97.1 kg (214 lb 1.6 oz)              Today, you had the following     No orders found for display         Today's Medication Changes          These changes are accurate as of 12/4/18 11:59 PM.  If you have any questions, ask your nurse or doctor.               Start taking these medicines.        Dose/Directions    oxyCODONE 5 MG tablet   Commonly known as:  ROXICODONE   Used for:  MDS (myelodysplastic syndrome) (H)        Dose:  5 mg   Take 1 tablet (5 mg) by mouth every 6 hours as needed for severe pain   Quantity:  30 tablet   Refills:  0         Stop taking these medicines if you haven't already. Please contact your care team if you have questions.     levofloxacin 250 MG tablet   Commonly known as:  LEVAQUIN                Where to get your medicines      Some of these will need a paper prescription and others can be bought over the counter.  Ask your nurse if you have questions.     Bring a paper prescription for each of these medications     oxyCODONE 5 MG tablet                Primary Care Provider Office Phone # Fax #    Vivek Rafael Callejas [phone]       [facility] [address] [place] [address]        Equal Access to Services      RABIA PEÑA : Hadii aad ku ac Vanegas, waairamda luqadaha, qaybta kaalmada alexpaulinaclint, waxtyesha primo tellezgeovannyaram barlow . So [facility] [phone].    ATENCIÓN: Si habla rody, tiene a del toro disposición servicios gratuitos de asistencia lingüística. Elmerame al [phone].    We comply with applicable federal civil rights laws and [place] laws. We do not discriminate on the basis of race, color, national origin, age, disability, sex, sexual orientation, or gender identity.            Thank you!     Thank you for choosing [facility]  for your care. Our goal is always to provide you with excellent care. Hearing back from our patients is one way we can continue to improve our services. Please take a few minutes to complete the written survey that you may receive in the mail after your visit with us. Thank you!             Your Updated Medication List - Protect others around you: Learn how to safely use, store and throw away your medicines at www.disposemymeds.org.          This list is accurate as of 12/4/18 11:59 PM.  Always use your most recent med list.                   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), MDS (myelodysplastic syndrome) (H)       amoxicillin 500 MG capsule    AMOXIL    16 capsule    Take 4 pills (2 grams) day of dental work    MDS (myelodysplastic syndrome) (H)       aspirin 325 MG tablet    ASA    30 tablet    Take 1 tablet (325 mg) by mouth daily    MDS (myelodysplastic syndrome) (H)       augmented betamethasone dipropionate 0.05 % external ointment    DIPROLENE-AF    50 g    Apply topically 2 times daily For areas of rash on the lower leg    Venous stasis dermatitis, unspecified laterality       calcium carbonate-vitamin D 500-400 MG-UNIT tablet    OS-JOE    180 tablet    Take 1 tablet by mouth daily 2000 mg    MDS  (myelodysplastic syndrome) (H), Acute mdzre-urxvfb-rczu disease (H), 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 Jobst ultrasheer or equivalent.    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    Status post bone marrow transplant (H)       triamcinolone 0.1 % external ointment    KENALOG     Apply twice a day to lower leg

## 2018-12-04 NOTE — NURSING NOTE
"Oncology Rooming Note    December 4, 2018 12:29 PM   Deejay Dior is a 68 year old male who presents for:    Chief Complaint   Patient presents with     Blood Draw     vitals done by CMA, no orders for labs, please order and draw in clinic     RECHECK     Return: MDS (myelodysplastic syndrome)     Initial Vitals: /62 (BP Location: Right arm, Patient Position: Sitting, Cuff Size: Adult Regular)  Pulse 82  Temp 96.9  F (36.1  C) (Oral)  Resp 16  Ht 1.753 m (5' 9\")  Wt 98.7 kg (217 lb 11.2 oz)  SpO2 95%  BMI 32.15 kg/m2 Estimated body mass index is 32.15 kg/(m^2) as calculated from the following:    Height as of this encounter: 1.753 m (5' 9\").    Weight as of this encounter: 98.7 kg (217 lb 11.2 oz). Body surface area is 2.19 meters squared.  Worst Pain (10) Comment: Data Unavailable   No LMP for male patient.  Allergies reviewed: Yes  Medications reviewed: Yes    Medications: Medication refills not needed today.  Pharmacy name entered into [employer]HE[facility]:    [id number] - [place], [place] - [address] & Replaced by [id number] - [place], [place] - [address]     Clinical concerns: N/A    5 minutes for nursing intake (face to face time)     Miriam Lehman CMA              "

## 2018-12-04 NOTE — NURSING NOTE
Chief Complaint   Patient presents with     Blood Draw     vitals done by ROOSEVELT, no orders for labs, please order and draw in clinic        Dali Kwong CMA on 12/4/2018 at 11:53 AM

## 2018-12-04 NOTE — MR AVS SNAPSHOT
After Visit Summary   12/4/2018    Deejay Dior    MRN: [id number]           Patient Information     Date Of Birth          1950        Visit Information        Provider Department      12/4/2018 12:15 PM  BMT CAROLINA #3 [facility] Blood and Marrow Transplant        Today's Diagnoses     MDS (myelodysplastic syndrome) (H)    -  1          [facility] ([facility])  [address] [place] [address]  Phone: [phone]  Clinic Hours:   Monday-Thursday:7am to 7pm   Friday: 7am to 5pm   Weekends and holidays:    8am to noon (in general)  If your fever is 100.5  or greater,   call the clinic.  After hours call the   hospital at [phone] or   [phone]. Ask for the BMT   fellow on-call            Follow-ups after your visit        Your next 10 appointments already scheduled     Dec 14, 2018 10:00 AM CST   IR FOLLOW UP VISIT OUTPATIENT with LUCAS   [facility] [place], Interventional Radiology ([facility])    [address] [facility] [id number] [phone]            Jan 31, 2019  1:30 PM CST   [facility] Draw with  [facility] LAB DRAW   [facility] Draw ([facility])    [address] [facility]  [address]  [facility] [address] [phone]            Jan 31, 2019  2:15 PM CST   Return with Aby Pinto MD   [facility] Blood and Marrow Transplant ([facility])    [address] [place] [address]  [address] [place] [address] [phone]              Who to contact     If you have questions or need follow up information about today's clinic visit or your schedule please contact [facility] BLOOD AND MARROW TRANSPLANT directly at [phone].  Normal or non-critical lab and imaging results will be communicated to you by MyChart, letter or phone within 4 business days after the clinic has received the results. If you do not hear from us within 7 days, please  "contact the clinic through Plum or phone. If you have a critical or abnormal lab result, we will notify you by phone as soon as possible.  Submit refill requests through Plum or call your pharmacy and they will forward the refill request to us. Please allow 3 business days for your refill to be completed.          Additional Information About Your Visit        [employer] Information     Plum gives you secure access to your electronic health record. If you see a primary care provider, you can also send messages to your care team and make appointments. If you have questions, please call your primary care clinic.  If you do not have a primary care provider, please call [phone] and they will assist you.        Care EveryWhere ID     This is your Care EveryWhere ID. This could be used by other organizations to access your [facility] medical records  [id number]        Your Vitals Were     Pulse Temperature Respirations Height Pulse Oximetry BMI (Body Mass Index)    82 96.9  F (36.1  C) (Oral) 16 1.753 m (5' 9\") 95% 32.15 kg/m2       Blood Pressure from Last 3 Encounters:   12/04/18 118/62   11/20/18 122/78   11/15/18 119/70    Weight from Last 3 Encounters:   12/04/18 98.7 kg (217 lb 11.2 oz)   11/20/18 94.3 kg (208 lb)   11/15/18 97.1 kg (214 lb 1.6 oz)              We Performed the Following     CBC with platelets differential - NOW     Comprehensive metabolic panel - NOW     Magnesium - NOW          Today's Medication Changes          These changes are accurate as of 12/4/18  4:09 PM.  If you have any questions, ask your nurse or doctor.               Start taking these medicines.        Dose/Directions    oxyCODONE 5 MG tablet   Commonly known as:  ROXICODONE   Used for:  MDS (myelodysplastic syndrome) (H)        Dose:  5 mg   Take 1 tablet (5 mg) by mouth every 6 hours as needed for severe pain   Quantity:  30 tablet   Refills:  0         Stop taking these medicines if you haven't already. Please contact " your care team if you have questions.     levofloxacin 250 MG tablet   Commonly known as:  LEVAQUIN                Where to get your medicines      Some of these will need a paper prescription and others can be bought over the counter.  Ask your nurse if you have questions.     Bring a paper prescription for each of these medications     oxyCODONE 5 MG tablet               Information about OPIOIDS     PRESCRIPTION OPIOIDS: WHAT YOU NEED TO KNOW   We gave you an opioid (narcotic) pain medicine. It is important to manage your pain, but opioids are not always the best choice. You should first try all the other options your care team gave you. Take this medicine for as short a time (and as few doses) as possible.    Some activities can increase your pain, such as bandage changes or therapy sessions. It may help to take your pain medicine 30 to 60 minutes before these activities. Reduce your stress by getting enough sleep, working on hobbies you enjoy and practicing relaxation or meditation. Talk to your care team about ways to manage your pain beyond prescription opioids.    These medicines have risks:    DO NOT drive when on new or higher doses of pain medicine. These medicines can affect your alertness and reaction times, and you could be arrested for driving under the influence (DUI). If you need to use opioids long-term, talk to your care team about driving.    DO NOT operate heavy machinery    DO NOT do any other dangerous activities while taking these medicines.    DO NOT drink any alcohol while taking these medicines.     If the opioid prescribed includes acetaminophen, DO NOT take with any other medicines that contain acetaminophen. Read all labels carefully. Look for the word  acetaminophen  or  Tylenol.  Ask your pharmacist if you have questions or are unsure.    You can get addicted to pain medicines, especially if you have a history of addiction (chemical, alcohol or substance dependence). Talk to your care  team about ways to reduce this risk.    All opioids tend to cause constipation. Drink plenty of water and eat foods that have a lot of fiber, such as fruits, vegetables, prune juice, apple juice and high-fiber cereal. Take a laxative (Miralax, milk of magnesia, Colace, Senna) if you don t move your bowels at least every other day. Other side effects include upset stomach, sleepiness, dizziness, throwing up, tolerance (needing more of the medicine to have the same effect), physical dependence and slowed breathing.    Store your pills in a secure place, locked if possible. We will not replace any lost or stolen medicine. If you don t finish your medicine, please throw away (dispose) as directed by your pharmacist. The [place] Pollution Control Agency has more information about safe disposal: https://www.[employer].[facility].[place].us/living-green/managing-unwanted-medications         Recent Review Flowsheet Data     BMT Recent Results Latest Ref Rng & Units 10/11/2018 10/16/2018 10/17/2018 10/22/2018 10/24/2018 11/15/2018 12/4/2018    WBC 4.0 - 11.0 10e9/L 10.7 11.1(H) 12.7(H) 10.7 9.4 6.3 5.9    Hemoglobin 13.3 - 17.7 g/dL 14.2 14.4 13.8 14.8 15.1 14.0 13.8    Platelet Count 150 - 450 10e9/L 180 163 148(L) 167 161 227 254    Neutrophils (Absolute) 1.6 - 8.3 10e9/L 9.0(H) 9.2(H) 11.2(H) 6.8 5.1 2.8 2.7    INR 0.86 - 1.14 - - - - - - -    Sodium 133 - 144 mmol/L 135 135 135 137 135 139 138    Potassium 3.4 - 5.3 mmol/L 4.3 4.5 4.2 3.4 3.8 4.2 4.4    Chloride 94 - 109 mmol/L 104 106 104 105 104 108 107    Glucose 70 - 99 mg/dL 152(H) 111(H) 103(H) 93 94 90 90    Urea Nitrogen 7 - 30 mg/dL 34(H) 26 31(H) 29 27 22 20    Creatinine 0.66 - 1.25 mg/dL 1.18 1.14 1.22 1.06 1.10 1.23 0.95    Calcium (Total) 8.5 - 10.1 mg/dL 8.0(L) 8.1(L) 8.2(L) 8.2(L) 8.2(L) 8.6 8.6    Protein (Total) 6.8 - 8.8 g/dL 6.3(L) - 6.2(L) 6.5(L) - 6.8 6.9    Albumin 3.4 - 5.0 g/dL 2.8(L) - 2.8(L) 2.9(L) - 3.2(L) 3.1(L)    Bilirubin (Direct) 0.0 - 0.2 mg/dL - - - -  - - -    Alkaline Phosphatase 40 - 150 U/L 72 - 65 72 - 66 70    AST 0 - 45 U/L 21 - 20 25 - 31 41    ALT 0 - 70 U/L 33 - 36 37 - 28 34    MCV 78 - 100 fl 88 88 88 90 89 92 94               Primary Care Provider Office Phone # Fax #    Vivek Rafael Callejas [phone]       [facility] [address] [place] [address]        Equal Access to Services     [facility] MARIANA : Hadii aad ku hadasho Soomaali, waaxda luqadaha, qaybta kaalmada adeegyada, waxay idiin hayaan adeeg kharash la'aan . So [facility] [phone].    ATENCIÓN: Si habla español, tiene a del toro disposición servicios gratuitos de asistencia lingüística. [facility] [phone].    We comply with applicable federal civil rights laws and [place] laws. We do not discriminate on the basis of race, color, national origin, age, disability, sex, sexual orientation, or gender identity.            Thank you!     Thank you for choosing [facility] BLOOD AND MARROW TRANSPLANT  for your care. Our goal is always to provide you with excellent care. Hearing back from our patients is one way we can continue to improve our services. Please take a few minutes to complete the written survey that you may receive in the mail after your visit with us. Thank you!             Your Updated Medication List - Protect others around you: Learn how to safely use, store and throw away your medicines at www.disposemymeds.org.          This list is accurate as of 12/4/18  4:09 PM.  Always use your most recent med list.                   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), MDS (myelodysplastic syndrome) (H)       amoxicillin 500 MG capsule    AMOXIL    16 capsule    Take 4 pills (2 grams) day of dental work    MDS (myelodysplastic syndrome) (H)       aspirin 325 MG tablet    ASA    30 tablet    Take 1 tablet (325 mg) by  mouth daily    MDS (myelodysplastic syndrome) (H)       augmented betamethasone dipropionate 0.05 % external ointment    DIPROLENE-AF    50 g    Apply topically 2 times daily For areas of rash on the lower leg    Venous stasis dermatitis, unspecified laterality       calcium carbonate-vitamin D 500-400 MG-UNIT tablet    OS-JOE    180 tablet    Take 1 tablet by mouth daily 2000 mg    MDS (myelodysplastic syndrome) (H), Acute gxdkx-terbuz-pbrb disease (H), 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 Jobst ultrasheer or equivalent.    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    Status post bone marrow transplant (H)       triamcinolone 0.1 % external ointment    KENALOG     Apply twice a day to lower leg

## 2018-12-04 NOTE — LETTER
12/4/2018       RE: Deejay Dior  [address]  [facility] [address]     Dear Colleague,    Thank you for referring your patient, Deejay Dior, to the [facility] at [facility]. Please see a copy of my visit note below.    Reason for Visit  Deejay Dior is a 68 year old year old male who is being seen for No chief complaint on file.    Pulmonary HPI  66 YO male with history of MDS s/p NMA allo sib BMT (Day + 707) who is referred to the Pulmonary BMT clinic by Dr. Pinto for evaluation of SOB and abnormal chest CT.  Developed influenza A (H1N1) in 3-16 with significant SOB/MERIDA requiring hospital admission with ICU stay and use of Hi-flow. 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  After discharge he was slowly feeling better- could initially walk 40-50 feet without oxygen ( feet with oxygen) but improved to 3-4 blocks without oxygen at end of April. Early May developed weakness, fatigue and increased SOB.  Diagnosed with Influenza B; not hospitalized. After that episode had continued SOB/MERIDA, using oxygen at home.  CT chest in 5-16 showed bilateral GGO, repeat scan on 6-1 showed a decrease in GGO.   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  Since that time he feels 70-80% better with decreased MERIDA.  URI with nasal drainage and congestion the last two days, not much cough.  No prior history of lung disease as youth or young adult; no asthma.  Episode of pneumonia 11-14 hospitalized for 2 days.  No tobacco x 16 years, 32-48 pack year history.   + second hand smoke.  Denies significant mold exposure.    Last seen 2 months ago. At that visit he had an abnormal CT but his respiratory symptoms had all  resolved.  Since his last visit he has been doing well from a respiratory standpoint. No cough, no SOB, no MERIDA.  No respiratory illness since his last visit.  CT chest from today reviewed. Acute changes on his recent CT are resolved, chronic changes in his lingula and RLL are unchanged compared to 7-16, these are likely fibrotic changes from his past influenza.    The patient was seen and examined by Arsh Sandoval           Current Outpatient Prescriptions   Medication     acyclovir (ZOVIRAX) 800 MG tablet     amoxicillin (AMOXIL) 500 MG capsule     aspirin 325 MG tablet     augmented betamethasone dipropionate (DIPROLENE-AF) 0.05 % external ointment     calcium carbonate-vitamin D 500-400 MG-UNIT TABS tablt     fluconazole (DIFLUCAN) 100 MG tablet     LORazepam (ATIVAN) 1 MG tablet     order for DME     ORDER FOR DME     oxyCODONE (ROXICODONE) 5 MG tablet     pantoprazole (PROTONIX) 20 MG EC tablet     ruxolitinib (JAKAFI) 5 MG TABS tablet CHEMO     sertraline (ZOLOFT) 100 MG tablet     sulfamethoxazole-trimethoprim (BACTRIM DS/SEPTRA DS) 800-160 MG per tablet     triamcinolone (KENALOG) 0.1 % ointment     No current facility-administered medications for this visit.      Allergies   Allergen Reactions     Blood Transfusion Related (Informational Only) Other (See Comments)     Patient has a history of a clinically significant antibody against RBC antigens.  A delay in compatible RBCs may occur.     Past Medical History:   Diagnosis Date     Head injury 1964     Hearing problem Hearing aids 2015     History of blood transfusion 3/2014     History of radiation therapy June 2014     Immunosuppression (H) Sirolimus daily     MDS (myelodysplastic syndrome) (H)      Numbness and tingling     feet     Obstructive sleep apnea 1999     Pneumonia      Reduced vision August 2016    Cataract surgery     Sleep apnea 1999     Transplant July 1, 2014    Stem Cells       Past Surgical History:   Procedure Laterality Date      BACK SURGERY       BIOPSY       BMT CELL PRODUCT INFUSION       ESOPHAGOSCOPY, GASTROSCOPY, DUODENOSCOPY (EGD), COMBINED N/A 2/2/2015     ESOPHAGOSCOPY, GASTROSCOPY, DUODENOSCOPY (EGD), COMBINED Left 8/6/2015    Procedure: COMBINED ESOPHAGOSCOPY, GASTROSCOPY, DUODENOSCOPY (EGD), BIOPSY SINGLE OR MULTIPLE;  Surgeon: Amber Francis MD;  Location: [facility]     EXCISE LESION LIP N/A 1/2/2017    Procedure: EXCISE LESION LIP;  Surgeon: Tim Yanez MD;  Location: [facility]     EYE SURGERY       FLEXIBLE SIGMOIDOSCOPY  2/2/15     HEMORRHOIDECTOMY  2001     PHACOEMULSIFICATION CLEAR CORNEA WITH STANDARD INTRAOCULAR LENS IMPLANT Left 7/18/2016    Procedure: PHACOEMULSIFICATION CLEAR CORNEA WITH STANDARD INTRAOCULAR LENS IMPLANT;  Surgeon: Randolph Giles MD;  Location:  [facility]     TONSILLECTOMY  1953     TRANSPLANT  7-1-2014    Stem Cell Transplant [facility] BMT     VASCULAR SURGERY Left 2009       Social History     Social History     Marital status:      Spouse name: N/A     Number of children: N/A     Years of education: N/A     Occupational History     Not on file.     Social History Main Topics     Smoking status: Former Smoker     Packs/day: 1.00     Years: 34.00     Types: Cigarettes     Start date: 12/1/1967     Quit date: 4/1/2001     Smokeless tobacco: Never Used      Comment: quit in 2000     Alcohol use 2.5 oz/week      Comment: Seldom     Drug use: No     Sexual activity: Not Currently     Partners: Female     Birth control/ protection: Male Surgical, Female Surgical     Other Topics Concern     Not on file     Social History Narrative       ROS Pulmonary  A complete ROS was otherwise negative except as noted in the HPI.  There were no vitals taken for this visit.  Exam:   GENERAL APPEARANCE: Well developed, well nourished, alert, and in no apparent distress.  EYES: PERRL, EOMI  HENT: Nasal mucosa with no edema and no hyperemia. No nasal polyps.  EARS: Canals clear, TMs normal  MOUTH: Oral  mucosa is moist, without any lesions, no tonsillar enlargement, no oropharyngeal exudate.  NECK: supple, no masses, no thyromegaly.  LYMPHATICS: No significant axillary, cervical, or supraclavicular nodes.  RESP:  Good air flow throughout.  No crackles. No rhonchi. No wheezes.  CV: Normal S1, S2, regular rhythm, normal rate. No murmur.  No rub. No gallop. No LE edema.   ABDOMEN:  Bowel sounds normal, soft, nontender, no HSM or masses.   MS: extremities normal. No clubbing. No cyanosis.  SKIN: no rash on limited exam  NEURO: Mentation intact, speech normal, normal strength and tone, normal gait and stance  PSYCH: mentation appears normal. and affect normal/bright  Results:  Recent Results (from the past 168 hour(s))   CBC with platelets differential - NOW    Collection Time: 12/04/18  1:18 PM   Result Value Ref Range    WBC 5.9 4.0 - 11.0 10e9/L    RBC Count 4.41 4.4 - 5.9 10e12/L    Hemoglobin 13.8 13.3 - 17.7 g/dL    Hematocrit 41.4 40.0 - 53.0 %    MCV 94 78 - 100 fl    MCH 31.3 26.5 - 33.0 pg    MCHC 33.3 31.5 - 36.5 g/dL    RDW 18.5 (H) 10.0 - 15.0 %    Platelet Count 254 150 - 450 10e9/L    Diff Method Automated Method     % Neutrophils 45.5 %    % Lymphocytes 35.5 %    % Monocytes 16.3 %    % Eosinophils 2.2 %    % Basophils 0.5 %    % Immature Granulocytes 0.0 %    Nucleated RBCs 0 0 /100    Absolute Neutrophil 2.7 1.6 - 8.3 10e9/L    Absolute Lymphocytes 2.1 0.8 - 5.3 10e9/L    Absolute Monocytes 1.0 0.0 - 1.3 10e9/L    Absolute Eosinophils 0.1 0.0 - 0.7 10e9/L    Absolute Basophils 0.0 0.0 - 0.2 10e9/L    Abs Immature Granulocytes 0.0 0 - 0.4 10e9/L    Absolute Nucleated RBC 0.0    Comprehensive metabolic panel - NOW    Collection Time: 12/04/18  1:18 PM   Result Value Ref Range    Sodium 138 133 - 144 mmol/L    Potassium 4.4 3.4 - 5.3 mmol/L    Chloride 107 94 - 109 mmol/L    Carbon Dioxide 24 20 - 32 mmol/L    Anion Gap 8 3 - 14 mmol/L    Glucose 90 70 - 99 mg/dL    Urea Nitrogen 20 7 - 30 mg/dL     Creatinine 0.95 0.66 - 1.25 mg/dL    GFR Estimate 78 >60 mL/min/1.7m2    GFR Estimate If Black >90 >60 mL/min/1.7m2    Calcium 8.6 8.5 - 10.1 mg/dL    Bilirubin Total 0.2 0.2 - 1.3 mg/dL    Albumin 3.1 (L) 3.4 - 5.0 g/dL    Protein Total 6.9 6.8 - 8.8 g/dL    Alkaline Phosphatase 70 40 - 150 U/L    ALT 34 0 - 70 U/L    AST 41 0 - 45 U/L   Magnesium - NOW    Collection Time: 12/04/18  1:18 PM   Result Value Ref Range    Magnesium 2.0 1.6 - 2.3 mg/dL       Assessment and plan:   69 YO with past history of Influenza A and B infections 6 weeks apart with continued significant MERIDA.  CT chest at that time had the presence of GGO with air trapping and ? Small airway inflammation.  No change in spirometry compared to pre-transplant testing. Alhough spirometry shows mild airflow limitation, I did not think he had cGVHD of the lung (NOE) due to no change in spirometry compared to pre-transplant, with only a small drop in lung function compared to normal (usual drop with NOE is 30-50%). A viral infection with GGO on CT was an alternative explanation for his recent SOB/MERIDA.  Findings and clinical course were most consistent with bronchiolitis/organizing pneumonia post H1N1 infection; this was a common occurrence that year in post-BMT patients who contracted H1N1. Since his initial evaluation in 2016, he developed an acute respiratory illness one month prior to his repeat evaluation in 2018 with systems all resolved at the time of that visit.  CT findings were likely due to resolving pneumonia.   He has frequent URI's but no respiratory symptoms between episodes and no chronic sputum production with no symptoms suggestive of bronchiectasis.  No evidence of lung cGvHD based on symptoms or spirometry.  Unlikely sirolimus toxicity with GGO only in LLL, would expect to be diffuse.  Is now off Sirolimus.  Overall the acute changes on his CT have completely resolved.     RTC prn.  Patient to call with any questions or concerns prior  to his next clinic visit.      Again, thank you for allowing me to participate in the care of your patient.      Sincerely,    Arsh Sandoval MD

## 2018-12-11 ENCOUNTER — TELEPHONE (OUTPATIENT)
Dept: INTERVENTIONAL RADIOLOGY/VASCULAR | Facility: CLINIC | Age: 68
End: 2018-12-11

## 2018-12-12 ENCOUNTER — CARE COORDINATION (OUTPATIENT)
Dept: TRANSPLANT | Facility: CLINIC | Age: 68
End: 2018-12-12

## 2018-12-12 DIAGNOSIS — D89.813 GVH (GRAFT VERSUS HOST DISEASE) (H): ICD-10-CM

## 2018-12-12 DIAGNOSIS — D89.813 GVH (GRAFT VERSUS HOST DISEASE) (H): Primary | ICD-10-CM

## 2018-12-12 DIAGNOSIS — D46.9 MDS (MYELODYSPLASTIC SYNDROME) (H): Primary | ICD-10-CM

## 2018-12-12 DIAGNOSIS — D46.9 MDS (MYELODYSPLASTIC SYNDROME) (H): ICD-10-CM

## 2018-12-12 RX ORDER — CEVIMELINE HYDROCHLORIDE 30 MG/1
30 CAPSULE ORAL 3 TIMES DAILY
Qty: 90 CAPSULE | Refills: 11 | Status: SHIPPED | OUTPATIENT
Start: 2018-12-12 | End: 2019-06-27

## 2018-12-12 NOTE — PROGRESS NOTES
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 Patient states that he has tried a variety of OTC products and dexamethasone swish and spit without relief. The dentist recommended cevimeline (Evoxac). I contacted Dr Pinto and BMT Pharm D Sal Quigley who agreed with this choice. Prescription sent to [facility] in [place], patient preferred pharmacy, for cevimeline 30 mg, three times daily. 5 refills. Authorized by Dr Pinto. Patient was told to contact the pharmacy to be sure his insurance will cover this medication.

## 2018-12-14 ENCOUNTER — HOSPITAL ENCOUNTER (OUTPATIENT)
Facility: CLINIC | Age: 68
Discharge: HOME | End: 2018-12-14
Attending: RADIOLOGY | Admitting: RADIOLOGY
Payer: MEDICARE

## 2018-12-14 DIAGNOSIS — I83.899 VARICOSE VEINS OF LOWER EXTREMITIES WITH COMPLICATIONS: ICD-10-CM

## 2018-12-18 ENCOUNTER — TELEPHONE (OUTPATIENT)
Dept: TRANSPLANT | Facility: CLINIC | Age: 68
End: 2018-12-18

## 2018-12-18 NOTE — TELEPHONE ENCOUNTER
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

## 2018-12-18 NOTE — TELEPHONE ENCOUNTER
Central Prior Authorization Team   Phone: [phone]      PA Initiation    Medication: cevimeline (EVOXAC) 30 MG capsule- PA Initiated  Insurance Company: [employer] - Phone [phone] Fax [phone]  Pharmacy Filling the Rx: [id number] [place] [place] [facility] [address] [address]  Filling Pharmacy Phone: [phone]  Filling Pharmacy Fax: [phone]  Start Date: 12/18/2018

## 2018-12-19 NOTE — TELEPHONE ENCOUNTER
PRIOR AUTHORIZATION DENIED    Medication: cevimeline (EVOXAC) 30 MG capsule- DENIED    Denial Date: 12/18/2018    Denial Rational:               Appeal Information:

## 2018-12-21 ENCOUNTER — TELEPHONE (OUTPATIENT)
Dept: ONCOLOGY | Facility: CLINIC | Age: 68
End: 2018-12-21

## 2018-12-21 NOTE — TELEPHONE ENCOUNTER
Called and left a msg for pt regarding treatment     He states that he was present the last txt scheduled date however the wait time was too long.     Informed him that I will schedule with Dr Godwin again, however will f/u with pt after the holidays    Radha Rodney RN, BSN  Interventional Radiology Nurse Coordinator   Phone: [phone]

## 2018-12-21 NOTE — TELEPHONE ENCOUNTER
Pt did return my phone call     Inquired on dates for treatment.     He would like Tues 1/8 with Dr. Godwin.     Will schedule with pt and then get back to him.     He agrees to plan.     *pt scheduled for     Radha Rodney RN, BSN  Interventional Radiology Nurse Coordinator   Phone: [phone]

## 2018-12-26 NOTE — TELEPHONE ENCOUNTER
Called pt to inform him of his upcoming appts.     Informed him that we have him rescheduled with Dr. Godwin.     Informed him that we scheduled for Tues 1/8. He is to check into [facility] waiting room at 1015am for a 1030am appt with Dr. Godwin.     He verbalized understanding and will call with any other questions.     Radha Rodney RN, BSN  Interventional Radiology Nurse Coordinator   Phone: [phone]

## 2018-12-28 ENCOUNTER — TELEPHONE (OUTPATIENT)
Dept: ONCOLOGY | Facility: CLINIC | Age: 68
End: 2018-12-28

## 2018-12-28 ENCOUNTER — TELEPHONE (OUTPATIENT)
Dept: TRANSPLANT | Facility: CLINIC | Age: 68
End: 2018-12-28

## 2018-12-28 NOTE — TELEPHONE ENCOUNTER
Pt called triage today with extreme muscle/joint pain [id number]. 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 Per PharmD it is not side effect of Jakafi. He also tapered off steroids at that time. No other sxs. No other new drugs. Messaged Dr. Pinto/NC and schedulers to have pt seen within the next week if possible by Dr. Pinto.     Ann-Marie Zee NP

## 2018-12-28 NOTE — ORAL ONC MGMT
Oral Chemotherapy Monitoring Program    Deejay called one of our liaisons and left a message that he wanted to report side effects with his Jakafi therapy. I placed a call to follow up on this with Deejay and to perform a routine assessment.    Left message to please call back in follow up of therapy. No drug names were mentioned.    Ale Uribe  Pharmacy Intern  [facility]  [phone]

## 2018-12-28 NOTE — ORAL ONC MGMT
"Oral Chemotherapy Monitoring Program    Primary Oncologist: Dr. Pinto  Primary Oncology Clinic: [facility]  Cancer Diagnosis: GVHD    Start Date: C1D1=10/22/2018  Therapy: Jakafi (ruxolitinib) 5 mg (1 x 5 mg) BID continuously    Lab Monitoring Plan  CBC and CMP monthly  Subjective/Objective:  Deejay Dior is a 68 year old male contacted by phone for a follow-up visit for oral chemotherapy.  Deejay is tolerating Jakafi therapy, but has the side effect of severe pain. Deejay called our pharmacy to report severe aches and pains throughout his body, most notably in joints. He expressed that he feels pain in his knees, hands, and even fingers. Deejay described his pain as a 10 on a scale of 1 to 10, with 10 being most severe. Deejay reports that he has pain doing ordinary tasks such as sitting in a chair and climbing stairs. He expressed that even lifting a milk jug \"feels like my arm is being ripped out of the socket.\" He notes that his pain seems to get worse at night and affects his sleep. He tries to avoid taking his oxycodone 5 mg if he can. We discussed that he can use it as long as he follows the instructions of taking one 5 mg tablet every 6 hours as needed. Deejay reports that he has taken it twice as directed with moderate relief. More recently, he took 2 tablets at once to help with a severe episode of pain and he reports that it gave him relief. We discussed the importance of taking it as directed. Deejay denies all other side effects, including nausea and vomiting. Deejay expressed understanding and agreement with our discussion. I have notified Deejay's care team of his severe pain.    ORAL CHEMOTHERAPY 10/22/2018 10/29/2018 11/28/2018 12/28/2018   Drug Name Jakafi (Ruxolitinib) Jakafi (Ruxolitinib) Jakafi (Ruxolitinib) Jakafi (Ruxolitinib)   Current Dosage 5mg 5mg 5mg 5mg   Current Schedule BID BID BID BID   Cycle Details Continuous Continuous - Continuous   Start Date of Last Cycle - 10/22/2018 - - " "  Planned next cycle start date - 11/19/2018 - -   Doses missed in last 2 weeks - 0 0 -   Adherence Assessment - Adherent Adherent Adherent   Adverse Effects - No AE identified during assessment No AE identified during assessment Other (see note for details)   Other (see note for details) - - - Joint Pain   Pharmacist intervention? - - - Yes   Intervention(s) - - - Referral to oncology provider   Home BPs - not needed not needed not needed   Any new drug interactions? Yes - - -   Pharmacist Intervention? Yes - - -   Intervention(s) Patient Education - - -   Is the dose as ordered appropriate for the patient? - - Yes -     Vitals:  BP:   BP Readings from Last 1 Encounters:   12/04/18 118/62     Wt Readings from Last 1 Encounters:   12/04/18 98.7 kg (217 lb 11.2 oz)     Estimated body surface area is 2.19 meters squared as calculated from the following:    Height as of 12/4/18: 1.753 m (5' 9\").    Weight as of 12/4/18: 98.7 kg (217 lb 11.2 oz).    Labs:  _  Result Component Current Result Ref Range   Sodium 138 (12/4/2018) 133 - 144 mmol/L     _  Result Component Current Result Ref Range   Potassium 4.4 (12/4/2018) 3.4 - 5.3 mmol/L     _  Result Component Current Result Ref Range   Calcium 8.6 (12/4/2018) 8.5 - 10.1 mg/dL     _  Result Component Current Result Ref Range   Magnesium 2.0 (12/4/2018) 1.6 - 2.3 mg/dL     No results found for Phos within last 30 days.     _  Result Component Current Result Ref Range   Albumin 3.1 (L) (12/4/2018) 3.4 - 5.0 g/dL     _  Result Component Current Result Ref Range   Urea Nitrogen 20 (12/4/2018) 7 - 30 mg/dL     _  Result Component Current Result Ref Range   Creatinine 0.95 (12/4/2018) 0.66 - 1.25 mg/dL       _  Result Component Current Result Ref Range   AST 41 (12/4/2018) 0 - 45 U/L     _  Result Component Current Result Ref Range   ALT 34 (12/4/2018) 0 - 70 U/L     _  Result Component Current Result Ref Range   Bilirubin Total 0.2 (12/4/2018) 0.2 - 1.3 mg/dL       _  Result " Component Current Result Ref Range   WBC 5.9 (12/4/2018) 4.0 - 11.0 10e9/L     _  Result Component Current Result Ref Range   Hemoglobin 13.8 (12/4/2018) 13.3 - 17.7 g/dL     _  Result Component Current Result Ref Range   Platelet Count 254 (12/4/2018) 150 - 450 10e9/L     _  Result Component Current Result Ref Range   Absolute Neutrophil 2.7 (12/4/2018) 1.6 - 8.3 10e9/L       Assessment:  Deejay is tolerating Jakafi therapy, but has significant severe pain. He is having minimal relief on current pain management with oxycodone 5 mg every 6 hours as needed.    Plan:  - Continue Jakafi therapy  - Continue oxycodone 5 mg every 6 hours as needed  - Follow up with Dr. Pinto regarding side effects and pain management strategy    Follow-Up:  Follow-up with Deejay BRAVO regarding in-basket sent to Dr. Pinto.      Ale Uribe  Pharmacy Intern  [facility]  [phone]

## 2018-12-28 NOTE — ORAL ONC MGMT
Patient has been referred to BMT triage for further assistance.     Ale Uribe  Pharmacy Intern  [facility]  [phone]

## 2019-01-03 ENCOUNTER — ONCOLOGY VISIT (OUTPATIENT)
Dept: TRANSPLANT | Facility: CLINIC | Age: 69
End: 2019-01-03
Payer: MEDICARE

## 2019-01-03 ENCOUNTER — APPOINTMENT (OUTPATIENT)
Dept: LAB | Facility: CLINIC | Age: 69
End: 2019-01-03
Payer: MEDICARE

## 2019-01-03 VITALS
WEIGHT: 214 LBS | TEMPERATURE: 97.1 F | HEART RATE: 69 BPM | BODY MASS INDEX: 31.7 KG/M2 | DIASTOLIC BLOOD PRESSURE: 73 MMHG | HEIGHT: 69 IN | SYSTOLIC BLOOD PRESSURE: 116 MMHG | OXYGEN SATURATION: 96 % | RESPIRATION RATE: 16 BRPM

## 2019-01-03 DIAGNOSIS — D89.813 GVH (GRAFT VERSUS HOST DISEASE) (H): ICD-10-CM

## 2019-01-03 DIAGNOSIS — D46.9 MDS (MYELODYSPLASTIC SYNDROME) (H): ICD-10-CM

## 2019-01-03 LAB
ALBUMIN SERPL-MCNC: 3.3 G/DL (ref 3.4–5)
ALP SERPL-CCNC: 71 U/L (ref 40–150)
ALT SERPL W P-5'-P-CCNC: 36 U/L (ref 0–70)
ANION GAP SERPL CALCULATED.3IONS-SCNC: 7 MMOL/L (ref 3–14)
AST SERPL W P-5'-P-CCNC: ABNORMAL U/L (ref 0–45)
BASOPHILS # BLD AUTO: 0 10E9/L (ref 0–0.2)
BASOPHILS NFR BLD AUTO: 0.5 %
BILIRUB SERPL-MCNC: 0.4 MG/DL (ref 0.2–1.3)
BUN SERPL-MCNC: 23 MG/DL (ref 7–30)
CALCIUM SERPL-MCNC: 8.9 MG/DL (ref 8.5–10.1)
CHLORIDE SERPL-SCNC: 106 MMOL/L (ref 94–109)
CO2 SERPL-SCNC: 23 MMOL/L (ref 20–32)
CREAT SERPL-MCNC: 0.97 MG/DL (ref 0.66–1.25)
DIFFERENTIAL METHOD BLD: ABNORMAL
EOSINOPHIL # BLD AUTO: 0.2 10E9/L (ref 0–0.7)
EOSINOPHIL NFR BLD AUTO: 2.7 %
ERYTHROCYTE [DISTWIDTH] IN BLOOD BY AUTOMATED COUNT: 16.3 % (ref 10–15)
GFR SERPL CREATININE-BSD FRML MDRD: 79 ML/MIN/{1.73_M2}
GLUCOSE SERPL-MCNC: 89 MG/DL (ref 70–99)
HCT VFR BLD AUTO: 40.6 % (ref 40–53)
HGB BLD-MCNC: 13.8 G/DL (ref 13.3–17.7)
IMM GRANULOCYTES # BLD: 0 10E9/L (ref 0–0.4)
IMM GRANULOCYTES NFR BLD: 0.2 %
LYMPHOCYTES # BLD AUTO: 2.5 10E9/L (ref 0.8–5.3)
LYMPHOCYTES NFR BLD AUTO: 41.7 %
MCH RBC QN AUTO: 31.4 PG (ref 26.5–33)
MCHC RBC AUTO-ENTMCNC: 34 G/DL (ref 31.5–36.5)
MCV RBC AUTO: 92 FL (ref 78–100)
MONOCYTES # BLD AUTO: 0.9 10E9/L (ref 0–1.3)
MONOCYTES NFR BLD AUTO: 14.3 %
NEUTROPHILS # BLD AUTO: 2.4 10E9/L (ref 1.6–8.3)
NEUTROPHILS NFR BLD AUTO: 40.6 %
NRBC # BLD AUTO: 0 10*3/UL
NRBC BLD AUTO-RTO: 0 /100
PLATELET # BLD AUTO: 209 10E9/L (ref 150–450)
POTASSIUM SERPL-SCNC: 4.3 MMOL/L (ref 3.4–5.3)
PROT SERPL-MCNC: 7.1 G/DL (ref 6.8–8.8)
RBC # BLD AUTO: 4.4 10E12/L (ref 4.4–5.9)
SODIUM SERPL-SCNC: 136 MMOL/L (ref 133–144)
WBC # BLD AUTO: 6 10E9/L (ref 4–11)

## 2019-01-03 PROCEDURE — 82040 ASSAY OF SERUM ALBUMIN: CPT

## 2019-01-03 PROCEDURE — 80053 COMPREHEN METABOLIC PANEL: CPT | Performed by: INTERNAL MEDICINE

## 2019-01-03 PROCEDURE — 82565 ASSAY OF CREATININE: CPT

## 2019-01-03 PROCEDURE — 82247 BILIRUBIN TOTAL: CPT

## 2019-01-03 PROCEDURE — G0463 HOSPITAL OUTPT CLINIC VISIT: HCPCS

## 2019-01-03 PROCEDURE — 84075 ASSAY ALKALINE PHOSPHATASE: CPT

## 2019-01-03 PROCEDURE — 82374 ASSAY BLOOD CARBON DIOXIDE: CPT

## 2019-01-03 PROCEDURE — 82310 ASSAY OF CALCIUM: CPT

## 2019-01-03 PROCEDURE — 84460 ALANINE AMINO (ALT) (SGPT): CPT

## 2019-01-03 PROCEDURE — 36415 COLL VENOUS BLD VENIPUNCTURE: CPT

## 2019-01-03 PROCEDURE — 85025 COMPLETE CBC W/AUTO DIFF WBC: CPT | Performed by: INTERNAL MEDICINE

## 2019-01-03 PROCEDURE — 84132 ASSAY OF SERUM POTASSIUM: CPT

## 2019-01-03 PROCEDURE — 84155 ASSAY OF PROTEIN SERUM: CPT

## 2019-01-03 PROCEDURE — 82435 ASSAY OF BLOOD CHLORIDE: CPT

## 2019-01-03 PROCEDURE — 82947 ASSAY GLUCOSE BLOOD QUANT: CPT | Mod: ZF

## 2019-01-03 PROCEDURE — 84520 ASSAY OF UREA NITROGEN: CPT

## 2019-01-03 PROCEDURE — 84295 ASSAY OF SERUM SODIUM: CPT

## 2019-01-03 RX ORDER — PREDNISONE 10 MG/1
10 TABLET ORAL DAILY
Qty: 90 TABLET | Refills: 0 | Status: SHIPPED | OUTPATIENT
Start: 2019-01-03 | End: 2019-05-30

## 2019-01-03 ASSESSMENT — MIFFLIN-ST. JEOR: SCORE: 1721.08

## 2019-01-03 ASSESSMENT — PAIN SCALES - GENERAL: PAINLEVEL: MODERATE PAIN (5)

## 2019-01-03 NOTE — NURSING NOTE
Vitals done, labs drawn by venipuncture in lab.  See doc flow sheets for details.  Sara Coronel, CMA

## 2019-01-03 NOTE — NURSING NOTE
"Oncology Rooming Note    January 3, 2019 1:47 PM   Deejay Dior is a 68 year old male who presents for:    Chief Complaint   Patient presents with     Blood Draw     Vitals done, labs drawn by  and patient checked into next appt.     RECHECK     PT is here for labs and to see MDS     Initial Vitals: /73   Pulse 69   Temp 97.1  F (36.2  C) (Oral)   Resp 16   Ht 1.753 m (5' 9\")   Wt 97.1 kg (214 lb)   SpO2 96%   BMI 31.60 kg/m   Estimated body mass index is 31.6 kg/m  as calculated from the following:    Height as of this encounter: 1.753 m (5' 9\").    Weight as of this encounter: 97.1 kg (214 lb). Body surface area is 2.17 meters squared.  Moderate Pain (5) Comment: all over body pain   No LMP for male patient.  Allergies reviewed: Yes  Medications reviewed: Yes    Medications: Medication refills not needed today.  Pharmacy name entered into [facility]:    [facility] [address] [place], [place] - [address] [facility] [address] & [id number] - [place] - [address] [address] [place] [place] [address]  [place] [facility] DIS[facility]GE - [place], [place] - [address] [facility] - [place], [place] - [address] [place] [address]    Clinical concerns: none     6 minutes for nursing intake (face to face time)     Elizabeth Forrester MA              "

## 2019-01-03 NOTE — PROGRESS NOTES
"BMT Clinic Note     ID:  Mr. 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 Off prednisone now and notices that his \"aches/pains\" have returned as well as dry mouth. Pain is debilitating and he is unable to function normally, feels stiff as well, for example it is hard to pour from a gallon bottle. No n/v/d/c. Good appetite. Recent sclerotherapy with Dr. Godwin to left leg. Feels that it's helping control the swelling. No fevers. No bleeding. No chnages to his skin since 2 months ago, no more SOB or cough. Mouth and eyes are worse with more dryness. Using artificial tears 3-4 times a day now. No joints redness or swelling.     Review of Systems: 10 point ROS negative except as noted above.    Physical Exam:  /73   Pulse 69   Temp 97.1  F (36.2  C) (Oral)   Resp 16   Ht 1.753 m (5' 9\")   Wt 97.1 kg (214 lb)   SpO2 96%   BMI 31.60 kg/m       Wt Readings from Last 4 Encounters:   01/03/19 97.1 kg (214 lb)   12/04/18 98.7 kg (217 lb 11.2 oz)   11/20/18 94.3 kg (208 lb)   11/15/18 97.1 kg (214 lb 1.6 oz)     General: NAD, interactive, KPS 80%  Eyes: SARIKA, sclera anicteric  Nose/Mouth/Throat: OP clear, no ulcerations, very dry, upper plate, chronic lichenoid changes , no lip lesions, tongue non-coated.   Lungs:CTA B, no crackles or wheezes   CV: RRR without murmurs rubs or gallops  Abd: S/NT/ND +BS  Skin:   RLE skin shiny with some reddened areas. Nontender. No edema. No skin desquamation  No new skin changes, back without rash   Neuro: A&O, moderate resting tremor  Access: peripheral    Labs:  Lab Results   Component Value Date    WBC 6.0 01/03/2019    ANEU 2.4 01/03/2019    HGB 13.8 01/03/2019    HCT 40.6 01/03/2019     01/03/2019     12/04/2018    POTASSIUM 4.4 12/04/2018    CHLORIDE 107 12/04/2018    CO2 24 12/04/2018    GLC 90 12/04/2018    BUN 20 12/04/2018    CR 0.95 12/04/2018    MAG 2.0 12/04/2018    INR 0.95 " 06/30/2016     ASSESSMENT AND PLAN:  Mr. 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 Total cell dose (from 7/1 & 7/2) 6.53 x 10^6 CD34+ cells/kg.   - 1 year anniversary (July 2015): 30% cellular, trilineage hematopoiesis, no abnormal blasts by morphology or flow , no dysplasia, 0-1 fibrosis, 100% donor (BM, CD3, CD15). CR  - 2 year anniversary (July 2016): 20-30% cellular, trilineage hematopoiesis, no abnormal blasts by morphology or flow , no dysplasia, No fibrosis, 100% donor in BM (PB not sent). ISCN: //46,XX[20] Complete Remission.    HEME: CBC okay  No transfusion needs, counts stable       ARTHALGIAS AND MYALGIAS:  Dif dg includes steroids withdrawal vs cGVHD arthropathy. Given overall stable cGVHD and no new symtpoms, I am leaning forwards steroids withdrawal as a culprit of his discomfomfort. He is using 5 mg Oxycodne 4 times a day now with only partial response.    We discussed the treatment approach. I recommended to resume PND 40mg a day and quick burst and taper of a week to 10mg po q.o.d and stay on this dose. The goal is to go to lowest dose of steroids which would control his symtpoms. He will f/u with  on 1/22 and she will re-evaluate.   COnt Sasha, I re-confirmed arthralgias are not side effect of Jakafi.       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 Had been off prednisone since summer 2017 and briefly tapered off Sirolimus (january 2018), however resumed with flare in February. 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 Sirolimus was stopped on 9/27 & Pred 90mg was started. Seen by Dr. Sandoval on 10/2, please see his note. He does not think his symptoms or CT findings are c/w Sirolimus or GVH & he was in favor of tapering Prednisone. Recently he has worsening skin thickening &  desquamation to the RLE, c/w GVH.   Started Jakafi 10/22 at 5 mg BID with symptom improvement in lower extremities.  Completed steroid taper in Oct 2018  Possible flare-up  - see above.     ID:  Afebrile no signs/sx of infection.   - IgG = 403, repleted 3/22/16, 5/8/16= 1270; recheck with recent viral exposures 1300 10/1  - Prophy: HD ACV (renal dosing), Fluconazole and  Bactrim.    - Flu shot given 10/11/18      GI:    On protonix (has heartburn)    FEN/Renal:  Cr significantly improved with bumex (no metolazone) every other day. Edema better, continue     Fatigue: Better recently.  - History of testosterone deficiency, rechecked and modestly low. Endo referral whenever pt requests  - TSH normal 2/28 and again on repeat 9/27 at 1.01.    Derm:  Schedule derm referral for RLE skin changes-see note 10/24  -both LE improved per pt  Venous statis: see below-most recently had sclerotherapy to left LE      Vasc:   10/15 Right leg US with small (technically DVT) clot in posterior tibial vein: per Millie, started reg dose aspirin daily 325mg and recheck US in 2 weeks or symptomatically. U/S on 11/27 without DVT  Off lymphedema wraps  Discomfort since edema/shakes: oxy 1-2 tab during day and ativan 6 hours away from oxy for sleep.  H/o chronic venous statis: s/p sclerotherapy to the L leg.  kelfex day prior and 4 days following.     CV:  Edema 2/2 heart failure? BNP neg compared to baseline. Use bumex (has worked best historically); recheck labs this week for Cr/K tolerance   ECHO results no change from previous EF 60-65%    Plan:  Start PND burst 40mg with rapid taper of a week to 10mg every other day it is possbile he will need a low dose steroid long-term.     RTC 1/31 with Dr. Pinto or sooner luis Gallegos MD  Associate Professor of Medicine  [phone]

## 2019-01-04 ENCOUNTER — TELEPHONE (OUTPATIENT)
Dept: INTERVENTIONAL RADIOLOGY/VASCULAR | Facility: CLINIC | Age: 69
End: 2019-01-04

## 2019-01-08 ENCOUNTER — HOSPITAL ENCOUNTER (OUTPATIENT)
Facility: CLINIC | Age: 69
Discharge: HOME | End: 2019-01-08
Attending: RADIOLOGY | Admitting: RADIOLOGY
Payer: MEDICARE

## 2019-01-08 ENCOUNTER — HOSPITAL ENCOUNTER (OUTPATIENT)
Dept: INTERVENTIONAL RADIOLOGY/VASCULAR | Facility: CLINIC | Age: 69
End: 2019-01-08
Attending: RADIOLOGY | Admitting: RADIOLOGY
Payer: MEDICARE

## 2019-01-08 PROCEDURE — G0463 HOSPITAL OUTPT CLINIC VISIT: HCPCS

## 2019-01-09 DIAGNOSIS — I83.893 SYMPTOMATIC VARICOSE VEINS OF BOTH LOWER EXTREMITIES: Primary | ICD-10-CM

## 2019-01-17 ENCOUNTER — APPOINTMENT (OUTPATIENT)
Dept: LAB | Facility: CLINIC | Age: 69
End: 2019-01-17
Attending: NURSE PRACTITIONER
Payer: MEDICARE

## 2019-01-17 ENCOUNTER — ONCOLOGY VISIT (OUTPATIENT)
Dept: TRANSPLANT | Facility: CLINIC | Age: 69
End: 2019-01-17
Attending: NURSE PRACTITIONER
Payer: MEDICARE

## 2019-01-17 VITALS
DIASTOLIC BLOOD PRESSURE: 72 MMHG | SYSTOLIC BLOOD PRESSURE: 126 MMHG | BODY MASS INDEX: 31.88 KG/M2 | HEART RATE: 79 BPM | RESPIRATION RATE: 18 BRPM | TEMPERATURE: 97.5 F | WEIGHT: 215.9 LBS | OXYGEN SATURATION: 97 %

## 2019-01-17 DIAGNOSIS — D46.9 MDS (MYELODYSPLASTIC SYNDROME) (H): ICD-10-CM

## 2019-01-17 DIAGNOSIS — D89.813 GVH (GRAFT VERSUS HOST DISEASE) (H): ICD-10-CM

## 2019-01-17 LAB
ALBUMIN SERPL-MCNC: 3.2 G/DL (ref 3.4–5)
ALP SERPL-CCNC: 70 U/L (ref 40–150)
ALT SERPL W P-5'-P-CCNC: 28 U/L (ref 0–70)
ANION GAP SERPL CALCULATED.3IONS-SCNC: 7 MMOL/L (ref 3–14)
AST SERPL W P-5'-P-CCNC: 27 U/L (ref 0–45)
BASOPHILS # BLD AUTO: 0 10E9/L (ref 0–0.2)
BASOPHILS NFR BLD AUTO: 0.2 %
BILIRUB SERPL-MCNC: 0.4 MG/DL (ref 0.2–1.3)
BUN SERPL-MCNC: 24 MG/DL (ref 7–30)
CALCIUM SERPL-MCNC: 8.4 MG/DL (ref 8.5–10.1)
CHLORIDE SERPL-SCNC: 106 MMOL/L (ref 94–109)
CO2 SERPL-SCNC: 23 MMOL/L (ref 20–32)
CREAT SERPL-MCNC: 0.97 MG/DL (ref 0.66–1.25)
DIFFERENTIAL METHOD BLD: ABNORMAL
EOSINOPHIL # BLD AUTO: 0.1 10E9/L (ref 0–0.7)
EOSINOPHIL NFR BLD AUTO: 1.3 %
ERYTHROCYTE [DISTWIDTH] IN BLOOD BY AUTOMATED COUNT: 15.7 % (ref 10–15)
GFR SERPL CREATININE-BSD FRML MDRD: 79 ML/MIN/{1.73_M2}
GLUCOSE SERPL-MCNC: 89 MG/DL (ref 70–99)
HCT VFR BLD AUTO: 41.9 % (ref 40–53)
HGB BLD-MCNC: 13.6 G/DL (ref 13.3–17.7)
IMM GRANULOCYTES # BLD: 0 10E9/L (ref 0–0.4)
IMM GRANULOCYTES NFR BLD: 0.2 %
LYMPHOCYTES # BLD AUTO: 3.7 10E9/L (ref 0.8–5.3)
LYMPHOCYTES NFR BLD AUTO: 42.4 %
MCH RBC QN AUTO: 30.7 PG (ref 26.5–33)
MCHC RBC AUTO-ENTMCNC: 32.5 G/DL (ref 31.5–36.5)
MCV RBC AUTO: 95 FL (ref 78–100)
MONOCYTES # BLD AUTO: 1 10E9/L (ref 0–1.3)
MONOCYTES NFR BLD AUTO: 11 %
NEUTROPHILS # BLD AUTO: 3.9 10E9/L (ref 1.6–8.3)
NEUTROPHILS NFR BLD AUTO: 44.9 %
NRBC # BLD AUTO: 0 10*3/UL
NRBC BLD AUTO-RTO: 0 /100
PLATELET # BLD AUTO: 247 10E9/L (ref 150–450)
POTASSIUM SERPL-SCNC: 3.8 MMOL/L (ref 3.4–5.3)
PROT SERPL-MCNC: 6.9 G/DL (ref 6.8–8.8)
RBC # BLD AUTO: 4.43 10E12/L (ref 4.4–5.9)
SODIUM SERPL-SCNC: 137 MMOL/L (ref 133–144)
WBC # BLD AUTO: 8.6 10E9/L (ref 4–11)

## 2019-01-17 PROCEDURE — G0463 HOSPITAL OUTPT CLINIC VISIT: HCPCS | Mod: ZF

## 2019-01-17 PROCEDURE — 36415 COLL VENOUS BLD VENIPUNCTURE: CPT

## 2019-01-17 PROCEDURE — 80053 COMPREHEN METABOLIC PANEL: CPT | Performed by: INTERNAL MEDICINE

## 2019-01-17 PROCEDURE — 85025 COMPLETE CBC W/AUTO DIFF WBC: CPT | Performed by: INTERNAL MEDICINE

## 2019-01-17 ASSESSMENT — PAIN SCALES - GENERAL: PAINLEVEL: NO PAIN (0)

## 2019-01-17 NOTE — PROGRESS NOTES
BMT Clinic Note  January 17, 2019     ID:  Mr. 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 States his symptoms have improved 80%. No only having baseline joint aches associated with chronic degenerative disease affecting mostly his knees.  He has tapered his steroids to 10 mg every other day and believes this is the perfect dose in which his GVH symptoms are managed and he doesn't have any untoward SE from the PDN such as jitteriness or insomnia. Notes he has a great appetite, no SOB, no oral sores, and skin over the lower extremities continues to improve with topical steroid prescribed by dermatology and s/p sclerotherapy for venous stasis. He was in a great mood and had a wonderful affect. He will follow up with Dr. Pinto next week.     Review of Systems: 10 point ROS negative except as noted above.    Physical Exam:  There were no vitals taken for this visit.     Wt Readings from Last 4 Encounters:   01/03/19 97.1 kg (214 lb)   12/04/18 98.7 kg (217 lb 11.2 oz)   11/20/18 94.3 kg (208 lb)   11/15/18 97.1 kg (214 lb 1.6 oz)     General: NAD, interactive, KPS 80%  Eyes: SARIKA, sclera anicteric  Nose/Mouth/Throat: OP clear, no ulcerations, very dry, upper plate, chronic lichenoid changes , no lip lesions, tongue non-coated.   Lungs:CTA B, no crackles or wheezes   CV: RRR without murmurs rubs or gallops  Abd: S/NT/ND +BS  Skin:   RLE skin shiny with some reddened areas. Nontender. No edema. No skin desquamation  No new skin changes, back without rash   Neuro: A&O, moderate resting tremor  Access: peripheral    Labs:  Lab Results   Component Value Date    WBC 6.0 01/03/2019    ANEU 2.4 01/03/2019    HGB 13.8 01/03/2019    HCT 40.6 01/03/2019     01/03/2019     01/03/2019    POTASSIUM 4.3 01/03/2019    CHLORIDE 106 01/03/2019    CO2 23 01/03/2019    GLC 89 01/03/2019     BUN 23 01/03/2019    CR 0.97 01/03/2019    MAG 2.0 12/04/2018    INR 0.95 06/30/2016     ASSESSMENT AND PLAN:  Mr. 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 Total cell dose (from 7/1 & 7/2) 6.53 x 10^6 CD34+ cells/kg.   - 1 year anniversary (July 2015): 30% cellular, trilineage hematopoiesis, no abnormal blasts by morphology or flow , no dysplasia, 0-1 fibrosis, 100% donor (BM, CD3, CD15). CR  - 2 year anniversary (July 2016): 20-30% cellular, trilineage hematopoiesis, no abnormal blasts by morphology or flow , no dysplasia, No fibrosis, 100% donor in BM (PB not sent). ISCN: //46,XX[20] Complete Remission.    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 Had been off prednisone since summer 2017 and briefly tapered off Sirolimus (january 2018), however resumed with flare in February. 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 Sirolimus was stopped on 9/27 & Pred 90mg was started. Seen by Dr. Sandoval on 10/2, please see his note. He does not think his symptoms or CT findings are c/w Sirolimus or GVH & he was in favor of tapering Prednisone. Recently he has worsening skin thickening & desquamation to the RLE, c/w GVH.   Started Jakafi 10/22 at 5 mg BID with symptom improvement in lower extremities. 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 Resume PND 40mg a day on 1/3 with significant systemic arthralgic pain, quick burst and taper off over a week to 10mg po q.o.d and stay on this dose. Near resolution of symptoms noted 1/17, will continue 10 mg every other day and re-assess 1/24.     ID:  Afebrile no signs/sx of infection.   - IgG = 403, repleted 3/22/16,  5/8/16= 1270; recheck with recent viral exposures 1300 10/1. Will recheck next visit given respiratory viral season.   - Prophy: HD ACV (renal dosing), Fluconazole and  Bactrim. Consider resuming antibacterial prophy if will remain on PDN for an extended course when see for f/u 1/24 (not resumed with steroid burst).     - Flu shot given 10/11/18      GI: No symptoms 1/17  On protonix (has heartburn)    FEN/Renal:  Cr significantly improved with bumex (no metolazone) every other day. Edema better, continue     Fatigue: Significantly improved with steroids   - History of testosterone deficiency, rechecked and modestly low. Endo referral whenever pt requests  - TSH normal 2/28 and again on repeat 9/27 at 1.01.    Derm:  Schedule derm referral for RLE skin changes-see note 10/24  - Both LE improved per pt  - Venous statis: see below-most recently had sclerotherapy to left LE      Vasc:   10/15 Right leg US with small (technically DVT) clot in posterior tibial vein: per Millie, started reg dose aspirin daily 325mg and recheck US in 2 weeks or symptomatically. U/S on 11/27 without DVT  Off lymphedema wraps  Discomfort since edema/shakes: oxy 1-2 tab during day and ativan 6 hours away from oxy for sleep.  H/o chronic venous statis: s/p sclerotherapy to the L leg.  kelfex day prior and 4 days following.     CV:  Edema 2/2 heart failure? BNP neg compared to baseline. Use bumex (has worked best historically); recheck labs this week for Cr/K tolerance   ECHO results no change from previous EF 60-65%    Plan:  - Continue PDN 10mg every other day it is possbile he will need a low dose steroid long-term.   - Keep scheduled follow up with Dr. Pinto 1/24    Rebecca Jj, MSN, APRN, ACNP-BC  Pager: [phone]

## 2019-01-17 NOTE — NURSING NOTE
Chief Complaint   Patient presents with     Blood Draw     Labs drawn via  by RN in lab. VS taken.     Anel Isabel RN

## 2019-01-17 NOTE — NURSING NOTE
"Oncology Rooming Note    January 17, 2019 11:18 AM   Deejay Dior is a 68 year old male who presents for:    Chief Complaint   Patient presents with     Blood Draw     Labs drawn via  by RN in lab. VS taken.     RECHECK     RTN-MDS      Initial Vitals: /72 (BP Location: Right arm, Patient Position: Sitting, Cuff Size: Adult Large)   Pulse 79   Temp 97.5  F (36.4  C) (Oral)   Resp 18   Wt 97.9 kg (215 lb 14.4 oz)   SpO2 97%   BMI 31.88 kg/m   Estimated body mass index is 31.88 kg/m  as calculated from the following:    Height as of 1/3/19: 1.753 m (5' 9\").    Weight as of this encounter: 97.9 kg (215 lb 14.4 oz). Body surface area is 2.18 meters squared.  No Pain (0) Comment: Data Unavailable   No LMP for male patient.  Allergies reviewed: Yes  Medications reviewed: Yes    Medications: Medication refills not needed today.  Pharmacy name entered into [employer]:    [id number] - [place], [place] - [address] [facility] [address] & [id number] - [place] - [address] [address] [place] [facility] [place] - [address]  [facility] - [place], [place] - [address] [facility] - [place], [place] - [facility] [address]     Clinical concerns: None     5 minutes for nursing intake (face to face time)     Chandrika Méndez CMA              "

## 2019-01-24 ENCOUNTER — APPOINTMENT (OUTPATIENT)
Dept: LAB | Facility: CLINIC | Age: 69
End: 2019-01-24
Attending: INTERNAL MEDICINE
Payer: MEDICARE

## 2019-01-24 ENCOUNTER — ONCOLOGY VISIT (OUTPATIENT)
Dept: TRANSPLANT | Facility: CLINIC | Age: 69
End: 2019-01-24
Attending: INTERNAL MEDICINE
Payer: MEDICARE

## 2019-01-24 VITALS
WEIGHT: 218.4 LBS | OXYGEN SATURATION: 98 % | HEART RATE: 68 BPM | BODY MASS INDEX: 32.25 KG/M2 | SYSTOLIC BLOOD PRESSURE: 133 MMHG | DIASTOLIC BLOOD PRESSURE: 82 MMHG | RESPIRATION RATE: 18 BRPM | TEMPERATURE: 97.5 F

## 2019-01-24 DIAGNOSIS — D89.813 GVH (GRAFT VERSUS HOST DISEASE) (H): ICD-10-CM

## 2019-01-24 DIAGNOSIS — D46.9 MDS (MYELODYSPLASTIC SYNDROME) (H): ICD-10-CM

## 2019-01-24 LAB
ALBUMIN SERPL-MCNC: 3.4 G/DL (ref 3.4–5)
ALP SERPL-CCNC: 75 U/L (ref 40–150)
ALT SERPL W P-5'-P-CCNC: 24 U/L (ref 0–70)
ANION GAP SERPL CALCULATED.3IONS-SCNC: 6 MMOL/L (ref 3–14)
AST SERPL W P-5'-P-CCNC: 27 U/L (ref 0–45)
BASOPHILS # BLD AUTO: 0 10E9/L (ref 0–0.2)
BASOPHILS NFR BLD AUTO: 0.2 %
BILIRUB SERPL-MCNC: 0.3 MG/DL (ref 0.2–1.3)
BUN SERPL-MCNC: 19 MG/DL (ref 7–30)
CALCIUM SERPL-MCNC: 8.8 MG/DL (ref 8.5–10.1)
CHLORIDE SERPL-SCNC: 108 MMOL/L (ref 94–109)
CO2 SERPL-SCNC: 25 MMOL/L (ref 20–32)
CREAT SERPL-MCNC: 0.87 MG/DL (ref 0.66–1.25)
DIFFERENTIAL METHOD BLD: ABNORMAL
EOSINOPHIL # BLD AUTO: 0 10E9/L (ref 0–0.7)
EOSINOPHIL NFR BLD AUTO: 0.2 %
ERYTHROCYTE [DISTWIDTH] IN BLOOD BY AUTOMATED COUNT: 15.8 % (ref 10–15)
GFR SERPL CREATININE-BSD FRML MDRD: 87 ML/MIN/{1.73_M2}
GLUCOSE SERPL-MCNC: 111 MG/DL (ref 70–99)
HCT VFR BLD AUTO: 44.4 % (ref 40–53)
HGB BLD-MCNC: 14.1 G/DL (ref 13.3–17.7)
IMM GRANULOCYTES # BLD: 0 10E9/L (ref 0–0.4)
IMM GRANULOCYTES NFR BLD: 0.2 %
INR PPP: 0.92 (ref 0.86–1.14)
LYMPHOCYTES # BLD AUTO: 1.1 10E9/L (ref 0.8–5.3)
LYMPHOCYTES NFR BLD AUTO: 19.5 %
MCH RBC QN AUTO: 30.2 PG (ref 26.5–33)
MCHC RBC AUTO-ENTMCNC: 31.8 G/DL (ref 31.5–36.5)
MCV RBC AUTO: 95 FL (ref 78–100)
MONOCYTES # BLD AUTO: 0.4 10E9/L (ref 0–1.3)
MONOCYTES NFR BLD AUTO: 6.3 %
NEUTROPHILS # BLD AUTO: 4.3 10E9/L (ref 1.6–8.3)
NEUTROPHILS NFR BLD AUTO: 73.6 %
NRBC # BLD AUTO: 0 10*3/UL
NRBC BLD AUTO-RTO: 0 /100
PLATELET # BLD AUTO: 264 10E9/L (ref 150–450)
POTASSIUM SERPL-SCNC: 4.6 MMOL/L (ref 3.4–5.3)
PROT SERPL-MCNC: 7.4 G/DL (ref 6.8–8.8)
RBC # BLD AUTO: 4.67 10E12/L (ref 4.4–5.9)
SODIUM SERPL-SCNC: 139 MMOL/L (ref 133–144)
WBC # BLD AUTO: 5.9 10E9/L (ref 4–11)

## 2019-01-24 PROCEDURE — 85025 COMPLETE CBC W/AUTO DIFF WBC: CPT | Performed by: NURSE PRACTITIONER

## 2019-01-24 PROCEDURE — 80053 COMPREHEN METABOLIC PANEL: CPT | Performed by: NURSE PRACTITIONER

## 2019-01-24 PROCEDURE — 82784 ASSAY IGA/IGD/IGG/IGM EACH: CPT | Performed by: NURSE PRACTITIONER

## 2019-01-24 PROCEDURE — 85610 PROTHROMBIN TIME: CPT | Performed by: NURSE PRACTITIONER

## 2019-01-24 PROCEDURE — G0463 HOSPITAL OUTPT CLINIC VISIT: HCPCS | Mod: ZF

## 2019-01-24 PROCEDURE — 36415 COLL VENOUS BLD VENIPUNCTURE: CPT

## 2019-01-24 RX ORDER — PILOCARPINE HYDROCHLORIDE 5 MG/1
5 TABLET, FILM COATED ORAL 4 TIMES DAILY
Qty: 360 TABLET | Refills: 11 | Status: SHIPPED | OUTPATIENT
Start: 2019-01-24 | End: 2019-07-25

## 2019-01-24 ASSESSMENT — PAIN SCALES - GENERAL: PAINLEVEL: NO PAIN (0)

## 2019-01-24 NOTE — NURSING NOTE
"Oncology Rooming Note    January 24, 2019 4:15 PM   Deejay Dior is a 68 year old male who presents for:    Chief Complaint   Patient presents with     Blood Draw     Labs drawn via  by RN in lab. VS taken.      RECHECK     RTN- MDS     Initial Vitals: /82 (BP Location: Right arm, Patient Position: Sitting, Cuff Size: Adult Regular)   Pulse 68   Temp 97.5  F (36.4  C) (Oral)   Resp 18   Wt 99.1 kg (218 lb 6.4 oz)   SpO2 98%   BMI 32.25 kg/m   Estimated body mass index is 32.25 kg/m  as calculated from the following:    Height as of 1/3/19: 1.753 m (5' 9\").    Weight as of this encounter: 99.1 kg (218 lb 6.4 oz). Body surface area is 2.2 meters squared.  No Pain (0) Comment: Data Unavailable   No LMP for male patient.  Allergies reviewed: Yes  Medications reviewed: Yes    Medications: Medication refills not needed today.  Pharmacy name entered into [employer]:    [id number] - [place], [place] - [address] [facility] [address] & [id number] - [place] - [address] [place] [facility] [place] - [address]  [place] [facility] - [place], [place] - [address] [facility] - [place], [place] - [address] [place] [address]    Clinical concerns: Dry mouth, sores in the mouth.    7 minutes for nursing intake (face to face time)     Ling Mccoy CMA              "

## 2019-01-24 NOTE — PROGRESS NOTES
BMT Clinic Note  January 17, 2019     ID:  Mr. 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 States his symptoms have improved 80%. No only having baseline joint aches associated with chronic degenerative disease affecting mostly his knees.  He has tapered his steroids to 10 mg every other day and believes this is the perfect dose in which his GVH symptoms are managed and he doesn't have any untoward SE from the PDN such as jitteriness or insomnia. Notes he has a great appetite, no SOB, no oral sores, and skin over the lower extremities continues to improve with topical steroid prescribed by dermatology and s/p sclerotherapy for venous stasis. He was in a great mood and had a wonderful affect. He will follow up with Dr. Pinto next week.     Review of Systems: 10 point ROS negative except as noted above.    Physical Exam:  /82 (BP Location: Right arm, Patient Position: Sitting, Cuff Size: Adult Regular)   Pulse 68   Temp 97.5  F (36.4  C) (Oral)   Resp 18   Wt 99.1 kg (218 lb 6.4 oz)   SpO2 98%   BMI 32.25 kg/m       Wt Readings from Last 4 Encounters:   01/24/19 99.1 kg (218 lb 6.4 oz)   01/17/19 97.9 kg (215 lb 14.4 oz)   01/03/19 97.1 kg (214 lb)   12/04/18 98.7 kg (217 lb 11.2 oz)     General: NAD, interactive, KPS 80%  Eyes: SARIKA, sclera anicteric  Nose/Mouth/Throat: OP clear, no ulcerations, very dry, upper plate, chronic lichenoid changes , no lip lesions, tongue non-coated.   Lungs:CTA B, no crackles or wheezes   CV: RRR without murmurs rubs or gallops  Abd: S/NT/ND +BS  Skin:   RLE skin shiny with some reddened areas. Nontender. No edema. No skin desquamation  No new skin changes, back without rash   Neuro: A&O, moderate resting tremor  Access: peripheral    Labs:  Lab Results   Component Value Date    WBC 8.6 01/17/2019    ANEU 3.9 01/17/2019    HGB 13.6  01/17/2019    HCT 41.9 01/17/2019     01/17/2019     01/17/2019    POTASSIUM 3.8 01/17/2019    CHLORIDE 106 01/17/2019    CO2 23 01/17/2019    GLC 89 01/17/2019    BUN 24 01/17/2019    CR 0.97 01/17/2019    MAG 2.0 12/04/2018    INR 0.95 06/30/2016     ASSESSMENT AND PLAN:  Mr. 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 Total cell dose (from 7/1 & 7/2) 6.53 x 10^6 CD34+ cells/kg.   - 1 year anniversary (July 2015): 30% cellular, trilineage hematopoiesis, no abnormal blasts by morphology or flow , no dysplasia, 0-1 fibrosis, 100% donor (BM, CD3, CD15). CR  - 2 year anniversary (July 2016): 20-30% cellular, trilineage hematopoiesis, no abnormal blasts by morphology or flow , no dysplasia, No fibrosis, 100% donor in BM (PB not sent). ISCN: //46,XX[20] Complete Remission.    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 Had been off prednisone since summer 2017 and briefly tapered off Sirolimus (january 2018), however resumed with flare in February. 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 Sirolimus was stopped on 9/27 & Pred 90mg was started. Seen by Dr. Sandoval on 10/2, please see his note. He does not think his symptoms or CT findings are c/w Sirolimus or GVH & he was in favor of tapering Prednisone. Recently he has worsening skin thickening & desquamation to the RLE, c/w GVH.   Started Jakafi 10/22 at 5 mg BID with symptom improvement in lower extremities. 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 Resume PND 40mg a day on 1/3 with significant systemic arthralgic pain, quick burst and taper off over a week to 10mg po q.o.d and stay on this dose.  Near resolution of symptoms noted 1/17, will continue 10 mg every other day and re-assess 1/24.   Plan:  Stay with pred to 10 mg EOD and f/u 1 month.    Dry mouth: Dentist recommended Evoxac but insurance denied.     ID:  Afebrile no signs/sx of infection.   - IgG = 403, repleted 3/22/16, 5/8/16= 1270; recheck with recent viral exposures 1300 10/1. Will recheck next visit given respiratory viral season.   - Prophy: HD ACV (renal dosing), Fluconazole and  Bactrim. Consider resuming antibacterial prophy if will remain on PDN for an extended course when see for f/u 1/24 (not resumed with steroid burst).     - Flu shot given 10/11/18      GI: No symptoms 1/17  On protonix (has heartburn)    FEN/Renal:  Cr significantly improved with bumex (no metolazone) every other day. Edema better, continue     Fatigue: Significantly improved with steroids   - History of testosterone deficiency, rechecked and modestly low. Endo referral whenever pt requests  - TSH normal 2/28 and again on repeat 9/27 at 1.01.    Derm:  Schedule derm referral for RLE skin changes-see note 10/24  - Both LE improved per pt  - Venous statis: see below-most recently had sclerotherapy to left LE      Vasc:   10/15 Right leg US with small (technically DVT) clot in posterior tibial vein: per Millie, started reg dose aspirin daily 325mg and recheck US in 2 weeks or symptomatically. U/S on 11/27 without DVT  Off lymphedema wraps  Discomfort since edema/shakes: oxy 1-2 tab during day and ativan 6 hours away from oxy for sleep.  H/o chronic venous statis: s/p sclerotherapy to the L leg.  kelfex day prior and 4 days following.     CV:  Edema 2/2 heart failure? BNP neg compared to baseline. Use bumex (has worked best historically); recheck labs this week for Cr/K tolerance   ECHO results no change from previous EF 60-65%    Plan:  - Continue PDN 10mg every other day it is possbile he will need a low dose steroid long-term.   [place]  - Keep scheduled follow up  with Dr. Pinto 1/24

## 2019-01-24 NOTE — NURSING NOTE
Chief Complaint   Patient presents with     Blood Draw     Labs drawn via  by RN in lab. VS taken.      Radha Cerna RN

## 2019-01-25 LAB — IGG SERPL-MCNC: 1130 MG/DL (ref 695–1620)

## 2019-02-01 DIAGNOSIS — I83.893 SYMPTOMATIC VARICOSE VEINS OF BOTH LOWER EXTREMITIES: Primary | ICD-10-CM

## 2019-02-04 DIAGNOSIS — Z94.81 STATUS POST BONE MARROW TRANSPLANT (H): ICD-10-CM

## 2019-02-04 RX ORDER — SULFAMETHOXAZOLE/TRIMETHOPRIM 800-160 MG
TABLET ORAL
Qty: 16 TABLET | Refills: 1 | Status: CANCELLED | OUTPATIENT
Start: 2019-02-04

## 2019-02-04 RX ORDER — SULFAMETHOXAZOLE/TRIMETHOPRIM 800-160 MG
TABLET ORAL
Qty: 16 TABLET | Refills: 0 | Status: SHIPPED | OUTPATIENT
Start: 2019-02-04 | End: 2019-04-05

## 2019-02-06 ASSESSMENT — ENCOUNTER SYMPTOMS
NECK MASS: 0
SINUS PAIN: 0
TROUBLE SWALLOWING: 0
TASTE DISTURBANCE: 1
SINUS CONGESTION: 0
HOARSE VOICE: 0
SMELL DISTURBANCE: 0
SORE THROAT: 0

## 2019-02-07 DIAGNOSIS — D46.9 MDS (MYELODYSPLASTIC SYNDROME) (H): ICD-10-CM

## 2019-02-07 DIAGNOSIS — D89.813 GVH (GRAFT VERSUS HOST DISEASE) (H): Primary | ICD-10-CM

## 2019-02-11 ENCOUNTER — ANCILLARY PROCEDURE (OUTPATIENT)
Dept: ULTRASOUND IMAGING | Facility: CLINIC | Age: 69
End: 2019-02-11
Attending: RADIOLOGY
Payer: MEDICARE

## 2019-02-11 DIAGNOSIS — I83.893 SYMPTOMATIC VARICOSE VEINS OF BOTH LOWER EXTREMITIES: ICD-10-CM

## 2019-02-13 NOTE — PROGRESS NOTES
BMT Clinic Note  January 17, 2019     ID:  Mr. 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 He is feeling well. On pred 10/0. Notes knee pain on days with 0mg but tolerating well. Mouth a little dry but no sores or pain. No significant dry eyes. No n/v/d. No rashes, bruises or bleeding. No fevers, chills, cough or congestion.    Review of Systems: 10 point ROS negative except as noted above.    Physical Exam:  /65 (BP Location: Right arm, Patient Position: Sitting, Cuff Size: Adult Regular)   Pulse 73   Temp 98.2  F (36.8  C) (Oral)   Wt 101.4 kg (223 lb 9.6 oz)   SpO2 96%   BMI 33.02 kg/m       Wt Readings from Last 4 Encounters:   02/14/19 101.4 kg (223 lb 9.6 oz)   01/24/19 99.1 kg (218 lb 6.4 oz)   01/17/19 97.9 kg (215 lb 14.4 oz)   01/03/19 97.1 kg (214 lb)     General: NAD, interactive, KPS 80%  Eyes: SARIKA, sclera anicteric  Nose/Mouth/Throat: OP clear, no ulcerations, very dry, upper plate, chronic lichenoid changes , no lip lesions, tongue non-coated.   Lungs:CTA B, no crackles or wheezes   CV: RRR without murmurs rubs or gallops  Abd: S/NT/ND +BS  Skin:   RLE skin shiny with some reddened areas. Nontender. Trace edema L>R (chronic). No new skin changes, back without rash   Neuro: A&O  Access: peripheral    Labs:  Lab Results   Component Value Date    WBC 4.7 02/14/2019    ANEU 3.1 02/14/2019    HGB 13.9 02/14/2019    HCT 43.5 02/14/2019     02/14/2019     01/24/2019    POTASSIUM 4.6 01/24/2019    CHLORIDE 108 01/24/2019    CO2 25 01/24/2019     (H) 01/24/2019    BUN 19 01/24/2019    CR 0.87 01/24/2019    MAG 2.0 12/04/2018    INR 0.98 02/14/2019     ASSESSMENT AND PLAN:  Mr. 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 Total cell dose (from 7/1 &  7/2) 6.53 x 10^6 CD34+ cells/kg.   - 1 year anniversary (July 2015): 30% cellular, trilineage hematopoiesis, no abnormal blasts by morphology or flow , no dysplasia, 0-1 fibrosis, 100% donor (BM, CD3, CD15). CR  - 2 year anniversary (July 2016): 20-30% cellular, trilineage hematopoiesis, no abnormal blasts by morphology or flow , no dysplasia, No fibrosis, 100% donor in BM (PB not sent). ISCN: //46,XX[20] Complete Remission.    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 Had been off prednisone since summer 2017 and briefly tapered off Sirolimus (january 2018), however resumed with flare in February. 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 Sirolimus was stopped on 9/27 & Pred 90mg was started. Seen by Dr. Sandoval on 10/2, please see his note. He does not think his symptoms or CT findings are c/w Sirolimus or GVH & he was in favor of tapering Prednisone. Recently he has worsening skin thickening & desquamation to the RLE, c/w GVH.   Started Jakafi 10/22 at 5 mg BID with symptom improvement in lower extremities. 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   Burst pred 40mg a day on 1/3 with significant systemic arthralgic pain, tapered back to 10/0 eod after 1 week, near resolution of symptoms noted 1/17, returned to 10 mg every other day.  DIscussed ok to try 5mg every other day to see if his arthralgias tolerate.     Dry mouth: Dentist recommended Evoxac but insurance denied. No complaints 2/14    ID:  Afebrile no signs/sx of infection.   - IgG = 403, repleted 3/22/16, 5/8/16= 1270; recheck with recent viral exposures 1300 10/1. 1/24 =1130  - Prophy: HD ACV (renal dosing), Fluconazole and  Bactrim.   - Flu shot given 10/11/18      GI: No symptoms 1/17  On protonix (has  heartburn)    FEN/Renal:  Cr significantly improved with bumex (no metolazone) every other day. Edema better, continue     Fatigue: Significantly improved with steroids   - History of testosterone deficiency, rechecked and modestly low. Endo referral whenever pt requests  - TSH normal 2/28 and again on repeat 9/27 at 1.01.    Derm:  Schedule derm referral for RLE skin changes-see note 10/24  - Both LE improved per pt  - Venous statis: see below-most recently had sclerotherapy to left LE    Vasc:   10/15 Right leg US with small (technically DVT) clot in posterior tibial vein: per Millie, started reg dose aspirin daily 325mg and recheck US in 2 weeks or symptomatically. U/S on 11/27 without DVT  Off lymphedema wraps  Discomfort since edema/shakes: oxy 1-2 tab during day and ativan 6 hours away from oxy for sleep.  H/o chronic venous statis: s/p sclerotherapy to the L leg.  kelfex day prior and 4 days following.     CV:  Edema 2/2 heart failure? BNP neg compared to baseline. Use bumex (has worked best historically); recheck labs this week for Cr/K tolerance   ECHO results no change from previous EF 60-65%    Plan:  - Continue prednisone 10mg every other day (5mg trial)  - Needs appt with Dr Collado when they return from [place] late March    Tamar Melendez PAC  [phone]

## 2019-02-14 ENCOUNTER — OFFICE VISIT (OUTPATIENT)
Dept: VASCULAR SURGERY | Facility: CLINIC | Age: 69
End: 2019-02-14
Payer: MEDICARE

## 2019-02-14 ENCOUNTER — APPOINTMENT (OUTPATIENT)
Dept: LAB | Facility: CLINIC | Age: 69
End: 2019-02-14
Attending: INTERNAL MEDICINE
Payer: MEDICARE

## 2019-02-14 ENCOUNTER — ONCOLOGY VISIT (OUTPATIENT)
Dept: TRANSPLANT | Facility: CLINIC | Age: 69
End: 2019-02-14
Attending: NURSE PRACTITIONER
Payer: MEDICARE

## 2019-02-14 VITALS
HEART RATE: 73 BPM | WEIGHT: 223.6 LBS | OXYGEN SATURATION: 96 % | DIASTOLIC BLOOD PRESSURE: 65 MMHG | BODY MASS INDEX: 33.02 KG/M2 | SYSTOLIC BLOOD PRESSURE: 110 MMHG | TEMPERATURE: 98.2 F

## 2019-02-14 VITALS — SYSTOLIC BLOOD PRESSURE: 110 MMHG | DIASTOLIC BLOOD PRESSURE: 65 MMHG | HEART RATE: 73 BPM | OXYGEN SATURATION: 96 %

## 2019-02-14 DIAGNOSIS — D89.813 GVHD (GRAFT VERSUS HOST DISEASE) (H): ICD-10-CM

## 2019-02-14 DIAGNOSIS — D46.9 MDS (MYELODYSPLASTIC SYNDROME) (H): Primary | ICD-10-CM

## 2019-02-14 DIAGNOSIS — I83.893 SYMPTOMATIC VARICOSE VEINS OF BOTH LOWER EXTREMITIES: Primary | ICD-10-CM

## 2019-02-14 DIAGNOSIS — D89.813 GVH (GRAFT VERSUS HOST DISEASE) (H): ICD-10-CM

## 2019-02-14 LAB
ALBUMIN SERPL-MCNC: 3.4 G/DL (ref 3.4–5)
ALP SERPL-CCNC: 69 U/L (ref 40–150)
ALT SERPL W P-5'-P-CCNC: 27 U/L (ref 0–70)
AMYLASE SERPL-CCNC: 46 U/L (ref 30–110)
ANION GAP SERPL CALCULATED.3IONS-SCNC: 5 MMOL/L (ref 3–14)
AST SERPL W P-5'-P-CCNC: 35 U/L (ref 0–45)
BASOPHILS # BLD AUTO: 0 10E9/L (ref 0–0.2)
BASOPHILS NFR BLD AUTO: 0.4 %
BILIRUB SERPL-MCNC: 0.3 MG/DL (ref 0.2–1.3)
BUN SERPL-MCNC: 24 MG/DL (ref 7–30)
CALCIUM SERPL-MCNC: 8.5 MG/DL (ref 8.5–10.1)
CHLORIDE SERPL-SCNC: 108 MMOL/L (ref 94–109)
CO2 SERPL-SCNC: 26 MMOL/L (ref 20–32)
CREAT SERPL-MCNC: 0.9 MG/DL (ref 0.66–1.25)
DIFFERENTIAL METHOD BLD: ABNORMAL
EOSINOPHIL # BLD AUTO: 0 10E9/L (ref 0–0.7)
EOSINOPHIL NFR BLD AUTO: 0.9 %
ERYTHROCYTE [DISTWIDTH] IN BLOOD BY AUTOMATED COUNT: 15.8 % (ref 10–15)
GFR SERPL CREATININE-BSD FRML MDRD: 85 ML/MIN/{1.73_M2}
GLUCOSE SERPL-MCNC: 97 MG/DL (ref 70–99)
HCT VFR BLD AUTO: 43.5 % (ref 40–53)
HGB BLD-MCNC: 13.9 G/DL (ref 13.3–17.7)
IMM GRANULOCYTES # BLD: 0 10E9/L (ref 0–0.4)
IMM GRANULOCYTES NFR BLD: 0.2 %
INR PPP: 0.98 (ref 0.86–1.14)
LYMPHOCYTES # BLD AUTO: 1.3 10E9/L (ref 0.8–5.3)
LYMPHOCYTES NFR BLD AUTO: 27 %
MCH RBC QN AUTO: 30.9 PG (ref 26.5–33)
MCHC RBC AUTO-ENTMCNC: 32 G/DL (ref 31.5–36.5)
MCV RBC AUTO: 97 FL (ref 78–100)
MONOCYTES # BLD AUTO: 0.3 10E9/L (ref 0–1.3)
MONOCYTES NFR BLD AUTO: 5.4 %
NEUTROPHILS # BLD AUTO: 3.1 10E9/L (ref 1.6–8.3)
NEUTROPHILS NFR BLD AUTO: 66.1 %
NRBC # BLD AUTO: 0 10*3/UL
NRBC BLD AUTO-RTO: 0 /100
PLATELET # BLD AUTO: 245 10E9/L (ref 150–450)
POTASSIUM SERPL-SCNC: 4.6 MMOL/L (ref 3.4–5.3)
PROT SERPL-MCNC: 7.1 G/DL (ref 6.8–8.8)
RBC # BLD AUTO: 4.5 10E12/L (ref 4.4–5.9)
SODIUM SERPL-SCNC: 140 MMOL/L (ref 133–144)
WBC # BLD AUTO: 4.7 10E9/L (ref 4–11)

## 2019-02-14 PROCEDURE — 36415 COLL VENOUS BLD VENIPUNCTURE: CPT

## 2019-02-14 PROCEDURE — 85025 COMPLETE CBC W/AUTO DIFF WBC: CPT | Performed by: STUDENT IN AN ORGANIZED HEALTH CARE EDUCATION/TRAINING PROGRAM

## 2019-02-14 PROCEDURE — 80053 COMPREHEN METABOLIC PANEL: CPT | Performed by: STUDENT IN AN ORGANIZED HEALTH CARE EDUCATION/TRAINING PROGRAM

## 2019-02-14 PROCEDURE — 82150 ASSAY OF AMYLASE: CPT | Performed by: STUDENT IN AN ORGANIZED HEALTH CARE EDUCATION/TRAINING PROGRAM

## 2019-02-14 PROCEDURE — 85610 PROTHROMBIN TIME: CPT | Performed by: STUDENT IN AN ORGANIZED HEALTH CARE EDUCATION/TRAINING PROGRAM

## 2019-02-14 PROCEDURE — G0463 HOSPITAL OUTPT CLINIC VISIT: HCPCS

## 2019-02-14 ASSESSMENT — PAIN SCALES - GENERAL
PAINLEVEL: NO PAIN (0)
PAINLEVEL: NO PAIN (0)

## 2019-02-14 NOTE — NURSING NOTE
Vascular Rooming Note     Deejay Dior's goals for this visit include:   Chief Complaint   Patient presents with     RECHDOUGLAS Villalba is being seen today for a follow up left leg,no concerns at this time as reported by patient.     Anahi Lynch LPN

## 2019-02-14 NOTE — LETTER
2/14/2019       RE: Deejay Dior  [address]  [facility] [id number]     Dear Colleague,    Thank you for referring your patient, Deejay Dior, to the [facility] at [facility]. Please see a copy of my visit note below.          INTERVENTIONAL RADIOLOGY CONSULTATION    Name: Deejay Dior  Age: 68 year old   Referring Physician: Dr. Callejas   REASON FOR REFERRAL: f/u bilateral venous incompetency treatment    HPI: Deejay Dior is a 68-year-old male with bilateral venous incompetency, today here for follow-up status post bilateral GSV ablation and bilateral sclerotherapy.  Treatment on the right side was done by Dr. Dumont and on the left side by Dr. Godwin.  His presenting complaints were leg swelling, shin sores, peeling/flaking skin and erythema.  He was also complaining of leg heaviness and fatigue before the procedure.    Today he feels very well.  He tolerated the procedures well.  Had minimal tenderness in the left medial thigh, likely secondary to trapped blood.  Most of his symptoms pretreatment significantly improved.  He has mild edema in his left leg.  Also complains of fatigue which he attributes to his MDS.  He wears his compression stockings regularly.    He has a history of MDS, status post bone marrow transplantation.    PAST MEDICAL HISTORY:   Past Medical History:   Diagnosis Date     Head injury 1964     Hearing problem Hearing aids 2015     History of blood transfusion 3/2014     History of radiation therapy June 2014     Immunosuppression (H) Sirolimus daily     MDS (myelodysplastic syndrome) (H)      Numbness and tingling     feet     Obstructive sleep apnea 1999     Pneumonia      Reduced vision August 2016    Cataract surgery     Sleep apnea 1999     Transplant July 1, 2014    Stem Cells       PAST SURGICAL HISTORY:   Past Surgical History:   Procedure Laterality Date     BACK SURGERY       BIOPSY       BMT CELL PRODUCT  INFUSION       ESOPHAGOSCOPY, GASTROSCOPY, DUODENOSCOPY (EGD), COMBINED N/A 2015     ESOPHAGOSCOPY, GASTROSCOPY, DUODENOSCOPY (EGD), COMBINED Left 2015    Procedure: COMBINED ESOPHAGOSCOPY, GASTROSCOPY, DUODENOSCOPY (EGD), BIOPSY SINGLE OR MULTIPLE;  Surgeon: Amber Francis MD;  Location: [facility]     EXCISE LESION LIP N/A 2017    Procedure: EXCISE LESION LIP;  Surgeon: Tim Yanez MD;  Location: [facility]     EYE SURGERY       FLEXIBLE SIGMOIDOSCOPY  2/2/15     HEMORRHOIDECTOMY       IR FOLLOW UP VISIT OUTPATIENT  2019     PHACOEMULSIFICATION CLEAR CORNEA WITH STANDARD INTRAOCULAR LENS IMPLANT Left 2016    Procedure: PHACOEMULSIFICATION CLEAR CORNEA WITH STANDARD INTRAOCULAR LENS IMPLANT;  Surgeon: Randolph Giles MD;  Location:  [facility]     TONSILLECTOMY       TRANSPLANT  2014    Stem Cell Transplant [facility] BMT     VASCULAR SURGERY Left        FAMILY HISTORY:   Family History   Problem Relation Age of Onset     Breast Cancer Mother      Cancer Mother              Cerebrovascular Disease Mother              Cancer Father              Hypertension Brother      Heart Disease Brother      Hyperlipidemia Brother      Depression Brother      Heart Disease Brother         2016     Hypertension Brother         1985     Glaucoma No family hx of      Macular Degeneration No family hx of        SOCIAL HISTORY:   Social History     Tobacco Use     Smoking status: Former Smoker     Packs/day: 1.00     Years: 34.00     Pack years: 34.00     Types: Cigarettes     Start date: 1967     Last attempt to quit: 2001     Years since quittin.8     Smokeless tobacco: Never Used     Tobacco comment: quit in    Substance Use Topics     Alcohol use: Yes     Alcohol/week: 2.5 oz     Comment: Seldom       PROBLEM LIST:   Patient Active Problem List    Diagnosis Date Noted     Primary hypogonadism in male 2017     Priority: Medium     Deep vein thrombosis  (DVT) (H) 06/22/2016     Priority: Medium     Long-term (current) use of anticoagulants [Z79.01] 05/27/2016     Priority: Medium     Acute deep vein thrombosis (DVT) of distal vein of right lower extremity (H) 05/16/2016     Priority: Medium     Fever, unspecified 05/07/2016     Priority: Medium     Influenza A (H1N1) 03/18/2016     Priority: Medium     Fatigue 12/11/2015     Priority: Medium     Secondary adrenal insufficiency (H) 12/11/2015     Priority: Medium     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     Priority: Medium     Pneumonia 11/30/2014     Priority: Medium     MDS (myelodysplastic syndrome) (H) 06/11/2014     Priority: Medium       MEDICATIONS:   Prescription Medications as of 2/14/2019       Rx Number Disp Refills Start End Last Dispensed Date Next Fill Date Owning Pharmacy    acyclovir (ZOVIRAX) 800 MG tablet  180 tablet 6 8/16/2018    [facility]    Sig: Take 1 tablet (800 mg) by mouth 3 times daily    Class: E-Prescribe    Route: Oral    aspirin 325 MG tablet  30 tablet 1 10/16/2018    [facility] #[facility] [place]    Sig: Take 1 tablet (325 mg) by mouth daily    Class: E-Prescribe    Route: Oral    calcium carbonate-vitamin D 500-400 MG-UNIT TABS tablt  180 tablet 3 9/11/2014    [address]    Sig: Take 1 tablet by mouth daily 2000 mg    Class: Historical    Route: Oral    cevimeline (EVOXAC) 30 MG capsule  90 capsule 11 12/12/2018 12/12/2019   [facility] #[facility] [place]    Sig: Take 1 capsule (30 mg) by mouth 3 times daily    Class: E-Prescribe    Route: Oral    fluconazole (DIFLUCAN) 100 MG tablet  30 tablet 5 11/2/2018    [facility] #[facility] [place]    Sig: Take 1 tablet (100 mg) by mouth daily    Class: Historical    Notes to Pharmacy: Called in one fill to [facility] in [place], [place]  [phone] and called [facility] in [place] to fill when needed with 5 refills.    Route: Oral    LORazepam (ATIVAN) 1 MG tablet  60 tablet 0 11/15/2018    [facility] [address] [place] [address]    Sig: Take 1 tablet (1 mg) by mouth At Bedtime    Class: Local Print    Route: Oral    order for DME  1 each 1 8/27/2018        Sig: Please measure and distribute 1 pair of 20mmHg - 30mmHg thigh high open or closed toe compression stockings. Jobst ultrasheer or equivalent.    Class: Local Print    ORDER FOR DME  1 Device 0 3/5/2015    [address] 5[address]    Sig: Please provide a NOVA cane offset with strap item number [id number]    Class: Fax    oxyCODONE (ROXICODONE) 5 MG tablet  30 tablet 0 12/4/2018    [facility] [address] [place] [address]    Sig: Take 1 tablet (5 mg) by mouth every 6 hours as needed for severe pain    Class: Local Print    Earliest Fill Date: 12/4/2018    Route: Oral    pantoprazole (PROTONIX) 20 MG [facility] tablet  30 tablet 11 8/16/2018    [facility] [address] [place] [address]    Sig: Take 1 tablet (20 mg) by mouth daily    Class: E-Prescribe    Route: Oral    pilocarpine (SALAGEN) 5 MG tablet  360 tablet 11 1/24/2019 1/24/2020   [facility] [address] [place] [address]    Sig: Take 1 tablet (5 mg) by mouth 4 times daily    Class: E-Prescribe    Route: Oral    predniSONE (DELTASONE) 10 MG tablet  90 tablet 0 1/3/2019 4/3/2019   [facility] [address] [place] [address]    Sig: Take 10 mg by mouth daily.    Class: E-Prescribe    Route: Oral    ruxolitinib (JAKAFI) 5 MG TABS tablet CHEMO  60 tablet 1 2/7/2019    [address]    Sig: Take 1 tablet (5 mg) by mouth 2 times daily    Class: E-Prescribe    Route: Oral    Non-formulary Exception Code: Specific indication for non-formulary alternative     sertraline (ZOLOFT) 100 MG tablet  30 tablet 5 7/17/2018    [facility] #[id number] - [place], [place] [id number] [address]    Sig: Take 1 tablet (100 mg) by mouth daily     Class: E-Prescribe    sulfamethoxazole-trimethoprim (BACTRIM DS/SEPTRA DS) 800-160 MG tablet  16 tablet 0 2/4/2019    [facility] #[facility] - [address]    Sig: Take one tablet by mouth twice daily on Mondays and Tuesdays only.    Class: E-Prescribe    triamcinolone (KENALOG) 0.1 % ointment    8/29/2018        Sig: Apply twice a day to lower leg    Class: Historical    amoxicillin (AMOXIL) 500 MG capsule  16 capsule 3 4/26/2018    [facility] #[facility] - [address]    Sig: Take 4 pills (2 grams) day of dental work    Class: E-Prescribe    Notes to Pharmacy: Fill 16 pills please (many dental visits)    augmented betamethasone dipropionate (DIPROLENE-AF) 0.05 % external ointment  50 g 1 11/30/2018    [facility] #[facility] - [address]    Sig: Apply topically 2 times daily For areas of rash on the lower leg    Class: E-Prescribe    Route: Topical          ALLERGIES:   Blood transfusion related (informational only)    ROS:  11 point review of the systems is negative except as stated in HPI.    Physical Examination:   VITALS:   /65 (BP Location: Right arm, Patient Position: Chair, Cuff Size: Adult Regular)   Pulse 73   SpO2 96%   Constitutional: healthy, alert and no distress  Head: negative, Normocephalic. No masses, lesions, tenderness or abnormalities  Extremities: No ulcerations.  1+ pitting pretibial edema on the left. No edema on right.  Few mild bulging varicosities medial distal left calf.    Labs:    BMP RESULTS:  Lab Results   Component Value Date     02/14/2019    POTASSIUM 4.6 02/14/2019    CHLORIDE 108 02/14/2019    CO2 26 02/14/2019    ANIONGAP 5 02/14/2019    GLC 97 02/14/2019    BUN 24 02/14/2019    CR 0.90 02/14/2019    GFRESTIMATED 85 02/14/2019    GFRESTBLACK  >90 02/14/2019    JOE 8.5 02/14/2019        CBC RESULTS:  Lab Results   Component Value Date    WBC 4.7 02/14/2019    RBC 4.50 02/14/2019    HGB 13.9 02/14/2019    HCT 43.5 02/14/2019    MCV 97 02/14/2019    MCH 30.9 02/14/2019    MCHC 32.0 02/14/2019    RDW 15.8 (H) 02/14/2019     02/14/2019       INR/PTT:  Lab Results   Component Value Date    INR 0.98 02/14/2019    PTT 47 (H) 04/04/2016       Diagnostic studies: Bilateral lower extremity venous duplex ultrasound from 2/11/2019 demonstrates thrombosis of both GSV.    Assessment and plan:  68-year-old male with bilateral GSV ablation and sclerotherapy, tolerated the procedures very well.  Most of his pretreatment complaints have resolved.  Minimal left lower extremity edema, likely secondary to known signficant deep venous insufficiency.  Minimal left medial thigh tenderness, likely secondary to trapped blood.  It does not bother the patient much.  We discussed with him that if he feels bothered a lot with this tenderness, we could do a procedure called trapped blood release.  This procedure is done at the hospital with just local anesthesia.  He states that he is not bothered much with tenderness and does not want to undergo trapped blood release procedure.  We also explained to him that the blood in the thrombosed vein will be resorbed by the body with time.  However, hemoglobin can cause brownish staining of the skin.  We will see him back in clinic in 5 months for his routine 6 month postop follow up with bilateral lower extremity venous ultrasound.  He understands and agrees with the plan.    Fredy Albright  Vascular and Interventional Radiology Fellow    I was present with the fellow during the history and exam.  I discussed the case with the fellow and agree with the findings as documented in the assessment and plan.    I spent a total of 15 minutes face-to-face with Deejay Dior during today's office visit.  Over 50% of this time was spent  counseling the patient and/or coordinating care regarding follow up treatment of bilateral superficial venous insufficiency.  See note for details.     Leland Godwin MD  Interventional Radiology and Vascular Imaging Attending  Department of Radiology  [facility]    CC  Patient Care Team:  Renetta Musa MD as PCP - General (Internal Medicine)  Tabatha Mckeon as Referring Physician (Hematology & Oncology)  Josey Means MD as MD (Ophthalmology)  Jyoti Borjas APRN CNP as Nurse Practitioner (Oncology)  Jyoti Borjas APRN CNP as Referring Physician (Oncology)  Kenia Westbrook MD as MD (INTERNAL MEDICINE - ENDOCRINOLOGY, DIABETES & METABOLISM)  Kya Arellano MD as Resident  Julisa Hernandez LICSW as   Rea Garcia MSW as   Aston Dumont MD as Resident (Radiology)  Suzanne Collado MD as BMT Physician (Hematology)  Mauro Baxter, RN as BMT Nurse Coordinator  RENETTA MUSA

## 2019-02-14 NOTE — PROGRESS NOTES
INTERVENTIONAL RADIOLOGY CONSULTATION    Name: Deejay Dior  Age: 68 year old   Referring Physician: Dr. Callejas   REASON FOR REFERRAL: f/u bilateral venous incompetency treatment    HPI: Deejay Dior is a 68-year-old male with bilateral venous incompetency, today here for follow-up status post bilateral GSV ablation and bilateral sclerotherapy.  Treatment on the right side was done by Dr. Dumont and on the left side by Dr. Godwin.  His presenting complaints were leg swelling, shin sores, peeling/flaking skin and erythema.  He was also complaining of leg heaviness and fatigue before the procedure.    Today he feels very well.  He tolerated the procedures well.  Had minimal tenderness in the left medial thigh, likely secondary to trapped blood.  Most of his symptoms pretreatment significantly improved.  He has mild edema in his left leg.  Also complains of fatigue which he attributes to his MDS.  He wears his compression stockings regularly.    He has a history of MDS, status post bone marrow transplantation.    PAST MEDICAL HISTORY:   Past Medical History:   Diagnosis Date     Head injury 1964     Hearing problem Hearing aids 2015     History of blood transfusion 3/2014     History of radiation therapy June 2014     Immunosuppression (H) Sirolimus daily     MDS (myelodysplastic syndrome) (H)      Numbness and tingling     feet     Obstructive sleep apnea 1999     Pneumonia      Reduced vision August 2016    Cataract surgery     Sleep apnea 1999     Transplant July 1, 2014    Stem Cells       PAST SURGICAL HISTORY:   Past Surgical History:   Procedure Laterality Date     BACK SURGERY       BIOPSY       BMT CELL PRODUCT INFUSION       ESOPHAGOSCOPY, GASTROSCOPY, DUODENOSCOPY (EGD), COMBINED N/A 2/2/2015     ESOPHAGOSCOPY, GASTROSCOPY, DUODENOSCOPY (EGD), COMBINED Left 8/6/2015    Procedure: COMBINED ESOPHAGOSCOPY, GASTROSCOPY, DUODENOSCOPY (EGD), BIOPSY SINGLE OR MULTIPLE;  Surgeon: Amber Francis  MD Suzanne;  Location: [facility]     EXCISE LESION LIP N/A 2017    Procedure: EXCISE LESION LIP;  Surgeon: Tim Yanez MD;  Location: [facility]     EYE SURGERY       FLEXIBLE SIGMOIDOSCOPY  2/2/15     HEMORRHOIDECTOMY       IR FOLLOW UP VISIT OUTPATIENT  2019     PHACOEMULSIFICATION CLEAR CORNEA WITH STANDARD INTRAOCULAR LENS IMPLANT Left 2016    Procedure: PHACOEMULSIFICATION CLEAR CORNEA WITH STANDARD INTRAOCULAR LENS IMPLANT;  Surgeon: Randolph Giles MD;  Location:  [facility]     TONSILLECTOMY       TRANSPLANT  2014    Stem Cell Transplant [facility] BMT     VASCULAR SURGERY Left        FAMILY HISTORY:   Family History   Problem Relation Age of Onset     Breast Cancer Mother      Cancer Mother              Cerebrovascular Disease Mother              Cancer Father              Hypertension Brother      Heart Disease Brother      Hyperlipidemia Brother      Depression Brother      Heart Disease Brother         2016     Hypertension Brother         1985     Glaucoma No family hx of      Macular Degeneration No family hx of        SOCIAL HISTORY:   Social History     Tobacco Use     Smoking status: Former Smoker     Packs/day: 1.00     Years: 34.00     Pack years: 34.00     Types: Cigarettes     Start date: 1967     Last attempt to quit: 2001     Years since quittin.8     Smokeless tobacco: Never Used     Tobacco comment: quit in    Substance Use Topics     Alcohol use: Yes     Alcohol/week: 2.5 oz     Comment: Seldom       PROBLEM LIST:   Patient Active Problem List    Diagnosis Date Noted     Primary hypogonadism in male 2017     Priority: Medium     Deep vein thrombosis (DVT) (H) 2016     Priority: Medium     Long-term (current) use of anticoagulants [Z79.01] 2016     Priority: Medium     Acute deep vein thrombosis (DVT) of distal vein of right lower extremity (H) 2016     Priority: Medium     Fever, unspecified 2016      Priority: Medium     Influenza A (H1N1) 03/18/2016     Priority: Medium     Fatigue 12/11/2015     Priority: Medium     Secondary adrenal insufficiency (H) 12/11/2015     Priority: Medium     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     Priority: Medium     Pneumonia 11/30/2014     Priority: Medium     MDS (myelodysplastic syndrome) (H) 06/11/2014     Priority: Medium       MEDICATIONS:   Prescription Medications as of 2/14/2019       Rx Number Disp Refills Start End Last Dispensed Date Next Fill Date Owning Pharmacy    acyclovir (ZOVIRAX) 800 MG tablet  180 tablet 6 8/16/2018    [id number] - [place], [address]    Sig: Take 1 tablet (800 mg) by mouth 3 times daily    Class: E-Prescribe    Route: Oral    aspirin 325 MG tablet  30 tablet 1 10/16/2018    [id number] [address]    Sig: Take 1 tablet (325 mg) by mouth daily    Class: E-Prescribe    Route: Oral    calcium carbonate-vitamin D 500-400 MG-UNIT TABS tablt  180 tablet 3 9/11/2014    [phone]    Sig: Take 1 tablet by mouth daily 2000 mg    Class: Historical    Route: Oral    cevimeline (EVOXAC) 30 MG capsule  90 capsule 11 12/12/2018 12/12/2019   [facility] [address] [place] [address]    Sig: Take 1 capsule (30 mg) by mouth 3 times daily    Class: E-Prescribe    Route: Oral    fluconazole (DIFLUCAN) 100 MG tablet  30 tablet 5 11/2/2018    [id number] [address]    Sig: Take 1 tablet (100 mg) by mouth daily    Class: Historical    Notes to Pharmacy: Called in one fill to [facility] in [place], [place] [phone] and called [facility] in [place] to fill when needed with 5 refills.    Route: Oral    LORazepam (ATIVAN) 1 MG tablet  60 tablet 0 11/15/2018    [facility][id number] - [place]    Sig: Take 1 tablet (1 mg) by mouth At Bedtime    Class: Local Print     Route: Oral    order for DME  1 each 1 8/27/2018        Sig: Please measure and distribute 1 pair of 20mmHg - 30mmHg thigh high open or closed toe compression stockings. Jobst ultrasheer or equivalent.    Class: Local Print    ORDER FOR DME  1 Device 0 3/5/2015    [address]    Sig: Please provide a NOVA cane offset with strap item number [id number]    Class: Fax    oxyCODONE (ROXICODONE) 5 MG tablet  30 tablet 0 12/4/2018    [facility][id number] - [place], [address]    Sig: Take 1 tablet (5 mg) by mouth every 6 hours as needed for severe pain    Class: Local Print    Earliest Fill Date: 12/4/2018    Route: Oral    pantoprazole (PROTONIX) 20 MG EC tablet  30 tablet 11 8/16/2018    [facility] #[facility] - [place], [address]    Sig: Take 1 tablet (20 mg) by mouth daily    Class: E-Prescribe    Route: Oral    pilocarpine (SALAGEN) 5 MG tablet  360 tablet 11 1/24/2019 1/24/2020   [id number] - [place], [address]    Sig: Take 1 tablet (5 mg) by mouth 4 times daily    Class: E-Prescribe    Route: Oral    predniSONE (DELTASONE) 10 MG tablet  90 tablet 0 1/3/2019 4/3/2019   [facility][id number] - [place], [address]    Sig: Take 10 mg by mouth daily.    Class: E-Prescribe    Route: Oral    ruxolitinib (JAKAFI) 5 MG TABS tablet CHEMO  60 tablet 1 2/7/2019    [place] [id number] [facility] [address]    Sig: Take 1 tablet (5 mg) by mouth 2 times daily    Class: E-Prescribe    Route: Oral    Non-formulary Exception Code: Specific indication for non-formulary alternative    sertraline (ZOLOFT) 100 MG tablet  30 tablet 5 7/17/2018    [facility][id number] - [place], [place] [id number]    Sig: Take 1 tablet (100 mg) by mouth daily     Class: E-Prescribe    sulfamethoxazole-trimethoprim (BACTRIM DS/SEPTRA DS) 800-160 MG tablet  16 tablet 0 2/4/2019     [facility][id number] - [place], [place] - [address] [address]    Sig: Take one tablet by mouth twice daily on Mondays and Tuesdays only.    Class: E-Prescribe    triamcinolone (KENALOG) 0.1 % ointment    8/29/2018        Sig: Apply twice a day to lower leg    Class: Historical    amoxicillin (AMOXIL) 500 MG capsule  16 capsule 3 4/26/2018    [id number] - [place], [place] [id number]    Sig: Take 4 pills (2 grams) day of dental work    Class: E-Prescribe    Notes to Pharmacy: Fill 16 pills please (many dental visits)    augmented betamethasone dipropionate (DIPROLENE-AF) 0.05 % external ointment  50 g 1 11/30/2018    [facility][id number] - [place], [place] - [address] [address]    Sig: Apply topically 2 times daily For areas of rash on the lower leg    Class: E-Prescribe    Route: Topical          ALLERGIES:   Blood transfusion related (informational only)    ROS:  11 point review of the systems is negative except as stated in HPI.    Physical Examination:   VITALS:   /65 (BP Location: Right arm, Patient Position: Chair, Cuff Size: Adult Regular)   Pulse 73   SpO2 96%   Constitutional: healthy, alert and no distress  Head: negative, Normocephalic. No masses, lesions, tenderness or abnormalities  Extremities: No ulcerations.  1+ pitting pretibial edema on the left. No edema on right.  Few mild bulging varicosities medial distal left calf.    Labs:    BMP RESULTS:  Lab Results   Component Value Date     02/14/2019    POTASSIUM 4.6 02/14/2019    CHLORIDE 108 02/14/2019    CO2 26 02/14/2019    ANIONGAP 5 02/14/2019    GLC 97 02/14/2019    BUN 24 02/14/2019    CR 0.90 02/14/2019    GFRESTIMATED 85 02/14/2019    GFRESTBLACK >90 02/14/2019    JOE 8.5 02/14/2019        CBC RESULTS:  Lab Results   Component Value Date    WBC 4.7 02/14/2019    RBC 4.50 02/14/2019    HGB 13.9 02/14/2019    HCT 43.5 02/14/2019    MCV 97 02/14/2019    MCH 30.9 02/14/2019    MCHC 32.0 02/14/2019    RDW 15.8 (H) 02/14/2019    PLT  245 02/14/2019       INR/PTT:  Lab Results   Component Value Date    INR 0.98 02/14/2019    PTT 47 (H) 04/04/2016       Diagnostic studies: Bilateral lower extremity venous duplex ultrasound from 2/11/2019 demonstrates thrombosis of both GSV.    Assessment and plan:  68-year-old male with bilateral GSV ablation and sclerotherapy, tolerated the procedures very well.  Most of his pretreatment complaints have resolved.  Minimal left lower extremity edema, likely secondary to known signficant deep venous insufficiency.  Minimal left medial thigh tenderness, likely secondary to trapped blood.  It does not bother the patient much.  We discussed with him that if he feels bothered a lot with this tenderness, we could do a procedure called trapped blood release.  This procedure is done at the hospital with just local anesthesia.  He states that he is not bothered much with tenderness and does not want to undergo trapped blood release procedure.  We also explained to him that the blood in the thrombosed vein will be resorbed by the body with time.  However, hemoglobin can cause brownish staining of the skin.  We will see him back in clinic in 5 months for his routine 6 month postop follow up with bilateral lower extremity venous ultrasound.  He understands and agrees with the plan.    Fredy Albright  Vascular and Interventional Radiology Fellow    I was present with the fellow during the history and exam.  I discussed the case with the fellow and agree with the findings as documented in the assessment and plan.    I spent a total of 15 minutes face-to-face with Deejay Dior during today's office visit.  Over 50% of this time was spent counseling the patient and/or coordinating care regarding follow up treatment of bilateral superficial venous insufficiency.  See note for details.     Leland Godwin MD  Interventional Radiology and Vascular Imaging Attending  Department of Radiology  [facility]      CC  Patient Care Team:  Renetta Musa MD as PCP - General (Internal Medicine)  Tabatha Mckeon as Referring Physician (Hematology & Oncology)  Josey Means MD as MD (Ophthalmology)  Jyoti Borjas APRN CNP as Nurse Practitioner (Oncology)  Jyoti Borjas APRN CNP as Referring Physician (Oncology)  Kenia Westbrook MD as MD (INTERNAL MEDICINE - ENDOCRINOLOGY, DIABETES & METABOLISM)  Kya Arellano MD as Resident  [facility] as   Rea Garcia MSW as   Aston Dumont MD as Resident (Radiology)  Suzanne Collado MD as BMT Physician (Hematology)  Mauro Baxter, RN as BMT Nurse Coordinator  RENETTA MUSA

## 2019-02-14 NOTE — IP AVS SNAPSHOT
[address] [address]                                       After Visit Summary   11/20/2018    Deejay Dior    MRN: [id number]           After Visit Summary Signature Page     I have received my discharge instructions, and my questions have been answered. I have discussed any challenges I see with this plan with the nurse or doctor.    ..........................................................................................................................................  Patient/Patient Representative Signature      ..........................................................................................................................................  Patient Representative Print Name and Relationship to Patient    ..................................................               ................................................  Date                                   Time    ..........................................................................................................................................  Reviewed by Signature/Title    ...................................................              ..............................................  Date                                               Time          22EPIC Rev 08/18         Wound Color?: pink Wound Edema?: mild Add 81520 Cpt? (Important Note: In 2017 The Use Of 31803 Is Being Tracked By Cms To Determine Future Global Period Reimbursement For Global Periods): yes Wound Evaluated By (Optional): Bessie Ho DO Body Location Override (Optional): right upper cutaneous lip Follow Up Time Frame (Optional): months Sutures?: intact Detail Level: Detailed Wound Diameter In Cm(Optional): 0 Patient To Follow-Up With?: their primary dermatologist Wound Crusting?: clean Follow Up Units (Optional): 6

## 2019-02-14 NOTE — NURSING NOTE
"Oncology Rooming Note    February 14, 2019 1:58 PM   Deejay Dior is a 68 year old male who presents for:    Chief Complaint   Patient presents with     Blood Draw     venipuncture done by [facility], vitals done at earlier appt today     RECHECK     RTN- MDS     Initial Vitals: /65 (BP Location: Right arm, Patient Position: Sitting, Cuff Size: Adult Regular)   Pulse 73   Temp 98.2  F (36.8  C) (Oral)   Wt 101.4 kg (223 lb 9.6 oz)   SpO2 96%   BMI 33.02 kg/m   Estimated body mass index is 33.02 kg/m  as calculated from the following:    Height as of 1/3/19: 1.753 m (5' 9\").    Weight as of this encounter: 101.4 kg (223 lb 9.6 oz). Body surface area is 2.22 meters squared.  No Pain (0) Comment: Data Unavailable   No LMP for male patient.  Allergies reviewed: Yes  Medications reviewed: Yes    Medications: Medication refills not needed today.  Pharmacy name entered into [facility]: [id number] [place] [address] [address]    Clinical concerns: none     7 minutes for nursing intake (face to face time)     Ling Mccoy CMA              "

## 2019-03-11 DIAGNOSIS — D46.9 MDS (MYELODYSPLASTIC SYNDROME) (H): ICD-10-CM

## 2019-03-11 DIAGNOSIS — R35.0 URINARY FREQUENCY: ICD-10-CM

## 2019-03-11 DIAGNOSIS — D89.813 GVH (GRAFT VERSUS HOST DISEASE) (H): ICD-10-CM

## 2019-03-11 DIAGNOSIS — T86.09 OTHER COMPLICATION OF BONE MARROW TRANSPLANT (H): ICD-10-CM

## 2019-03-12 ENCOUNTER — TELEPHONE (OUTPATIENT)
Dept: ONCOLOGY | Facility: CLINIC | Age: 69
End: 2019-03-12

## 2019-03-12 RX ORDER — SERTRALINE HYDROCHLORIDE 100 MG/1
TABLET, FILM COATED ORAL
Qty: 30 TABLET | Refills: 5 | Status: SHIPPED | OUTPATIENT
Start: 2019-03-12 | End: 2019-10-07

## 2019-03-12 NOTE — ORAL ONC MGMT
Oral Chemotherapy Monitoring Program     Primary Oncologist: Dr. Pinto  Primary Oncology Clinic: [facility]  Cancer Diagnosis: GVHD     Start Date: V9I3=14/22/2018  Therapy: Jakafi (ruxolitinib) 5 mg (1 x 5 mg) BID continuously     Lab Monitoring Plan  CBC and CMP monthly    Subjective/Objective:  Deejay Dior is a 68 year old male contacted by phone for a follow-up visit for oral chemotherapy. Deejay is in [place] on the date of this call and doing well. He said he has not had any AE. He said he drinks at least 64 ounces of water and other beverages per day. He thanked me for the call and care.    ORAL CHEMOTHERAPY 10/22/2018 10/29/2018 11/28/2018 12/28/2018 3/12/2019   Drug Name Jakafi (Ruxolitinib) Jakafi (Ruxolitinib) Jakafi (Ruxolitinib) Jakafi (Ruxolitinib) Jakafi (Ruxolitinib)   Current Dosage 5mg 5mg 5mg 5mg 5mg   Current Schedule BID BID BID BID BID   Cycle Details Continuous Continuous - Continuous Continuous   Start Date of Last Cycle - 10/22/2018 - - -   Planned next cycle start date - 11/19/2018 - - -   Doses missed in last 2 weeks - 0 0 - 0   Adherence Assessment - Adherent Adherent Adherent Adherent   Adverse Effects - No AE identified during assessment No AE identified during assessment Other (see note for details) No AE identified during assessment   Other (see note for details) - - - Joint Pain -   Pharmacist intervention? - - - Yes -   Intervention(s) - - - Referral to oncology provider -   Home BPs - not needed not needed not needed -   Any new drug interactions? Yes - - - No   Pharmacist Intervention? Yes - - - -   Intervention(s) Patient Education - - - -   Is the dose as ordered appropriate for the patient? - - Yes - Yes       Last PHQ-2 Score on record:   PHQ-2 ( 1999 Pf[employer]) 2/14/2019 10/12/2016   Q1: Little interest or pleasure in doing things 0 0   Q2: Feeling down, depressed or hopeless 0 0   PHQ-2 Score 0 0       Vitals:  BP:   BP Readings from Last 1 Encounters:   02/14/19  "110/65     Wt Readings from Last 1 Encounters:   02/14/19 101.4 kg (223 lb 9.6 oz)     Estimated body surface area is 2.22 meters squared as calculated from the following:    Height as of 1/3/19: 1.753 m (5' 9\").    Weight as of 2/14/19: 101.4 kg (223 lb 9.6 oz).    Labs:  _  Result Component Current Result Ref Range   Sodium 140 (2/14/2019) 133 - 144 mmol/L     _  Result Component Current Result Ref Range   Potassium 4.6 (2/14/2019) 3.4 - 5.3 mmol/L     _  Result Component Current Result Ref Range   Calcium 8.5 (2/14/2019) 8.5 - 10.1 mg/dL     No results found for Mag within last 30 days.     No results found for Phos within last 30 days.     _  Result Component Current Result Ref Range   Albumin 3.4 (2/14/2019) 3.4 - 5.0 g/dL     _  Result Component Current Result Ref Range   Urea Nitrogen 24 (2/14/2019) 7 - 30 mg/dL     _  Result Component Current Result Ref Range   Creatinine 0.90 (2/14/2019) 0.66 - 1.25 mg/dL       _  Result Component Current Result Ref Range   AST 35 (2/14/2019) 0 - 45 U/L     _  Result Component Current Result Ref Range   ALT 27 (2/14/2019) 0 - 70 U/L     _  Result Component Current Result Ref Range   Bilirubin Total 0.3 (2/14/2019) 0.2 - 1.3 mg/dL       _  Result Component Current Result Ref Range   WBC 4.7 (2/14/2019) 4.0 - 11.0 10e9/L     _  Result Component Current Result Ref Range   Hemoglobin 13.9 (2/14/2019) 13.3 - 17.7 g/dL     _  Result Component Current Result Ref Range   Platelet Count 245 (2/14/2019) 150 - 450 10e9/L     _  Result Component Current Result Ref Range   Absolute Neutrophil 3.1 (2/14/2019) 1.6 - 8.3 10e9/L       Assessment:  Deejay continues to do well on Jakafi    Plan:  Continue Jakafi, next office visit is scheduled for 4/4/2019.    Follow-Up:  In about 8 weeks pending next office visit.    Vidalwilmer McneillD  [facility]  [phone]  March 12, 2019    "

## 2019-04-04 ENCOUNTER — APPOINTMENT (OUTPATIENT)
Dept: LAB | Facility: CLINIC | Age: 69
End: 2019-04-04
Attending: INTERNAL MEDICINE
Payer: MEDICARE

## 2019-04-04 ENCOUNTER — ONCOLOGY VISIT (OUTPATIENT)
Dept: TRANSPLANT | Facility: CLINIC | Age: 69
End: 2019-04-04
Attending: INTERNAL MEDICINE
Payer: MEDICARE

## 2019-04-04 VITALS
DIASTOLIC BLOOD PRESSURE: 89 MMHG | TEMPERATURE: 97.4 F | BODY MASS INDEX: 33.55 KG/M2 | HEART RATE: 62 BPM | RESPIRATION RATE: 16 BRPM | WEIGHT: 227.2 LBS | SYSTOLIC BLOOD PRESSURE: 151 MMHG | OXYGEN SATURATION: 99 %

## 2019-04-04 DIAGNOSIS — D46.9 MDS (MYELODYSPLASTIC SYNDROME) (H): ICD-10-CM

## 2019-04-04 LAB
ALBUMIN SERPL-MCNC: 3.4 G/DL (ref 3.4–5)
ALP SERPL-CCNC: 56 U/L (ref 40–150)
ALT SERPL W P-5'-P-CCNC: 36 U/L (ref 0–70)
ANION GAP SERPL CALCULATED.3IONS-SCNC: 4 MMOL/L (ref 3–14)
AST SERPL W P-5'-P-CCNC: NORMAL U/L (ref 0–45)
BASOPHILS # BLD AUTO: 0 10E9/L (ref 0–0.2)
BASOPHILS NFR BLD AUTO: 0.6 %
BILIRUB SERPL-MCNC: 0.4 MG/DL (ref 0.2–1.3)
BUN SERPL-MCNC: 24 MG/DL (ref 7–30)
CALCIUM SERPL-MCNC: 9.3 MG/DL (ref 8.5–10.1)
CHLORIDE SERPL-SCNC: 106 MMOL/L (ref 94–109)
CO2 SERPL-SCNC: 27 MMOL/L (ref 20–32)
CREAT SERPL-MCNC: 0.93 MG/DL (ref 0.66–1.25)
DIFFERENTIAL METHOD BLD: ABNORMAL
EOSINOPHIL # BLD AUTO: 0.1 10E9/L (ref 0–0.7)
EOSINOPHIL NFR BLD AUTO: 1.7 %
ERYTHROCYTE [DISTWIDTH] IN BLOOD BY AUTOMATED COUNT: 16.9 % (ref 10–15)
GFR SERPL CREATININE-BSD FRML MDRD: 83 ML/MIN/{1.73_M2}
GLUCOSE SERPL-MCNC: 91 MG/DL (ref 70–99)
HCT VFR BLD AUTO: 43.3 % (ref 40–53)
HGB BLD-MCNC: 14.1 G/DL (ref 13.3–17.7)
IMM GRANULOCYTES # BLD: 0 10E9/L (ref 0–0.4)
IMM GRANULOCYTES NFR BLD: 0.1 %
LYMPHOCYTES # BLD AUTO: 3.4 10E9/L (ref 0.8–5.3)
LYMPHOCYTES NFR BLD AUTO: 47.6 %
MCH RBC QN AUTO: 31 PG (ref 26.5–33)
MCHC RBC AUTO-ENTMCNC: 32.6 G/DL (ref 31.5–36.5)
MCV RBC AUTO: 95 FL (ref 78–100)
MONOCYTES # BLD AUTO: 1 10E9/L (ref 0–1.3)
MONOCYTES NFR BLD AUTO: 14.4 %
NEUTROPHILS # BLD AUTO: 2.6 10E9/L (ref 1.6–8.3)
NEUTROPHILS NFR BLD AUTO: 35.6 %
NRBC # BLD AUTO: 0 10*3/UL
NRBC BLD AUTO-RTO: 0 /100
PLATELET # BLD AUTO: 265 10E9/L (ref 150–450)
POTASSIUM SERPL-SCNC: 4.4 MMOL/L (ref 3.4–5.3)
PROT SERPL-MCNC: 7 G/DL (ref 6.8–8.8)
RBC # BLD AUTO: 4.55 10E12/L (ref 4.4–5.9)
SODIUM SERPL-SCNC: 137 MMOL/L (ref 133–144)
WBC # BLD AUTO: 7.2 10E9/L (ref 4–11)

## 2019-04-04 PROCEDURE — G0463 HOSPITAL OUTPT CLINIC VISIT: HCPCS

## 2019-04-04 PROCEDURE — 84132 ASSAY OF SERUM POTASSIUM: CPT | Performed by: INTERNAL MEDICINE

## 2019-04-04 PROCEDURE — 82310 ASSAY OF CALCIUM: CPT | Performed by: INTERNAL MEDICINE

## 2019-04-04 PROCEDURE — 82374 ASSAY BLOOD CARBON DIOXIDE: CPT | Performed by: INTERNAL MEDICINE

## 2019-04-04 PROCEDURE — 82435 ASSAY OF BLOOD CHLORIDE: CPT | Performed by: INTERNAL MEDICINE

## 2019-04-04 PROCEDURE — 84460 ALANINE AMINO (ALT) (SGPT): CPT | Performed by: INTERNAL MEDICINE

## 2019-04-04 PROCEDURE — 36415 COLL VENOUS BLD VENIPUNCTURE: CPT

## 2019-04-04 PROCEDURE — 84155 ASSAY OF PROTEIN SERUM: CPT | Performed by: INTERNAL MEDICINE

## 2019-04-04 PROCEDURE — 84520 ASSAY OF UREA NITROGEN: CPT | Performed by: INTERNAL MEDICINE

## 2019-04-04 PROCEDURE — 82565 ASSAY OF CREATININE: CPT | Performed by: INTERNAL MEDICINE

## 2019-04-04 PROCEDURE — 82947 ASSAY GLUCOSE BLOOD QUANT: CPT | Mod: ZF | Performed by: INTERNAL MEDICINE

## 2019-04-04 PROCEDURE — 85025 COMPLETE CBC W/AUTO DIFF WBC: CPT | Performed by: STUDENT IN AN ORGANIZED HEALTH CARE EDUCATION/TRAINING PROGRAM

## 2019-04-04 PROCEDURE — 84075 ASSAY ALKALINE PHOSPHATASE: CPT | Performed by: INTERNAL MEDICINE

## 2019-04-04 PROCEDURE — 82040 ASSAY OF SERUM ALBUMIN: CPT | Performed by: INTERNAL MEDICINE

## 2019-04-04 PROCEDURE — 82247 BILIRUBIN TOTAL: CPT | Performed by: INTERNAL MEDICINE

## 2019-04-04 PROCEDURE — 84295 ASSAY OF SERUM SODIUM: CPT | Performed by: INTERNAL MEDICINE

## 2019-04-04 NOTE — NURSING NOTE
Chief Complaint   Patient presents with     Blood Draw     Labs drawn via  by RN in lab. VS taken.      Labs drawn via venipuncture. Vital signs taken. Checked into next appointment.     Mary Mobley RN

## 2019-04-04 NOTE — PROGRESS NOTES
BMT Clinic Note  January 17, 2019     ID:  Mr. 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  On pred 10/0 and Jakafi 5 mg bid. Notes knee pain/ fatigue on days with 0mg  Mouth a little dry but no sores or pain. No significant dry eyes. No n/v/d. No rashes, bruises or bleeding. No fevers, chills, cough or congestion.    Review of Systems: 10 point ROS negative except as noted above.    Physical Exam:  /89 (BP Location: Right arm, Patient Position: Sitting, Cuff Size: Adult Regular)   Pulse 62   Temp 97.4  F (36.3  C) (Oral)   Resp 16   Wt 103.1 kg (227 lb 3.2 oz)   SpO2 99%   BMI 33.55 kg/m       Wt Readings from Last 4 Encounters:   04/04/19 103.1 kg (227 lb 3.2 oz)   02/14/19 101.4 kg (223 lb 9.6 oz)   01/24/19 99.1 kg (218 lb 6.4 oz)   01/17/19 97.9 kg (215 lb 14.4 oz)     General: NAD, interactive, KPS 80%  Eyes: SARIKA, sclera anicteric  Nose/Mouth/Throat: OP clear, no ulcerations, very dry, upper plate, chronic lichenoid changes , no lip lesions, tongue non-coated.   Lungs:CTA B, no crackles or wheezes   CV: RRR without murmurs rubs or gallops  Abd: S/NT/ND +BS  Skin:   RLE skin shiny with some reddened areas. Nontender. Trace edema L>R (chronic). No new skin changes, back without rash   Neuro: A&O  Access: peripheral    Labs:  Lab Results   Component Value Date    WBC 7.2 04/04/2019    ANEU 2.6 04/04/2019    HGB 14.1 04/04/2019    HCT 43.3 04/04/2019     04/04/2019     04/04/2019    POTASSIUM 4.4 04/04/2019    CHLORIDE 106 04/04/2019    CO2 27 04/04/2019    GLC 91 04/04/2019    BUN 24 04/04/2019    CR 0.93 04/04/2019    MAG 2.0 12/04/2018    INR 0.98 02/14/2019     ASSESSMENT AND PLAN:  Mr. 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 Total cell dose (from 7/1 &  7/2) 6.53 x 10^6 CD34+ cells/kg.   - 1 year anniversary (July 2015): 30% cellular, trilineage hematopoiesis, no abnormal blasts by morphology or flow , no dysplasia, 0-1 fibrosis, 100% donor (BM, CD3, CD15). CR  - 2 year anniversary (July 2016): 20-30% cellular, trilineage hematopoiesis, no abnormal blasts by morphology or flow , no dysplasia, No fibrosis, 100% donor in BM (PB not sent). ISCN: //46,XX[20] Complete Remission.    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 Had been off prednisone since summer 2017 and briefly tapered off Sirolimus (january 2018), however resumed with flare in February. 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 Sirolimus was stopped on 9/27 & Pred 90mg was started. Seen by Dr. Sandoval on 10/2, please see his note. He does not think his symptoms or CT findings are c/w Sirolimus or GVH & he was in favor of tapering Prednisone. Recently he has worsening skin thickening & desquamation to the RLE, c/w GVH.   Started Jakafi 10/22 at 5 mg BID with symptom improvement in lower extremities. 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   Burst pred 40mg a day on 1/3 with significant systemic arthralgic pain, tapered back to 10/0 eod after 1 week, near resolution of symptoms noted 1/17, returned to 10 mg every other day.  DIscussed ok to try 5mg every day to see if his arthralgias tolerate.     Dry mouth: Dentist recommended Evoxac but insurance denied. No complaints 2/14    ID:  Afebrile no signs/sx of infection.   - IgG = 403, repleted 3/22/16, 5/8/16= 1270; recheck with recent viral exposures 1300 10/1. 1/24 =1130  - Prophy: HD ACV (renal dosing), Fluconazole and  Bactrim.   - Flu shot given 10/11/18      GI: No symptoms 1/17  On protonix (has  heartburn)    FEN/Renal:  Cr significantly improved with bumex (no metolazone) every other day. Edema better, continue     Fatigue: Significantly improved with steroids   - History of testosterone deficiency, rechecked and modestly low. Endo referral whenever pt requests  - TSH normal 2/28 and again on repeat 9/27 at 1.01.    Derm:  Schedule derm referral for RLE skin changes-see note 10/24  - Both LE improved per pt  - Venous statis: see below-most recently had sclerotherapy to left LE    Vasc:   10/15 Right leg US with small (technically DVT) clot in posterior tibial vein: per Millie, started reg dose aspirin daily 325mg and recheck US in 2 weeks or symptomatically. U/S on 11/27 without DVT  Off lymphedema wraps  Discomfort since edema/shakes: oxy 1-2 tab during day and ativan 6 hours away from oxy for sleep.  H/o chronic venous statis: s/p sclerotherapy to the L leg.  kelfex day prior and 4 days following.     CV:  Edema 2/2 heart failure? BNP neg compared to baseline. Use bumex (has worked best historically); recheck labs this week for Cr/K tolerance   ECHO results no change from previous EF 60-65%    Immunizations: will explore if efficacy of vaccines is OK on Jakafi (Killed vaccines are OK on Jakafi, live vaccines are not recommended). Given the schedule and he will explore whether Medicare will allow him to get vaccines here versus in his PCP    Plan:  - Change prednisone to 5 mg every day to see if daily dosing improved arthralgias. Continue Jakafi 5 mg bid    RTC in 3 months    Suzanne Collado

## 2019-04-04 NOTE — NURSING NOTE
"Oncology Rooming Note    April 4, 2019 2:10 PM   Deejay Dior is a 68 year old male who presents for:    Chief Complaint   Patient presents with     Blood Draw     Labs drawn via  by RN in lab. VS taken.      RECHECK     Labs and to see MD for Transition of Care     Initial Vitals: /89 (BP Location: Right arm, Patient Position: Sitting, Cuff Size: Adult Regular)   Pulse 62   Temp 97.4  F (36.3  C) (Oral)   Resp 16   Wt 103.1 kg (227 lb 3.2 oz)   SpO2 99%   BMI 33.55 kg/m   Estimated body mass index is 33.55 kg/m  as calculated from the following:    Height as of 1/3/19: 1.753 m (5' 9\").    Weight as of this encounter: 103.1 kg (227 lb 3.2 oz). Body surface area is 2.24 meters squared.  Data Unavailable Comment: Data Unavailable   No LMP for male patient.  Allergies reviewed: Yes  Medications reviewed: Yes    Medications: Medication refills not needed today.  Pharmacy name entered into [employer]: [id number]  [facility] [address] [address]    Clinical concerns: none       Elizabeth Forrester MA              "

## 2019-04-05 DIAGNOSIS — Z94.81 STATUS POST BONE MARROW TRANSPLANT (H): ICD-10-CM

## 2019-04-05 RX ORDER — LEVOFLOXACIN 250 MG/1
250 TABLET, FILM COATED ORAL DAILY
Qty: 90 TABLET | Refills: 3 | Status: SHIPPED | OUTPATIENT
Start: 2019-04-05 | End: 2020-04-21

## 2019-04-05 RX ORDER — SULFAMETHOXAZOLE/TRIMETHOPRIM 800-160 MG
TABLET ORAL
Qty: 48 TABLET | Refills: 3 | Status: SHIPPED | OUTPATIENT
Start: 2019-04-05 | End: 2020-06-22

## 2019-04-05 RX ORDER — FLUCONAZOLE 100 MG/1
100 TABLET ORAL DAILY
Qty: 90 TABLET | Refills: 3 | Status: SHIPPED | OUTPATIENT
Start: 2019-04-05 | End: 2020-04-01

## 2019-04-16 DIAGNOSIS — D46.9 MDS (MYELODYSPLASTIC SYNDROME) (H): ICD-10-CM

## 2019-04-16 DIAGNOSIS — D89.813 GVH (GRAFT VERSUS HOST DISEASE) (H): ICD-10-CM

## 2019-04-16 DIAGNOSIS — D89.813 GVH (GRAFT VERSUS HOST DISEASE) (H): Primary | ICD-10-CM

## 2019-04-26 ENCOUNTER — TELEPHONE (OUTPATIENT)
Dept: PHARMACY | Facility: CLINIC | Age: 69
End: 2019-04-26

## 2019-04-26 NOTE — TELEPHONE ENCOUNTER
Oral Chemotherapy Monitoring Program     Placed call to patient in follow up of Jakafi therapy.     Left message to please call back in follow-up of therapy. No patient or drug names were mentioned.     Edu Petersen, PharmD, BCOP  April 26, 2019

## 2019-04-26 NOTE — TELEPHONE ENCOUNTER
Oral Chemotherapy Monitoring Program     Primary Oncologist: Dr. Pinto  Primary Oncology Clinic: [facility]  Cancer Diagnosis: GVHD     Start Date: W7Y0=96/22/2018  Therapy: Jakafi (ruxolitinib) 5 mg (1 x 5 mg) BID continuously     Lab Monitoring Plan  CBC and CMP monthly     Subjective/Objective:  Deejay Dior is a 68 year old male contacted by phone for a follow-up visit for oral chemotherapy. Deejay is doing well without any side effects from his Jakafi.  He did admit that he missed 2 doses of the medication by accident.  No real reason given.    ORAL CHEMOTHERAPY 10/22/2018 10/29/2018 11/28/2018 12/28/2018 3/12/2019 4/26/2019   Drug Name Jakafi (Ruxolitinib) Jakafi (Ruxolitinib) Jakafi (Ruxolitinib) Jakafi (Ruxolitinib) Jakafi (Ruxolitinib) Jakafi (Ruxolitinib)   Current Dosage 5mg 5mg 5mg 5mg 5mg 5mg   Current Schedule BID BID BID BID BID BID   Cycle Details Continuous Continuous - Continuous Continuous Continuous   Start Date of Last Cycle - 10/22/2018 - - - -   Planned next cycle start date - 11/19/2018 - - - -   Doses missed in last 2 weeks - 0 0 - 0 2   Adherence Assessment - Adherent Adherent Adherent Adherent Adherent   Adverse Effects - No AE identified during assessment No AE identified during assessment Other (see note for details) No AE identified during assessment No AE identified during assessment   Other (see note for details) - - - Joint Pain - -   Pharmacist intervention? - - - Yes - -   Intervention(s) - - - Referral to oncology provider - -   Home BPs - not needed not needed not needed - -   Any new drug interactions? Yes - - - No No   Pharmacist Intervention? Yes - - - - -   Intervention(s) Patient Education - - - - -   Is the dose as ordered appropriate for the patient? - - Yes - Yes Yes   Is the patient currently in pain? - - - - - Assessed in last 30 days.   Has the patient been assessed within the past 6 months for depression? - - - - - Yes       Vitals:  BP:   BP Readings  "from Last 1 Encounters:   04/04/19 151/89     Wt Readings from Last 1 Encounters:   04/04/19 103.1 kg (227 lb 3.2 oz)     Estimated body surface area is 2.24 meters squared as calculated from the following:    Height as of 1/3/19: 1.753 m (5' 9\").    Weight as of 4/4/19: 103.1 kg (227 lb 3.2 oz).    Labs:  _  Result Component Current Result Ref Range   Sodium 137 (4/4/2019) 133 - 144 mmol/L     _  Result Component Current Result Ref Range   Potassium 4.4 (4/4/2019) 3.4 - 5.3 mmol/L     _  Result Component Current Result Ref Range   Calcium 9.3 (4/4/2019) 8.5 - 10.1 mg/dL     No results found for Mag within last 30 days.     No results found for Phos within last 30 days.     _  Result Component Current Result Ref Range   Albumin 3.4 (4/4/2019) 3.4 - 5.0 g/dL     _  Result Component Current Result Ref Range   Urea Nitrogen 24 (4/4/2019) 7 - 30 mg/dL     _  Result Component Current Result Ref Range   Creatinine 0.93 (4/4/2019) 0.66 - 1.25 mg/dL       _  Result Component Current Result Ref Range   AST Canceled, Test credited (4/4/2019) 0 - 45 U/L     _  Result Component Current Result Ref Range   ALT 36 (4/4/2019) 0 - 70 U/L     _  Result Component Current Result Ref Range   Bilirubin Total 0.4 (4/4/2019) 0.2 - 1.3 mg/dL       _  Result Component Current Result Ref Range   WBC 7.2 (4/4/2019) 4.0 - 11.0 10e9/L     _  Result Component Current Result Ref Range   Hemoglobin 14.1 (4/4/2019) 13.3 - 17.7 g/dL     _  Result Component Current Result Ref Range   Platelet Count 265 (4/4/2019) 150 - 450 10e9/L     _  Result Component Current Result Ref Range   Absolute Neutrophil 2.6 (4/4/2019) 1.6 - 8.3 10e9/L     Assessment/Plan:  Continue same dose as ordered    Follow-Up:  6/27/19 labs and appt    Refill Due:  FVCD    Edu Petersen, PharmD, BCOP  April 26, 2019    "

## 2019-05-30 DIAGNOSIS — D89.813 GVH (GRAFT VERSUS HOST DISEASE) (H): ICD-10-CM

## 2019-05-31 RX ORDER — PREDNISONE 10 MG/1
10 TABLET ORAL EVERY OTHER DAY
Qty: 45 TABLET | Refills: 3 | Status: SHIPPED | OUTPATIENT
Start: 2019-05-31 | End: 2019-06-27

## 2019-06-27 ENCOUNTER — ONCOLOGY VISIT (OUTPATIENT)
Dept: TRANSPLANT | Facility: CLINIC | Age: 69
End: 2019-06-27
Attending: INTERNAL MEDICINE
Payer: MEDICARE

## 2019-06-27 ENCOUNTER — APPOINTMENT (OUTPATIENT)
Dept: LAB | Facility: CLINIC | Age: 69
End: 2019-06-27
Attending: INTERNAL MEDICINE
Payer: MEDICARE

## 2019-06-27 VITALS
OXYGEN SATURATION: 97 % | TEMPERATURE: 97.8 F | WEIGHT: 230.4 LBS | BODY MASS INDEX: 34.02 KG/M2 | HEART RATE: 65 BPM | DIASTOLIC BLOOD PRESSURE: 75 MMHG | SYSTOLIC BLOOD PRESSURE: 121 MMHG

## 2019-06-27 DIAGNOSIS — D46.9 MDS (MYELODYSPLASTIC SYNDROME) (H): ICD-10-CM

## 2019-06-27 DIAGNOSIS — D89.813 GVH (GRAFT VERSUS HOST DISEASE) (H): ICD-10-CM

## 2019-06-27 DIAGNOSIS — C92.01 ACUTE MYELOID LEUKEMIA IN REMISSION (H): Primary | ICD-10-CM

## 2019-06-27 LAB
ALBUMIN SERPL-MCNC: 3.3 G/DL (ref 3.4–5)
ALP SERPL-CCNC: 62 U/L (ref 40–150)
ALT SERPL W P-5'-P-CCNC: 28 U/L (ref 0–70)
ANION GAP SERPL CALCULATED.3IONS-SCNC: 5 MMOL/L (ref 3–14)
AST SERPL W P-5'-P-CCNC: 30 U/L (ref 0–45)
BASOPHILS # BLD AUTO: 0 10E9/L (ref 0–0.2)
BASOPHILS NFR BLD AUTO: 0.4 %
BILIRUB SERPL-MCNC: 0.3 MG/DL (ref 0.2–1.3)
BUN SERPL-MCNC: 26 MG/DL (ref 7–30)
CALCIUM SERPL-MCNC: 8.8 MG/DL (ref 8.5–10.1)
CHLORIDE SERPL-SCNC: 110 MMOL/L (ref 94–109)
CO2 SERPL-SCNC: 25 MMOL/L (ref 20–32)
CREAT SERPL-MCNC: 1.13 MG/DL (ref 0.66–1.25)
DIFFERENTIAL METHOD BLD: ABNORMAL
EOSINOPHIL # BLD AUTO: 0.1 10E9/L (ref 0–0.7)
EOSINOPHIL NFR BLD AUTO: 1.2 %
ERYTHROCYTE [DISTWIDTH] IN BLOOD BY AUTOMATED COUNT: 15.5 % (ref 10–15)
GFR SERPL CREATININE-BSD FRML MDRD: 65 ML/MIN/{1.73_M2}
GLUCOSE SERPL-MCNC: 99 MG/DL (ref 70–99)
HCT VFR BLD AUTO: 38.2 % (ref 40–53)
HGB BLD-MCNC: 13.1 G/DL (ref 13.3–17.7)
IMM GRANULOCYTES # BLD: 0 10E9/L (ref 0–0.4)
IMM GRANULOCYTES NFR BLD: 0.1 %
LYMPHOCYTES # BLD AUTO: 3.2 10E9/L (ref 0.8–5.3)
LYMPHOCYTES NFR BLD AUTO: 43.5 %
MCH RBC QN AUTO: 33 PG (ref 26.5–33)
MCHC RBC AUTO-ENTMCNC: 34.3 G/DL (ref 31.5–36.5)
MCV RBC AUTO: 96 FL (ref 78–100)
MONOCYTES # BLD AUTO: 0.9 10E9/L (ref 0–1.3)
MONOCYTES NFR BLD AUTO: 12.3 %
NEUTROPHILS # BLD AUTO: 3.2 10E9/L (ref 1.6–8.3)
NEUTROPHILS NFR BLD AUTO: 42.5 %
NRBC # BLD AUTO: 0 10*3/UL
NRBC BLD AUTO-RTO: 0 /100
PLATELET # BLD AUTO: 248 10E9/L (ref 150–450)
POTASSIUM SERPL-SCNC: 4 MMOL/L (ref 3.4–5.3)
PROT SERPL-MCNC: 6.9 G/DL (ref 6.8–8.8)
RBC # BLD AUTO: 3.97 10E12/L (ref 4.4–5.9)
SODIUM SERPL-SCNC: 139 MMOL/L (ref 133–144)
WBC # BLD AUTO: 7.4 10E9/L (ref 4–11)

## 2019-06-27 PROCEDURE — G0463 HOSPITAL OUTPT CLINIC VISIT: HCPCS

## 2019-06-27 PROCEDURE — 36415 COLL VENOUS BLD VENIPUNCTURE: CPT

## 2019-06-27 PROCEDURE — 80053 COMPREHEN METABOLIC PANEL: CPT | Performed by: PHYSICIAN ASSISTANT

## 2019-06-27 PROCEDURE — 85025 COMPLETE CBC W/AUTO DIFF WBC: CPT | Performed by: PHYSICIAN ASSISTANT

## 2019-06-27 RX ORDER — PREDNISONE 10 MG/1
5 TABLET ORAL EVERY OTHER DAY
Refills: 3 | COMMUNITY
Start: 2019-06-27 | End: 2019-07-25

## 2019-06-27 ASSESSMENT — PAIN SCALES - GENERAL: PAINLEVEL: NO PAIN (0)

## 2019-06-27 NOTE — NURSING NOTE
"Oncology Rooming Note    June 27, 2019 11:52 AM   Deejay Dior is a 68 year old male who presents for:    Chief Complaint   Patient presents with     Blood Draw     Labs drawn via VPT by MA. VS taken. Pt. checked in for appt.      RECHECK     Pt is here for a rtn for AML     Initial Vitals: Blood Pressure 121/75 (BP Location: Right arm, Patient Position: Chair, Cuff Size: Adult Large)   Pulse 65   Temperature 97.8  F (36.6  C) (Oral)   Weight 104.5 kg (230 lb 6.4 oz)   Oxygen Saturation 97%   Body Mass Index 34.02 kg/m   Estimated body mass index is 34.02 kg/m  as calculated from the following:    Height as of 1/3/19: 1.753 m (5' 9\").    Weight as of this encounter: 104.5 kg (230 lb 6.4 oz). Body surface area is 2.26 meters squared.  No Pain (0) Comment: Data Unavailable   No LMP for male patient.  Allergies reviewed: Yes  Medications reviewed: Yes    Medications: Medication refills not needed today.  Pharmacy name entered into [employer]: [facility] #[id number] - [place], [place] - [address] [address]    Clinical concerns: none       Elizabeth Forrester MA              "

## 2019-06-27 NOTE — NURSING NOTE
Chief Complaint   Patient presents with     Blood Draw     Labs drawn via VPT by MA. VS taken. Pt. checked in for appt.      Nelly Loza MA

## 2019-06-27 NOTE — PROGRESS NOTES
BMT Clinic Note     ID:  Mr. 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  On pred 5mg every day and Jakafi 5 mg bid. He has pain in his right hip, has been seeing orthopedics (at [facility]) and relays that they say he needs a right hip replacement due to his avascular necrosis.  Ongoing fatigue which varies day to day.  Very bad dry mouth, for which he has tried numerous solutions, none of which has worked very well.   He doesn't have bodywide arthraligia/myalgias anymore.  He still has shiny, smooth skin on her right lower leg that developed after a bout of cellulitis there, he tells me.   No cough, cold; no chest or abdominal pain.    Review of Systems: 8 point ROS negative except as noted above.    Physical Exam:  /75 (BP Location: Right arm, Patient Position: Chair, Cuff Size: Adult Large)   Pulse 65   Temp 97.8  F (36.6  C) (Oral)   Wt 104.5 kg (230 lb 6.4 oz)   SpO2 97%   BMI 34.02 kg/m       Wt Readings from Last 4 Encounters:   06/27/19 104.5 kg (230 lb 6.4 oz)   04/04/19 103.1 kg (227 lb 3.2 oz)   02/14/19 101.4 kg (223 lb 9.6 oz)   01/24/19 99.1 kg (218 lb 6.4 oz)     General: NAD, interactive, KPS 80%  Eyes: SARIKA, sclera anicteric  Nose/Mouth/Throat: OP clear, no ulcerations, very dry, upper plate, chronic lichenoid changes , no lip lesions, tongue non-coated.   Lungs:CTA B, no crackles or wheezes   CV: RRR without murmurs rubs or gallops  Abd: S/NT/ND +BS  Skin:   RLE skin shiny with some reddened areas. Nontender. Trace edema L>R (chronic).    Neuro: A&O  Access: peripheral    Labs:  Lab Results   Component Value Date    WBC 7.2 04/04/2019    ANEU 2.6 04/04/2019    HGB 14.1 04/04/2019    HCT 43.3 04/04/2019     04/04/2019     04/04/2019    POTASSIUM 4.4 04/04/2019    CHLORIDE 106 04/04/2019    CO2 27 04/04/2019    GLC 91 04/04/2019    BUN 24 04/04/2019    CR 0.93  04/04/2019    MAG 2.0 12/04/2018    INR 0.98 02/14/2019     ASSESSMENT AND PLAN:  Mr. 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  He is almost 5 years out from transplant!      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 Total cell dose (from 7/1 & 7/2) 6.53 x 10^6 CD34+ cells/kg.   - 1 year anniversary (July 2015): 30% cellular, trilineage hematopoiesis, no abnormal blasts by morphology or flow , no dysplasia, 0-1 fibrosis, 100% donor (BM, CD3, CD15). CR  - 2 year anniversary (July 2016): 20-30% cellular, trilineage hematopoiesis, no abnormal blasts by morphology or flow , no dysplasia, No fibrosis, 100% donor in BM (PB not sent). ISCN: //46,XX[20] Complete Remission.    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 Had been off prednisone since summer 2017 and briefly tapered off Sirolimus (january 2018), however resumed with flare in February. 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 Sirolimus was stopped on 9/27 & Pred 90mg was started. Seen by Dr. Sandoval on 10/2, please see his note. He does not think his symptoms or CT findings are c/w Sirolimus or GVH & he was in favor of tapering Prednisone. Recently he has worsening skin thickening & desquamation to the RLE, c/w GVH.   Started Jakafi 10/22 at 5 mg BID with symptom improvement in lower extremities. 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   Burst pred 40mg a day on 1/3 with significant systemic arthralgic pain, tapered back to 10/0 eod after 1 week, near resolution of symptoms noted 1/17, returned to 10 mg every other day; then dropped to 5mg q day in April.    6/27: keep jakafi at 5mg bid; decrease pred to 5mg every other day for 4 weeks, then stop  (assuming GVHD isn't worse).    Dry mouth: try high xylitol content gum/mints: info on what type ([employer] brand) given, informed them they can buy it on [employer].     ID:  Afebrile no signs/sx of infection.   - IgG = 403, repleted 3/22/16, 5/8/16= 1270; recheck with recent viral exposures 1300 10/1. 1/24 =1130  - Prophy: HD ACV (renal dosing), Fluconazole and  Bactrim.   - Flu shot given 10/11/18      GI: No symptoms   On protonix (has heartburn)    FEN/Renal:  Cr significantly improved with bumex (no metolazone) every other day. Edema better, continue compression stockings.    Fatigue:    - History of testosterone deficiency, rechecked and modestly low. Endo referral whenever pt feels ready for this.  - TSH normal 2/28 and again on repeat 9/27 at 1.01.  - counseled that moderate exercise is really the only known way to combat fatigue, and acknowledged how difficult it is to balance too much with not enough activity.     Derm:    - Venous statis: see below-most recently had sclerotherapy to left LE    Vasc:   10/15 Right leg US with small (technically DVT) clot in posterior tibial vein: per Millie, started reg dose aspirin daily 325mg and recheck US in 2 weeks or symptomatically. U/S on 11/27 without DVT  Off lymphedema wraps  Discomfort since edema/shakes: oxy 1-2 tab during day and ativan 6 hours away from oxy for sleep.  H/o chronic venous statis: s/p sclerotherapy to the L leg.  kelfex day prior and 4 days following.     CV:  Edema 2/2 heart failure? BNP neg compared to baseline. Use bumex (has worked best historically); recheck labs this week for Cr/K tolerance   ECHO results no change from previous EF 60-65%    Immunizations: not addressed 6/27; per last note, Deejay was going to determine if his insurance would cover vaccines here or if he needs to get at PCP.  Note:  Only killed vaccines are safe for him while he is on jakafi.    MS: avascular necrosis of hip.  Seeing Ortho again mid-July.  Goal to taper off  steroids and will defer to Dr. Collado about plan for jakafi with surgery.    Plan:  Decrease pred to 5mg every other day; goal of off in 4 weeks if stable.    Jakafi continues at 5mg bid; will defer to Dr. Collado about any need to hold this for surgery.  Try high xylitol content gum/mints for dry mouth.    Linn Rivero  6/27/2019

## 2019-06-27 NOTE — NURSING NOTE
Chief Complaint   Patient presents with     Blood Draw     No lab orders; vitals     /75 (BP Location: Right arm, Patient Position: Chair, Cuff Size: Adult Large)   Pulse 65   Temp 97.8  F (36.6  C) (Oral)   Wt 104.5 kg (230 lb 6.4 oz)   SpO2 97%   BMI 34.02 kg/m      No Lab orders. Vital signs taken in lab. Pt tolerated well. Pt checked in for next appointment.    Abril Madrid RN

## 2019-07-16 DIAGNOSIS — I83.893 SYMPTOMATIC VARICOSE VEINS OF BOTH LOWER EXTREMITIES: Primary | ICD-10-CM

## 2019-07-17 ASSESSMENT — ENCOUNTER SYMPTOMS
ALTERED TEMPERATURE REGULATION: 0
WEIGHT GAIN: 1
FEVER: 0
MYALGIAS: 1
JOINT SWELLING: 0
FLANK PAIN: 0
MUSCLE CRAMPS: 0
POLYPHAGIA: 0
CHILLS: 0
MUSCLE WEAKNESS: 1
DIFFICULTY URINATING: 0
TROUBLE SWALLOWING: 0
EYE IRRITATION: 0
HEMATURIA: 0
INCREASED ENERGY: 1
WEIGHT LOSS: 0
EYE REDNESS: 0
HOARSE VOICE: 0
DOUBLE VISION: 0
TASTE DISTURBANCE: 0
SINUS CONGESTION: 1
NECK PAIN: 0
NECK MASS: 0
SINUS PAIN: 0
STIFFNESS: 1
POOR WOUND HEALING: 1
POLYDIPSIA: 1
ARTHRALGIAS: 1
SKIN CHANGES: 0
SMELL DISTURBANCE: 0
DYSURIA: 0
BACK PAIN: 0
NAIL CHANGES: 1
EYE WATERING: 0
EYE PAIN: 0
HALLUCINATIONS: 0
FATIGUE: 1
SORE THROAT: 0
DECREASED APPETITE: 0
NIGHT SWEATS: 0

## 2019-07-18 ENCOUNTER — OFFICE VISIT (OUTPATIENT)
Dept: VASCULAR SURGERY | Facility: CLINIC | Age: 69
End: 2019-07-18
Payer: MEDICARE

## 2019-07-18 ENCOUNTER — ANCILLARY PROCEDURE (OUTPATIENT)
Dept: ULTRASOUND IMAGING | Facility: CLINIC | Age: 69
End: 2019-07-18
Attending: RADIOLOGY
Payer: MEDICARE

## 2019-07-18 VITALS — DIASTOLIC BLOOD PRESSURE: 72 MMHG | OXYGEN SATURATION: 95 % | HEART RATE: 62 BPM | SYSTOLIC BLOOD PRESSURE: 118 MMHG

## 2019-07-18 DIAGNOSIS — I83.893 SYMPTOMATIC VARICOSE VEINS OF BOTH LOWER EXTREMITIES: Primary | ICD-10-CM

## 2019-07-18 DIAGNOSIS — I83.893 SYMPTOMATIC VARICOSE VEINS OF BOTH LOWER EXTREMITIES: ICD-10-CM

## 2019-07-18 ASSESSMENT — PAIN SCALES - GENERAL: PAINLEVEL: NO PAIN (0)

## 2019-07-18 NOTE — PROGRESS NOTES
"SUBJECTIVE:  68-year-old male with bilateral venous incompetency, today here for follow-up status post bilateral GSV ablation and bilateral sclerotherapy.  Treatment on the right side was done by Dr. Dumont and on the left side by Dr. Godwin.  His presenting complaints were leg swelling, shin sores, peeling/flaking skin and erythema.  He was also complaining of leg heaviness and fatigue before the procedure. He also has history of MDS, status post BMT.     Today he feels very well. Right leg treatment was performed in October of 2018. Patient doesn't report \"any more vein issues\". His calf swelling has resolved however continues to have bilateral feet swelling.  It was expanded to the patient patient has deep venous incompetence, which may explain the feet swelling and the patient needs to continue wearing the compression stockings.  Patient reports blistering in the right shin, with sloughing of the skin at the right anterior shin. Patient reports that the pain related to the varicose veins has resolved.     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)     Fatigue     Secondary adrenal insufficiency (H)     Influenza A (H1N1)     Fever, unspecified     Acute deep vein thrombosis (DVT) of distal vein of right lower extremity (H)     Long-term (current) use of anticoagulants [Z79.01]     Deep vein thrombosis (DVT) (H)     Primary hypogonadism in male      Current Outpatient Medications   Medication Sig     acyclovir (ZOVIRAX) 800 MG tablet Take 1 tablet (800 mg) by mouth 3 times daily     amoxicillin (AMOXIL) 500 MG capsule Take 4 pills (2 grams) day of dental work     aspirin 325 MG tablet Take 1 tablet (325 mg) by mouth daily     augmented betamethasone dipropionate (DIPROLENE-AF) 0.05 % external ointment Apply topically 2 times daily For areas of rash on the lower leg     calcium carbonate-vitamin D 500-400 MG-UNIT TABS tablt " Take 1 tablet by mouth daily 2000 mg     fluconazole (DIFLUCAN) 100 MG tablet Take 1 tablet (100 mg) by mouth daily     levofloxacin (LEVAQUIN) 250 MG tablet Take 1 tablet (250 mg) by mouth daily     LORazepam (ATIVAN) 1 MG tablet Take 1 tablet (1 mg) by mouth At Bedtime     order for DME Please measure and distribute 1 pair of 20mmHg - 30mmHg thigh high open or closed toe compression stockings. Jobst ultrasheer or equivalent.     ORDER FOR DME Please provide a NOVA cane offset with strap item number [id number]     oxyCODONE (ROXICODONE) 5 MG tablet Take 1 tablet (5 mg) by mouth every 6 hours as needed for severe pain     pantoprazole (PROTONIX) 20 MG EC tablet Take 1 tablet (20 mg) by mouth daily     pilocarpine (SALAGEN) 5 MG tablet Take 1 tablet (5 mg) by mouth 4 times daily     predniSONE (DELTASONE) 10 MG tablet Take 0.5 tablets (5 mg) by mouth every other day     ruxolitinib (JAKAFI) 5 MG TABS tablet CHEMO Take 1 tablet (5 mg) by mouth 2 times daily     sertraline (ZOLOFT) 100 MG tablet take 1 tablet by mouth daily.        sulfamethoxazole-trimethoprim (BACTRIM DS/SEPTRA DS) 800-160 MG tablet Take one tablet by mouth twice daily on  and  only.     triamcinolone (KENALOG) 0.1 % ointment Apply twice a day to lower leg     No current facility-administered medications for this visit.      Social History     Tobacco Use     Smoking status: Former Smoker     Packs/day: 1.00     Years: 34.00     Pack years: 34.00     Types: Cigarettes     Start date: 1967     Last attempt to quit: 2001     Years since quittin.3     Smokeless tobacco: Never Used     Tobacco comment: quit in    Substance Use Topics     Alcohol use: Yes     Alcohol/week: 2.5 oz     Comment: Seldom     Drug use: No      REVIEW OF SYSTEMS: As above     OBJECTIVE:  /72 (BP Location: Left arm, Patient Position: Chair, Cuff Size: Adult Large)   Pulse 62   SpO2 95%   General appearance: Pleasant, cooperative, no  distress.  Lungs: Clear to auscultation with normal respiratory effort.   Heart: Regular rate and rhythm with normal S1, S2.  No murmurs, clicks, or gallops.  Abdomen: Soft with active bowel sounds. Non-tender, no masses or organomegaly.   Mental Status: cooperative, normal affect, no gross thought process defects during casual conversation.  Bilateral legs: Left calf is slightly larger than the right     Right: Mild foot edema, 2 ruptured blisters anterior shin, scaly of skin at the ankle, scattered telangiectasias right thigh, minimal lipodermatosclerosis right calf, skin scarring anterior shin     Left: foot edema,  Cord like palpable structure at the medial thigh, small to moderate sized varicose veins at the knee    Ultrasound 7/18/2019: IMPRESSION:   1. New recannulization of the LEFT great saphenous vein.       A. Ablation edge increased to 4.8 cm from the saphenofemoral  junction from 1.8 cm on 2/11/2019.       B. New 2.5 cm segmental recannulization in the proximal to mid  thigh.       C. New recannulization in the distal thigh with partial  compressibility.     2. Stable post right great saphenous endovascular laser ablation  ranges.    ASSESSMENT AND PLAN:   Patient status post bilateral GSV ablation and sclerotherapy for symptomatic varicose veins, is doing really well with near symptom resolution after intervention. 6 months following left leg intervention with Dr. Godwin. Patient continues to have bilateral feet edema which was explained to him that it is from deep venous incompetence and he needs to continue to wear his compression stockings for that. On ultrasound today small area of GSV recannalization is noted in the proximal to med left thigh however considering no significant symptoms, no further intervention at this time. Of note patient does have some varicosities about the left knee which may potentially connect to the above mentioned recanalized left GSV segment. If patient were to become  symptomatic from these at a later date, additional sclerotherapy may be performed. Patient's right shin skin changes are unlikely to be from varicose veins and patient follows up with dermatology at [facility] for those.     Akhil Ayala   Interventional Radiology Fellow   [phone]    I was present with the fellow during the history and exam.  I discussed the case with the fellow and agree with the findings as documented in the assessment and plan.    I spent a total of 15 minutes face-to-face with Deejay Dior during today's office visit.  Over 50% of this time was spent counseling the patient and/or coordinating care regarding follow up treatment of symptomatic BLE varicosities and chronic venous stasis.  See note for details.     Leland Godwin MD  Interventional Radiology and Vascular Imaging Attending  Department of Radiology  [facility]

## 2019-07-18 NOTE — LETTER
"7/18/2019       RE: Deejay Dior  [address]  [place] [address]     Dear Colleague,    Thank you for referring your patient, Deejay Dior, to the [facility] at [facility]. Please see a copy of my visit note below.    SUBJECTIVE:  68-year-old male with bilateral venous incompetency, today here for follow-up status post bilateral GSV ablation and bilateral sclerotherapy.  Treatment on the right side was done by Dr. Dumont and on the left side by Dr. Godwin.  His presenting complaints were leg swelling, shin sores, peeling/flaking skin and erythema.  He was also complaining of leg heaviness and fatigue before the procedure. He also has history of MDS, status post BMT.     Today he feels very well. Right leg treatment was performed in October of 2018. Patient doesn't report \"any more vein issues\". His calf swelling has resolved however continues to have bilateral feet swelling.  It was expanded to the patient patient has deep venous incompetence, which may explain the feet swelling and the patient needs to continue wearing the compression stockings.  Patient reports blistering in the right shin, with sloughing of the skin at the right anterior shin. Patient reports that the pain related to the varicose veins has resolved.     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)     Fatigue     Secondary adrenal insufficiency (H)     Influenza A (H1N1)     Fever, unspecified     Acute deep vein thrombosis (DVT) of distal vein of right lower extremity (H)     Long-term (current) use of anticoagulants [Z79.01]     Deep vein thrombosis (DVT) (H)     Primary hypogonadism in male      Current Outpatient Medications   Medication Sig     acyclovir (ZOVIRAX) 800 MG tablet Take 1 tablet (800 mg) by mouth 3 times daily     amoxicillin (AMOXIL) 500 MG capsule Take 4 pills (2 grams) day " of dental work     aspirin 325 MG tablet Take 1 tablet (325 mg) by mouth daily     augmented betamethasone dipropionate (DIPROLENE-AF) 0.05 % external ointment Apply topically 2 times daily For areas of rash on the lower leg     calcium carbonate-vitamin D 500-400 MG-UNIT TABS tablt Take 1 tablet by mouth daily 2000 mg     fluconazole (DIFLUCAN) 100 MG tablet Take 1 tablet (100 mg) by mouth daily     levofloxacin (LEVAQUIN) 250 MG tablet Take 1 tablet (250 mg) by mouth daily     LORazepam (ATIVAN) 1 MG tablet Take 1 tablet (1 mg) by mouth At Bedtime     order for DME Please measure and distribute 1 pair of 20mmHg - 30mmHg thigh high open or closed toe compression stockings. Jobst ultrasheer or equivalent.     ORDER FOR DME Please provide a NOVA cane offset with strap item number [id number]     oxyCODONE (ROXICODONE) 5 MG tablet Take 1 tablet (5 mg) by mouth every 6 hours as needed for severe pain     pantoprazole (PROTONIX) 20 MG EC tablet Take 1 tablet (20 mg) by mouth daily     pilocarpine (SALAGEN) 5 MG tablet Take 1 tablet (5 mg) by mouth 4 times daily     predniSONE (DELTASONE) 10 MG tablet Take 0.5 tablets (5 mg) by mouth every other day     ruxolitinib (JAKAFI) 5 MG TABS tablet CHEMO Take 1 tablet (5 mg) by mouth 2 times daily     sertraline (ZOLOFT) 100 MG tablet take 1 tablet by mouth daily.        sulfamethoxazole-trimethoprim (BACTRIM DS/SEPTRA DS) 800-160 MG tablet Take one tablet by mouth twice daily on  and  only.     triamcinolone (KENALOG) 0.1 % ointment Apply twice a day to lower leg     No current facility-administered medications for this visit.      Social History     Tobacco Use     Smoking status: Former Smoker     Packs/day: 1.00     Years: 34.00     Pack years: 34.00     Types: Cigarettes     Start date: 1967     Last attempt to quit: 2001     Years since quittin.3     Smokeless tobacco: Never Used     Tobacco comment: quit in    Substance Use Topics     Alcohol  use: Yes     Alcohol/week: 2.5 oz     Comment: Seldom     Drug use: No      REVIEW OF SYSTEMS: As above     OBJECTIVE:  /72 (BP Location: Left arm, Patient Position: Chair, Cuff Size: Adult Large)   Pulse 62   SpO2 95%   General appearance: Pleasant, cooperative, no distress.  Lungs: Clear to auscultation with normal respiratory effort.   Heart: Regular rate and rhythm with normal S1, S2.  No murmurs, clicks, or gallops.  Abdomen: Soft with active bowel sounds. Non-tender, no masses or organomegaly.   Mental Status: cooperative, normal affect, no gross thought process defects during casual conversation.  Bilateral legs: Left calf is slightly larger than the right     Right: Mild foot edema, 2 ruptured blisters anterior shin, scaly of skin at the ankle, scattered telangiectasias right thigh, minimal lipodermatosclerosis right calf, skin scarring anterior shin     Left: foot edema,  Cord like palpable structure at the medial thigh, small to moderate sized varicose veins at the knee    Ultrasound 7/18/2019: IMPRESSION:   1. New recannulization of the LEFT great saphenous vein.       A. Ablation edge increased to 4.8 cm from the saphenofemoral  junction from 1.8 cm on 2/11/2019.       B. New 2.5 cm segmental recannulization in the proximal to mid  thigh.       C. New recannulization in the distal thigh with partial  compressibility.     2. Stable post right great saphenous endovascular laser ablation  ranges.    ASSESSMENT AND PLAN:   Patient status post bilateral GSV ablation and sclerotherapy for symptomatic varicose veins, is doing really well with near symptom resolution after intervention. 6 months following left leg intervention with Dr. Godwin. Patient continues to have bilateral feet edema which was explained to him that it is from deep venous incompetence and he needs to continue to wear his compression stockings for that. On ultrasound today small area of GSV recannalization is noted in the proximal  to med left thigh however considering no significant symptoms, no further intervention at this time. Of note patient does have some varicosities about the left knee which may potentially connect to the above mentioned recanalized left GSV segment. If patient were to become symptomatic from these at a later date, additional sclerotherapy may be performed. Patient's right shin skin changes are unlikely to be from varicose veins and patient follows up with dermatology at [facility] for those.     Akhil Ayala   Interventional Radiology Fellow   [phone]    I was present with the fellow during the history and exam.  I discussed the case with the fellow and agree with the findings as documented in the assessment and plan.    I spent a total of 15 minutes face-to-face with Deejay Dior during today's office visit.  Over 50% of this time was spent counseling the patient and/or coordinating care regarding follow up treatment of symptomatic BLE varicosities and chronic venous stasis.  See note for details.     Leland Godwin MD  Interventional Radiology and Vascular Imaging Attending  Department of Radiology  [facility]

## 2019-07-18 NOTE — NURSING NOTE
Vascular Rooming Note     Deejay Dior's goals for this visit include:   Chief Complaint   Patient presents with     RAMON Villalba, is being seen today for a  6 month follow up EVLA, feeling good/ no change, swelling in feet or ankle at times, as reported by patient.     Anahi Lynch LPN

## 2019-07-25 ENCOUNTER — ONCOLOGY VISIT (OUTPATIENT)
Dept: TRANSPLANT | Facility: CLINIC | Age: 69
End: 2019-07-25
Attending: INTERNAL MEDICINE
Payer: MEDICARE

## 2019-07-25 ENCOUNTER — APPOINTMENT (OUTPATIENT)
Dept: LAB | Facility: CLINIC | Age: 69
End: 2019-07-25
Attending: INTERNAL MEDICINE
Payer: MEDICARE

## 2019-07-25 VITALS
BODY MASS INDEX: 34.19 KG/M2 | DIASTOLIC BLOOD PRESSURE: 76 MMHG | SYSTOLIC BLOOD PRESSURE: 120 MMHG | HEART RATE: 71 BPM | WEIGHT: 231.5 LBS | OXYGEN SATURATION: 96 % | RESPIRATION RATE: 16 BRPM | TEMPERATURE: 97.6 F

## 2019-07-25 DIAGNOSIS — C92.01 ACUTE MYELOID LEUKEMIA IN REMISSION (H): ICD-10-CM

## 2019-07-25 LAB
ALBUMIN SERPL-MCNC: 3.6 G/DL (ref 3.4–5)
ALP SERPL-CCNC: 63 U/L (ref 40–150)
ALT SERPL W P-5'-P-CCNC: 32 U/L (ref 0–70)
ANION GAP SERPL CALCULATED.3IONS-SCNC: 5 MMOL/L (ref 3–14)
AST SERPL W P-5'-P-CCNC: 38 U/L (ref 0–45)
BASOPHILS # BLD AUTO: 0 10E9/L (ref 0–0.2)
BASOPHILS NFR BLD AUTO: 0.4 %
BILIRUB SERPL-MCNC: 0.3 MG/DL (ref 0.2–1.3)
BUN SERPL-MCNC: 24 MG/DL (ref 7–30)
CALCIUM SERPL-MCNC: 8.8 MG/DL (ref 8.5–10.1)
CHLORIDE SERPL-SCNC: 106 MMOL/L (ref 94–109)
CO2 SERPL-SCNC: 26 MMOL/L (ref 20–32)
CREAT SERPL-MCNC: 1.22 MG/DL (ref 0.66–1.25)
DIFFERENTIAL METHOD BLD: ABNORMAL
EOSINOPHIL # BLD AUTO: 0.1 10E9/L (ref 0–0.7)
EOSINOPHIL NFR BLD AUTO: 0.9 %
ERYTHROCYTE [DISTWIDTH] IN BLOOD BY AUTOMATED COUNT: 15.6 % (ref 10–15)
GFR SERPL CREATININE-BSD FRML MDRD: 59 ML/MIN/{1.73_M2}
GLUCOSE SERPL-MCNC: 94 MG/DL (ref 70–99)
HCT VFR BLD AUTO: 39 % (ref 40–53)
HGB BLD-MCNC: 13.1 G/DL (ref 13.3–17.7)
IMM GRANULOCYTES # BLD: 0 10E9/L (ref 0–0.4)
IMM GRANULOCYTES NFR BLD: 0.1 %
LYMPHOCYTES # BLD AUTO: 2.7 10E9/L (ref 0.8–5.3)
LYMPHOCYTES NFR BLD AUTO: 39.9 %
MCH RBC QN AUTO: 32.3 PG (ref 26.5–33)
MCHC RBC AUTO-ENTMCNC: 33.6 G/DL (ref 31.5–36.5)
MCV RBC AUTO: 96 FL (ref 78–100)
MONOCYTES # BLD AUTO: 0.9 10E9/L (ref 0–1.3)
MONOCYTES NFR BLD AUTO: 14 %
NEUTROPHILS # BLD AUTO: 3 10E9/L (ref 1.6–8.3)
NEUTROPHILS NFR BLD AUTO: 44.7 %
NRBC # BLD AUTO: 0 10*3/UL
NRBC BLD AUTO-RTO: 0 /100
PLATELET # BLD AUTO: 229 10E9/L (ref 150–450)
POTASSIUM SERPL-SCNC: 4.2 MMOL/L (ref 3.4–5.3)
PROT SERPL-MCNC: 7 G/DL (ref 6.8–8.8)
RBC # BLD AUTO: 4.06 10E12/L (ref 4.4–5.9)
SODIUM SERPL-SCNC: 136 MMOL/L (ref 133–144)
WBC # BLD AUTO: 6.7 10E9/L (ref 4–11)

## 2019-07-25 PROCEDURE — G0463 HOSPITAL OUTPT CLINIC VISIT: HCPCS | Mod: ZF

## 2019-07-25 PROCEDURE — 85025 COMPLETE CBC W/AUTO DIFF WBC: CPT | Performed by: PHYSICIAN ASSISTANT

## 2019-07-25 PROCEDURE — 36415 COLL VENOUS BLD VENIPUNCTURE: CPT

## 2019-07-25 PROCEDURE — 80053 COMPREHEN METABOLIC PANEL: CPT | Performed by: PHYSICIAN ASSISTANT

## 2019-07-25 ASSESSMENT — PAIN SCALES - GENERAL: PAINLEVEL: EXTREME PAIN (8)

## 2019-07-25 NOTE — NURSING NOTE
"Oncology Rooming Note    July 25, 2019 12:11 PM   Deejay Dior is a 68 year old male who presents for:    Chief Complaint   Patient presents with     Blood Draw     Labs drawn via  by RN in lab. VS taken.      RECHECK     Pt here for routine visit with MD and Labs. Pt is s/p BMT for AML.      Initial Vitals: /76   Pulse 71   Temp 97.6  F (36.4  C) (Oral)   Resp 16   Wt 105 kg (231 lb 8 oz)   SpO2 96%   BMI 34.19 kg/m   Estimated body mass index is 34.19 kg/m  as calculated from the following:    Height as of 1/3/19: 1.753 m (5' 9\").    Weight as of this encounter: 105 kg (231 lb 8 oz). Body surface area is 2.26 meters squared.  Extreme Pain (8) Comment: Data Unavailable   No LMP for male patient.  Allergies reviewed: Yes  Medications reviewed: Yes    Medications: Medication refills not needed today.  Pharmacy name entered into "[employer]: [id number] - [place], [place] - [address] [address]    Clinical concerns: CALEB Lemos RN              "

## 2019-07-25 NOTE — PROGRESS NOTES
BMT Clinic Note     ID:  Mr. 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  On pred 5mg every day and Jakafi 5 mg bid. He has pain in his right hip, has been seeing orthopedics (at [facility]) and relays that they say he needs a right hip replacement due to his avascular necrosis.  Ongoing fatigue which varies day to day.  Very bad dry mouth, for which he has tried numerous solutions, none of which has worked very well.   He doesn't have bodywide arthraligia/myalgias anymore.  He still has shiny, smooth skin on her right lower leg that developed after a bout of cellulitis there, he tells me.   No cough, cold; no chest or abdominal pain.  He was tapering prednisone and is scheduled to stop it today. Planned to have dental extractions next week and hip replacement in September  Review of Systems: 10 point ROS negative except as noted above.    Physical Exam:  /76   Pulse 71   Temp 97.6  F (36.4  C) (Oral)   Resp 16   Wt 105 kg (231 lb 8 oz)   SpO2 96%   BMI 34.19 kg/m       Wt Readings from Last 4 Encounters:   07/25/19 105 kg (231 lb 8 oz)   06/27/19 104.5 kg (230 lb 6.4 oz)   04/04/19 103.1 kg (227 lb 3.2 oz)   02/14/19 101.4 kg (223 lb 9.6 oz)     General: NAD, interactive, KPS 80%  Eyes: SARIKA, sclera anicteric  Nose/Mouth/Throat: OP clear, no ulcerations, very dry, upper plate, chronic lichenoid changes , no lip lesions, tongue non-coated.   Lungs:CTA B, no crackles or wheezes   CV: RRR without murmurs rubs or gallops  Abd: S/NT/ND +BS  Skin:   RLE skin shiny with some reddened areas. Nontender. Trace edema L>R (chronic).    Neuro: A&O  Access: peripheral    Labs:  Lab Results   Component Value Date    WBC 6.7 07/25/2019    ANEU 3.0 07/25/2019    HGB 13.1 (L) 07/25/2019    HCT 39.0 (L) 07/25/2019     07/25/2019     07/25/2019    POTASSIUM 4.2 07/25/2019    CHLORIDE 106 07/25/2019     CO2 26 07/25/2019    GLC 94 07/25/2019    BUN 24 07/25/2019    CR 1.22 07/25/2019    MAG 2.0 12/04/2018    INR 0.98 02/14/2019     ASSESSMENT AND PLAN:  Mr. 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  He is almost 5 years out from transplant!      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 Total cell dose (from 7/1 & 7/2) 6.53 x 10^6 CD34+ cells/kg.   - 1 year anniversary (July 2015): 30% cellular, trilineage hematopoiesis, no abnormal blasts by morphology or flow , no dysplasia, 0-1 fibrosis, 100% donor (BM, CD3, CD15). CR  - 2 year anniversary (July 2016): 20-30% cellular, trilineage hematopoiesis, no abnormal blasts by morphology or flow , no dysplasia, No fibrosis, 100% donor in BM (PB not sent). ISCN: //46,XX[20] Complete Remission.    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 Had been off prednisone since summer 2017 and briefly tapered off Sirolimus (january 2018), however resumed with flare in February. 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 Sirolimus was stopped on 9/27 & Pred 90mg was started. Seen by Dr. Sandoval on 10/2, please see his note. He does not think his symptoms or CT findings are c/w Sirolimus or GVH & he was in favor of tapering Prednisone. Recently he has worsening skin thickening & desquamation to the RLE, c/w GVH.   Started Jakafi 10/22 at 5 mg BID with symptom improvement in lower extremities. 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   Burst pred 40mg a day on 1/3 with significant systemic arthralgic pain, tapered back to 10/0 eod after 1 week, near resolution of symptoms noted 1/17, returned to 10 mg every other day; then dropped to 5mg q day in April.    6/27: keep jakafi at 5mg  bid; decreased pred to 5mg every other day 1 month back and now is discontinued. Dry mouth: try high xylitol content gum/mints: info on what type (Epic brand) given, informed them they can buy it on [employer].     ID:  Afebrile no signs/sx of infection.   - IgG = 403, repleted 3/22/16, 5/8/16= 1270; recheck with recent viral exposures 1300 10/1. 1/24 =1130  - Prophy: HD ACV (renal dosing), Fluconazole and  Bactrim.   - Flu shot given 10/11/18      GI: No symptoms   On protonix (has heartburn)    FEN/Renal:  Cr significantly improved with bumex (no metolazone) every other day. Edema better, continue compression stockings.    Fatigue:    - History of testosterone deficiency, rechecked and modestly low. Endo referral whenever pt feels ready for this.  - TSH normal 2/28 and again on repeat 9/27 at 1.01.  - counseled that moderate exercise is really the only known way to combat fatigue, and acknowledged how difficult it is to balance too much with not enough activity.     Derm:    - Venous statis: see below-most recently had sclerotherapy to left LE    Vasc:   10/15 Right leg US with small (technically DVT) clot in posterior tibial vein: per Millie, started reg dose aspirin daily 325mg and recheck US in 2 weeks or symptomatically. U/S on 11/27 without DVT  Off lymphedema wraps  Discomfort since edema/shakes: oxy 1-2 tab during day and ativan 6 hours away from oxy for sleep.  H/o chronic venous statis: s/p sclerotherapy to the L leg.  kelfex day prior and 4 days following.     CV:  Edema 2/2 heart failure? BNP neg compared to baseline. Use bumex (has worked best historically); recheck labs this week for Cr/K tolerance   ECHO results no change from previous EF 60-65%    Immunizations: not addressed 6/27; per last note, Deejay was going to determine if his insurance would cover vaccines here or if he needs to get at PCP.  Note:  Only killed vaccines are safe for him while he is on jakafi.    MS: avascular necrosis of hip.   Seeing Ortho again mid-July.  Goal to taper off steroids and will defer to Dr. Collado about plan for jakafi with surgery.    Plan:  Off prednisone 7/24.   Jakafi continues at 5mg bid;  Stay on this. OK to stay on Jakafi through [facility]. Planned for dental extractions next week and hip replacement in [facility].   Try high xylitol content gum/mints for dry mouth.    RTC to see me in October    Suzanne Collado

## 2019-08-01 ENCOUNTER — HOSPITAL ENCOUNTER (OUTPATIENT)
Dept: LAB | Facility: CLINIC | Age: 69
Discharge: HOME | End: 2019-08-01
Attending: ORTHOPAEDIC SURGERY | Admitting: ORTHOPAEDIC SURGERY
Payer: MEDICARE

## 2019-08-01 DIAGNOSIS — D46.9 MDS (MYELODYSPLASTIC SYNDROME) (H): ICD-10-CM

## 2019-08-01 DIAGNOSIS — D89.813 GVH (GRAFT VERSUS HOST DISEASE) (H): ICD-10-CM

## 2019-08-01 DIAGNOSIS — Z01.812 PRE-OPERATIVE LABORATORY EXAMINATION: ICD-10-CM

## 2019-08-01 LAB
MRSA DNA SPEC QL NAA+PROBE: NEGATIVE
SPECIMEN SOURCE: NORMAL

## 2019-08-01 PROCEDURE — 40000830 ZZHCL STATISTIC STAPH AUREUS METH RESIST SCREEN PCR: Performed by: ORTHOPAEDIC SURGERY

## 2019-08-01 PROCEDURE — 87641 MR-STAPH DNA AMP PROBE: CPT | Performed by: ORTHOPAEDIC SURGERY

## 2019-08-01 PROCEDURE — 87640 STAPH A DNA AMP PROBE: CPT | Performed by: ORTHOPAEDIC SURGERY

## 2019-08-01 PROCEDURE — 40000829 ZZHCL STATISTIC STAPH AUREUS SUSCEPT SCREEN PCR: Performed by: ORTHOPAEDIC SURGERY

## 2019-08-21 DIAGNOSIS — T86.09 GVHD AS COMPLICATION OF BONE MARROW TRANSPLANT (H): ICD-10-CM

## 2019-08-21 DIAGNOSIS — D89.813 GVHD AS COMPLICATION OF BONE MARROW TRANSPLANT (H): ICD-10-CM

## 2019-08-21 DIAGNOSIS — Z94.81 STATUS POST BONE MARROW TRANSPLANT (H): ICD-10-CM

## 2019-08-21 RX ORDER — PANTOPRAZOLE SODIUM 20 MG/1
20 TABLET, DELAYED RELEASE ORAL DAILY
Qty: 30 TABLET | Refills: 11 | Status: SHIPPED | OUTPATIENT
Start: 2019-08-21 | End: 2020-07-27

## 2019-09-03 ENCOUNTER — APPOINTMENT (OUTPATIENT)
Dept: GENERAL RADIOLOGY | Facility: CLINIC | Age: 69
DRG: 470 | End: 2019-09-03
Attending: ORTHOPAEDIC SURGERY
Payer: MEDICARE

## 2019-09-03 ENCOUNTER — ANESTHESIA EVENT (OUTPATIENT)
Dept: SURGERY | Facility: CLINIC | Age: 69
DRG: 470 | End: 2019-09-03
Payer: MEDICARE

## 2019-09-03 ENCOUNTER — HOSPITAL ENCOUNTER (INPATIENT)
Facility: CLINIC | Age: 69
LOS: 1 days | Discharge: HOME | DRG: 470 | End: 2019-09-04
Attending: ORTHOPAEDIC SURGERY | Admitting: ORTHOPAEDIC SURGERY
Payer: MEDICARE

## 2019-09-03 ENCOUNTER — ANESTHESIA (OUTPATIENT)
Dept: SURGERY | Facility: CLINIC | Age: 69
DRG: 470 | End: 2019-09-03
Payer: MEDICARE

## 2019-09-03 DIAGNOSIS — Z47.1 AFTERCARE FOLLOWING RIGHT HIP JOINT REPLACEMENT SURGERY: Primary | ICD-10-CM

## 2019-09-03 DIAGNOSIS — Z96.641 AFTERCARE FOLLOWING RIGHT HIP JOINT REPLACEMENT SURGERY: Primary | ICD-10-CM

## 2019-09-03 LAB
ABO + RH BLD: NORMAL
ABO + RH BLD: NORMAL
ALBUMIN SERPL-MCNC: 3.1 G/DL (ref 3.4–5)
ALP SERPL-CCNC: 64 U/L (ref 40–150)
ALT SERPL W P-5'-P-CCNC: 36 U/L (ref 0–70)
ANION GAP SERPL CALCULATED.3IONS-SCNC: 3 MMOL/L (ref 3–14)
AST SERPL W P-5'-P-CCNC: 46 U/L (ref 0–45)
BASOPHILS # BLD AUTO: 0 10E9/L (ref 0–0.2)
BASOPHILS NFR BLD AUTO: 0.1 %
BILIRUB SERPL-MCNC: 0.2 MG/DL (ref 0.2–1.3)
BLD GP AB SCN SERPL QL: NORMAL
BLD PROD TYP BPU: NORMAL
BLOOD BANK CMNT PATIENT-IMP: NORMAL
BUN SERPL-MCNC: 20 MG/DL (ref 7–30)
CALCIUM SERPL-MCNC: 8.3 MG/DL (ref 8.5–10.1)
CHLORIDE SERPL-SCNC: 110 MMOL/L (ref 94–109)
CO2 SERPL-SCNC: 25 MMOL/L (ref 20–32)
CREAT SERPL-MCNC: 1.02 MG/DL (ref 0.66–1.25)
CREAT SERPL-MCNC: 1.09 MG/DL (ref 0.66–1.25)
DIFFERENTIAL METHOD BLD: ABNORMAL
EOSINOPHIL # BLD AUTO: 0 10E9/L (ref 0–0.7)
EOSINOPHIL NFR BLD AUTO: 0 %
ERYTHROCYTE [DISTWIDTH] IN BLOOD BY AUTOMATED COUNT: 15.9 % (ref 10–15)
GFR SERPL CREATININE-BSD FRML MDRD: 68 ML/MIN/{1.73_M2}
GFR SERPL CREATININE-BSD FRML MDRD: 74 ML/MIN/{1.73_M2}
GLUCOSE SERPL-MCNC: 137 MG/DL (ref 70–99)
HCT VFR BLD AUTO: 33.3 % (ref 40–53)
HGB BLD-MCNC: 11.4 G/DL (ref 13.3–17.7)
HGB BLD-MCNC: 12.5 G/DL (ref 13.3–17.7)
IMM GRANULOCYTES # BLD: 0 10E9/L (ref 0–0.4)
IMM GRANULOCYTES NFR BLD: 0.2 %
LYMPHOCYTES # BLD AUTO: 0.8 10E9/L (ref 0.8–5.3)
LYMPHOCYTES NFR BLD AUTO: 8.3 %
MCH RBC QN AUTO: 31.8 PG (ref 26.5–33)
MCHC RBC AUTO-ENTMCNC: 34.2 G/DL (ref 31.5–36.5)
MCV RBC AUTO: 93 FL (ref 78–100)
MONOCYTES # BLD AUTO: 0.2 10E9/L (ref 0–1.3)
MONOCYTES NFR BLD AUTO: 1.8 %
NEUTROPHILS # BLD AUTO: 8.6 10E9/L (ref 1.6–8.3)
NEUTROPHILS NFR BLD AUTO: 89.6 %
NRBC # BLD AUTO: 0 10*3/UL
NRBC BLD AUTO-RTO: 0 /100
NUM BPU REQUESTED: 1
PLATELET # BLD AUTO: 223 10E9/L (ref 150–450)
POTASSIUM SERPL-SCNC: 5 MMOL/L (ref 3.4–5.3)
PROT SERPL-MCNC: 6.5 G/DL (ref 6.8–8.8)
RBC # BLD AUTO: 3.58 10E12/L (ref 4.4–5.9)
SODIUM SERPL-SCNC: 138 MMOL/L (ref 133–144)
SPECIMEN EXP DATE BLD: NORMAL
WBC # BLD AUTO: 9.6 10E9/L (ref 4–11)

## 2019-09-03 PROCEDURE — 0SR902A REPLACEMENT OF RIGHT HIP JOINT WITH METAL ON POLYETHYLENE SYNTHETIC SUBSTITUTE, UNCEMENTED, OPEN APPROACH: ICD-10-PCS | Performed by: ORTHOPAEDIC SURGERY

## 2019-09-03 PROCEDURE — 25000125 ZZHC RX 250: Performed by: ORTHOPAEDIC SURGERY

## 2019-09-03 PROCEDURE — 25000128 H RX IP 250 OP 636: Performed by: NURSE ANESTHETIST, CERTIFIED REGISTERED

## 2019-09-03 PROCEDURE — 36415 COLL VENOUS BLD VENIPUNCTURE: CPT | Performed by: PHYSICIAN ASSISTANT

## 2019-09-03 PROCEDURE — 25800030 ZZH RX IP 258 OP 636: Performed by: ORTHOPAEDIC SURGERY

## 2019-09-03 PROCEDURE — 25800030 ZZH RX IP 258 OP 636: Performed by: PHYSICIAN ASSISTANT

## 2019-09-03 PROCEDURE — 25800025 ZZH RX 258: Performed by: ORTHOPAEDIC SURGERY

## 2019-09-03 PROCEDURE — 40000170 ZZH STATISTIC PRE-PROCEDURE ASSESSMENT II: Performed by: ORTHOPAEDIC SURGERY

## 2019-09-03 PROCEDURE — 25000566 ZZH SEVOFLURANE, EA 15 MIN: Performed by: ORTHOPAEDIC SURGERY

## 2019-09-03 PROCEDURE — 25000132 ZZH RX MED GY IP 250 OP 250 PS 637: Mod: GY | Performed by: PHYSICIAN ASSISTANT

## 2019-09-03 PROCEDURE — 25000125 ZZHC RX 250: Performed by: NURSE ANESTHETIST, CERTIFIED REGISTERED

## 2019-09-03 PROCEDURE — 40000985 XR PELVIS AD HIP PORTABLE RIGHT 1 VIEW

## 2019-09-03 PROCEDURE — C1776 JOINT DEVICE (IMPLANTABLE): HCPCS | Performed by: ORTHOPAEDIC SURGERY

## 2019-09-03 PROCEDURE — 37000009 ZZH ANESTHESIA TECHNICAL FEE, EACH ADDTL 15 MIN: Performed by: ORTHOPAEDIC SURGERY

## 2019-09-03 PROCEDURE — 36415 COLL VENOUS BLD VENIPUNCTURE: CPT | Performed by: ORTHOPAEDIC SURGERY

## 2019-09-03 PROCEDURE — 80053 COMPREHEN METABOLIC PANEL: CPT | Performed by: ORTHOPAEDIC SURGERY

## 2019-09-03 PROCEDURE — 36000065 ZZH SURGERY LEVEL 4 W FLUORO 1ST 30 MIN: Performed by: ORTHOPAEDIC SURGERY

## 2019-09-03 PROCEDURE — 99222 1ST HOSP IP/OBS MODERATE 55: CPT | Performed by: PHYSICIAN ASSISTANT

## 2019-09-03 PROCEDURE — 25000128 H RX IP 250 OP 636: Performed by: ANESTHESIOLOGY

## 2019-09-03 PROCEDURE — 86922 COMPATIBILITY TEST ANTIGLOB: CPT | Performed by: ANESTHESIOLOGY

## 2019-09-03 PROCEDURE — 25000128 H RX IP 250 OP 636: Performed by: ORTHOPAEDIC SURGERY

## 2019-09-03 PROCEDURE — 37000008 ZZH ANESTHESIA TECHNICAL FEE, 1ST 30 MIN: Performed by: ORTHOPAEDIC SURGERY

## 2019-09-03 PROCEDURE — 25800030 ZZH RX IP 258 OP 636: Performed by: ANESTHESIOLOGY

## 2019-09-03 PROCEDURE — 12000000 ZZH R&B MED SURG/OB

## 2019-09-03 PROCEDURE — 86901 BLOOD TYPING SEROLOGIC RH(D): CPT | Performed by: ANESTHESIOLOGY

## 2019-09-03 PROCEDURE — 86900 BLOOD TYPING SEROLOGIC ABO: CPT | Performed by: ANESTHESIOLOGY

## 2019-09-03 PROCEDURE — 86850 RBC ANTIBODY SCREEN: CPT | Performed by: ANESTHESIOLOGY

## 2019-09-03 PROCEDURE — 99207 ZZC CONSULT E&M CHANGED TO INITIAL LEVEL: CPT | Performed by: PHYSICIAN ASSISTANT

## 2019-09-03 PROCEDURE — 36000063 ZZH SURGERY LEVEL 4 EA 15 ADDTL MIN: Performed by: ORTHOPAEDIC SURGERY

## 2019-09-03 PROCEDURE — 25800030 ZZH RX IP 258 OP 636: Performed by: NURSE ANESTHETIST, CERTIFIED REGISTERED

## 2019-09-03 PROCEDURE — 82565 ASSAY OF CREATININE: CPT | Performed by: PHYSICIAN ASSISTANT

## 2019-09-03 PROCEDURE — C1713 ANCHOR/SCREW BN/BN,TIS/BN: HCPCS | Performed by: ORTHOPAEDIC SURGERY

## 2019-09-03 PROCEDURE — 85018 HEMOGLOBIN: CPT | Performed by: PHYSICIAN ASSISTANT

## 2019-09-03 PROCEDURE — 85025 COMPLETE CBC W/AUTO DIFF WBC: CPT | Performed by: ORTHOPAEDIC SURGERY

## 2019-09-03 PROCEDURE — 27210794 ZZH OR GENERAL SUPPLY STERILE: Performed by: ORTHOPAEDIC SURGERY

## 2019-09-03 PROCEDURE — 25000132 ZZH RX MED GY IP 250 OP 250 PS 637: Mod: GY | Performed by: ORTHOPAEDIC SURGERY

## 2019-09-03 PROCEDURE — 25000125 ZZHC RX 250: Performed by: PHYSICIAN ASSISTANT

## 2019-09-03 PROCEDURE — 71000015 ZZH RECOVERY PHASE 1 LEVEL 2 EA ADDTL HR: Performed by: ORTHOPAEDIC SURGERY

## 2019-09-03 PROCEDURE — 71000014 ZZH RECOVERY PHASE 1 LEVEL 2 FIRST HR: Performed by: ORTHOPAEDIC SURGERY

## 2019-09-03 PROCEDURE — 25000125 ZZHC RX 250: Performed by: ANESTHESIOLOGY

## 2019-09-03 PROCEDURE — 25000128 H RX IP 250 OP 636: Performed by: PHYSICIAN ASSISTANT

## 2019-09-03 PROCEDURE — 40000277 XR SURGERY CARM FLUORO LESS THAN 5 MIN W STILLS

## 2019-09-03 PROCEDURE — 86902 BLOOD TYPE ANTIGEN DONOR EA: CPT | Performed by: ANESTHESIOLOGY

## 2019-09-03 DEVICE — IMP APEX HOLE ELIMINATOR HIP DEPUY DURALOC 1246-03-000: Type: IMPLANTABLE DEVICE | Site: HIP | Status: FUNCTIONAL

## 2019-09-03 DEVICE — IMPLANTABLE DEVICE: Type: IMPLANTABLE DEVICE | Site: HIP | Status: FUNCTIONAL

## 2019-09-03 DEVICE — IMP CUP ACE PINNACLE 56MM 1217-22-056: Type: IMPLANTABLE DEVICE | Site: HIP | Status: FUNCTIONAL

## 2019-09-03 DEVICE — IMP HEAD FEMORAL DEPUY CERAMIC 36MM +1.5MM 1365-36-310: Type: IMPLANTABLE DEVICE | Site: HIP | Status: FUNCTIONAL

## 2019-09-03 DEVICE — IMP SCR BONE CAN ACE 6.5X35MM 1217-35-500: Type: IMPLANTABLE DEVICE | Site: HIP | Status: FUNCTIONAL

## 2019-09-03 DEVICE — IMP SCR BONE CAN ACE 6.5X25MM 1217-25-500: Type: IMPLANTABLE DEVICE | Site: HIP | Status: FUNCTIONAL

## 2019-09-03 RX ORDER — MAGNESIUM HYDROXIDE 1200 MG/15ML
LIQUID ORAL PRN
Status: DISCONTINUED | OUTPATIENT
Start: 2019-09-03 | End: 2019-09-03 | Stop reason: HOSPADM

## 2019-09-03 RX ORDER — ONDANSETRON 2 MG/ML
INJECTION INTRAMUSCULAR; INTRAVENOUS PRN
Status: DISCONTINUED | OUTPATIENT
Start: 2019-09-03 | End: 2019-09-03

## 2019-09-03 RX ORDER — AMOXICILLIN 250 MG
2 CAPSULE ORAL 2 TIMES DAILY
Status: DISCONTINUED | OUTPATIENT
Start: 2019-09-03 | End: 2019-09-04 | Stop reason: HOSPADM

## 2019-09-03 RX ORDER — ACETAMINOPHEN 500 MG
500-1000 TABLET ORAL EVERY 6 HOURS PRN
Qty: 60 TABLET | Refills: 0 | Status: ON HOLD | OUTPATIENT
Start: 2019-09-03 | End: 2021-03-01

## 2019-09-03 RX ORDER — GLYCOPYRROLATE 0.2 MG/ML
INJECTION, SOLUTION INTRAMUSCULAR; INTRAVENOUS PRN
Status: DISCONTINUED | OUTPATIENT
Start: 2019-09-03 | End: 2019-09-03

## 2019-09-03 RX ORDER — NALOXONE HYDROCHLORIDE 0.4 MG/ML
.1-.4 INJECTION, SOLUTION INTRAMUSCULAR; INTRAVENOUS; SUBCUTANEOUS
Status: DISCONTINUED | OUTPATIENT
Start: 2019-09-03 | End: 2019-09-03

## 2019-09-03 RX ORDER — FLUCONAZOLE 100 MG/1
100 TABLET ORAL DAILY
Status: DISCONTINUED | OUTPATIENT
Start: 2019-09-04 | End: 2019-09-04 | Stop reason: HOSPADM

## 2019-09-03 RX ORDER — NEOSTIGMINE METHYLSULFATE 1 MG/ML
VIAL (ML) INJECTION PRN
Status: DISCONTINUED | OUTPATIENT
Start: 2019-09-03 | End: 2019-09-03

## 2019-09-03 RX ORDER — KETOROLAC TROMETHAMINE 15 MG/ML
15 INJECTION, SOLUTION INTRAMUSCULAR; INTRAVENOUS EVERY 6 HOURS
Status: DISCONTINUED | OUTPATIENT
Start: 2019-09-03 | End: 2019-09-03

## 2019-09-03 RX ORDER — LABETALOL HYDROCHLORIDE 5 MG/ML
10 INJECTION, SOLUTION INTRAVENOUS
Status: DISCONTINUED | OUTPATIENT
Start: 2019-09-03 | End: 2019-09-03 | Stop reason: HOSPADM

## 2019-09-03 RX ORDER — AMOXICILLIN 250 MG
1 CAPSULE ORAL 2 TIMES DAILY
Status: DISCONTINUED | OUTPATIENT
Start: 2019-09-03 | End: 2019-09-04 | Stop reason: HOSPADM

## 2019-09-03 RX ORDER — PANTOPRAZOLE SODIUM 20 MG/1
20 TABLET, DELAYED RELEASE ORAL
Status: DISCONTINUED | OUTPATIENT
Start: 2019-09-04 | End: 2019-09-04 | Stop reason: HOSPADM

## 2019-09-03 RX ORDER — SERTRALINE HYDROCHLORIDE 100 MG/1
100 TABLET, FILM COATED ORAL EVERY EVENING
Status: DISCONTINUED | OUTPATIENT
Start: 2019-09-03 | End: 2019-09-04 | Stop reason: HOSPADM

## 2019-09-03 RX ORDER — PROCHLORPERAZINE MALEATE 5 MG
5 TABLET ORAL EVERY 6 HOURS PRN
Status: DISCONTINUED | OUTPATIENT
Start: 2019-09-03 | End: 2019-09-04 | Stop reason: HOSPADM

## 2019-09-03 RX ORDER — ACETAMINOPHEN 325 MG/1
975 TABLET ORAL EVERY 8 HOURS
Status: DISCONTINUED | OUTPATIENT
Start: 2019-09-03 | End: 2019-09-04 | Stop reason: HOSPADM

## 2019-09-03 RX ORDER — HYDROMORPHONE HYDROCHLORIDE 1 MG/ML
.3-.5 INJECTION, SOLUTION INTRAMUSCULAR; INTRAVENOUS; SUBCUTANEOUS
Status: DISCONTINUED | OUTPATIENT
Start: 2019-09-03 | End: 2019-09-04 | Stop reason: HOSPADM

## 2019-09-03 RX ORDER — HYDROXYZINE HYDROCHLORIDE 10 MG/1
10 TABLET, FILM COATED ORAL EVERY 6 HOURS PRN
Status: DISCONTINUED | OUTPATIENT
Start: 2019-09-03 | End: 2019-09-04 | Stop reason: HOSPADM

## 2019-09-03 RX ORDER — PROPOFOL 10 MG/ML
INJECTION, EMULSION INTRAVENOUS CONTINUOUS PRN
Status: DISCONTINUED | OUTPATIENT
Start: 2019-09-03 | End: 2019-09-03

## 2019-09-03 RX ORDER — ONDANSETRON 2 MG/ML
4 INJECTION INTRAMUSCULAR; INTRAVENOUS EVERY 30 MIN PRN
Status: DISCONTINUED | OUTPATIENT
Start: 2019-09-03 | End: 2019-09-03 | Stop reason: HOSPADM

## 2019-09-03 RX ORDER — CEFAZOLIN SODIUM 1 G/3ML
1 INJECTION, POWDER, FOR SOLUTION INTRAMUSCULAR; INTRAVENOUS SEE ADMIN INSTRUCTIONS
Status: DISCONTINUED | OUTPATIENT
Start: 2019-09-03 | End: 2019-09-03 | Stop reason: HOSPADM

## 2019-09-03 RX ORDER — SODIUM CHLORIDE, SODIUM LACTATE, POTASSIUM CHLORIDE, CALCIUM CHLORIDE 600; 310; 30; 20 MG/100ML; MG/100ML; MG/100ML; MG/100ML
INJECTION, SOLUTION INTRAVENOUS CONTINUOUS
Status: DISCONTINUED | OUTPATIENT
Start: 2019-09-03 | End: 2019-09-03 | Stop reason: HOSPADM

## 2019-09-03 RX ORDER — LEVOFLOXACIN 250 MG/1
250 TABLET, FILM COATED ORAL DAILY
Status: DISCONTINUED | OUTPATIENT
Start: 2019-09-04 | End: 2019-09-04 | Stop reason: HOSPADM

## 2019-09-03 RX ORDER — CEFAZOLIN SODIUM 2 G/100ML
2 INJECTION, SOLUTION INTRAVENOUS
Status: COMPLETED | OUTPATIENT
Start: 2019-09-03 | End: 2019-09-03

## 2019-09-03 RX ORDER — DEXTROSE MONOHYDRATE, SODIUM CHLORIDE, AND POTASSIUM CHLORIDE 50; 1.49; 4.5 G/1000ML; G/1000ML; G/1000ML
INJECTION, SOLUTION INTRAVENOUS CONTINUOUS
Status: DISCONTINUED | OUTPATIENT
Start: 2019-09-03 | End: 2019-09-04 | Stop reason: HOSPADM

## 2019-09-03 RX ORDER — DEXAMETHASONE SODIUM PHOSPHATE 4 MG/ML
INJECTION, SOLUTION INTRA-ARTICULAR; INTRALESIONAL; INTRAMUSCULAR; INTRAVENOUS; SOFT TISSUE PRN
Status: DISCONTINUED | OUTPATIENT
Start: 2019-09-03 | End: 2019-09-03

## 2019-09-03 RX ORDER — ONDANSETRON 4 MG/1
4 TABLET, ORALLY DISINTEGRATING ORAL EVERY 30 MIN PRN
Status: DISCONTINUED | OUTPATIENT
Start: 2019-09-03 | End: 2019-09-03 | Stop reason: HOSPADM

## 2019-09-03 RX ORDER — NALOXONE HYDROCHLORIDE 0.4 MG/ML
.1-.4 INJECTION, SOLUTION INTRAMUSCULAR; INTRAVENOUS; SUBCUTANEOUS
Status: DISCONTINUED | OUTPATIENT
Start: 2019-09-03 | End: 2019-09-04 | Stop reason: HOSPADM

## 2019-09-03 RX ORDER — DEXAMETHASONE SODIUM PHOSPHATE 4 MG/ML
4 INJECTION, SOLUTION INTRA-ARTICULAR; INTRALESIONAL; INTRAMUSCULAR; INTRAVENOUS; SOFT TISSUE
Status: DISCONTINUED | OUTPATIENT
Start: 2019-09-03 | End: 2019-09-03 | Stop reason: HOSPADM

## 2019-09-03 RX ORDER — EPHEDRINE SULFATE 50 MG/ML
INJECTION, SOLUTION INTRAMUSCULAR; INTRAVENOUS; SUBCUTANEOUS PRN
Status: DISCONTINUED | OUTPATIENT
Start: 2019-09-03 | End: 2019-09-03

## 2019-09-03 RX ORDER — PROPOFOL 10 MG/ML
INJECTION, EMULSION INTRAVENOUS PRN
Status: DISCONTINUED | OUTPATIENT
Start: 2019-09-03 | End: 2019-09-03

## 2019-09-03 RX ORDER — VECURONIUM BROMIDE 1 MG/ML
INJECTION, POWDER, LYOPHILIZED, FOR SOLUTION INTRAVENOUS PRN
Status: DISCONTINUED | OUTPATIENT
Start: 2019-09-03 | End: 2019-09-03

## 2019-09-03 RX ORDER — PRENATAL VIT/IRON FUM/FOLIC AC 27MG-0.8MG
1 TABLET ORAL DAILY
COMMUNITY
End: 2020-06-23

## 2019-09-03 RX ORDER — ACETAMINOPHEN 325 MG/1
650 TABLET ORAL EVERY 4 HOURS PRN
Status: DISCONTINUED | OUTPATIENT
Start: 2019-09-06 | End: 2019-09-04 | Stop reason: HOSPADM

## 2019-09-03 RX ORDER — CELECOXIB 200 MG/1
400 CAPSULE ORAL ONCE
Status: COMPLETED | OUTPATIENT
Start: 2019-09-03 | End: 2019-09-03

## 2019-09-03 RX ORDER — ONDANSETRON 4 MG/1
4 TABLET, ORALLY DISINTEGRATING ORAL EVERY 6 HOURS PRN
Status: DISCONTINUED | OUTPATIENT
Start: 2019-09-03 | End: 2019-09-04 | Stop reason: HOSPADM

## 2019-09-03 RX ORDER — LIDOCAINE 40 MG/G
CREAM TOPICAL
Status: DISCONTINUED | OUTPATIENT
Start: 2019-09-03 | End: 2019-09-04 | Stop reason: HOSPADM

## 2019-09-03 RX ORDER — HYDROMORPHONE HYDROCHLORIDE 1 MG/ML
.3-.5 INJECTION, SOLUTION INTRAMUSCULAR; INTRAVENOUS; SUBCUTANEOUS EVERY 5 MIN PRN
Status: DISCONTINUED | OUTPATIENT
Start: 2019-09-03 | End: 2019-09-03 | Stop reason: HOSPADM

## 2019-09-03 RX ORDER — ACYCLOVIR 800 MG/1
800 TABLET ORAL 3 TIMES DAILY
Status: DISCONTINUED | OUTPATIENT
Start: 2019-09-03 | End: 2019-09-04 | Stop reason: HOSPADM

## 2019-09-03 RX ORDER — TEMAZEPAM 7.5 MG/1
7.5 CAPSULE ORAL
Status: DISCONTINUED | OUTPATIENT
Start: 2019-09-04 | End: 2019-09-04 | Stop reason: HOSPADM

## 2019-09-03 RX ORDER — FENTANYL CITRATE 50 UG/ML
INJECTION, SOLUTION INTRAMUSCULAR; INTRAVENOUS PRN
Status: DISCONTINUED | OUTPATIENT
Start: 2019-09-03 | End: 2019-09-03

## 2019-09-03 RX ORDER — FENTANYL CITRATE 50 UG/ML
25-50 INJECTION, SOLUTION INTRAMUSCULAR; INTRAVENOUS
Status: DISCONTINUED | OUTPATIENT
Start: 2019-09-03 | End: 2019-09-03 | Stop reason: HOSPADM

## 2019-09-03 RX ORDER — OXYCODONE HYDROCHLORIDE 5 MG/1
5-10 TABLET ORAL
Status: DISCONTINUED | OUTPATIENT
Start: 2019-09-03 | End: 2019-09-04 | Stop reason: HOSPADM

## 2019-09-03 RX ORDER — PREGABALIN 150 MG/1
150 CAPSULE ORAL ONCE
Status: COMPLETED | OUTPATIENT
Start: 2019-09-03 | End: 2019-09-03

## 2019-09-03 RX ORDER — ONDANSETRON 2 MG/ML
4 INJECTION INTRAMUSCULAR; INTRAVENOUS EVERY 6 HOURS PRN
Status: DISCONTINUED | OUTPATIENT
Start: 2019-09-03 | End: 2019-09-04 | Stop reason: HOSPADM

## 2019-09-03 RX ORDER — KETOROLAC TROMETHAMINE 30 MG/ML
INJECTION, SOLUTION INTRAMUSCULAR; INTRAVENOUS PRN
Status: DISCONTINUED | OUTPATIENT
Start: 2019-09-03 | End: 2019-09-03 | Stop reason: HOSPADM

## 2019-09-03 RX ORDER — ACETAMINOPHEN 500 MG
1000 TABLET ORAL ONCE
Status: COMPLETED | OUTPATIENT
Start: 2019-09-03 | End: 2019-09-03

## 2019-09-03 RX ORDER — LIDOCAINE HYDROCHLORIDE 20 MG/ML
INJECTION, SOLUTION INFILTRATION; PERINEURAL PRN
Status: DISCONTINUED | OUTPATIENT
Start: 2019-09-03 | End: 2019-09-03

## 2019-09-03 RX ORDER — CEFAZOLIN SODIUM 2 G/100ML
2 INJECTION, SOLUTION INTRAVENOUS EVERY 8 HOURS
Status: COMPLETED | OUTPATIENT
Start: 2019-09-03 | End: 2019-09-04

## 2019-09-03 RX ORDER — SULFAMETHOXAZOLE/TRIMETHOPRIM 800-160 MG
1 TABLET ORAL 2 TIMES DAILY
Status: DISCONTINUED | OUTPATIENT
Start: 2019-09-03 | End: 2019-09-04 | Stop reason: HOSPADM

## 2019-09-03 RX ORDER — HYDRALAZINE HYDROCHLORIDE 20 MG/ML
2.5-5 INJECTION INTRAMUSCULAR; INTRAVENOUS EVERY 10 MIN PRN
Status: DISCONTINUED | OUTPATIENT
Start: 2019-09-03 | End: 2019-09-03 | Stop reason: HOSPADM

## 2019-09-03 RX ORDER — MULTIVIT-MIN/IRON/FOLIC ACID/K 18-600-40
1000 CAPSULE ORAL DAILY
Status: ON HOLD | COMMUNITY
End: 2021-03-02

## 2019-09-03 RX ORDER — SENNOSIDES A AND B 8.6 MG/1
1-2 TABLET, FILM COATED ORAL DAILY
Qty: 14 TABLET | Refills: 0 | Status: ON HOLD | OUTPATIENT
Start: 2019-09-03 | End: 2021-03-01

## 2019-09-03 RX ORDER — OXYCODONE HYDROCHLORIDE 5 MG/1
5-10 TABLET ORAL EVERY 6 HOURS PRN
Qty: 30 TABLET | Refills: 0 | Status: SHIPPED | OUTPATIENT
Start: 2019-09-03 | End: 2020-02-14

## 2019-09-03 RX ADMIN — SODIUM CHLORIDE, POTASSIUM CHLORIDE, SODIUM LACTATE AND CALCIUM CHLORIDE: 600; 310; 30; 20 INJECTION, SOLUTION INTRAVENOUS at 12:20

## 2019-09-03 RX ADMIN — VECURONIUM BROMIDE 2 MG: 1 INJECTION, POWDER, LYOPHILIZED, FOR SOLUTION INTRAVENOUS at 11:45

## 2019-09-03 RX ADMIN — CELECOXIB 400 MG: 200 CAPSULE ORAL at 09:51

## 2019-09-03 RX ADMIN — PHENYLEPHRINE HYDROCHLORIDE 50 MCG: 10 INJECTION INTRAVENOUS at 12:29

## 2019-09-03 RX ADMIN — VECURONIUM BROMIDE 1 MG: 1 INJECTION, POWDER, LYOPHILIZED, FOR SOLUTION INTRAVENOUS at 13:15

## 2019-09-03 RX ADMIN — SODIUM CHLORIDE 1 G: 9 INJECTION, SOLUTION INTRAVENOUS at 11:35

## 2019-09-03 RX ADMIN — LIDOCAINE HYDROCHLORIDE 100 MG: 20 INJECTION, SOLUTION INFILTRATION; PERINEURAL at 11:11

## 2019-09-03 RX ADMIN — CEFAZOLIN SODIUM 1 G: 2 INJECTION, SOLUTION INTRAVENOUS at 13:20

## 2019-09-03 RX ADMIN — HYDROMORPHONE HYDROCHLORIDE 0.5 MG: 1 INJECTION, SOLUTION INTRAMUSCULAR; INTRAVENOUS; SUBCUTANEOUS at 14:47

## 2019-09-03 RX ADMIN — ONDANSETRON 4 MG: 2 INJECTION INTRAMUSCULAR; INTRAVENOUS at 13:10

## 2019-09-03 RX ADMIN — Medication 5 MG: at 13:08

## 2019-09-03 RX ADMIN — PROPOFOL 170 MG: 10 INJECTION, EMULSION INTRAVENOUS at 11:11

## 2019-09-03 RX ADMIN — FENTANYL CITRATE 50 MCG: 50 INJECTION, SOLUTION INTRAMUSCULAR; INTRAVENOUS at 11:57

## 2019-09-03 RX ADMIN — HYDROMORPHONE HYDROCHLORIDE 0.25 MG: 1 INJECTION, SOLUTION INTRAMUSCULAR; INTRAVENOUS; SUBCUTANEOUS at 13:55

## 2019-09-03 RX ADMIN — CEFAZOLIN SODIUM 2 G: 2 INJECTION, SOLUTION INTRAVENOUS at 11:20

## 2019-09-03 RX ADMIN — SODIUM CHLORIDE, POTASSIUM CHLORIDE, SODIUM LACTATE AND CALCIUM CHLORIDE: 600; 310; 30; 20 INJECTION, SOLUTION INTRAVENOUS at 13:52

## 2019-09-03 RX ADMIN — LIDOCAINE HYDROCHLORIDE 1 ML: 10 INJECTION, SOLUTION EPIDURAL; INFILTRATION; INTRACAUDAL; PERINEURAL at 10:05

## 2019-09-03 RX ADMIN — FENTANYL CITRATE 50 MCG: 50 INJECTION, SOLUTION INTRAMUSCULAR; INTRAVENOUS at 13:00

## 2019-09-03 RX ADMIN — VECURONIUM BROMIDE 2 MG: 1 INJECTION, POWDER, LYOPHILIZED, FOR SOLUTION INTRAVENOUS at 12:30

## 2019-09-03 RX ADMIN — CEFAZOLIN SODIUM 2 G: 2 INJECTION, SOLUTION INTRAVENOUS at 21:49

## 2019-09-03 RX ADMIN — ACETAMINOPHEN 975 MG: 325 TABLET ORAL at 21:41

## 2019-09-03 RX ADMIN — NEOSTIGMINE METHYLSULFATE 5 MG: 1 INJECTION, SOLUTION INTRAVENOUS at 14:08

## 2019-09-03 RX ADMIN — DEXMEDETOMIDINE HYDROCHLORIDE 8 MCG: 100 INJECTION, SOLUTION INTRAVENOUS at 11:59

## 2019-09-03 RX ADMIN — PHENYLEPHRINE HYDROCHLORIDE 50 MCG: 10 INJECTION INTRAVENOUS at 13:11

## 2019-09-03 RX ADMIN — ACYCLOVIR 800 MG: 800 TABLET ORAL at 19:00

## 2019-09-03 RX ADMIN — SODIUM CHLORIDE 1 G: 9 INJECTION, SOLUTION INTRAVENOUS at 13:30

## 2019-09-03 RX ADMIN — ACYCLOVIR 800 MG: 800 TABLET ORAL at 21:42

## 2019-09-03 RX ADMIN — VECURONIUM BROMIDE 1 MG: 1 INJECTION, POWDER, LYOPHILIZED, FOR SOLUTION INTRAVENOUS at 13:00

## 2019-09-03 RX ADMIN — HYDROMORPHONE HYDROCHLORIDE 0.5 MG: 1 INJECTION, SOLUTION INTRAMUSCULAR; INTRAVENOUS; SUBCUTANEOUS at 12:13

## 2019-09-03 RX ADMIN — PHENYLEPHRINE HYDROCHLORIDE 100 MCG: 10 INJECTION INTRAVENOUS at 11:40

## 2019-09-03 RX ADMIN — ROCURONIUM BROMIDE 50 MG: 10 INJECTION INTRAVENOUS at 11:11

## 2019-09-03 RX ADMIN — AMOXICILLIN AND CLAVULANATE POTASSIUM 1 TABLET: 875; 125 TABLET, FILM COATED ORAL at 21:42

## 2019-09-03 RX ADMIN — POTASSIUM CHLORIDE, DEXTROSE MONOHYDRATE AND SODIUM CHLORIDE: 150; 5; 450 INJECTION, SOLUTION INTRAVENOUS at 16:31

## 2019-09-03 RX ADMIN — SERTRALINE HYDROCHLORIDE 100 MG: 100 TABLET ORAL at 21:42

## 2019-09-03 RX ADMIN — ACETAMINOPHEN 1000 MG: 500 TABLET, FILM COATED ORAL at 09:48

## 2019-09-03 RX ADMIN — PREGABALIN 150 MG: 150 CAPSULE ORAL at 09:45

## 2019-09-03 RX ADMIN — VECURONIUM BROMIDE 2 MG: 1 INJECTION, POWDER, LYOPHILIZED, FOR SOLUTION INTRAVENOUS at 12:00

## 2019-09-03 RX ADMIN — DEXAMETHASONE SODIUM PHOSPHATE 4 MG: 4 INJECTION, SOLUTION INTRA-ARTICULAR; INTRALESIONAL; INTRAMUSCULAR; INTRAVENOUS; SOFT TISSUE at 11:32

## 2019-09-03 RX ADMIN — SODIUM CHLORIDE, POTASSIUM CHLORIDE, SODIUM LACTATE AND CALCIUM CHLORIDE: 600; 310; 30; 20 INJECTION, SOLUTION INTRAVENOUS at 09:48

## 2019-09-03 RX ADMIN — FENTANYL CITRATE 50 MCG: 50 INJECTION, SOLUTION INTRAMUSCULAR; INTRAVENOUS at 11:11

## 2019-09-03 RX ADMIN — FENTANYL CITRATE 50 MCG: 50 INJECTION, SOLUTION INTRAMUSCULAR; INTRAVENOUS at 13:02

## 2019-09-03 RX ADMIN — DEXMEDETOMIDINE HYDROCHLORIDE 12 MCG: 100 INJECTION, SOLUTION INTRAVENOUS at 11:58

## 2019-09-03 RX ADMIN — PROPOFOL 20 MCG/KG/MIN: 10 INJECTION, EMULSION INTRAVENOUS at 11:34

## 2019-09-03 RX ADMIN — GLYCOPYRROLATE 0.8 MG: 0.2 INJECTION, SOLUTION INTRAMUSCULAR; INTRAVENOUS at 14:08

## 2019-09-03 ASSESSMENT — ACTIVITIES OF DAILY LIVING (ADL)
ADLS_ACUITY_SCORE: 11
TOILETING: 0-->INDEPENDENT
FALL_HISTORY_WITHIN_LAST_SIX_MONTHS: NO
TRANSFERRING: 0-->INDEPENDENT
RETIRED_EATING: 0-->INDEPENDENT
AMBULATION: 0-->INDEPENDENT
COGNITION: 0 - NO COGNITION ISSUES REPORTED
BATHING: 0-->INDEPENDENT
ADLS_ACUITY_SCORE: 11
DRESS: 0-->INDEPENDENT
RETIRED_COMMUNICATION: 0-->UNDERSTANDS/COMMUNICATES WITHOUT DIFFICULTY
SWALLOWING: 0-->SWALLOWS FOODS/LIQUIDS WITHOUT DIFFICULTY

## 2019-09-03 ASSESSMENT — MIFFLIN-ST. JEOR: SCORE: 1785.15

## 2019-09-03 NOTE — ANESTHESIA POSTPROCEDURE EVALUATION
Patient: Deejay Dior    Procedure(s):  RIGHT TOTAL HIP ARTHROPLASTY, DIRECT ANTERIOR APPROACH    Diagnosis:RIGHT HIP DJD  Diagnosis Additional Information: No value filed.    Anesthesia Type:  General, ETT    Note:  Anesthesia Post Evaluation    Patient location during evaluation: PACU  Patient participation: Able to fully participate in evaluation  Level of consciousness: awake and alert  Pain management: adequate  Airway patency: patent  Cardiovascular status: acceptable  Respiratory status: acceptable  Hydration status: acceptable  PONV: none     Anesthetic complications: None          Last vitals:  Vitals:    09/03/19 1540 09/03/19 1610 09/03/19 1640   BP: 123/72 121/71 (P) 106/66   Pulse: 70 70    Resp: 13 15 (P) 14   Temp: 37.1  C (98.7  F) 36.4  C (97.5  F)    SpO2: 97% 98% (P) 96%         Electronically Signed By: Deejay Rahman MD  September 3, 2019  5:14 PM

## 2019-09-03 NOTE — PROGRESS NOTES
Admission medication history interview status for the 9/3/2019  admission is complete. See EPIC admission navigator for prior to admission medications     Medication history source reliability:Good    Medication history interview source(s):Patient    Medication history resources (including written lists, pill bottles, clinic record):written list    Primary pharmacy.[facility]    Additional medication history information not noted on PTA med list :  Patients wife (Racquel) will be going home to  home supply of Jakafi in its original RX bottle (per pharmacy protocol).     Time spent in this activity: 30 minutes    Prior to Admission medications    Medication Sig Last Dose Taking? Auth Provider   acyclovir (ZOVIRAX) 800 MG tablet Take 1 tablet (800 mg) by mouth 3 times daily 9/3/2019 at 0700 Yes Aby Pinto MD   amoxicillin (AMOXIL) 500 MG capsule Take 4 pills (2 grams) day of dental work more than a month at prn Yes Aby Pinto MD   amoxicillin-clavulanate (AUGMENTIN) 875-125 MG tablet Take 1 tablet by mouth 2 times daily For 7 days (started 9/30/19) 9/2/2019 at pm Yes Reported, Patient   augmented betamethasone dipropionate (DIPROLENE-AF) 0.05 % external ointment Apply topically 2 times daily For areas of rash on the lower leg Past Week at prn Yes Edu Corral MD   calcium carbonate-vitamin D 500-400 MG-UNIT TABS tablt Take 1 tablet by mouth daily 2000 mg 9/2/2019 at 1700 Yes Linn Rivero PA-C   fluconazole (DIFLUCAN) 100 MG tablet Take 1 tablet (100 mg) by mouth daily 9/3/2019 at 0700 Yes Suzanne Collado MD   Hypromellose (ARTIFICIAL TEARS OP) Apply 1-2 drops to eye 2 times daily as needed Past Week at prn Yes Reported, Patient   levofloxacin (LEVAQUIN) 250 MG tablet Take 1 tablet (250 mg) by mouth daily 9/3/2019 at 0700 Yes Suzanne Collado MD   pantoprazole (PROTONIX) 20 MG EC tablet Take 1 tablet (20 mg) by mouth daily 9/3/2019 at 0700 Yes Suzanne Collado MD   Prenatal Vit-Fe Fumarate-FA  (PRENATAL MULTIVITAMIN W/IRON) 27-0.8 MG tablet Take 1 tablet by mouth daily 9/3/2019 at am Yes Reported, Patient   ruxolitinib (JAKAFI) 5 MG TABS tablet CHEMO Take 1 tablet (5 mg) by mouth 2 times daily 9/3/2019 at 0700 Yes Suzanne Collado MD   sertraline (ZOLOFT) 100 MG tablet take 1 tablet by mouth daily.    9/2/2019 at pm Yes Suzanne Collado MD   sulfamethoxazole-trimethoprim (BACTRIM DS/SEPTRA DS) 800-160 MG tablet Take one tablet by mouth twice daily on Mondays and Tuesdays only. 9/2/2019 at pm Yes Suzanne Collado MD   triamcinolone (KENALOG) 0.1 % ointment Apply twice a day to lower leg Past Week at Unknown time Yes Reported, Patient   Vitamin D, Cholecalciferol, 1000 units TABS Take 1,000 Units by mouth daily 9/2/2019 at am Yes Reported, Patient   order for DME Please measure and distribute 1 pair of 20mmHg - 30mmHg thigh high open or closed toe compression stockings. Jobst ultrasheer or equivalent.   Aston Dumont MD   ORDER FOR DME Please provide a [employer] cane offset with strap item number [id number]   Aby Pinto MD

## 2019-09-03 NOTE — ANESTHESIA CARE TRANSFER NOTE
Patient: Deejay Dior    Procedure(s):  RIGHT TOTAL HIP ARTHROPLASTY, DIRECT ANTERIOR APPROACH    Diagnosis: RIGHT HIP DJD  Diagnosis Additional Information: No value filed.    Anesthesia Type:   General, ETT     Note:  Airway :Face Mask  Patient transferred to:PACU  Comments: To pacu. Vss. Report given to RN assuming care of ptHandoff Report: Identifed the Patient, Identified the Reponsible Provider, Reviewed the pertinent medical history, Discussed the surgical course, Reviewed Intra-OP anesthesia mangement and issues during anesthesia, Set expectations for post-procedure period and Allowed opportunity for questions and acknowledgement of understanding      Vitals: (Last set prior to Anesthesia Care Transfer)    CRNA VITALS  9/3/2019 1343 - 9/3/2019 1419      9/3/2019             Pulse:  99    SpO2:  98 %    Resp Rate (set):  10                Electronically Signed By: BEE Trujillo CRNA  September 3, 2019  2:19 PM

## 2019-09-03 NOTE — PLAN OF CARE
PT: Pt up late to floor, still needing to get settled by nursing. Pt lethargic, not appropriate for PT. Plan to see for PT eval tomorrow morning anticipating discharge tomorrow POD1 per surgeon.    Nursing to dangle/mobilize pt this evening when pt more alert.

## 2019-09-03 NOTE — OR NURSING
Frequent apnea. Per Dr Rahman, will sent patient to unit and apply CPAP there since wife has patient's CPAP machine. Easily arroussable.

## 2019-09-03 NOTE — BRIEF OP NOTE
[facility]    Brief Operative Note    Pre-operative diagnosis: Right hip OA  Post-operative diagnosis same  Procedure: RIGHT DIRECT ANTERIOR TOTAL HIP ARTHROPLASTY  Surgeon(s) and Role:     * Yong Diaz MD - Primary     * Colby Campbell PA-C - Assisting     * Elicia Bear PA-C - Assisting    Anesthesia: General   Estimated blood loss: 350 ml  Drains:  None  Specimens: None  Findings:  Advanced OA  Complications: None    Plan: DC home POD1 w/family assist.  DVT prophylaxis w/ Lovenox POD1 and POD2 and Xarelto for 6 weeks beginning POD3.    Implants:    Implant Name Type Inv. Item Serial No.  Lot No. LRB No. Used   IMP CUP ACE PINNACLE 56MM 1217- Total Joint Component/Insert IMP CUP ACE PINNACLE 56MM 1217-  [employer] [id number] Right 1   IMP APEX HOLE ELIMINATOR HIP DEPUY DURALOC 1246- Metallic Hardware/[place] IMP APEX HOLE ELIMINATOR HIP DEPUY DURALOC 1246-  [employer] [facility] [employer]-C [id number] Right 1   IMP SCR BONE CAN ACE 6.5X35MM 1217- Metallic Hardware/[place] IMP SCR BONE CAN ACE 6.5X35MM 1217-  [employer] [facility] [employer] [id number] Right 1   IMP SCR BONE CAN ACE 6.5X25MM 1217- Metallic Hardware/[place] IMP SCR BONE CAN ACE 6.5X25MM 1217-  [employer] [facility] [employer] [id number] Right 1   ALTRX ACETABULAR LINER 36 X 56MM    Depuy [id number] Right 1   SIZE 6 HI COLLAR ACTIS FEMORAL STEM    Depuy [id number] Right 1   IMP HEAD FEMORAL DEPUY CERAMIC 36MM +1.5MM 1365- Total Joint Component/Insert IMP HEAD FEMORAL DEPUY CERAMIC 36MM +1.5MM 1365-  [employer] [facility] [employer]C [id number] Right 1

## 2019-09-03 NOTE — ANESTHESIA PREPROCEDURE EVALUATION
Anesthesia Pre-Procedure Evaluation    Patient: Deejay Dior   MRN: [id number] : 1950          Preoperative Diagnosis: RIGHT HIP DJD    Procedure(s):  RIGHT TOTAL HIP ARTHROPLASTY, DIRECT ANTERIOR APPROACH (DEPUY)^    Past Medical History:   Diagnosis Date     Acute deep vein thrombosis (DVT) of distal end of right lower extremity (H)      Clotting disorder (H)      Depression, major      Glucose intolerance      H/O bone marrow transplant (H)      Head injury 1964     Hearing problem Hearing aids      History of blood transfusion 3/2014     History of radiation therapy 2014     [place] (hard of hearing)     bilateral     Immunosuppression (H) Sirolimus daily     Lymphedema of both lower extremities      MDS (myelodysplastic syndrome) (H)      MDS/MPN (myelodysplastic/myeloproliferative neoplasms) (H)      Mixed hyperlipidemia      Numbness and tingling     feet     Obstructive sleep apnea      Pneumonia      Reduced vision 2016    Cataract surgery     Sleep apnea      Transplant 2014    Stem Cells     Past Surgical History:   Procedure Laterality Date     BACK SURGERY       BIOPSY       BMT CELL PRODUCT INFUSION       ESOPHAGOSCOPY, GASTROSCOPY, DUODENOSCOPY (EGD), COMBINED N/A 2015     ESOPHAGOSCOPY, GASTROSCOPY, DUODENOSCOPY (EGD), COMBINED Left 2015    Procedure: COMBINED ESOPHAGOSCOPY, GASTROSCOPY, DUODENOSCOPY (EGD), BIOPSY SINGLE OR MULTIPLE;  Surgeon: Amber Francis MD;  Location: [facility]     EXCISE LESION LIP N/A 2017    Procedure: EXCISE LESION LIP;  Surgeon: Tim Yanez MD;  Location: [facility]     EYE SURGERY      blepharoplasty     FLEXIBLE SIGMOIDOSCOPY  2/2/15     HEMORRHOIDECTOMY       IR FOLLOW UP VISIT OUTPATIENT  2019     PHACOEMULSIFICATION CLEAR CORNEA WITH STANDARD INTRAOCULAR LENS IMPLANT Left 2016    Procedure: PHACOEMULSIFICATION CLEAR CORNEA WITH STANDARD INTRAOCULAR LENS IMPLANT;  Surgeon: Randolph Giles,  MD;  Location:  [facility]     TONSILLECTOMY  1953     TRANSPLANT  7-1-2014    Stem Cell Transplant [facility] BMT     VASCULAR SURGERY  2010    varicose vein surgery       Anesthesia Evaluation     .             ROS/MED HX    ENT/Pulmonary:     (+)sleep apnea, other ENT- hard of hearing, uses CPAP , recent URI resolved . .    Neurologic:  - neg neurologic ROS   (+)neuropathy     Cardiovascular:  - neg cardiovascular ROS   (+) Dyslipidemia, ----. : . . . :. .       METS/Exercise Tolerance:     Hematologic: Comments: Myelodysplastic syndrome s/p bone marrow transplant    (+) History of blood clots History of Transfusion -      Musculoskeletal:  - neg musculoskeletal ROS (+) arthritis,  -       GI/Hepatic:  - neg GI/hepatic ROS   (+) GERD Asymptomatic on medication,       Renal/Genitourinary:  - ROS Renal section negative       Endo:  - neg endo ROS   (+) Other Endocrine Disorder secondary adrenal insufficiency.      Psychiatric:  - neg psychiatric ROS   (+) psychiatric history depression      Infectious Disease:  - neg infectious disease ROS       Malignancy:      - no malignancy   Other:    - neg other ROS                      Physical Exam  Normal systems: cardiovascular and pulmonary    Airway   Mallampati: II  TM distance: >3 FB  Neck ROM: full    Dental   (+) upper dentures    Cardiovascular       Pulmonary             Lab Results   Component Value Date    WBC 6.7 07/25/2019    HGB 13.1 (L) 07/25/2019    HCT 39.0 (L) 07/25/2019     07/25/2019    CRP 7.4 05/16/2015    SED 40 (H) 11/30/2014     07/25/2019    POTASSIUM 4.2 07/25/2019    CHLORIDE 106 07/25/2019    CO2 26 07/25/2019    BUN 24 07/25/2019    CR 1.22 07/25/2019    GLC 94 07/25/2019    JOE 8.8 07/25/2019    PHOS 2.8 04/05/2016    MAG 2.0 12/04/2018    ALBUMIN 3.6 07/25/2019    PROTTOTAL 7.0 07/25/2019    ALT 32 07/25/2019    AST 38 07/25/2019     (H) 03/29/2016    ALKPHOS 63 07/25/2019    BILITOTAL 0.3 07/25/2019    LIPASE 158 09/27/2018     "AMYLASE 46 02/14/2019    RUDY 11 03/25/2016    PTT 47 (H) 04/04/2016    INR 0.98 02/14/2019    FIBR 155 (L) 02/09/2015    TSH 1.01 09/27/2018    T4 1.03 06/30/2016       Preop Vitals  BP Readings from Last 3 Encounters:   07/25/19 120/76   07/18/19 118/72   06/27/19 121/75    Pulse Readings from Last 3 Encounters:   07/25/19 71   07/18/19 62   06/27/19 65      Resp Readings from Last 3 Encounters:   07/25/19 16   04/04/19 16   01/24/19 18    SpO2 Readings from Last 3 Encounters:   07/25/19 96%   07/18/19 95%   06/27/19 97%      Temp Readings from Last 1 Encounters:   07/25/19 36.4  C (97.6  F) (Oral)    Ht Readings from Last 1 Encounters:   01/03/19 1.753 m (5' 9\")      Wt Readings from Last 1 Encounters:   07/25/19 105 kg (231 lb 8 oz)    Estimated body mass index is 34.19 kg/m  as calculated from the following:    Height as of 1/3/19: 1.753 m (5' 9\").    Weight as of 7/25/19: 105 kg (231 lb 8 oz).       Anesthesia Plan      History & Physical Review  History and physical reviewed and following examination; no interval change.    ASA Status:  3 .    NPO Status:  > 8 hours    Plan for General and ETT with Intravenous and Propofol induction.   PONV prophylaxis:  Ondansetron (or other 5HT-3)       Postoperative Care  Postoperative pain management:  Multi-modal analgesia.      Consents  Anesthetic plan, risks, benefits and alternatives discussed with:  Patient..                 Constance Jeter MD  "

## 2019-09-04 ENCOUNTER — APPOINTMENT (OUTPATIENT)
Dept: PHYSICAL THERAPY | Facility: CLINIC | Age: 69
DRG: 470 | End: 2019-09-04
Attending: ORTHOPAEDIC SURGERY
Payer: MEDICARE

## 2019-09-04 VITALS
BODY MASS INDEX: 33.92 KG/M2 | HEART RATE: 77 BPM | TEMPERATURE: 97.9 F | OXYGEN SATURATION: 98 % | SYSTOLIC BLOOD PRESSURE: 106 MMHG | HEIGHT: 69 IN | WEIGHT: 229 LBS | RESPIRATION RATE: 16 BRPM | DIASTOLIC BLOOD PRESSURE: 66 MMHG

## 2019-09-04 LAB
ANION GAP SERPL CALCULATED.3IONS-SCNC: 3 MMOL/L (ref 3–14)
BUN SERPL-MCNC: 22 MG/DL (ref 7–30)
CALCIUM SERPL-MCNC: 8 MG/DL (ref 8.5–10.1)
CHLORIDE SERPL-SCNC: 109 MMOL/L (ref 94–109)
CO2 SERPL-SCNC: 24 MMOL/L (ref 20–32)
CREAT SERPL-MCNC: 1.12 MG/DL (ref 0.66–1.25)
GFR SERPL CREATININE-BSD FRML MDRD: 66 ML/MIN/{1.73_M2}
GLUCOSE SERPL-MCNC: 127 MG/DL (ref 70–99)
HGB BLD-MCNC: 10.6 G/DL (ref 13.3–17.7)
POTASSIUM SERPL-SCNC: 4.5 MMOL/L (ref 3.4–5.3)
SODIUM SERPL-SCNC: 136 MMOL/L (ref 133–144)

## 2019-09-04 PROCEDURE — 25000132 ZZH RX MED GY IP 250 OP 250 PS 637: Mod: GY | Performed by: PHYSICIAN ASSISTANT

## 2019-09-04 PROCEDURE — 80048 BASIC METABOLIC PNL TOTAL CA: CPT | Performed by: ORTHOPAEDIC SURGERY

## 2019-09-04 PROCEDURE — 36415 COLL VENOUS BLD VENIPUNCTURE: CPT | Performed by: ORTHOPAEDIC SURGERY

## 2019-09-04 PROCEDURE — 25000132 ZZH RX MED GY IP 250 OP 250 PS 637: Mod: GY | Performed by: ORTHOPAEDIC SURGERY

## 2019-09-04 PROCEDURE — 25000128 H RX IP 250 OP 636: Performed by: ORTHOPAEDIC SURGERY

## 2019-09-04 PROCEDURE — 85018 HEMOGLOBIN: CPT | Performed by: ORTHOPAEDIC SURGERY

## 2019-09-04 PROCEDURE — 97116 GAIT TRAINING THERAPY: CPT | Mod: GP | Performed by: PHYSICAL THERAPIST

## 2019-09-04 PROCEDURE — 97530 THERAPEUTIC ACTIVITIES: CPT | Mod: GP | Performed by: PHYSICAL THERAPIST

## 2019-09-04 PROCEDURE — 97161 PT EVAL LOW COMPLEX 20 MIN: CPT | Mod: GP | Performed by: PHYSICAL THERAPIST

## 2019-09-04 PROCEDURE — 99232 SBSQ HOSP IP/OBS MODERATE 35: CPT | Performed by: PHYSICIAN ASSISTANT

## 2019-09-04 PROCEDURE — 97110 THERAPEUTIC EXERCISES: CPT | Mod: GP | Performed by: PHYSICAL THERAPIST

## 2019-09-04 PROCEDURE — 99207 ZZC CDG-MDM COMPONENT: MEETS MODERATE - UP CODED: CPT | Performed by: PHYSICIAN ASSISTANT

## 2019-09-04 PROCEDURE — 25000128 H RX IP 250 OP 636: Performed by: PHYSICIAN ASSISTANT

## 2019-09-04 PROCEDURE — 25800030 ZZH RX IP 258 OP 636: Performed by: ORTHOPAEDIC SURGERY

## 2019-09-04 RX ORDER — ALBUMIN, HUMAN INJ 5% 5 %
SOLUTION INTRAVENOUS
Status: DISCONTINUED
Start: 2019-09-04 | End: 2019-09-04

## 2019-09-04 RX ADMIN — ENOXAPARIN SODIUM 40 MG: 40 INJECTION SUBCUTANEOUS at 13:03

## 2019-09-04 RX ADMIN — LEVOFLOXACIN 250 MG: 250 TABLET, FILM COATED ORAL at 08:03

## 2019-09-04 RX ADMIN — ACYCLOVIR 800 MG: 800 TABLET ORAL at 08:03

## 2019-09-04 RX ADMIN — ASPIRIN 325 MG: 325 TABLET, DELAYED RELEASE ORAL at 08:03

## 2019-09-04 RX ADMIN — ACETAMINOPHEN 975 MG: 325 TABLET ORAL at 06:47

## 2019-09-04 RX ADMIN — CEFAZOLIN SODIUM 2 G: 2 INJECTION, SOLUTION INTRAVENOUS at 04:29

## 2019-09-04 RX ADMIN — PANTOPRAZOLE SODIUM 20 MG: 20 TABLET, DELAYED RELEASE ORAL at 06:47

## 2019-09-04 RX ADMIN — FLUCONAZOLE 100 MG: 100 TABLET ORAL at 08:03

## 2019-09-04 RX ADMIN — OXYCODONE HYDROCHLORIDE 5 MG: 5 TABLET ORAL at 10:36

## 2019-09-04 RX ADMIN — POTASSIUM CHLORIDE, DEXTROSE MONOHYDRATE AND SODIUM CHLORIDE: 150; 5; 450 INJECTION, SOLUTION INTRAVENOUS at 03:49

## 2019-09-04 RX ADMIN — AMOXICILLIN AND CLAVULANATE POTASSIUM 1 TABLET: 875; 125 TABLET, FILM COATED ORAL at 08:03

## 2019-09-04 ASSESSMENT — ACTIVITIES OF DAILY LIVING (ADL)
ADLS_ACUITY_SCORE: 11

## 2019-09-04 NOTE — PROGRESS NOTES
09/04/19 0800   Quick Adds   Type of Visit Initial PT Evaluation   Living Environment   Lives With spouse   Living Arrangements   ([facility])   Home Accessibility stairs to enter home   Number of Stairs, Main Entrance 2   Transportation Anticipated car, drives self;family or friend will provide   Living Environment Comment main floor living, walk-in shower with shower chair and grab bars, comfort height toilets   Self-Care   Usual Activity Tolerance good   Current Activity Tolerance moderate   Regular Exercise No   Equipment Currently Used at Home none   Activity/Exercise/Self-Care Comment IND without AD at baseline   Functional Level Prior   Ambulation 0-->independent   Transferring 0-->independent   Toileting 0-->independent   Bathing 0-->independent   Fall history within last six months no   Which of the above functional risks had a recent onset or change? ambulation;transferring;toileting   General Information   Onset of Illness/Injury or Date of Surgery - Date 09/03/19   Referring Physician Yong Diaz MD   Patient/Family Goals Statement Home today   Pertinent History of Current Problem (include personal factors and/or comorbidities that impact the POC) Pt is a 69yo male seen POD#1 s/p R CESILIA, anterior approach, WBAT. 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, right lower extremity DVT, known avascular necrosis of the right femur   Precautions/Limitations fall precautions   Weight-Bearing Status - RLE weight-bearing as tolerated   General Observations Pt resting in bed; pt with PCDs, capnography   General Info Comments Activity: ambulate with assistance 3 times daily, OOB DOS if pt tolerates, up to chair 3 times daily   Cognitive Status Examination   Orientation orientation to person, place and time   Level of Consciousness alert   Follows Commands and Answers Questions 100% of the time;able to follow multistep instructions   Personal Safety and  "Judgment intact   Pain Assessment   Patient Currently in Pain Yes, see Vital Sign flowsheet   Integumentary/Edema   Integumentary/Edema Comments dressing appears intact to R anterior hip, baseline edema per pt   Posture    Posture Forward head position   Range of Motion (ROM)   ROM Comment R hip limited by pain, all others WNL   Strength   Strength Comments BLE WFL for functional mobility, Swapnil for SAQ and heel slide on R   Bed Mobility   Bed Mobility Comments Swapnil supine>sit   Transfer Skills   Transfer Comments CGA sit>Satnd to FWW   Gait   Gait Comments CGA 10' with FWW, reciprocal gait   Balance   Balance Comments Good at EOB, good static stance without UE support   Sensory Examination   Sensory Perception Comments Denies N/T   General Therapy Interventions   Planned Therapy Interventions bed mobility training;gait training;ROM;strengthening;stretching;transfer training;progressive activity/exercise;home program guidelines   Clinical Impression   Criteria for Skilled Therapeutic Intervention yes, treatment indicated   PT Diagnosis Impaired gait   Influenced by the following impairments Pain, weakness, decreased ROM   Functional limitations due to impairments Decreased safety and IND with functional transfers, gait, stairs   Clinical Presentation Stable/Uncomplicated   Clinical Decision Making (Complexity) Low complexity   Predicted Duration of Therapy Intervention (days/wks) eval and single treatment   Anticipated Discharge Disposition Home with Assist   Risk & Benefits of therapy have been explained Yes   Patient, Family & other staff in agreement with plan of care Yes   [facility] [employer] TM \"6 Clicks\"   2016, Trustees of [facility], under license to [employer].  All rights reserved.   6 Clicks Short Forms Basic Mobility Inpatient Short Form   [facility] [employer]-PAC  \"6 Clicks\" V.2 Basic Mobility Inpatient Short Form   1. Turning from your back to your side while in a flat bed without using " bedrails? 3 - A Little   2. Moving from lying on your back to sitting on the side of a flat bed without using bedrails? 3 - A Little   3. Moving to and from a bed to a chair (including a wheelchair)? 3 - A Little   4. Standing up from a chair using your arms (e.g., wheelchair, or bedside chair)? 3 - A Little   5. To walk in hospital room? 3 - A Little   6. Climbing 3-5 steps with a railing? 3 - A Little   Basic Mobility Raw Score (Score out of 24.Lower scores equate to lower levels of function) 18   Total Evaluation Time   Total Evaluation Time (Minutes) 10

## 2019-09-04 NOTE — PLAN OF CARE
Discharge Planner PT   Patient plan for discharge: Per [facility] Care Plan: Home without additional services, spouse available for 24 assist as needed    Current status: PT orders received, eval and single treatment completed. Pt is a 67yo male seen POD#1 s/p R CESILIA, anterior approach, WBAT. Pt lives with spouse in a townhouse with 2 steps to enter, all needs met on main level with walk-in shower, shower chair, grab bars, comfort height toilets. Pt IND without AD at baseline. Has access to FWW and SEC.    BP soft but stable, denies adverse symptoms. Pt participated in R CESILIA exercises and tolerated well; HEP handout given. By end of session pt able to perform bed mobility, transfers, and gait 150'x2 with FWW with Mod IND. Able to ambulate 50' with SEC and SBA. Pt able to ascend/descend 3 stairs x2 without rail with SBA. Pt able to demonstrate IND with LB dressing seated, Mod IND with transfers from standard height surfaces. Pt reports spouse can assist with dressing if needed.    Barriers to return to prior living situation: None anticipated     Recommendations for discharge: Home with supervision on stairs, continue CESILIA HEP    Rationale for recommendations: Pt has met all IP PT goals for anticipated safe disch home with spouse. Spouse able to provide supervision on stairs. Pt has all AD/AE needs met at home and demonstrates understanding of CESILIA HEP and walking program. No further IP PT needs identified. Orders completed.       Entered by: Sharri Lin 09/04/2019 9:06 AM     Physical Therapy Discharge Summary    Reason for therapy discharge:    All goals and outcomes met, no further needs identified.    Progress towards therapy goal(s). See goals on Care Plan in [facility] electronic health record for goal details.  Goals met    Therapy recommendation(s):    Continue home exercise program.

## 2019-09-04 NOTE — PROGRESS NOTES
"Orthopedic Surgery  9/4/2019  POD #1    S: Patient voices no complaints today.     O: Blood pressure 106/66, pulse 77, temperature 97.6  F (36.4  C), temperature source Oral, resp. rate 16, height 1.746 m (5' 8.75\"), weight 103.9 kg (229 lb), SpO2 98 %.  Lab Results   Component Value Date    HGB 10.6 09/04/2019     Neurovascularly intact.  Calves are negative bilaterally, both soft and nontender.  The dressing is C/D/I.    A: Mr. Dior is doing well status post right total hip arthroplasty.    P:   1. No dressing change, patient to remove outer dressing on POD3, leaving mesh adhesive in place.  2. Mobilize and continue physical therapy.   3. Discharge to home today after voiding.    Elicia Bear PA-C  [phone]    As above, doing well, home today  "

## 2019-09-04 NOTE — PLAN OF CARE
Pt a&o, VSS on 2L , CPAP on at night, CMS intact, dressing CDI , controlling pain with scheduled tylenol, regular diet, up with 1, rios in place. Continue to monitor.

## 2019-09-04 NOTE — PLAN OF CARE
Pt A&Ox4, VSS, CMS intact, dressing C,D,I, up with SBA and walker, voiding, oxycodone/tylenol for pain management, tolerating regular diet. Pt discharged to home with spouse. Pt verbalizes understanding of discharge instructions/medications/follow up plan. Spouse present during education.

## 2019-09-04 NOTE — CONSULTS
Consult Date:  09/03/2019      PRIMARY CARE PHYSICIAN:  Vivek Callejas MD.       PRIMARY ONCOLOGIST:  Suzanne Collado MD.       REQUESTING PHYSICIAN:  Yong Acevedo MD.       REASON FOR CONSULTATION:  Medical management following a right total hip arthroplasty.      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      The patient underwent right total hip arthroplasty under general endotracheal anesthesia with an estimated blood loss of 350 mL and no complications.      I evaluated the patient in his hospital room with his wife present at bedside.  The patient states that he has had longstanding fatigue and weakness that has been unchanged recently.  He has ongoing spot on his left shin that flakes on occasion.  He has chronic swelling in his legs that is managed with support hoses during the day and Ace wrapping at night.  He recently had an upper respiratory-like infection that led to the diagnosis of sinus infection for which the patient is currently on Augmentin with a completed course being finished this upcoming Thursday.  He notes having overactive bladder, chronic pain in his right hip, bilateral knees and bilateral shoulders.  He bruises quite easily.  He does mention occasional lightheadedness upon standing too quickly.   Otherwise, he offers no other complaints.      PAST MEDICAL HISTORY:   1.  Acute myeloid lymphoma and myelodysplastic syndrome followed by Dr. Collado and status post stem cell bone marrow transplant.   2.  GvHD.   3.  Obesity.   4.  Obstructive sleep apnea.   5.  GERD.   6.  Major depressive disorder.   7.  Chronic lower extremity lymphedema.   8.  History of right lower extremity DVT in 2016.   9.  Avascular necrosis of the femur on the right side.   10.  Colon polyp, adenomatous in 2006.   11.  Chronic bilateral venous stasis change of the lower extremities.   12.  Osteoarthritis.   13.  History of VRE.   14.  Recent sinus infection, currently on Augmentin.      PAST SURGICAL HISTORY:   1.  Blepharoplasty bilaterally.   2.  Corneal surgery bilaterally.   3.  Hemorrhoidectomy.   4.  Lumbar laminectomy.   5.  Bone marrow stem cell transplant.   6.  Varicose vein stripping bilaterally.   7.  Carpal tunnel release bilaterally.   8.  Abdominal hernia repair x 2.   9.  Tonsillectomy.   10.  Bilateral cataract removal and lens replacement.      PRIOR TO ADMIT MEDICATIONS:   Prior to Admission Medications   Prescriptions Last Dose Informant Patient Reported? Taking?   Hypromellose (ARTIFICIAL TEARS OP) Past Week at prn  Yes Yes   Sig: Apply 1-2 drops to eye 2 times daily as needed   ORDER FOR DME   No No   Sig: Please provide a NOVA cane offset with strap item number [id number]   Prenatal Vit-Fe Fumarate-FA (PRENATAL MULTIVITAMIN W/IRON) 27-0.8 MG tablet 9/3/2019 at am  Yes Yes   Sig: Take 1 tablet by mouth daily   Vitamin D, Cholecalciferol, 1000 units TABS 9/2/2019 at am  Yes Yes   Sig: Take 1,000 Units by mouth daily   acyclovir (ZOVIRAX) 800 MG tablet 9/3/2019 at 0700  No Yes   Sig: Take 1 tablet (800 mg) by mouth 3 times daily   amoxicillin (AMOXIL) 500 MG capsule more than a month at prn  No Yes   Sig: Take 4 pills (2 grams) day of dental work   amoxicillin-clavulanate (AUGMENTIN) 875-125 MG tablet 9/2/2019 at pm  Yes  Yes   Sig: Take 1 tablet by mouth 2 times daily For 7 days (started 9/30/19)   augmented betamethasone dipropionate (DIPROLENE-AF) 0.05 % external ointment Past Week at prn  No Yes   Sig: Apply topically 2 times daily For areas of rash on the lower leg   calcium carbonate-vitamin D 500-400 MG-UNIT TABS tablt 9/2/2019 at 1700  Yes Yes   Sig: Take 1 tablet by mouth daily 2000 mg   fluconazole (DIFLUCAN) 100 MG tablet 9/3/2019 at 0700  No Yes   Sig: Take 1 tablet (100 mg) by mouth daily   levofloxacin (LEVAQUIN) 250 MG tablet 9/3/2019 at 0700  No Yes   Sig: Take 1 tablet (250 mg) by mouth daily   order for DME   No No   Sig: Please measure and distribute 1 pair of 20mmHg - 30mmHg thigh high open or closed toe compression stockings. Jobst ultrasheer or equivalent.   pantoprazole (PROTONIX) 20 MG EC tablet 9/3/2019 at 0700  No Yes   Sig: Take 1 tablet (20 mg) by mouth daily   ruxolitinib (JAKAFI) 5 MG TABS tablet CHEMO 9/3/2019 at 0700  No Yes   Sig: Take 1 tablet (5 mg) by mouth 2 times daily   sertraline (ZOLOFT) 100 MG tablet 9/2/2019 at pm  No Yes   Sig: take 1 tablet by mouth daily.      sulfamethoxazole-trimethoprim (BACTRIM DS/SEPTRA DS) 800-160 MG tablet 9/2/2019 at pm  No Yes   Sig: Take one tablet by mouth twice daily on Mondays and Tuesdays only.   triamcinolone (KENALOG) 0.1 % ointment Past Week at Unknown time  Yes Yes   Sig: Apply twice a day to lower leg      Facility-Administered Medications: None        ALLERGIES:    Allergies   Allergen Reactions     Blood Transfusion Related (Informational Only) Other (See Comments)     Patient has a history of a clinically significant antibody against RBC antigens.  A delay in compatible RBCs may occur.     Ibuprofen Other (See Comments)     bleeding          SOCIAL HISTORY:  The patient currently resides in a house in [place] with his wife.  He is a former smoker.  He quit in 2001.  He consumes alcohol on occasion.  Denies street or illicit drug use and does not  currently utilize a cane or a walker.      FAMILY HISTORY:   1.  Father had liver and lung cancer.   2.  Mother had breast cancer and a CVA.   3.  Brother is a type 2 diabetic and has high blood pressure.      REVIEW OF SYSTEMS:  A 10-point review of systems was performed.  All pertinent positives are listed in the history of present illness, otherwise is negative.      PHYSICAL EXAMINATION:   VITAL SIGNS:  Temperature is 97.5 degrees Fahrenheit with a blood pressure of 104/56, heart rate of 67 beats per minute, respiratory rate of 15, O2 saturation 98% on 3 liters per nasal cannula.  The patient was denying any pain.   GENERAL:  The patient is awake, alert, cooperative, in no apparent distress, alert and oriented x 3.   HEENT:  Normocephalic, atraumatic.  Moist mucous membranes present.  No exudates noted in the posterior pharynx.  Uvula is midline.  Eyes:  Pupils are equal, round, reactive to light.  Extraocular movements are intact.  Normal sclerae.   NECK:  Supple, normal range of motion, no tracheal deviation.  No cervical lymphadenopathy present.   CARDIOVASCULAR:  Regular rate and rhythm, no rubs, murmurs or gallops appreciated.   PULMONARY:  Lungs are clear to auscultation bilaterally, no wheezes, rhonchus or rales appreciated.   GASTROINTESTINAL:  Bowel sounds are present in all 4 quadrants, soft, nontender, nondistended, obese.   GENITOURINARY:  Molina catheter is in place with clear yellow urine present in the bag.   NEUROLOGIC:  Cranial nerves II-XII appear grossly intact.  The patient demonstrates equal sensation, coordination and strength in the upper and lower extremities bilaterally.   EXTREMITIES:  1+ lower extremity edema noted bilaterally, specifically at the pedal area.  PCDs are in place bilaterally and calves are nontender to palpation.      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    1.  Avascular necrosis of the right femur, status post right total hip arthroplasty:  Postoperative day #0.  Orthopedic Service will be managing at this point.  Will defer analgesic management, DVT prophylaxis and PT and OT assessment to their service.  Hemoglobin will be checked in the morning to assess for acute blood loss anemia.  Would strongly encourage utilization of incentive spirometer.   2.  History of acute myelogenous leukemia and myelodysplastic syndrome, status post bone marrow/stem cell transplant:  This appears to be stable.  The patient will be continued on prior to admit Jakafi 5 mg 2 times daily.  The patient has brought his home supply and I have requested that this be utilized.  The patient will be resumed on prophylactic medications including Acyclovir 800 mg 3 times daily, fluconazole 100 mg daily, Levaquin 250 mg daily and Bactrim double strength 1 tablet 2 times daily.   3.  History of otvsv-rtpeyp-orqz disease:  Patient was previously on prednisone, but this has been weaned off the summer of 2017 with occasional flares requiring prednisone.  The patient has not taken any since July and is being continued on Jakafi 5 mg 2 times daily.   4.  Obesity with associated obstructive sleep apnea:  The patient has a BMI of 34.  He is compliant with home CPAP and has brought his own machine.  We will continue  with home CPAP with home settings.  Will defer ongoing management to PCP.    5.  Gastroesophageal reflux disease:  The patient's prior to admit Protonix will be continued.   6.  Major depressive disorder:   This appears to be stable.  The patient's prior to admit sertraline will be continued.   7.  Chronic venous stasis bilaterally in the lower extremities:  This appears to be stable.  No interventions are necessary.   8.  Chronic lower extremity lymphedema:  This is managed with stockings and Ace wrapping.  Currently the patient has PCDs in place and support socks in place.  Will monitor fluid status while the patient is receiving IV fluids.  Previously, the patient has utilized Bumex.   9.  History of colon polyp:  Discussed plan for outpatient colonoscopy.  Both the patient and patient's wife are aware that he is due for this procedure and plan to proceed shortly.   10.  Recent sinus infection:  The patient was started on Augmentin.  We will continue with this medication and complete course that is to go through Thursday.   11.  History of vancomycin-resistant Enterococci:  Patient is in contact precautions.   12.  History of right lower extremity deep venous thrombosis:  This occurred in 2016.  Prophylaxis management per Orthopedic Service.  It appears patient is currently on subcutaneous Lovenox with plans to continue on postop day #1 and postoperative day #2 and then initiation of Xarelto for 6 weeks beginning on postop day #3.      CODE STATUS:  Discussed with the patient.  He elected to be full code.      DISPOSITION:  Ultimately up to Orthopedic Service.      The patient was discussed with Dr. Sal Briceno, who agrees with the assessment and plan as stated above.  Dr. Briceno will evaluate the patient independently.      At this time, I would like to thank Dr. Acevedo for consulting the Hospitalist Service.  We will continue to follow.         SAL BRICENO DO       As dictated by NIDHI BERTRAND  MUSTAPHA MACK            D: 2019   T: 2019   MT:       Name:     TOM CARRASCO   MRN:      [id number]        Account:       [id number]   :      1950           Consult Date:  2019      Document: [id number]       cc: Yong Callejas MD

## 2019-09-04 NOTE — PLAN OF CARE
Pt A/O x4. CMS intact; Dressing CDI. VSS on RA. Up A1 ww, ambulating well. Pt denies any need for pain meds; Scheduled Tylenol given only. Molina out this AM - now DTV. IV running @ 100. Tolerating regular diet. Pt has bilateral hearing aids @ bedside. Pt slept well overnight. Possible discharge home today.

## 2019-09-04 NOTE — PROGRESS NOTES
[facility]    Medicine Progress Note - Hospitalist Service       Date of Admission:  9/3/2019  Assessment & Plan   Deejay Doir is a 68-year-old male with a past medical history significant for recent sinus infection, AML and MDS status post bone marrow transplant with GvHD, right lower extremity DVT, known avascular necrosis of the right femur who underwent an elective right total hip arthroplasty on 9/3/2019. Hospitalist Service has been consulted to assist with medical management.      Avascular necrosis of the right femur, status post right total hip arthroplasty 9/3/19    Surgery was performed using anterior approach, pt is recovering well.  - Management per Orthopedic surgery; planning discharge to home today  - DVT prophylaxis with lovenox until POD3, then Xarelto x6 weeks per Op note    History of acute myelogenous leukemia and myelodysplastic syndrome, status post bone marrow/stem cell transplant  History of jtkfc-bgjsjx-kfpv disease  This appears to be stable. Patient was previously on prednisone, but this has been weaned off the summer of 2017 with occasional flares requiring prednisone.  The patient has not taken any since July and is being continued on Jakafi 5 mg 2 times daily.   - Continue prior to admit Jakafi 5 mg 2 times daily, to use home supply  - Continue PTA Acyclovir 800 mg 3 times daily, fluconazole 100 mg daily, Levaquin 250 mg daily and Bactrim DS 1 tablet 2 times daily.     Obesity with associated obstructive sleep apnea  The patient has a BMI of 34.   - CPAP with home settings    Gastroesophageal reflux disease  - Continue PTA PPI     Major depressive disorder:   This appears to be stable.  - Continue PTA sertraline     Chronic lower extremity lymphedema  This is managed with stockings and Ace wrapping.  Currently the patient has PCDs in place and support socks in place.  Will monitor fluid status while the patient is receiving IV fluids.  Previously, the patient has  utilized Bumex.     Sinusitis   Diagnosed within the past week, pt has been on Augmentin PTA.   - Continue course until completed (last day 9/5)    History of right lower extremity deep venous thrombosis  This occurred in 2016.  Prophylaxis management per Orthopedic Service.   - Postop DVT prophylaxis per primary service as detailed above    Diet: Advance Diet as Tolerated: Regular Diet Adult  Diet    DVT Prophylaxis: Enoxaparin (Lovenox) SQ  Molina Catheter: not present  Code Status: Full Code      Disposition Plan   Expected discharge: Today, recommended to prior living arrangement once cleared by primary service.  Entered: Francis Love PA-C 09/04/2019, 10:11 AM       The patient's care was discussed with the Attending Physician, Dr. Harris, Bedside Nurse and Patient.    Francis Love PA-C  Hospitalist Service  [facility]    ______________________________________________________________________    Interval History   Pt seen & evaluated. Sitting up in wheelchair at bedside, eating breakfast. Tolerating oral intake well. Denies pain, cp, sob.     Data reviewed today: I reviewed all medications, new labs and imaging results over the last 24 hours. I personally reviewed no images or EKG's today.    Physical Exam   Vital Signs: Temp: 97.6  F (36.4  C) Temp src: Oral BP: 106/66 Pulse: 77 Heart Rate: 70 Resp: 16 SpO2: 98 % O2 Device: None (Room air) Oxygen Delivery: 3 LPM  Weight: 229 lbs 0 oz  GEN: well-developed, well-nourished, appears comfortable  HEENT: NCAT, PERRL, EOM intact bilaterally, sclera clear, conjunctiva normal, nose & mouth patent, mucous membranes moist  CHEST: lungs CTA bilaterally, no increased work of breathing, no wheeze, rales, rhonchi  HEART: RRR, S1 & S2  ABD: soft, nontender, nondistended, no guarding or rigidity, +BS in all 4 quadrants  MSK: RLE elevated on wheelchair leg, slight edema to lower leg, spontaneous ROM of LLE, pedal pulses palpable bilaterally  SKIN: warm & dry  without rash, no pedal edema    Data   Recent Labs   Lab 09/04/19  0637 09/03/19  1816 09/03/19  0920   WBC  --  9.6  --    HGB 10.6* 11.4* 12.5*   MCV  --  93  --    PLT  --  223  --     138  --    POTASSIUM 4.5 5.0  --    CHLORIDE 109 110*  --    CO2 24 25  --    BUN 22 20  --    CR 1.12 1.02 1.09   ANIONGAP 3 3  --    JOE 8.0* 8.3*  --    * 137*  --    ALBUMIN  --  3.1*  --    PROTTOTAL  --  6.5*  --    BILITOTAL  --  0.2  --    ALKPHOS  --  64  --    ALT  --  36  --    AST  --  46*  --      Recent Results (from the past 24 hour(s))   XR Surgery DANIA L/T 5 Min Fluoro w Stills    Narrative    XR SURGERY C-ARM FLUOROSCOPY LESS THAN FIVE MINUTES WITH STILLS   9/3/2019 1:30 PM     HISTORY: Right anterior hip.    FLUOROSCOPY TIME: .5 minutes  NUMBER OF IMAGES ACQUIRED: 3  VIEWS: 1      Impression    IMPRESSION: Right total hip arthroplasty placement.    GALILEA CASPER MD   XR Pelvis w Hip Port Right 1 View    Narrative    PORTABLE ONE VIEW PELVIS AND ONE VIEW RIGHT HIP 9/3/2019 3:11 PM    HISTORY: Postoperative evaluation of the right hip.    COMPARISON: Intraoperative images earlier today.    FINDINGS: There are surgical changes of a right total hip  arthroplasty. The hardware is intact with no fracture or other  complication seen. No other abnormality is demonstrated.      Impression    IMPRESSION: Unremarkable postoperative appearance of the right hip.    YAMILE DAVALOS MD     Medications     dextrose 5% and 0.45% NaCl + KCl 20 mEq/L 100 mL/hr at 09/04/19 0349       acetaminophen  975 mg Oral Q8H     acyclovir  800 mg Oral TID     albumin human         amoxicillin-clavulanate  1 tablet Oral BID     aspirin  325 mg Oral Daily     fluconazole  100 mg Oral Daily     levofloxacin  250 mg Oral Daily     pantoprazole  20 mg Oral QAM AC     ruxolitinib  5 mg Oral BID     senna-docusate  1 tablet Oral BID    Or     senna-docusate  2 tablet Oral BID     sertraline  100 mg Oral QPM     sodium chloride (PF)  3  mL Intracatheter Q8H     sulfamethoxazole-trimethoprim  1 tablet Oral BID

## 2019-09-04 NOTE — PLAN OF CARE
Discharge Planner OT     Patient plan for discharge: unknown    Current status: Orders received and chart reviewed. Per session with PT, patient is MOD I with mobility, MOD I with transfers, and demo'd I with dressing. PT reports spouse can A as needed with ADL on discharge. Has all AE.     Barriers to return to prior living situation: defer to PT  Recommendations for discharge: defer to PT    Rationale for recommendations: OT eval not indicated as patient is I with ADL. Has spouse A as needed on discharge. OT order complete.          Entered by: Robyn Waddell 09/04/2019 10:26 AM

## 2019-09-05 NOTE — DISCHARGE SUMMARY
"Discharge Summary    Deejay Dior MRN# [id number]   YOB: 1950 Age: 68 year old     Date of Admission: 9/3/2019    Date of Discharge: 9/4/2019    Reason for Admission: Deejay Dior is an 68 year old male who was admitted to the hospital following surgery.    Preoperative Diagnosis: RIGHT HIP DJD    Postoperative Diagnosis: RIGHT HIP DJD    Procedure Completed:  Right total hip arthroplasty    Hospital Course:  Mr. Dior was admitted and underwent the above procedure. The patient tolerated the procedure well. There were no complications. Following surgery he was admitted to the floor.  During his stay he did not require any blood transfusions. His pain was controlled with oral pain medication.  During his stay he progressed well in physical therapy and all the therapy goals were met.     Discharge Physical Exam:  /66 (BP Location: Left arm)   Pulse 77   Temp 97.9  F (36.6  C) (Oral)   Resp 16   Ht 1.746 m (5' 8.75\")   Wt 103.9 kg (229 lb)   SpO2 98%   BMI 34.06 kg/m    Neurovascularly intact, distal pulses present bilaterally.  Calves are negative bilaterally, both soft and nontender.    Assessment: Mr. Dior is stable and doing well status post Right total hip arthroplasty.    Plan: We will discharge him home on analgesics and deep venous thrombosis prophylaxis.  He will follow-up with me approximately 2 weeks from surgery.  He may call [phone] to schedule an appointment.    Meds:   Deejay Dior   Home Medication Instructions TIFFANIE:[id number]    Printed on:09/05/19 2394   Medication Information                      acetaminophen (TYLENOL) 500 MG tablet  Take 1-2 tablets (500-1,000 mg) by mouth every 6 hours as needed for mild pain             acyclovir (ZOVIRAX) 800 MG tablet  Take 1 tablet (800 mg) by mouth 3 times daily             amoxicillin (AMOXIL) 500 MG capsule  Take 4 pills (2 grams) day of dental work             amoxicillin-clavulanate (AUGMENTIN) 875-125 " MG tablet  Take 1 tablet by mouth 2 times daily For 7 days (started 9/30/19)             augmented betamethasone dipropionate (DIPROLENE-AF) 0.05 % external ointment  Apply topically 2 times daily For areas of rash on the lower leg             calcium carbonate-vitamin D 500-400 MG-UNIT TABS tablt  Take 1 tablet by mouth daily 2000 mg             fluconazole (DIFLUCAN) 100 MG tablet  Take 1 tablet (100 mg) by mouth daily             Hypromellose (ARTIFICIAL TEARS OP)  Apply 1-2 drops to eye 2 times daily as needed             levofloxacin (LEVAQUIN) 250 MG tablet  Take 1 tablet (250 mg) by mouth daily             ORDER FOR DME  Please provide a NOVA cane offset with strap item number [id number]             order for DME  Please measure and distribute 1 pair of 20mmHg - 30mmHg thigh high open or closed toe compression stockings. Jobst ultrasheer or equivalent.             oxyCODONE (ROXICODONE) 5 MG tablet  Take 1-2 tablets (5-10 mg) by mouth every 6 hours as needed for pain             pantoprazole (PROTONIX) 20 MG EC tablet  Take 1 tablet (20 mg) by mouth daily             Prenatal Vit-Fe Fumarate-FA (PRENATAL MULTIVITAMIN W/IRON) 27-0.8 MG tablet  Take 1 tablet by mouth daily             rivaroxaban ANTICOAGULANT (XARELTO) 10 MG TABS tablet  Take 1 tablet (10 mg) by mouth daily (with dinner) Begin taking on post operative day 3             ruxolitinib (JAKAFI) 5 MG TABS tablet CHEMO  Take 1 tablet (5 mg) by mouth 2 times daily             senna (SENOKOT) 8.6 MG tablet  Take 1-2 tablets by mouth daily             sertraline (ZOLOFT) 100 MG tablet  take 1 tablet by mouth daily.                sulfamethoxazole-trimethoprim (BACTRIM DS/SEPTRA DS) 800-160 MG tablet  Take one tablet by mouth twice daily on Mondays and Tuesdays only.             triamcinolone (KENALOG) 0.1 % ointment  Apply twice a day to lower leg             Vitamin D, Cholecalciferol, 1000 units TABS  Take 1,000 Units by mouth daily

## 2019-09-06 DIAGNOSIS — D46.9 MDS (MYELODYSPLASTIC SYNDROME) (H): ICD-10-CM

## 2019-09-06 DIAGNOSIS — D89.813 GVH (GRAFT VERSUS HOST DISEASE) (H): ICD-10-CM

## 2019-09-06 RX ORDER — AMOXICILLIN 500 MG/1
CAPSULE ORAL
Qty: 16 CAPSULE | Refills: 3 | Status: ON HOLD | OUTPATIENT
Start: 2019-09-06 | End: 2021-03-01

## 2019-09-06 RX ORDER — ACYCLOVIR 800 MG/1
800 TABLET ORAL 3 TIMES DAILY
Qty: 180 TABLET | Refills: 6 | Status: SHIPPED | OUTPATIENT
Start: 2019-09-06 | End: 2020-09-21

## 2019-09-07 LAB
BLD PROD TYP BPU: NORMAL
BLD UNIT ID BPU: 0
BLOOD PRODUCT CODE: NORMAL
BPU ID: NORMAL
TRANSFUSION STATUS PATIENT QL: NORMAL
TRANSFUSION STATUS PATIENT QL: NORMAL

## 2019-09-15 NOTE — OP NOTE
DATE OF SERVICE:  9/3/2019    SURGEON  JESSICA LYONS M.D.    ASSISTANT  Elicia Campbell PA-C    PREOPERATIVE DIAGNOSIS   Right hip osteoarthritis, failed to respond to conservative management.    POSTOPERATIVE DIAGNOSIS   Right hip osteoarthritis, failed to respond to conservative management.    TITLE OF PROCEDURE   Right total hip arthroplasty, Depuy uncemented components, direct anterior approach.    PROCEDURE  The patient was brought to the operating room and after satisfactory anesthesia was placed on the [place] table. The right  lower extremity was then prepped and draped in the usual sterile fashion. Image intensification was utilized during the case for component positioning as well as leg length assessment. An incision was made just lateral to the ASIS and coursing toward the proximal femur. Dissection was carried down to the TFL. The fascia overlying the TFL was incised and dissection was carried down to the interval between TFL and rectus femoris. This was identified. A lateral cobra retractor was placed followed by a medial cobra around the femoral neck. The leash vessels, anterior circumflex, was identified and was coagulated. The underlying fascia was released. Dissection was carried down to the hip capsule. Capsule was identified and a capsulotomy was performed in a T-type fashion first along the intertrochanteric line and then secondarily up along the femoral neck and head, finally completed by releasing along the saddle of the trochanter. The capsular edges were tagged for later repair. The hip was then externally rotated and medial capsular release was performed such that the lesser trochanter could be palpated. Following this, the femoral neck was osteotomized as per the preoperative plan. The femoral head was removed with a corkscrew without difficulty. The acetabulum was exposed and was reamed sequentially up to 56 mm. This was reamed under both direct visualization as well as  the aid of image intensification. A 56 mm [place] cup was impacted into place in approximately 40 to 45 degrees of abduction, and 15 degrees of anteversion. Two screws were placed and this gave excellent fixation. The 36 mm neutral liner was impacted into place.     Attention was then directed to the femur. The hook was placed underneath the proximal aspect of the femur between the trochanteric ridge and gluteus mee. The hip was then brought into external rotation, adduction, and extension. The femur was then carefully elevated using the appropriate releases off the inside of the greater trochanter. Once the femur was elevated, a starter broach was placed followed by sequential broaches. The broaches were performed up to size 6 Actis, which gave excellent torsinal as well as axial stability. Trial reduction was performed with a high offset +1.5mm head. The hip was reduced and with hip reduction the combined anteversion looked excellent. The hip was brought into full extension and external rotation. There was no evidence of instability. As well, x-rays were printed and compared with the opposite side and found to have good length and restoration of offset.  It was felt that an additional stem size could not be placed.  The hip was then dislocated and then the proximal femur was then brought back up into the proximal aspect of the wound. The real size 6 actis stem, high offset was impacted into place. Again this gave excellent torsion as well as axial stability. The real +1.5mm ceramic biolox head was impacted into place and the hip was reduced again. The image intensification confirmed excellent position of the components.   A 3 minute dilute betadine soak was performed.  The wound was thoroughly irrigated. The capsule was closed with interrupted 0 Vicryl suture and tissues infiltrated with toradol/marcaine mixture. The tensor fascia was closed with a running 0 Stratafix suture. The subcutaneous layer was closed  with interrupted 2-0 Vicryl, 2-0 Stratafix, and 3-0 subcuticular monocryl was placed followed by mesh dressing with skin glue. One gram of vancomycin powder was placed deep and superficial prior to closure.  Sterile dressing was applied. The patient left the operating room in satisfactory condition. Patient received 1 gm of tranexamic acid pre-op and at closure.

## 2019-09-24 ENCOUNTER — TELEPHONE (OUTPATIENT)
Dept: TRANSPLANT | Facility: CLINIC | Age: 69
End: 2019-09-24

## 2019-09-24 NOTE — TELEPHONE ENCOUNTER
Received a call from Deejay this morning regarding complaints of severe fatigue and aches and pains since tapering off prednisone. He is 3 weeks s/p right total hip replacement. He reports fatigue that lasts all day, and that all he wants to do is go to bed. Dr. Collado notified. She is okay with him taking 5mg/day as long as orthopedic surgeon is okay with it. He has an appointment with them tomorrow and will discuss when is okay to resume.

## 2019-09-24 NOTE — TELEPHONE ENCOUNTER
BMT CLINICAL SOCIAL WORK NOTE:    Focus: Resources    Data: Pt is a 68 year old male s/p allo BMT, currently 5 years post BMT    Interventions: Clinical  (CSW) placed a call to the pt regarding a notice received regarding the [employer] nilo being up for renewal 10/21/19. The pt shared that he is still using this nilo and would want to reapply. CSW and the pt will meet 10/10 when he is in clinic next to complete the renewal application.     The pt shared that overall he is doing ok, he had hip replacement surgery 3 weeks ago and is still have lots of pain associated to this and being off his steroids. The pt is hopeful that at his visit tomorrow with his ortho MD that they can figure out what is causing his pain. CSW encouraged Pt to contact CSW for support, questions and/or resources.    Assessment: Pt presented as friendly and open.  Pt appears to be coping well at this time, even with the increase in pain he is feeling. Pt continues to be supported by his wife.     Plan: CSW will continue to work with Pt and family to provide supportive counseling and assist with resources as needed. CSW will continue to collaborate with multidisciplinary team regarding Pt's plan of care.     JAYLON Moreno, [facility]  Pager: [phone]  Phone: [phone]

## 2019-10-07 DIAGNOSIS — R35.0 URINARY FREQUENCY: ICD-10-CM

## 2019-10-07 DIAGNOSIS — D46.9 MDS (MYELODYSPLASTIC SYNDROME) (H): ICD-10-CM

## 2019-10-07 DIAGNOSIS — T86.09 OTHER COMPLICATION OF BONE MARROW TRANSPLANT (H): ICD-10-CM

## 2019-10-07 DIAGNOSIS — D89.813 GVH (GRAFT VERSUS HOST DISEASE) (H): ICD-10-CM

## 2019-10-07 RX ORDER — SERTRALINE HYDROCHLORIDE 100 MG/1
TABLET, FILM COATED ORAL
Qty: 30 TABLET | Refills: 4 | Status: SHIPPED | OUTPATIENT
Start: 2019-10-07 | End: 2020-02-14

## 2019-10-09 NOTE — PROGRESS NOTES
BMT Clinic Note     ID:  Mr. 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  On pred 5mg every day and Jakafi 5 mg bid.  Ongoing profound fatigue which varies day to day.  He reports generalized myalgias & notes good relief of pain & fatigue when he takes Oxycodone. Extreme dry mouth, for which he has tried numerous solutions, none of which has worked very well. S/P R hip replacement, now with minimal pain.  Eating well with no N/V/D.    Review of Systems: 10 point ROS negative except as noted above.    Physical Exam:  /73 (BP Location: Right arm, Patient Position: Sitting, Cuff Size: Adult Regular)   Pulse 54   Temp 97.9  F (36.6  C) (Oral)   Resp 16   Wt 101.7 kg (224 lb 4.8 oz)   SpO2 98%   BMI 33.36 kg/m       Wt Readings from Last 4 Encounters:   10/10/19 101.7 kg (224 lb 4.8 oz)   09/03/19 103.9 kg (229 lb)   07/25/19 105 kg (231 lb 8 oz)   06/27/19 104.5 kg (230 lb 6.4 oz)     General: NAD, interactive, KPS 80%  Eyes: SARIKA, sclera anicteric  Nose/Mouth/Throat: OP clear, no ulcerations, very dry, upper plate, chronic lichenoid changes , no lip lesions   Lungs:CTA B, no crackles or wheezes   CV: RRR without murmurs rubs or gallops  Abd: S/NT/ND +BS  Skin:   RLE skin shiny with some reddened areas. Nontender. Trace edema L>R (chronic).    Neuro: A&O  Labs:  Lab Results   Component Value Date    WBC 7.6 10/10/2019    ANEU 4.0 10/10/2019    HGB 11.9 (L) 10/10/2019    HCT 35.9 (L) 10/10/2019     10/10/2019     10/10/2019    POTASSIUM 4.0 10/10/2019    CHLORIDE 108 10/10/2019    CO2 24 10/10/2019    GLC 87 10/10/2019    BUN 22 10/10/2019    CR 0.84 10/10/2019    MAG 2.1 10/10/2019    INR 0.98 02/14/2019     ASSESSMENT AND PLAN:  Mr. Dior is a 66 y/o male,  5+yr s/p NMA allo sib PBSCT for MDS. w/ cGVHD     AML/MDS/BMT: S/p 8/8 matched and ABO matched allo-sib transplant from his  sister. Total cell dose (from 7/1 & 7/2) 6.53 x 10^6 CD34+ cells/kg.   - 1 year anniversary (July 2015): 30% cellular, trilineage hematopoiesis, no abnormal blasts by morphology or flow , no dysplasia, 0-1 fibrosis, 100% donor (BM, CD3, CD15). CR  - 2 year anniversary (July 2016): 20-30% cellular, trilineage hematopoiesis, no abnormal blasts by morphology or flow , no dysplasia, No fibrosis, 100% donor in BM (PB not sent). ISCN: //46,XX[20] Complete Remission.    HEME: No transfusion needs, counts stable     - Was on anticoagulation after his hip replacment, this is now complete.     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 Had been off prednisone since summer 2017 and briefly tapered off Sirolimus (january 2018), however resumed with flare in February. 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 Sirolimus was stopped on 9/27 & Pred 90mg was started. Seen by Dr. Sandoval on 10/2, please see his note. He does not think his symptoms or CT findings are c/w Sirolimus or GVH & he was in favor of tapering Prednisone. Recently he has worsening skin thickening & desquamation to the RLE, c/w GVH.   Started Jakafi 10/22 at 5 mg BID with symptom improvement in lower extremities. 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   Burst pred 40mg a day on 1/3 with significant systemic arthralgic pain, tapered back to 10/0 eod after 1 week, near resolution of symptoms noted 1/17, returned to 10 mg every other day; then dropped to 5mg q day in April    Currently on Jakafi 5mg every day & Pred 5mg every day.  Has lost most of his teeth due to decay from xerosterma.  Not much relief with topical agents.     ID:  Afebrile no signs/sx of infection.   - IgG 1/24 =1130  - Prophy: HD ACV (renal dosing), Fluconazole and  Bactrim.   - Flu shot today.  Pneumovax  today.  Shingrix today & will need a booster in 8wk-6mo    GI:  protonix (has heartburn)    FEN/Renal:  Lytes & creat stable.    Fatigue:  This is his biggest complaint.  Will try Rittalin 10mg BID  - History of testosterone deficiency, rechecked and modestly low. He previously did testosterone injections & didn't think it made him feel any better.    - TSH normal 2/28 and again on repeat 9/27 at 1.01.  - counseled that moderate exercise is really the only known way to combat fatigue, and acknowledged how difficult it is to balance too much with not enough activity.     Derm:   Venous statis: see below-most recently had sclerotherapy to left LE    Vasc:   10/15 Right leg US with small (technically DVT) clot in posterior tibial vein: per Millie, started reg dose aspirin daily 325mg and recheck US in 2 weeks or symptomatically. U/S on 11/27 without DVT  Off lymphedema wraps  Discomfort since edema/shakes: oxy 1-2 tab during day and ativan 6 hours away from oxy for sleep.  H/o chronic venous statis: s/p sclerotherapy to the L leg.  kelfex day prior and 4 days following.     MS: avascular necrosis of hip.  S/p replacement surgery    RTC 11/21    Jyoti Borjas

## 2019-10-10 ENCOUNTER — SOCIAL WORK (OUTPATIENT)
Dept: TRANSPLANT | Facility: CLINIC | Age: 69
End: 2019-10-10

## 2019-10-10 ENCOUNTER — ONCOLOGY VISIT (OUTPATIENT)
Dept: TRANSPLANT | Facility: CLINIC | Age: 69
End: 2019-10-10
Attending: INTERNAL MEDICINE
Payer: MEDICARE

## 2019-10-10 ENCOUNTER — APPOINTMENT (OUTPATIENT)
Dept: LAB | Facility: CLINIC | Age: 69
End: 2019-10-10
Attending: INTERNAL MEDICINE
Payer: MEDICARE

## 2019-10-10 VITALS
DIASTOLIC BLOOD PRESSURE: 73 MMHG | OXYGEN SATURATION: 98 % | TEMPERATURE: 97.9 F | BODY MASS INDEX: 33.36 KG/M2 | SYSTOLIC BLOOD PRESSURE: 130 MMHG | RESPIRATION RATE: 16 BRPM | WEIGHT: 224.3 LBS | HEART RATE: 54 BPM

## 2019-10-10 DIAGNOSIS — D46.9 MDS (MYELODYSPLASTIC SYNDROME) (H): Primary | ICD-10-CM

## 2019-10-10 DIAGNOSIS — Z71.9 VISIT FOR COUNSELING: Primary | ICD-10-CM

## 2019-10-10 LAB
ALBUMIN SERPL-MCNC: 3.4 G/DL (ref 3.4–5)
ALP SERPL-CCNC: 76 U/L (ref 40–150)
ALT SERPL W P-5'-P-CCNC: 35 U/L (ref 0–70)
ANION GAP SERPL CALCULATED.3IONS-SCNC: 6 MMOL/L (ref 3–14)
AST SERPL W P-5'-P-CCNC: 30 U/L (ref 0–45)
BASOPHILS # BLD AUTO: 0 10E9/L (ref 0–0.2)
BASOPHILS NFR BLD AUTO: 0.1 %
BILIRUB SERPL-MCNC: 0.3 MG/DL (ref 0.2–1.3)
BUN SERPL-MCNC: 22 MG/DL (ref 7–30)
CALCIUM SERPL-MCNC: 8.9 MG/DL (ref 8.5–10.1)
CHLORIDE SERPL-SCNC: 108 MMOL/L (ref 94–109)
CO2 SERPL-SCNC: 24 MMOL/L (ref 20–32)
CREAT SERPL-MCNC: 0.84 MG/DL (ref 0.66–1.25)
DIFFERENTIAL METHOD BLD: ABNORMAL
EOSINOPHIL # BLD AUTO: 0.1 10E9/L (ref 0–0.7)
EOSINOPHIL NFR BLD AUTO: 1.3 %
ERYTHROCYTE [DISTWIDTH] IN BLOOD BY AUTOMATED COUNT: 18.6 % (ref 10–15)
GFR SERPL CREATININE-BSD FRML MDRD: 88 ML/MIN/{1.73_M2}
GLUCOSE SERPL-MCNC: 87 MG/DL (ref 70–99)
HCT VFR BLD AUTO: 35.9 % (ref 40–53)
HGB BLD-MCNC: 11.9 G/DL (ref 13.3–17.7)
IMM GRANULOCYTES # BLD: 0 10E9/L (ref 0–0.4)
IMM GRANULOCYTES NFR BLD: 0.3 %
LYMPHOCYTES # BLD AUTO: 2.5 10E9/L (ref 0.8–5.3)
LYMPHOCYTES NFR BLD AUTO: 32.9 %
MAGNESIUM SERPL-MCNC: 2.1 MG/DL (ref 1.6–2.3)
MCH RBC QN AUTO: 32.2 PG (ref 26.5–33)
MCHC RBC AUTO-ENTMCNC: 33.1 G/DL (ref 31.5–36.5)
MCV RBC AUTO: 97 FL (ref 78–100)
MONOCYTES # BLD AUTO: 1 10E9/L (ref 0–1.3)
MONOCYTES NFR BLD AUTO: 13.4 %
NEUTROPHILS # BLD AUTO: 4 10E9/L (ref 1.6–8.3)
NEUTROPHILS NFR BLD AUTO: 52 %
NRBC # BLD AUTO: 0 10*3/UL
NRBC BLD AUTO-RTO: 0 /100
PLATELET # BLD AUTO: 255 10E9/L (ref 150–450)
POTASSIUM SERPL-SCNC: 4 MMOL/L (ref 3.4–5.3)
PROT SERPL-MCNC: 6.9 G/DL (ref 6.8–8.8)
RBC # BLD AUTO: 3.7 10E12/L (ref 4.4–5.9)
SODIUM SERPL-SCNC: 138 MMOL/L (ref 133–144)
WBC # BLD AUTO: 7.6 10E9/L (ref 4–11)

## 2019-10-10 PROCEDURE — 85025 COMPLETE CBC W/AUTO DIFF WBC: CPT | Performed by: NURSE PRACTITIONER

## 2019-10-10 PROCEDURE — 90670 PCV13 VACCINE IM: CPT | Mod: ZF | Performed by: NURSE PRACTITIONER

## 2019-10-10 PROCEDURE — 90662 IIV NO PRSV INCREASED AG IM: CPT | Mod: ZF | Performed by: NURSE PRACTITIONER

## 2019-10-10 PROCEDURE — 83735 ASSAY OF MAGNESIUM: CPT | Performed by: NURSE PRACTITIONER

## 2019-10-10 PROCEDURE — G0008 ADMIN INFLUENZA VIRUS VAC: HCPCS

## 2019-10-10 PROCEDURE — 36415 COLL VENOUS BLD VENIPUNCTURE: CPT

## 2019-10-10 PROCEDURE — 80053 COMPREHEN METABOLIC PANEL: CPT | Performed by: NURSE PRACTITIONER

## 2019-10-10 PROCEDURE — 25000581 ZZH RX MED A9270 GY (STAT IND- M) 250: Mod: GY,ZF | Performed by: NURSE PRACTITIONER

## 2019-10-10 PROCEDURE — 90750 HZV VACC RECOMBINANT IM: CPT | Mod: GY,ZF | Performed by: NURSE PRACTITIONER

## 2019-10-10 PROCEDURE — 25000128 H RX IP 250 OP 636: Mod: ZF | Performed by: NURSE PRACTITIONER

## 2019-10-10 PROCEDURE — G0463 HOSPITAL OUTPT CLINIC VISIT: HCPCS | Mod: ZF

## 2019-10-10 PROCEDURE — G0009 ADMIN PNEUMOCOCCAL VACCINE: HCPCS

## 2019-10-10 PROCEDURE — 90472 IMMUNIZATION ADMIN EACH ADD: CPT

## 2019-10-10 RX ORDER — OXYCODONE HYDROCHLORIDE 10 MG/1
5 TABLET ORAL EVERY 4 HOURS PRN
Qty: 60 TABLET | Refills: 0 | Status: ON HOLD | OUTPATIENT
Start: 2019-10-10 | End: 2021-03-01

## 2019-10-10 RX ORDER — PREDNISONE 5 MG/1
5 TABLET ORAL DAILY
Qty: 30 TABLET | Refills: 3 | Status: SHIPPED | OUTPATIENT
Start: 2019-10-10 | End: 2020-02-14

## 2019-10-10 RX ORDER — METHYLPHENIDATE HYDROCHLORIDE 5 MG/1
TABLET ORAL
Qty: 60 TABLET | Refills: 0 | Status: SHIPPED | OUTPATIENT
Start: 2019-10-10 | End: 2019-11-21

## 2019-10-10 RX ADMIN — PNEUMOCOCCAL 13-VALENT CONJUGATE VACCINE 0.5 ML: 2.2; 2.2; 2.2; 2.2; 2.2; 4.4; 2.2; 2.2; 2.2; 2.2; 2.2; 2.2; 2.2 INJECTION, SUSPENSION INTRAMUSCULAR at 13:39

## 2019-10-10 RX ADMIN — INFLUENZA A VIRUS A/MICHIGAN/45/2015 X-275 (H1N1) ANTIGEN (FORMALDEHYDE INACTIVATED), INFLUENZA A VIRUS A/SINGAPORE/INFIMH-16-0019/2016 IVR-186 (H3N2) ANTIGEN (FORMALDEHYDE INACTIVATED), AND INFLUENZA B VIRUS B/MARYLAND/15/2016 BX-69A (A B/COLORADO/6/2017-LIKE VIRUS) ANTIGEN (FORMALDEHYDE INACTIVATED) 0.5 ML: 60; 60; 60 INJECTION, SUSPENSION INTRAMUSCULAR at 13:15

## 2019-10-10 RX ADMIN — ZOSTER VACCINE RECOMBINANT, ADJUVANTED 0.5 ML: KIT at 13:40

## 2019-10-10 ASSESSMENT — PAIN SCALES - GENERAL: PAINLEVEL: NO PAIN (1)

## 2019-10-10 NOTE — NURSING NOTE
"Oncology Rooming Note    October 10, 2019 12:43 PM   Deejay Dior is a 68 year old male who presents for:    Chief Complaint   Patient presents with     Blood Draw     Labs drawn via  by RN in lab. VS taken. Patient checked in for next appt.     RECHECK     Return: MDS     Initial Vitals: /73 (BP Location: Right arm, Patient Position: Sitting, Cuff Size: Adult Regular)   Pulse 54   Temp 97.9  F (36.6  C) (Oral)   Resp 16   Wt 101.7 kg (224 lb 4.8 oz)   SpO2 98%   BMI 33.36 kg/m   Estimated body mass index is 33.36 kg/m  as calculated from the following:    Height as of 9/3/19: 1.746 m (5' 8.75\").    Weight as of this encounter: 101.7 kg (224 lb 4.8 oz). Body surface area is 2.22 meters squared.  No Pain (1) Comment: only when moved   No LMP for male patient.  Allergies reviewed: Yes  Medications reviewed: Yes    Medications: MEDICATION REFILLS NEEDED TODAY. Provider was notified.  Pharmacy name entered into [employer]: [id number] - [place], [place] - [address] [address]    Clinical concerns: Patient would like a refill of Prednisone 5mg and would like to discuss vaccines       Chandrika Méndez CMA              "

## 2019-10-10 NOTE — PROGRESS NOTES
Clinical   Blood and Marrow Transplant Service    Regular BMT SW asked that writer f/u with patient about the PAN foundation renewal. Spoke with Deejay in exam room and he did not recall who had helped him with the foundation application previously. Writer offered to speak with the Pharmacy Liaison to see if they had helped him. Spoke with Jurgen and she recalled the patient and said that she would f/u on the application and reach out to the patient should she require any additional information. Updated patient and his family member with plan - they both remembered Jurgen's name once writer said it and were glad of the help.    No other questions or concerns identified at this time. BMT SW continue to follow for supportive counseling and assist with resources.     Jeanine IYER [facility]    Clinical     [facility]  Adult Blood and Marrow Transplant Program  [address] [place] [id number]  [email or web address]  [email or web address]  Office: [phone]   Pager: [phone]    10/10/2019

## 2019-10-10 NOTE — NURSING NOTE
Flu vaccination given today in left deltoid. VIS given to pt. Patient tolerated well. See ELLEN Lehman MA

## 2019-10-10 NOTE — NURSING NOTE
Chief Complaint   Patient presents with     Blood Draw     Labs drawn via  by RN in lab. VS taken. Patient checked in for next appt.      Labs collected from venipuncture by RN. Vitals taken. Checked in for appointment. No orders in- green and purple tubes drawn.    Danya Kimball RN

## 2019-10-10 NOTE — NURSING NOTE
Pt was given his Shingrix and Prevnar-13 today.   Pt tolerated these well.    See OTTO Forrester MA

## 2019-11-21 ENCOUNTER — APPOINTMENT (OUTPATIENT)
Dept: LAB | Facility: CLINIC | Age: 69
End: 2019-11-21
Attending: INTERNAL MEDICINE
Payer: MEDICARE

## 2019-11-21 ENCOUNTER — ONCOLOGY VISIT (OUTPATIENT)
Dept: TRANSPLANT | Facility: CLINIC | Age: 69
End: 2019-11-21
Attending: INTERNAL MEDICINE
Payer: MEDICARE

## 2019-11-21 VITALS
SYSTOLIC BLOOD PRESSURE: 124 MMHG | WEIGHT: 223.1 LBS | RESPIRATION RATE: 16 BRPM | DIASTOLIC BLOOD PRESSURE: 71 MMHG | BODY MASS INDEX: 33.19 KG/M2 | HEART RATE: 61 BPM | TEMPERATURE: 97.9 F | OXYGEN SATURATION: 98 %

## 2019-11-21 DIAGNOSIS — D46.9 MDS (MYELODYSPLASTIC SYNDROME) (H): ICD-10-CM

## 2019-11-21 LAB
ALBUMIN SERPL-MCNC: 3.4 G/DL (ref 3.4–5)
ALP SERPL-CCNC: 67 U/L (ref 40–150)
ALT SERPL W P-5'-P-CCNC: 32 U/L (ref 0–70)
ANION GAP SERPL CALCULATED.3IONS-SCNC: 5 MMOL/L (ref 3–14)
AST SERPL W P-5'-P-CCNC: 25 U/L (ref 0–45)
BASOPHILS # BLD AUTO: 0 10E9/L (ref 0–0.2)
BASOPHILS NFR BLD AUTO: 0.3 %
BILIRUB SERPL-MCNC: 0.3 MG/DL (ref 0.2–1.3)
BUN SERPL-MCNC: 22 MG/DL (ref 7–30)
CALCIUM SERPL-MCNC: 8.8 MG/DL (ref 8.5–10.1)
CHLORIDE SERPL-SCNC: 110 MMOL/L (ref 94–109)
CO2 SERPL-SCNC: 25 MMOL/L (ref 20–32)
CREAT SERPL-MCNC: 0.98 MG/DL (ref 0.66–1.25)
DIFFERENTIAL METHOD BLD: ABNORMAL
EOSINOPHIL # BLD AUTO: 0.1 10E9/L (ref 0–0.7)
EOSINOPHIL NFR BLD AUTO: 1.2 %
ERYTHROCYTE [DISTWIDTH] IN BLOOD BY AUTOMATED COUNT: 18.5 % (ref 10–15)
GFR SERPL CREATININE-BSD FRML MDRD: 77 ML/MIN/{1.73_M2}
GLUCOSE SERPL-MCNC: 100 MG/DL (ref 70–99)
HCT VFR BLD AUTO: 40.6 % (ref 40–53)
HGB BLD-MCNC: 13.6 G/DL (ref 13.3–17.7)
IMM GRANULOCYTES # BLD: 0 10E9/L (ref 0–0.4)
IMM GRANULOCYTES NFR BLD: 0.1 %
LYMPHOCYTES # BLD AUTO: 3.5 10E9/L (ref 0.8–5.3)
LYMPHOCYTES NFR BLD AUTO: 51.6 %
MAGNESIUM SERPL-MCNC: 2 MG/DL (ref 1.6–2.3)
MCH RBC QN AUTO: 31.8 PG (ref 26.5–33)
MCHC RBC AUTO-ENTMCNC: 33.5 G/DL (ref 31.5–36.5)
MCV RBC AUTO: 95 FL (ref 78–100)
MONOCYTES # BLD AUTO: 0.8 10E9/L (ref 0–1.3)
MONOCYTES NFR BLD AUTO: 12.3 %
NEUTROPHILS # BLD AUTO: 2.3 10E9/L (ref 1.6–8.3)
NEUTROPHILS NFR BLD AUTO: 34.5 %
NRBC # BLD AUTO: 0 10*3/UL
NRBC BLD AUTO-RTO: 0 /100
PLATELET # BLD AUTO: 217 10E9/L (ref 150–450)
POTASSIUM SERPL-SCNC: 3.7 MMOL/L (ref 3.4–5.3)
PROT SERPL-MCNC: 6.9 G/DL (ref 6.8–8.8)
RBC # BLD AUTO: 4.28 10E12/L (ref 4.4–5.9)
SODIUM SERPL-SCNC: 140 MMOL/L (ref 133–144)
VIT B12 SERPL-MCNC: 455 PG/ML (ref 193–986)
WBC # BLD AUTO: 6.8 10E9/L (ref 4–11)

## 2019-11-21 PROCEDURE — 85025 COMPLETE CBC W/AUTO DIFF WBC: CPT | Performed by: NURSE PRACTITIONER

## 2019-11-21 PROCEDURE — G0463 HOSPITAL OUTPT CLINIC VISIT: HCPCS | Mod: ZF

## 2019-11-21 PROCEDURE — 83735 ASSAY OF MAGNESIUM: CPT | Performed by: NURSE PRACTITIONER

## 2019-11-21 PROCEDURE — 36415 COLL VENOUS BLD VENIPUNCTURE: CPT

## 2019-11-21 PROCEDURE — 82607 VITAMIN B-12: CPT | Performed by: NURSE PRACTITIONER

## 2019-11-21 PROCEDURE — 80053 COMPREHEN METABOLIC PANEL: CPT | Performed by: NURSE PRACTITIONER

## 2019-11-21 ASSESSMENT — PAIN SCALES - GENERAL: PAINLEVEL: NO PAIN (0)

## 2019-11-21 NOTE — NURSING NOTE
Chief Complaint   Patient presents with     Blood Draw     labs drawn via vpt by rn. vs taken      Blood drawn via vpt by RN in lab. VS taken. Pt checked into next appointment.   Mariely Trejo RN

## 2019-11-21 NOTE — NURSING NOTE
"Oncology Rooming Note    November 21, 2019 11:53 AM   Deejay Dior is a 69 year old male who presents for:    Chief Complaint   Patient presents with     Blood Draw     labs drawn via vpt by rn. vs taken      RECHECK     Return: MDS      Initial Vitals: /71 (BP Location: Right arm, Patient Position: Sitting, Cuff Size: Adult Regular)   Pulse 61   Temp 97.9  F (36.6  C) (Oral)   Resp 16   Wt 101.2 kg (223 lb 1.6 oz)   SpO2 98%   BMI 33.19 kg/m   Estimated body mass index is 33.19 kg/m  as calculated from the following:    Height as of 9/3/19: 1.746 m (5' 8.75\").    Weight as of this encounter: 101.2 kg (223 lb 1.6 oz). Body surface area is 2.22 meters squared.  No Pain (0) Comment: Data Unavailable   No LMP for male patient.  Allergies reviewed: Yes  Medications reviewed: Yes    Medications: Medication refills not needed today.  Pharmacy name entered into InMy[employer]: [id number] [place], [place] - [address] [address]    Clinical concerns: None       Chandrika Méndez CMA              "

## 2020-02-14 ENCOUNTER — APPOINTMENT (OUTPATIENT)
Dept: LAB | Facility: CLINIC | Age: 70
End: 2020-02-14
Attending: INTERNAL MEDICINE
Payer: MEDICARE

## 2020-02-14 ENCOUNTER — ONCOLOGY VISIT (OUTPATIENT)
Dept: TRANSPLANT | Facility: CLINIC | Age: 70
End: 2020-02-14
Attending: INTERNAL MEDICINE
Payer: MEDICARE

## 2020-02-14 VITALS
TEMPERATURE: 97.9 F | SYSTOLIC BLOOD PRESSURE: 121 MMHG | WEIGHT: 225.9 LBS | RESPIRATION RATE: 16 BRPM | BODY MASS INDEX: 33.6 KG/M2 | DIASTOLIC BLOOD PRESSURE: 66 MMHG | OXYGEN SATURATION: 96 % | HEART RATE: 73 BPM

## 2020-02-14 DIAGNOSIS — T86.09 OTHER COMPLICATION OF BONE MARROW TRANSPLANT (H): ICD-10-CM

## 2020-02-14 DIAGNOSIS — R35.0 URINARY FREQUENCY: ICD-10-CM

## 2020-02-14 DIAGNOSIS — D89.813 GVH (GRAFT VERSUS HOST DISEASE) (H): ICD-10-CM

## 2020-02-14 DIAGNOSIS — D46.9 MDS (MYELODYSPLASTIC SYNDROME) (H): Primary | ICD-10-CM

## 2020-02-14 DIAGNOSIS — D46.9 MDS (MYELODYSPLASTIC SYNDROME) (H): ICD-10-CM

## 2020-02-14 LAB
ALBUMIN SERPL-MCNC: 3.3 G/DL (ref 3.4–5)
ALP SERPL-CCNC: 59 U/L (ref 40–150)
ALT SERPL W P-5'-P-CCNC: 34 U/L (ref 0–70)
ANION GAP SERPL CALCULATED.3IONS-SCNC: 6 MMOL/L (ref 3–14)
AST SERPL W P-5'-P-CCNC: 38 U/L (ref 0–45)
BASOPHILS # BLD AUTO: 0 10E9/L (ref 0–0.2)
BASOPHILS NFR BLD AUTO: 0.3 %
BILIRUB SERPL-MCNC: 0.4 MG/DL (ref 0.2–1.3)
BUN SERPL-MCNC: 21 MG/DL (ref 7–30)
CALCIUM SERPL-MCNC: 8.9 MG/DL (ref 8.5–10.1)
CHLORIDE SERPL-SCNC: 109 MMOL/L (ref 94–109)
CO2 SERPL-SCNC: 25 MMOL/L (ref 20–32)
CREAT SERPL-MCNC: 0.9 MG/DL (ref 0.66–1.25)
DIFFERENTIAL METHOD BLD: ABNORMAL
EOSINOPHIL # BLD AUTO: 0.1 10E9/L (ref 0–0.7)
EOSINOPHIL NFR BLD AUTO: 1.3 %
ERYTHROCYTE [DISTWIDTH] IN BLOOD BY AUTOMATED COUNT: 17.2 % (ref 10–15)
GFR SERPL CREATININE-BSD FRML MDRD: 86 ML/MIN/{1.73_M2}
GLUCOSE SERPL-MCNC: 121 MG/DL (ref 70–99)
HCT VFR BLD AUTO: 39.4 % (ref 40–53)
HGB BLD-MCNC: 13.4 G/DL (ref 13.3–17.7)
IMM GRANULOCYTES # BLD: 0 10E9/L (ref 0–0.4)
IMM GRANULOCYTES NFR BLD: 0.2 %
LYMPHOCYTES # BLD AUTO: 2.6 10E9/L (ref 0.8–5.3)
LYMPHOCYTES NFR BLD AUTO: 41.3 %
MCH RBC QN AUTO: 32.1 PG (ref 26.5–33)
MCHC RBC AUTO-ENTMCNC: 34 G/DL (ref 31.5–36.5)
MCV RBC AUTO: 94 FL (ref 78–100)
MONOCYTES # BLD AUTO: 0.8 10E9/L (ref 0–1.3)
MONOCYTES NFR BLD AUTO: 13.4 %
NEUTROPHILS # BLD AUTO: 2.7 10E9/L (ref 1.6–8.3)
NEUTROPHILS NFR BLD AUTO: 43.5 %
NRBC # BLD AUTO: 0 10*3/UL
NRBC BLD AUTO-RTO: 0 /100
PLATELET # BLD AUTO: 221 10E9/L (ref 150–450)
POTASSIUM SERPL-SCNC: 3.8 MMOL/L (ref 3.4–5.3)
PROT SERPL-MCNC: 6.9 G/DL (ref 6.8–8.8)
RBC # BLD AUTO: 4.18 10E12/L (ref 4.4–5.9)
SODIUM SERPL-SCNC: 140 MMOL/L (ref 133–144)
WBC # BLD AUTO: 6.3 10E9/L (ref 4–11)

## 2020-02-14 PROCEDURE — 80053 COMPREHEN METABOLIC PANEL: CPT | Performed by: INTERNAL MEDICINE

## 2020-02-14 PROCEDURE — G0463 HOSPITAL OUTPT CLINIC VISIT: HCPCS | Mod: ZF

## 2020-02-14 PROCEDURE — 36415 COLL VENOUS BLD VENIPUNCTURE: CPT

## 2020-02-14 PROCEDURE — 85025 COMPLETE CBC W/AUTO DIFF WBC: CPT | Performed by: INTERNAL MEDICINE

## 2020-02-14 RX ORDER — SERTRALINE HYDROCHLORIDE 100 MG/1
TABLET, FILM COATED ORAL
Qty: 30 TABLET | Refills: 3 | Status: SHIPPED | OUTPATIENT
Start: 2020-02-14 | End: 2020-02-14

## 2020-02-14 RX ORDER — SERTRALINE HYDROCHLORIDE 100 MG/1
100 TABLET, FILM COATED ORAL DAILY
Qty: 90 TABLET | Refills: 0 | Status: SHIPPED | OUTPATIENT
Start: 2020-02-14 | End: 2020-07-13

## 2020-02-14 RX ORDER — PREDNISONE 5 MG/1
5 TABLET ORAL DAILY
Qty: 90 TABLET | Refills: 0 | Status: SHIPPED | OUTPATIENT
Start: 2020-02-14 | End: 2020-06-02

## 2020-02-14 ASSESSMENT — PAIN SCALES - GENERAL: PAINLEVEL: NO PAIN (0)

## 2020-02-14 NOTE — NURSING NOTE
Chief Complaint   Patient presents with     Blood Draw     labs drawn by venipuncture by rn.  vital signs taken.     Labs drawn by venipuncture by RN in lab.  Vital signs taken.      Edilia Melendez RN

## 2020-02-14 NOTE — NURSING NOTE
"Oncology Rooming Note    February 14, 2020 11:07 AM   Deejay Dior is a 69 year old male who presents for:    Chief Complaint   Patient presents with     Blood Draw     labs drawn by venipuncture by rn.  vital signs taken.     RECHECK     here for provider visit HX: MDS     Initial Vitals: /66 (BP Location: Right arm, Patient Position: Sitting, Cuff Size: Adult Regular)   Pulse 73   Temp 97.9  F (36.6  C) (Oral)   Resp 16   Wt 102.5 kg (225 lb 14.4 oz)   SpO2 96%   BMI 33.60 kg/m   Estimated body mass index is 33.6 kg/m  as calculated from the following:    Height as of 9/3/19: 1.746 m (5' 8.75\").    Weight as of this encounter: 102.5 kg (225 lb 14.4 oz). Body surface area is 2.23 meters squared.  No Pain (0) Comment: Data Unavailable   No LMP for male patient.  Allergies reviewed: Yes  Medications reviewed: Yes    Medications: MEDICATION REFILLS NEEDED TODAY. Provider was notified.  Pharmacy name entered into [employer]: [facility] #[id number] - [place], [place] - [address] [address]    Clinical concerns: Pt reports having no lower teeth and needs to wait another 1.5 months before the denture implants are placed.  PT denies recent fevers/cold symptoms.  PT reports VERY DRY EYES AND MOUTH.  Pt denies nausea or pain at this time.  Accompanied by wife.       Ivett Andrade RN              "

## 2020-02-14 NOTE — PROGRESS NOTES
BMT Clinic Note     ID:  Mr. 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  On pred 5mg every day and Jakafi 5 mg bid.  Still has very dry mouth and fatigue. S/P R hip replacement, now with minimal pain.  Eating well with no N/V/D.  Planning a trip to [place] next week. Had a basal cell cancer lesion removed (Moh's resection) close to left eye. (In the past has had a basal and squamous cell removed)  Review of Systems: 10 point ROS negative except as noted above.    Physical Exam:  /66 (BP Location: Right arm, Patient Position: Sitting, Cuff Size: Adult Regular)   Pulse 73   Temp 97.9  F (36.6  C) (Oral)   Resp 16   Wt 102.5 kg (225 lb 14.4 oz)   SpO2 96%   BMI 33.60 kg/m       Wt Readings from Last 4 Encounters:   02/14/20 102.5 kg (225 lb 14.4 oz)   11/21/19 101.2 kg (223 lb 1.6 oz)   10/10/19 101.7 kg (224 lb 4.8 oz)   09/03/19 103.9 kg (229 lb)     General: NAD, interactive, KPS 80%  Eyes: SARIKA, sclera anicteric  Nose/Mouth/Throat: OP clear, no ulcerations, very dry, upper plate, chronic lichenoid changes , no lip lesions   Lungs:CTA B, no crackles or wheezes   CV: RRR without murmurs rubs or gallops  Abd: S/NT/ND +BS  Skin:   RLE skin shiny with some reddened areas. Nontender. Trace edema L>R (chronic).    Neuro: A&O  Labs:  Lab Results   Component Value Date    WBC 6.3 02/14/2020    ANEU 2.7 02/14/2020    HGB 13.4 02/14/2020    HCT 39.4 (L) 02/14/2020     02/14/2020     02/14/2020    POTASSIUM 3.8 02/14/2020    CHLORIDE 109 02/14/2020    CO2 25 02/14/2020     (H) 02/14/2020    BUN 21 02/14/2020    CR 0.90 02/14/2020    MAG 2.0 11/21/2019    INR 0.98 02/14/2019     ASSESSMENT AND PLAN:  Mr. 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 Total  cell dose (from 7/1 & 7/2) 6.53 x 10^6 CD34+ cells/kg.   - 1 year anniversary (July 2015): 30% cellular, trilineage hematopoiesis, no abnormal blasts by morphology or flow , no dysplasia, 0-1 fibrosis, 100% donor (BM, CD3, CD15). CR  - 2 year anniversary (July 2016): 20-30% cellular, trilineage hematopoiesis, no abnormal blasts by morphology or flow , no dysplasia, No fibrosis, 100% donor in BM (PB not sent). ISCN: //46,XX[20] Complete Remission.    HEME: No transfusion needs, counts stable     - Was on anticoagulation after his hip replacment, this is now complete.     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 Had been off prednisone since summer 2017 and briefly tapered off Sirolimus (january 2018), however resumed with flare in February. 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 Sirolimus was stopped on 9/27 & Pred 90mg was started. Seen by Dr. Sandoval on 10/2, please see his note. He does not think his symptoms or CT findings are c/w Sirolimus or GVH & he was in favor of tapering Prednisone. Recently he has worsening skin thickening & desquamation to the RLE, c/w GVH.   Started Jakafi 10/22 at 5 mg BID with symptom improvement in lower extremities. 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   Burst pred 40mg a day on 1/3 with significant systemic arthralgic pain, tapered back to 10/0 eod after 1 week, near resolution of symptoms noted 1/17, returned to 10 mg every other day; then dropped to 5mg q day in April    Currently on Jakafi 5mg twice daily & Pred 5mg every day.  Has lost most of his teeth due to decay from xerosterma.  Not much relief with topical agents.     ID:  Afebrile no signs/sx of infection.   - IgG 1/24 =1130  - Prophy: HD ACV (renal dosing), Fluconazole and  Bactrim.   - Flu shot, Pneumvax &Shingrix 10/10 . He has  not had any immunizations in the past (besides the pneumovax and shingrix on 10/10)- he will start today (pediarix and HIB today) . Will need boosters in 2 months     GI:  protonix (has heartburn)    FEN/Renal:  Lytes & creat stable.    Fatigue:  stable  - History of testosterone deficiency, rechecked and modestly low. He previously did testosterone injections & didn't think it made him feel any better.    - TSH normal 2/28 and again on repeat 9/27 at 1.01.  - counseled that moderate exercise is really the only known way to combat fatigue, and acknowledged how difficult it is to balance too much with not enough activity.     Derm:   Venous stasis: see below-most recently had sclerotherapy to left LE    Vasc:   10/15 Right leg US with small (technically DVT) clot in posterior tibial vein: per Millie, started reg dose aspirin daily 325mg and recheck US in 2 weeks or symptomatically. U/S on 11/27 without DVT  Off lymphedema wraps  Discomfort since edema/shakes: oxy 1-2 tab during day and ativan 6 hours away from oxy for sleep.  H/o chronic venous statis: s/p sclerotherapy to the L leg.  kelfex day prior and 4 days following.     MS: avascular necrosis of hip.  S/p replacement surgery    RTC 3 months    Suzanne Collado

## 2020-02-22 NOTE — PROGRESS NOTES
"BMT Daily Progress Note     ID/CC:  Mr. 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     HPI: Deejay presents for follow up regarding dry mouth and throat symptoms. He stopped using dex swish and spit and was instead using the MMW swish and swallow for a few days last week, but didn't feel it helped. He then resumed dex swish and spit over the weekend. Yesterday his sore throat and painful swallowing \"magically resolved\" and has not had any throat symptoms since. Dry mouth continues but oral intake has been better now that sore throat is better. Was able to eat salmon, potatoes, etc. No fevers, chills, nausea/vomiting, diarrhea, or new rashes.   Review of Systems: 10 point ROS negative except as noted above.    Physical Exam:  /73  Pulse 69  Temp 98.1  F (36.7  C) (Tympanic)  Resp 16  Ht 1.73 m (5' 8.11\")  Wt 98.4 kg (217 lb)  SpO2 98%  BMI 32.89 kg/m2   Wt Readings from Last 4 Encounters:   02/27/18 98.4 kg (217 lb)   02/21/18 98.6 kg (217 lb 6.4 oz)   12/14/17 103 kg (227 lb)   10/31/17 102.5 kg (226 lb)     General: NAD  Eyes: SARIKA, sclera anicteric  Nose/Mouth/Throat: OP dry, lichenoid changes on both buccal mucosa. Significant erythema throughout. Posterior papillae appear enlarged. Uvulae appears to have lichenoid changes on it.  No prrulent drainage noted.   Skin:   Generalized hyperpigmentation.  Multiple ecchymoses & skin tears   Neuro: A&O  Line: NONE    Labs:  Lab Results   Component Value Date    WBC 8.4 02/27/2018    ANEU 5.6 02/27/2018    HGB 15.8 02/27/2018    HCT 49.2 02/27/2018     02/27/2018     02/21/2018    POTASSIUM 3.8 02/21/2018    CHLORIDE 110 (H) 02/21/2018    CO2 24 02/21/2018     (H) 02/21/2018    BUN 17 02/21/2018    CR 0.88 02/21/2018    MAG 2.0 12/14/2017    INR 0.95 06/30/2016       ASSESSMENT AND PLAN:  Mr. Dior is a 64 y/o male, 3 Years  s/p NMA allo " -- DO NOT REPLY / DO NOT REPLY ALL --  -- Message is from the [facility]--    Provider paged via [employer] Documentation - The below message was copied and pasted from a [employer] page:    [facility] Contact Center   [facility] Contact CenterACC   Regis Cummins MD   Secure Text   [phone] FROM: SELF ACC RE: LETICIA MELENDEZ PATIENT EXPERIENCING INTENSE ANXIETY, REQUESTING TO SPEAK TO DR FOR TREATMENT OPTIONS TO HELP WITH SYMPTOMS.TLOP*        sib PBSCT w/ cGVHD for MDS here for f/u.     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 Total cell dose (from 7/1 & 7/2) 6.53 x 10^6 CD34+ cells/kg.   - 1 year anniversary (July 2015): 30% cellular, trilineage hematopoiesis, no abnormal blasts by morphology or flow , no dysplasia, 0-1 fibrosis, 100% donor (BM, CD3, CD15). CR  - 2 year anniversary (July 2016): 20-30% cellular, trilineage hematopoiesis, no abnormal blasts by morphology or flow , no dysplasia, NO fibrosis, 100% donor in BM (PB not sent). ISCN:  //46,XX[20] Complete Remission.    HEME:  Stable 2/27   -Transfuse to keep Hgb >8 & plt >50.   Not needing transfusions.  -Dx with RUE DVT 3/28/2016 and then a R femoral DVT was found incidentally on CT on 3/31/16 which developed while on Lovenox.  Bridged with Coumadin (started 4/5) through 6/30/16 (3 months).  Now off coumadin. 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 Rest of R and L venous system patent.  Follow.    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 Has been off prednisone since summer 2017 and recently tapered off Sirolimus (january 2018).   -  New significant dry mouth with lichenoid and erythematous changes to mouth 2/19. Started on dex swish and spit 2/19- Patient using inconsistently on and off with MMW, just restarted dex on 2/24.   - Significant throat pain with associated weight loss, MMW gave little relief. Throat pain spontaneously resolved 2/25 without recurrence.   - Strep swab 2/19 was negative. HSV swab was not received by lab.   - Given resolution of throat symptoms and improved oral intake, will have patient consistently do dex swish and spit 4x per day for 1 week. If no improvement, may need to start prednisone. Discussed with DOM who was in agreement on giving  patient another week prior to committing him to steroids.     ID:   Afebrile.    -  RVP and strep swab negative.  HSV swab never received by lab.   -  Recent hx of multiple episodes of PNA/URI/sinusitis.  Infl B on 5/5, Influenza A, H1N1 & HCAP in March 2016 & Geotrichim by BAL.  Please see Dr. Sandoval's note, he thinks his symptoms are consistent with bronchiolitis/organizing pneumonia post H1N1 infection & is recommending continuing the current dose of Pred [id number] & then re assess.    - IgG = 403, repleted 3/22/16, 5/8/16= 1270   - Prophy:  HD ACV (renal dosing), Fluconazole and SS daily Bactrim.   - CMV neg 9/22/16      6. FEN/Renal:  Cr and lytes WNL  - Wt. Loss: 10 lbs since December- patient attributes this primarily to his sore throat x 3 weeks that he has eaten very little. Oral intake now improving since resolution of throat symptoms, wt stable today.     7. Fatigue: Continues to have ongoing fatigue. Consider evaluating for adrenal insufficiency and/or thyroid dysfunction once acute issues have been evaluated.   - Added on TSH with T4 reflex to labs today, unable to add on cortisol.       Plan: Dex swish and spit 4x per day for one week for ongoing mouth symptoms. Patient to call if throat symptoms recur.     RTC: 3/8 with labs and [place] visit to evaluate possible need prednisone.    ADDENDUM: After patient left, CMP came back- has new mild elevation of ALT, AST, and Alk Phos, bilirubin normal. Concern for possible GVHD. Patient is asymptomatic from a GI perspective, without nausea, vomiting, or diarrhea. Plan to have patient return next week as planned and recheck liver numbers. If worse, will likely need steroids. Called patient and notified him of this. Requested that he call the clinic sooner if he has a change in his stools, right sided abdominal pain, or yellowing of his skin.     Jamey Mckenzie PA-C  [phone]

## 2020-03-02 ENCOUNTER — HEALTH MAINTENANCE LETTER (OUTPATIENT)
Age: 70
End: 2020-03-02

## 2020-03-30 DIAGNOSIS — Z94.81 STATUS POST BONE MARROW TRANSPLANT (H): ICD-10-CM

## 2020-04-01 RX ORDER — FLUCONAZOLE 100 MG/1
100 TABLET ORAL DAILY
Qty: 90 TABLET | Refills: 2 | Status: SHIPPED | OUTPATIENT
Start: 2020-04-01 | End: 2020-12-17

## 2020-04-21 DIAGNOSIS — Z94.81 STATUS POST BONE MARROW TRANSPLANT (H): ICD-10-CM

## 2020-04-21 RX ORDER — LEVOFLOXACIN 250 MG/1
250 TABLET, FILM COATED ORAL DAILY
Qty: 90 TABLET | Refills: 3 | Status: SHIPPED | OUTPATIENT
Start: 2020-04-21 | End: 2021-06-01

## 2020-05-15 ENCOUNTER — TELEPHONE (OUTPATIENT)
Dept: OPHTHALMOLOGY | Facility: CLINIC | Age: 70
End: 2020-05-15

## 2020-05-15 ENCOUNTER — APPOINTMENT (OUTPATIENT)
Dept: LAB | Facility: CLINIC | Age: 70
End: 2020-05-15
Attending: INTERNAL MEDICINE
Payer: MEDICARE

## 2020-05-15 ENCOUNTER — ONCOLOGY VISIT (OUTPATIENT)
Dept: TRANSPLANT | Facility: CLINIC | Age: 70
End: 2020-05-15
Attending: INTERNAL MEDICINE
Payer: MEDICARE

## 2020-05-15 VITALS
TEMPERATURE: 97.8 F | SYSTOLIC BLOOD PRESSURE: 124 MMHG | HEART RATE: 71 BPM | BODY MASS INDEX: 34.36 KG/M2 | DIASTOLIC BLOOD PRESSURE: 66 MMHG | RESPIRATION RATE: 18 BRPM | WEIGHT: 232 LBS | OXYGEN SATURATION: 97 % | HEIGHT: 69 IN

## 2020-05-15 DIAGNOSIS — D89.813 GVH (GRAFT VERSUS HOST DISEASE) (H): ICD-10-CM

## 2020-05-15 LAB
ALBUMIN SERPL-MCNC: 3.4 G/DL (ref 3.4–5)
ALP SERPL-CCNC: 52 U/L (ref 40–150)
ALT SERPL W P-5'-P-CCNC: 37 U/L (ref 0–70)
ANION GAP SERPL CALCULATED.3IONS-SCNC: 6 MMOL/L (ref 3–14)
AST SERPL W P-5'-P-CCNC: 40 U/L (ref 0–45)
BASOPHILS # BLD AUTO: 0 10E9/L (ref 0–0.2)
BASOPHILS NFR BLD AUTO: 0.3 %
BILIRUB SERPL-MCNC: 0.2 MG/DL (ref 0.2–1.3)
BUN SERPL-MCNC: 25 MG/DL (ref 7–30)
CALCIUM SERPL-MCNC: 9.2 MG/DL (ref 8.5–10.1)
CHLORIDE SERPL-SCNC: 110 MMOL/L (ref 94–109)
CO2 SERPL-SCNC: 23 MMOL/L (ref 20–32)
CREAT SERPL-MCNC: 0.99 MG/DL (ref 0.66–1.25)
DIFFERENTIAL METHOD BLD: ABNORMAL
EOSINOPHIL # BLD AUTO: 0.1 10E9/L (ref 0–0.7)
EOSINOPHIL NFR BLD AUTO: 1.4 %
ERYTHROCYTE [DISTWIDTH] IN BLOOD BY AUTOMATED COUNT: 17.3 % (ref 10–15)
GFR SERPL CREATININE-BSD FRML MDRD: 76 ML/MIN/{1.73_M2}
GLUCOSE SERPL-MCNC: 98 MG/DL (ref 70–99)
HCT VFR BLD AUTO: 39.2 % (ref 40–53)
HGB BLD-MCNC: 13.2 G/DL (ref 13.3–17.7)
IMM GRANULOCYTES # BLD: 0 10E9/L (ref 0–0.4)
IMM GRANULOCYTES NFR BLD: 0.2 %
LYMPHOCYTES # BLD AUTO: 2.8 10E9/L (ref 0.8–5.3)
LYMPHOCYTES NFR BLD AUTO: 48.4 %
MCH RBC QN AUTO: 32 PG (ref 26.5–33)
MCHC RBC AUTO-ENTMCNC: 33.7 G/DL (ref 31.5–36.5)
MCV RBC AUTO: 95 FL (ref 78–100)
MONOCYTES # BLD AUTO: 1 10E9/L (ref 0–1.3)
MONOCYTES NFR BLD AUTO: 16.7 %
NEUTROPHILS # BLD AUTO: 1.9 10E9/L (ref 1.6–8.3)
NEUTROPHILS NFR BLD AUTO: 33 %
NRBC # BLD AUTO: 0 10*3/UL
NRBC BLD AUTO-RTO: 0 /100
PLATELET # BLD AUTO: 187 10E9/L (ref 150–450)
POTASSIUM SERPL-SCNC: 4 MMOL/L (ref 3.4–5.3)
PROT SERPL-MCNC: 7.1 G/DL (ref 6.8–8.8)
RBC # BLD AUTO: 4.13 10E12/L (ref 4.4–5.9)
SODIUM SERPL-SCNC: 139 MMOL/L (ref 133–144)
WBC # BLD AUTO: 5.8 10E9/L (ref 4–11)

## 2020-05-15 PROCEDURE — 85025 COMPLETE CBC W/AUTO DIFF WBC: CPT | Performed by: INTERNAL MEDICINE

## 2020-05-15 PROCEDURE — G0463 HOSPITAL OUTPT CLINIC VISIT: HCPCS | Mod: ZF

## 2020-05-15 PROCEDURE — 80053 COMPREHEN METABOLIC PANEL: CPT | Performed by: INTERNAL MEDICINE

## 2020-05-15 PROCEDURE — 36415 COLL VENOUS BLD VENIPUNCTURE: CPT

## 2020-05-15 ASSESSMENT — PAIN SCALES - GENERAL: PAINLEVEL: NO PAIN (0)

## 2020-05-15 ASSESSMENT — MIFFLIN-ST. JEOR: SCORE: 1793.76

## 2020-05-15 NOTE — NURSING NOTE
Chief Complaint   Patient presents with     Lab Only     venipuncture, vitals checked     Samina Herrera RN on 5/15/2020 at 10:17 AM

## 2020-05-15 NOTE — PROGRESS NOTES
"BMT Clinic Note     ID:  Mr. 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  On pred 5mg every day and Jakafi 5 mg bid.  Still has very dry mouth and fatigue. Very dry eyes, using artificial tears 5-6 times/ days S/P R hip replacement, now with minimal pain.  Eating well with no N/V/D.  Had a basal cell cancer lesion removed (Moh's resection) close to left eye. (In the past has had a basal and squamous cell removed)  Review of Systems: 10 point ROS negative except as noted above.    Physical Exam:  /66   Pulse 71   Temp 97.8  F (36.6  C)   Resp 18   Ht 1.746 m (5' 8.75\")   Wt 105.2 kg (232 lb)   SpO2 97%   BMI 34.51 kg/m       Wt Readings from Last 4 Encounters:   05/15/20 105.2 kg (232 lb)   02/14/20 102.5 kg (225 lb 14.4 oz)   11/21/19 101.2 kg (223 lb 1.6 oz)   10/10/19 101.7 kg (224 lb 4.8 oz)     General: NAD, interactive, KPS 80%  Eyes: SARIKA, sclera anicteric  Nose/Mouth/Throat: OP clear, no ulcerations, very dry, upper plate, chronic lichenoid changes , no lip lesions   Lungs:CTA B, no crackles or wheezes   CV: RRR without murmurs rubs or gallops  Abd: S/NT/ND +BS  Skin:   RLE skin shiny with some reddened areas. Nontender. Trace edema L>R (chronic).    Neuro: A&O  Labs:  Lab Results   Component Value Date    WBC 5.8 05/15/2020    ANEU 1.9 05/15/2020    HGB 13.2 (L) 05/15/2020    HCT 39.2 (L) 05/15/2020     05/15/2020     05/15/2020    POTASSIUM 4.0 05/15/2020    CHLORIDE 110 (H) 05/15/2020    CO2 23 05/15/2020    GLC 98 05/15/2020    BUN 25 05/15/2020    CR 0.99 05/15/2020    MAG 2.0 11/21/2019    INR 0.98 02/14/2019     ASSESSMENT AND PLAN:  Mr. 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 Total cell dose (from 7/1 & 7/2) 6.53 x 10^6 CD34+ " cells/kg.   - 1 year anniversary (July 2015): 30% cellular, trilineage hematopoiesis, no abnormal blasts by morphology or flow , no dysplasia, 0-1 fibrosis, 100% donor (BM, CD3, CD15). CR  - 2 year anniversary (July 2016): 20-30% cellular, trilineage hematopoiesis, no abnormal blasts by morphology or flow , no dysplasia, No fibrosis, 100% donor in BM (PB not sent). ISCN: //46,XX[20] Complete Remission.    HEME: No transfusion needs, counts stable     - Was on anticoagulation after his hip replacment, this is now complete.     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 Had been off prednisone since summer 2017 and briefly tapered off Sirolimus (january 2018), however resumed with flare in February. 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 Sirolimus was stopped on 9/27 & Pred 90mg was started. Seen by Dr. Sandoval on 10/2, please see his note. He does not think his symptoms or CT findings are c/w Sirolimus or GVH & he was in favor of tapering Prednisone. Recently he has worsening skin thickening & desquamation to the RLE, c/w GVH.   Started Jakafi 10/22 at 5 mg BID with symptom improvement in lower extremities. 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   Burst pred 40mg a day on 1/3 with significant systemic arthralgic pain, tapered back to 10/0 eod after 1 week, near resolution of symptoms noted 1/17, returned to 10 mg every other day; then dropped to 5mg q day in April    Currently on Jakafi 5mg twice daily & Pred 5mg every day.  Has lost most of his teeth due to decay from xerosterma.  Not much relief with topical agents. Is planned to get dental implants  Eyes are very dry- will refer to ophthalmology    ID:  Afebrile no signs/sx of infection.   - IgG 1/24 =1130  - Prophy: HD ACV (renal dosing), Fluconazole and  Bactrim.       GI:   protonix (has heartburn)    FEN/Renal:  Lytes & creat stable.    Fatigue:  stable  - History of testosterone deficiency, rechecked and modestly low. He previously did testosterone injections & didn't think it made him feel any better.    - TSH normal 2/28 and again on repeat 9/27 at 1.01.  - counseled that moderate exercise is really the only known way to combat fatigue, and acknowledged how difficult it is to balance too much with not enough activity.     Derm:   Venous stasis: see below-most recently had sclerotherapy to left LE    Vasc:   10/15 Right leg US with small (technically DVT) clot in posterior tibial vein: per Millie, started reg dose aspirin daily 325mg and recheck US in 2 weeks or symptomatically. U/S on 11/27 without DVT  Off lymphedema wraps  Discomfort since edema/shakes: oxy 1-2 tab during day and ativan 6 hours away from oxy for sleep.  H/o chronic venous statis: s/p sclerotherapy to the L leg.  kelfex day prior and 4 days following.     MS: avascular necrosis of hip.  S/p replacement surgery    RTC 3 months  Refer to ophthalmology    Suzanne Collado

## 2020-05-15 NOTE — NURSING NOTE
"Oncology Rooming Note    May 15, 2020 10:24 AM   Deejay Dior is a 69 year old male who presents for:    Chief Complaint   Patient presents with     Lab Only     venipuncture, vitals checked     RECHECK     Return: MDS (myelodysplastic syndrome)     Initial Vitals: /66   Pulse 71   Temp 97.8  F (36.6  C)   Resp 18   Ht 1.746 m (5' 8.75\")   Wt 105.2 kg (232 lb)   SpO2 97%   BMI 34.51 kg/m   Estimated body mass index is 34.51 kg/m  as calculated from the following:    Height as of this encounter: 1.746 m (5' 8.75\").    Weight as of this encounter: 105.2 kg (232 lb). Body surface area is 2.26 meters squared.  No Pain (0) Comment: Data Unavailable   No LMP for male patient.  Allergies reviewed: Yes  Medications reviewed: Yes    Medications: Medication refills not needed today.  Pharmacy name entered into [employer]: [facility] #[id number] - [facility] - [place] [address] [address]    Clinical concerns: N/A      Miriam Lehman CMA              "

## 2020-06-02 DIAGNOSIS — D46.9 MDS (MYELODYSPLASTIC SYNDROME) (H): ICD-10-CM

## 2020-06-02 RX ORDER — PREDNISONE 5 MG/1
5 TABLET ORAL DAILY
Qty: 90 TABLET | Refills: 3 | Status: ON HOLD | OUTPATIENT
Start: 2020-06-02 | End: 2021-03-01

## 2020-06-08 NOTE — PROGRESS NOTES
HPI:  Patient presents for GVHD evaluation for involvement in the eyes. 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 There is foreign body sensation in the right eye occasionally. Artificial tears seems to help.       Pertinent Medical History:    AML s/p BMT 07/01/2014    Adrenal insufficiency    Hypogonadism     DVT    GHD    Myelodysplastic Syndrome    Ocular History:    Cataract surgery both eyes 2015    Dry Eye Syndrome    Eye Medications:    Preservative free artificial tears.     Assessment and Plan:  1.   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     Previous punctal plug left lower lid    There is also meibomian gland dysfunction both eyes.     Start warm compress at bedtime both eyes    Start optional lid massage at bedtime both eyes    Continue preservative free artificial tears 5 times daily both eyes.     Start restasis 2 times daily both eyes.     Keep all eye drops 5 minutes apart.     Follow up in 1 month with me.     2.   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     Biopsy showed GVHD colon and skin.     3.   Dry vs Wet Age Related Macular Degeneration vs pattern dystrophy, both eyes.     Macular OCT done today 06/10/2020. See retina to rule out sub-clinical CNVM.     Start ARED's 2 vitamins PO 2 times daily.      Recommends fish and green leafy vegetables 3 days per week.     No smoking.     Recommends UV protection.     Return immediately if there are distortions to amsler grid.     4.   Posterior vitreous detachment, right eye. Retina attached.     Educated on signs and symptoms of a retinal detachment to be seen immediately.         Medical History:  Past Medical History:   Diagnosis Date     Acute deep vein thrombosis (DVT) of distal end of right lower extremity (H)      Clotting disorder (H)      Depression, major      Glucose intolerance      H/O bone marrow transplant (H)      Head injury 1964     Hearing problem Hearing aids 2015     History  of blood transfusion 3/2014     History of radiation therapy June 2014     [place] (hard of hearing)     bilateral     Immunosuppression (H) Sirolimus daily     Lymphedema of both lower extremities      MDS (myelodysplastic syndrome) (H)      MDS/MPN (myelodysplastic/myeloproliferative neoplasms) (H)      Mixed hyperlipidemia      Numbness and tingling     feet     Obstructive sleep apnea 1999     Pneumonia      Reduced vision August 2016    Cataract surgery     Sleep apnea 1999     Transplant July 1, 2014    Stem Cells       Medications:  Current Outpatient Medications   Medication Sig Dispense Refill     acetaminophen (TYLENOL) 500 MG tablet Take 1-2 tablets (500-1,000 mg) by mouth every 6 hours as needed for mild pain 60 tablet 0     acyclovir (ZOVIRAX) 800 MG tablet Take 1 tablet (800 mg) by mouth 3 times daily 180 tablet 6     amoxicillin (AMOXIL) 500 MG capsule Take 4 pills (2 grams) day of dental work (Patient not taking: Reported on 2/14/2020) 16 capsule 3     augmented betamethasone dipropionate (DIPROLENE-AF) 0.05 % external ointment Apply topically 2 times daily For areas of rash on the lower leg 50 g 1     calcium carbonate-vitamin D 500-400 MG-UNIT TABS tablt Take 1 tablet by mouth daily 2000 mg 180 tablet 3     fluconazole (DIFLUCAN) 100 MG tablet Take 1 tablet (100 mg) by mouth daily 90 tablet 2     Hypromellose (ARTIFICIAL TEARS OP) Apply 1-2 drops to eye 2 times daily as needed       levofloxacin (LEVAQUIN) 250 MG tablet Take 1 tablet (250 mg) by mouth daily 90 tablet 3     oxyCODONE IR (ROXICODONE) 10 MG tablet Take 0.5 tablets (5 mg) by mouth every 4 hours as needed for severe pain (Patient not taking: Reported on 2/14/2020) 60 tablet 0     pantoprazole (PROTONIX) 20 MG EC tablet Take 1 tablet (20 mg) by mouth daily 30 tablet 11     predniSONE (DELTASONE) 5 MG tablet Take 1 tablet (5 mg) by mouth daily 90 tablet 3     Prenatal Vit-Fe Fumarate-FA (PRENATAL MULTIVITAMIN W/IRON) 27-0.8 MG tablet Take 1  tablet by mouth daily       ruxolitinib (JAKAFI) 5 MG TABS tablet CHEMO Take 1 tablet (5 mg) by mouth 2 times daily 60 tablet 11     senna (SENOKOT) 8.6 MG tablet Take 1-2 tablets by mouth daily 14 tablet 0     sertraline (ZOLOFT) 100 MG tablet Take 1 tablet (100 mg) by mouth daily 90 tablet 0     sulfamethoxazole-trimethoprim (BACTRIM DS/SEPTRA DS) 800-160 MG tablet Take one tablet by mouth twice daily on Mondays and Tuesdays only. 48 tablet 3     triamcinolone (KENALOG) 0.1 % ointment Apply twice a day to lower leg       Vitamin D, Cholecalciferol, 1000 units TABS Take 1,000 Units by mouth daily     Complete documentation of historical and exam elements from today's encounter can be found in the full encounter summary report (not reduplicated in this progress note). I personally obtained the chief complaint(s) and history of present illness.  I confirmed and edited as necessary the review of systems, past medical/surgical history, family history, social history, and examination findings as documented by others; and I examined the patient myself. I personally reviewed the relevant tests, images, and reports as documented above. I formulated and edited as necessary the assessment and plan and discussed the findings and management plan with the patient and family. - Nacho Rabago OD

## 2020-06-10 ENCOUNTER — OFFICE VISIT (OUTPATIENT)
Dept: OPHTHALMOLOGY | Facility: CLINIC | Age: 70
End: 2020-06-10
Attending: OPTOMETRIST
Payer: MEDICARE

## 2020-06-10 DIAGNOSIS — H35.3131 EARLY DRY STAGE NONEXUDATIVE AGE-RELATED MACULAR DEGENERATION OF BOTH EYES: ICD-10-CM

## 2020-06-10 DIAGNOSIS — H16.223 KERATITIS SICCA, BILATERAL: Primary | ICD-10-CM

## 2020-06-10 DIAGNOSIS — H35.89 RPE MOTTLING OF MACULA: ICD-10-CM

## 2020-06-10 PROCEDURE — 92134 CPTRZ OPH DX IMG PST SGM RTA: CPT | Mod: ZF | Performed by: OPTOMETRIST

## 2020-06-10 PROCEDURE — G0463 HOSPITAL OUTPT CLINIC VISIT: HCPCS | Mod: ZF

## 2020-06-10 RX ORDER — ANTIOX #8/OM3/DHA/EPA/LUT/ZEAX 250-2.5 MG
1 CAPSULE ORAL 2 TIMES DAILY
Qty: 60 CAPSULE | Refills: 11 | Status: ON HOLD | OUTPATIENT
Start: 2020-06-10 | End: 2021-02-09

## 2020-06-10 RX ORDER — CYCLOSPORINE 0.5 MG/ML
1 EMULSION OPHTHALMIC 2 TIMES DAILY
Qty: 1 BOX | Refills: 11 | Status: SHIPPED | OUTPATIENT
Start: 2020-06-10 | End: 2020-12-30

## 2020-06-10 ASSESSMENT — CONF VISUAL FIELD
OD_NORMAL: 1
METHOD: COUNTING FINGERS
OS_NORMAL: 1

## 2020-06-10 ASSESSMENT — VISUAL ACUITY
OS_SC: 20/20
OS_SC+: -3
OD_PH_SC: 20/25
OD_SC: 20/40
METHOD: SNELLEN - LINEAR

## 2020-06-10 ASSESSMENT — SLIT LAMP EXAM - LIDS: COMMENTS: MGD

## 2020-06-10 ASSESSMENT — TONOMETRY
OS_IOP_MMHG: 12
OD_IOP_MMHG: 15
IOP_METHOD: TONOPEN

## 2020-06-10 ASSESSMENT — REFRACTION_WEARINGRX
OD_ADD: +2.50
OS_ADD: +2.50
OS_SPHERE: -1.25
OS_AXIS: 154
OD_SPHERE: -1.00
OS_CYLINDER: +2.00
OD_AXIS: 024
OD_CYLINDER: +1.25

## 2020-06-10 ASSESSMENT — EXTERNAL EXAM - RIGHT EYE: OD_EXAM: NORMAL

## 2020-06-10 ASSESSMENT — EXTERNAL EXAM - LEFT EYE: OS_EXAM: NORMAL

## 2020-06-10 NOTE — PATIENT INSTRUCTIONS
1. Warm compress with dry eye mask, at bedtime, both eyes.     2. Optional lid massage, both eyes, at bedtime.    3. Fish oil/omega, 2,000 mg per day, by mouth.    4. Preservative free artifical tears, 5 times daily, both eyes.     5. Restasis, 2 times daily, both eyes    Keep all eye drops 5 minutes apart

## 2020-06-11 ENCOUNTER — TELEPHONE (OUTPATIENT)
Dept: OPHTHALMOLOGY | Facility: CLINIC | Age: 70
End: 2020-06-11

## 2020-06-11 NOTE — NURSING NOTE
Chief Complaint   Patient presents with     RECHECK     return endocrine     Anahi Zavaleta CMA   no

## 2020-06-11 NOTE — TELEPHONE ENCOUNTER
Pt to f/u in 1-2 week with Retina specialist per Dr. Rabago for wet AMD eval-- concern on OCT of possbile CNVM    Dr. Rabago attempted to call yesterday     I spoke to pt and reviewed referral to retina specialist    Pt prefers week of June 22nd     Scheduled afternoon appt June 23rd with Dr. Hector    Pt aware of date/time/location at [facility] and aware to call for any sudden changes between visits    Armin Zarate RN 1:04 PM 06/11/20

## 2020-06-22 DIAGNOSIS — Z94.81 STATUS POST BONE MARROW TRANSPLANT (H): ICD-10-CM

## 2020-06-22 RX ORDER — SULFAMETHOXAZOLE/TRIMETHOPRIM 800-160 MG
TABLET ORAL
Qty: 48 TABLET | Refills: 3 | Status: SHIPPED | OUTPATIENT
Start: 2020-06-22 | End: 2021-09-16

## 2020-06-23 ENCOUNTER — OFFICE VISIT (OUTPATIENT)
Dept: OPHTHALMOLOGY | Facility: CLINIC | Age: 70
End: 2020-06-23
Attending: OPHTHALMOLOGY
Payer: MEDICARE

## 2020-06-23 DIAGNOSIS — H43.813 PVD (POSTERIOR VITREOUS DETACHMENT), BILATERAL: ICD-10-CM

## 2020-06-23 DIAGNOSIS — H35.3132 INTERMEDIATE STAGE NONEXUDATIVE AGE-RELATED MACULAR DEGENERATION OF BOTH EYES: Primary | ICD-10-CM

## 2020-06-23 PROCEDURE — 92134 CPTRZ OPH DX IMG PST SGM RTA: CPT | Mod: ZF | Performed by: OPHTHALMOLOGY

## 2020-06-23 PROCEDURE — G0463 HOSPITAL OUTPT CLINIC VISIT: HCPCS | Mod: ZF

## 2020-06-23 RX ORDER — ANTIOX #8/OM3/DHA/EPA/LUT/ZEAX 250-2.5 MG
1 CAPSULE ORAL 2 TIMES DAILY
Status: ON HOLD | COMMUNITY
End: 2021-03-01

## 2020-06-23 ASSESSMENT — EXTERNAL EXAM - RIGHT EYE: OD_EXAM: NORMAL

## 2020-06-23 ASSESSMENT — CONF VISUAL FIELD
OS_NORMAL: 1
METHOD: COUNTING FINGERS
OD_NORMAL: 1

## 2020-06-23 ASSESSMENT — VISUAL ACUITY
OD_SC: 20/40
OS_PH_SC+: -2
OS_PH_SC: 20/25
OS_SC: 20/30
OD_PH_SC: 20/30
METHOD: SNELLEN - LINEAR

## 2020-06-23 ASSESSMENT — EXTERNAL EXAM - LEFT EYE: OS_EXAM: NORMAL

## 2020-06-23 ASSESSMENT — TONOMETRY
OD_IOP_MMHG: 16
IOP_METHOD: TONOPEN
OS_IOP_MMHG: 15

## 2020-06-23 ASSESSMENT — REFRACTION_WEARINGRX
OS_AXIS: 148
OD_ADD: +2.75
OS_SPHERE: -1.25
OD_SPHERE: -0.75
SPECS_TYPE: LAST MRX
OD_AXIS: 015
OS_ADD: +2.75
OS_CYLINDER: +1.50
OD_CYLINDER: +1.25

## 2020-06-23 ASSESSMENT — SLIT LAMP EXAM - LIDS
COMMENTS: MGD
COMMENTS: MGD

## 2020-06-23 ASSESSMENT — CUP TO DISC RATIO
OS_RATIO: 0.35
OD_RATIO: 0.35

## 2020-06-23 NOTE — NURSING NOTE
Chief Complaints and History of Present Illnesses   Patient presents with     Macular Degeneration Evaluation     Chief Complaint(s) and History of Present Illness(es)     Macular Degeneration Evaluation     Laterality: both eyes              Comments     Pt states vision is the same as last visit. Pt feeling irritation in RE that comes and goes for the past 6 months, relief with drops.  No flashes or floaters. No redness. No eye pain.    EZRA Montgomery June 23, 2020 2:13 PM

## 2020-06-23 NOTE — PROGRESS NOTES
CC -   Dry YAMD    INTERVAL HISTORY - Initial visit    HPI -   Deejay Dior is a  69 year old year-old patient with history of dry AMD  Has AML s/p BMT 7/2014 and GVHD.  No DM      PAST OCULAR SURGERY  CE/IOL OU 2015    RETINAL IMAGING:  OCT 6-23-20  OD - mild focal RPE irregular, ?nasal macula small FVPED changes from 6-10-20, PVD  OS - mild ERM, subfoveal deposits, no fluid, PVD      ASSESSMENT & PLAN    1. Dry AMD OU intermediate   - may have pattern component   - OCT-A done 6/2020  No CNVM   - AREDS/Amsler   - f/u 6 months    2. PVD OU   - advised S/Sx RD      3. GVHD   - TABITHA      4. PCO OU   - mild    - refer patient to see if YAG would be helpful        return to clinic: 6 months, OCT OU    ATTESTATION     Attending Attestation:     Complete documentation of historical and exam elements from today's encounter can be found in the full encounter summary report (not reduplicated in this progress note).  I personally obtained the chief complaint(s) and history of present illness.  I confirmed and edited as necessary the review of systems, past medical/surgical history, family history, social history, and examination findings as documented by others; and I examined the patient myself.  I personally reviewed the relevant tests, images, and reports as documented above.  I personally reviewed the ophthalmic test(s) associated with this encounter, agree with the interpretation(s) as documented by the resident/fellow, and have edited the corresponding report(s) as necessary.   I formulated and edited as necessary the assessment and plan and discussed the findings and management plan with the patient and family    Marisol Hector MD, PhD  , Vitreoretinal Surgery  Department of Ophthalmology  [facility]

## 2020-06-23 NOTE — PROGRESS NOTES
CC: AMD    HPI: Deejay Dior is a  69 year old year-old patient with AML s/p BMT, GVH, TABITHA history of 6 months of FBS, RE > LE sent for evaluation of possible wet AMD. He notes longstanding stable rare floaters. No flashes of light or blind spots. No distortion with Amsler, though notes haziness of left eye superiorly w/ Amsler. No hx of DM. Pt takes 5 mg prednisone. Taking roxulitinib for GVHD; started 10/2018. Taking Bactrim for pulmonary PPX. Also takes Levaquin and diflucan as PPX since BMT.    Eye meds:  Restasis BID (started 6/10/2020 by Dr Rabago)  PFAT's    Retinal Imaging:  OCT 6/23/20  RE: normal foveal contour, nasal area of outer retinal disruption, few small focal areas of nasal RPE thickening  LE: preserved foveal contour, small FVPED inf subfoveal, few scattered amall areas of focal RPE thickening    Assessment & Plan:    1. RPE degeneration each eye   Likely moderate dry AMD each eye. Subfoveal hyperreflectivity of left eye on OCT could be early CNVM. Meds in roxulitinib family have been shown to cause retinal inflammation, though not likely in this case.    Hazy Amsler top central w/ left eye   Continue AREDS   Continue Amsler daily   Return precautions for sudden changes     RCK: 3 months with dilation and OCT macula, PRN sooner    Sathya Santos MD  Department of Ophthalmology - PGY3

## 2020-06-25 ENCOUNTER — TELEPHONE (OUTPATIENT)
Dept: ONCOLOGY | Facility: CLINIC | Age: 70
End: 2020-06-25

## 2020-06-25 NOTE — TELEPHONE ENCOUNTER
[place]/ Assistance Approved    Medication Jakafi  Amount/ $ 8500  [facility]  Phone #   Fax #   Effective Dates 6/25/2020 -10/21/2020  Additional Information renewal on Sept 21, 2020  Patient notified? Yes  Jurgen Moreno CPhT  [facility]  Oncology Pharmacy Liaison   [email or web address]  [phone] (phone  [phone] (fax

## 2020-07-06 NOTE — PROGRESS NOTES
HPI:  Patient presents for GVHD evaluation for involvement in the eyes. 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 There is foreign body sensation in the right eye occasionally. Artificial tears seems to help.     Interval update: His eyes are still dry. The left eye has the plug in there and feels less dry. 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 S/P removal 2020    Eye Medications:    Preservative free artificial tears.     Assessment and Plan:  1.   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     Punctal plug left lower lid. Patient may want punctal plug in the right lower lid.     There is also meibomian gland dysfunction both eyes.     Continue preservative free artificial tears 5 times daily both eyes.     Continue restasis 2 times daily both eyes - started on 06/10/2020.    Start systane gel drops 2 times daily.     Start warm compress at bedtime both eyes    Keep all eye drops 5 minutes apart.     Follow up in 1 month with me.     2.   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     Biopsy showed GVHD colon and skin.     3.   Dry Age Related Macular Degeneration, both eyes.     Seen by Dr. Hector on 06/10/2020.     Dry AMD - may have pattern component.    Continue ARED's 2 vitamins PO 2 times daily.      Recommends fish and green leafy vegetables 3 days per week.     No smoking.     Recommeds UV protection.     Return immediately if there are distortions to amsler grid.     4.   Posterior vitreous detachment, right eye. Retina attached.     Educated on signs and symptoms of a retinal detachment to be seen immediately.     5.   Basal Cell Carcinoma,  Left Lower Lid.    S/P removal in 01/2020 at [facility].         Medical History:  Past Medical History:   Diagnosis Date     Acute deep vein thrombosis (DVT) of distal end of right lower extremity (H)      Clotting disorder (H)      Depression, major      Glucose intolerance      H/O bone marrow transplant (H)      Head injury 1964     Hearing problem Hearing aids 2015     History of blood transfusion 3/2014     History of radiation therapy June 2014     [place] (hard of hearing)     bilateral     Immunosuppression (H) Sirolimus daily     Lymphedema of both lower extremities      MDS (myelodysplastic syndrome) (H)      MDS/MPN (myelodysplastic/myeloproliferative neoplasms) (H)      Mixed hyperlipidemia      Numbness and tingling     feet     Obstructive sleep apnea 1999     Pneumonia      Reduced vision August 2016    Cataract surgery     Sleep apnea 1999     Transplant July 1, 2014    Stem Cells       Medications:  Current Outpatient Medications   Medication Sig Dispense Refill     acetaminophen (TYLENOL) 500 MG tablet Take 1-2 tablets (500-1,000 mg) by mouth every 6 hours as needed for mild pain 60 tablet 0     acyclovir (ZOVIRAX) 800 MG tablet Take 1 tablet (800 mg) by mouth 3 times daily 180 tablet 6     amoxicillin (AMOXIL) 500 MG capsule Take 4 pills (2 grams) day of dental work 16 capsule 3     augmented betamethasone dipropionate (DIPROLENE-AF) 0.05 % external ointment Apply topically 2 times daily For areas of rash on the lower leg 50 g 1     calcium carbonate-vitamin D 500-400 MG-UNIT TABS tablt Take 1 tablet by mouth daily 2000 mg 180 tablet 3     cycloSPORINE (RESTASIS) 0.05 % ophthalmic emulsion Place 1 drop into both eyes 2 times daily 1 Box 11     fluconazole (DIFLUCAN) 100 MG tablet Take 1 tablet (100 mg) by mouth daily 90 tablet 2     Hypromellose (ARTIFICIAL TEARS OP) Apply 1-2 drops to eye 2 times daily as needed       levofloxacin (LEVAQUIN) 250 MG tablet Take 1 tablet (250 mg) by mouth daily 90  tablet 3     Multiple Vitamins-Minerals (PRESERVISION AREDS 2) CAPS Take 1 capsule by mouth 2 times daily       Multiple Vitamins-Minerals (PRESERVISION AREDS 2) CAPS Take 1 ampule by mouth 2 times daily 60 capsule 11     oxyCODONE IR (ROXICODONE) 10 MG tablet Take 0.5 tablets (5 mg) by mouth every 4 hours as needed for severe pain 60 tablet 0     pantoprazole (PROTONIX) 20 MG EC tablet Take 1 tablet (20 mg) by mouth daily 30 tablet 11     predniSONE (DELTASONE) 5 MG tablet Take 1 tablet (5 mg) by mouth daily 90 tablet 3     ruxolitinib (JAKAFI) 5 MG TABS tablet CHEMO Take 1 tablet (5 mg) by mouth 2 times daily 60 tablet 11     senna (SENOKOT) 8.6 MG tablet Take 1-2 tablets by mouth daily 14 tablet 0     sertraline (ZOLOFT) 100 MG tablet Take 1 tablet (100 mg) by mouth daily 90 tablet 0     sulfamethoxazole-trimethoprim (BACTRIM DS) 800-160 MG tablet Take one tablet by mouth twice daily on Mondays and Tuesdays only. 48 tablet 3     triamcinolone (KENALOG) 0.1 % ointment Apply twice a day to lower leg       Vitamin D, Cholecalciferol, 1000 units TABS Take 1,000 Units by mouth daily     Complete documentation of historical and exam elements from today's encounter can be found in the full encounter summary report (not reduplicated in this progress note). I personally obtained the chief complaint(s) and history of present illness.  I confirmed and edited as necessary the review of systems, past medical/surgical history, family history, social history, and examination findings as documented by others; and I examined the patient myself. I personally reviewed the relevant tests, images, and reports as documented above. I formulated and edited as necessary the assessment and plan and discussed the findings and management plan with the patient and family. - Nacho Rabago OD

## 2020-07-09 ENCOUNTER — OFFICE VISIT (OUTPATIENT)
Dept: OPHTHALMOLOGY | Facility: CLINIC | Age: 70
End: 2020-07-09
Attending: OPTOMETRIST
Payer: MEDICARE

## 2020-07-09 DIAGNOSIS — H16.223 KERATITIS SICCA, BILATERAL: ICD-10-CM

## 2020-07-09 DIAGNOSIS — H35.3132 INTERMEDIATE STAGE NONEXUDATIVE AGE-RELATED MACULAR DEGENERATION OF BOTH EYES: Primary | ICD-10-CM

## 2020-07-09 PROCEDURE — G0463 HOSPITAL OUTPT CLINIC VISIT: HCPCS | Mod: ZF

## 2020-07-09 ASSESSMENT — REFRACTION_WEARINGRX: SPECS_TYPE: LAST MRX

## 2020-07-09 ASSESSMENT — EXTERNAL EXAM - LEFT EYE: OS_EXAM: NORMAL

## 2020-07-09 ASSESSMENT — TONOMETRY
IOP_METHOD: TONOPEN
OS_IOP_MMHG: 10
OD_IOP_MMHG: 10

## 2020-07-09 ASSESSMENT — VISUAL ACUITY
OS_SC+: -1
OD_SC+: -1
METHOD: SNELLEN - LINEAR
OS_SC: 20/20
OD_SC: 20/25

## 2020-07-09 ASSESSMENT — CONF VISUAL FIELD
METHOD: COUNTING FINGERS
OD_NORMAL: 1
OS_NORMAL: 1

## 2020-07-09 ASSESSMENT — SLIT LAMP EXAM - LIDS: COMMENTS: MGD

## 2020-07-09 ASSESSMENT — EXTERNAL EXAM - RIGHT EYE: OD_EXAM: NORMAL

## 2020-07-09 ASSESSMENT — CUP TO DISC RATIO
OD_RATIO: 0.35
OS_RATIO: 0.35

## 2020-07-09 NOTE — PATIENT INSTRUCTIONS
1. Warm compress with dry eye mask, at bedtime, both eyes.     2. Preservative free artifical tears, 5 times daily, both eyes.     3. Systane gel drops 2 times per day    4. Restasis, 2 times daily, both eyes    5. ARED's 2 vitamin, by mouth, 2 times daily.    6. Wear sunglasses outdoors.    7. Eat fish and green leafy vegetables 3 days per week.     Keep all eye drops 5 minutes apart

## 2020-07-09 NOTE — NURSING NOTE
Chief Complaints and History of Present Illnesses   Patient presents with     Dry Eye(s) Follow-Up     Chief Complaint(s) and History of Present Illness(es)     Dry Eye(s) Follow-Up     Laterality: both eyes    Associated signs and symptoms: foreign body sensation.  Negative for eye pain    Frequency: intermittently    Course: stable              Comments     Deejay is here to continue care forKeratitis sicca, bilateral (H). Provider to access dry eye/GVHD. He states he feels a burning sensation from time to time. He uses Restasis and OTC gtts.    Nitin Kidd COT 11:07 AM July 9, 2020

## 2020-07-12 DIAGNOSIS — T86.09 OTHER COMPLICATION OF BONE MARROW TRANSPLANT (H): ICD-10-CM

## 2020-07-12 DIAGNOSIS — D46.9 MDS (MYELODYSPLASTIC SYNDROME) (H): Primary | ICD-10-CM

## 2020-07-12 DIAGNOSIS — R35.0 URINARY FREQUENCY: ICD-10-CM

## 2020-07-12 DIAGNOSIS — D46.9 MDS (MYELODYSPLASTIC SYNDROME) (H): ICD-10-CM

## 2020-07-12 DIAGNOSIS — D89.813 GVH (GRAFT VERSUS HOST DISEASE) (H): ICD-10-CM

## 2020-07-13 RX ORDER — SERTRALINE HYDROCHLORIDE 100 MG/1
TABLET, FILM COATED ORAL
Qty: 90 TABLET | Refills: 3 | Status: SHIPPED | OUTPATIENT
Start: 2020-07-13 | End: 2021-02-01

## 2020-07-26 DIAGNOSIS — Z94.81 STATUS POST BONE MARROW TRANSPLANT (H): ICD-10-CM

## 2020-07-26 DIAGNOSIS — D89.813 GVHD AS COMPLICATION OF BONE MARROW TRANSPLANT (H): ICD-10-CM

## 2020-07-26 DIAGNOSIS — T86.09 GVHD AS COMPLICATION OF BONE MARROW TRANSPLANT (H): ICD-10-CM

## 2020-07-27 RX ORDER — PANTOPRAZOLE SODIUM 20 MG/1
20 TABLET, DELAYED RELEASE ORAL DAILY
Qty: 30 TABLET | Refills: 4 | Status: SHIPPED | OUTPATIENT
Start: 2020-07-27 | End: 2021-02-02

## 2020-07-29 DIAGNOSIS — D46.9 MDS (MYELODYSPLASTIC SYNDROME) (H): ICD-10-CM

## 2020-07-29 DIAGNOSIS — D89.813 GVH (GRAFT VERSUS HOST DISEASE) (H): Primary | ICD-10-CM

## 2020-08-17 ENCOUNTER — APPOINTMENT (OUTPATIENT)
Dept: LAB | Facility: CLINIC | Age: 70
End: 2020-08-17
Attending: INTERNAL MEDICINE
Payer: MEDICARE

## 2020-08-17 ENCOUNTER — ONCOLOGY VISIT (OUTPATIENT)
Dept: TRANSPLANT | Facility: CLINIC | Age: 70
End: 2020-08-17
Attending: INTERNAL MEDICINE
Payer: MEDICARE

## 2020-08-17 VITALS
RESPIRATION RATE: 16 BRPM | WEIGHT: 229.3 LBS | HEART RATE: 67 BPM | SYSTOLIC BLOOD PRESSURE: 128 MMHG | DIASTOLIC BLOOD PRESSURE: 70 MMHG | TEMPERATURE: 98.1 F | OXYGEN SATURATION: 97 % | BODY MASS INDEX: 34.11 KG/M2

## 2020-08-17 DIAGNOSIS — D46.9 MDS (MYELODYSPLASTIC SYNDROME) (H): ICD-10-CM

## 2020-08-17 DIAGNOSIS — D89.813 GVH (GRAFT VERSUS HOST DISEASE) (H): ICD-10-CM

## 2020-08-17 LAB
ALBUMIN SERPL-MCNC: 3.3 G/DL (ref 3.4–5)
ALP SERPL-CCNC: 55 U/L (ref 40–150)
ALT SERPL W P-5'-P-CCNC: 31 U/L (ref 0–70)
ANION GAP SERPL CALCULATED.3IONS-SCNC: 5 MMOL/L (ref 3–14)
AST SERPL W P-5'-P-CCNC: 30 U/L (ref 0–45)
BASOPHILS # BLD AUTO: 0 10E9/L (ref 0–0.2)
BASOPHILS NFR BLD AUTO: 0.2 %
BILIRUB SERPL-MCNC: 0.3 MG/DL (ref 0.2–1.3)
BUN SERPL-MCNC: 20 MG/DL (ref 7–30)
CALCIUM SERPL-MCNC: 9 MG/DL (ref 8.5–10.1)
CHLORIDE SERPL-SCNC: 108 MMOL/L (ref 94–109)
CO2 SERPL-SCNC: 25 MMOL/L (ref 20–32)
CREAT SERPL-MCNC: 0.9 MG/DL (ref 0.66–1.25)
DIFFERENTIAL METHOD BLD: ABNORMAL
EOSINOPHIL # BLD AUTO: 0 10E9/L (ref 0–0.7)
EOSINOPHIL NFR BLD AUTO: 0.7 %
ERYTHROCYTE [DISTWIDTH] IN BLOOD BY AUTOMATED COUNT: 17.3 % (ref 10–15)
GFR SERPL CREATININE-BSD FRML MDRD: 85 ML/MIN/{1.73_M2}
GLUCOSE SERPL-MCNC: 98 MG/DL (ref 70–99)
HCT VFR BLD AUTO: 37.7 % (ref 40–53)
HGB BLD-MCNC: 12.8 G/DL (ref 13.3–17.7)
IMM GRANULOCYTES # BLD: 0 10E9/L (ref 0–0.4)
IMM GRANULOCYTES NFR BLD: 0.2 %
LYMPHOCYTES # BLD AUTO: 2 10E9/L (ref 0.8–5.3)
LYMPHOCYTES NFR BLD AUTO: 34.5 %
MCH RBC QN AUTO: 32.2 PG (ref 26.5–33)
MCHC RBC AUTO-ENTMCNC: 34 G/DL (ref 31.5–36.5)
MCV RBC AUTO: 95 FL (ref 78–100)
MONOCYTES # BLD AUTO: 0.8 10E9/L (ref 0–1.3)
MONOCYTES NFR BLD AUTO: 13.4 %
NEUTROPHILS # BLD AUTO: 3 10E9/L (ref 1.6–8.3)
NEUTROPHILS NFR BLD AUTO: 51 %
NRBC # BLD AUTO: 0 10*3/UL
NRBC BLD AUTO-RTO: 0 /100
PLATELET # BLD AUTO: 183 10E9/L (ref 150–450)
POTASSIUM SERPL-SCNC: 4.3 MMOL/L (ref 3.4–5.3)
PROT SERPL-MCNC: 6.9 G/DL (ref 6.8–8.8)
RBC # BLD AUTO: 3.98 10E12/L (ref 4.4–5.9)
SODIUM SERPL-SCNC: 138 MMOL/L (ref 133–144)
WBC # BLD AUTO: 5.8 10E9/L (ref 4–11)

## 2020-08-17 PROCEDURE — 85025 COMPLETE CBC W/AUTO DIFF WBC: CPT | Performed by: INTERNAL MEDICINE

## 2020-08-17 PROCEDURE — 36415 COLL VENOUS BLD VENIPUNCTURE: CPT

## 2020-08-17 PROCEDURE — 80053 COMPREHEN METABOLIC PANEL: CPT | Performed by: INTERNAL MEDICINE

## 2020-08-17 PROCEDURE — G0463 HOSPITAL OUTPT CLINIC VISIT: HCPCS

## 2020-08-17 ASSESSMENT — PAIN SCALES - GENERAL: PAINLEVEL: NO PAIN (0)

## 2020-08-17 NOTE — LETTER
8/17/2020     RE: Deejay Dior  [address]  [place] [address]    Dear Colleague,    Thank you for referring your patient, Deejay Dior, to the [facility]. Please see a copy of my visit note below.    BMT Clinic Note   ID:  Mr. Dior is a 64 y/o male, 6 years and 1 month s/p NMA allo sib PBSCT for MDS w/cGVHD    HPI: Deejay was seen in the BMT clinic for a follow up on his GVH symptoms.  On pred 5mg every day and Jakafi 5 mg bid.  Still has fatigue. Very dry eyes, using artificial tears 5-6 times/ days S/P R hip replacement, now with minimal pain.  Eating well with no N/V/D.  Had a basal cell cancer lesion removed (Moh's resection) close to left eye. (In the past has had a basal and squamous cell removed)  Review of Systems: 10 point ROS negative except as noted above.  Physical Exam:  /70 (BP Location: Right arm, Patient Position: Sitting, Cuff Size: Adult Large)   Pulse 67   Temp 98.1  F (36.7  C) (Oral)   Resp 16   Wt 104 kg (229 lb 4.8 oz)   SpO2 97%   BMI 34.11 kg/m       Wt Readings from Last 4 Encounters:   08/17/20 104 kg (229 lb 4.8 oz)   05/15/20 105.2 kg (232 lb)   02/14/20 102.5 kg (225 lb 14.4 oz)   11/21/19 101.2 kg (223 lb 1.6 oz)     General: NAD, interactive, KPS 80%  Eyes: SARIKA, sclera anicteric  Nose/Mouth/Throat: OP clear, no ulcerations, very dry, upper plate, chronic lichenoid changes , no lip lesions   Lungs:CTA B, no crackles or wheezes   CV: RRR without murmurs rubs or gallops  Abd: S/NT/ND +BS  Skin:   RLE skin shiny with some reddened areas. Nontender. Trace edema L>R (chronic).    Neuro: A&O  Labs:  Lab Results   Component Value Date    WBC 5.8 08/17/2020    ANEU 3.0 08/17/2020    HGB 12.8 (L) 08/17/2020    HCT 37.7 (L) 08/17/2020     08/17/2020     05/15/2020    POTASSIUM 4.0 05/15/2020    CHLORIDE 110 (H) 05/15/2020    CO2 23 05/15/2020    GLC 98 05/15/2020    BUN 25 05/15/2020    CR 0.99 05/15/2020    MAG 2.0  11/21/2019    INR 0.98 02/14/2019     ASSESSMENT AND PLAN:  Mr. Dior is a 66 y/o male,  6 years and 1 month s/p NMA allo sib PBSCT for MDS. w/ cGVHD     AML/MDS/BMT: S/p 8/8 matched and ABO matched allo-sib transplant from his sister. Total cell dose (from 7/1 & 7/2) 6.53 x 10^6 CD34+ cells/kg.   - 1 year anniversary (July 2015): 30% cellular, trilineage hematopoiesis, no abnormal blasts by morphology or flow , no dysplasia, 0-1 fibrosis, 100% donor (BM, CD3, CD15). CR  - 2 year anniversary (July 2016): 20-30% cellular, trilineage hematopoiesis, no abnormal blasts by morphology or flow , no dysplasia, No fibrosis, 100% donor in BM (PB not sent). ISCN: //46,XX[20] Complete Remission.    HEME: No transfusion needs, counts stable     - Was on anticoagulation after his hip replacment, this is now complete.     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 Had been off prednisone since summer 2017 and briefly tapered off Sirolimus (january 2018), however resumed with flare in February. 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 Sirolimus was stopped on 9/27 & Pred 90mg was started. Seen by Dr. Sandoval on 10/2, please see his note. He does not think his symptoms or CT findings are c/w Sirolimus or GVH & he was in favor of tapering Prednisone. Recently he has worsening skin thickening & desquamation to the RLE, c/w GVH.   Started Jakafi 10/22 at 5 mg BID with symptom improvement in lower extremities. 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   Burst pred 40mg a day on 1/3 with significant systemic arthralgic pain, tapered back to 10/0 eod after 1 week, near resolution of symptoms noted 1/17, returned to 10 mg every other day; then dropped to 5mg q day in April    Currently on Jakafi 5mg twice daily & Pred 5mg every day.  Has lost most of  his teeth due to decay from xerosterma.  He now has dental implants and partial dentures.  Eyes are better.    ID:  Afebrile no signs/sx of infection.   - IgG 1/24 =1130  - Prophy: HD ACV (renal dosing), Fluconazole and  Bactrim.     GI:  protonix (has heartburn)    FEN/Renal:  Lytes & creat stable.    Fatigue:  stable  - History of testosterone deficiency, rechecked and modestly low. He previously did testosterone injections & didn't think it made him feel any better.    - TSH normal 2/28 and again on repeat 9/27 at 1.01.  - counseled that moderate exercise is really the only known way to combat fatigue, and acknowledged how difficult it is to balance too much with not enough activity.     Derm:   Venous stasis: see below-most recently had sclerotherapy to left LE    Vasc:   10/15 Right leg US with small (technically DVT) clot in posterior tibial vein: per Millie, started reg dose aspirin daily 325mg and recheck US in 2 weeks or symptomatically. U/S on 11/27 without DVT  Off lymphedema wraps  Discomfort since edema/shakes: oxy 1-2 tab during day and ativan 6 hours away from oxy for sleep.  H/o chronic venous statis: s/p sclerotherapy to the L leg.  kelfex day prior and 4 days following.     MS: avascular necrosis of hip.  S/p replacement surgery    RTC 3 months      Suzanne Collado

## 2020-08-17 NOTE — PROGRESS NOTES
BMT Clinic Note     ID:  Mr. Dior is a 66 y/o male, 6 years and 1 month s/p NMA allo sib PBSCT for MDS w/cGVHD    HPI: Deejay was seen in the BMT clinic for a follow up on his GVH symptoms.  On pred 5mg every day and Jakafi 5 mg bid.  Still has fatigue. Very dry eyes, using artificial tears 5-6 times/ days S/P R hip replacement, now with minimal pain.  Eating well with no N/V/D.  Had a basal cell cancer lesion removed (Moh's resection) close to left eye. (In the past has had a basal and squamous cell removed)  Review of Systems: 10 point ROS negative except as noted above.    Physical Exam:  /70 (BP Location: Right arm, Patient Position: Sitting, Cuff Size: Adult Large)   Pulse 67   Temp 98.1  F (36.7  C) (Oral)   Resp 16   Wt 104 kg (229 lb 4.8 oz)   SpO2 97%   BMI 34.11 kg/m       Wt Readings from Last 4 Encounters:   08/17/20 104 kg (229 lb 4.8 oz)   05/15/20 105.2 kg (232 lb)   02/14/20 102.5 kg (225 lb 14.4 oz)   11/21/19 101.2 kg (223 lb 1.6 oz)     General: NAD, interactive, KPS 80%  Eyes: SARIKA, sclera anicteric  Nose/Mouth/Throat: OP clear, no ulcerations, very dry, upper plate, chronic lichenoid changes , no lip lesions   Lungs:CTA B, no crackles or wheezes   CV: RRR without murmurs rubs or gallops  Abd: S/NT/ND +BS  Skin:   RLE skin shiny with some reddened areas. Nontender. Trace edema L>R (chronic).    Neuro: A&O  Labs:  Lab Results   Component Value Date    WBC 5.8 08/17/2020    ANEU 3.0 08/17/2020    HGB 12.8 (L) 08/17/2020    HCT 37.7 (L) 08/17/2020     08/17/2020     05/15/2020    POTASSIUM 4.0 05/15/2020    CHLORIDE 110 (H) 05/15/2020    CO2 23 05/15/2020    GLC 98 05/15/2020    BUN 25 05/15/2020    CR 0.99 05/15/2020    MAG 2.0 11/21/2019    INR 0.98 02/14/2019     ASSESSMENT AND PLAN:  Mr. Dior is a 66 y/o male,  6 years and 1 month s/p NMA allo sib PBSCT for MDS. w/ cGVHD     AML/MDS/BMT: S/p 8/8 matched and ABO matched allo-sib transplant from his sister. Total  cell dose (from 7/1 & 7/2) 6.53 x 10^6 CD34+ cells/kg.   - 1 year anniversary (July 2015): 30% cellular, trilineage hematopoiesis, no abnormal blasts by morphology or flow , no dysplasia, 0-1 fibrosis, 100% donor (BM, CD3, CD15). CR  - 2 year anniversary (July 2016): 20-30% cellular, trilineage hematopoiesis, no abnormal blasts by morphology or flow , no dysplasia, No fibrosis, 100% donor in BM (PB not sent). ISCN: //46,XX[20] Complete Remission.    HEME: No transfusion needs, counts stable     - Was on anticoagulation after his hip replacment, this is now complete.     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 Had been off prednisone since summer 2017 and briefly tapered off Sirolimus (january 2018), however resumed with flare in February. 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 Sirolimus was stopped on 9/27 & Pred 90mg was started. Seen by Dr. Sandoval on 10/2, please see his note. He does not think his symptoms or CT findings are c/w Sirolimus or GVH & he was in favor of tapering Prednisone. Recently he has worsening skin thickening & desquamation to the RLE, c/w GVH.   Started Jakafi 10/22 at 5 mg BID with symptom improvement in lower extremities. 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   Burst pred 40mg a day on 1/3 with significant systemic arthralgic pain, tapered back to 10/0 eod after 1 week, near resolution of symptoms noted 1/17, returned to 10 mg every other day; then dropped to 5mg q day in April    Currently on Jakafi 5mg twice daily & Pred 5mg every day.  Has lost most of his teeth due to decay from xerosterma.  He now has dental implants and partial dentures.  Eyes are better.    ID:  Afebrile no signs/sx of infection.   - IgG 1/24 =1130  - Prophy: HD ACV (renal dosing), Fluconazole and  Bactrim.       GI:   protonix (has heartburn)    FEN/Renal:  Lytes & creat stable.    Fatigue:  stable  - History of testosterone deficiency, rechecked and modestly low. He previously did testosterone injections & didn't think it made him feel any better.    - TSH normal 2/28 and again on repeat 9/27 at 1.01.  - counseled that moderate exercise is really the only known way to combat fatigue, and acknowledged how difficult it is to balance too much with not enough activity.     Derm:   Venous stasis: see below-most recently had sclerotherapy to left LE    Vasc:   10/15 Right leg US with small (technically DVT) clot in posterior tibial vein: per Mlilie, started reg dose aspirin daily 325mg and recheck US in 2 weeks or symptomatically. U/S on 11/27 without DVT  Off lymphedema wraps  Discomfort since edema/shakes: oxy 1-2 tab during day and ativan 6 hours away from oxy for sleep.  H/o chronic venous statis: s/p sclerotherapy to the L leg.  kelfex day prior and 4 days following.     MS: avascular necrosis of hip.  S/p replacement surgery    RTC 3 months      Suzanne Collado

## 2020-08-17 NOTE — NURSING NOTE
Chief Complaint   Patient presents with     Blood Draw     labs drawn with vpt by rn.  vs taken     Labs drawn with vpt by rn.  Pt tolerated well.  VS taken.  Pt checked in for next appt.    Saloni Ortega RN

## 2020-08-17 NOTE — NURSING NOTE
"Oncology Rooming Note    August 17, 2020 1:26 PM   Deejay Dior is a 69 year old male who presents for:    Chief Complaint   Patient presents with     Blood Draw     labs drawn with vpt by rn.  vs taken     RECHECK     Pt is here for a rtn for MDS     Initial Vitals: Blood Pressure 128/70 (BP Location: Right arm, Patient Position: Sitting, Cuff Size: Adult Large)   Pulse 67   Temperature 98.1  F (36.7  C) (Oral)   Respiration 16   Weight 104 kg (229 lb 4.8 oz)   Oxygen Saturation 97%   Body Mass Index 34.11 kg/m   Estimated body mass index is 34.11 kg/m  as calculated from the following:    Height as of 5/15/20: 1.746 m (5' 8.75\").    Weight as of this encounter: 104 kg (229 lb 4.8 oz). Body surface area is 2.25 meters squared.  No Pain (0) Comment: Data Unavailable   No LMP for male patient.  Allergies reviewed: Yes  Medications reviewed: Yes    Medications: Medication refills not needed today.  Pharmacy name entered into IMT: [id number] - [place], [place] - [address] [address]    Clinical concerns: none       Elizabeth Forrester MA            "

## 2020-09-20 DIAGNOSIS — D46.9 MDS (MYELODYSPLASTIC SYNDROME) (H): ICD-10-CM

## 2020-09-20 DIAGNOSIS — D89.813 GVH (GRAFT VERSUS HOST DISEASE) (H): ICD-10-CM

## 2020-09-21 RX ORDER — ACYCLOVIR 800 MG/1
800 TABLET ORAL 2 TIMES DAILY
Qty: 180 TABLET | Refills: 0 | Status: SHIPPED | OUTPATIENT
Start: 2020-09-21 | End: 2021-01-18

## 2020-09-29 DIAGNOSIS — C92.01 ACUTE MYELOID LEUKEMIA IN REMISSION (H): Primary | ICD-10-CM

## 2020-09-29 RX ORDER — SERTRALINE HYDROCHLORIDE 25 MG/1
TABLET, FILM COATED ORAL
Qty: 180 TABLET | Refills: 0 | Status: SHIPPED | OUTPATIENT
Start: 2020-10-01 | End: 2020-12-16

## 2020-10-05 NOTE — NURSING NOTE
"Oncology Rooming Note    March 15, 2018 2:07 PM   Deejay Dior is a 67 year old male who presents for:    Chief Complaint   Patient presents with     Blood Draw     VPT labs and vitals completed in lab by LPN.     RECHECK     provider visit, hx MDS post transplant.     Initial Vitals: /69  Pulse 79  Temp 98.6  F (37  C) (Oral)  Resp 18  Ht 1.73 m (5' 8.11\")  Wt 95 kg (209 lb 6.4 oz)  SpO2 96%  BMI 31.74 kg/m2 Estimated body mass index is 31.74 kg/(m^2) as calculated from the following:    Height as of this encounter: 1.73 m (5' 8.11\").    Weight as of this encounter: 95 kg (209 lb 6.4 oz). Body surface area is 2.14 meters squared.  No Pain (0) Comment: Data Unavailable   No LMP for male patient.  Allergies reviewed: Yes  Medications reviewed: Yes    Medications: Medication refills not needed today.  Pharmacy name entered into [facility]:    [id number] - [place], [place] - [address] [facility] [address] [id number] - [place], [place] - [address] [address] [address] [facility]     Clinical concerns: None    5 minutes for nursing intake (face to face time)     Aliya Cabral RN              "
VPT labs and vitals completed in lab by LPN.  Pt within 12hrs of taking his Sirolimus.  Pt tolerated procedure well. See flowsheet.    Ashley Finney LPN      
Colonoscopy
hip/Right:

## 2020-11-30 DIAGNOSIS — H35.3132 INTERMEDIATE STAGE NONEXUDATIVE AGE-RELATED MACULAR DEGENERATION OF BOTH EYES: Primary | ICD-10-CM

## 2020-12-01 ENCOUNTER — OFFICE VISIT (OUTPATIENT)
Dept: OPHTHALMOLOGY | Facility: CLINIC | Age: 70
End: 2020-12-01
Attending: OPHTHALMOLOGY
Payer: MEDICARE

## 2020-12-01 DIAGNOSIS — H35.3132 INTERMEDIATE STAGE NONEXUDATIVE AGE-RELATED MACULAR DEGENERATION OF BOTH EYES: ICD-10-CM

## 2020-12-01 PROCEDURE — 92134 CPTRZ OPH DX IMG PST SGM RTA: CPT | Performed by: OPHTHALMOLOGY

## 2020-12-01 PROCEDURE — 99213 OFFICE O/P EST LOW 20 MIN: CPT | Performed by: OPHTHALMOLOGY

## 2020-12-01 PROCEDURE — G0463 HOSPITAL OUTPT CLINIC VISIT: HCPCS

## 2020-12-01 ASSESSMENT — VISUAL ACUITY
OD_SC: 20/25
METHOD: SNELLEN - LINEAR
OS_SC: 20/25

## 2020-12-01 ASSESSMENT — TONOMETRY
OD_IOP_MMHG: 16
IOP_METHOD: TONOPEN
OS_IOP_MMHG: 13

## 2020-12-01 ASSESSMENT — EXTERNAL EXAM - LEFT EYE: OS_EXAM: NORMAL

## 2020-12-01 ASSESSMENT — EXTERNAL EXAM - RIGHT EYE: OD_EXAM: NORMAL

## 2020-12-01 ASSESSMENT — CONF VISUAL FIELD
METHOD: COUNTING FINGERS
OD_NORMAL: 1
OS_NORMAL: 1

## 2020-12-01 ASSESSMENT — REFRACTION_WEARINGRX: SPECS_TYPE: LAST MRX

## 2020-12-01 ASSESSMENT — SLIT LAMP EXAM - LIDS
COMMENTS: MGD
COMMENTS: MGD

## 2020-12-01 ASSESSMENT — CUP TO DISC RATIO
OS_RATIO: 0.35
OD_RATIO: 0.35

## 2020-12-01 NOTE — PROGRESS NOTES
CC -   Dry AMD    INTERVAL HISTORY - VA stable, has FBS chronic    HPI -   Deejay Dior is a  70 year old year-old patient with history of dry AMD  Has AML s/p BMT 7/2014 and GVHD.  No DM      PAST OCULAR SURGERY  CE/IOL OU 2015    RETINAL IMAGING:  OCT 12-1-20  OD - mild focal RPE irregular, ?nasal macula small FVPED changes from 6-10-20, PVD  OS - mild ERM, subfoveal deposits, no fluid, PVD      ASSESSMENT & PLAN    1. Dry AMD OU intermediate   - may have pattern component   - OCT-A done 6/2020  No CNVM   - AREDS/Amsler   - f/u 6 months    2. PVD OU   - advised S/Sx RD      3. GVHD   - TABITHA      4. PCO OU   - mild    - could consider YAG      # TABITHA    - very bothered   - did not tolerate restasis   - using PFATs very frequently     - refer to cornea          return to clinic: 6 months, OCT OU, DFE OU    ATTESTATION     Attending Attestation:     Complete documentation of historical and exam elements from today's encounter can be found in the full encounter summary report (not reduplicated in this progress note).  I personally obtained the chief complaint(s) and history of present illness.  I confirmed and edited as necessary the review of systems, past medical/surgical history, family history, social history, and examination findings as documented by others; and I examined the patient myself.  I personally reviewed the relevant tests, images, and reports as documented above.  I formulated and edited as necessary the assessment and plan and discussed the findings and management plan with the patient and family    Marisol Hector MD, PhD  , Vitreoretinal Surgery  Department of Ophthalmology  [facility]

## 2020-12-01 NOTE — NURSING NOTE
Chief Complaints and History of Present Illnesses   Patient presents with     Follow Up     6 month follow up  Dry AMD OU intermediate     Chief Complaint(s) and History of Present Illness(es)     Follow Up     Laterality: both eyes    Comments: 6 month follow up  Dry AMD OU intermediate              Comments     Pt states vision is about the same as last visit.  Eyes feel very dry at times. Feels like a grain of sand in RE, relief with artificial tears.  No new flashes or floaters.    EZRA Montgomery December 1, 2020 12:25 PM

## 2020-12-10 ENCOUNTER — HOSPITAL ENCOUNTER (OUTPATIENT)
Dept: LAB | Facility: CLINIC | Age: 70
Discharge: HOME | End: 2020-12-10
Attending: INTERNAL MEDICINE | Admitting: INTERNAL MEDICINE
Payer: MEDICARE

## 2020-12-10 DIAGNOSIS — C92.01 ACUTE MYELOID LEUKEMIA IN REMISSION (H): ICD-10-CM

## 2020-12-10 DIAGNOSIS — C92.01 ACUTE MYELOID LEUKEMIA IN REMISSION (H): Primary | ICD-10-CM

## 2020-12-10 LAB
ALBUMIN SERPL-MCNC: 3.4 G/DL (ref 3.4–5)
ALP SERPL-CCNC: 58 U/L (ref 40–150)
ALT SERPL W P-5'-P-CCNC: 34 U/L (ref 0–70)
ANION GAP SERPL CALCULATED.3IONS-SCNC: 7 MMOL/L (ref 3–14)
AST SERPL W P-5'-P-CCNC: 33 U/L (ref 0–45)
BASOPHILS # BLD AUTO: 0 10E9/L (ref 0–0.2)
BASOPHILS NFR BLD AUTO: 0.3 %
BILIRUB SERPL-MCNC: 0.3 MG/DL (ref 0.2–1.3)
BUN SERPL-MCNC: 24 MG/DL (ref 7–30)
CALCIUM SERPL-MCNC: 9 MG/DL (ref 8.5–10.1)
CHLORIDE SERPL-SCNC: 108 MMOL/L (ref 94–109)
CO2 SERPL-SCNC: 25 MMOL/L (ref 20–32)
CREAT SERPL-MCNC: 1.13 MG/DL (ref 0.66–1.25)
DIFFERENTIAL METHOD BLD: ABNORMAL
EOSINOPHIL # BLD AUTO: 0.1 10E9/L (ref 0–0.7)
EOSINOPHIL NFR BLD AUTO: 1 %
ERYTHROCYTE [DISTWIDTH] IN BLOOD BY AUTOMATED COUNT: 17.2 % (ref 10–15)
GFR SERPL CREATININE-BSD FRML MDRD: 65 ML/MIN/{1.73_M2}
GLUCOSE SERPL-MCNC: 111 MG/DL (ref 70–99)
HCT VFR BLD AUTO: 40.4 % (ref 40–53)
HGB BLD-MCNC: 13.1 G/DL (ref 13.3–17.7)
IMM GRANULOCYTES # BLD: 0 10E9/L (ref 0–0.4)
IMM GRANULOCYTES NFR BLD: 0.2 %
LYMPHOCYTES # BLD AUTO: 2.5 10E9/L (ref 0.8–5.3)
LYMPHOCYTES NFR BLD AUTO: 43.6 %
MCH RBC QN AUTO: 32.1 PG (ref 26.5–33)
MCHC RBC AUTO-ENTMCNC: 32.4 G/DL (ref 31.5–36.5)
MCV RBC AUTO: 99 FL (ref 78–100)
MONOCYTES # BLD AUTO: 0.7 10E9/L (ref 0–1.3)
MONOCYTES NFR BLD AUTO: 12.7 %
NEUTROPHILS # BLD AUTO: 2.5 10E9/L (ref 1.6–8.3)
NEUTROPHILS NFR BLD AUTO: 42.2 %
NRBC # BLD AUTO: 0 10*3/UL
NRBC BLD AUTO-RTO: 0 /100
PLATELET # BLD AUTO: 218 10E9/L (ref 150–450)
POTASSIUM SERPL-SCNC: 4.1 MMOL/L (ref 3.4–5.3)
PROT SERPL-MCNC: 7.2 G/DL (ref 6.8–8.8)
RBC # BLD AUTO: 4.08 10E12/L (ref 4.4–5.9)
SODIUM SERPL-SCNC: 140 MMOL/L (ref 133–144)
WBC # BLD AUTO: 5.8 10E9/L (ref 4–11)

## 2020-12-10 PROCEDURE — 85025 COMPLETE CBC W/AUTO DIFF WBC: CPT | Performed by: INTERNAL MEDICINE

## 2020-12-10 PROCEDURE — 80053 COMPREHEN METABOLIC PANEL: CPT | Performed by: INTERNAL MEDICINE

## 2020-12-10 PROCEDURE — 36415 COLL VENOUS BLD VENIPUNCTURE: CPT | Performed by: INTERNAL MEDICINE

## 2020-12-11 ENCOUNTER — TELEPHONE (OUTPATIENT)
Dept: ONCOLOGY | Facility: CLINIC | Age: 70
End: 2020-12-11

## 2020-12-11 ENCOUNTER — VIRTUAL VISIT (OUTPATIENT)
Dept: TRANSPLANT | Facility: CLINIC | Age: 70
End: 2020-12-11
Attending: INTERNAL MEDICINE
Payer: MEDICARE

## 2020-12-11 DIAGNOSIS — D89.813 GVH (GRAFT VERSUS HOST DISEASE) (H): Primary | ICD-10-CM

## 2020-12-11 PROCEDURE — 99442 PR PHYSICIAN TELEPHONE EVALUATION 11-20 MIN: CPT | Performed by: INTERNAL MEDICINE

## 2020-12-11 NOTE — TELEPHONE ENCOUNTER
[facility]/ Assistance Approved    Medication Jakafi  Amount/ $ 7000  Foundation LLS  Effective Dates 9/2-11/30/21  Additional Information   Patient notified? yes        Jurgen Moreno CPhT  [facility]  Oncology Pharmacy Liaison   [email or web address]  [phone] (phone  [phone] (fax

## 2020-12-11 NOTE — PROGRESS NOTES
Deejay Dior is a 70 year old male who is being evaluated via a billable phone visit (planned video visit was changed to phone visit as the patient could not connect via video). Consent for phone visit was taken.     Time spent on phone visit was 15 minutes          I have reviewed and updated the patient's allergies and medication list. Patient was asked if they had any patient reported vital signs to present, if yes, please see documented vitals.  Patient was also asked for their current weight and height, if presented, documented in vitals.    Concerns: No concerns    Refills: No refills    Noa Wilson [facility]       ID:  Mr. 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  On pred 5mg every day and Jakafi 5 mg bid.  Still has fatigue. Very dry eyes, using artificial tears 5-6 times/ days. Also has developed macular degeneration. S/P R hip replacement, now with minimal pain.  Eating well with no N/V/D.  Had a basal cell cancer lesion removed (Moh's resection) close to left eye. (In the past has had a basal and squamous cell removed)  Review of Systems: Stable symptoms in eyes and mouth. Stable fatigue. Tapering sertraline as discussed due to medication interactions. However, was supposed to be on 25mg on Dec 1, but increased it back to 50 as he felt some nausea. No other symptoms    Labs:  Lab Results   Component Value Date    WBC 5.8 12/10/2020    ANEU 2.5 12/10/2020    HGB 13.1 (L) 12/10/2020    HCT 40.4 12/10/2020     12/10/2020     12/10/2020    POTASSIUM 4.1 12/10/2020    CHLORIDE 108 12/10/2020    CO2 25 12/10/2020     (H) 12/10/2020    BUN 24 12/10/2020    CR 1.13 12/10/2020    MAG 2.0 11/21/2019    INR 0.98 02/14/2019     ASSESSMENT AND PLAN:  Mr. 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 Total cell dose (from 7/1 & 7/2) 6.53 x 10^6 CD34+ cells/kg.   - 1 year anniversary (July 2015): 30% cellular, trilineage hematopoiesis, no abnormal blasts by morphology or flow , no dysplasia, 0-1 fibrosis, 100% donor (BM, CD3, CD15). CR  - 2 year anniversary (July 2016): 20-30% cellular, trilineage hematopoiesis, no abnormal blasts by morphology or flow , no dysplasia, No fibrosis, 100% donor in BM (PB not sent). ISCN: //46,XX[20] Complete Remission.    HEME: No transfusion needs, counts stable     - Was on anticoagulation after his hip replacment, this is now complete.     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 Had been off prednisone since summer 2017 and briefly tapered off Sirolimus (january 2018), however resumed with flare in February. 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 Sirolimus was stopped on 9/27 & Pred 90mg was started. Seen by Dr. Sandoval on 10/2, please see his note. He does not think his symptoms or CT findings are c/w Sirolimus or GVH & he was in favor of tapering Prednisone. Recently he has worsening skin thickening & desquamation to the RLE, c/w GVH.   Started Jakafi 10/22 at 5 mg BID with symptom improvement in lower extremities. 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   Burst pred 40mg a day on 1/3 with significant systemic arthralgic pain, tapered back to 10/0 eod after 1 week, near resolution of symptoms noted 1/17, returned to 10 mg every other day; then dropped to 5mg q day in April    Currently on Jakafi 5mg twice daily & Pred 5mg every day.  Has lost most of his teeth due to decay from xerosterma.  He now has dental implants and partial dentures.  Eyes are better.    ID:  Afebrile no signs/sx of infection.   - IgG 1/24 =1130  - Prophy: HD ACV (renal dosing),  Fluconazole and  Bactrim.       GI:  protonix (has heartburn)    FEN/Renal:  Lytes & creat stable.    Fatigue:  stable  - History of testosterone deficiency, rechecked and modestly low. He previously did testosterone injections & didn't think it made him feel any better.    - TSH normal 2/28 and again on repeat 9/27 at 1.01.  - counseled that moderate exercise is really the only known way to combat fatigue, and acknowledged how difficult it is to balance too much with not enough activity.     Derm:   Venous stasis: see below-most recently had sclerotherapy to left LE    Vasc:   10/15 Right leg US with small (technically DVT) clot in posterior tibial vein: per Millie, started reg dose aspirin daily 325mg and recheck US in 2 weeks or symptomatically. U/S on 11/27 without DVT  Off lymphedema wraps  Discomfort since edema/shakes: oxy 1-2 tab during day and ativan 6 hours away from oxy for sleep.  H/o chronic venous statis: s/p sclerotherapy to the L leg.  kelfex day prior and 4 days following.     MS: avascular necrosis of hip.  S/p replacement surgery    RTC 3 months      Suzanne Collado

## 2020-12-11 NOTE — LETTER
12/11/2020         RE: Deejay Dior  [address]  [place] [address]        Dear Colleague,    Thank you for referring your patient, Deejay Dior, to the [facility] BLOOD AND MARROW TRANSPLANT PROGRAM [place]. Please see a copy of my visit note below.    Deejay Dior is a 70 year old male who is being evaluated via a billable phone visit (planned video visit was changed to phone visit as the patient could not connect via video). Consent for phone visit was taken.     Time spent on phone visit was 15 minutes          I have reviewed and updated the patient's allergies and medication list. Patient was asked if they had any patient reported vital signs to present, if yes, please see documented vitals.  Patient was also asked for their current weight and height, if presented, documented in vitals.    Concerns: No concerns    Refills: No refills    Noa Wilson CMA       ID:  Mr. 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  On pred 5mg every day and Jakafi 5 mg bid.  Still has fatigue. Very dry eyes, using artificial tears 5-6 times/ days. Also has developed macular degeneration. S/P R hip replacement, now with minimal pain.  Eating well with no N/V/D.  Had a basal cell cancer lesion removed (Moh's resection) close to left eye. (In the past has had a basal and squamous cell removed)  Review of Systems: Stable symptoms in eyes and mouth. Stable fatigue. Tapering sertraline as discussed due to medication interactions. However, was supposed to be on 25mg on Dec 1, but increased it back to 50 as he felt some nausea. No other symptoms    Labs:  Lab Results   Component Value Date    WBC 5.8 12/10/2020    ANEU 2.5 12/10/2020    HGB 13.1 (L) 12/10/2020    HCT 40.4 12/10/2020     12/10/2020     12/10/2020    POTASSIUM 4.1 12/10/2020    CHLORIDE 108 12/10/2020    CO2 25 12/10/2020     (H)  12/10/2020    BUN 24 12/10/2020    CR 1.13 12/10/2020    MAG 2.0 11/21/2019    INR 0.98 02/14/2019     ASSESSMENT AND PLAN:  Mr. 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 Total cell dose (from 7/1 & 7/2) 6.53 x 10^6 CD34+ cells/kg.   - 1 year anniversary (July 2015): 30% cellular, trilineage hematopoiesis, no abnormal blasts by morphology or flow , no dysplasia, 0-1 fibrosis, 100% donor (BM, CD3, CD15). CR  - 2 year anniversary (July 2016): 20-30% cellular, trilineage hematopoiesis, no abnormal blasts by morphology or flow , no dysplasia, No fibrosis, 100% donor in BM (PB not sent). ISCN: //46,XX[20] Complete Remission.    HEME: No transfusion needs, counts stable     - Was on anticoagulation after his hip replacment, this is now complete.     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 Had been off prednisone since summer 2017 and briefly tapered off Sirolimus (january 2018), however resumed with flare in February. 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 Sirolimus was stopped on 9/27 & Pred 90mg was started. Seen by Dr. Sandoval on 10/2, please see his note. He does not think his symptoms or CT findings are c/w Sirolimus or GVH & he was in favor of tapering Prednisone. Recently he has worsening skin thickening & desquamation to the RLE, c/w GVH.   Started Jakafi 10/22 at 5 mg BID with symptom improvement in lower extremities. 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   Burst pred 40mg a day on 1/3 with significant systemic arthralgic pain, tapered back to 10/0 eod after 1 week, near resolution of symptoms noted 1/17, returned to 10 mg every other day; then dropped to 5mg q day in April    Currently on  Jakafi 5mg twice daily & Pred 5mg every day.  Has lost most of his teeth due to decay from xerosterma.  He now has dental implants and partial dentures.  Eyes are better.    ID:  Afebrile no signs/sx of infection.   - IgG 1/24 =1130  - Prophy: HD ACV (renal dosing), Fluconazole and  Bactrim.       GI:  protonix (has heartburn)    FEN/Renal:  Lytes & creat stable.    Fatigue:  stable  - History of testosterone deficiency, rechecked and modestly low. He previously did testosterone injections & didn't think it made him feel any better.    - TSH normal 2/28 and again on repeat 9/27 at 1.01.  - counseled that moderate exercise is really the only known way to combat fatigue, and acknowledged how difficult it is to balance too much with not enough activity.     Derm:   Venous stasis: see below-most recently had sclerotherapy to left LE    Vasc:   10/15 Right leg US with small (technically DVT) clot in posterior tibial vein: per Millie, started reg dose aspirin daily 325mg and recheck US in 2 weeks or symptomatically. U/S on 11/27 without DVT  Off lymphedema wraps  Discomfort since edema/shakes: oxy 1-2 tab during day and ativan 6 hours away from oxy for sleep.  H/o chronic venous statis: s/p sclerotherapy to the L leg.  kelfex day prior and 4 days following.     MS: avascular necrosis of hip.  S/p replacement surgery    RTC 3 months      Suzanne Collado      Again, thank you for allowing me to participate in the care of your patient.        Sincerely,        Suzanne Collado MD

## 2020-12-16 DIAGNOSIS — Z94.81 STATUS POST BONE MARROW TRANSPLANT (H): ICD-10-CM

## 2020-12-17 RX ORDER — FLUCONAZOLE 100 MG/1
100 TABLET ORAL DAILY
Qty: 90 TABLET | Refills: 3 | Status: SHIPPED | OUTPATIENT
Start: 2020-12-17 | End: 2021-12-06

## 2020-12-30 ENCOUNTER — OFFICE VISIT (OUTPATIENT)
Dept: OPHTHALMOLOGY | Facility: CLINIC | Age: 70
End: 2020-12-30
Attending: OPHTHALMOLOGY
Payer: MEDICARE

## 2020-12-30 DIAGNOSIS — H26.493 PCO (POSTERIOR CAPSULAR OPACIFICATION), BILATERAL: ICD-10-CM

## 2020-12-30 DIAGNOSIS — H02.056 TRICHIASIS OF LEFT EYE WITHOUT ENTROPION: Primary | ICD-10-CM

## 2020-12-30 DIAGNOSIS — D89.813 GVHD (GRAFT VERSUS HOST DISEASE) (H): ICD-10-CM

## 2020-12-30 DIAGNOSIS — H16.223 KERATITIS SICCA, BILATERAL: ICD-10-CM

## 2020-12-30 DIAGNOSIS — H02.831 DERMATOCHALASIS OF BOTH UPPER EYELIDS: ICD-10-CM

## 2020-12-30 DIAGNOSIS — H02.834 DERMATOCHALASIS OF BOTH UPPER EYELIDS: ICD-10-CM

## 2020-12-30 DIAGNOSIS — Z96.1 PSEUDOPHAKIA: ICD-10-CM

## 2020-12-30 DIAGNOSIS — H02.056 TRICHIASIS OF LEFT EYE WITHOUT ENTROPION: ICD-10-CM

## 2020-12-30 PROCEDURE — 67820 REVISE EYELASHES: CPT | Mod: LT | Performed by: OPHTHALMOLOGY

## 2020-12-30 PROCEDURE — 68761 CLOSE TEAR DUCT OPENING: CPT | Mod: RT | Performed by: OPHTHALMOLOGY

## 2020-12-30 PROCEDURE — 99215 OFFICE O/P EST HI 40 MIN: CPT | Mod: 25 | Performed by: OPHTHALMOLOGY

## 2020-12-30 PROCEDURE — G0463 HOSPITAL OUTPT CLINIC VISIT: HCPCS

## 2020-12-30 RX ORDER — TACROLIMUS 0.3 MG/G
OINTMENT TOPICAL AT BEDTIME
Qty: 30 G | Refills: 3 | Status: ON HOLD | OUTPATIENT
Start: 2020-12-30 | End: 2021-03-01

## 2020-12-30 ASSESSMENT — CONF VISUAL FIELD
METHOD: COUNTING FINGERS
OS_NORMAL: 1
OD_NORMAL: 1

## 2020-12-30 ASSESSMENT — VISUAL ACUITY
OD_SC+: -2
METHOD: SNELLEN - LINEAR
OS_SC: 20/25
OD_PH_SC: 20/30
OD_PH_SC+: +2
OD_SC: 20/30

## 2020-12-30 ASSESSMENT — EXTERNAL EXAM - LEFT EYE: OS_EXAM: NORMAL

## 2020-12-30 ASSESSMENT — TONOMETRY
OD_IOP_MMHG: 18
IOP_METHOD: ICARE
OS_IOP_MMHG: 16

## 2020-12-30 ASSESSMENT — EXTERNAL EXAM - RIGHT EYE: OD_EXAM: NORMAL

## 2020-12-30 NOTE — PATIENT INSTRUCTIONS
Continue preservative free artificial tears every hour  Start Refresh PM ointment every night at bedtime.   Start Xiidra BID each eye   Continue warm compresses daily

## 2020-12-30 NOTE — PROGRESS NOTES
CC: GVHD OU    HPI:  Patient presents for GVHD evaluation for involvement in the eyes. 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 There is foreign body sensation in the right eye occasionally. Artificial tears seems to help.     Interval update:  Over the interval, no changes in vision however eyes feel dry, has FBS right eye >> left eye. No flashes / floaters / diplopia.  Denies tearing, itching, discharge. Frequent redness.       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   2. MGD each eye     S/p silicone Punctal plug left lower lid. There is also meibomian gland dysfunction both eyes.     Stopped Restasis due to burning.    Also uses CPAP for PIPO    PLAN:   - Continue preservative free artificial tears q1-2 hours  - Start Refresh PM at bedtime     - Continue warm compress BID both eyes   - collagen punctal plug placed RLL today 12/30/20 -- consider punctal cautery next visit if helpful    - start Xiidra trial as restasis burned too much -- consider serum tears if insurance won't cover or uncomfortable.    - trial of tacrolimus ointment QHS OU   - discussed serum tears as future option if above treatments unsuccessful.     2.   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     Biopsy showed GVHD colon and skin.     3.   Dry Age Related Macular Degeneration, both eyes.     Seen by Dr. Hector on 12/2020  -- no CNVM     Dry AMD - may have pattern component.    Continue ARED's 2 vitamins PO 2 times daily.      Recommends fish and green leafy vegetables 3 days per week.     No smoking.     Recommeds UV protection.     Return immediately if there are distortions to amsler grid.     F/u Tawny 6 months    4.   Posterior vitreous detachment, right eye. Retina attached.     RD / RT precautions      5.   Basal Cell Carcinoma, Left Lower Lid.    S/P removal in 01/2020 at [facility].      6. Posterior Capsular Opacification each eye  - mid PCO each eye visual significance unclear  Given ARMD and significant dryness.  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 Dermatochalsis each eye  - epilated lash JOEL nasally touching conj, mild entropion due to dermatochalasi.  - monitor    Jason Goldberg, MD  Cornea & External Disease Fellow  Department of Ophthalmology and Visual Neurosciences    RTC 2-3 months, sooner prn.  Possible punctal cautery each eye, consider initiating serum tears.  Dilate to consider possible YAG     Attending Physician Attestation:  Complete documentation of historical and exam elements from today's encounter can be found in the full encounter summary report (not reduplicated in this progress note).  I personally obtained the chief complaint(s) and history of present illness.  I confirmed and edited as necessary the review of systems, past medical/surgical history, family history, social history, and examination findings as documented by others; and I examined the patient myself.  I personally reviewed the relevant tests, images, and reports as documented above.  I formulated and edited as necessary the assessment and plan and discussed the findings and management plan with the patient and family. I was present for the key portions of the procedure and immediately available for the remainder. - Tyrone Fry MD    I personally  spent great than 40min with the patient, of which >50% of the time was spent face to face with the patient, counseling and coordinating care with the patient. This excludes time spent performing the procedure. We discussed the complexity of his diagnosis, the need for further information prior to proceeding with yet another surgery, and the unknown prognosis for the patient at this time.    Tyrone Fry MD

## 2020-12-30 NOTE — NURSING NOTE
"Chief Complaints and History of Present Illnesses   Patient presents with     Dry Eye Syndrome Evaluation     Chief Complaint(s) and History of Present Illness(es)     Dry Eye Syndrome Evaluation     Laterality: both eyes    Associated symptoms: dryness, burning and irritation    Frequency: constantly    Timing: throughout the day    Context: dry eyes    Treatments tried: artificial tears    Response to treatment: significant improvement              Comments     Referral from Dr. Hector to [facility] for dry eyes. Pt has GVHD and has been struggling with dry eyes for two years. Has tried Restasis which was found extremely irritating.  Uses PFAT uses about 4 to 5 vials a day. Significant relief during the day, but notes at night when watching TV, reading, experiences unbearable irritation. He will put in more drops, which only gives very temporary relief. States \"eyes are really difficult to open in the morning as they burn so much.\"  Uses a PFAT 'which is thicker before I go to bed.' Has tried a 'bead mask', which puts in the microwave for 20 seconds. States feels good when applied. Will keep on during overnight hours.   Pt states he uses Preservision AREDS BID.  Terra Montalvo, RILEY COT 12:38 PM 12/30/2020                      "

## 2021-01-11 ENCOUNTER — TELEPHONE (OUTPATIENT)
Dept: OPHTHALMOLOGY | Facility: CLINIC | Age: 71
End: 2021-01-11

## 2021-01-11 NOTE — TELEPHONE ENCOUNTER
Non-formulary Xiidra 5% has been approved from 10/10/20-1/8/22.  ID#[id number], [employer].  Pt will rec'd the approval letter in the mail. Pharmacy was notified.

## 2021-01-18 DIAGNOSIS — D89.813 GVH (GRAFT VERSUS HOST DISEASE) (H): ICD-10-CM

## 2021-01-18 DIAGNOSIS — D46.9 MDS (MYELODYSPLASTIC SYNDROME) (H): ICD-10-CM

## 2021-01-18 RX ORDER — ACYCLOVIR 800 MG/1
TABLET ORAL
Qty: 180 TABLET | Refills: 3 | Status: SHIPPED | OUTPATIENT
Start: 2021-01-18 | End: 2021-12-06

## 2021-01-28 ENCOUNTER — TELEPHONE (OUTPATIENT)
Dept: TRANSPLANT | Facility: CLINIC | Age: 71
End: 2021-01-28

## 2021-01-28 NOTE — TELEPHONE ENCOUNTER
Called regarding the status of COVID19 vaccine.     We do expect that patients within the Blood and Marrow Transplant and Cellular Therapy Program here at [facility],  who are at least 100 days from transplant or cellular infusion date, will receive the COVID vaccine series (2 vaccinations in total) as it becomes available. Because quantities of the vaccine are currently limited, the [facility] ([facility]) has been tasked with carefully evaluating the process for administering vaccines in order to provide the greatest amount of protection for the most vulnerable populations. The information provided by [facility] outlines that vaccination will begin with healthcare workers that are working in direct contact with patients, followed by those living and working in long-term care facilities. Unfortunately, at this time we do not know when vaccines will be available to all patients in the [facility] and what guidelines will be followed for the prioritization of vaccination administration. The health and wellness of our BMT and Cellular Therapy patients is our priority and goal, as soon as the COVID vaccine is widely available we will work to educate and vaccinate all of our post-infusion patients as appropriate.

## 2021-02-01 DIAGNOSIS — C92.01 ACUTE MYELOID LEUKEMIA IN REMISSION (H): ICD-10-CM

## 2021-02-01 RX ORDER — SERTRALINE HYDROCHLORIDE 25 MG/1
50 TABLET, FILM COATED ORAL DAILY
Qty: 180 TABLET | Refills: 1 | Status: ON HOLD | OUTPATIENT
Start: 2021-02-01 | End: 2021-03-01

## 2021-02-01 NOTE — TELEPHONE ENCOUNTER
Received a call from Deejay this morning letting me know that he has tested positive for COVID. The only symptom he is experiencing is exhaustion. Encouraged Deejay to avoid leaving the house, quarantine, wear a mask around his wife (presumed negative), and wash his hands thoroughly/frequently. Deejay was encouraged to call if he developed any new or worsening symptoms.

## 2021-02-02 DIAGNOSIS — D89.813 GVHD AS COMPLICATION OF BONE MARROW TRANSPLANT (H): ICD-10-CM

## 2021-02-02 DIAGNOSIS — Z94.81 STATUS POST BONE MARROW TRANSPLANT (H): ICD-10-CM

## 2021-02-02 DIAGNOSIS — T86.09 GVHD AS COMPLICATION OF BONE MARROW TRANSPLANT (H): ICD-10-CM

## 2021-02-02 RX ORDER — PANTOPRAZOLE SODIUM 20 MG/1
TABLET, DELAYED RELEASE ORAL
Qty: 30 TABLET | Refills: 0 | Status: ON HOLD | OUTPATIENT
Start: 2021-02-02 | End: 2021-03-01

## 2021-02-05 ENCOUNTER — HOSPITAL ENCOUNTER (EMERGENCY)
Facility: CLINIC | Age: 71
Discharge: HOME | End: 2021-02-05
Attending: EMERGENCY MEDICINE | Admitting: EMERGENCY MEDICINE
Payer: MEDICARE

## 2021-02-05 ENCOUNTER — VIRTUAL VISIT (OUTPATIENT)
Dept: TRANSPLANT | Facility: CLINIC | Age: 71
End: 2021-02-05
Attending: PHYSICIAN ASSISTANT
Payer: MEDICARE

## 2021-02-05 ENCOUNTER — TELEPHONE (OUTPATIENT)
Dept: TRANSPLANT | Facility: CLINIC | Age: 71
End: 2021-02-05
Payer: MEDICARE

## 2021-02-05 ENCOUNTER — APPOINTMENT (OUTPATIENT)
Dept: GENERAL RADIOLOGY | Facility: CLINIC | Age: 71
End: 2021-02-05
Attending: EMERGENCY MEDICINE
Payer: MEDICARE

## 2021-02-05 VITALS
SYSTOLIC BLOOD PRESSURE: 126 MMHG | TEMPERATURE: 98.7 F | RESPIRATION RATE: 24 BRPM | DIASTOLIC BLOOD PRESSURE: 73 MMHG | HEART RATE: 74 BPM | OXYGEN SATURATION: 95 %

## 2021-02-05 DIAGNOSIS — U07.1 2019 NOVEL CORONAVIRUS DISEASE (COVID-19): ICD-10-CM

## 2021-02-05 DIAGNOSIS — D89.813 GVHD AS COMPLICATION OF BONE MARROW TRANSPLANT (H): Primary | ICD-10-CM

## 2021-02-05 DIAGNOSIS — T86.09 GVHD AS COMPLICATION OF BONE MARROW TRANSPLANT (H): Primary | ICD-10-CM

## 2021-02-05 LAB
ALBUMIN SERPL-MCNC: 2.8 G/DL (ref 3.4–5)
ALP SERPL-CCNC: 57 U/L (ref 40–150)
ALT SERPL W P-5'-P-CCNC: 23 U/L (ref 0–70)
ANION GAP SERPL CALCULATED.3IONS-SCNC: 6 MMOL/L (ref 3–14)
AST SERPL W P-5'-P-CCNC: 42 U/L (ref 0–45)
BASE EXCESS BLDV CALC-SCNC: 2 MMOL/L
BASOPHILS # BLD AUTO: 0 10E9/L (ref 0–0.2)
BASOPHILS NFR BLD AUTO: 0.2 %
BILIRUB SERPL-MCNC: 0.2 MG/DL (ref 0.2–1.3)
BUN SERPL-MCNC: 30 MG/DL (ref 7–30)
CALCIUM SERPL-MCNC: 8.4 MG/DL (ref 8.5–10.1)
CHLORIDE SERPL-SCNC: 109 MMOL/L (ref 94–109)
CO2 SERPL-SCNC: 25 MMOL/L (ref 20–32)
CREAT SERPL-MCNC: 1.1 MG/DL (ref 0.66–1.25)
DIFFERENTIAL METHOD BLD: ABNORMAL
EOSINOPHIL # BLD AUTO: 0 10E9/L (ref 0–0.7)
EOSINOPHIL NFR BLD AUTO: 0.4 %
ERYTHROCYTE [DISTWIDTH] IN BLOOD BY AUTOMATED COUNT: 18.8 % (ref 10–15)
GFR SERPL CREATININE-BSD FRML MDRD: 67 ML/MIN/{1.73_M2}
GLUCOSE SERPL-MCNC: 94 MG/DL (ref 70–99)
HCO3 BLDV-SCNC: 28 MMOL/L (ref 21–28)
HCT VFR BLD AUTO: 42.1 % (ref 40–53)
HGB BLD-MCNC: 13.6 G/DL (ref 13.3–17.7)
IMM GRANULOCYTES # BLD: 0 10E9/L (ref 0–0.4)
IMM GRANULOCYTES NFR BLD: 0.2 %
LACTATE BLD-SCNC: 1.4 MMOL/L (ref 0.7–2)
LYMPHOCYTES # BLD AUTO: 1.3 10E9/L (ref 0.8–5.3)
LYMPHOCYTES NFR BLD AUTO: 25.5 %
MAGNESIUM SERPL-MCNC: 2 MG/DL (ref 1.6–2.3)
MCH RBC QN AUTO: 32.2 PG (ref 26.5–33)
MCHC RBC AUTO-ENTMCNC: 32.3 G/DL (ref 31.5–36.5)
MCV RBC AUTO: 100 FL (ref 78–100)
MONOCYTES # BLD AUTO: 0.4 10E9/L (ref 0–1.3)
MONOCYTES NFR BLD AUTO: 7.9 %
NEUTROPHILS # BLD AUTO: 3.4 10E9/L (ref 1.6–8.3)
NEUTROPHILS NFR BLD AUTO: 65.8 %
NRBC # BLD AUTO: 0 10*3/UL
NRBC BLD AUTO-RTO: 0 /100
O2/TOTAL GAS SETTING VFR VENT: ABNORMAL %
PCO2 BLDV: 47 MM HG (ref 40–50)
PH BLDV: 7.38 PH (ref 7.32–7.43)
PLATELET # BLD AUTO: 173 10E9/L (ref 150–450)
PO2 BLDV: 23 MM HG (ref 25–47)
POTASSIUM SERPL-SCNC: 4 MMOL/L (ref 3.4–5.3)
PROT SERPL-MCNC: 6.9 G/DL (ref 6.8–8.8)
RBC # BLD AUTO: 4.23 10E12/L (ref 4.4–5.9)
SODIUM SERPL-SCNC: 140 MMOL/L (ref 133–144)
TROPONIN I SERPL-MCNC: <0.015 UG/L (ref 0–0.04)
WBC # BLD AUTO: 5.2 10E9/L (ref 4–11)

## 2021-02-05 PROCEDURE — 93005 ELECTROCARDIOGRAM TRACING: CPT

## 2021-02-05 PROCEDURE — 83605 ASSAY OF LACTIC ACID: CPT | Performed by: EMERGENCY MEDICINE

## 2021-02-05 PROCEDURE — 36415 COLL VENOUS BLD VENIPUNCTURE: CPT | Performed by: EMERGENCY MEDICINE

## 2021-02-05 PROCEDURE — 71045 X-RAY EXAM CHEST 1 VIEW: CPT

## 2021-02-05 PROCEDURE — 84484 ASSAY OF TROPONIN QUANT: CPT | Performed by: EMERGENCY MEDICINE

## 2021-02-05 PROCEDURE — 82803 BLOOD GASES ANY COMBINATION: CPT | Performed by: EMERGENCY MEDICINE

## 2021-02-05 PROCEDURE — 83735 ASSAY OF MAGNESIUM: CPT | Performed by: EMERGENCY MEDICINE

## 2021-02-05 PROCEDURE — 80053 COMPREHEN METABOLIC PANEL: CPT | Performed by: EMERGENCY MEDICINE

## 2021-02-05 PROCEDURE — 999N001193 HC VIDEO/TELEPHONE VISIT; NO CHARGE

## 2021-02-05 PROCEDURE — 85025 COMPLETE CBC W/AUTO DIFF WBC: CPT | Performed by: EMERGENCY MEDICINE

## 2021-02-05 PROCEDURE — 87040 BLOOD CULTURE FOR BACTERIA: CPT | Mod: XS | Performed by: EMERGENCY MEDICINE

## 2021-02-05 PROCEDURE — 99285 EMERGENCY DEPT VISIT HI MDM: CPT | Mod: 25

## 2021-02-05 ASSESSMENT — ENCOUNTER SYMPTOMS
SHORTNESS OF BREATH: 1
WEAKNESS: 1
FATIGUE: 1

## 2021-02-05 NOTE — PROGRESS NOTES
Deejay Dior is 7 yrs from allo transplant. He called BMT triage pager tday 2/5 with worsening COvid symptoims. He tells me he was fatigued which is not unusual for him. He was tested for Covid19 found to be positive 1/30/21. He had been feeling the same (asymptomatic vs fatigue only) until 2/3. Since Wednesday he has been having low grade fevers , increased exhaustion to the extent that he laying in bed all day. Getting up to urinate frequenly. Drinking 2-3 bottles of water per day but not eating much. No Gi symptoms. Significant body aches. Newly feel sob when up to the bathroom. He isn't coughing much but that is new also. He is taking tylenol routinely so unsure how high his temps would get without it. He at times also feels SOB when he has been laying down for a while but this seems better when he sits up in bed. Occasional dizziness with up to the bathroom as well. No falls.     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 Defer to ER physician regarding if admission is warranted or not. Should be admitted he can request BMT consult as we are happy to help medicine team manage immunosuppression with acute infection if he not hypoxic and flet to be safe for discharge encouraged him to continue to rest, take meds, eat and drink and call us back with any further worsening of symptoms.     He tells me he will present to [facility] ER today.     Anali Clark PA-C  [phone]

## 2021-02-05 NOTE — LETTER
2/5/2021         RE: Deejay Dior  [address]  [place] [address]        Dear Colleague,    Thank you for referring your patient, Deejay Dior, to the [facility] BLOOD AND MARROW TRANSPLANT PROGRAM [place]. Please see a copy of my visit note below.    Deejay Dior is 7 yrs from allo transplant. He called BMT triage pager tday 2/5 with worsening COvid symptoims. He tells me he was fatigued which is not unusual for him. He was tested for Covid19 found to be positive 1/30/21. He had been feeling the same (asymptomatic vs fatigue only) until 2/3. Since Wednesday he has been having low grade fevers , increased exhaustion to the extent that he laying in bed all day. Getting up to urinate frequenly. Drinking 2-3 bottles of water per day but not eating much. No Gi symptoms. Significant body aches. Newly feel sob when up to the bathroom. He isn't coughing much but that is new also. He is taking tylenol routinely so unsure how high his temps would get without it. He at times also feels SOB when he has been laying down for a while but this seems better when he sits up in bed. Occasional dizziness with up to the bathroom as well. No falls.     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 Defer to ER physician regarding if admission is warranted or not. Should be admitted he can request BMT consult as we are happy to help medicine team manage immunosuppression with acute infection if he not hypoxic and flet to be safe for discharge encouraged him to continue to rest, take meds, eat and drink and call us back with any further worsening of symptoms.     He tells me he will present to [facility] ER today.     Anali Clark PA-C  [phone]

## 2021-02-05 NOTE — ED TRIAGE NOTES
A&O x4, ABCs intact. Pt presents with SOB, fatigue and generalized weakness. Pt reports that he is a transplant patient and was diagnosed with COVID last Saturday. He reports hardly any symptoms then but the fatigue, weakness and SOB has become worse.

## 2021-02-05 NOTE — TELEPHONE ENCOUNTER
Pt called experiencing symptoms, tested positive for COVID-19 and is having exacerbated symptoms-fatigue, SOB. I informed pt that I will page an CAROLINA to give Maia call @ [phone] and if pt does not get a call back within half an hour to call [phone] again. Pt agreed. CAROLINA paged.

## 2021-02-05 NOTE — ED PROVIDER NOTES
History     Chief Complaint:  Shortness of Breath and Generalized Weakness     HPI   Deejay Dior is a 70 year old male with history of DVT, clotting disorder, hyperlipidemia, myelodysplastic syndrome, who presents for evaluation of increased shortness of breath and generalized weakness in the setting of known positive COVID-19. Per chart review, the patient tested positive for COVID-19 on 1/30 and today had a virtual visit for his symptoms. He was found to be increasingly short of breath and weak and was recommended to be seen in the ED for further evaluation. Here, he states that he is generally just feeling run down, weak, fatigued, and short of breath. He has had pulse oximeter at home that has been reading between 95-97% generally.     Virtual Visit Note 2/5/2021  [facility] Blood and Marrow Transplant Program Caro Dior is 7 yrs from allo transplant. He called BMT triage pager tday 2/5 with worsening COvid symptoims. He tells me he was fatigued which is not unusual for him. He was tested for Covid19 found to be positive 1/30/21. He had been feeling the same (asymptomatic vs fatigue only) until 2/3. Since Wednesday he has been having low grade fevers , increased exhaustion to the extent that he laying in bed all day. Getting up to urinate frequenly. Drinking 2-3 bottles of water per day but not eating much. No Gi symptoms. Significant body aches. Newly feel sob when up to the bathroom. He isn't coughing much but that is new also. He is taking tylenol routinely so unsure how high his temps would get without it. He at times also feels SOB when he has been laying down for a while but this seems better when he sits up in bed. Occasional dizziness with up to the bathroom as well. No falls.      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 Defer to ER physician regarding if admission is warranted or not. Should be admitted he can request BMT consult as we are happy to help medicine team manage immunosuppression with acute infection if he not hypoxic and flet to be safe for discharge encouraged him to continue to rest, take meds, eat and drink and call us back with any further worsening of symptoms.      He tells me he will present to [facility] ER today.      Anali Clark PA-C  [phone]    Review of Systems   Constitutional: Positive for fatigue.   Respiratory: Positive for shortness of breath.    Neurological: Positive for weakness.   All other systems reviewed and are negative.      Allergies:  Blood Transfusion Related (Informational Only)  Ibuprofen    Medications:  acyclovir   amoxicillin   augmented betamethasone dipropionate  fluconazole   Hypromellose  levofloxacin  Oxycodone   pantoprazole   Prednisone    ruxolitinib   senna   sertraline  sulfamethoxazole-trimethoprim     Past Medical History:    DVT  Clotting disorder    Depression, major   Glucose intolerance    Head injury   Hearing problem   History of blood transfusion  Immunosuppression  Lymphedema of both lower extremities  myelodysplastic syndrome   Hyperlipidemia  PIPO  Pneumonia  Reduced vision  Hypogonadism  Secondary adrenal insufficiency   graft versus host disease    Past Surgical History:    bone marrow transplant   Arthroplasty anterior  Back surgery  Biopsy  BMT cell, product infusion  Cataracts  EGD  Excise lesion lip  Blepharoplasty   Sigmoidoscopy flex   Hemorrhoidectomy   Phacoemulsification clear cornea with standard intraocular lens implant   tonsillectomy  Transplant, stem cell  Varicose vein surgery    Family History:    Mother: breast cancer, Cerebrovascular Disease  Father: cancer  Brother: hypertension, heart disease, hyperlipidemia, depression    Social History:  The patient was  unaccompanied to the ED.  PCP: Britta Vivek Hall     Physical Exam     Patient Vitals for the past 24 hrs:   BP Temp Temp src Pulse Resp SpO2   02/05/21 1345 134/72 -- -- 74 -- 95 %   02/05/21 1330 132/75 -- -- 76 -- --   02/05/21 1315 138/76 -- -- 72 -- --   02/05/21 1300 (!) 130/107 -- -- 71 -- 96 %   02/05/21 1245 (!) 109/93 -- -- 71 -- 94 %   02/05/21 1240 -- -- -- -- -- 96 %   02/05/21 1230 137/74 -- -- 67 -- --   02/05/21 1215 118/66 -- -- 74 -- 96 %   02/05/21 1200 131/73 -- -- -- -- --   02/05/21 1116 120/71 98.7  F (37.1  C) Oral 78 24 92 %     Physical Exam  General: Patient is alert and cooperative.  Well appearing.   HENT:  Normal nose, oropharynx. Moist oral mucosa.  Eyes: EOMI. Normal conjunctiva.  Neck:  Normal range of motion and appearance.   Cardiovascular:  Normal rate, regular rhythm and normal heart sounds.   Pulmonary/Chest:  Effort normal. No wheezing or crackles.  Abdominal: Soft. No distension or tenderness.     Musculoskeletal: Normal range of motion. No edema or tenderness.   Neurological: oriented, normal strength, sensation, and coordination.   Skin: Warm and dry. No rash or bruising.   Psychiatric: Normal mood and affect. Normal behavior and judgement.      Emergency Department Course     ECG:  ECG taken at 1207, ECG read at 1215  Normal sinus rhythm with sinus arrhythmia   Nonspecific ST abnormality   Abnormal ECG  Rate 70 bpm. WY interval 174 ms. QRS duration 86 ms. QT/QTc 386/416 ms. P-R-T axes 14 -13 26.    Imaging:    XR Chest Port 1 View  1. Minimally increased bilateral mid to lower lung predominant  interstitial opacities may represent progressive fibrotic changes or  superimposed pneumonitis. No pleural effusion. No pneumothorax.  2. Borderline-enlarged cardiopericardial silhouette, likely  accentuated by the portable technique.  MANAV ZAMORA MD  Reading per radiology     Laboratory:    CBC: WBC 5.2, HGB 13.6,   CMP: Calcium 8.4 (L), Albumin 2.8 (L), o/w WNL  (Creatinine 1.10)    Troponin (Collected 1227): <0.015     Lactic Acid (Resulted: 1254): 1.4    Magnesium: 2.0    Blood Gas (Collected): pH 7.38, PCO2 47, PO2 23 (L), Bicarbonate 28, Base Excess Venous 2.0, FIO2 room air      Blood Culture x2: Pending    Emergency Department Course:    Reviewed:    I reviewed the patient's nursing notes, vitals, past medical records, Care Everywhere.     Assessments:    1210 I performed an exam of the patient as documented above.     1400 Patient rechecked and updated.      Disposition:  The patient was discharged to home.     Impression & Plan      Covid-19  Deejay Dior was evaluated during a global COVID-19 pandemic, which necessitated consideration that the patient might be at risk for infection with the SARS-CoV-2 virus that causes COVID-19.   Applicable protocols for evaluation were followed during the patient's care.   COVID-19 was considered as part of the patient's evaluation. The plan for testing is:  a test was obtained at a previous visit and reviewed & considered today. Patient is known positive for COVID-19 on 1/30/2021.    Medical Decision Making:  Deejay Dior is a 70 year old male who presents to the emergency department today for evaluation of generalized fatigue and mild shortness of breath.  Recent medical history is notable for positive Covid test on January 30.  He also does have a history of a bone marrow transplant and is on chronic immunosuppressive therapy.  He had a [facility] visit today and was referred to the emergency department for evaluation of his symptoms.  He is well-appearing, afebrile, and hemodynamically stable with normal oxygen saturations.  He has a normal cardiopulmonary examination.  ED testing has been reassuring.  This is included an EKG depicting a sinus rhythm with no ischemic changes.  Laboratory tests are unremarkable.  He has a normal white blood cell count of 5.2, undetectable troponin, and normal lactic acid.  Chest  x-ray shows evidence of what appears to be very mild pneumonitis.  No significant infiltrate or clinical concern for bacterial pneumonia.  His test results were discussed with him.  There is no indication for admission which she is quite happy with.  He is being discharged with reassurance.  He does already have a home pulse oximeter was advised to monitor his oxygen levels and return if worsening symptoms, evidence of home hypoxia, or other concerns.    Diagnosis:    ICD-10-CM    1. 2019 novel coronavirus disease (COVID-19)  U07.1 Blood culture ONE site     Discharge Medications:  New Prescriptions    No medications on file     Scribe Disclosure:  I, Orla Severson, am serving as a scribe at 12:05 PM on 2/5/2021 to document services personally performed by Mauro Rivera MD based on my observations and the provider's statements to me.     [facility] EMERGENCY DEPT         Mauro Rivera MD  02/05/21 0373

## 2021-02-08 ENCOUNTER — HOSPITAL ENCOUNTER (EMERGENCY)
Facility: CLINIC | Age: 71
Discharge: ANOTHER HOSPITAL | End: 2021-02-08
Attending: EMERGENCY MEDICINE | Admitting: EMERGENCY MEDICINE
Payer: MEDICARE

## 2021-02-08 ENCOUNTER — HOSPITAL ENCOUNTER (INPATIENT)
Facility: CLINIC | Age: 71
LOS: 23 days | Discharge: HOME HEALTH | DRG: 207 | End: 2021-03-03
Attending: INTERNAL MEDICINE | Admitting: STUDENT IN AN ORGANIZED HEALTH CARE EDUCATION/TRAINING PROGRAM
Payer: MEDICARE

## 2021-02-08 ENCOUNTER — APPOINTMENT (OUTPATIENT)
Dept: CT IMAGING | Facility: CLINIC | Age: 71
End: 2021-02-08
Attending: EMERGENCY MEDICINE
Payer: MEDICARE

## 2021-02-08 VITALS
HEART RATE: 78 BPM | TEMPERATURE: 98.1 F | DIASTOLIC BLOOD PRESSURE: 63 MMHG | RESPIRATION RATE: 16 BRPM | OXYGEN SATURATION: 95 % | SYSTOLIC BLOOD PRESSURE: 111 MMHG

## 2021-02-08 DIAGNOSIS — U07.1 PNEUMONIA DUE TO 2019 NOVEL CORONAVIRUS: ICD-10-CM

## 2021-02-08 DIAGNOSIS — D46.9 MDS (MYELODYSPLASTIC SYNDROME) (H): ICD-10-CM

## 2021-02-08 DIAGNOSIS — Z94.81 HISTORY OF BONE MARROW TRANSPLANT (H): ICD-10-CM

## 2021-02-08 DIAGNOSIS — D89.813 GVH (GRAFT VERSUS HOST DISEASE) (H): ICD-10-CM

## 2021-02-08 DIAGNOSIS — D89.811 CHRONIC GRAFT-VERSUS-HOST DISEASE (H): ICD-10-CM

## 2021-02-08 DIAGNOSIS — J12.82 PNEUMONIA DUE TO 2019 NOVEL CORONAVIRUS: ICD-10-CM

## 2021-02-08 DIAGNOSIS — D89.813 GRAFT-VERSUS-HOST DISEASE (H): Primary | ICD-10-CM

## 2021-02-08 DIAGNOSIS — U07.1 COVID-19 VIRUS INFECTION: ICD-10-CM

## 2021-02-08 LAB
ALBUMIN SERPL-MCNC: 2.6 G/DL (ref 3.4–5)
ALP SERPL-CCNC: 69 U/L (ref 40–150)
ALT SERPL W P-5'-P-CCNC: 28 U/L (ref 0–70)
ANION GAP SERPL CALCULATED.3IONS-SCNC: 5 MMOL/L (ref 3–14)
AST SERPL W P-5'-P-CCNC: 51 U/L (ref 0–45)
BASE DEFICIT BLDV-SCNC: 3.7 MMOL/L
BASOPHILS # BLD AUTO: 0 10E9/L (ref 0–0.2)
BASOPHILS NFR BLD AUTO: 0.2 %
BILIRUB SERPL-MCNC: 0.3 MG/DL (ref 0.2–1.3)
BUN SERPL-MCNC: 25 MG/DL (ref 7–30)
CALCIUM SERPL-MCNC: 8.4 MG/DL (ref 8.5–10.1)
CHLORIDE SERPL-SCNC: 111 MMOL/L (ref 94–109)
CO2 SERPL-SCNC: 23 MMOL/L (ref 20–32)
CREAT SERPL-MCNC: 0.88 MG/DL (ref 0.66–1.25)
CRP SERPL-MCNC: 78 MG/L (ref 0–8)
D DIMER PPP FEU-MCNC: 3 UG/ML FEU (ref 0–0.5)
DIFFERENTIAL METHOD BLD: ABNORMAL
EOSINOPHIL # BLD AUTO: 0 10E9/L (ref 0–0.7)
EOSINOPHIL NFR BLD AUTO: 0.3 %
ERYTHROCYTE [DISTWIDTH] IN BLOOD BY AUTOMATED COUNT: 17.8 % (ref 10–15)
FERRITIN SERPL-MCNC: 308 NG/ML (ref 26–388)
GFR SERPL CREATININE-BSD FRML MDRD: 86 ML/MIN/{1.73_M2}
GLUCOSE BLDC GLUCOMTR-MCNC: 148 MG/DL (ref 70–99)
GLUCOSE SERPL-MCNC: 100 MG/DL (ref 70–99)
HCO3 BLDV-SCNC: 22 MMOL/L (ref 21–28)
HCT VFR BLD AUTO: 39.8 % (ref 40–53)
HGB BLD-MCNC: 13.4 G/DL (ref 13.3–17.7)
IMM GRANULOCYTES # BLD: 0 10E9/L (ref 0–0.4)
IMM GRANULOCYTES NFR BLD: 0.3 %
INTERPRETATION ECG - MUSE: NORMAL
INTERPRETATION ECG - MUSE: NORMAL
LACTATE BLD-SCNC: 1.1 MMOL/L (ref 0.7–2)
LDH SERPL L TO P-CCNC: 483 U/L (ref 85–227)
LYMPHOCYTES # BLD AUTO: 1 10E9/L (ref 0.8–5.3)
LYMPHOCYTES NFR BLD AUTO: 17.2 %
MAGNESIUM SERPL-MCNC: 1.7 MG/DL (ref 1.6–2.3)
MCH RBC QN AUTO: 32 PG (ref 26.5–33)
MCHC RBC AUTO-ENTMCNC: 33.7 G/DL (ref 31.5–36.5)
MCV RBC AUTO: 95 FL (ref 78–100)
MONOCYTES # BLD AUTO: 0.3 10E9/L (ref 0–1.3)
MONOCYTES NFR BLD AUTO: 5.5 %
NEUTROPHILS # BLD AUTO: 4.6 10E9/L (ref 1.6–8.3)
NEUTROPHILS NFR BLD AUTO: 76.5 %
NRBC # BLD AUTO: 0 10*3/UL
NRBC BLD AUTO-RTO: 0 /100
O2/TOTAL GAS SETTING VFR VENT: 6 %
PCO2 BLDV: 40 MM HG (ref 40–50)
PH BLDV: 7.34 PH (ref 7.32–7.43)
PLATELET # BLD AUTO: 152 10E9/L (ref 150–450)
PO2 BLDV: 39 MM HG (ref 25–47)
POTASSIUM SERPL-SCNC: 4 MMOL/L (ref 3.4–5.3)
PROCALCITONIN SERPL-MCNC: 0.1 NG/ML
PROCALCITONIN SERPL-MCNC: 0.28 NG/ML
PROT SERPL-MCNC: 6.6 G/DL (ref 6.8–8.8)
RBC # BLD AUTO: 4.19 10E12/L (ref 4.4–5.9)
SODIUM SERPL-SCNC: 139 MMOL/L (ref 133–144)
TROPONIN I SERPL-MCNC: 0.02 UG/L (ref 0–0.04)
WBC # BLD AUTO: 6.1 10E9/L (ref 4–11)

## 2021-02-08 PROCEDURE — XW0DXT5 INTRODUCTION OF RUXOLITINIB INTO MOUTH AND PHARYNX, EXTERNAL APPROACH, NEW TECHNOLOGY GROUP 5: ICD-10-PCS | Performed by: STUDENT IN AN ORGANIZED HEALTH CARE EDUCATION/TRAINING PROGRAM

## 2021-02-08 PROCEDURE — 120N000002 HC R&B MED SURG/OB UMMC

## 2021-02-08 PROCEDURE — 99207 PR APP CREDIT; MD BILLING SHARED VISIT: CPT | Performed by: NURSE PRACTITIONER

## 2021-02-08 PROCEDURE — 999N001017 HC STATISTIC GLUCOSE BY METER IP

## 2021-02-08 PROCEDURE — 250N000011 HC RX IP 250 OP 636: Performed by: EMERGENCY MEDICINE

## 2021-02-08 PROCEDURE — 86140 C-REACTIVE PROTEIN: CPT | Performed by: EMERGENCY MEDICINE

## 2021-02-08 PROCEDURE — 93005 ELECTROCARDIOGRAM TRACING: CPT

## 2021-02-08 PROCEDURE — 80053 COMPREHEN METABOLIC PANEL: CPT | Performed by: EMERGENCY MEDICINE

## 2021-02-08 PROCEDURE — 83615 LACTATE (LD) (LDH) ENZYME: CPT | Performed by: EMERGENCY MEDICINE

## 2021-02-08 PROCEDURE — 258N000003 HC RX IP 258 OP 636: Performed by: EMERGENCY MEDICINE

## 2021-02-08 PROCEDURE — 250N000009 HC RX 250: Performed by: NURSE PRACTITIONER

## 2021-02-08 PROCEDURE — 83605 ASSAY OF LACTIC ACID: CPT | Performed by: EMERGENCY MEDICINE

## 2021-02-08 PROCEDURE — 36415 COLL VENOUS BLD VENIPUNCTURE: CPT | Performed by: NURSE PRACTITIONER

## 2021-02-08 PROCEDURE — 71275 CT ANGIOGRAPHY CHEST: CPT | Mod: ME

## 2021-02-08 PROCEDURE — 99223 1ST HOSP IP/OBS HIGH 75: CPT | Mod: AI | Performed by: STUDENT IN AN ORGANIZED HEALTH CARE EDUCATION/TRAINING PROGRAM

## 2021-02-08 PROCEDURE — 250N000013 HC RX MED GY IP 250 OP 250 PS 637: Performed by: EMERGENCY MEDICINE

## 2021-02-08 PROCEDURE — 99207 PR NOT IN PERSON INPATIENT CONSULT STATISTICAL MARKER: CPT | Performed by: INTERNAL MEDICINE

## 2021-02-08 PROCEDURE — 99285 EMERGENCY DEPT VISIT HI MDM: CPT | Mod: 25

## 2021-02-08 PROCEDURE — 84484 ASSAY OF TROPONIN QUANT: CPT | Performed by: EMERGENCY MEDICINE

## 2021-02-08 PROCEDURE — 85379 FIBRIN DEGRADATION QUANT: CPT | Performed by: EMERGENCY MEDICINE

## 2021-02-08 PROCEDURE — 94640 AIRWAY INHALATION TREATMENT: CPT

## 2021-02-08 PROCEDURE — 70450 CT HEAD/BRAIN W/O DYE: CPT | Mod: ME

## 2021-02-08 PROCEDURE — 85025 COMPLETE CBC W/AUTO DIFF WBC: CPT | Performed by: EMERGENCY MEDICINE

## 2021-02-08 PROCEDURE — 96361 HYDRATE IV INFUSION ADD-ON: CPT

## 2021-02-08 PROCEDURE — 82728 ASSAY OF FERRITIN: CPT | Performed by: EMERGENCY MEDICINE

## 2021-02-08 PROCEDURE — 87040 BLOOD CULTURE FOR BACTERIA: CPT | Mod: XS | Performed by: EMERGENCY MEDICINE

## 2021-02-08 PROCEDURE — 96375 TX/PRO/DX INJ NEW DRUG ADDON: CPT | Mod: 59

## 2021-02-08 PROCEDURE — 82803 BLOOD GASES ANY COMBINATION: CPT | Performed by: EMERGENCY MEDICINE

## 2021-02-08 PROCEDURE — 250N000011 HC RX IP 250 OP 636: Performed by: NURSE PRACTITIONER

## 2021-02-08 PROCEDURE — 96374 THER/PROPH/DIAG INJ IV PUSH: CPT | Mod: 59

## 2021-02-08 PROCEDURE — 250N000009 HC RX 250: Performed by: EMERGENCY MEDICINE

## 2021-02-08 PROCEDURE — 258N000003 HC RX IP 258 OP 636: Performed by: NURSE PRACTITIONER

## 2021-02-08 PROCEDURE — 84145 PROCALCITONIN (PCT): CPT | Performed by: EMERGENCY MEDICINE

## 2021-02-08 PROCEDURE — XW033E5 INTRODUCTION OF REMDESIVIR ANTI-INFECTIVE INTO PERIPHERAL VEIN, PERCUTANEOUS APPROACH, NEW TECHNOLOGY GROUP 5: ICD-10-PCS | Performed by: STUDENT IN AN ORGANIZED HEALTH CARE EDUCATION/TRAINING PROGRAM

## 2021-02-08 PROCEDURE — 84145 PROCALCITONIN (PCT): CPT | Performed by: NURSE PRACTITIONER

## 2021-02-08 PROCEDURE — 83735 ASSAY OF MAGNESIUM: CPT | Performed by: EMERGENCY MEDICINE

## 2021-02-08 PROCEDURE — 250N000013 HC RX MED GY IP 250 OP 250 PS 637: Performed by: NURSE PRACTITIONER

## 2021-02-08 RX ORDER — METOCLOPRAMIDE HYDROCHLORIDE 5 MG/ML
10 INJECTION INTRAMUSCULAR; INTRAVENOUS ONCE
Status: COMPLETED | OUTPATIENT
Start: 2021-02-08 | End: 2021-02-08

## 2021-02-08 RX ORDER — LIDOCAINE 40 MG/G
CREAM TOPICAL
Status: DISCONTINUED | OUTPATIENT
Start: 2021-02-08 | End: 2021-02-22

## 2021-02-08 RX ORDER — ONDANSETRON 2 MG/ML
4 INJECTION INTRAMUSCULAR; INTRAVENOUS EVERY 6 HOURS PRN
Status: DISCONTINUED | OUTPATIENT
Start: 2021-02-08 | End: 2021-03-03 | Stop reason: HOSPADM

## 2021-02-08 RX ORDER — PROCHLORPERAZINE 25 MG
12.5 SUPPOSITORY, RECTAL RECTAL EVERY 12 HOURS PRN
Status: DISCONTINUED | OUTPATIENT
Start: 2021-02-08 | End: 2021-02-22

## 2021-02-08 RX ORDER — ACYCLOVIR 400 MG/1
800 TABLET ORAL 3 TIMES DAILY
Status: DISCONTINUED | OUTPATIENT
Start: 2021-02-08 | End: 2021-02-09

## 2021-02-08 RX ORDER — LIDOCAINE 40 MG/G
CREAM TOPICAL
Status: DISCONTINUED | OUTPATIENT
Start: 2021-02-08 | End: 2021-02-08

## 2021-02-08 RX ORDER — DIPHENHYDRAMINE HYDROCHLORIDE 50 MG/ML
25 INJECTION INTRAMUSCULAR; INTRAVENOUS ONCE
Status: COMPLETED | OUTPATIENT
Start: 2021-02-08 | End: 2021-02-08

## 2021-02-08 RX ORDER — ACETAMINOPHEN 325 MG/1
650 TABLET ORAL EVERY 4 HOURS PRN
Status: DISCONTINUED | OUTPATIENT
Start: 2021-02-08 | End: 2021-02-18

## 2021-02-08 RX ORDER — IBUPROFEN 200 MG
600 TABLET ORAL EVERY 6 HOURS PRN
Status: DISCONTINUED | OUTPATIENT
Start: 2021-02-08 | End: 2021-02-09

## 2021-02-08 RX ORDER — PANTOPRAZOLE SODIUM 20 MG/1
20 TABLET, DELAYED RELEASE ORAL DAILY
Status: DISCONTINUED | OUTPATIENT
Start: 2021-02-09 | End: 2021-02-09

## 2021-02-08 RX ORDER — IOPAMIDOL 755 MG/ML
500 INJECTION, SOLUTION INTRAVASCULAR ONCE
Status: COMPLETED | OUTPATIENT
Start: 2021-02-08 | End: 2021-02-08

## 2021-02-08 RX ORDER — ACYCLOVIR 400 MG/1
800 TABLET ORAL 2 TIMES DAILY
Status: DISCONTINUED | OUTPATIENT
Start: 2021-02-08 | End: 2021-02-08

## 2021-02-08 RX ORDER — MAGNESIUM CARB/ALUMINUM HYDROX 105-160MG
148 TABLET,CHEWABLE ORAL
Status: DISCONTINUED | OUTPATIENT
Start: 2021-02-08 | End: 2021-02-22

## 2021-02-08 RX ORDER — POLYETHYLENE GLYCOL 3350 17 G/17G
17 POWDER, FOR SOLUTION ORAL DAILY PRN
Status: DISCONTINUED | OUTPATIENT
Start: 2021-02-08 | End: 2021-02-25

## 2021-02-08 RX ORDER — DEXAMETHASONE SODIUM PHOSPHATE 10 MG/ML
10 INJECTION, SOLUTION INTRAMUSCULAR; INTRAVENOUS ONCE
Status: COMPLETED | OUTPATIENT
Start: 2021-02-08 | End: 2021-02-08

## 2021-02-08 RX ORDER — BISACODYL 5 MG
5 TABLET, DELAYED RELEASE (ENTERIC COATED) ORAL DAILY PRN
Status: DISCONTINUED | OUTPATIENT
Start: 2021-02-08 | End: 2021-02-22

## 2021-02-08 RX ORDER — ACETAMINOPHEN 500 MG
1000 TABLET ORAL ONCE
Status: COMPLETED | OUTPATIENT
Start: 2021-02-08 | End: 2021-02-08

## 2021-02-08 RX ORDER — FLUCONAZOLE 100 MG/1
100 TABLET ORAL DAILY
Status: DISCONTINUED | OUTPATIENT
Start: 2021-02-09 | End: 2021-02-14

## 2021-02-08 RX ORDER — LEVOFLOXACIN 250 MG/1
250 TABLET, FILM COATED ORAL DAILY
Status: DISCONTINUED | OUTPATIENT
Start: 2021-02-09 | End: 2021-02-09

## 2021-02-08 RX ORDER — ONDANSETRON 4 MG/1
4 TABLET, ORALLY DISINTEGRATING ORAL EVERY 6 HOURS PRN
Status: DISCONTINUED | OUTPATIENT
Start: 2021-02-08 | End: 2021-03-03 | Stop reason: HOSPADM

## 2021-02-08 RX ORDER — IPRATROPIUM BROMIDE AND ALBUTEROL SULFATE 2.5; .5 MG/3ML; MG/3ML
3 SOLUTION RESPIRATORY (INHALATION)
Status: DISCONTINUED | OUTPATIENT
Start: 2021-02-08 | End: 2021-02-08 | Stop reason: HOSPADM

## 2021-02-08 RX ORDER — BISACODYL 5 MG
15 TABLET, DELAYED RELEASE (ENTERIC COATED) ORAL DAILY PRN
Status: DISCONTINUED | OUTPATIENT
Start: 2021-02-08 | End: 2021-02-22

## 2021-02-08 RX ORDER — PROCHLORPERAZINE MALEATE 5 MG
5 TABLET ORAL EVERY 6 HOURS PRN
Status: DISCONTINUED | OUTPATIENT
Start: 2021-02-08 | End: 2021-03-03 | Stop reason: HOSPADM

## 2021-02-08 RX ORDER — SULFAMETHOXAZOLE/TRIMETHOPRIM 800-160 MG
1 TABLET ORAL
Status: DISCONTINUED | OUTPATIENT
Start: 2021-02-08 | End: 2021-03-03 | Stop reason: HOSPADM

## 2021-02-08 RX ORDER — BISACODYL 5 MG
10 TABLET, DELAYED RELEASE (ENTERIC COATED) ORAL DAILY PRN
Status: DISCONTINUED | OUTPATIENT
Start: 2021-02-08 | End: 2021-02-22

## 2021-02-08 RX ADMIN — ENOXAPARIN SODIUM 50 MG: 100 INJECTION SUBCUTANEOUS at 20:07

## 2021-02-08 RX ADMIN — DIPHENHYDRAMINE HYDROCHLORIDE 25 MG: 50 INJECTION INTRAMUSCULAR; INTRAVENOUS at 09:31

## 2021-02-08 RX ADMIN — SULFAMETHOXAZOLE AND TRIMETHOPRIM 1 TABLET: 800; 160 TABLET ORAL at 20:18

## 2021-02-08 RX ADMIN — SODIUM CHLORIDE 50 ML: 9 INJECTION, SOLUTION INTRAVENOUS at 21:42

## 2021-02-08 RX ADMIN — IOPAMIDOL 73 ML: 755 INJECTION, SOLUTION INTRAVENOUS at 10:29

## 2021-02-08 RX ADMIN — ACETAMINOPHEN 650 MG: 325 TABLET, FILM COATED ORAL at 23:06

## 2021-02-08 RX ADMIN — DEXAMETHASONE SODIUM PHOSPHATE 10 MG: 10 INJECTION, SOLUTION INTRAMUSCULAR; INTRAVENOUS at 09:31

## 2021-02-08 RX ADMIN — METOCLOPRAMIDE HYDROCHLORIDE 10 MG: 5 INJECTION INTRAMUSCULAR; INTRAVENOUS at 09:31

## 2021-02-08 RX ADMIN — IPRATROPIUM BROMIDE AND ALBUTEROL SULFATE 3 ML: .5; 3 SOLUTION RESPIRATORY (INHALATION) at 09:31

## 2021-02-08 RX ADMIN — ACYCLOVIR 800 MG: 400 TABLET ORAL at 20:08

## 2021-02-08 RX ADMIN — ACETAMINOPHEN 1000 MG: 500 TABLET, FILM COATED ORAL at 09:25

## 2021-02-08 RX ADMIN — SODIUM CHLORIDE 1000 ML: 9 INJECTION, SOLUTION INTRAVENOUS at 09:27

## 2021-02-08 RX ADMIN — REMDESIVIR 200 MG: 100 INJECTION, POWDER, LYOPHILIZED, FOR SOLUTION INTRAVENOUS at 21:40

## 2021-02-08 RX ADMIN — SODIUM CHLORIDE 90 ML: 9 INJECTION, SOLUTION INTRAVENOUS at 10:31

## 2021-02-08 ASSESSMENT — ENCOUNTER SYMPTOMS
ABDOMINAL PAIN: 0
COUGH: 0
SHORTNESS OF BREATH: 1
FATIGUE: 1
NECK PAIN: 0
CHILLS: 1
HEADACHES: 1
FEVER: 1
WEAKNESS: 0

## 2021-02-08 ASSESSMENT — ACTIVITIES OF DAILY LIVING (ADL): ADLS_ACUITY_SCORE: 14

## 2021-02-08 NOTE — ED TRIAGE NOTES
Patient presents from home via EMS with c/o SOB, fever and HA. COVID+, diagnosed end of January. Upon EMS arrival found to be 80% on RA. Placed on 6L 02 and came up to 94%. 10/10 HA, given 15mg toradol. 103 degree fever for EMS. A&Ox4.

## 2021-02-08 NOTE — ED PROVIDER NOTES
History   Chief Complaint:  Shortness of Breath     The history is provided by the patient and the EMS personnel.      Deejay Dior is a 70 year old male with history of stem cell transplant for Myelodysplastic syndrome and DVT/clotting disorder, not anticoagulated, who presents with shortness of breath. Per EMS, the patient was diagnosed with COVID on 01/30/21 and has had worsening shortness of breath. He has had a fever, and was 80% on room air when EMS arrived this morning. Patient was placed on 6L of O2 and now sating at 94%. He also notes that he had a headache that is 10/10 and was given Toradol en route and better now 6/10. He notes that last night troubles catching his breath, with significant fevers, chills, body aches.  He denies chest pain, weakness in his extremities, neck pain, or abdominal pain. No vision changes.     Virtual Visit Note 2/5/2021  [facility] Blood and Marrow Transplant Program [place]  Deejay Dior is 7 yrs from allo transplant. He called BMT triage pager [email or web address] 2/5 with worsening COvid symptoims. He tells me he was fatigued which is not unusual for him. He was tested for Covid19 found to be positive 1/30/21. He had been feeling the same (asymptomatic vs fatigue only) until 2/3. Since Wednesday he has been having low grade fevers , increased exhaustion to the extent that he laying in bed all day. Getting up to urinate frequenly. Drinking 2-3 bottles of water per day but not eating much. No Gi symptoms. Significant body aches. Newly feel sob when up to the bathroom. He isn't coughing much but that is new also. He is taking tylenol routinely so unsure how high his temps would get without it. He at times also feels SOB when he has been laying down for a while but this seems better when he sits up in bed. Occasional dizziness with up to the bathroom as well. No falls.      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 Defer to ER physician regarding if admission is warranted or not. Should be admitted he can request BMT consult as we are happy to help medicine team manage immunosuppression with acute infection if he not hypoxic and flet to be safe for discharge encouraged him to continue to rest, take meds, eat and drink and call us back with any further worsening of symptoms.      Review of Systems   Constitutional: Positive for chills, fatigue and fever.   Respiratory: Positive for shortness of breath. Negative for cough.    Cardiovascular: Negative for chest pain.   Gastrointestinal: Negative for abdominal pain.   Musculoskeletal: Negative for neck pain.   Neurological: Positive for headaches. Negative for weakness.   All other systems reviewed and are negative.    Allergies:  Blood Transfusion Related (Informational Only)  Ibuprofen    Medications:  Acyclovir  Amoxicillin   Diflucan   Levaquin   Protonix  Prednisone   Senna   Ruxolitinib  Sertraline     Past Medical History:    Acute DVT  Leukemia   Graft versus host disease  Clotting disorder  Depression   Glucose intolerance   Head injury   Bone marrow transplant   Immunosuppression   Myelodysplastic syndrome   Hyperlipidemia   PIPO  Pneumonia     Past Surgical History:    Arthroplasty hip anterior, right   Back surgery  BMT cell infusion   EGD x2  Eye surgery   Hemorrhoidectomy    Lens implant IOL  Tonsillectomy   Varicose vein surgery     Family History:    Breast caner, mother   Cerebrovascular disease, mother   Cancer, father   Hypertension, brother   Heart disease, brother   Hyperlipidemia, brother   Depression, brother     Social History:  Smoking status: former smoker  Alcohol use: yes  Drug use: no  PCP: Vivek Callejas  Presents to the ED via EMS from home    Physical Exam     Patient Vitals for the  past 24 hrs:   BP Temp Temp src Pulse Resp SpO2   02/08/21 1230 113/63 -- -- 79 25 94 %   02/08/21 1215 125/64 -- -- 83 (!) 36 94 %   02/08/21 1200 106/54 -- -- 82 27 94 %   02/08/21 1145 114/70 -- -- 83 (!) 31 94 %   02/08/21 1130 123/63 -- -- 83 29 95 %   02/08/21 1115 119/47 -- -- -- -- --   02/08/21 1015 125/51 -- -- 110 21 96 %   02/08/21 1000 (!) 154/79 -- -- 112 21 91 %   02/08/21 0945 117/75 -- -- 92 18 96 %   02/08/21 0930 127/66 -- -- 100 11 92 %   02/08/21 0915 126/66 -- -- 98 18 92 %   02/08/21 0902 137/85 100.8  F (38.2  C) Oral 101 22 91 %     Physical Exam  General: very pleasant, elderly gentleman. Appears ill  Head: The scalp, face, and head appear normal  Eyes: The pupils are equal, round, and reactive to light. Conjunctivae and sclerae are normal  Neck: Normal range of motion.   CV: tachycardiac but regular. Intact peripheral pulses   Resp: Tachypnea, mild increased work of breathing  GI: Abdomen is soft, no rigidity, guarding, or rebound. No distension. No tenderness to palpation in any quadrant.     MS: Normal tone. Joints grossly normal without effusions. No asymmetric leg swelling, calf or thigh tenderness.    Skin: No rash or lesions noted. Normal capillary refill noted  Neuro: Alert and oriented x 3. Knows the president and birthday. No evidence of confusion. Speech is normal and fluent. Face is symmetric.  5/5 UE and LE strength which is symmetrical.   PERRLA, EOMI.    No meningismus and full neck AROM/PROM.    Psych:  Normal affect.  Appropriate interactions.    Emergency Department Course     ECG:  ECG taken at 0924, ECG read at 0929  Normal sinus rhythm  Nonspecific ST abnormality   Abnormal ECG  No significant change noted compared to ECG dated 2/5/21.   Rate 97 bpm. WA interval 148 ms. QRS duration 84 ms. QT/QTc 342/434 ms. P-R-T axes 48 2 43.     Imaging:  CT Chest PE:  1.  No pulmonary emboli.   2.  Multifocal groundglass opacities with superimposed interlobular   septal thickening  (crazy paving appearance). Findings are most   suggestive of COVID-19 pneumonia. Influenza pneumonia and organizing   pneumonia as can be seen in the setting of drug toxicity or connective   tissue disease can cause a similar imaging pattern.     Reading per radiology      Head CT:  1. No acute process intracranially.   2. No mass, mass effect or hemorrhage.   3. Nothing for acute infarction.   4. Mild chronic ischemic changes deep white matter both cerebral   hemispheres.     Reading per radiology       Laboratory:  CBC: WBC 6.1, HGB 13.4,    CMP: Glucose 100(H), chloride 111(H), calcium 8.4 (L), albumin 2.6(L), protein total 6.6(L), AST 51(H), o/w WNL (Creatinine: 0.88)    UA: Pending  Troponin (1002): 0.018  Lactic acid (0937): 1.1  D-dimer: 3.0(H)  VBG and oxyhgb: pH 7.34 / PCO2 40 / PO2 39 / Bicarb 22 / FlO2 6 L of NC / Base excess 3.7  CRP inflammation: 78.0(H)  Magnesium: 1.7  Procalcitonin: pending  Ferritin: 308  Lactase Dehydrogenase: 483(H)  Blood Cultures x2: Pending    Emergency Department Course:  Reviewed:  I reviewed the patient's nursing notes, vitals, past medical records, Care Everywhere.     Assessments:  0900 I performed an assessment and examination of the patient as noted above.     1112 I checked on and updated the patient and discussed trasfer to [facility] facility, [facility]. He states that is headache is better and that his breathing feels better.     1250 I updated the patient. Findings and plan explained to the Patient. Patient will be transferred to [facility] via EMS. Discussed the case with admitting doctor, who will admit the patient to a monitored bed for further monitoring, evaluation, and treatment.     Consults:   1235   I spoke with Dr. Swanson of the Hospitalist service from [facility] and the Hospitalist at [facility] regarding patient's presentation, findings, and plan of care.       Interventions:  0925 Acetaminophen, 1000 mg, PO   0927 NS 1L IV Bolus   0931 Benadryl 25 mg  IV  0931 Decadron 10 mg IV  0931 Reglan 10 mg IV  0931 Duoneb, 3 ml, inhalation    Disposition:  The patient was transferred.      Impression & Plan   Medical Decision Making:  Deejay Dior is a 70 year old male with past medical history of mild dysplastic syndrome status post bone marrow transplant complicated by chronic [id number]-uonc disease who is chronically on Jakafi and prednisone who presents to the emergency department today with shortness of breath.  Patient was diagnosed with Covid on January 30 and since then has had progressive shortness of breath, headache, cough and generalized fatigue.  On initial evaluation here, he is hypoxic on room air to 80% and febrile.  He was established on 6 L of nasal cannula and is now saturating between 95 to 100%.  He looks significantly more comfortable following the administration of 1 DuoNeb and establishing nasal cannula.  Broad work-up reveals evidence of Covid pneumonia on CT scan without evidence of pulmonary emboli.  There does not appear to be any evidence of acute coronary syndrome based on his nonischemic EKG and a negative troponin.  In regards to his headache, I do not believe that this is significantly sinister in nature.  He has a negative CT scan along with a normal neurologic examination.  With the aforementioned interventions he had complete resolution of his headache.  I do not believe that the patient requires a lumbar tap to rule out meningitis at this time.  His headache is likely secondary to his Covid infection.  I was able to speak with triage hospitalist and the bone marrow transplant team at the [facility].  After a thoughtful discussion we decided to transfer the patient to the [facility] for further evaluation and treatment of his Covid pneumonia and close monitoring by the bone marrow transplant team.  I discussed this plan with the patient and he is amenable at this time.  Patient remained stable while under my  care and will be transferred as soon as possible.    Covid-19  Deejay Dior was evaluated during a global COVID-19 pandemic, which necessitated consideration that the patient might be at risk for infection with the SARS-CoV-2 virus that causes COVID-19.   Applicable protocols for evaluation were followed during the patient's care.   COVID-19 was considered as part of the patient's evaluation. The plan for testing is:  a test was obtained at a previous visit and reviewed & considered today.      Diagnosis:    ICD-10-CM    1. Pneumonia due to 2019 novel coronavirus  U07.1     J12.82    2. History of bone marrow transplant (H)  Z94.81    3. Chronic ibnen-uuoebw-hiav disease (H)  D89.811        Scribe Disclosure:  Barbara PEREZ, am serving as a scribe at 9:02 AM on 2/8/2021 to document services personally performed by Reagan Mitchell MD based on my observations and the provider's statements to me.             Reagan Mitchell MD  02/08/21 1523

## 2021-02-08 NOTE — ED NOTES
[facility]  ED Nurse Handoff Report    Deejay Dior is a 70 year old male   ED Chief complaint: Shortness of Breath  . ED Diagnosis:   Final diagnoses:   None     Allergies:   Allergies   Allergen Reactions     Blood Transfusion Related (Informational Only) Other (See Comments)     Patient has a history of a clinically significant antibody against RBC antigens.  A delay in compatible RBCs may occur.     Ibuprofen Other (See Comments)     bleeding       Code Status: Full Code  Activity level - Baseline/Home:  Independent. Activity Level - Current:   Assist X 1. Lift room needed: No. Bariatric: No   Needed: No   Isolation: Yes. Infection: COVID+    Vital Signs:   Vitals:    02/08/21 1015 02/08/21 1115 02/08/21 1130 02/08/21 1145   BP: 125/51 119/47 123/63 114/70   Pulse: 110  83 83   Resp: 21  29 (!) 31   Temp:       TempSrc:       SpO2: 96%  95% 94%       Cardiac Rhythm:  ,   Cardiac  Ectopy: Premature ventricular contractions;Premature atrial contractions  Ectopy Frequency: Occasional  Pain level:    Patient confused: No. Patient Falls Risk: Yes.   Elimination Status: Has voided   Patient Report - Initial Complaint: Patient presents from home via EMS with c/o SOB, fever and HA. COVID+, diagnosed end of January. Upon EMS arrival found to be 80% on RA. Placed on 6L 02 and came up to 94%. 10/10 HA, given 15mg toradol. 103 degree fever for EMS. A&Ox4.  Focused Assessment:  Respiratory - Respiratory WDL: .WDL except; all; cough; rhythm/pattern  Rhythm/Pattern, Respiratory: shortness of breath  Expansion/Accessory Muscles/Retractions: no use of accessory muscles  Breath Sounds: All Fields; RLL; LLL  Cough Frequency: infrequent  LLL Breath Sounds: Posterior:; crackles, fine  RLL Breath Sounds: crackles, fine   Head To Toe Assessment - All Lung Fields Breath Sounds: Posterior:; diminished   Cardiac - Cardiac WDL: .WDL except; rhythm  Cardiac Rhythm: tachycardic   Tests Performed: 12 lead EKG,  Labs, CT head and chest  Abnormal Results:   Abnormal Labs Reviewed   CBC WITH PLATELETS DIFFERENTIAL - Abnormal; Notable for the following components:       Result Value    RBC Count 4.19 (*)     Hematocrit 39.8 (*)     RDW 17.8 (*)     All other components within normal limits   D DIMER QUANTITATIVE - Abnormal; Notable for the following components:    D Dimer 3.0 (*)     All other components within normal limits   COMPREHENSIVE METABOLIC PANEL - Abnormal; Notable for the following components:    Chloride 111 (*)     Glucose 100 (*)     Calcium 8.4 (*)     Albumin 2.6 (*)     Protein Total 6.6 (*)     AST 51 (*)     All other components within normal limits   CRP INFLAMMATION - Abnormal; Notable for the following components:    CRP Inflammation 78.0 (*)     All other components within normal limits   LACTATE DEHYDROGENASE - Abnormal; Notable for the following components:    Lactate Dehydrogenase 483 (*)     All other components within normal limits     CT Head w/o Contrast   Final Result   IMPRESSION:    1. No acute process intracranially.   2. No mass, mass effect or hemorrhage.   3. Nothing for acute infarction.   4. Mild chronic ischemic changes deep white matter both cerebral   hemispheres.       AGUSTIN HER MD      CT Chest Pulmonary Embolism w Contrast   Final Result   IMPRESSION:   1.  No pulmonary emboli.   2.  Multifocal groundglass opacities with superimposed interlobular   septal thickening (crazy paving appearance). Findings are most   suggestive of COVID-19 pneumonia. Influenza pneumonia and organizing   pneumonia as can be seen in the setting of drug toxicity or connective   tissue disease can cause a similar imaging pattern.      KENYON LAM MD        Treatments provided: See MAR  Family Comments: N/A  OBS brochure/video discussed/provided to patient:  N/A  ED Medications:   Medications   ipratropium - albuterol 0.5 mg/2.5 mg/3 mL (DUONEB) neb solution 3 mL (3 mLs Nebulization Given 2/8/21  0931)   sodium chloride (PF) 0.9% PF flush 3 mL (has no administration in time range)   sodium chloride (PF) 0.9% PF flush 3 mL (3 mLs Intracatheter Not Given 2/8/21 0938)   0.9% sodium chloride BOLUS (0 mLs Intravenous Stopped 2/8/21 1148)   metoclopramide (REGLAN) injection 10 mg (10 mg Intravenous Given 2/8/21 0931)   acetaminophen (TYLENOL) tablet 1,000 mg (1,000 mg Oral Given 2/8/21 0925)   dexamethasone PF (DECADRON) injection 10 mg (10 mg Intravenous Given 2/8/21 0931)   diphenhydrAMINE (BENADRYL) injection 25 mg (25 mg Intravenous Given 2/8/21 0931)   iopamidol (ISOVUE-370) solution 500 mL (73 mLs Intravenous Given 2/8/21 1029)   sodium chloride 0.9 % bag 500mL for CT scan flush use (90 mLs As instructed Given 2/8/21 1031)     Drips infusing:  No  For the majority of the shift, the patient's behavior Green. Interventions performed were N/A.    Sepsis treatment initiated: No     Patient tested for COVID 19 prior to admission: No. Known positive.    ED Nurse Name/Phone Number: Aliya Woods RN,   11:48 AM

## 2021-02-08 NOTE — PROGRESS NOTES
[facility]  Transfer Triage Note    Date of call: 02/08/21  Time of call: 1:07 PM    Is pandemic COVID-19 a concern? YES:   1.  Travel history/exposure history (select all that apply): no known direct contact   2.  Vitals: Fill in t 100.8, HR 92 /63 RR 25 SpO2 94% on 6L  3.  On Oxygen? (select one option):  NC L/minute 6  4.  History of lung disease or active smoking (or other risk factors for death/respiratory      failure?: Yes: GVHD, immunosuppression  5.  Type of bed requested (select one option): med/surg  6.  Other Isolation needed (select all that apply): Contact  7.  Has the patient been added to the shared patient list 'COVID'?  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    Stability of Patient: Patient is vitally stable, with no critical labs, and will likely remain stable throughout the transfer process  ICU: No    Expected Time of Arrival for Transfer: 0-8 hours    Arrival Location:  [place] [address] Phone: [phone]    Recommendations for Management and Stabilization: Not needed    Additional Comments Patient is a 70 year old with hx of MDS s/p stem cell transplant followed by the BMT service here who is admitted to [facility] ED with worsening COVID-19 with hypoxia.  Discussed case with BMT service, given his GVHD requring immunosuppression and likely blunted immune response to the virus, tertiary care would be indicated.  Will admit to medicine service at [facility], with BMT service consulting.     Leland Swanson MD

## 2021-02-08 NOTE — CONSULTS
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    Mr. Dior was diagnosed with COVID on 1/30 when he was tested after an exposure to his COVID+ grandson. He initially felt fine, with no  symptoms, but then in the last 5 days or so has had worsening fatigue, malaise, and dyspnea on exertion. He also noted some fevers. He presented to the [facility] ED on 2/5, with grossly normal labs, and unimpressive CXR. He was not hypoxic and was discharged.    Unfortunately, since discharge he has had worsening cough, headache and fatigue. His symptoms progressed today to the point that he called EMS. When EMS arrived, he was found to have SpO2 of 80% on room air. He was brought to the [facility] ED, where he was febrile to 100.8F, tachycardia, and had improved oxygenation on 6L, with SpO2 >96%.     CT chest showed no PE, but had multifocal GGO c/w known COVID infection. He was given 10mg IV Dexamethasone and a Duoneb, along with 1L fluid, and transferred to [facility] for further treatment given his history of HSCT.    Overnight, he was hypoxic with activity down to 79% on 6L, requiring up to 15L for recovery. He was started on remdesivir, and dex was continued.    Today, he continues to feel tired, with persistent dyspnea, although improved compared to when he was at home on Sunday.     Review of Systems: Pertinent positive and negative systems described in HPI; the remainder of the 14  systems are negative    Past Medical History:  Past Medical History:   Diagnosis Date     Acute deep vein thrombosis (DVT) of distal end of right lower extremity (H)      Clotting disorder (H)      Depression, major      Glucose intolerance      H/O bone marrow transplant (H)      Head injury 1964     Hearing problem Hearing aids 2015     History of blood transfusion 3/2014     History of radiation therapy June 2014     [place] (hard of hearing)     bilateral     Immunosuppression (H) Sirolimus daily     Lymphedema of both lower extremities      MDS (myelodysplastic syndrome) (H)      MDS/MPN (myelodysplastic/myeloproliferative neoplasms) (H)      Mixed hyperlipidemia      Numbness and tingling     feet     Obstructive sleep apnea 1999     Pneumonia      Reduced vision August 2016    Cataract surgery     Sleep apnea 1999     Transplant July 1, 2014    Stem Cells       Past Surgical History:  Past Surgical History:   Procedure Laterality Date     ARTHROPLASTY HIP ANTERIOR Right 9/3/2019    Procedure: RIGHT TOTAL HIP ARTHROPLASTY, DIRECT ANTERIOR APPROACH;  Surgeon: Yong Diaz MD;  Location:  OR     BACK SURGERY       BIOPSY       BMT CELL PRODUCT INFUSION       CATARACT IOL, RT/LT Bilateral 2015?     ESOPHAGOSCOPY, GASTROSCOPY, DUODENOSCOPY (EGD), COMBINED N/A 2/2/2015     ESOPHAGOSCOPY, GASTROSCOPY, DUODENOSCOPY (EGD), COMBINED Left 8/6/2015    Procedure: COMBINED ESOPHAGOSCOPY, GASTROSCOPY, DUODENOSCOPY (EGD), BIOPSY SINGLE OR MULTIPLE;  Surgeon: Amber Francis MD;  Location: [facility]     EXCISE LESION LIP N/A 1/2/2017    Procedure: EXCISE LESION LIP;  Surgeon: Tim Yanez MD;  Location: [facility] OR     EYE SURGERY      blepharoplasty     FLEXIBLE SIGMOIDOSCOPY  2/2/15     HEMORRHOIDECTOMY  2001     IR FOLLOW UP VISIT OUTPATIENT  1/8/2019     PHACOEMULSIFICATION CLEAR CORNEA WITH STANDARD INTRAOCULAR LENS IMPLANT Left 7/18/2016    Procedure: PHACOEMULSIFICATION CLEAR CORNEA WITH STANDARD  INTRAOCULAR LENS IMPLANT;  Surgeon: Randolph Giles MD;  Location:  [facility]     TONSILLECTOMY       TRANSPLANT  2014    Stem Cell Transplant [facility] BMT     VASCULAR SURGERY  2010    varicose vein surgery       Social History:  Social History     Socioeconomic History     Marital status:      Spouse name: Not on file     Number of children: Not on file     Years of education: Not on file     Highest education level: Not on file   Occupational History     Not on file   Social Needs     Financial resource strain: Not on file     Food insecurity     Worry: Not on file     Inability: Not on file     Transportation needs     Medical: Not on file     Non-medical: Not on file   Tobacco Use     Smoking status: Former Smoker     Packs/day: 1.00     Years: 34.00     Pack years: 34.00     Types: Cigarettes     Start date: 1967     Quit date: 2001     Years since quittin.8     Smokeless tobacco: Never Used     Tobacco comment: quit in    Substance and Sexual Activity     Alcohol use: Yes     Alcohol/week: 4.2 standard drinks     Comment: Seldom     Drug use: No     Sexual activity: Not Currently     Partners: Female     Birth control/protection: Male Surgical, Female Surgical   Lifestyle     Physical activity     Days per week: Not on file     Minutes per session: Not on file     Stress: Not on file   Relationships     Social connections     Talks on phone: Not on file     Gets together: Not on file     Attends [employer] service: Not on file     Active member of club or organization: Not on file     Attends meetings of clubs or organizations: Not on file     Relationship status: Not on file     Intimate partner violence     Fear of current or ex partner: Not on file     Emotionally abused: Not on file     Physically abused: Not on file     Forced sexual activity: Not on file   Other Topics Concern     Parent/sibling w/ CABG, MI or angioplasty before 65F 55M? Not Asked   Social History Narrative      Not on file        Family History  Family History   Problem Relation Age of Onset     Breast Cancer Mother      Cancer Mother         1977     Cerebrovascular Disease Mother         1977     Cancer Father         1987     Hypertension Brother      Heart Disease Brother      Hyperlipidemia Brother      Depression Brother      Heart Disease Brother         2016     Hypertension Brother         1985     Glaucoma No family hx of      Macular Degeneration No family hx of        Outpatient Medications:  No current facility-administered medications on file prior to encounter.        acetaminophen (TYLENOL) 500 MG tablet, Take 1-2 tablets (500-1,000 mg) by mouth every 6 hours as needed for mild pain       acyclovir (ZOVIRAX) 800 MG tablet, TAKE ONE TABLET BY MOUTH TWICE DAILY        calcium carbonate-vitamin D 500-400 MG-UNIT TABS tablt, Take 1 tablet by mouth daily 2000 mg       fluconazole (DIFLUCAN) 100 MG tablet, Take 1 tablet (100 mg) by mouth daily       levofloxacin (LEVAQUIN) 250 MG tablet, Take 1 tablet (250 mg) by mouth daily       Multiple Vitamins-Minerals (PRESERVISION AREDS 2) CAPS, Take 1 capsule by mouth 2 times daily       pantoprazole (PROTONIX) 20 MG EC tablet, TAKE ONE TABLET BY MOUTH EVERY DAY       predniSONE (DELTASONE) 5 MG tablet, Take 1 tablet (5 mg) by mouth daily       ruxolitinib (JAKAFI) 5 MG TABS tablet, Take 1 tablet (5 mg) by mouth 2 times daily       sertraline (ZOLOFT) 25 MG tablet, Take 2 tablets (50 mg) by mouth daily       sulfamethoxazole-trimethoprim (BACTRIM DS) 800-160 MG tablet, Take one tablet by mouth twice daily on Mondays and Tuesdays only.       Vitamin D, Cholecalciferol, 1000 units TABS, Take 1,000 Units by mouth daily       amoxicillin (AMOXIL) 500 MG capsule, Take 4 pills (2 grams) day of dental work       Hypromellose (ARTIFICIAL TEARS OP), Apply 1-2 drops to eye 2 times daily as needed       lifitegrast (XIIDRA) 5 % opthalmic solution, Place 1 drop into both eyes 2  times daily       oxyCODONE IR (ROXICODONE) 10 MG tablet, Take 0.5 tablets (5 mg) by mouth every 4 hours as needed for severe pain       senna (SENOKOT) 8.6 MG tablet, Take 1-2 tablets by mouth daily (Patient taking differently: Take 1-2 tablets by mouth daily as needed )       tacrolimus (PROTOPIC) 0.03 % external ointment, Apply topically At Bedtime       triamcinolone (KENALOG) 0.1 % ointment, Apply twice a day to lower leg         Physical Exam:    /65   Pulse 68   Temp 96.3  F (35.7  C) (Oral)   Resp 22   Wt 104.5 kg (230 lb 6.4 oz)   SpO2 92%   BMI 34.27 kg/m    Gen: Tired appearing, laying in bed, Oximask in place  Pulm: Mildly labored breathing, no audible wheezing or adventitious breath sounds  Skin: No obvious rashes or abnormalities on exposed skin  Neuro: Alert and answering questions appropriately. CNII-XII grossly intact.    The rest of a comprehensive physical examination is deferred due to public health emergency video visit restrictions.      Labs & Studies: I personally reviewed the following studies:  ROUTINE LABS (Last four results):  CMP  Recent Labs   Lab 02/09/21  0637 02/08/21  0910 02/05/21  1227    139 140   POTASSIUM 4.5 4.0 4.0   CHLORIDE 114* 111* 109   CO2 22 23 25   ANIONGAP 6 5 6   * 100* 94   BUN 28 25 30   CR 0.77 0.88 1.10   GFRESTIMATED >90 86 67   GFRESTBLACK >90 >90 77   JOE 8.5 8.4* 8.4*   MAG  --  1.7 2.0   PROTTOTAL 6.4* 6.6* 6.9   ALBUMIN 2.6* 2.6* 2.8*   BILITOTAL 0.3 0.3 0.2   ALKPHOS 66 69 57   AST 52* 51* 42   ALT 29 28 23     CBC  Recent Labs   Lab 02/09/21  0637 02/08/21  0910 02/05/21  1227   WBC 8.0 6.1 5.2   RBC 4.12* 4.19* 4.23*   HGB 13.3 13.4 13.6   HCT 40.0 39.8* 42.1   MCV 97 95 100   MCH 32.3 32.0 32.2   MCHC 33.3 33.7 32.3   RDW 18.7* 17.8* 18.8*   * 152 173     IMAGING  CT PE 2/8/21  1.  No pulmonary emboli.  2.  Multifocal groundglass opacities with superimposed interlobular  septal thickening (crazy paving appearance).  Findings are most  suggestive of COVID-19 pneumonia. Influenza pneumonia and organizing  pneumonia as can be seen in the setting of drug toxicity or connective  tissue disease can cause a similar imaging pattern.    CT head 2/8/21  1. No acute process intracranially.  2. No mass, mass effect or hemorrhage.  3. Nothing for acute infarction.  4. Mild chronic ischemic changes deep white matter both cerebral  hemispheres.     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 Dior is 6 year, 7 months out from allo-sib PBHSCT c/b mucocutaneous cGHVD (skin, colon, eyes, mouth) along with arthralgias/myalgias and chronic fatigue/dyspnea. Previously evaluated by pulmonary, and no edenilson brionchiolitis or pulmonary involvement. His GVHD symptoms have been stable on Jakafi and low-dose pred for some time, although he has had a history of recurrent pulmonary infections, at least in part due to immunosuppression. Given his immunosuppression and resultant B-cell dysfunction, would check IgG levels, and discuss with ID about the utility of IVIg if IgG is low, along with the possibility of anti-COVID19 monoclonal antibody infusion.     He should remain on his Jakafi and prednisone despite his infection. Acute discontinuation of Jakafi can cause rebound inflammation, which could have significant consequences given his inflamed state. Since he's on dexamethasone for COVID, can hold his low-dose pred, but this should be restarted on discharge. Otherwise, he should continue on all of his prophylactic anti-infective agents; levofloxacin can be held if on IV antibiotics.    #History  of left lower extremity DVT, off AC  #JAK2+ MPN (cured by HSCT)  Small segment left great saphenous vein DVT found 10/15/2018, improved 11/27/201 on ASA 325mg daily. Subsequently underwent great saphenous ablation on the right, with recannulization of left saphenous vein on US from 7/2019. He does have a history of JAK2+ MPN, but he is now s/p transplant with 100% donor engraftment, so this is essentially cured. No indication for additional anticoagulation for these thromboses at this time. Recommend anticoagulation per COVID protocol.     Recommendations:   - Continue Jakafi 5mg BID  - Hold prednisone 5mg while on Dex for COVID   - Please restart prednisone 5mg daily once Dex is finished  - Check quantitative immunoglobulin levels (ordered for you)   - Consider IVIg if IgG <400  - Discuss with ID utility of anti-COVID monoclonal antibody therapy  - Systemic anticoagulation per COVID protocol  - Continue prophylactic anti-infective: ACV 800mg BID, Bactrim BID Mon/Tues, fluconazole 100mg daily, levofloxacin 250mg    Patient was seen and plan of care was discussed with attending physician Dr. Edgar.    We will continue to follow this patient. Please don't hesitate to contact the Fellow On-Call with questions.    Shubham Joyce MD PhD  Heme/Onc/Transplant Fellow  Pgr [phone]

## 2021-02-08 NOTE — H&P
[facility]    History and Physical - Hospitalist Service, Gold Night       Date of Admission:  2/8/2021    Assessment & Plan   Deejay Dior is a 70 year old man admitted on 2/8/2021. 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    1) COVID-19 pneumonia, acute hypoxic respiratory failure - Presents with shortness of breath and hypoxic respiratory failure requiring 6 lpm O2 in setting of recent COVID diagnosis on 1/30.  CT scan today consistent with COVID pneumonia and negative for pulmonary embolus.  - Hold home prednisone, start dexamethasone  - Meets criteria for Remdesivir, will order  - Consult BMT given history of BMT, GVHD.  Continue home Jakafi  - Lovenox for prophylaxis given history of DVT  - Tylenol, Ibuprofen PRN  - Follow BID blood sugars with steroid increase    2) History of BMT, GVHD - S/p stem cell transplant in 2014.  - Continue home Jakafi, hold home prednisone in setting of dexamethasone initiation  - Continue acyclovir, levofloxacin, fluconazole, Bactrim for prophylaxis  - Consult BMT    3) History of depression  - Continue home Zoloft     Diet:   Regular  DVT Prophylaxis: Pneumatic Compression Devices  [place] Catheter: not present  Code Status:   Full         Disposition Plan   Expected discharge: > 7 days, recommended to prior living arrangement once hypoxia resolved.  Entered: BEE Mcdonnell CNP 02/08/2021, 4:55 PM     The patient's care was discussed with the Attending Physician, Dr. Hogan.    BEE Mcdonnell CNP  [facility]  Contact information available via [facility] Paging/Directory  Please see sign in/sign out for up to date coverage information    ______________________________________________________________________    Chief Complaint    Shortness of breath, fevers    History is obtained from the patient    History of Present Illness   Deejay Dior is a 70 year old man admitted on 2/8/2021. 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    The patient reports he was diagnosed with COVID on 1/30.  Since that time he has developed body aches, fevers, chills, and worsening shortness of breath.  Over the past 2 days he has found he has been unable to catch his breath after getting up to go to the bathroom.    He has been taking Tylenol at home to help with fevers.  He does not use any breathing treatments and does not normally use oxygen    Review of Systems    The 10 point Review of Systems is negative other than noted in the HPI or here.     Past Medical History    I have reviewed this patient's medical history and updated it with pertinent information if needed.   Past Medical History:   Diagnosis Date     Acute deep vein thrombosis (DVT) of distal end of right lower extremity (H)      Clotting disorder (H)      Depression, major      Glucose intolerance      H/O bone marrow transplant (H)      Head injury 1964     Hearing problem Hearing aids 2015     History of blood transfusion 3/2014     History of radiation therapy June 2014     [place] (hard of hearing)     bilateral     Immunosuppression (H) Sirolimus daily     Lymphedema of both lower extremities      MDS (myelodysplastic syndrome) (H)      MDS/MPN (myelodysplastic/myeloproliferative neoplasms) (H)      Mixed hyperlipidemia      Numbness and tingling     feet     Obstructive sleep apnea 1999     Pneumonia      Reduced vision August 2016    Cataract surgery     Sleep apnea 1999     Transplant July 1, 2014    Stem Cells       Past Surgical History   I have reviewed this patient's surgical history and updated it with pertinent information if needed.  Past  Surgical History:   Procedure Laterality Date     ARTHROPLASTY HIP ANTERIOR Right 9/3/2019    Procedure: RIGHT TOTAL HIP ARTHROPLASTY, DIRECT ANTERIOR APPROACH;  Surgeon: Yong Diaz MD;  Location:  OR     BACK SURGERY       BIOPSY       BMT CELL PRODUCT INFUSION       CATARACT IOL, RT/LT Bilateral ?     ESOPHAGOSCOPY, GASTROSCOPY, DUODENOSCOPY (EGD), COMBINED N/A 2015     ESOPHAGOSCOPY, GASTROSCOPY, DUODENOSCOPY (EGD), COMBINED Left 2015    Procedure: COMBINED ESOPHAGOSCOPY, GASTROSCOPY, DUODENOSCOPY (EGD), BIOPSY SINGLE OR MULTIPLE;  Surgeon: Amber Francis MD;  Location:  GI     EXCISE LESION LIP N/A 2017    Procedure: EXCISE LESION LIP;  Surgeon: Tim Yanez MD;  Location:  OR     EYE SURGERY      blepharoplasty     FLEXIBLE SIGMOIDOSCOPY  2/2/15     HEMORRHOIDECTOMY       IR FOLLOW UP VISIT OUTPATIENT  2019     PHACOEMULSIFICATION CLEAR CORNEA WITH STANDARD INTRAOCULAR LENS IMPLANT Left 2016    Procedure: PHACOEMULSIFICATION CLEAR CORNEA WITH STANDARD INTRAOCULAR LENS IMPLANT;  Surgeon: Randolph Giles MD;  Location:  EC     TONSILLECTOMY       TRANSPLANT  2014    Stem Cell Transplant [facility] BMT     VASCULAR SURGERY      varicose vein surgery       Social History   I have reviewed this patient's social history and updated it with pertinent information if needed.  Social History     Tobacco Use     Smoking status: Former Smoker     Packs/day: 1.00     Years: 34.00     Pack years: 34.00     Types: Cigarettes     Start date: 1967     Quit date: 2001     Years since quittin.8     Smokeless tobacco: Never Used     Tobacco comment: quit in    Substance Use Topics     Alcohol use: Yes     Alcohol/week: 4.2 standard drinks     Comment: Seldom     Drug use: No       Family History   I have reviewed this patient's family history and updated it with pertinent information if needed.  Family History   Problem Relation Age of  Onset     Breast Cancer Mother      Cancer Mother         1977     Cerebrovascular Disease Mother         1977     Cancer Father         1987     Hypertension Brother      Heart Disease Brother      Hyperlipidemia Brother      Depression Brother      Heart Disease Brother         2016     Hypertension Brother         1985     Glaucoma No family hx of      Macular Degeneration No family hx of        Prior to Admission Medications   Prior to Admission Medications   Prescriptions Last Dose Informant Patient Reported? Taking?   Hypromellose (ARTIFICIAL TEARS OP)   Yes No   Sig: Apply 1-2 drops to eye 2 times daily as needed   Multiple Vitamins-Minerals (PRESERVISION AREDS 2) CAPS   No No   Sig: Take 1 ampule by mouth 2 times daily   Multiple Vitamins-Minerals (PRESERVISION AREDS 2) CAPS   Yes No   Sig: Take 1 capsule by mouth 2 times daily   Vitamin D, Cholecalciferol, 1000 units TABS   Yes No   Sig: Take 1,000 Units by mouth daily   acetaminophen (TYLENOL) 500 MG tablet   No No   Sig: Take 1-2 tablets (500-1,000 mg) by mouth every 6 hours as needed for mild pain   acyclovir (ZOVIRAX) 800 MG tablet   No No   Sig: TAKE ONE TABLET BY MOUTH TWICE DAILY    amoxicillin (AMOXIL) 500 MG capsule   No No   Sig: Take 4 pills (2 grams) day of dental work   augmented betamethasone dipropionate (DIPROLENE-AF) 0.05 % external ointment   No No   Sig: Apply topically 2 times daily For areas of rash on the lower leg   calcium carbonate-vitamin D 500-400 MG-UNIT TABS tablt   Yes No   Sig: Take 1 tablet by mouth daily 2000 mg   fluconazole (DIFLUCAN) 100 MG tablet   No No   Sig: Take 1 tablet (100 mg) by mouth daily   levofloxacin (LEVAQUIN) 250 MG tablet   No No   Sig: Take 1 tablet (250 mg) by mouth daily   lifitegrast (XIIDRA) 5 % opthalmic solution   No No   Sig: Place 1 drop into both eyes 2 times daily   oxyCODONE IR (ROXICODONE) 10 MG tablet   No No   Sig: Take 0.5 tablets (5 mg) by mouth every 4 hours as needed for severe pain    pantoprazole (PROTONIX) 20 MG EC tablet   No No   Sig: TAKE ONE TABLET BY MOUTH EVERY DAY   predniSONE (DELTASONE) 5 MG tablet   No No   Sig: Take 1 tablet (5 mg) by mouth daily   ruxolitinib (JAKAFI) 5 MG TABS tablet   No No   Sig: Take 1 tablet (5 mg) by mouth 2 times daily   senna (SENOKOT) 8.6 MG tablet   No No   Sig: Take 1-2 tablets by mouth daily   sertraline (ZOLOFT) 25 MG tablet   No No   Sig: Take 2 tablets (50 mg) by mouth daily   sulfamethoxazole-trimethoprim (BACTRIM DS) 800-160 MG tablet   No No   Sig: Take one tablet by mouth twice daily on Mondays and Tuesdays only.   tacrolimus (PROTOPIC) 0.03 % external ointment   No No   Sig: Apply topically At Bedtime   triamcinolone (KENALOG) 0.1 % ointment   Yes No   Sig: Apply twice a day to lower leg      Facility-Administered Medications: None     Allergies   Allergies   Allergen Reactions     Blood Transfusion Related (Informational Only) Other (See Comments)     Patient has a history of a clinically significant antibody against RBC antigens.  A delay in compatible RBCs may occur.     Ibuprofen Other (See Comments)     bleeding       Physical Exam   Vital Signs: Temp: 98  F (36.7  C) Temp src: Oral BP: 125/74 Pulse: 72   Resp: 16 SpO2: 92 % O2 Device: Oxymask Oxygen Delivery: 8 LPM  Weight: 230 lbs 6.4 oz    Physical Exam   Constitutional:   Well nourished, well developed, resting comfortably   Head: Normocephalic and atraumatic.   Eyes: Conjunctivae are normal. Pupils are equal, round, and reactive to light.    Oropharynx: Pharynx has no erythema or exudate, mucous membranes are moist  Neck:   No adenopathy, no bony tenderness  Cardiovascular: Regular rate and rhythm without murmurs or gallops  Pulmonary/Chest: Clear to auscultation bilaterally, with no wheezes or retractions. No respiratory distress.  GI: Soft with good bowel sounds.  Non-tender, non-distended, with no guarding, no rebound, no peritoneal signs.   Back:  No bony or CVA tenderness    Musculoskeletal:  No edema or clubbing   Skin: Skin is warm and dry. No rash noted.   Neurological: Alert and oriented to person, place, and time. Nonfocal exam  Psychiatric:  Normal mood and affect.      Data   Data reviewed today: I reviewed all medications, new labs and imaging results over the last 24 hours. I personally reviewed notes from today, labs, his EKG and his CT head and chest as well as past BMT notes.

## 2021-02-09 ENCOUNTER — APPOINTMENT (OUTPATIENT)
Dept: CARDIOLOGY | Facility: CLINIC | Age: 71
DRG: 207 | End: 2021-02-09
Attending: INTERNAL MEDICINE
Payer: MEDICARE

## 2021-02-09 LAB
ABO + RH BLD: NORMAL
ABO + RH BLD: NORMAL
ALBUMIN SERPL-MCNC: 2.6 G/DL (ref 3.4–5)
ALP SERPL-CCNC: 66 U/L (ref 40–150)
ALT SERPL W P-5'-P-CCNC: 29 U/L (ref 0–70)
ANION GAP SERPL CALCULATED.3IONS-SCNC: 6 MMOL/L (ref 3–14)
AST SERPL W P-5'-P-CCNC: 52 U/L (ref 0–45)
BILIRUB SERPL-MCNC: 0.3 MG/DL (ref 0.2–1.3)
BUN SERPL-MCNC: 28 MG/DL (ref 7–30)
C PNEUM DNA SPEC QL NAA+PROBE: NOT DETECTED
CALCIUM SERPL-MCNC: 8.5 MG/DL (ref 8.5–10.1)
CHLORIDE SERPL-SCNC: 114 MMOL/L (ref 94–109)
CO2 SERPL-SCNC: 22 MMOL/L (ref 20–32)
CREAT SERPL-MCNC: 0.77 MG/DL (ref 0.66–1.25)
CRP SERPL-MCNC: 130 MG/L (ref 0–8)
ERYTHROCYTE [DISTWIDTH] IN BLOOD BY AUTOMATED COUNT: 18.7 % (ref 10–15)
FIBRINOGEN PPP-MCNC: 492 MG/DL (ref 200–420)
FLUAV H1 2009 PAND RNA SPEC QL NAA+PROBE: NOT DETECTED
FLUAV H1 RNA SPEC QL NAA+PROBE: NOT DETECTED
FLUAV H3 RNA SPEC QL NAA+PROBE: NOT DETECTED
FLUAV RNA SPEC QL NAA+PROBE: NOT DETECTED
FLUBV RNA SPEC QL NAA+PROBE: NOT DETECTED
GFR SERPL CREATININE-BSD FRML MDRD: >90 ML/MIN/{1.73_M2}
GLUCOSE BLDC GLUCOMTR-MCNC: 116 MG/DL (ref 70–99)
GLUCOSE SERPL-MCNC: 121 MG/DL (ref 70–99)
HADV DNA SPEC QL NAA+PROBE: NOT DETECTED
HCOV PNL SPEC NAA+PROBE: NOT DETECTED
HCT VFR BLD AUTO: 40 % (ref 40–53)
HGB BLD-MCNC: 13.3 G/DL (ref 13.3–17.7)
HMPV RNA SPEC QL NAA+PROBE: NOT DETECTED
HPIV1 RNA SPEC QL NAA+PROBE: NOT DETECTED
HPIV2 RNA SPEC QL NAA+PROBE: NOT DETECTED
HPIV3 RNA SPEC QL NAA+PROBE: NOT DETECTED
HPIV4 RNA SPEC QL NAA+PROBE: NOT DETECTED
M PNEUMO DNA SPEC QL NAA+PROBE: NOT DETECTED
MCH RBC QN AUTO: 32.3 PG (ref 26.5–33)
MCHC RBC AUTO-ENTMCNC: 33.3 G/DL (ref 31.5–36.5)
MCV RBC AUTO: 97 FL (ref 78–100)
MICROBIOLOGIST REVIEW: ABNORMAL
PLATELET # BLD AUTO: 146 10E9/L (ref 150–450)
POTASSIUM SERPL-SCNC: 4.5 MMOL/L (ref 3.4–5.3)
PROT SERPL-MCNC: 6.4 G/DL (ref 6.8–8.8)
RBC # BLD AUTO: 4.12 10E12/L (ref 4.4–5.9)
RSV RNA SPEC QL NAA+PROBE: NOT DETECTED
RSV RNA SPEC QL NAA+PROBE: NOT DETECTED
RV+EV RNA SPEC QL NAA+PROBE: ABNORMAL
SODIUM SERPL-SCNC: 143 MMOL/L (ref 133–144)
SPECIMEN EXP DATE BLD: NORMAL
WBC # BLD AUTO: 8 10E9/L (ref 4–11)

## 2021-02-09 PROCEDURE — 250N000009 HC RX 250: Performed by: NURSE PRACTITIONER

## 2021-02-09 PROCEDURE — 258N000003 HC RX IP 258 OP 636: Performed by: INTERNAL MEDICINE

## 2021-02-09 PROCEDURE — 250N000011 HC RX IP 250 OP 636: Performed by: INTERNAL MEDICINE

## 2021-02-09 PROCEDURE — 87486 CHLMYD PNEUM DNA AMP PROBE: CPT | Performed by: INTERNAL MEDICINE

## 2021-02-09 PROCEDURE — 250N000013 HC RX MED GY IP 250 OP 250 PS 637: Performed by: INTERNAL MEDICINE

## 2021-02-09 PROCEDURE — 250N000013 HC RX MED GY IP 250 OP 250 PS 637: Performed by: STUDENT IN AN ORGANIZED HEALTH CARE EDUCATION/TRAINING PROGRAM

## 2021-02-09 PROCEDURE — 86140 C-REACTIVE PROTEIN: CPT | Performed by: NURSE PRACTITIONER

## 2021-02-09 PROCEDURE — 258N000003 HC RX IP 258 OP 636: Performed by: NURSE PRACTITIONER

## 2021-02-09 PROCEDURE — 80053 COMPREHEN METABOLIC PANEL: CPT | Performed by: NURSE PRACTITIONER

## 2021-02-09 PROCEDURE — 250N000012 HC RX MED GY IP 250 OP 636 PS 637: Performed by: NURSE PRACTITIONER

## 2021-02-09 PROCEDURE — 86901 BLOOD TYPING SEROLOGIC RH(D): CPT | Performed by: NURSE PRACTITIONER

## 2021-02-09 PROCEDURE — 99233 SBSQ HOSP IP/OBS HIGH 50: CPT | Performed by: INTERNAL MEDICINE

## 2021-02-09 PROCEDURE — 93306 TTE W/DOPPLER COMPLETE: CPT | Mod: 26 | Performed by: STUDENT IN AN ORGANIZED HEALTH CARE EDUCATION/TRAINING PROGRAM

## 2021-02-09 PROCEDURE — 87804 INFLUENZA ASSAY W/OPTIC: CPT | Performed by: INTERNAL MEDICINE

## 2021-02-09 PROCEDURE — 86900 BLOOD TYPING SEROLOGIC ABO: CPT | Performed by: NURSE PRACTITIONER

## 2021-02-09 PROCEDURE — 250N000013 HC RX MED GY IP 250 OP 250 PS 637: Performed by: NURSE PRACTITIONER

## 2021-02-09 PROCEDURE — 85027 COMPLETE CBC AUTOMATED: CPT | Performed by: NURSE PRACTITIONER

## 2021-02-09 PROCEDURE — 85384 FIBRINOGEN ACTIVITY: CPT | Performed by: NURSE PRACTITIONER

## 2021-02-09 PROCEDURE — 999N001017 HC STATISTIC GLUCOSE BY METER IP

## 2021-02-09 PROCEDURE — 36415 COLL VENOUS BLD VENIPUNCTURE: CPT | Performed by: NURSE PRACTITIONER

## 2021-02-09 PROCEDURE — 255N000002 HC RX 255 OP 636: Performed by: STUDENT IN AN ORGANIZED HEALTH CARE EDUCATION/TRAINING PROGRAM

## 2021-02-09 PROCEDURE — 120N000002 HC R&B MED SURG/OB UMMC

## 2021-02-09 PROCEDURE — 87581 M.PNEUMON DNA AMP PROBE: CPT | Performed by: INTERNAL MEDICINE

## 2021-02-09 PROCEDURE — 250N000011 HC RX IP 250 OP 636: Performed by: NURSE PRACTITIONER

## 2021-02-09 PROCEDURE — 87633 RESP VIRUS 12-25 TARGETS: CPT | Performed by: INTERNAL MEDICINE

## 2021-02-09 PROCEDURE — 999N000208 ECHOCARDIOGRAM COMPLETE

## 2021-02-09 RX ORDER — PANTOPRAZOLE SODIUM 40 MG/1
40 TABLET, DELAYED RELEASE ORAL DAILY
Status: DISCONTINUED | OUTPATIENT
Start: 2021-02-10 | End: 2021-02-14

## 2021-02-09 RX ORDER — CARBOXYMETHYLCELLULOSE SODIUM 5 MG/ML
1 SOLUTION/ DROPS OPHTHALMIC 4 TIMES DAILY PRN
Status: DISCONTINUED | OUTPATIENT
Start: 2021-02-09 | End: 2021-03-03 | Stop reason: HOSPADM

## 2021-02-09 RX ORDER — ACYCLOVIR 400 MG/1
800 TABLET ORAL 2 TIMES DAILY
Status: DISCONTINUED | OUTPATIENT
Start: 2021-02-09 | End: 2021-02-14

## 2021-02-09 RX ORDER — LEVOFLOXACIN 250 MG/1
250 TABLET, FILM COATED ORAL DAILY
Status: DISCONTINUED | OUTPATIENT
Start: 2021-02-09 | End: 2021-03-03 | Stop reason: HOSPADM

## 2021-02-09 RX ORDER — SALIVA STIMULANT COMB. NO.3
1 SPRAY, NON-AEROSOL (ML) MUCOUS MEMBRANE 4 TIMES DAILY PRN
Status: DISCONTINUED | OUTPATIENT
Start: 2021-02-09 | End: 2021-03-03 | Stop reason: HOSPADM

## 2021-02-09 RX ORDER — PREDNISONE 5 MG/1
5 TABLET ORAL DAILY
Status: DISCONTINUED | OUTPATIENT
Start: 2021-02-09 | End: 2021-02-17

## 2021-02-09 RX ORDER — FUROSEMIDE 10 MG/ML
40 INJECTION INTRAMUSCULAR; INTRAVENOUS DAILY
Status: DISCONTINUED | OUTPATIENT
Start: 2021-02-09 | End: 2021-02-10

## 2021-02-09 RX ORDER — LANOLIN ALCOHOL/MO/W.PET/CERES
3 CREAM (GRAM) TOPICAL AT BEDTIME
Status: DISCONTINUED | OUTPATIENT
Start: 2021-02-09 | End: 2021-03-03 | Stop reason: HOSPADM

## 2021-02-09 RX ORDER — CEFTRIAXONE 2 G/1
2 INJECTION, POWDER, FOR SOLUTION INTRAMUSCULAR; INTRAVENOUS EVERY 24 HOURS
Status: DISCONTINUED | OUTPATIENT
Start: 2021-02-09 | End: 2021-02-10

## 2021-02-09 RX ADMIN — ACETAMINOPHEN 650 MG: 325 TABLET, FILM COATED ORAL at 21:55

## 2021-02-09 RX ADMIN — FLUCONAZOLE 100 MG: 100 TABLET ORAL at 09:43

## 2021-02-09 RX ADMIN — FUROSEMIDE 40 MG: 10 INJECTION, SOLUTION INTRAMUSCULAR; INTRAVENOUS at 13:15

## 2021-02-09 RX ADMIN — Medication 1 TABLET: at 09:42

## 2021-02-09 RX ADMIN — AZITHROMYCIN MONOHYDRATE 500 MG: 500 INJECTION, POWDER, LYOPHILIZED, FOR SOLUTION INTRAVENOUS at 16:15

## 2021-02-09 RX ADMIN — SULFAMETHOXAZOLE AND TRIMETHOPRIM 1 TABLET: 800; 160 TABLET ORAL at 09:44

## 2021-02-09 RX ADMIN — LEVOFLOXACIN 250 MG: 250 TABLET, FILM COATED ORAL at 09:42

## 2021-02-09 RX ADMIN — PANTOPRAZOLE SODIUM 20 MG: 20 TABLET, DELAYED RELEASE ORAL at 09:42

## 2021-02-09 RX ADMIN — MELATONIN TAB 3 MG 3 MG: 3 TAB at 21:55

## 2021-02-09 RX ADMIN — ENOXAPARIN SODIUM 50 MG: 100 INJECTION SUBCUTANEOUS at 19:50

## 2021-02-09 RX ADMIN — SERTRALINE HYDROCHLORIDE 50 MG: 50 TABLET ORAL at 09:41

## 2021-02-09 RX ADMIN — HUMAN ALBUMIN MICROSPHERES AND PERFLUTREN 5 ML: 10; .22 INJECTION, SOLUTION INTRAVENOUS at 14:30

## 2021-02-09 RX ADMIN — ENOXAPARIN SODIUM 50 MG: 100 INJECTION SUBCUTANEOUS at 09:43

## 2021-02-09 RX ADMIN — SODIUM CHLORIDE 50 ML: 9 INJECTION, SOLUTION INTRAVENOUS at 19:51

## 2021-02-09 RX ADMIN — DEXAMETHASONE 6 MG: 2 TABLET ORAL at 09:41

## 2021-02-09 RX ADMIN — REMDESIVIR 100 MG: 100 INJECTION, POWDER, LYOPHILIZED, FOR SOLUTION INTRAVENOUS at 18:04

## 2021-02-09 RX ADMIN — Medication 1 DROP: at 05:50

## 2021-02-09 RX ADMIN — ACYCLOVIR 800 MG: 400 TABLET ORAL at 09:41

## 2021-02-09 RX ADMIN — Medication 1 SPRAY: at 10:02

## 2021-02-09 RX ADMIN — ACYCLOVIR 800 MG: 800 TABLET ORAL at 19:50

## 2021-02-09 RX ADMIN — ACETAMINOPHEN 650 MG: 325 TABLET, FILM COATED ORAL at 16:21

## 2021-02-09 RX ADMIN — CEFTRIAXONE SODIUM 2 G: 2 INJECTION, POWDER, FOR SOLUTION INTRAMUSCULAR; INTRAVENOUS at 14:53

## 2021-02-09 RX ADMIN — Medication 1 SPRAY: at 05:50

## 2021-02-09 ASSESSMENT — ACTIVITIES OF DAILY LIVING (ADL)
ADLS_ACUITY_SCORE: 14
ADLS_ACUITY_SCORE: 15
ADLS_ACUITY_SCORE: 14
ADLS_ACUITY_SCORE: 14

## 2021-02-09 NOTE — PROGRESS NOTES
[facility]    Hospitalist Progress Note      Assessment & Plan   Deejay Dior is a 70 year old male who was admitted on 2/8/2021.  The patient presented with shortness of breath.  The patient has history of stem cell transplant for mild dysplastic syndrome dated 7/1/2014 complicated by [id number] disease on prednisone and ruxolitinib, and prior venous thromboembolism.  1.  Acute hypoxic respiratory failure secondary to COVID-19 pneumonia and likely bacterial pneumonia with possible volume overload, all are POA  This is the main problem that led to this admission.  The patient qualifies for diagnosis of acute hypoxic respiratory failure secondary to requiring liters of oxygen to maintain saturation above 92%.  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  Myelodysplastic syndrome status post bone marrow transplant, all are POA  -Holding prednisone while the patient is receiving dexamethasone  -Continue ruxolitinib 5 mg twice daily  -Continue Bactrim double strength 1 tablet twice weekly for PCP prophylaxis  -Continue acyclovir 800 mg 2 times daily for prophylaxis against herpes simplex virus  -Continue fluconazole 100 mg oral daily  -Awaiting further recommendations from bone marrow transplant service    3.  Chronic medical problems  -Depression, continue sertraline    DVT Prophylaxis: Enoxaparin  (Lovenox) SQ  Code Status: Full Code  Expected discharge: 4 - 7 days, recommended to prior living arrangement once O2 use less than 1 liters/minute.    Marysol Ramirez MD  Text Page (7am - 6pm, M-F)    Interval History   The patient continues to complain of dyspnea on exertion.  He denied any active chest pain or wheezing.  10 point review of systems is otherwise negative    -Data reviewed today: I reviewed all new labs and imaging results over the last 24 hours. I personally reviewed the EKG tracing showing Sinus rhythm with no significant ST/T wave changes.    Physical Exam   Temp: 96.3  F (35.7  C) Temp src: Oral BP: 129/65 Pulse: 68   Resp: 22 SpO2: 92 % O2 Device: Oxymask Oxygen Delivery: 15 LPM  Vitals:    02/08/21 1705   Weight: 104.5 kg (230 lb 6.4 oz)     Vital Signs with Ranges  Temp:  [96.3  F (35.7  C)-98.1  F (36.7  C)] 96.3  F (35.7  C)  Pulse:  [68-82] 68  Resp:  [13-28] 22  BP: (105-129)/(49-74) 129/65  SpO2:  [88 %-97 %] 92 %  I/O last 3 completed shifts:  In: 240 [P.O.:240]  Out: 300 [Urine:300]    Constitutional: Awake, alert, cooperative, no apparent distress.  Eyes: Conjunctiva and pupils examined and normal.  HEENT: Moist mucous membranes, normal dentition.  Respiratory: Clear to auscultation bilaterally, no crackles or wheezing.  Cardiovascular: Regular rate and rhythm, normal S1 and S2, and no murmur noted.  GI: Soft, non-distended, non-tender, normal bowel sounds.  Lymph/Hematologic: No anterior cervical or supraclavicular adenopathy.  Skin: No rashes, no cyanosis, no edema.  Musculoskeletal: No joint swelling, erythema or tenderness.  Neurologic: Cranial nerves 2-12 intact, normal strength and sensation.  Psychiatric: Alert, oriented to person, place and time, no obvious anxiety or depression.    Medications     - MEDICATION INSTRUCTIONS -       - MEDICATION INSTRUCTIONS -         remdesivir  100 mg Intravenous Q24H    And     sodium chloride 0.9%  50 mL Intravenous Q24H     acyclovir   800 mg Oral BID     calcium carbonate-vitamin D  1 tablet Oral Daily     dexamethasone  6 mg Oral Daily     enoxaparin ANTICOAGULANT  0.5 mg/kg Subcutaneous BID     fluconazole  100 mg Oral Daily     levofloxacin  250 mg Oral Daily     pantoprazole  20 mg Oral Daily     ruxolitinib  5 mg Oral BID     sertraline  50 mg Oral Daily     sulfamethoxazole-trimethoprim  1 tablet Oral Once per day on Mon Tue       Data   Recent Labs   Lab 02/09/21  0637 02/08/21  0910 02/05/21  1227   WBC 8.0 6.1 5.2   HGB 13.3 13.4 13.6   MCV 97 95 100   * 152 173    139 140   POTASSIUM 4.5 4.0 4.0   CHLORIDE 114* 111* 109   CO2 22 23 25   BUN 28 25 30   CR 0.77 0.88 1.10   ANIONGAP 6 5 6   JOE 8.5 8.4* 8.4*   * 100* 94   ALBUMIN 2.6* 2.6* 2.8*   PROTTOTAL 6.4* 6.6* 6.9   BILITOTAL 0.3 0.3 0.2   ALKPHOS 66 69 57   ALT 29 28 23   AST 52* 51* 42   TROPI  --  0.018 <0.015       No results found for this or any previous visit (from the past 24 hour(s)).

## 2021-02-09 NOTE — PHARMACY-ADMISSION MEDICATION HISTORY
Admission medication history interview status for the 2/8/2021 admission is complete. See Epic admission navigator for allergy information, pharmacy, prior to admission medications and immunization status.     Medication history interview sources:  Fill history, Care Everywhere, Patient's Spouse    Changes made to PTA medication list (reason)  Added: None  Deleted: Diprolene ointment  Changed: None      Additional medication history information (including reliability of information, actions taken by pharmacist):   - Per patient's spouse - he does not use the oxycodone but has it available at home so I left it on the list        Prior to Admission medications    Medication Sig Last Dose Taking? Auth Provider   acetaminophen (TYLENOL) 500 MG tablet Take 1-2 tablets (500-1,000 mg) by mouth every 6 hours as needed for mild pain prn at prn Yes Colby Campbell PA-C   acyclovir (ZOVIRAX) 800 MG tablet TAKE ONE TABLET BY MOUTH TWICE DAILY  2/8/2021 at Unknown time Yes Suzanne Collado MD   calcium carbonate-vitamin D 500-400 MG-UNIT TABS tablt Take 1 tablet by mouth daily 2000 mg 2/8/2021 at Unknown time Yes Linn Rivero PA-C   fluconazole (DIFLUCAN) 100 MG tablet Take 1 tablet (100 mg) by mouth daily 2/8/2021 at Unknown time Yes Suzanne Collado MD   levofloxacin (LEVAQUIN) 250 MG tablet Take 1 tablet (250 mg) by mouth daily 2/8/2021 at Unknown time Yes Suzanne Collado MD   Multiple Vitamins-Minerals (PRESERVISION AREDS 2) CAPS Take 1 capsule by mouth 2 times daily 2/8/2021 at Unknown time Yes Reported, Patient   pantoprazole (PROTONIX) 20 MG EC tablet TAKE ONE TABLET BY MOUTH EVERY DAY 2/8/2021 at Unknown time Yes Suzanne Collado MD   predniSONE (DELTASONE) 5 MG tablet Take 1 tablet (5 mg) by mouth daily 2/8/2021 at Unknown time Yes Suzanne Collado MD   ruxolitinib (JAKAFI) 5 MG TABS tablet Take 1 tablet (5 mg) by mouth 2 times daily 2/8/2021 at Unknown time Yes Suzanne Collado MD   sertraline (ZOLOFT) 25 MG tablet Take  2 tablets (50 mg) by mouth daily 2/8/2021 at Unknown time Yes Suzanne Collado MD   sulfamethoxazole-trimethoprim (BACTRIM DS) 800-160 MG tablet Take one tablet by mouth twice daily on Mondays and Tuesdays only. 2/8/2021 at Unknown time Yes Suzanne Collado MD   Vitamin D, Cholecalciferol, 1000 units TABS Take 1,000 Units by mouth daily 2/8/2021 at Unknown time Yes Reported, Patient   amoxicillin (AMOXIL) 500 MG capsule Take 4 pills (2 grams) day of dental work prn at prn  Suzanne Collado MD   Hypromellose (ARTIFICIAL TEARS OP) Apply 1-2 drops to eye 2 times daily as needed prn at prn  Reported, Patient   lifitegrast (XIIDRA) 5 % opthalmic solution Place 1 drop into both eyes 2 times daily   Tyrone Fry MD   oxyCODONE IR (ROXICODONE) 10 MG tablet Take 0.5 tablets (5 mg) by mouth every 4 hours as needed for severe pain   Jyoti Borjas, BEE CNP   senna (SENOKOT) 8.6 MG tablet Take 1-2 tablets by mouth daily PRN prn at prn  Colby Campbell PA-C   tacrolimus (PROTOPIC) 0.03 % external ointment Apply topically At Bedtime   Tyrone Fry MD   triamcinolone (KENALOG) 0.1 % ointment Apply twice a day to lower leg prn at prn  Reported, Patient         Medication history completed by: Jaye Estrada, PharmD

## 2021-02-09 NOTE — PLAN OF CARE
Time: 2330-0730    Reason for admission/Dx:   COVID-19 virus infection [U07.1]     /49   Pulse 70   Temp 97.7  F (36.5  C) (Oral)   Resp 18   Wt 104.5 kg (230 lb 6.4 oz)   SpO2 93%   BMI 34.27 kg/m      Shift changes: COVID-19+ on 1/30. Pt requiring and tolerating 6L O2 Oxy+, while resting and in a side-lying position to meet goal sats 92%+. With activity/movement however, pt endorsing MERIDA and requiring 15L supp O2. RN witnessed desat to 79% with activity and noted a 5 minute+ recovery time, after returning to bed, on 15L supp O2, before being able to successfully wean back to 6L. Pt otherwise VSS. AOx4, very pleasant and cooperative, SBA d/t tubing + MERIDA. Continuing COVID-19 cocktail therapies. Blood Cx NGTD x3 days. On ppx regimen.     Plan: Supportive cares. COVID-19 therapies.

## 2021-02-09 NOTE — PLAN OF CARE
Time: 2976-6500   Reason for admission: COVID-19 virus infection    Neuro: A&Ox4.  Cardiac: WDL.  Respiratory: Diminished w/crackles. SpO2 92-97% on 6L oxymask while side lying. Bumped to 9L while siting up right. Infreq dry cough.  GI/: WDL.  Skin: Visible skin CDI.  Activity: Indept and up ad darya.  Pain: Reports minor headache, declines analgesics.  Diet: Reg diet, good appetite. Denies N/V.  Lines: L PIV SL.  Labs: Covid-19 positive; Elevated inflammatory markers; CT chest showed ground glass opacities, no evidence of PE; CT head negative.    Shift changes: Afebrile and VSS on 6L oxymask. Started remdesivir and lovenox.  Plan of care: Closely monitor oxygenation and continue w/remdesivir and dexamethasone.      Blood pressure 105/49, pulse 76, temperature 97.7  F (36.5  C), temperature source Oral, resp. rate 20, weight 104.5 kg (230 lb 6.4 oz), SpO2 94 %.

## 2021-02-10 ENCOUNTER — APPOINTMENT (OUTPATIENT)
Dept: GENERAL RADIOLOGY | Facility: CLINIC | Age: 71
DRG: 207 | End: 2021-02-10
Attending: INTERNAL MEDICINE
Payer: MEDICARE

## 2021-02-10 LAB
ALBUMIN SERPL-MCNC: 2.4 G/DL (ref 3.4–5)
ALP SERPL-CCNC: 76 U/L (ref 40–150)
ALT SERPL W P-5'-P-CCNC: 29 U/L (ref 0–70)
ANION GAP SERPL CALCULATED.3IONS-SCNC: 6 MMOL/L (ref 3–14)
AST SERPL W P-5'-P-CCNC: 52 U/L (ref 0–45)
BASE DEFICIT BLDA-SCNC: 0.6 MMOL/L
BASE EXCESS BLDA CALC-SCNC: 0.6 MMOL/L
BASOPHILS # BLD AUTO: 0 10E9/L (ref 0–0.2)
BASOPHILS NFR BLD AUTO: 0.1 %
BILIRUB SERPL-MCNC: 0.3 MG/DL (ref 0.2–1.3)
BUN SERPL-MCNC: 40 MG/DL (ref 7–30)
CALCIUM SERPL-MCNC: 8.7 MG/DL (ref 8.5–10.1)
CHLORIDE SERPL-SCNC: 112 MMOL/L (ref 94–109)
CO2 SERPL-SCNC: 23 MMOL/L (ref 20–32)
CREAT SERPL-MCNC: 0.87 MG/DL (ref 0.66–1.25)
CRP SERPL-MCNC: 110 MG/L (ref 0–8)
D DIMER PPP FEU-MCNC: 4.4 UG/ML FEU (ref 0–0.5)
DIFFERENTIAL METHOD BLD: ABNORMAL
EOSINOPHIL # BLD AUTO: 0 10E9/L (ref 0–0.7)
EOSINOPHIL NFR BLD AUTO: 0 %
ERYTHROCYTE [DISTWIDTH] IN BLOOD BY AUTOMATED COUNT: 18.5 % (ref 10–15)
FIBRINOGEN PPP-MCNC: 474 MG/DL (ref 200–420)
GFR SERPL CREATININE-BSD FRML MDRD: 86 ML/MIN/{1.73_M2}
GLUCOSE BLDC GLUCOMTR-MCNC: 125 MG/DL (ref 70–99)
GLUCOSE SERPL-MCNC: 119 MG/DL (ref 70–99)
HCO3 BLD-SCNC: 22 MMOL/L (ref 21–28)
HCO3 BLD-SCNC: 24 MMOL/L (ref 21–28)
HCT VFR BLD AUTO: 38.8 % (ref 40–53)
HGB BLD-MCNC: 13.3 G/DL (ref 13.3–17.7)
IGA SERPL-MCNC: 83 MG/DL (ref 84–499)
IGG SERPL-MCNC: 3275 MG/DL (ref 610–1616)
IGG SERPL-MCNC: 831 MG/DL (ref 610–1616)
IGM SERPL-MCNC: 174 MG/DL (ref 35–242)
IMM GRANULOCYTES # BLD: 0 10E9/L (ref 0–0.4)
IMM GRANULOCYTES NFR BLD: 0.6 %
LYMPHOCYTES # BLD AUTO: 0.9 10E9/L (ref 0.8–5.3)
LYMPHOCYTES NFR BLD AUTO: 12.6 %
MCH RBC QN AUTO: 32.5 PG (ref 26.5–33)
MCHC RBC AUTO-ENTMCNC: 34.3 G/DL (ref 31.5–36.5)
MCV RBC AUTO: 95 FL (ref 78–100)
MONOCYTES # BLD AUTO: 0.3 10E9/L (ref 0–1.3)
MONOCYTES NFR BLD AUTO: 4.5 %
NEUTROPHILS # BLD AUTO: 5.6 10E9/L (ref 1.6–8.3)
NEUTROPHILS NFR BLD AUTO: 82.2 %
NRBC # BLD AUTO: 0 10*3/UL
NRBC BLD AUTO-RTO: 0 /100
NT-PROBNP SERPL-MCNC: 915 PG/ML (ref 0–900)
O2/TOTAL GAS SETTING VFR VENT: 100 %
O2/TOTAL GAS SETTING VFR VENT: 70 %
PCO2 BLD: 30 MM HG (ref 35–45)
PCO2 BLD: 33 MM HG (ref 35–45)
PH BLD: 7.47 PH (ref 7.35–7.45)
PH BLD: 7.47 PH (ref 7.35–7.45)
PLATELET # BLD AUTO: 170 10E9/L (ref 150–450)
PO2 BLD: 57 MM HG (ref 80–105)
PO2 BLD: 64 MM HG (ref 80–105)
POTASSIUM SERPL-SCNC: 4.2 MMOL/L (ref 3.4–5.3)
PROT SERPL-MCNC: 6.3 G/DL (ref 6.8–8.8)
RBC # BLD AUTO: 4.09 10E12/L (ref 4.4–5.9)
SODIUM SERPL-SCNC: 141 MMOL/L (ref 133–144)
WBC # BLD AUTO: 6.9 10E9/L (ref 4–11)

## 2021-02-10 PROCEDURE — 99233 SBSQ HOSP IP/OBS HIGH 50: CPT | Mod: GC | Performed by: INTERNAL MEDICINE

## 2021-02-10 PROCEDURE — 82784 ASSAY IGA/IGD/IGG/IGM EACH: CPT | Performed by: INTERNAL MEDICINE

## 2021-02-10 PROCEDURE — 250N000013 HC RX MED GY IP 250 OP 250 PS 637: Performed by: NURSE PRACTITIONER

## 2021-02-10 PROCEDURE — 250N000009 HC RX 250: Performed by: NURSE PRACTITIONER

## 2021-02-10 PROCEDURE — 82803 BLOOD GASES ANY COMBINATION: CPT | Performed by: INTERNAL MEDICINE

## 2021-02-10 PROCEDURE — 80053 COMPREHEN METABOLIC PANEL: CPT | Performed by: NURSE PRACTITIONER

## 2021-02-10 PROCEDURE — 272N000054 HC CANNULA HIGH FLOW, ADULT

## 2021-02-10 PROCEDURE — 99291 CRITICAL CARE FIRST HOUR: CPT | Mod: GC

## 2021-02-10 PROCEDURE — 250N000012 HC RX MED GY IP 250 OP 636 PS 637: Performed by: INTERNAL MEDICINE

## 2021-02-10 PROCEDURE — 87385 HISTOPLASMA CAPSUL AG IA: CPT | Performed by: INTERNAL MEDICINE

## 2021-02-10 PROCEDURE — 83520 IMMUNOASSAY QUANT NOS NONAB: CPT | Performed by: INTERNAL MEDICINE

## 2021-02-10 PROCEDURE — 83880 ASSAY OF NATRIURETIC PEPTIDE: CPT | Performed by: NURSE PRACTITIONER

## 2021-02-10 PROCEDURE — 999N001017 HC STATISTIC GLUCOSE BY METER IP

## 2021-02-10 PROCEDURE — 250N000012 HC RX MED GY IP 250 OP 636 PS 637: Performed by: NURSE PRACTITIONER

## 2021-02-10 PROCEDURE — 250N000011 HC RX IP 250 OP 636: Performed by: INTERNAL MEDICINE

## 2021-02-10 PROCEDURE — 86140 C-REACTIVE PROTEIN: CPT | Performed by: NURSE PRACTITIONER

## 2021-02-10 PROCEDURE — 71045 X-RAY EXAM CHEST 1 VIEW: CPT | Mod: 26 | Performed by: RADIOLOGY

## 2021-02-10 PROCEDURE — 85025 COMPLETE CBC W/AUTO DIFF WBC: CPT | Performed by: NURSE PRACTITIONER

## 2021-02-10 PROCEDURE — 250N000011 HC RX IP 250 OP 636: Performed by: NURSE PRACTITIONER

## 2021-02-10 PROCEDURE — 250N000013 HC RX MED GY IP 250 OP 250 PS 637: Performed by: INTERNAL MEDICINE

## 2021-02-10 PROCEDURE — 85384 FIBRINOGEN ACTIVITY: CPT | Performed by: NURSE PRACTITIONER

## 2021-02-10 PROCEDURE — 258N000003 HC RX IP 258 OP 636: Performed by: INTERNAL MEDICINE

## 2021-02-10 PROCEDURE — 36600 WITHDRAWAL OF ARTERIAL BLOOD: CPT

## 2021-02-10 PROCEDURE — 71045 X-RAY EXAM CHEST 1 VIEW: CPT

## 2021-02-10 PROCEDURE — 99223 1ST HOSP IP/OBS HIGH 75: CPT | Mod: GC | Performed by: INTERNAL MEDICINE

## 2021-02-10 PROCEDURE — 200N000002 HC R&B ICU UMMC

## 2021-02-10 PROCEDURE — 82784 ASSAY IGA/IGD/IGG/IGM EACH: CPT | Performed by: NURSE PRACTITIONER

## 2021-02-10 PROCEDURE — 85379 FIBRIN DEGRADATION QUANT: CPT | Performed by: INTERNAL MEDICINE

## 2021-02-10 PROCEDURE — 99291 CRITICAL CARE FIRST HOUR: CPT | Performed by: INTERNAL MEDICINE

## 2021-02-10 PROCEDURE — 36415 COLL VENOUS BLD VENIPUNCTURE: CPT | Performed by: INTERNAL MEDICINE

## 2021-02-10 PROCEDURE — 87449 NOS EACH ORGANISM AG IA: CPT | Performed by: INTERNAL MEDICINE

## 2021-02-10 PROCEDURE — 258N000003 HC RX IP 258 OP 636: Performed by: NURSE PRACTITIONER

## 2021-02-10 PROCEDURE — 87305 ASPERGILLUS AG IA: CPT | Performed by: INTERNAL MEDICINE

## 2021-02-10 RX ORDER — ALBUTEROL SULFATE 90 UG/1
2 AEROSOL, METERED RESPIRATORY (INHALATION)
Status: DISCONTINUED | OUTPATIENT
Start: 2021-02-10 | End: 2021-02-14

## 2021-02-10 RX ORDER — DEXAMETHASONE 4 MG/1
4 TABLET ORAL ONCE
Status: COMPLETED | OUTPATIENT
Start: 2021-02-10 | End: 2021-02-10

## 2021-02-10 RX ADMIN — ALBUTEROL SULFATE 2 PUFF: 90 AEROSOL, METERED RESPIRATORY (INHALATION) at 11:53

## 2021-02-10 RX ADMIN — REMDESIVIR 100 MG: 100 INJECTION, POWDER, LYOPHILIZED, FOR SOLUTION INTRAVENOUS at 20:00

## 2021-02-10 RX ADMIN — ALBUTEROL SULFATE 2 PUFF: 90 AEROSOL, METERED RESPIRATORY (INHALATION) at 21:38

## 2021-02-10 RX ADMIN — ACETAMINOPHEN 650 MG: 325 TABLET, FILM COATED ORAL at 08:34

## 2021-02-10 RX ADMIN — PANTOPRAZOLE SODIUM 40 MG: 40 TABLET, DELAYED RELEASE ORAL at 08:34

## 2021-02-10 RX ADMIN — ENOXAPARIN SODIUM 50 MG: 100 INJECTION SUBCUTANEOUS at 08:33

## 2021-02-10 RX ADMIN — ENOXAPARIN SODIUM 50 MG: 100 INJECTION SUBCUTANEOUS at 20:06

## 2021-02-10 RX ADMIN — FLUCONAZOLE 100 MG: 100 TABLET ORAL at 08:34

## 2021-02-10 RX ADMIN — DEXAMETHASONE 6 MG: 2 TABLET ORAL at 08:34

## 2021-02-10 RX ADMIN — Medication 1 TABLET: at 08:34

## 2021-02-10 RX ADMIN — SERTRALINE HYDROCHLORIDE 50 MG: 50 TABLET ORAL at 08:34

## 2021-02-10 RX ADMIN — ALBUTEROL SULFATE 2 PUFF: 90 AEROSOL, METERED RESPIRATORY (INHALATION) at 14:33

## 2021-02-10 RX ADMIN — CEFTRIAXONE SODIUM 2 G: 2 INJECTION, POWDER, FOR SOLUTION INTRAMUSCULAR; INTRAVENOUS at 13:39

## 2021-02-10 RX ADMIN — SODIUM CHLORIDE 50 ML: 9 INJECTION, SOLUTION INTRAVENOUS at 20:01

## 2021-02-10 RX ADMIN — FUROSEMIDE 40 MG: 10 INJECTION, SOLUTION INTRAMUSCULAR; INTRAVENOUS at 08:33

## 2021-02-10 RX ADMIN — MELATONIN TAB 3 MG 3 MG: 3 TAB at 21:38

## 2021-02-10 RX ADMIN — ACYCLOVIR 800 MG: 800 TABLET ORAL at 08:34

## 2021-02-10 RX ADMIN — DEXAMETHASONE 4 MG: 4 TABLET ORAL at 18:45

## 2021-02-10 RX ADMIN — ACYCLOVIR 800 MG: 800 TABLET ORAL at 20:06

## 2021-02-10 RX ADMIN — AZITHROMYCIN MONOHYDRATE 500 MG: 500 INJECTION, POWDER, LYOPHILIZED, FOR SOLUTION INTRAVENOUS at 14:29

## 2021-02-10 ASSESSMENT — ACTIVITIES OF DAILY LIVING (ADL)
ADLS_ACUITY_SCORE: 13
ADLS_ACUITY_SCORE: 15
ADLS_ACUITY_SCORE: 13

## 2021-02-10 NOTE — PLAN OF CARE
Time: 1900-0730    Reason for admission/Dx:   COVID-19 virus infection [U07.1]     /58 (BP Location: Right arm)   Pulse 62   Temp 95.7  F (35.4  C) (Axillary)   Resp 24   Wt 104.1 kg (229 lb 8 oz)   SpO2 94%   BMI 34.14 kg/m      Shift changes: COVID-19+ on 1/30; Viral panel also showing rhinovirus positive on 2/9. Pt requiring and tolerating 6L O2 Oxy+, while resting and in a side-lying position to meet goal sats 92%+. With activity/movement however, pt endorsing MERIDA and requiring 15L supp O2. Since yesterday, pt's tolerance for activity in addition to recovery time have both markedly improved respectively. Pt otherwise VSS. AOx4, very pleasant and cooperative, SBA d/t tubing + MERIDA. Continuing COVID-19 cocktail therapies. PTA Jakafi chemo + ppx regimens maintained. /uptrending. AST bumped to 52.      Plan: Supportive cares. COVID-19 therapies + IV abx.

## 2021-02-10 NOTE — PROGRESS NOTES
[facility]    Hospitalist Progress Note      Assessment & Plan   Deejay Dior is a 70 year old male who was admitted on 2/8/2021.  The patient presented with shortness of breath.  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  1.  Acute hypoxic respiratory failure secondary to COVID-19 pneumonia and likely bacterial pneumonia with possible volume overload, all are POA  This is the main problem that led to this admission.  The patient qualifies for diagnosis of acute hypoxic respiratory failure secondary to requiring liters of oxygen to maintain saturation above 92%.  Respiratory virus panel is only remarkable for rhinovirus that is positive.  There is no therapeutic role for treatment of rhinovirus based on discussion with infectious disease service.  Transthoracic echocardiography with normal ejection fraction and no evidence of increased filling pressures.  The patient CRP is decreasing slightly.  - Following blood cultures  -Daily CRP  -Follow-up D-dimer in a.m.  -Follow-up ferritin ordered in a.m.  -Chest x-ray, reviewed with no remarkable changes  -D-dimer, reviewed with no remarkable change from prior of 3  -Ordered and awaiting interleukin-6 level  -Strict input and output  -Daily body weight  -Started heated high flow nasal cannula targeting a flow rate of 40 to 50 L while titrating FiO2 in order to recruit dead space  -I encouraged the patient to self prone for 10 minutes every 60 minutes while awake, this was also discussed with nursing staff who are in agreement  -We will use BiPAP overnight just to cover the possibility of an underlying obstructive sleep apnea for this patient with class I obesity, if need be  -If interleukin-6 is elevated, we will start Tocilizumab at 8 mg/kg IV 1 dose not to exceed 800  mg in order to decrease incidence of need for intubation and death given beneficial effect for Tocilizumab in patients with critical COVID 19, nominal score of 4-5 which this patient qualifies for based on data published in the [place] Journal of Medicine in January 2021 by Emre et al  - Continue remdesivir for a total of 5 days, the patient received loading dose  - Continue dexamethasone for total of 10 days or until hospital discharge, increased dose to 10 mg daily based on recommendations of MICU  -Held home dose of levofloxacin  -Continue empirical ceftriaxone and azithromycin for coverage of bacterial pneumonia  -Continue IV furosemide 40 mg daily  -Continue escalated prophylactic dose for enoxaparin given elevated D-dimer  -I personally discussed the care of this patient with the MICU attending  -I personally discussed the care of this patient with the infectious disease service  -I personally discussed the care of this patient with pulmonology    2.  Myelodysplastic syndrome status post bone marrow transplant, all are POA  -Holding prednisone while the patient is receiving dexamethasone  -Continue ruxolitinib 5 mg twice daily  -Continue Bactrim double strength 1 tablet twice weekly for PCP prophylaxis  -Continue acyclovir 800 mg 2 times daily for prophylaxis against herpes simplex virus  -Continue fluconazole 100 mg oral daily  -We highly appreciate the input of bone marrow transplant service    3.  Chronic medical problems  -Depression, continue sertraline    Billing  Given refractory respiratory failure, I spent 30 minutes of critical care time directing the care of this patient with respiratory failure refractory to current therapies    Prognosis  Very poor with high chance for inpatient mortality.  I ordered to transfer to the intensive care unit under my service for closer monitoring for need for intubation given that the patient is currently on 100% FiO2    DVT Prophylaxis: Enoxaparin (Lovenox)  SQ  Code Status: Full Code  Expected discharge: 4 - 7 days, recommended to prior living arrangement once O2 use less than 1 liters/minute.    Marysol Ramirez MD  Text Page (7am - 6pm, M-F)    Interval History   The patient unfortunately continues to complain of dyspnea on exertion.  He denied any active chest pain or wheezing.   10 point review of systems is otherwise negative    -Data reviewed today: I reviewed all new labs and imaging results over the last 24 hours. I personally reviewed the chest x-ray image(s) showing Left-sided haziness.    Physical Exam   Temp: 95.7  F (35.4  C) Temp src: Axillary BP: 124/58 Pulse: 62   Resp: 24 SpO2: 96 % O2 Device: High Flow Nasal Cannula (HFNC) Oxygen Delivery: 45 LPM  Vitals:    02/08/21 1705 02/09/21 1300   Weight: 104.5 kg (230 lb 6.4 oz) 104.1 kg (229 lb 8 oz)     Vital Signs with Ranges  Temp:  [95.7  F (35.4  C)-98.2  F (36.8  C)] 95.7  F (35.4  C)  Pulse:  [62-71] 62  Resp:  [20-24] 24  BP: (123-124)/(58-59) 124/58  FiO2 (%):  [50 %-100 %] 100 %  SpO2:  [74 %-99 %] 96 %  I/O last 3 completed shifts:  In: 500 [I.V.:500]  Out: 2150 [Urine:2150]    Constitutional: Awake, alert, cooperative, no apparent distress.  Eyes: Conjunctiva and pupils examined and normal.  HEENT: Moist mucous membranes, normal dentition.  Respiratory: Clear to auscultation bilaterally, no crackles or wheezing.  Cardiovascular: Regular rate and rhythm, normal S1 and S2, and no murmur noted.  GI: Soft, non-distended, non-tender, normal bowel sounds.  Lymph/Hematologic: No anterior cervical or supraclavicular adenopathy.  Skin: No rashes, no cyanosis, no edema.  Musculoskeletal: No joint swelling, erythema or tenderness.  Neurologic: Cranial nerves 2-12 intact, normal strength and sensation.  Psychiatric: Alert, oriented to person, place and time, no obvious anxiety or depression.    Medications     - MEDICATION INSTRUCTIONS -       - MEDICATION INSTRUCTIONS -         remdesivir  100 mg  Intravenous Q24H    And     sodium chloride 0.9%  50 mL Intravenous Q24H     acyclovir  800 mg Oral BID     albuterol  2 puff Inhalation Q4H While awake     azithromycin  500 mg Intravenous Q24H     calcium carbonate-vitamin D  1 tablet Oral Daily     cefTRIAXone  2 g Intravenous Q24H     dexamethasone  6 mg Oral Daily     enoxaparin ANTICOAGULANT  0.5 mg/kg Subcutaneous BID     fluconazole  100 mg Oral Daily     furosemide  40 mg Intravenous Daily     [Held by provider] levofloxacin  250 mg Oral Daily     melatonin  3 mg Oral At Bedtime     pantoprazole  40 mg Oral Daily     [Held by provider] predniSONE  5 mg Oral Daily     ruxolitinib  5 mg Oral BID     sertraline  50 mg Oral Daily     sulfamethoxazole-trimethoprim  1 tablet Oral Once per day on Mon Tue       Data   Recent Labs   Lab 02/10/21  0721 02/09/21  0637 02/08/21  0910 02/05/21  1227   WBC 6.9 8.0 6.1 5.2   HGB 13.3 13.3 13.4 13.6   MCV 95 97 95 100    146* 152 173    143 139 140   POTASSIUM 4.2 4.5 4.0 4.0   CHLORIDE 112* 114* 111* 109   CO2 23 22 23 25   BUN 40* 28 25 30   CR 0.87 0.77 0.88 1.10   ANIONGAP 6 6 5 6   JOE 8.7 8.5 8.4* 8.4*   * 121* 100* 94   ALBUMIN 2.4* 2.6* 2.6* 2.8*   PROTTOTAL 6.3* 6.4* 6.6* 6.9   BILITOTAL 0.3 0.3 0.3 0.2   ALKPHOS 76 66 69 57   ALT 29 29 28 23   AST 52* 52* 51* 42   TROPI  --   --  0.018 <0.015       Recent Results (from the past 24 hour(s))   XR Chest Port 1 View    Narrative    Exam: XR CHEST PORT 1 VW, 2/10/2021 11:13 AM    Indication: follow up for COVID pneumonia    Comparison: 2/8/2021 chest CT, 2/5/2021 chest radiographs    Findings:   Borderline enlarged cardiac silhouette. The pulmonary vasculature is  indistinct. Left greater than right basilar predominant mixed  interstitial and patchy airspace opacities, increased since 2/5/2021  chest radiograph. No appreciable pleural effusion or pneumothorax.      Impression    Impression: Left greater than right and basilar predominant  mixed  interstitial and patchy airspace opacities, compatible with patient's  history of COVID-19, increased since 2/5/2021 chest radiograph and  similar to 2/8/2021 chest CT.    VILMA SYLVESTER, DO

## 2021-02-10 NOTE — PLAN OF CARE
Afebrile, pt initial oxygen at 6 liters with sats low 90's, but pt desats to 74% when OOB to use bedside urinal, even when on 15 liters via oxymask, any movement in bed patient desats to mid 80's on the 15 liters, attempting to wean down oxygen at rest but pt desats, place on highflow at 50% with sats mid 80's, increased to 60% but sats remain in the mid 80's, increased to 70% fi02 40 LPM and sats low 90's at rest. Updated MD and order placed to transfer to , chest xray and pulm consult, to start on albuterol inhaler, pt does remain calm with desaturations, respiratory rate 18-24, BP/P WNL, IV lasix this a.m. with good UOP, ate few bites of breakfast, pt remains alert, orientated.    sats 87-91% on the 70% so spoke with RT and increased to 80% FI02 via oxymask, ABGs done, PO2 level 57, placed on 100% fio2 45 LPM and redraw of ABGs 45 minutes later, PO2 improved to 64

## 2021-02-10 NOTE — SIGNIFICANT EVENT
Significant Event Note    Time of event: 2:45 PM February 10, 2021    Description of event:  Called to evaluate patient given increasing oxygen requirements to evaluate for possible intubation. 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 Current treatments include HFNC (100%, 45L), ceftriaxone and azithromycin, remdesivir, dexamethasone 6mg/day, and daily lasix.     On my assessment, patient is lying on on left side and is in NAD. He is conversational and does not become dyspneic or hypoxic during my conversation with him. Lungs are well aerated and slightly course bilaterally, mild tachypnea. No significant peripheral edema on exam. Intermittent dry cough. Patient denies dyspnea other than while standing to urinate (noted by RNs that this provokes hypoxia to 70%).     Plan:  Given his overall well appearance and lack of increased work of breathing, would not recommend intubation at this time. Would recommend the following:  - can increase flow rate as needed on HFNC; could escalate to BIPAP if difficulty with oxygenation on maximum HFNC or increased WOB  - strict bed rest given dyspnea and hypoxia with standing/exertion (discussed with patient that this might require catheter if unable to urinate while supine)  - encourage self proning  - consider increasing dexamethasone to 10mg/day given worsening respiratory status  - continue diuresis as tolerated    Please call back if further questions or if patient's clinical status worsening.    Discussed with: supervising physician, Dr. Leventhal, and primary treatment physician, Dr. Ramirez.    Mehreen Holm MD  Critical Care Fellow

## 2021-02-10 NOTE — PROGRESS NOTES
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 Dior is 6 year, 7 months out from allo-sib PBHSCT c/b mucocutaneous cGHVD (skin, colon, eyes, mouth) along with arthralgias/myalgias and chronic fatigue/dyspnea. Previously evaluated by pulmonary, and no edenilson brionchiolitis or pulmonary involvement. His GVHD symptoms have been stable on Jakafi and low-dose pred for some time, although he has had a history of recurrent pulmonary infections, at least in part due to immunosuppression. His IgG level was normal,no need for IVIg.     He should remain on his Jakafi and prednisone despite his infection. Acute discontinuation of Jakafi can cause rebound inflammation, which could have significant consequences given his inflamed state. Since he's on dexamethasone for COVID, can hold his low-dose pred, but this should be restarted on discharge. Otherwise, he should continue on all of his prophylactic anti-infective agents; levofloxacin can be held while on IV antibiotics.     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 Subsequently underwent great saphenous ablation on the right, with recannulization of left saphenous vein on US from 7/2019. He does have a history of JAK2+ MPN, but he is now s/p transplant with 100% donor engraftment,  so this is cured. No indication for additional anticoagulation for these thromboses at this time. Recommend anticoagulation per COVID protocol.      Recommendations:   - Continue Jakafi 5mg BID  - Hold prednisone 5mg while on Dex for COVID              - Please restart prednisone 5mg daily once Dex is finished  - Agree with ID consult  - Systemic anticoagulation per COVID protocol  - Continue prophylactic anti-infective: ACV 800mg BID, Bactrim BID Mon/Tues, fluconazole 100mg daily  - Okay to hold levofloxacin while on IV abx    Patient was seen and plan of care was discussed with attending physician Dr. Black.    We will continue to follow this patient. Please don't hesitate to contact the Fellow On-Call with questions.    Shubham Joyce MD PhD  Heme/Onc/Transplant Fellow  [facility] [phone]  ___________________________________________________________________    Subjective & Interval History:    - Oxygenation worsening this morning, now requiring HFNC  - Remains afebrile otherwise  - Subjectively feels that his breathing is stable      Physical Exam:    /58 (BP Location: Right arm)   Pulse 62   Temp 95.7  F (35.4  C) (Axillary)   Resp 24   Wt 104.1 kg (229 lb 8 oz)   SpO2 94%   BMI 34.14 kg/m    Gen: Tired, laying in bed, HFNC in place  Pulm: Mildly labored breathing, no audible wheezing or adventitious breath sounds  Skin: No obvious rashes or abnormalities on exposed skin  Neuro: Alert and answering questions appropriately. CNII-XII grossly intact.     The rest of a comprehensive physical examination is deferred due to public health emergency video visit restrictions.    Labs & Studies: I personally reviewed the following studies:  ROUTINE LABS (Last four results):  CMP  Recent Labs   Lab 02/10/21  0721 02/09/21  0637 02/08/21  0910 02/05/21  1227    143 139 140   POTASSIUM 4.2 4.5 4.0 4.0   CHLORIDE 112* 114* 111* 109   CO2 23 22 23 25   ANIONGAP 6 6 5 6   * 121* 100* 94   BUN  40* 28 25 30   CR 0.87 0.77 0.88 1.10   GFRESTIMATED 86 >90 86 67   GFRESTBLACK >90 >90 >90 77   JOE 8.7 8.5 8.4* 8.4*   MAG  --   --  1.7 2.0   PROTTOTAL 6.3* 6.4* 6.6* 6.9   ALBUMIN 2.4* 2.6* 2.6* 2.8*   BILITOTAL 0.3 0.3 0.3 0.2   ALKPHOS 76 66 69 57   AST 52* 52* 51* 42   ALT 29 29 28 23     CBC  Recent Labs   Lab 02/10/21  0721 02/09/21  0637 02/08/21  0910 02/05/21  1227   WBC 6.9 8.0 6.1 5.2   RBC 4.09* 4.12* 4.19* 4.23*   HGB 13.3 13.3 13.4 13.6   HCT 38.8* 40.0 39.8* 42.1   MCV 95 97 95 100   MCH 32.5 32.3 32.0 32.2   MCHC 34.3 33.3 33.7 32.3   RDW 18.5* 18.7* 17.8* 18.8*    146* 152 173     INRNo lab results found in last 7 days.    Medications list for reference:  Current Facility-Administered Medications   Medication     remdesivir 100 mg in sodium chloride 0.9 % 250 mL intermittent infusion    And     0.9% sodium chloride BOLUS     acetaminophen (TYLENOL) tablet 650 mg     acyclovir (ZOVIRAX) tablet 800 mg     artificial saliva (BIOTENE MT) solution 1 spray     azithromycin (ZITHROMAX) 500 mg in sodium chloride 0.9 % 250 mL intermittent infusion     bisacodyl (DULCOLAX) EC tablet 5 mg    Or     bisacodyl (DULCOLAX) EC tablet 10 mg    Or     bisacodyl (DULCOLAX) EC tablet 15 mg     calcium carbonate-vitamin D (OSCAL w/D) per tablet 1 tablet     carboxymethylcellulose PF (REFRESH PLUS) 0.5 % ophthalmic solution 1 drop     cefTRIAXone (ROCEPHIN) 2 g vial to attach to  ml bag for ADULTS or NS 50 ml bag for PEDS     dexamethasone (DECADRON) tablet 6 mg     enoxaparin ANTICOAGULANT (LOVENOX) injection 50 mg     fluconazole (DIFLUCAN) tablet 100 mg     furosemide (LASIX) injection 40 mg     [Held by provider] levofloxacin (LEVAQUIN) tablet 250 mg     lidocaine (LMX4) cream     lidocaine 1 % 0.1-1 mL     magnesium citrate solution 148 mL     Medication instructions: Do NOT use nebulized medications     melatonin tablet 3 mg     ondansetron (ZOFRAN-ODT) ODT tab 4 mg    Or     ondansetron (ZOFRAN)  injection 4 mg     pantoprazole (PROTONIX) EC tablet 40 mg     Patient is already receiving anticoagulation with heparin, enoxaparin (LOVENOX), warfarin (COUMADIN)  or other anticoagulant medication     polyethylene glycol (MIRALAX) Packet 17 g     [Held by provider] predniSONE (DELTASONE) tablet 5 mg     prochlorperazine (COMPAZINE) injection 5 mg    Or     prochlorperazine (COMPAZINE) tablet 5 mg    Or     prochlorperazine (COMPAZINE) suppository 12.5 mg     ruxolitinib (JAKAFI) tablet 5 mg     sertraline (ZOLOFT) tablet 50 mg     sodium chloride (PF) 0.9% PF flush 3 mL     sodium chloride (PF) 0.9% PF flush 3 mL     sodium chloride (PF) 0.9% PF flush 3 mL     sodium chloride (PF) 0.9% PF flush 3 mL     sulfamethoxazole-trimethoprim (BACTRIM DS) 800-160 MG per tablet 1 tablet     Attestation by staff:    I examined galileo saw the patient and agree with the fellow's A&P.    Edgar Black M.D.

## 2021-02-10 NOTE — PLAN OF CARE
[id number]:  VSS on 6-15L of O2 based on pt's activity- O2 goal 92% or above- Pt was on higher level of O2 but sating 100%- d/t clustering cares O2 was not always turned down as soon as O2 sats higher 90's/100%. MERIDA immediately with activity or eating. Tylenol given for generalized aches/pains. Pt denies n/v. Pt started on IV Rocephin and Levaquin. Pt started on IV lasix as well- good urine output- strict I/O's.  Pt's chemo at bedside (dual sign). Pt continues on IV Remdesivir (completed 2/12).  50ml bolus given now following REmdesivir. Pt's family brought in hearing aids, clothes, shaver, brush. All at bedside. Pt can make needs known, calls appropriately, LS crackles/coarse/tubular. Good cough- IS at bedside, no sputum for sample- still needed. Pt swabbed for Influenza A/B and RSV- results in Epic. Md's paged with result. Pt on regular diet- appetite improved. PIV infusing- patent. Will continue to follow POC/monitor.   AST 52, Plt Ct 146, .0, Fibrinogen 492

## 2021-02-11 LAB
ALBUMIN SERPL-MCNC: 2.5 G/DL (ref 3.4–5)
ALP SERPL-CCNC: 86 U/L (ref 40–150)
ALT SERPL W P-5'-P-CCNC: 30 U/L (ref 0–70)
ANION GAP SERPL CALCULATED.3IONS-SCNC: 8 MMOL/L (ref 3–14)
AST SERPL W P-5'-P-CCNC: 52 U/L (ref 0–45)
BACTERIA SPEC CULT: NO GROWTH
BACTERIA SPEC CULT: NO GROWTH
BASOPHILS # BLD AUTO: 0 10E9/L (ref 0–0.2)
BASOPHILS NFR BLD AUTO: 0.1 %
BILIRUB SERPL-MCNC: 0.3 MG/DL (ref 0.2–1.3)
BUN SERPL-MCNC: 39 MG/DL (ref 7–30)
CALCIUM SERPL-MCNC: 8.7 MG/DL (ref 8.5–10.1)
CHLORIDE SERPL-SCNC: 111 MMOL/L (ref 94–109)
CO2 SERPL-SCNC: 23 MMOL/L (ref 20–32)
CREAT SERPL-MCNC: 0.85 MG/DL (ref 0.66–1.25)
CRP SERPL-MCNC: 79 MG/L (ref 0–8)
D DIMER PPP FEU-MCNC: 3.9 UG/ML FEU (ref 0–0.5)
DIFFERENTIAL METHOD BLD: ABNORMAL
EOSINOPHIL # BLD AUTO: 0 10E9/L (ref 0–0.7)
EOSINOPHIL NFR BLD AUTO: 0 %
ERYTHROCYTE [DISTWIDTH] IN BLOOD BY AUTOMATED COUNT: 18.2 % (ref 10–15)
FERRITIN SERPL-MCNC: 401 NG/ML (ref 26–388)
FIBRINOGEN PPP-MCNC: 436 MG/DL (ref 200–420)
GALACTOMANNAN AG SERPL QL IA: NEGATIVE
GALACTOMANNAN AG SERPL-ACNC: 0.05
GFR SERPL CREATININE-BSD FRML MDRD: 87 ML/MIN/{1.73_M2}
GLUCOSE BLDC GLUCOMTR-MCNC: 121 MG/DL (ref 70–99)
GLUCOSE BLDC GLUCOMTR-MCNC: 133 MG/DL (ref 70–99)
GLUCOSE SERPL-MCNC: 137 MG/DL (ref 70–99)
HCT VFR BLD AUTO: 39.1 % (ref 40–53)
HGB BLD-MCNC: 13.2 G/DL (ref 13.3–17.7)
IL6 SERPL-MCNC: 17.83 PG/ML
IMM GRANULOCYTES # BLD: 0.1 10E9/L (ref 0–0.4)
IMM GRANULOCYTES NFR BLD: 0.9 %
LYMPHOCYTES # BLD AUTO: 0.7 10E9/L (ref 0.8–5.3)
LYMPHOCYTES NFR BLD AUTO: 10.8 %
Lab: NORMAL
MCH RBC QN AUTO: 32 PG (ref 26.5–33)
MCHC RBC AUTO-ENTMCNC: 33.8 G/DL (ref 31.5–36.5)
MCV RBC AUTO: 95 FL (ref 78–100)
MONOCYTES # BLD AUTO: 0.3 10E9/L (ref 0–1.3)
MONOCYTES NFR BLD AUTO: 4.2 %
NEUTROPHILS # BLD AUTO: 5.7 10E9/L (ref 1.6–8.3)
NEUTROPHILS NFR BLD AUTO: 84 %
NRBC # BLD AUTO: 0 10*3/UL
NRBC BLD AUTO-RTO: 0 /100
PLATELET # BLD AUTO: 192 10E9/L (ref 150–450)
PLATELET # BLD EST: ABNORMAL 10*3/UL
POTASSIUM SERPL-SCNC: 4 MMOL/L (ref 3.4–5.3)
PROT SERPL-MCNC: 6.4 G/DL (ref 6.8–8.8)
RBC # BLD AUTO: 4.13 10E12/L (ref 4.4–5.9)
SODIUM SERPL-SCNC: 141 MMOL/L (ref 133–144)
SPECIMEN SOURCE: NORMAL
SPECIMEN SOURCE: NORMAL
WBC # BLD AUTO: 6.7 10E9/L (ref 4–11)

## 2021-02-11 PROCEDURE — 250N000009 HC RX 250: Performed by: NURSE PRACTITIONER

## 2021-02-11 PROCEDURE — 999N001017 HC STATISTIC GLUCOSE BY METER IP

## 2021-02-11 PROCEDURE — 200N000002 HC R&B ICU UMMC

## 2021-02-11 PROCEDURE — 258N000003 HC RX IP 258 OP 636: Performed by: NURSE PRACTITIONER

## 2021-02-11 PROCEDURE — 85384 FIBRINOGEN ACTIVITY: CPT | Performed by: NURSE PRACTITIONER

## 2021-02-11 PROCEDURE — 999N000185 HC STATISTIC TRANSPORT TIME EA 15 MIN

## 2021-02-11 PROCEDURE — 99291 CRITICAL CARE FIRST HOUR: CPT | Mod: GC | Performed by: INTERNAL MEDICINE

## 2021-02-11 PROCEDURE — 85025 COMPLETE CBC W/AUTO DIFF WBC: CPT | Performed by: NURSE PRACTITIONER

## 2021-02-11 PROCEDURE — 80053 COMPREHEN METABOLIC PANEL: CPT | Performed by: NURSE PRACTITIONER

## 2021-02-11 PROCEDURE — 85379 FIBRIN DEGRADATION QUANT: CPT | Performed by: NURSE PRACTITIONER

## 2021-02-11 PROCEDURE — 250N000011 HC RX IP 250 OP 636: Performed by: STUDENT IN AN ORGANIZED HEALTH CARE EDUCATION/TRAINING PROGRAM

## 2021-02-11 PROCEDURE — 82728 ASSAY OF FERRITIN: CPT | Performed by: NURSE PRACTITIONER

## 2021-02-11 PROCEDURE — 999N000157 HC STATISTIC RCP TIME EA 10 MIN

## 2021-02-11 PROCEDURE — 999N000043 HC STATISTIC CTO2 CONT OXYGEN TECH TIME EA 90 MIN

## 2021-02-11 PROCEDURE — 99233 SBSQ HOSP IP/OBS HIGH 50: CPT | Mod: GC | Performed by: INTERNAL MEDICINE

## 2021-02-11 PROCEDURE — 250N000012 HC RX MED GY IP 250 OP 636 PS 637: Performed by: STUDENT IN AN ORGANIZED HEALTH CARE EDUCATION/TRAINING PROGRAM

## 2021-02-11 PROCEDURE — 250N000013 HC RX MED GY IP 250 OP 250 PS 637: Performed by: INTERNAL MEDICINE

## 2021-02-11 PROCEDURE — 250N000011 HC RX IP 250 OP 636: Performed by: NURSE PRACTITIONER

## 2021-02-11 PROCEDURE — 250N000013 HC RX MED GY IP 250 OP 250 PS 637: Performed by: NURSE PRACTITIONER

## 2021-02-11 PROCEDURE — 86140 C-REACTIVE PROTEIN: CPT | Performed by: NURSE PRACTITIONER

## 2021-02-11 PROCEDURE — 258N000003 HC RX IP 258 OP 636: Performed by: STUDENT IN AN ORGANIZED HEALTH CARE EDUCATION/TRAINING PROGRAM

## 2021-02-11 PROCEDURE — 99233 SBSQ HOSP IP/OBS HIGH 50: CPT | Performed by: INTERNAL MEDICINE

## 2021-02-11 PROCEDURE — 36415 COLL VENOUS BLD VENIPUNCTURE: CPT | Performed by: NURSE PRACTITIONER

## 2021-02-11 RX ADMIN — ACYCLOVIR 800 MG: 800 TABLET ORAL at 08:27

## 2021-02-11 RX ADMIN — Medication 1 TABLET: at 08:26

## 2021-02-11 RX ADMIN — PANTOPRAZOLE SODIUM 40 MG: 40 TABLET, DELAYED RELEASE ORAL at 08:26

## 2021-02-11 RX ADMIN — ALBUTEROL SULFATE 2 PUFF: 90 AEROSOL, METERED RESPIRATORY (INHALATION) at 13:24

## 2021-02-11 RX ADMIN — ALBUTEROL SULFATE 2 PUFF: 90 AEROSOL, METERED RESPIRATORY (INHALATION) at 17:28

## 2021-02-11 RX ADMIN — ALBUTEROL SULFATE 2 PUFF: 90 AEROSOL, METERED RESPIRATORY (INHALATION) at 06:18

## 2021-02-11 RX ADMIN — AZITHROMYCIN MONOHYDRATE 500 MG: 500 INJECTION, POWDER, LYOPHILIZED, FOR SOLUTION INTRAVENOUS at 13:24

## 2021-02-11 RX ADMIN — ENOXAPARIN SODIUM 50 MG: 100 INJECTION SUBCUTANEOUS at 20:26

## 2021-02-11 RX ADMIN — FLUCONAZOLE 100 MG: 100 TABLET ORAL at 08:26

## 2021-02-11 RX ADMIN — ENOXAPARIN SODIUM 50 MG: 100 INJECTION SUBCUTANEOUS at 08:27

## 2021-02-11 RX ADMIN — ACYCLOVIR 800 MG: 800 TABLET ORAL at 20:26

## 2021-02-11 RX ADMIN — ALBUTEROL SULFATE 2 PUFF: 90 AEROSOL, METERED RESPIRATORY (INHALATION) at 22:41

## 2021-02-11 RX ADMIN — DEXAMETHASONE 6 MG: 2 TABLET ORAL at 08:26

## 2021-02-11 RX ADMIN — ALBUTEROL SULFATE 2 PUFF: 90 AEROSOL, METERED RESPIRATORY (INHALATION) at 09:53

## 2021-02-11 RX ADMIN — SERTRALINE HYDROCHLORIDE 50 MG: 50 TABLET ORAL at 08:26

## 2021-02-11 RX ADMIN — SODIUM CHLORIDE 50 ML: 9 INJECTION, SOLUTION INTRAVENOUS at 17:27

## 2021-02-11 RX ADMIN — REMDESIVIR 100 MG: 100 INJECTION, POWDER, LYOPHILIZED, FOR SOLUTION INTRAVENOUS at 17:25

## 2021-02-11 ASSESSMENT — ACTIVITIES OF DAILY LIVING (ADL)
ADLS_ACUITY_SCORE: 14

## 2021-02-11 NOTE — PROGRESS NOTES
[facility]  Transplant Infectious Disease Progress Note     Patient:  Deejay Dior, Date of birth 1950, Medical record number [id number]  Date of Visit:  02/11/2021         Assessment and Recommendations:   Recommendations:  1. Agree with continued remdesivir & dexamethasone as ordered.   2. Continue azithromycin as ordered.   3. Continue checking daily CRP.  4. No other clear identified COVID-19 therapies/trials are recommended from ID standpoint at this time.  5. Review of continued need for prophylaxis medications/indications/dosing in the days to come pending patient's clinical course.    Transplant Infectious Disease will continue to follow with you.      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    Infectious Disease issues include:  #Acute Hypoxic Respiratory Failure:  #COVID-19 PNA (diagnosed 01/30):  #MDS s/p stem cell transplant (2014):  #Chronic GVHD:     From a COVID-19 therapy standpoint, patient is currently on appropriate therapies with remdesivir and dexamethasone as ordered. There is no role for monoclonal antibodies at this point in his course. There is some data to support the use of high titer IVIG, however, this would not routinely be available in standard IVIG infusions and thus, likely low utility of benefit from this. Reviewed with ID team members here, there are no other current trials/therapies at this point in time that patient would be eligible for (should this change, trial leaders will alert us of this). His underlying MDS s/p stem cell transplant and chronic GVHD does likely preclude his eligibility. Again, given the known COVID-19 diagnosis, with bilateral GGO seen on imaging, and normal procalcitonin, this does argue against bacterial etiology and  we are comfortable continuing with azithromycin alone. Patient is currently on Jakafi and prednisone in the out patient setting, as well as a variety of prophylactic medications - acyclovir, fluconazole, bactrim, and levaquin. Pending patient's clinical course, it is reasonable to re-visit the ongoing need/indication for these in the days in the come. Namely, continued need for high dose acyclovir (in absence of documented CMV) and fluconazole (with no neutropenia and essentially normal differential).    - QTc interval: 434 (02/08/21)  - Bacterial prophylaxis: levaquin as out-patient; currently on azithromycin   - Pneumocystis prophylaxis: bactrim  - Viral serostatus & prophylaxis: acyclovir   - Fungal prophylaxis: fluconazole   - Immunization status: Unknown  - Gamma globulin status: IgG level pending   - Isolation status: COVID-19; FLOR Mathews DO, MPH  Pager [phone]  Infectious Disease Fellow, PGY-4     Discussed with Dr. Haynes          Interval History:   Patient having increasing respiratory requirements, prompting transfer to MICU last night with concern for potential impending intubation. He currently remains on 35LPM/75% FiO2 of high flow. Otherwise remains afebrile and hemodynamically stable. WBC remain WNL and CRP today is 79 (decreased from 110). BC remain with NGTD and aspergillus/1,3,B-D glucan/blasto are pending. Patient himself reporting no worsening dyspnea or respiratory distress.          Current Medications & Allergies:       remdesivir  100 mg Intravenous Q24H    And     sodium chloride 0.9%  50 mL Intravenous Q24H     acyclovir  800 mg Oral BID     albuterol  2 puff Inhalation Q4H While awake     calcium carbonate-vitamin D  1 tablet Oral Daily     dexamethasone  6 mg Oral Daily     enoxaparin ANTICOAGULANT  0.5 mg/kg Subcutaneous BID     fluconazole  100 mg Oral Daily     levofloxacin  250 mg Oral Daily     melatonin  3 mg Oral At Bedtime     pantoprazole  40 mg Oral Daily      [Held by provider] predniSONE  5 mg Oral Daily     ruxolitinib  5 mg Oral BID     sertraline  50 mg Oral Daily     sulfamethoxazole-trimethoprim  1 tablet Oral Once per day on Mon Tue       Infusions/Drips:    - MEDICATION INSTRUCTIONS -       - MEDICATION INSTRUCTIONS -         Allergies   Allergen Reactions     Blood Transfusion Related (Informational Only) Other (See Comments)     Patient has a history of a clinically significant antibody against RBC antigens.  A delay in compatible RBCs may occur.            Physical Exam:   Vitals were reviewed.  All vitals stable.  Patient Vitals for the past 24 hrs:   BP Temp Temp src Pulse Resp SpO2 Weight   02/11/21 1600 -- 98.7  F (37.1  C) Oral -- 22 -- --   02/11/21 1500 113/69 -- -- 72 -- 93 % --   02/11/21 1400 113/60 -- -- 70 -- 98 % --   02/11/21 1315 -- -- -- -- -- 98 % --   02/11/21 1300 108/51 -- -- 68 -- 91 % --   02/11/21 1200 128/66 97.6  F (36.4  C) Oral 68 24 91 % --   02/11/21 1100 111/70 -- -- 71 24 98 % --   02/11/21 1000 130/71 -- -- 70 24 (!) 88 % --   02/11/21 0900 126/72 -- -- 63 24 94 % --   02/11/21 0800 136/73 98.2  F (36.8  C) Oral 75 22 93 % --   02/11/21 0700 133/78 -- -- 61 18 91 % --   02/11/21 0600 134/82 -- -- 57 -- 97 % --   02/11/21 0500 126/81 -- -- 73 -- 95 % --   02/11/21 0400 (!) 144/80 97.7  F (36.5  C) Oral 64 18 94 % 104.6 kg (230 lb 9.6 oz)   02/11/21 0300 -- -- -- 66 -- 94 % --   02/11/21 0200 115/58 -- -- 67 -- 91 % --   02/11/21 0118 -- -- -- -- -- 96 % --   02/11/21 0115 -- -- -- -- -- 98 % --   02/11/21 0100 112/58 -- -- 63 -- 97 % --   02/11/21 0045 -- -- -- -- -- 97 % --   02/11/21 0030 -- -- -- -- -- 97 % --   02/11/21 0000 112/65 -- -- 66 16 97 % --   02/10/21 2300 111/67 -- -- 66 -- 100 % --   02/10/21 2200 114/58 -- -- 70 -- 97 % --   02/10/21 2100 116/74 -- -- 66 -- 99 % --   02/10/21 2000 128/75 97.9  F (36.6  C) Oral 68 18 96 % --   02/10/21 1900 124/73 -- -- 75 -- (!) 79 % --   02/10/21 1815 114/66 -- -- 67 -- 96 % --    02/10/21 1700 114/66 -- -- 67 (!) 32 93 % --     Ranges for vital signs:  Temp:  [97.6  F (36.4  C)-98.7  F (37.1  C)] 98.7  F (37.1  C)  Pulse:  [57-75] 72  Resp:  [16-32] 22  BP: (108-144)/(51-82) 113/69  FiO2 (%):  [60 %-100 %] 75 %  SpO2:  [79 %-100 %] 93 %  Vitals:    02/08/21 1705 02/09/21 1300 02/11/21 0400   Weight: 104.5 kg (230 lb 6.4 oz) 104.1 kg (229 lb 8 oz) 104.6 kg (230 lb 9.6 oz)       Physical Examination:  Physical exam deferred given COVID-19 + status and remote visit.          Laboratory Data:     Absolute CD4   Date Value Ref Range Status   06/29/2015 345 (L) 441 - 2,156 cells/uL Final     Comment:     Effective 12/08/2014, the reference range for this assay has changed to   reflect   new methodology.     12/29/2014 190 (L) 441 - 2,156 cells/uL Final     Comment:     Effective 12/08/2014, the reference range for this assay has changed to   reflect   new methodology.     10/09/2014 37 mm3 Final       Inflammatory Markers    Recent Labs   Lab Test 02/11/21  0409 02/10/21  0721 02/09/21  0637 02/08/21  0910 05/16/15  1057 11/30/14  1207   SED  --   --   --   --   --  40*   CRP 79.0* 110.0* 130.0* 78.0* 7.4 17.4*       Immune Globulin Studies     Recent Labs   Lab Test 02/10/21  0721 01/24/19  1605 10/01/18  0955 05/08/16  0906 03/20/16  0352 07/30/15  1125 10/09/14  1010   IGG 3,275*  831 1,130 1,300 1,270 403* 913 458*     --   --   --   --   --  65   IGE  --   --   --   --   --   --  12   IGA 83*  --   --   --   --   --  48*       Metabolic Studies       Recent Labs   Lab Test 02/11/21  0409 02/10/21  0721 02/08/21 2024 02/08/21 2024 02/08/21  0910 02/05/21  1227 09/27/18  1217 09/27/18  1217 04/05/16  0300 04/05/16  0300    141   < >  --  139 140   < >  --    < > 137   POTASSIUM 4.0 4.2   < >  --  4.0 4.0   < >  --    < > 4.0   CHLORIDE 111* 112*   < >  --  111* 109   < >  --    < > 104   CO2 23 23   < >  --  23 25   < >  --    < > 26   ANIONGAP 8 6   < >  --  5 6   < >  --     < > 8   BUN 39* 40*   < >  --  25 30   < >  --    < > 18   CR 0.85 0.87   < >  --  0.88 1.10   < >  --    < > 0.79   GFRESTIMATED 87 86   < >  --  86 67   < >  --    < > >90  Non  GFR Calc     * 119*   < >  --  100* 94   < >  --    < > 94   JOE 8.7 8.7   < >  --  8.4* 8.4*   < >  --    < > 8.4*   PHOS  --   --   --   --   --   --   --   --   --  2.8   MAG  --   --   --   --  1.7 2.0   < >  --    < > 2.0   LACT  --   --   --   --  1.1 1.4  --   --   --   --    PCAL  --   --   --  0.28 0.10  --   --   --   --   --    FGTL  --   --   --   --   --   --   --  35  --   --     < > = values in this interval not displayed.       Hepatic Studies    Recent Labs   Lab Test 02/11/21  0409 02/10/21  0721 02/09/21  0637 02/08/21  0910 04/06/16  0245 04/06/16 0245 03/25/16 2128 03/25/16 2128 07/07/14 0445 07/07/14 0445   BILITOTAL 0.3 0.3 0.3 0.3   < > 0.8   < >  --    < > 1.8*   BILIDELTA  --   --   --   --   --   --   --   --   --  1.0*   BILICONJ  --   --   --   --   --   --   --   --   --  0.0   DBIL  --   --   --   --   --  0.3*   < >  --    < >  --    ALKPHOS 86 76 66 69   < > 292*   < >  --    < > 88   PROTTOTAL 6.4* 6.3* 6.4* 6.6*   < > 6.7*   < >  --    < > 6.3*   ALBUMIN 2.5* 2.4* 2.6* 2.6*   < > 2.3*   < >  --    < > 3.4   AST 52* 52* 52* 51*   < > 96*   < >  --    < > 19   ALT 30 29 29 28   < > 167*   < >  --    < > 47   LDH  --   --   --  483*  --   --   --  806*   < >  --     < > = values in this interval not displayed.       Pancreatitis testing    Recent Labs   Lab Test 02/14/19  1342 09/27/18  1217 04/04/16  0350   AMYLASE 46  --   --    LIPASE  --  158  --    TRIG  --   --  286*       Gout Labs      Recent Labs   Lab Test 06/24/14  1205 06/09/14  1116   URIC 6.1 5.8       Hematology Studies      Recent Labs   Lab Test 02/11/21  0409 02/10/21  0721 02/09/21  0637 02/08/21  0910 02/05/21  1227 12/10/20  1430   WBC 6.7 6.9 8.0 6.1 5.2 5.8   ANEU 5.7 5.6  --  4.6 3.4 2.5   ALYM 0.7* 0.9  --   1.0 1.3 2.5   WILLIAM 0.3 0.3  --  0.3 0.4 0.7   AEOS 0.0 0.0  --  0.0 0.0 0.1   HGB 13.2* 13.3 13.3 13.4 13.6 13.1*   HCT 39.1* 38.8* 40.0 39.8* 42.1 40.4    170 146* 152 173 218       Clotting Studies    Recent Labs   Lab Test 02/14/19  1342 01/24/19  1605 06/30/16  0952 06/22/16  1218 04/04/16  0350 04/04/16  0350   INR 0.98 0.92 0.95 1.48*   < > 1.01   PTT  --   --   --   --   --  47*    < > = values in this interval not displayed.       Iron Testing    Recent Labs   Lab Test 02/11/21  0409 11/21/19  1148 11/21/19  1148 09/27/18  1218 09/27/18  1218   LENNOX 401*   < >  --   --   --    MCV 95   < > 95   < >  --    FOLIC  --   --   --   --  15.9   B12  --   --  455  --   --     < > = values in this interval not displayed.       Arterial Blood Gas Testing    Recent Labs   Lab Test 02/10/21  1441 02/10/21  1300 02/08/21  0910 02/05/21  1232 03/28/16  0431 03/28/16  0431 03/27/16  2120 03/27/16  2120 03/27/16  1601   PH 7.47* 7.47*  --   --   --  7.43  --  7.45 7.34*   PCO2 33* 30*  --   --   --  45  --  38 53*   PO2 64* 57*  --   --   --  126*  --  149* 161*   HCO3 24 22  --   --   --  29*  --  27 29*   O2PER 100 70 6 ROOM AIR   < > 50.0   < > 60.0 100.0    < > = values in this interval not displayed.        Thyroid Studies     Recent Labs   Lab Test 09/27/18  1217 02/27/18  1239 10/12/16  1107 06/30/16  0953 03/01/16  1227   TSH 1.01 0.70 0.40 1.02 0.38*   T4  --   --   --  1.03 1.12       Urine Studies     Recent Labs   Lab Test 08/25/16  1601 05/07/16  1211 03/24/16  1650 03/21/16  1730 12/17/15  1430   URINEPH 6.0 5.0 5.0 5.0 5.0   NITRITE Negative Negative Negative Negative Negative   LEUKEST Negative Negative Negative Negative Negative   WBCU 1 1 <1 1 1       Medication levels    Recent Labs   Lab Test 09/27/18  1059 04/01/16  0831 04/01/16  0831 03/21/16  2143 03/21/16  2143 02/01/15  0924 02/01/15  0924   VANCOMYCIN  --   --   --   --  25.4*   < >  --    VCON  --   --  1.6   < >  --   --   --    CYCLSP   --   --   --   --   --   --  178   RAPAMY 4.5*   < >  --    < >  --    < >  --     < > = values in this interval not displayed.       Body fluid stats    Recent Labs   Lab Test 03/27/16  1406   FTYP Bronchial  SITE 2 RIGHT MIDDLE LOBE    Bronchial  SITE 1 RIGTH UPPER LOBE     FCOL Colorless  Colorless   FAPR Clear  Clear   FRBC << Do Not Report >>  << Do Not Report >>   FWBC 63  57   FNEU 16  21   FLYM 8  14   FMONO 76  65   GS No organisms seen  >25 PMNs/low power field    No organisms seen  >25 PMNs/low power field         Microbiology:  Fungal testing  Recent Labs   Lab Test 09/27/18  1217 03/27/16  1406 03/25/16  1911 03/20/16  0352 03/19/16  1638 03/19/16  0749   FGTL 35  --  >500  Unit: pg/mL   149  --   --    ASPGAI 0.05 0.10  0.09  --   --  0.07 0.12   ASPAG  --  Negative  Reference range: Negative  (Note)  INTERPRETIVE INFORMATION: Aspergillus Galactomannan EIA,  Broncho  A BAL galactomannan index of greater than or equal to 0.5  is considered positive.  This result should be interpreted  in the context of patient history, clinical signs/symptoms,  and other routine diagnostic tests (e.g., culture,  histologic examination of biopsy material, and radiographic  imaging).  Performed by [facility],  [address] [place], [place] [id number] [phone]  [email or web address], Lencho Slaughter MD, Lab. Director    Negative  Reference range: Negative  (Note)  INTERPRETIVE INFORMATION: Aspergillus Galactomannan EIA,  Broncho  A BAL galactomannan index of greater than or equal to 0.5  is considered positive.  This result should be interpreted  in the context of patient history, clinical signs/symptoms,  and other routine diagnostic tests (e.g., culture,  histologic examination of biopsy material, and radiographic  imaging).  Performed by [facility],  [address] [place] [address],[place] [id number] [phone]  [email or web address], Lencho Slaughter MD, Lab. Director    --   --   --   --    ASPGAA Negative  --   --   --   Negative  Reference range: Negative  Unit: not reported  (Note)  INTERPRETIVE INFORMATION: Aspergillus Galactomannan Antigen  by EIA  Negative results do not exclude the diagnosis of invasive  aspergillosis. A single positive test result (index equal  to or greater than 0.5) should be clinically correlated  by testing a separate serum specimen because many agents  (e.g. foods, antibiotics) may cross-react with the test.  If invasive aspergillosis is suspected in high-risk  patients, serial sampling is recommended.  Performed by [facility],  [address] [facility] [place] [address] [phone]  [email or web address], Lencho Slaughter MD, Lab. Director   Negative  Reference range: Negative  Unit: not reported  (Note)  INTERPRETIVE INFORMATION: Aspergillus Galactomannan Antigen  by EIA  Negative results do not exclude the diagnosis of invasive  aspergillosis. A single positive test result (index equal  to or greater than 0.5) should be clinically correlated  by testing a separate serum specimen because many agents  (e.g. foods, antibiotics) may cross-react with the test.  If invasive aspergillosis is suspected in high-risk  patients, serial sampling is recommended.  Performed by [facility],  [address] [facility] [place] [address] [phone]  [email or web address], Lencho Slaughter MD, Lab. Director         Last Culture results with specimen source  Culture Micro   Date Value Ref Range Status   02/08/2021 No growth after 3 days  Preliminary   02/08/2021 No growth after 3 days  Preliminary   02/05/2021 No growth  Final   02/05/2021 No growth  Final   03/08/2018 No growth  Final   03/08/2018 No growth  Final   02/21/2018 No Beta Streptococcus isolated  Final   06/09/2016 No growth  Final   05/07/2016 No growth  Final   05/07/2016 No growth  Final   03/31/2016 No growth  Final   03/31/2016 No growth  Final   03/29/2016 (A)  Final    Light growth Enterococcus species (VRE)  Critical Value/Significant Value, preliminary result only,  called to and read   back by Preeti Cordoba RN @1301 04/01/16 gd     03/27/2016 No growth  Final   03/27/2016   Final    Culture negative for acid fast bacilli  Assayed at [facility] [address]     03/27/2016 (A)  Final    Geotrichum candidum isolated  Susceptibility testing requested by Dr. Alonzo 4.1.16 KB  No additional fungi cultured after 4 weeks incubation     03/27/2016 No growth after 29 days  Final   03/27/2016   Final    Unable to determine the presence of Actinomyces species due to an overgrowth of   normal kimani.     03/27/2016 No Legionella species isolated  Final   03/27/2016 No growth  Final   03/27/2016 Culture negative after 29 days  Final   03/27/2016 No growth after 29 days  Final   03/27/2016 No Legionella species isolated  Final   03/27/2016   Final    Culture negative for acid fast bacilli  Assayed at [facility] [address]      Specimen Description   Date Value Ref Range Status   02/08/2021 Blood Right Arm  Final   02/08/2021 Blood Left Arm  Final   02/05/2021 Blood Right Arm  Final   02/05/2021 Blood  Final   08/01/2019 Nares  Final   03/08/2018 Blood Left Arm  Final   03/08/2018 Blood Right Arm  Final   02/21/2018 Throat  Final   02/21/2018 Throat  Final   10/12/2016 Nasal  Final   06/09/2016 Blood Unspecified Site  Final   05/07/2016 Blood Red port  Final   05/07/2016 Blood White port  Final   05/05/2016 Nasal  Final   03/31/2016 Blood Right Arm  Final   03/31/2016 Blood Left PICC  Final        Last check of C difficile  C Diff Toxin B PCR   Date Value Ref Range Status   03/29/2016  NEG Final    Negative  Negative: Clostridium difficile target DNA sequences NOT detected, presumed   negative for Clostridium difficile toxin B or the number of bacteria present   may be below the limit of detection for the test.   FDA approved assay performed using [employer] GeneXpert real-time PCR.   A negative result does not exclude actual disease due to Clostridium  difficile   and may be due to improper collection, handling and storage of the specimen or   the number of organisms in the specimen is below the detection limit of the   assay.         Infection Studies to assess Diarrhea   Recent Labs   Lab Test 03/29/16  1245 03/21/16  0945   ADENOVIRUSAG Negative  --    EPCAMP Not Detected Not Detected   EPSALM Not Detected Not Detected   EPSHGL Not Detected Not Detected   EPVIB Not Detected Not Detected   EPROTA Not Detected Not Detected   EPNORO Not Detected Not Detected   EPYER Not Detected Not Detected       Virology:  Coronavirus-19 testing    Recent Labs   Lab Test 01/30/21  1514   COVIDPCREXT Positive*       Respiratory virus (non-coronavirus-19) testing    Recent Labs   Lab Test 02/09/21  1315 02/21/18  1348 10/12/16  1155 06/09/16  1233 11/30/14  1208 11/30/14  1208   RVSPEC  --  Nasopharyngeal  --  Nasopharyngeal   < >  --    AFLU  --   --   --   --   --  Negative   IFLUA Not Detected Negative  --  Negative   < >  --    INFZA  --   --  Negative   Flu A target RNA not detected, presumed negative for Influenza A or the viral   load is below the limit of detection.    --    < >  --    FLUAH1 Not Detected Negative  --  Negative   < >  --    [id number] Not Detected Negative  --  Negative   < >  --    FLUAH3 Not Detected Negative  --  Negative   < >  --    BFLU  --   --   --   --   --  Negative   Test results must be correlated with clinical data. If necessary, results   should be confirmed by a molecular assay or viral culture.     IFLUB Not Detected Negative  --  Negative   < >  --    INFZB  --   --  Negative   Flu B target RNA not detected, presumed negative for Influenza B or the viral   load is below the limit of detection.    --    < >  --    PIV1 Not Detected Negative  --  Negative   < >  --    PIV2 Not Detected Negative  --  Negative   < >  --    PIV3 Not Detected Negative  --  Negative   < >  --    PIV4 Not Detected  --   --   --   --   --    IRSV  --   --  Negative    RSV target RNA not detected, presumed negative for Respiratory Syncitial Virus   or the viral load is below the limit of detection.   FDA approved assay performed using [employer] GeneXpert(R) real-time PCR.    --    < >  --    HRVS  --  Negative  --  Negative   < >  --    RSVA Not Detected Negative  --  Negative   < >  --    RSVB Not Detected Negative  --  Negative   < >  --    HMPV Not Detected Negative  --  Negative   < >  --    ADVBE  --  Negative  --  Negative   < >  --    ADVC  --  Negative  --  Negative   < >  --    ADENOV Not Detected  --   --   --   --   --    CORONA Not Detected  --   --   --   --   --     < > = values in this interval not displayed.       EBV DNA Copies/mL   Date Value Ref Range Status   08/16/2018 EBV DNA Not Detected EBVNEG^EBV DNA Not Detected [Copies]/mL Final   06/21/2018 EBV DNA Not Detected EBVNEG^EBV DNA Not Detected [Copies]/mL Final   02/27/2018 <500 (A) EBVNEG^EBV DNA Not Detected [Copies]/mL Final     Comment:     EBV DNA Detected below the reportable range of 500 Copies/mL   12/14/2017 EBV DNA Not Detected EBVNEG^EBV DNA Not Detected [Copies]/mL Final   09/28/2017 <500 (A) EBVNEG^EBV DNA Not Detected [Copies]/mL Final     Comment:     EBV DNA Detected below the reportable range of 500 Copies/mL   06/08/2017 EBV DNA Not Detected EBVNEG [Copies]/mL Final       No results found for: 14291, BKRES    Parvovirus Testing    Recent Labs   Lab Test 03/27/16  1406   PRVSP Bronchoalveolar Lavage   Right upper lobe     PRVPC Not Detected  (Note)  NOT DETECTED - A negative result does not rule out the  presence of PCR inhibitors in the patient specimen or assay  specific nucleic acid in concentrations below the level of  detection by the assay.    The specimen submitted for testing did not meet [facility]  submission guidelines. Testing was performed on a specimen  that did not meet validated type requirements.  Performance characteristics of this assay may be affected.  Interpret results with  caution. Please refer to the [facility] â[facility]“ [facility] Test Directory for information on specimen  acceptability:  http://[email or web address]/Specimen-Handling/index.jsp.  INTERPRETIVE INFORMATION: Parvovirus B19 By PCR  Test developed and characteristics determined by [facility]. See Compliance Statement B: [employer]/[facility]  Performed by [facility],  [address], [facility] [place] [address] [phone]  [email or web address], Lencho Slaughter MD, Lab. Director         Adenovirus Testing    Recent Labs   Lab Test 03/08/18  1343 03/29/16  1245 03/29/16  0939 02/18/15  1421 02/18/15  1050 02/04/15  2046   ADRES No Adenovirus DNA detected.  --  No Adenovirus DNA detected.  --  No Adenovirus DNA detected.  --    ADENOVIRUSAG  --  Negative  --  Negative  --  Negative       Imaging:  Recent Results (from the past 48 hour(s))   XR Chest Port 1 View    Narrative    Exam: XR CHEST PORT 1 VW, 2/10/2021 11:13 AM    Indication: follow up for COVID pneumonia    Comparison: 2/8/2021 chest CT, 2/5/2021 chest radiographs    Findings:   Borderline enlarged cardiac silhouette. The pulmonary vasculature is  indistinct. Left greater than right basilar predominant mixed  interstitial and patchy airspace opacities, increased since 2/5/2021  chest radiograph. No appreciable pleural effusion or pneumothorax.      Impression    Impression: Left greater than right and basilar predominant mixed  interstitial and patchy airspace opacities, compatible with patient's  history of COVID-19, increased since 2/5/2021 chest radiograph and  similar to 2/8/2021 chest CT.    VILMA SYLVESTER DO   .

## 2021-02-11 NOTE — PROGRESS NOTES
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    CHANGES and MAJOR THINGS TODAY:   - BMT consulted  - Wean HFNC for goal SpO2>90%    PLAN:    Neuro:  Pain and sedation  - Acetaminophen PRN     MDD  PTA sertraline     Pulmonary:  Acute hypoxic respiratory failure secondary to COVID-19 pneumonia  Rhinovirus/Enterovirus pneumonia   Increasing oxygen needs requiring up to 35 L/min, 100% HFNC overnight. Transferred to ICU due to concern for need for intubation. No increased respiratory effort, no acute distress, speaking in full sentences. Improving today.   - Continue to monitor, no need to escalate care at this time  - Wean HFNC as able, goal SpO2 >90%  - Encourage self-proning  - Covid management as below     Cardiovascular:  No acute concerns. TTE 2/9 with IVC <2.1 cm, collapsing >50% with sniff suggesting normal RA pressure. LVEF 65-70%.     GI/Nutrition:  Nutrition  Tolerating regular diet.   - Will transition to CLD if requiring HFNC >45L FiO2 %    Renal/Fluids/Electrolytes:  No acute concerns.     Volume Status  On exam appears euvolemic, consistent with TTE above. Has been on IV lasix 40 mg every day with adequate UOP, discontinued.   - Monitor I/O, goal net 0 to -1L today  - Will plan to resume IV lasix if net positive     Endocrine:  No acute concerns     ID:  COVID-19 pneumonia  Rhinovirus/Enterovirus pneumonia   Covid+ 1/30, per patient.  He became symptomatic 2/3, ultimately admitted 2/8 after he developed a fever 103 at home and had progressive shortness of breath.  Elevated CRP, fibrinogen, D-dimer consistent with COVID-19 pneumonia.  CT chest without pulmonary emboli, multifocal airspace opacities consistent with COVID-19  pneumonia. CXR 2/10 with no interval change from prior studies.  - ID following  - Continue dexamethasone (2/8-2/17), decrease to 6 mg for total 10-day course (he was on 10 mg previously)   -Continue remdesivir (2/8-2/12)  - IL-6 level pending, medicine team was considering tocilizumab  - Daily CRP and ferritin per ID  - AC enoxaparin 50 mg BID given elevated D-dimer    C/f Superimposed bacterial pneumonia  Procal 0.1 --> 0.28 on 2/8. Previously started on ceftriaxone and azithromycin with prophylactic levofloxacin held, narrowed to azithromycin only.   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 PTA on prednisone and Jakafi.  - BMT consulted  - Holding PTA prednisone while dexamethasone  - Continue PTA Jakafi 5 mg twice daily  - Continue PTA Bactrim double strength 1 tablet twice weekly for PCP prophylaxis  - Continue PTA acyclovir 800 mg 2 times daily for prophylaxis against herpes simplex virus  - Continue PTA  fluconazole 100 mg oral daily  - Hold PTA levofloxacin 250 mg every day while on azithromycin     Musculoskeletal:  No acute concerns     Skin:  No acute concerns     General Cares/Prophylaxis:    DVT Prophylaxis: Enoxaparin (Lovenox) subcutaneous 50 mg BID  GI Prophylaxis: Not indicated  Restraints: None  Family Communication: None  Code Status: Full     Lines/tubes/drains:  -PIV     Disposition:  - Medical ICU      Patient seen and findings/plan discussed with medical ICU staff, Dr. Leventhal.    Michael Vaz MD  PGY-1, Internal Medicine  MICU 2 Service    ====================================  INTERVAL HISTORY:   Overnight, FiO2 decreased from 100% to 75%. No other complaints.    OBJECTIVE:   1. VITAL SIGNS:   Temp:  [97.6  F (36.4  C)-98.2  F (36.8   C)] 97.6  F (36.4  C)  Pulse:  [57-75] 70  Resp:  [16-32] 24  BP: (111-144)/(58-82) 130/71  FiO2 (%):  [60 %-100 %] 100 %  SpO2:  [79 %-100 %] 88 %  FiO2 (%): 100 %  Resp: 24    2. INTAKE/ OUTPUT:   I/O last 3 completed shifts:  In: 600 [P.O.:600]  Out: 1925 [Urine:1925]    3. PHYSICAL EXAMINATION:  General: lying in bed lateral decubitus, NAD  HEENT: HFNC in place, EOMI, anicteric sclera  Neuro: AAOx3, conversant, responding appropriately to questions  Pulm/Resp: Clear breath sounds bilaterally without rhonchi, crackles or wheeze, breathing non-labored on HFNC  CV: RRR,no JVD, no LE edema, no murmur or gallops  Abdomen: Soft, non-distended, non-tender  Extremities: warm, well-perfused    4. LABS:   Arterial Blood Gases   Recent Labs   Lab 02/10/21  1441 02/10/21  1300   PH 7.47* 7.47*   PCO2 33* 30*   PO2 64* 57*   HCO3 24 22     Complete Blood Count   Recent Labs   Lab 02/11/21  0409 02/10/21  0721 02/09/21  0637 02/08/21  0910   WBC 6.7 6.9 8.0 6.1   HGB 13.2* 13.3 13.3 13.4    170 146* 152     Basic Metabolic Panel  Recent Labs   Lab 02/11/21  0409 02/10/21  0721 02/09/21  0637 02/08/21  0910    141 143 139   POTASSIUM 4.0 4.2 4.5 4.0   CHLORIDE 111* 112* 114* 111*   CO2 23 23 22 23   BUN 39* 40* 28 25   CR 0.85 0.87 0.77 0.88   * 119* 121* 100*     Liver Function Tests  Recent Labs   Lab 02/11/21  0409 02/10/21  0721 02/09/21  0637 02/08/21  0910   AST 52* 52* 52* 51*   ALT 30 29 29 28   ALKPHOS 86 76 66 69   BILITOTAL 0.3 0.3 0.3 0.3   ALBUMIN 2.5* 2.4* 2.6* 2.6*     Coagulation Profile  No lab results found in last 7 days.    5. RADIOLOGY:   No results found for this or any previous visit (from the past 24 hour(s)).

## 2021-02-11 NOTE — PROGRESS NOTES
2 RNs did a 2 person skin check upon arrival to  with Rosalia CEJA RN.   No new deficits noted.  Aby Styles RN on 2/10/2021 at 6:30 PM

## 2021-02-11 NOTE — CONSULTS
[facility]  Transplant Infectious Disease Consult Note - New Patient     Patient:  Deejay Dior, Date of birth 1950, Medical record number [id number]  Date of Visit:  02/10/2021  Consult requested by Dr. Marysol Ramirez for evaluation of COVID-19 treatment options in BMT patient.         Assessment and Recommendations:   Recommendations:  1. Agree with continued remdesivir & dexamethasone as ordered.   2. Would recommend obtaining ferritin level and checking daily CRP.  3. Would favor discontinuation of ceftriaxone and continue with azithromycin only.   4. No other clear identified COVID-19 therapies/trials are recommended from ID standpoint at this time.  5. Review of continued need for prophylaxis medications/indications/dosing in the days to come.    Thank you very much for this consultation. Transplant Infectious Disease will continue to follow with you, with further recommendations to follow.    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    Infectious Disease issues include:  #Acute Hypoxic Respiratory Failure:  #COVID-19 PNA (diagnosed 01/30):  #MDS s/p stem cell transplant (2014):  #Chronic GVHD:    From a COVID-19 therapy standpoint, patient is currently on appropriate therapies with remdesivir and dexamethasone as ordered. There is no role for monoclonal antibodies at this point in his course. There is some data to support the use of high titer IVIG, however, this would not routinely be available in standard IVIG infusions and thus, likely low utility of benefit from this. Reviewed with ID team members here, there are no other current trials/therapies at this point in time that patient would be eligible for (should this change, trial leaders will alert us of this). His  underlying MDS s/p stem cell transplant and chronic GVHD does likely preclude his eligibility. Given the known COVID-19 diagnosis, with bilateral GGO seen on imaging, and normal procalcitonin, this does argue against bacterial etiology and we would be comfortable discontinuing the ceftriaxone. It would be reasonable to continue with a 5-day course of azithromycin, given that atypical pneumonia is harder to rule out in this scenario. Would not make much of nor do anything differently in regards to the rhinovirus that was found on the respiratory panel. Patient is currently on Jakafi and prednisone in the out patient setting, as well as a variety of prophylactic medications - acyclovir, fluconazole, bactrim, and levaquin. We will review/discuss in the days to come the continued need/dosage of these medications.     - QTc interval: 434 (02/08/21)  - Bacterial prophylaxis: levaquin as out-patient; currently on ceftriaxone/azithromycin   - Pneumocystis prophylaxis: bactrim  - Viral serostatus & prophylaxis: acyclovir   - Fungal prophylaxis: fluconazole   - Immunization status: Unknown  - Gamma globulin status: IgG level pending   - Isolation status: COVID-19; FLOR Mathews DO, MPH  Pager [phone]  Infectious Disease Fellow, PGY-4    Discussed with Dr. 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 Patient initially was diagnosed with COVID-19 on 01/30 in the out patient setting and was managing symptoms at home. However, over the last several days he had progression of symptoms prompting his wife to call EMS, at which point he was noted to hypoxic and subsequently admitted for further care on 02/08. Patient described myalgias and mild headache over the last week and again,  development of acute worsening of respiratory status over the last several days.     On admission, patient was noted to be febrile with Tmax os 100.8. He was requiring up to 15L O2 on arrival; this was later titrated down to 6L, and most recently he has been placed on high flow O2.  Respiratory panel was positive for rhinovirus, influenza noted to be negative, and all BC have remained negative to date. Patient underwent CT chest, confirming GGO throughout. ID asked to weigh in regarding additional potential therapies for management of COVID-19 in this patient.    Past Medical History:   Diagnosis Date     Acute deep vein thrombosis (DVT) of distal end of right lower extremity (H)      Clotting disorder (H)      Depression, major      Glucose intolerance      H/O bone marrow transplant (H)      Head injury 1964     Hearing problem Hearing aids 2015     History of blood transfusion 3/2014     History of radiation therapy June 2014     [place] (hard of hearing)     bilateral     Immunosuppression (H) Sirolimus daily     Lymphedema of both lower extremities      MDS (myelodysplastic syndrome) (H)      MDS/MPN (myelodysplastic/myeloproliferative neoplasms) (H)      Mixed hyperlipidemia      Numbness and tingling     feet     Obstructive sleep apnea 1999     Pneumonia      Reduced vision August 2016    Cataract surgery     Sleep apnea 1999     Transplant July 1, 2014    Stem Cells       Past Surgical History:   Procedure Laterality Date     ARTHROPLASTY HIP ANTERIOR Right 9/3/2019    Procedure: RIGHT TOTAL HIP ARTHROPLASTY, DIRECT ANTERIOR APPROACH;  Surgeon: Yong Diaz MD;  Location:  OR     BACK SURGERY       BIOPSY       BMT CELL PRODUCT INFUSION       CATARACT IOL, RT/LT Bilateral 2015?     ESOPHAGOSCOPY, GASTROSCOPY, DUODENOSCOPY (EGD), COMBINED N/A 2/2/2015     ESOPHAGOSCOPY, GASTROSCOPY, DUODENOSCOPY (EGD), COMBINED Left 8/6/2015    Procedure: COMBINED ESOPHAGOSCOPY, GASTROSCOPY, DUODENOSCOPY (EGD), BIOPSY  SINGLE OR MULTIPLE;  Surgeon: Amber Francis MD;  Location:  GI     EXCISE LESION LIP N/A 2017    Procedure: EXCISE LESION LIP;  Surgeon: Tim Yanez MD;  Location: [facility] OR     EYE SURGERY      blepharoplasty     FLEXIBLE SIGMOIDOSCOPY  2/2/15     HEMORRHOIDECTOMY       IR FOLLOW UP VISIT OUTPATIENT  2019     PHACOEMULSIFICATION CLEAR CORNEA WITH STANDARD INTRAOCULAR LENS IMPLANT Left 2016    Procedure: PHACOEMULSIFICATION CLEAR CORNEA WITH STANDARD INTRAOCULAR LENS IMPLANT;  Surgeon: Randolph Giles MD;  Location:  [facility]     TONSILLECTOMY       TRANSPLANT  2014    Stem Cell Transplant [facility] BMT     VASCULAR SURGERY      varicose vein surgery       Family History   Problem Relation Age of Onset     Breast Cancer Mother      Cancer Mother              Cerebrovascular Disease Mother              Cancer Father              Hypertension Brother      Heart Disease Brother      Hyperlipidemia Brother      Depression Brother      Heart Disease Brother         2016     Hypertension Brother         1985     Glaucoma No family hx of      Macular Degeneration No family hx of        Social History     Social History Narrative     Not on file     Social History     Tobacco Use     Smoking status: Former Smoker     Packs/day: 1.00     Years: 34.00     Pack years: 34.00     Types: Cigarettes     Start date: 1967     Quit date: 2001     Years since quittin.8     Smokeless tobacco: Never Used     Tobacco comment: quit in    Substance Use Topics     Alcohol use: Yes     Alcohol/week: 4.2 standard drinks     Comment: Seldom     Drug use: No       Immunization History   Administered Date(s) Administered     Influenza (High Dose) 3 valent vaccine 10/27/2016, 10/11/2018, 10/10/2019     Influenza Vaccine IM > 6 months Valent IIV4 10/30/2014     Pneumo Conj 13-V (2010&after) 10/10/2019     Zoster vaccine recombinant adjuvanted (SHINGRIX) 10/10/2019        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)     Fatigue     Secondary adrenal insufficiency (H)     Influenza A (H1N1)     Fever, unspecified     Acute deep vein thrombosis (DVT) of distal vein of right lower extremity (H)     Long-term (current) use of anticoagulants [Z79.01]     Deep vein thrombosis (DVT) (H)     Primary hypogonadism in male     Status post right hip replacement     COVID-19 virus infection            Current Medications & Allergies:       remdesivir  100 mg Intravenous Q24H    And     sodium chloride 0.9%  50 mL Intravenous Q24H     acyclovir  800 mg Oral BID     albuterol  2 puff Inhalation Q4H While awake     azithromycin  500 mg Intravenous Q24H     calcium carbonate-vitamin D  1 tablet Oral Daily     cefTRIAXone  2 g Intravenous Q24H     [START ON 2/11/2021] dexamethasone  10 mg Oral Daily     dexamethasone  4 mg Oral Once     enoxaparin ANTICOAGULANT  0.5 mg/kg Subcutaneous BID     fluconazole  100 mg Oral Daily     furosemide  40 mg Intravenous Daily     [Held by provider] levofloxacin  250 mg Oral Daily     melatonin  3 mg Oral At Bedtime     pantoprazole  40 mg Oral Daily     [Held by provider] predniSONE  5 mg Oral Daily     ruxolitinib  5 mg Oral BID     sertraline  50 mg Oral Daily     sulfamethoxazole-trimethoprim  1 tablet Oral Once per day on Mon Tue       Allergies   Allergen Reactions     Blood Transfusion Related (Informational Only) Other (See Comments)     Patient has a history of a clinically significant antibody against RBC antigens.  A delay in compatible RBCs may occur.            Physical Exam:     Patient Vitals for the past 24 hrs:   BP Temp Temp src Pulse Resp SpO2   02/10/21 1700 114/66 -- -- 67 (!) 32 93 %   02/10/21 1500 -- -- -- -- -- 96 %   02/10/21 1400 -- -- -- -- -- 98 %   02/10/21 1216 -- -- -- -- -- 98 %   02/10/21 1215 -- -- -- -- -- 99 %   02/10/21 1145 -- -- -- --  -- 94 %   02/10/21 1140 -- -- -- -- -- (!) 86 %   02/10/21 1135 -- -- -- -- -- (!) 88 %   02/10/21 1130 -- -- -- -- -- (!) 86 %   02/10/21 0805 -- -- -- -- -- 91 %   02/10/21 0800 -- -- -- -- -- (!) 74 %   02/10/21 0610 124/58 95.7  F (35.4  C) Axillary 62 24 94 %   02/09/21 2210 -- -- -- -- -- 91 %   02/09/21 2204 123/59 98.2  F (36.8  C) Oral 71 20 91 %     Ranges for vital signs:  Temp:  [95.7  F (35.4  C)-98.2  F (36.8  C)] 95.7  F (35.4  C)  Pulse:  [62-71] 67  Resp:  [20-32] 32  BP: (114-124)/(58-66) 114/66  FiO2 (%):  [50 %-100 %] 100 %  SpO2:  [74 %-99 %] 93 %  Vitals:    02/08/21 1705 02/09/21 1300   Weight: 104.5 kg (230 lb 6.4 oz) 104.1 kg (229 lb 8 oz)       Physical Examination:  Physical exam deferred given COVID-19 + status and remote chart review performed.          Laboratory Data:     Absolute CD4   Date Value Ref Range Status   06/29/2015 345 (L) 441 - 2,156 cells/uL Final     Comment:     Effective 12/08/2014, the reference range for this assay has changed to   reflect   new methodology.     12/29/2014 190 (L) 441 - 2,156 cells/uL Final     Comment:     Effective 12/08/2014, the reference range for this assay has changed to   reflect   new methodology.     10/09/2014 37 mm3 Final       Inflammatory Markers    Recent Labs   Lab Test 02/10/21  0721 02/09/21  0637 08/18/16  1645 08/18/16  1645 05/12/16  1034 11/30/14  1207 11/30/14  1207   SED  --   --   --   --   --   --  40*   .0* 130.0*   < >  --   --    < > 17.4*   ALESHIA  --   --   --   --  14.6   < >  --    PSA  --   --   --  0.17  --   --   --     < > = values in this interval not displayed.       Immune Globulin Studies     Recent Labs   Lab Test 02/10/21  0721 01/24/19  1605 10/01/18  0955 05/08/16  0906 03/20/16  0352 07/30/15  1125 10/09/14  1010   IGG 3,275*  831 1,130 1,300 1,270 403* 913 458*     --   --   --   --   --  65   IGE  --   --   --   --   --   --  12   IGA 83*  --   --   --   --   --  48*       Metabolic Studies        Recent Labs   Lab Test 02/10/21  0721 02/09/21  0637 02/08/21 2024 02/08/21  0910 02/05/21  1227 09/27/18  1217 09/27/18  1217 04/05/16  0300 04/05/16  0300    143  --  139 140   < >  --    < > 137   POTASSIUM 4.2 4.5  --  4.0 4.0   < >  --    < > 4.0   CHLORIDE 112* 114*  --  111* 109   < >  --    < > 104   CO2 23 22  --  23 25   < >  --    < > 26   ANIONGAP 6 6  --  5 6   < >  --    < > 8   BUN 40* 28  --  25 30   < >  --    < > 18   CR 0.87 0.77  --  0.88 1.10   < >  --    < > 0.79   GFRESTIMATED 86 >90  --  86 67   < >  --    < > >90  Non  GFR Calc     * 121*  --  100* 94   < >  --    < > 94   JOE 8.7 8.5  --  8.4* 8.4*   < >  --    < > 8.4*   PHOS  --   --   --   --   --   --   --   --  2.8   MAG  --   --   --  1.7 2.0   < >  --    < > 2.0   LACT  --   --   --  1.1 1.4  --   --   --   --    PCAL  --   --  0.28 0.10  --   --   --   --   --    FGTL  --   --   --   --   --   --  35  --   --     < > = values in this interval not displayed.       Hepatic Studies    Recent Labs   Lab Test 02/10/21  0721 02/09/21  0637 02/08/21 0910 04/06/16  0245 04/06/16  0245 03/29/16  0300 03/29/16 0300 03/25/16 2128 03/25/16 2128 03/25/16  1911   BILITOTAL 0.3 0.3 0.3   < > 0.8   < > 2.4*   < >  --   --    DBIL  --   --   --   --  0.3*   < >  --   --   --   --    ALKPHOS 76 66 69   < > 292*   < > 355*   < >  --   --    PROTTOTAL 6.3* 6.4* 6.6*   < > 6.7*   < > 6.2*   < >  --   --    ALBUMIN 2.4* 2.6* 2.6*   < > 2.3*   < > 1.9*   < >  --   --    AST 52* 52* 51*   < > 96*   < > 413*   < >  --   --    ALT 29 29 28   < > 167*   < > 247*   < >  --   --    LDH  --   --  483*  --   --   --   --   --  806*  --    GGT  --   --   --   --   --   --  716*  --   --   --    RUDY  --   --   --   --   --   --   --   --   --  11    < > = values in this interval not displayed.       Pancreatitis testing    Recent Labs   Lab Test 02/14/19  1342 09/27/18  1217 04/04/16  0350   AMYLASE 46  --   --    LIPASE   --  158  --    TRIG  --   --  286*       Gout Labs      Recent Labs   Lab Test 06/24/14  1205 06/09/14  1116   URIC 6.1 5.8       Hematology Studies      Recent Labs   Lab Test 02/10/21  0721 02/09/21  0637 02/08/21  0910 02/05/21  1227 12/10/20  1430 08/17/20  1309   WBC 6.9 8.0 6.1 5.2 5.8 5.8   ANEU 5.6  --  4.6 3.4 2.5 3.0   ALYM 0.9  --  1.0 1.3 2.5 2.0   WILLIAM 0.3  --  0.3 0.4 0.7 0.8   AEOS 0.0  --  0.0 0.0 0.1 0.0   HGB 13.3 13.3 13.4 13.6 13.1* 12.8*   HCT 38.8* 40.0 39.8* 42.1 40.4 37.7*    146* 152 173 218 183       Clotting Studies    Recent Labs   Lab Test 02/14/19  1342 01/24/19  1605 06/30/16  0952 06/22/16  1218 04/04/16  0350 04/04/16  0350   INR 0.98 0.92 0.95 1.48*   < > 1.01   PTT  --   --   --   --   --  47*    < > = values in this interval not displayed.       Iron Testing    Recent Labs   Lab Test 02/10/21  0721 02/08/21  0910 02/08/21  0910 11/21/19  1148 11/21/19  1148 09/27/18  1218 09/27/18  1218   LENNOX  --   --  308  --   --   --   --    MCV 95   < > 95   < > 95   < >  --    FOLIC  --   --   --   --   --   --  15.9   B12  --   --   --   --  455  --   --     < > = values in this interval not displayed.       Arterial Blood Gas Testing    Recent Labs   Lab Test 02/10/21  1441 02/10/21  1300 02/08/21  0910 02/05/21  1232 03/28/16  0431 03/28/16  0431 03/27/16 2120 03/27/16 2120 03/27/16  1601   PH 7.47* 7.47*  --   --   --  7.43  --  7.45 7.34*   PCO2 33* 30*  --   --   --  45  --  38 53*   PO2 64* 57*  --   --   --  126*  --  149* 161*   HCO3 24 22  --   --   --  29*  --  27 29*   O2PER 100 70 6 ROOM AIR   < > 50.0   < > 60.0 100.0    < > = values in this interval not displayed.        Thyroid Studies     Recent Labs   Lab Test 09/27/18  1217 02/27/18  1239 10/12/16  1107 06/30/16  0953 03/01/16  1227   TSH 1.01 0.70 0.40 1.02 0.38*   T4  --   --   --  1.03 1.12       Urine Studies     Recent Labs   Lab Test 08/25/16  1601 05/07/16  1211 03/24/16  1650 03/21/16  1730 12/17/15  1430    URINEPH 6.0 5.0 5.0 5.0 5.0   NITRITE Negative Negative Negative Negative Negative   LEUKEST Negative Negative Negative Negative Negative   WBCU 1 1 <1 1 1       Medication levels    Recent Labs   Lab Test 09/27/18  1059 04/01/16  0831 04/01/16  0831 03/21/16  2143 03/21/16  2143 02/01/15  0924 02/01/15  0924   VANCOMYCIN  --   --   --   --  25.4*   < >  --    VCON  --   --  1.6   < >  --   --   --    CYCLSP  --   --   --   --   --   --  178   RAPAMY 4.5*   < >  --    < >  --    < >  --     < > = values in this interval not displayed.       Body fluid stats    Recent Labs   Lab Test 03/27/16  1406   FTYP Bronchial  SITE 2 RIGHT MIDDLE LOBE    Bronchial  SITE 1 RIGTH UPPER LOBE     FCOL Colorless  Colorless   FAPR Clear  Clear   FRBC << Do Not Report >>  << Do Not Report >>   FWBC 63  57   FNEU 16  21   FLYM 8  14   FMONO 76  65   GS No organisms seen  >25 PMNs/low power field    No organisms seen  >25 PMNs/low power field         Microbiology:  Fungal testing  Recent Labs   Lab Test 09/27/18  1217 03/27/16  1406 03/25/16  1911 03/20/16  0352 03/19/16  1638 03/19/16  0749   FGTL 35  --  >500  Unit: pg/mL   149  --   --    ASPGAI 0.05 0.10  0.09  --   --  0.07 0.12   ASPAG  --  Negative  Reference range: Negative  (Note)  INTERPRETIVE INFORMATION: Aspergillus Galactomannan EIA,  Broncho  A BAL galactomannan index of greater than or equal to 0.5  is considered positive.  This result should be interpreted  in the context of patient history, clinical signs/symptoms,  and other routine diagnostic tests (e.g., culture,  histologic examination of biopsy material, and radiographic  imaging).  Performed by [facility],  [address] [place] [id number] [phone]  www.[email or web address], Lencho Slaughter MD, Lab. Director    Negative  Reference range: Negative  (Note)  INTERPRETIVE INFORMATION: Aspergillus Galactomannan EIA,  Broncho  A BAL galactomannan index of greater than or equal to 0.5  is considered  positive.  This result should be interpreted  in the context of patient history, clinical signs/symptoms,  and other routine diagnostic tests (e.g., culture,  histologic examination of biopsy material, and radiographic  imaging).  Performed by [facility],  [address] [place] [address] [phone]  www.[facility], Lencho Slaughter MD, Lab. Director    --   --   --   --    ASPGAA Negative  --   --   --  Negative  Reference range: Negative  Unit: not reported  (Note)  INTERPRETIVE INFORMATION: Aspergillus Galactomannan Antigen  by EIA  Negative results do not exclude the diagnosis of invasive  aspergillosis. A single positive test result (index equal  to or greater than 0.5) should be clinically correlated  by testing a separate serum specimen because many agents  (e.g. foods, antibiotics) may cross-react with the test.  If invasive aspergillosis is suspected in high-risk  patients, serial sampling is recommended.  Performed by [facility],  [address] [place] [address] [phone]  www.[facility], Lencho Slaughter MD, Lab. Director   Negative  Reference range: Negative  Unit: not reported  (Note)  INTERPRETIVE INFORMATION: Aspergillus Galactomannan Antigen  by EIA  Negative results do not exclude the diagnosis of invasive  aspergillosis. A single positive test result (index equal  to or greater than 0.5) should be clinically correlated  by testing a separate serum specimen because many agents  (e.g. foods, antibiotics) may cross-react with the test.  If invasive aspergillosis is suspected in high-risk  patients, serial sampling is recommended.  Performed by [facility],  [address] [place] [address] [phone]  www.[facility], Lencho Slaughter MD, Lab. Director         Last Culture results with specimen source  Culture Micro   Date Value Ref Range Status   02/08/2021 No growth after 2 days  Preliminary   02/08/2021 No growth after 2 days  Preliminary   02/05/2021 No growth after 5 days   Preliminary   02/05/2021 No growth after 5 days  Preliminary   03/08/2018 No growth  Final   03/08/2018 No growth  Final   02/21/2018 No Beta Streptococcus isolated  Final   06/09/2016 No growth  Final   05/07/2016 No growth  Final   05/07/2016 No growth  Final   03/31/2016 No growth  Final   03/31/2016 No growth  Final   03/29/2016 (A)  Final    Light growth Enterococcus species (VRE)  Critical Value/Significant Value, preliminary result only, called to and read   back by Preeti Cordoba RN @1301 04/01/16 gd     03/27/2016 No growth  Final   03/27/2016   Final    Culture negative for acid fast bacilli  Assayed at [facility],[address] [address]     03/27/2016 (A)  Final    Geotrichum candidum isolated  Susceptibility testing requested by Dr. Alonzo 4.1.16 KB  No additional fungi cultured after 4 weeks incubation     03/27/2016 No growth after 29 days  Final   03/27/2016   Final    Unable to determine the presence of Actinomyces species due to an overgrowth of   normal kimani.     03/27/2016 No Legionella species isolated  Final   03/27/2016 No growth  Final   03/27/2016 Culture negative after 29 days  Final   03/27/2016 No growth after 29 days  Final   03/27/2016 No Legionella species isolated  Final   03/27/2016   Final    Culture negative for acid fast bacilli  Assayed at [facility] [address]      Specimen Description   Date Value Ref Range Status   02/08/2021 Blood Right Arm  Final   02/08/2021 Blood Left Arm  Final   02/05/2021 Blood Right Arm  Final   02/05/2021 Blood  Final   08/01/2019 Nares  Final   03/08/2018 Blood Left Arm  Final   03/08/2018 Blood Right Arm  Final   02/21/2018 Throat  Final   02/21/2018 Throat  Final   10/12/2016 Nasal  Final   06/09/2016 Blood Unspecified Site  Final   05/07/2016 Blood Red port  Final   05/07/2016 Blood White port  Final   05/05/2016 Nasal  Final   03/31/2016 Blood Right Arm  Final   03/31/2016 Blood Left PICC  Final   03/29/2016 Feces   Final   03/29/2016 Rectal  Final   03/27/2016 Nares  Final        Last check of C difficile  C Diff Toxin B PCR   Date Value Ref Range Status   03/29/2016  NEG Final    Negative  Negative: Clostridium difficile target DNA sequences NOT detected, presumed   negative for Clostridium difficile toxin B or the number of bacteria present   may be below the limit of detection for the test.   FDA approved assay performed using [employer] GeneXpert real-time PCR.   A negative result does not exclude actual disease due to Clostridium difficile   and may be due to improper collection, handling and storage of the specimen or   the number of organisms in the specimen is below the detection limit of the   assay.         Syphilis Testing    T Pallidum by TP-PA conf   Date Value Ref Range Status   06/09/2014   Final    Non Reactive  Reference range: Non Reactive  (Note)  Performed by [facility],  [address] [place] [address] [phone]  [email or web address], Lencho Slaughter MD, Lab. Director         Quantiferon testing   Recent Labs   Lab Test 02/10/21  0721 02/08/21  0910 03/27/16  1406 03/27/16  1406   LYMPH 12.6 17.2   < >  --    AFBSMS  --   --   --  Negative for acid fast bacteria  Assayed at [facility] [address]    Negative for acid fast bacteria  Less than 5ml of specimen received. A minimum of 5 mL of sputum or fluid is   recommended for recovery of acid fast bacilli (AFB).  Volumes less than 5 mL   are suboptimal and may compromise recovery of AFB from culture.  Assayed at [facility] [address]      < > = values in this interval not displayed.       Virology:  Coronavirus-19 testing    Recent Labs   Lab Test 01/30/21  1514   COVIDPCREXT Positive*       Respiratory virus (non-coronavirus-19) testing    Recent Labs   Lab Test 02/09/21  1315 02/21/18  1348 10/12/16  1155 06/09/16  1233 11/30/14  1208 11/30/14  1208   RVSPEC  --  Nasopharyngeal  --  Nasopharyngeal   < >   --    AFLU  --   --   --   --   --  Negative   IFLUA Not Detected Negative  --  Negative   < >  --    INFZA  --   --  Negative   Flu A target RNA not detected, presumed negative for Influenza A or the viral   load is below the limit of detection.    --    < >  --    FLUAH1 Not Detected Negative  --  Negative   < >  --    [id number] Not Detected Negative  --  Negative   < >  --    FLUAH3 Not Detected Negative  --  Negative   < >  --    BFLU  --   --   --   --   --  Negative   Test results must be correlated with clinical data. If necessary, results   should be confirmed by a molecular assay or viral culture.     IFLUB Not Detected Negative  --  Negative   < >  --    INFZB  --   --  Negative   Flu B target RNA not detected, presumed negative for Influenza B or the viral   load is below the limit of detection.    --    < >  --    PIV1 Not Detected Negative  --  Negative   < >  --    PIV2 Not Detected Negative  --  Negative   < >  --    PIV3 Not Detected Negative  --  Negative   < >  --    PIV4 Not Detected  --   --   --   --   --    IRSV  --   --  Negative   RSV target RNA not detected, presumed negative for Respiratory Syncitial Virus   or the viral load is below the limit of detection.   FDA approved assay performed using [employer] GeneXpert(R) real-time PCR.    --    < >  --    HRVS  --  Negative  --  Negative   < >  --    RSVA Not Detected Negative  --  Negative   < >  --    RSVB Not Detected Negative  --  Negative   < >  --    HMPV Not Detected Negative  --  Negative   < >  --    ADVBE  --  Negative  --  Negative   < >  --    ADVC  --  Negative  --  Negative   < >  --    ADENOV Not Detected  --   --   --   --   --    CORONA Not Detected  --   --   --   --   --     < > = values in this interval not displayed.       CMV viral loads    Recent Labs   Lab Test 11/21/19  1148 10/17/18  1254 08/16/18  1221 03/08/18  1344 12/14/17  1200   CSPEC Plasma, EDTA anticoagulant PLEDTA Plasma, EDTA anticoagulant Plasma, EDTA  anticoagulant Plasma, EDTA anticoagulant   CMVLOG Not Calculated Not Calculated Not Calculated Not Calculated Not Calculated           HHV6 DNA Result   Date Value Ref Range Status   03/29/2016 No HHV6 DNA detected Copies/mL Final   03/26/2016 No HHV6 DNA detected Copies/mL Final   12/17/2015 No HHV6 DNA detected Copies/mL Final   02/18/2015 No HHV6 DNA detected Copies/mL Final   09/25/2014 No HHV6 DNA detected Copies/mL Final       EBV DNA Copies/mL   Date Value Ref Range Status   08/16/2018 EBV DNA Not Detected EBVNEG^EBV DNA Not Detected [Copies]/mL Final   06/21/2018 EBV DNA Not Detected EBVNEG^EBV DNA Not Detected [Copies]/mL Final   02/27/2018 <500 (A) EBVNEG^EBV DNA Not Detected [Copies]/mL Final     Comment:     EBV DNA Detected below the reportable range of 500 Copies/mL   12/14/2017 EBV DNA Not Detected EBVNEG^EBV DNA Not Detected [Copies]/mL Final   09/28/2017 <500 (A) EBVNEG^EBV DNA Not Detected [Copies]/mL Final     Comment:     EBV DNA Detected below the reportable range of 500 Copies/mL   06/08/2017 EBV DNA Not Detected EBVNEG [Copies]/mL Final       No results found for: 41285, BKRES    Parvovirus Testing    Recent Labs   Lab Test 03/27/16  1406   PRVSP Bronchoalveolar Lavage   Right upper lobe     PRVPC Not Detected  (Note)  NOT DETECTED - A negative result does not rule out the  presence of PCR inhibitors in the patient specimen or assay  specific nucleic acid in concentrations below the level of  detection by the assay.    The specimen submitted for testing did not meet [facility]  submission guidelines. Testing was performed on a specimen  that did not meet validated type requirements.  Performance characteristics of this assay may be affected.  Interpret results with caution. Please refer to the [facility]  Laboratory Test Directory for information on specimen  acceptability:  [email or web address].  INTERPRETIVE INFORMATION: Parvovirus B19 By PCR  Test developed and  characteristics determined by [facility]. See Compliance Statement B: [employer]  Performed by [facility],  [address] [place] [address] [phone]  [email or web address], Lencho Slaughter MD, Lab. Director         Adenovirus Testing    Recent Labs   Lab Test 03/08/18  1343 03/29/16  1245 03/29/16  0939 02/18/15  1421 02/18/15  1050 02/04/15  2046   ADRES No Adenovirus DNA detected.  --  No Adenovirus DNA detected.  --  No Adenovirus DNA detected.  --    ADENOVIRUSAG  --  Negative  --  Negative  --  Negative       Hepatitis B Testing     Recent Labs   Lab Test 03/08/18  1343 06/29/15  1102 06/09/14  1116   AUSAB 3.57  --  0.04   HBCAB Nonreactive Nonreactive Negative   HEPBANG Nonreactive Nonreactive Negative        Hepatitis C Antibody   Date Value Ref Range Status   03/08/2018 Nonreactive NR^Nonreactive Final     Comment:     Assay performance characteristics have not been established for newborns,   infants, and children     06/29/2015  NR Final    Nonreactive   Assay performance characteristics have not been established for newborns,   infants, and children         CMV Antibody IgG   Date Value Ref Range Status   06/29/2015 (H) 0.0 - 0.8 AI Final    >8.0  Positive   Antibody index (AI) values reflect qualitative changes in antibody   concentration that cannot be directly associated with clinical condition or   disease state.     06/09/2014 5.6 (H) 0.0 - 0.8 AI Final     Comment:     Positive     Varicella Zoster Virus Antibody IgG   Date Value Ref Range Status   06/09/2014 (H) 0.0 - 0.8 AI Final    >8.0  Positive, suggests prev. exposure and probable immunity       EBV Capsid Antibody IgG   Date Value Ref Range Status   06/09/2014 >8.0  Positive, suggests recent or past exposure   (H) 0.0 - 0.8 AI Final     Herpes Simplex Virus Type 1 IgG   Date Value Ref Range Status   06/09/2014 >8.0  Positive.  IgG antibody to HSV-1 detected.   (H) 0.0 - 0.8 AI Final     Herpes Simplex Virus Type 2 IgG   Date  Value Ref Range Status   2014 <0.2  No HSV-2 IgG antibodies detected.   0.0 - 0.8 AI Final       Imaging:  Recent Results (from the past 48 hour(s))   Echo Complete    Narrative    [id number]  [id number]  [id number]  [id number]^MELI^PARVINFLORI^HO           [facility],[place]  Echocardiography Laboratory  [address] [place] [address]     Name: TOM CARRASCO  MRN: [id number]  : 1950  Study Date: 2021 02:01 PM  Age: 70 yrs  Gender: Male  Patient Location: [facility]  Reason For Study: Dyspnea  Ordering Physician: LEORA GARRISON  Referring Physician: YOLANDA KING  Performed By: Rosa Stacy RDCS     BSA: 2.2 m2  Height: 68 in  Weight: 230 lb  HR: 71  BP: 124/65 mmHg  _____________________________________________________________________________  __        Procedure  Complete Portable Echo Adult. Contrast Optison. Patient was given 5 ml mixture  of 3 ml Optison and 6 ml saline. 4 ml wasted.  _____________________________________________________________________________  __        Interpretation Summary  Global and regional left ventricular function is hyperkinetic with an EF of  65-70%.  Global right ventricular function is normal. The right ventricle is normal  size.  No significant valvular abnormalities.  IVC diameter <2.1 cm collapsing >50% with sniff suggests a normal RA pressure  of 3 mmHg.  This study was compared with the study from 10/17/2018. No significant changes  noted. The ascending aorta is not clearly visualized.     _____________________________________________________________________________  __        Left Ventricle  Global and regional left ventricular function is hyperkinetic with an EF of  65-70%. Left ventricular size is normal. Mild concentric wall thickening  consistent with left ventricular hypertrophy is present. Left ventricular  diastolic function is indeterminate.     Right Ventricle  Global right ventricular function is  normal. The right ventricle is normal  size.     Atria  Moderate biatrial enlargement is present.     Mitral Valve  The mitral valve is normal. Trace mitral insufficiency is present.        Aortic Valve  The aortic valve is tricuspid. On Doppler interrogation, there is no  significant stenosis or regurgitation. Mild aortic valve calcification is  present.     Tricuspid Valve  The tricuspid valve is normal. Trace tricuspid insufficiency is present.  Pulmonary artery systolic pressure cannot be assessed.     Pulmonic Valve  The valve leaflets are not well visualized. Trace pulmonic insufficiency is  present.     Vessels  The thoracic aorta cannot be assessed. Sinuses of Valsalva 3.5 cm. IVC  diameter <2.1 cm collapsing >50% with sniff suggests a normal RA pressure of 3  mmHg.     Pericardium  No pericardial effusion is present.        Compared to Previous Study  This study was compared with the study from 10/17/2018 . No significant  changes noted.  _____________________________________________________________________________  __  MMode/2D Measurements & Calculations  IVSd: 1.2 cm     LVIDd: 4.8 cm  LVIDs: 2.2 cm  LVPWd: 1.5 cm  FS: 53.7 %  LV mass(C)d: 261.2 grams  LV mass(C)dI: 120.4 grams/m2  Ao root diam: 3.5 cm  asc Aorta Diam: 3.3 cm  LVOT diam: 2.1 cm  LVOT area: 3.5 cm2  RWT: 0.63        Doppler Measurements & Calculations  MV E max alexander: 65.6 cm/sec  MV A max alexander: 87.4 cm/sec  MV E/A: 0.75  MV dec slope: 365.0 cm/sec2  MV dec time: 0.18 sec  PA acc time: 0.11 sec  E/E' av.3  Lateral E/e': 9.6  Medial E/e': 15.1        _____________________________________________________________________________  __        Report approved by: [facility] 2021 03:22 PM      XR Chest Port 1 View    Narrative    Exam: XR CHEST PORT 1 VW, 2/10/2021 11:13 AM    Indication: follow up for COVID pneumonia    Comparison: 2021 chest CT, 2021 chest radiographs    Findings:   Borderline enlarged cardiac silhouette.  The pulmonary vasculature is  indistinct. Left greater than right basilar predominant mixed  interstitial and patchy airspace opacities, increased since 2/5/2021  chest radiograph. No appreciable pleural effusion or pneumothorax.      Impression    Impression: Left greater than right and basilar predominant mixed  interstitial and patchy airspace opacities, compatible with patient's  history of COVID-19, increased since 2/5/2021 chest radiograph and  similar to 2/8/2021 chest CT.    VILMA SYLVESTER, DO

## 2021-02-11 NOTE — PROGRESS NOTES
Critical Care Services Admission Note:  I personally examined and evaluated the patient today. I formulated today s plan with the house staff team or resident(s) and agree with the findings and plan in the associated note (see separately attested resident note).    I have evaluated all laboratory values and imaging studies   Summary of hospital course:  History of BMT for MDS in 2014. Admitted on 2/8, and has had steadily worsening hypoxemia.  Transferred to the ICU on 100%, 45 L HFNC.    Data  On exam, appears comfortable.  No accessory muscle use.  Speaking full sentences.     ABG 7.47/33/64 on 100% FIO2  D-dimer 4.4  CRP 78-->130--> 110    Procal 0.28    ECHO grossly normal    COVID + on 1/30  Rhinovirus PCR + on 2/9  Assessment/plan:    COVID 19 ARDS  - HFNC 100%, now on 35 L flow  - decadron 6 mg daily for 10 days total  - remdesevir  - ID following  - will stop ceftriaxone   - can continue 2 more days of azithromycin in case of an atypical organism superimposed.     Hx of BMT  - continue jakafi, fluconazole, acyclovir, bactrim       Rest per resident note.    I spent a total of 35 minutes (excluding procedure time) personally providing and directing critical care services at the bedside and on the critical care unit for this patient.     Johnie Lawler MD

## 2021-02-11 NOTE — PROGRESS NOTES
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 Dior is 6 year, 7 months out from allo-sib PBHSCT c/b mucocutaneous cGHVD (skin, colon, eyes, mouth) along with arthralgias/myalgias and chronic fatigue/dyspnea. Previously evaluated by pulmonary, and no edenilson brionchiolitis or pulmonary involvement. His GVHD symptoms have been stable on Jakafi and low-dose pred for some time, although he has had a history of recurrent pulmonary infections, at least in part due to immunosuppression. His IgG level was normal,no need for IVIg.     He should remain on his Jakafi and prednisone despite his infection. Acute discontinuation of Jakafi can cause rebound inflammation, which could have significant consequences given his inflamed state. Since he's on dexamethasone for COVID, can hold his low-dose pred, but this should be restarted on discharge. Otherwise, he should continue on all of his prophylactic anti-infective agents; levofloxacin can be held while on IV antibiotics.     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 Subsequently underwent great saphenous ablation on the right, with recannulization of left saphenous vein on US from 7/2019. He does have a history of JAK2+ MPN, but he is now s/p transplant with 100% donor engraftment,  so this is cured. No indication for additional anticoagulation for these thromboses at this time. Recommend anticoagulation per COVID protocol.      Recommendations:   - Continue Jakafi 5mg BID  - Hold prednisone 5mg while on Dex for COVID              - Please restart prednisone 5mg daily once Dex is finished  - Agree with ID consult  - Systemic anticoagulation per COVID protocol  - Continue prophylactic anti-infective: ACV 800mg BID, Bactrim BID Mon/Tues, fluconazole 100mg daily  - Okay to hold levofloxacin while on IV abx    Patient was seen and plan of care was discussed with attending physician Dr. Black.    We will continue to follow this patient. Please don't hesitate to contact the Fellow On-Call with questions.    Aby Mcpherson PA-C  [phone]  _________________________________________________    Subjective & Interval History:    - Oxygenation worse overnight prompting transfer to the MICU  - Remains afebrile  - Subjectively feels that his breathing is stable      Physical Exam:    /66 (BP Location: Right arm)   Pulse 68   Temp 97.6  F (36.4  C) (Oral)   Resp 24   Wt 104.6 kg (230 lb 9.6 oz)   SpO2 91%   BMI 34.30 kg/m    Gen: Tired, laying in bed, HFNC in place  Pulm: Mildly labored breathing, no audible wheezing or adventitious breath sounds  Skin: No obvious rashes or abnormalities on exposed skin  Neuro: Alert and answering questions appropriately. CNII-XII grossly intact.     The rest of a comprehensive physical examination is deferred due to public health emergency video visit restrictions.    Labs & Studies: I personally reviewed the following studies:  ROUTINE LABS (Last four results):  CMP  Recent Labs   Lab 02/11/21  0409 02/10/21  0721 02/09/21  0637 02/08/21  0910 02/05/21  1227    141 143 139 140   POTASSIUM 4.0 4.2 4.5 4.0 4.0   CHLORIDE 111* 112* 114* 111* 109   CO2 23 23 22 23 25   ANIONGAP 8 6 6 5 6   * 119* 121* 100* 94   BUN 39* 40* 28 25 30   CR 0.85 0.87 0.77  0.88 1.10   GFRESTIMATED 87 86 >90 86 67   GFRESTBLACK >90 >90 >90 >90 77   JOE 8.7 8.7 8.5 8.4* 8.4*   MAG  --   --   --  1.7 2.0   PROTTOTAL 6.4* 6.3* 6.4* 6.6* 6.9   ALBUMIN 2.5* 2.4* 2.6* 2.6* 2.8*   BILITOTAL 0.3 0.3 0.3 0.3 0.2   ALKPHOS 86 76 66 69 57   AST 52* 52* 52* 51* 42   ALT 30 29 29 28 23     CBC  Recent Labs   Lab 02/11/21  0409 02/10/21  0721 02/09/21  0637 02/08/21  0910   WBC 6.7 6.9 8.0 6.1   RBC 4.13* 4.09* 4.12* 4.19*   HGB 13.2* 13.3 13.3 13.4   HCT 39.1* 38.8* 40.0 39.8*   MCV 95 95 97 95   MCH 32.0 32.5 32.3 32.0   MCHC 33.8 34.3 33.3 33.7   RDW 18.2* 18.5* 18.7* 17.8*    170 146* 152     INRNo lab results found in last 7 days.    Medications list for reference:  Current Facility-Administered Medications   Medication     remdesivir 100 mg in sodium chloride 0.9 % 250 mL intermittent infusion    And     0.9% sodium chloride BOLUS     acetaminophen (TYLENOL) tablet 650 mg     acyclovir (ZOVIRAX) tablet 800 mg     albuterol (PROAIR HFA/PROVENTIL HFA/VENTOLIN HFA) 108 (90 Base) MCG/ACT inhaler 2 puff     artificial saliva (BIOTENE MT) solution 1 spray     azithromycin (ZITHROMAX) 500 mg in sodium chloride 0.9 % 250 mL intermittent infusion     bisacodyl (DULCOLAX) EC tablet 5 mg    Or     bisacodyl (DULCOLAX) EC tablet 10 mg    Or     bisacodyl (DULCOLAX) EC tablet 15 mg     calcium carbonate-vitamin D (OSCAL w/D) per tablet 1 tablet     carboxymethylcellulose PF (REFRESH PLUS) 0.5 % ophthalmic solution 1 drop     dexamethasone (DECADRON) tablet 6 mg     enoxaparin ANTICOAGULANT (LOVENOX) injection 50 mg     fluconazole (DIFLUCAN) tablet 100 mg     [Held by provider] levofloxacin (LEVAQUIN) tablet 250 mg     lidocaine (LMX4) cream     lidocaine 1 % 0.1-1 mL     magnesium citrate solution 148 mL     Medication instructions: Do NOT use nebulized medications     melatonin tablet 3 mg     ondansetron (ZOFRAN-ODT) ODT tab 4 mg    Or     ondansetron (ZOFRAN) injection 4 mg     pantoprazole  (PROTONIX) EC tablet 40 mg     Patient is already receiving anticoagulation with heparin, enoxaparin (LOVENOX), warfarin (COUMADIN)  or other anticoagulant medication     polyethylene glycol (MIRALAX) Packet 17 g     [Held by provider] predniSONE (DELTASONE) tablet 5 mg     prochlorperazine (COMPAZINE) injection 5 mg    Or     prochlorperazine (COMPAZINE) tablet 5 mg    Or     prochlorperazine (COMPAZINE) suppository 12.5 mg     ruxolitinib (JAKAFI) tablet 5 mg     sertraline (ZOLOFT) tablet 50 mg     sodium chloride (PF) 0.9% PF flush 3 mL     sodium chloride (PF) 0.9% PF flush 3 mL     sodium chloride (PF) 0.9% PF flush 3 mL     sodium chloride (PF) 0.9% PF flush 3 mL     sulfamethoxazole-trimethoprim (BACTRIM DS) 800-160 MG per tablet 1 tablet     The patient was seen and examined by me separately from the midlevel provider. The note reflects our mutual assessment and plans and were approved by me personally.   I personally reviewed today's lab results vital signs and radiology results.    Each point of the assessment and plan were reviewed by the midlevel and me and either endorsed by me or were my added decisions.    My pertinent physical findings today are: not possible: video    My assessment and plan are: 69 yo 6 years post allo for MDS on prednisone and Jakafi for CGVHD now with serious COVID pneumonia. Continue Jakafi and hold predsinone while on dex burst, then resume.    Edgar Black M.D.

## 2021-02-11 NOTE — PLAN OF CARE
ICU End of Shift Summary. See flowsheets for vital signs and detailed assessment.    Changes this shift: Alert and oriented. No pain. Positioning independently. Encouraged self proning while awake. BP WNL. On HFNC 70% and 35 LPM. FiO2 titrated per O2 sats. No cough. Performing IS independently. Voiding in urinal in bed. Adequate urine output. Continued on remdesivir and dexamethasone.     Plan: Titrate FiO2 and wean off as tolerated. Monitor respiratory status. Continue remdesivir and dexamethasone regimen. Notify team of any changes.      Problem: COPD Comorbidity  Goal: Maintenance of COPD Symptom Control  Outcome: Improving     Problem: Obstructive Sleep Apnea Risk or Actual (Comorbidity Management)  Goal: Unobstructed Breathing During Sleep  Outcome: Improving     Problem: Gas Exchange Impaired  Goal: Optimal Gas Exchange  Outcome: Improving

## 2021-02-11 NOTE — H&P
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    PLAN:    Neuro:  # Pain and sedation  None, no acute concerns.    #MDD  -PTA sertraline    Pulmonary:  #Acute hypoxic respiratory failure secondary to COVID-19 pneumonia  Increasing oxygen needs.  Now on 35 L/min, 100% high flow nasal cannula.  Saturating 95 to 98%. no increased respiratory effort, no acute distress, speaking in full sentences.   -Continue to monitor, no need to escalate care at this time.  -Overnight COVID-19 treatment as below  -He is euvolemic on exam, stop Lasix 40 daily    Cardiovascular:  No acute concerns    GI/Nutrition:  No acute concerns    Renal/Fluids/Electrolytes:  No acute concerns.    Endocrine:  No acute concerns    ID:  #COVID-19 pneumonia  Covid+ 1/30, per patient.  He became symptomatic February 3, ultimately admitted February 8 after he developed a fever 103 at home and had progressive shortness of breath.  Elevated CRP, fibrinogen, D-dimer consistent with COVID-19 pneumonia.  CT chest without pulmonary emboli, multifocal airspace opacities consistent with COVID-19 pneumonia.  Infectious diseases following.  CXR 2/10 with no interval change from prior studies.  -Continue dexamethasone (2/8-2/17), decrease to 6 mg for total 10-day course (he was on 10 mg for unclear reason)  -Continue remdesivir (2/8-2/12)  -IL-6 level pending, medicine team was considering tocilizumab  -Daily CRP and ferritin per ID  -Appreciate recommendations per infectious disease.    Hematology:    #MDS/ s/p BMT 2014 complicated by  GVHD, on prednisone and Jakafi  No acute concerns, continue PTA medications.  -Holding prednisone while dexamethasone  -Continue Jakafi 5 mg twice daily  -Continue Bactrim double strength 1 tablet twice weekly for PCP prophylaxis  -Continue acyclovir 800 mg 2 times daily for prophylaxis against herpes simplex virus  -Continue fluconazole 100 mg oral daily    Musculoskeletal:  No acute concerns    Skin:  No acute concerns    General Cares/Prophylaxis:    DVT Prophylaxis: Enoxaparin (Lovenox) SQ  GI Prophylaxis: Not indicated  Restraints: None  Family Communication: None  Code Status: Full    Lines/tubes/drains:  -PIV    Disposition:  - Medical ICU     Patient seen and findings/plan discussed with medical ICU staff, Dr. Lawler.    Vidal Soria    -----------------------------------------------------------------------    HISTORY PRESENTING ILLNESS:     Admitted 2/8 for COVID-19 pneumonia.  Per patient he tested positive for COVID-19 on 1/30 following exposure from his grandson.  He subsequently became symptomatic on 2/3.  Presented to ED on 2/8 after having fever of 103 at home, progressive shortness of breath.  Denies ever having cough, myalgias/arthralgias, sore throat, loss of taste or smell.    Initially admitted on 6 L oxygen mask, morning of 2/10 dramatic increase in oxygen requirement transitioned to high flow nasal cannula requiring 35 L/min at 60%.  Initial conversation with critical care in the afternoon on 2/10 concerning for possible activation.  At that time he was in no acute distress able to speak in full sentences, no increased respiratory effort.  Later in the evening occasionally desaturating, ultimately transferred to ICU for further management out of concern for possible intubation.  He subsequently saturating well on high flow nasal cannula 35 L/min 100%.  Saturating 95 to 100%.    On exam he is speaking in full sentences, feeling well.  Reports he is only dyspneic with activity.   Denies cough.  Denies fever/chills, no body aches.    REVIEW OF SYSTEMS: Negative except for as included above    PAST MEDICAL HISTORY:   Past Medical History:   Diagnosis Date     Acute deep vein thrombosis (DVT) of distal end of right lower extremity (H)      Clotting disorder (H)      Depression, major      Glucose intolerance      H/O bone marrow transplant (H)      Head injury 1964     Hearing problem Hearing aids 2015     History of blood transfusion 3/2014     History of radiation therapy June 2014     [place] (hard of hearing)     bilateral     Immunosuppression (H) Sirolimus daily     Lymphedema of both lower extremities      MDS (myelodysplastic syndrome) (H)      MDS/MPN (myelodysplastic/myeloproliferative neoplasms) (H)      Mixed hyperlipidemia      Numbness and tingling     feet     Obstructive sleep apnea 1999     Pneumonia      Reduced vision August 2016    Cataract surgery     Sleep apnea 1999     Transplant July 1, 2014    Stem Cells     SURGICAL HISTORY:  Past Surgical History:   Procedure Laterality Date     ARTHROPLASTY HIP ANTERIOR Right 9/3/2019    Procedure: RIGHT TOTAL HIP ARTHROPLASTY, DIRECT ANTERIOR APPROACH;  Surgeon: Yong Diaz MD;  Location:  OR     BACK SURGERY       BIOPSY       BMT CELL PRODUCT INFUSION       CATARACT IOL, RT/LT Bilateral 2015?     ESOPHAGOSCOPY, GASTROSCOPY, DUODENOSCOPY (EGD), COMBINED N/A 2/2/2015     ESOPHAGOSCOPY, GASTROSCOPY, DUODENOSCOPY (EGD), COMBINED Left 8/6/2015    Procedure: COMBINED ESOPHAGOSCOPY, GASTROSCOPY, DUODENOSCOPY (EGD), BIOPSY SINGLE OR MULTIPLE;  Surgeon: Amber Francis MD;  Location: [facility]     EXCISE LESION LIP N/A 1/2/2017    Procedure: EXCISE LESION LIP;  Surgeon: Tim Yanez MD;  Location:  OR     EYE SURGERY      blepharoplasty     FLEXIBLE SIGMOIDOSCOPY  2/2/15     HEMORRHOIDECTOMY  2001     IR FOLLOW UP VISIT OUTPATIENT  1/8/2019     PHACOEMULSIFICATION CLEAR CORNEA WITH STANDARD INTRAOCULAR LENS IMPLANT  Left 2016    Procedure: PHACOEMULSIFICATION CLEAR CORNEA WITH STANDARD INTRAOCULAR LENS IMPLANT;  Surgeon: Randolph Giles MD;  Location:  [facility]     TONSILLECTOMY       TRANSPLANT  2014    Stem Cell Transplant [facility] BMT     VASCULAR SURGERY      varicose vein surgery     SOCIAL HISTORY:  Social History     Socioeconomic History     Marital status:      Spouse name: Not on file     Number of children: Not on file     Years of education: Not on file     Highest education level: Not on file   Occupational History     Not on file   Social Needs     Financial resource strain: Not on file     Food insecurity     Worry: Not on file     Inability: Not on file     Transportation needs     Medical: Not on file     Non-medical: Not on file   Tobacco Use     Smoking status: Former Smoker     Packs/day: 1.00     Years: 34.00     Pack years: 34.00     Types: Cigarettes     Start date: 1967     Quit date: 2001     Years since quittin.8     Smokeless tobacco: Never Used     Tobacco comment: quit in    Substance and Sexual Activity     Alcohol use: Yes     Alcohol/week: 4.2 standard drinks     Comment: Seldom     Drug use: No     Sexual activity: Not Currently     Partners: Female     Birth control/protection: Male Surgical, Female Surgical   Lifestyle     Physical activity     Days per week: Not on file     Minutes per session: Not on file     Stress: Not on file   Relationships     Social connections     Talks on phone: Not on file     Gets together: Not on file     Attends [employer] service: Not on file     Active member of club or organization: Not on file     Attends meetings of clubs or organizations: Not on file     Relationship status: Not on file     Intimate partner violence     Fear of current or ex partner: Not on file     Emotionally abused: Not on file     Physically abused: Not on file     Forced sexual activity: Not on file   Other Topics Concern     Parent/sibling w/  CABG, MI or angioplasty before 65F 55M? Not Asked   Social History Narrative     Not on file     FAMILY HISTORY:   Family History   Problem Relation Age of Onset     Breast Cancer Mother      Cancer Mother         1977     Cerebrovascular Disease Mother         1977     Cancer Father         1987     Hypertension Brother      Heart Disease Brother      Hyperlipidemia Brother      Depression Brother      Heart Disease Brother         2016     Hypertension Brother         1985     Glaucoma No family hx of      Macular Degeneration No family hx of      ALLERGIES:   Allergies   Allergen Reactions     Blood Transfusion Related (Informational Only) Other (See Comments)     Patient has a history of a clinically significant antibody against RBC antigens.  A delay in compatible RBCs may occur.     MEDICATIONS:  No current facility-administered medications on file prior to encounter.        acetaminophen (TYLENOL) 500 MG tablet, Take 1-2 tablets (500-1,000 mg) by mouth every 6 hours as needed for mild pain       acyclovir (ZOVIRAX) 800 MG tablet, TAKE ONE TABLET BY MOUTH TWICE DAILY        calcium carbonate-vitamin D 500-400 MG-UNIT TABS tablt, Take 1 tablet by mouth daily 2000 mg       fluconazole (DIFLUCAN) 100 MG tablet, Take 1 tablet (100 mg) by mouth daily       levofloxacin (LEVAQUIN) 250 MG tablet, Take 1 tablet (250 mg) by mouth daily       Multiple Vitamins-Minerals (PRESERVISION AREDS 2) CAPS, Take 1 capsule by mouth 2 times daily       pantoprazole (PROTONIX) 20 MG EC tablet, TAKE ONE TABLET BY MOUTH EVERY DAY       predniSONE (DELTASONE) 5 MG tablet, Take 1 tablet (5 mg) by mouth daily       ruxolitinib (JAKAFI) 5 MG TABS tablet, Take 1 tablet (5 mg) by mouth 2 times daily       sertraline (ZOLOFT) 25 MG tablet, Take 2 tablets (50 mg) by mouth daily       sulfamethoxazole-trimethoprim (BACTRIM DS) 800-160 MG tablet, Take one tablet by mouth twice daily on Mondays and Tuesdays only.       Vitamin D,  Cholecalciferol, 1000 units TABS, Take 1,000 Units by mouth daily       amoxicillin (AMOXIL) 500 MG capsule, Take 4 pills (2 grams) day of dental work       Hypromellose (ARTIFICIAL TEARS OP), Apply 1-2 drops to eye 2 times daily as needed       lifitegrast (XIIDRA) 5 % opthalmic solution, Place 1 drop into both eyes 2 times daily       oxyCODONE IR (ROXICODONE) 10 MG tablet, Take 0.5 tablets (5 mg) by mouth every 4 hours as needed for severe pain       senna (SENOKOT) 8.6 MG tablet, Take 1-2 tablets by mouth daily (Patient taking differently: Take 1-2 tablets by mouth daily as needed )       tacrolimus (PROTOPIC) 0.03 % external ointment, Apply topically At Bedtime       triamcinolone (KENALOG) 0.1 % ointment, Apply twice a day to lower leg        PHYSICAL EXAMINATION:  Temp:  [95.7  F (35.4  C)-97.9  F (36.6  C)] 97.9  F (36.6  C)  Pulse:  [62-75] 66  Resp:  [18-32] 18  BP: (114-128)/(58-75) 116/74  FiO2 (%):  [50 %-100 %] 100 %  SpO2:  [74 %-99 %] 99 %  General: Laying 30 degrees in bed, high flow nasal cannula in place.  Pleasant, cooperative, no acute distress and nontoxic.  Caring for conversations with no difficulty.  Positioning self in bed with no difficulty.  HEENT: EOMI, anicteric sclera, moist mucous membranes.  Neuro: A&Ox3, NAD  Pulm/Resp: Clear breath sounds bilaterally with mild bibasilar crackles.  Otherwise good airflow.  He is breathing comfortably on high flow nasal cannula, no accessory muscle use.  CV: RRR, no JVD, no lower extremity edema, no murmurs or gallops on auscultation  Abdomen: Soft, non-distended, non-tender  Extremities: Warm and well-perfused no edema    LABS: Reviewed.   Arterial Blood Gases   Recent Labs   Lab 02/10/21  1441 02/10/21  1300   PH 7.47* 7.47*   PCO2 33* 30*   PO2 64* 57*   HCO3 24 22     Complete Blood Count   Recent Labs   Lab 02/10/21  0721 02/09/21  0637 02/08/21  0910 02/05/21  1227   WBC 6.9 8.0 6.1 5.2   HGB 13.3 13.3 13.4 13.6    146* 152 173     Basic  Metabolic Panel  Recent Labs   Lab 02/10/21  0721 02/09/21  0637 02/08/21  0910 02/05/21  1227    143 139 140   POTASSIUM 4.2 4.5 4.0 4.0   CHLORIDE 112* 114* 111* 109   CO2 23 22 23 25   BUN 40* 28 25 30   CR 0.87 0.77 0.88 1.10   * 121* 100* 94     Liver Function Tests  Recent Labs   Lab 02/10/21  0721 02/09/21  0637 02/08/21  0910 02/05/21  1227   AST 52* 52* 51* 42   ALT 29 29 28 23   ALKPHOS 76 66 69 57   BILITOTAL 0.3 0.3 0.3 0.2   ALBUMIN 2.4* 2.6* 2.6* 2.8*     Coagulation Profile  No lab results found in last 7 days.    IMAGING:  Recent Results (from the past 24 hour(s))   XR Chest Port 1 View    Narrative    Exam: XR CHEST PORT 1 VW, 2/10/2021 11:13 AM    Indication: follow up for COVID pneumonia    Comparison: 2/8/2021 chest CT, 2/5/2021 chest radiographs    Findings:   Borderline enlarged cardiac silhouette. The pulmonary vasculature is  indistinct. Left greater than right basilar predominant mixed  interstitial and patchy airspace opacities, increased since 2/5/2021  chest radiograph. No appreciable pleural effusion or pneumothorax.      Impression    Impression: Left greater than right and basilar predominant mixed  interstitial and patchy airspace opacities, compatible with patient's  history of COVID-19, increased since 2/5/2021 chest radiograph and  similar to 2/8/2021 chest CT.    VILMA SYLVESTER, DO

## 2021-02-11 NOTE — PLAN OF CARE
ICU End of Shift Summary. See flowsheets for vital signs and detailed assessment.    Changes this shift: Pt remains on HFNC (35 LPM, %) with SpO2 goal >90%. Declines feeling SOB but does have some dyspnea on exertion and does desat (low/mid 80s) with activity/repositioning. Self proning as able, only tolerates short periods d/t left shoulder discomfort when positioned prone. Otherwise denies pain/discomfort. Poor appetite, encouraging PO intake.     Plan:  Continue POC, notify provider of changes. Monitor respiratory status, titrate O2 support as needed.      Problem: COPD Comorbidity  Goal: Maintenance of COPD Symptom Control  Outcome: No Change     Problem: Obstructive Sleep Apnea Risk or Actual (Comorbidity Management)  Goal: Unobstructed Breathing During Sleep  Outcome: No Change

## 2021-02-12 LAB
1,3 BETA GLUCAN SER-MCNC: <31 PG/ML
ALBUMIN SERPL-MCNC: 2.3 G/DL (ref 3.4–5)
ALP SERPL-CCNC: 87 U/L (ref 40–150)
ALT SERPL W P-5'-P-CCNC: 38 U/L (ref 0–70)
ANION GAP SERPL CALCULATED.3IONS-SCNC: 4 MMOL/L (ref 3–14)
AST SERPL W P-5'-P-CCNC: 46 U/L (ref 0–45)
B-D GLUCAN INTERPRETATION (1,3): NEGATIVE
BASOPHILS # BLD AUTO: 0 10E9/L (ref 0–0.2)
BASOPHILS NFR BLD AUTO: 0.1 %
BILIRUB SERPL-MCNC: 0.4 MG/DL (ref 0.2–1.3)
BUN SERPL-MCNC: 38 MG/DL (ref 7–30)
CALCIUM SERPL-MCNC: 8.3 MG/DL (ref 8.5–10.1)
CHLORIDE SERPL-SCNC: 115 MMOL/L (ref 94–109)
CO2 SERPL-SCNC: 22 MMOL/L (ref 20–32)
CREAT SERPL-MCNC: 0.79 MG/DL (ref 0.66–1.25)
CRP SERPL-MCNC: 44 MG/L (ref 0–8)
DIFFERENTIAL METHOD BLD: ABNORMAL
EOSINOPHIL # BLD AUTO: 0 10E9/L (ref 0–0.7)
EOSINOPHIL NFR BLD AUTO: 0 %
ERYTHROCYTE [DISTWIDTH] IN BLOOD BY AUTOMATED COUNT: 18.1 % (ref 10–15)
FIBRINOGEN PPP-MCNC: 395 MG/DL (ref 200–420)
GFR SERPL CREATININE-BSD FRML MDRD: 90 ML/MIN/{1.73_M2}
GLUCOSE BLDC GLUCOMTR-MCNC: 149 MG/DL (ref 70–99)
GLUCOSE BLDC GLUCOMTR-MCNC: 98 MG/DL (ref 70–99)
GLUCOSE SERPL-MCNC: 126 MG/DL (ref 70–99)
HCT VFR BLD AUTO: 38.3 % (ref 40–53)
HGB BLD-MCNC: 12.4 G/DL (ref 13.3–17.7)
IMM GRANULOCYTES # BLD: 0.1 10E9/L (ref 0–0.4)
IMM GRANULOCYTES NFR BLD: 1.2 %
LAB SCANNED RESULT: NORMAL
LAB SCANNED RESULT: NORMAL
LYMPHOCYTES # BLD AUTO: 0.7 10E9/L (ref 0.8–5.3)
LYMPHOCYTES NFR BLD AUTO: 8.9 %
MCH RBC QN AUTO: 30.8 PG (ref 26.5–33)
MCHC RBC AUTO-ENTMCNC: 32.4 G/DL (ref 31.5–36.5)
MCV RBC AUTO: 95 FL (ref 78–100)
MONOCYTES # BLD AUTO: 0.4 10E9/L (ref 0–1.3)
MONOCYTES NFR BLD AUTO: 5.1 %
NEUTROPHILS # BLD AUTO: 7 10E9/L (ref 1.6–8.3)
NEUTROPHILS NFR BLD AUTO: 84.7 %
NRBC # BLD AUTO: 0 10*3/UL
NRBC BLD AUTO-RTO: 0 /100
PLATELET # BLD AUTO: 184 10E9/L (ref 150–450)
POTASSIUM SERPL-SCNC: 4.1 MMOL/L (ref 3.4–5.3)
PROT SERPL-MCNC: 5.9 G/DL (ref 6.8–8.8)
RBC # BLD AUTO: 4.02 10E12/L (ref 4.4–5.9)
SODIUM SERPL-SCNC: 142 MMOL/L (ref 133–144)
WBC # BLD AUTO: 8.3 10E9/L (ref 4–11)

## 2021-02-12 PROCEDURE — 86140 C-REACTIVE PROTEIN: CPT | Performed by: STUDENT IN AN ORGANIZED HEALTH CARE EDUCATION/TRAINING PROGRAM

## 2021-02-12 PROCEDURE — 250N000011 HC RX IP 250 OP 636: Performed by: NURSE PRACTITIONER

## 2021-02-12 PROCEDURE — 99233 SBSQ HOSP IP/OBS HIGH 50: CPT | Mod: GC | Performed by: INTERNAL MEDICINE

## 2021-02-12 PROCEDURE — 999N000157 HC STATISTIC RCP TIME EA 10 MIN

## 2021-02-12 PROCEDURE — 250N000013 HC RX MED GY IP 250 OP 250 PS 637: Performed by: NURSE PRACTITIONER

## 2021-02-12 PROCEDURE — 999N001017 HC STATISTIC GLUCOSE BY METER IP

## 2021-02-12 PROCEDURE — G0463 HOSPITAL OUTPT CLINIC VISIT: HCPCS

## 2021-02-12 PROCEDURE — 250N000013 HC RX MED GY IP 250 OP 250 PS 637: Performed by: INTERNAL MEDICINE

## 2021-02-12 PROCEDURE — 200N000002 HC R&B ICU UMMC

## 2021-02-12 PROCEDURE — 250N000009 HC RX 250: Performed by: NURSE PRACTITIONER

## 2021-02-12 PROCEDURE — 99291 CRITICAL CARE FIRST HOUR: CPT | Mod: GC | Performed by: INTERNAL MEDICINE

## 2021-02-12 PROCEDURE — 94660 CPAP INITIATION&MGMT: CPT

## 2021-02-12 PROCEDURE — 85384 FIBRINOGEN ACTIVITY: CPT | Performed by: STUDENT IN AN ORGANIZED HEALTH CARE EDUCATION/TRAINING PROGRAM

## 2021-02-12 PROCEDURE — 36415 COLL VENOUS BLD VENIPUNCTURE: CPT | Performed by: STUDENT IN AN ORGANIZED HEALTH CARE EDUCATION/TRAINING PROGRAM

## 2021-02-12 PROCEDURE — 258N000003 HC RX IP 258 OP 636: Performed by: NURSE PRACTITIONER

## 2021-02-12 PROCEDURE — 80053 COMPREHEN METABOLIC PANEL: CPT | Performed by: STUDENT IN AN ORGANIZED HEALTH CARE EDUCATION/TRAINING PROGRAM

## 2021-02-12 PROCEDURE — 85025 COMPLETE CBC W/AUTO DIFF WBC: CPT | Performed by: STUDENT IN AN ORGANIZED HEALTH CARE EDUCATION/TRAINING PROGRAM

## 2021-02-12 PROCEDURE — 250N000012 HC RX MED GY IP 250 OP 636 PS 637: Performed by: STUDENT IN AN ORGANIZED HEALTH CARE EDUCATION/TRAINING PROGRAM

## 2021-02-12 PROCEDURE — 999N000128 HC STATISTIC PERIPHERAL IV START W/O US GUIDANCE

## 2021-02-12 RX ADMIN — ALBUTEROL SULFATE 2 PUFF: 90 AEROSOL, METERED RESPIRATORY (INHALATION) at 06:19

## 2021-02-12 RX ADMIN — ALBUTEROL SULFATE 2 PUFF: 90 AEROSOL, METERED RESPIRATORY (INHALATION) at 18:11

## 2021-02-12 RX ADMIN — ENOXAPARIN SODIUM 50 MG: 100 INJECTION SUBCUTANEOUS at 20:31

## 2021-02-12 RX ADMIN — LEVOFLOXACIN 250 MG: 250 TABLET, FILM COATED ORAL at 08:44

## 2021-02-12 RX ADMIN — ALBUTEROL SULFATE 2 PUFF: 90 AEROSOL, METERED RESPIRATORY (INHALATION) at 09:50

## 2021-02-12 RX ADMIN — SERTRALINE HYDROCHLORIDE 50 MG: 50 TABLET ORAL at 08:43

## 2021-02-12 RX ADMIN — PANTOPRAZOLE SODIUM 40 MG: 40 TABLET, DELAYED RELEASE ORAL at 08:43

## 2021-02-12 RX ADMIN — ACYCLOVIR 800 MG: 800 TABLET ORAL at 08:43

## 2021-02-12 RX ADMIN — ACYCLOVIR 800 MG: 800 TABLET ORAL at 20:30

## 2021-02-12 RX ADMIN — ENOXAPARIN SODIUM 50 MG: 100 INJECTION SUBCUTANEOUS at 08:42

## 2021-02-12 RX ADMIN — Medication 1 TABLET: at 08:43

## 2021-02-12 RX ADMIN — FLUCONAZOLE 100 MG: 100 TABLET ORAL at 08:44

## 2021-02-12 RX ADMIN — DEXAMETHASONE 6 MG: 2 TABLET ORAL at 08:42

## 2021-02-12 RX ADMIN — REMDESIVIR 100 MG: 100 INJECTION, POWDER, LYOPHILIZED, FOR SOLUTION INTRAVENOUS at 18:11

## 2021-02-12 RX ADMIN — SODIUM CHLORIDE 50 ML: 9 INJECTION, SOLUTION INTRAVENOUS at 18:11

## 2021-02-12 ASSESSMENT — ACTIVITIES OF DAILY LIVING (ADL)
ADLS_ACUITY_SCORE: 14

## 2021-02-12 NOTE — CONSULTS
[facility] Nurse Inpatient Pressure Injury Assessment   Reason for consultation: Evaluate and treat L) cheek      ASSESSMENT  Pressure Injury: on L) cheek , hospital acquired ,   This is a Medical Device Related Pressure Injury (MDRPI) due to hi flow oxygen tubing  Pressure Injury is Stage 1   Contributing factor of the pressure injury: pressure and immobility  Status: initial assessment  Recommend provider order: None, at this time     TREATMENT PLAN  L) cheek wound: Daily    Ensure to apply half of Mepilex foam dressing to keep the pressure off on both cheeks.  Orders Written  WO Nurse follow-up plan:twice weekly  Nursing to notify the Provider(s) and re-consult the WO Nurse if wound(s) deteriorates or new skin concern.    Patient History  According to provider note(s):  Patient is a 70 year old male with PMH of MDS s/p allogenic stem cell transplant (2014), complicated by chronic GVHD (currently on Jakafi & Prednisone) admitted to [facility] with acute hypoxic respiratory failure secondary to COVID-19.    Objective Data  Containment of urine/stool: Indwelling catheter    Current Diet/ Nutrition:  Orders Placed This Encounter      Regular Diet Adult      Output:   I/O last 3 completed shifts:  In: 1150 [P.O.:600; I.V.:550]  Out: 1125 [Urine:1125]    Risk Assessment:   Sensory Perception: 4-->no impairment  Moisture: 4-->rarely moist  Activity: 2-->chairfast  Mobility: 3-->slightly limited  Nutrition: 3-->adequate  Friction and Shear: 3-->no apparent problem  Sven Score: 19      Labs:   Recent Labs   Lab 02/12/21  0337   ALBUMIN 2.3*   HGB 12.4*   WBC 8.3   CRP 44.0*       Physical Exam  Skin inspection: focused BL cheeks    Wound Location:  L) cheek  Wound History: Pt has HFNC in place.   Measurements (length x width x depth, in cm) 0.5 cm x 0.7 cm  x  0.0 cm   Wound Base:  100 % non-blanchable, erythema and epidermis  Tunneling N/A  Undermining N/A  Palpation of the wound bed: normal and firm over the bony  prominence  Periwound skin: intact  Color: normal and consistent with surrounding tissue  Temperature: normal   Drainage:, none  Description of drainage: none  Odor: none  Pain: denies ,     Interventions  Current support surface: Standard  Low air loss mattress  Current off-loading measures: Foam padding and Pillows  Repositioning aid: Pillows  Visual inspection of wound(s) completed   Wound Care: was done per plan of care.  Supplies: floor stock and discussed with RN  Educated provided: plan of care, wound progress and Off-loading pressure  Education provided to: nurse  Discussed importance of:off-loading pressure to wound and dressing change frequency  Discussed plan of care with Nurse    Bernie Ruiz RN

## 2021-02-12 NOTE — PROGRESS NOTES
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    CHANGES and MAJOR THINGS TODAY:   - Overnight switched to BPAP due to difficulty tolerating HFNC, will continue HFNC and BPAP today  - Completing remdesivir and azithromycin today  - Continue dexamethasone  - Restart PTA levofloxacin 250 mg every day now azithromycin completed    PLAN:    Neuro:  Pain and sedation  - Acetaminophen PRN     MDD  PTA sertraline     Pulmonary:  Acute hypoxic respiratory failure secondary to COVID-19 pneumonia  Rhinovirus/Enterovirus pneumonia   Increasing oxygen needs requiring up to 35 L/min, 100% HFNC overnight. Transferred to ICU due to concern for need for intubation. No increased respiratory effort, no acute distress, speaking in full sentences. Continuing to require HFNC, switched to BPAP overnight due to difficulty tolerating HFNC.  - Continue to monitor, no need to escalate care at this time  - Avoid BPAP when eating  - Wean HFNC as able, goal SpO2 >90%  - Encourage self-proning  - Covid management as below     Cardiovascular:  No acute concerns. TTE 2/9 with IVC <2.1 cm, collapsing >50% with sniff suggesting normal RA pressure. LVEF 65-70%.     GI/Nutrition:  Nutrition  Tolerating regular diet.   - Will transition to CLD if requiring HFNC >45L FiO2 %    Renal/Fluids/Electrolytes:  No acute concerns.     Volume Status  On exam appears euvolemic, consistent with TTE above. Has been on IV lasix 40 mg every day with adequate UOP, discontinued.   - Monitor I/O, goal net 0 to -1L today  - Will plan to resume IV lasix if net positive     Endocrine:  No acute concerns     ID:  COVID-19 pneumonia  Rhinovirus/Enterovirus pneumonia   Covid+ 1/30, per patient.  He  became symptomatic 2/3, ultimately admitted 2/8 after he developed a fever 103 at home and had progressive shortness of breath.  Elevated CRP, fibrinogen, D-dimer consistent with COVID-19 pneumonia.  CT chest without pulmonary emboli, multifocal airspace opacities consistent with COVID-19 pneumonia. CXR 2/10 with no interval change from prior studies. IL-6 17.83, obtained for consideration of tocilizumab.  - ID following  - Continue dexamethasone (2/8-2/17), decrease to 6 mg for total 10-day course (he was on 10 mg previously)   -Complete remdesivir today (2/8-2/12)  - Daily CRP per ID  - AC enoxaparin 50 mg BID given elevated D-dimer    C/f Superimposed bacterial pneumonia  Procal 0.1 --> 0.28 on 2/8. Previously started on ceftriaxone and azithromycin with prophylactic levofloxacin held, narrowed to azithromycin only.   - Completed 3 day course of azithromycin 500 mg every day     Antibiotics  Azithromycin (2/9-2/11)    Antibiotics Prophylaxis  Acyclovir (2/8- )  Fluconazole (2/9- )  Levofloxacin (2/9)  Bactrim (2/8- )    Hematology:    MDS/ s/p BMT 2014 complicated by GVHD  No acute concerns. PTA on prednisone and Jakafi.  - BMT consulted  - Holding PTA prednisone while dexamethasone  - Continue PTA Jakafi 5 mg twice daily  - Continue PTA Bactrim double strength 1 tablet twice weekly for PCP prophylaxis  - Continue PTA acyclovir 800 mg 2 times daily for prophylaxis against herpes simplex virus  - Continue PTA  fluconazole 100 mg oral daily  - Restart PTA levofloxacin 250 mg every day now azithromycin completed     Musculoskeletal:  No acute concerns     Skin:  No acute concerns     General Cares/Prophylaxis:    DVT Prophylaxis: Enoxaparin (Lovenox) subcutaneous 50 mg BID  GI Prophylaxis: Not indicated  Restraints: None  Family Communication: None  Code Status: Full     Lines/tubes/drains:  -PIV     Disposition:  - Medical ICU      Patient seen and findings/plan discussed with medical ICU staff,   [place].    Michael Vaz MD  PGY-1, Internal Medicine  MICU 2 Service    ====================================  INTERVAL HISTORY:   Overnight switched to BPAP due to difficulty tolerating HFNC (problems with heating humidifier). No distress on BPAP. No cough, SOB, CP, LE edema or pain. No other complaints.    OBJECTIVE:   1. VITAL SIGNS:   Temp:  [97.6  F (36.4  C)-99.1  F (37.3  C)] 98.6  F (37  C)  Pulse:  [57-83] 65  Resp:  [15-69] 15  BP: (108-147)/(51-96) 146/92  FiO2 (%):  [60 %-100 %] 80 %  SpO2:  [76 %-98 %] 91 %  FiO2 (%): 80 %  Resp: 15  HFNC: 40L 100% --> 35L 80% with sat 94%  BPAP: IPAP 10 EPAP 4 RR 12 FiO2 65    2. INTAKE/ OUTPUT:   I/O last 3 completed shifts:  In: 1150 [P.O.:600; I.V.:550]  Out: 1125 [Urine:1125]    Weight 231 --> 229     3. PHYSICAL EXAMINATION:  General: lying in bed supine, BPAP in place, NAD  HEENT: HFNC in place, EOMI, anicteric sclera  Neuro: AAOx3, conversant, responding appropriately to questions  Pulm/Resp: Clear breath sounds bilaterally without rhonchi, crackles or wheeze, breathing non-labored on BPAP  CV: RRR,no JVD, no LE edema, no murmur or gallops  Abdomen: Soft, non-distended, non-tender  Extremities: warm, well-perfused, slight RLE edema (chronic for years per patient)    4. LABS:   Arterial Blood Gases   Recent Labs   Lab 02/10/21  1441 02/10/21  1300   PH 7.47* 7.47*   PCO2 33* 30*   PO2 64* 57*   HCO3 24 22     Complete Blood Count   Recent Labs   Lab 02/12/21  0337 02/11/21  0409 02/10/21  0721 02/09/21  0637   WBC 8.3 6.7 6.9 8.0   HGB 12.4* 13.2* 13.3 13.3    192 170 146*     Basic Metabolic Panel  Recent Labs   Lab 02/12/21  0337 02/11/21  0409 02/10/21  0721 02/09/21  0637    141 141 143   POTASSIUM 4.1 4.0 4.2 4.5   CHLORIDE 115* 111* 112* 114*   CO2 22 23 23 22   BUN 38* 39* 40* 28   CR 0.79 0.85 0.87 0.77   * 137* 119* 121*     Liver Function Tests  Recent Labs   Lab 02/12/21  0337 02/11/21  0409 02/10/21  0721 02/09/21  0637   AST  46* 52* 52* 52*   ALT 38 30 29 29   ALKPHOS 87 86 76 66   BILITOTAL 0.4 0.3 0.3 0.3   ALBUMIN 2.3* 2.5* 2.4* 2.6*     IL-6 17.83   Fibrinogen 395  CRP 79 --> 41    Coagulation Profile  No lab results found in last 7 days.    5. RADIOLOGY:   No results found for this or any previous visit (from the past 24 hour(s)).

## 2021-02-12 NOTE — PLAN OF CARE
ICU End of Shift Summary. See flowsheets for vital signs and detailed assessment.    Changes this shift: Alert and oriented, slept on and off overnight. On 35lpm and % HFNC, desats into the high 70s/low 80s with any activity. Has been self proning and laying on his side throughout the night, O2 sats lower when on his back. Intermittently reports shortness of breath, worse with exertion. Bradycardic when sleeping, HR 50s-60s. BP WNL. Voiding using the urinal, adequate urine ouput. 1 BM overnight.     Plan: Continue to monitor, continue POC. Wean FiO2 as tolerated. Encourage self-proning and pulmonary hygeine. Encourage energy conservation measures. Notify team of changed.

## 2021-02-12 NOTE — PROGRESS NOTES
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 Dior is 6 year, 7 months out from allo-sib PBHSCT c/b mucocutaneous cGHVD (skin, colon, eyes, mouth) along with arthralgias/myalgias and chronic fatigue/dyspnea. Previously evaluated by pulmonary, w/o signs of pulmonary cGVHD. His GVHD symptoms have been stable on Jakafi and low-dose pred for some time, although he has had a history of recurrent pulmonary infections, at least in part due to immunosuppression. His IgG level was normal,no need for IVIg.     He should remain on his Jakafi and prednisone despite his infection. Acute discontinuation of Jakafi can cause rebound inflammation, which could have significant consequences given his inflamed state. Since he's on dexamethasone for COVID, can hold his low-dose pred, but this should be restarted on discharge. Otherwise, he should continue on all of his prophylactic anti-infective agents; levofloxacin can be held while on IV antibiotics.     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 Subsequently underwent great saphenous ablation on the right, with recannulization of left saphenous vein on US from 7/2019. He does have a history of JAK2+ MPN, but he is now s/p transplant with 100% donor engraftment, so this is cured. No  indication for additional anticoagulation for these thromboses at this time. Recommend anticoagulation per COVID protocol.      Recommendations:   - Continue Jakafi 5mg BID  - Hold prednisone 5mg while on Dex for COVID              - Please restart prednisone 5mg daily once Dex is finished  - Systemic anticoagulation per COVID protocol  - Continue prophylactic anti-infective: ACV 800mg BID, Bactrim BID Mon/Tues, fluconazole 100mg daily  - Okay to hold levofloxacin while on IV abx    Patient was seen and plan of care was discussed with attending physician Dr. Black.    We will continue to follow this patient. Please don't hesitate to contact the Fellow On-Call with questions.    Shubham Joyce MD PhD  Heme/Onc/Transplant Fellow  Pgr #[id number]    _________________________________________________    Subjective & Interval History:    - Remains in the ICU on HFNC  - Continues to have significant de-saturations with activity  - Subjectively feeling well, but difficulty with dyspnea on exertion      Physical Exam:    BP (!) 147/88   Pulse 60   Temp 98.6  F (37  C) (Axillary)   Resp 20   Wt 103.8 kg (228 lb 13.4 oz)   SpO2 96%   BMI 34.04 kg/m    Gen: Tired appearing, laying in bed, HFNC in place  Pulm: Unlabored breathing  Skin: No obvious rashes or abnormalities on exposed skin  Neuro: Alert and answering questions appropriately. CNII-XII grossly intact.     The rest of a comprehensive physical examination is deferred due to public health emergency video visit restrictions.    Labs & Studies: I personally reviewed the following studies:  ROUTINE LABS (Last four results):  CMP  Recent Labs   Lab 02/12/21  0337 02/11/21  0409 02/10/21  0721 02/09/21  0637 02/08/21  0910 02/05/21  1227    141 141 143 139 140   POTASSIUM 4.1 4.0 4.2 4.5 4.0 4.0   CHLORIDE 115* 111* 112* 114* 111* 109   CO2 22 23 23 22 23 25   ANIONGAP 4 8 6 6 5 6   * 137* 119* 121* 100* 94   BUN 38* 39* 40* 28 25 30   CR 0.79 0.85  0.87 0.77 0.88 1.10   GFRESTIMATED 90 87 86 >90 86 67   GFRESTBLACK >90 >90 >90 >90 >90 77   JOE 8.3* 8.7 8.7 8.5 8.4* 8.4*   MAG  --   --   --   --  1.7 2.0   PROTTOTAL 5.9* 6.4* 6.3* 6.4* 6.6* 6.9   ALBUMIN 2.3* 2.5* 2.4* 2.6* 2.6* 2.8*   BILITOTAL 0.4 0.3 0.3 0.3 0.3 0.2   ALKPHOS 87 86 76 66 69 57   AST 46* 52* 52* 52* 51* 42   ALT 38 30 29 29 28 23     CBC  Recent Labs   Lab 02/12/21  0337 02/11/21  0409 02/10/21  0721 02/09/21  0637   WBC 8.3 6.7 6.9 8.0   RBC 4.02* 4.13* 4.09* 4.12*   HGB 12.4* 13.2* 13.3 13.3   HCT 38.3* 39.1* 38.8* 40.0   MCV 95 95 95 97   MCH 30.8 32.0 32.5 32.3   MCHC 32.4 33.8 34.3 33.3   RDW 18.1* 18.2* 18.5* 18.7*    192 170 146*     INRNo lab results found in last 7 days.    Medications list for reference:  Current Facility-Administered Medications   Medication     remdesivir 100 mg in sodium chloride 0.9 % 250 mL intermittent infusion    And     0.9% sodium chloride BOLUS     acetaminophen (TYLENOL) tablet 650 mg     acyclovir (ZOVIRAX) tablet 800 mg     albuterol (PROAIR HFA/PROVENTIL HFA/VENTOLIN HFA) 108 (90 Base) MCG/ACT inhaler 2 puff     artificial saliva (BIOTENE MT) solution 1 spray     bisacodyl (DULCOLAX) EC tablet 5 mg    Or     bisacodyl (DULCOLAX) EC tablet 10 mg    Or     bisacodyl (DULCOLAX) EC tablet 15 mg     calcium carbonate-vitamin D (OSCAL w/D) per tablet 1 tablet     carboxymethylcellulose PF (REFRESH PLUS) 0.5 % ophthalmic solution 1 drop     dexamethasone (DECADRON) tablet 6 mg     enoxaparin ANTICOAGULANT (LOVENOX) injection 50 mg     fluconazole (DIFLUCAN) tablet 100 mg     levofloxacin (LEVAQUIN) tablet 250 mg     lidocaine (LMX4) cream     lidocaine 1 % 0.1-1 mL     magnesium citrate solution 148 mL     Medication instructions: Do NOT use nebulized medications     melatonin tablet 3 mg     ondansetron (ZOFRAN-ODT) ODT tab 4 mg    Or     ondansetron (ZOFRAN) injection 4 mg     pantoprazole (PROTONIX) EC tablet 40 mg     Patient is already receiving  anticoagulation with heparin, enoxaparin (LOVENOX), warfarin (COUMADIN)  or other anticoagulant medication     polyethylene glycol (MIRALAX) Packet 17 g     [Held by provider] predniSONE (DELTASONE) tablet 5 mg     prochlorperazine (COMPAZINE) injection 5 mg    Or     prochlorperazine (COMPAZINE) tablet 5 mg    Or     prochlorperazine (COMPAZINE) suppository 12.5 mg     ruxolitinib (JAKAFI) tablet 5 mg     sertraline (ZOLOFT) tablet 50 mg     sodium chloride (PF) 0.9% PF flush 3 mL     sodium chloride (PF) 0.9% PF flush 3 mL     sodium chloride (PF) 0.9% PF flush 3 mL     sodium chloride (PF) 0.9% PF flush 3 mL     sulfamethoxazole-trimethoprim (BACTRIM DS) 800-160 MG per tablet 1 tablet     The patient was seen and examined by me separately from the fellow provider. The note reflects our mutual assessment and plans and were approved by me personally.   I personally reviewed today's lab results vital signs and radiology results.    Each point of the assessment and plan were reviewed by the midlevel and me and either endorsed by me or were my added decisions.    My pertinent physical findings today are: not possible: video    My assessment and plan are: 71 yo 6 years post allo for MDS on prednisone and Jakafi for CGVHD now with serious COVID pneumonia. Continue Jakafi and hold predsinone while on dex burst, then resume. No changes imn immunsuprssion I agree with the A&P as described by the resident    Edgar Black M.D.

## 2021-02-13 ENCOUNTER — ANESTHESIA (OUTPATIENT)
Dept: INTENSIVE CARE | Facility: CLINIC | Age: 71
DRG: 207 | End: 2021-02-13
Payer: MEDICARE

## 2021-02-13 ENCOUNTER — ANESTHESIA EVENT (OUTPATIENT)
Dept: INTENSIVE CARE | Facility: CLINIC | Age: 71
DRG: 207 | End: 2021-02-13
Payer: MEDICARE

## 2021-02-13 LAB
ALBUMIN SERPL-MCNC: 2.2 G/DL (ref 3.4–5)
ALP SERPL-CCNC: 106 U/L (ref 40–150)
ALT SERPL W P-5'-P-CCNC: 41 U/L (ref 0–70)
ANION GAP SERPL CALCULATED.3IONS-SCNC: 5 MMOL/L (ref 3–14)
AST SERPL W P-5'-P-CCNC: 43 U/L (ref 0–45)
BASE DEFICIT BLDA-SCNC: 0.8 MMOL/L
BASE DEFICIT BLDA-SCNC: 1.6 MMOL/L
BILIRUB SERPL-MCNC: 0.5 MG/DL (ref 0.2–1.3)
BUN SERPL-MCNC: 33 MG/DL (ref 7–30)
CALCIUM SERPL-MCNC: 8.5 MG/DL (ref 8.5–10.1)
CHLORIDE SERPL-SCNC: 112 MMOL/L (ref 94–109)
CO2 SERPL-SCNC: 25 MMOL/L (ref 20–32)
CREAT SERPL-MCNC: 0.77 MG/DL (ref 0.66–1.25)
CRP SERPL-MCNC: 53 MG/L (ref 0–8)
ERYTHROCYTE [DISTWIDTH] IN BLOOD BY AUTOMATED COUNT: 18.3 % (ref 10–15)
GFR SERPL CREATININE-BSD FRML MDRD: >90 ML/MIN/{1.73_M2}
GLUCOSE BLDC GLUCOMTR-MCNC: 100 MG/DL (ref 70–99)
GLUCOSE BLDC GLUCOMTR-MCNC: 119 MG/DL (ref 70–99)
GLUCOSE BLDC GLUCOMTR-MCNC: 129 MG/DL (ref 70–99)
GLUCOSE SERPL-MCNC: 102 MG/DL (ref 70–99)
HCO3 BLD-SCNC: 22 MMOL/L (ref 21–28)
HCO3 BLD-SCNC: 22 MMOL/L (ref 21–28)
HCT VFR BLD AUTO: 39 % (ref 40–53)
HGB BLD-MCNC: 13 G/DL (ref 13.3–17.7)
MCH RBC QN AUTO: 31.5 PG (ref 26.5–33)
MCHC RBC AUTO-ENTMCNC: 33.3 G/DL (ref 31.5–36.5)
MCV RBC AUTO: 94 FL (ref 78–100)
O2/TOTAL GAS SETTING VFR VENT: 100 %
O2/TOTAL GAS SETTING VFR VENT: 80 %
PCO2 BLD: 29 MM HG (ref 35–45)
PCO2 BLD: 35 MM HG (ref 35–45)
PH BLD: 7.42 PH (ref 7.35–7.45)
PH BLD: 7.48 PH (ref 7.35–7.45)
PLATELET # BLD AUTO: 185 10E9/L (ref 150–450)
PO2 BLD: 128 MM HG (ref 80–105)
PO2 BLD: 46 MM HG (ref 80–105)
POTASSIUM SERPL-SCNC: 4.8 MMOL/L (ref 3.4–5.3)
PROT SERPL-MCNC: 5.9 G/DL (ref 6.8–8.8)
RBC # BLD AUTO: 4.13 10E12/L (ref 4.4–5.9)
SODIUM SERPL-SCNC: 142 MMOL/L (ref 133–144)
WBC # BLD AUTO: 7.7 10E9/L (ref 4–11)

## 2021-02-13 PROCEDURE — 250N000011 HC RX IP 250 OP 636

## 2021-02-13 PROCEDURE — 99291 CRITICAL CARE FIRST HOUR: CPT | Performed by: INTERNAL MEDICINE

## 2021-02-13 PROCEDURE — 85027 COMPLETE CBC AUTOMATED: CPT | Performed by: STUDENT IN AN ORGANIZED HEALTH CARE EDUCATION/TRAINING PROGRAM

## 2021-02-13 PROCEDURE — 258N000003 HC RX IP 258 OP 636: Performed by: STUDENT IN AN ORGANIZED HEALTH CARE EDUCATION/TRAINING PROGRAM

## 2021-02-13 PROCEDURE — 36620 INSERTION CATHETER ARTERY: CPT | Mod: GC | Performed by: INTERNAL MEDICINE

## 2021-02-13 PROCEDURE — 36415 COLL VENOUS BLD VENIPUNCTURE: CPT | Performed by: STUDENT IN AN ORGANIZED HEALTH CARE EDUCATION/TRAINING PROGRAM

## 2021-02-13 PROCEDURE — 999N000157 HC STATISTIC RCP TIME EA 10 MIN

## 2021-02-13 PROCEDURE — 250N000011 HC RX IP 250 OP 636: Performed by: STUDENT IN AN ORGANIZED HEALTH CARE EDUCATION/TRAINING PROGRAM

## 2021-02-13 PROCEDURE — 99233 SBSQ HOSP IP/OBS HIGH 50: CPT | Mod: GC | Performed by: INTERNAL MEDICINE

## 2021-02-13 PROCEDURE — 250N000013 HC RX MED GY IP 250 OP 250 PS 637: Performed by: NURSE PRACTITIONER

## 2021-02-13 PROCEDURE — 999N000011 HC STATISTIC ANESTHESIA CASE

## 2021-02-13 PROCEDURE — 999N000127 HC STATISTIC PERIPHERAL IV START W US GUIDANCE

## 2021-02-13 PROCEDURE — 250N000011 HC RX IP 250 OP 636: Performed by: NURSE PRACTITIONER

## 2021-02-13 PROCEDURE — 82803 BLOOD GASES ANY COMBINATION: CPT | Performed by: STUDENT IN AN ORGANIZED HEALTH CARE EDUCATION/TRAINING PROGRAM

## 2021-02-13 PROCEDURE — 5A1955Z RESPIRATORY VENTILATION, GREATER THAN 96 CONSECUTIVE HOURS: ICD-10-PCS | Performed by: INTERNAL MEDICINE

## 2021-02-13 PROCEDURE — 5A0945A ASSISTANCE WITH RESPIRATORY VENTILATION, 24-96 CONSECUTIVE HOURS, HIGH NASAL FLOW/VELOCITY: ICD-10-PCS | Performed by: INTERNAL MEDICINE

## 2021-02-13 PROCEDURE — 36600 WITHDRAWAL OF ARTERIAL BLOOD: CPT

## 2021-02-13 PROCEDURE — 250N000012 HC RX MED GY IP 250 OP 636 PS 637: Performed by: STUDENT IN AN ORGANIZED HEALTH CARE EDUCATION/TRAINING PROGRAM

## 2021-02-13 PROCEDURE — 200N000002 HC R&B ICU UMMC

## 2021-02-13 PROCEDURE — 94660 CPAP INITIATION&MGMT: CPT

## 2021-02-13 PROCEDURE — 250N000013 HC RX MED GY IP 250 OP 250 PS 637: Performed by: INTERNAL MEDICINE

## 2021-02-13 PROCEDURE — 86140 C-REACTIVE PROTEIN: CPT | Performed by: STUDENT IN AN ORGANIZED HEALTH CARE EDUCATION/TRAINING PROGRAM

## 2021-02-13 PROCEDURE — 999N001017 HC STATISTIC GLUCOSE BY METER IP

## 2021-02-13 PROCEDURE — 80053 COMPREHEN METABOLIC PANEL: CPT | Performed by: STUDENT IN AN ORGANIZED HEALTH CARE EDUCATION/TRAINING PROGRAM

## 2021-02-13 PROCEDURE — 94002 VENT MGMT INPAT INIT DAY: CPT

## 2021-02-13 PROCEDURE — 250N000009 HC RX 250: Performed by: STUDENT IN AN ORGANIZED HEALTH CARE EDUCATION/TRAINING PROGRAM

## 2021-02-13 RX ORDER — NALOXONE HYDROCHLORIDE 0.4 MG/ML
0.2 INJECTION, SOLUTION INTRAMUSCULAR; INTRAVENOUS; SUBCUTANEOUS
Status: DISCONTINUED | OUTPATIENT
Start: 2021-02-13 | End: 2021-03-03 | Stop reason: HOSPADM

## 2021-02-13 RX ORDER — PROPOFOL 10 MG/ML
INJECTION, EMULSION INTRAVENOUS
Status: COMPLETED
Start: 2021-02-13 | End: 2021-02-13

## 2021-02-13 RX ORDER — NALOXONE HYDROCHLORIDE 0.4 MG/ML
0.4 INJECTION, SOLUTION INTRAMUSCULAR; INTRAVENOUS; SUBCUTANEOUS
Status: DISCONTINUED | OUTPATIENT
Start: 2021-02-13 | End: 2021-03-03 | Stop reason: HOSPADM

## 2021-02-13 RX ORDER — FENTANYL CITRATE 50 UG/ML
50 INJECTION, SOLUTION INTRAMUSCULAR; INTRAVENOUS
Status: DISCONTINUED | OUTPATIENT
Start: 2021-02-13 | End: 2021-02-16

## 2021-02-13 RX ORDER — FUROSEMIDE 10 MG/ML
40 INJECTION INTRAMUSCULAR; INTRAVENOUS ONCE
Status: COMPLETED | OUTPATIENT
Start: 2021-02-13 | End: 2021-02-13

## 2021-02-13 RX ORDER — PROPOFOL 10 MG/ML
5-75 INJECTION, EMULSION INTRAVENOUS CONTINUOUS
Status: DISCONTINUED | OUTPATIENT
Start: 2021-02-13 | End: 2021-02-16

## 2021-02-13 RX ORDER — FENTANYL CITRATE 50 UG/ML
INJECTION, SOLUTION INTRAMUSCULAR; INTRAVENOUS
Status: COMPLETED
Start: 2021-02-13 | End: 2021-02-14

## 2021-02-13 RX ORDER — PROPOFOL 10 MG/ML
INJECTION, EMULSION INTRAVENOUS PRN
Status: DISCONTINUED | OUTPATIENT
Start: 2021-02-13 | End: 2021-02-13

## 2021-02-13 RX ADMIN — MELATONIN TAB 3 MG 3 MG: 3 TAB at 21:43

## 2021-02-13 RX ADMIN — PROPOFOL 30 MCG/KG/MIN: 10 INJECTION, EMULSION INTRAVENOUS at 23:31

## 2021-02-13 RX ADMIN — PROPOFOL 100 MG: 10 INJECTION, EMULSION INTRAVENOUS at 23:22

## 2021-02-13 RX ADMIN — PANTOPRAZOLE SODIUM 40 MG: 40 TABLET, DELAYED RELEASE ORAL at 08:37

## 2021-02-13 RX ADMIN — SERTRALINE HYDROCHLORIDE 50 MG: 50 TABLET ORAL at 08:37

## 2021-02-13 RX ADMIN — ACYCLOVIR 800 MG: 800 TABLET ORAL at 19:47

## 2021-02-13 RX ADMIN — FUROSEMIDE 40 MG: 10 INJECTION, SOLUTION INTRAVENOUS at 10:01

## 2021-02-13 RX ADMIN — ALBUTEROL SULFATE 2 PUFF: 90 AEROSOL, METERED RESPIRATORY (INHALATION) at 22:33

## 2021-02-13 RX ADMIN — ENOXAPARIN SODIUM 50 MG: 100 INJECTION SUBCUTANEOUS at 19:47

## 2021-02-13 RX ADMIN — LEVOFLOXACIN 250 MG: 250 TABLET, FILM COATED ORAL at 08:36

## 2021-02-13 RX ADMIN — FLUCONAZOLE 100 MG: 100 TABLET ORAL at 08:38

## 2021-02-13 RX ADMIN — PHENYLEPHRINE HYDROCHLORIDE 200 MCG: 10 INJECTION INTRAVENOUS at 23:22

## 2021-02-13 RX ADMIN — ACYCLOVIR 800 MG: 800 TABLET ORAL at 08:37

## 2021-02-13 RX ADMIN — ROCURONIUM BROMIDE 100 MG: 10 INJECTION INTRAVENOUS at 23:22

## 2021-02-13 RX ADMIN — ALBUTEROL SULFATE 2 PUFF: 90 AEROSOL, METERED RESPIRATORY (INHALATION) at 09:00

## 2021-02-13 RX ADMIN — Medication 1 TABLET: at 11:51

## 2021-02-13 RX ADMIN — ACETAMINOPHEN 650 MG: 325 TABLET, FILM COATED ORAL at 09:00

## 2021-02-13 RX ADMIN — ENOXAPARIN SODIUM 50 MG: 100 INJECTION SUBCUTANEOUS at 08:37

## 2021-02-13 RX ADMIN — DEXAMETHASONE 6 MG: 2 TABLET ORAL at 08:36

## 2021-02-13 RX ADMIN — ALBUTEROL SULFATE 2 PUFF: 90 AEROSOL, METERED RESPIRATORY (INHALATION) at 18:22

## 2021-02-13 ASSESSMENT — ACTIVITIES OF DAILY LIVING (ADL)
ADLS_ACUITY_SCORE: 14

## 2021-02-13 NOTE — PROGRESS NOTES
ICU progress note    70-year-old man with history of pulmonary transplant here with COVID-19 pneumonia.    Neurologic: At risk of delirium.  Oriented proactively.    Pulmonary: Breathing comfortably.  Not in distress.  Unfortunately taking a step back he is on progressively higher levels of oxygen support.  We will retry him on high flow today and if he tolerates this we can continue with current plan.  If he remains BiPAP dependent we will have to consider intubation in the near future.  Will be safer for the patient if there is some room for titration if he gets more sick.  We will also diurese him and mobilize proactively.  He is doing decubitus positioning as possible.  He can't comfortably prone.    GI: clears    Heme: ppx    Renal: restart diuresis.    He is at high risk for intubation.  Patient is actually feeling better today than the last previous days and comfortable with his noninvasive support.  We have explained that if he continues to trend upwards we will have to consider it at some point but patient is comfortable being maintained on his noninvasive support right now.    30 minutes of critical care time thus far today.  This patient is critically ill at high risk of decompensation and death.    Carlos Eduardo Davis MD  2/13/2021

## 2021-02-13 NOTE — PROGRESS NOTES
[facility]  Transplant Infectious Disease Progress Note     Patient:  Deejay Dior, Date of birth 1950, Medical record number [id number]  Date of Visit:  02/12/2021         Assessment and Recommendations:   Recommendations:  1. Agree with continued remdesivir & dexamethasone as ordered. (Remdesivir to be completed today).  2. Continue azithromycin as ordered (completed today).  3. Continue checking daily CRP.  4. No other clear identified COVID-19 therapies/trials are recommended from ID standpoint at this time.  5. Review of continued need for prophylaxis medications/indications/dosing in the days to come pending patient's clinical course.    Transplant Infectious Disease will continue to follow with you.      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    Infectious Disease issues include:  #Acute Hypoxic Respiratory Failure:  #COVID-19 PNA (diagnosed 01/30):  #MDS s/p stem cell transplant (2014):  #Chronic GVHD:     From a COVID-19 therapy standpoint, patient is currently on appropriate therapies with remdesivir and dexamethasone as ordered. There is no role for monoclonal antibodies at this point in his course. There is some data to support the use of high titer IVIG, however, this would not routinely be available in standard IVIG infusions and thus, likely low utility of benefit from this. Reviewed with ID team members here, there are no other current trials/therapies at this point in time that patient would be eligible for (should this change, trial leaders will alert us of this). His underlying MDS s/p stem cell transplant and chronic GVHD does likely preclude his eligibility. Again, given the known COVID-19 diagnosis, with bilateral GGO seen on imaging, and normal  procalcitonin, this does argue against bacterial etiology and we are comfortable continuing with azithromycin alone. Patient is currently on Jakafi and prednisone in the out patient setting, as well as a variety of prophylactic medications - acyclovir, fluconazole, bactrim, and levaquin. Pending patient's clinical course, it is reasonable to re-visit the ongoing need/indication for these in the days in the come. Namely, continued need for high dose acyclovir (in absence of documented CMV) and fluconazole (with no neutropenia and essentially normal differential). Patient continuing to have tenuous respiratory status, but no significant worsening. Would not advise any additional COVID-19 therapies at this point time.    - QTc interval: 434 (02/08/21)  - Bacterial prophylaxis: levaquin as out-patient; currently on azithromycin   - Pneumocystis prophylaxis: bactrim  - Viral serostatus & prophylaxis: acyclovir   - Fungal prophylaxis: fluconazole   - Immunization status: Unknown  - Gamma globulin status: IgG replete   - Isolation status: COVID-19; FLOR Mathews DO, MPH  Pager [phone]  Infectious Disease Fellow, PGY-4     Discussed with Dr. Haynes          Interval History:   Patient continuing to have significant oxygen requirements, but respiratory status has remained grossly stable in last 24 hours. He remains on 35-40L, between % FiO2 on high flow oxygen. Patient otherwise afebrile and hemodynamically stable. CRP is continuing to down-trend, decreased to 44 today.  BC remain with NGTD and aspergillus/1,3,B-D glucan/blasto are all negative.         Current Medications & Allergies:       acyclovir  800 mg Oral BID     albuterol  2 puff Inhalation Q4H While awake     calcium carbonate-vitamin D  1 tablet Oral Daily     dexamethasone  6 mg Oral Daily     enoxaparin ANTICOAGULANT  0.5 mg/kg Subcutaneous BID     fluconazole  100 mg Oral Daily     levofloxacin  250 mg Oral Daily     melatonin  3 mg Oral  At Bedtime     pantoprazole  40 mg Oral Daily     [Held by provider] predniSONE  5 mg Oral Daily     ruxolitinib  5 mg Oral BID     sertraline  50 mg Oral Daily     sulfamethoxazole-trimethoprim  1 tablet Oral Once per day on Mon Tue       Infusions/Drips:    - MEDICATION INSTRUCTIONS -       - MEDICATION INSTRUCTIONS -         Allergies   Allergen Reactions     Blood Transfusion Related (Informational Only) Other (See Comments)     Patient has a history of a clinically significant antibody against RBC antigens.  A delay in compatible RBCs may occur.            Physical Exam:   Vitals were reviewed.  All vitals stable.  Patient Vitals for the past 24 hrs:   BP Temp Temp src Pulse Resp SpO2 Weight   02/12/21 1900 130/68 -- -- 69 16 97 % --   02/12/21 1800 125/70 -- -- 70 19 100 % --   02/12/21 1700 (!) 142/90 -- -- -- 22 93 % --   02/12/21 1600 122/78 98.7  F (37.1  C) Oral 63 26 95 % --   02/12/21 1558 -- -- -- 64 -- 96 % --   02/12/21 1500 122/74 -- -- 61 25 98 % --   02/12/21 1400 120/81 -- -- 64 9 (!) 89 % --   02/12/21 1300 128/76 -- -- 64 10 95 % --   02/12/21 1200 106/57 98.1  F (36.7  C) Oral 75 26 95 % --   02/12/21 1100 124/73 -- -- 72 12 100 % --   02/12/21 1030 -- -- -- 76 19 (!) 82 % --   02/12/21 1000 (!) 146/92 -- -- 65 15 91 % --   02/12/21 0900 (!) 147/88 -- -- 60 20 96 % --   02/12/21 0834 -- -- -- 62 -- 94 % --   02/12/21 0800 128/70 98.6  F (37  C) Axillary 61 26 94 % --   02/12/21 0700 116/66 -- -- 70 (!) 69 95 % --   02/12/21 0600 131/81 -- -- 60 24 93 % --   02/12/21 0500 117/67 -- -- 61 29 94 % --   02/12/21 0400 139/76 97.6  F (36.4  C) Axillary 82 (!) 34 (!) 80 % 103.8 kg (228 lb 13.4 oz)   02/12/21 0300 119/63 -- -- 60 (!) 32 95 % --   02/12/21 0200 128/65 -- -- 57 25 95 % --   02/12/21 0100 128/68 -- -- 60 25 97 % --   02/12/21 0000 117/59 99  F (37.2  C) Oral 66 26 (!) 87 % --   02/11/21 2356 -- -- -- -- -- (!) 89 % --   02/11/21 2355 -- -- -- -- -- (!) 76 % --   02/11/21 2300 -- -- -- 68  27 97 % --   02/11/21 2200 112/67 -- -- 76 22 97 % --   02/11/21 2100 123/65 -- -- 72 24 90 % --   02/11/21 2000 125/60 99.1  F (37.3  C) Axillary 83 23 (!) 84 % --   02/11/21 1939 -- -- -- -- -- 91 % --     Ranges for vital signs:  Temp:  [97.6  F (36.4  C)-99.1  F (37.3  C)] 98.7  F (37.1  C)  Pulse:  [57-83] 69  Resp:  [9-69] 16  BP: (106-147)/(57-92) 130/68  FiO2 (%):  [60 %-100 %] 100 %  SpO2:  [76 %-100 %] 97 %  Vitals:    02/09/21 1300 02/11/21 0400 02/12/21 0400   Weight: 104.1 kg (229 lb 8 oz) 104.6 kg (230 lb 9.6 oz) 103.8 kg (228 lb 13.4 oz)       Physical Examination:  Physical exam deferred given COVID-19 + status and remote visit.          Laboratory Data:     Absolute CD4   Date Value Ref Range Status   06/29/2015 345 (L) 441 - 2,156 cells/uL Final     Comment:     Effective 12/08/2014, the reference range for this assay has changed to   reflect   new methodology.     12/29/2014 190 (L) 441 - 2,156 cells/uL Final     Comment:     Effective 12/08/2014, the reference range for this assay has changed to   reflect   new methodology.     10/09/2014 37 mm3 Final       Inflammatory Markers    Recent Labs   Lab Test 02/12/21  0337 02/11/21  0409 02/10/21  0721 02/09/21  0637 02/08/21  0910 05/16/15  1057 11/30/14  1207   SED  --   --   --   --   --   --  40*   CRP 44.0* 79.0* 110.0* 130.0* 78.0* 7.4 17.4*       Immune Globulin Studies     Recent Labs   Lab Test 02/10/21  0721 01/24/19  1605 10/01/18  0955 05/08/16  0906 03/20/16  0352 07/30/15  1125 10/09/14  1010   IGG 3,275*  831 1,130 1,300 1,270 403* 913 458*     --   --   --   --   --  65   IGE  --   --   --   --   --   --  12   IGA 83*  --   --   --   --   --  48*       Metabolic Studies       Recent Labs   Lab Test 02/12/21  0337 02/11/21  0409 02/10/21  1305 02/08/21  2024 02/08/21  2024 02/08/21  0910 02/05/21  1227 04/05/16  0300 04/05/16  0300    141  --    < >  --  139 140   < > 137   POTASSIUM 4.1 4.0  --    < >  --  4.0 4.0   < >  4.0   CHLORIDE 115* 111*  --    < >  --  111* 109   < > 104   CO2 22 23  --    < >  --  23 25   < > 26   ANIONGAP 4 8  --    < >  --  5 6   < > 8   BUN 38* 39*  --    < >  --  25 30   < > 18   CR 0.79 0.85  --    < >  --  0.88 1.10   < > 0.79   GFRESTIMATED 90 87  --    < >  --  86 67   < > >90  Non  GFR Calc     * 137*  --    < >  --  100* 94   < > 94   JOE 8.3* 8.7  --    < >  --  8.4* 8.4*   < > 8.4*   PHOS  --   --   --   --   --   --   --   --  2.8   MAG  --   --   --   --   --  1.7 2.0   < > 2.0   LACT  --   --   --   --   --  1.1 1.4  --   --    PCAL  --   --   --   --  0.28 0.10  --   --   --    FGTL  --   --  <31  --   --   --   --    < >  --     < > = values in this interval not displayed.       Hepatic Studies    Recent Labs   Lab Test 02/12/21  0337 02/11/21  0409 02/10/21  0721 02/08/21  0910 02/08/21  0910 04/06/16  0245 04/06/16  0245 03/25/16 2128 03/25/16 2128 07/07/14  0445 07/07/14  0445   BILITOTAL 0.4 0.3 0.3   < > 0.3   < > 0.8   < >  --    < > 1.8*   BILIDELTA  --   --   --   --   --   --   --   --   --   --  1.0*   BILICONJ  --   --   --   --   --   --   --   --   --   --  0.0   DBIL  --   --   --   --   --   --  0.3*   < >  --    < >  --    ALKPHOS 87 86 76   < > 69   < > 292*   < >  --    < > 88   PROTTOTAL 5.9* 6.4* 6.3*   < > 6.6*   < > 6.7*   < >  --    < > 6.3*   ALBUMIN 2.3* 2.5* 2.4*   < > 2.6*   < > 2.3*   < >  --    < > 3.4   AST 46* 52* 52*   < > 51*   < > 96*   < >  --    < > 19   ALT 38 30 29   < > 28   < > 167*   < >  --    < > 47   LDH  --   --   --   --  483*  --   --   --  806*   < >  --     < > = values in this interval not displayed.       Pancreatitis testing    Recent Labs   Lab Test 02/14/19  1342 09/27/18  1217 04/04/16  0350   AMYLASE 46  --   --    LIPASE  --  158  --    TRIG  --   --  286*       Gout Labs      Recent Labs   Lab Test 06/24/14  1205 06/09/14  1116   URIC 6.1 5.8       Hematology Studies      Recent Labs   Lab Test  02/12/21  0337 02/11/21  0409 02/10/21  0721 02/09/21  0637 02/08/21  0910 02/05/21  1227   WBC 8.3 6.7 6.9 8.0 6.1 5.2   ANEU 7.0 5.7 5.6  --  4.6 3.4   ALYM 0.7* 0.7* 0.9  --  1.0 1.3   WILLIAM 0.4 0.3 0.3  --  0.3 0.4   AEOS 0.0 0.0 0.0  --  0.0 0.0   HGB 12.4* 13.2* 13.3 13.3 13.4 13.6   HCT 38.3* 39.1* 38.8* 40.0 39.8* 42.1    192 170 146* 152 173       Clotting Studies    Recent Labs   Lab Test 02/14/19  1342 01/24/19  1605 06/30/16  0952 06/22/16  1218 04/04/16  0350 04/04/16  0350   INR 0.98 0.92 0.95 1.48*   < > 1.01   PTT  --   --   --   --   --  47*    < > = values in this interval not displayed.       Iron Testing    Recent Labs   Lab Test 02/12/21  0337 02/11/21  0409 11/21/19  1148 11/21/19  1148 09/27/18  1218 09/27/18  1218   LENNOX  --  401*   < >  --   --   --    MCV 95 95   < > 95   < >  --    FOLIC  --   --   --   --   --  15.9   B12  --   --   --  455  --   --     < > = values in this interval not displayed.       Arterial Blood Gas Testing    Recent Labs   Lab Test 02/10/21  1441 02/10/21  1300 02/08/21  0910 02/05/21  1232 03/28/16  0431 03/28/16  0431 03/27/16  2120 03/27/16  2120 03/27/16  1601   PH 7.47* 7.47*  --   --   --  7.43  --  7.45 7.34*   PCO2 33* 30*  --   --   --  45  --  38 53*   PO2 64* 57*  --   --   --  126*  --  149* 161*   HCO3 24 22  --   --   --  29*  --  27 29*   O2PER 100 70 6 ROOM AIR   < > 50.0   < > 60.0 100.0    < > = values in this interval not displayed.        Thyroid Studies     Recent Labs   Lab Test 09/27/18  1217 02/27/18  1239 10/12/16  1107 06/30/16  0953 03/01/16  1227   TSH 1.01 0.70 0.40 1.02 0.38*   T4  --   --   --  1.03 1.12       Urine Studies     Recent Labs   Lab Test 08/25/16  1601 05/07/16  1211 03/24/16  1650 03/21/16  1730 12/17/15  1430   URINEPH 6.0 5.0 5.0 5.0 5.0   NITRITE Negative Negative Negative Negative Negative   LEUKEST Negative Negative Negative Negative Negative   WBCU 1 1 <1 1 1       Medication levels    Recent Labs   Lab Test  09/27/18  1059 04/01/16  0831 04/01/16  0831 03/21/16  2143 03/21/16  2143 02/01/15  0924 02/01/15  0924   VANCOMYCIN  --   --   --   --  25.4*   < >  --    VCON  --   --  1.6   < >  --   --   --    CYCLSP  --   --   --   --   --   --  178   RAPAMY 4.5*   < >  --    < >  --    < >  --     < > = values in this interval not displayed.       Body fluid stats    Recent Labs   Lab Test 03/27/16  1406   FTYP Bronchial  SITE 2 RIGHT MIDDLE LOBE    Bronchial  SITE 1 RIGTH UPPER LOBE     FCOL Colorless  Colorless   FAPR Clear  Clear   FRBC << Do Not Report >>  << Do Not Report >>   FWBC 63  57   FNEU 16  21   FLYM 8  14   FMONO 76  65   GS No organisms seen  >25 PMNs/low power field    No organisms seen  >25 PMNs/low power field         Microbiology:  Fungal testing  Recent Labs   Lab Test 02/10/21  1305 09/27/18  1217 03/27/16  1406 03/25/16  1911 03/20/16  0352 03/19/16  1638 03/19/16  0749   FGTL <31 35  --  >500  Unit: pg/mL   149  --   --    ASPGAI 0.05 0.05 0.10  0.09  --   --  0.07 0.12   ASPAG  --   --  Negative  Reference range: Negative  (Note)  INTERPRETIVE INFORMATION: Aspergillus Galactomannan EIA,  Broncho  A BAL galactomannan index of greater than or equal to 0.5  is considered positive.  This result should be interpreted  in the context of patient history, clinical signs/symptoms,  and other routine diagnostic tests (e.g., culture,  histologic examination of biopsy material, and radiographic  imaging).  Performed by [facility],  [address] [place] [id number] [phone]  [email or web address], Lencho Slaughter MD, Lab. Director    Negative  Reference range: Negative  (Note)  INTERPRETIVE INFORMATION: Aspergillus Galactomannan EIA,  Broncho  A BAL galactomannan index of greater than or equal to 0.5  is considered positive.  This result should be interpreted  in the context of patient history, clinical signs/symptoms,  and other routine diagnostic tests (e.g., culture,  histologic examination of  biopsy material, and radiographic  imaging).  Performed by [facility],  [address] [facility] [place] [address] [phone]  [email or web address], Lencho Slaughter MD, Lab. Director    --   --   --   --    ASPGAA Negative Negative  --   --   --  Negative  Reference range: Negative  Unit: not reported  (Note)  INTERPRETIVE INFORMATION: Aspergillus Galactomannan Antigen  by EIA  Negative results do not exclude the diagnosis of invasive  aspergillosis. A single positive test result (index equal  to or greater than 0.5) should be clinically correlated  by testing a separate serum specimen because many agents  (e.g. foods, antibiotics) may cross-react with the test.  If invasive aspergillosis is suspected in high-risk  patients, serial sampling is recommended.  Performed by [facility],  [address] [facility] [place] [address] [phone]  [email or web address], Lencho Slaughter MD, Lab. Director   Negative  Reference range: Negative  Unit: not reported  (Note)  INTERPRETIVE INFORMATION: Aspergillus Galactomannan Antigen  by EIA  Negative results do not exclude the diagnosis of invasive  aspergillosis. A single positive test result (index equal  to or greater than 0.5) should be clinically correlated  by testing a separate serum specimen because many agents  (e.g. foods, antibiotics) may cross-react with the test.  If invasive aspergillosis is suspected in high-risk  patients, serial sampling is recommended.  Performed by [facility],  [address] [facility] [place] [address] [phone]  [email or web address], Lencho Slaughter MD, Lab. Director         Last Culture results with specimen source  Culture Micro   Date Value Ref Range Status   02/08/2021 No growth after 4 days  Preliminary   02/08/2021 No growth after 4 days  Preliminary   02/05/2021 No growth  Final   02/05/2021 No growth  Final   03/08/2018 No growth  Final   03/08/2018 No growth  Final   02/21/2018 No Beta Streptococcus isolated  Final   06/09/2016 No growth  Final    05/07/2016 No growth  Final   05/07/2016 No growth  Final   03/31/2016 No growth  Final   03/31/2016 No growth  Final   03/29/2016 (A)  Final    Light growth Enterococcus species (VRE)  Critical Value/Significant Value, preliminary result only, called to and read   back by Preeti Cordoba RN @1301 04/01/16 gd     03/27/2016 No growth  Final   03/27/2016   Final    Culture negative for acid fast bacilli  Assayed at [facility] [address]     03/27/2016 (A)  Final    Geotrichum candidum isolated  Susceptibility testing requested by Dr. Alonzo 4.1.16 KB  No additional fungi cultured after 4 weeks incubation     03/27/2016 No growth after 29 days  Final   03/27/2016   Final    Unable to determine the presence of Actinomyces species due to an overgrowth of   normal kimani.     03/27/2016 No Legionella species isolated  Final   03/27/2016 No growth  Final   03/27/2016 Culture negative after 29 days  Final   03/27/2016 No growth after 29 days  Final   03/27/2016 No Legionella species isolated  Final   03/27/2016   Final    Culture negative for acid fast bacilli  Assayed at [facility] [address]      Specimen Description   Date Value Ref Range Status   02/08/2021 Blood Right Arm  Final   02/08/2021 Blood Left Arm  Final   02/05/2021 Blood Right Arm  Final   02/05/2021 Blood  Final   08/01/2019 Nares  Final   03/08/2018 Blood Left Arm  Final   03/08/2018 Blood Right Arm  Final   02/21/2018 Throat  Final   02/21/2018 Throat  Final   10/12/2016 Nasal  Final   06/09/2016 Blood Unspecified Site  Final   05/07/2016 Blood Red port  Final   05/07/2016 Blood White port  Final   05/05/2016 Nasal  Final   03/31/2016 Blood Right Arm  Final   03/31/2016 Blood Left PICC  Final        Last check of C difficile  C Diff Toxin B PCR   Date Value Ref Range Status   03/29/2016  NEG Final    Negative  Negative: Clostridium difficile target DNA sequences NOT detected, presumed   negative for  Clostridium difficile toxin B or the number of bacteria present   may be below the limit of detection for the test.   FDA approved assay performed using [employer] GeneXpert real-time PCR.   A negative result does not exclude actual disease due to Clostridium difficile   and may be due to improper collection, handling and storage of the specimen or   the number of organisms in the specimen is below the detection limit of the   assay.         Infection Studies to assess Diarrhea   Recent Labs   Lab Test 03/29/16  1245 03/21/16  0945   ADENOVIRUSAG Negative  --    EPCAMP Not Detected Not Detected   EPSALM Not Detected Not Detected   EPSHGL Not Detected Not Detected   EPVIB Not Detected Not Detected   EPROTA Not Detected Not Detected   EPNORO Not Detected Not Detected   EPYER Not Detected Not Detected       Virology:  Coronavirus-19 testing    Recent Labs   Lab Test 01/30/21  1514   COVIDPCREXT Positive*       Respiratory virus (non-coronavirus-19) testing    Recent Labs   Lab Test 02/09/21  1315 02/21/18  1348 10/12/16  1155 06/09/16  1233 11/30/14  1208 11/30/14  1208   RVSPEC  --  Nasopharyngeal  --  Nasopharyngeal   < >  --    AFLU  --   --   --   --   --  Negative   IFLUA Not Detected Negative  --  Negative   < >  --    INFZA  --   --  Negative   Flu A target RNA not detected, presumed negative for Influenza A or the viral   load is below the limit of detection.    --    < >  --    FLUAH1 Not Detected Negative  --  Negative   < >  --    [id number] Not Detected Negative  --  Negative   < >  --    FLUAH3 Not Detected Negative  --  Negative   < >  --    BFLU  --   --   --   --   --  Negative   Test results must be correlated with clinical data. If necessary, results   should be confirmed by a molecular assay or viral culture.     IFLUB Not Detected Negative  --  Negative   < >  --    INFZB  --   --  Negative   Flu B target RNA not detected, presumed negative for Influenza B or the viral   load is below the limit of  detection.    --    < >  --    PIV1 Not Detected Negative  --  Negative   < >  --    PIV2 Not Detected Negative  --  Negative   < >  --    PIV3 Not Detected Negative  --  Negative   < >  --    PIV4 Not Detected  --   --   --   --   --    IRSV  --   --  Negative   RSV target RNA not detected, presumed negative for Respiratory Syncitial Virus   or the viral load is below the limit of detection.   FDA approved assay performed using [employer] GeneXpert(R) real-time PCR.    --    < >  --    HRVS  --  Negative  --  Negative   < >  --    RSVA Not Detected Negative  --  Negative   < >  --    RSVB Not Detected Negative  --  Negative   < >  --    HMPV Not Detected Negative  --  Negative   < >  --    ADVBE  --  Negative  --  Negative   < >  --    ADVC  --  Negative  --  Negative   < >  --    ADENOV Not Detected  --   --   --   --   --    CORONA Not Detected  --   --   --   --   --     < > = values in this interval not displayed.       EBV DNA Copies/mL   Date Value Ref Range Status   08/16/2018 EBV DNA Not Detected EBVNEG^EBV DNA Not Detected [Copies]/mL Final   06/21/2018 EBV DNA Not Detected EBVNEG^EBV DNA Not Detected [Copies]/mL Final   02/27/2018 <500 (A) EBVNEG^EBV DNA Not Detected [Copies]/mL Final     Comment:     EBV DNA Detected below the reportable range of 500 Copies/mL   12/14/2017 EBV DNA Not Detected EBVNEG^EBV DNA Not Detected [Copies]/mL Final   09/28/2017 <500 (A) EBVNEG^EBV DNA Not Detected [Copies]/mL Final     Comment:     EBV DNA Detected below the reportable range of 500 Copies/mL   06/08/2017 EBV DNA Not Detected EBVNEG [Copies]/mL Final       No results found for: [address]10, BKRES    Parvovirus Testing    Recent Labs   Lab Test 03/27/16  1406   PRVSP Bronchoalveolar Lavage   Right upper lobe     PRVPC Not Detected  (Note)  NOT DETECTED - A negative result does not rule out the  presence of PCR inhibitors in the patient specimen or assay  specific nucleic acid in concentrations below the level of  detection  by the assay.    The specimen submitted for testing did not meet [facility]  submission guidelines. Testing was performed on a specimen  that did not meet validated type requirements.  Performance characteristics of this assay may be affected.  Interpret results with caution. Please refer to the [facility]  Laboratory Test Directory for information on specimen  acceptability:  http://www.[facility]/Specimen-Handling/index.jsp.  INTERPRETIVE INFORMATION: Parvovirus B19 By PCR  Test developed and characteristics determined by [facility]. See Compliance Statement B: [facility]/CS  Performed by [facility],  [address] [place] [address] [phone]  [email or web address], Lencho Slaughter MD, Lab. Director         Adenovirus Testing    Recent Labs   Lab Test 03/08/18  1343 03/29/16  1245 03/29/16  0939 02/18/15  1421 02/18/15  1050 02/04/15  2046   ADRES No Adenovirus DNA detected.  --  No Adenovirus DNA detected.  --  No Adenovirus DNA detected.  --    ADENOVIRUSAG  --  Negative  --  Negative  --  Negative       Imaging:  No results found for this or any previous visit (from the past 48 hour(s))..

## 2021-02-13 NOTE — PROGRESS NOTES
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 Dior is 6 year, 7 months out from allo-sib PBHSCT c/b mucocutaneous cGHVD (skin, colon, eyes, mouth) along with arthralgias/myalgias and chronic fatigue/dyspnea. No pulmonary cGHVD. His GVHD symptoms have been stable on Jakafi and low-dose pred for some time, although he has had a history of recurrent pulmonary infections, at least in part due to immunosuppression. His IgG level was normal, no need for IVIg. Would continue Jakafi, but hold pred while on dexamethasone for COVID. He should restart pred once off dex. He should continue on all of his prophylactic anti-infective agents; levofloxacin can be held while on IV antibiotics.     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 Great saphenous ablation on the right, with recannulization of left saphenous vein on US from 7/2019. Anticoagulation per COVID protocol.      Recommendations:   - Continue Jakafi 5mg BID  - Hold prednisone 5mg while on Dex for COVID              - Please restart prednisone 5mg daily once Dex is finished  - Systemic anticoagulation per COVID protocol  - Continue prophylactic anti-infective: ACV 800mg BID, Bactrim BID Mon/Tues, fluconazole 100mg daily  - Okay to hold levofloxacin while on IV  abx    Patient was seen and plan of care was discussed with attending physician Dr. Black.    We will continue to follow this patient. Please don't hesitate to contact the Fellow On-Call with questions.    Shubham Joyce MD PhD  Heme/Onc/Transplant Fellow  Pgr #[id number]    _________________________________________________    Subjective & Interval History:    - Oxygen requirements continue to increase, up to 100% 40L HFNC at rest, needing oxymask on top of it with activity      Physical Exam:    /75   Pulse 60   Temp 98.1  F (36.7  C) (Axillary)   Resp 12   Wt 103.8 kg (228 lb 13.4 oz)   SpO2 96%   BMI 34.04 kg/m    Gen: Laying in bed, HFNC and oxymask in place  Pulm: Unlabored breathing  Skin: No obvious rashes or abnormalities on exposed skin  Neuro: Alert, using cell phone. CNII-XII grossly intact.     The rest of a comprehensive physical examination is deferred due to public health emergency video visit restrictions.    Labs & Studies: I personally reviewed the following studies:  ROUTINE LABS (Last four results):  CMP  Recent Labs   Lab 02/13/21  0616 02/12/21  0337 02/11/21  0409 02/10/21  0721 02/08/21  0910 02/08/21  0910    142 141 141   < > 139   POTASSIUM 4.8 4.1 4.0 4.2   < > 4.0   CHLORIDE 112* 115* 111* 112*   < > 111*   CO2 25 22 23 23   < > 23   ANIONGAP 5 4 8 6   < > 5   * 126* 137* 119*   < > 100*   BUN 33* 38* 39* 40*   < > 25   CR 0.77 0.79 0.85 0.87   < > 0.88   GFRESTIMATED >90 90 87 86   < > 86   GFRESTBLACK >90 >90 >90 >90   < > >90   JOE 8.5 8.3* 8.7 8.7   < > 8.4*   MAG  --   --   --   --   --  1.7   PROTTOTAL 5.9* 5.9* 6.4* 6.3*   < > 6.6*   ALBUMIN 2.2* 2.3* 2.5* 2.4*   < > 2.6*   BILITOTAL 0.5 0.4 0.3 0.3   < > 0.3   ALKPHOS 106 87 86 76   < > 69   AST 43 46* 52* 52*   < > 51*   ALT 41 38 30 29   < > 28    < > = values in this interval not displayed.     CBC  Recent Labs   Lab 02/13/21  0616 02/12/21  0337 02/11/21  0409 02/10/21  0721   WBC 7.7 8.3 6.7  6.9   RBC 4.13* 4.02* 4.13* 4.09*   HGB 13.0* 12.4* 13.2* 13.3   HCT 39.0* 38.3* 39.1* 38.8*   MCV 94 95 95 95   MCH 31.5 30.8 32.0 32.5   MCHC 33.3 32.4 33.8 34.3   RDW 18.3* 18.1* 18.2* 18.5*    184 192 170     INRNo lab results found in last 7 days.    Medications list for reference:  Current Facility-Administered Medications   Medication     acetaminophen (TYLENOL) tablet 650 mg     acyclovir (ZOVIRAX) tablet 800 mg     albuterol (PROAIR HFA/PROVENTIL HFA/VENTOLIN HFA) 108 (90 Base) MCG/ACT inhaler 2 puff     artificial saliva (BIOTENE MT) solution 1 spray     bisacodyl (DULCOLAX) EC tablet 5 mg    Or     bisacodyl (DULCOLAX) EC tablet 10 mg    Or     bisacodyl (DULCOLAX) EC tablet 15 mg     calcium carbonate-vitamin D (OSCAL w/D) per tablet 1 tablet     carboxymethylcellulose PF (REFRESH PLUS) 0.5 % ophthalmic solution 1 drop     dexamethasone (DECADRON) tablet 6 mg     enoxaparin ANTICOAGULANT (LOVENOX) injection 50 mg     fluconazole (DIFLUCAN) tablet 100 mg     levofloxacin (LEVAQUIN) tablet 250 mg     lidocaine (LMX4) cream     lidocaine 1 % 0.1-1 mL     magnesium citrate solution 148 mL     Medication instructions: Do NOT use nebulized medications     melatonin tablet 3 mg     ondansetron (ZOFRAN-ODT) ODT tab 4 mg    Or     ondansetron (ZOFRAN) injection 4 mg     pantoprazole (PROTONIX) EC tablet 40 mg     Patient is already receiving anticoagulation with heparin, enoxaparin (LOVENOX), warfarin (COUMADIN)  or other anticoagulant medication     polyethylene glycol (MIRALAX) Packet 17 g     [Held by provider] predniSONE (DELTASONE) tablet 5 mg     prochlorperazine (COMPAZINE) injection 5 mg    Or     prochlorperazine (COMPAZINE) tablet 5 mg    Or     prochlorperazine (COMPAZINE) suppository 12.5 mg     ruxolitinib (JAKAFI) tablet 5 mg     sertraline (ZOLOFT) tablet 50 mg     sodium chloride (PF) 0.9% PF flush 3 mL     sodium chloride (PF) 0.9% PF flush 3 mL     sodium chloride (PF) 0.9% PF flush 3 mL      sodium chloride (PF) 0.9% PF flush 3 mL     sulfamethoxazole-trimethoprim (BACTRIM DS) 800-160 MG per tablet 1 tablet       The patient was seen and examined by me separately from the fellow provider. The note reflects our mutual assessment and plans and were approved by me personally.   I personally reviewed today's lab results vital signs and radiology results.     Each point of the assessment and plan were reviewed by the midlevel and me and either endorsed by me or were my added decisions.     My pertinent physical findings today are: not possible: video     My assessment and plan are: 69 yo 6 years post allo for MDS on prednisone and Jakafi for CGVHD now with serious COVID pneumonia. Continue Jakafi and hold predsinone while on dex burst, then resume.Will probably need intubation. Ci No changes in immunosupession. No current BMT issues.  I agree with the A&P as described by the resident

## 2021-02-13 NOTE — PLAN OF CARE
ICU End of Shift Summary. See flowsheets for vital signs and detailed assessment.     Changes this shift: Neuro intact. Denies pain. Hemodynamically stable. On 100% HFNC for majority of shift. PO2 46 on ABG- pt placed on 80% BiPAP- maintaining O2 sats in high 90s.ABG scheduled for later in AM. Voiding WDL in urinal. No BM.      Plan: Continue to monitor oxygenation status. Report changes to MICU team.

## 2021-02-13 NOTE — PLAN OF CARE
ICU End of Shift Summary. See flowsheets for vital signs and detailed assessment.    Changes this shift: desat to 70s with activity, talking.  HFNC 100% 40L with 10L oxymask.  Recovers well with rest.  Up to commode with SBA    Plan: continue with plan of care

## 2021-02-13 NOTE — PROGRESS NOTES
MEDICAL ICU PROGRESS NOTE  02/13/2021      Date of Service (when I saw the patient): 02/13/2021    ASSESSMENT: Deejay Doir is a 70 year old male with 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 Transferred to MICU on 2/10 for worsening respiratory status and c/f intubation.    CHANGES and MAJOR THINGS TODAY:   - Transition back to HFNC  - 40 mg Furosemide x1      PLAN:    Neuro:  Pain and sedation  - Acetaminophen PRN    MDD  - Continue PTA sertraline    Pulmonary:  Acute hypoxic respiratory failure secondary to COVID-19 pneumonia, rhinovirus  Respiratory Alkalosis  ABG checked overnight --> 7.48/29/46 on 100% HFNC overnight, pO2 improved after 80% BiPAP with pO2 of 128.  - Encourage self-proning  - Encourage HFNC as tolerated  - BIPAP when needed  - Anti-anxiety medications if needed  - Diurese again today with 40 mg furosemide x1  - If requires increase in FiO2 or inability to tolerate breaks from BiPAP on HFNC, strongly consider intubation    Cardiovascular:  No acute concerns. TTE 2/9 LVEF 65-70%.     GI/Nutrition  Nutrition  Tolerating regular diet, back off to clears if not tolerating HFNC    Renal/Fluids/Electrolytes:  No acute issues    Endocrine:  No acute concerns    ID:  COVID-19 pneumonia  + Rhinovirus  Remdesivir course completed on 2/12. Was considered for tocilizumab  - ID following  - Dexamethasone decreased from 10 mg to 6 mg for total ten day course (2/8-2/17)  - Daily CRP per ID rec  - Enoxaparin 50 mg BID given elevated D-dimer    Concern for superimposed bacterial pneumonia  Procal increased to 0.28 on 2/8. Previously on ceftriaxone, arithromycin, with prophylactic levofloxacin held but now restarted.   - Competed 3 days azithromycin    Hematology:    MDS/ s/p BMT 2014 complicated by GVHD  No acute concerns. PTA on prednisone and Jakafi.  - BMT consulted  - Holding  PTA prednisone while on dexamethasone  - Continue PTA Jakafi 5 mg twice daily  - Continue PTA Bactrim double strength 1 tablet twice weekly for PCP prophylaxis  - Continue PTA acyclovir 800 mg 2 times daily for prophylaxis against herpes simplex virus  - Continue PTA  fluconazole 100 mg oral daily  - Continue PTA levofloxacin 250 mg every day     Musculoskeletal:  No acute concerns    Skin:  No acute concerns    General Cares/Prophylaxis:    DVT Prophylaxis: Enoxaparin (Lovenox) subcutaneous 50 mg BID  GI Prophylaxis: Not indicated  Restraints: None  Family Communication: Wife (Racquel) updated by phone at 13:10 this afternoon  Code Status: Full code   Discussed with patient and wife via phone that there is not much more non-invasive support we can provide if he were to worsen. If he were to require increase in his FiO2 or become unable to tolerate breaks from BiPAP on HFNC, those would be concerning signs at which point we would likely want to intubate. Although reluctant to get intubated, Deejay would prefer intubation to allow more time to recover over death.    Lines/tubes/drains:  - PIV    Disposition:  - Medical ICU     Patient seen and findings/plan discussed with medical ICU staff, Dr. Davis.    Summer SELLERSHarini Tobin    ====================================  INTERVAL HISTORY:   PO2 on 0400 ABG was 46. Placed on 80% BIPAP with repeat ABG pO2 128. When I saw him he denied feeling short of breath although appeared to have some work of breathing. Denied chest pain, abdominal pain, nausea. No other acute events.     OBJECTIVE:   1. VITAL SIGNS:   Temp:  [98.1  F (36.7  C)-98.7  F (37.1  C)] 98.2  F (36.8  C)  Pulse:  [60-76] 61  Resp:  [9-69] 25  BP: (106-147)/(57-92) 127/67  FiO2 (%):  [60 %-100 %] 100 %  SpO2:  [82 %-100 %] 95 %  FiO2 (%): 100 %  Resp: 25    2. INTAKE/ OUTPUT:   I/O last 3 completed shifts:  In: 1335 [P.O.:1060; I.V.:275]  Out: 1025 [Urine:1025]    3. PHYSICAL EXAMINATION:  General: Lying in bed on  HFNC  Neuro: Alert and oriented x4, able to complete full sentences, appropriate  Pulm/Resp: Clear breath sounds bilaterally without rhonchi, crackles or wheeze, breathing slightly labored  CV: Sinus jordon to normal sinus, rare PACs/PVCs,  trace edema.   Abdomen: Soft, non-distended, non-tender  : Urine yellow and clear, continent  Extremities: Warm, slight RLE edema (chronic per pt)    4. LABS:   Arterial Blood Gases   Recent Labs   Lab 02/13/21  0342 02/10/21  1441 02/10/21  1300   PH 7.48* 7.47* 7.47*   PCO2 29* 33* 30*   PO2 46* 64* 57*   HCO3 22 24 22     Complete Blood Count   Recent Labs   Lab 02/12/21  0337 02/11/21  0409 02/10/21  0721 02/09/21  0637   WBC 8.3 6.7 6.9 8.0   HGB 12.4* 13.2* 13.3 13.3    192 170 146*     Basic Metabolic Panel  Recent Labs   Lab 02/12/21  0337 02/11/21  0409 02/10/21  0721 02/09/21  0637    141 141 143   POTASSIUM 4.1 4.0 4.2 4.5   CHLORIDE 115* 111* 112* 114*   CO2 22 23 23 22   BUN 38* 39* 40* 28   CR 0.79 0.85 0.87 0.77   * 137* 119* 121*     Liver Function Tests  Recent Labs   Lab 02/12/21  0337 02/11/21  0409 02/10/21  0721 02/09/21  0637   AST 46* 52* 52* 52*   ALT 38 30 29 29   ALKPHOS 87 86 76 66   BILITOTAL 0.4 0.3 0.3 0.3   ALBUMIN 2.3* 2.5* 2.4* 2.6*     5. RADIOLOGY:   No results found for this or any previous visit (from the past 24 hour(s)).

## 2021-02-14 ENCOUNTER — APPOINTMENT (OUTPATIENT)
Dept: GENERAL RADIOLOGY | Facility: CLINIC | Age: 71
DRG: 207 | End: 2021-02-14
Attending: STUDENT IN AN ORGANIZED HEALTH CARE EDUCATION/TRAINING PROGRAM
Payer: MEDICARE

## 2021-02-14 LAB
ALBUMIN SERPL-MCNC: 2.3 G/DL (ref 3.4–5)
ALP SERPL-CCNC: 116 U/L (ref 40–150)
ALT SERPL W P-5'-P-CCNC: 36 U/L (ref 0–70)
ANION GAP SERPL CALCULATED.3IONS-SCNC: 6 MMOL/L (ref 3–14)
APTT PPP: 29 SEC (ref 22–37)
AST SERPL W P-5'-P-CCNC: 33 U/L (ref 0–45)
BACTERIA SPEC CULT: NO GROWTH
BACTERIA SPEC CULT: NO GROWTH
BASE DEFICIT BLDA-SCNC: 0.2 MMOL/L
BASE DEFICIT BLDA-SCNC: 0.4 MMOL/L
BASE DEFICIT BLDA-SCNC: 0.7 MMOL/L
BASE DEFICIT BLDA-SCNC: 1.1 MMOL/L
BASE DEFICIT BLDA-SCNC: 1.2 MMOL/L
BILIRUB SERPL-MCNC: 0.6 MG/DL (ref 0.2–1.3)
BUN SERPL-MCNC: 39 MG/DL (ref 7–30)
CALCIUM SERPL-MCNC: 8.5 MG/DL (ref 8.5–10.1)
CHLORIDE SERPL-SCNC: 107 MMOL/L (ref 94–109)
CK SERPL-CCNC: 27 U/L (ref 30–300)
CO2 SERPL-SCNC: 25 MMOL/L (ref 20–32)
CREAT SERPL-MCNC: 0.96 MG/DL (ref 0.66–1.25)
CRP SERPL-MCNC: 68 MG/L (ref 0–8)
FIBRINOGEN PPP-MCNC: 425 MG/DL (ref 200–420)
GFR SERPL CREATININE-BSD FRML MDRD: 79 ML/MIN/{1.73_M2}
GLUCOSE BLDC GLUCOMTR-MCNC: 120 MG/DL (ref 70–99)
GLUCOSE BLDC GLUCOMTR-MCNC: 132 MG/DL (ref 70–99)
GLUCOSE BLDC GLUCOMTR-MCNC: 141 MG/DL (ref 70–99)
GLUCOSE BLDC GLUCOMTR-MCNC: 174 MG/DL (ref 70–99)
GLUCOSE SERPL-MCNC: 134 MG/DL (ref 70–99)
GRAM STN SPEC: ABNORMAL
GRAM STN SPEC: ABNORMAL
HCO3 BLD-SCNC: 25 MMOL/L (ref 21–28)
HCO3 BLD-SCNC: 25 MMOL/L (ref 21–28)
HCO3 BLD-SCNC: 26 MMOL/L (ref 21–28)
HCO3 BLD-SCNC: 26 MMOL/L (ref 21–28)
HCO3 BLD-SCNC: 27 MMOL/L (ref 21–28)
INR PPP: 1.23 (ref 0.86–1.14)
LDH SERPL L TO P-CCNC: 783 U/L (ref 85–227)
Lab: ABNORMAL
MAGNESIUM SERPL-MCNC: 2.4 MG/DL (ref 1.6–2.3)
O2/TOTAL GAS SETTING VFR VENT: 100 %
O2/TOTAL GAS SETTING VFR VENT: 70 %
O2/TOTAL GAS SETTING VFR VENT: 90 %
PCO2 BLD: 42 MM HG (ref 35–45)
PCO2 BLD: 45 MM HG (ref 35–45)
PCO2 BLD: 48 MM HG (ref 35–45)
PCO2 BLD: 55 MM HG (ref 35–45)
PCO2 BLD: 55 MM HG (ref 35–45)
PH BLD: 7.29 PH (ref 7.35–7.45)
PH BLD: 7.29 PH (ref 7.35–7.45)
PH BLD: 7.34 PH (ref 7.35–7.45)
PH BLD: 7.36 PH (ref 7.35–7.45)
PH BLD: 7.39 PH (ref 7.35–7.45)
PHOSPHATE SERPL-MCNC: 4.5 MG/DL (ref 2.5–4.5)
PO2 BLD: 102 MM HG (ref 80–105)
PO2 BLD: 113 MM HG (ref 80–105)
PO2 BLD: 87 MM HG (ref 80–105)
PO2 BLD: 94 MM HG (ref 80–105)
PO2 BLD: 97 MM HG (ref 80–105)
POTASSIUM SERPL-SCNC: 4.1 MMOL/L (ref 3.4–5.3)
PROT SERPL-MCNC: 6 G/DL (ref 6.8–8.8)
SODIUM SERPL-SCNC: 138 MMOL/L (ref 133–144)
SPECIMEN SOURCE: ABNORMAL
SPECIMEN SOURCE: NORMAL
SPECIMEN SOURCE: NORMAL

## 2021-02-14 PROCEDURE — 87070 CULTURE OTHR SPECIMN AEROBIC: CPT | Performed by: INTERNAL MEDICINE

## 2021-02-14 PROCEDURE — 71045 X-RAY EXAM CHEST 1 VIEW: CPT | Mod: 26 | Performed by: RADIOLOGY

## 2021-02-14 PROCEDURE — 999N000065 XR CHEST PORT 1 VW

## 2021-02-14 PROCEDURE — 83615 LACTATE (LD) (LDH) ENZYME: CPT | Performed by: INTERNAL MEDICINE

## 2021-02-14 PROCEDURE — 85384 FIBRINOGEN ACTIVITY: CPT | Performed by: STUDENT IN AN ORGANIZED HEALTH CARE EDUCATION/TRAINING PROGRAM

## 2021-02-14 PROCEDURE — 80053 COMPREHEN METABOLIC PANEL: CPT | Performed by: INTERNAL MEDICINE

## 2021-02-14 PROCEDURE — 87205 SMEAR GRAM STAIN: CPT | Performed by: INTERNAL MEDICINE

## 2021-02-14 PROCEDURE — 86140 C-REACTIVE PROTEIN: CPT | Performed by: INTERNAL MEDICINE

## 2021-02-14 PROCEDURE — 250N000011 HC RX IP 250 OP 636: Performed by: NURSE PRACTITIONER

## 2021-02-14 PROCEDURE — 82803 BLOOD GASES ANY COMBINATION: CPT | Performed by: STUDENT IN AN ORGANIZED HEALTH CARE EDUCATION/TRAINING PROGRAM

## 2021-02-14 PROCEDURE — 250N000012 HC RX MED GY IP 250 OP 636 PS 637: Performed by: STUDENT IN AN ORGANIZED HEALTH CARE EDUCATION/TRAINING PROGRAM

## 2021-02-14 PROCEDURE — 85730 THROMBOPLASTIN TIME PARTIAL: CPT | Performed by: STUDENT IN AN ORGANIZED HEALTH CARE EDUCATION/TRAINING PROGRAM

## 2021-02-14 PROCEDURE — 250N000011 HC RX IP 250 OP 636: Performed by: STUDENT IN AN ORGANIZED HEALTH CARE EDUCATION/TRAINING PROGRAM

## 2021-02-14 PROCEDURE — 999N001017 HC STATISTIC GLUCOSE BY METER IP

## 2021-02-14 PROCEDURE — 74018 RADEX ABDOMEN 1 VIEW: CPT | Mod: 26 | Performed by: RADIOLOGY

## 2021-02-14 PROCEDURE — 250N000013 HC RX MED GY IP 250 OP 250 PS 637: Performed by: INTERNAL MEDICINE

## 2021-02-14 PROCEDURE — 999N000065 XR ABDOMEN PORT 1 VW

## 2021-02-14 PROCEDURE — 250N000013 HC RX MED GY IP 250 OP 250 PS 637: Performed by: NURSE PRACTITIONER

## 2021-02-14 PROCEDURE — 99291 CRITICAL CARE FIRST HOUR: CPT | Mod: GC | Performed by: INTERNAL MEDICINE

## 2021-02-14 PROCEDURE — 999N000157 HC STATISTIC RCP TIME EA 10 MIN

## 2021-02-14 PROCEDURE — 85610 PROTHROMBIN TIME: CPT | Performed by: STUDENT IN AN ORGANIZED HEALTH CARE EDUCATION/TRAINING PROGRAM

## 2021-02-14 PROCEDURE — 36556 INSERT NON-TUNNEL CV CATH: CPT | Mod: GC | Performed by: INTERNAL MEDICINE

## 2021-02-14 PROCEDURE — 99233 SBSQ HOSP IP/OBS HIGH 50: CPT | Mod: GC | Performed by: INTERNAL MEDICINE

## 2021-02-14 PROCEDURE — 250N000011 HC RX IP 250 OP 636

## 2021-02-14 PROCEDURE — 87077 CULTURE AEROBIC IDENTIFY: CPT | Performed by: INTERNAL MEDICINE

## 2021-02-14 PROCEDURE — 84100 ASSAY OF PHOSPHORUS: CPT | Performed by: STUDENT IN AN ORGANIZED HEALTH CARE EDUCATION/TRAINING PROGRAM

## 2021-02-14 PROCEDURE — 83735 ASSAY OF MAGNESIUM: CPT | Performed by: INTERNAL MEDICINE

## 2021-02-14 PROCEDURE — 99233 SBSQ HOSP IP/OBS HIGH 50: CPT | Performed by: INTERNAL MEDICINE

## 2021-02-14 PROCEDURE — 99291 CRITICAL CARE FIRST HOUR: CPT | Mod: 25 | Performed by: INTERNAL MEDICINE

## 2021-02-14 PROCEDURE — 94003 VENT MGMT INPAT SUBQ DAY: CPT

## 2021-02-14 PROCEDURE — 82550 ASSAY OF CK (CPK): CPT | Performed by: STUDENT IN AN ORGANIZED HEALTH CARE EDUCATION/TRAINING PROGRAM

## 2021-02-14 PROCEDURE — 258N000003 HC RX IP 258 OP 636: Performed by: STUDENT IN AN ORGANIZED HEALTH CARE EDUCATION/TRAINING PROGRAM

## 2021-02-14 PROCEDURE — 272N000078 HC NUTRITION PRODUCT INTERMEDIATE LITER

## 2021-02-14 PROCEDURE — 94640 AIRWAY INHALATION TREATMENT: CPT

## 2021-02-14 PROCEDURE — 82803 BLOOD GASES ANY COMBINATION: CPT | Performed by: INTERNAL MEDICINE

## 2021-02-14 PROCEDURE — 87186 SC STD MICRODIL/AGAR DIL: CPT | Performed by: INTERNAL MEDICINE

## 2021-02-14 PROCEDURE — 250N000013 HC RX MED GY IP 250 OP 250 PS 637: Performed by: STUDENT IN AN ORGANIZED HEALTH CARE EDUCATION/TRAINING PROGRAM

## 2021-02-14 PROCEDURE — 200N000002 HC R&B ICU UMMC

## 2021-02-14 RX ORDER — FLUCONAZOLE 40 MG/ML
100 POWDER, FOR SUSPENSION ORAL DAILY
Status: DISCONTINUED | OUTPATIENT
Start: 2021-02-14 | End: 2021-02-23

## 2021-02-14 RX ORDER — NOREPINEPHRINE BITARTRATE 0.06 MG/ML
INJECTION, SOLUTION INTRAVENOUS
Status: DISCONTINUED
Start: 2021-02-14 | End: 2021-02-14 | Stop reason: HOSPADM

## 2021-02-14 RX ORDER — DEXTROSE MONOHYDRATE 100 MG/ML
INJECTION, SOLUTION INTRAVENOUS CONTINUOUS PRN
Status: DISCONTINUED | OUTPATIENT
Start: 2021-02-14 | End: 2021-02-22

## 2021-02-14 RX ORDER — ACYCLOVIR 200 MG/5ML
800 SUSPENSION ORAL 2 TIMES DAILY
Status: DISCONTINUED | OUTPATIENT
Start: 2021-02-14 | End: 2021-02-23

## 2021-02-14 RX ORDER — MIDAZOLAM (PF) 1 MG/ML IN 0.9 % SODIUM CHLORIDE INTRAVENOUS SOLUTION
1-12 CONTINUOUS
Status: DISCONTINUED | OUTPATIENT
Start: 2021-02-14 | End: 2021-02-19

## 2021-02-14 RX ADMIN — RUXOLITINIB 5 MG: 5 TABLET ORAL at 10:20

## 2021-02-14 RX ADMIN — Medication 8 MG/HR: at 02:17

## 2021-02-14 RX ADMIN — ACYCLOVIR 800 MG: 200 SUSPENSION ORAL at 11:15

## 2021-02-14 RX ADMIN — Medication 50 MCG/HR: at 03:11

## 2021-02-14 RX ADMIN — DEXAMETHASONE 6 MG: 2 TABLET ORAL at 07:35

## 2021-02-14 RX ADMIN — ALBUTEROL SULFATE 2 PUFF: 90 AEROSOL, METERED RESPIRATORY (INHALATION) at 08:36

## 2021-02-14 RX ADMIN — ENOXAPARIN SODIUM 50 MG: 100 INJECTION SUBCUTANEOUS at 07:37

## 2021-02-14 RX ADMIN — POLYETHYLENE GLYCOL 3350 17 G: 17 POWDER, FOR SOLUTION ORAL at 11:47

## 2021-02-14 RX ADMIN — SODIUM CHLORIDE, POTASSIUM CHLORIDE, SODIUM LACTATE AND CALCIUM CHLORIDE 500 ML: 600; 310; 30; 20 INJECTION, SOLUTION INTRAVENOUS at 02:07

## 2021-02-14 RX ADMIN — ACYCLOVIR 800 MG: 200 SUSPENSION ORAL at 19:59

## 2021-02-14 RX ADMIN — FLUCONAZOLE 100 MG: 40 POWDER, FOR SUSPENSION ORAL at 11:47

## 2021-02-14 RX ADMIN — FENTANYL CITRATE 50 MCG: 50 INJECTION, SOLUTION INTRAMUSCULAR; INTRAVENOUS at 01:40

## 2021-02-14 RX ADMIN — MULTIVITAMIN 15 ML: LIQUID ORAL at 11:47

## 2021-02-14 RX ADMIN — Medication 1 TABLET: at 11:47

## 2021-02-14 RX ADMIN — Medication 10 MG/HR: at 19:05

## 2021-02-14 RX ADMIN — LEVOFLOXACIN 250 MG: 250 TABLET, FILM COATED ORAL at 08:41

## 2021-02-14 RX ADMIN — Medication 12 MG/HR: at 07:36

## 2021-02-14 RX ADMIN — Medication 200 MCG/HR: at 14:25

## 2021-02-14 RX ADMIN — RUXOLITINIB 5 MG: 5 TABLET ORAL at 20:19

## 2021-02-14 RX ADMIN — ENOXAPARIN SODIUM 50 MG: 100 INJECTION SUBCUTANEOUS at 20:20

## 2021-02-14 RX ADMIN — SERTRALINE HYDROCHLORIDE 50 MG: 50 TABLET ORAL at 07:36

## 2021-02-14 RX ADMIN — PANTOPRAZOLE SODIUM 40 MG: 40 TABLET, DELAYED RELEASE ORAL at 07:48

## 2021-02-14 ASSESSMENT — ACTIVITIES OF DAILY LIVING (ADL)
ADLS_ACUITY_SCORE: 21
DEPENDENT_IADLS:: INDEPENDENT
ADLS_ACUITY_SCORE: 19
ADLS_ACUITY_SCORE: 21
ADLS_ACUITY_SCORE: 21
ADLS_ACUITY_SCORE: 19
ADLS_ACUITY_SCORE: 14

## 2021-02-14 NOTE — PROGRESS NOTES
Brief cross cover note    Increasing oxygen demands overnight.  Saturating consistently 87 to 91% on high flow nasal cannula with oxygen mask.  Desaturating quickly through conversation.  This writer had the conversation with the patient about elective intubation out of concern for his increased need for BiPAP earlier in the day and his persistent desaturations on 2 forms of oxygen.  Discussed risks and benefits, and patient agreed to proceed with elective intubation.  Further discussed with his spouse who is also in agreement.    Anesthesia was called to the bedside, he was intubated with no difficulties.    PF ratio 100, consented his spouse for arterial line for hemodynamic monitoring and ABGs.  Additionally consented her for central line should he need one in the future.    Shortly after intubation patient became more hypotensive, suspected this was secondary to propofol.  Propofol stopped, blood pressures improved, however he maintained a MAP 65-70.  Out of concern he may need more hemodynamic support while prone, elected to move forward with central line for more access should he be prone and need escalation of cares.    Please see associated procedure notes.    Vidal Alatorre MD  Internal Medicine PGY-2

## 2021-02-14 NOTE — PROGRESS NOTES
[facility]  Transplant Infectious Disease Progress Note     Patient:  Deejay Dior, Date of birth 1950, Medical record number [id number]  Date of Visit:  02/14/2021         Assessment and Recommendations:   Recommendations:  - Continue dexamethasone to complete a ten day course.  - Will review need for prophylaxis medications/indications/dosing in the days to come pending patient's clinical course.    Transplant ID will follow with you from a distance -- please page if acute ID questions or concerns arise.    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  He required intubated for Covid-19 pneumonitis / ARDS on 2/13/21 late evening.    ID issues:  #Acute Hypoxic Respiratory Failure:  #COVID-19 PNA (diagnosed 01/30):  #MDS s/p stem cell transplant (2014):  #Chronic GVHD:     From a COVID-19 therapy standpoint, he has received appropriate therapies of remdesivir and dexamethasone. There is no role for monoclonal antibodies at this point in his course.  Reviewed with [facility] ID division members comfirming there are no other current trials/therapies for which he is eligible (especially given his underlying MDS s/p stem cell transplant and chronic GVHD.  Pre-admission, he was on Jakafi and prednisone in the out patient setting, as well as a variety of prophylactic medications - acyclovir, fluconazole,TMP-SMX, and Levaquin -- we will re-visit the ongoing need/indications after he is respiratorily improved.  (Continued need for high dose acyclovir (in absence of documented CMV) and fluconazole (with no neutropenia and essentially normal differential) is unclear.)    Other ID issues:  - QTc interval: 434 (02/08/21)  - Bacterial prophylaxis: Levaquin as  out-patient; currently on azithromycin   - Pneumocystis prophylaxis: bactrim  - Viral serostatus & prophylaxis: acyclovir   - Fungal prophylaxis: fluconazole   - Immunization status: Unknown  - Gamma globulin status: IgG replete   - Isolation status: COVID-19; VRE         Interval History:   Mr. Dior remains in the MICU and was electively intubated for increased work or breathing at about 11 PM last night.  He remains on the ventilator and sedated  He was proned about 6 AM today and remains prone on FiO2 0.70 with increased PEEP for ARDS / Covid-19 pneumonia.  Review of systems is not obtainable.  He remains afebrile (T max 99.1 degrees F since 2/9/21) and hemodynamically stable without pressors. His CRP is relatively stable, up to 68.0 today from 44.0 on 2/11/21, but down from a peak of 130.0 on 2/9/21.  He remains on dexamethasone for Covid-19 and on levofloxacin, acyclovir, and fluconazole prophylaxis.  He completed a five day remdesivir course (2/8 - 12/21) and he also received three days of azithromycin previously (2/9 - 11/21).  A Gram stain from the ETT this AM shows < 25 PMNs/ lpf with moderate GPCs.  2/10/21 aspergillus galactomannan, Fungitell, Histoplasma antigen, and Blastomyces antigen assays were all negative.  In addition to Covid-19, a 2/9/21 NP respiratory virus PCR panel also detected human rhinovirus.  Admission 2/8/21 blood cultures were negative.         Current Medications & Allergies:       acyclovir  800 mg Oral BID     calcium carbonate-vitamin D  1 tablet Oral Daily     dexamethasone  6 mg Oral Daily     enoxaparin ANTICOAGULANT  0.5 mg/kg Subcutaneous BID     fluconazole  100 mg Oral Daily     levofloxacin  250 mg Oral Daily     melatonin  3 mg Oral At Bedtime     multivitamins w/minerals  15 mL Per Feeding Tube Daily     pantoprazole  40 mg Oral QAM AC     [Held by provider] predniSONE  5 mg Oral Daily     ruxolitinib  5 mg Oral or Feeding Tube BID     sertraline  50 mg Oral Daily  "    sulfamethoxazole-trimethoprim  1 tablet Oral Once per day on Mon Tue     Infusions/Drips:    dextrose       fentaNYL 200 mcg/hr (02/14/21 1600)     - MEDICATION INSTRUCTIONS -       midazolam 10 mg/hr (02/14/21 1600)     - MEDICATION INSTRUCTIONS -       propofol (DIPRIVAN) infusion Stopped (02/14/21 0353)     Allergies   Allergen Reactions     Blood Transfusion Related (Informational Only) Other (See Comments)     Patient has a history of a clinically significant antibody against RBC antigens.  A delay in compatible RBCs may occur.          Physical Exam:   Vitals were reviewed.  All vitals stable.  Patient Vitals for the past 24 hrs:   BP Temp Temp src Pulse Resp SpO2 Height Weight   02/14/21 1600 -- 98.5  F (36.9  C) Axillary 75 16 96 % -- --   02/14/21 1500 -- -- -- 78 16 95 % -- --   02/14/21 1400 -- -- -- 75 17 97 % -- --   02/14/21 1300 -- -- -- 69 16 94 % -- --   02/14/21 1200 -- 98.3  F (36.8  C) Axillary 68 16 94 % -- --   02/14/21 1100 -- -- -- 67 16 97 % -- --   02/14/21 1000 -- -- -- 68 16 97 % -- --   02/14/21 0900 -- -- -- 68 15 96 % -- --   02/14/21 0840 -- -- -- 68 12 (!) 88 % -- --   02/14/21 0800 -- 96.6  F (35.9  C) Axillary 63 20 98 % 1.753 m (5' 9\") --   02/14/21 0736 -- -- -- -- -- 91 % -- --   02/14/21 0700 -- -- -- 67 19 97 % -- --   02/14/21 0600 -- -- -- 53 19 95 % -- --   02/14/21 0500 -- -- -- 62 19 96 % -- --   02/14/21 0400 -- 98.7  F (37.1  C) Axillary 53 23 94 % -- 103.7 kg (228 lb 9.9 oz)   02/14/21 0351 -- -- -- 54 -- 94 % -- --   02/14/21 0300 -- -- -- 57 23 97 % -- --   02/14/21 0200 -- -- -- 56 28 96 % -- --   02/14/21 0100 -- -- -- 68 21 95 % -- --   02/14/21 0000 -- 98.1  F (36.7  C) Axillary 72 19 94 % -- --   02/13/21 2328 137/80 -- -- 69 -- 92 % -- --   02/13/21 2300 115/67 -- -- 64 (!) 7 91 % -- --   02/13/21 2200 115/62 -- -- 62 (!) 32 (!) 85 % -- --   02/13/21 2100 113/73 -- -- 68 19 (!) 89 % -- --   02/13/21 2000 122/82 98.1  F (36.7  C) Axillary 70 23 91 % -- -- "   02/13/21 1900 116/71 -- -- 76 15 (!) 89 % -- --   02/13/21 1800 118/72 -- -- 67 20 (!) 89 % -- --   02/13/21 1700 96/57 -- -- 62 24 93 % -- --     Ranges for vital signs over the past 24 hours:   Temp:  [96.6  F (35.9  C)-98.7  F (37.1  C)] 98.5  F (36.9  C)  Pulse:  [53-78] 75  Resp:  [7-32] 16  BP: ()/(57-82) 137/80  MAP:  [62 mmHg-98 mmHg] 93 mmHg  Arterial Line BP: ()/(4-80) 123/76  FiO2 (%):  [70 %-100 %] 70 %  SpO2:  [85 %-98 %] 96 %  Vitals:    02/11/21 0400 02/12/21 0400 02/14/21 0400   Weight: 104.6 kg (230 lb 9.6 oz) 103.8 kg (228 lb 13.4 oz) 103.7 kg (228 lb 9.9 oz)     Intake/Output Summary (Last 24 hours) at 2/14/2021 1631  Last data filed at 2/14/2021 1600  Gross per 24 hour   Intake 605.54 ml   Output 940 ml   Net -334.46 ml     Physical Examination:  GENERAL:  Intubated, sedated, 70 year old man prone in bed in NAD on ventilator viewed through window / video.  Remainder of exam unobtainable due to Covid-19 isolation.  HEAD:  NCAT.  EYES:  EOMI, anicteric sclerae.  ENT:  No visible otorrhea or anterior oral lesion.  Oral ETT.  LUNGS:  Breathing appears comfortable on vent.  CARDIOVASCULAR:  RRR.  ABDOMEN:  Unable to examine.  :  Molina with kian urine.  SKIN:  No visible acute rash or lesion.  Peripheral IV lines.  NEUROLOGIC:  Sedated, not moving.         Laboratory Data:     Absolute CD4   Date Value Ref Range Status   06/29/2015 345 (L) 441 - 2,156 cells/uL Final     Comment:     Effective 12/08/2014, the reference range for this assay has changed to   reflect   new methodology.     12/29/2014 190 (L) 441 - 2,156 cells/uL Final     Comment:     Effective 12/08/2014, the reference range for this assay has changed to   reflect   new methodology.     10/09/2014 37 mm3 Final     Inflammatory Markers    Recent Labs   Lab Test 02/14/21  0326 02/13/21  0616 02/12/21  0337 02/11/21  0409 02/10/21  0721 02/09/21  0637 11/30/14  1207 11/30/14  1207   SED  --   --   --   --   --   --   --  40*    CRP 68.0* 53.0* 44.0* 79.0* 110.0* 130.0*   < > 17.4*    < > = values in this interval not displayed.     Immune Globulin Studies     Recent Labs   Lab Test 02/10/21  0721 01/24/19  1605 10/01/18  0955 05/08/16  0906 03/20/16  0352 07/30/15  1125 10/09/14  1010   IGG 3,275*  831 1,130 1,300 1,270 403* 913 458*     --   --   --   --   --  65   IGE  --   --   --   --   --   --  12   IGA 83*  --   --   --   --   --  48*     Metabolic Studies       Recent Labs   Lab Test 02/14/21  0637 02/14/21  0326 02/13/21  0616 02/10/21  1305 02/10/21  1305 02/08/21 2024 02/08/21 2024 02/08/21  0910 02/05/21  1227   NA  --  138 142   < >  --    < >  --  139 140   POTASSIUM  --  4.1 4.8   < >  --    < >  --  4.0 4.0   CHLORIDE  --  107 112*   < >  --    < >  --  111* 109   CO2  --  25 25   < >  --    < >  --  23 25   ANIONGAP  --  6 5   < >  --    < >  --  5 6   BUN  --  39* 33*   < >  --    < >  --  25 30   CR  --  0.96 0.77   < >  --    < >  --  0.88 1.10   GFRESTIMATED  --  79 >90   < >  --    < >  --  86 67   GLC  --  134* 102*   < >  --    < >  --  100* 94   JOE  --  8.5 8.5   < >  --    < >  --  8.4* 8.4*   PHOS 4.5  --   --   --   --   --   --   --   --    MAG  --  2.4*  --   --   --   --   --  1.7 2.0   LACT  --   --   --   --   --   --   --  1.1 1.4   PCAL  --   --   --   --   --   --  0.28 0.10  --    FGTL  --   --   --   --  <31  --   --   --   --    CKT 27*  --   --   --   --   --   --   --   --     < > = values in this interval not displayed.     Hepatic Studies    Recent Labs   Lab Test 02/14/21  0326 02/13/21  0616 02/12/21  0337 02/08/21  0910 02/08/21  0910 04/06/16  0245 04/06/16  0245 07/07/14  0445 07/07/14  0445   BILITOTAL 0.6 0.5 0.4   < > 0.3   < > 0.8   < > 1.8*   BILIDELTA  --   --   --   --   --   --   --   --  1.0*   BILICONJ  --   --   --   --   --   --   --   --  0.0   DBIL  --   --   --   --   --   --  0.3*   < >  --    ALKPHOS 116 106 87   < > 69   < > 292*   < > 88   PROTTOTAL 6.0* 5.9*  5.9*   < > 6.6*   < > 6.7*   < > 6.3*   ALBUMIN 2.3* 2.2* 2.3*   < > 2.6*   < > 2.3*   < > 3.4   AST 33 43 46*   < > 51*   < > 96*   < > 19   ALT 36 41 38   < > 28   < > 167*   < > 47   *  --   --   --  483*  --   --    < >  --     < > = values in this interval not displayed.     Pancreatitis testing    Recent Labs   Lab Test 02/14/19  1342 09/27/18  1217 04/04/16  0350   AMYLASE 46  --   --    LIPASE  --  158  --    TRIG  --   --  286*     Gout Labs      Recent Labs   Lab Test 06/24/14  1205 06/09/14  1116   URIC 6.1 5.8     Hematology Studies      Recent Labs   Lab Test 02/13/21  0616 02/12/21  0337 02/11/21  0409 02/10/21  0721 02/09/21  0637 02/08/21  0910   WBC 7.7 8.3 6.7 6.9 8.0 6.1   ANEU  --  7.0 5.7 5.6  --  4.6   ALYM  --  0.7* 0.7* 0.9  --  1.0   WILLIAM  --  0.4 0.3 0.3  --  0.3   AEOS  --  0.0 0.0 0.0  --  0.0   HGB 13.0* 12.4* 13.2* 13.3 13.3 13.4   HCT 39.0* 38.3* 39.1* 38.8* 40.0 39.8*    184 192 170 146* 152     Clotting Studies    Recent Labs   Lab Test 02/14/21  0637 02/14/19  1342 01/24/19  1605 06/30/16  0952   INR 1.23* 0.98 0.92 0.95   PTT 29  --   --   --      Iron Testing    Recent Labs   Lab Test 02/13/21  0616 02/11/21  0409 02/11/21  0409 11/21/19  1148 11/21/19  1148 09/27/18  1218 09/27/18  1218   LENNOX  --   --  401*   < >  --   --   --    MCV 94   < > 95   < > 95   < >  --    FOLIC  --   --   --   --   --   --  15.9   B12  --   --   --   --  455  --   --     < > = values in this interval not displayed.     Arterial Blood Gas Testing    Recent Labs   Lab Test 02/14/21  1511 02/14/21  0951 02/14/21  0639 02/14/21  0326 02/14/21  0020   PH 7.29* 7.29* 7.34* 7.39 7.36   PCO2 55* 55* 48* 42 45   PO2 87 94 113* 97 102   HCO3 26 27 26 25 25   O2PER 70.0 90 100.0 100.0 100.0     Thyroid Studies     Recent Labs   Lab Test 09/27/18  1217 02/27/18  1239 10/12/16  1107 06/30/16  0953 03/01/16  1227   TSH 1.01 0.70 0.40 1.02 0.38*   T4  --   --   --  1.03 1.12     Urine Studies      Recent Labs   Lab Test 08/25/16  1601 05/07/16  1211 03/24/16  1650 03/21/16  1730 12/17/15  1430   URINEPH 6.0 5.0 5.0 5.0 5.0   NITRITE Negative Negative Negative Negative Negative   LEUKEST Negative Negative Negative Negative Negative   WBCU 1 1 <1 1 1     Medication levels    Recent Labs   Lab Test 09/27/18  1059 04/01/16  0831 04/01/16  0831 03/21/16  2143 03/21/16  2143 02/01/15  0924 02/01/15  0924   VANCOMYCIN  --   --   --   --  25.4*   < >  --    VCON  --   --  1.6   < >  --   --   --    CYCLSP  --   --   --   --   --   --  178   RAPAMY 4.5*   < >  --    < >  --    < >  --     < > = values in this interval not displayed.     Body fluid stats    Recent Labs   Lab Test 02/14/21  0848 03/27/16  1406   FTYP  --  Bronchial  SITE 2 RIGHT MIDDLE LOBE    Bronchial  SITE 1 RIGTH UPPER LOBE     FCOL  --  Colorless  Colorless   FAPR  --  Clear  Clear   FRBC  --  << Do Not Report >>  << Do Not Report >>   FWBC  --  63  57   FNEU  --  16  21   FLYM  --  8  14   FMONO  --  76  65   GS <25 PMNs/low power field  Moderate  Gram positive cocci  * No organisms seen  >25 PMNs/low power field    No organisms seen  >25 PMNs/low power field       Microbiology:    Fungal testing  Recent Labs   Lab Test 02/10/21  1305 09/27/18  1217 03/27/16  1406 03/25/16  1911 03/20/16  0352 03/19/16  1638 03/19/16  0749   FGTL <31 35  --  >500  Unit: pg/mL   149  --   --    ASPGAI 0.05 0.05 0.10  0.09  --   --  0.07 0.12   ASPAG  --   --  Negative  Reference range: Negative  (Note)  INTERPRETIVE INFORMATION: Aspergillus Galactomannan EIA,  Broncho  A BAL galactomannan index of greater than or equal to 0.5  is considered positive.  This result should be interpreted  in the context of patient history, clinical signs/symptoms,  and other routine diagnostic tests (e.g., culture,  histologic examination of biopsy material, and radiographic  imaging).  Performed by [facility],  [address] [place] [address],[place] [address]  [phone]  [email or web address], Lencho Slaughter MD, Lab. Director    Negative  Reference range: Negative  (Note)  INTERPRETIVE INFORMATION: Aspergillus Galactomannan EIA,  Broncho  A BAL galactomannan index of greater than or equal to 0.5  is considered positive.  This result should be interpreted  in the context of patient history, clinical signs/symptoms,  and other routine diagnostic tests (e.g., culture,  histologic examination of biopsy material, and radiographic  imaging).  Performed by [facility],  [address] [facility],[place] [id number] [phone]  [email or web address], Lencho Slaughter MD, Lab. Director    --   --   --   --    ASPGAA Negative Negative  --   --   --  Negative  Reference range: Negative  Unit: not reported  (Note)  INTERPRETIVE INFORMATION: Aspergillus Galactomannan Antigen  by EIA  Negative results do not exclude the diagnosis of invasive  aspergillosis. A single positive test result (index equal  to or greater than 0.5) should be clinically correlated  by testing a separate serum specimen because many agents  (e.g. foods, antibiotics) may cross-react with the test.  If invasive aspergillosis is suspected in high-risk  patients, serial sampling is recommended.  Performed by [facility],  [address] [facility],[place] [id number] [phone]  [employer], Lencho Slaughter MD, Lab. Director   Negative  Reference range: Negative  Unit: not reported  (Note)  INTERPRETIVE INFORMATION: Aspergillus Galactomannan Antigen  by EIA  Negative results do not exclude the diagnosis of invasive  aspergillosis. A single positive test result (index equal  to or greater than 0.5) should be clinically correlated  by testing a separate serum specimen because many agents  (e.g. foods, antibiotics) may cross-react with the test.  If invasive aspergillosis is suspected in high-risk  patients, serial sampling is recommended.  Performed by [facility],  [address] [facility],[place] [id number] [phone]  [employer],  Lencho Slaughter MD, Lab. Director       Last Culture results with specimen source  Culture Micro   Date Value Ref Range Status   02/14/2021 PENDING  Preliminary   02/08/2021 No growth  Final   02/08/2021 No growth  Final   02/05/2021 No growth  Final   02/05/2021 No growth  Final   03/08/2018 No growth  Final   03/08/2018 No growth  Final   02/21/2018 No Beta Streptococcus isolated  Final   06/09/2016 No growth  Final   05/07/2016 No growth  Final   05/07/2016 No growth  Final   03/31/2016 No growth  Final   03/31/2016 No growth  Final   03/29/2016 (A)  Final    Light growth Enterococcus species (VRE)  Critical Value/Significant Value, preliminary result only, called to and read   back by Preeti Cordoba RN @1301 04/01/16 gd     03/27/2016 No growth  Final   03/27/2016   Final    Culture negative for acid fast bacilli  Assayed at [facility] [address]     03/27/2016 (A)  Final    Geotrichum candidum isolated  Susceptibility testing requested by Dr. Alonzo 4.1.16 KB  No additional fungi cultured after 4 weeks incubation     03/27/2016 No growth after 29 days  Final   03/27/2016   Final    Unable to determine the presence of Actinomyces species due to an overgrowth of   normal kimani.     03/27/2016 No Legionella species isolated  Final   03/27/2016 No growth  Final   03/27/2016 Culture negative after 29 days  Final   03/27/2016 No growth after 29 days  Final   03/27/2016 No Legionella species isolated  Final   03/27/2016   Final    Culture negative for acid fast bacilli  Assayed at [facility] [address]      Specimen Description   Date Value Ref Range Status   02/14/2021 Sputum  Final   02/14/2021 Sputum  Final   02/08/2021 Blood Right Arm  Final   02/08/2021 Blood Left Arm  Final   02/05/2021 Blood Right Arm  Final   02/05/2021 Blood  Final   08/01/2019 Nares  Final   03/08/2018 Blood Left Arm  Final   03/08/2018 Blood Right Arm  Final   02/21/2018 Throat  Final    02/21/2018 Throat  Final   10/12/2016 Nasal  Final   06/09/2016 Blood Unspecified Site  Final   05/07/2016 Blood Red port  Final   05/07/2016 Blood White port  Final   05/05/2016 Nasal  Final        Last check of C difficile  C Diff Toxin B PCR   Date Value Ref Range Status   03/29/2016  NEG Final    Negative  Negative: Clostridium difficile target DNA sequences NOT detected, presumed   negative for Clostridium difficile toxin B or the number of bacteria present   may be below the limit of detection for the test.   FDA approved assay performed using [employer] real-time PCR.   A negative result does not exclude actual disease due to Clostridium difficile   and may be due to improper collection, handling and storage of the specimen or   the number of organisms in the specimen is below the detection limit of the   assay.       Infection Studies to assess Diarrhea   Recent Labs   Lab Test 03/29/16  1245 03/21/16  0945   ADENOVIRUSAG Negative  --    EPCAMP Not Detected Not Detected   EPSALM Not Detected Not Detected   EPSHGL Not Detected Not Detected   EPVIB Not Detected Not Detected   EPROTA Not Detected Not Detected   EPNORO Not Detected Not Detected   EPYER Not Detected Not Detected     Virology:    Coronavirus-19 testing    Recent Labs   Lab Test 01/30/21  1514   COVIDPCREXT Positive*     Respiratory virus (non-coronavirus-19) testing    Recent Labs   Lab Test 02/09/21  1315 02/21/18  1348 10/12/16  1155 06/09/16  1233 11/30/14  1208 11/30/14  1208   RVSPEC  --  Nasopharyngeal  --  Nasopharyngeal   < >  --    AFLU  --   --   --   --   --  Negative   IFLUA Not Detected Negative  --  Negative   < >  --    INFZA  --   --  Negative   Flu A target RNA not detected, presumed negative for Influenza A or the viral   load is below the limit of detection.    --    < >  --    FLUAH1 Not Detected Negative  --  Negative   < >  --    KI1000 Not Detected Negative  --  Negative   < >  --    FLUAH3 Not Detected Negative  --   Negative   < >  --    BFLU  --   --   --   --   --  Negative   Test results must be correlated with clinical data. If necessary, results   should be confirmed by a molecular assay or viral culture.     IFLUB Not Detected Negative  --  Negative   < >  --    INFZB  --   --  Negative   Flu B target RNA not detected, presumed negative for Influenza B or the viral   load is below the limit of detection.    --    < >  --    PIV1 Not Detected Negative  --  Negative   < >  --    PIV2 Not Detected Negative  --  Negative   < >  --    PIV3 Not Detected Negative  --  Negative   < >  --    PIV4 Not Detected  --   --   --   --   --    IRSV  --   --  Negative   RSV target RNA not detected, presumed negative for Respiratory Syncitial Virus   or the viral load is below the limit of detection.   FDA approved assay performed using [employer]) real-time PCR.    --    < >  --    HRVS  --  Negative  --  Negative   < >  --    RSVA Not Detected Negative  --  Negative   < >  --    RSVB Not Detected Negative  --  Negative   < >  --    HMPV Not Detected Negative  --  Negative   < >  --    ADVBE  --  Negative  --  Negative   < >  --    ADVC  --  Negative  --  Negative   < >  --    ADENOV Not Detected  --   --   --   --   --    CORONA Not Detected  --   --   --   --   --     < > = values in this interval not displayed.     EBV DNA Copies/mL   Date Value Ref Range Status   08/16/2018 EBV DNA Not Detected EBVNEG^EBV DNA Not Detected [Copies]/mL Final   06/21/2018 EBV DNA Not Detected EBVNEG^EBV DNA Not Detected [Copies]/mL Final   02/27/2018 <500 (A) EBVNEG^EBV DNA Not Detected [Copies]/mL Final     Comment:     EBV DNA Detected below the reportable range of 500 Copies/mL   12/14/2017 EBV DNA Not Detected EBVNEG^EBV DNA Not Detected [Copies]/mL Final   09/28/2017 <500 (A) EBVNEG^EBV DNA Not Detected [Copies]/mL Final     Comment:     EBV DNA Detected below the reportable range of 500 Copies/mL   06/08/2017 EBV DNA Not Detected EBVNEG  [Copies]/mL Final     Parvovirus Testing    Recent Labs   Lab Test 03/27/16  1406   PRVSP Bronchoalveolar Lavage   Right upper lobe     PRVPC Not Detected  (Note)  NOT DETECTED - A negative result does not rule out the  presence of PCR inhibitors in the patient specimen or assay  specific nucleic acid in concentrations below the level of  detection by the assay.    The specimen submitted for testing did not meet [employer]  submission guidelines. Testing was performed on a specimen  that did not meet validated type requirements.  Performance characteristics of this assay may be affected.  Interpret results with caution. Please refer to the [employer]  Laboratory Test Directory for information on specimen  acceptability:  [email or web address].[email or web address])/Specimen-Handling/index.jsp.  INTERPRETIVE INFORMATION: Parvovirus B19 By PCR  Test developed and characteristics determined by [facility]. See Compliance Statement B: [facility]CS  Performed by [facility],  [address] [place] [address] [phone]  [email or web address]), Lencho Slaughter MD, Lab. Director       Adenovirus Testing    Recent Labs   Lab Test 03/08/18  1343 03/29/16  1245 03/29/16  0939 02/18/15  1421 02/18/15  1050 02/04/15  2046   ADRES No Adenovirus DNA detected.  --  No Adenovirus DNA detected.  --  No Adenovirus DNA detected.  --    ADENOVIRUSAG  --  Negative  --  Negative  --  Negative     Imaging:  Recent Results (from the past 48 hour(s))   XR Abdomen Port 1 View    Narrative    Exam: XR ABDOMEN PORT 1 , 2/14/2021 1:01 AM    Indication: og placement    Comparison: Abdominal CT 3/30/2016    Findings:   Semiupright AP view of the upper abdomen. Enteric tube passes below  the left hemidiaphragm with side port projected over the stomach.  Nonspecific bowel gas pattern in the upper abdomen. No visualized  intra-abdominal free air. Hazy pulmonary interstitial opacities again  noted in the lung bases.      Impression    Impression:   Enteric tube side-port  is projected over the stomach.    I have personally reviewed the examination and initial interpretation  and I agree with the findings.    CARY BAILEY MD   XR Chest Port 1 View    Narrative    Exam: XR CHEST PORT 1 VW, 2/14/2021 1:01 AM    Indication: intubation    Comparison: Chest x-ray 2/10/2021, Chest CT on 2/8/2021    Findings:   Semiupright AP view of the chest. Endotracheal tube has been placed  with tip overlying the high thoracic trachea approximately 5 to 6 cm  above the ramonita. No appreciable change in bilateral patchy pulmonary  opacities, low lung volumes, and bibasilar pulmonary opacities. No  appreciable pleural effusion or pneumothorax. Stable borderline  cardiomegaly.      Impression    Impression:   1. Endotracheal tube tip is projected approximately 5-6 cm above the  ramonita.  2. Persistent left greater than right patchy airspace opacities  compatible with atypical infection.    I have personally reviewed the examination and initial interpretation  and I agree with the findings.    CARY BAILEY MD   XR Chest Port 1 View    Narrative    Exam: XR CHEST PORT 1 VW, 2/14/2021 5:31 AM    Indication: Central line placement    Comparison: Chest x-ray 2/14/2021 at 0037 hours    Findings:   Supine AP view of the chest. Endotracheal tube tip is overlying the  mid thoracic trachea. Right IJ central venous catheter has been placed  with tip overlying the cavoatrial junction. Enteric tube passes below  the left hemidiaphragm with tip outside the field of view. Bilateral  perihilar and bibasilar patchy/hazy pulmonary opacities. No  pneumothorax. Small bilateral pleural effusions. Stable cardiac  silhouette.      Impression    Impression:   1. New right IJ central venous catheter tip is at the cavoatrial  junction.  2. Stable position of endotracheal tube.  3. Stable appearance of pulmonary opacities compatible with atypical  infection and possible superimposed atelectasis/edema.    I have personally reviewed  the examination and initial interpretation  and I agree with the findings.    CARY BAILEY MD

## 2021-02-14 NOTE — PROGRESS NOTES
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 Transferred to MICU on 2/10 for worsening respiratory status and c/f intubation.    CHANGES and MAJOR THINGS TODAY:   - Intubated overnight, a line, central line and OG placed  - On fentanyl and midazolam, wean as able  - Proned starting early AM 2/14, plan to continue until 2/15 AM     PLAN:    Neuro:  Pain and sedation  - Acetaminophen PRN  - Fentanyl, midazolam, wean as tolerated  - May consider paralysis if needed    MDD  - Continue PTA sertraline    Pulmonary:  Acute hypoxic respiratory failure secondary to COVID-19 pneumonia, rhinovirus  Acute respiratory distress syndrome  Most recent P/F ratio 97. Intubated 2/14 with initiation of proning. Has received intermittent diuresis to maintain net negative daily.   - Continue proning, plan to supinate on 2/15 AM  - LTVV, goal 6 ml/kg IBW   - Consider IV lasix 40 mg if not net negative this afternoon     Cardiovascular:  No acute concerns. TTE 2/9 LVEF 65-70%.     GI/Nutrition  Nutrition  Previously tolerating regular diet. OG in place 2/13.  - RD consulted, start trophic tube feeds    Renal/Fluids/Electrolytes:  No acute issues. Cr 0.77 --> 0.96 (baseline 0.8-0.9).    Endocrine:  No acute concerns    ID:  COVID-19 pneumonia (+1/30)  + Rhinovirus  Remdesivir course completed on 2/12. Was considered for tocilizumab  - ID following  - Dexamethasone decreased from 10 mg to 6 mg for total ten day course (2/8-2/17)  - Daily CRP per ID rec  - Enoxaparin 50 mg BID given elevated D-dimer    Concern for superimposed bacterial pneumonia  Procal increased to 0.28 on 2/8. Previously on ceftriaxone, arithromycin, with prophylactic levofloxacin held but now  restarted. CXR 2/8 with stable pulmonary opacities compatible with atypical infection vs. Superimposed atelectasis/edema, improved from prior.   - Competed 3 days azithromycin    Antibiotics  Azithromycin (2/9-2/11)     Antibiotics Prophylaxis  Acyclovir (2/8- )  Fluconazole (2/9- )  Levofloxacin (2/9, 2/12- )  Bactrim (2/8- )    Hematology:    MDS/ s/p BMT 2014 complicated by GVHD  No acute concerns. PTA on prednisone and Jakafi.  - BMT consulted  - Holding PTA prednisone while on dexamethasone  - Continue PTA Jakafi 5 mg twice daily  - Continue PTA Bactrim double strength 1 tablet twice weekly for PCP prophylaxis  - Continue PTA acyclovir 800 mg 2 times daily for prophylaxis against herpes simplex virus  - Continue PTA  fluconazole 100 mg oral daily  - Continue PTA levofloxacin 250 mg every day     Musculoskeletal:  No acute concerns    Skin:  No acute concerns    General Cares/Prophylaxis:    DVT Prophylaxis: Enoxaparin (Lovenox) subcutaneous 50 mg BID  GI Prophylaxis: PPI   Restraints: None  Family Communication: Will update wife (Racquel)   Code Status: Full code     Lines/tubes/drains:  - PIV  - Central line, a line, ETT  - OG (trickle feeds)     Disposition:  - Medical ICU     Patient seen and findings/plan discussed with medical ICU staff, Dr. Davis.    Michael Vaz MD  PGY-1, Internal Medicine  MICU 2 Service    ====================================  INTERVAL HISTORY:   Overnight with increasing oxygen requirement with desaturations with conversation. P/F ratio 100. Decision made to proceed with intubation. Patient was intubated and proned. Pressures initially dropped with propofol sedation, improved off propofol.     OBJECTIVE:   1. VITAL SIGNS:   Temp:  [97.8  F (36.6  C)-98.7  F (37.1  C)] 98.7  F (37.1  C)  Pulse:  [50-76] 62  Resp:  [7-33] 19  BP: ()/() 137/80  MAP:  [62 mmHg-95 mmHg] 79 mmHg  Arterial Line BP: ()/(4-72) 109/63  FiO2 (%):  [80 %-100 %] 100 %  SpO2:  [85 %-100 %] 96  %  Ventilation Mode: CMV/AC  (Continuous Mandatory Ventilation/ Assist Control)  FiO2 (%): 100 %  Rate Set (breaths/minute): 20 breaths/min  Tidal Volume Set (mL): 480 mL  PEEP (cm H2O): (S) 10 cmH2O  Oxygen Concentration (%): 100 %  Resp: 19    2. INTAKE/ OUTPUT:   I/O last 3 completed shifts:  In: 300 [P.O.:300]  Out: 3025 [Urine:3025]    3. PHYSICAL EXAMINATION:  General: Lying in bed, intubated  Neuro: Sedated, not responsive to commands  Pulm/Resp: Clear breath sounds bilaterally without rhonchi, crackles or wheeze, breathing slightly labored  CV: Sinus jordon to normal sinus, rare PACs/PVCs,  trace edema  Abdomen: Soft, non-distended, non-tender  : Urine yellow and clear  Extremities: Warm, slight RLE edema (chronic per pt)    4. LABS:   Arterial Blood Gases   Recent Labs   Lab 02/14/21 0326 02/14/21  0020 02/13/21  0648 02/13/21  0342   PH 7.39 7.36 7.42 7.48*   PCO2 42 45 35 29*   PO2 97 102 128* 46*   HCO3 25 25 22 22     Complete Blood Count   Recent Labs   Lab 02/13/21 0616 02/12/21 0337 02/11/21  0409 02/10/21  0721   WBC 7.7 8.3 6.7 6.9   HGB 13.0* 12.4* 13.2* 13.3    184 192 170     Basic Metabolic Panel  Recent Labs   Lab 02/14/21 0326 02/13/21  0616 02/12/21  0337 02/11/21  0409    142 142 141   POTASSIUM 4.1 4.8 4.1 4.0   CHLORIDE 107 112* 115* 111*   CO2 25 25 22 23   BUN 39* 33* 38* 39*   CR 0.96 0.77 0.79 0.85   * 102* 126* 137*     Liver Function Tests  Recent Labs   Lab 02/14/21 0326 02/13/21  0616 02/12/21 0337 02/11/21  0409   AST 33 43 46* 52*   ALT 36 41 38 30   ALKPHOS 116 106 87 86   BILITOTAL 0.6 0.5 0.4 0.3   ALBUMIN 2.3* 2.2* 2.3* 2.5*     5. RADIOLOGY:   Recent Results (from the past 24 hour(s))   XR Abdomen Port 1 View    Narrative    Exam: XR ABDOMEN PORT 1 VW, 2/14/2021 1:01 AM    Indication: og placement    Comparison: Abdominal CT 3/30/2016    Findings:   Semiupright AP view of the upper abdomen. Enteric tube passes below  the left hemidiaphragm with  side port projected over the stomach.  Nonspecific bowel gas pattern in the upper abdomen. No visualized  intra-abdominal free air. Hazy pulmonary interstitial opacities again  noted in the lung bases.      Impression    Impression:   Enteric tube side-port is projected over the stomach.   XR Chest Port 1 View    Narrative    Exam: XR CHEST PORT 1 VW, 2/14/2021 1:01 AM    Indication: intubation    Comparison: Chest x-ray 2/10/2021, Chest CT on 2/8/2021    Findings:   Semiupright AP view of the chest. Endotracheal tube has been placed  with tip overlying the high thoracic trachea approximately 5 to 6 cm  above the ramonita. No appreciable change in bilateral patchy pulmonary  opacities, low lung volumes, and bibasilar pulmonary opacities. No  appreciable pleural effusion or pneumothorax. Stable borderline  cardiomegaly.      Impression    Impression:   1. Endotracheal tube tip is projected approximately 5-6 and is above  the ramonita.  2. Persistent left greater than right patchy airspace opacities  compatible with atypical infection.   XR Chest Port 1 View    Narrative    Exam: XR CHEST PORT 1 , 2/14/2021 5:31 AM    Indication: Central line placement    Comparison: Chest x-ray 2/14/2021 at 0037 hours    Findings:   Supine AP view of the chest. Endotracheal tube tip is overlying the  mid thoracic trachea. Right IJ central venous catheter has been placed  with tip overlying the cavoatrial junction. Enteric tube passes below  the left hemidiaphragm with tip outside the field of view. Bilateral  perihilar and bibasilar patchy/hazy pulmonary opacities. No  pneumothorax. Small bilateral pleural effusions. Stable cardiac  silhouette.      Impression    Impression:   1. New right IJ central venous catheter tip is at the cavoatrial  junction.  2. Stable position of endotracheal tube.  3. Stable appearance of pulmonary opacities compatible with atypical  infection and possible superimposed atelectasis/edema.

## 2021-02-14 NOTE — PROGRESS NOTES
Pt was intubated at 11:25pm  With ETT size 8.0mm 24 @lips. Placement confirmed by auscultaion of bilateral BS,visulization of bilateral chest rise, end-tidal co2 monitor. the procedure was performed without difficulty and complication. Initial vent settings : 20 480 +8 and 100% to maintain saturation above 90%. RT will continue to: maintain airway patency, normal lung ventilation (6-8ml/kg), titrate fio2/PEEP as tolerated.  [facility] RRT

## 2021-02-14 NOTE — PROGRESS NOTES
CLINICAL NUTRITION SERVICES - ASSESSMENT NOTE     Nutrition Prescription    RECOMMENDATIONS FOR MDs/PROVIDERS TO ORDER:  None at present.     Malnutrition Status:    Unable to determine due to limited data available.     Recommendations already ordered by Registered Dietitian (RD):  - Start Nutren 1.5 @ trickle rate of 15 ml/hr.  Will provide 360ml, 540 kcal (6.7 kcal/kg), 24 gm protein (0.3 gm/kg), 63 gm carb, 22 gm fat, 0 fiber.   - Start FWF of 30 ml q 4 hr.   - Order Phos lab.   - start certavite.    Future/Additional Recommendations:  - Follow tolerance of trickle rate with OG and proning.    - follow labs, weights, fluid balance, GI function for TF management.   - If feasible to advance TF rate would increase Nutren 1.5 gradually based on tolerance to goal of 60 mL/hr plus 2 packets Prosource to provide 2240 kcals (28 kcal/kg/day), 120 g PRO (1.5 g/kg/day), 1094 mL H2O, 253 g CHO and no fiber daily.       REASON FOR ASSESSMENT  Dejeay Dior is a/an 70 year old male assessed by the dietitian for Provider Order - Registered Dietitian to Assess and Recommend TF.  Provider is responsible for entering the TF orders--70 yom with covid infection intubated 2/13, req TF initiation. Hoping to begin with trophic feeds on 2/14.    NUTRITION HISTORY  Started feeling ill w/ COVID Sx 5 days PTA (2/5). Note from 2/5 clinic- Drinking 2-3 bottles of water per day but not eating much. At that same visit he reported no GI sx.     CURRENT NUTRITION ORDERS  Diet: no current order  Intake/Tolerance: Pt was on a regular diet 2/8-2/13.  He ordered 2 meals/day between 2/9 and 2/13 and intake was recorded as 25% or 100% for those meals recorded.   Daily meal orders totalled 305-2650 calories, 4-75 gm protein/d.     LABS  Labs reviewed  CRP 68.0  Glucose 134 or <  No phos drawn this admission.  K+ wnl, Mg high.    GI  OG placed 2/14 0000.   Last BM: 2/11    MEDICATIONS  Medications reviewed  Dexamethasone 6mg,  "  levoflaxacin  protonix    ANTHROPOMETRICS  Height: 5'8.75\"  Most Recent Weight: 103.7 kg (228 lb 9.9 oz)    IBW: 72.7 kg (143% IBW)  BMI: Obesity Grade I BMI 30-34.9  Weight History: Limited wt records in recent months.  Wt appears similar to prior weights form 2020. Unclear if 1/31/21 wt is actual vs stated.   Wt Readings from Last 10 Encounters:   02/14/21 103.7 kg (228 lb 9.9 oz)   01/31/21 107.5 kg (237 lb) per CE stated? in ED   08/17/20 104 kg (229 lb 4.8 oz)   05/15/20 105.2 kg (232 lb)   02/14/20 102.5 kg (225 lb 14.4 oz)   11/21/19 101.2 kg (223 lb 1.6 oz)   10/10/19 101.7 kg (224 lb 4.8 oz)   09/03/19 103.9 kg (229 lb)   07/25/19 105 kg (231 lb 8 oz)   06/27/19 104.5 kg (230 lb 6.4 oz)   04/04/19 103.1 kg (227 lb 3.2 oz)       Dosing Weight: 80.5 kg (using 2/14 103.7 kg lowest wt this admission and IBW of 72.7 kg.      ASSESSED NUTRITION NEEDS  Estimated Energy Needs: 8766-8286 kcals/day (20 - 25 kcals/kg)  Justification: Vented  Estimated Protein Needs: 121-161 grams protein/day (1.5 - 2 grams of pro/kg)  Justification: Hypercatabolism with acute illness  Estimated Fluid Needs: 0772-9794 mL/day (1 mL/kcal)   Justification: Maintenance and Per provider pending fluid status    PHYSICAL FINDINGS  See malnutrition section below.  Unable to assess pt due to Covid isolation precautions and need to preserve PPE.    MALNUTRITION  % Intake: Unable to assess  % Weight Loss: Unable to assess  Subcutaneous Fat Loss: Unable to assess  Muscle Loss: Unable to assess  Fluid Accumulation/Edema: Unable to assess  Malnutrition Diagnosis: Unable to determine due to limited data available.     NUTRITION DIAGNOSIS  Increased nutrient needs for protein/calories related to Covid dx as evidenced by estimated needs for 1.5-2.0 gm Protein/kg.       INTERVENTIONS  Implementation  Nutrition Education: Not appropriate at this time due to patient condition   Enteral Nutrition - Initiate  Feeding tube flush     Goals  Total avg " nutritional intake to meet a minimum of 1610 kcal/kg and 1.5 g PRO/kg daily (per dosing wt 80.5 kg).     Monitoring/Evaluation  Progress toward goals will be monitored and evaluated per protocol.    Marivel Santamaria RD, RADU   Weekend pager: [phone]

## 2021-02-14 NOTE — PHARMACY-CONSULT NOTE
Pharmacy Tube Feeding Consult    Medication reviewed for administration by feeding tube and for potential food/drug interactions.    Recommendation: Recommend holding tube feedings for 1 hours before and 1 hours after administration of levofloxacin.    Pharmacy will continue to follow as new medications are ordered.      Sathya Garcia, CharitoD, BCCCP

## 2021-02-14 NOTE — CONSULTS
Care Management Initial Consult    General Information  Assessment completed with: Spouse or significant other, VM-chart review, (spouse Ulysses [phone])  Type of CM/SW Visit: Initial Assessment    Primary Care Provider verified and updated as needed:     Readmission within the last 30 days:           Advance Care Planning: Advance Care Planning Reviewed: present on chart(ULYSSES CARRASCO - Spouse is health care agent)          Communication Assessment  Patient's communication style: spoken language (English or Bilingual)    Hearing Difficulty or Deaf: yes   Wear Glasses or Blind: yes    Cognitive  Cognitive/Neuro/Behavioral: .WDL except  Level of Consciousness: sedated  Arousal Level: arouses to pain  Orientation: other (see comments)(brit)  Mood/Behavior: behavior appropriate to situation     Speech: endotracheal tube    Living Environment:   People in home: spouse     Current living Arrangements: house      Able to return to prior arrangements: other (see comments)(to be determined, patient currently critically ill & in ICU)       Family/Social Support:  Care provided by: self  Provides care for: no one  Marital Status:   Wife, Children, Sibling(s)  (Ulysses Oseguera)       Description of Support System: Supportive, Involved    Support Assessment: Adequate family and caregiver support    Current Resources:   Patient receiving home care services: No     Community Resources:    Equipment currently used at home: none  Supplies currently used at home:      Employment/Financial:  Employment Status: retired        Financial Concerns: No concerns identified           Lifestyle & Psychosocial Needs:        Socioeconomic History     Marital status:      Spouse name: Not on file     Number of children: Not on file     Years of education: Not on file     Highest education level: Not on file     Tobacco Use     Smoking status: Former Smoker     Packs/day: 1.00     Years: 34.00     Pack years: 34.00      Types: Cigarettes     Start date: 1967     Quit date: 2001     Years since quittin.8     Smokeless tobacco: Never Used     Tobacco comment: quit in    Substance and Sexual Activity     Alcohol use: Yes     Alcohol/week: 4.2 standard drinks     Comment: Seldom     Drug use: No     Sexual activity: Not Currently     Partners: Female     Birth control/protection: Male Surgical, Female Surgical       Functional Status:  Prior to admission patient needed assistance:   Dependent ADLs:: Independent(prior to admission)  Dependent IADLs:: Independent(prior to admission)       Mental Health Status:  Mental Health Status: Current Concern(depression and anxiety symptoms per spouse)  Mental Health Management: Medication(SSRI - Zoloft)    Chemical Dependency Status:  Chemical Dependency Status: No Current Concerns             Values/Beliefs:  Spiritual, Cultural Beliefs, Sikhism Practices, Values that affect care: yes(Zoroastrian)               Additional Information:  Deejay is currently intubated and sedated in the ICU, per medical notes he is critically ill. He is a BMT patient and is admitted with Covid19. Chart review completed incl prior BMT SW assessments and phone contact with Deejay's spouse.     Per spouse, Deejay had been independent in ADLs prior to admission. They have good emotional support from their family incl two adult sons Asiya, Deejay's siblings (4 brothers, 2 sisters) and spouse's brother (who is an internal medicine MD). Spouse is retired RN and reports good grasp on Deejay's medical situation. She is receiving frequent updates from ICU team and feels comfortable calling the nurses station for check-ins.     Spouse noted Deejay had been experiencing some depression and anxiety symptoms. He has been on an antidepressant but had to decrease dose due to concerns that medication possible interaction with antifungal. Deejay does not see a therapist nor has he been interested but had reported to spouse  antidepressant was working well.     PLAN: SW avail as needed for discharge planning, if needed, and family support. Currently, spouse notes good emotional support from family and in frequent contact with medical staff here.     JAYLON Swift

## 2021-02-14 NOTE — PLAN OF CARE
ICU End of Shift Summary. See flowsheets for vital signs and detailed assessment.    Changes this shift: lasix given this am, increased work of breathing and desat to 70s activity.  80% bipap until 1600, changed to HFNC and maintaining sat>88 on 100% HFNC 40L.     Plan: Continue to monitor.  Clear liquid diet.

## 2021-02-14 NOTE — PROGRESS NOTES
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 Dior is 6 year, 7 months out from allo-sib PBHSCT c/b mucocutaneous cGHVD and chronic fatigue/dyspnea. No pulmonary cGHVD. His GVHD symptoms have been stable on Jakafi and low-dose pred for some time. His IgG level was normal, no need for IVIg. Would continue Jakafi, but hold pred while on dexamethasone for COVID. He should restart pred once off dex. He should continue on all of his prophylactic anti-infective agents. At present there are no primary BMT/GHVD issues, but we remain very concerned about the critical nature of his COVID PNA.     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 Great saphenous ablation on the right, with recannulization of left saphenous vein on US from 7/2019. Anticoagulation per COVID protocol/ICU.      Recommendations:   - Convert Jakafi 5mg BID to suspension to be given per OG  - Restart prednisone 5mg daily (or equivalent IV dose) once Dex is finished  - Systemic anticoagulation per COVID protocol/ICU team  - Continue prophylactic anti-infectives: ACV 800mg BID, Bactrim BID Mon/Tues, fluconazole 100mg daily, levofloxacin 250mg QD    Patient was seen and plan of care was discussed with attending physician Dr. Black.    We will continue to follow this patient. Please don't  hesitate to contact the Fellow On-Call with questions.    Shubham Joyce MD PhD  Heme/Onc/Transplant Fellow  Pgr #[phone]    _________________________________________________    Subjective & Interval History:    - Continued worsening oxygenation and work of breathing overnight  - electively intubated, sedated with prop/fent      Physical Exam:    /80   Pulse 67   Temp 98.7  F (37.1  C) (Axillary)   Resp 19   Wt 103.7 kg (228 lb 9.9 oz)   SpO2 97%   BMI 34.01 kg/m    Gen: Proned, intubated sedated  Pulm: Unlabored breathing on ventilator  Skin: No obvious rashes or abnormalities on exposed skin  Neuro: Sedated     The rest of a comprehensive physical examination is deferred due to public health emergency video visit restrictions.    Labs & Studies: I personally reviewed the following studies:  ROUTINE LABS (Last four results):  CMP  Recent Labs   Lab 02/14/21  0326 02/13/21  0616 02/12/21  0337 02/11/21  0409 02/08/21  0910 02/08/21  0910    142 142 141   < > 139   POTASSIUM 4.1 4.8 4.1 4.0   < > 4.0   CHLORIDE 107 112* 115* 111*   < > 111*   CO2 25 25 22 23   < > 23   ANIONGAP 6 5 4 8   < > 5   * 102* 126* 137*   < > 100*   BUN 39* 33* 38* 39*   < > 25   CR 0.96 0.77 0.79 0.85   < > 0.88   GFRESTIMATED 79 >90 90 87   < > 86   GFRESTBLACK >90 >90 >90 >90   < > >90   JOE 8.5 8.5 8.3* 8.7   < > 8.4*   MAG 2.4*  --   --   --   --  1.7   PROTTOTAL 6.0* 5.9* 5.9* 6.4*   < > 6.6*   ALBUMIN 2.3* 2.2* 2.3* 2.5*   < > 2.6*   BILITOTAL 0.6 0.5 0.4 0.3   < > 0.3   ALKPHOS 116 106 87 86   < > 69   AST 33 43 46* 52*   < > 51*   ALT 36 41 38 30   < > 28    < > = values in this interval not displayed.     CBC  Recent Labs   Lab 02/13/21  0616 02/12/21  0337 02/11/21  0409 02/10/21  0721   WBC 7.7 8.3 6.7 6.9   RBC 4.13* 4.02* 4.13* 4.09*   HGB 13.0* 12.4* 13.2* 13.3   HCT 39.0* 38.3* 39.1* 38.8*   MCV 94 95 95 95   MCH 31.5 30.8 32.0 32.5   MCHC 33.3 32.4 33.8 34.3   RDW 18.3* 18.1* 18.2* 18.5*   PLT  185 184 192 170     INR  Recent Labs   Lab 02/14/21  0637   INR 1.23*       Medications list for reference:  Current Facility-Administered Medications   Medication     acetaminophen (TYLENOL) tablet 650 mg     acyclovir (ZOVIRAX) suspension 800 mg     albuterol (PROAIR HFA/PROVENTIL HFA/VENTOLIN HFA) 108 (90 Base) MCG/ACT inhaler 2 puff     artificial saliva (BIOTENE MT) solution 1 spray     bisacodyl (DULCOLAX) EC tablet 5 mg    Or     bisacodyl (DULCOLAX) EC tablet 10 mg    Or     bisacodyl (DULCOLAX) EC tablet 15 mg     calcium carbonate-vitamin D (OSCAL w/D) per tablet 1 tablet     carboxymethylcellulose PF (REFRESH PLUS) 0.5 % ophthalmic solution 1 drop     dexamethasone (DECADRON) tablet 6 mg     enoxaparin ANTICOAGULANT (LOVENOX) injection 50 mg     fentaNYL (PF) (SUBLIMAZE) injection 50 mcg     fentaNYL (SUBLIMAZE) infusion     fluconazole (DIFLUCAN) suspension 100 mg     levofloxacin (LEVAQUIN) tablet 250 mg     lidocaine (LMX4) cream     lidocaine 1 % 0.1-1 mL     magnesium citrate solution 148 mL     Medication instructions: Do NOT use nebulized medications     melatonin tablet 3 mg     midazolam (VERSED) drip - ADULT 100 mg/100 mL in NS PRE-MIX     midazolam (VERSED) injection 2 mg     naloxone (NARCAN) injection 0.2 mg    Or     naloxone (NARCAN) injection 0.4 mg    Or     naloxone (NARCAN) injection 0.2 mg    Or     naloxone (NARCAN) injection 0.4 mg     norepinephrine (LEVOPHED) 16-0.9 MG/250ML-% 16 mg in  mL infusion CENTRAL LINE     ondansetron (ZOFRAN-ODT) ODT tab 4 mg    Or     ondansetron (ZOFRAN) injection 4 mg     pantoprazole (PROTONIX) 2 mg/mL suspension 40 mg     Patient is already receiving anticoagulation with heparin, enoxaparin (LOVENOX), warfarin (COUMADIN)  or other anticoagulant medication     polyethylene glycol (MIRALAX) Packet 17 g     [Held by provider] predniSONE (DELTASONE) tablet 5 mg     prochlorperazine (COMPAZINE) injection 5 mg    Or     prochlorperazine (COMPAZINE)  tablet 5 mg    Or     prochlorperazine (COMPAZINE) suppository 12.5 mg     propofol (DIPRIVAN) infusion     ruxolitinib (JAKAFI) tablet 5 mg     sertraline (ZOLOFT) tablet 50 mg     sodium chloride (PF) 0.9% PF flush 3 mL     sodium chloride (PF) 0.9% PF flush 3 mL     sodium chloride (PF) 0.9% PF flush 3 mL     sodium chloride (PF) 0.9% PF flush 3 mL     sulfamethoxazole-trimethoprim (BACTRIM DS) 800-160 MG per tablet 1 tablet        Attending note.The patient was seen and examined by me separately from the fellow provider. The note reflects our mutual assessment and plans and were approved by me personally.   I personally reviewed today's lab results vital signs and radiology results.     Each point of the assessment and plan were reviewed by the midlevel and me and either endorsed by me or were my added decisions.     My pertinent physical findings today are: not possible: video     My assessment and plan are: 69 yo 6 years post allo for MDS on prednisone and Jakafi for CGVHD now with serious COVID pneumonia. Continue Jakafi and hold predsinone while on dex burst, then resume.Will probably need intubation. Ci No changes in immunosupession. No current BMT issues.  I agree with the A&P as described by the fellow.    Edgar Black M.D.

## 2021-02-14 NOTE — PROCEDURES
Central Line Placement - Date: 2/14/2021     Indication: IV Access  Consent: Obtained via spouse.     The patient was placed in a dependent position appropriate for central line placement based on the vein to be cannulated. The patient's right neck was prepped and draped in sterile fashion.  Right internal jugular vein identified with ultrasound.  A needle with syringe was inserted into the neck via guided ultrasound to the right internal jugular vein.  Draw dark venous blood return.  A wire was guided through the needle.  Needle removed, 7 [place] triple-lumen catheter was threaded smoothly over the guide wire and appropriate blood return was obtained. Each lumen of the catheter was evacuated of air and flushed with sterile saline. The catheter was then sutured in place to the skin and a sterile dressing applied. Dr. Xavier was present for all key portions of the procedure and immediately available for the entire procedure. EBL 5 ml. The patient tolerated the procedure well and there were no complications.    Vidal Alatorre MD  Internal Medicine, PGY-2

## 2021-02-14 NOTE — PROCEDURES
Dx: S/p r rotator cuff repair         Authorized # of Visits:   12        Next MD visit: April 19th  Fall Risk: standard         Precautions: n/a             Subjective: No new problems. Working on [employer] 3x/day and feeling ROM is improving.   Pain pre-rx= [facility]    Arterial line placement    Date/Time: 2/14/2021 3:55 AM  Performed by: Marina Xavier MD  Authorized by: Marina Xavier MD     UNIVERSAL PROTOCOL   Site Marked: NA  Prior Images Obtained and Reviewed:  NA  Required items: Required blood products, implants, devices and special equipment available    Patient identity confirmed:  Provided demographic data and arm band  Patient was reevaluated immediately before administering moderate or deep sedation or anesthesia  Confirmation Checklist:  Patient's identity using two indicators  Universal Protocol: the Joint Commission Universal Protocol was followed        Indication: multiple ABGs respiratory failure hemodynamic monitoring  Location:  Left radial      SEDATION    Vital signs: Vital signs monitored during sedation      PROCEDURE DETAILS            Post-procedure:  Line sutured and dressing applied  CMS: normal  PROCEDURE   Length of time physician/provider present for 1:1 monitoring during sedation: 0           tolerated. Date: 3/9/2017  Tx#: 2/12 Date: 3/14/2017  Tx#: 3/12 Date: 3/16/2017  Tx#: 4/ Date: 3/21/2017  Tx#: 5/12  Week 7 post op Date: Tx#: 6/ Date: Tx#: 7/ Date:    Tx#: 8/   OH pulleys: flex, abd x10 ea OH pulleys: flex, abd x10 ea OH pulleys: f

## 2021-02-14 NOTE — PROCEDURES
ARTERIAL LINE PLACEMENT     Indication: Hemodynamic monitoring     The patient s right wrist was prepped and draped in sterile fashion. Arrow radial artery catheterization set was used. A 20G arterial line was introduced into the radial artery. Pulsatile blood flow returned. Guide wire passed through the needle, catheter advanced, needle and guidewire removed. Blood pressure sensor/tubing connected to catheter. The catheter was then sutured in place to the skin and a sterile dressing applied. Perfusion to the extremity distal to the point of catheter insertion was checked and found to be adequate.      failed attempt of right arm, small hematoma developed, hemostasis achieved with pressure.  Successful left arm.     Estimated Blood Loss: 5 cc     The patient tolerated the procedure well and there were no complications.     Vidal Alatorre MD  Internal Medicine, PGY-2

## 2021-02-14 NOTE — PLAN OF CARE
Major Shift Events:  Patient intubated at 2300 d/t increased work of breathing and numerous desaturation episodes. Proned at 0530. Sinus rhythm with numerous PVC/PAC. MAPs decreased into high 50s. Propofol turned off and blood pressures improved. CMV settings 100%, Rate 20, PEEP 10, . Numerous ETT and oral secretions. OG in place. Molina in place with adequate urine output. Arterial line and central line placed. Fentanyl gtt at 200 mcg/hr. Versed gtt at 12 mg/hr.     Plan: Continue with plan of care.    For vital signs and complete assessments, please see documentation flowsheets.

## 2021-02-14 NOTE — PROGRESS NOTES
Pt was pronated at 0530 due to inadequate oxygenation status (p/f ratio 97). RT will help with head turns Q2H. Lincoln bite block is in place to prevent ETT occlusion.

## 2021-02-14 NOTE — ANESTHESIA PROCEDURE NOTES
Airway   Date/Time: 2/13/2021 11:23 PM   Patient location during procedure: ICU  Staff -   Resident/Fellow: Krishna Burns MD  Performed By: resident    Consent for Airway   Urgency: emergentConsent: The procedure was performed in an emergent situation.    Report Obtained from Primary Care Team  History regarding most recent potassium obtained: Yes  History regarding presence/absence of renal failure obtained:Yes  History regarding stroke/CVA obtained:Yes  History regarding presence/absence of NM disorder:Yes    Indications and Patient Condition  Indications for airway management: airway protection  Mallampati: IIInduction type:RSIMask difficulty assessment: 0 - not attempted    Final Airway Details  Final airway type: endotracheal airway  Successful airway:ETT - single  Endotracheal Airway Details   ETT size (mm): 8.0  Cuffed: yes  Successful intubation technique: video laryngoscopy  Grade View of Cords: 1  Adjucts: stylet  Measured from: lips  Secured at (cm): 24  Secured with: commercial tube reich  Bite block used: None    Post intubation assessment   ETT secured, No apparent complications and Vent settings by primary/ICU team  Placement verified by: capnometry   Number of attempts at approach: 1  Number of other approaches attempted: 0  Secured with:commercial tube reich  Ease of procedure: easy  Dentition: IntactAdditional Comments  COVID positive patient on HFNC with worsening respiratory status, requiring intubation. Patient tolerated induction well, desaturated to 80%, after intubation took a few minutes to bag patient up to 90%.

## 2021-02-14 NOTE — PROGRESS NOTES
ICU progress note    70-year-old man with history of pulmonary transplant here with COVID-19 pneumonia.    Neurologic: Therapeutic sedation    Pulmonary: COVID 19 pneumonia and ARDS.  PEEP increased, RR decreased.  Eval Pplat and consider decreasing Vt to 420 (6ml/kg). Monitor ABG. Permissive hypercapnia. Keep volume even to negative.    Proned this AM.  Plan on supinating tomorrow AM.    GI: start trickle TF    Heme: ppx    Renal: keep volume even.    ID: treat for covid.      30 minutes of critical care time thus far today.  This patient is critically ill at high risk of decompensation and death.  Multiple bedside evaluations of respiratory, hemodynamic status.    Carlos Eduardo Davis MD  2/14/2021

## 2021-02-15 ENCOUNTER — APPOINTMENT (OUTPATIENT)
Dept: GENERAL RADIOLOGY | Facility: CLINIC | Age: 71
DRG: 207 | End: 2021-02-15
Attending: INTERNAL MEDICINE
Payer: MEDICARE

## 2021-02-15 PROBLEM — N17.8 OTHER ACUTE KIDNEY FAILURE (H): Status: ACTIVE | Noted: 2021-02-15

## 2021-02-15 LAB
ALBUMIN SERPL-MCNC: 2.4 G/DL (ref 3.4–5)
ALP SERPL-CCNC: 115 U/L (ref 40–150)
ALT SERPL W P-5'-P-CCNC: 37 U/L (ref 0–70)
ANION GAP SERPL CALCULATED.3IONS-SCNC: 7 MMOL/L (ref 3–14)
AST SERPL W P-5'-P-CCNC: 23 U/L (ref 0–45)
BASE DEFICIT BLDA-SCNC: 0.4 MMOL/L
BASE DEFICIT BLDA-SCNC: 1.6 MMOL/L
BASE EXCESS BLDA CALC-SCNC: 0.4 MMOL/L
BILIRUB SERPL-MCNC: 0.5 MG/DL (ref 0.2–1.3)
BUN SERPL-MCNC: 60 MG/DL (ref 7–30)
CALCIUM SERPL-MCNC: 8.6 MG/DL (ref 8.5–10.1)
CHLORIDE SERPL-SCNC: 108 MMOL/L (ref 94–109)
CO2 SERPL-SCNC: 25 MMOL/L (ref 20–32)
CREAT SERPL-MCNC: 1.33 MG/DL (ref 0.66–1.25)
CRP SERPL-MCNC: 56 MG/L (ref 0–8)
ERYTHROCYTE [DISTWIDTH] IN BLOOD BY AUTOMATED COUNT: 18.4 % (ref 10–15)
GFR SERPL CREATININE-BSD FRML MDRD: 53 ML/MIN/{1.73_M2}
GLUCOSE BLDC GLUCOMTR-MCNC: 146 MG/DL (ref 70–99)
GLUCOSE BLDC GLUCOMTR-MCNC: 193 MG/DL (ref 70–99)
GLUCOSE SERPL-MCNC: 147 MG/DL (ref 70–99)
HCO3 BLD-SCNC: 27 MMOL/L (ref 21–28)
HCO3 BLD-SCNC: 27 MMOL/L (ref 21–28)
HCO3 BLD-SCNC: 28 MMOL/L (ref 21–28)
HCT VFR BLD AUTO: 43.3 % (ref 40–53)
HGB BLD-MCNC: 14.2 G/DL (ref 13.3–17.7)
MCH RBC QN AUTO: 32.2 PG (ref 26.5–33)
MCHC RBC AUTO-ENTMCNC: 32.8 G/DL (ref 31.5–36.5)
MCV RBC AUTO: 98 FL (ref 78–100)
O2/TOTAL GAS SETTING VFR VENT: 50 %
O2/TOTAL GAS SETTING VFR VENT: 70 %
O2/TOTAL GAS SETTING VFR VENT: 80 %
OXYHGB MFR BLD: 97 % (ref 92–100)
PCO2 BLD: 53 MM HG (ref 35–45)
PCO2 BLD: 55 MM HG (ref 35–45)
PCO2 BLD: 58 MM HG (ref 35–45)
PH BLD: 7.27 PH (ref 7.35–7.45)
PH BLD: 7.31 PH (ref 7.35–7.45)
PH BLD: 7.31 PH (ref 7.35–7.45)
PLATELET # BLD AUTO: 207 10E9/L (ref 150–450)
PO2 BLD: 104 MM HG (ref 80–105)
PO2 BLD: 118 MM HG (ref 80–105)
PO2 BLD: 75 MM HG (ref 80–105)
POTASSIUM SERPL-SCNC: 4.6 MMOL/L (ref 3.4–5.3)
PROT SERPL-MCNC: 6.4 G/DL (ref 6.8–8.8)
RBC # BLD AUTO: 4.41 10E12/L (ref 4.4–5.9)
SODIUM SERPL-SCNC: 140 MMOL/L (ref 133–144)
WBC # BLD AUTO: 7.1 10E9/L (ref 4–11)

## 2021-02-15 PROCEDURE — 86140 C-REACTIVE PROTEIN: CPT | Performed by: INTERNAL MEDICINE

## 2021-02-15 PROCEDURE — 250N000013 HC RX MED GY IP 250 OP 250 PS 637: Performed by: STUDENT IN AN ORGANIZED HEALTH CARE EDUCATION/TRAINING PROGRAM

## 2021-02-15 PROCEDURE — 80053 COMPREHEN METABOLIC PANEL: CPT | Performed by: INTERNAL MEDICINE

## 2021-02-15 PROCEDURE — 250N000012 HC RX MED GY IP 250 OP 636 PS 637: Performed by: STUDENT IN AN ORGANIZED HEALTH CARE EDUCATION/TRAINING PROGRAM

## 2021-02-15 PROCEDURE — 250N000013 HC RX MED GY IP 250 OP 250 PS 637: Performed by: INTERNAL MEDICINE

## 2021-02-15 PROCEDURE — 85027 COMPLETE CBC AUTOMATED: CPT | Performed by: INTERNAL MEDICINE

## 2021-02-15 PROCEDURE — 258N000003 HC RX IP 258 OP 636: Performed by: STUDENT IN AN ORGANIZED HEALTH CARE EDUCATION/TRAINING PROGRAM

## 2021-02-15 PROCEDURE — 44500 INTRO GASTROINTESTINAL TUBE: CPT

## 2021-02-15 PROCEDURE — 999N000065 XR ABDOMEN PORT 1 VW

## 2021-02-15 PROCEDURE — 82803 BLOOD GASES ANY COMBINATION: CPT | Performed by: STUDENT IN AN ORGANIZED HEALTH CARE EDUCATION/TRAINING PROGRAM

## 2021-02-15 PROCEDURE — 99232 SBSQ HOSP IP/OBS MODERATE 35: CPT | Performed by: INTERNAL MEDICINE

## 2021-02-15 PROCEDURE — 82810 BLOOD GASES O2 SAT ONLY: CPT | Performed by: STUDENT IN AN ORGANIZED HEALTH CARE EDUCATION/TRAINING PROGRAM

## 2021-02-15 PROCEDURE — 94003 VENT MGMT INPAT SUBQ DAY: CPT

## 2021-02-15 PROCEDURE — 99291 CRITICAL CARE FIRST HOUR: CPT | Mod: GC

## 2021-02-15 PROCEDURE — 250N000009 HC RX 250: Performed by: INTERNAL MEDICINE

## 2021-02-15 PROCEDURE — 250N000011 HC RX IP 250 OP 636: Performed by: STUDENT IN AN ORGANIZED HEALTH CARE EDUCATION/TRAINING PROGRAM

## 2021-02-15 PROCEDURE — 999N001017 HC STATISTIC GLUCOSE BY METER IP

## 2021-02-15 PROCEDURE — 71045 X-RAY EXAM CHEST 1 VIEW: CPT | Mod: 26 | Performed by: RADIOLOGY

## 2021-02-15 PROCEDURE — 99233 SBSQ HOSP IP/OBS HIGH 50: CPT | Performed by: INTERNAL MEDICINE

## 2021-02-15 PROCEDURE — 999N000157 HC STATISTIC RCP TIME EA 10 MIN

## 2021-02-15 PROCEDURE — 250N000009 HC RX 250

## 2021-02-15 PROCEDURE — 250N000011 HC RX IP 250 OP 636: Performed by: NURSE PRACTITIONER

## 2021-02-15 PROCEDURE — 74018 RADEX ABDOMEN 1 VIEW: CPT | Mod: 26 | Performed by: STUDENT IN AN ORGANIZED HEALTH CARE EDUCATION/TRAINING PROGRAM

## 2021-02-15 PROCEDURE — 71045 X-RAY EXAM CHEST 1 VIEW: CPT

## 2021-02-15 PROCEDURE — 200N000002 HC R&B ICU UMMC

## 2021-02-15 PROCEDURE — 250N000013 HC RX MED GY IP 250 OP 250 PS 637: Performed by: NURSE PRACTITIONER

## 2021-02-15 RX ORDER — BISACODYL 10 MG
10 SUPPOSITORY, RECTAL RECTAL DAILY PRN
Status: DISCONTINUED | OUTPATIENT
Start: 2021-02-15 | End: 2021-02-22

## 2021-02-15 RX ORDER — LIDOCAINE HYDROCHLORIDE 20 MG/ML
5 SOLUTION OROPHARYNGEAL ONCE
Status: COMPLETED | OUTPATIENT
Start: 2021-02-15 | End: 2021-02-15

## 2021-02-15 RX ORDER — AMINO AC/PROTEIN HYDR/WHEY PRO 10G-100/30
2 LIQUID (ML) ORAL DAILY
Status: DISCONTINUED | OUTPATIENT
Start: 2021-02-15 | End: 2021-02-24

## 2021-02-15 RX ORDER — LIDOCAINE HYDROCHLORIDE 20 MG/ML
SOLUTION OROPHARYNGEAL
Status: COMPLETED
Start: 2021-02-15 | End: 2021-02-15

## 2021-02-15 RX ADMIN — LIDOCAINE HYDROCHLORIDE 5 ML: 20 SOLUTION ORAL; TOPICAL at 11:33

## 2021-02-15 RX ADMIN — ACYCLOVIR 800 MG: 200 SUSPENSION ORAL at 07:35

## 2021-02-15 RX ADMIN — Medication: at 19:53

## 2021-02-15 RX ADMIN — DEXAMETHASONE 6 MG: 2 TABLET ORAL at 07:36

## 2021-02-15 RX ADMIN — ACYCLOVIR 800 MG: 200 SUSPENSION ORAL at 19:53

## 2021-02-15 RX ADMIN — RUXOLITINIB 5 MG: 5 TABLET ORAL at 20:33

## 2021-02-15 RX ADMIN — ENOXAPARIN SODIUM 50 MG: 100 INJECTION SUBCUTANEOUS at 19:53

## 2021-02-15 RX ADMIN — Medication 2 PACKET: at 14:51

## 2021-02-15 RX ADMIN — MELATONIN TAB 3 MG 3 MG: 3 TAB at 23:33

## 2021-02-15 RX ADMIN — MULTIVITAMIN 15 ML: LIQUID ORAL at 07:35

## 2021-02-15 RX ADMIN — LEVOFLOXACIN 250 MG: 250 TABLET, FILM COATED ORAL at 07:36

## 2021-02-15 RX ADMIN — Medication 1 TABLET: at 11:33

## 2021-02-15 RX ADMIN — Medication: at 08:50

## 2021-02-15 RX ADMIN — Medication 200 MCG/HR: at 02:13

## 2021-02-15 RX ADMIN — PANTOPRAZOLE SODIUM 40 MG: 40 TABLET, DELAYED RELEASE ORAL at 07:35

## 2021-02-15 RX ADMIN — SERTRALINE HYDROCHLORIDE 50 MG: 50 TABLET ORAL at 07:36

## 2021-02-15 RX ADMIN — SULFAMETHOXAZOLE AND TRIMETHOPRIM 1 TABLET: 800; 160 TABLET ORAL at 07:40

## 2021-02-15 RX ADMIN — FLUCONAZOLE 100 MG: 40 POWDER, FOR SUSPENSION ORAL at 07:35

## 2021-02-15 RX ADMIN — RUXOLITINIB 5 MG: 5 TABLET ORAL at 08:13

## 2021-02-15 RX ADMIN — ENOXAPARIN SODIUM 50 MG: 100 INJECTION SUBCUTANEOUS at 07:36

## 2021-02-15 RX ADMIN — Medication 8 MG/HR: at 14:50

## 2021-02-15 RX ADMIN — POLYETHYLENE GLYCOL 3350 17 G: 17 POWDER, FOR SOLUTION ORAL at 07:35

## 2021-02-15 RX ADMIN — Medication 200 MCG/HR: at 14:50

## 2021-02-15 RX ADMIN — LIDOCAINE HYDROCHLORIDE 5 ML: 20 SOLUTION OROPHARYNGEAL at 11:33

## 2021-02-15 RX ADMIN — Medication 10 MG/HR: at 05:07

## 2021-02-15 RX ADMIN — SODIUM CHLORIDE, POTASSIUM CHLORIDE, SODIUM LACTATE AND CALCIUM CHLORIDE 1000 ML: 600; 310; 30; 20 INJECTION, SOLUTION INTRAVENOUS at 12:46

## 2021-02-15 ASSESSMENT — ACTIVITIES OF DAILY LIVING (ADL)
ADLS_ACUITY_SCORE: 23
ADLS_ACUITY_SCORE: 21
ADLS_ACUITY_SCORE: 21
ADLS_ACUITY_SCORE: 23
ADLS_ACUITY_SCORE: 21
ADLS_ACUITY_SCORE: 23

## 2021-02-15 ASSESSMENT — MIFFLIN-ST. JEOR: SCORE: 1782.38

## 2021-02-15 NOTE — PROGRESS NOTES
Critical Care Services Progress Note:  I personally examined and evaluated the patient today. I formulated today s plan with the house staff team or resident(s) and agree with the findings and plan in the associated note (see separately attested resident note).   I have evaluated all laboratory values and imaging studies from the past 24 hours.  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 Initially on HFNC, then NIPPV, and then intubated on but intubated on 2/13.  Proned ventilated.   Overnight events/pertinent findings today:  Placed supine this AM. No other acute issues.  Fentanyl 200  Versed 8     Data  Na 140, K 4.6, BUN 60, Cr 1.3  CRP 56  WBC 7.1, Hgb 14.2, Plts 207    I/O +29.5    Vent: 16/480/60/14  Pplat 27  ABG 7.31/53/118    Assessment/plan:  COVID19 ARDS:   - Dexamethasone  - s/p remdesevir  - intubated on mechanical ventilation.  Pplat okay  - prone overnight today (even though P/F is currently 168)  - RASS goal -3.   - CXR today     SARAH: suspect pre-renal  - 1 L of LR now    Nutrition:  -place post-pyloric tube today    Hx of BMT  - heme/onc following.  Continuing anti-infective ppx with acyclovir, fluconazole, levofloxacin.     Rest per resident note.    I spent a total of 40 minutes (excluding procedure time) personally providing and directing critical care services at the bedside and on the critical care unit for this patient.     Johnie Lawler MD

## 2021-02-15 NOTE — PLAN OF CARE
Major Shift Events:  Sedated and proned. RASS -4. Versed at 10 mg/hr and fentanyl at 200 mcg/hr. Sinus rhythm 60-80 with frequent PACs. MAP>65. Vent settings 70%, RR 16, , PEEP 14. Large amounts of oral secretions. TF at 15 mL/hr through OG tube. Molina in place.     Plan: Continue with plan of care. Supine in AM.    For vital signs and complete assessments, please see documentation flowsheets.

## 2021-02-15 NOTE — PROCEDURES
Small Bowel Feeding Tube Placement Assessment  Reason for Feeding Tube Placement: nutrition and medication administration in pt intermittently proning  Cortrak Start Time: 12:10   Cortrak End Time: 12:45  Medicine Delivered During Procedure: lidocaine gel  Placement Successful:  Yes - presumed post-pyloric per JACIEL Weber pending    Procedure Complications: coiling and kinking around OGT, RN removed OGT.   Final Placement Armin at exit of nare 109 cm  Face to Face time with patient: 60 minutes total including donning/doffing PPE and cleaning Cortrak    Bridle Placement:   Reason for bridle placement: securement of feeding tube   Medicine delivered during procedure: lidocaine gel   Procedure: Successful  Location of top of clip on FT: @ 111 cm marker   Condition of nose/skin at time of bridle placement: Unremarkable   Face to Face time with patient: <5 minutes.    Oralia Cabral, MS, RD, LD, [facility]  MICU pager: [phone]  ASCOM: [id number]

## 2021-02-15 NOTE — PROGRESS NOTES
CLINICAL NUTRITION SERVICES - BRIEF NOTE      Nutrition Prescription     RECOMMENDATIONS FOR MDs/PROVIDERS TO ORDER:  --Additional water flushes and bowel regimen as per primary team.      Malnutrition Diagnosis: Non-severe malnutrition in the context of acute on chronic illness    Recommendations already ordered by Registered Dietitian (RD):  --Pending AXR confirmation of feeding tube position  --GOAL: Nutren 1.5 goal of 60 mL/hr plus 2 packets Prosource to provide 2240 kcals (28 kcal/kg/day), 120 g PRO (1.5 g/kg/day), 1094 mL H2O, 253 g CHO and no fiber daily.   --Advance TF rate by 15 ml q 8 hrs until goal rate.  --Do not start or advance TF rate unless K+ >3.0, Mg++ > 1.5,  and Phos > 1.9.  --Minimum 30 ml q 4 hrs water flushes for tube patency.     Future/Additional Recommendations:  --Monitor TF tolerance, advancement, labs/lytes.  --Monitor renal function.     *Please see full assessment note from 2/14/2021    New Findings:  Pt proned overnight, currently supine with plans to prone again this pm. RD placed ppFT (AXR pending). Okay to advance TF toward goal rate pending FT position.     Pt has been tolerating trophic TF: Nutren 1.5 @ 15 ml/hr. Last BM 2/12.    Labs: Cr 1.33 (H <- 0.96), BUN 60 (H <- 39)  Meds: reviewed    MALNUTRITION  % Intake: <75% x 7 days (non-severe)  % Weight Loss: None noted  Subcutaneous Fat Loss: Facial region:  mild and Upper arm:  mild  Muscle Loss: Temporal:  mild, Thoracic region (clavicle, acromium bone, deltoid, trapezius, pectoral):  mild, Upper arm (bicep, tricep):  mild and Lower arm  (forearm):  mild  Fluid Accumulation/Edema: Mild 1-2+ edema  Malnutrition Diagnosis: Non-severe malnutrition in the context of acute on chronic illness    Interventions  Collaboration with other providers - rounds, RN  Enteral Nutrition - advance TF toward goal as above    RD to follow per protocol.    Oralia Cabral, MS, RD, LD, [facility]  MICU pager: [phone]  ASCOM: [id number]

## 2021-02-15 NOTE — PROGRESS NOTES
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 Transferred to MICU on 2/10 for worsening respiratory status and c/f intubation.    CHANGES and MAJOR THINGS TODAY:   - On fentanyl and midazolam, wean with goal of RASS -3   - Supine this AM, plan to prone again at 1600   - Advance TF   - 1L LR  - Sputum growing GPC     PLAN:    Neuro:  Pain and sedation  - Acetaminophen PRN  - Fentanyl, midazolam, wean with goal of RASS -3    MDD  - Continue PTA sertraline    Pulmonary:  Acute hypoxic respiratory failure secondary to COVID-19 pneumonia, rhinovirus  Acute respiratory distress syndrome  Most recent P/F ratio 97. Intubated 2/14 with initiation of proning. Has received intermittent diuresis to maintain net negative daily.   - Supine 2/15 this AM, plan to prone again at 1600  - LTVV, goal 6 ml/kg IBW ()  - No diuresis today    Cardiovascular:  No acute concerns. TTE 2/9 LVEF 65-70%.     GI/Nutrition  Nutrition  Previously tolerating regular diet. OG in place 2/13. Started trophic tube feeds 2/14.   - RD consulted, advancing TF today  - RD to place [place]    Renal/Fluids/Electrolytes:  SARAH   Cr 1.33 on 2/15 from baseline 0.8-0.9. Likely pre-renal in etiology in the setting of poor PO intake. Patient has been -5L thus far this admission.   - No diuresis today   - 1L LR     Endocrine:  No acute concerns, 120-174    ID:  COVID-19 pneumonia (+1/30)  + Rhinovirus  Remdesivir course completed on 2/12. Was considered for tocilizumab  - ID following  - Dexamethasone decreased from 10 mg to 6 mg for total ten day course (2/8-2/17)  - Daily CRP per ID rec  - Enoxaparin 50 mg BID given elevated D-dimer    Concern for superimposed bacterial pneumonia  Procal  increased to 0.28 on 2/8. Previously on ceftriaxone, arithromycin, with prophylactic levofloxacin held but now restarted. CXR 2/8 with stable pulmonary opacities compatible with atypical infection vs. Superimposed atelectasis/edema, improved from prior. Sputum culture 2/14 growing GPC though respiratory status has been improved without evidence of increased secretion.   - Competed 3 days azithromycin  - Hold off additional antibiotics for now     Antibiotics  Azithromycin (2/9-2/11)     Antibiotics Prophylaxis  Acyclovir (2/8- )  Fluconazole (2/9- )  Levofloxacin (2/9, 2/12- )  Bactrim (2/8- )    Hematology:    MDS/ s/p BMT 2014 complicated by GVHD  No acute concerns. PTA on prednisone and Jakafi.  - BMT consulted  - Holding PTA prednisone while on dexamethasone  - Continue PTA Jakafi 5 mg twice daily  - Continue PTA Bactrim double strength 1 tablet twice weekly for PCP prophylaxis  - Continue PTA acyclovir 800 mg 2 times daily for prophylaxis against herpes simplex virus  - Continue PTA  fluconazole 100 mg oral daily  - Continue PTA levofloxacin 250 mg every day     Musculoskeletal:  No acute concerns    Skin:  No acute concerns    General Cares/Prophylaxis:    DVT Prophylaxis: Enoxaparin (Lovenox) subcutaneous 50 mg BID  GI Prophylaxis: PPI   Restraints: None  Family Communication: Will update wife (Racquel)   Code Status: Full code     Lines/tubes/drains:  - PIV  - Central line (consider removal tomorrow if continues to be stable off pressors), a line, ETT  - GARCÍA BELTRAN    Disposition:  - Medical ICU     Patient seen and findings/plan discussed with medical ICU staff, Dr. Davis.    Michael Vaz MD  PGY-1, Internal Medicine  MICU 2 Service    ====================================  INTERVAL HISTORY:   NAEO. Intubated and proned. Supine this AM. Remains off pressors.     OBJECTIVE:   1. VITAL SIGNS:   Temp:  [96.6  F (35.9  C)-99  F (37.2  C)] 97.8  F (36.6  C)  Pulse:  [63-82] 77  Resp:  [12-20] 16  MAP:  [72 mmHg-238  mmHg] 73 mmHg  Arterial Line BP: ()/() 88/65  FiO2 (%):  [70 %-90 %] 70 %  SpO2:  [88 %-98 %] 97 %  Ventilation Mode: CMV/AC  (Continuous Mandatory Ventilation/ Assist Control)  FiO2 (%): 70 %  Rate Set (breaths/minute): 16 breaths/min  Tidal Volume Set (mL): 480 mL  PEEP (cm H2O): 14 cmH2O  Oxygen Concentration (%): 75 %  Resp: 16    2. INTAKE/ OUTPUT:   I/O last 3 completed shifts:  In: 927.54 [I.V.:397.54; NG/GT:350]  Out: 930 [Urine:930]    3. PHYSICAL EXAMINATION:  General: Lying in bed, intubated, proned  Neuro: Sedated, not responsive to commands  Pulm/Resp: Clear breath sounds bilaterally without rhonchi, crackles or wheeze, breathing slightly labored  CV: RRR, trace edema  Abdomen: Soft, non-distended, non-tender  : Urine yellow and clear  Extremities: Warm, slight RLE edema (chronic per pt)    4. LABS:   Arterial Blood Gases   Recent Labs   Lab 02/15/21  0101 02/14/21  1511 02/14/21  0951 02/14/21  0639   PH 7.27* 7.29* 7.29* 7.34*   PCO2 58* 55* 55* 48*   PO2 104 87 94 113*   HCO3 27 26 27 26     Complete Blood Count   Recent Labs   Lab 02/15/21  0431 02/13/21  0616 02/12/21  0337 02/11/21  0409   WBC 7.1 7.7 8.3 6.7   HGB 14.2 13.0* 12.4* 13.2*    185 184 192     Basic Metabolic Panel  Recent Labs   Lab 02/15/21  0431 02/14/21  0326 02/13/21  0616 02/12/21  0337    138 142 142   POTASSIUM 4.6 4.1 4.8 4.1   CHLORIDE 108 107 112* 115*   CO2 25 25 25 22   BUN 60* 39* 33* 38*   CR 1.33* 0.96 0.77 0.79   * 134* 102* 126*     Liver Function Tests  Recent Labs   Lab 02/15/21  0431 02/14/21  0637 02/14/21  0326 02/13/21  0616 02/12/21  0337   AST 23  --  33 43 46*   ALT 37  --  36 41 38   ALKPHOS 115  --  116 106 87   BILITOTAL 0.5  --  0.6 0.5 0.4   ALBUMIN 2.4*  --  2.3* 2.2* 2.3*   INR  --  1.23*  --   --   --      5. RADIOLOGY:   No results found for this or any previous visit (from the past 24 hour(s)).

## 2021-02-15 NOTE — PROGRESS NOTES
[facility]  Transplant Infectious Disease Progress Note     Patient:  Deejay Dior, Date of birth 1950, Medical record number [id number]  Date of Visit:  02/15/2021         Assessment and Recommendations:   Recommendations:  - Continue dexamethasone to complete a ten day course.  - No new recommendations.    Transplant ID will follow with you.    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  He required intubated for Covid-19 pneumonitis / ARDS on 2/13/21 late evening.    ID issues:  #Acute Hypoxic Respiratory Failure:  #COVID-19 PNA (diagnosed 01/30):  #MDS s/p stem cell transplant (2014):  #Chronic GVHD:     From a COVID-19 therapy standpoint, he has received appropriate therapies of remdesivir and dexamethasone. There is no role for monoclonal antibodies at this point in his course.  Reviewed with [facility] ID division members comfirming there are no other current trials/therapies for which he is eligible (especially given his underlying MDS s/p stem cell transplant and chronic GVHD.  Pre-admission, he was on Jakafi and prednisone in the out patient setting, as well as a variety of prophylactic medications - acyclovir, fluconazole,TMP-SMX, and Levaquin -- we will re-visit the ongoing need/indications after he is respiratorily improved.  (Continued need for high dose acyclovir (in absence of documented CMV) and fluconazole (with no neutropenia and essentially normal differential) is unclear.)    Other ID issues:  - QTc interval: 434 (02/08/21)  - Bacterial prophylaxis: Levaquin as out-patient; currently on azithromycin   - Pneumocystis prophylaxis: bactrim  - Viral serostatus & prophylaxis: acyclovir   - Fungal prophylaxis: fluconazole   -  Immunization status: Unknown  - Gamma globulin status: IgG replete   - Isolation status: COVID-19; VRE         Interval History:   Mr. Dior remains intubated, sedated and proned in the MICU (since 2/13/21 late PM intubation) with an FiO2 of 0.60 and PEEP of 14 for ARDS / Covid-19 pneumonia.  He is consistently afebrile (T max 99.1 degrees F since 2/9/21) and hemodynamically stable without pressors. A review of systems is unobtainable.  His CRP is relatively stable at 56.0 today with a steadily normal peripheral WBC in the 6 - 8K range over the past week (7.1 this AM).  He is on dexamethasone for Covid-19 as well as levofloxacin, acyclovir, TMP-SMX, and fluconazole prophylaxis, having previously completed a five day remdesivir course (2/8 - 12/21) and three days of azithromycin (2/9 - 11/21).  There is no new recent significant microbiology -- a 2/14/21 ETT-derived sputum culture is without growth to date.  Aspergillus galactomannan, Fungitell, Histoplasma antigen, and Blastomyces antigen assays were all negative on 2/10/21.  In addition to Covid-19, a 2/9/21 NP respiratory virus PCR panel also detected human rhinovirus.  Admission 2/8/21 blood cultures were negative.         Current Medications & Allergies:       acyclovir  800 mg Oral BID     artificial tears   Both Eyes BID     calcium carbonate-vitamin D  1 tablet Oral Daily     dexamethasone  6 mg Oral Daily     enoxaparin ANTICOAGULANT  0.5 mg/kg Subcutaneous BID     fluconazole  100 mg Oral Daily     levofloxacin  250 mg Oral Daily     melatonin  3 mg Oral At Bedtime     multivitamins w/minerals  15 mL Per Feeding Tube Daily     pantoprazole  40 mg Oral QAM AC     [Held by provider] predniSONE  5 mg Oral Daily     protein modular  2 packet Per Feeding Tube Daily     ruxolitinib  5 mg Oral or Feeding Tube BID     sertraline  50 mg Oral Daily     sulfamethoxazole-trimethoprim  1 tablet Oral Once per day on Mon Tue     Infusions/Drips:    dextrose        fentaNYL 200 mcg/hr (02/15/21 1800)     - MEDICATION INSTRUCTIONS -       midazolam 8 mg/hr (02/15/21 1800)     - MEDICATION INSTRUCTIONS -       propofol (DIPRIVAN) infusion Stopped (02/14/21 0353)     Allergies   Allergen Reactions     Blood Transfusion Related (Informational Only) Other (See Comments)     Patient has a history of a clinically significant antibody against RBC antigens.  A delay in compatible RBCs may occur.          Physical Exam:   Vitals were reviewed.  All vitals stable.  Patient Vitals for the past 24 hrs:   Temp Temp src Pulse Resp SpO2 Weight   02/15/21 1700 -- -- 69 16 95 % --   02/15/21 1630 -- -- 63 -- 99 % --   02/15/21 1600 97.8  F (36.6  C) Axillary 64 16 95 % --   02/15/21 1545 -- -- (!) 49 -- 95 % --   02/15/21 1516 -- -- -- -- 95 % --   02/15/21 1500 -- -- 61 16 95 % --   02/15/21 1400 -- -- 64 16 97 % --   02/15/21 1300 -- -- (!) 46 16 97 % --   02/15/21 1230 -- -- (!) 48 -- 95 % --   02/15/21 1200 98.7  F (37.1  C) Axillary -- 16 -- --   02/15/21 1118 -- -- -- -- 98 % --   02/15/21 1100 -- -- 56 16 97 % --   02/15/21 1000 -- -- 55 16 98 % --   02/15/21 0900 -- -- 68 -- 96 % --   02/15/21 0800 98.3  F (36.8  C) Axillary 73 16 95 % --   02/15/21 0743 -- -- -- -- 95 % --   02/15/21 0700 -- -- 75 -- 96 % --   02/15/21 0600 -- -- 77 -- 97 % --   02/15/21 0500 -- -- 76 -- 96 % --   02/15/21 0400 97.8  F (36.6  C) Axillary 76 16 97 % 104.2 kg (229 lb 11.5 oz)   02/15/21 0300 -- -- 75 16 96 % --   02/15/21 0200 -- -- 77 17 98 % --   02/15/21 0100 -- -- 75 16 96 % --   02/15/21 0000 98.1  F (36.7  C) Axillary 79 16 97 % --   02/14/21 2300 -- -- 79 17 97 % --   02/14/21 2200 -- -- 82 16 97 % --   02/14/21 2100 -- -- 80 16 95 % --   02/14/21 2000 99  F (37.2  C) Axillary 80 16 97 % --   02/14/21 1900 -- -- 80 16 95 % --   02/14/21 1800 -- -- 81 16 97 % --     Ranges for vital signs over the past 24 hours:   Temp:  [97.8  F (36.6  C)-99  F (37.2  C)] 97.8  F (36.6  C)  Pulse:  [46-82]  69  Resp:  [16-17] 16  MAP:  [63 mmHg-238 mmHg] 76 mmHg  Arterial Line BP: ()/() 118/57  FiO2 (%):  [50 %-80 %] 50 %  SpO2:  [95 %-99 %] 95 %  Vitals:    02/12/21 0400 02/14/21 0400 02/15/21 0400   Weight: 103.8 kg (228 lb 13.4 oz) 103.7 kg (228 lb 9.9 oz) 104.2 kg (229 lb 11.5 oz)     Intake/Output Summary (Last 24 hours) at 2/14/2021 1631  Last data filed at 2/14/2021 1600  Gross per 24 hour   Intake 605.54 ml   Output 940 ml   Net -334.46 ml     Physical Examination:  GENERAL:  Intubated, sedated, 70 year old man prone in bed in NAD on ventilator viewed through window / video.  Remainder of exam unobtainable due to Covid-19 isolation.  HEAD:  NCAT.  EYES:  EOMI, anicteric sclerae.  ENT:  No visible otorrhea.  OG tube present.  Oral ETT.  LUNGS:  Breathing appears comfortable on vent.  CARDIOVASCULAR:  RRR.  ABDOMEN:  Unable to examine.  :  Molina with kian urine.  SKIN:  No visible acute rash or lesion.  Right internal jugular line present.  NEUROLOGIC:  Sedated, not moving.         Laboratory Data:     Absolute CD4   Date Value Ref Range Status   06/29/2015 345 (L) 441 - 2,156 cells/uL Final     Comment:     Effective 12/08/2014, the reference range for this assay has changed to   reflect   new methodology.     12/29/2014 190 (L) 441 - 2,156 cells/uL Final     Comment:     Effective 12/08/2014, the reference range for this assay has changed to   reflect   new methodology.     10/09/2014 37 mm3 Final     Inflammatory Markers    Recent Labs   Lab Test 02/15/21  0431 02/14/21  0326 02/13/21  0616 02/12/21  0337 02/11/21  0409 02/10/21  0721 11/30/14  1207 11/30/14  1207   SED  --   --   --   --   --   --   --  40*   CRP 56.0* 68.0* 53.0* 44.0* 79.0* 110.0*   < > 17.4*    < > = values in this interval not displayed.     Immune Globulin Studies     Recent Labs   Lab Test 02/10/21  0721 01/24/19  1605 10/01/18  0955 05/08/16  0906 03/20/16  0352 07/30/15  1125 10/09/14  1010   IGG 3,275*  831 1,130  1,300 1,270 403* 913 458*     --   --   --   --   --  65   IGE  --   --   --   --   --   --  12   IGA 83*  --   --   --   --   --  48*     Metabolic Studies       Recent Labs   Lab Test 02/15/21  0431 02/14/21  0637 02/14/21  0326 02/10/21  1305 02/10/21  1305 02/08/21 2024 02/08/21 2024 02/08/21 0910 02/05/21  1227     --  138   < >  --    < >  --  139 140   POTASSIUM 4.6  --  4.1   < >  --    < >  --  4.0 4.0   CHLORIDE 108  --  107   < >  --    < >  --  111* 109   CO2 25  --  25   < >  --    < >  --  23 25   ANIONGAP 7  --  6   < >  --    < >  --  5 6   BUN 60*  --  39*   < >  --    < >  --  25 30   CR 1.33*  --  0.96   < >  --    < >  --  0.88 1.10   GFRESTIMATED 53*  --  79   < >  --    < >  --  86 67   *  --  134*   < >  --    < >  --  100* 94   JOE 8.6  --  8.5   < >  --    < >  --  8.4* 8.4*   PHOS  --  4.5  --   --   --   --   --   --   --    MAG  --   --  2.4*  --   --   --   --  1.7 2.0   LACT  --   --   --   --   --   --   --  1.1 1.4   PCAL  --   --   --   --   --   --  0.28 0.10  --    FGTL  --   --   --   --  <31  --   --   --   --    CKT  --  27*  --   --   --   --   --   --   --     < > = values in this interval not displayed.     Hepatic Studies    Recent Labs   Lab Test 02/15/21  0431 02/14/21  0326 02/13/21  0616 02/08/21  0910 02/08/21  0910 04/06/16 0245 04/06/16 0245 07/07/14  0445 07/07/14  0445   BILITOTAL 0.5 0.6 0.5   < > 0.3   < > 0.8   < > 1.8*   BILIDELTA  --   --   --   --   --   --   --   --  1.0*   BILICONJ  --   --   --   --   --   --   --   --  0.0   DBIL  --   --   --   --   --   --  0.3*   < >  --    ALKPHOS 115 116 106   < > 69   < > 292*   < > 88   PROTTOTAL 6.4* 6.0* 5.9*   < > 6.6*   < > 6.7*   < > 6.3*   ALBUMIN 2.4* 2.3* 2.2*   < > 2.6*   < > 2.3*   < > 3.4   AST 23 33 43   < > 51*   < > 96*   < > 19   ALT 37 36 41   < > 28   < > 167*   < > 47   LDH  --  783*  --   --  483*  --   --    < >  --     < > = values in this interval not displayed.      Pancreatitis testing    Recent Labs   Lab Test 02/14/19  1342 09/27/18  1217 04/04/16  0350   AMYLASE 46  --   --    LIPASE  --  158  --    TRIG  --   --  286*     Gout Labs      Recent Labs   Lab Test 06/24/14  1205 06/09/14  1116   URIC 6.1 5.8     Hematology Studies      Recent Labs   Lab Test 02/15/21  0431 02/13/21  0616 02/12/21  0337 02/11/21  0409 02/10/21  0721 02/09/21  0637   WBC 7.1 7.7 8.3 6.7 6.9 8.0   ANEU  --   --  7.0 5.7 5.6  --    ALYM  --   --  0.7* 0.7* 0.9  --    WILLIAM  --   --  0.4 0.3 0.3  --    AEOS  --   --  0.0 0.0 0.0  --    HGB 14.2 13.0* 12.4* 13.2* 13.3 13.3   HCT 43.3 39.0* 38.3* 39.1* 38.8* 40.0    185 184 192 170 146*     Clotting Studies    Recent Labs   Lab Test 02/14/21  0637 02/14/19  1342 01/24/19  1605 06/30/16  0952   INR 1.23* 0.98 0.92 0.95   PTT 29  --   --   --      Iron Testing    Recent Labs   Lab Test 02/15/21  0431 02/11/21  0409 02/11/21  0409 11/21/19  1148 11/21/19  1148 09/27/18  1218 09/27/18  1218   LENNOX  --   --  401*   < >  --   --   --    MCV 98   < > 95   < > 95   < >  --    FOLIC  --   --   --   --   --   --  15.9   B12  --   --   --   --  455  --   --     < > = values in this interval not displayed.     Arterial Blood Gas Testing    Recent Labs   Lab Test 02/15/21  0928 02/15/21  0101 02/14/21  1511 02/14/21  0951 02/14/21  0639   PH 7.31* 7.27* 7.29* 7.29* 7.34*   PCO2 53* 58* 55* 55* 48*   PO2 118* 104 87 94 113*   HCO3 27 27 26 27 26   O2PER 70.0 80.0 70.0 90 100.0     Thyroid Studies     Recent Labs   Lab Test 09/27/18  1217 02/27/18  1239 10/12/16  1107 06/30/16  0953 03/01/16  1227   TSH 1.01 0.70 0.40 1.02 0.38*   T4  --   --   --  1.03 1.12     Urine Studies     Recent Labs   Lab Test 08/25/16  1601 05/07/16  1211 03/24/16  1650 03/21/16  1730 12/17/15  1430   URINEPH 6.0 5.0 5.0 5.0 5.0   NITRITE Negative Negative Negative Negative Negative   LEUKEST Negative Negative Negative Negative Negative   WBCU 1 1 <1 1 1     Medication levels     Recent Labs   Lab Test 09/27/18  1059 04/01/16  0831 04/01/16  0831 03/21/16  2143 03/21/16  2143 02/01/15  0924 02/01/15  0924   VANCOMYCIN  --   --   --   --  25.4*   < >  --    VCON  --   --  1.6   < >  --   --   --    CYCLSP  --   --   --   --   --   --  178   RAPAMY 4.5*   < >  --    < >  --    < >  --     < > = values in this interval not displayed.     Body fluid stats    Recent Labs   Lab Test 02/14/21  0848 03/27/16  1406   FTYP  --  Bronchial  SITE 2 RIGHT MIDDLE LOBE    Bronchial  SITE 1 RIGTH UPPER LOBE     FCOL  --  Colorless  Colorless   FAPR  --  Clear  Clear   FRBC  --  << Do Not Report >>  << Do Not Report >>   FWBC  --  63  57   FNEU  --  16  21   FLYM  --  8  14   FMONO  --  76  65   GS <25 PMNs/low power field  Moderate  Gram positive cocci  * No organisms seen  >25 PMNs/low power field    No organisms seen  >25 PMNs/low power field       Microbiology:    Fungal testing  Recent Labs   Lab Test 02/10/21  1305 09/27/18  1217 03/27/16  1406 03/25/16  1911 03/20/16  0352 03/19/16  1638 03/19/16  0749   FGTL <31 35  --  >500  Unit: pg/mL   149  --   --    ASPGAI 0.05 0.05 0.10  0.09  --   --  0.07 0.12   ASPAG  --   --  Negative  Reference range: Negative  (Note)  INTERPRETIVE INFORMATION: Aspergillus Galactomannan EIA,  Broncho  A BAL galactomannan index of greater than or equal to 0.5  is considered positive.  This result should be interpreted  in the context of patient history, clinical signs/symptoms,  and other routine diagnostic tests (e.g., culture,  histologic examination of biopsy material, and radiographic  imaging).  Performed by [facility],  [address] [place] [address] [phone]  [email or web address], Lencho Slaughter MD, Lab. Director    Negative  Reference range: Negative  (Note)  INTERPRETIVE INFORMATION: Aspergillus Galactomannan EIA,  Broncho  A BAL galactomannan index of greater than or equal to 0.5  is considered positive.  This result should be interpreted  in  the context of patient history, clinical signs/symptoms,  and other routine diagnostic tests (e.g., culture,  histologic examination of biopsy material, and radiographic  imaging).  Performed by [facility],  [address] [facility] [place] [address] [phone]  [email or web address], Lencho Slaughter MD, Lab. Director    --   --   --   --    ASPGAA Negative Negative  --   --   --  Negative  Reference range: Negative  Unit: not reported  (Note)  INTERPRETIVE INFORMATION: Aspergillus Galactomannan Antigen  by EIA  Negative results do not exclude the diagnosis of invasive  aspergillosis. A single positive test result (index equal  to or greater than 0.5) should be clinically correlated  by testing a separate serum specimen because many agents  (e.g. foods, antibiotics) may cross-react with the test.  If invasive aspergillosis is suspected in high-risk  patients, serial sampling is recommended.  Performed by [facility],  [address] [place] [address] [phone]  [email or web address], Lencho Slaughter MD, Lab. Director   Negative  Reference range: Negative  Unit: not reported  (Note)  INTERPRETIVE INFORMATION: Aspergillus Galactomannan Antigen  by EIA  Negative results do not exclude the diagnosis of invasive  aspergillosis. A single positive test result (index equal  to or greater than 0.5) should be clinically correlated  by testing a separate serum specimen because many agents  (e.g. foods, antibiotics) may cross-react with the test.  If invasive aspergillosis is suspected in high-risk  patients, serial sampling is recommended.  Performed by [facility],  [address] [place] [address] [phone]  [email or web address], Lencho Slaughter MD, Lab. Director       Last Culture results with specimen source  Culture Micro   Date Value Ref Range Status   02/14/2021 Culture in progress  Preliminary   02/08/2021 No growth  Final   02/08/2021 No growth  Final   02/05/2021 No growth  Final   02/05/2021 No growth  Final    03/08/2018 No growth  Final   03/08/2018 No growth  Final   02/21/2018 No Beta Streptococcus isolated  Final   06/09/2016 No growth  Final   05/07/2016 No growth  Final   05/07/2016 No growth  Final   03/31/2016 No growth  Final   03/31/2016 No growth  Final   03/29/2016 (A)  Final    Light growth Enterococcus species (VRE)  Critical Value/Significant Value, preliminary result only, called to and read   back by Preeti Cordoba RN @1301 04/01/16      03/27/2016 No growth  Final   03/27/2016   Final    Culture negative for acid fast bacilli  Assayed at [facility] [address]     03/27/2016 (A)  Final    Geotrichum candidum isolated  Susceptibility testing requested by Dr. Alonzo 4.1.16 KB  No additional fungi cultured after 4 weeks incubation     03/27/2016 No growth after 29 days  Final   03/27/2016   Final    Unable to determine the presence of Actinomyces species due to an overgrowth of   normal kimani.     03/27/2016 No Legionella species isolated  Final   03/27/2016 No growth  Final   03/27/2016 Culture negative after 29 days  Final   03/27/2016 No growth after 29 days  Final   03/27/2016 No Legionella species isolated  Final   03/27/2016   Final    Culture negative for acid fast bacilli  Assayed at [facility] [address]      Specimen Description   Date Value Ref Range Status   02/14/2021 Sputum  Final   02/14/2021 Sputum  Final   02/08/2021 Blood Right Arm  Final   02/08/2021 Blood Left Arm  Final   02/05/2021 Blood Right Arm  Final   02/05/2021 Blood  Final   08/01/2019 Nares  Final   03/08/2018 Blood Left Arm  Final   03/08/2018 Blood Right Arm  Final   02/21/2018 Throat  Final   02/21/2018 Throat  Final   10/12/2016 Nasal  Final   06/09/2016 Blood Unspecified Site  Final   05/07/2016 Blood Red port  Final   05/07/2016 Blood White port  Final   05/05/2016 Nasal  Final        Last check of C difficile  C Diff Toxin B PCR   Date Value Ref Range Status   03/29/2016   NEG Final    Negative  Negative: Clostridium difficile target DNA sequences NOT detected, presumed   negative for Clostridium difficile toxin B or the number of bacteria present   may be below the limit of detection for the test.   FDA approved assay performed using [employer] GeneXpert real-time PCR.   A negative result does not exclude actual disease due to Clostridium difficile   and may be due to improper collection, handling and storage of the specimen or   the number of organisms in the specimen is below the detection limit of the   assay.       Infection Studies to assess Diarrhea   Recent Labs   Lab Test 03/29/16  1245 03/21/16  0945   ADENOVIRUSAG Negative  --    EPCAMP Not Detected Not Detected   EPSALM Not Detected Not Detected   EPSHGL Not Detected Not Detected   EPVIB Not Detected Not Detected   EPROTA Not Detected Not Detected   EPNORO Not Detected Not Detected   EPYER Not Detected Not Detected     Virology:    Coronavirus-19 testing    Recent Labs   Lab Test 01/30/21  1514   COVIDPCREXT Positive*     Respiratory virus (non-coronavirus-19) testing    Recent Labs   Lab Test 02/09/21  1315 02/21/18  1348 10/12/16  1155 06/09/16  1233 11/30/14  1208 11/30/14  1208   RVSPEC  --  Nasopharyngeal  --  Nasopharyngeal   < >  --    AFLU  --   --   --   --   --  Negative   IFLUA Not Detected Negative  --  Negative   < >  --    INFZA  --   --  Negative   Flu A target RNA not detected, presumed negative for Influenza A or the viral   load is below the limit of detection.    --    < >  --    FLUAH1 Not Detected Negative  --  Negative   < >  --    [id number] Not Detected Negative  --  Negative   < >  --    FLUAH3 Not Detected Negative  --  Negative   < >  --    BFLU  --   --   --   --   --  Negative   Test results must be correlated with clinical data. If necessary, results   should be confirmed by a molecular assay or viral culture.     IFLUB Not Detected Negative  --  Negative   < >  --    INFZB  --   --  Negative   Flu B  target RNA not detected, presumed negative for Influenza B or the viral   load is below the limit of detection.    --    < >  --    PIV1 Not Detected Negative  --  Negative   < >  --    PIV2 Not Detected Negative  --  Negative   < >  --    PIV3 Not Detected Negative  --  Negative   < >  --    PIV4 Not Detected  --   --   --   --   --    IRSV  --   --  Negative   RSV target RNA not detected, presumed negative for Respiratory Syncitial Virus   or the viral load is below the limit of detection.   FDA approved assay performed using [employer]orcepert(R) real-time PCR.    --    < >  --    HRVS  --  Negative  --  Negative   < >  --    RSVA Not Detected Negative  --  Negative   < >  --    RSVB Not Detected Negative  --  Negative   < >  --    HMPV Not Detected Negative  --  Negative   < >  --    ADVBE  --  Negative  --  Negative   < >  --    ADVC  --  Negative  --  Negative   < >  --    ADENOV Not Detected  --   --   --   --   --    CORONA Not Detected  --   --   --   --   --     < > = values in this interval not displayed.     EBV DNA Copies/mL   Date Value Ref Range Status   08/16/2018 EBV DNA Not Detected EBVNEG^EBV DNA Not Detected [Copies]/mL Final   06/21/2018 EBV DNA Not Detected EBVNEG^EBV DNA Not Detected [Copies]/mL Final   02/27/2018 <500 (A) EBVNEG^EBV DNA Not Detected [Copies]/mL Final     Comment:     EBV DNA Detected below the reportable range of 500 Copies/mL   12/14/2017 EBV DNA Not Detected EBVNEG^EBV DNA Not Detected [Copies]/mL Final   09/28/2017 <500 (A) EBVNEG^EBV DNA Not Detected [Copies]/mL Final     Comment:     EBV DNA Detected below the reportable range of 500 Copies/mL   06/08/2017 EBV DNA Not Detected EBVNEG [Copies]/mL Final     Parvovirus Testing    Recent Labs   Lab Test 03/27/16  1406   PRVSP Bronchoalveolar Lavage   Right upper lobe     PRVPC Not Detected  (Note)  NOT DETECTED - A negative result does not rule out the  presence of PCR inhibitors in the patient specimen or assay  specific  nucleic acid in concentrations below the level of  detection by the assay.    The specimen submitted for testing did not meet [facility] [facility]  submission guidelines. Testing was performed on a specimen  that did not meet validated type requirements.  Performance characteristics of this assay may be affected.  Interpret results with caution. Please refer to the [employer] [facility]  Laboratory Test Directory for information on specimen  acceptability:  [email or web address].  INTERPRETIVE INFORMATION: Parvovirus B19 By PCR  Test developed and characteristics determined by [facility]. See Compliance Statement B: [employer] [facility]  Performed by [facility],  [address] [place] [address] [phone]  [email or web address], Lencho Slaughter MD, Lab. Director       Adenovirus Testing    Recent Labs   Lab Test 03/08/18  1343 03/29/16  1245 03/29/16  0939 02/18/15  1421 02/18/15  1050 02/04/15  2046   ADRES No Adenovirus DNA detected.  --  No Adenovirus DNA detected.  --  No Adenovirus DNA detected.  --    ADENOVIRUSAG  --  Negative  --  Negative  --  Negative     Imaging:  Recent Results (from the past 48 hour(s))   XR Abdomen Port 1 View    Narrative    Exam: XR ABDOMEN PORT 1 , 2/14/2021 1:01 AM    Indication: og placement    Comparison: Abdominal CT 3/30/2016    Findings:   Semiupright AP view of the upper abdomen. Enteric tube passes below  the left hemidiaphragm with side port projected over the stomach.  Nonspecific bowel gas pattern in the upper abdomen. No visualized  intra-abdominal free air. Hazy pulmonary interstitial opacities again  noted in the lung bases.      Impression    Impression:   Enteric tube side-port is projected over the stomach.    I have personally reviewed the examination and initial interpretation  and I agree with the findings.    CARY BAILEY MD   XR Chest Port 1 View    Narrative    Exam: XR CHEST PORT 1 , 2/14/2021 1:01 AM    Indication: intubation    Comparison: Chest  x-ray 2/10/2021, Chest CT on 2/8/2021    Findings:   Semiupright AP view of the chest. Endotracheal tube has been placed  with tip overlying the high thoracic trachea approximately 5 to 6 cm  above the ramonita. No appreciable change in bilateral patchy pulmonary  opacities, low lung volumes, and bibasilar pulmonary opacities. No  appreciable pleural effusion or pneumothorax. Stable borderline  cardiomegaly.      Impression    Impression:   1. Endotracheal tube tip is projected approximately 5-6 cm above the  ramonita.  2. Persistent left greater than right patchy airspace opacities  compatible with atypical infection.    I have personally reviewed the examination and initial interpretation  and I agree with the findings.    CARY BAILEY MD   XR Chest Port 1 View    Narrative    Exam: XR CHEST PORT 1 VW, 2/14/2021 5:31 AM    Indication: Central line placement    Comparison: Chest x-ray 2/14/2021 at 0037 hours    Findings:   Supine AP view of the chest. Endotracheal tube tip is overlying the  mid thoracic trachea. Right IJ central venous catheter has been placed  with tip overlying the cavoatrial junction. Enteric tube passes below  the left hemidiaphragm with tip outside the field of view. Bilateral  perihilar and bibasilar patchy/hazy pulmonary opacities. No  pneumothorax. Small bilateral pleural effusions. Stable cardiac  silhouette.      Impression    Impression:   1. New right IJ central venous catheter tip is at the cavoatrial  junction.  2. Stable position of endotracheal tube.  3. Stable appearance of pulmonary opacities compatible with atypical  infection and possible superimposed atelectasis/edema.    I have personally reviewed the examination and initial interpretation  and I agree with the findings.    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 Also evaluate  volume.    COMPARISON: 2/14/21    FINDINGS: Heart is normal in size. Aortic arch atherosclerosis. Patchy  interstitial and airspace opacities appear similar on the right and  slightly decreased in the left lung base. Continued small left  effusion. Endotracheal intubation with tip of the tube approximately  3.3 cm above the ramonita. Thoracic spondylosis.      Impression    IMPRESSION: Overall slight decrease in patchy opacifications in the  left lung base. Continued small left effusion. Atherosclerosis.  Endotracheal intubation. Continued patchy opacities in right lung  base.    SUZANNA FELIX MD   XR Abdomen Port 1 View    Narrative    EXAM: XR ABDOMEN PORT 1 VW  2/15/2021 2:31 PM     HISTORY:  70 yom with covid pneumonia requiring proning, eval feeding  tube placement       COMPARISON:  2/14/2021    FINDINGS:   Supine frontal radiograph of the abdomen. The feeding tube tip  projects near the proximal jejunum. Gastric tube has been removed.  Nonobstructive bowel gas pattern. Degenerative changes of the spine.  Partially visualized orthopedic hardware projected over right  hemipelvis.      Impression    IMPRESSION: Feeding tube tip projects near the proximal jejunum.    I have personally reviewed the examination and initial interpretation  and I agree with the findings.    ODALYS GILL MD

## 2021-02-15 NOTE — PLAN OF CARE
ICU End of Shift Summary. See flowsheets for vital signs and detailed assessment.    Changes this shift:     Neuro: sedated on versed & fentanyl drip. RASS -4 but with prone and supine positioning patient was moving arms and coughing.     Resp: CMV 16/480/14/50%. Supine this morning @0845, tolerated well; prone positioning @1645. Scant secretions.     Cardiac: HR 40-60s with intermittent frequent PACs. MAPs 60-80. 1-2+ edema to feet.    GI/: NJ placed, increased TF goal. Small BM, Bowel meds given. UOP 50-60ml/hr. 1L LR given d/t increased crt and marginal UOP. IV fluids did help BPs for a short period with -130s.    Plan: prone for an additional 16hrs. Supine tomorrow.       Problem: Adult Inpatient Plan of Care  Goal: Plan of Care Review  Outcome: No Change     Problem: COPD Comorbidity  Goal: Maintenance of COPD Symptom Control  Outcome: No Change     Problem: Gas Exchange Impaired  Goal: Optimal Gas Exchange  Outcome: No Change

## 2021-02-15 NOTE — PROGRESS NOTES
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 Dior is 6 year, 7 months out from allo-sib PBHSCT c/b mucocutaneous cGHVD and chronic fatigue/dyspnea. No pulmonary cGHVD. His GVHD symptoms have been stable on Jakafi and low-dose pred for some time. His IgG level was normal, no need for IVIg. Would continue Jakafi, but hold pred while on dexamethasone for COVID. *He should restart pred once off dex. He should continue on all of his prophylactic anti-infective agents. At present there are no primary BMT/GHVD issues, but we remain very concerned about the critical nature of his COVID PNA.     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 Great saphenous ablation on the right, with recannulization of left saphenous vein on US from 7/2019. Anticoagulation per COVID protocol/ICU.      Patient was seen and plan of care was discussed with attending physician Dr. Black.    We will continue to follow this patient. Please contact us with questions.  "    ADRIAN WhitingC  [phone]      _________________________________________________    Subjective & Interval History:    Proned overnight. Intubated, sedated with Fent/Versed (some hypotension w/propofol). Mild SARAH noted today - getting IVF. ely intubated, sedated with prop/fent      Physical Exam:    /80   Pulse 56   Temp 98.7  F (37.1  C) (Axillary)   Resp 16   Ht 1.753 m (5' 9\")   Wt 104.2 kg (229 lb 11.5 oz)   SpO2 98%   BMI 33.92 kg/m    Physical examination deferred t primary team due to public health emergency video visit restrictions.    Labs & Studies: I personally reviewed the following studies:  ROUTINE LABS (Last four results):  CMP  Recent Labs   Lab 02/15/21  0431 02/14/21  0637 02/14/21 0326 02/13/21 0616 02/12/21  0337     --  138 142 142   POTASSIUM 4.6  --  4.1 4.8 4.1   CHLORIDE 108  --  107 112* 115*   CO2 25  --  25 25 22   ANIONGAP 7  --  6 5 4   *  --  134* 102* 126*   BUN 60*  --  39* 33* 38*   CR 1.33*  --  0.96 0.77 0.79   GFRESTIMATED 53*  --  79 >90 90   GFRESTBLACK 61  --  >90 >90 >90   JOE 8.6  --  8.5 8.5 8.3*   MAG  --   --  2.4*  --   --    PHOS  --  4.5  --   --   --    PROTTOTAL 6.4*  --  6.0* 5.9* 5.9*   ALBUMIN 2.4*  --  2.3* 2.2* 2.3*   BILITOTAL 0.5  --  0.6 0.5 0.4   ALKPHOS 115  --  116 106 87   AST 23  --  33 43 46*   ALT 37  --  36 41 38     CBC  Recent Labs   Lab 02/15/21  0431 02/13/21  0616 02/12/21 0337 02/11/21  0409   WBC 7.1 7.7 8.3 6.7   RBC 4.41 4.13* 4.02* 4.13*   HGB 14.2 13.0* 12.4* 13.2*   HCT 43.3 39.0* 38.3* 39.1*   MCV 98 94 95 95   MCH 32.2 31.5 30.8 32.0   MCHC 32.8 33.3 32.4 33.8   RDW 18.4* 18.3* 18.1* 18.2*    185 184 192     INR  Recent Labs   Lab 02/14/21  0637   INR 1.23*       Attending note.The patient was seen and examined by me separately from the fellow provider. The note reflects our mutual assessment and plans and were approved by me personally.   I personally reviewed today's lab results vital signs " and radiology results.     Each point of the assessment and plan were reviewed by the midlevel and me and either endorsed by me or were my added decisions.     My pertinent physical findings today are: not possible: video     My assessment and plan are: 71 yo 6 years post allo for MDS on prednisone and Jakafi for CGVHD now with serious COVID pneumonia. Continue Jakafi and hold predsinone while on dex burst, then resume.Will probably need intubation. Ci No changes in immunosupession. No current BMT issues. May need to reduce Jakafi if renal function further deteriorates. I agree with the A&P as described by the fellow.    Edgar Black M.D.

## 2021-02-15 NOTE — PLAN OF CARE
ICU End of Shift Summary. See flowsheets for vital signs and detailed assessment.    Changes this shift: sedated on versed at 10, fentanyl at 200.  RASS -4, grimaces with repositioning.  Sinus 60-80s, frequent PACs and occasional PVCs.  Maintaining MAP>65.  Proned, CMV 70% RR16, , peep14.  Overbreathes intermittently RR16-18.  Minimal secretions via ETT, large amount of oral secretions.  Tolerating TF via OG at 15.  No BM this shift, miralax given.  [place]ina with adequate UOP.       Plan: Supine tomorrow AM. Continue to monitor

## 2021-02-16 LAB
ALBUMIN SERPL-MCNC: 2.1 G/DL (ref 3.4–5)
ALP SERPL-CCNC: 97 U/L (ref 40–150)
ALT SERPL W P-5'-P-CCNC: 31 U/L (ref 0–70)
ANION GAP SERPL CALCULATED.3IONS-SCNC: <1 MMOL/L (ref 3–14)
AST SERPL W P-5'-P-CCNC: 16 U/L (ref 0–45)
BASE EXCESS BLDA CALC-SCNC: 1.4 MMOL/L
BASE EXCESS BLDA CALC-SCNC: 1.8 MMOL/L
BASE EXCESS BLDA CALC-SCNC: 2.9 MMOL/L
BILIRUB SERPL-MCNC: 0.3 MG/DL (ref 0.2–1.3)
BUN SERPL-MCNC: 57 MG/DL (ref 7–30)
CALCIUM SERPL-MCNC: 8.7 MG/DL (ref 8.5–10.1)
CHLORIDE SERPL-SCNC: 110 MMOL/L (ref 94–109)
CO2 SERPL-SCNC: 31 MMOL/L (ref 20–32)
CREAT SERPL-MCNC: 1.12 MG/DL (ref 0.66–1.25)
CRP SERPL-MCNC: 27 MG/L (ref 0–8)
ERYTHROCYTE [DISTWIDTH] IN BLOOD BY AUTOMATED COUNT: 18.3 % (ref 10–15)
GFR SERPL CREATININE-BSD FRML MDRD: 65 ML/MIN/{1.73_M2}
GLUCOSE BLDC GLUCOMTR-MCNC: 157 MG/DL (ref 70–99)
GLUCOSE BLDC GLUCOMTR-MCNC: 171 MG/DL (ref 70–99)
GLUCOSE BLDC GLUCOMTR-MCNC: 186 MG/DL (ref 70–99)
GLUCOSE BLDC GLUCOMTR-MCNC: 187 MG/DL (ref 70–99)
GLUCOSE BLDC GLUCOMTR-MCNC: 196 MG/DL (ref 70–99)
GLUCOSE BLDC GLUCOMTR-MCNC: 205 MG/DL (ref 70–99)
GLUCOSE SERPL-MCNC: 205 MG/DL (ref 70–99)
HCO3 BLD-SCNC: 28 MMOL/L (ref 21–28)
HCO3 BLD-SCNC: 29 MMOL/L (ref 21–28)
HCO3 BLD-SCNC: 30 MMOL/L (ref 21–28)
HCT VFR BLD AUTO: 38.9 % (ref 40–53)
HGB BLD-MCNC: 12.5 G/DL (ref 13.3–17.7)
MCH RBC QN AUTO: 31.3 PG (ref 26.5–33)
MCHC RBC AUTO-ENTMCNC: 32.1 G/DL (ref 31.5–36.5)
MCV RBC AUTO: 98 FL (ref 78–100)
O2/TOTAL GAS SETTING VFR VENT: 45 %
O2/TOTAL GAS SETTING VFR VENT: 50 %
O2/TOTAL GAS SETTING VFR VENT: 50 %
OXYHGB MFR BLD: 95 % (ref 92–100)
PCO2 BLD: 51 MM HG (ref 35–45)
PCO2 BLD: 54 MM HG (ref 35–45)
PCO2 BLD: 60 MM HG (ref 35–45)
PH BLD: 7.3 PH (ref 7.35–7.45)
PH BLD: 7.33 PH (ref 7.35–7.45)
PH BLD: 7.37 PH (ref 7.35–7.45)
PLATELET # BLD AUTO: 191 10E9/L (ref 150–450)
PO2 BLD: 71 MM HG (ref 80–105)
PO2 BLD: 83 MM HG (ref 80–105)
PO2 BLD: 92 MM HG (ref 80–105)
POTASSIUM SERPL-SCNC: 4.4 MMOL/L (ref 3.4–5.3)
PROT SERPL-MCNC: 5.7 G/DL (ref 6.8–8.8)
RBC # BLD AUTO: 3.99 10E12/L (ref 4.4–5.9)
SODIUM SERPL-SCNC: 141 MMOL/L (ref 133–144)
WBC # BLD AUTO: 6.6 10E9/L (ref 4–11)

## 2021-02-16 PROCEDURE — 94003 VENT MGMT INPAT SUBQ DAY: CPT

## 2021-02-16 PROCEDURE — 999N001017 HC STATISTIC GLUCOSE BY METER IP

## 2021-02-16 PROCEDURE — 99233 SBSQ HOSP IP/OBS HIGH 50: CPT | Performed by: INTERNAL MEDICINE

## 2021-02-16 PROCEDURE — 250N000012 HC RX MED GY IP 250 OP 636 PS 637: Performed by: STUDENT IN AN ORGANIZED HEALTH CARE EDUCATION/TRAINING PROGRAM

## 2021-02-16 PROCEDURE — 999N000015 HC STATISTIC ARTERIAL MONITORING DAILY

## 2021-02-16 PROCEDURE — 250N000013 HC RX MED GY IP 250 OP 250 PS 637: Performed by: INTERNAL MEDICINE

## 2021-02-16 PROCEDURE — 85027 COMPLETE CBC AUTOMATED: CPT | Performed by: INTERNAL MEDICINE

## 2021-02-16 PROCEDURE — 250N000013 HC RX MED GY IP 250 OP 250 PS 637: Performed by: NURSE PRACTITIONER

## 2021-02-16 PROCEDURE — 82803 BLOOD GASES ANY COMBINATION: CPT | Performed by: STUDENT IN AN ORGANIZED HEALTH CARE EDUCATION/TRAINING PROGRAM

## 2021-02-16 PROCEDURE — 272N000078 HC NUTRITION PRODUCT INTERMEDIATE LITER

## 2021-02-16 PROCEDURE — 99291 CRITICAL CARE FIRST HOUR: CPT | Mod: GC

## 2021-02-16 PROCEDURE — 80053 COMPREHEN METABOLIC PANEL: CPT | Performed by: INTERNAL MEDICINE

## 2021-02-16 PROCEDURE — 86140 C-REACTIVE PROTEIN: CPT | Performed by: INTERNAL MEDICINE

## 2021-02-16 PROCEDURE — 82810 BLOOD GASES O2 SAT ONLY: CPT | Performed by: STUDENT IN AN ORGANIZED HEALTH CARE EDUCATION/TRAINING PROGRAM

## 2021-02-16 PROCEDURE — 999N000157 HC STATISTIC RCP TIME EA 10 MIN

## 2021-02-16 PROCEDURE — G0463 HOSPITAL OUTPT CLINIC VISIT: HCPCS

## 2021-02-16 PROCEDURE — 250N000011 HC RX IP 250 OP 636: Performed by: NURSE PRACTITIONER

## 2021-02-16 PROCEDURE — 250N000011 HC RX IP 250 OP 636: Performed by: STUDENT IN AN ORGANIZED HEALTH CARE EDUCATION/TRAINING PROGRAM

## 2021-02-16 PROCEDURE — 999N000197 HC STATISTIC WOC PT EDUCATION, 0-15 MIN

## 2021-02-16 PROCEDURE — 250N000013 HC RX MED GY IP 250 OP 250 PS 637: Performed by: STUDENT IN AN ORGANIZED HEALTH CARE EDUCATION/TRAINING PROGRAM

## 2021-02-16 PROCEDURE — 258N000003 HC RX IP 258 OP 636: Performed by: INTERNAL MEDICINE

## 2021-02-16 PROCEDURE — 200N000002 HC R&B ICU UMMC

## 2021-02-16 RX ORDER — DEXTROSE MONOHYDRATE 25 G/50ML
25-50 INJECTION, SOLUTION INTRAVENOUS
Status: DISCONTINUED | OUTPATIENT
Start: 2021-02-16 | End: 2021-02-17

## 2021-02-16 RX ORDER — NICOTINE POLACRILEX 4 MG
15-30 LOZENGE BUCCAL
Status: DISCONTINUED | OUTPATIENT
Start: 2021-02-16 | End: 2021-02-17

## 2021-02-16 RX ORDER — FENTANYL CITRATE 50 UG/ML
50 INJECTION, SOLUTION INTRAMUSCULAR; INTRAVENOUS
Status: DISCONTINUED | OUTPATIENT
Start: 2021-02-16 | End: 2021-02-20

## 2021-02-16 RX ADMIN — Medication: at 08:12

## 2021-02-16 RX ADMIN — ENOXAPARIN SODIUM 50 MG: 100 INJECTION SUBCUTANEOUS at 12:07

## 2021-02-16 RX ADMIN — ACYCLOVIR 800 MG: 200 SUSPENSION ORAL at 08:11

## 2021-02-16 RX ADMIN — RUXOLITINIB 5 MG: 5 TABLET ORAL at 20:33

## 2021-02-16 RX ADMIN — Medication 3 MG/HR: at 16:21

## 2021-02-16 RX ADMIN — Medication 200 MCG/HR: at 04:04

## 2021-02-16 RX ADMIN — SERTRALINE HYDROCHLORIDE 50 MG: 50 TABLET ORAL at 08:11

## 2021-02-16 RX ADMIN — Medication 2 PACKET: at 08:15

## 2021-02-16 RX ADMIN — FLUCONAZOLE 100 MG: 40 POWDER, FOR SUSPENSION ORAL at 08:11

## 2021-02-16 RX ADMIN — INSULIN ASPART 1 UNITS: 100 INJECTION, SOLUTION INTRAVENOUS; SUBCUTANEOUS at 20:53

## 2021-02-16 RX ADMIN — Medication 75 MCG/HR: at 16:21

## 2021-02-16 RX ADMIN — INSULIN ASPART 1 UNITS: 100 INJECTION, SOLUTION INTRAVENOUS; SUBCUTANEOUS at 12:06

## 2021-02-16 RX ADMIN — INSULIN ASPART 1 UNITS: 100 INJECTION, SOLUTION INTRAVENOUS; SUBCUTANEOUS at 08:18

## 2021-02-16 RX ADMIN — MULTIVITAMIN 15 ML: LIQUID ORAL at 08:11

## 2021-02-16 RX ADMIN — PANTOPRAZOLE SODIUM 40 MG: 40 TABLET, DELAYED RELEASE ORAL at 08:11

## 2021-02-16 RX ADMIN — Medication 8 MG/HR: at 04:03

## 2021-02-16 RX ADMIN — INSULIN ASPART 1 UNITS: 100 INJECTION, SOLUTION INTRAVENOUS; SUBCUTANEOUS at 04:40

## 2021-02-16 RX ADMIN — DEXAMETHASONE 6 MG: 2 TABLET ORAL at 08:11

## 2021-02-16 RX ADMIN — Medication: at 20:33

## 2021-02-16 RX ADMIN — INSULIN ASPART 1 UNITS: 100 INJECTION, SOLUTION INTRAVENOUS; SUBCUTANEOUS at 16:20

## 2021-02-16 RX ADMIN — ACYCLOVIR 800 MG: 200 SUSPENSION ORAL at 20:32

## 2021-02-16 RX ADMIN — RUXOLITINIB 5 MG: 5 TABLET ORAL at 08:22

## 2021-02-16 RX ADMIN — SULFAMETHOXAZOLE AND TRIMETHOPRIM 1 TABLET: 800; 160 TABLET ORAL at 08:17

## 2021-02-16 RX ADMIN — Medication 1 TABLET: at 12:07

## 2021-02-16 RX ADMIN — SODIUM CHLORIDE, POTASSIUM CHLORIDE, SODIUM LACTATE AND CALCIUM CHLORIDE 500 ML: 600; 310; 30; 20 INJECTION, SOLUTION INTRAVENOUS at 10:47

## 2021-02-16 RX ADMIN — MELATONIN TAB 3 MG 3 MG: 3 TAB at 21:58

## 2021-02-16 RX ADMIN — LEVOFLOXACIN 250 MG: 250 TABLET, FILM COATED ORAL at 08:11

## 2021-02-16 ASSESSMENT — ACTIVITIES OF DAILY LIVING (ADL)
ADLS_ACUITY_SCORE: 23
ADLS_ACUITY_SCORE: 19
ADLS_ACUITY_SCORE: 23
ADLS_ACUITY_SCORE: 19
ADLS_ACUITY_SCORE: 23
ADLS_ACUITY_SCORE: 19

## 2021-02-16 ASSESSMENT — MIFFLIN-ST. JEOR: SCORE: 1776.38

## 2021-02-16 NOTE — PROGRESS NOTES
Notified Person: MICU 2   Notification  Time: 1500  Notification Interaction: phone  Purpose of Notification: Increasing hypercapnia per most recent ABG. CO2 increasing from 52 to 60.   Orders Received: No change, will allow permissive hypercapnia   Comments: Decreased Vt this morning. Patient currently breathing 18 breaths/min. Recommended increase in RR to 20 with increase of Vt back to original 480 ml per breath due to CO2 trend. Will continue to monitor closely.

## 2021-02-16 NOTE — PLAN OF CARE
ICU End of Shift Summary. See flowsheets for vital signs and detailed assessment.    Changes this shift: No major changes overnight. Remains sedated, RASS -4. Grimaces and slightly moves arms during head turns. BP WNL, HR 50s-60s. Art line very positional. FiO2 weaned to 45%, O2 sats low-mid 90s. Tube feeds advanced to goal, 3 small stools overnight. Rectal pouch placed. sliding scale insulin started for high blood sugars. Adequate urine output.    Plan: Continue to monitor, continue POC. Wean FiO2/peep as tolerated. Plan to supinate this morning. Notify team of changes.

## 2021-02-16 NOTE — PROGRESS NOTES
Critical Care Services Progress Note:  I personally examined and evaluated the patient today. I formulated today s plan with the house staff team or resident(s) and agree with the findings and plan in the associated note (see separately attested resident note).   I have evaluated all laboratory values and imaging studies from the past 24 hours.  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 Initially on HFNC, then NIPPV, and then intubated on but intubated on 2/13.  Proned ventilated.   Overnight events/pertinent findings today:  No acute events overnight. Was prone ventilated.      Data  Na 141, K 4.4, BUN 57, Cr 1.12  CRP 27  WBC 6.6, Hgb 12.5, Plts 191    Vent: 16/420/45/14  Pplat 25  ABG 7.33/54/92    Sputum with Moderate S haemolyticus, moderate S epidermidis, Moderate E faecalis.  Gram stain with < 25 WBC.     Assessment/plan:  COVID19 ARDS:   - Dexamethasone  - s/p remdesevir  - intubated on mechanical ventilation.  Pplat okay  - will decrease PEEP to 12  - RASS goal 0 to -1. Decrease sedation today.      SARAH: suspect pre-renal. Improved with 1 liter of LR.     Nutrition:  - Tube feeds    Hx of BMT  - heme/onc following.  Continuing anti-infective ppx with acyclovir, fluconazole, levofloxacin.     Rest per resident note.    I spent a total of 40 minutes (excluding procedure time) personally providing and directing critical care services at the bedside and on the critical care unit for this patient.     Johnie Lawler MD

## 2021-02-16 NOTE — PROGRESS NOTES
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 Dior is 6 year, 7 months out from allo-sib PBHSCT c/b mucocutaneous cGHVD and chronic fatigue/dyspnea. No pulmonary cGHVD. His GVHD symptoms have been stable on Jakafi and low-dose pred for some time. His IgG level was normal, no need for IVIg. Would continue Jakafi, but hold pred while on dexamethasone for COVID. *He should restart pred once off dex. He should continue on all of his prophylactic anti-infective agents.  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 Great saphenous ablation on the right, with recannulization of left saphenous vein on US from 7/2019. Anticoagulation per COVID protocol/ICU.      Patient was seen and plan of care was discussed with attending physician Dr. Black.    We will continue to follow this patient. Please contact us with questions.     Tiffany Stanford PA-C  [phone]      _________________________________________________    Subjective &  "Interval History:    No acute changes. FIO2 50% this am. Creat improved with IVF. Proned overnight. Intubated, sedated with Fent/Versed.     Physical Exam:    /66 (BP Location: Right arm)   Pulse 62   Temp 97.8  F (36.6  C) (Axillary)   Resp 18   Ht 1.753 m (5' 9\")   Wt 103.6 kg (228 lb 6.3 oz)   SpO2 94%   BMI 33.73 kg/m    Physical examination deferred t primary team due to public health emergency video visit restrictions.    Labs & Studies: I personally reviewed the following studies:  ROUTINE LABS (Last four results):  CMP  Recent Labs   Lab 02/16/21  0349 02/15/21  0431 02/14/21  0637 02/14/21  0326 02/13/21  0616    140  --  138 142   POTASSIUM 4.4 4.6  --  4.1 4.8   CHLORIDE 110* 108  --  107 112*   CO2 31 25  --  25 25   ANIONGAP <1* 7  --  6 5   * 147*  --  134* 102*   BUN 57* 60*  --  39* 33*   CR 1.12 1.33*  --  0.96 0.77   GFRESTIMATED 65 53*  --  79 >90   GFRESTBLACK 76 61  --  >90 >90   JOE 8.7 8.6  --  8.5 8.5   MAG  --   --   --  2.4*  --    PHOS  --   --  4.5  --   --    PROTTOTAL 5.7* 6.4*  --  6.0* 5.9*   ALBUMIN 2.1* 2.4*  --  2.3* 2.2*   BILITOTAL 0.3 0.5  --  0.6 0.5   ALKPHOS 97 115  --  116 106   AST 16 23  --  33 43   ALT 31 37  --  36 41     CBC  Recent Labs   Lab 02/16/21  0349 02/15/21  0431 02/13/21  0616 02/12/21  0337   WBC 6.6 7.1 7.7 8.3   RBC 3.99* 4.41 4.13* 4.02*   HGB 12.5* 14.2 13.0* 12.4*   HCT 38.9* 43.3 39.0* 38.3*   MCV 98 98 94 95   MCH 31.3 32.2 31.5 30.8   MCHC 32.1 32.8 33.3 32.4   RDW 18.3* 18.4* 18.3* 18.1*    207 185 184     INR  Recent Labs   Lab 02/14/21  0637   INR 1.23*       Attending note.The patient was seen and examined by me separately from the [place] provider. The note reflects our mutual assessment and plans and were approved by me personally.   I personally reviewed today's lab results vital signs and radiology results.     Each point of the assessment and plan were reviewed by the midlevel and me and either endorsed by me or " were my added decisions.     My pertinent physical findings today are: not possible: video     My assessment and plan are: 71 yo 6 years post allo for MDS on prednisone and Jakafi for CGVHD now with serious COVID pneumonia. Continue Jakafi and hold predsinone while on dex burst, then resume.Will probably need intubation. Ci No changes in immunosupession. No current BMT issues. May need to reduce Jakafi if renal function further deteriorates.GFR increased: no reason to decrease Jakagi: monitor  Edgar Black M.D.

## 2021-02-16 NOTE — PROGRESS NOTES
MEDICAL ICU PROGRESS NOTE  02/16/2021      Date of Service (when I saw the patient): 02/16/2021    ASSESSMENT: Deejay Dior is a 70 year old male with 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 Transferred to MICU on 2/10 for worsening respiratory status and c/f intubation.    CHANGES and MAJOR THINGS TODAY:   - On fentanyl and midazolam, wean with goal of RASS -1  - Supine this AM  - Sputum growing staph haemolyticus, epidermidis, and enterooccus faecalis   - TV decreased to 420, PEEP decreased to 12  -  ml    PLAN:    Neuro:  Pain and sedation  - Acetaminophen PRN  - Fentanyl, midazolam, wean with goal of RASS -1    MDD  - Continue PTA sertraline    Pulmonary:  Acute hypoxic respiratory failure secondary to COVID-19 pneumonia, rhinovirus  Acute respiratory distress syndrome  Intubated 2/14 with initiation of proning. Has received intermittent diuresis to maintain net negative daily, earlier this hospital course. P/F improved to 184.    - Supine 2/15  - Decreased tidal volume to 420 ml (6 ml/kg)  - PEEP decreased to 12  - No diuresis today    Cardiovascular:  No acute concerns. TTE 2/9 LVEF 65-70%.     GI/Nutrition  Nutrition  Previously tolerating regular diet. OG in place 2/13. Started TF 2/14.   - RD consulted    Renal/Fluids/Electrolytes:  SARAH   Cr 1.33 on 2/15 from baseline 0.8-0.9. Likely pre-renal in etiology in the setting of poor PO intake. Patient has been -5L thus far this admission. Cr improved with 1L LR 2/15. Currently 1.12.  - No diuresis today   -  ml    Endocrine:  Hyperglycemia  - Sliding scale insulin q4h    ID:  COVID-19 pneumonia (+1/30)  + Rhinovirus  Remdesivir course completed on 2/12. Was considered for tocilizumab  - ID following  - Dexamethasone decreased from 10 mg to 6 mg for total ten day course (2/8-2/17)  - Daily CRP per ID rec  - Enoxaparin  50 mg BID given elevated D-dimer    Concern for superimposed bacterial pneumonia  Procal increased to 0.28 on 2/8. Previously on ceftriaxone, arithromycin, with prophylactic levofloxacin held but now restarted. CXR 2/8 with stable pulmonary opacities compatible with atypical infection vs. Superimposed atelectasis/edema, improved from prior. Sputum culture 2/14 growing GPC 2/16 growing staph haemolyticus, staph epidermidis, and enterococcus faecalis though respiratory status has been improved without evidence of increased secretion.   - Competed 3 days azithromycin  - Hold off additional antibiotics for now     Antibiotics  Azithromycin (2/9-2/11)     Antibiotics Prophylaxis  Acyclovir (2/8- )  Fluconazole (2/9- )  Levofloxacin (2/9, 2/12- )  Bactrim (2/8- )    Hematology:    MDS/ s/p BMT 2014 complicated by GVHD  No acute concerns. PTA on prednisone and Jakafi.  - BMT consulted  - Holding PTA prednisone while on dexamethasone  - Continue PTA Jakafi 5 mg twice daily, dose reduce to 5 mg every day if GFR<59  - Continue PTA Bactrim double strength 1 tablet twice weekly for PCP prophylaxis  - Continue PTA acyclovir 800 mg 2 times daily for prophylaxis against herpes simplex virus  - Continue PTA  fluconazole 100 mg oral daily  - Continue PTA levofloxacin 250 mg every day     Musculoskeletal:  No acute concerns    Skin:  No acute concerns    General Cares/Prophylaxis:    DVT Prophylaxis: Enoxaparin (Lovenox) subcutaneous 50 mg BID  GI Prophylaxis: PPI   Restraints: None  Family Communication: Will update wife (Racquel)   Code Status: Full code     Lines/tubes/drains:  - PIV  - Central line (consider removal tomorrow if continues to be stable off pressors), a line, ETT  - GARCÍA BELTRAN    Disposition:  - Medical ICU     Patient seen and findings/plan discussed with medical ICU staff, Dr. Davis.    Michael Vaz MD  PGY-1, Internal Medicine  MICU 2 Service    ====================================  INTERVAL HISTORY:   NAEO.  Intubated and proned. Supine this AM. Remains off pressors.     OBJECTIVE:   1. VITAL SIGNS:   Temp:  [97  F (36.1  C)-98.7  F (37.1  C)] 98  F (36.7  C)  Pulse:  [46-75] 61  Resp:  [16] 16  BP: (109)/(66) 109/66  MAP:  [62 mmHg-107 mmHg] 85 mmHg  Arterial Line BP: ()/(45-81) 105/68  FiO2 (%):  [50 %-70 %] 50 %  SpO2:  [94 %-99 %] 96 %  Ventilation Mode: CMV/AC  (Continuous Mandatory Ventilation/ Assist Control)  FiO2 (%): 50 %  Rate Set (breaths/minute): 16 breaths/min  Tidal Volume Set (mL): 480 mL  PEEP (cm H2O): 14 cmH2O  Oxygen Concentration (%): 50 %  Resp: 16    2. INTAKE/ OUTPUT:   I/O last 3 completed shifts:  In: 2494.67 [I.V.:384.67; NG/GT:600; IV Piggyback:1000]  Out: 1270 [Urine:1270]    3. PHYSICAL EXAMINATION:  General: Lying in bed, intubated, proned  Neuro: Sedated, not responsive to commands  Pulm/Resp: Clear breath sounds bilaterally without rhonchi, crackles or wheeze, breathing slightly labored  CV: RRR, trace edema  Abdomen: Soft, non-distended, non-tender  : Urine yellow and clear  Extremities: Warm, slight RLE edema (chronic per pt)    4. LABS:   Arterial Blood Gases   Recent Labs   Lab 02/16/21  0348 02/15/21  1740 02/15/21  0928 02/15/21  0101   PH 7.33* 7.31* 7.31* 7.27*   PCO2 54* 55* 53* 58*   PO2 92 75* 118* 104   HCO3 28 28 27 27     Complete Blood Count   Recent Labs   Lab 02/16/21  0349 02/15/21  0431 02/13/21  0616 02/12/21  0337   WBC 6.6 7.1 7.7 8.3   HGB 12.5* 14.2 13.0* 12.4*    207 185 184     Basic Metabolic Panel  Recent Labs   Lab 02/16/21  0349 02/15/21  0431 02/14/21  0326 02/13/21  0616    140 138 142   POTASSIUM 4.4 4.6 4.1 4.8   CHLORIDE 110* 108 107 112*   CO2 31 25 25 25   BUN 57* 60* 39* 33*   CR 1.12 1.33* 0.96 0.77   * 147* 134* 102*     Liver Function Tests  Recent Labs   Lab 02/16/21  0349 02/15/21  0431 02/14/21  0637 02/14/21  0326 02/13/21  0616   AST 16 23  --  33 43   ALT 31 37  --  36 41   ALKPHOS 97 115  --  116 106   BILITOTAL  0.3 0.5  --  0.6 0.5   ALBUMIN 2.1* 2.4*  --  2.3* 2.2*   INR  --   --  1.23*  --   --      5. RADIOLOGY:   Recent Results (from the past 24 hour(s))   XR Chest Port 1 View    Narrative    Portable chest    INDICATION: Bone marrow transplant now with covid pneumonia, sputum  growing GPC, query new pneumonia. Also evaluate volume.    COMPARISON: 2/14/21    FINDINGS: Heart is normal in size. Aortic arch atherosclerosis. Patchy  interstitial and airspace opacities appear similar on the right and  slightly decreased in the left lung base. Continued small left  effusion. Endotracheal intubation with tip of the tube approximately  3.3 cm above the ramonita. Thoracic spondylosis.      Impression    IMPRESSION: Overall slight decrease in patchy opacifications in the  left lung base. Continued small left effusion. Atherosclerosis.  Endotracheal intubation. Continued patchy opacities in right lung  base.    SUZANNA FELIX MD   XR Abdomen Port 1 View    Narrative    EXAM: XR ABDOMEN PORT 1 VW  2/15/2021 2:31 PM     HISTORY:  70 yom with covid pneumonia requiring proning, eval feeding  tube placement       COMPARISON:  2/14/2021    FINDINGS:   Supine frontal radiograph of the abdomen. The feeding tube tip  projects near the proximal jejunum. Gastric tube has been removed.  Nonobstructive bowel gas pattern. Degenerative changes of the spine.  Partially visualized orthopedic hardware projected over right  hemipelvis.      Impression    IMPRESSION: Feeding tube tip projects near the proximal jejunum.    I have personally reviewed the examination and initial interpretation  and I agree with the findings.    ODALYS GILL MD

## 2021-02-16 NOTE — CONSULTS
[facility] Nurse Inpatient Pressure Injury Assessment   Reason for consultation: Evaluate and treat L) cheek      ASSESSMENT  Pressure Injury: on L) cheek , hospital acquired ,   This is a Medical Device Related Pressure Injury (MDRPI) due to hi flow oxygen tubing  Pressure Injury is Stage 1   Contributing factor of the pressure injury: pressure and immobility  Status: initial assessment  Recommend provider order: None, at this time     2/16- Unable to visualize the area due ETAD in place. Direct pressure is completely off. Spoke with RN and verbalized no any other skin concerns at this time.     TREATMENT PLAN  L) cheek wound: Daily    Ensure to apply half of Mepilex foam dressing to keep the pressure off on both cheeks.  Orders Written  WOC Nurse follow-up plan:weekly  Nursing to notify the Provider(s) and re-consult the WO Nurse if wound(s) deteriorates or new skin concern.    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    Objective Data  Containment of urine/stool: Indwelling catheter    Current Diet/ Nutrition:  None      Output:   I/O last 3 completed shifts:  In: 2685.67 [I.V.:365.67; NG/GT:600; IV Piggyback:1000]  Out: 1375 [Urine:1375]    Risk Assessment:   Sensory Perception: 1-->completely limited  Moisture: 4-->rarely moist  Activity: 1-->bedfast  Mobility: 1-->completely immobile  Nutrition: 2-->probably inadequate  Friction and Shear: 2-->potential problem  Sven Score: 11      Labs:   Recent Labs   Lab 02/16/21  0349 02/14/21  0637 02/14/21  0637   ALBUMIN 2.1*   < >  --    HGB 12.5*   < >  --    INR  --   --  1.23*   WBC 6.6   < >  --    CRP 27.0*   < >  --     < > = values in this interval not displayed.       Physical Exam  Skin inspection: focused BL cheeks    Wound  Location:  L) cheek  Wound History: Pt has HFNC in place.   Measurements (length x width x depth, in cm) 0.5 cm x 0.7 cm  x  0.0 cm   Wound Base:  100 % non-blanchable, erythema and epidermis  Tunneling N/A  Undermining N/A  Palpation of the wound bed: normal and firm over the bony prominence  Periwound skin: intact  Color: normal and consistent with surrounding tissue  Temperature: normal   Drainage:, none  Description of drainage: none  Odor: none  Pain: denies ,     Interventions  Current support surface: Standard  Low air loss mattress  Current off-loading measures: Foam padding and Pillows  Repositioning aid: Pillows  Visual inspection of wound(s) completed   Wound Care: was done per plan of care.  Supplies: floor stock and discussed with RN  Educated provided: plan of care, wound progress and Off-loading pressure  Education provided to: nurse  Discussed importance of:off-loading pressure to wound and dressing change frequency  Discussed plan of care with Nurse    Bernie Ruiz RN

## 2021-02-17 LAB
ALBUMIN SERPL-MCNC: 2.1 G/DL (ref 3.4–5)
ALP SERPL-CCNC: 91 U/L (ref 40–150)
ALT SERPL W P-5'-P-CCNC: 40 U/L (ref 0–70)
ANION GAP SERPL CALCULATED.3IONS-SCNC: 3 MMOL/L (ref 3–14)
AST SERPL W P-5'-P-CCNC: 34 U/L (ref 0–45)
BASE EXCESS BLDA CALC-SCNC: 5.4 MMOL/L
BASE EXCESS BLDA CALC-SCNC: 6.1 MMOL/L
BILIRUB SERPL-MCNC: 0.4 MG/DL (ref 0.2–1.3)
BUN SERPL-MCNC: 59 MG/DL (ref 7–30)
CALCIUM SERPL-MCNC: 8.5 MG/DL (ref 8.5–10.1)
CHLORIDE SERPL-SCNC: 115 MMOL/L (ref 94–109)
CO2 SERPL-SCNC: 28 MMOL/L (ref 20–32)
CREAT SERPL-MCNC: 0.92 MG/DL (ref 0.66–1.25)
CRP SERPL-MCNC: 15 MG/L (ref 0–8)
ERYTHROCYTE [DISTWIDTH] IN BLOOD BY AUTOMATED COUNT: 18 % (ref 10–15)
GFR SERPL CREATININE-BSD FRML MDRD: 83 ML/MIN/{1.73_M2}
GLUCOSE BLDC GLUCOMTR-MCNC: 182 MG/DL (ref 70–99)
GLUCOSE BLDC GLUCOMTR-MCNC: 188 MG/DL (ref 70–99)
GLUCOSE BLDC GLUCOMTR-MCNC: 192 MG/DL (ref 70–99)
GLUCOSE BLDC GLUCOMTR-MCNC: 192 MG/DL (ref 70–99)
GLUCOSE BLDC GLUCOMTR-MCNC: 193 MG/DL (ref 70–99)
GLUCOSE BLDC GLUCOMTR-MCNC: 194 MG/DL (ref 70–99)
GLUCOSE SERPL-MCNC: 197 MG/DL (ref 70–99)
HCO3 BLD-SCNC: 32 MMOL/L (ref 21–28)
HCO3 BLD-SCNC: 32 MMOL/L (ref 21–28)
HCT VFR BLD AUTO: 37.1 % (ref 40–53)
HGB BLD-MCNC: 12.2 G/DL (ref 13.3–17.7)
MAGNESIUM SERPL-MCNC: 2.8 MG/DL (ref 1.6–2.3)
MCH RBC QN AUTO: 31.4 PG (ref 26.5–33)
MCHC RBC AUTO-ENTMCNC: 32.9 G/DL (ref 31.5–36.5)
MCV RBC AUTO: 96 FL (ref 78–100)
O2/TOTAL GAS SETTING VFR VENT: 45 %
O2/TOTAL GAS SETTING VFR VENT: 45 %
PCO2 BLD: 53 MM HG (ref 35–45)
PCO2 BLD: 54 MM HG (ref 35–45)
PH BLD: 7.38 PH (ref 7.35–7.45)
PH BLD: 7.4 PH (ref 7.35–7.45)
PHOSPHATE SERPL-MCNC: 2.2 MG/DL (ref 2.5–4.5)
PLATELET # BLD AUTO: 206 10E9/L (ref 150–450)
PO2 BLD: 88 MM HG (ref 80–105)
PO2 BLD: 90 MM HG (ref 80–105)
POTASSIUM SERPL-SCNC: 5 MMOL/L (ref 3.4–5.3)
POTASSIUM SERPL-SCNC: 5.1 MMOL/L (ref 3.4–5.3)
POTASSIUM SERPL-SCNC: 5.2 MMOL/L (ref 3.4–5.3)
PROT SERPL-MCNC: 5.7 G/DL (ref 6.8–8.8)
RBC # BLD AUTO: 3.88 10E12/L (ref 4.4–5.9)
SODIUM SERPL-SCNC: 145 MMOL/L (ref 133–144)
SODIUM SERPL-SCNC: 146 MMOL/L (ref 133–144)
WBC # BLD AUTO: 9.3 10E9/L (ref 4–11)

## 2021-02-17 PROCEDURE — 94003 VENT MGMT INPAT SUBQ DAY: CPT

## 2021-02-17 PROCEDURE — 99233 SBSQ HOSP IP/OBS HIGH 50: CPT | Performed by: INTERNAL MEDICINE

## 2021-02-17 PROCEDURE — 250N000011 HC RX IP 250 OP 636: Performed by: NURSE PRACTITIONER

## 2021-02-17 PROCEDURE — 250N000013 HC RX MED GY IP 250 OP 250 PS 637: Performed by: INTERNAL MEDICINE

## 2021-02-17 PROCEDURE — 250N000013 HC RX MED GY IP 250 OP 250 PS 637: Performed by: STUDENT IN AN ORGANIZED HEALTH CARE EDUCATION/TRAINING PROGRAM

## 2021-02-17 PROCEDURE — 250N000011 HC RX IP 250 OP 636: Performed by: INTERNAL MEDICINE

## 2021-02-17 PROCEDURE — 82803 BLOOD GASES ANY COMBINATION: CPT | Performed by: STUDENT IN AN ORGANIZED HEALTH CARE EDUCATION/TRAINING PROGRAM

## 2021-02-17 PROCEDURE — 99291 CRITICAL CARE FIRST HOUR: CPT | Mod: GC

## 2021-02-17 PROCEDURE — 999N000157 HC STATISTIC RCP TIME EA 10 MIN

## 2021-02-17 PROCEDURE — 250N000013 HC RX MED GY IP 250 OP 250 PS 637: Performed by: NURSE PRACTITIONER

## 2021-02-17 PROCEDURE — 999N001017 HC STATISTIC GLUCOSE BY METER IP

## 2021-02-17 PROCEDURE — 80053 COMPREHEN METABOLIC PANEL: CPT | Performed by: INTERNAL MEDICINE

## 2021-02-17 PROCEDURE — 85027 COMPLETE CBC AUTOMATED: CPT | Performed by: INTERNAL MEDICINE

## 2021-02-17 PROCEDURE — 83735 ASSAY OF MAGNESIUM: CPT | Performed by: INTERNAL MEDICINE

## 2021-02-17 PROCEDURE — 84100 ASSAY OF PHOSPHORUS: CPT | Performed by: INTERNAL MEDICINE

## 2021-02-17 PROCEDURE — 86140 C-REACTIVE PROTEIN: CPT | Performed by: INTERNAL MEDICINE

## 2021-02-17 PROCEDURE — 250N000009 HC RX 250: Performed by: INTERNAL MEDICINE

## 2021-02-17 PROCEDURE — 84295 ASSAY OF SERUM SODIUM: CPT | Performed by: STUDENT IN AN ORGANIZED HEALTH CARE EDUCATION/TRAINING PROGRAM

## 2021-02-17 PROCEDURE — 84132 ASSAY OF SERUM POTASSIUM: CPT | Performed by: STUDENT IN AN ORGANIZED HEALTH CARE EDUCATION/TRAINING PROGRAM

## 2021-02-17 PROCEDURE — 272N000078 HC NUTRITION PRODUCT INTERMEDIATE LITER

## 2021-02-17 PROCEDURE — 250N000012 HC RX MED GY IP 250 OP 636 PS 637: Performed by: STUDENT IN AN ORGANIZED HEALTH CARE EDUCATION/TRAINING PROGRAM

## 2021-02-17 PROCEDURE — 258N000003 HC RX IP 258 OP 636: Performed by: INTERNAL MEDICINE

## 2021-02-17 PROCEDURE — 200N000002 HC R&B ICU UMMC

## 2021-02-17 RX ORDER — PREDNISONE 10 MG/1
10 TABLET ORAL DAILY
Status: DISCONTINUED | OUTPATIENT
Start: 2021-02-18 | End: 2021-03-03

## 2021-02-17 RX ORDER — DEXTROSE MONOHYDRATE 25 G/50ML
25-50 INJECTION, SOLUTION INTRAVENOUS
Status: DISCONTINUED | OUTPATIENT
Start: 2021-02-17 | End: 2021-02-22

## 2021-02-17 RX ORDER — DEXMEDETOMIDINE HYDROCHLORIDE 4 UG/ML
.2-1.2 INJECTION, SOLUTION INTRAVENOUS CONTINUOUS
Status: DISCONTINUED | OUTPATIENT
Start: 2021-02-17 | End: 2021-02-23

## 2021-02-17 RX ORDER — NICOTINE POLACRILEX 4 MG
15-30 LOZENGE BUCCAL
Status: DISCONTINUED | OUTPATIENT
Start: 2021-02-17 | End: 2021-02-22

## 2021-02-17 RX ADMIN — RUXOLITINIB 5 MG: 5 TABLET ORAL at 08:41

## 2021-02-17 RX ADMIN — Medication 2 PACKET: at 08:38

## 2021-02-17 RX ADMIN — INSULIN ASPART 1 UNITS: 100 INJECTION, SOLUTION INTRAVENOUS; SUBCUTANEOUS at 08:38

## 2021-02-17 RX ADMIN — DEXMEDETOMIDINE 0.2 MCG/KG/HR: 100 INJECTION, SOLUTION, CONCENTRATE INTRAVENOUS at 16:23

## 2021-02-17 RX ADMIN — MELATONIN TAB 3 MG 3 MG: 3 TAB at 22:40

## 2021-02-17 RX ADMIN — Medication: at 20:56

## 2021-02-17 RX ADMIN — PANTOPRAZOLE SODIUM 40 MG: 40 TABLET, DELAYED RELEASE ORAL at 08:41

## 2021-02-17 RX ADMIN — ENOXAPARIN SODIUM 50 MG: 100 INJECTION SUBCUTANEOUS at 00:32

## 2021-02-17 RX ADMIN — ACYCLOVIR 800 MG: 200 SUSPENSION ORAL at 20:56

## 2021-02-17 RX ADMIN — ENOXAPARIN SODIUM 50 MG: 100 INJECTION SUBCUTANEOUS at 11:19

## 2021-02-17 RX ADMIN — INSULIN ASPART 2 UNITS: 100 INJECTION, SOLUTION INTRAVENOUS; SUBCUTANEOUS at 11:39

## 2021-02-17 RX ADMIN — INSULIN ASPART 1 UNITS: 100 INJECTION, SOLUTION INTRAVENOUS; SUBCUTANEOUS at 03:50

## 2021-02-17 RX ADMIN — ACYCLOVIR 800 MG: 200 SUSPENSION ORAL at 08:41

## 2021-02-17 RX ADMIN — INSULIN ASPART 1 UNITS: 100 INJECTION, SOLUTION INTRAVENOUS; SUBCUTANEOUS at 00:32

## 2021-02-17 RX ADMIN — LEVOFLOXACIN 250 MG: 250 TABLET, FILM COATED ORAL at 08:39

## 2021-02-17 RX ADMIN — FLUCONAZOLE 100 MG: 40 POWDER, FOR SUSPENSION ORAL at 08:41

## 2021-02-17 RX ADMIN — MULTIVITAMIN 15 ML: LIQUID ORAL at 08:39

## 2021-02-17 RX ADMIN — RUXOLITINIB 5 MG: 5 TABLET ORAL at 20:56

## 2021-02-17 RX ADMIN — INSULIN ASPART 1 UNITS: 100 INJECTION, SOLUTION INTRAVENOUS; SUBCUTANEOUS at 20:57

## 2021-02-17 RX ADMIN — INSULIN ASPART 2 UNITS: 100 INJECTION, SOLUTION INTRAVENOUS; SUBCUTANEOUS at 16:22

## 2021-02-17 RX ADMIN — MIDAZOLAM 2 MG: 1 INJECTION INTRAMUSCULAR; INTRAVENOUS at 00:53

## 2021-02-17 RX ADMIN — Medication: at 08:38

## 2021-02-17 RX ADMIN — SERTRALINE HYDROCHLORIDE 50 MG: 50 TABLET ORAL at 08:39

## 2021-02-17 RX ADMIN — FENTANYL CITRATE 50 MCG: 50 INJECTION, SOLUTION INTRAMUSCULAR; INTRAVENOUS at 21:20

## 2021-02-17 RX ADMIN — Medication 1 TABLET: at 11:19

## 2021-02-17 RX ADMIN — DEXAMETHASONE 6 MG: 2 TABLET ORAL at 08:39

## 2021-02-17 ASSESSMENT — ACTIVITIES OF DAILY LIVING (ADL)
ADLS_ACUITY_SCORE: 23
ADLS_ACUITY_SCORE: 19
ADLS_ACUITY_SCORE: 23
ADLS_ACUITY_SCORE: 23
ADLS_ACUITY_SCORE: 19
ADLS_ACUITY_SCORE: 19

## 2021-02-17 ASSESSMENT — MIFFLIN-ST. JEOR: SCORE: 1774.38

## 2021-02-17 NOTE — PROGRESS NOTES
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 Dior is 6 year, 7 months out from allo-sib PBHSCT c/b mucocutaneous cGHVD and chronic fatigue/dyspnea. No pulmonary cGHVD. His GVHD symptoms have been stable on Jakafi and low-dose pred for some time. His IgG level was normal, no need for IVIg. Would continue Jakafi, but hold pred while on dexamethasone for COVID. *He should restart pred once off dex. He should continue on all of his prophylactic anti-infective agents.  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 Great saphenous ablation on the right, with recannulization of left saphenous vein on US from 7/2019. Anticoagulation per COVID protocol/ICU.      Patient was seen and plan of care was discussed with attending physician Dr. Black.    We will continue to follow this patient. Please contact us with questions.     Tiffany Stanford PA-C  [phone]      _________________________________________________    Subjective & Interval  "History:    Weaning FIO2, PEEP, sedation. No acute changes.     Physical Exam:    /79   Pulse 61   Temp 98  F (36.7  C)   Resp 17   Ht 1.753 m (5' 9\")   Wt 103.4 kg (227 lb 15.3 oz)   SpO2 97%   BMI 33.66 kg/m    Physical examination deferred t primary team due to public [facility] emergency video visit restrictions.    Labs & Studies: I personally reviewed the following studies:  ROUTINE LABS (Last four results):  CMP  Recent Labs   Lab 02/17/21  1131 02/17/21  0349 02/16/21  0349 02/15/21  0431 02/14/21  0637 02/14/21  0326   NA  --  145* 141 140  --  138   POTASSIUM 5.1 5.0 4.4 4.6  --  4.1   CHLORIDE  --  115* 110* 108  --  107   CO2  --  28 31 25  --  25   ANIONGAP  --  3 <1* 7  --  6   GLC  --  197* 205* 147*  --  134*   BUN  --  59* 57* 60*  --  39*   CR  --  0.92 1.12 1.33*  --  0.96   GFRESTIMATED  --  83 65 53*  --  79   GFRESTBLACK  --  >90 76 61  --  >90   JOE  --  8.5 8.7 8.6  --  8.5   MAG  --  2.8*  --   --   --  2.4*   PHOS  --  2.2*  --   --  4.5  --    PROTTOTAL  --  5.7* 5.7* 6.4*  --  6.0*   ALBUMIN  --  2.1* 2.1* 2.4*  --  2.3*   BILITOTAL  --  0.4 0.3 0.5  --  0.6   ALKPHOS  --  91 97 115  --  116   AST  --  34 16 23  --  33   ALT  --  40 31 37  --  36     CBC  Recent Labs   Lab 02/17/21  0349 02/16/21  0349 02/15/21  0431 02/13/21  0616   WBC 9.3 6.6 7.1 7.7   RBC 3.88* 3.99* 4.41 4.13*   HGB 12.2* 12.5* 14.2 13.0*   HCT 37.1* 38.9* 43.3 39.0*   MCV 96 98 98 94   MCH 31.4 31.3 32.2 31.5   MCHC 32.9 32.1 32.8 33.3   RDW 18.0* 18.3* 18.4* 18.3*    191 207 185     INR  Recent Labs   Lab 02/14/21  0637   INR 1.23*       Attending note.The patient was seen and examined by me separately from the [place] provider. The note reflects our mutual assessment and plans and were approved by me personally.   I personally reviewed today's lab results vital signs and radiology results.     Each point of the assessment and plan were reviewed by the midlevel and me and either endorsed by me or were " my added decisions.     My pertinent physical findings today are: not possible: video     My assessment and plan are: 71 yo 6 years post allo for MDS on prednisone and Jakafi for CGVHD now with serious COVID pneumonia. Continue Jakafi and hold predsinone while on dex burst, then resume.Will probably need intubation. Ci No changes in immunosupession. No current BMT issues. May need to reduce Jakafi if renal function further deteriorates.GFR increased: no reason to decrease Jakafi: monitor. Add 10 mg prednisone daily when decadron ends.    Edgar Black M.D.

## 2021-02-17 NOTE — PROGRESS NOTES
Critical Care Services Progress Note:  I personally examined and evaluated the patient today. I formulated today s plan with the house staff team or resident(s) and agree with the findings and plan in the associated note (see separately attested resident note).   I have evaluated all laboratory values and imaging studies from the past 24 hours.  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 Initially on HFNC, then NIPPV, and then intubated on but intubated on 2/13.  Proned ventilated until 2/16.   Overnight events/pertinent findings today:  Remained supine overnight. Sedation has been decreased to fentanyl 50, versed 2. FIO2 has been increased from 45 to 50%. No acute events.      Data  Na 145, K 5.0, BUN 59, Cr 0.92  CRP 15  WBC 9.3, Hgb 12.2, Plts 206    Vent: 16/420/50/12  Pplat 24  ABG 7.37/51/83    Sputum with Moderate S haemolyticus, moderate S epidermidis, Moderate E faecalis.  Gram stain with < 25 WBC.     Assessment/plan:  COVID19 ARDS:   - S/p Dexamethasone  - s/p remdesevir  - intubated on mechanical ventilation.  Pplat okay  - will decrease PEEP to 10  - RASS goal 0 to -1. Will change sedation to precedex.   - pressure support trial today. Hopeful extubation soon.     SARAH: suspect pre-renal. Will Keep I= O today    Nutrition:  - Tube feeds    Hx of BMT  - heme/onc following.  Continuing anti-infective ppx with acyclovir, fluconazole, levofloxacin.   - restart regular prednisone 5 mg dose tomorrow    Rest per resident note.    I spent a total of 35 minutes (excluding procedure time) personally providing and directing critical care services at the bedside and on the critical care unit for this patient.     Johnie Lawler MD

## 2021-02-17 NOTE — PLAN OF CARE
ICU End of Shift Summary. See flowsheets for vital signs and detailed assessment.    Changes this shift: Vt down to 420 ml/breath, PEEP down to 12. Tolerating mildly, more hypercapnic, but allowing per MICU. Sedation weaned down, Neuros currently unchanged, PERRLA +2, no command following, withdrawing to pain.     Plan:  Continue to wean vent settings. Monitor neuro status. Maintain skin integrity. Update family.

## 2021-02-17 NOTE — PROGRESS NOTES
MEDICAL ICU PROGRESS NOTE  02/17/2021      Date of Service (when I saw the patient): 02/17/2021    ASSESSMENT: Deejay Dior is a 70 year old male with 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 Transferred to MICU on 2/10 for worsening respiratory status and c/f intubation.    CHANGES and MAJOR THINGS TODAY:   - Wean midazolam gtt with addition of precedex gtt  - Continue to wean fentanyl gtt  - PEEP decreased to 10   - Goal net even to +500 ml today, consider  ml if not at goal    PLAN:    Neuro:  Pain and sedation  Weaning sedation with goal towards intubation.  - Wean fentanyl gtt  - Wean midazolam gtt with addition of precedex gtt  - Acetaminophen PRN  - RASS goal 0 to -1    MDD  - Continue PTA sertraline    Pulmonary:  Acute hypoxic respiratory failure secondary to COVID-19 pneumonia, rhinovirus  Acute respiratory distress syndrome  Intubated 2/14 with initiation of proning. Has received intermittent diuresis to maintain net negative daily, earlier this hospital course. P/F improved to 184 on 2/17 with no additional proning indicated.  - LTVV at 420 ml (6 ml/kg)  - PEEP decreased to 10  - No diuresis today  - PST    Cardiovascular:  No acute concerns. TTE 2/9 LVEF 65-70%.     GI/Nutrition  Nutrition  Previously tolerating regular diet. OG in place 2/13. Started TF 2/14.   - RD consulted    Renal/Fluids/Electrolytes:  SARAH   Cr 1.33 on 2/15 from baseline 0.8-0.9. Likely pre-renal in etiology in the setting of poor PO intake. Patient has been -5L thus far this admission. Cr improved with 1L LR 2/15, 0.5L LR 2/16.   - Goal net even to +500 ml today, consider  ml if not at goal  - No diuresis today    Endocrine:  Hyperglycemia  Glucose 157-197.   - Increased to medium sliding scale insulin q4h    ID:  COVID-19 pneumonia (+1/30)  + Rhinovirus  Remdesivir course completed on  2/12. Was considered for tocilizumab. CRP downtrending.  - ID following  - Dexamethasone decreased from 10 mg to 6 mg for total ten day course (2/8-2/17), will resume PTA prednisone for BMT 2/18  - Daily CRP  - Enoxaparin 50 mg BID given elevated D-dimer    Concern for superimposed bacterial pneumonia  Procal increased to 0.28 on 2/8. Previously on ceftriaxone, arithromycin, with prophylactic levofloxacin held but now restarted. CXR 2/8 with stable pulmonary opacities compatible with atypical infection vs. Superimposed atelectasis/edema, improved from prior. Sputum culture 2/14 growing GPC 2/16 growing staph haemolyticus, staph epidermidis, and enterococcus faecalis though respiratory status has been improved without evidence of increased secretion.   - Competed 3 days azithromycin  - Hold off additional antibiotics for now     Antibiotics  Azithromycin (2/9-2/11)     Antimicrobial Prophylaxis  Acyclovir (2/8- )  Fluconazole (2/9- )  Levofloxacin (2/9, 2/12- )  Bactrim (2/8- )    Hematology:    MDS/ s/p BMT 2014 complicated by GVHD  No acute concerns. PTA on prednisone and Jakafi.  - BMT consulted  - Will start PTA prednisone 2/18, currently held while on dexamethasone  - Continue PTA Jakafi 5 mg twice daily, dose reduce to 5 mg every day if GFR<59  - Continue PTA Bactrim double strength 1 tablet twice weekly for PCP prophylaxis  - Continue PTA acyclovir 800 mg 2 times daily for prophylaxis against herpes simplex virus  - Continue PTA  fluconazole 100 mg oral daily  - Continue PTA levofloxacin 250 mg every day     Musculoskeletal:  No acute concerns    Skin:  No acute concerns    General Cares/Prophylaxis:    DVT Prophylaxis: Enoxaparin (Lovenox) subcutaneous 50 mg BID  GI Prophylaxis: PPI   Restraints: None  Family Communication: Wife (Racquel) last updated 2/16  Code Status: Full code     Lines/tubes/drains:  - PIV  - Central line (remove today), a line, ETT  - GARCÍA BELTRAN    Disposition:  - Medical ICU     Patient seen  and findings/plan discussed with medical ICU staff, Dr. Lawler.    Michael Vaz MD  PGY-1, Internal Medicine  MICU 2 Service    ====================================  INTERVAL HISTORY:   NAEO. Intubated. Supine. Remains off pressors.     OBJECTIVE:   1. VITAL SIGNS:   Temp:  [97.6  F (36.4  C)-98.9  F (37.2  C)] 98.4  F (36.9  C)  Pulse:  [45-68] 64  Resp:  [16-20] 18  BP: (118)/(79) 118/79  MAP:  [75 mmHg-96 mmHg] 78 mmHg  Arterial Line BP: ()/(54-76) 116/54  FiO2 (%):  [40 %-50 %] 45 %  SpO2:  [91 %-100 %] 97 %  Ventilation Mode: CMV/AC  (Continuous Mandatory Ventilation/ Assist Control)  FiO2 (%): 45 %  Rate Set (breaths/minute): 16 breaths/min  Tidal Volume Set (mL): 420 mL  PEEP (cm H2O): 12 cmH2O  Oxygen Concentration (%): 50 %  Resp: 18    2. INTAKE/ OUTPUT:   I/O last 3 completed shifts:  In: 2819.29 [I.V.:249.29; NG/GT:630; IV Piggyback:500]  Out: 1565 [Urine:1215; Stool:350]    3. PHYSICAL EXAMINATION:  General: Lying in bed, intubated, supine  Neuro: Sedated, minimally responsive to commands  Pulm/Resp: Clear breath sounds bilaterally without rhonchi, crackles or wheeze, breathing slightly labored  CV: RRR, no m/g/r  Abdomen: Soft, non-distended, non-tender  : Urine yellow and clear  Extremities: Warm, slight RLE edema (chronic per pt)    4. LABS:   Arterial Blood Gases   Recent Labs   Lab 02/16/21  2159 02/16/21  1416 02/16/21  0348 02/15/21  1740   PH 7.37 7.30* 7.33* 7.31*   PCO2 51* 60* 54* 55*   PO2 83 71* 92 75*   HCO3 29* 30* 28 28     Complete Blood Count   Recent Labs   Lab 02/17/21  0349 02/16/21  0349 02/15/21  0431 02/13/21  0616   WBC 9.3 6.6 7.1 7.7   HGB 12.2* 12.5* 14.2 13.0*    191 207 185     Basic Metabolic Panel  Recent Labs   Lab 02/17/21  0349 02/16/21  0349 02/15/21  0431 02/14/21  0326   * 141 140 138   POTASSIUM 5.0 4.4 4.6 4.1   CHLORIDE 115* 110* 108 107   CO2 28 31 25 25   BUN 59* 57* 60* 39*   CR 0.92 1.12 1.33* 0.96   * 205* 147* 134*     Liver  Function Tests  Recent Labs   Lab 02/17/21  0349 02/16/21  0349 02/15/21  0431 02/14/21  0637 02/14/21  0326   AST 34 16 23  --  33   ALT 40 31 37  --  36   ALKPHOS 91 97 115  --  116   BILITOTAL 0.4 0.3 0.5  --  0.6   ALBUMIN 2.1* 2.1* 2.4*  --  2.3*   INR  --   --   --  1.23*  --      5. RADIOLOGY:   No results found for this or any previous visit (from the past 24 hour(s)).

## 2021-02-17 NOTE — PLAN OF CARE
ICU End of Shift Summary. See flowsheets for vital signs and detailed assessment.    Changes this shift: PEEP weaned down to 8. Sedation change from Versed to Dex this afternoon. Patient more awake, but not following commands or tracking, resting comfortably between cares. Rhythm so far not impacted by Dex, has been jordon into the upper 40's at times. Urine output adequate today. Right CVC removed, left radial art line in place, positional. Pt seemingly more hypertensive this afternoon off sedation.     Plan:  Monitor HR with Dex infusion. Continue to wean vent settings. Monitor neuro status and wean sedation as able. Update wife.

## 2021-02-17 NOTE — PLAN OF CARE
ICU End of Shift Summary. See flowsheets for vital signs and detailed assessment.    Changes this shift: No major changes overnight. Sedation unchanged, RASS -3-4, moves all extremities, wakes up and coughs/gags on ETT with turns. BP WNL, HR 50s-60s, intermittently as low as 46. FiO2 weaned to 45%, vent settings unchanged. Overbreathes slightly with RR in the high teens and is mildly dyssynchronous. Adequate urine output.     Plan: Continue to monitor, continue POC. Continue to wean sedation, and FiO2/peep as tolerated. Address continued elevated blood sugars (190s, need to increase sliding scale coverage).

## 2021-02-18 LAB
ALBUMIN SERPL-MCNC: 2.2 G/DL (ref 3.4–5)
ALP SERPL-CCNC: 82 U/L (ref 40–150)
ALT SERPL W P-5'-P-CCNC: 56 U/L (ref 0–70)
ANION GAP SERPL CALCULATED.3IONS-SCNC: <1 MMOL/L (ref 3–14)
ANISOCYTOSIS BLD QL SMEAR: ABNORMAL
AST SERPL W P-5'-P-CCNC: 35 U/L (ref 0–45)
BACTERIA SPEC CULT: ABNORMAL
BASOPHILS # BLD AUTO: 0 10E9/L (ref 0–0.2)
BASOPHILS NFR BLD AUTO: 0 %
BILIRUB SERPL-MCNC: 0.4 MG/DL (ref 0.2–1.3)
BUN SERPL-MCNC: 49 MG/DL (ref 7–30)
CALCIUM SERPL-MCNC: 9 MG/DL (ref 8.5–10.1)
CHLORIDE SERPL-SCNC: 110 MMOL/L (ref 94–109)
CO2 SERPL-SCNC: 34 MMOL/L (ref 20–32)
CREAT SERPL-MCNC: 0.75 MG/DL (ref 0.66–1.25)
CRP SERPL-MCNC: 10 MG/L (ref 0–8)
DIFFERENTIAL METHOD BLD: ABNORMAL
EOSINOPHIL # BLD AUTO: 0 10E9/L (ref 0–0.7)
EOSINOPHIL NFR BLD AUTO: 0 %
ERYTHROCYTE [DISTWIDTH] IN BLOOD BY AUTOMATED COUNT: 18.2 % (ref 10–15)
GFR SERPL CREATININE-BSD FRML MDRD: >90 ML/MIN/{1.73_M2}
GLUCOSE BLDC GLUCOMTR-MCNC: 131 MG/DL (ref 70–99)
GLUCOSE BLDC GLUCOMTR-MCNC: 144 MG/DL (ref 70–99)
GLUCOSE BLDC GLUCOMTR-MCNC: 151 MG/DL (ref 70–99)
GLUCOSE BLDC GLUCOMTR-MCNC: 156 MG/DL (ref 70–99)
GLUCOSE BLDC GLUCOMTR-MCNC: 195 MG/DL (ref 70–99)
GLUCOSE BLDC GLUCOMTR-MCNC: 201 MG/DL (ref 70–99)
GLUCOSE SERPL-MCNC: 195 MG/DL (ref 70–99)
HCT VFR BLD AUTO: 38 % (ref 40–53)
HGB BLD-MCNC: 12.8 G/DL (ref 13.3–17.7)
INR PPP: 1.07 (ref 0.86–1.14)
LYMPHOCYTES # BLD AUTO: 0.3 10E9/L (ref 0.8–5.3)
LYMPHOCYTES NFR BLD AUTO: 2.7 %
Lab: ABNORMAL
MACROCYTES BLD QL SMEAR: PRESENT
MCH RBC QN AUTO: 32.7 PG (ref 26.5–33)
MCHC RBC AUTO-ENTMCNC: 33.7 G/DL (ref 31.5–36.5)
MCV RBC AUTO: 97 FL (ref 78–100)
MONOCYTES # BLD AUTO: 0.9 10E9/L (ref 0–1.3)
MONOCYTES NFR BLD AUTO: 8 %
MYELOCYTES # BLD: 0.1 10E9/L
MYELOCYTES NFR BLD MANUAL: 0.9 %
NEUTROPHILS # BLD AUTO: 9.7 10E9/L (ref 1.6–8.3)
NEUTROPHILS NFR BLD AUTO: 88.4 %
NRBC # BLD AUTO: 0.5 10*3/UL
NRBC BLD AUTO-RTO: 5 /100
OVALOCYTES BLD QL SMEAR: SLIGHT
PLATELET # BLD AUTO: 205 10E9/L (ref 150–450)
POIKILOCYTOSIS BLD QL SMEAR: SLIGHT
POTASSIUM SERPL-SCNC: 4.9 MMOL/L (ref 3.4–5.3)
POTASSIUM SERPL-SCNC: 5 MMOL/L (ref 3.4–5.3)
POTASSIUM SERPL-SCNC: 5 MMOL/L (ref 3.4–5.3)
PROT SERPL-MCNC: 5.8 G/DL (ref 6.8–8.8)
RBC # BLD AUTO: 3.91 10E12/L (ref 4.4–5.9)
RBC INCLUSIONS BLD: SLIGHT
SODIUM SERPL-SCNC: 141 MMOL/L (ref 133–144)
SODIUM SERPL-SCNC: 143 MMOL/L (ref 133–144)
SODIUM SERPL-SCNC: 144 MMOL/L (ref 133–144)
SPECIMEN SOURCE: ABNORMAL
TARGETS BLD QL SMEAR: SLIGHT
WBC # BLD AUTO: 11 10E9/L (ref 4–11)

## 2021-02-18 PROCEDURE — 250N000009 HC RX 250: Performed by: INTERNAL MEDICINE

## 2021-02-18 PROCEDURE — 999N000127 HC STATISTIC PERIPHERAL IV START W US GUIDANCE

## 2021-02-18 PROCEDURE — 85610 PROTHROMBIN TIME: CPT | Performed by: INTERNAL MEDICINE

## 2021-02-18 PROCEDURE — 84132 ASSAY OF SERUM POTASSIUM: CPT | Performed by: STUDENT IN AN ORGANIZED HEALTH CARE EDUCATION/TRAINING PROGRAM

## 2021-02-18 PROCEDURE — 250N000011 HC RX IP 250 OP 636: Performed by: STUDENT IN AN ORGANIZED HEALTH CARE EDUCATION/TRAINING PROGRAM

## 2021-02-18 PROCEDURE — 250N000011 HC RX IP 250 OP 636: Performed by: INTERNAL MEDICINE

## 2021-02-18 PROCEDURE — 36415 COLL VENOUS BLD VENIPUNCTURE: CPT | Performed by: STUDENT IN AN ORGANIZED HEALTH CARE EDUCATION/TRAINING PROGRAM

## 2021-02-18 PROCEDURE — 84295 ASSAY OF SERUM SODIUM: CPT | Performed by: STUDENT IN AN ORGANIZED HEALTH CARE EDUCATION/TRAINING PROGRAM

## 2021-02-18 PROCEDURE — 99233 SBSQ HOSP IP/OBS HIGH 50: CPT | Performed by: INTERNAL MEDICINE

## 2021-02-18 PROCEDURE — 250N000011 HC RX IP 250 OP 636: Performed by: NURSE PRACTITIONER

## 2021-02-18 PROCEDURE — 250N000013 HC RX MED GY IP 250 OP 250 PS 637: Performed by: INTERNAL MEDICINE

## 2021-02-18 PROCEDURE — 272N000078 HC NUTRITION PRODUCT INTERMEDIATE LITER

## 2021-02-18 PROCEDURE — 99291 CRITICAL CARE FIRST HOUR: CPT | Mod: GC

## 2021-02-18 PROCEDURE — 999N000157 HC STATISTIC RCP TIME EA 10 MIN

## 2021-02-18 PROCEDURE — 250N000013 HC RX MED GY IP 250 OP 250 PS 637

## 2021-02-18 PROCEDURE — 200N000002 HC R&B ICU UMMC

## 2021-02-18 PROCEDURE — 250N000013 HC RX MED GY IP 250 OP 250 PS 637: Performed by: STUDENT IN AN ORGANIZED HEALTH CARE EDUCATION/TRAINING PROGRAM

## 2021-02-18 PROCEDURE — 999N001017 HC STATISTIC GLUCOSE BY METER IP

## 2021-02-18 PROCEDURE — 258N000003 HC RX IP 258 OP 636: Performed by: INTERNAL MEDICINE

## 2021-02-18 PROCEDURE — 250N000012 HC RX MED GY IP 250 OP 636 PS 637: Performed by: STUDENT IN AN ORGANIZED HEALTH CARE EDUCATION/TRAINING PROGRAM

## 2021-02-18 PROCEDURE — 999N000015 HC STATISTIC ARTERIAL MONITORING DAILY

## 2021-02-18 PROCEDURE — 99232 SBSQ HOSP IP/OBS MODERATE 35: CPT | Performed by: INTERNAL MEDICINE

## 2021-02-18 PROCEDURE — 84132 ASSAY OF SERUM POTASSIUM: CPT | Performed by: INTERNAL MEDICINE

## 2021-02-18 PROCEDURE — 86140 C-REACTIVE PROTEIN: CPT | Performed by: INTERNAL MEDICINE

## 2021-02-18 PROCEDURE — 250N000013 HC RX MED GY IP 250 OP 250 PS 637: Performed by: NURSE PRACTITIONER

## 2021-02-18 PROCEDURE — 85025 COMPLETE CBC W/AUTO DIFF WBC: CPT | Performed by: INTERNAL MEDICINE

## 2021-02-18 PROCEDURE — 94003 VENT MGMT INPAT SUBQ DAY: CPT

## 2021-02-18 PROCEDURE — 80053 COMPREHEN METABOLIC PANEL: CPT | Performed by: INTERNAL MEDICINE

## 2021-02-18 PROCEDURE — 250N000012 HC RX MED GY IP 250 OP 636 PS 637: Performed by: INTERNAL MEDICINE

## 2021-02-18 RX ORDER — HALOPERIDOL 5 MG/ML
2-5 INJECTION INTRAMUSCULAR 2 TIMES DAILY PRN
Status: DISCONTINUED | OUTPATIENT
Start: 2021-02-18 | End: 2021-03-02

## 2021-02-18 RX ORDER — HYDRALAZINE HYDROCHLORIDE 20 MG/ML
10 INJECTION INTRAMUSCULAR; INTRAVENOUS EVERY 6 HOURS PRN
Status: DISCONTINUED | OUTPATIENT
Start: 2021-02-18 | End: 2021-03-03 | Stop reason: HOSPADM

## 2021-02-18 RX ORDER — ACETAMINOPHEN 325 MG/1
650 TABLET ORAL EVERY 8 HOURS
Status: DISCONTINUED | OUTPATIENT
Start: 2021-02-18 | End: 2021-02-23

## 2021-02-18 RX ADMIN — HALOPERIDOL LACTATE 2 MG: 5 INJECTION, SOLUTION INTRAMUSCULAR at 17:10

## 2021-02-18 RX ADMIN — FLUCONAZOLE 100 MG: 40 POWDER, FOR SUSPENSION ORAL at 07:54

## 2021-02-18 RX ADMIN — ACYCLOVIR 800 MG: 200 SUSPENSION ORAL at 21:24

## 2021-02-18 RX ADMIN — Medication 1 TABLET: at 11:50

## 2021-02-18 RX ADMIN — PANTOPRAZOLE SODIUM 40 MG: 40 TABLET, DELAYED RELEASE ORAL at 07:53

## 2021-02-18 RX ADMIN — SERTRALINE HYDROCHLORIDE 50 MG: 50 TABLET ORAL at 07:53

## 2021-02-18 RX ADMIN — ACETAMINOPHEN 650 MG: 325 TABLET, FILM COATED ORAL at 11:50

## 2021-02-18 RX ADMIN — ACETAMINOPHEN 650 MG: 325 TABLET, FILM COATED ORAL at 19:39

## 2021-02-18 RX ADMIN — INSULIN ASPART 1 UNITS: 100 INJECTION, SOLUTION INTRAVENOUS; SUBCUTANEOUS at 11:50

## 2021-02-18 RX ADMIN — MELATONIN TAB 3 MG 3 MG: 3 TAB at 22:21

## 2021-02-18 RX ADMIN — RUXOLITINIB 5 MG: 5 TABLET ORAL at 21:26

## 2021-02-18 RX ADMIN — Medication 50 MCG/HR: at 07:52

## 2021-02-18 RX ADMIN — Medication 2 PACKET: at 07:54

## 2021-02-18 RX ADMIN — PREDNISONE 10 MG: 5 TABLET ORAL at 07:53

## 2021-02-18 RX ADMIN — DEXMEDETOMIDINE 0.4 MCG/KG/HR: 100 INJECTION, SOLUTION, CONCENTRATE INTRAVENOUS at 17:31

## 2021-02-18 RX ADMIN — INSULIN ASPART 1 UNITS: 100 INJECTION, SOLUTION INTRAVENOUS; SUBCUTANEOUS at 08:01

## 2021-02-18 RX ADMIN — MULTIVITAMIN 15 ML: LIQUID ORAL at 07:53

## 2021-02-18 RX ADMIN — RUXOLITINIB 5 MG: 5 TABLET ORAL at 10:00

## 2021-02-18 RX ADMIN — ENOXAPARIN SODIUM 50 MG: 100 INJECTION SUBCUTANEOUS at 11:50

## 2021-02-18 RX ADMIN — Medication: at 19:53

## 2021-02-18 RX ADMIN — INSULIN ASPART 2 UNITS: 100 INJECTION, SOLUTION INTRAVENOUS; SUBCUTANEOUS at 00:23

## 2021-02-18 RX ADMIN — INSULIN ASPART 1 UNITS: 100 INJECTION, SOLUTION INTRAVENOUS; SUBCUTANEOUS at 20:03

## 2021-02-18 RX ADMIN — ACYCLOVIR 800 MG: 200 SUSPENSION ORAL at 07:53

## 2021-02-18 RX ADMIN — INSULIN ASPART 2 UNITS: 100 INJECTION, SOLUTION INTRAVENOUS; SUBCUTANEOUS at 04:08

## 2021-02-18 RX ADMIN — DEXMEDETOMIDINE 0.3 MCG/KG/HR: 100 INJECTION, SOLUTION, CONCENTRATE INTRAVENOUS at 04:10

## 2021-02-18 RX ADMIN — FENTANYL CITRATE 50 MCG: 50 INJECTION, SOLUTION INTRAMUSCULAR; INTRAVENOUS at 00:25

## 2021-02-18 RX ADMIN — HALOPERIDOL LACTATE 5 MG: 5 INJECTION, SOLUTION INTRAMUSCULAR at 22:25

## 2021-02-18 RX ADMIN — ENOXAPARIN SODIUM 50 MG: 100 INJECTION SUBCUTANEOUS at 00:15

## 2021-02-18 RX ADMIN — LEVOFLOXACIN 250 MG: 250 TABLET, FILM COATED ORAL at 07:53

## 2021-02-18 ASSESSMENT — ACTIVITIES OF DAILY LIVING (ADL)
ADLS_ACUITY_SCORE: 23

## 2021-02-18 ASSESSMENT — MIFFLIN-ST. JEOR
SCORE: 1772.38
SCORE: 1765.38

## 2021-02-18 NOTE — PROGRESS NOTES
Critical Care Services Progress Note:  I personally examined and evaluated the patient today. I formulated today s plan with the house staff team or resident(s) and agree with the findings and plan in the associated note (see separately attested resident note).   I have evaluated all laboratory values and imaging studies from the past 24 hours.  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 Initially on HFNC, then NIPPV, and then intubated on but intubated on 2/13.  Proned ventilated until 2/16.   Overnight events/pertinent findings today:  Remained supine overnight. Versed stopped.  precedex started. No acute events overnight     Data  Na 144, K 5.0, BUN 49, Cr 0.75  CRP 10  WBC 11.0, Hgb 12.8, Plts 205    Vent: 16/420/40/8  Pplat 24  ABG 7.37/51/83    Sputum with Moderate S haemolyticus, moderate S epidermidis, Moderate E faecalis.  Gram stain with < 25 WBC.     Assessment/plan:  COVID19 ARDS:   - S/p Dexamethasone  - s/p remdesevir  - intubated on mechanical ventilation.  Pplat okay  - will decrease PEEP to 5  - RASS goal 0 to -1. Will change sedation to precedex.  - stop fentanyl   - pressure support trial today. Hopeful extubation soon.     SARAH: suspect pre-renal. Improving.  Will Keep I= O today    Nutrition:  - Tube feeds    Hx of BMT  - heme/onc following.  Continuing anti-infective ppx with acyclovir, fluconazole, levofloxacin.   - restart prednisone. But at slightly higher dose of  10 mg today    Rest per resident note.    I spent a total of 35 minutes (excluding procedure time) personally providing and directing critical care services at the bedside and on the critical care unit for this patient.     Johnie Lawler MD

## 2021-02-18 NOTE — PLAN OF CARE
ICU End of Shift Summary. See flowsheets for vital signs and detailed assessment.    Changes this shift: Pt continues to be on minimal sedation, with both fentanyl and precedex gtt infusing, see flowsheets.  Pt does get bradycardic to the 40's at times when sleeping.  Pt startles easily and half opens eyes but does not track or follow commands.  He moves all extremities and is agitated after any cares but appears quite calm and sedate when left alone.  Fentanyl boluses given PRN without significant change in behaviors.  Blood pressure is quite labile, hypertensive for most of the night despite pt appearing to be sleeping comfortably, MD aware.  Pt continues to be on minimal vent settings, tolerated weaning of O2 to 30%.      Plan: Continue to monitor and update MD with changes and concerns, consider further wean in PEEP today to 5 and potential PST to work towards extubation.        Problem: Adult Inpatient Plan of Care  Goal: Plan of Care Review  Outcome: No Change  Goal: Patient-Specific Goal (Individualized)  Outcome: No Change  Goal: Absence of Hospital-Acquired Illness or Injury  Outcome: No Change  Intervention: Identify and Manage Fall Risk  Recent Flowsheet Documentation  Taken 2/18/2021 0400 by Renu Ojeda, RN  Safety Promotion/Fall Prevention:   activity supervised   room near nurse's station   clutter free environment maintained   fall prevention program maintained   safety round/check completed  Taken 2/18/2021 0000 by Renu Ojeda, RN  Safety Promotion/Fall Prevention:   activity supervised   room near nurse's station   clutter free environment maintained   fall prevention program maintained   safety round/check completed  Taken 2/17/2021 2000 by Renu Ojeda, RN  Safety Promotion/Fall Prevention:   activity supervised   room near nurse's station   clutter free environment maintained   fall prevention program maintained   safety round/check completed  Intervention: Prevent Skin  Injury  Recent Flowsheet Documentation  Taken 2/18/2021 0615 by Renu Ojeda RN  Body Position:   turned   left  Taken 2/18/2021 0400 by Renu Ojeda RN  Body Position:   turned   right   legs elevated   heels elevated   side-lying   upper extremity elevated  Taken 2/18/2021 0000 by Renu Ojeda RN  Body Position:   turned   right   legs elevated   heels elevated   side-lying   upper extremity elevated  Taken 2/17/2021 2200 by Renu Ojeda RN  Body Position:   turned   left  Taken 2/17/2021 2030 by Renu Ojeda RN  Body Position:   turned   right   legs elevated   heels elevated   side-lying   upper extremity elevated  Intervention: Prevent and Manage VTE (Venous Thromboembolism) Risk  Recent Flowsheet Documentation  Taken 2/18/2021 0400 by Renu Ojeda RN  VTE Prevention/Management:   bleeding precautions maintained   bleeding risk assessed   bleeding risk factor(s) identified, physician notified   PROM (passive range of motion) performed   anticoagulant therapy maintained  Taken 2/18/2021 0000 by Renu Ojeda RN  VTE Prevention/Management:   bleeding precautions maintained   bleeding risk assessed   bleeding risk factor(s) identified, physician notified   PROM (passive range of motion) performed   anticoagulant therapy maintained  Taken 2/17/2021 2030 by Renu Ojeda RN  VTE Prevention/Management:   bleeding precautions maintained   bleeding risk assessed   bleeding risk factor(s) identified, physician notified   PROM (passive range of motion) performed   anticoagulant therapy maintained  Goal: Optimal Comfort and Wellbeing  Outcome: No Change  Goal: Readiness for Transition of Care  Outcome: No Change

## 2021-02-18 NOTE — PROGRESS NOTES
[facility]  Transplant Infectious Disease Progress Note     Patient:  Deejay Dior, Date of birth 1950, Medical record number [id number]  Date of Visit:  02/18/2021         Assessment and Recommendations:   Recommendations:  - No new antimicrobials or infectious disease work-up is indicated.    Transplant ID will follow with you.    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  He required intubation for Covid-19 pneumonitis / ARDS on 2/13/21 late evening.  His respiratory status is much improved although he remains intubated today.    ID issues:  #Acute Hypoxic Respiratory Failure:  #COVID-19 PNA (diagnosed 01/30):  #MDS s/p stem cell transplant (2014):  #Chronic GVHD:     From a COVID-19 therapy standpoint, he has received appropriate therapies of remdesivir and dexamethasone. There is no role for monoclonal antibodies or other current Covid trials / therapies (especially given his underlying MDS s/p stem cell transplant and chronic GVHD).  Pre-admission, he was on Jakafi and prednisone in the out patient setting, as well as a variety of prophylactic medications - acyclovir, fluconazole,TMP-SMX, and Levaquin -- we will re-visit the ongoing need / indications after he is respiratorily improved.  (Continued need for high dose acyclovir (in absence of documented CMV) and fluconazole (with no neutropenia and essentially normal differential) is unclear.)    # 2/14/21 ETT sputum culture with growth:  The three organisms isolated in this culture are normal kimani (coagulase negative Staphs) or upper airway non-pathogenic colonizers (Enterococcus) -- would not treat since he lacks any evidence for a super-imposed bacterial pneumonia.    Other ID  issues:  - QTc interval: 434 (02/08/21)  - Bacterial prophylaxis: Levaquin as out-patient; currently on azithromycin   - Pneumocystis prophylaxis: bactrim  - Viral serostatus & prophylaxis: acyclovir   - Fungal prophylaxis: fluconazole   - Immunization status: Unknown  - Gamma globulin status: IgG replete   - Isolation status: COVID-19; VRE         Interval History:   Mr. Dior remains afebrile (T max 99.1 degrees F) since 2/8/21 pm.  His peripheral WBC is increased to 11.0 today (perhaps due to steroids?) but his serum CRP continues to fall, down to 10.0 today on only prophylaxis with levofloxacin, acyclovir, TMP-SMX, and fluconazole.   He is a bit more wakeful off much sedation but not yet verbally interactive.  His respiratory status on the ventilator continues to improve, with FiO2 down to 0.30 and PEEP down to 5 (rapidly improving since proning was discontinued on 2/16/21).  Pressure support trials are constrained by mental status.  A review of systems is unobtainable but there are no other new issues over the past 24 hours.  A 2/14/21 ETT-obtained sputum culture grew a mixed growth of Staph haemolyticus and epidermidis as well as E faecalis but he has improved despite absence of antibiotic therapy against these organisms.  There is no other new microbiology.  There is no new chest x-ray since 2/15/21.         Current Medications & Allergies:       acetaminophen  650 mg Oral Q8H     acyclovir  800 mg Oral BID     artificial tears   Both Eyes BID     calcium carbonate-vitamin D  1 tablet Oral Daily     enoxaparin ANTICOAGULANT  0.5 mg/kg Subcutaneous BID     fluconazole  100 mg Oral Daily     insulin aspart  1-6 Units Subcutaneous Q4H     levofloxacin  250 mg Oral Daily     melatonin  3 mg Oral At Bedtime     multivitamins w/minerals  15 mL Per Feeding Tube Daily     pantoprazole  40 mg Oral QAM AC     predniSONE  10 mg Oral Daily     protein modular  2 packet Per Feeding Tube Daily     ruxolitinib  5 mg  Oral or Feeding Tube BID     sertraline  50 mg Oral Daily     sulfamethoxazole-trimethoprim  1 tablet Oral Once per day on Mon Tue     Infusions/Drips:    dexmedetomidine 0.3 mcg/kg/hr (02/18/21 1600)     dextrose       - MEDICATION INSTRUCTIONS -       midazolam Stopped (02/17/21 1700)     - MEDICATION INSTRUCTIONS -       Allergies   Allergen Reactions     Blood Transfusion Related (Informational Only) Other (See Comments)     Patient has a history of a clinically significant antibody against RBC antigens.  A delay in compatible RBCs may occur.          Physical Exam:   Vitals were reviewed.  All vitals stable.  Patient Vitals for the past 24 hrs:   BP Temp Temp src Pulse Resp SpO2 Weight   02/18/21 1628 -- -- -- -- -- 96 % --   02/18/21 1600 (!) 144/77 98.4  F (36.9  C) Axillary 56 -- 94 % --   02/18/21 1500 (!) 140/71 -- -- 65 -- 95 % --   02/18/21 1400 (!) 141/70 -- -- 63 -- 95 % --   02/18/21 1300 (!) 155/79 -- -- 58 -- 100 % --   02/18/21 1255 -- -- -- -- -- 100 % --   02/18/21 1200 (!) 153/99 98  F (36.7  C) Axillary 56 -- 96 % --   02/18/21 1100 -- -- -- 61 -- 94 % --   02/18/21 1000 -- -- -- 51 -- 94 % --   02/18/21 0900 -- -- -- 65 -- 93 % --   02/18/21 0804 -- -- -- -- -- 96 % --   02/18/21 0800 -- 98.1  F (36.7  C) Axillary 60 -- 99 % --   02/18/21 0700 -- -- -- 56 -- 94 % --   02/18/21 0645 -- -- -- -- -- 93 % --   02/18/21 0630 -- -- -- 67 -- 92 % --   02/18/21 0615 -- -- -- 76 -- 94 % --   02/18/21 0600 -- -- -- (!) 46 -- 94 % --   02/18/21 0545 -- -- -- (!) 47 -- 94 % --   02/18/21 0530 -- -- -- (!) 49 -- 94 % --   02/18/21 0515 -- -- -- (!) 46 -- 94 % --   02/18/21 0500 -- -- -- (!) 48 -- 94 % --   02/18/21 0445 -- -- -- 62 -- 93 % --   02/18/21 0430 -- -- -- 70 -- 93 % --   02/18/21 0415 -- -- -- 77 -- 92 % --   02/18/21 0400 -- 98.4  F (36.9  C) Axillary 50 19 93 % --   02/18/21 0345 -- -- -- (!) 48 -- 93 % --   02/18/21 0330 -- -- -- (!) 47 -- 93 % --   02/18/21 0315 -- -- -- (!) 48 -- 94 % --    02/18/21 0300 -- -- -- 57 -- 94 % --   02/18/21 0245 -- -- -- 52 -- 93 % --   02/18/21 0230 -- -- -- 50 -- 94 % --   02/18/21 0215 -- -- -- 57 -- 94 % --   02/18/21 0200 -- -- -- (!) 49 -- 93 % --   02/18/21 0145 -- -- -- (!) 49 -- 93 % --   02/18/21 0130 -- -- -- (!) 48 -- 93 % --   02/18/21 0115 -- -- -- (!) 48 -- 93 % --   02/18/21 0100 -- -- -- 61 -- 93 % --   02/18/21 0045 -- -- -- (!) 49 -- 94 % --   02/18/21 0030 -- -- -- 52 -- 94 % --   02/18/21 0015 -- -- -- -- -- 94 % --   02/18/21 0000 -- 98.7  F (37.1  C) Axillary (!) 48 21 95 % 102.5 kg (225 lb 15.5 oz)   02/17/21 2345 -- -- -- 62 -- 95 % --   02/17/21 2330 -- -- -- 50 -- 94 % --   02/17/21 2315 -- -- -- 59 -- 95 % --   02/17/21 2300 -- -- -- (!) 49 -- 95 % --   02/17/21 2245 -- -- -- 63 -- 94 % --   02/17/21 2230 -- -- -- (!) 48 -- 94 % --   02/17/21 2215 -- -- -- 59 -- 94 % --   02/17/21 2200 -- -- -- 63 -- 93 % --   02/17/21 2145 -- -- -- 66 -- 93 % --   02/17/21 2130 -- -- -- 68 -- 92 % --   02/17/21 2115 -- -- -- 68 -- 97 % --   02/17/21 2114 (!) 156/93 -- -- 69 -- 96 % --   02/17/21 2045 -- -- -- 64 -- 96 % --   02/17/21 2030 -- 98.3  F (36.8  C) Axillary 67 19 96 % --   02/17/21 2015 -- -- -- 67 -- 96 % --   02/17/21 2000 -- -- -- 64 -- 97 % --   02/17/21 1945 -- -- -- 51 -- 96 % --   02/17/21 1930 -- -- -- 60 -- 97 % --   02/17/21 1915 -- -- -- 64 -- 96 % --   02/17/21 1900 -- -- -- 64 -- 96 % --   02/17/21 1800 -- -- -- 68 20 96 % --   02/17/21 1700 -- -- -- 69 19 97 % --     Ranges for vital signs over the past 24 hours:   Temp:  [98  F (36.7  C)-98.7  F (37.1  C)] 98.4  F (36.9  C)  Pulse:  [46-77] 56  Resp:  [19-21] 19  BP: (140-156)/(70-99) 144/77  MAP:  [77 mmHg-129 mmHg] 89 mmHg  Arterial Line BP: (104-191)/(52-93) 143/58  FiO2 (%):  [30 %] 30 %  SpO2:  [92 %-100 %] 96 %  Vitals:    02/16/21 0400 02/17/21 0000 02/18/21 0000   Weight: 103.6 kg (228 lb 6.3 oz) 103.4 kg (227 lb 15.3 oz) 102.5 kg (225 lb 15.5 oz)     Intake/Output Summary  (Last 24 hours) at 2/18/2021 1632  Last data filed at 2/18/2021 1600  Gross per 24 hour   Intake 3251.89 ml   Output 2160 ml   Net 1091.89 ml     Physical Examination:  GENERAL:  Intubated, sedated, 70 year old man in bed in [facility] on ventilator viewed through window / video.  Remainder of exam unobtainable due to Covid-19 isolation.  HEAD:  NCAT.  EYES:  EOMI, anicteric sclerae.  ENT:  No visible otorrhea.  OG tube present.  Oral ETT.  LUNGS:  Breathing appears comfortable on ventilator.  CARDIOVASCULAR:  RRR.  ABDOMEN:  Unable to examine.  :  Molina present.  SKIN:  No visible acute rash or lesion from distance.  Arterial and peripheral IV line sites reportedly lack inflammation.  NEUROLOGIC:  Sedated, non-interactive, moves extremities x 4.         Laboratory Data:     Absolute CD4   Date Value Ref Range Status   06/29/2015 345 (L) 441 - 2,156 cells/uL Final     Comment:     Effective 12/08/2014, the reference range for this assay has changed to   reflect   new methodology.     12/29/2014 190 (L) 441 - 2,156 cells/uL Final     Comment:     Effective 12/08/2014, the reference range for this assay has changed to   reflect   new methodology.     10/09/2014 37 mm3 Final     Inflammatory Markers    Recent Labs   Lab Test 02/18/21  0415 02/17/21  0349 02/16/21  0349 02/15/21  0431 02/14/21  0326 02/13/21  0616 11/30/14  1207 11/30/14  1207   SED  --   --   --   --   --   --   --  40*   CRP 10.0* 15.0* 27.0* 56.0* 68.0* 53.0*   < > 17.4*    < > = values in this interval not displayed.     Immune Globulin Studies     Recent Labs   Lab Test 02/10/21  0721 01/24/19  1605 10/01/18  0955 05/08/16  0906 03/20/16  0352 07/30/15  1125 10/09/14  1010   IGG 3,275*  831 1,130 1,300 1,270 403* 913 458*     --   --   --   --   --  65   IGE  --   --   --   --   --   --  12   IGA 83*  --   --   --   --   --  48*     Metabolic Studies       Recent Labs   Lab Test 02/18/21  1009 02/18/21  0415 02/18/21  0209 02/17/21  0349  02/17/21  0349 02/14/21  0637 02/14/21  0637 02/10/21  1305 02/10/21  1305 02/08/21 2024 02/08/21 2024 02/08/21 0910 02/05/21  1227   NA  --  144 143   < > 145*   < >  --    < >  --    < >  --  139 140   POTASSIUM 4.9 5.0 5.0   < > 5.0   < >  --    < >  --    < >  --  4.0 4.0   CHLORIDE  --  110*  --   --  115*   < >  --    < >  --    < >  --  111* 109   CO2  --  34*  --   --  28   < >  --    < >  --    < >  --  23 25   ANIONGAP  --  <1*  --   --  3   < >  --    < >  --    < >  --  5 6   BUN  --  49*  --   --  59*   < >  --    < >  --    < >  --  25 30   CR  --  0.75  --   --  0.92   < >  --    < >  --    < >  --  0.88 1.10   GFRESTIMATED  --  >90  --   --  83   < >  --    < >  --    < >  --  86 67   GLC  --  195*  --   --  197*   < >  --    < >  --    < >  --  100* 94   JOE  --  9.0  --   --  8.5   < >  --    < >  --    < >  --  8.4* 8.4*   PHOS  --   --   --   --  2.2*  --  4.5  --   --   --   --   --   --    MAG  --   --   --   --  2.8*  --   --    < >  --   --   --  1.7 2.0   LACT  --   --   --   --   --   --   --   --   --   --   --  1.1 1.4   PCAL  --   --   --   --   --   --   --   --   --   --  0.28 0.10  --    FGTL  --   --   --   --   --   --   --   --  <31  --   --   --   --    CKT  --   --   --   --   --   --  27*  --   --   --   --   --   --     < > = values in this interval not displayed.     Hepatic Studies    Recent Labs   Lab Test 02/18/21  0415 02/17/21  0349 02/16/21  0349 02/14/21  0326 02/14/21  0326 02/08/21  0910 02/08/21  0910 04/06/16  0245 04/06/16  0245 07/07/14  0445 07/07/14  0445   BILITOTAL 0.4 0.4 0.3   < > 0.6   < > 0.3   < > 0.8   < > 1.8*   BILIDELTA  --   --   --   --   --   --   --   --   --   --  1.0*   BILICONJ  --   --   --   --   --   --   --   --   --   --  0.0   DBIL  --   --   --   --   --   --   --   --  0.3*   < >  --    ALKPHOS 82 91 97   < > 116   < > 69   < > 292*   < > 88   PROTTOTAL 5.8* 5.7* 5.7*   < > 6.0*   < > 6.6*   < > 6.7*   < > 6.3*   ALBUMIN 2.2* 2.1*  2.1*   < > 2.3*   < > 2.6*   < > 2.3*   < > 3.4   AST 35 34 16   < > 33   < > 51*   < > 96*   < > 19   ALT 56 40 31   < > 36   < > 28   < > 167*   < > 47   LDH  --   --   --   --  783*  --  483*  --   --    < >  --     < > = values in this interval not displayed.     Pancreatitis testing    Recent Labs   Lab Test 02/14/19  1342 09/27/18  1217 04/04/16  0350   AMYLASE 46  --   --    LIPASE  --  158  --    TRIG  --   --  286*     Gout Labs      Recent Labs   Lab Test 06/24/14  1205 06/09/14  1116   URIC 6.1 5.8     Hematology Studies      Recent Labs   Lab Test 02/18/21 0415 02/17/21  0349 02/16/21  0349 02/15/21  0431 02/13/21  0616 02/12/21  0337   WBC 11.0 9.3 6.6 7.1 7.7 8.3   ANEU 9.7*  --   --   --   --  7.0   ALYM 0.3*  --   --   --   --  0.7*   WILLIAM 0.9  --   --   --   --  0.4   AEOS 0.0  --   --   --   --  0.0   HGB 12.8* 12.2* 12.5* 14.2 13.0* 12.4*   HCT 38.0* 37.1* 38.9* 43.3 39.0* 38.3*    206 191 207 185 184     Clotting Studies    Recent Labs   Lab Test 02/18/21 0415 02/14/21  0637 02/14/19  1342 01/24/19  1605   INR 1.07 1.23* 0.98 0.92   PTT  --  29  --   --      Iron Testing    Recent Labs   Lab Test 02/18/21 0415 02/11/21  0409 02/11/21  0409 11/21/19  1148 11/21/19  1148 09/27/18  1218 09/27/18  1218   LENNOX  --   --  401*   < >  --   --   --    MCV 97   < > 95   < > 95   < >  --    FOLIC  --   --   --   --   --   --  15.9   B12  --   --   --   --  455  --   --     < > = values in this interval not displayed.     Arterial Blood Gas Testing    Recent Labs   Lab Test 02/17/21  1615 02/17/21  1131 02/16/21  2159 02/16/21  1416 02/16/21  0348   PH 7.40 7.38 7.37 7.30* 7.33*   PCO2 53* 54* 51* 60* 54*   PO2 90 88 83 71* 92   HCO3 32* 32* 29* 30* 28   O2PER 45 45 50 45 50.0     Thyroid Studies     Recent Labs   Lab Test 09/27/18  1217 02/27/18  1239 10/12/16  1107 06/30/16  0953 03/01/16  1227   TSH 1.01 0.70 0.40 1.02 0.38*   T4  --   --   --  1.03 1.12     Urine Studies     Recent Labs   Lab  Test 08/25/16  1601 05/07/16  1211 03/24/16  1650 03/21/16  1730 12/17/15  1430   URINEPH 6.0 5.0 5.0 5.0 5.0   NITRITE Negative Negative Negative Negative Negative   LEUKEST Negative Negative Negative Negative Negative   WBCU 1 1 <1 1 1     Medication levels    Recent Labs   Lab Test 09/27/18  1059 04/01/16  0831 04/01/16  0831 03/21/16  2143 03/21/16  2143 02/01/15  0924 02/01/15  0924   VANCOMYCIN  --   --   --   --  25.4*   < >  --    VCON  --   --  1.6   < >  --   --   --    CYCLSP  --   --   --   --   --   --  178   RAPAMY 4.5*   < >  --    < >  --    < >  --     < > = values in this interval not displayed.     Body fluid stats    Recent Labs   Lab Test 02/14/21  0848 03/27/16  1406   FTYP  --  Bronchial  SITE 2 RIGHT MIDDLE LOBE    Bronchial  SITE 1 RIGTH UPPER LOBE     FCOL  --  Colorless  Colorless   FAPR  --  Clear  Clear   FRBC  --  << Do Not Report >>  << Do Not Report >>   FWBC  --  63  57   FNEU  --  16  21   FLYM  --  8  14   FMONO  --  76  65   GS <25 PMNs/low power field  Moderate  Gram positive cocci  * No organisms seen  >25 PMNs/low power field    No organisms seen  >25 PMNs/low power field       Microbiology:    Fungal testing  Recent Labs   Lab Test 02/10/21  1305 09/27/18  1217 03/27/16  1406 03/25/16  1911 03/20/16  0352 03/19/16  1638 03/19/16  0749   FGTL <31 35  --  >500  Unit: pg/mL   149  --   --    ASPGAI 0.05 0.05 0.10  0.09  --   --  0.07 0.12   ASPAG  --   --  Negative  Reference range: Negative  (Note)  INTERPRETIVE INFORMATION: Aspergillus Galactomannan EIA,  Broncho  A BAL galactomannan index of greater than or equal to 0.5  is considered positive.  This result should be interpreted  in the context of patient history, clinical signs/symptoms,  and other routine diagnostic tests (e.g., culture,  histologic examination of biopsy material, and radiographic  imaging).  Performed by [facility],  [address] [place] [address] [phone]  www.[employer], Lencho WINSTON  MD Kasandra, Lab. Director    Negative  Reference range: Negative  (Note)  INTERPRETIVE INFORMATION: Aspergillus Galactomannan EIA,  Broncho  A BAL galactomannan index of greater than or equal to 0.5  is considered positive.  This result should be interpreted  in the context of patient history, clinical signs/symptoms,  and other routine diagnostic tests (e.g., culture,  histologic examination of biopsy material, and radiographic  imaging).  Performed by [facility],  [address] [facility],[place] [id number] [phone]  [email or web address], Lencho Slaughter MD, Lab. Director    --   --   --   --    ASPGAA Negative Negative  --   --   --  Negative  Reference range: Negative  Unit: not reported  (Note)  INTERPRETIVE INFORMATION: Aspergillus Galactomannan Antigen  by EIA  Negative results do not exclude the diagnosis of invasive  aspergillosis. A single positive test result (index equal  to or greater than 0.5) should be clinically correlated  by testing a separate serum specimen because many agents  (e.g. foods, antibiotics) may cross-react with the test.  If invasive aspergillosis is suspected in high-risk  patients, serial sampling is recommended.  Performed by [facility],  [address] [facility] [address] [phone]  [email or web address], Lencho Slaughter MD, Lab. Director   Negative  Reference range: Negative  Unit: not reported  (Note)  INTERPRETIVE INFORMATION: Aspergillus Galactomannan Antigen  by EIA  Negative results do not exclude the diagnosis of invasive  aspergillosis. A single positive test result (index equal  to or greater than 0.5) should be clinically correlated  by testing a separate serum specimen because many agents  (e.g. foods, antibiotics) may cross-react with the test.  If invasive aspergillosis is suspected in high-risk  patients, serial sampling is recommended.  Performed by [facility],  [address] [facility] [place] [address] [phone]  [email or web address], Lencho Slaughter MD, Lab. Director        Last Culture results with specimen source  Culture Micro   Date Value Ref Range Status   02/14/2021 (A)  Final    Moderate growth  Staphylococcus haemolyticus  Susceptibility testing not routinely done     02/14/2021 (A)  Final    Moderate growth  Staphylococcus epidermidis  Susceptibility testing not routinely done     02/14/2021 Moderate growth  Enterococcus faecalis   (A)  Final   02/08/2021 No growth  Final   02/08/2021 No growth  Final   02/05/2021 No growth  Final   02/05/2021 No growth  Final   03/08/2018 No growth  Final   03/08/2018 No growth  Final   02/21/2018 No Beta Streptococcus isolated  Final   06/09/2016 No growth  Final   05/07/2016 No growth  Final   05/07/2016 No growth  Final   03/31/2016 No growth  Final   03/31/2016 No growth  Final   03/29/2016 (A)  Final    Light growth Enterococcus species (VRE)  Critical Value/Significant Value, preliminary result only, called to and read   back by Preeti Cordoba RN @1301 04/01/16       Specimen Description   Date Value Ref Range Status   02/14/2021 Sputum  Final   02/14/2021 Sputum  Final   02/08/2021 Blood Right Arm  Final   02/08/2021 Blood Left Arm  Final   02/05/2021 Blood Right Arm  Final   02/05/2021 Blood  Final   08/01/2019 Nares  Final   03/08/2018 Blood Left Arm  Final   03/08/2018 Blood Right Arm  Final   02/21/2018 Throat  Final   02/21/2018 Throat  Final   10/12/2016 Nasal  Final   06/09/2016 Blood Unspecified Site  Final   05/07/2016 Blood Red port  Final   05/07/2016 Blood White port  Final   05/05/2016 Nasal  Final        Last check of C difficile  C Diff Toxin B PCR   Date Value Ref Range Status   03/29/2016  NEG Final    Negative  Negative: Clostridium difficile target DNA sequences NOT detected, presumed   negative for Clostridium difficile toxin B or the number of bacteria present   may be below the limit of detection for the test.   FDA approved assay performed using [employer] real-time PCR.   A negative result does not  exclude actual disease due to Clostridium difficile   and may be due to improper collection, handling and storage of the specimen or   the number of organisms in the specimen is below the detection limit of the   assay.       Infection Studies to assess Diarrhea   Recent Labs   Lab Test 03/29/16  1245 03/21/16  0945   ADENOVIRUSAG Negative  --    EPCAMP Not Detected Not Detected   EPSALM Not Detected Not Detected   EPSHGL Not Detected Not Detected   EPVIB Not Detected Not Detected   EPROTA Not Detected Not Detected   EPNORO Not Detected Not Detected   EPYER Not Detected Not Detected     Virology:    Coronavirus-19 testing    Recent Labs   Lab Test 01/30/21  1514   COVIDPCREXT Positive*     Respiratory virus (non-coronavirus-19) testing    Recent Labs   Lab Test 02/09/21  1315 02/21/18  1348 10/12/16  1155 06/09/16  1233 11/30/14  1208 11/30/14  1208   RVSPEC  --  Nasopharyngeal  --  Nasopharyngeal   < >  --    AFLU  --   --   --   --   --  Negative   IFLUA Not Detected Negative  --  Negative   < >  --    INFZA  --   --  Negative   Flu A target RNA not detected, presumed negative for Influenza A or the viral   load is below the limit of detection.    --    < >  --    FLUAH1 Not Detected Negative  --  Negative   < >  --    [id number] Not Detected Negative  --  Negative   < >  --    FLUAH3 Not Detected Negative  --  Negative   < >  --    BFLU  --   --   --   --   --  Negative   Test results must be correlated with clinical data. If necessary, results   should be confirmed by a molecular assay or viral culture.     IFLUB Not Detected Negative  --  Negative   < >  --    INFZB  --   --  Negative   Flu B target RNA not detected, presumed negative for Influenza B or the viral   load is below the limit of detection.    --    < >  --    PIV1 Not Detected Negative  --  Negative   < >  --    PIV2 Not Detected Negative  --  Negative   < >  --    PIV3 Not Detected Negative  --  Negative   < >  --    PIV4 Not Detected  --   --   --    --   --    IRSV  --   --  Negative   RSV target RNA not detected, presumed negative for Respiratory Syncitial Virus   or the viral load is below the limit of detection.   FDA approved assay performed using [employer] GeneXpert(R) real-time PCR.    --    < >  --    HRVS  --  Negative  --  Negative   < >  --    RSVA Not Detected Negative  --  Negative   < >  --    RSVB Not Detected Negative  --  Negative   < >  --    HMPV Not Detected Negative  --  Negative   < >  --    ADVBE  --  Negative  --  Negative   < >  --    ADVC  --  Negative  --  Negative   < >  --    ADENOV Not Detected  --   --   --   --   --    CORONA Not Detected  --   --   --   --   --     < > = values in this interval not displayed.     EBV DNA Copies/mL   Date Value Ref Range Status   08/16/2018 EBV DNA Not Detected EBVNEG^EBV DNA Not Detected [Copies]/mL Final   06/21/2018 EBV DNA Not Detected EBVNEG^EBV DNA Not Detected [Copies]/mL Final   02/27/2018 <500 (A) EBVNEG^EBV DNA Not Detected [Copies]/mL Final     Comment:     EBV DNA Detected below the reportable range of 500 Copies/mL   12/14/2017 EBV DNA Not Detected EBVNEG^EBV DNA Not Detected [Copies]/mL Final   09/28/2017 <500 (A) EBVNEG^EBV DNA Not Detected [Copies]/mL Final     Comment:     EBV DNA Detected below the reportable range of 500 Copies/mL   06/08/2017 EBV DNA Not Detected EBVNEG [Copies]/mL Final     Parvovirus Testing    Recent Labs   Lab Test 03/27/16  1406   PRVSP Bronchoalveolar Lavage   Right upper lobe     PRVPC Not Detected  (Note)  NOT DETECTED - A negative result does not rule out the  presence of PCR inhibitors in the patient specimen or assay  specific nucleic acid in concentrations below the level of  detection by the assay.    The specimen submitted for testing did not meet [facility]  submission guidelines. Testing was performed on a specimen  that did not meet validated type requirements.  Performance characteristics of this assay may be affected.  Interpret results with caution.  Please refer to the [facility]  Laboratory Test Directory for information on specimen  acceptability:  [email or web address]/Specimen-Handling/index.jsp.  INTERPRETIVE INFORMATION: Parvovirus B19 By PCR  Test developed and characteristics determined by [facility]. See Compliance Statement B: "[employer]/CS  Performed by [facility],  [address] [facility] [place] [address] [phone]  www."[employer] Lencho Slaughter MD, Lab. Director       Adenovirus Testing    Recent Labs   Lab Test 03/08/18  1343 03/29/16  1245 03/29/16  0939 02/18/15  1421 02/18/15  1050 02/04/15  2046   ADRES No Adenovirus DNA detected.  --  No Adenovirus DNA detected.  --  No Adenovirus DNA detected.  --    ADENOVIRUSAG  --  Negative  --  Negative  --  Negative     Imaging:  No results found for this or any previous visit (from the past 48 hour(s)).     2/15 CXR:  Overall slight decrease in patchy opacifications in the left lung base. Continued small left effusion. Atherosclerosis. Endotracheal intubation. Continued patchy opacities in right lung base.

## 2021-02-18 NOTE — PROGRESS NOTES
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 Transferred to MICU on 2/10 for worsening respiratory status and c/f intubation.    CHANGES and MAJOR THINGS TODAY:   - Wean fentanyl gtt  - PEEP decreased to 5  - Start prednisone 10 mg  - Remove rios, arterial line    PLAN:    Neuro:  Pain and sedation  Weaning sedation with goal towards intubation.  - Fetanyl gtt and midazolam gtt weaned off 2/18   - Precedex gtt  - Schedule actaminophen 650 q8h  - Consider Haldol 2 mg PRN   - RASS goal 0    MDD  - Continue PTA sertraline    Pulmonary:  Acute hypoxic respiratory failure secondary to COVID-19 pneumonia, rhinovirus  Acute respiratory distress syndrome  Intubated 2/14 with initiation of proning. Has received intermittent diuresis to maintain net negative daily, earlier this hospital course. P/F improved to 184 on 2/17 with no additional proning indicated.  - LTVV at 420 ml (6 ml/kg)  - PEEP decreased from 10 to 5  - No diuresis today, goal net even  - PST    Cardiovascular:  No acute concerns. TTE 2/9 LVEF 65-70%.     GI/Nutrition  Nutrition  Previously tolerating regular diet. OG in place 2/13. Started TF 2/14.   - RD consulted    Renal/Fluids/Electrolytes:  SARAH   Cr 1.33 on 2/15 from baseline 0.8-0.9. Likely pre-renal in etiology in the setting of poor PO intake. Patient has been -5L thus far this admission. Cr improved with 1L LR 2/15, 0.5L LR 2/16.   - Goal net even to +500 ml today, consider  ml if not at goal  - No diuresis today    Hypernatremia  Na stable at 144.   - Continue  q4h     Endocrine:  Hyperglycemia  Glucose 157-197.   - Continue medium sliding scale insulin q4h    ID:  COVID-19 pneumonia (+1/30)  +  Rhinovirus  Remdesivir course completed on 2/12. Was considered for tocilizumab. CRP downtrending.  - ID following  - Dexamethasone decreased from 10 mg to 6 mg for total ten day course (2/8-2/17) completed  - Enoxaparin 50 mg BID given elevated D-dimer    Concern for superimposed bacterial pneumonia  Procal increased to 0.28 on 2/8. Previously on ceftriaxone, arithromycin, with prophylactic levofloxacin held but now restarted. CXR 2/8 with stable pulmonary opacities compatible with atypical infection vs. Superimposed atelectasis/edema, improved from prior. Sputum culture 2/14 growing GPC 2/16 growing staph haemolyticus, staph epidermidis, and enterococcus faecalis though respiratory status has been improved without evidence of increased secretion.   - Competed 3 days azithromycin  - Hold off additional antibiotics for now     Antibiotics  Azithromycin (2/9-2/11)     Antimicrobial Prophylaxis  Acyclovir (2/8- )  Fluconazole (2/9- )  Levofloxacin (2/9, 2/12- )  Bactrim (2/8- )    Hematology:    MDS/ s/p BMT 2014 complicated by GVHD  No acute concerns. PTA on prednisone and Jakafi.  - BMT consulted  - Hold PTA prednisone 5 mg while on dexamethasone, started prednisone 10 mg on 2/18 per BMT  - Continue PTA Jakafi 5 mg twice daily, dose reduce to 5 mg every day if GFR<59  - Continue PTA Bactrim double strength 1 tablet twice weekly for PCP prophylaxis  - Continue PTA acyclovir 800 mg 2 times daily for prophylaxis against herpes simplex virus  - Continue PTA  fluconazole 100 mg oral daily  - Continue PTA levofloxacin 250 mg every day     Musculoskeletal:  No acute concerns    Skin:  No acute concerns    General Cares/Prophylaxis:    DVT Prophylaxis: Enoxaparin (Lovenox) subcutaneous 50 mg BID  GI Prophylaxis: PPI   Restraints: None  Family Communication: Wife (Racquel) last updated 2/18  Code Status: Full code     Lines/tubes/drains:  - PIV  - ETT  - OG, [place]  - rios, arterial line (remove today)    Disposition:  - Medical  ICU     Patient seen and findings/plan discussed with medical ICU staff, Dr. Lawler.    Michael Vaz MD  PGY-1, Internal Medicine  MICU 2 Service    ====================================  INTERVAL HISTORY:   NAEO. Intubated. Supine. Remains off pressors.     OBJECTIVE:   1. VITAL SIGNS:   Temp:  [98  F (36.7  C)-98.7  F (37.1  C)] 98.1  F (36.7  C)  Pulse:  [46-77] 51  Resp:  [16-21] 19  BP: (156)/(93) 156/93  MAP:  [77 mmHg-129 mmHg] 89 mmHg  Arterial Line BP: (100-191)/(52-93) 143/58  FiO2 (%):  [30 %-45 %] 30 %  SpO2:  [92 %-99 %] 94 %  Ventilation Mode: CMV/AC  (Continuous Mandatory Ventilation/ Assist Control)  FiO2 (%): 30 %  Rate Set (breaths/minute): 16 breaths/min  Tidal Volume Set (mL): 420 mL  PEEP (cm H2O): 8 cmH2O  Oxygen Concentration (%): 30 %  Resp: 19    2. INTAKE/ OUTPUT:   I/O last 3 completed shifts:  In: 2997.61 [I.V.:232.61; NG/GT:785]  Out: 1845 [Urine:1825; Stool:20]    3. PHYSICAL EXAMINATION:  General: Lying in bed, supine  Neuro: Sedated, agitated at times, not consistently responsive to commands  Pulm/Resp: Intubated, clear breath sounds bilaterally without rhonchi, crackles or wheeze  CV: RRR, no m/g/r  Abdomen: Soft, non-distended, non-tender  : Urine yellow and clear  Extremities: Warm, slight RLE edema (chronic per pt)    4. LABS:   Arterial Blood Gases   Recent Labs   Lab 02/17/21  1615 02/17/21  1131 02/16/21  2159 02/16/21  1416   PH 7.40 7.38 7.37 7.30*   PCO2 53* 54* 51* 60*   PO2 90 88 83 71*   HCO3 32* 32* 29* 30*     Complete Blood Count   Recent Labs   Lab 02/18/21  0415 02/17/21  0349 02/16/21  0349 02/15/21  0431   WBC 11.0 9.3 6.6 7.1   HGB 12.8* 12.2* 12.5* 14.2    206 191 207     Basic Metabolic Panel  Recent Labs   Lab 02/18/21  1009 02/18/21  0415 02/18/21  0209 02/17/21  1615 02/17/21  0349 02/17/21  0349 02/16/21  0349 02/15/21  0431   NA  --  144 143 146*  --  145* 141 140   POTASSIUM 4.9 5.0 5.0 5.2   < > 5.0 4.4 4.6   CHLORIDE  --  110*  --   --   --   115* 110* 108   CO2  --  34*  --   --   --  28 31 25   BUN  --  49*  --   --   --  59* 57* 60*   CR  --  0.75  --   --   --  0.92 1.12 1.33*   GLC  --  195*  --   --   --  197* 205* 147*    < > = values in this interval not displayed.     Liver Function Tests  Recent Labs   Lab 02/18/21  0415 02/17/21  0349 02/16/21  0349 02/15/21  0431 02/14/21  0637   AST 35 34 16 23  --    ALT 56 40 31 37  --    ALKPHOS 82 91 97 115  --    BILITOTAL 0.4 0.4 0.3 0.5  --    ALBUMIN 2.2* 2.1* 2.1* 2.4*  --    INR 1.07  --   --   --  1.23*     5. RADIOLOGY:   No results found for this or any previous visit (from the past 24 hour(s)).

## 2021-02-18 NOTE — PROGRESS NOTES
[facility]  Transplant Infectious Disease Progress Note     Patient:  Deejay Dior, Date of birth 1950, Medical record number [id number]  Date of Visit:  02/17/2021         Assessment and Recommendations:   Recommendations:  - Would not treat the organisms isolated in the 2/14/21 sputum culture -- they represent mere airway colonization.  There is no evidence for a bacterial pneumonia.  - Finish the dexamethasone ten day course today.  - No new antimicrobials or infectious disease work-up is indicated.    Transplant ID will follow with you.  The case was discussed with the BMT team today.    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  He required intubation for Covid-19 pneumonitis / ARDS on 2/13/21 late evening.  His respiratory status is improving although he remains intubated today.    ID issues:  #Acute Hypoxic Respiratory Failure:  #COVID-19 PNA (diagnosed 01/30):  #MDS s/p stem cell transplant (2014):  #Chronic GVHD:     From a COVID-19 therapy standpoint, he has received appropriate therapies of remdesivir and dexamethasone. There is no role for monoclonal antibodies or other current Covid trials / therapies (especially given his underlying MDS s/p stem cell transplant and chronic GVHD).  Pre-admission, he was on Jakafi and prednisone in the out patient setting, as well as a variety of prophylactic medications - acyclovir, fluconazole,TMP-SMX, and Levaquin -- we will re-visit the ongoing need / indications after he is respiratorily improved.  (Continued need for high dose acyclovir (in absence of documented CMV) and fluconazole (with no neutropenia and essentially normal differential) is unclear.)    # 2/14/21 ETT sputum culture with  growth:  The three organisms isolated in this culture are normal kimani (coagulase negative Staphs) or upper airway non-pathogenic colonizers (Enterococcus) -- would not treat since he lacks any evidence for a super-imposed bacterial pneumonia.    Other ID issues:  - QTc interval: 434 (02/08/21)  - Bacterial prophylaxis: Levaquin as out-patient; currently on azithromycin   - Pneumocystis prophylaxis: bactrim  - Viral serostatus & prophylaxis: acyclovir   - Fungal prophylaxis: fluconazole   - Immunization status: Unknown  - Gamma globulin status: IgG replete   - Isolation status: COVID-19; VRE         Interval History:   Mr. Dior remains consistently afebrile (T max 99.1 degrees F).  His peripheral WBC is stably normal at 9.3 this AM and his Serum CRP continues to decrease to 15.0 today.   He is sedated but happily is improving on the ventilator versus yesterday AM with an FiO2 down to 0.45 and a lower PEEP at 8 with the beginning of pressure support trials today.  He remains hemodynamically stable.   He finishes a ten day dexamethasone course for Covid-19 today, but is still on as well as levofloxacin, acyclovir, TMP-SMX, and fluconazole prophylaxis.  A review of systems is unobtainable but there are no new issues recently.  A 2/14/21 ETT-obtained sputum culture grew a mixed growth of Staph haemolyticus and epidermidis as well as E faecalis.  There is no other new microbiology.         Current Medications & Allergies:       acyclovir  800 mg Oral BID     artificial tears   Both Eyes BID     calcium carbonate-vitamin D  1 tablet Oral Daily     enoxaparin ANTICOAGULANT  0.5 mg/kg Subcutaneous BID     fluconazole  100 mg Oral Daily     insulin aspart  1-6 Units Subcutaneous Q4H     levofloxacin  250 mg Oral Daily     melatonin  3 mg Oral At Bedtime     multivitamins w/minerals  15 mL Per Feeding Tube Daily     pantoprazole  40 mg Oral QAM AC     [START ON 2/18/2021] predniSONE  10 mg Oral Daily     protein modular   2 packet Per Feeding Tube Daily     ruxolitinib  5 mg Oral or Feeding Tube BID     sertraline  50 mg Oral Daily     sulfamethoxazole-trimethoprim  1 tablet Oral Once per day on Mon Tue     Infusions/Drips:    dexmedetomidine 0.3 mcg/kg/hr (02/17/21 1900)     dextrose       fentaNYL 50 mcg/hr (02/17/21 1900)     - MEDICATION INSTRUCTIONS -       midazolam Stopped (02/17/21 1700)     - MEDICATION INSTRUCTIONS -       Allergies   Allergen Reactions     Blood Transfusion Related (Informational Only) Other (See Comments)     Patient has a history of a clinically significant antibody against RBC antigens.  A delay in compatible RBCs may occur.          Physical Exam:   Vitals were reviewed.  All vitals stable.  Patient Vitals for the past 24 hrs:   BP Temp Temp src Pulse Resp SpO2 Weight   02/17/21 2000 -- -- -- 67 -- 97 % --   02/17/21 1800 -- -- -- 68 20 96 % --   02/17/21 1700 -- -- -- 69 19 97 % --   02/17/21 1600 -- 98.3  F (36.8  C) Axillary 66 17 97 % --   02/17/21 1500 -- -- -- 66 16 95 % --   02/17/21 1400 -- -- -- 68 18 98 % --   02/17/21 1300 -- -- -- 62 18 96 % --   02/17/21 1200 -- 98  F (36.7  C) Axillary 62 17 96 % --   02/17/21 1100 -- -- -- 61 18 97 % --   02/17/21 1000 -- -- -- 67 17 96 % --   02/17/21 0900 -- -- -- 66 18 96 % --   02/17/21 0800 -- 98.4  F (36.9  C) Axillary 57 18 97 % --   02/17/21 0700 -- -- -- 59 -- 96 % --   02/17/21 0600 -- -- -- 64 18 97 % --   02/17/21 0500 -- -- -- 54 18 -- --   02/17/21 0420 -- -- -- 52 -- 97 % --   02/17/21 0400 -- 98.4  F (36.9  C) Oral 62 19 97 % --   02/17/21 0300 -- -- -- (!) 45 -- 97 % --   02/17/21 0200 -- -- -- 51 16 97 % --   02/17/21 0100 -- -- -- 64 18 96 % --   02/17/21 0000 -- 98.8  F (37.1  C) Oral 63 18 95 % 103.4 kg (227 lb 15.3 oz)   02/16/21 2300 -- -- -- 61 19 94 % --   02/16/21 2200 -- -- -- 65 18 93 % --   02/16/21 2100 118/79 -- -- 56 20 95 % --     Ranges for vital signs over the past 24 hours:   Temp:  [98  F (36.7  C)-98.8  F (37.1  C)]  98.3  F (36.8  C)  Pulse:  [45-69] 67  Resp:  [16-20] 20  BP: (118)/(79) 118/79  MAP:  [75 mmHg-106 mmHg] 106 mmHg  Arterial Line BP: ()/(54-88) 159/79  FiO2 (%):  [45 %-50 %] 45 %  SpO2:  [93 %-98 %] 97 %  Vitals:    02/15/21 0400 02/16/21 0400 02/17/21 0000   Weight: 104.2 kg (229 lb 11.5 oz) 103.6 kg (228 lb 6.3 oz) 103.4 kg (227 lb 15.3 oz)     Intake/Output Summary (Last 24 hours) at 2/17/2021 1942  Last data filed at 2/17/2021 1900  Gross per 24 hour   Intake 2117.81 ml   Output 1570 ml   Net 547.81 ml     Physical Examination:  GENERAL:  Intubated, sedated, 70 year old man in bed in [facility] on ventilator viewed through window / video.  Remainder of exam unobtainable due to Covid-19 isolation.  HEAD:  NCAT.  EYES:  EOMI, anicteric sclerae.  ENT:  No visible otorrhea.  OG tube present.  Oral ETT.  LUNGS:  Breathing appears comfortable on ventilator.  CARDIOVASCULAR:  RRR.  ABDOMEN:  Unable to examine.  :  Molina with kian urine.  SKIN:  No visible acute rash or lesion.  Arterial and peripheral IV line sites reportedly lack inflammation.  NEUROLOGIC:  Sedated, non-interactive, moves extremities x 4.         Laboratory Data:     Absolute CD4   Date Value Ref Range Status   06/29/2015 345 (L) 441 - 2,156 cells/uL Final     Comment:     Effective 12/08/2014, the reference range for this assay has changed to   reflect   new methodology.     12/29/2014 190 (L) 441 - 2,156 cells/uL Final     Comment:     Effective 12/08/2014, the reference range for this assay has changed to   reflect   new methodology.     10/09/2014 37 mm3 Final     Inflammatory Markers    Recent Labs   Lab Test 02/17/21  0349 02/16/21  0349 02/15/21  0431 02/14/21  0326 02/13/21  0616 02/12/21  0337 11/30/14  1207 11/30/14  1207   SED  --   --   --   --   --   --   --  40*   CRP 15.0* 27.0* 56.0* 68.0* 53.0* 44.0*   < > 17.4*    < > = values in this interval not displayed.     Immune Globulin Studies     Recent Labs   Lab Test 02/10/21  0716  01/24/19  1605 10/01/18  0955 05/08/16  0906 03/20/16  0352 07/30/15  1125 10/09/14  1010   IGG 3,275*  831 1,130 1,300 1,270 403* 913 458*     --   --   --   --   --  65   IGE  --   --   --   --   --   --  12   IGA 83*  --   --   --   --   --  48*     Metabolic Studies       Recent Labs   Lab Test 02/17/21  1615 02/17/21  1131 02/17/21  0349 02/16/21  0349 02/14/21  0637 02/14/21  0637 02/10/21  1305 02/10/21  1305 02/08/21 2024 02/08/21 2024 02/08/21  0910 02/05/21  1227   *  --  145* 141   < >  --    < >  --    < >  --  139 140   POTASSIUM 5.2 5.1 5.0 4.4   < >  --    < >  --    < >  --  4.0 4.0   CHLORIDE  --   --  115* 110*   < >  --    < >  --    < >  --  111* 109   CO2  --   --  28 31   < >  --    < >  --    < >  --  23 25   ANIONGAP  --   --  3 <1*   < >  --    < >  --    < >  --  5 6   BUN  --   --  59* 57*   < >  --    < >  --    < >  --  25 30   CR  --   --  0.92 1.12   < >  --    < >  --    < >  --  0.88 1.10   GFRESTIMATED  --   --  83 65   < >  --    < >  --    < >  --  86 67   GLC  --   --  197* 205*   < >  --    < >  --    < >  --  100* 94   JOE  --   --  8.5 8.7   < >  --    < >  --    < >  --  8.4* 8.4*   PHOS  --   --  2.2*  --   --  4.5  --   --   --   --   --   --    MAG  --   --  2.8*  --   --   --    < >  --   --   --  1.7 2.0   LACT  --   --   --   --   --   --   --   --   --   --  1.1 1.4   PCAL  --   --   --   --   --   --   --   --   --  0.28 0.10  --    FGTL  --   --   --   --   --   --   --  <31  --   --   --   --    CKT  --   --   --   --   --  27*  --   --   --   --   --   --     < > = values in this interval not displayed.     Hepatic Studies    Recent Labs   Lab Test 02/17/21  0349 02/16/21  0349 02/15/21  0431 02/14/21  0326 02/08/21  0910 02/08/21  0910 04/06/16  0245 04/06/16  0245 07/07/14  0445 07/07/14  0445   BILITOTAL 0.4 0.3 0.5 0.6   < > 0.3   < > 0.8   < > 1.8*   BILIDELTA  --   --   --   --   --   --   --   --   --  1.0*   BILICONJ  --   --   --   --   --    --   --   --   --  0.0   DBIL  --   --   --   --   --   --   --  0.3*   < >  --    ALKPHOS 91 97 115 116   < > 69   < > 292*   < > 88   PROTTOTAL 5.7* 5.7* 6.4* 6.0*   < > 6.6*   < > 6.7*   < > 6.3*   ALBUMIN 2.1* 2.1* 2.4* 2.3*   < > 2.6*   < > 2.3*   < > 3.4   AST 34 16 23 33   < > 51*   < > 96*   < > 19   ALT 40 31 37 36   < > 28   < > 167*   < > 47   LDH  --   --   --  783*  --  483*  --   --    < >  --     < > = values in this interval not displayed.     Pancreatitis testing    Recent Labs   Lab Test 02/14/19  1342 09/27/18  1217 04/04/16  0350   AMYLASE 46  --   --    LIPASE  --  158  --    TRIG  --   --  286*     Gout Labs      Recent Labs   Lab Test 06/24/14  1205 06/09/14  1116   URIC 6.1 5.8     Hematology Studies      Recent Labs   Lab Test 02/17/21  0349 02/16/21  0349 02/15/21  0431 02/13/21  0616 02/12/21  0337 02/11/21  0409   WBC 9.3 6.6 7.1 7.7 8.3 6.7   ANEU  --   --   --   --  7.0 5.7   ALYM  --   --   --   --  0.7* 0.7*   WILLIMA  --   --   --   --  0.4 0.3   AEOS  --   --   --   --  0.0 0.0   HGB 12.2* 12.5* 14.2 13.0* 12.4* 13.2*   HCT 37.1* 38.9* 43.3 39.0* 38.3* 39.1*    191 207 185 184 192     Clotting Studies    Recent Labs   Lab Test 02/14/21  0637 02/14/19  1342 01/24/19  1605 06/30/16  0952   INR 1.23* 0.98 0.92 0.95   PTT 29  --   --   --      Iron Testing    Recent Labs   Lab Test 02/17/21  0349 02/11/21  0409 02/11/21  0409 11/21/19  1148 11/21/19  1148 09/27/18  1218 09/27/18  1218   LENNOX  --   --  401*   < >  --   --   --    MCV 96   < > 95   < > 95   < >  --    FOLIC  --   --   --   --   --   --  15.9   B12  --   --   --   --  455  --   --     < > = values in this interval not displayed.     Arterial Blood Gas Testing    Recent Labs   Lab Test 02/17/21  1615 02/17/21  1131 02/16/21  2159 02/16/21  1416 02/16/21  0348   PH 7.40 7.38 7.37 7.30* 7.33*   PCO2 53* 54* 51* 60* 54*   PO2 90 88 83 71* 92   HCO3 32* 32* 29* 30* 28   O2PER 45 45 50 45 50.0     Thyroid Studies     Recent  Labs   Lab Test 09/27/18  1217 02/27/18  1239 10/12/16  1107 06/30/16  0953 03/01/16  1227   TSH 1.01 0.70 0.40 1.02 0.38*   T4  --   --   --  1.03 1.12     Urine Studies     Recent Labs   Lab Test 08/25/16  1601 05/07/16  1211 03/24/16  1650 03/21/16  1730 12/17/15  1430   URINEPH 6.0 5.0 5.0 5.0 5.0   NITRITE Negative Negative Negative Negative Negative   LEUKEST Negative Negative Negative Negative Negative   WBCU 1 1 <1 1 1     Medication levels    Recent Labs   Lab Test 09/27/18  1059 04/01/16  0831 04/01/16  0831 03/21/16  2143 03/21/16  2143 02/01/15  0924 02/01/15  0924   VANCOMYCIN  --   --   --   --  25.4*   < >  --    VCON  --   --  1.6   < >  --   --   --    CYCLSP  --   --   --   --   --   --  178   RAPAMY 4.5*   < >  --    < >  --    < >  --     < > = values in this interval not displayed.     Body fluid stats    Recent Labs   Lab Test 02/14/21  0848 03/27/16  1406   FTYP  --  Bronchial  SITE 2 RIGHT MIDDLE LOBE    Bronchial  SITE 1 RIGTH UPPER LOBE     FCOL  --  Colorless  Colorless   FAPR  --  Clear  Clear   FRBC  --  << Do Not Report >>  << Do Not Report >>   FWBC  --  63  57   FNEU  --  16  21   FLYM  --  8  14   FMONO  --  76  65   GS <25 PMNs/low power field  Moderate  Gram positive cocci  * No organisms seen  >25 PMNs/low power field    No organisms seen  >25 PMNs/low power field       Microbiology:    Fungal testing  Recent Labs   Lab Test 02/10/21  1305 09/27/18  1217 03/27/16  1406 03/25/16  1911 03/20/16  0352 03/19/16  1638 03/19/16  0749   FGTL <31 35  --  >500  Unit: pg/mL   149  --   --    ASPGAI 0.05 0.05 0.10  0.09  --   --  0.07 0.12   ASPAG  --   --  Negative  Reference range: Negative  (Note)  INTERPRETIVE INFORMATION: Aspergillus Galactomannan EIA,  Broncho  A BAL galactomannan index of greater than or equal to 0.5  is considered positive.  This result should be interpreted  in the context of patient history, clinical signs/symptoms,  and other routine diagnostic tests  (e.g., culture,  histologic examination of biopsy material, and radiographic  imaging).  Performed by [facility],  [address] [facility] [place] [address] [phone]  [email or web address], Lencho Slaughter MD, Lab. Director    Negative  Reference range: Negative  (Note)  INTERPRETIVE INFORMATION: Aspergillus Galactomannan EIA,  Broncho  A BAL galactomannan index of greater than or equal to 0.5  is considered positive.  This result should be interpreted  in the context of patient history, clinical signs/symptoms,  and other routine diagnostic tests (e.g., culture,  histologic examination of biopsy material, and radiographic  imaging).  Performed by [facility],  [address] [facility] [place] [address] [phone]  [email or web address], Lencho Slaughter MD, Lab. Director    --   --   --   --    ASPGAA Negative Negative  --   --   --  Negative  Reference range: Negative  Unit: not reported  (Note)  INTERPRETIVE INFORMATION: Aspergillus Galactomannan Antigen  by EIA  Negative results do not exclude the diagnosis of invasive  aspergillosis. A single positive test result (index equal  to or greater than 0.5) should be clinically correlated  by testing a separate serum specimen because many agents  (e.g. foods, antibiotics) may cross-react with the test.  If invasive aspergillosis is suspected in high-risk  patients, serial sampling is recommended.  Performed by [facility],  [address] [facility] [place] [address] [phone]  [email or web address], Lencho Slaughter MD, Lab. Director   Negative  Reference range: Negative  Unit: not reported  (Note)  INTERPRETIVE INFORMATION: Aspergillus Galactomannan Antigen  by EIA  Negative results do not exclude the diagnosis of invasive  aspergillosis. A single positive test result (index equal  to or greater than 0.5) should be clinically correlated  by testing a separate serum specimen because many agents  (e.g. foods, antibiotics) may cross-react with the test.  If invasive aspergillosis is suspected  in high-risk  patients, serial sampling is recommended.  Performed by [facility],  [address], [facility] [place] [address] [phone]  [email or web address], Lencho Slaughter MD, Lab. Director       Last Culture results with specimen source  Culture Micro   Date Value Ref Range Status   02/14/2021 (A)  Preliminary    Moderate growth  Staphylococcus haemolyticus  Susceptibility testing not routinely done     02/14/2021 (A)  Preliminary    Moderate growth  Staphylococcus epidermidis  Susceptibility testing not routinely done     02/14/2021 (A)  Preliminary    Moderate growth  Enterococcus faecalis  Ampicillin and Penicillin susceptibilities in progress.  2.17.21     02/08/2021 No growth  Final   02/08/2021 No growth  Final   02/05/2021 No growth  Final   02/05/2021 No growth  Final   03/08/2018 No growth  Final   03/08/2018 No growth  Final   02/21/2018 No Beta Streptococcus isolated  Final   06/09/2016 No growth  Final   05/07/2016 No growth  Final   05/07/2016 No growth  Final   03/31/2016 No growth  Final   03/31/2016 No growth  Final   03/29/2016 (A)  Final    Light growth Enterococcus species (VRE)  Critical Value/Significant Value, preliminary result only, called to and read   back by Preeti Cordoba RN @1301 04/01/16 gd      Specimen Description   Date Value Ref Range Status   02/14/2021 Sputum  Final   02/14/2021 Sputum  Final   02/08/2021 Blood Right Arm  Final   02/08/2021 Blood Left Arm  Final   02/05/2021 Blood Right Arm  Final   02/05/2021 Blood  Final   08/01/2019 Nares  Final   03/08/2018 Blood Left Arm  Final   03/08/2018 Blood Right Arm  Final   02/21/2018 Throat  Final   02/21/2018 Throat  Final   10/12/2016 Nasal  Final   06/09/2016 Blood Unspecified Site  Final   05/07/2016 Blood Red port  Final   05/07/2016 Blood White port  Final   05/05/2016 Nasal  Final        Last check of C difficile  C Diff Toxin B PCR   Date Value Ref Range Status   03/29/2016  NEG Final    Negative  Negative: Clostridium difficile  target DNA sequences NOT detected, presumed   negative for Clostridium difficile toxin B or the number of bacteria present   may be below the limit of detection for the test.   FDA approved assay performed using [employer] GeneXpert real-time PCR.   A negative result does not exclude actual disease due to Clostridium difficile   and may be due to improper collection, handling and storage of the specimen or   the number of organisms in the specimen is below the detection limit of the   assay.       Infection Studies to assess Diarrhea   Recent Labs   Lab Test 03/29/16  1245 03/21/16  0945   ADENOVIRUSAG Negative  --    EPCAMP Not Detected Not Detected   EPSALM Not Detected Not Detected   EPSHGL Not Detected Not Detected   EPVIB Not Detected Not Detected   EPROTA Not Detected Not Detected   EPNORO Not Detected Not Detected   EPYER Not Detected Not Detected     Virology:    Coronavirus-19 testing    Recent Labs   Lab Test 01/30/21  1514   COVIDPCREXT Positive*     Respiratory virus (non-coronavirus-19) testing    Recent Labs   Lab Test 02/09/21  1315 02/21/18  1348 10/12/16  1155 06/09/16  1233 11/30/14  1208 11/30/14  1208   RVSPEC  --  Nasopharyngeal  --  Nasopharyngeal   < >  --    AFLU  --   --   --   --   --  Negative   IFLUA Not Detected Negative  --  Negative   < >  --    INFZA  --   --  Negative   Flu A target RNA not detected, presumed negative for Influenza A or the viral   load is below the limit of detection.    --    < >  --    FLUAH1 Not Detected Negative  --  Negative   < >  --    HH9947 Not Detected Negative  --  Negative   < >  --    FLUAH3 Not Detected Negative  --  Negative   < >  --    BFLU  --   --   --   --   --  Negative   Test results must be correlated with clinical data. If necessary, results   should be confirmed by a molecular assay or viral culture.     IFLUB Not Detected Negative  --  Negative   < >  --    INFZB  --   --  Negative   Flu B target RNA not detected, presumed negative for  Influenza B or the viral   load is below the limit of detection.    --    < >  --    PIV1 Not Detected Negative  --  Negative   < >  --    PIV2 Not Detected Negative  --  Negative   < >  --    PIV3 Not Detected Negative  --  Negative   < >  --    PIV4 Not Detected  --   --   --   --   --    IRSV  --   --  Negative   RSV target RNA not detected, presumed negative for Respiratory Syncitial Virus   or the viral load is below the limit of detection.   FDA approved assay performed using [employer]R) real-time PCR.    --    < >  --    HRVS  --  Negative  --  Negative   < >  --    RSVA Not Detected Negative  --  Negative   < >  --    RSVB Not Detected Negative  --  Negative   < >  --    HMPV Not Detected Negative  --  Negative   < >  --    ADVBE  --  Negative  --  Negative   < >  --    ADVC  --  Negative  --  Negative   < >  --    ADENOV Not Detected  --   --   --   --   --    CORONA Not Detected  --   --   --   --   --     < > = values in this interval not displayed.     EBV DNA Copies/mL   Date Value Ref Range Status   08/16/2018 EBV DNA Not Detected EBVNEG^EBV DNA Not Detected [Copies]/mL Final   06/21/2018 EBV DNA Not Detected EBVNEG^EBV DNA Not Detected [Copies]/mL Final   02/27/2018 <500 (A) EBVNEG^EBV DNA Not Detected [Copies]/mL Final     Comment:     EBV DNA Detected below the reportable range of 500 Copies/mL   12/14/2017 EBV DNA Not Detected EBVNEG^EBV DNA Not Detected [Copies]/mL Final   09/28/2017 <500 (A) EBVNEG^EBV DNA Not Detected [Copies]/mL Final     Comment:     EBV DNA Detected below the reportable range of 500 Copies/mL   06/08/2017 EBV DNA Not Detected EBVNEG [Copies]/mL Final     Parvovirus Testing    Recent Labs   Lab Test 03/27/16  1406   PRVSP Bronchoalveolar Lavage   Right upper lobe     PRVPC Not Detected  (Note)  NOT DETECTED - A negative result does not rule out the  presence of PCR inhibitors in the patient specimen or assay  specific nucleic acid in concentrations below the level  of  detection by the assay.    The specimen submitted for testing did not meet [employer]  submission guidelines. Testing was performed on a specimen  that did not meet validated type requirements.  Performance characteristics of this assay may be affected.  Interpret results with caution. Please refer to the [employer]  Laboratory Test Directory for information on specimen  acceptability:  [email or web address]/Specimen-Handling/index.[email or web address].  INTERPRETIVE INFORMATION: Parvovirus B19 By PCR  Test developed and characteristics determined by [facility]. See Compliance Statement B: [employer]/CS  Performed by [facility],  [address] [place] [address] [phone]  [email or web address], Lencho Slaughter MD, Lab. Director       Adenovirus Testing    Recent Labs   Lab Test 03/08/18  1343 03/29/16  1245 03/29/16  0939 02/18/15  1421 02/18/15  1050 02/04/15  2046   ADRES No Adenovirus DNA detected.  --  No Adenovirus DNA detected.  --  No Adenovirus DNA detected.  --    ADENOVIRUSAG  --  Negative  --  Negative  --  Negative     Imaging:  No results found for this or any previous visit (from the past 48 hour(s)).

## 2021-02-18 NOTE — PLAN OF CARE
ICU End of Shift Summary. See flowsheets for vital signs and detailed assessment.    Changes this shift: Restless and agitated w/nursing cares.  Fentanyl gtt weaned off.  PRN Haldol available.  Apnea w/PST.  Moderate to large amounts of secretions from ETT.  A-line and rios catheters removed.      Plan:  Continue weaning from ventilator as tolerated.       Problem: COPD Comorbidity  Goal: Maintenance of COPD Symptom Control  Outcome: No Change     Problem: Gas Exchange Impaired  Goal: Optimal Gas Exchange  Outcome: No Change

## 2021-02-18 NOTE — PROGRESS NOTES
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 Dior is 6 year, 7 months out from allo-sib PBHSCT c/b mucocutaneous cGHVD and chronic fatigue/dyspnea. No pulmonary cGHVD. His GVHD symptoms have been stable on Jakafi and low-dose pred for some time. His IgG level was normal, no need for IVIg. Would continue Jakafi, but hold pred while on dexamethasone for COVID. *He should restart pred once off dex. He should continue on all of his prophylactic anti-infective agents.  - Bactrim for PJP prophy  - ACV bid for VZV prophy  - Levaquin/fluc prophy (steroids, Jakafi)     #History of left lower extremity DVT  - due to elevated ddimer, on lovenox 50mg BID per MICU    #JAK2+ MPN (cured by HSCT)  Small segment left great saphenous vein DVT found 10/15/2018, improved 11/27/201 on ASA 325mg daily. Great saphenous ablation on the right, with recannulization of left saphenous vein on US from 7/2019. Anticoagulation per COVID protocol/ICU.      Patient was seen and plan of care was discussed with attending physician Dr. Black.    We will continue to follow this patient. Please contact us with questions.     Minoo Vasquez pa-c  [phone]      _________________________________________________    Subjective & Interval  "History:    Weaning FIO2, PEEP, sedation. No acute changes.     Physical Exam:    BP (!) 156/93   Pulse 51   Temp 98.1  F (36.7  C) (Axillary)   Resp 19   Ht 1.753 m (5' 9\")   Wt 102.5 kg (225 lb 15.5 oz)   SpO2 94%   BMI 33.37 kg/m    Physical examination deferred t primary team due to public health emergency video visit restrictions.    Labs & Studies: I personally reviewed the following studies:  ROUTINE LABS (Last four results):  CMP  Recent Labs   Lab 02/18/21  1009 02/18/21  0415 02/18/21  0209 02/17/21  1615 02/17/21  0349 02/17/21  0349 02/16/21  0349 02/15/21  0431 02/14/21  0637 02/14/21  0326   NA  --  144 143 146*  --  145* 141 140  --  138   POTASSIUM 4.9 5.0 5.0 5.2   < > 5.0 4.4 4.6  --  4.1   CHLORIDE  --  110*  --   --   --  115* 110* 108  --  107   CO2  --  34*  --   --   --  28 31 25  --  25   ANIONGAP  --  <1*  --   --   --  3 <1* 7  --  6   GLC  --  195*  --   --   --  197* 205* 147*  --  134*   BUN  --  49*  --   --   --  59* 57* 60*  --  39*   CR  --  0.75  --   --   --  0.92 1.12 1.33*  --  0.96   GFRESTIMATED  --  >90  --   --   --  83 65 53*  --  79   GFRESTBLACK  --  >90  --   --   --  >90 76 61  --  >90   JOE  --  9.0  --   --   --  8.5 8.7 8.6  --  8.5   MAG  --   --   --   --   --  2.8*  --   --   --  2.4*   PHOS  --   --   --   --   --  2.2*  --   --  4.5  --    PROTTOTAL  --  5.8*  --   --   --  5.7* 5.7* 6.4*  --  6.0*   ALBUMIN  --  2.2*  --   --   --  2.1* 2.1* 2.4*  --  2.3*   BILITOTAL  --  0.4  --   --   --  0.4 0.3 0.5  --  0.6   ALKPHOS  --  82  --   --   --  91 97 115  --  116   AST  --  35  --   --   --  34 16 23  --  33   ALT  --  56  --   --   --  40 31 37  --  36    < > = values in this interval not displayed.     CBC  Recent Labs   Lab 02/18/21  0415 02/17/21  0349 02/16/21  0349 02/15/21  0431   WBC 11.0 9.3 6.6 7.1   RBC 3.91* 3.88* 3.99* 4.41   HGB 12.8* 12.2* 12.5* 14.2   HCT 38.0* 37.1* 38.9* 43.3   MCV 97 96 98 98   MCH 32.7 31.4 31.3 32.2   MCHC 33.7 32.9 " 32.1 32.8   RDW 18.2* 18.0* 18.3* 18.4*    206 191 207     INR  Recent Labs   Lab 02/18/21  0415 02/14/21  0637   INR 1.07 1.23*       Attending note.The patient was seen and examined by me separately from the [facility] provider. The note reflects our mutual assessment and plans and were approved by me personally.   I personally reviewed today's lab results vital signs and radiology results.     Each point of the assessment and plan were reviewed by the midlevel and me and either endorsed by me or were my added decisions.     My pertinent physical findings today are: not possible: video     My assessment and plan are: 69 yo 6 years post allo for MDS on prednisone and Jakafi for CGVHD now with serious COVID pneumonia. Continue Jakafi and hold predsinone while on dex burst, then resume.Will probably need intubation. Ci No changes in immunosupession. No current BMT issues. May need to reduce Jakafi if renal function further deteriorates.GFR increased: no reason to decrease Jakafi: monitor. Add 10 mg prednisone daily when decadron ends. Pressure support tril.    Edgar Black M.D.

## 2021-02-19 LAB
ALBUMIN SERPL-MCNC: 2.4 G/DL (ref 3.4–5)
ALP SERPL-CCNC: 89 U/L (ref 40–150)
ALT SERPL W P-5'-P-CCNC: 58 U/L (ref 0–70)
ANION GAP SERPL CALCULATED.3IONS-SCNC: 2 MMOL/L (ref 3–14)
ANION GAP SERPL CALCULATED.3IONS-SCNC: 7 MMOL/L (ref 3–14)
AST SERPL W P-5'-P-CCNC: 37 U/L (ref 0–45)
BASE EXCESS BLDV CALC-SCNC: 5.1 MMOL/L
BASE EXCESS BLDV CALC-SCNC: 7 MMOL/L
BILIRUB SERPL-MCNC: 0.6 MG/DL (ref 0.2–1.3)
BUN SERPL-MCNC: 37 MG/DL (ref 7–30)
BUN SERPL-MCNC: 38 MG/DL (ref 7–30)
CALCIUM SERPL-MCNC: 8.9 MG/DL (ref 8.5–10.1)
CALCIUM SERPL-MCNC: 9.1 MG/DL (ref 8.5–10.1)
CHLORIDE SERPL-SCNC: 103 MMOL/L (ref 94–109)
CHLORIDE SERPL-SCNC: 105 MMOL/L (ref 94–109)
CO2 SERPL-SCNC: 25 MMOL/L (ref 20–32)
CO2 SERPL-SCNC: 32 MMOL/L (ref 20–32)
CREAT SERPL-MCNC: 0.69 MG/DL (ref 0.66–1.25)
CREAT SERPL-MCNC: 0.78 MG/DL (ref 0.66–1.25)
CRP SERPL-MCNC: 7.2 MG/L (ref 0–8)
D DIMER PPP FEU-MCNC: 8.5 UG/ML FEU (ref 0–0.5)
ERYTHROCYTE [DISTWIDTH] IN BLOOD BY AUTOMATED COUNT: 17.8 % (ref 10–15)
FIBRINOGEN PPP-MCNC: 302 MG/DL (ref 200–420)
GFR SERPL CREATININE-BSD FRML MDRD: 90 ML/MIN/{1.73_M2}
GFR SERPL CREATININE-BSD FRML MDRD: >90 ML/MIN/{1.73_M2}
GLUCOSE BLDC GLUCOMTR-MCNC: 111 MG/DL (ref 70–99)
GLUCOSE BLDC GLUCOMTR-MCNC: 119 MG/DL (ref 70–99)
GLUCOSE BLDC GLUCOMTR-MCNC: 123 MG/DL (ref 70–99)
GLUCOSE BLDC GLUCOMTR-MCNC: 131 MG/DL (ref 70–99)
GLUCOSE BLDC GLUCOMTR-MCNC: 140 MG/DL (ref 70–99)
GLUCOSE BLDC GLUCOMTR-MCNC: 153 MG/DL (ref 70–99)
GLUCOSE BLDC GLUCOMTR-MCNC: 159 MG/DL (ref 70–99)
GLUCOSE SERPL-MCNC: 129 MG/DL (ref 70–99)
GLUCOSE SERPL-MCNC: 130 MG/DL (ref 70–99)
HCO3 BLDV-SCNC: 26 MMOL/L (ref 21–28)
HCO3 BLDV-SCNC: 26 MMOL/L (ref 21–28)
HCT VFR BLD AUTO: 39.7 % (ref 40–53)
HGB BLD-MCNC: 12.8 G/DL (ref 13.3–17.7)
INR PPP: 0.98 (ref 0.86–1.14)
MCH RBC QN AUTO: 31.1 PG (ref 26.5–33)
MCHC RBC AUTO-ENTMCNC: 32.2 G/DL (ref 31.5–36.5)
MCV RBC AUTO: 96 FL (ref 78–100)
O2/TOTAL GAS SETTING VFR VENT: 6 %
O2/TOTAL GAS SETTING VFR VENT: ABNORMAL %
PCO2 BLDV: 23 MM HG (ref 40–50)
PCO2 BLDV: 28 MM HG (ref 40–50)
PH BLDV: 7.58 PH (ref 7.32–7.43)
PH BLDV: 7.66 PH (ref 7.32–7.43)
PLATELET # BLD AUTO: 185 10E9/L (ref 150–450)
PO2 BLDV: 54 MM HG (ref 25–47)
PO2 BLDV: 68 MM HG (ref 25–47)
POTASSIUM SERPL-SCNC: 4.6 MMOL/L (ref 3.4–5.3)
POTASSIUM SERPL-SCNC: 4.8 MMOL/L (ref 3.4–5.3)
PROT SERPL-MCNC: 5.9 G/DL (ref 6.8–8.8)
RBC # BLD AUTO: 4.12 10E12/L (ref 4.4–5.9)
SODIUM SERPL-SCNC: 136 MMOL/L (ref 133–144)
SODIUM SERPL-SCNC: 138 MMOL/L (ref 133–144)
WBC # BLD AUTO: 14.1 10E9/L (ref 4–11)

## 2021-02-19 PROCEDURE — 36415 COLL VENOUS BLD VENIPUNCTURE: CPT | Performed by: INTERNAL MEDICINE

## 2021-02-19 PROCEDURE — 85610 PROTHROMBIN TIME: CPT | Performed by: INTERNAL MEDICINE

## 2021-02-19 PROCEDURE — 250N000009 HC RX 250: Performed by: INTERNAL MEDICINE

## 2021-02-19 PROCEDURE — 36415 COLL VENOUS BLD VENIPUNCTURE: CPT | Performed by: STUDENT IN AN ORGANIZED HEALTH CARE EDUCATION/TRAINING PROGRAM

## 2021-02-19 PROCEDURE — 250N000011 HC RX IP 250 OP 636: Performed by: INTERNAL MEDICINE

## 2021-02-19 PROCEDURE — 99233 SBSQ HOSP IP/OBS HIGH 50: CPT | Performed by: INTERNAL MEDICINE

## 2021-02-19 PROCEDURE — 999N000157 HC STATISTIC RCP TIME EA 10 MIN

## 2021-02-19 PROCEDURE — 85027 COMPLETE CBC AUTOMATED: CPT | Performed by: INTERNAL MEDICINE

## 2021-02-19 PROCEDURE — 250N000013 HC RX MED GY IP 250 OP 250 PS 637: Performed by: STUDENT IN AN ORGANIZED HEALTH CARE EDUCATION/TRAINING PROGRAM

## 2021-02-19 PROCEDURE — 258N000003 HC RX IP 258 OP 636: Performed by: INTERNAL MEDICINE

## 2021-02-19 PROCEDURE — 250N000013 HC RX MED GY IP 250 OP 250 PS 637: Performed by: NURSE PRACTITIONER

## 2021-02-19 PROCEDURE — 200N000002 HC R&B ICU UMMC

## 2021-02-19 PROCEDURE — 99291 CRITICAL CARE FIRST HOUR: CPT | Mod: GC

## 2021-02-19 PROCEDURE — 250N000013 HC RX MED GY IP 250 OP 250 PS 637: Performed by: INTERNAL MEDICINE

## 2021-02-19 PROCEDURE — 999N001017 HC STATISTIC GLUCOSE BY METER IP

## 2021-02-19 PROCEDURE — 999N000127 HC STATISTIC PERIPHERAL IV START W US GUIDANCE

## 2021-02-19 PROCEDURE — 250N000013 HC RX MED GY IP 250 OP 250 PS 637

## 2021-02-19 PROCEDURE — 82803 BLOOD GASES ANY COMBINATION: CPT | Performed by: STUDENT IN AN ORGANIZED HEALTH CARE EDUCATION/TRAINING PROGRAM

## 2021-02-19 PROCEDURE — 94003 VENT MGMT INPAT SUBQ DAY: CPT

## 2021-02-19 PROCEDURE — 250N000011 HC RX IP 250 OP 636: Performed by: NURSE PRACTITIONER

## 2021-02-19 PROCEDURE — 250N000012 HC RX MED GY IP 250 OP 636 PS 637: Performed by: STUDENT IN AN ORGANIZED HEALTH CARE EDUCATION/TRAINING PROGRAM

## 2021-02-19 PROCEDURE — 85384 FIBRINOGEN ACTIVITY: CPT | Performed by: INTERNAL MEDICINE

## 2021-02-19 PROCEDURE — 250N000011 HC RX IP 250 OP 636: Performed by: STUDENT IN AN ORGANIZED HEALTH CARE EDUCATION/TRAINING PROGRAM

## 2021-02-19 PROCEDURE — 86140 C-REACTIVE PROTEIN: CPT | Performed by: INTERNAL MEDICINE

## 2021-02-19 PROCEDURE — 80053 COMPREHEN METABOLIC PANEL: CPT | Performed by: INTERNAL MEDICINE

## 2021-02-19 PROCEDURE — 85379 FIBRIN DEGRADATION QUANT: CPT | Performed by: INTERNAL MEDICINE

## 2021-02-19 PROCEDURE — 80048 BASIC METABOLIC PNL TOTAL CA: CPT | Performed by: INTERNAL MEDICINE

## 2021-02-19 PROCEDURE — 272N000078 HC NUTRITION PRODUCT INTERMEDIATE LITER

## 2021-02-19 RX ORDER — IBUPROFEN 100 MG/5ML
400 SUSPENSION, ORAL (FINAL DOSE FORM) ORAL ONCE
Status: COMPLETED | OUTPATIENT
Start: 2021-02-19 | End: 2021-02-19

## 2021-02-19 RX ORDER — HALOPERIDOL 5 MG/ML
5 INJECTION INTRAMUSCULAR ONCE
Status: COMPLETED | OUTPATIENT
Start: 2021-02-19 | End: 2021-02-19

## 2021-02-19 RX ADMIN — SERTRALINE HYDROCHLORIDE 50 MG: 50 TABLET ORAL at 08:07

## 2021-02-19 RX ADMIN — Medication: at 20:11

## 2021-02-19 RX ADMIN — RUXOLITINIB 5 MG: 5 TABLET ORAL at 20:02

## 2021-02-19 RX ADMIN — Medication 1 TABLET: at 11:32

## 2021-02-19 RX ADMIN — ENOXAPARIN SODIUM 50 MG: 100 INJECTION SUBCUTANEOUS at 11:24

## 2021-02-19 RX ADMIN — MULTIVITAMIN 15 ML: LIQUID ORAL at 08:05

## 2021-02-19 RX ADMIN — HALOPERIDOL LACTATE 5 MG: 5 INJECTION, SOLUTION INTRAMUSCULAR at 23:56

## 2021-02-19 RX ADMIN — ENOXAPARIN SODIUM 50 MG: 100 INJECTION SUBCUTANEOUS at 23:23

## 2021-02-19 RX ADMIN — INSULIN ASPART 1 UNITS: 100 INJECTION, SOLUTION INTRAVENOUS; SUBCUTANEOUS at 23:27

## 2021-02-19 RX ADMIN — RUXOLITINIB 5 MG: 5 TABLET ORAL at 08:37

## 2021-02-19 RX ADMIN — MELATONIN TAB 3 MG 3 MG: 3 TAB at 21:17

## 2021-02-19 RX ADMIN — ACETAMINOPHEN 650 MG: 325 TABLET, FILM COATED ORAL at 05:25

## 2021-02-19 RX ADMIN — INSULIN ASPART 1 UNITS: 100 INJECTION, SOLUTION INTRAVENOUS; SUBCUTANEOUS at 11:29

## 2021-02-19 RX ADMIN — FENTANYL CITRATE 50 MCG: 50 INJECTION, SOLUTION INTRAMUSCULAR; INTRAVENOUS at 20:08

## 2021-02-19 RX ADMIN — ENOXAPARIN SODIUM 50 MG: 100 INJECTION SUBCUTANEOUS at 00:28

## 2021-02-19 RX ADMIN — HALOPERIDOL LACTATE 5 MG: 5 INJECTION, SOLUTION INTRAMUSCULAR at 19:58

## 2021-02-19 RX ADMIN — ACETAMINOPHEN 650 MG: 325 TABLET, FILM COATED ORAL at 19:59

## 2021-02-19 RX ADMIN — LEVOFLOXACIN 250 MG: 250 TABLET, FILM COATED ORAL at 08:07

## 2021-02-19 RX ADMIN — IBUPROFEN 400 MG: 200 SUSPENSION ORAL at 18:38

## 2021-02-19 RX ADMIN — Medication: at 08:09

## 2021-02-19 RX ADMIN — ACETAMINOPHEN 650 MG: 325 TABLET, FILM COATED ORAL at 11:31

## 2021-02-19 RX ADMIN — DEXMEDETOMIDINE 0.5 MCG/KG/HR: 100 INJECTION, SOLUTION, CONCENTRATE INTRAVENOUS at 02:05

## 2021-02-19 RX ADMIN — FLUCONAZOLE 100 MG: 40 POWDER, FOR SUSPENSION ORAL at 08:06

## 2021-02-19 RX ADMIN — PANTOPRAZOLE SODIUM 40 MG: 40 TABLET, DELAYED RELEASE ORAL at 08:04

## 2021-02-19 RX ADMIN — INSULIN ASPART 1 UNITS: 100 INJECTION, SOLUTION INTRAVENOUS; SUBCUTANEOUS at 00:28

## 2021-02-19 RX ADMIN — HALOPERIDOL LACTATE 5 MG: 5 INJECTION, SOLUTION INTRAMUSCULAR at 15:02

## 2021-02-19 RX ADMIN — Medication 2 PACKET: at 08:09

## 2021-02-19 RX ADMIN — HALOPERIDOL LACTATE 5 MG: 5 INJECTION, SOLUTION INTRAMUSCULAR at 18:38

## 2021-02-19 RX ADMIN — DEXMEDETOMIDINE 1.2 MCG/KG/HR: 100 INJECTION, SOLUTION, CONCENTRATE INTRAVENOUS at 09:24

## 2021-02-19 RX ADMIN — ACYCLOVIR 800 MG: 200 SUSPENSION ORAL at 08:05

## 2021-02-19 RX ADMIN — ACYCLOVIR 800 MG: 200 SUSPENSION ORAL at 20:01

## 2021-02-19 RX ADMIN — PREDNISONE 10 MG: 5 TABLET ORAL at 08:06

## 2021-02-19 ASSESSMENT — MIFFLIN-ST. JEOR: SCORE: 1750.38

## 2021-02-19 ASSESSMENT — ACTIVITIES OF DAILY LIVING (ADL)
ADLS_ACUITY_SCORE: 21
ADLS_ACUITY_SCORE: 19
ADLS_ACUITY_SCORE: 21
ADLS_ACUITY_SCORE: 23
ADLS_ACUITY_SCORE: 19
ADLS_ACUITY_SCORE: 21

## 2021-02-19 NOTE — PROGRESS NOTES
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 Dior is 4 year, 7 months out from allo-sib PBHSCT c/b mucocutaneous cGHVD and chronic fatigue/dyspnea. No pulmonary cGHVD. His GVHD symptoms have been stable on Jakafi and low-dose pred for some time. His IgG level was normal, no need for IVIg.   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 Great saphenous ablation on the right, with recannulization of left saphenous vein on US from 7/2019. Anticoagulation per COVID protocol/ICU.      Patient was seen and plan of care was discussed with attending physician Dr. Black.    We will continue to follow this patient. Please contact us with questions.     Tiffany Stanford PA-C  [phone]        _________________________________________________    Subjective & Interval History:    FIO2 weaned to 30%, PEEP down to 5.  "Some apnea with PST yesterday. Sedation effects. This afternoon, successfully extubated, currently on 4L.    Physical Exam:    /78   Pulse 87   Temp 100  F (37.8  C) (Oral)   Resp 19   Ht 1.753 m (5' 9\")   Wt 103.2 kg (227 lb 8.2 oz)   SpO2 92%   BMI 33.60 kg/m    Physical examination deferred t primary team due to public health emergency video visit restrictions.    Labs & Studies: I personally reviewed the following studies:  ROUTINE LABS (Last four results):  CMP  Recent Labs   Lab 02/19/21  0319 02/18/21  1908 02/18/21  1009 02/18/21  0415 02/18/21  0209 02/17/21  0349 02/17/21  0349 02/16/21  0349 02/14/21  0637 02/14/21  0637 02/14/21  0326    141  --  144 143   < > 145* 141   < >  --  138   POTASSIUM 4.8  --  4.9 5.0 5.0   < > 5.0 4.4   < >  --  4.1   CHLORIDE 105  --   --  110*  --   --  115* 110*   < >  --  107   CO2 32  --   --  34*  --   --  28 31   < >  --  25   ANIONGAP 2*  --   --  <1*  --   --  3 <1*   < >  --  6   *  --   --  195*  --   --  197* 205*   < >  --  134*   BUN 38*  --   --  49*  --   --  59* 57*   < >  --  39*   CR 0.69  --   --  0.75  --   --  0.92 1.12   < >  --  0.96   GFRESTIMATED >90  --   --  >90  --   --  83 65   < >  --  79   GFRESTBLACK >90  --   --  >90  --   --  >90 76   < >  --  >90   JOE 8.9  --   --  9.0  --   --  8.5 8.7   < >  --  8.5   MAG  --   --   --   --   --   --  2.8*  --   --   --  2.4*   PHOS  --   --   --   --   --   --  2.2*  --   --  4.5  --    PROTTOTAL 5.9*  --   --  5.8*  --   --  5.7* 5.7*   < >  --  6.0*   ALBUMIN 2.4*  --   --  2.2*  --   --  2.1* 2.1*   < >  --  2.3*   BILITOTAL 0.6  --   --  0.4  --   --  0.4 0.3   < >  --  0.6   ALKPHOS 89  --   --  82  --   --  91 97   < >  --  116   AST 37  --   --  35  --   --  34 16   < >  --  33   ALT 58  --   --  56  --   --  40 31   < >  --  36    < > = values in this interval not displayed.     CBC  Recent Labs   Lab 02/19/21  0319 02/18/21  0415 02/17/21  0349 02/16/21  0349   WBC " 14.1* 11.0 9.3 6.6   RBC 4.12* 3.91* 3.88* 3.99*   HGB 12.8* 12.8* 12.2* 12.5*   HCT 39.7* 38.0* 37.1* 38.9*   MCV 96 97 96 98   MCH 31.1 32.7 31.4 31.3   MCHC 32.2 33.7 32.9 32.1   RDW 17.8* 18.2* 18.0* 18.3*    205 206 191     INR  Recent Labs   Lab 02/19/21  0319 02/18/21  0415 02/14/21  0637   INR 0.98 1.07 1.23*       Attending note.The patient was seen and examined by me separately from the [facility] provider. The note reflects our mutual assessment and plans and were approved by me personally.   I personally reviewed today's lab results vital signs and radiology results.     Each point of the assessment and plan were reviewed by the midlevel and me and either endorsed by me or were my added decisions.     My pertinent physical findings today are: not possible: video     My assessment and plan are: 69 yo 6 years post allo for MDS on prednisone and Jakafi for CGVHD now with serious COVID pneumonia. Continue Jakafi and hold predsinone while on dex burst, then resume.Will probably need intubation. Ci No changes in immunosupession. No current BMT issues. May need to reduce Jakafi if renal function further deteriorates.GFR increased: no reason to decrease Jakafi: monitor. Add 10 mg prednisone daily when decadron ends. Pressure support trial under way. Try tp extubate later today.    Edgar Black M.D.

## 2021-02-19 NOTE — PROGRESS NOTES
MEDICAL ICU PROGRESS NOTE  02/19/2021      Date of Service (when I saw the patient): 02/19/2021    ASSESSMENT: Deejay Dior is a 70 year old male with 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 Transferred to MICU on 2/10 for worsening respiratory status and c/f intubation.    CHANGES and MAJOR THINGS TODAY:   - Wean sedation, PST   - Extubated    PLAN:    Neuro:  Pain and sedation  - Off fetanyl gtt, midazolam gtt, precedex gtt  - Schedule actaminophen 650 q8h  - Consider Haldol 2 mg PRN   - RASS goal 0    MDD  - Continue PTA sertraline    Pulmonary:  Acute hypoxic respiratory failure secondary to COVID-19 pneumonia, rhinovirus  Acute respiratory distress syndrome  Intubated 2/14 with initiation of proning. Has received intermittent diuresis to maintain net negative daily, earlier this hospital course. P/F improved to 184 on 2/17 with no additional proning indicated. Extubated 2/19.   - No diuresis today, goal net even    Cardiovascular:  No acute concerns. TTE 2/9 LVEF 65-70%.     GI/Nutrition  Nutrition  Previously tolerating regular diet. OG in place 2/13. Started TF 2/14.   - RD consulted    Renal/Fluids/Electrolytes:  SARAH, resolved  Cr 1.33 on 2/15 from baseline 0.8-0.9. Likely pre-renal in etiology in the setting of poor PO intake. Patient has been -5L thus far this admission. Cr improved with 1L LR 2/15, 0.5L LR 2/16.   - No diuresis today    Hypernatremia, resolved  Na stable at 138-141.   - Discontinue FWF.    Hyperkalemia  K up to 5.2, now improved at 4.6. Still elevated T waves on telemetry.  - Continue to monitor    Endocrine:  Hyperglycemia  Glucose 111-130.  - Continue medium sliding scale insulin q4h    ID:  COVID-19 pneumonia (+1/30)  + Rhinovirus  Remdesivir course completed on 2/12. Was considered for tocilizumab. CRP downtrending.  - ID following  - Dexamethasone  decreased from 10 mg to 6 mg for total ten day course (2/8-2/17) completed  - Enoxaparin 50 mg BID given elevated D-dimer  - Repeat Covid-19 prior to time of transfer    Concern for superimposed bacterial pneumonia  Procal increased to 0.28 on 2/8. Previously on ceftriaxone, arithromycin, with prophylactic levofloxacin held but now restarted. CXR 2/8 with stable pulmonary opacities compatible with atypical infection vs. Superimposed atelectasis/edema, improved from prior. Sputum culture 2/14 growing GPC 2/16 growing staph haemolyticus, staph epidermidis, and enterococcus faecalis though respiratory status has been improved without evidence of increased secretion.   - Competed 3 days azithromycin  - Hold off additional antibiotics for now  - CXR if worsening leukocytosis     Antibiotics  Azithromycin (2/9-2/11)     Antimicrobial Prophylaxis  Acyclovir (2/8- )  Fluconazole (2/9- )  Levofloxacin (2/9, 2/12- )  Bactrim (2/8- )    Hematology:    MDS/ s/p BMT 2014 complicated by GVHD  No acute concerns. PTA on prednisone and Jakafi.  - BMT consulted  - Hold PTA prednisone 5 mg while on dexamethasone, started prednisone 10 mg on 2/18 per BMT  - Continue PTA Jakafi 5 mg twice daily, dose reduce to 5 mg every day if GFR<59  - Continue PTA Bactrim double strength 1 tablet twice weekly for PCP prophylaxis  - Continue PTA acyclovir 800 mg 2 times daily for prophylaxis against herpes simplex virus  - Continue PTA  fluconazole 100 mg oral daily  - Continue PTA levofloxacin 250 mg every day     Musculoskeletal:  No acute concerns    Skin:  No acute concerns    General Cares/Prophylaxis:    DVT Prophylaxis: Enoxaparin (Lovenox) subcutaneous 50 mg BID  GI Prophylaxis: PPI   Restraints: None  Family Communication: Wife (Racquel) last updated 2/18  Code Status: Full code     Lines/tubes/drains:  - PIV  - ETT  - OG, NJ  - rios, arterial line (removed)    Disposition:  - Medical ICU     Patient seen and findings/plan discussed with  medical ICU staff, Dr. Lawler.    Michael Vaz MD  PGY-1, Internal Medicine  MICU 2 Service    ====================================  INTERVAL HISTORY:   NAEO. Extubated. Moaning. No localizing pain.     OBJECTIVE:   1. VITAL SIGNS:   Temp:  [98.4  F (36.9  C)-100  F (37.8  C)] 100  F (37.8  C)  Pulse:  [48-87] 87  Resp:  [13-20] 19  BP: (127-161)/(67-94) 134/78  FiO2 (%):  [30 %] 30 %  SpO2:  [92 %-96 %] 92 %  Ventilation Mode: (S) CPAP/PS  (Continuous positive airway pressure with Pressure Support)  FiO2 (%): 30 %  Rate Set (breaths/minute): 16 breaths/min  Tidal Volume Set (mL): 420 mL  PEEP (cm H2O): (S) 5 cmH2O  Pressure Support (cm H2O): (S) 5 cmH2O  Oxygen Concentration (%): 30 %  Resp: 19    2. INTAKE/ OUTPUT:   I/O last 3 completed shifts:  In: 2804.25 [I.V.:254.25; NG/GT:1110]  Out: 2205 [Urine:2155; Stool:50]    3. PHYSICAL EXAMINATION:  General: Lying in bed, supine  Neuro: Aitated at times, moaning, not consistently responsive to commands  Pulm/Resp: Clear breath sounds bilaterally without rhonchi, crackles or wheeze  CV: RRR, no m/g/r  Abdomen: Soft, non-distended, non-tender  : Urine yellow and clear  Extremities: Warm, slight RLE edema (chronic per pt)    4. LABS:   Arterial Blood Gases   Recent Labs   Lab 02/17/21  1615 02/17/21  1131 02/16/21  2159 02/16/21  1416   PH 7.40 7.38 7.37 7.30*   PCO2 53* 54* 51* 60*   PO2 90 88 83 71*   HCO3 32* 32* 29* 30*     Complete Blood Count   Recent Labs   Lab 02/19/21  0319 02/18/21  0415 02/17/21  0349 02/16/21  0349   WBC 14.1* 11.0 9.3 6.6   HGB 12.8* 12.8* 12.2* 12.5*    205 206 191     Basic Metabolic Panel  Recent Labs   Lab 02/19/21  0319 02/18/21  1908 02/18/21  1009 02/18/21  0415 02/18/21  0209 02/17/21 0349 02/17/21 0349 02/16/21 0349    141  --  144 143   < > 145* 141   POTASSIUM 4.8  --  4.9 5.0 5.0   < > 5.0 4.4   CHLORIDE 105  --   --  110*  --   --  115* 110*   CO2 32  --   --  34*  --   --  28 31   BUN 38*  --   --  49*  --    --  59* 57*   CR 0.69  --   --  0.75  --   --  0.92 1.12   *  --   --  195*  --   --  197* 205*    < > = values in this interval not displayed.     Liver Function Tests  Recent Labs   Lab 02/19/21  0319 02/18/21  0415 02/17/21  0349 02/16/21  0349 02/14/21  0637 02/14/21  0637   AST 37 35 34 16   < >  --    ALT 58 56 40 31   < >  --    ALKPHOS 89 82 91 97   < >  --    BILITOTAL 0.6 0.4 0.4 0.3   < >  --    ALBUMIN 2.4* 2.2* 2.1* 2.1*   < >  --    INR 0.98 1.07  --   --   --  1.23*    < > = values in this interval not displayed.     5. RADIOLOGY:   No results found for this or any previous visit (from the past 24 hour(s)).

## 2021-02-19 NOTE — PROGRESS NOTES
Critical Care Services Progress Note:  I personally examined and evaluated the patient today. I formulated today s plan with the house staff team or resident(s) and agree with the findings and plan in the associated note (see separately attested resident note).   I have evaluated all laboratory values and imaging studies from the past 24 hours.  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 Initially on HFNC, then NIPPV, and then intubated on but intubated on 2/13.  Proned ventilated until 2/16. Has completed treatment for COVID19.   Overnight events/pertinent findings today:  Was still too obtunded for extubation yesterday.  No acute events.  Develops bradycardia with increased doses of precedex.      Data  Na 138, K 4.8, BUN 38, Cr 0.69  CRP 7.2  WBC 14.1, Hgb 12.8, Pts 185    Vent: 16/420/30/5  Pplat 15    Assessment/plan:  COVID19 ARDS: Lungs have greatly improved. Had prolonged effects from sedation which had prevented extubation.    - S/p Dexamethasone  - s/p remdesevir  - stop pre pressure support trial today    SARAH: resolved    Nutrition:  - Tube feeds    Hx of BMT  - heme/onc following.  Continuing anti-infective ppx with acyclovir, fluconazole, levofloxacin.   - prednisone 10    Rest per resident note.    I spent a total of 35 minutes (excluding procedure time) personally providing and directing critical care services at the bedside and on the critical care unit for this patient.     Johnie Lawler MD

## 2021-02-19 NOTE — PLAN OF CARE
ICU End of Shift Summary. See flowsheets for vital signs and detailed assessment.    Changes this shift: patient awake and restless but not tracking or following commands. Adequate UOP via external male catheter. BP stable. HR intermittently jordon but stable. And on rune of SVT in th e 140's.Precedex running all night. No BM overnight    Plan:  Continue to monitor and assess. Notify MD of any changes      Problem: Adult Inpatient Plan of Care  Goal: Plan of Care Review  Outcome: No Change  Goal: Patient-Specific Goal (Individualized)  Outcome: No Change  Goal: Absence of Hospital-Acquired Illness or Injury  Outcome: No Change  Intervention: Identify and Manage Fall Risk  Recent Flowsheet Documentation  Taken 2/19/2021 0400 by Gisela Sykes RN  Safety Promotion/Fall Prevention:   activity supervised   fall prevention program maintained   patient and family education   safety round/check completed  Taken 2/19/2021 0000 by Gisela Sykes RN  Safety Promotion/Fall Prevention:   activity supervised   fall prevention program maintained   patient and family education   safety round/check completed  Taken 2/18/2021 2000 by Gisela Sykes RN  Safety Promotion/Fall Prevention:   activity supervised   fall prevention program maintained   patient and family education   safety round/check completed  Intervention: Prevent Skin Injury  Recent Flowsheet Documentation  Taken 2/19/2021 0600 by Gisela Sykes RN  Body Position:   turned   side-lying   left  Taken 2/19/2021 0400 by Gisela Sykes RN  Body Position:   turned   side-lying   right   heels elevated   upper extremity elevated  Taken 2/19/2021 0200 by Gisela Sykes RN  Body Position:   turned   side-lying   left   heels elevated   upper extremity elevated  Taken 2/19/2021 0000 by Gisela Sykes RN  Body Position:   turned   side-lying   right   heels elevated   upper extremity elevated  Taken 2/18/2021 2200 by Gisela Sykes RN  Body Position:   turned    side-lying   left  Taken 2/18/2021 2000 by Gisela Sykes RN  Body Position:   turned   side-lying   right   heels elevated   upper extremity elevated  Intervention: Prevent and Manage VTE (Venous Thromboembolism) Risk  Recent Flowsheet Documentation  Taken 2/19/2021 0400 by Gisela Sykes RN  VTE Prevention/Management:   pneumatic compression device   bleeding precautions maintained   bleeding risk assessed   anticoagulant therapy maintained  Taken 2/19/2021 0000 by Gisela Sykes RN  VTE Prevention/Management:   pneumatic compression device   bleeding precautions maintained   bleeding risk assessed   anticoagulant therapy maintained  Taken 2/18/2021 2000 by Gisela Sykes RN  VTE Prevention/Management:   pneumatic compression device   bleeding precautions maintained   bleeding risk assessed   anticoagulant therapy maintained  Goal: Optimal Comfort and Wellbeing  Outcome: No Change  Goal: Readiness for Transition of Care  Outcome: No Change

## 2021-02-19 NOTE — PROGRESS NOTES
Patient had cuff leak before procedure. Suctioned before and after extubation and got small creamy/cloudy secretions. Pt electively extubated per physician order and Placed on Oxyplus Mask @ 4 LPM. Breath sounds were coarse,diminished, no stridor heard. Labs pending. Patient tolerated procedure well without any immediate complications.    Melissa Orr, RT, RT  2/19/2021 12:49 PM

## 2021-02-19 NOTE — PROGRESS NOTES
CLINICAL NUTRITION SERVICES - REASSESSMENT NOTE     Nutrition Prescription    RECOMMENDATIONS FOR MDs/PROVIDERS TO ORDER:  Notify RD or Gus-trained RN when plan is to extubate pt so that the stylet wire can be inserted into pt's FT to keep it in place when OGT and ETT are removed.    Malnutrition Status:    Non-severe malnutrition in the context of acute illness.     Recommendations already ordered by Registered Dietitian (RD):  Discussed nutrition POC on MICU rounds.     Future/Additional Recommendations:  If K+ trends higher than normal, would change to lower K+ formula, Novasource Renal @ 45 ml/hr + the 2 pkt of Prosource that are currently ordered. This total regimen provides 2240 kcal (28 kcal/kg), 120 g pro (1.5 g/kg), 198 g CHO, 774 ml free water, and 0 g fiber daily.        EVALUATION OF THE PROGRESS TOWARD GOALS   Diet: NPO  Nutrition Support: Nutren 1.5 @ 60 mL/hr via NJT + 2 packets Prosource = 2240 kcals (28 kcal/kg/day), 120 g PRO (1.5 g/kg/day), 1094 mL H2O, 253 g CHO and no fiber daily.   Intake: TF started on 2/14. Pt has received goal TF volume over the past 2 days. TF advanced slowly d/t refeeding risk. Pt was on a Regular diet prior to intubation.     NEW FINDINGS   Attempting to wean from vent.   K+ trending to higher end of normal, 4.8 today.    Estimated needs while in MICU:  25-30 kcal/kg = 0046-7370 kcal/kg/day    MALNUTRITION  % Intake: < 75% for > 7 days   % Weight Loss: Weight loss does not meet criteria  Subcutaneous Fat Loss: Unable to assess  Muscle Loss: Unable to assess  Fluid Accumulation/Edema: Mild  Malnutrition Diagnosis: Non-severe malnutrition in the context of acute illness.     Previous Goals   Total avg nutritional intake to meet a minimum of 1610 kcal/kg and 1.5 g PRO/kg daily (per dosing wt 80.5 kg).  Evaluation: Not met    Previous Nutrition Diagnosis  Increased nutrient needs for protein/calories  Evaluation: Improving    CURRENT NUTRITION DIAGNOSIS  Predicted  inadequate nutrient intake (calories, protein) related to prolonged hospital LOS with risk for interruption to TF infusion.      INTERVENTIONS  Implementation  Collaboration with other providers- MICU rounds    Goals  Total avg nutritional intake to meet a minimum of 25 kcal/kg and 1.5 g PRO/kg daily (per dosing wt 81 kg).    Monitoring/Evaluation  Progress toward goals will be monitored and evaluated per protocol.    Alize Ahn RD, LD  (MICU dietitian, [id number] (Mon-Fri))

## 2021-02-19 NOTE — PROGRESS NOTES
[facility]  Transplant Infectious Disease Progress Note -- Sign Off     Patient:  Deejay Dior, Date of birth 1950, Medical record number [id number]  Date of Visit:  02/19/2021         Assessment and Recommendations:   Recommendations:    - No new antimicrobials or infectious disease work-up are indicated.  - Post-discharge Transplant ID Clinic follow-up does not at present seem needed.    - When he returns to , would re-evaluate his need for ongoing antimicrobial prophylaxis:  His ANC has generally not been low enough to merit bacterial prophylaxis with levofloxacin and low-dose (10 mg / day) prednisone plus ruxolitinib do not increase the risk substantially, so would discontinue Levaquin.  Overall, his ALC has not been low enough to merit need for fluconazole or TMP-SMX prophylaxis either, and low-dose prednisone plus ruxolitinib do not notably increase that risk, so would discontinue both in a week (since now off dexamethasone).  In the presence of Jakafi, VZV prophylaxis is not unreasonable, but acyclovir 800 mg PO BID is a dose that probably does not provide much prophylactic benefit -- monitoring pre-emptively for any sign of shingles may be just as reasonable.    With the above recommendations, Transplant ID will sign off now.  Thanks for allowing us to participate in the care of this gentleman.  Please page if additional ID questions or concerns arise.    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  He moved to the MICU on 2/10/21 and required intubation for Covid-19 pneumonitis / ARDS on 2/13/21 late evening.  He required high ventilator settings and prone  positioning until 2/16/21, but his respiratory status has now much improved over the past three to four days and he now was extubated to four liters supplemental oxygen this midday, 2/19/21.  He has an increased leukocytosis today, but no other evidence during this hospitalization of any additional active infection beyond Covid-19.    ID issues:    # Improved acute hypoxic respiratory failure / COVID-19 pneumonia (diagnosed 1/30/21):  From a COVID-19 therapy standpoint, he has received appropriate therapies of remdesivir and dexamethasone. There is no role for monoclonal antibodies or other current Covid trials / therapies (especially given his underlying MDS s/p stem cell transplant and chronic GVHD).  Pre-admission, he was on Jakafi and prednisone in the out patient setting, as well as a variety of prophylactic medications - acyclovir, fluconazole,TMP-SMX, and Levaquin -- we will re-visit the ongoing need / indications after he is respiratorily improved.  (Continued need for high dose acyclovir (in absence of documented CMV) and fluconazole (with no neutropenia and essentially normal differential) is unclear.)    # ETT sputum culture with growth 2/14/21:  The three organisms isolated in this 2/14/21 culture are normal kimani (coagulase negative Staphs) or upper airway non-pathogenic colonizers (Enterococcus).  Hence, would not treat them since he lacks any evidence for a super-imposed bacterial pneumonia.    # New leukocytosis 2/18 - 19/21:  The etiology of this is not completely clear, but he lacks other corresponding evidence of any new additional infection.  He is a candidate for a ventilator-acquired pneumonia (with a history of recent proning and high vent settings), but so far lacks worsened respiratory status (instead it has recently steadily improved), new cough or increased sputum production, or fever, and his serum CRP has steadily decreased over the past week+ (from a peak of 130.0 on 2/9/21 down to 7.2  "on 2/19/21).  Perhaps, most likely, the leukocytosis is due to steroids and stress.  If it worsens or new symptoms emerge, would consider repeating a chest x-ray, procalcitonin, blood cultures, and sputum culture, if indicated.    Other ID issues:  - QTc interval: 434 msec on 2/8/21 EKG.  - Bacterial prophylaxis: On Levaquin.  (Questionable if needed.)  - Pneumocystis prophylaxis:  TMP-SMX.  - Viral serostatus & prophylaxis:  CMV / EBV / VZV / HSV-1 seropositive.  Acyclovir as low-dose VZV \"prophylaxis\" on ruxolitinib.  - Fungal prophylaxis:  On fluconazole.  ALCs have generally been protectively adequate.   - Immunization status: Unknown.  - Gamma globulin status: IgG replete   - Isolation status: COVID-19; VRE         Interval History:   Mr. Dior has remained afebrile (T max 99.1 degrees F) since 2/8/21 PM until having a mildly increased temperature of 100.0 degrees F this noon.  His peripheral WBC increased to 14.1 today (due to steroids?) but his serum CRP continues to fall, down to 7.2 today on only prophylaxis antimicrobials of levofloxacin, acyclovir, TMP-SMX, and fluconazole.  He is more awake recently but is not verbally interactive or moving to request and has been intermittently restive on the ventilator.  After being on minimal vent settings (FiO2 0.30, PEEP 5) with rapid improvement over the past three days (since prone positioning stopped on 2/16/21) he was extubated just this midday to four liters supplemental oxygen by oxymask and is so far stable from a respiratory standpoint.    A review of systems is unobtainable but there are no other new issues over the past 24 hours.  A 2/14/21 ETT-obtained sputum culture grew a mixed growth of Staph haemolyticus and epidermidis as well as E faecalis but he has improved despite absence of antibiotic therapy against these organisms.  There is no other new microbiology.  There is no new chest x-ray since 2/15/21.         Current Medications & Allergies: "       acetaminophen  650 mg Oral Q8H     acyclovir  800 mg Oral BID     artificial tears   Both Eyes BID     calcium carbonate-vitamin D  1 tablet Oral Daily     enoxaparin ANTICOAGULANT  0.5 mg/kg Subcutaneous BID     fluconazole  100 mg Oral Daily     insulin aspart  1-6 Units Subcutaneous Q4H     levofloxacin  250 mg Oral Daily     melatonin  3 mg Oral At Bedtime     multivitamins w/minerals  15 mL Per Feeding Tube Daily     pantoprazole  40 mg Oral QAM AC     predniSONE  10 mg Oral Daily     protein modular  2 packet Per Feeding Tube Daily     ruxolitinib  5 mg Oral or Feeding Tube BID     sertraline  50 mg Oral Daily     sulfamethoxazole-trimethoprim  1 tablet Oral Once per day on Mon Tue     Infusions/Drips:    dexmedetomidine Stopped (02/19/21 1000)     dextrose       - MEDICATION INSTRUCTIONS -       midazolam Stopped (02/17/21 1700)     - MEDICATION INSTRUCTIONS -       Allergies   Allergen Reactions     Blood Transfusion Related (Informational Only) Other (See Comments)     Patient has a history of a clinically significant antibody against RBC antigens.  A delay in compatible RBCs may occur.          Physical Exam:   Vitals were reviewed.  All vitals stable.  Patient Vitals for the past 24 hrs:   BP Temp Temp src Pulse Resp SpO2 Weight   02/19/21 1238 -- -- -- 77 -- 94 % --   02/19/21 1235 -- -- -- -- -- 96 % --   02/19/21 1200 136/73 100  F (37.8  C) Oral 77 19 96 % --   02/19/21 1100 (!) 145/79 -- -- 76 -- 92 % --   02/19/21 1000 (!) 142/83 -- -- 71 13 94 % --   02/19/21 0955 -- -- -- 65 -- 95 % --   02/19/21 0900 138/82 -- -- 63 -- 93 % --   02/19/21 0824 -- -- -- 68 -- 94 % --   02/19/21 0800 (!) 145/74 99  F (37.2  C) Oral 61 18 95 % --   02/19/21 0748 -- -- -- -- -- 95 % --   02/19/21 0700 135/76 -- -- 59 -- 95 % --   02/19/21 0600 (!) 145/80 -- -- 57 -- 95 % --   02/19/21 0500 127/67 -- -- 67 -- 93 % --   02/19/21 0400 (!) 147/84 99.1  F (37.3  C) Axillary 56 18 93 % --   02/19/21 0300 (!) 143/77 --  -- 61 -- 94 % --   02/19/21 0200 136/67 -- -- 61 -- 94 % --   02/19/21 0100 (!) 140/94 -- -- 66 -- 94 % --   02/19/21 0000 (!) 155/80 99.1  F (37.3  C) Axillary 61 18 93 % --   02/18/21 2300 (!) 156/87 -- -- (!) 48 20 94 % --   02/18/21 2200 (!) 150/90 -- -- 66 -- 95 % 103.2 kg (227 lb 8.2 oz)   02/18/21 2131 -- -- -- 52 -- 95 % --   02/18/21 2100 (!) 161/81 -- -- 64 -- 95 % --   02/18/21 2000 139/70 98.5  F (36.9  C) Axillary 64 18 96 % --   02/18/21 1800 (!) 149/76 -- -- 55 -- 96 % --   02/18/21 1700 130/71 -- -- 64 -- 95 % --   02/18/21 1628 -- -- -- -- -- 96 % --   02/18/21 1600 (!) 144/77 98.4  F (36.9  C) Axillary 56 -- 94 % --   02/18/21 1500 (!) 140/71 -- -- 65 -- 95 % --   02/18/21 1400 (!) 141/70 -- -- 63 -- 95 % --     Ranges for vital signs over the past 24 hours:   Temp:  [98.4  F (36.9  C)-100  F (37.8  C)] 100  F (37.8  C)  Pulse:  [48-77] 77  Resp:  [13-20] 19  BP: (127-161)/(67-94) 136/73  FiO2 (%):  [30 %] 30 %  SpO2:  [92 %-96 %] 94 %  Vitals:    02/17/21 0000 02/18/21 0000 02/18/21 2200   Weight: 103.4 kg (227 lb 15.3 oz) 102.5 kg (225 lb 15.5 oz) 103.2 kg (227 lb 8.2 oz)     Intake/Output Summary (Last 24 hours) at 2/19/2021 1327  Last data filed at 2/19/2021 1200  Gross per 24 hour   Intake 2519.93 ml   Output 3060 ml   Net -540.07 ml     Physical Examination:  GENERAL:  Intubated, sedated, 70 year old man in bed in [facility] on ventilator viewed through window / video.  Remainder of exam unobtainable due to Covid-19 isolation.  HEAD:  NCAT.  EYES:  EOMI, anicteric sclerae.  ENT:  No visible otorrhea.  OG tube present.  Oral ETT.  LUNGS:  Breathing appears comfortable on ventilator.  CARDIOVASCULAR:  RRR.  ABDOMEN:  Unable to examine.  :  Molina present.  SKIN:  No visible acute rash or lesion from distance.  Arterial and peripheral IV line sites reportedly lack inflammation.  NEUROLOGIC:  Sedated, non-interactive, moves extremities x 4.         Laboratory Data:     Absolute CD4   Date Value Ref  Range Status   06/29/2015 345 (L) 441 - 2,156 cells/uL Final     Comment:     Effective 12/08/2014, the reference range for this assay has changed to   reflect   new methodology.     12/29/2014 190 (L) 441 - 2,156 cells/uL Final     Comment:     Effective 12/08/2014, the reference range for this assay has changed to   reflect   new methodology.     10/09/2014 37 mm3 Final     Inflammatory Markers    Recent Labs   Lab Test 02/19/21  0319 02/18/21  0415 02/17/21  0349 02/16/21  0349 02/15/21  0431 02/14/21  0326 11/30/14  1207 11/30/14  1207   SED  --   --   --   --   --   --   --  40*   CRP 7.2 10.0* 15.0* 27.0* 56.0* 68.0*   < > 17.4*    < > = values in this interval not displayed.     Immune Globulin Studies     Recent Labs   Lab Test 02/10/21  0721 01/24/19  1605 10/01/18  0955 05/08/16  0906 03/20/16  0352 07/30/15  1125 10/09/14  1010   IGG 3,275*  831 1,130 1,300 1,270 403* 913 458*     --   --   --   --   --  65   IGE  --   --   --   --   --   --  12   IGA 83*  --   --   --   --   --  48*     Metabolic Studies       Recent Labs   Lab Test 02/19/21  0319 02/18/21  1908 02/18/21  1009 02/18/21  0415 02/17/21  0349 02/17/21  0349 02/14/21  0637 02/14/21  0637 02/10/21  1305 02/10/21  1305 02/08/21  2024 02/08/21 2024 02/08/21  0910 02/05/21  1227    141  --  144   < > 145*   < >  --    < >  --    < >  --  139 140   POTASSIUM 4.8  --  4.9 5.0   < > 5.0   < >  --    < >  --    < >  --  4.0 4.0   CHLORIDE 105  --   --  110*  --  115*   < >  --    < >  --    < >  --  111* 109   CO2 32  --   --  34*  --  28   < >  --    < >  --    < >  --  23 25   ANIONGAP 2*  --   --  <1*  --  3   < >  --    < >  --    < >  --  5 6   BUN 38*  --   --  49*  --  59*   < >  --    < >  --    < >  --  25 30   CR 0.69  --   --  0.75  --  0.92   < >  --    < >  --    < >  --  0.88 1.10   GFRESTIMATED >90  --   --  >90  --  83   < >  --    < >  --    < >  --  86 67   *  --   --  195*  --  197*   < >  --    < >  --    <  >  --  100* 94   JOE 8.9  --   --  9.0  --  8.5   < >  --    < >  --    < >  --  8.4* 8.4*   PHOS  --   --   --   --   --  2.2*  --  4.5  --   --   --   --   --   --    MAG  --   --   --   --   --  2.8*  --   --    < >  --   --   --  1.7 2.0   LACT  --   --   --   --   --   --   --   --   --   --   --   --  1.1 1.4   PCAL  --   --   --   --   --   --   --   --   --   --   --  0.28 0.10  --    FGTL  --   --   --   --   --   --   --   --   --  <31  --   --   --   --    CKT  --   --   --   --   --   --   --  27*  --   --   --   --   --   --     < > = values in this interval not displayed.     Hepatic Studies    Recent Labs   Lab Test 02/19/21  0319 02/18/21  0415 02/17/21  0349 02/14/21  0326 02/14/21  0326 02/08/21  0910 02/08/21  0910 04/06/16  0245 04/06/16  0245 07/07/14  0445 07/07/14  0445   BILITOTAL 0.6 0.4 0.4   < > 0.6   < > 0.3   < > 0.8   < > 1.8*   BILIDELTA  --   --   --   --   --   --   --   --   --   --  1.0*   BILICONJ  --   --   --   --   --   --   --   --   --   --  0.0   DBIL  --   --   --   --   --   --   --   --  0.3*   < >  --    ALKPHOS 89 82 91   < > 116   < > 69   < > 292*   < > 88   PROTTOTAL 5.9* 5.8* 5.7*   < > 6.0*   < > 6.6*   < > 6.7*   < > 6.3*   ALBUMIN 2.4* 2.2* 2.1*   < > 2.3*   < > 2.6*   < > 2.3*   < > 3.4   AST 37 35 34   < > 33   < > 51*   < > 96*   < > 19   ALT 58 56 40   < > 36   < > 28   < > 167*   < > 47   LDH  --   --   --   --  783*  --  483*  --   --    < >  --     < > = values in this interval not displayed.     Pancreatitis testing    Recent Labs   Lab Test 02/14/19  1342 09/27/18  1217 04/04/16  0350   AMYLASE 46  --   --    LIPASE  --  158  --    TRIG  --   --  286*     Gout Labs      Recent Labs   Lab Test 06/24/14  1205 06/09/14  1116   URIC 6.1 5.8     Hematology Studies      Recent Labs   Lab Test 02/19/21  0319 02/18/21  0415 02/17/21  0349 02/16/21  0349 02/15/21  0431 02/13/21  0616 02/12/21  0337   WBC 14.1* 11.0 9.3 6.6 7.1 7.7 8.3   ANEU  --  9.7*  --   --    --   --  7.0   ALYM  --  0.3*  --   --   --   --  0.7*   WILLIAM  --  0.9  --   --   --   --  0.4   AEOS  --  0.0  --   --   --   --  0.0   HGB 12.8* 12.8* 12.2* 12.5* 14.2 13.0* 12.4*   HCT 39.7* 38.0* 37.1* 38.9* 43.3 39.0* 38.3*    205 206 191 207 185 184     Clotting Studies    Recent Labs   Lab Test 02/19/21 0319 02/18/21  0415 02/14/21  0637 02/14/19  1342   INR 0.98 1.07 1.23* 0.98   PTT  --   --  29  --      Iron Testing    Recent Labs   Lab Test 02/19/21 0319 02/11/21  0409 02/11/21  0409 11/21/19  1148 11/21/19  1148 09/27/18  1218 09/27/18  1218   LENNOX  --   --  401*   < >  --   --   --    MCV 96   < > 95   < > 95   < >  --    FOLIC  --   --   --   --   --   --  15.9   B12  --   --   --   --  455  --   --     < > = values in this interval not displayed.     Arterial Blood Gas Testing    Recent Labs   Lab Test 02/17/21  1615 02/17/21  1131 02/16/21  2159 02/16/21  1416 02/16/21  0348   PH 7.40 7.38 7.37 7.30* 7.33*   PCO2 53* 54* 51* 60* 54*   PO2 90 88 83 71* 92   HCO3 32* 32* 29* 30* 28   O2PER 45 45 50 45 50.0     Thyroid Studies     Recent Labs   Lab Test 09/27/18  1217 02/27/18  1239 10/12/16  1107 06/30/16  0953 03/01/16  1227   TSH 1.01 0.70 0.40 1.02 0.38*   T4  --   --   --  1.03 1.12     Urine Studies     Recent Labs   Lab Test 08/25/16  1601 05/07/16  1211 03/24/16  1650 03/21/16  1730 12/17/15  1430   URINEPH 6.0 5.0 5.0 5.0 5.0   NITRITE Negative Negative Negative Negative Negative   LEUKEST Negative Negative Negative Negative Negative   WBCU 1 1 <1 1 1     Medication levels    Recent Labs   Lab Test 09/27/18  1059 04/01/16  0831 04/01/16  0831 03/21/16  2143 03/21/16  2143 02/01/15  0924 02/01/15  0924   VANCOMYCIN  --   --   --   --  25.4*   < >  --    VCON  --   --  1.6   < >  --   --   --    CYCLSP  --   --   --   --   --   --  178   RAPAMY 4.5*   < >  --    < >  --    < >  --     < > = values in this interval not displayed.     Body fluid stats    Recent Labs   Lab Test  02/14/21  0848 03/27/16  1406   FTYP  --  Bronchial  SITE 2 RIGHT MIDDLE LOBE    Bronchial  SITE 1 RIGTH UPPER LOBE     FCOL  --  Colorless  Colorless   FAPR  --  Clear  Clear   FRBC  --  << Do Not Report >>  << Do Not Report >>   FWBC  --  63  57   FNEU  --  16  21   FLYM  --  8  14   FMONO  --  76  65   GS <25 PMNs/low power field  Moderate  Gram positive cocci  * No organisms seen  >25 PMNs/low power field    No organisms seen  >25 PMNs/low power field       Microbiology:    Fungal testing  Recent Labs   Lab Test 02/10/21  1305 09/27/18  1217 03/27/16  1406 03/25/16  1911 03/20/16  0352 03/19/16  1638 03/19/16  0749   FGTL <31 35  --  >500  Unit: pg/mL   149  --   --    ASPGAI 0.05 0.05 0.10  0.09  --   --  0.07 0.12   ASPAG  --   --  Negative  Reference range: Negative  (Note)  INTERPRETIVE INFORMATION: Aspergillus Galactomannan EIA,  Broncho  A BAL galactomannan index of greater than or equal to 0.5  is considered positive.  This result should be interpreted  in the context of patient history, clinical signs/symptoms,  and other routine diagnostic tests (e.g., culture,  histologic examination of biopsy material, and radiographic  imaging).  Performed by [email or web address],  [address] [place], [place] [id number] [phone]  [email or web address], Lencho Slaughter MD, Lab. Director    Negative  Reference range: Negative  (Note)  INTERPRETIVE INFORMATION: Aspergillus Galactomannan EIA,  Broncho  A BAL galactomannan index of greater than or equal to 0.5  is considered positive.  This result should be interpreted  in the context of patient history, clinical signs/symptoms,  and other routine diagnostic tests (e.g., culture,  histologic examination of biopsy material, and radiographic  imaging).  Performed by [email or web address],  [address] [place] [id number] [phone]  [employer], Lencho Slaughter MD, Lab. Director    --   --   --   --    ASPGAA Negative Negative  --   --   --  Negative  Reference range:  Negative  Unit: not reported  (Note)  INTERPRETIVE INFORMATION: Aspergillus Galactomannan Antigen  by EIA  Negative results do not exclude the diagnosis of invasive  aspergillosis. A single positive test result (index equal  to or greater than 0.5) should be clinically correlated  by testing a separate serum specimen because many agents  (e.g. foods, antibiotics) may cross-react with the test.  If invasive aspergillosis is suspected in high-risk  patients, serial sampling is recommended.  Performed by [facility],  [address] [place] [address] [phone]  [email or web address], Lencho Slaughter MD, Lab. Director   Negative  Reference range: Negative  Unit: not reported  (Note)  INTERPRETIVE INFORMATION: Aspergillus Galactomannan Antigen  by EIA  Negative results do not exclude the diagnosis of invasive  aspergillosis. A single positive test result (index equal  to or greater than 0.5) should be clinically correlated  by testing a separate serum specimen because many agents  (e.g. foods, antibiotics) may cross-react with the test.  If invasive aspergillosis is suspected in high-risk  patients, serial sampling is recommended.  Performed by [facility],  [address] [place] [address] [phone]  [email or web address], Lencho Slaughter MD, Lab. Director       Last Culture results with specimen source  Culture Micro   Date Value Ref Range Status   02/14/2021 (A)  Final    Moderate growth  Staphylococcus haemolyticus  Susceptibility testing not routinely done     02/14/2021 (A)  Final    Moderate growth  Staphylococcus epidermidis  Susceptibility testing not routinely done     02/14/2021 Moderate growth  Enterococcus faecalis   (A)  Final   02/08/2021 No growth  Final   02/08/2021 No growth  Final   02/05/2021 No growth  Final   02/05/2021 No growth  Final   03/08/2018 No growth  Final   03/08/2018 No growth  Final   02/21/2018 No Beta Streptococcus isolated  Final   06/09/2016 No growth  Final   05/07/2016 No  growth  Final   05/07/2016 No growth  Final   03/31/2016 No growth  Final   03/31/2016 No growth  Final   03/29/2016 (A)  Final    Light growth Enterococcus species (VRE)  Critical Value/Significant Value, preliminary result only, called to and read   back by Preeti Cordoba RN @1301 04/01/16 gd      Specimen Description   Date Value Ref Range Status   02/14/2021 Sputum  Final   02/14/2021 Sputum  Final   02/08/2021 Blood Right Arm  Final   02/08/2021 Blood Left Arm  Final   02/05/2021 Blood Right Arm  Final   02/05/2021 Blood  Final   08/01/2019 Nares  Final   03/08/2018 Blood Left Arm  Final   03/08/2018 Blood Right Arm  Final   02/21/2018 Throat  Final   02/21/2018 Throat  Final   10/12/2016 Nasal  Final   06/09/2016 Blood Unspecified Site  Final   05/07/2016 Blood Red port  Final   05/07/2016 Blood White port  Final   05/05/2016 Nasal  Final        Last check of C difficile  C Diff Toxin B PCR   Date Value Ref Range Status   03/29/2016  NEG Final    Negative  Negative: Clostridium difficile target DNA sequences NOT detected, presumed   negative for Clostridium difficile toxin B or the number of bacteria present   may be below the limit of detection for the test.   FDA approved assay performed using [employer] GeneXpert real-time PCR.   A negative result does not exclude actual disease due to Clostridium difficile   and may be due to improper collection, handling and storage of the specimen or   the number of organisms in the specimen is below the detection limit of the   assay.       Infection Studies to assess Diarrhea   Recent Labs   Lab Test 03/29/16  1245 03/21/16  0945   ADENOVIRUSAG Negative  --    EPCAMP Not Detected Not Detected   EPSALM Not Detected Not Detected   EPSHGL Not Detected Not Detected   EPVIB Not Detected Not Detected   EPROTA Not Detected Not Detected   EPNORO Not Detected Not Detected   EPYER Not Detected Not Detected     Virology:    Coronavirus-19 testing    Recent Labs   Lab Test  01/30/21  1514   COVIDPCREXT Positive*     Respiratory virus (non-coronavirus-19) testing    Recent Labs   Lab Test 02/09/21  1315 02/21/18  1348 10/12/16  1155 06/09/16  1233 11/30/14  1208 11/30/14  1208   RVSPEC  --  Nasopharyngeal  --  Nasopharyngeal   < >  --    AFLU  --   --   --   --   --  Negative   IFLUA Not Detected Negative  --  Negative   < >  --    INFZA  --   --  Negative   Flu A target RNA not detected, presumed negative for Influenza A or the viral   load is below the limit of detection.    --    < >  --    FLUAH1 Not Detected Negative  --  Negative   < >  --    [id number] Not Detected Negative  --  Negative   < >  --    FLUAH3 Not Detected Negative  --  Negative   < >  --    BFLU  --   --   --   --   --  Negative   Test results must be correlated with clinical data. If necessary, results   should be confirmed by a molecular assay or viral culture.     IFLUB Not Detected Negative  --  Negative   < >  --    INFZB  --   --  Negative   Flu B target RNA not detected, presumed negative for Influenza B or the viral   load is below the limit of detection.    --    < >  --    PIV1 Not Detected Negative  --  Negative   < >  --    PIV2 Not Detected Negative  --  Negative   < >  --    PIV3 Not Detected Negative  --  Negative   < >  --    PIV4 Not Detected  --   --   --   --   --    IRSV  --   --  Negative   RSV target RNA not detected, presumed negative for Respiratory Syncitial Virus   or the viral load is below the limit of detection.   FDA approved assay performed using [employer] GeneXpert(R) real-time PCR.    --    < >  --    HRVS  --  Negative  --  Negative   < >  --    RSVA Not Detected Negative  --  Negative   < >  --    RSVB Not Detected Negative  --  Negative   < >  --    HMPV Not Detected Negative  --  Negative   < >  --    ADVBE  --  Negative  --  Negative   < >  --    ADVC  --  Negative  --  Negative   < >  --    ADENOV Not Detected  --   --   --   --   --    CORONA Not Detected  --   --   --   --   --      < > = values in this interval not displayed.     EBV DNA Copies/mL   Date Value Ref Range Status   08/16/2018 EBV DNA Not Detected EBVNEG^EBV DNA Not Detected [Copies]/mL Final   06/21/2018 EBV DNA Not Detected EBVNEG^EBV DNA Not Detected [Copies]/mL Final   02/27/2018 <500 (A) EBVNEG^EBV DNA Not Detected [Copies]/mL Final     Comment:     EBV DNA Detected below the reportable range of 500 Copies/mL   12/14/2017 EBV DNA Not Detected EBVNEG^EBV DNA Not Detected [Copies]/mL Final   09/28/2017 <500 (A) EBVNEG^EBV DNA Not Detected [Copies]/mL Final     Comment:     EBV DNA Detected below the reportable range of 500 Copies/mL   06/08/2017 EBV DNA Not Detected EBVNEG [Copies]/mL Final     Parvovirus Testing    Recent Labs   Lab Test 03/27/16  1406   PRVSP Bronchoalveolar Lavage   Right upper lobe     PRVPC Not Detected  (Note)  NOT DETECTED - A negative result does not rule out the  presence of PCR inhibitors in the patient specimen or assay  specific nucleic acid in concentrations below the level of  detection by the assay.    The specimen submitted for testing did not meet [facility]  submission guidelines. Testing was performed on a specimen  that did not meet validated type requirements.  Performance characteristics of this assay may be affected.  Interpret results with caution. Please refer to the [facility]  Laboratory Test Directory for information on specimen  acceptability:  [email or web address]-[email or web address].jsp.  INTERPRETIVE INFORMATION: Parvovirus B19 By PCR  Test developed and characteristics determined by [email or web address]. See Compliance Statement B: [facility]  Performed by [email or web address],  [address] [place] [address] [phone]  [email or web address], Lencho Slaughter MD, Lab. Director       Adenovirus Testing    Recent Labs   Lab Test 03/08/18  1343 03/29/16  1245 03/29/16  0939 02/18/15  1421 02/18/15  1050 02/04/15  2046   ADRES No Adenovirus DNA detected.  --  No Adenovirus DNA detected.   --  No Adenovirus DNA detected.  --    ADENOVIRUSAG  --  Negative  --  Negative  --  Negative     Imaging:  No results found for this or any previous visit (from the past 48 hour(s)).     2/15 CXR:  Overall slight decrease in patchy opacifications in the left lung base. Continued small left effusion. Atherosclerosis. Endotracheal intubation. Continued patchy opacities in right lung base.

## 2021-02-20 LAB
ALBUMIN SERPL-MCNC: 2.2 G/DL (ref 3.4–5)
ALP SERPL-CCNC: 88 U/L (ref 40–150)
ALT SERPL W P-5'-P-CCNC: 56 U/L (ref 0–70)
ANION GAP SERPL CALCULATED.3IONS-SCNC: 3 MMOL/L (ref 3–14)
AST SERPL W P-5'-P-CCNC: 44 U/L (ref 0–45)
BASE EXCESS BLDV CALC-SCNC: 5.1 MMOL/L
BILIRUB SERPL-MCNC: 0.8 MG/DL (ref 0.2–1.3)
BUN SERPL-MCNC: 39 MG/DL (ref 7–30)
CALCIUM SERPL-MCNC: 8.4 MG/DL (ref 8.5–10.1)
CHLORIDE SERPL-SCNC: 105 MMOL/L (ref 94–109)
CO2 SERPL-SCNC: 29 MMOL/L (ref 20–32)
CREAT SERPL-MCNC: 0.76 MG/DL (ref 0.66–1.25)
CRP SERPL-MCNC: 22 MG/L (ref 0–8)
ERYTHROCYTE [DISTWIDTH] IN BLOOD BY AUTOMATED COUNT: 17.9 % (ref 10–15)
GFR SERPL CREATININE-BSD FRML MDRD: >90 ML/MIN/{1.73_M2}
GLUCOSE BLDC GLUCOMTR-MCNC: 103 MG/DL (ref 70–99)
GLUCOSE BLDC GLUCOMTR-MCNC: 106 MG/DL (ref 70–99)
GLUCOSE BLDC GLUCOMTR-MCNC: 108 MG/DL (ref 70–99)
GLUCOSE BLDC GLUCOMTR-MCNC: 127 MG/DL (ref 70–99)
GLUCOSE BLDC GLUCOMTR-MCNC: 165 MG/DL (ref 70–99)
GLUCOSE SERPL-MCNC: 132 MG/DL (ref 70–99)
HCO3 BLDV-SCNC: 30 MMOL/L (ref 21–28)
HCT VFR BLD AUTO: 38.2 % (ref 40–53)
HGB BLD-MCNC: 12.9 G/DL (ref 13.3–17.7)
INR PPP: 1.02 (ref 0.86–1.14)
MCH RBC QN AUTO: 31.9 PG (ref 26.5–33)
MCHC RBC AUTO-ENTMCNC: 33.8 G/DL (ref 31.5–36.5)
MCV RBC AUTO: 95 FL (ref 78–100)
O2/TOTAL GAS SETTING VFR VENT: 45 %
OXYHGB MFR BLDV: 78 %
PCO2 BLDV: 45 MM HG (ref 40–50)
PH BLDV: 7.43 PH (ref 7.32–7.43)
PLATELET # BLD AUTO: 171 10E9/L (ref 150–450)
PO2 BLDV: 47 MM HG (ref 25–47)
POTASSIUM SERPL-SCNC: 4.4 MMOL/L (ref 3.4–5.3)
PROT SERPL-MCNC: 5.6 G/DL (ref 6.8–8.8)
RBC # BLD AUTO: 4.04 10E12/L (ref 4.4–5.9)
SODIUM SERPL-SCNC: 138 MMOL/L (ref 133–144)
WBC # BLD AUTO: 12.5 10E9/L (ref 4–11)

## 2021-02-20 PROCEDURE — 999N001017 HC STATISTIC GLUCOSE BY METER IP

## 2021-02-20 PROCEDURE — 250N000011 HC RX IP 250 OP 636: Performed by: INTERNAL MEDICINE

## 2021-02-20 PROCEDURE — 86140 C-REACTIVE PROTEIN: CPT | Performed by: INTERNAL MEDICINE

## 2021-02-20 PROCEDURE — 85610 PROTHROMBIN TIME: CPT | Performed by: INTERNAL MEDICINE

## 2021-02-20 PROCEDURE — 250N000009 HC RX 250: Performed by: INTERNAL MEDICINE

## 2021-02-20 PROCEDURE — 250N000011 HC RX IP 250 OP 636: Performed by: STUDENT IN AN ORGANIZED HEALTH CARE EDUCATION/TRAINING PROGRAM

## 2021-02-20 PROCEDURE — 80053 COMPREHEN METABOLIC PANEL: CPT | Performed by: INTERNAL MEDICINE

## 2021-02-20 PROCEDURE — 250N000011 HC RX IP 250 OP 636: Performed by: NURSE PRACTITIONER

## 2021-02-20 PROCEDURE — 250N000013 HC RX MED GY IP 250 OP 250 PS 637: Performed by: STUDENT IN AN ORGANIZED HEALTH CARE EDUCATION/TRAINING PROGRAM

## 2021-02-20 PROCEDURE — 85027 COMPLETE CBC AUTOMATED: CPT | Performed by: INTERNAL MEDICINE

## 2021-02-20 PROCEDURE — 250N000013 HC RX MED GY IP 250 OP 250 PS 637

## 2021-02-20 PROCEDURE — 99291 CRITICAL CARE FIRST HOUR: CPT | Mod: GC

## 2021-02-20 PROCEDURE — 250N000012 HC RX MED GY IP 250 OP 636 PS 637: Performed by: STUDENT IN AN ORGANIZED HEALTH CARE EDUCATION/TRAINING PROGRAM

## 2021-02-20 PROCEDURE — 99233 SBSQ HOSP IP/OBS HIGH 50: CPT | Mod: GC | Performed by: INTERNAL MEDICINE

## 2021-02-20 PROCEDURE — G0463 HOSPITAL OUTPT CLINIC VISIT: HCPCS

## 2021-02-20 PROCEDURE — 36415 COLL VENOUS BLD VENIPUNCTURE: CPT | Performed by: INTERNAL MEDICINE

## 2021-02-20 PROCEDURE — 999N000215 HC STATISTIC HFNC ADULT NON-CPAP

## 2021-02-20 PROCEDURE — 82803 BLOOD GASES ANY COMBINATION: CPT

## 2021-02-20 PROCEDURE — 82810 BLOOD GASES O2 SAT ONLY: CPT

## 2021-02-20 PROCEDURE — 200N000002 HC R&B ICU UMMC

## 2021-02-20 PROCEDURE — 36415 COLL VENOUS BLD VENIPUNCTURE: CPT

## 2021-02-20 PROCEDURE — 250N000013 HC RX MED GY IP 250 OP 250 PS 637: Performed by: NURSE PRACTITIONER

## 2021-02-20 PROCEDURE — 250N000013 HC RX MED GY IP 250 OP 250 PS 637: Performed by: INTERNAL MEDICINE

## 2021-02-20 PROCEDURE — 272N000078 HC NUTRITION PRODUCT INTERMEDIATE LITER

## 2021-02-20 PROCEDURE — 999N000157 HC STATISTIC RCP TIME EA 10 MIN

## 2021-02-20 PROCEDURE — 250N000011 HC RX IP 250 OP 636

## 2021-02-20 PROCEDURE — 258N000003 HC RX IP 258 OP 636: Performed by: INTERNAL MEDICINE

## 2021-02-20 RX ORDER — QUETIAPINE FUMARATE 25 MG/1
50 TABLET, FILM COATED ORAL AT BEDTIME
Status: DISCONTINUED | OUTPATIENT
Start: 2021-02-20 | End: 2021-02-22

## 2021-02-20 RX ADMIN — PREDNISONE 10 MG: 5 TABLET ORAL at 08:54

## 2021-02-20 RX ADMIN — FLUCONAZOLE 100 MG: 40 POWDER, FOR SUSPENSION ORAL at 08:58

## 2021-02-20 RX ADMIN — SERTRALINE HYDROCHLORIDE 50 MG: 50 TABLET ORAL at 08:55

## 2021-02-20 RX ADMIN — ACYCLOVIR 800 MG: 200 SUSPENSION ORAL at 19:41

## 2021-02-20 RX ADMIN — Medication 25 MCG/HR: at 02:16

## 2021-02-20 RX ADMIN — ACETAMINOPHEN 650 MG: 325 TABLET, FILM COATED ORAL at 13:16

## 2021-02-20 RX ADMIN — PANTOPRAZOLE SODIUM 40 MG: 40 TABLET, DELAYED RELEASE ORAL at 08:56

## 2021-02-20 RX ADMIN — ENOXAPARIN SODIUM 50 MG: 100 INJECTION SUBCUTANEOUS at 13:15

## 2021-02-20 RX ADMIN — INSULIN ASPART 1 UNITS: 100 INJECTION, SOLUTION INTRAVENOUS; SUBCUTANEOUS at 13:32

## 2021-02-20 RX ADMIN — MULTIVITAMIN 15 ML: LIQUID ORAL at 08:54

## 2021-02-20 RX ADMIN — HALOPERIDOL LACTATE 5 MG: 5 INJECTION, SOLUTION INTRAMUSCULAR at 21:48

## 2021-02-20 RX ADMIN — Medication: at 09:08

## 2021-02-20 RX ADMIN — QUETIAPINE 50 MG: 25 TABLET, FILM COATED ORAL at 21:48

## 2021-02-20 RX ADMIN — DEXMEDETOMIDINE 1 MCG/KG/HR: 100 INJECTION, SOLUTION, CONCENTRATE INTRAVENOUS at 10:05

## 2021-02-20 RX ADMIN — MELATONIN TAB 3 MG 3 MG: 3 TAB at 21:48

## 2021-02-20 RX ADMIN — DEXMEDETOMIDINE 0.6 MCG/KG/HR: 100 INJECTION, SOLUTION, CONCENTRATE INTRAVENOUS at 21:40

## 2021-02-20 RX ADMIN — LEVOFLOXACIN 250 MG: 250 TABLET, FILM COATED ORAL at 08:55

## 2021-02-20 RX ADMIN — ACETAMINOPHEN 650 MG: 325 TABLET, FILM COATED ORAL at 19:41

## 2021-02-20 RX ADMIN — ACYCLOVIR 800 MG: 200 SUSPENSION ORAL at 08:57

## 2021-02-20 RX ADMIN — DEXMEDETOMIDINE 0.5 MCG/KG/HR: 100 INJECTION, SOLUTION, CONCENTRATE INTRAVENOUS at 14:08

## 2021-02-20 RX ADMIN — RUXOLITINIB 5 MG: 5 TABLET ORAL at 19:40

## 2021-02-20 RX ADMIN — RUXOLITINIB 5 MG: 5 TABLET ORAL at 09:01

## 2021-02-20 RX ADMIN — DEXMEDETOMIDINE 1 MCG/KG/HR: 100 INJECTION, SOLUTION, CONCENTRATE INTRAVENOUS at 02:05

## 2021-02-20 RX ADMIN — ACETAMINOPHEN 650 MG: 325 TABLET, FILM COATED ORAL at 04:43

## 2021-02-20 RX ADMIN — FENTANYL CITRATE 50 MCG: 50 INJECTION, SOLUTION INTRAMUSCULAR; INTRAVENOUS at 00:55

## 2021-02-20 RX ADMIN — Medication: at 19:41

## 2021-02-20 RX ADMIN — HALOPERIDOL LACTATE 5 MG: 5 INJECTION, SOLUTION INTRAMUSCULAR at 09:01

## 2021-02-20 RX ADMIN — Medication 2 PACKET: at 08:56

## 2021-02-20 RX ADMIN — DEXMEDETOMIDINE 0.8 MCG/KG/HR: 100 INJECTION, SOLUTION, CONCENTRATE INTRAVENOUS at 06:09

## 2021-02-20 RX ADMIN — Medication 1 TABLET: at 13:16

## 2021-02-20 ASSESSMENT — ACTIVITIES OF DAILY LIVING (ADL)
ADLS_ACUITY_SCORE: 19

## 2021-02-20 NOTE — PROGRESS NOTES
Critical Care Services Progress Note:  I personally examined and evaluated the patient today. I formulated today s plan with the house staff team or resident(s) and agree with the findings and plan in the associated note (see separately attested resident note).   I have evaluated all laboratory values and imaging studies from the past 24 hours.  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 Initially on HFNC, then NIPPV, and then intubated on but intubated on 2/13.  Proned ventilated until 2/16. Has completed treatment for COVID19. Extubated on 2/19  Overnight events/pertinent findings today:  Extubated yesterday.  Has had significant delirium since extubation. Required precedex infusion.  A fentanyl infusion was started overnight.      Data  Na 138, K 4.4, BUN 39, Cr 0.76  CRP 22  WBC 12.5, Hgb 12.9, Pts 171    Assessment/plan:  COVID19 ARDS: recovering from ARDS. Now dealing with delirium from high sedation needs and prolonged ICU stay  - S/p Dexamethasone  - s/p remdesevir  - keep SaO2 > 92%    Delirium:  - stop fentanyl  - can continue precedex  - get up in a chair.    - 50 mg seroquel at night  - no benzos      Nutrition:  - Tube feeds    Hx of BMT  - heme/onc following.  Continuing anti-infective ppx with acyclovir, fluconazole, levofloxacin.   - prednisone 10    Rest per resident note.    I spent a total of 35 minutes (excluding procedure time) personally providing and directing critical care services at the bedside and on the critical care unit for this patient.     Johnie Lawler MD

## 2021-02-20 NOTE — PROGRESS NOTES
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 Transferred to MICU on 2/10 for worsening respiratory status requiring intubation.     CHANGES and MAJOR THINGS TODAY:   - Avoid opioids and benzodiazepines  - Seroquel 50 mg at bedtime  - Precedex gtt, wean as able  - Up in chair  - Delirium precautions    PLAN:    Neuro:  Pain and sedation  Off fetanyl gtt, midazolam gtt.   - Precedex gtt, wean as able  - RASS goal 0    Agitation Delirium  - Haldol 2 mg PRN   - Seroquel 50 mg at bedtime  - Schedule actaminophen 650 q8h  - Avoid opioids and benzodiazepines  - Hearing aids in   - Keep awake during the day, frequent reorientation     MDD  - Continue PTA sertraline    Pulmonary:  Acute hypoxic respiratory failure secondary to COVID-19 pneumonia, rhinovirus  Acute respiratory distress syndrome  Intubated 2/14 with initiation of proning. Has received intermittent diuresis to maintain net negative daily, earlier this hospital course. P/F improved to 184 on 2/17 with no additional proning indicated. Extubated 2/19.   - No diuresis today, goal net even    Cardiovascular:  No acute concerns. TTE 2/9 LVEF 65-70%.     GI/Nutrition  Nutrition  Previously tolerating regular diet. OG in place 2/13. Started TF 2/14.   - RD consulted    Renal/Fluids/Electrolytes:  SARAH, resolved  Cr 1.33 on 2/15 from baseline 0.8-0.9. Likely pre-renal in etiology in the setting of poor PO intake. Patient has been -5L thus far this admission. Cr improved with 1L LR 2/15, 0.5L LR 2/16.   - No diuresis today    Hypernatremia, resolved  Na stable at 136-138   - Discontinued FWF.    Hyperkalemia, resolved  K up to 5.2, now improved at 4.4.  - Continue to monitor      Endocrine:  Hyperglycemia  Glucose 111-159.  - Continue medium sliding scale insulin q4h    ID:  COVID-19 pneumonia (+1/30)  + Rhinovirus  Remdesivir course completed on 2/12. Was considered for tocilizumab. CRP downtrending.  - ID following  - Dexamethasone decreased from 10 mg to 6 mg for total ten day course (2/8-2/17) completed  - Enoxaparin 50 mg BID given elevated D-dimer  - Repeat Covid-19 prior to time of transfer    Concern for superimposed bacterial pneumonia  Procal increased to 0.28 on 2/8. Previously on ceftriaxone, arithromycin, with prophylactic levofloxacin held but now restarted. CXR 2/8 with stable pulmonary opacities compatible with atypical infection vs. Superimposed atelectasis/edema, improved from prior. Sputum culture 2/14 growing GPC 2/16 growing staph haemolyticus, staph epidermidis, and enterococcus faecalis though respiratory status has been improved without evidence of increased secretion.   - Competed 3 days azithromycin  - Hold off additional antibiotics for now  - CXR if worsening leukocytosis     Antibiotics  Azithromycin (2/9-2/11)     Antimicrobial Prophylaxis  Acyclovir (2/8- )  Fluconazole (2/9- )  Levofloxacin (2/9, 2/12- )  Bactrim (2/8- )    Hematology:    MDS/ s/p BMT 2014 complicated by GVHD  No acute concerns. PTA on prednisone and Jakafi.  - BMT consulted  - Hold PTA prednisone 5 mg while on dexamethasone, started prednisone 10 mg on 2/18 per BMT  - Continue PTA Jakafi 5 mg twice daily, dose reduce to 5 mg every day if GFR<59  - Continue PTA Bactrim double strength 1 tablet twice weekly for PCP prophylaxis  - Continue PTA acyclovir 800 mg 2 times daily for prophylaxis against herpes simplex virus  - Continue PTA  fluconazole 100 mg oral daily  - Continue PTA levofloxacin 250 mg every day     Musculoskeletal:  No acute concerns    Skin:  L cheek pressure injury, healed Stage 1  Direct pressure completely off.   - WOCN signed off     General Cares/Prophylaxis:    DVT  Prophylaxis: Enoxaparin (Lovenox) subcutaneous 50 mg BID  GI Prophylaxis: PPI   Restraints: None  Family Communication: Wife (Racquel) last updated 2/19  Code Status: Full code     Lines/tubes/drains:  - PIV x2  - NJ    Disposition:  - Medical ICU     Patient seen and findings/plan discussed with medical ICU staff, Dr. Lawler.    Michael Vaz MD  PGY-1, Internal Medicine  MICU 2 Service    ====================================  INTERVAL HISTORY:   Started on fentanyl gtt overnight. Turned off this AM. Moaning and groaning still. Occasional episodes of apnea with spontaneous resolution. Has been desatting on oxymask and overnight was switched to HFNC. Mouth breathing. Not following commands. Afebrile, occasional non-sustained SVT.     OBJECTIVE:   1. VITAL SIGNS:   Temp:  [97.7  F (36.5  C)-100  F (37.8  C)] 98.7  F (37.1  C)  Pulse:  [] 60  Resp:  [11-32] 19  BP: ()/(45-88) 116/59  FiO2 (%):  [30 %-50 %] 45 %  SpO2:  [92 %-100 %] 97 %  Ventilation Mode: (S) CPAP/PS  (Continuous positive airway pressure with Pressure Support)  FiO2 (%): 45 %  Rate Set (breaths/minute): 16 breaths/min  Tidal Volume Set (mL): 420 mL  PEEP (cm H2O): (S) 5 cmH2O  Pressure Support (cm H2O): (S) 5 cmH2O  Oxygen Concentration (%): 30 %  Resp: 19    2. INTAKE/ OUTPUT:   I/O last 3 completed shifts:  In: 2506.06 [I.V.:346.06; NG/GT:720]  Out: 3060 [Urine:2610; Stool:450]    3. PHYSICAL EXAMINATION:  General: Lying in bed  Neuro: Aitated at times, moaning, not consistently responsive to commands, very hard of hearing  Pulm/Resp: Diminished breath sounds, on HFNC  CV: RRR, no m/g/r  Abdomen: Soft, non-distended, non-tender  : Urine yellow and clear  Extremities: Warm, slight RLE edema (chronic per pt)    4. LABS:   Arterial Blood Gases   Recent Labs   Lab 02/17/21  1615 02/17/21  1131 02/16/21  2159 02/16/21  1416   PH 7.40 7.38 7.37 7.30*   PCO2 53* 54* 51* 60*   PO2 90 88 83 71*   HCO3 32* 32* 29* 30*     Complete Blood Count    Recent Labs   Lab 02/20/21  0516 02/19/21 0319 02/18/21 0415 02/17/21  0349   WBC 12.5* 14.1* 11.0 9.3   HGB 12.9* 12.8* 12.8* 12.2*    185 205 206     Basic Metabolic Panel  Recent Labs   Lab 02/20/21  0516 02/19/21  1634 02/19/21 0319 02/18/21  1908 02/18/21  1009 02/18/21  0415    136 138 141  --  144   POTASSIUM 4.4 4.6 4.8  --  4.9 5.0   CHLORIDE 105 103 105  --   --  110*   CO2 29 25 32  --   --  34*   BUN 39* 37* 38*  --   --  49*   CR 0.76 0.78 0.69  --   --  0.75   * 130* 129*  --   --  195*     Liver Function Tests  Recent Labs   Lab 02/20/21  0516 02/19/21 0319 02/18/21 0415 02/17/21  0349 02/14/21  0637 02/14/21  0637   AST 44 37 35 34   < >  --    ALT 56 58 56 40   < >  --    ALKPHOS 88 89 82 91   < >  --    BILITOTAL 0.8 0.6 0.4 0.4   < >  --    ALBUMIN 2.2* 2.4* 2.2* 2.1*   < >  --    INR 1.02 0.98 1.07  --   --  1.23*    < > = values in this interval not displayed.     5. RADIOLOGY:   No results found for this or any previous visit (from the past 24 hour(s)).

## 2021-02-20 NOTE — CONSULTS
WO Nurse Inpatient Pressure Injury Assessment   Reason for consultation: Evaluate and treat L) cheek      ASSESSMENT  Pressure Injury: on L) cheek , hospital acquired ,   This is a Medical Device Related Pressure Injury (MDRPI) due to hi flow oxygen tubing  Pressure Injury is healed Stage 1   Contributing factor of the pressure injury: pressure and immobility  Status: healed  Recommend provider order: None, at this time     2/16- Unable to visualize the area due ETAD in place. Direct pressure is completely off. Spoke with RN and verbalized no any other skin concerns at this time.     TREATMENT PLAN  L) cheek wound: Leave it MEE  Orders Written  WO Nurse follow-up plan:signing off  Nursing to notify the Provider(s) and re-consult the WOC Nurse if wound(s) deteriorates or new skin concern.    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    Objective Data  Containment of urine/stool: Indwelling catheter    Current Diet/ Nutrition:  None      Output:   I/O last 3 completed shifts:  In: 2506.06 [I.V.:346.06; NG/GT:720]  Out: 3060 [Urine:2610; Stool:450]    Risk Assessment:   Sensory Perception: 3-->slightly limited  Moisture: 3-->occasionally moist  Activity: 2-->chairfast  Mobility: 3-->slightly limited  Nutrition: 3-->adequate  Friction and Shear: 2-->potential problem  Sven Score: 16      Labs:   Recent Labs   Lab 02/20/21  0516   ALBUMIN 2.2*   HGB 12.9*   INR 1.02   WBC 12.5*   CRP 22.0*       Physical Exam  Skin inspection: focused BL cheeks    Wound Location:  L) cheek  Wound History: Pt had HFNC in place.   Measurements (length x width x depth, in cm)- healed    Interventions  Current support surface: Standard  Low air loss mattress  Current off-loading measures: Foam padding  and Pillows  Repositioning aid: Pillows  Visual inspection of wound(s) completed   Wound Care: was done per plan of care.  Supplies: not necessary  Educated provided: plan of care, wound progress and Off-loading pressure- previous visit  Education provided to: nurse  Discussed importance of:off-loading pressure to wound and dressing change frequency- previous visit  Discussed plan of care with Nurse    Bernie Ruiz RN

## 2021-02-20 NOTE — PROGRESS NOTES
BMT Consult Note   Date of Service: 02/20/2021    Patient: Deejay Dior  MRN: [id number]  Admission Date: 2/8/2021  Hospital Day # 12    Reason for Consult: chronic GVHD management in s/o COVID    BMT Recommendations 2/20:   - No current BMT issues  - Continue pred 10/d  - Continue Jakafi 5mg BID  - Continue prophylactic ACV, fluconazole, levofloxacin, Bactrim  - Once patient is ready to transfer out of the ICU to , recommend repeating COVID-19 test to help w/ room assignment  - If worse leukocytosis (presumably partly 2/2 steroids), repeat chest imaging    Assessment & Plan:   Deejay Dior is a 70 year old man with a 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 Dior is 6 year, 7 months out from allo-sib PBHSCT c/b mucocutaneous cGHVD and chronic fatigue/dyspnea. No pulmonary cGHVD. His GVHD symptoms have been stable on Jakafi and low-dose pred for some time. His IgG level was normal, no need for IVIg.   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 Great saphenous ablation on the right, with recannulization of left saphenous vein on US from 7/2019. Anticoagulation per COVID protocol/ICU.      Patient was seen and plan of care was discussed with attending physician Dr. Black.    We will continue to follow this patient. Please contact us with  "questions.     Sharon Bayron  Hematology, Oncology & Transplantation fellow  Pager: [phone]  02/20/2021     _________________________________________________    Subjective & Interval History:    Extubated on 2/19 to 4L oxymask now on HFNC 40L. Persistent delirium due to sedatives, ICU delirium.    Physical Exam:    /59   Pulse 60   Temp 98.7  F (37.1  C) (Axillary)   Resp 19   Ht 1.753 m (5' 9\")   Wt 101 kg (222 lb 10.6 oz)   SpO2 97%   BMI 32.88 kg/m    Physical examination deferred t primary team due to public [facility] emergency video visit restrictions.    Labs & Studies: I personally reviewed the following studies:  ROUTINE LABS (Last four results):  CMP  Recent Labs   Lab 02/20/21  0516 02/19/21  1634 02/19/21  0319 02/18/21  1908 02/18/21  1009 02/18/21  0415 02/17/21  0349 02/17/21  0349 02/14/21  0637 02/14/21  0637 02/14/21  0326    136 138 141  --  144   < > 145*   < >  --  138   POTASSIUM 4.4 4.6 4.8  --  4.9 5.0   < > 5.0   < >  --  4.1   CHLORIDE 105 103 105  --   --  110*  --  115*   < >  --  107   CO2 29 25 32  --   --  34*  --  28   < >  --  25   ANIONGAP 3 7 2*  --   --  <1*  --  3   < >  --  6   * 130* 129*  --   --  195*  --  197*   < >  --  134*   BUN 39* 37* 38*  --   --  49*  --  59*   < >  --  39*   CR 0.76 0.78 0.69  --   --  0.75  --  0.92   < >  --  0.96   GFRESTIMATED >90 90 >90  --   --  >90  --  83   < >  --  79   GFRESTBLACK >90 >90 >90  --   --  >90  --  >90   < >  --  >90   JOE 8.4* 9.1 8.9  --   --  9.0  --  8.5   < >  --  8.5   MAG  --   --   --   --   --   --   --  2.8*  --   --  2.4*   PHOS  --   --   --   --   --   --   --  2.2*  --  4.5  --    PROTTOTAL 5.6*  --  5.9*  --   --  5.8*  --  5.7*   < >  --  6.0*   ALBUMIN 2.2*  --  2.4*  --   --  2.2*  --  2.1*   < >  --  2.3*   BILITOTAL 0.8  --  0.6  --   --  0.4  --  0.4   < >  --  0.6   ALKPHOS 88  --  89  --   --  82  --  91   < >  --  116   AST 44  --  37  --   --  35  --  34   < >  --  33   ALT " 56  --  58  --   --  56  --  40   < >  --  36    < > = values in this interval not displayed.     CBC  Recent Labs   Lab 02/20/21  0516 02/19/21 0319 02/18/21 0415 02/17/21  0349   WBC 12.5* 14.1* 11.0 9.3   RBC 4.04* 4.12* 3.91* 3.88*   HGB 12.9* 12.8* 12.8* 12.2*   HCT 38.2* 39.7* 38.0* 37.1*   MCV 95 96 97 96   MCH 31.9 31.1 32.7 31.4   MCHC 33.8 32.2 33.7 32.9   RDW 17.9* 17.8* 18.2* 18.0*    185 205 206     INR  Recent Labs   Lab 02/20/21 0516 02/19/21 0319 02/18/21 0415 02/14/21  0637   INR 1.02 0.98 1.07 1.23*      Attending note.The patient was seen and examined by me separately from the fellow provider. The note reflects our mutual assessment and plans and were approved by me personally.   I personally reviewed today's lab results vital signs and radiology results.     Each point of the assessment and plan were reviewed by the midlevel and me and either endorsed by me or were my added decisions.     My pertinent physical findings today are: not possible: video     My assessment and plan are: 71 yo 6 years post allo for MDS on prednisone and Jakafi for CGVHD now with serious COVID pneumonia. Continue Jakafi and hold predsinone while on dex burst, then resume.Will probably need intubation. Ci No changes in immunosupession. No current BMT issues. May need to reduce Jakafi if renal function further deteriorates.GFR increased: no reason to decrease Jakafi: monitor. Add 10 mg prednisone daily when decadron ends. Pressure support trial under way. Extubated but delerious: lighten sedation and pain meds.     Edgar Black M.D.

## 2021-02-20 NOTE — PLAN OF CARE
ICU End of Shift Summary. See flowsheets for vital signs and detailed assessment.    Changes this shift: Remains restless overnight on precedex. Soft restraints remain off. Continues to moan/groan all night, occasionally yelling and pulling at lines/tubes. Moves all extremities to commands and attempted reorientation, however pt remains very confused and hard of hearing. PRN haldol given x2. PRN fentanyl given x2. Despite medicating, pt tachypneic with occasional cheyne bee respirations and complaints of shortness of breath on oxymask 6L. Started on HFNC 45% 40LPM. VBG showing improvement from last evening. Started on fentanyl gtt with slight improvement. Occasional soft blood pressures - MICU aware. No BM overnight. Adequate UOP overnight. PIV inserted.     Plan: Delirium bundle. Monitor respiratory status - consider bipap if tolerates? Unable to wean O2 overnight.     Problem: Altered Behavior (Delirium)  Goal: Improved Behavioral Control  Outcome: No Change     Problem: Restraint for Non-Violent/Non-Self-Destructive Behavior  Goal: Prevent/Manage Potential Problems  Description: Maintain safety of patient and others during period of restraint.  Promote psychological and physical wellbeing.  Prevent injury to skin and involved body parts.  Promote nutrition, hydration, and elimination.  Outcome: No Change

## 2021-02-20 NOTE — PHARMACY-CONSULT NOTE
Pharmacy Delirium Chart Review    Upon chart review, the following medications may contribute to possible patient delirium: none.  Please consult unit pharmacist with further questions.    Valerie Betancourt RP  Phone/Pager: *[phone]

## 2021-02-20 NOTE — PLAN OF CARE
ICU End of Shift Summary. See flowsheets for vital signs and detailed assessment.    Changes this shift: Alert, PERRLA, moves all extremities.  Does not follow commands or track.  Extremely confused-constantly moaning, incoherent speech, swearing, pulling at lines (IV, rios, rectal pouch, ETT, oxymask).  Notified MICU multiple times-haldol, ibuprofen and dex gtt restarted with no improvement.  TMax 100-tylenol given.  Extubated to 4-6LPM oxymask @ ~ 12:30.  VBG 7.66/23/54/26-notified MICU-no interventions.  LS: coarse.  Pt unable to perform pulmonary hygiene.  SB-ST c occasional PACs and PVCs-peaked T wave.  K+ improving.  Two runs of unsustained SVT.  Increasingly tachycardic throughout shift.  Normotensive-HTN.  TF @ goal-verbal orders for no FW.  Adequate UO (I/O not accurate d/t patient removing rios and rectal pouch).  sliding scale insulin.  Moderate stool output.      Plan: Will continue to support and monitor.

## 2021-02-21 ENCOUNTER — APPOINTMENT (OUTPATIENT)
Dept: OCCUPATIONAL THERAPY | Facility: CLINIC | Age: 71
DRG: 207 | End: 2021-02-21
Attending: INTERNAL MEDICINE
Payer: MEDICARE

## 2021-02-21 ENCOUNTER — APPOINTMENT (OUTPATIENT)
Dept: PHYSICAL THERAPY | Facility: CLINIC | Age: 71
DRG: 207 | End: 2021-02-21
Attending: INTERNAL MEDICINE
Payer: MEDICARE

## 2021-02-21 ENCOUNTER — APPOINTMENT (OUTPATIENT)
Dept: SPEECH THERAPY | Facility: CLINIC | Age: 71
DRG: 207 | End: 2021-02-21
Attending: INTERNAL MEDICINE
Payer: MEDICARE

## 2021-02-21 LAB
ANION GAP SERPL CALCULATED.3IONS-SCNC: 3 MMOL/L (ref 3–14)
BASE EXCESS BLDV CALC-SCNC: 6.1 MMOL/L
BUN SERPL-MCNC: 40 MG/DL (ref 7–30)
C DIFF TOX B STL QL: NEGATIVE
CALCIUM SERPL-MCNC: 9 MG/DL (ref 8.5–10.1)
CHLORIDE SERPL-SCNC: 105 MMOL/L (ref 94–109)
CO2 SERPL-SCNC: 30 MMOL/L (ref 20–32)
CREAT SERPL-MCNC: 0.81 MG/DL (ref 0.66–1.25)
CRP SERPL-MCNC: 58 MG/L (ref 0–8)
ERYTHROCYTE [DISTWIDTH] IN BLOOD BY AUTOMATED COUNT: 18 % (ref 10–15)
GFR SERPL CREATININE-BSD FRML MDRD: 89 ML/MIN/{1.73_M2}
GLUCOSE BLDC GLUCOMTR-MCNC: 114 MG/DL (ref 70–99)
GLUCOSE BLDC GLUCOMTR-MCNC: 115 MG/DL (ref 70–99)
GLUCOSE BLDC GLUCOMTR-MCNC: 116 MG/DL (ref 70–99)
GLUCOSE BLDC GLUCOMTR-MCNC: 128 MG/DL (ref 70–99)
GLUCOSE BLDC GLUCOMTR-MCNC: 129 MG/DL (ref 70–99)
GLUCOSE BLDC GLUCOMTR-MCNC: 130 MG/DL (ref 70–99)
GLUCOSE SERPL-MCNC: 127 MG/DL (ref 70–99)
HCO3 BLDV-SCNC: 29 MMOL/L (ref 21–28)
HCT VFR BLD AUTO: 38.4 % (ref 40–53)
HGB BLD-MCNC: 12.7 G/DL (ref 13.3–17.7)
LABORATORY COMMENT REPORT: NORMAL
MCH RBC QN AUTO: 31.4 PG (ref 26.5–33)
MCHC RBC AUTO-ENTMCNC: 33.1 G/DL (ref 31.5–36.5)
MCV RBC AUTO: 95 FL (ref 78–100)
O2/TOTAL GAS SETTING VFR VENT: ABNORMAL %
OXYHGB MFR BLDV: 96 %
PCO2 BLDV: 36 MM HG (ref 40–50)
PH BLDV: 7.52 PH (ref 7.32–7.43)
PLATELET # BLD AUTO: 169 10E9/L (ref 150–450)
PO2 BLDV: 88 MM HG (ref 25–47)
POTASSIUM SERPL-SCNC: 4 MMOL/L (ref 3.4–5.3)
RBC # BLD AUTO: 4.04 10E12/L (ref 4.4–5.9)
SARS-COV-2 RNA RESP QL NAA+PROBE: NEGATIVE
SODIUM SERPL-SCNC: 138 MMOL/L (ref 133–144)
SPECIMEN SOURCE: NORMAL
SPECIMEN SOURCE: NORMAL
WBC # BLD AUTO: 9.3 10E9/L (ref 4–11)

## 2021-02-21 PROCEDURE — 250N000009 HC RX 250: Performed by: INTERNAL MEDICINE

## 2021-02-21 PROCEDURE — 85027 COMPLETE CBC AUTOMATED: CPT | Performed by: INTERNAL MEDICINE

## 2021-02-21 PROCEDURE — 258N000003 HC RX IP 258 OP 636: Performed by: INTERNAL MEDICINE

## 2021-02-21 PROCEDURE — 999N000215 HC STATISTIC HFNC ADULT NON-CPAP

## 2021-02-21 PROCEDURE — 250N000013 HC RX MED GY IP 250 OP 250 PS 637: Performed by: STUDENT IN AN ORGANIZED HEALTH CARE EDUCATION/TRAINING PROGRAM

## 2021-02-21 PROCEDURE — 36415 COLL VENOUS BLD VENIPUNCTURE: CPT | Performed by: INTERNAL MEDICINE

## 2021-02-21 PROCEDURE — 250N000012 HC RX MED GY IP 250 OP 636 PS 637: Performed by: STUDENT IN AN ORGANIZED HEALTH CARE EDUCATION/TRAINING PROGRAM

## 2021-02-21 PROCEDURE — 250N000011 HC RX IP 250 OP 636: Performed by: STUDENT IN AN ORGANIZED HEALTH CARE EDUCATION/TRAINING PROGRAM

## 2021-02-21 PROCEDURE — U0005 INFEC AGEN DETEC AMPLI PROBE: HCPCS

## 2021-02-21 PROCEDURE — 99233 SBSQ HOSP IP/OBS HIGH 50: CPT | Mod: GC

## 2021-02-21 PROCEDURE — 92526 ORAL FUNCTION THERAPY: CPT | Mod: GN

## 2021-02-21 PROCEDURE — 258N000003 HC RX IP 258 OP 636

## 2021-02-21 PROCEDURE — 97530 THERAPEUTIC ACTIVITIES: CPT | Mod: GO | Performed by: OCCUPATIONAL THERAPIST

## 2021-02-21 PROCEDURE — 250N000013 HC RX MED GY IP 250 OP 250 PS 637: Performed by: INTERNAL MEDICINE

## 2021-02-21 PROCEDURE — 250N000011 HC RX IP 250 OP 636: Performed by: NURSE PRACTITIONER

## 2021-02-21 PROCEDURE — 272N000078 HC NUTRITION PRODUCT INTERMEDIATE LITER

## 2021-02-21 PROCEDURE — 250N000013 HC RX MED GY IP 250 OP 250 PS 637: Performed by: NURSE PRACTITIONER

## 2021-02-21 PROCEDURE — 99233 SBSQ HOSP IP/OBS HIGH 50: CPT | Mod: GC | Performed by: INTERNAL MEDICINE

## 2021-02-21 PROCEDURE — 82805 BLOOD GASES W/O2 SATURATION: CPT | Performed by: INTERNAL MEDICINE

## 2021-02-21 PROCEDURE — U0003 INFECTIOUS AGENT DETECTION BY NUCLEIC ACID (DNA OR RNA); SEVERE ACUTE RESPIRATORY SYNDROME CORONAVIRUS 2 (SARS-COV-2) (CORONAVIRUS DISEASE [COVID-19]), AMPLIFIED PROBE TECHNIQUE, MAKING USE OF HIGH THROUGHPUT TECHNOLOGIES AS DESCRIBED BY CMS-2020-01-R: HCPCS

## 2021-02-21 PROCEDURE — 97166 OT EVAL MOD COMPLEX 45 MIN: CPT | Mod: GO | Performed by: OCCUPATIONAL THERAPIST

## 2021-02-21 PROCEDURE — 999N000157 HC STATISTIC RCP TIME EA 10 MIN

## 2021-02-21 PROCEDURE — 80048 BASIC METABOLIC PNL TOTAL CA: CPT | Performed by: STUDENT IN AN ORGANIZED HEALTH CARE EDUCATION/TRAINING PROGRAM

## 2021-02-21 PROCEDURE — 97161 PT EVAL LOW COMPLEX 20 MIN: CPT | Mod: GP

## 2021-02-21 PROCEDURE — 206N000001 HC R&B BMT UMMC

## 2021-02-21 PROCEDURE — 97530 THERAPEUTIC ACTIVITIES: CPT | Mod: GP

## 2021-02-21 PROCEDURE — 86140 C-REACTIVE PROTEIN: CPT | Performed by: INTERNAL MEDICINE

## 2021-02-21 PROCEDURE — 92610 EVALUATE SWALLOWING FUNCTION: CPT | Mod: GN

## 2021-02-21 PROCEDURE — 999N001017 HC STATISTIC GLUCOSE BY METER IP

## 2021-02-21 PROCEDURE — 87493 C DIFF AMPLIFIED PROBE: CPT | Performed by: INTERNAL MEDICINE

## 2021-02-21 PROCEDURE — 250N000013 HC RX MED GY IP 250 OP 250 PS 637

## 2021-02-21 RX ORDER — FUROSEMIDE 10 MG/ML
20 INJECTION INTRAMUSCULAR; INTRAVENOUS ONCE
Status: COMPLETED | OUTPATIENT
Start: 2021-02-21 | End: 2021-02-21

## 2021-02-21 RX ADMIN — Medication: at 08:13

## 2021-02-21 RX ADMIN — ACETAMINOPHEN 650 MG: 325 TABLET, FILM COATED ORAL at 21:26

## 2021-02-21 RX ADMIN — Medication 1 TABLET: at 12:08

## 2021-02-21 RX ADMIN — SODIUM CHLORIDE, POTASSIUM CHLORIDE, SODIUM LACTATE AND CALCIUM CHLORIDE 500 ML: 600; 310; 30; 20 INJECTION, SOLUTION INTRAVENOUS at 08:39

## 2021-02-21 RX ADMIN — PANTOPRAZOLE SODIUM 40 MG: 40 TABLET, DELAYED RELEASE ORAL at 07:50

## 2021-02-21 RX ADMIN — ENOXAPARIN SODIUM 50 MG: 100 INJECTION SUBCUTANEOUS at 12:08

## 2021-02-21 RX ADMIN — PREDNISONE 10 MG: 5 TABLET ORAL at 07:46

## 2021-02-21 RX ADMIN — LEVOFLOXACIN 250 MG: 250 TABLET, FILM COATED ORAL at 07:46

## 2021-02-21 RX ADMIN — DEXMEDETOMIDINE 0.8 MCG/KG/HR: 100 INJECTION, SOLUTION, CONCENTRATE INTRAVENOUS at 04:25

## 2021-02-21 RX ADMIN — FLUCONAZOLE 100 MG: 40 POWDER, FOR SUSPENSION ORAL at 07:49

## 2021-02-21 RX ADMIN — RUXOLITINIB 5 MG: 5 TABLET ORAL at 07:41

## 2021-02-21 RX ADMIN — ACYCLOVIR 800 MG: 200 SUSPENSION ORAL at 07:49

## 2021-02-21 RX ADMIN — SERTRALINE HYDROCHLORIDE 50 MG: 50 TABLET ORAL at 07:46

## 2021-02-21 RX ADMIN — QUETIAPINE 50 MG: 25 TABLET, FILM COATED ORAL at 21:26

## 2021-02-21 RX ADMIN — FUROSEMIDE 20 MG: 10 INJECTION, SOLUTION INTRAMUSCULAR; INTRAVENOUS at 05:10

## 2021-02-21 RX ADMIN — ENOXAPARIN SODIUM 50 MG: 100 INJECTION SUBCUTANEOUS at 00:22

## 2021-02-21 RX ADMIN — RUXOLITINIB 5 MG: 5 TABLET ORAL at 21:25

## 2021-02-21 RX ADMIN — MELATONIN TAB 3 MG 3 MG: 3 TAB at 21:27

## 2021-02-21 RX ADMIN — ACYCLOVIR 800 MG: 200 SUSPENSION ORAL at 21:26

## 2021-02-21 RX ADMIN — ONDANSETRON 4 MG: 2 INJECTION INTRAMUSCULAR; INTRAVENOUS at 08:11

## 2021-02-21 RX ADMIN — MULTIVITAMIN 15 ML: LIQUID ORAL at 07:46

## 2021-02-21 RX ADMIN — ACETAMINOPHEN 650 MG: 325 TABLET, FILM COATED ORAL at 04:24

## 2021-02-21 RX ADMIN — Medication 2 PACKET: at 07:48

## 2021-02-21 RX ADMIN — ACETAMINOPHEN 650 MG: 325 TABLET, FILM COATED ORAL at 12:07

## 2021-02-21 ASSESSMENT — MIFFLIN-ST. JEOR: SCORE: 1728.38

## 2021-02-21 ASSESSMENT — ACTIVITIES OF DAILY LIVING (ADL)
ADLS_ACUITY_SCORE: 19
ADLS_ACUITY_SCORE: 23
ADLS_ACUITY_SCORE: 23
ADLS_ACUITY_SCORE: 19

## 2021-02-21 NOTE — PROGRESS NOTES
02/21/21 1305   Quick Adds   Type of Visit Initial Occupational Therapy Evaluation   Living Environment   People in home spouse   Current Living Arrangements   ([facility])   Transportation Anticipated car, drives self;family or friend will provide   Living Environment Comments No TERRENCE, all needs met on main floor, tub shower   Self-Care   Usual Activity Tolerance good   Current Activity Tolerance poor   Regular Exercise Yes   Activity/Exercise Type walking   Exercise Amount/Frequency 3-5 times/wk   Equipment Currently Used at Home shower chair  (has walker if needed)   Activity/Exercise/Self-Care Comment Pt was I in all ADLs prior to admission including driving, cleaning, and medication management   Disability/Function   Hearing Difficulty or Deaf yes   Use of hearing assistive devices bilateral hearing aids   Fall history within last six months no   Change in Functional Status Since Onset of Current Illness/Injury yes   General Information   Referring Physician Reagan Mitchell MD   Patient/Family Therapy Goal Statement (OT) Return to PLOF   Additional Occupational Profile Info/Pertinent History of Current Problem 70 year old man with a 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   Existing Precautions/Restrictions fall   Cognitive Status Examination   Cognitive Status Comments No issues at baseline per pt. Pt somewhat groggy and forgetful during eval, will continue to assess   Visual Perception   Visual Acuity Wears reading glasses   Range of Motion Comprehensive   Comment, General Range of Motion WFL B UEs   Strength Comprehensive (MMT)   Comment, General Manual Muscle Testing (MMT) Assessment 4/5 grossly B hands/UEs   Clinical Impression   Criteria for Skilled Therapeutic Interventions Met (OT) yes   OT  Diagnosis Decreased I in ADLs due to deconditioning   Assessment of Occupational Performance 5 or more Performance Deficits   Identified Performance Deficits dressing, bathing, toileting, g/h, functional endurance, cognition   Clinical Decision Making Complexity (OT) moderate complexity   Therapy Frequency (OT) 5x/week   Predicted Duration of Therapy 2 weeks   Risk & Benefits of therapy have been explained patient   Total Evaluation Time (Minutes)   Total Evaluation Time (Minutes) 10

## 2021-02-21 NOTE — PROGRESS NOTES
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  Pt was seen by SLP in 4/2016 and regular diet and thin liquids were recommended at that time.  Per pt report, consuming regular diet and thin liquids without difficulty PTA.  Pt failed RN swallow screen.  Clinical swallow evaluation completed per MD order.   General Observations Pt awake and alert, with fatigue       Present no   Pain Assessment   Patient Currently in Pain No   Type of Evaluation   Type of Evaluation Swallow Evaluation   Oral Motor   Oral Musculature generally intact  (generalized weakness, WFL)   Structural Abnormalities none present   Dentition (Oral Motor)   Dentition (Oral Motor) dental appliance/dentures;edentulous   Dental Appliance/Denture (Oral Motor) upper and lower  (used with eating, present and in place)   Facial Symmetry (Oral Motor)   Facial Symmetry (Oral Motor) WNL   Lip Function (Oral Motor)   Lip Range of Motion (Oral Motor) WNL   Tongue Function (Oral Motor)   Tongue ROM (Oral Motor) WNL   Jaw Function (Oral Motor)   Jaw Function (Oral Motor) WNL   Cough/Swallow/Gag Reflex (Oral Motor)   Volitional Throat Clear/Cough (Oral Motor) WNL   Volitional Swallow (Oral Motor) WNL   Vocal Quality/Secretion Management (Oral Motor)   Vocal Quality (Oral Motor) WNL   General Swallowing Observations   Swallowing Evaluation Clinical swallow evaluation   Current Diet/Method of Nutritional Intake (General Swallowing Observations, NIS) nasogastric tube  (NG);NPO   Respiratory Support (General Swallowing Observations) nasal cannula  (HFNC, 50% Fi O2, 30 LPM)   Clinical Swallow Evaluation   Feeding Assistance dependent   Clinical Swallow Evaluation Textures Trialed Thin Liquids;Puree Textures;Semi-Solid   Clinical Swallow Eval: Thin Liquid Texture Trial   Mode of Presentation, Thin Liquids cup;straw;spoon;fed by clinician   Volume of Liquid or Food Presented 3 OZ   Oral Phase of Swallow WFL   Pharyngeal Phase of Swallow intact   Diagnostic Statement No overt s/sx of aspiration, oral phase WFL takiing single sips with a slow rate   Clinical Swallow Evaluation: Puree Solid Texture Trial   Mode of Presentation, Puree spoon;fed by clinician   Volume of Puree Presented 4 tbsp   Oral Phase, Puree WFL   Pharyngeal Phase, Puree intact   Diagnostic Statement No overt s/sx of aspiration, oral phase WFL   Clinical Swallow Evaluation: Semisolid Texture Trial   Mode of Presentation, Semisolid fed by clinician   Volume of Semisolid Food Presented 2 bites   Oral Phase, Semisolid Poor AP movement;Residue in oral cavity   Oral Residue, Semisolid mid posterior tongue   Pharyngeal Phase, Semisolid intact   Diagnostic Statement No overt s/sx of aspiration.  Reduced mastication and prolonged AP transit, mild oral residue cleared with liquid rinse.  Increased aspiration risk   Swallowing Recommendations   Diet Consistency Recommendations full liquid diet   Supervision Level for Intake 1:1 supervision needed   Mode of Delivery Recommendations bolus size, small;slow rate of intake   Swallowing Maneuver Recommendations alternate food and liquid intake   Monitoring/Assistance Required (Eating/Swallowing) stop eating activities when fatigue is present;monitor for cough or change in vocal quality with intake   Recommended Feeding/Eating Techniques (Swallow Eval) maintain upright sitting position for eating;provide 6 smaller meals throughout day;provide assist with feeding;schedule meals prior  to therapy to avoid therapy fatigue   Medication Administration Recommendations, Swallowing (SLP) via NJ   Instrumental Assessment Recommendations reassess via non-instrumental clinical swallow evaluation   General Therapy Interventions   Planned Therapy Interventions Dysphagia Treatment   Dysphagia treatment Oropharyngeal exercise training;Modified diet education;Instruction of safe swallow strategies;Compensatory strategies for swallowing   SLP Therapy Assessment/Plan   Criteria for Skilled Therapeutic Interventions Met (SLP Eval) yes;treatment indicated   SLP Diagnosis Mild-moderate oropharyngeal dysphagia   Rehab Potential (SLP Eval) good, to achieve stated therapy goals   Therapy Frequency (SLP Eval) 5 times/wk   Predicted Duration of Therapy Intervention (SLP Eval) 2 weeks   Comment, Therapy Assessment/Plan (SLP) Clinical swallow evaluation completed per MD order.  Pt presents with mild-moderate oropharyngeal dysphagia in the setting of weakness, decreased respiratory status.  Recommend full liquid diet, with supervision and feeding assist.  Recommend critical meds given crushed in puree or via FT.  Ensure pt is fully alert and upright for all PO, given small bites/sips, alternating bites/sips.  Hold PO with changes to pt's respiratory status.  Pt awake and alert, fatigue noted.  Pt is edentulous, dentures present and in place for assessment.  Generalized weakness of oral mechanism but WFL.  Reduced mastication, prolonged AP transit of bolus resulting in mild oral residue with semisolid texture.  No overt s/sx of aspiration  with thin via spoon/cup/straw taking single sips, puree and oral phase WFL.  Pt requiring cues to follow safe swallow precautions and feeding assist.  SLP to follow, anticipate pt may have SLP needs at time of discharge pending progress.   Therapy Plan Review/Discharge Plan (SLP)   Therapy Plan Review (SLP) evaluation/treatment results reviewed;risks/benefits reviewed;care plan/treatment  goals reviewed;current/potential barriers reviewed;participants voiced agreement with care plan;participants included;patient   Demonstrates Need for Referral to Another Service (SLP) clinical nutrition services/dietitian;physical therapist;occupational therapist   SLP Discharge Planning    SLP Discharge Recommendation (DC Rec) Transitional Care Facility   SLP Rationale for DC Rec Swallow function below baseline statsu   SLP Brief overview of current status  Recommend full liquid diet, with supervision and feeding assist.  Recommend critical meds given crushed in puree or via FT.  Ensure pt is fully alert and upright for all PO, given small bites/sips, alternating bites/sips.  Hold PO with changes to pt's respiratory status.     Total Evaluation Time   Total Evaluation Time (Minutes) 12

## 2021-02-21 NOTE — PLAN OF CARE
ICU End of Shift Summary. See flowsheets for vital signs and detailed assessment.    Changes this shift: 10L oxymask majority of the night. Around 0400, desaturated to mid 80's with complaints of shortness of breath. MICU notified - lasix ordered d/t +700mL yesterday (not including unmeasured stool x2 and urine x1 occurrences) and placed on HFNC. Per MICU, AM VBG not needed d/t intact mentation - A&O x4, moves all extremities to command, makes needs known. Able to sleep tonight with bedtime seroquel, precedex gtt, and PRN haldol x1. LS diminished. Occasional soft BP's overnight, but will come back up on its own. AM labs not drawn at this time, will defer to following RN to follow up.    Blood pressure 60's/40's with MAP's 50's at 0700. Attempted to notify MICU multiple times with no response. Precedex turned off at this moment. Pt continues to mentate and communicates as normal.    Plan: Continue to optimize respiratory status. Delirium bundle. Consider transfer out of ICU?

## 2021-02-21 NOTE — PROGRESS NOTES
Critical Care Services Progress Note:  I personally examined and evaluated the patient today. I formulated today s plan with the house staff team or resident(s) and agree with the findings and plan in the associated note (see separately attested resident note).   I have evaluated all laboratory values and imaging studies from the past 24 hours.  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 Initially on HFNC, then NIPPV, and then intubated on but intubated on 2/13.  Proned ventilated until 2/16. Has completed treatment for COVID19. Extubated on 2/19  Overnight events/pertinent findings today:    Slept well overnight.  Answering questions appropriately     /80  HR 79  4L oximask with O2 saturation 95%  Assessment/plan:  COVID19 ARDS: recovering. Still requiring a bit of oxygen, but breathing comfortably  - S/p Dexamethasone  - s/p remdesevir  - keep SaO2 > 92%    Delirium: much improved.    - floor team can consider stopping PM seroquel.  This was started in the ICU.       Nutrition:  - Tube feeds    Hx of BMT  - heme/onc following.  Continuing anti-infective ppx with acyclovir, fluconazole, levofloxacin.   - prednisone 10    Okay to transfer to the floor.     Rest per resident note.      Johnie Lawler MD

## 2021-02-21 NOTE — PROGRESS NOTES
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 Transferred to MICU on 2/10 for worsening respiratory status requiring intubation.      CHANGES and MAJOR THINGS TODAY:   - Transfer to BMT floor today; will repeat COVID-19 test as part of transfer process  - Start full liquid diet, per SLP     PLAN:     Neuro:  Agitated Delirium - improving  Improvement in delirium over past 24-hours, with patient A&Ox3 and appropriate in conversation today off precedex.  - Haldol 2 mg PRN   - Seroquel 50 mg at bedtime  - Delirium precautions, including keeping hearing aids in during the day.    Pain and sedation  - Scheduled actaminophen 650 q8h  - Avoiding opioids and benzodiazepines in setting of delirium     MDD  - Continue PTA sertraline     Pulmonary:  Acute hypoxic respiratory failure, presumed 2/2 secondary to COVID-19 pneumonia, rhinovirus, and mild pulmonary edema  Acute respiratory distress syndrome  Intubated 2/14 with initiation of proning. Has received intermittent diuresis to maintain net negative daily, earlier this hospital course. P/F improved to 184 on 2/17 with no additional proning indicated. Extubated 2/19, with patient managed on oxymask/HFNC since then. Patient did require escalation to HFNC overnight, likely related to mild pulmonary edema, with patient now breathing comfortably on oxymask 4-6L this AM after diuresis overnight.  - No further diuresis today, goal net even    Cardiovascular:  Transient hypotension episode, presumed 2/2 to intravascular depletion following diuresis - improved  Transiently hypotensive to MAP's 50's this AM after diuresis, with no associated concern for shock. BP's improved after 500ml LR bolus, overall  suggestive of transient intravascular depletion as likely etiology     GI/Nutrition  Nutrition  Previously tolerating regular diet. OG in place 2/13. Started TF 2/14. Given improvement in respiratory status, SLP consulted with patient cleared for full liquid diet as of 2/21.  - RD consulted   - Start full liquid diet     Renal/Fluids/Electrolytes:  SARAH, presumed prerenal 2/2 to poor PO intake - resolved  Cr 1.33 on 2/15 up from baseline 0.8-0.9, improved to baseline after gentle fluid resuscitation.     Endocrine:  Hyperglycemia  - Continue medium sliding scale insulin q4h     ID:  Elevated CRP, unclear etiology  Rise in CRP today to 58, though no clinical evidence of breakthrough fever or infectious symptoms. Presume that this may be secondary to cessation of antiinflammatory medications (e.g. dexamethasone) following COVID-19 infection. Given no clinical evidence of infection, will plan to trend CRP and monitor for breakthrough infectious symptoms for now. Additionally, will test for C. Diff given   -- Trend CRP daily  -- Sputum culture from 2/14 with recent growth of staph haemolyticus, staph epidermidis, and enterococcus faecalis, which was presumed colonization and not treated with antibiotics. If new fever or worsening respiratory status, would repeat CXR.    COVID-19 pneumonia (+1/30)  + Rhinovirus  Remdesivir course completed on 2/12. Was considered for tocilizumab.  - ID following  - s/p 10-day course of dexamethasone (2/8-2/17) completed  - Enoxaparin 50 mg BID given elevated D-dimer  - Repeat COVID-19 today prior to transfer    Concern for superimposed bacterial pneumonia - resolved  Procal increased to 0.28 on 2/8 with CXR that day notable for stable pulmonary opacities compatible with atypical infection vs atelectasis/edema. Patient has since completed treatment with 3-day course of azithromycin (2/9-2/11).     Antibiotics  Azithromycin (2/9-2/11)     Antimicrobial Prophylaxis  Acyclovir (2/8-  )  Fluconazole (2/9- )  Levofloxacin (2/9, 2/12- )  Bactrim (2/8- )     Hematology:    MDS/ s/p BMT 2014 complicated by GVHD  No acute concerns. PTA on prednisone and Jakafi.  - BMT consulted  - Hold PTA prednisone 5 mg while on dexamethasone, started prednisone 10 mg on 2/18 per BMT  - Continue PTA Jakafi 5 mg twice daily, dose reduce to 5 mg every day if GFR<59  - Continue PTA Bactrim double strength 1 tablet twice weekly for PCP prophylaxis  - Continue PTA acyclovir 800 mg 2 times daily for prophylaxis against herpes simplex virus  - Continue PTA  fluconazole 100 mg oral daily  - Continue PTA levofloxacin 250 mg every day      Musculoskeletal:  No acute concerns     Skin:  L cheek pressure injury, healed Stage 1  Direct pressure completely off.   - WOCN signed off      General Cares/Prophylaxis:    DVT Prophylaxis: Enoxaparin (Lovenox) subcutaneous 50 mg BID  GI Prophylaxis: PPI   Restraints: None  Family Communication: Wife (Racquel) updated via phone at bedside  Code Status: Full code      Lines/tubes/drains:  - PIV x2  - NJ     Disposition:  - Transfer to BMT floor     Patient seen and findings/plan discussed with medical ICU staff, Dr. Lawler.    Iván Monroe    ====================================  INTERVAL HISTORY:   Nursing notes reviewed. Patient with desats to mid 80's overnight with associated presence of dyspnea, with improvement and transition back to oxymask after diuresis. Patient did have transient hypotension (MAPs 50's) w/o change in mental status after diuresis, though improved with giving back 500ml LR bolus. Following bolus, patient endorses feeling comfortable, denying CP, dyspnea, or fever. Endorses gradual improvement in productive cough over the past few days.     OBJECTIVE:   1. VITAL SIGNS:   Temp:  [97.6  F (36.4  C)-98.9  F (37.2  C)] 97.6  F (36.4  C)  Pulse:  [58-96] 96  Resp:  [7-40] 17  BP: ()/() 127/114  FiO2 (%):  [45 %-100 %] 50 %  SpO2:  [83 %-100 %] 100  %  FiO2 (%): 50 %  Resp: 17    2. INTAKE/ OUTPUT:   I/O last 3 completed shifts:  In: 2498.74 [I.V.:453.74; NG/GT:665]  Out: 1600 [Urine:1600]    3. PHYSICAL EXAMINATION:  General: Resting comfortably in bed, NAD  HEENT: NCAT, PERRL  Neuro: A&Ox3, no focal sensorimotor deficits  Pulm/Resp: Breathing comfortably on oxymask. Auscultation notable for bibasilar crackles, though otherwise relatively CTAB.  CV: RRR, normal S1 and S2, no M/R/G  Abdomen: Soft, non-distended, non-tender, active BS's  Extremities: 1+ pedal edema bilaterally     4. LABS:   Arterial Blood Gases   Recent Labs   Lab 02/17/21  1615 02/17/21  1131 02/16/21  2159 02/16/21  1416   PH 7.40 7.38 7.37 7.30*   PCO2 53* 54* 51* 60*   PO2 90 88 83 71*   HCO3 32* 32* 29* 30*     Complete Blood Count   Recent Labs   Lab 02/21/21  0741 02/20/21  0516 02/19/21  0319 02/18/21  0415   WBC 9.3 12.5* 14.1* 11.0   HGB 12.7* 12.9* 12.8* 12.8*    171 185 205     Basic Metabolic Panel  Recent Labs   Lab 02/21/21  0741 02/20/21  0516 02/19/21  1634 02/19/21  0319    138 136 138   POTASSIUM 4.0 4.4 4.6 4.8   CHLORIDE 105 105 103 105   CO2 30 29 25 32   BUN 40* 39* 37* 38*   CR 0.81 0.76 0.78 0.69   * 132* 130* 129*     Liver Function Tests  Recent Labs   Lab 02/20/21  0516 02/19/21  0319 02/18/21  0415 02/17/21  0349   AST 44 37 35 34   ALT 56 58 56 40   ALKPHOS 88 89 82 91   BILITOTAL 0.8 0.6 0.4 0.4   ALBUMIN 2.2* 2.4* 2.2* 2.1*   INR 1.02 0.98 1.07  --      Coagulation Profile  Recent Labs   Lab 02/20/21  0516 02/19/21  0319 02/18/21  0415   INR 1.02 0.98 1.07       5. RADIOLOGY:   No results found for this or any previous visit (from the past 24 hour(s)).

## 2021-02-21 NOTE — PLAN OF CARE
ICU End of Shift Summary. See flowsheets for vital signs and detailed assessment.    Changes this shift: PERRLA, intermittently following commands, moves all extremities.  Delirium improving-now able to intermittently make needs known.  Afebrile.  SB-SR c PACs.  Normotensive.  Team requested switch from HFNC to oxymask (requiring 10-15LPM).  LS: variable.  Refuses to perform pulmonary hygiene.   Adequate UO.  2 large loose stools.  Up to chair for ~ 3 hours.  Failed bedside speech eval-asked MICU to place speech eval.    Plan: Will continue to support and monitor.

## 2021-02-21 NOTE — PROGRESS NOTES
Brief Progress Note    Called to bedside as patient was desaturating into mid 80s. Patient reporting intermittent, worsening dyspnea.    Laying flat in bed, talking in full sentences. Tired, nontoxic. No accessory muscle use. Normal respiratory effort on 15 L oximask, saturating low 90s with me in the room.    Plan:  - start High Flow  - angle bed to 30-45 degrees  - lasix 20 mg, net positive 700 cc 2/20 - goal to be net even    Vidal Alatorre MD  Internal Medicine PGY-2

## 2021-02-21 NOTE — PROGRESS NOTES
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 Dior is 6 year, 7 months out from allo-sib PBHSCT c/b mucocutaneous cGHVD and chronic fatigue/dyspnea. No pulmonary cGHVD. His GVHD symptoms have been stable on Jakafi and low-dose pred for some time. His IgG level was normal, no need for IVIg.   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 Great saphenous ablation on the right, with recannulization of left saphenous vein on US from 7/2019. Anticoagulation per COVID protocol/ICU.      Patient was seen and plan of care was discussed with attending physician Dr. Black.    We will continue to follow this patient. Please contact us with questions.     Sharon Verma  Hematology, Oncology &  "Transplantation fellow  Pager: [phone]  02/21/2021   _________________________________________________    Subjective & Interval History:    Desaturated this morning while on 10L oxymask into mid-80s with sob. Currently on HFNC  Delirium has improved. He was typing on his phone this morning.     Physical Exam:    BP 97/56   Pulse 66   Temp 98.9  F (37.2  C) (Oral)   Resp 26   Ht 1.753 m (5' 9\")   Wt 98.8 kg (217 lb 13 oz)   SpO2 98%   BMI 32.17 kg/m    Physical examination deferred t primary team due to public health emergency video visit restrictions.    Labs & Studies: I personally reviewed the following studies:  ROUTINE LABS (Last four results):  CMP  Recent Labs   Lab 02/21/21  0741 02/20/21  0516 02/19/21  1634 02/19/21  0319 02/18/21  0415 02/18/21  0415 02/17/21  0349 02/17/21  0349    138 136 138   < > 144   < > 145*   POTASSIUM 4.0 4.4 4.6 4.8   < > 5.0   < > 5.0   CHLORIDE 105 105 103 105  --  110*  --  115*   CO2 30 29 25 32  --  34*  --  28   ANIONGAP 3 3 7 2*  --  <1*  --  3   * 132* 130* 129*  --  195*  --  197*   BUN 40* 39* 37* 38*  --  49*  --  59*   CR 0.81 0.76 0.78 0.69  --  0.75  --  0.92   GFRESTIMATED 89 >90 90 >90  --  >90  --  83   GFRESTBLACK >90 >90 >90 >90  --  >90  --  >90   JOE 9.0 8.4* 9.1 8.9  --  9.0  --  8.5   MAG  --   --   --   --   --   --   --  2.8*   PHOS  --   --   --   --   --   --   --  2.2*   PROTTOTAL  --  5.6*  --  5.9*  --  5.8*  --  5.7*   ALBUMIN  --  2.2*  --  2.4*  --  2.2*  --  2.1*   BILITOTAL  --  0.8  --  0.6  --  0.4  --  0.4   ALKPHOS  --  88  --  89  --  82  --  91   AST  --  44  --  37  --  35  --  34   ALT  --  56  --  58  --  56  --  40    < > = values in this interval not displayed.     CBC  Recent Labs   Lab 02/21/21  0741 02/20/21  0516 02/19/21  0319 02/18/21  0415   WBC 9.3 12.5* 14.1* 11.0   RBC 4.04* 4.04* 4.12* 3.91*   HGB 12.7* 12.9* 12.8* 12.8*   HCT 38.4* 38.2* 39.7* 38.0*   MCV 95 95 96 97   MCH 31.4 31.9 31.1 32.7 "   MCHC 33.1 33.8 32.2 33.7   RDW 18.0* 17.9* 17.8* 18.2*    171 185 205     INR  Recent Labs   Lab 02/20/21  0516 02/19/21  0319 02/18/21  0415   INR 1.02 0.98 1.07         Attending note.The patient was seen and examined by me separately from the fellow provider. The note reflects our mutual assessment and plans and were approved by me personally.   I personally reviewed today's lab results vital signs and radiology results.     Each point of the assessment and plan were reviewed by the midlevel and me and either endorsed by me or were my added decisions.     My pertinent physical findings today are: not possible: video     My assessment and plan are: 69 yo 6 years post allo for MDS on prednisone and Jakafi for CGVHD now with serious COVID pneumonia. Continue Jakafi and hold predsinone while on dex burst, then resume.Will probably need intubation. Ci No changes in immunosupession. No current BMT issues. May need to reduce Jakafi if renal function further deteriorates.GFR increased: no reason to decrease Jakafi: monitor. Add 10 mg prednisone daily when decadron ends.. Extubated but delerious: lighten sedation and pain meds.Hypotensive: fluids given. Hi flow 45 LPM     Edgar Black M.D.

## 2021-02-21 NOTE — PLAN OF CARE
Transferred to:  at 16:30 with RN and NST  Status at time of transfer: A&O. VSS.  8LPM oxymask  Belongings: with patient.  Hearing Aids Still Missing  Rios removed? (if no, why?): NA  Chart and medications: with patient  Family notified: yes-marek(wife)    ICU End of Shift Summary. See flowsheets for vital signs and detailed assessment.    Changes this shift: A&O, PERRLA, moves all extremities, neurologically intact.  Sling dependent d/t deconditioning-PT/OT following. Afebrile.  SR c PACs.  Normotensive.  8LPM oxymask.  LS: diminished.  Strong cough-swallowing sputum.  Refusing pulmonary hygiene.  I/O inaccurate d/t incontinence (external rios intermittently patent and multiple loose stools-C Diff neg).  500mL bolus given in AM for hypotension post-diuresis.  Speech therapy approved a Full liquid diet.      Plan: Will continue to support and monitor.

## 2021-02-22 ENCOUNTER — APPOINTMENT (OUTPATIENT)
Dept: PHYSICAL THERAPY | Facility: CLINIC | Age: 71
DRG: 207 | End: 2021-02-22
Attending: INTERNAL MEDICINE
Payer: MEDICARE

## 2021-02-22 ENCOUNTER — APPOINTMENT (OUTPATIENT)
Dept: SPEECH THERAPY | Facility: CLINIC | Age: 71
DRG: 207 | End: 2021-02-22
Attending: INTERNAL MEDICINE
Payer: MEDICARE

## 2021-02-22 LAB
ANION GAP SERPL CALCULATED.3IONS-SCNC: 4 MMOL/L (ref 3–14)
BUN SERPL-MCNC: 38 MG/DL (ref 7–30)
CALCIUM SERPL-MCNC: 8.9 MG/DL (ref 8.5–10.1)
CHLORIDE SERPL-SCNC: 103 MMOL/L (ref 94–109)
CO2 SERPL-SCNC: 30 MMOL/L (ref 20–32)
CREAT SERPL-MCNC: 0.88 MG/DL (ref 0.66–1.25)
CRP SERPL-MCNC: 48 MG/L (ref 0–8)
ERYTHROCYTE [DISTWIDTH] IN BLOOD BY AUTOMATED COUNT: 18.2 % (ref 10–15)
GFR SERPL CREATININE-BSD FRML MDRD: 86 ML/MIN/{1.73_M2}
GLUCOSE BLDC GLUCOMTR-MCNC: 109 MG/DL (ref 70–99)
GLUCOSE BLDC GLUCOMTR-MCNC: 113 MG/DL (ref 70–99)
GLUCOSE BLDC GLUCOMTR-MCNC: 120 MG/DL (ref 70–99)
GLUCOSE BLDC GLUCOMTR-MCNC: 121 MG/DL (ref 70–99)
GLUCOSE SERPL-MCNC: 117 MG/DL (ref 70–99)
HCT VFR BLD AUTO: 37.1 % (ref 40–53)
HGB BLD-MCNC: 12.4 G/DL (ref 13.3–17.7)
MAGNESIUM SERPL-MCNC: 2.2 MG/DL (ref 1.6–2.3)
MCH RBC QN AUTO: 32.5 PG (ref 26.5–33)
MCHC RBC AUTO-ENTMCNC: 33.4 G/DL (ref 31.5–36.5)
MCV RBC AUTO: 97 FL (ref 78–100)
PHOSPHATE SERPL-MCNC: 3.5 MG/DL (ref 2.5–4.5)
PLATELET # BLD AUTO: 155 10E9/L (ref 150–450)
POTASSIUM SERPL-SCNC: 4.3 MMOL/L (ref 3.4–5.3)
RBC # BLD AUTO: 3.81 10E12/L (ref 4.4–5.9)
SODIUM SERPL-SCNC: 138 MMOL/L (ref 133–144)
WBC # BLD AUTO: 10 10E9/L (ref 4–11)

## 2021-02-22 PROCEDURE — 250N000013 HC RX MED GY IP 250 OP 250 PS 637

## 2021-02-22 PROCEDURE — 250N000011 HC RX IP 250 OP 636: Performed by: NURSE PRACTITIONER

## 2021-02-22 PROCEDURE — G0463 HOSPITAL OUTPT CLINIC VISIT: HCPCS

## 2021-02-22 PROCEDURE — 999N001017 HC STATISTIC GLUCOSE BY METER IP

## 2021-02-22 PROCEDURE — 97116 GAIT TRAINING THERAPY: CPT | Mod: GP | Performed by: REHABILITATION PRACTITIONER

## 2021-02-22 PROCEDURE — 36415 COLL VENOUS BLD VENIPUNCTURE: CPT | Performed by: INTERNAL MEDICINE

## 2021-02-22 PROCEDURE — 80048 BASIC METABOLIC PNL TOTAL CA: CPT | Performed by: INTERNAL MEDICINE

## 2021-02-22 PROCEDURE — 250N000013 HC RX MED GY IP 250 OP 250 PS 637: Performed by: INTERNAL MEDICINE

## 2021-02-22 PROCEDURE — 84100 ASSAY OF PHOSPHORUS: CPT | Performed by: INTERNAL MEDICINE

## 2021-02-22 PROCEDURE — 85027 COMPLETE CBC AUTOMATED: CPT | Performed by: INTERNAL MEDICINE

## 2021-02-22 PROCEDURE — 92526 ORAL FUNCTION THERAPY: CPT | Mod: GN

## 2021-02-22 PROCEDURE — 250N000013 HC RX MED GY IP 250 OP 250 PS 637: Performed by: NURSE PRACTITIONER

## 2021-02-22 PROCEDURE — 97530 THERAPEUTIC ACTIVITIES: CPT | Mod: GP | Performed by: REHABILITATION PRACTITIONER

## 2021-02-22 PROCEDURE — 250N000013 HC RX MED GY IP 250 OP 250 PS 637: Performed by: STUDENT IN AN ORGANIZED HEALTH CARE EDUCATION/TRAINING PROGRAM

## 2021-02-22 PROCEDURE — 86140 C-REACTIVE PROTEIN: CPT | Performed by: INTERNAL MEDICINE

## 2021-02-22 PROCEDURE — 99233 SBSQ HOSP IP/OBS HIGH 50: CPT | Performed by: INTERNAL MEDICINE

## 2021-02-22 PROCEDURE — 83735 ASSAY OF MAGNESIUM: CPT | Performed by: INTERNAL MEDICINE

## 2021-02-22 PROCEDURE — 250N000012 HC RX MED GY IP 250 OP 636 PS 637: Performed by: STUDENT IN AN ORGANIZED HEALTH CARE EDUCATION/TRAINING PROGRAM

## 2021-02-22 PROCEDURE — 206N000001 HC R&B BMT UMMC

## 2021-02-22 RX ADMIN — Medication 2 PACKET: at 08:58

## 2021-02-22 RX ADMIN — SERTRALINE HYDROCHLORIDE 50 MG: 50 TABLET ORAL at 08:34

## 2021-02-22 RX ADMIN — ACETAMINOPHEN 650 MG: 325 TABLET, FILM COATED ORAL at 20:09

## 2021-02-22 RX ADMIN — ACETAMINOPHEN 650 MG: 325 TABLET, FILM COATED ORAL at 11:44

## 2021-02-22 RX ADMIN — MELATONIN TAB 3 MG 3 MG: 3 TAB at 22:45

## 2021-02-22 RX ADMIN — ACYCLOVIR 800 MG: 200 SUSPENSION ORAL at 08:33

## 2021-02-22 RX ADMIN — ENOXAPARIN SODIUM 50 MG: 100 INJECTION SUBCUTANEOUS at 11:44

## 2021-02-22 RX ADMIN — Medication: at 00:13

## 2021-02-22 RX ADMIN — PANTOPRAZOLE SODIUM 40 MG: 40 TABLET, DELAYED RELEASE ORAL at 08:33

## 2021-02-22 RX ADMIN — PREDNISONE 10 MG: 5 TABLET ORAL at 08:33

## 2021-02-22 RX ADMIN — ACETAMINOPHEN 650 MG: 325 TABLET, FILM COATED ORAL at 03:05

## 2021-02-22 RX ADMIN — LEVOFLOXACIN 250 MG: 250 TABLET, FILM COATED ORAL at 08:34

## 2021-02-22 RX ADMIN — SULFAMETHOXAZOLE AND TRIMETHOPRIM 1 TABLET: 800; 160 TABLET ORAL at 08:33

## 2021-02-22 RX ADMIN — ACYCLOVIR 800 MG: 200 SUSPENSION ORAL at 20:09

## 2021-02-22 RX ADMIN — ENOXAPARIN SODIUM 50 MG: 100 INJECTION SUBCUTANEOUS at 00:06

## 2021-02-22 RX ADMIN — RUXOLITINIB 5 MG: 5 TABLET ORAL at 08:21

## 2021-02-22 RX ADMIN — MULTIVITAMIN 15 ML: LIQUID ORAL at 08:33

## 2021-02-22 RX ADMIN — ENOXAPARIN SODIUM 50 MG: 100 INJECTION SUBCUTANEOUS at 23:13

## 2021-02-22 RX ADMIN — RUXOLITINIB 5 MG: 5 TABLET ORAL at 20:08

## 2021-02-22 RX ADMIN — FLUCONAZOLE 100 MG: 40 POWDER, FOR SUSPENSION ORAL at 08:33

## 2021-02-22 RX ADMIN — Medication 1 TABLET: at 11:44

## 2021-02-22 ASSESSMENT — ACTIVITIES OF DAILY LIVING (ADL)
ADLS_ACUITY_SCORE: 19
ADLS_ACUITY_SCORE: 18
ADLS_ACUITY_SCORE: 18
ADLS_ACUITY_SCORE: 19
ADLS_ACUITY_SCORE: 19
ADLS_ACUITY_SCORE: 18

## 2021-02-22 NOTE — PROGRESS NOTES
02/21/21 1600   Quick Adds   Type of Visit Initial PT Evaluation   Living Environment   People in home spouse   Current Living Arrangements other (see comments)  (town home)   Home Accessibility stairs to enter home;stairs within home   Number of Stairs, Main Entrance 1  (fromgarage or front)   Stair Railings, Main Entrance none   Number of Stairs, Within Home, Primary other (see comments)  (14 basement, no need)   Stair Railings, Within Home, Primary railings on both sides of stairs   Living Environment Comments all needs main floor   Self-Care   Usual Activity Tolerance good   Current Activity Tolerance poor   Regular Exercise Yes   Activity/Exercise Type walking  (outside or treadmill)   Exercise Amount/Frequency 3-5 times/wk   Equipment Currently Used at Home shower chair   Activity/Exercise/Self-Care Comment Pt I with all ADLs and mobility prio, has a walker, not using   Disability/Function   Hearing Difficulty or Deaf yes   Hearing Management B hearing aides, not in today, spoke loudly   Concentrating, Remembering or Making Decisions Difficulty no   Difficulty Communicating no   Walking or Climbing Stairs Difficulty no   Dressing/Bathing Difficulty no   Toileting issues no   Change in Functional Status Since Onset of Current Illness/Injury yes   General Information   Onset of Illness/Injury or Date of Surgery 02/08/21   Referring Physician Geronimo Lawler MD   Pertinent History of Current Problem (include personal factors and/or comorbidities that impact the POC) 70 year old male with 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 Transferred to MICU on 2/10 for worsening respiratory status requiring intubation.    Existing Precautions/Restrictions oxygen therapy device and L/min   General Observations Oxymask 7L, up to 9L with activity today   Cognition   Orientation Status  (Cognition) person;place   Affect/Mental Status (Cognition) anxious   Follows Commands (Cognition) follows one-step commands;25-49% accuracy;increased processing time needed;physical/tactile prompts required;repetition of directions required;verbal cues/prompting required   Behavioral Issues overwhelmed easily   Safety Deficit (Cognition) moderate deficit;awareness of need for assistance;insight into deficits/self-awareness;judgment   Posture    Posture Forward head position;Protracted shoulders   Strength   Strength Comments B LEs impaired based on functional assessment   Transfers   Transfer Safety Comments Mod A x2 stand from chair to walker   Clinical Impression   Criteria for Skilled Therapeutic Intervention yes, treatment indicated   PT Diagnosis (PT) impaired functional moblity   Influenced by the following impairments decreased strength, ROM, balance, increased O2 needs, work of breathing   Functional limitations due to impairments decreased I with transfers, gait,bed mob   Clinical Presentation Stable/Uncomplicated   Clinical Presentation Rationale clinical judgement   Clinical Decision Making (Complexity) low complexity   Therapy Frequency (PT) 6x/week   Planned Therapy Interventions (PT) balance training;bed mobility training;gait training;home exercise program;patient/family education;stair training;strengthening;transfer training   Risk & Benefits of therapy have been explained care plan/treatment goals reviewed;participants voiced agreement with care plan;participants included;patient   PT Discharge Planning    PT Discharge Recommendation (DC Rec) Transitional Care Facility   PT Rationale for DC Rec Pt presents significantly below baseline mobility and will need continued therapy toprogress functional mobility and ADLs prior to return home   PT Brief overview of current status  Lift to chair, mod A x2 to stand to wh walker   Skin WDL   Skin WDL X   Skin Color ricky;redness blanchable   Skin Temperature  cool   Skin Moisture dry,flaky   Skin Elasticity quick return to original state   Skin Integrity/Characteristics scab;bruised;abrasion

## 2021-02-22 NOTE — PLAN OF CARE
Transferred from 4C. Alert and oriented x4. Calm. Reports he is happy to be back on 5C. On 6L oxymask, other vital signs stable. Used the urinal in the chair with minimal assistance. Ate jello, ice cream and chicken broth, required assistance with feeding as he became easily tired. Tube feeding at 60ml/hr into NJ tube. Red spot in the middle of his tongue, oral care provided. Lift assistance to chair. WOC consult placed. Nonblanchable redness on left cheek and bridge of nose. Skin breakdown under NJ tube in left nostril. Skin tear on left forearm- covered with bacitracin and band aid. Skin tear on left abdomen, already covered with mepilex and Vaseline gauze. Skin breakdown on left side of dom-anal area- barrier cream applied. Plan to reposition q2 hours. Consider pulsate bed.           Problem: Adult Inpatient Plan of Care  Goal: Plan of Care Review  2/21/2021 2005 by Aby Montana RN  Outcome: No Change  2/21/2021 2005 by Aby Montana RN  Outcome: No Change  Goal: Patient-Specific Goal (Individualized)  2/21/2021 2005 by Aby Montana RN  Outcome: No Change  2/21/2021 2005 by Aby Montana RN  Outcome: No Change  Goal: Absence of Hospital-Acquired Illness or Injury  2/21/2021 2005 by Aby Montana RN  Outcome: No Change  2/21/2021 2005 by Aby Montana RN  Outcome: No Change  Intervention: Identify and Manage Fall Risk  Recent Flowsheet Documentation  Taken 2/21/2021 1720 by Aby Montana RN  Safety Promotion/Fall Prevention:   activity supervised   assistive device/personal items within reach   bed alarm on   chair alarm on   clutter free environment maintained   fall prevention program maintained   increased rounding and observation   patient and family education  Intervention: Prevent and Manage VTE (Venous Thromboembolism) Risk  Recent Flowsheet Documentation  Taken 2/21/2021 1720 by Aby Montana RN  VTE Prevention/Management:   bleeding risk assessed   fluids promoted   anticoagulant therapy  maintained  Goal: Optimal Comfort and Wellbeing  2/21/2021 2005 by Aby Montana RN  Outcome: No Change  2/21/2021 2005 by Aby Montana RN  Outcome: No Change  Goal: Readiness for Transition of Care  2/21/2021 2005 by Aby Montana RN  Outcome: No Change  2/21/2021 2005 by Aby Montana RN  Outcome: No Change

## 2021-02-22 NOTE — PROGRESS NOTES
Pt transferred to/from: 4C  Reason for transfer: lower level of care  Family aware of transfer: yes  Disposition of belongings: remain with patient  Teaching: orientation to room, call light use, unit routines, plan of care  Report called to/from: Barbara OCHOA  Skin double check completed by: Josey Friedman   Nonblanchable redness on left cheek and bridge of nose. Skin breakdown under NJ tube in left nostril. Skin tear on left forearm- covered with bacitracin and band aid. Skin tear on left abdomen, already covered with mepilex and Vaseline gauze. Skin breakdown on left side of per-anal area- barrier cream applied. Plan to reposition q2 hours.

## 2021-02-22 NOTE — PLAN OF CARE
SLP: Pt engaged in nursing cares/transition from chair at time of SLP attempt.  SLP will attempt later in PM or tomorrow per scheduling constraints.

## 2021-02-22 NOTE — PLAN OF CARE
VSS, afebrile. Pt denies nausea. Reported pain to bilateral knees. Ice place and scheduled tylenol given. Pt sleeping post dose. Continues on tube feeding as ordered. Pt reports upset stomach, attempt to use bedpan several times but only one very small stool. Urinal at bedside. Call light in reach, pt uses appropriately. Bed alarm on for safety. Pt remains on 6-8L oxymask, reports SOB with activity. Labs stable this am, no replacements needed. Turned q2 hr. Continue plan of care.     Problem: Adult Inpatient Plan of Care  Goal: Plan of Care Review  Outcome: No Change  Goal: Patient-Specific Goal (Individualized)  Outcome: No Change  Goal: Absence of Hospital-Acquired Illness or Injury  Outcome: No Change  Intervention: Identify and Manage Fall Risk  Recent Flowsheet Documentation  Taken 2/21/2021 2215 by Jennifer Collado, RN  Safety Promotion/Fall Prevention:    activity supervised    bed alarm on    clutter free environment maintained    fall prevention program maintained    increased rounding and observation    lighting adjusted  Intervention: Prevent and Manage VTE (Venous Thromboembolism) Risk  Recent Flowsheet Documentation  Taken 2/21/2021 2215 by Jennifer Collado, RN  VTE Prevention/Management:    ambulation promoted    bleeding risk assessed    fluids promoted    AROM (active range of motion) performed  Goal: Optimal Comfort and Wellbeing  Outcome: No Change  Goal: Readiness for Transition of Care  Outcome: No Change     Problem: COPD Comorbidity  Goal: Maintenance of COPD Symptom Control  Outcome: No Change     Problem: Obstructive Sleep Apnea Risk or Actual (Comorbidity Management)  Goal: Unobstructed Breathing During Sleep  Outcome: No Change     Problem: Gas Exchange Impaired  Goal: Optimal Gas Exchange  Outcome: No Change     Problem: Adjustment to Illness (Delirium)  Goal: Optimal Coping  Outcome: No Change     Problem: Altered Behavior (Delirium)  Goal: Improved Behavioral Control  Outcome: No Change     Problem:  Attention and Thought Clarity Impairment (Delirium)  Goal: Improved Attention and Thought Clarity  Outcome: No Change     Problem: Sleep Disturbance (Delirium)  Goal: Improved Sleep  Outcome: No Change

## 2021-02-22 NOTE — PROGRESS NOTES
CLINICAL NUTRITION SERVICES - BRIEF NOTE      Nutrition Prescription     RECOMMENDATIONS FOR MDs/PROVIDERS TO ORDER:  Goal for pt to be able to meet at least 60% of needs po to recommend further weaning off of nutrition support (~1200 kcal, ~70g protein)     Recommendations already ordered by Registered Dietitian (RD):  Per rounds - reduce TF to 40 mL/hr  --Nutren 1.5 @ 40 mL/hr to provide 1440 kcals (18 kcal/kg/day), 65 g PRO (0.8 g/kg/day), 730 mL H2O, 169 g CHO and no Fiber daily.  --continue prosource BID for additional 120 kcal/22 g protein --> total provisions: 1560 kcal (19 kcal/kg) and 87 g pro (1.1 g/kg)    PO:  --kcal counts 2/22-2/24  --Boost TID     Future/Additional Recommendations:  If poor oral intakes, resume full strength feeds of Nutren 1.5 @ 60 mL/hr    *Please see full assessment note from 2/19/21    New Findings:  Per discussion on rounds, request to decrease TF to 40 mL/hr while starting calorie counts and initiating supplements in aims to increase po and wean off TF if pt able to meet needs orally.    WOC (2/20): Pressure Injury: on L) cheek , hospital acquired ,   This is a Medical Device Related Pressure Injury (MDRPI) due to hi flow oxygen tubing  Pressure Injury is healed Stage 1   Contributing factor of the pressure injury: pressure and immobility  Status: healed    SLP (2/21): FLD - with supervision and feeding assist    Interventions  Per above    RD to follow per protocol.    Julisa Aj MS, RD, , CNSC, LD.  5C/BMT Pager:[phone]

## 2021-02-22 NOTE — PROGRESS NOTES
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 Transferred to MICU on 2/10 for worsening respiratory status requiring intubation 2/13-2/19.  - transferred to  2/21    HPI: Transferred to  yesterday afternoon. Frequent stooling, possibly 2/2 TF. Some stomach upset. Able to eat a bit (jello, ice cream, chicken broth). No fevers. On 6L oxymask.     ROS: 10 point ROS neg other than the symptoms noted above in the HPI.    Exam:    Blood pressure (!) 157/77, pulse 89, temperature 98.5  F (36.9  C), temperature source Axillary, resp. rate 20, height 1.753 m (5' 9\"), weight 98.8 kg (217 lb 13 oz), SpO2 95 %.  General: NAD  HEENT: NCAT  Neuro: A&Ox3, no focal sensorimotor deficits  Pulm/Resp: Breathing comfortably on oxymask. Auscultation notable for bibasilar crackles, though otherwise relatively CTAB.  CV: RRR, normal S1 and S2, no M/R/G  Abdomen: Soft, non-distended, non-tender, active BS's  Extremities: 1+ pedal edema bilaterally      Labs:  Lab Results   Component Value Date    WBC 10.0 02/22/2021    ANEU 9.7 (H) 02/18/2021    HGB 12.4 (L) 02/22/2021    HCT 37.1 (L) 02/22/2021     02/22/2021     02/22/2021    POTASSIUM 4.3 02/22/2021    CHLORIDE 103 02/22/2021    CO2 30 02/22/2021     (H) 02/22/2021    BUN 38 (H) 02/22/2021    CR 0.88 02/22/2021    MAG 2.2 02/22/2021    INR 1.02 02/20/2021    BILITOTAL 0.8 02/20/2021    AST 44 02/20/2021    ALT 56 02/20/2021    ALKPHOS 88 02/20/2021    PROTTOTAL 5.6 (L) 02/20/2021    ALBUMIN 2.2 (L) 02/20/2021     Imaging:  CXR (2/15): Overall slight decrease in patchy opacifications in the left lung base. Continued small left effusion. Atherosclerosis. Endotracheal intubation. Continued patchy opacities in right lung base.      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 Transferred to MICU on 2/10 for worsening respiratory status requiring intubation 2/13-2/19.  - transferred to  2/21       1. Pulmonary:  Acute hypoxic respiratory failure, presumed 2/2 secondary to COVID-19 pneumonia, rhinovirus, and mild pulmonary edema  Acute respiratory distress syndrome  Intubated 2/13 with initiation of proning. Extubated 2/19, now on oxymask 6L.  Has received intermittent diuresis to maintain net negative daily, earlier this hospital course. P/F improved to 184 on 2/17 with no additional proning indicated.     2. ID:  Elevated CRP, unclear etiology  Down to 48 (58); cont to trend    COVID-19 pneumonia (+1/30; neg 2/21)  + Rhinovirus  Remdesivir course completed on 2/12. Was considered for tocilizumab.  - ID signed off  - s/p 10-day course of dexamethasone (2/8-2/17) completed  - Enoxaparin 50 mg BID given elevated D-dimer    Possible superimposed bacterial pneumonia   Procal increased to 0.28 on 2/8 with CXR that day notable for stable pulmonary opacities compatible with atypical infection vs atelectasis/edema. Patient has since completed treatment with 3-day course of azithromycin (2/9-2/11).  - Sputum culture from 2/14 with recent growth of staph haemolyticus, staph epidermidis, and enterococcus faecalis, which was presumed colonization and not treated with antibiotics. If new fever or worsening respiratory status, would repeat CXR.    Prophy:   Acyclovir, fluconazole, Levaquin, Bactrim    3.  Cardiovascular:  Transient hypotension episode 2/21am, presumed 2/2 to intravascular depletion following diuresis - improved post 500cc bolus.    4.  Neuro:  Agitated Delirium - improved  Off Precedex x2/21am  - Haldol prn  - Delirium precautions, including keeping hearing aids in during the day.    Pain and sedation  -  Scheduled actaminophen 650 q8h     Mood  - Continue PTA sertraline     5.  GI: NG tube in place.  - Protonix for GI prophy  #frequent stools: C.dif neg . Possibly 2/2 TF, reduce rate per below.     6.  Renal/FEN: Lytes wnl.  #mild malnutrition in the context of acute illness: TF at goal. +diarrhea. Reduce rate from 60 to 40mL/hr, start ramon counts.  - SLP following: currently on full liquid diet    #SARAH, presumed prerenal 2/2 to poor PO intake, dehydration. Resolved.     7.  Endocrine:  Hyperglycemia - BG generally <140. Stopped Novolog sliding scale .     8. Hematology:    MDS/ s/p BMT  complicated by GVHD  No acute concerns. PTA on prednisone and Jakafi.  - PTA prednisone 5 mg/d held while on dexamethasone; started prednisone 10 mg/d () post dex  - Continue PTA Jakafi 5 mg bid     9.  Deconditionin/2 acute illness, ICU, steroids. Cont PT/OT.    ADRIAN WhitingC  [phone]        Attending note.The patient was seen and examined by me separately from the fellow provider. The note reflects our mutual assessment and plans and were approved by me personally.   I personally reviewed today's lab results vital signs and radiology results.     Each point of the assessment and plan were reviewed by the midlevel and me and either endorsed by me or were my added decisions.     My pertinent physical findings today are:Afebrile. Some rales both lungs, no rash.    My assessment and plan are: 69 yo 6 years post allo for MDS on prednisone and Jakafi for CGVHD now with serious COVID pneumonia. Continue Jakafi and hold predsinone while on dex burst, then resume.Will probably need intubation. Ci No changes in immunosupession. No current BMT issues. May need to reduce Jakafi if renal function further deteriorates.GFR increased: no reason to decrease Jakafi: monitor. Add 10 mg prednisone daily when decadron ends.. Extubated and comfortable. COVID-oxygen 4-8 LPM .c dif-:add imodium.     Edgar Black,  M.D.

## 2021-02-22 NOTE — PLAN OF CARE
"BP (!) 157/77 (BP Location: Left arm)   Pulse 89   Temp 98.5  F (36.9  C) (Axillary)   Resp 20   Ht 1.753 m (5' 9\")   Wt 98.8 kg (217 lb 13 oz)   SpO2 97%   BMI 32.17 kg/m    2121-1881: VSS. Afebrile. On 3 liters oxymask sats >92%. A&Ox4. Ceiling lift used for transfers. Up to chair for 1.5 hours today. WOC saw him for small wound on left buttock, bridge of nose, and left nare. Clear liquid diet- ate 2 popsicles, 2 ice creams, and drinking water. One large loose BM. TPN decreased to 40ml/hr.  Transitioned to regular diet. Using urinal at bedside. Unable to locate hearing aids. No other complaints or concerns  Problem: Adult Inpatient Plan of Care  Goal: Absence of Hospital-Acquired Illness or Injury  Intervention: Identify and Manage Fall Risk  Recent Flowsheet Documentation  Taken 2/22/2021 0800 by Josefa Sanderson, RN  Safety Promotion/Fall Prevention: activity supervised     Problem: Adult Inpatient Plan of Care  Goal: Absence of Hospital-Acquired Illness or Injury  Intervention: Prevent Skin Injury  Recent Flowsheet Documentation  Taken 2/22/2021 0800 by Josefa Sanderson, RN  Body Position:    turned    left     "

## 2021-02-22 NOTE — CONSULTS
[facility] Nurse Inpatient Pressure Injury Assessment   Reason for consultation: Evaluate and treat bridge of nose, left internal nostril, gluteal cleft, and left buttock wounds      ASSESSMENT  Pressure Injury: on left internal nostril , hospital acquired ,   This is a Medical Device Related Pressure Injury (MDRPI) due to NG  Pressure Injury is Stage Mucosal   Contributing factor of the pressure injury: pressure  Status: initial assessment  Recommend provider order: If long term feeds needed consider placing PEG tube or if NJ needed for longer than a few days consider replacing in alternate nostril. If NJ needed only a few more days, it may be ok to leave in place, but remove as soon as able. Discussed with BMT fellow 2/22.    Gluteal cleft wound due to MASD moisture associated skin damage  Status: initial assessment    Left buttock wound due to skin tear  Status: initial assessment    Bridge of nose: No wound currently present, only blanchable erythema.      TREATMENT PLAN  Left nare wound and bridge of nose: Every shift and PRN  Assess wound beneath NJ tube, cleanse gently with NS, and apply Vaseline to wound bed.   May place a small piece of gauze beneath tube and over wound from time to time to aid in elevating off wound.   Reposition NJ tube as needed so that face mask is not tugging it down and pulling it deeper into wound bed.   Assess if NJ tube appropriate for removal daily.  Apply Gecko pad or small strip of mepilex lite over bridge of nose to prevent skin breakdown from oxygen face mask, and assess under mask and straps each shift.    Gluteal cleft and left buttock woounds: Every three days and PRN    Cleanse the area with NS and pat dry.    Apply No sting film barrier to periwound skin.    Cover wound with Mepilex. Sacral Mepilex (#[id number]).    Ok to apply upside down. Taco mepilex and place middle portion into crack so that it conforms to patients body curvatures and comes in contact with the wound bed.      Change dressing Q 3 days.    Turn and reposition Q 2hrs.    Ensure pt has Xu-cushion while sitting up in the chair.    FYI- If pt has constant incontinent loose stools needing dressing changes Q shift please discontinue the Mepilex dressing and apply criticaid barrier paste BID and PRN.        Orders Written  WOC Nurse follow-up plan:twice weekly for left nostril wound, weekly for additional wounds.  Nursing to notify the Provider(s) and re-consult the WOC Nurse if wound(s) deteriorates or new skin concern.    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 Transferred to MICU on 2/10 for worsening respiratory status requiring intubation 2/13-2/19.  - transferred to  2/21    Objective Data  Containment of urine/stool: Incontinence Protocol    Current Diet/ Nutrition:  Orders Placed This Encounter      Full Liquid Diet      Output:   I/O last 3 completed shifts:  In: 1588 [P.O.:448; NG/GT:180]  Out: 1275 [Urine:1275]    Risk Assessment:   Sensory Perception: 3-->slightly limited  Moisture: 2-->very moist  Activity: 2-->chairfast  Mobility: 3-->slightly limited  Nutrition: 3-->adequate  Friction and Shear: 2-->potential problem  Sven Score: 15      Labs:   Recent Labs   Lab 02/22/21  0317 02/20/21  0516 02/20/21  0516   ALBUMIN  --   --  2.2*   HGB 12.4*   < > 12.9*   INR  --   --  1.02   WBC 10.0   < > 12.5*   CRP 48.0*   < > 22.0*    < > = values in this interval not displayed.       Physical Exam  Skin inspection: focused Left nostril  Patient is high risk for pressure injury development secondary to history of pressure injury    Attempted to photo but due to isolation status and location of wound photo is difficult.     Wound Location:  Left nostril behind NJ tube  Wound History: Wound noted by RN upon  transfer to floor from MICU. NJ tube placed 2/15. Currently bridled. Face mask does tend to pull down on tube and apply pressure to lower inner nostril.  Measurements (length x width x depth, in cm) approximately 0.4 x 0.3 x 0.1 cm   Wound Base:  100 % mucosal  Tunneling N/A  Undermining N/A  Palpation of the wound bed: normal   Periwound skin: intact  Color: normal and consistent with surrounding tissue  Temperature: normal   Drainage:, scant  Description of drainage: serous  Odor: none  Pain: mild, tender      Skin inspection: Gluteal cleft and left buttock:       Gluteal cleft 2/22    Left lower buttock 2/22  Wound History: Wound noted by RN upon transfer to floor from MICU.   Measurements (length x width x depth, in cm) left buttock: 1 x 0.3 x 0.1 cm; gluteal cleft 5 x 0.3 x 0 cm  Wound Base:  100 % dermis and fibrin left buttock; 100% reddened and macerated epidermis gluteal cleft.  Tunneling N/A  Undermining N/A  Palpation of the wound bed: normal   Periwound skin: intact  Color: normal and consistent with surrounding tissue  Temperature: normal   Drainage:none  Description of drainage: none  Odor: none  Pain: none    Interventions  Current support surface: Standard  Atmos Air mattress  Current off-loading measures: Pillows  Repositioning aid: Pillows  Visual inspection of wound(s) completed   Tube Securement: bridle  Wound Care: was done per plan of care.  Supplies: discussed with RN and discussed with patient  Educated provided: importance of repositioning, plan of care, wound progress and Off-loading pressure  Education provided to: patient  and nurse  Discussed importance of:repositioning every 2 hours, off-loading pressure to wound, their role in pressure injury prevention and moisture management  Discussed plan of care with Patient and Nurse    Ebony Davenport RN, CWOCN

## 2021-02-23 ENCOUNTER — APPOINTMENT (OUTPATIENT)
Dept: PHYSICAL THERAPY | Facility: CLINIC | Age: 71
DRG: 207 | End: 2021-02-23
Attending: INTERNAL MEDICINE
Payer: MEDICARE

## 2021-02-23 ENCOUNTER — APPOINTMENT (OUTPATIENT)
Dept: SPEECH THERAPY | Facility: CLINIC | Age: 71
DRG: 207 | End: 2021-02-23
Attending: INTERNAL MEDICINE
Payer: MEDICARE

## 2021-02-23 LAB
ALBUMIN SERPL-MCNC: 2.1 G/DL (ref 3.4–5)
ALP SERPL-CCNC: 65 U/L (ref 40–150)
ALT SERPL W P-5'-P-CCNC: 42 U/L (ref 0–70)
ANION GAP SERPL CALCULATED.3IONS-SCNC: 1 MMOL/L (ref 3–14)
AST SERPL W P-5'-P-CCNC: 26 U/L (ref 0–45)
BASOPHILS # BLD AUTO: 0 10E9/L (ref 0–0.2)
BASOPHILS NFR BLD AUTO: 0.2 %
BILIRUB DIRECT SERPL-MCNC: 0.2 MG/DL (ref 0–0.2)
BILIRUB SERPL-MCNC: 0.6 MG/DL (ref 0.2–1.3)
BUN SERPL-MCNC: 36 MG/DL (ref 7–30)
CALCIUM SERPL-MCNC: 8.9 MG/DL (ref 8.5–10.1)
CHLORIDE SERPL-SCNC: 102 MMOL/L (ref 94–109)
CO2 SERPL-SCNC: 30 MMOL/L (ref 20–32)
CREAT SERPL-MCNC: 0.88 MG/DL (ref 0.66–1.25)
CRP SERPL-MCNC: 62 MG/L (ref 0–8)
DIFFERENTIAL METHOD BLD: ABNORMAL
EOSINOPHIL # BLD AUTO: 0.1 10E9/L (ref 0–0.7)
EOSINOPHIL NFR BLD AUTO: 0.5 %
ERYTHROCYTE [DISTWIDTH] IN BLOOD BY AUTOMATED COUNT: 18.2 % (ref 10–15)
GFR SERPL CREATININE-BSD FRML MDRD: 86 ML/MIN/{1.73_M2}
GLUCOSE SERPL-MCNC: 112 MG/DL (ref 70–99)
HCT VFR BLD AUTO: 35.8 % (ref 40–53)
HGB BLD-MCNC: 11.7 G/DL (ref 13.3–17.7)
IMM GRANULOCYTES # BLD: 0.1 10E9/L (ref 0–0.4)
IMM GRANULOCYTES NFR BLD: 0.6 %
INR PPP: 1.05 (ref 0.86–1.14)
LYMPHOCYTES # BLD AUTO: 1.1 10E9/L (ref 0.8–5.3)
LYMPHOCYTES NFR BLD AUTO: 10.1 %
MCH RBC QN AUTO: 32 PG (ref 26.5–33)
MCHC RBC AUTO-ENTMCNC: 32.7 G/DL (ref 31.5–36.5)
MCV RBC AUTO: 98 FL (ref 78–100)
MONOCYTES # BLD AUTO: 0.9 10E9/L (ref 0–1.3)
MONOCYTES NFR BLD AUTO: 8.1 %
NEUTROPHILS # BLD AUTO: 8.9 10E9/L (ref 1.6–8.3)
NEUTROPHILS NFR BLD AUTO: 80.5 %
NRBC # BLD AUTO: 0 10*3/UL
NRBC BLD AUTO-RTO: 0 /100
PLATELET # BLD AUTO: 169 10E9/L (ref 150–450)
POTASSIUM SERPL-SCNC: 4.2 MMOL/L (ref 3.4–5.3)
PROT SERPL-MCNC: 5.9 G/DL (ref 6.8–8.8)
RBC # BLD AUTO: 3.66 10E12/L (ref 4.4–5.9)
SODIUM SERPL-SCNC: 134 MMOL/L (ref 133–144)
WBC # BLD AUTO: 11 10E9/L (ref 4–11)

## 2021-02-23 PROCEDURE — 250N000013 HC RX MED GY IP 250 OP 250 PS 637: Performed by: INTERNAL MEDICINE

## 2021-02-23 PROCEDURE — 206N000001 HC R&B BMT UMMC

## 2021-02-23 PROCEDURE — 80048 BASIC METABOLIC PNL TOTAL CA: CPT | Performed by: INTERNAL MEDICINE

## 2021-02-23 PROCEDURE — 92526 ORAL FUNCTION THERAPY: CPT | Mod: GN

## 2021-02-23 PROCEDURE — 86140 C-REACTIVE PROTEIN: CPT | Performed by: INTERNAL MEDICINE

## 2021-02-23 PROCEDURE — 250N000013 HC RX MED GY IP 250 OP 250 PS 637: Performed by: NURSE PRACTITIONER

## 2021-02-23 PROCEDURE — 250N000013 HC RX MED GY IP 250 OP 250 PS 637: Performed by: STUDENT IN AN ORGANIZED HEALTH CARE EDUCATION/TRAINING PROGRAM

## 2021-02-23 PROCEDURE — 97116 GAIT TRAINING THERAPY: CPT | Mod: GP | Performed by: REHABILITATION PRACTITIONER

## 2021-02-23 PROCEDURE — 250N000012 HC RX MED GY IP 250 OP 636 PS 637: Performed by: STUDENT IN AN ORGANIZED HEALTH CARE EDUCATION/TRAINING PROGRAM

## 2021-02-23 PROCEDURE — 97530 THERAPEUTIC ACTIVITIES: CPT | Mod: GP | Performed by: REHABILITATION PRACTITIONER

## 2021-02-23 PROCEDURE — 250N000011 HC RX IP 250 OP 636: Performed by: NURSE PRACTITIONER

## 2021-02-23 PROCEDURE — 85610 PROTHROMBIN TIME: CPT | Performed by: INTERNAL MEDICINE

## 2021-02-23 PROCEDURE — 80076 HEPATIC FUNCTION PANEL: CPT | Performed by: INTERNAL MEDICINE

## 2021-02-23 PROCEDURE — 36415 COLL VENOUS BLD VENIPUNCTURE: CPT | Performed by: INTERNAL MEDICINE

## 2021-02-23 PROCEDURE — 99233 SBSQ HOSP IP/OBS HIGH 50: CPT | Performed by: INTERNAL MEDICINE

## 2021-02-23 PROCEDURE — 85025 COMPLETE CBC W/AUTO DIFF WBC: CPT | Performed by: INTERNAL MEDICINE

## 2021-02-23 PROCEDURE — 250N000013 HC RX MED GY IP 250 OP 250 PS 637: Performed by: PHYSICIAN ASSISTANT

## 2021-02-23 PROCEDURE — 250N000013 HC RX MED GY IP 250 OP 250 PS 637

## 2021-02-23 RX ORDER — OXYCODONE HYDROCHLORIDE 5 MG/1
5 TABLET ORAL EVERY 6 HOURS PRN
Status: DISCONTINUED | OUTPATIENT
Start: 2021-02-23 | End: 2021-02-23

## 2021-02-23 RX ORDER — OXYCODONE HYDROCHLORIDE 10 MG/1
10 TABLET ORAL EVERY 6 HOURS PRN
Status: DISCONTINUED | OUTPATIENT
Start: 2021-02-23 | End: 2021-03-03 | Stop reason: HOSPADM

## 2021-02-23 RX ORDER — LOPERAMIDE HCL 2 MG
2-4 CAPSULE ORAL 4 TIMES DAILY PRN
Status: DISCONTINUED | OUTPATIENT
Start: 2021-02-23 | End: 2021-03-03 | Stop reason: HOSPADM

## 2021-02-23 RX ORDER — PANTOPRAZOLE SODIUM 40 MG/1
40 TABLET, DELAYED RELEASE ORAL
Status: DISCONTINUED | OUTPATIENT
Start: 2021-02-24 | End: 2021-03-03 | Stop reason: HOSPADM

## 2021-02-23 RX ORDER — QUETIAPINE FUMARATE 25 MG/1
25 TABLET, FILM COATED ORAL AT BEDTIME
Status: DISCONTINUED | OUTPATIENT
Start: 2021-02-23 | End: 2021-03-01

## 2021-02-23 RX ORDER — ACETAMINOPHEN 325 MG/1
650 TABLET ORAL EVERY 6 HOURS PRN
Status: DISCONTINUED | OUTPATIENT
Start: 2021-02-23 | End: 2021-02-25

## 2021-02-23 RX ORDER — FLUCONAZOLE 100 MG/1
100 TABLET ORAL DAILY
Status: DISCONTINUED | OUTPATIENT
Start: 2021-02-24 | End: 2021-03-03 | Stop reason: HOSPADM

## 2021-02-23 RX ORDER — ACYCLOVIR 800 MG/1
800 TABLET ORAL 2 TIMES DAILY
Status: DISCONTINUED | OUTPATIENT
Start: 2021-02-23 | End: 2021-03-03 | Stop reason: HOSPADM

## 2021-02-23 RX ORDER — GUAR GUM
1 PACKET (EA) ORAL 3 TIMES DAILY
Status: DISCONTINUED | OUTPATIENT
Start: 2021-02-23 | End: 2021-02-24

## 2021-02-23 RX ADMIN — Medication 2 PACKET: at 08:25

## 2021-02-23 RX ADMIN — ENOXAPARIN SODIUM 50 MG: 100 INJECTION SUBCUTANEOUS at 12:42

## 2021-02-23 RX ADMIN — SULFAMETHOXAZOLE AND TRIMETHOPRIM 1 TABLET: 800; 160 TABLET ORAL at 08:24

## 2021-02-23 RX ADMIN — ACETAMINOPHEN 650 MG: 325 TABLET, FILM COATED ORAL at 03:55

## 2021-02-23 RX ADMIN — Medication 1 PACKET: at 15:19

## 2021-02-23 RX ADMIN — OXYCODONE HYDROCHLORIDE 5 MG: 5 TABLET ORAL at 13:55

## 2021-02-23 RX ADMIN — SERTRALINE HYDROCHLORIDE 50 MG: 50 TABLET ORAL at 08:24

## 2021-02-23 RX ADMIN — ACETAMINOPHEN 650 MG: 325 TABLET, FILM COATED ORAL at 12:42

## 2021-02-23 RX ADMIN — PREDNISONE 10 MG: 5 TABLET ORAL at 08:24

## 2021-02-23 RX ADMIN — LOPERAMIDE HYDROCHLORIDE 2 MG: 2 CAPSULE ORAL at 14:25

## 2021-02-23 RX ADMIN — RUXOLITINIB 5 MG: 5 TABLET ORAL at 09:42

## 2021-02-23 RX ADMIN — ACYCLOVIR 800 MG: 800 TABLET ORAL at 19:42

## 2021-02-23 RX ADMIN — LEVOFLOXACIN 250 MG: 250 TABLET, FILM COATED ORAL at 08:24

## 2021-02-23 RX ADMIN — MULTIVITAMIN 15 ML: LIQUID ORAL at 08:24

## 2021-02-23 RX ADMIN — ENOXAPARIN SODIUM 50 MG: 100 INJECTION SUBCUTANEOUS at 23:00

## 2021-02-23 RX ADMIN — ACYCLOVIR 800 MG: 200 SUSPENSION ORAL at 08:24

## 2021-02-23 RX ADMIN — RUXOLITINIB 5 MG: 5 TABLET ORAL at 19:41

## 2021-02-23 RX ADMIN — PANTOPRAZOLE SODIUM 40 MG: 40 TABLET, DELAYED RELEASE ORAL at 08:24

## 2021-02-23 RX ADMIN — FLUCONAZOLE 100 MG: 40 POWDER, FOR SUSPENSION ORAL at 08:24

## 2021-02-23 RX ADMIN — Medication 1 PACKET: at 19:42

## 2021-02-23 RX ADMIN — MELATONIN TAB 3 MG 3 MG: 3 TAB at 21:43

## 2021-02-23 RX ADMIN — LOPERAMIDE HYDROCHLORIDE 2 MG: 2 CAPSULE ORAL at 23:11

## 2021-02-23 RX ADMIN — OXYCODONE HYDROCHLORIDE 10 MG: 10 TABLET ORAL at 18:21

## 2021-02-23 RX ADMIN — QUETIAPINE FUMARATE 25 MG: 25 TABLET ORAL at 21:43

## 2021-02-23 RX ADMIN — Medication 1 TABLET: at 12:42

## 2021-02-23 ASSESSMENT — ACTIVITIES OF DAILY LIVING (ADL)
ADLS_ACUITY_SCORE: 18

## 2021-02-23 NOTE — PLAN OF CARE
Speech Language Therapy Discharge Summary    Reason for therapy discharge:    All goals and outcomes met, no further needs identified.    Progress towards therapy goal(s). See goals on Care Plan in [facility] electronic health record for goal details.  Goals met    Therapy recommendation(s):    No further therapy is recommended.    Recommend a regular texture diet with thin liquids.

## 2021-02-23 NOTE — PLAN OF CARE
Afebrile, Vital signs stable on 2L oxymask. Dyspnea on exertion, patient becomes fatigued easily. He wants to have the NG tube removed as soon as possible. Pt making effort to eat a regular diet but becomes fatigued so nursing is assisting in feeding. Continues calorie counts. Tube feeding at goal rate of 40ml/hr. Also reports increased knee pain- given tylenol, Icy hot patches and 5mg oxycodone with minimal relief. Plan to try 10mg oxycodone. Had 3 loose, watery bowel movements. Given imodium and added fiber supplements. Repositioned in bed q2 hours, bed alarm on. Up to shower via rolling shower today.         Problem: Adult Inpatient Plan of Care  Goal: Plan of Care Review  Outcome: No Change

## 2021-02-23 NOTE — PROGRESS NOTES
Calorie Count  Intake recorded for: 2/22  Total Kcals: 0 Total Protein: 0g  Kcals from Hospital Food: 0   Protein: 0g  Kcals from Outside Food (average):0 Protein: 0g  # Meals Ordered from Kitchen: 2 meals  # Meals Recorded: 50% diet ginger ale  # Supplements Recorded: 0

## 2021-02-23 NOTE — PLAN OF CARE
"Afebrile, VSS, maintaining O2 sats on 3L NC. A&Ox4, calling out appropriately and able to make needs known, bed alarm on for pt safety. Using urinal independently, and bed pan x2 with with assist x2 with small smear of loose stool out overnight. Pt complains of bilateral knee pain overnight, given ice packs and scheduled tylenol, repositioned and pt appeared to be resting comfortably. Turning q2h. Pt appeared to be resting, however states he did not sleep most of the night, due to not being able to \"turn off my thoughts\". L nare wound cleaned and Vaseline applied.  Continues with tube feeds through NG and on calorie counts, tube feed infusing at 40ml/hr. Continue with POC.       Problem: Adult Inpatient Plan of Care  Goal: Plan of Care Review  Outcome: No Change     Problem: Adult Inpatient Plan of Care  Goal: Patient-Specific Goal (Individualized)  Outcome: No Change     Problem: Adult Inpatient Plan of Care  Goal: Absence of Hospital-Acquired Illness or Injury  Outcome: No Change     "

## 2021-02-23 NOTE — PLAN OF CARE
OT/5C - Cancel. Pt working with PT this AM and showering with RN assist this PM. Will reschedule as appropriate.

## 2021-02-23 NOTE — PROGRESS NOTES
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 Transferred to MICU on 2/10 for worsening respiratory status requiring intubation 2/13-2/19.  - transferred to  2/21    HPI: Oxygen weaned to 3L. Eating a little bit but tires easily. Now on regular diet, thin liquids. Loose stooling. Reports not sleeping well last night, unable to turn off his thoughts. Complains of bilateral knee aching and some L hip pain. Reports seeing ortho ([facility]) in January and had a cortisone injection (wife confirms). Was due for MRI outpt but postponed d/t Covid.     ROS: 10 point ROS neg other than the symptoms noted above in the HPI.    Exam:    Blood pressure 138/77, pulse 77, temperature 98.4  F (36.9  C), resp. rate 18, height 1.753 m (5' 9\"), weight 98.8 kg (217 lb 13 oz), SpO2 98 %.  General: NAD  HEENT: NCAT. Mouth dry, no ulcerations.  Neuro: non-focal  Pulm/Resp: Breathing comfortably on oxymask. Auscultation notable for bibasilar crackles, though otherwise relatively CTAB.  CV: RRR, normal S1 and S2, no M/R/G.   Abdomen: Soft, non-distended, non-tender, active BS's  Extremities: trace - 1+ pedal edema bilaterally  MS: Knees bilaterally w/o erythema, edema, rash, NT to palpation.      Labs:  Lab Results   Component Value Date    WBC 11.0 02/23/2021    ANEU 8.9 (H) 02/23/2021    HGB 11.7 (L) 02/23/2021    HCT 35.8 (L) 02/23/2021     02/23/2021     02/23/2021    POTASSIUM 4.2 02/23/2021    CHLORIDE 102 02/23/2021    CO2 30 02/23/2021     (H) 02/23/2021    BUN 36 (H) 02/23/2021    CR 0.88 02/23/2021    MAG 2.2 02/22/2021    INR 1.05 02/23/2021    BILITOTAL 0.6 02/23/2021    AST 26 02/23/2021    ALT 42 02/23/2021    ALKPHOS 65 02/23/2021    PROTTOTAL 5.9 (L) 02/23/2021    ALBUMIN 2.1 (L) 02/23/2021     Imaging:  CXR (2/15): " Overall slight decrease in patchy opacifications in the left lung base. Continued small left effusion. Atherosclerosis. Endotracheal intubation. Continued patchy opacities in right lung base.      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 Transferred to MICU on 2/10 for worsening respiratory status requiring intubation 2/13-2/19.  - transferred to  2/21     1. Pulmonary:  Acute hypoxic respiratory failure, presumed 2/2 secondary to COVID-19 pneumonia, rhinovirus, and mild pulmonary edema  Acute respiratory distress syndrome  Intubated 2/13 with initiation of proning. Extubated 2/19. Weaning O2, now 3L.  Has received intermittent diuresis to maintain net negative daily, earlier this hospital course.     2. ID:  Elevated CRP, unclear etiology  cont to trend    COVID-19 pneumonia (+1/30; neg 2/21), Repeat Covid 2/28.  + Rhinovirus  Remdesivir course completed on 2/12. Was considered for tocilizumab.  - ID signed off  - s/p 10-day course of dexamethasone (2/8-2/17) completed  - Enoxaparin 50 mg BID given elevated D-dimer; cont for now. Likely Eliquis at discharge for 30d.    Possible superimposed bacterial pneumonia   Procal increased to 0.28 on 2/8 with CXR that day notable for stable pulmonary opacities compatible with atypical infection vs atelectasis/edema. Patient has since completed treatment with 3-day course of azithromycin (2/9-2/11).  - Sputum culture from 2/14 with recent growth of staph haemolyticus, staph epidermidis, and enterococcus faecalis, which was presumed colonization and not treated with antibiotics. If new fever or worsening respiratory status, would repeat CXR.    Prophy:   Acyclovir, fluconazole, Levaquin, Bactrim    3.  Cardiovascular:  Transient hypotension episode 2/21am, presumed 2/2 to intravascular depletion  following diuresis - improved post 500cc bolus.    4.  Neuro:  Sleep: add seroquel back  (was on for a couple of days in ICU); melatonin at hs    Agitated Delirium - resolved  Off Precedex am    Mood  - Continue PTA sertraline     5.  GI: NG tube in place. Wound in L nare - if still needed in the next couple of days consider replacing in R nare  - Protonix for GI prophy  #frequent stools: C.dif neg . Possibly  TF, reduced rate  per below.  - add fiber supp; Imodium prn      6.  Renal/FEN: Lytes wnl.  #mild malnutrition in the context of acute illness: TF at goal. +diarrhea. Reduced rate from 60 to 40mL/hr (), start ramon counts.  - SLP assessed, now signed off. Regular diet, thin liquids .     #SARAH, presumed prerenal  to poor PO intake, dehydration. Resolved.     7.  Endocrine:  Hyperglycemia - BG generally <140. Stopped Novolog sliding scale .     8. Hematology:    MDS/ s/p BMT  complicated by GVHD  No acute concerns. PTA on prednisone and Jakafi.  - PTA prednisone 5 mg/d held while on dexamethasone; started prednisone 10 mg/d () post dex  - Continue PTA Jakafi 5 mg bid     9.  Deconditionin/2 acute illness, ICU, steroids. Cont PT/OT. Currently rec TCU.    10.  MS: bilateral knee ache. Arthritis per pt.   - Rx icy hot patch, oxycodone prn    Wife updated via phone today .    ADRIAN WhitingC  [phone]        Attending note.The patient was seen and examined by me separately from the fellow provider. The note reflects our mutual assessment and plans and were approved by me personally.   I personally reviewed today's lab results vital signs and radiology results.     Each point of the assessment and plan were reviewed by the midlevel and me and either endorsed by me or were my added decisions.     My pertinent physical findings today are:Afebrile. Some rales both lungs, no rash.    My assessment and plan are: 71 yo 6 years post allo for MDS on prednisone and  Jakafi for CGVHD now with serious COVID pneumonia. Continue Jakafi and hold predsinone while on dex burst, then resume.Will probably need intubation. Ci No changes in immunosupession. No current BMT issues. May need to reduce Jakafi if renal function further deteriorates.GFR increased: no reason to decrease Jakafi: monitor. Add 10 mg prednisone daily when decadron ends.. Extubated and comfortable. COVID-oxygen 4-8 LPM .c dif-:add imodium. Calorie counts. Remove NG as soon as possible b/o nares wound.     Edgar Black M.D.

## 2021-02-24 ENCOUNTER — APPOINTMENT (OUTPATIENT)
Dept: PHYSICAL THERAPY | Facility: CLINIC | Age: 71
DRG: 207 | End: 2021-02-24
Attending: INTERNAL MEDICINE
Payer: MEDICARE

## 2021-02-24 ENCOUNTER — APPOINTMENT (OUTPATIENT)
Dept: OCCUPATIONAL THERAPY | Facility: CLINIC | Age: 71
DRG: 207 | End: 2021-02-24
Attending: INTERNAL MEDICINE
Payer: MEDICARE

## 2021-02-24 LAB
ANION GAP SERPL CALCULATED.3IONS-SCNC: 6 MMOL/L (ref 3–14)
BASOPHILS # BLD AUTO: 0 10E9/L (ref 0–0.2)
BASOPHILS NFR BLD AUTO: 0.1 %
BUN SERPL-MCNC: 36 MG/DL (ref 7–30)
CALCIUM SERPL-MCNC: 8.6 MG/DL (ref 8.5–10.1)
CHLORIDE SERPL-SCNC: 104 MMOL/L (ref 94–109)
CO2 SERPL-SCNC: 27 MMOL/L (ref 20–32)
CREAT SERPL-MCNC: 0.96 MG/DL (ref 0.66–1.25)
CRP SERPL-MCNC: 78 MG/L (ref 0–8)
DIFFERENTIAL METHOD BLD: ABNORMAL
EOSINOPHIL # BLD AUTO: 0.1 10E9/L (ref 0–0.7)
EOSINOPHIL NFR BLD AUTO: 0.7 %
ERYTHROCYTE [DISTWIDTH] IN BLOOD BY AUTOMATED COUNT: 18.3 % (ref 10–15)
GFR SERPL CREATININE-BSD FRML MDRD: 78 ML/MIN/{1.73_M2}
GLUCOSE SERPL-MCNC: 136 MG/DL (ref 70–99)
HCT VFR BLD AUTO: 32.8 % (ref 40–53)
HGB BLD-MCNC: 10.7 G/DL (ref 13.3–17.7)
IMM GRANULOCYTES # BLD: 0.1 10E9/L (ref 0–0.4)
IMM GRANULOCYTES NFR BLD: 0.7 %
LYMPHOCYTES # BLD AUTO: 1.4 10E9/L (ref 0.8–5.3)
LYMPHOCYTES NFR BLD AUTO: 14.1 %
MCH RBC QN AUTO: 31.6 PG (ref 26.5–33)
MCHC RBC AUTO-ENTMCNC: 32.6 G/DL (ref 31.5–36.5)
MCV RBC AUTO: 97 FL (ref 78–100)
MONOCYTES # BLD AUTO: 1 10E9/L (ref 0–1.3)
MONOCYTES NFR BLD AUTO: 10.5 %
NEUTROPHILS # BLD AUTO: 7.2 10E9/L (ref 1.6–8.3)
NEUTROPHILS NFR BLD AUTO: 73.9 %
NRBC # BLD AUTO: 0 10*3/UL
NRBC BLD AUTO-RTO: 0 /100
PLATELET # BLD AUTO: 182 10E9/L (ref 150–450)
POTASSIUM SERPL-SCNC: 4.4 MMOL/L (ref 3.4–5.3)
RBC # BLD AUTO: 3.39 10E12/L (ref 4.4–5.9)
SODIUM SERPL-SCNC: 137 MMOL/L (ref 133–144)
WBC # BLD AUTO: 9.8 10E9/L (ref 4–11)

## 2021-02-24 PROCEDURE — 250N000012 HC RX MED GY IP 250 OP 636 PS 637: Performed by: STUDENT IN AN ORGANIZED HEALTH CARE EDUCATION/TRAINING PROGRAM

## 2021-02-24 PROCEDURE — 250N000013 HC RX MED GY IP 250 OP 250 PS 637: Performed by: STUDENT IN AN ORGANIZED HEALTH CARE EDUCATION/TRAINING PROGRAM

## 2021-02-24 PROCEDURE — 250N000011 HC RX IP 250 OP 636: Performed by: NURSE PRACTITIONER

## 2021-02-24 PROCEDURE — 206N000001 HC R&B BMT UMMC

## 2021-02-24 PROCEDURE — 97530 THERAPEUTIC ACTIVITIES: CPT | Mod: GP | Performed by: REHABILITATION PRACTITIONER

## 2021-02-24 PROCEDURE — 85025 COMPLETE CBC W/AUTO DIFF WBC: CPT | Performed by: INTERNAL MEDICINE

## 2021-02-24 PROCEDURE — 250N000013 HC RX MED GY IP 250 OP 250 PS 637: Performed by: INTERNAL MEDICINE

## 2021-02-24 PROCEDURE — 97116 GAIT TRAINING THERAPY: CPT | Mod: GP | Performed by: REHABILITATION PRACTITIONER

## 2021-02-24 PROCEDURE — 250N000013 HC RX MED GY IP 250 OP 250 PS 637: Performed by: PHYSICIAN ASSISTANT

## 2021-02-24 PROCEDURE — 97530 THERAPEUTIC ACTIVITIES: CPT | Mod: GO | Performed by: OCCUPATIONAL THERAPIST

## 2021-02-24 PROCEDURE — 86140 C-REACTIVE PROTEIN: CPT | Performed by: INTERNAL MEDICINE

## 2021-02-24 PROCEDURE — 36415 COLL VENOUS BLD VENIPUNCTURE: CPT | Performed by: INTERNAL MEDICINE

## 2021-02-24 PROCEDURE — 250N000013 HC RX MED GY IP 250 OP 250 PS 637: Performed by: NURSE PRACTITIONER

## 2021-02-24 PROCEDURE — 80048 BASIC METABOLIC PNL TOTAL CA: CPT | Performed by: INTERNAL MEDICINE

## 2021-02-24 PROCEDURE — 99233 SBSQ HOSP IP/OBS HIGH 50: CPT | Performed by: INTERNAL MEDICINE

## 2021-02-24 RX ORDER — MULTIVITAMIN,THERAPEUTIC
1 TABLET ORAL DAILY
Status: DISCONTINUED | OUTPATIENT
Start: 2021-02-25 | End: 2021-03-03 | Stop reason: HOSPADM

## 2021-02-24 RX ADMIN — MELATONIN TAB 3 MG 3 MG: 3 TAB at 22:15

## 2021-02-24 RX ADMIN — OXYCODONE HYDROCHLORIDE 10 MG: 10 TABLET ORAL at 03:13

## 2021-02-24 RX ADMIN — RUXOLITINIB 5 MG: 5 TABLET ORAL at 08:25

## 2021-02-24 RX ADMIN — SERTRALINE HYDROCHLORIDE 50 MG: 50 TABLET ORAL at 08:20

## 2021-02-24 RX ADMIN — PANTOPRAZOLE SODIUM 40 MG: 40 TABLET, DELAYED RELEASE ORAL at 08:19

## 2021-02-24 RX ADMIN — ACYCLOVIR 800 MG: 800 TABLET ORAL at 08:19

## 2021-02-24 RX ADMIN — Medication 1 PACKET: at 08:21

## 2021-02-24 RX ADMIN — OXYCODONE HYDROCHLORIDE 10 MG: 10 TABLET ORAL at 16:10

## 2021-02-24 RX ADMIN — MULTIVITAMIN 15 ML: LIQUID ORAL at 08:34

## 2021-02-24 RX ADMIN — FLUCONAZOLE 100 MG: 100 TABLET ORAL at 08:19

## 2021-02-24 RX ADMIN — QUETIAPINE FUMARATE 25 MG: 25 TABLET ORAL at 22:14

## 2021-02-24 RX ADMIN — ACYCLOVIR 800 MG: 800 TABLET ORAL at 20:24

## 2021-02-24 RX ADMIN — LEVOFLOXACIN 250 MG: 250 TABLET, FILM COATED ORAL at 08:20

## 2021-02-24 RX ADMIN — Medication 2 PACKET: at 08:21

## 2021-02-24 RX ADMIN — PREDNISONE 10 MG: 5 TABLET ORAL at 08:20

## 2021-02-24 RX ADMIN — RUXOLITINIB 5 MG: 5 TABLET ORAL at 20:23

## 2021-02-24 RX ADMIN — OXYCODONE HYDROCHLORIDE 10 MG: 10 TABLET ORAL at 08:34

## 2021-02-24 RX ADMIN — ENOXAPARIN SODIUM 50 MG: 100 INJECTION SUBCUTANEOUS at 12:36

## 2021-02-24 RX ADMIN — Medication 1 TABLET: at 12:36

## 2021-02-24 ASSESSMENT — ACTIVITIES OF DAILY LIVING (ADL)
ADLS_ACUITY_SCORE: 18
ADLS_ACUITY_SCORE: 16
ADLS_ACUITY_SCORE: 16
ADLS_ACUITY_SCORE: 18

## 2021-02-24 ASSESSMENT — MIFFLIN-ST. JEOR: SCORE: 1722.87

## 2021-02-24 NOTE — PLAN OF CARE
Afebrile, VSS on 3L NC overnight. PRN Oxycodone x1 for knee and hip pain and pt states pain is well controlled. Mepilex lite applied to bridge of nose, and L nare wound cleaned and Vaseline applied. Tube feed continues at 40ml/hr, pt drinking ray cola overnight and ate one vanilla ice cream cup, continues on calorie counts. Per pt, sleeping well overnight after melatonin and Seroquel. Repositioned q2-3h, bed alarm on for pt safety. PRN imodium x1, no BM overnight. Continue with POC.    Problem: Adult Inpatient Plan of Care  Goal: Plan of Care Review  Outcome: No Change     Problem: Adult Inpatient Plan of Care  Goal: Patient-Specific Goal (Individualized)  Outcome: No Change     Problem: Adult Inpatient Plan of Care  Goal: Absence of Hospital-Acquired Illness or Injury  Outcome: No Change     Problem: Adult Inpatient Plan of Care  Goal: Absence of Hospital-Acquired Illness or Injury  Intervention: Identify and Manage Fall Risk  Recent Flowsheet Documentation  Taken 2/23/2021 2000 by Rosalia Ospina, RN  Safety Promotion/Fall Prevention: bed alarm on

## 2021-02-24 NOTE — PROGRESS NOTES
Calorie Count  Intake recorded for: 2/23  Total Kcals: 1218 Total Protein: 48g  Kcals from Hospital Food: 1218   Protein: 48g  Kcals from Outside Food (average):0 Protein: 0g  # Meals Ordered from Kitchen: 3  # Meals Recorded: First (100% 8 oz. OJ, 25% omelet w. Cheese, hash browns)    Second (50% cheeseburger, fries, ketchup, saleh)    Third (100% ray, ice cream, 75% pepperoni and sausage pizza)  # Supplements Recorded: 25% Boost Plus

## 2021-02-24 NOTE — PLAN OF CARE
Afebrile, Vital signs stable on 2L oxymask. Alert and oriented x4. Dyspnea on exertion. Continues to have bilateral leg soreness and pain in left groin, relief with PRN oxycodone x2.   Small scab/skin tear on right shin with a large amount of bleeding this morning, bleeding stopped after placing a hemostatic dressing. In afternoon skin tear on left forearm with increased bleeding, hemostatic dressing placed. NG tube removed, Vaseline applied to pressure injury in left nostril, encourage patient to order and assist with feedings. Continues on calorie counts. Up to chair with sling, no bowel movement, voids spontaneously via urinal. Repositioned q2 hours in bed. Bed alarm on, patient calls out appropriately.         Problem: Adult Inpatient Plan of Care  Goal: Plan of Care Review  Outcome: No Change     Problem: Adult Inpatient Plan of Care  Goal: Optimal Comfort and Wellbeing  Outcome: Improving     Problem: Adult Inpatient Plan of Care  Goal: Readiness for Transition of Care  Outcome: Improving

## 2021-02-24 NOTE — PROGRESS NOTES
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 Transferred to MICU on 2/10 for worsening respiratory status requiring intubation 2/13-2/19.  - transferred to  2/21    HPI: He continues to feel better, currently on 2L of O2.  His knee/hip pain is much improved with oxycodone.  His stools are much less frequent.  He has been eating well, yesterday he had an omelette, cheeseburger, pizza.  He denies nausea/vomiting. He slept well last night for the first time in several days, he suspects because pain is better controlled (could also be addition of seroquel last night).  He would like his NG tube out.  He reports infrequent cough, breathing feels good.  He denies headache, congestion, abdominal pain, vomiting, urinary complaints.        ROS: 10 point ROS neg other than the symptoms noted above in the HPI.    Exam:    Blood pressure 126/80, pulse 75, temperature 98.3  F (36.8  C), temperature source Axillary, resp. rate 18, height 1.753 m (5' 9\"), weight 98.8 kg (217 lb 13 oz), SpO2 94 %.  Constitutional: very pleasant, in NAD  ENT: dry mm with small cracked area on tongue, neck is supple, unable to visualize sore in left nostril, NG tube in place  Respiratory: CTAB, normal effort  Cardiovascular: nl s1/s2 no MRGs  GI: abdomen is soft, NT, +BS  Skin: warm, dry  Musculoskeletal: mild-mod pitting edema bilat lower extremities, skin tear on right lower leg with surrounding dried blood  Neurologic: awake/alert, speech is clear, answers questions appropriately       Labs:  Lab Results   Component Value Date    WBC 9.8 02/24/2021    ANEU 7.2 02/24/2021    HGB 10.7 (L) 02/24/2021    HCT 32.8 (L) 02/24/2021     02/24/2021     02/24/2021    POTASSIUM 4.4 02/24/2021    CHLORIDE 104 02/24/2021    CO2 27 02/24/2021     (H) " 02/24/2021    BUN 36 (H) 02/24/2021    CR 0.96 02/24/2021    MAG 2.2 02/22/2021    INR 1.05 02/23/2021    BILITOTAL 0.6 02/23/2021    AST 26 02/23/2021    ALT 42 02/23/2021    ALKPHOS 65 02/23/2021    PROTTOTAL 5.9 (L) 02/23/2021    ALBUMIN 2.1 (L) 02/23/2021     Imaging:  CXR (2/15): Overall slight decrease in patchy opacifications in the left lung base. Continued small left effusion. Atherosclerosis. Endotracheal intubation. Continued patchy opacities in right lung base.      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 Transferred to MICU on 2/10 for worsening respiratory status requiring intubation 2/13-2/19.  - transferred to  2/21     1. Pulmonary:  Acute hypoxic respiratory failure, presumed 2/2 secondary to COVID-19 pneumonia, rhinovirus, and mild pulmonary edema  Acute respiratory distress syndrome  Intubated 2/13 with initiation of proning. Extubated 2/19. Weaning O2, now 2L.  Has received intermittent diuresis to maintain net negative daily, earlier this hospital course, monitor closely.    2. ID:  Elevated CRP, unclear etiology   cont to trend    COVID-19 pneumonia (+1/30; neg 2/21), Repeat Covid 2/28.  + Rhinovirus  Remdesivir course completed on 2/12. Was considered for tocilizumab.  - ID signed off  - s/p 10-day course of dexamethasone (2/8-2/17) completed  - Enoxaparin 50 mg BID given elevated D-dimer; cont for now. Likely Eliquis at discharge for 30d.    Possible superimposed bacterial pneumonia   Procal increased to 0.28 on 2/8 with CXR that day notable for stable pulmonary opacities compatible with atypical infection vs atelectasis/edema. Patient has since completed treatment with 3-day course of azithromycin (2/9-2/11).  - Sputum culture from 2/14 with recent growth of staph haemolyticus, staph epidermidis, and  enterococcus faecalis, which was presumed colonization and not treated with antibiotics. If new fever or worsening respiratory status, would repeat CXR.    Prophy:   Acyclovir, fluconazole, Levaquin, Bactrim    3.  Cardiovascular:  Transient hypotension episode , presumed 2/2 to intravascular depletion following diuresis - improved post 500cc bolus.    4.  Neuro:  Sleep: added seroquel back  which helped significantly (was on for a couple of days in ICU); melatonin at hs    Agitated Delirium - resolved  Off Precedex     Mood  - Continue PTA sertraline     5.  GI: NG tube in place. Wound in L nare and now eating well, will pull NG tube today .  - Protonix for GI prophy  #frequent stools: C.dif neg . Possibly  TF, much better this am .  - add fiber supp; Imodium prn , also now on narcotics which likely helped w/frequent stools     6.  Renal/FEN: Lytes wnl.  #mild malnutrition in the context of acute illness: was on TF, now with adequate po intake and sore from NG tube left nostril, will pull NG tube today and continue calorie counts  - SLP assessed, now signed off. Regular diet, thin liquids .     #SARAH, presumed prerenal  to poor PO intake, dehydration. Resolved.     7.  Endocrine:  Hyperglycemia - BG generally <140. Stopped Novolog sliding scale .     8. Hematology:    MDS/ s/p BMT  complicated by GVHD  No acute concerns. PTA on prednisone and Jakafi.  - PTA prednisone 5 mg/d held while on dexamethasone; started prednisone 10 mg/d () post dex  - Continue PTA Jakafi 5 mg bid     9.  Deconditionin/2 acute illness, ICU, steroids. Cont PT/OT. Currently rec TCU.    10.  MS: bilateral knee ache. Arthritis per pt.   - Rx icy hot patch, oxycodone prn, much better today      The plan was staffed with Dr. Baez.    Maeve Callejas PA-C

## 2021-02-25 ENCOUNTER — APPOINTMENT (OUTPATIENT)
Dept: PHYSICAL THERAPY | Facility: CLINIC | Age: 71
DRG: 207 | End: 2021-02-25
Attending: INTERNAL MEDICINE
Payer: MEDICARE

## 2021-02-25 LAB
ANION GAP SERPL CALCULATED.3IONS-SCNC: 6 MMOL/L (ref 3–14)
BASOPHILS # BLD AUTO: 0 10E9/L (ref 0–0.2)
BASOPHILS NFR BLD AUTO: 0.1 %
BUN SERPL-MCNC: 35 MG/DL (ref 7–30)
CALCIUM SERPL-MCNC: 8.6 MG/DL (ref 8.5–10.1)
CHLORIDE SERPL-SCNC: 104 MMOL/L (ref 94–109)
CO2 SERPL-SCNC: 22 MMOL/L (ref 20–32)
CREAT SERPL-MCNC: 0.87 MG/DL (ref 0.66–1.25)
CRP SERPL-MCNC: 91 MG/L (ref 0–8)
DIFFERENTIAL METHOD BLD: ABNORMAL
EOSINOPHIL # BLD AUTO: 0.1 10E9/L (ref 0–0.7)
EOSINOPHIL NFR BLD AUTO: 0.5 %
ERYTHROCYTE [DISTWIDTH] IN BLOOD BY AUTOMATED COUNT: 18.5 % (ref 10–15)
GFR SERPL CREATININE-BSD FRML MDRD: 86 ML/MIN/{1.73_M2}
GLUCOSE SERPL-MCNC: 86 MG/DL (ref 70–99)
HCT VFR BLD AUTO: 33.7 % (ref 40–53)
HGB BLD-MCNC: 11 G/DL (ref 13.3–17.7)
IMM GRANULOCYTES # BLD: 0.1 10E9/L (ref 0–0.4)
IMM GRANULOCYTES NFR BLD: 0.6 %
LYMPHOCYTES # BLD AUTO: 2 10E9/L (ref 0.8–5.3)
LYMPHOCYTES NFR BLD AUTO: 21.1 %
MCH RBC QN AUTO: 32.7 PG (ref 26.5–33)
MCHC RBC AUTO-ENTMCNC: 32.6 G/DL (ref 31.5–36.5)
MCV RBC AUTO: 100 FL (ref 78–100)
MONOCYTES # BLD AUTO: 1.1 10E9/L (ref 0–1.3)
MONOCYTES NFR BLD AUTO: 11.3 %
NEUTROPHILS # BLD AUTO: 6.2 10E9/L (ref 1.6–8.3)
NEUTROPHILS NFR BLD AUTO: 66.4 %
NRBC # BLD AUTO: 0 10*3/UL
NRBC BLD AUTO-RTO: 0 /100
PLATELET # BLD AUTO: 194 10E9/L (ref 150–450)
POTASSIUM SERPL-SCNC: 4.2 MMOL/L (ref 3.4–5.3)
RBC # BLD AUTO: 3.36 10E12/L (ref 4.4–5.9)
SODIUM SERPL-SCNC: 132 MMOL/L (ref 133–144)
WBC # BLD AUTO: 9.3 10E9/L (ref 4–11)

## 2021-02-25 PROCEDURE — 250N000013 HC RX MED GY IP 250 OP 250 PS 637: Performed by: NURSE PRACTITIONER

## 2021-02-25 PROCEDURE — 206N000001 HC R&B BMT UMMC

## 2021-02-25 PROCEDURE — 250N000011 HC RX IP 250 OP 636: Performed by: NURSE PRACTITIONER

## 2021-02-25 PROCEDURE — 250N000012 HC RX MED GY IP 250 OP 636 PS 637: Performed by: STUDENT IN AN ORGANIZED HEALTH CARE EDUCATION/TRAINING PROGRAM

## 2021-02-25 PROCEDURE — 97530 THERAPEUTIC ACTIVITIES: CPT | Mod: GP | Performed by: PHYSICAL THERAPIST

## 2021-02-25 PROCEDURE — 250N000013 HC RX MED GY IP 250 OP 250 PS 637: Performed by: INTERNAL MEDICINE

## 2021-02-25 PROCEDURE — 99233 SBSQ HOSP IP/OBS HIGH 50: CPT | Performed by: INTERNAL MEDICINE

## 2021-02-25 PROCEDURE — 80048 BASIC METABOLIC PNL TOTAL CA: CPT | Performed by: INTERNAL MEDICINE

## 2021-02-25 PROCEDURE — 250N000013 HC RX MED GY IP 250 OP 250 PS 637: Performed by: PHYSICIAN ASSISTANT

## 2021-02-25 PROCEDURE — 36415 COLL VENOUS BLD VENIPUNCTURE: CPT | Performed by: INTERNAL MEDICINE

## 2021-02-25 PROCEDURE — 85025 COMPLETE CBC W/AUTO DIFF WBC: CPT | Performed by: INTERNAL MEDICINE

## 2021-02-25 PROCEDURE — G0463 HOSPITAL OUTPT CLINIC VISIT: HCPCS

## 2021-02-25 PROCEDURE — 250N000013 HC RX MED GY IP 250 OP 250 PS 637: Performed by: STUDENT IN AN ORGANIZED HEALTH CARE EDUCATION/TRAINING PROGRAM

## 2021-02-25 PROCEDURE — 86140 C-REACTIVE PROTEIN: CPT | Performed by: INTERNAL MEDICINE

## 2021-02-25 RX ORDER — POLYETHYLENE GLYCOL 3350 17 G/17G
17 POWDER, FOR SOLUTION ORAL DAILY
Status: DISCONTINUED | OUTPATIENT
Start: 2021-02-25 | End: 2021-02-25

## 2021-02-25 RX ORDER — SENNOSIDES 8.6 MG
8.6 TABLET ORAL 2 TIMES DAILY PRN
Status: DISCONTINUED | OUTPATIENT
Start: 2021-02-25 | End: 2021-03-03 | Stop reason: HOSPADM

## 2021-02-25 RX ORDER — POLYETHYLENE GLYCOL 3350 17 G/17G
17 POWDER, FOR SOLUTION ORAL DAILY PRN
Status: DISCONTINUED | OUTPATIENT
Start: 2021-02-25 | End: 2021-03-03 | Stop reason: HOSPADM

## 2021-02-25 RX ORDER — ACETAMINOPHEN 325 MG/1
650 TABLET ORAL EVERY 6 HOURS
Status: DISCONTINUED | OUTPATIENT
Start: 2021-02-25 | End: 2021-03-03 | Stop reason: HOSPADM

## 2021-02-25 RX ADMIN — ENOXAPARIN SODIUM 50 MG: 100 INJECTION SUBCUTANEOUS at 12:00

## 2021-02-25 RX ADMIN — ACETAMINOPHEN 650 MG: 325 TABLET, FILM COATED ORAL at 15:32

## 2021-02-25 RX ADMIN — QUETIAPINE FUMARATE 25 MG: 25 TABLET ORAL at 22:58

## 2021-02-25 RX ADMIN — ACYCLOVIR 800 MG: 800 TABLET ORAL at 20:53

## 2021-02-25 RX ADMIN — PANTOPRAZOLE SODIUM 40 MG: 40 TABLET, DELAYED RELEASE ORAL at 08:03

## 2021-02-25 RX ADMIN — Medication 1 TABLET: at 12:00

## 2021-02-25 RX ADMIN — LEVOFLOXACIN 250 MG: 250 TABLET, FILM COATED ORAL at 08:03

## 2021-02-25 RX ADMIN — FLUCONAZOLE 100 MG: 100 TABLET ORAL at 08:03

## 2021-02-25 RX ADMIN — THERA TABS 1 TABLET: TAB at 08:03

## 2021-02-25 RX ADMIN — OXYCODONE HYDROCHLORIDE 10 MG: 10 TABLET ORAL at 15:28

## 2021-02-25 RX ADMIN — PREDNISONE 10 MG: 5 TABLET ORAL at 08:03

## 2021-02-25 RX ADMIN — ACETAMINOPHEN 650 MG: 325 TABLET, FILM COATED ORAL at 11:59

## 2021-02-25 RX ADMIN — ACETAMINOPHEN 650 MG: 325 TABLET, FILM COATED ORAL at 22:58

## 2021-02-25 RX ADMIN — RUXOLITINIB 5 MG: 5 TABLET ORAL at 20:53

## 2021-02-25 RX ADMIN — OXYCODONE HYDROCHLORIDE 10 MG: 10 TABLET ORAL at 08:03

## 2021-02-25 RX ADMIN — SERTRALINE HYDROCHLORIDE 50 MG: 50 TABLET ORAL at 08:03

## 2021-02-25 RX ADMIN — SENNOSIDES 8.6 MG: 8.6 TABLET, FILM COATED ORAL at 15:32

## 2021-02-25 RX ADMIN — MELATONIN TAB 3 MG 3 MG: 3 TAB at 22:58

## 2021-02-25 RX ADMIN — ENOXAPARIN SODIUM 50 MG: 100 INJECTION SUBCUTANEOUS at 22:59

## 2021-02-25 RX ADMIN — ENOXAPARIN SODIUM 50 MG: 100 INJECTION SUBCUTANEOUS at 00:35

## 2021-02-25 RX ADMIN — RUXOLITINIB 5 MG: 5 TABLET ORAL at 08:07

## 2021-02-25 RX ADMIN — ACYCLOVIR 800 MG: 800 TABLET ORAL at 08:03

## 2021-02-25 ASSESSMENT — ACTIVITIES OF DAILY LIVING (ADL)
ADLS_ACUITY_SCORE: 16

## 2021-02-25 NOTE — PROGRESS NOTES
Calorie Count  Intake recorded for: 2/24  Total Kcals: 972 Total Protein: 49g  Kcals from Hospital Food: 897  Protein: 48g  Kcals from Outside Food (average):75 Protein: 1g  # Meals Ordered from Kitchen: 3 meals + snack from outside the hospital   # Meals Recorded: 3 meals (First - 100% orange juice, 25% 2 Kosovan toast w/ syrup & butter)      (Second - 100% chicken noodle soup w/ saltines, 50% chocolate chip cookie, baked potato chips)      (Third - 100% milk, 75% roast turkey & mashed potatoes w/ gravy, corn w/ butter)      (Snack - 100% chocolate truffle from outside the hospital)   # Supplements Recorded: 0

## 2021-02-25 NOTE — PLAN OF CARE
"/77 (BP Location: Left arm)   Pulse 73   Temp 98.1  F (36.7  C) (Axillary)   Resp 18   Ht 1.753 m (5' 9\")   Wt 98.2 kg (216 lb 9.6 oz)   SpO2 98%   BMI 31.99 kg/m      Afebrile, AVSS on 2LPM oxymask. Denies pain or nausea. Adequate fluid intake and UOP with bedside urinal. No stools overnight. No replacements needed this AM. Pt is assist x1 and calls appropriately. Continue to monitor and follow POC.    Problem: Adult Inpatient Plan of Care  Goal: Absence of Hospital-Acquired Illness or Injury  Intervention: Prevent Skin Injury  Recent Flowsheet Documentation  Taken 2/25/2021 0400 by Sharri Remy, RN  Body Position: turned     Problem: Gas Exchange Impaired  Goal: Optimal Gas Exchange  Outcome: No Change  Intervention: Optimize Oxygenation and Ventilation  Recent Flowsheet Documentation  Taken 2/25/2021 0400 by Sharri Remy, RN  Head of Bed (HOB) Positioning: HOB at 30 degrees     "

## 2021-02-25 NOTE — PROGRESS NOTES
[facility] Nurse Inpatient Pressure Injury Assessment   Reason for consultation: Evaluate and treat bridge of nose, left internal nostril, gluteal cleft, and left buttock wounds      ASSESSMENT  Pressure Injury: on left internal nostril , hospital acquired ,   This is a Medical Device Related Pressure Injury (MDRPI) due to NG  Pressure Injury is Stage Mucosal   Contributing factor of the pressure injury: pressure  Status: healed 2/25    Gluteal cleft wound due to MASD moisture associated skin damage  Status: improving     Left buttock wound due to skin tear  Status: improving     Right shin and left arm wound due to GVH vs delayed healing  Status: initial assessment     TREATMENT PLAN  Bridge of nose prevention   Apply Gecko pad or small strip of mepilex lite over bridge of nose to prevent skin breakdown from oxygen face mask, and assess under mask and straps each shift.    Gluteal cleft and left buttock woounds: Every three days and PRN    Cleanse the area with NS and pat dry.    Apply No sting film barrier to periwound skin.    Cover wound with Mepilex. Sacral Mepilex (#[id number]).    Ok to apply upside down. Taco mepilex and place middle portion into crack so that it conforms to patients body curvatures and comes in contact with the wound bed.     Change dressing Q 3 days.    Turn and reposition Q 2hrs.    Ensure pt has Xu-cushion while sitting up in the chair.    FYI- If pt has constant incontinent loose stools needing dressing changes Q shift please discontinue the Mepilex dressing and apply criticaid barrier paste BID and PRN.    Right shin and left arm wounds: Q3D and PRN gently remove dressing, may need to moisten with saline. Cleanse with wound cleanser and pat dry. Apply multiple layers of Vaseline gauze over wound (to ensure wound stays moist) cover with mepilex dressing. If wound is moderatly bleeding switch to quick clot dressing. Ensuring to moisten any dressing being removed so wound do not start bleeding  again.       Orders Written  [facility] Nurse follow-up plan:weekly .  Nursing to notify the Provider(s) and re-consult the WO Nurse if wound(s) deteriorates or new skin concern.    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 Transferred to MICU on 2/10 for worsening respiratory status requiring intubation 2/13-2/19.  - transferred to  2/21    Objective Data  Containment of urine/stool: Incontinence Protocol    Current Diet/ Nutrition:  Orders Placed This Encounter      High Kcal/High Protein Diet, ADULT    Output:   I/O last 3 completed shifts:  In: 1190 [P.O.:940; NG/GT:90]  Out: 925 [Urine:925]    Risk Assessment:   Sensory Perception: 3-->slightly limited  Moisture: 4-->rarely moist  Activity: 2-->chairfast  Mobility: 3-->slightly limited  Nutrition: 3-->adequate  Friction and Shear: 2-->potential problem  Sven Score: 17    Labs:   Recent Labs   Lab 02/25/21  0354 02/23/21  0348 02/23/21  0348   ALBUMIN  --   --  2.1*   HGB 11.0*   < > 11.7*   INR  --   --  1.05   WBC 9.3   < > 11.0   CRP 91.0*   < > 62.0*    < > = values in this interval not displayed.       Physical Exam  Skin inspection: focused face, legs, arms, buttock  Patient is high risk for pressure injury development secondary to history of pressure injury      Wound Location:  Left nostril behind NJ tube healed 2/25  Wound History: Wound noted by RN upon transfer to floor from MICU. NJ tube placed 2/15. Currently bridled. Face mask does tend to pull down on tube and apply pressure to lower inner nostril.       Skin inspection: Gluteal cleft and left buttock:     Gluteal cleft 2/22    Left lower buttock 2/22  Wound History: Wound noted by RN upon transfer to floor from MICU.   Measurements (length x width x depth, in cm) left buttock: 1 x 0.3 x 0.1  cm; gluteal cleft 5 x 0.3 x 0 cm  Wound Base:  100 % dermis and fibrin left buttock; 100% reddened and macerated epidermis gluteal cleft. Wounds are lightening in color   Tunneling N/A  Undermining N/A  Palpation of the wound bed: normal   Periwound skin: intact  Color: normal and consistent with surrounding tissue  Temperature: normal   Drainage:none  Description of drainage: none  Odor: none  Pain: none    Wound Location:  Right shin    Date of last photo 2/25  Wound History: prior wound site from >5 years ago, slow healing  Wound Base: 100 % superficial scab and dry drainage pinpoint opening with bleeding     Palpation of the wound bed: firm     Drainage: moderate     Description of drainage: bloody     Measurements (length x width x depth, in cm) 1.3  x 1.4  x  +0.3 cm      Tunneling N/A     Undermining N/A  Periwound skin: intact      Color: normal and consistent with surrounding tissue      Temperature: normal   Odor: none  Pain: no grimacing or signs of discomfort, none    Wound Location:  Left arm    Date of last photo 2/25  Wound History: prior skin tear site, pt on blood thinners  Wound Base: 50/50 % dermis, superficial scab and dry drainage     Palpation of the wound bed: normal      Drainage: copious     Description of drainage: bloody     Measurements (length x width x depth, in cm) 1  x 0.5  x  +0.2 cm      Tunneling N/A     Undermining N/A  Periwound skin: ecchymosis      Color: purple      Temperature: normal   Odor: none  Pain: during dressing change, tender    Interventions  Current support surface: Standard  Atmos Air mattress  Current off-loading measures: Pillows  Repositioning aid: Pillows  Visual inspection of wound(s) completed   Tube Securement: bridle  Wound Care: was done per plan of care.  Supplies: discussed with RN and discussed with patient  Educated provided: importance of repositioning, plan of care, wound progress and Off-loading pressure  Education provided to: patient  and  nurse  Discussed importance of:repositioning every 2 hours, off-loading pressure to wound, their role in pressure injury prevention and moisture management  Discussed plan of care with Patient and Nurse    Aranza Stafford RN , CWOCN

## 2021-02-25 NOTE — PLAN OF CARE
OT/5C - Cancel. Per RN, increased pain on this date without improvement. Limited tolerance for therapy compared to previous dates. Will reschedule as appropriate.

## 2021-02-25 NOTE — PLAN OF CARE
Afebrile, VSS on 2L Oxymask. Oxygen saturations drop to 88% on room air. Pain in left groin, given oxycodone x2, scheduled tylenol, refused icy/hot patch. Last bowel movement was 2/23, refused miralax, requested PRN senna x1. Doesn't feel constipated. Continues to have bleeding for wound on left forearm and right shin, vaseline gauze placed. Ate 50% pancakes and 75% stir armijo chicken with rice. Up to shower with nursing, able to shave and do oral cares independently from chair.        Problem: Adult Inpatient Plan of Care  Goal: Plan of Care Review  Outcome: No Change

## 2021-02-25 NOTE — PROGRESS NOTES
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 Transferred to MICU on 2/10 for worsening respiratory status requiring intubation 2/13-2/19.  - transferred to  2/21    HPI: He continues to feel better everyday.  He reports that he is eating a lot.  He is glad to have NG tube removed.  His breathing feels good and cough is intermittent, both improving.  His left groin pain remains his biggest issue but it is controlled with oxycodone.  The pain in his left groin is chronic (\"years\" per patient) and unchanged, no new injuries.  He usually takes motrin at home for the pain.  His knee pain is chronic as well and comes and goes, he denies any change.  He denies nausea/vomiting, headache, congestion, chest pain, abdominal pain, diarrhea or urinary complaints.  Last BM was two days ago, he does not feel constipated.          ROS: 10 point ROS neg other than the symptoms noted above in the HPI.    Exam:    Blood pressure 133/78, pulse 74, temperature 98.7  F (37.1  C), temperature source Axillary, resp. rate 18, height 1.753 m (5' 9\"), weight 98.2 kg (216 lb 9.6 oz), SpO2 96 %.  Constitutional: very pleasant, in NAD  ENT: dry mm with small cracked area on tongue, neck is supple, small sore left nostril with granulation tissue, no bleeding  Respiratory: CTAB, normal effort  Cardiovascular: nl s1/s2 no MRGs  GI: abdomen is soft, NT, +BS  Skin: warm, dry  Musculoskeletal: mild pitting edema bilat lower extremities, dressing right lower leg non-bloody, left hip/groin pain with internal/external rotation, no swelling or skin changes   Neurologic: awake/alert, speech is clear, answers questions appropriately       Labs:  Lab Results   Component Value Date    WBC 9.3 02/25/2021    ANEU 6.2 02/25/2021    HGB 11.0 (L) 02/25/2021    HCT 33.7 (L) " 02/25/2021     02/25/2021     (L) 02/25/2021    POTASSIUM 4.2 02/25/2021    CHLORIDE 104 02/25/2021    CO2 22 02/25/2021    GLC 86 02/25/2021    BUN 35 (H) 02/25/2021    CR 0.87 02/25/2021    MAG 2.2 02/22/2021    INR 1.05 02/23/2021    BILITOTAL 0.6 02/23/2021    AST 26 02/23/2021    ALT 42 02/23/2021    ALKPHOS 65 02/23/2021    PROTTOTAL 5.9 (L) 02/23/2021    ALBUMIN 2.1 (L) 02/23/2021     Imaging:  CXR (2/15): Overall slight decrease in patchy opacifications in the left lung base. Continued small left effusion. Atherosclerosis. Endotracheal intubation. Continued patchy opacities in right lung base.      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 Transferred to MICU on 2/10 for worsening respiratory status requiring intubation 2/13-2/19.  - transferred to  2/21     1. Pulmonary:  Acute hypoxic respiratory failure, presumed 2/2 secondary to COVID-19 pneumonia, rhinovirus, and mild pulmonary edema  Acute respiratory distress syndrome  Intubated 2/13 with initiation of proning. Extubated 2/19. Weaning O2, now 2L.  Has received intermittent diuresis to maintain net negative daily, earlier this hospital course, monitor closely.    2. ID:  Elevated CRP, unclear etiology   cont to trend    COVID-19 pneumonia (+1/30; neg 2/21), Repeat Covid 2/28.  + Rhinovirus  Remdesivir course completed on 2/12. Was considered for tocilizumab.  - ID signed off  - s/p 10-day course of dexamethasone (2/8-2/17) completed  - Enoxaparin 50 mg BID given elevated D-dimer; cont for now. Likely Eliquis at discharge for 30d.  - Will likely need 02 upon discharge    Possible superimposed bacterial pneumonia   Procal increased to 0.28 on 2/8 with CXR that day notable for stable pulmonary opacities compatible with atypical infection vs atelectasis/edema.  Patient has since completed treatment with 3-day course of azithromycin (-).  - Sputum culture from  with recent growth of staph haemolyticus, staph epidermidis, and enterococcus faecalis, which was presumed colonization and not treated with antibiotics. If new fever or worsening respiratory status, would repeat CXR.    Prophy:   Acyclovir, fluconazole, Levaquin, Bactrim    3.  Cardiovascular:  -Transient hypotension episode , presumed 2/2 to intravascular depletion following diuresis - improved post 500cc bolus.  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  Neuro:  Sleep: added seroquel back  which helped significantly (was on for a couple of days in ICU); melatonin at hs    Agitated Delirium - resolved  Off Precedex     Mood  - Continue PTA sertraline     5.  GI: NG tube pulled on , now eating well  - Protonix for GI prophy  #frequent stools: C.dif neg . Possibly 2/2 TF, resolved   - no BM x 2 days now and on narcotics, will start miralax daily       6.  Renal/FEN: Lytes wnl.  #mild malnutrition in the context of acute illness: was on TF, now with adequate po intake and sore from NG tube left nostril, will pull NG tube today and continue calorie counts  - SLP assessed, now signed off. Regular diet, thin liquids .     #SARAH, presumed prerenal  to poor PO intake, dehydration. Resolved.     7.  Endocrine:  Hyperglycemia - BG generally <140. Stopped Novolog sliding scale .     8. Hematology:    MDS/ s/p BMT  complicated by GVHD  No acute concerns. PTA on prednisone and Jakafi.  - PTA prednisone 5 mg/d held while on dexamethasone; started prednisone 10 mg/d () post dex  - Continue PTA Jakafi 5 mg bid     9.  Deconditionin/2 acute illness, ICU, steroids. Cont PT/OT.  "Currently rec TCU.    10.  MS: Chronic left groin and bilateral knee pain.  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  His pain has been ongoing for \"years\" and is unchanged.  He usually uses motrin at home.    - Rx icy hot patch, oxycodone prn, much better today  - will order scheduled tylenol to minimize need for narcotics in anticipation of possible discharge next week    11.  Wounds: wound on right lower shin (?mild trauma vs GVH), no signs of infection  -WOC consult    Dispo: PT currently recommending TCU, but patient prefers to go home.  Will continue PT/OT and see how he does over the next few days.  Likely discharge (home vs TCU) early next week.      The plan was staffed with Dr. Baez.    Maeve Callejas PA-C  "

## 2021-02-26 ENCOUNTER — APPOINTMENT (OUTPATIENT)
Dept: PHYSICAL THERAPY | Facility: CLINIC | Age: 71
DRG: 207 | End: 2021-02-26
Attending: INTERNAL MEDICINE
Payer: MEDICARE

## 2021-02-26 ENCOUNTER — APPOINTMENT (OUTPATIENT)
Dept: OCCUPATIONAL THERAPY | Facility: CLINIC | Age: 71
DRG: 207 | End: 2021-02-26
Attending: INTERNAL MEDICINE
Payer: MEDICARE

## 2021-02-26 LAB
ALBUMIN SERPL-MCNC: 2 G/DL (ref 3.4–5)
ALP SERPL-CCNC: 76 U/L (ref 40–150)
ALT SERPL W P-5'-P-CCNC: 38 U/L (ref 0–70)
ANION GAP SERPL CALCULATED.3IONS-SCNC: 5 MMOL/L (ref 3–14)
AST SERPL W P-5'-P-CCNC: 30 U/L (ref 0–45)
BASOPHILS # BLD AUTO: 0 10E9/L (ref 0–0.2)
BASOPHILS NFR BLD AUTO: 0.1 %
BILIRUB DIRECT SERPL-MCNC: 0.2 MG/DL (ref 0–0.2)
BILIRUB SERPL-MCNC: 0.5 MG/DL (ref 0.2–1.3)
BUN SERPL-MCNC: 30 MG/DL (ref 7–30)
CALCIUM SERPL-MCNC: 8.8 MG/DL (ref 8.5–10.1)
CHLORIDE SERPL-SCNC: 105 MMOL/L (ref 94–109)
CO2 SERPL-SCNC: 27 MMOL/L (ref 20–32)
CREAT SERPL-MCNC: 0.8 MG/DL (ref 0.66–1.25)
CRP SERPL-MCNC: 110 MG/L (ref 0–8)
DIFFERENTIAL METHOD BLD: ABNORMAL
EOSINOPHIL # BLD AUTO: 0.1 10E9/L (ref 0–0.7)
EOSINOPHIL NFR BLD AUTO: 0.7 %
ERYTHROCYTE [DISTWIDTH] IN BLOOD BY AUTOMATED COUNT: 18 % (ref 10–15)
GFR SERPL CREATININE-BSD FRML MDRD: 89 ML/MIN/{1.73_M2}
GLUCOSE SERPL-MCNC: 105 MG/DL (ref 70–99)
HCT VFR BLD AUTO: 30.2 % (ref 40–53)
HGB BLD-MCNC: 10 G/DL (ref 13.3–17.7)
IMM GRANULOCYTES # BLD: 0 10E9/L (ref 0–0.4)
IMM GRANULOCYTES NFR BLD: 0.5 %
INR PPP: 1.13 (ref 0.86–1.14)
LYMPHOCYTES # BLD AUTO: 1.7 10E9/L (ref 0.8–5.3)
LYMPHOCYTES NFR BLD AUTO: 20.1 %
MCH RBC QN AUTO: 32.1 PG (ref 26.5–33)
MCHC RBC AUTO-ENTMCNC: 33.1 G/DL (ref 31.5–36.5)
MCV RBC AUTO: 97 FL (ref 78–100)
MONOCYTES # BLD AUTO: 0.8 10E9/L (ref 0–1.3)
MONOCYTES NFR BLD AUTO: 9.3 %
NEUTROPHILS # BLD AUTO: 5.9 10E9/L (ref 1.6–8.3)
NEUTROPHILS NFR BLD AUTO: 69.3 %
NRBC # BLD AUTO: 0 10*3/UL
NRBC BLD AUTO-RTO: 0 /100
PLATELET # BLD AUTO: 201 10E9/L (ref 150–450)
POTASSIUM SERPL-SCNC: 3.8 MMOL/L (ref 3.4–5.3)
PROT SERPL-MCNC: 6 G/DL (ref 6.8–8.8)
RBC # BLD AUTO: 3.12 10E12/L (ref 4.4–5.9)
SODIUM SERPL-SCNC: 137 MMOL/L (ref 133–144)
WBC # BLD AUTO: 8.6 10E9/L (ref 4–11)

## 2021-02-26 PROCEDURE — 80076 HEPATIC FUNCTION PANEL: CPT | Performed by: INTERNAL MEDICINE

## 2021-02-26 PROCEDURE — 250N000013 HC RX MED GY IP 250 OP 250 PS 637: Performed by: NURSE PRACTITIONER

## 2021-02-26 PROCEDURE — 250N000011 HC RX IP 250 OP 636: Performed by: NURSE PRACTITIONER

## 2021-02-26 PROCEDURE — 250N000013 HC RX MED GY IP 250 OP 250 PS 637: Performed by: INTERNAL MEDICINE

## 2021-02-26 PROCEDURE — 250N000013 HC RX MED GY IP 250 OP 250 PS 637: Performed by: PHYSICIAN ASSISTANT

## 2021-02-26 PROCEDURE — 97530 THERAPEUTIC ACTIVITIES: CPT | Mod: GP | Performed by: PHYSICAL THERAPIST

## 2021-02-26 PROCEDURE — 97110 THERAPEUTIC EXERCISES: CPT | Mod: GO

## 2021-02-26 PROCEDURE — 97110 THERAPEUTIC EXERCISES: CPT | Mod: GP | Performed by: PHYSICAL THERAPIST

## 2021-02-26 PROCEDURE — 250N000013 HC RX MED GY IP 250 OP 250 PS 637: Performed by: STUDENT IN AN ORGANIZED HEALTH CARE EDUCATION/TRAINING PROGRAM

## 2021-02-26 PROCEDURE — 36415 COLL VENOUS BLD VENIPUNCTURE: CPT | Performed by: INTERNAL MEDICINE

## 2021-02-26 PROCEDURE — 206N000001 HC R&B BMT UMMC

## 2021-02-26 PROCEDURE — 97116 GAIT TRAINING THERAPY: CPT | Mod: GP | Performed by: PHYSICAL THERAPIST

## 2021-02-26 PROCEDURE — 85610 PROTHROMBIN TIME: CPT | Performed by: INTERNAL MEDICINE

## 2021-02-26 PROCEDURE — 85025 COMPLETE CBC W/AUTO DIFF WBC: CPT | Performed by: INTERNAL MEDICINE

## 2021-02-26 PROCEDURE — 250N000012 HC RX MED GY IP 250 OP 636 PS 637: Performed by: STUDENT IN AN ORGANIZED HEALTH CARE EDUCATION/TRAINING PROGRAM

## 2021-02-26 PROCEDURE — 86140 C-REACTIVE PROTEIN: CPT | Performed by: INTERNAL MEDICINE

## 2021-02-26 PROCEDURE — 97530 THERAPEUTIC ACTIVITIES: CPT | Mod: GO

## 2021-02-26 PROCEDURE — 99233 SBSQ HOSP IP/OBS HIGH 50: CPT | Performed by: INTERNAL MEDICINE

## 2021-02-26 PROCEDURE — 80048 BASIC METABOLIC PNL TOTAL CA: CPT | Performed by: PHYSICIAN ASSISTANT

## 2021-02-26 RX ADMIN — OXYCODONE HYDROCHLORIDE 10 MG: 10 TABLET ORAL at 09:30

## 2021-02-26 RX ADMIN — RUXOLITINIB 5 MG: 5 TABLET ORAL at 20:56

## 2021-02-26 RX ADMIN — ENOXAPARIN SODIUM 50 MG: 100 INJECTION SUBCUTANEOUS at 21:41

## 2021-02-26 RX ADMIN — ACETAMINOPHEN 650 MG: 325 TABLET, FILM COATED ORAL at 04:51

## 2021-02-26 RX ADMIN — ENOXAPARIN SODIUM 50 MG: 100 INJECTION SUBCUTANEOUS at 10:00

## 2021-02-26 RX ADMIN — PREDNISONE 10 MG: 5 TABLET ORAL at 08:47

## 2021-02-26 RX ADMIN — ACETAMINOPHEN 650 MG: 325 TABLET, FILM COATED ORAL at 10:46

## 2021-02-26 RX ADMIN — OXYCODONE HYDROCHLORIDE 10 MG: 10 TABLET ORAL at 14:35

## 2021-02-26 RX ADMIN — QUETIAPINE FUMARATE 25 MG: 25 TABLET ORAL at 21:41

## 2021-02-26 RX ADMIN — ACYCLOVIR 800 MG: 800 TABLET ORAL at 20:56

## 2021-02-26 RX ADMIN — SERTRALINE HYDROCHLORIDE 50 MG: 50 TABLET ORAL at 08:47

## 2021-02-26 RX ADMIN — ACETAMINOPHEN 650 MG: 325 TABLET, FILM COATED ORAL at 16:54

## 2021-02-26 RX ADMIN — MELATONIN TAB 3 MG 3 MG: 3 TAB at 21:41

## 2021-02-26 RX ADMIN — PANTOPRAZOLE SODIUM 40 MG: 40 TABLET, DELAYED RELEASE ORAL at 08:47

## 2021-02-26 RX ADMIN — ACYCLOVIR 800 MG: 800 TABLET ORAL at 08:47

## 2021-02-26 RX ADMIN — ACETAMINOPHEN 650 MG: 325 TABLET, FILM COATED ORAL at 21:40

## 2021-02-26 RX ADMIN — SENNOSIDES 8.6 MG: 8.6 TABLET, FILM COATED ORAL at 16:54

## 2021-02-26 RX ADMIN — LEVOFLOXACIN 250 MG: 250 TABLET, FILM COATED ORAL at 08:47

## 2021-02-26 RX ADMIN — RUXOLITINIB 5 MG: 5 TABLET ORAL at 08:41

## 2021-02-26 RX ADMIN — FLUCONAZOLE 100 MG: 100 TABLET ORAL at 08:47

## 2021-02-26 RX ADMIN — THERA TABS 1 TABLET: TAB at 08:47

## 2021-02-26 RX ADMIN — Medication 1 TABLET: at 12:20

## 2021-02-26 ASSESSMENT — ACTIVITIES OF DAILY LIVING (ADL)
ADLS_ACUITY_SCORE: 16

## 2021-02-26 ASSESSMENT — MIFFLIN-ST. JEOR: SCORE: 1724.23

## 2021-02-26 NOTE — PROGRESS NOTES
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 Transferred to MICU on 2/10 for worsening respiratory status requiring intubation 2/13-2/19.  - transferred to  2/21    HPI: He continues to feel better.  His appetite is good.  His breathing feels good.  His cough has pretty much resolved.  His pain continues but it is controlled with medications.  Last BM was 2 days ago.  He denies abdominal pain.  He also denies any other new complaints including headache, congestion, chest pain, N/V, urinary complaints.          ROS: 10 point ROS neg other than the symptoms noted above in the HPI.    Exam:    Blood pressure (!) 151/82, pulse 74, temperature 98.5  F (36.9  C), temperature source Axillary, resp. rate 18, height 1.753 m (5' 9\"), weight 98.2 kg (216 lb 9.6 oz), SpO2 95 %.  Constitutional: very pleasant, in NAD  ENT: dry MM, neck is supple  Respiratory: CTAB, normal effort  Cardiovascular: nl s1/s2 no MRGs  GI: abdomen is soft, NT, +BS  Skin: warm, dry, small wound LLE no longer bleeding, small wound L forearm continues to ooze blood slowly  Musculoskeletal: trace edema bilat lower extremities  Neurologic: awake/alert, speech is clear, answers questions appropriately       Labs:  Lab Results   Component Value Date    WBC 8.6 02/26/2021    ANEU 5.9 02/26/2021    HGB 10.0 (L) 02/26/2021    HCT 30.2 (L) 02/26/2021     02/26/2021     (L) 02/25/2021    POTASSIUM 4.2 02/25/2021    CHLORIDE 104 02/25/2021    CO2 22 02/25/2021    GLC 86 02/25/2021    BUN 35 (H) 02/25/2021    CR 0.87 02/25/2021    MAG 2.2 02/22/2021    INR 1.13 02/26/2021    BILITOTAL 0.5 02/26/2021    AST 30 02/26/2021    ALT 38 02/26/2021    ALKPHOS 76 02/26/2021    PROTTOTAL 6.0 (L) 02/26/2021    ALBUMIN 2.0 (L) 02/26/2021     Imaging:  CXR (2/15): Overall " slight decrease in patchy opacifications in the left lung base. Continued small left effusion. Atherosclerosis. Endotracheal intubation. Continued patchy opacities in right lung base.      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 Transferred to MICU on 2/10 for worsening respiratory status requiring intubation 2/13-2/19.  - transferred to  2/21     1. Pulmonary:  Acute hypoxic respiratory failure, presumed 2/2 secondary to COVID-19 pneumonia, rhinovirus, and mild pulmonary edema  Acute respiratory distress syndrome  Intubated 2/13 with initiation of proning. Extubated 2/19. Weaning O2, now 2L.  Has received intermittent diuresis to maintain net negative daily, earlier this hospital course, monitor closely.    2. ID:  Elevated CRP, unclear etiology   cont to trend    COVID-19 pneumonia (+1/30; neg 2/21), Repeat Covid 2/28.  + Rhinovirus  Remdesivir course completed on 2/12. Was considered for tocilizumab.  He did complete a course of zithromax for possible superimposed bacterial pneumonia 2/9-2/11  - ID signed off  - s/p 10-day course of dexamethasone (2/8-2/17) completed  - Enoxaparin 50 mg BID given elevated D-dimer; cont for now and discontinue on oral anticoagulant for 30d. Pharm checked on coverage, patient able to get Eliquis for $26.03/30 day supply.  Will discharge on 30d of Eliquis when he goes.  - Will likely need 02 upon discharge, RN coordinator aware    Prophy:   Acyclovir, fluconazole, Levaquin, Bactrim    3.  Cardiovascular:  -Transient hypotension episode 2/21am, presumed 2/2 to intravascular depletion following diuresis - improved post 500cc bolus.  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  Neuro:  Sleep: added seroquel back  which helped significantly (was on for a couple of days in ICU); melatonin at hs    Agitated Delirium - resolved  Off Precedex am    Mood  - Continue PTA sertraline     5.  GI: NG tube pulled on , now eating well  - Protonix for GI prophy  #frequent stools: C.dif neg . Possibly 2/2 TF, resolved   - no BM x 2-3 days now and on narcotics, bowel regimen ordered    6.  Renal/FEN: Lytes wnl.  #mild malnutrition in the context of acute illness: was on TF, now with adequate po intake and sore from NG tube left nostril  - SLP assessed, now signed off. Regular diet, thin liquids .   -#SARAH, presumed prerenal  to poor PO intake, dehydration. Resolved.     7.  Endocrine:  Hyperglycemia - BG generally <140. Stopped Novolog sliding scale .     8. Hematology:    MDS/ s/p BMT  complicated by GVHD  No acute concerns. PTA on prednisone and Jakafi.  - PTA prednisone 5 mg/d held while on dexamethasone; started prednisone 10 mg/d () post dex  - Continue PTA Jakafi 5 mg bid  - had 1gm drop in Hgb overnight last night, likely from oozing wounds on right leg and left forearm/anticoagulant, continue to monitor     9.  Deconditionin/2 acute illness, ICU, steroids. Cont PT/OT. Currently rec TCU, RN coordinator and SW aware and working on placement, patient will get a repeat Covid test this weekend to aid in discharge planning    10.  MS: Chronic left groin and bilateral knee pain.  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  His pain has been ongoing for \"years\" and is unchanged.  He usually uses motrin at home.    - Rx icy hot patch, oxycodone prn, pain well controlled  -scheduled tylenol ordered to minimize need for narcotics in anticipation of possible discharge next week    11.  Wounds: wound on right lower shin (?mild trauma vs GVH), no signs of infection  -WOC consult  - much less oozing " blood this am    Dispo: PT currently recommending TCU, but patient prefers to go home.  Will continue PT/OT and see how he does over the next few days.  Likely discharge to TCU, sometime next week.      The plan was staffed with Dr. Baez.    Maeve Callejas PA-C

## 2021-02-26 NOTE — PROGRESS NOTES
"CLINICAL NUTRITION SERVICES - REASSESSMENT NOTE     Nutrition Prescription  Malnutrition Status:    Unable to determine due to unable to fully access patient at this time due to Covid precautions.    Recommendations already ordered by Registered Dietitian (RD)  --Oral nutrition supplement- able to order PRN    Future/Additional Recommendations:  --Continue monitoring oral intake  --Complete NFPE if able     EVALUATION OF THE PROGRESS TOWARD GOALS   Diet: High Kcal/High Protein  Nutrition Support:   2/22-2/24: Nutren 1.5 @ 40 mL/hr to provide 1440 kcals (18 kcal/kg/day), 65 g PRO (0.8 g/kg/day), 730 mL H2O, 169 g CHO and no Fiber daily     Intake:  Calorie count-  2/22: 2 meals- 50% diet ginger ale  2/23: 3 meals- 100% 8 oz. OJ, 25% omelet w. Cheese, hash browns  50% cheeseburger, fries, ketchup, saleh), 100% ray, ice cream, 75% pepperoni and sausage pizza, 25% Boost Plus (1218 kcal, 48 g PRO)  2/24: 3 meals +snack from outside hospital  100% orange juice, 25% 2 french toast w/ syrup & butter), 100% chicken noodle soup w/ saltines, 50% chocolate chip cookie, baked potato chips), 100% milk, 75% roast turkey & mashed potatoes w/ gravy, corn w/ butter), 100% chocolate truffle from outside the hospital. (897 kcal and 48 g PRO)  Average= 705 kcal (35% of needs and 9 kcal/kg), 32 g PRO/day (26% of needs and 0.4 g/kg )     Per note 2/25 ate 50% of pancakes (305 kcal and 3 g PRO) and 75% of stir armijo with rice (about 470 kcal and 53 g PRO), pt reports eating a lot.    Per patient: appetite has been poor for 1 month, but is greatly improving. Appetite is significantly better than early this week. Deejay confirms tray orders and claims to be eating \"a little over half\" and then he gets full because \"it's a lot of food\". Takes boost/ensure at home and isn't interested in drinking them while admitted- told him he can order as he desires.    NEW FINDINGS   Weight:   Per rounds- wt stable at 216 lb  Deejay reports suspected muscle " loss.    Labs:   (H), albumin 2.0 (L)- likely due to state of inflammation  Urea nitrogen 30 WNL, GFR WNL, creatinine 0.87 (N)  2/22 Lytes WNL     Meds: Vitamin D, thera-vit, deltasone, protonix, jakafi.    GI: last BM 2/26 x6    Skin: WOC note 2/25  Pressure Injury: on left internal nostril , hospital acquired ,   This is a Medical Device Related Pressure Injury (MDRPI) due to NG  Pressure Injury is Stage Mucosal   Contributing factor of the pressure injury: pressure  Status: healed 2/25     Gluteal cleft wound due to MASD moisture associated skin damage  Status: improving      Left buttock wound due to skin tear  Status: improving      Right shin and left arm wound due to GVH vs delayed healing  Status: initial assessment    MALNUTRITION (unable to see at this time due to Covid + screen)  % Intake: No decreased intake noted- improving since off TF  % Weight Loss: None noted  Subcutaneous Fat Loss: Unable to assess  Muscle Loss: Unable to assess  Fluid Accumulation/Edema: Moderate-feet, Mild- arms and hands per flowsheets  Malnutrition Diagnosis: Unable to determine due to unable to fully access patient at this time due to Covid precautions.    Previous Goals   Total avg nutritional intake to meet a minimum of 25 kcal/kg and 1.5 g PRO/kg daily (per dosing wt 81 kg).  Evaluation: Not met- improving    Previous Nutrition Diagnosis  Predicted inadequate nutrient intake (calories, protein) related to prolonged hospital LOS with risk for interruption to TF infusion  Evaluation: Improving- EN stopped 2/24    CURRENT NUTRITION DIAGNOSIS  Inadequate oral intake related to recovering poor appetite as evidenced by pt reports eating slightly over 50% of his meal trays 2-3 times/day and meeting 35% of calorie needs and 26% of protein needs from 2/22-2/24.    INTERVENTIONS  Implementation  Nutrition education for recommended modifications- continue ordering protein at every meal to assist in wound healing.    Collaboration with other providers - 5C rounds  Oral nutrition supplement- able to order PRN    Goals  Patient to consume % of nutritionally adequate meal trays TID, or the equivalent with supplements/snacks.    Monitoring/Evaluation  Progress toward goals will be monitored and evaluated per protocol.    Tram Valenzuela  Dietetic Intern    I read and agree with the above assessment and interventions.   Julisa Aj MS, RD, , CNSC, LD.  Pager: [phone]

## 2021-02-26 NOTE — PLAN OF CARE
"BP (!) 151/82 (BP Location: Left arm)   Pulse 74   Temp 98.5  F (36.9  C) (Axillary)   Resp 18   Ht 1.753 m (5' 9\")   Wt 98.2 kg (216 lb 9.6 oz)   SpO2 95%   BMI 31.99 kg/m      Afebrile, AOVSS on 2LPM oxymask. Denies pain or nausea. Adequate fluid intake and UOP with bedside urinal, no BM overnight Dressing on left forearm with vaseline gauze and mepilex changed. Mepilex on right shin CDI. No blood replacements needed, waiting for electrolytes to result. Pt is assist x1 on bed alarm and calls appropriately. Continue to monitor and follow POC.    Problem: Adult Inpatient Plan of Care  Goal: Absence of Hospital-Acquired Illness or Injury  Intervention: Prevent Skin Injury  Recent Flowsheet Documentation  Taken 2/25/2021 2000 by Sharri Remy, RN  Body Position: turned     Problem: Gas Exchange Impaired  Goal: Optimal Gas Exchange  Outcome: No Change  Intervention: Optimize Oxygenation and Ventilation  Recent Flowsheet Documentation  Taken 2/25/2021 2000 by Sharri Remy, RN  Head of Bed (HOB) Positioning: HOB at 20-30 degrees     "

## 2021-02-26 NOTE — DISCHARGE SUMMARY
[facility] Discharge Summary   Deejay Dior MRN# [id number]   Age: 70 year old  YOB: 1950   Date of Admission: 2/8/2021  Date of Discharge: 3/3/2021   Admitting Physician: Michelle Hogan MD  Discharge Physician:  Cralos Eduardo Baez MD  Primary BMT Physician: Dr Suzanne Collado  Discharge Diagnoses:    1.COVID-19 pneumonia  2. Acute hypoxic Respiratory failure secondary to COVID-19 requiring mechanical ventilation  3.Rhinovirus, upper respiratory tract  4. SARAH, secondary to dehydration, resolved  5.Depression  6. History of  NMA allo sib PBSCT for MDS, transplant date: 7/14/2021, in [facility]  7. 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 mild malnutrition in the context of acute illness  9. Hyperglycemia secondary to steroids, resolved  Discharge Medications:       BarbyDeejay   Home Medication Instructions TIFFANIE:[id number]    Printed on:03/03/21 1033   Medication Information                      acetaminophen (TYLENOL) 325 MG tablet  Take 2 tablets (650 mg) by mouth every 6 hours             acyclovir (ZOVIRAX) 800 MG tablet  TAKE ONE TABLET BY MOUTH TWICE DAILY              apixaban ANTICOAGULANT (ELIQUIS) 2.5 MG tablet  Take 1 tablet (2.5 mg) by mouth 2 times daily for 30 days post COVID discharge and stop             calcium carbonate-vitamin D (OSCAL W/D) 500-200 MG-UNIT tablet  Take 1 tablet by mouth daily             fluconazole (DIFLUCAN) 100 MG tablet  Take 1 tablet (100 mg) by mouth daily             levofloxacin (LEVAQUIN) 250 MG tablet  Take 1 tablet (250 mg) by mouth daily             melatonin 3 MG tablet  Take 1 tablet (3 mg) by mouth At Bedtime             menthol (ICY HOT) 5 % PTCH  Apply 1 patch topically every 8 hours as needed for muscle soreness             multivitamin, therapeutic (THERA-VIT) TABS tablet  Take 1 tablet by mouth daily             oxyCODONE IR (ROXICODONE) 10 MG tablet  Take 1  [facility] Progress Note    Patient: Esther Byrne Date: 2019   : 1968 Attending: Jenna Yu MD         Admission date: 2019    Intensive Care Unit admission date: 2019  Intubation date: not indicated    Chief Complaint: Wound dehiscence     Interval History: No acute issues overnight,    HOSPITAL PROBLEM LIST:   1. Scalp wound dehiscence with exposed titanium mesh s/p temporoparietal flap, removal of frontal bone, cranialization of frontal sinus, titanium mesh cranioplasty  2. Post-op pneumocephalus- stable  3. Chronic right frontal sinusitis/osteomyelitis (MRSA and Klebsiella)  4. Seizure disorder   5. PTSD, depression & Anxiety  6. Essential HTN  7. Pancytopenia- stable    ASSESSMENT & PLAN  Esther Byrne is a 50 year old female admitted to the neuro ICU for ongoing care post-operatively.     Neurology:  - HBO therapy ()  - tylenol 1000mg Q8H scheduled and PRN oxycodone for breakthrough pain  - CT head per neurosurgery  - keppra, gabapentin  - psych on consult, on sertraline and valium  - PT/OT, PM&R, speech consult as needed    Cardiology:  - on norvasc    Pulmonary:  - combivent respimat  - supplemental oxygen to keep sats>92%.  - ISS in bed as tolerated.    Renal:  - K > 4 and Mag > 2  - no active issues    Gastroenterology:  - PO diet as tolerated  - dietary on consult for calorie count  - on multiple supplement to promote wound healing  - Bowel Regimen PRN    Endocrine:  - Goal <180  - Insulin sliding scale as needed    Hematology:  - ANC : 700  - hematology on consult  - appreciate recs  - would start oral iron supplements     Infectious Disease:  - on ceftaroline, flagyl  - ID on consult, duration of therapy as per ID  - will monitor the fever curve    BEST PRACTICES:  - VTE (venous thromboembolism)  prophylaxis: SCD, frequent ambulation, no chemical ppx as per neurosurgery  - SUP: not indicated  - Glycemic control: Intrinsic  - LDA: exchange the  PICC line  - Nutrition: regular diet  - Therapy/mobilization: as tolerated     ================================================================    I/O last 3 completed shifts:  In: 1132 [P.O.:480; I.V.:652]  Out: -   No intake/output data recorded.    Vital Last Value 24 Hour Range   Temperature 98.3 °F (36.8 °C) (07/08/19 2000) Temp  Min: 97.4 °F (36.3 °C)  Max: 98.3 °F (36.8 °C)   Pulse 72 (07/08/19 2000) Pulse  Min: 56  Max: 72   Respiratory 18 (07/08/19 2000) Resp  Min: 12  Max: 18   Non-Invasive  Blood Pressure 98/57 (07/08/19 2100) BP  Min: 98/57  Max: 108/58   Pulse Oximetry 98 % (07/08/19 2000) SpO2  Min: 95 %  Max: 99 %   Arterial   Blood Pressure 128/59 (06/20/19 1100) No data recorded        General appearance: Well developed, well nourished and no acute distress  Neurologic: Level of consciousness:  Alert, Orientation:  Person, Place and Time, Pupils:  Equal:    4 mm and Reaction:  Briskly reactive and Motor:  RUE, LUE, RLE, LLE 5/5  Lungs: clear to auscultation, no wheezes, no rubs, no rhonchi  Heart: Regular rate and rhythm no murmurs, gallops or rubs and S1, S2 Normal  Abdomen:  soft, nontender, normal bowel sounds, no organomegaly  Extremities:  Right forearm healed blisters    Pertinent Reviewed: Allergies, Medications, Labs, Imaging and Physician and Nursing Notes    ACCS Attestation    This patient is critically ill as documented above. I evaluated the patient and reviewed imaging and laboratory data. Critical care services I provided 24365 ([facility] hospital care, level III).    Marino Jacques MD  [facility].  Pager: [phone].   tablet (10 mg) by mouth every 6 hours as needed for moderate to severe pain             pantoprazole (PROTONIX) 40 MG EC tablet  Take 1 tablet (40 mg) by mouth every morning (before breakfast)             predniSONE (DELTASONE) 5 MG tablet  Take 2 tablets (10 mg) by mouth daily             ruxolitinib (JAKAFI) 5 MG TABS tablet  Take 1 tablet (5 mg) by mouth 2 times daily             sertraline (ZOLOFT) 100 MG tablet  Take 1 tablet (100 mg) by mouth daily             sulfamethoxazole-trimethoprim (BACTRIM DS) 800-160 MG tablet  Take one tablet by mouth twice daily on Mondays and Tuesdays only.             triamcinolone (KENALOG) 0.1 % external ointment  Apply topically 2 times daily Apply to right lower leg, avoid open areas where skin is not intact             Vitamin D, Cholecalciferol, 25 MCG (1000 UT) TABS  Take 1 tablet (1,000 Units) by mouth daily                 Brief History of Illness:    **Adopted from H&P  Deejay Dior is a 70 year old man admitted on 2/8/2021. 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     The patient reports he was diagnosed with COVID on 1/30.  Since that time he developed body aches, fevers, chills, and worsening shortness of breath.  Over the past 2 days prior to admission he  found he was unable to catch his breath after getting up to go to the bathroom.     He was been taking Tylenol at home to help with fevers.  He did not use any breathing treatments and does not normally use oxygen    LABS:    ROUTINE IP LABS (Last four results)  BMP  Recent Labs   Lab 03/03/21  0346 03/02/21  0435 03/01/21  0625 02/28/21  0431    138 138 137   POTASSIUM 3.7 4.0 3.9 3.9   CHLORIDE 108 108 108 106   JOE 8.2* 8.4* 8.5 8.3*   CO2 24 24 26 27   BUN 21 20 24 27   CR 0.74 0.70 0.72 0.79   GLC 99 92 98 108*     CBC  Recent Labs   Lab 03/03/21  0346  "21  0435 21  0625 21  043   WBC 5.7 5.9 6.4 7.2   RBC 2.96* 2.88* 2.92* 2.91*   HGB 9.3* 9.2* 9.3* 9.3*   HCT 29.1* 28.3* 28.7* 28.8*   MCV 98 98 98 99   MCH 31.4 31.9 31.8 32.0   MCHC 32.0 32.5 32.4 32.3   RDW 19.4* 18.6* 18.5* 18.7*    231 210 196     INR  Recent Labs   Lab 21  0625 21  06   INR 1.12 1.13       IMAGIN2021:  Chest CT with PE Protocol:  FINDINGS:  ANGIOGRAM CHEST: Pulmonary arteries are normal caliber and negative  for pulmonary emboli. Thoracic aorta is negative for dissection. No CT  evidence of right heart strain.     LUNGS AND PLEURA: Multifocal peripheral predominant groundglass  opacities with \"crazy paving\" appearance. No pleural effusion. A few  calcified pulmonary granulomas. No pulmonary masses.     MEDIASTINUM/AXILLAE: No lymphadenopathy. Normal heart size. Severe  coronary artery calcifications. Trace pericardial effusion. Small  hiatal hernia.     UPPER ABDOMEN: The visualized portions of the upper abdomen are within  normal limits.     MUSCULOSKELETAL: No concerning bone lesions.                                                                      IMPRESSION:  1.  No pulmonary emboli.  2.  Multifocal groundglass opacities with superimposed interlobular  septal thickening (crazy paving appearance). Findings are most  suggestive of COVID-19 pneumonia. Influenza pneumonia and organizing  pneumonia as can be seen in the setting of drug toxicity or connective  tissue disease can cause a similar imaging pattern.    2021:  Head CT:  IMPRESSION:   1. No acute process intracranially.  2. No mass, mass effect or hemorrhage.  3. Nothing for acute infarction.  4. Mild chronic ischemic changes deep white matter both cerebral  hemispheres.     CXR 2/15/2021:  INDICATION: Bone marrow transplant now with covid pneumonia, sputum  growing GPC, query new pneumonia. Also evaluate volume.     COMPARISON: 21     FINDINGS: Heart is normal in size. " Aortic arch atherosclerosis. Patchy  interstitial and airspace opacities appear similar on the right and  slightly decreased in the left lung base. Continued small left  effusion. Endotracheal intubation with tip of the tube approximately  3.3 cm above the ramonita. Thoracic spondylosis.                                                                      IMPRESSION: Overall slight decrease in patchy opacifications in the  left lung base. Continued small left effusion. Atherosclerosis.  Endotracheal intubation. Continued patchy opacities in right lung  base.    Hospital Course:    Deejay was admitted on 2/8/2021 because he had known COVID and became increasingly sob.  He has a history of stem cells transplant and was on immunosuppression for chronic GVH. He was transferred to the MICU on 2/10/2021 with increasing oxygen needs.  He was requiring 100% high low nasal cannula. His COVID pneumonia  was being followed by infectious disease.  On admission he had a elevated CRP, fibrinogen, D dimer consistent with COVID-19 pneumonia.  He had a CT scan without PE but had multifocal airspace opacities consistent with COVID-19 pneumonia.   CXR 2/10 with no interval change from prior studies.He was treated with dexamethasone (2/8-2/17), for total 10-day course  and  remdesivir (2/8-2/12). He did not qualify for antibodies per ID.  He was also treated with azithromycin for possible bacterial coinfection. He was switched to BPAP due to difficulty tolerating HFNC. He was given diuresis to help with respiratory issues. Unfortunately he was electively intubated on 2/13/2021 due to increasing oxygen demands and sats less then 90% sats with conversation. MICU thought there was some possiblity of acute respiratory distress syndrome. They were proning overnight.  With mechanical ventilation they did start tube feeds for nutirtion. Patient was sedated. He did develop some SARAH with Cr 1.33 on 2/15 from baseline 0.8-0.9. Likely pre-renal in  etiology in the setting of poor PO intake. This resolved. Patient was then put on pressure support and extubated on 2/19. Patient did develop some MICU Agitation Delirium that was treated.  Patient was transferred to  with improvement in delrium and respirtory status. Developed decreasing oxygen needs after transfer from 6 liters oxymask to 1-2 liters with activity.  SLP evaluted for swallowing, tube feeds were stopped and patient began eating regular diet. He did have some diarrhea that resolved from tube feeds.  He was admitted on prophylactic antibiotics and these were continued including acyclovir, fluconazole, levaquin and Bactrim.  He will continue these due to current immunosuppression. His cGVH of the mouth, eyes, and skin is stable.  He remains on prednisone and Jakafi.  His counts were stable during this admission.  He did not require transfusions. He became quite deconditioned.  He is weak from long hospitalization.  Physical and occupation therapy are recommending transfer to transitional care unit.  They say he is progressing but needs help with transfer and gait.  He needs help with stairs.  He needs improvement in conditioning because he is quite short of breath with any activity.  Please see below for System Based list of hospital events.  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 Transferred to MICU on 2/10 for worsening respiratory status requiring intubation 2/13-2/19.  - transferred to  2/21.     1. Pulmonary: Hypoxia/O2 needed d/t COVID-19  Acute hypoxic respiratory failure, presumed  secondary to COVID-19 pneumonia, rhinovirus, and mild pulmonary edema  Acute respiratory distress syndrome  - Intubated 2/13 with initiation of proning. Extubated 2/19.   - Weaning O2, now 1-4L, more oxygen required with acitivity.  Does have sob with acitivity and drops sats.   - Will need 02 upon discharge, RN coordinator aware     I certify that this patient, Deejay Dior has been under my care (or a nurse practitioner or physican's assistant working with me). This is the face-to-face encounter for oxygen medical necessity.       Deejay Dior is now in a chronic stable state and continues to require supplemental oxygen. Patient has continued oxygen desaturation due to COVID-19.     Alternative treatment(s) tried or considered and deemed clinically infective for treatment of COVID-19 include steroids and remdesivir and antibiotics.  If portability is ordered, is the patient mobile within the home? yes     **Patients who qualify for home O2 coverage under the CMS guidelines require ABG tests or O2 sat readings obtained closest to, but no earlier than 2 days prior to the discharge, as evidence of the need for home oxygen therapy. Testing must be performed while patient is in the chronic stable state. See notes for documentation of O2 sats.**     2. ID:  Afebrile.     COVID-19 pneumonia (+1/30; neg 2/21), Repeat Covid 2/28=pos- will get repeat COVID on 3/11 by home health nurse.  -Decreasing CRP=25 today.  + Rhinovirus on 2/9/2021  Remdesivir course completed on 2/12. He did complete a course of zithromax for possible superimposed bacterial pneumonia 2/9-2/11  - ID signed off  - s/p 10-day course of dexamethasone (2/8-2/17) completed  - Enoxaparin 50 mg BID given elevated D-dimer;  Pharm checked on coverage, patient able to get Eliquis for $26.03/30 day supply. Recommended dosing of eliquis=2.5 mg po bid, started on 3/2/2021.  Will continue for 30 days and then stop.         Prophy:   Acyclovir, fluconazole, Levaquin( while on prednisone), Bactrim     3.  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   "Neuro:  - Insomnia: melatonin at hs     Mood  - Zoloft 100mg.Increased dose on 3/1 as patient having some issues with increased depression, situational.     5.  GI:   - Protonix for GI prophy  - Diarrhea: C.dif neg 2/21. Possibly due to TF,now resolved. Now having some loose stools.     6. CGVH:  Skin, eyes, mouth, stable  - Jakafi 5mg twice daily & Pred 10 mg every day( increased during this admission from 5 mg daily prior to admission).Will decrease to prednisone 5mg daily on discharge.  Skin on right leg around wounds looks better today.     7. Renal/FEN:   - Lytes/Cr wnl.   - mild malnutrition in the context of acute illness: was on TF, now with adequate po intake.  - SLP assessed, now signed off. Regular diet  - SARAH, presumed prerenal 2/2 to poor PO intake, dehydration. Resolved.      8. Hematology: Counts stable.  - MDS/ s/p BMT 2014   - No transfusions     9. Deconditioning: Due to acute illness, long hospital stay, ICU, steroids.  - PT/OT: Previously rec TCU but Deejay was then showing progress that we discharge home with 24hr caregiver, home PT/OT, skilled RN for wound care. Have not been able to find placement at TCU secondary to COVID+( 2/28) and Jakafi.  Sending home with the above plan.  Deejay says he managed stairs with PT and his walker.     10.  MS: Chronic left groin and bilateral knee pain.  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  His pain has been ongoing for \"years\" and is unchanged.    - Rx icy hot patch, oxycodone prn, pain well controlled  -Previously during admission was on scheduled tylenol ordered to minimize need for narcotics      11.  Wounds: wound on right lower shin and gluteal cleft and left buttock:  (?mild trauma-thin skin with chronic prednisone and less likely GVH), no signs of infection.  -WOC following: see regimen from 3/1. Has home care RN coverage and will have them care for his skin     Dispo: PT/OT now recommending  discharge to home with " 24hr caregiver, PT/OT, RN wound care, and home O2. All the services have been arranged by discharge coordinator. Spoke with Deejay's wife today and she is comfortable taking him home.  Discharge meds all done 3/2 so could be discharged today before 11 am. Discussed discharge meds with Deejay today.  Plan is to  eliquis from discharge pharmacy and  zoloft and oxycodone from CUB in [place] where apparently the copay is less.  Has video visit with Dr Collado on 3/12/2021.  Will go home with oxygen.     CODE STATUS: FULL  Discharge Instructions and Follow-Up:    Discharge diet: Regular diet as tolerated  Discharge activity: Activity with assist  Discharge follow-up: Follow up with BMT Clinic as follows:  1. Home lab draw 3/11 Video visit with Dr Collado on 3/12/2021    BMT Contact Information  For issues including fevers 100.5 or more:  Please call during the week: Monday through Friday between hours of 8:00 am and 4:30 pm- Call BMT office- [phone]  After hours/Weekends: Please call [facility]  and ask for BMT physician on call and the  will have the BMT physician call you back: [phone]  Advice for Patients concerning COVID19:  a. Avoid contact with individuals:   i. Who are sick or have recently been sick  ii. Have traveled to high risk areas (per CDC guidelines) or have been on a cruise in the last 14 days  iii. Who were or could have been exposed to COVID-19   b. If experiencing symptoms such as: Fever, cough or shortness of breath contact BMT at [phone] Mon-Fri 8am-4:30pm or After Hours at [phone] (ask to speak to a BMT Fellow) for guidance on need for clinical assessment  c. Avoid all non- essential travel at this time; if traveling is necessary use mask (N-95)   d. Wear a mask when in public areas  d. Avoid crowded places, if possible  f. Follow CDC advice https://www.cdc.gov/coronavirus/2019-ncov/index.html and travel guidelines  https://www.cdc.gov/coronavirus/2019-ncov/travelers/index.html    Radha Nina PA-C

## 2021-02-26 NOTE — PROGRESS NOTES
Care Management Follow Up    Length of Stay (days): 18    Expected Discharge Date: 03/02/21     Concerns to be Addressed:   TCU placement    Patient plan of care discussed at interdisciplinary rounds: Yes    Anticipated Discharge Disposition: TCU vs Home with services   Anticipated Discharge Services:  TCU  Anticipated Discharge DME:  None    Patient/family educated on Medicare website which has current facility and service quality ratings:  Yes  Education Provided on the Discharge Plan:  Yes  Patient/Family in Agreement with the Plan:  Yes    Referrals Placed by CM/SW:  TCU  Private pay costs discussed: private room/amenity fees    Additional Information:  SW spoke with the pt to discuss discharge planning. The pt shared that he is open to considering TCU placement, but was hopeful he could return home and start OP therapy like he has had in the past. SW discussed the difference between the two options and level of care he is currently needing. The pt shared that is he open to TCU placement and discussed that he would prefer to be closer to home. The pt requested referrals be sent to [facility], [facility] and [facility]. Discussed that if his wife is able to come in (checking on COVID visitor rules) then she can see if she is comfortable with the pt returning home at his current functional status. Pt agrees that planning for both options would be a good idea and we will see how he continues to progress.       13:34  Confirmed with charge RN that the pt can have his wife come pending a second negative COVID test (scheduled on 2/28/21) and approval from medical team. [facility] is unable to accept the pt, but [facility] can accept pending a bed available on Monday.     JAYLON Moreno, [facility]  Phone [phone]  Pager [phone]

## 2021-02-26 NOTE — PROGRESS NOTES
VS normal range with O2 administrated 2 L/ min with oxplus, PT reported patient O2 sat down to middle 80 % with RA, ADL improved, patient could move to bed, staff stand by, can turned well on the bed, used urinal in bed, R side PIV site applied new dressing no bleeding was noted Pt continue to improving, both around hip pain which pt stated he is having before administration, is controlled with PO medication per ordered, constipation, administrated PRN medication per ordered

## 2021-02-27 ENCOUNTER — APPOINTMENT (OUTPATIENT)
Dept: OCCUPATIONAL THERAPY | Facility: CLINIC | Age: 71
DRG: 207 | End: 2021-02-27
Attending: INTERNAL MEDICINE
Payer: MEDICARE

## 2021-02-27 LAB
ANION GAP SERPL CALCULATED.3IONS-SCNC: 7 MMOL/L (ref 3–14)
BASOPHILS # BLD AUTO: 0 10E9/L (ref 0–0.2)
BASOPHILS NFR BLD AUTO: 0 %
BUN SERPL-MCNC: 28 MG/DL (ref 7–30)
CALCIUM SERPL-MCNC: 9 MG/DL (ref 8.5–10.1)
CHLORIDE SERPL-SCNC: 103 MMOL/L (ref 94–109)
CO2 SERPL-SCNC: 26 MMOL/L (ref 20–32)
CREAT SERPL-MCNC: 0.75 MG/DL (ref 0.66–1.25)
CRP SERPL-MCNC: 100 MG/L (ref 0–8)
DIFFERENTIAL METHOD BLD: ABNORMAL
EOSINOPHIL # BLD AUTO: 0 10E9/L (ref 0–0.7)
EOSINOPHIL NFR BLD AUTO: 0.5 %
ERYTHROCYTE [DISTWIDTH] IN BLOOD BY AUTOMATED COUNT: 18 % (ref 10–15)
GFR SERPL CREATININE-BSD FRML MDRD: >90 ML/MIN/{1.73_M2}
GLUCOSE SERPL-MCNC: 92 MG/DL (ref 70–99)
HCT VFR BLD AUTO: 30.2 % (ref 40–53)
HGB BLD-MCNC: 10.1 G/DL (ref 13.3–17.7)
IMM GRANULOCYTES # BLD: 0 10E9/L (ref 0–0.4)
IMM GRANULOCYTES NFR BLD: 0.4 %
LYMPHOCYTES # BLD AUTO: 2 10E9/L (ref 0.8–5.3)
LYMPHOCYTES NFR BLD AUTO: 23.7 %
MCH RBC QN AUTO: 32.6 PG (ref 26.5–33)
MCHC RBC AUTO-ENTMCNC: 33.4 G/DL (ref 31.5–36.5)
MCV RBC AUTO: 97 FL (ref 78–100)
MONOCYTES # BLD AUTO: 0.6 10E9/L (ref 0–1.3)
MONOCYTES NFR BLD AUTO: 7.3 %
NEUTROPHILS # BLD AUTO: 5.7 10E9/L (ref 1.6–8.3)
NEUTROPHILS NFR BLD AUTO: 68.1 %
NRBC # BLD AUTO: 0 10*3/UL
NRBC BLD AUTO-RTO: 0 /100
PLATELET # BLD AUTO: 203 10E9/L (ref 150–450)
POTASSIUM SERPL-SCNC: 3.8 MMOL/L (ref 3.4–5.3)
RBC # BLD AUTO: 3.1 10E12/L (ref 4.4–5.9)
SODIUM SERPL-SCNC: 135 MMOL/L (ref 133–144)
WBC # BLD AUTO: 8.3 10E9/L (ref 4–11)

## 2021-02-27 PROCEDURE — 85025 COMPLETE CBC W/AUTO DIFF WBC: CPT | Performed by: INTERNAL MEDICINE

## 2021-02-27 PROCEDURE — 250N000013 HC RX MED GY IP 250 OP 250 PS 637: Performed by: STUDENT IN AN ORGANIZED HEALTH CARE EDUCATION/TRAINING PROGRAM

## 2021-02-27 PROCEDURE — 80048 BASIC METABOLIC PNL TOTAL CA: CPT | Performed by: INTERNAL MEDICINE

## 2021-02-27 PROCEDURE — 206N000001 HC R&B BMT UMMC

## 2021-02-27 PROCEDURE — 250N000013 HC RX MED GY IP 250 OP 250 PS 637: Performed by: PHYSICIAN ASSISTANT

## 2021-02-27 PROCEDURE — 97535 SELF CARE MNGMENT TRAINING: CPT | Mod: GO

## 2021-02-27 PROCEDURE — 250N000013 HC RX MED GY IP 250 OP 250 PS 637: Performed by: INTERNAL MEDICINE

## 2021-02-27 PROCEDURE — 250N000011 HC RX IP 250 OP 636: Performed by: NURSE PRACTITIONER

## 2021-02-27 PROCEDURE — 250N000013 HC RX MED GY IP 250 OP 250 PS 637: Performed by: NURSE PRACTITIONER

## 2021-02-27 PROCEDURE — 36415 COLL VENOUS BLD VENIPUNCTURE: CPT | Performed by: INTERNAL MEDICINE

## 2021-02-27 PROCEDURE — 86140 C-REACTIVE PROTEIN: CPT | Performed by: INTERNAL MEDICINE

## 2021-02-27 PROCEDURE — 99233 SBSQ HOSP IP/OBS HIGH 50: CPT | Performed by: INTERNAL MEDICINE

## 2021-02-27 PROCEDURE — 250N000012 HC RX MED GY IP 250 OP 636 PS 637: Performed by: STUDENT IN AN ORGANIZED HEALTH CARE EDUCATION/TRAINING PROGRAM

## 2021-02-27 PROCEDURE — 97530 THERAPEUTIC ACTIVITIES: CPT | Mod: GO

## 2021-02-27 RX ADMIN — OXYCODONE HYDROCHLORIDE 10 MG: 10 TABLET ORAL at 09:55

## 2021-02-27 RX ADMIN — ENOXAPARIN SODIUM 50 MG: 100 INJECTION SUBCUTANEOUS at 10:00

## 2021-02-27 RX ADMIN — ACYCLOVIR 800 MG: 800 TABLET ORAL at 19:54

## 2021-02-27 RX ADMIN — RUXOLITINIB 5 MG: 5 TABLET ORAL at 19:54

## 2021-02-27 RX ADMIN — ACETAMINOPHEN 650 MG: 325 TABLET, FILM COATED ORAL at 22:27

## 2021-02-27 RX ADMIN — QUETIAPINE FUMARATE 25 MG: 25 TABLET ORAL at 22:27

## 2021-02-27 RX ADMIN — ACETAMINOPHEN 650 MG: 325 TABLET, FILM COATED ORAL at 03:41

## 2021-02-27 RX ADMIN — Medication 1 TABLET: at 12:00

## 2021-02-27 RX ADMIN — PANTOPRAZOLE SODIUM 40 MG: 40 TABLET, DELAYED RELEASE ORAL at 08:00

## 2021-02-27 RX ADMIN — ACETAMINOPHEN 650 MG: 325 TABLET, FILM COATED ORAL at 10:00

## 2021-02-27 RX ADMIN — MELATONIN TAB 3 MG 3 MG: 3 TAB at 22:27

## 2021-02-27 RX ADMIN — OXYCODONE HYDROCHLORIDE 10 MG: 10 TABLET ORAL at 22:27

## 2021-02-27 RX ADMIN — ACYCLOVIR 800 MG: 800 TABLET ORAL at 08:00

## 2021-02-27 RX ADMIN — LEVOFLOXACIN 250 MG: 250 TABLET, FILM COATED ORAL at 08:00

## 2021-02-27 RX ADMIN — ENOXAPARIN SODIUM 50 MG: 100 INJECTION SUBCUTANEOUS at 22:27

## 2021-02-27 RX ADMIN — SERTRALINE HYDROCHLORIDE 50 MG: 50 TABLET ORAL at 08:00

## 2021-02-27 RX ADMIN — FLUCONAZOLE 100 MG: 100 TABLET ORAL at 09:00

## 2021-02-27 RX ADMIN — THERA TABS 1 TABLET: TAB at 08:00

## 2021-02-27 RX ADMIN — PREDNISONE 10 MG: 5 TABLET ORAL at 08:00

## 2021-02-27 RX ADMIN — RUXOLITINIB 5 MG: 5 TABLET ORAL at 09:34

## 2021-02-27 RX ADMIN — ACETAMINOPHEN 650 MG: 325 TABLET, FILM COATED ORAL at 16:00

## 2021-02-27 ASSESSMENT — ACTIVITIES OF DAILY LIVING (ADL)
ADLS_ACUITY_SCORE: 18
ADLS_ACUITY_SCORE: 16
ADLS_ACUITY_SCORE: 18
ADLS_ACUITY_SCORE: 18

## 2021-02-27 ASSESSMENT — MIFFLIN-ST. JEOR: SCORE: 1713.35

## 2021-02-27 NOTE — PLAN OF CARE
"BP (!) 143/73 (BP Location: Left arm)   Pulse 75   Temp 97.9  F (36.6  C) (Axillary)   Resp 18   Ht 1.753 m (5' 9\")   Wt 98.4 kg (216 lb 14.4 oz)   SpO2 97%   BMI 32.03 kg/m      Afebrile, AVSS on 2LPM oxymask. Denies pain or nausea. Adequate fluid intake and UOP with beside urinal. Small loose BM x1 overnight. All dressings are CDI. No replacements needed this AM. Pt is assist x1 and calls appropriately. Continue to monitor and follow POC.    Problem: Adult Inpatient Plan of Care  Goal: Absence of Hospital-Acquired Illness or Injury  Intervention: Prevent Skin Injury  Recent Flowsheet Documentation  Taken 2/26/2021 2200 by Sharri Remy, RN  Body Position: position changed independently     Problem: Adult Inpatient Plan of Care  Goal: Absence of Hospital-Acquired Illness or Injury  Intervention: Identify and Manage Fall Risk  Recent Flowsheet Documentation  Taken 2/26/2021 2200 by Sharri Remy, RN  Safety Promotion/Fall Prevention: bed alarm on     Problem: Gas Exchange Impaired  Goal: Optimal Gas Exchange  Outcome: No Change  Intervention: Optimize Oxygenation and Ventilation  Recent Flowsheet Documentation  Taken 2/26/2021 2200 by Sharri Remy, RN  Head of Bed (HOB) Positioning: HOB at 20-30 degrees     "

## 2021-02-27 NOTE — PROGRESS NOTES
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 Transferred to MICU on 2/10 for worsening respiratory status requiring intubation 2/13-2/19.  - transferred to  2/21    HPI: Sleepy this morning, hard to wake up. When woke him up he knew location, date, year.  No cough.  Does not feel sob at rest.  Remains on 2 liters of O2, over 5 minutes his sats dropped on room air from 100% to 91%.  RN reports yesterday sats dropped to 80's on room air. Said he had a stool.  No bleeding.          ROS: 10 point ROS neg other than the symptoms noted above in the HPI.    Exam:    Blood pressure 139/87, pulse 74, temperature 98  F (36.7  C), temperature source Axillary, resp. rate 18, height 1.753 m (5' 9\"), weight 98.4 kg (216 lb 14.4 oz), SpO2 98 %.  Constitutional: very pleasant, in NAD  ENT: dry MM, neck is supple  Respiratory: CTAB, normal effort  Cardiovascular: nl s1/s2 no MRGs  GI: abdomen is soft, NT, +BS  Skin: warm, dry, small wound RLE, covered with dressing, no visible blood, small wound L forearm continues to ooze blood slowly  Musculoskeletal: trace edema bilat lower extremities  Neurologic: awake but sleepy today, speech is clear, answers questions appropriately but went back to sleep  Knew hospital, date, year         Labs:  Lab Results   Component Value Date    WBC 8.3 02/27/2021    ANEU 5.7 02/27/2021    HGB 10.1 (L) 02/27/2021    HCT 30.2 (L) 02/27/2021     02/27/2021     02/27/2021    POTASSIUM 3.8 02/27/2021    CHLORIDE 103 02/27/2021    CO2 26 02/27/2021    GLC 92 02/27/2021    BUN 28 02/27/2021    CR 0.75 02/27/2021    MAG 2.2 02/22/2021    INR 1.13 02/26/2021    BILITOTAL 0.5 02/26/2021    AST 30 02/26/2021    ALT 38 02/26/2021    ALKPHOS 76 02/26/2021    PROTTOTAL 6.0 (L) 02/26/2021    ALBUMIN 2.0 (L) " 02/26/2021     Imaging:  CXR (2/15): Overall slight decrease in patchy opacifications in the left lung base. Continued small left effusion. Atherosclerosis. Endotracheal intubation. Continued patchy opacities in right lung base.      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 Transferred to MICU on 2/10 for worsening respiratory status requiring intubation 2/13-2/19.  - transferred to  2/21     1. Pulmonary:  Acute hypoxic respiratory failure, presumed  secondary to COVID-19 pneumonia, rhinovirus, and mild pulmonary edema  Acute respiratory distress syndrome  Intubated 2/13 with initiation of proning. Extubated 2/19. Weaning O2, now 2L. Sats dropped to 91% on room air in bed today  Has received intermittent diuresis to maintain net negative daily, earlier this hospital course, monitor closely.    2. ID: afebrile.  Elevated CRP, unclear etiology   cont to [id number]=906.0(110)    COVID-19 pneumonia (+1/30; neg 2/21), Repeat Covid 2/28.  + Rhinovirus  Remdesivir course completed on 2/12. Was considered for tocilizumab.  He did complete a course of zithromax for possible superimposed bacterial pneumonia 2/9-2/11  - ID signed off  - s/p 10-day course of dexamethasone (2/8-2/17) completed  - Enoxaparin 50 mg BID given elevated D-dimer; cont for now and discontinue on oral anticoagulant for 30d. Pharm checked on coverage, patient able to get Eliquis for $26.03/30 day supply.  Will discharge on 30d of Eliquis when he goes.  Recommended dosing of eliquis=2.5 mg po bid  - Will likely need 02 upon discharge, RN coordinator aware    Prophy:   Acyclovir, fluconazole, Levaquin( while on prednisone), Bactrim    3.  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 Was not admitted on Anticoagulation.    4.  Neuro:  Sleep: added seroquel back 2/23 which helped significantly (was on for a couple of days in ICU); melatonin at hs    Agitated Delirium - resolved  Off Precedex x2/21am    Mood  - Continue PTA sertraline     5.  GI: NG tube pulled on 2/24, now eating well  - Protonix for GI prophy  #frequent stools: C.dif neg 2/21. Possibly due to TF,now resolved   - no BM x 2-3 days now and on narcotics, bowel regimen ordered    6. CGVH:  Skin, eyes  -Jakafi 5mg twice daily & Pred 5mg every day    7.  Renal/FEN: weight below admit weight  Lytes wnl.  #mild malnutrition in the context of acute illness: was on TF, now with adequate po intake and sore from NG tube left nostril  - SLP assessed, now signed off. Regular diet, thin liquids x2/22.   -#SARAH, presumed prerenal 2/2 to poor PO intake, dehydration. Resolved.     8.  Endocrine:  Hyperglycemia - BG generally <140. Stopped Novolog sliding scale 2/22.     9. Hematology:    MDS/ s/p BMT 2014     -hgb stable yesterday and today.  No transfusions     10.  Deconditioning: Due acute illness, long hospital stay, ICU, steroids. Cont PT/OT. Currently rec TCU, RN coordinator and SW aware and working on placement, patient will get a repeat Covid test this weekend to aid in discharge planning    11.  MS: Chronic left groin and bilateral knee pain.  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  His pain has been ongoing for \"years\" and is unchanged.  He usually uses motrin at home.    - Rx icy hot patch, oxycodone prn, pain well controlled  -scheduled tylenol ordered to minimize need for narcotics in anticipation of possible discharge next week    11.  Wounds: wound on right lower shin (?mild trauma vs GVH), no signs of infection  -WOC consult, seen by wound on 2/25    Dispo: PT " currently recommending rehab discharge, but patient prefers to go home.  Will continue PT/OT and see how he does over the next few days.  Likely discharge to  TCU/rehab, sometime next week.      Radha Nina PA-C  [id number]  2/27/2021

## 2021-02-28 LAB
ANION GAP SERPL CALCULATED.3IONS-SCNC: 3 MMOL/L (ref 3–14)
BASOPHILS # BLD AUTO: 0 10E9/L (ref 0–0.2)
BASOPHILS NFR BLD AUTO: 0.1 %
BUN SERPL-MCNC: 27 MG/DL (ref 7–30)
CALCIUM SERPL-MCNC: 8.3 MG/DL (ref 8.5–10.1)
CHLORIDE SERPL-SCNC: 106 MMOL/L (ref 94–109)
CO2 SERPL-SCNC: 27 MMOL/L (ref 20–32)
CREAT SERPL-MCNC: 0.79 MG/DL (ref 0.66–1.25)
CRP SERPL-MCNC: 69 MG/L (ref 0–8)
DIFFERENTIAL METHOD BLD: ABNORMAL
EOSINOPHIL # BLD AUTO: 0.1 10E9/L (ref 0–0.7)
EOSINOPHIL NFR BLD AUTO: 1 %
ERYTHROCYTE [DISTWIDTH] IN BLOOD BY AUTOMATED COUNT: 18.7 % (ref 10–15)
GFR SERPL CREATININE-BSD FRML MDRD: 90 ML/MIN/{1.73_M2}
GLUCOSE SERPL-MCNC: 108 MG/DL (ref 70–99)
HCT VFR BLD AUTO: 28.8 % (ref 40–53)
HGB BLD-MCNC: 9.3 G/DL (ref 13.3–17.7)
IMM GRANULOCYTES # BLD: 0 10E9/L (ref 0–0.4)
IMM GRANULOCYTES NFR BLD: 0.4 %
LABORATORY COMMENT REPORT: ABNORMAL
LYMPHOCYTES # BLD AUTO: 1.9 10E9/L (ref 0.8–5.3)
LYMPHOCYTES NFR BLD AUTO: 25.7 %
MCH RBC QN AUTO: 32 PG (ref 26.5–33)
MCHC RBC AUTO-ENTMCNC: 32.3 G/DL (ref 31.5–36.5)
MCV RBC AUTO: 99 FL (ref 78–100)
MONOCYTES # BLD AUTO: 0.5 10E9/L (ref 0–1.3)
MONOCYTES NFR BLD AUTO: 7.1 %
NEUTROPHILS # BLD AUTO: 4.7 10E9/L (ref 1.6–8.3)
NEUTROPHILS NFR BLD AUTO: 65.7 %
NRBC # BLD AUTO: 0.1 10*3/UL
NRBC BLD AUTO-RTO: 1 /100
PLATELET # BLD AUTO: 196 10E9/L (ref 150–450)
POTASSIUM SERPL-SCNC: 3.9 MMOL/L (ref 3.4–5.3)
RBC # BLD AUTO: 2.91 10E12/L (ref 4.4–5.9)
SARS-COV-2 RNA RESP QL NAA+PROBE: POSITIVE
SODIUM SERPL-SCNC: 137 MMOL/L (ref 133–144)
SPECIMEN SOURCE: ABNORMAL
WBC # BLD AUTO: 7.2 10E9/L (ref 4–11)

## 2021-02-28 PROCEDURE — 36415 COLL VENOUS BLD VENIPUNCTURE: CPT | Performed by: INTERNAL MEDICINE

## 2021-02-28 PROCEDURE — 250N000013 HC RX MED GY IP 250 OP 250 PS 637: Performed by: INTERNAL MEDICINE

## 2021-02-28 PROCEDURE — U0005 INFEC AGEN DETEC AMPLI PROBE: HCPCS | Performed by: INTERNAL MEDICINE

## 2021-02-28 PROCEDURE — 85025 COMPLETE CBC W/AUTO DIFF WBC: CPT | Performed by: INTERNAL MEDICINE

## 2021-02-28 PROCEDURE — 250N000011 HC RX IP 250 OP 636: Performed by: NURSE PRACTITIONER

## 2021-02-28 PROCEDURE — 250N000013 HC RX MED GY IP 250 OP 250 PS 637: Performed by: NURSE PRACTITIONER

## 2021-02-28 PROCEDURE — 250N000013 HC RX MED GY IP 250 OP 250 PS 637: Performed by: PHYSICIAN ASSISTANT

## 2021-02-28 PROCEDURE — 250N000013 HC RX MED GY IP 250 OP 250 PS 637: Performed by: STUDENT IN AN ORGANIZED HEALTH CARE EDUCATION/TRAINING PROGRAM

## 2021-02-28 PROCEDURE — 86140 C-REACTIVE PROTEIN: CPT | Performed by: INTERNAL MEDICINE

## 2021-02-28 PROCEDURE — 99233 SBSQ HOSP IP/OBS HIGH 50: CPT | Performed by: INTERNAL MEDICINE

## 2021-02-28 PROCEDURE — 206N000001 HC R&B BMT UMMC

## 2021-02-28 PROCEDURE — U0003 INFECTIOUS AGENT DETECTION BY NUCLEIC ACID (DNA OR RNA); SEVERE ACUTE RESPIRATORY SYNDROME CORONAVIRUS 2 (SARS-COV-2) (CORONAVIRUS DISEASE [COVID-19]), AMPLIFIED PROBE TECHNIQUE, MAKING USE OF HIGH THROUGHPUT TECHNOLOGIES AS DESCRIBED BY CMS-2020-01-R: HCPCS | Performed by: INTERNAL MEDICINE

## 2021-02-28 PROCEDURE — 80048 BASIC METABOLIC PNL TOTAL CA: CPT | Performed by: INTERNAL MEDICINE

## 2021-02-28 PROCEDURE — 250N000012 HC RX MED GY IP 250 OP 636 PS 637: Performed by: STUDENT IN AN ORGANIZED HEALTH CARE EDUCATION/TRAINING PROGRAM

## 2021-02-28 RX ADMIN — ACETAMINOPHEN 650 MG: 325 TABLET, FILM COATED ORAL at 21:37

## 2021-02-28 RX ADMIN — ACETAMINOPHEN 650 MG: 325 TABLET, FILM COATED ORAL at 10:44

## 2021-02-28 RX ADMIN — LEVOFLOXACIN 250 MG: 250 TABLET, FILM COATED ORAL at 09:36

## 2021-02-28 RX ADMIN — THERA TABS 1 TABLET: TAB at 09:38

## 2021-02-28 RX ADMIN — MELATONIN TAB 3 MG 3 MG: 3 TAB at 21:37

## 2021-02-28 RX ADMIN — ENOXAPARIN SODIUM 50 MG: 100 INJECTION SUBCUTANEOUS at 21:37

## 2021-02-28 RX ADMIN — Medication 1 TABLET: at 14:24

## 2021-02-28 RX ADMIN — RUXOLITINIB 5 MG: 5 TABLET ORAL at 20:15

## 2021-02-28 RX ADMIN — PANTOPRAZOLE SODIUM 40 MG: 40 TABLET, DELAYED RELEASE ORAL at 09:37

## 2021-02-28 RX ADMIN — QUETIAPINE FUMARATE 25 MG: 25 TABLET ORAL at 21:37

## 2021-02-28 RX ADMIN — ENOXAPARIN SODIUM 50 MG: 100 INJECTION SUBCUTANEOUS at 10:44

## 2021-02-28 RX ADMIN — OXYCODONE HYDROCHLORIDE 10 MG: 10 TABLET ORAL at 21:37

## 2021-02-28 RX ADMIN — ACETAMINOPHEN 650 MG: 325 TABLET, FILM COATED ORAL at 04:57

## 2021-02-28 RX ADMIN — SERTRALINE HYDROCHLORIDE 50 MG: 50 TABLET ORAL at 09:37

## 2021-02-28 RX ADMIN — FLUCONAZOLE 100 MG: 100 TABLET ORAL at 09:37

## 2021-02-28 RX ADMIN — PREDNISONE 10 MG: 5 TABLET ORAL at 09:37

## 2021-02-28 RX ADMIN — ACYCLOVIR 800 MG: 800 TABLET ORAL at 09:37

## 2021-02-28 RX ADMIN — ACETAMINOPHEN 650 MG: 325 TABLET, FILM COATED ORAL at 16:30

## 2021-02-28 RX ADMIN — RUXOLITINIB 5 MG: 5 TABLET ORAL at 09:35

## 2021-02-28 RX ADMIN — ACYCLOVIR 800 MG: 800 TABLET ORAL at 20:15

## 2021-02-28 ASSESSMENT — MIFFLIN-ST. JEOR: SCORE: 1715.61

## 2021-02-28 ASSESSMENT — ACTIVITIES OF DAILY LIVING (ADL)
ADLS_ACUITY_SCORE: 18
ADLS_ACUITY_SCORE: 16
ADLS_ACUITY_SCORE: 16
ADLS_ACUITY_SCORE: 18
ADLS_ACUITY_SCORE: 18
ADLS_ACUITY_SCORE: 16

## 2021-02-28 NOTE — PROGRESS NOTES
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 Transferred to MICU on 2/10 for worsening respiratory status requiring intubation 2/13-2/19.  - transferred to  2/21    HPI: Very awake this morning.  Was sating 94% on room air at rest but once he was sitting up and shaving his sats dropped to 84%.  He said he felt sob. No cough. No fevers.  No n,v,d.  Mouth and eyes are very dry. No new skin changes.  Some bleeding on the lft arm with removal of dressing.      ROS: 10 point ROS neg other than the symptoms noted above in the HPI.    Exam:    Blood pressure (!) 144/78, pulse 70, temperature 97.1  F (36.2  C), temperature source Axillary, resp. rate 16, height 1.753 m (5' 9\"), weight 97.3 kg (214 lb 8 oz), SpO2 97 %.  Constitutional: very pleasant, in NAD  ENT: dry MM, neck is supple, has dressing on right neck where line removed  Respiratory: CTAB, normal effort  Cardiovascular: nl s1/s2 no MRGs  GI: abdomen is soft, NT, +BS  Skin: warm, dry, small wound RLE, covered with dressing, no visible blood, small wound L forearm continues to ooze blood slowly  Musculoskeletal: trace edema bilat lower extremities  Neurologic: awake not sleepy at all today, speech is clear, answers questions appropriately, very appropriate today         Labs:  Lab Results   Component Value Date    WBC 7.2 02/28/2021    ANEU 4.7 02/28/2021    HGB 9.3 (L) 02/28/2021    HCT 28.8 (L) 02/28/2021     02/28/2021     02/28/2021    POTASSIUM 3.9 02/28/2021    CHLORIDE 106 02/28/2021    CO2 27 02/28/2021     (H) 02/28/2021    BUN 27 02/28/2021    CR 0.79 02/28/2021    MAG 2.2 02/22/2021    INR 1.13 02/26/2021    BILITOTAL 0.5 02/26/2021    AST 30 02/26/2021    ALT 38 02/26/2021    ALKPHOS 76 02/26/2021    PROTTOTAL 6.0 (L) 02/26/2021    ALBUMIN 2.0 " (L) 02/26/2021     Imaging:  CXR (2/15): Overall slight decrease in patchy opacifications in the left lung base. Continued small left effusion. Atherosclerosis. Endotracheal intubation. Continued patchy opacities in right lung base.      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 Transferred to MICU on 2/10 for worsening respiratory status requiring intubation 2/13-2/19.  - transferred to  2/21     1. Pulmonary:  Acute hypoxic respiratory failure, presumed  secondary to COVID-19 pneumonia, rhinovirus, and mild pulmonary edema  Acute respiratory distress syndrome  Intubated 2/13 with initiation of proning. Extubated 2/19. Weaning O2, now 2L. Sats dropped to 94% on room air in bed today at rest but with activity sats drop, improving slowly  Has received intermittent diuresis to maintain net negative daily, earlier this hospital course, monitor closely. Weight down, no diuresis    2. ID: afebrile.  Elevated CRP, unclear etiology   cont to sbrfqBQM=235.0(110) down to 69 today    COVID-19 pneumonia (+1/30; neg 2/21), Repeat Covid 2/28, ordered and will get today  + Rhinovirus  Remdesivir course completed on 2/12. Was considered for tocilizumab.  He did complete a course of zithromax for possible superimposed bacterial pneumonia 2/9-2/11  - ID signed off  - s/p 10-day course of dexamethasone (2/8-2/17) completed  - Enoxaparin 50 mg BID given elevated D-dimer; cont for now and discontinue on oral anticoagulant for 30d. Pharm checked on coverage, patient able to get Eliquis for $26.03/30 day supply.  Will discharge on 30d of Eliquis when he goes.  Recommended dosing of eliquis=2.5 mg po bid  - Will likely need 02 upon discharge, RN coordinator aware    Prophy:   Acyclovir, fluconazole, Levaquin( while on prednisone), Bactrim    3.   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 Was not admitted on Anticoagulation.    4.  Neuro:  Sleep: added seroquel back 2/23 which helped significantly (was on for a couple of days in ICU); melatonin at hs    Agitated Delirium - resolved  Off Precedex x2/21am    Mood  - Continue PTA sertraline     5.  GI: NG tube pulled on 2/24, now eating well  - Protonix for GI prophy  #frequent stools: C.dif neg 2/21. Possibly due to TF,now resolved   - no BM x 2-3 days now and on narcotics, bowel regimen ordered    6. CGVH:  Skin, eyes  -Jakafi 5mg twice daily & Pred 5mg every day    7.  Renal/FEN: weight below admit weight  Lytes wnl.  #mild malnutrition in the context of acute illness: was on TF, now with adequate po intake and sore from NG tube left nostril  - SLP assessed, now signed off. Regular diet, thin liquids x2/22.   -#SARAH, presumed prerenal 2/2 to poor PO intake, dehydration. Resolved.     8.  Endocrine:  Hyperglycemia - BG generally <140. Stopped Novolog sliding scale 2/22. Nn=429 today     9. Hematology:    MDS/ s/p BMT 2014     -hgb stable yesterday and today.  No transfusions     10.  Deconditioning: Due acute illness, long hospital stay, ICU, steroids. Cont PT/OT. Currently rec TCU, RN coordinator and SW aware and working on placement, patient will get a repeat Covid test this weekend to aid in discharge planning    11.  MS: Chronic left groin and bilateral knee pain.  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  His pain has been ongoing for \"years\" and is unchanged.  He usually uses motrin at home.    - Rx icy hot patch, oxycodone prn, pain well controlled  -scheduled tylenol ordered to minimize need for narcotics in anticipation of possible " discharge next week    11.  Wounds: wound on right lower shin (?mild trauma vs GVH), no signs of infection  -WOC consult, seen by wound on 2/25- asked if wound would come by and see if there is a dressing that when removed will not cause bleeding.    Dispo: PT currently recommending rehab discharge, but patient prefers to go home.  Will continue PT/OT and see how he does over the next few days.  Likely discharge to  TCU/rehab, sometime next week.See SW note from 2/25.      Radha Nina PA-C  [phone]  2/28/2021

## 2021-02-28 NOTE — PROGRESS NOTES
"VS baseline with O2 2 L/min administration, status better, walked with PT went bathroom, and sat shaved his face, and standing weight scale, however patient insisted using bed pan for stool, nurse encouraged to use commode in bed side but \" Bed pan is fine \", R side arm old IV site, with dressing no bleeding was notifed however after removed dressing with soaked NS, again started bleeding, reapplied dressing and pressure after that no bleeding was noted  "

## 2021-02-28 NOTE — PLAN OF CARE
"Deejay had an uneventful night. Afebrile, vitals stable. Cares clustered to support sleep promotion. Oxy x1 at HS for chronic pain. All wound dressings C/D/I. Denied nausea, dyspnea at rest. Oxygen saturation 90-94% on room air at rest and while talking, 2 L O2 via facemask applied while sleeping to maintain sat >92%. Plan to recheck covid today. Continue to optimize mobility and oxygenation with supporting plan of care. BP (!) 111/90 (BP Location: Left arm)   Pulse 83   Temp 98.3  F (36.8  C) (Axillary)   Resp 16   Ht 1.753 m (5' 9\")   Wt 97.3 kg (214 lb 8 oz)   SpO2 98%   BMI 31.68 kg/m      Problem: Adult Inpatient Plan of Care  Goal: Optimal Comfort and Wellbeing  Outcome: No Change     Problem: Gas Exchange Impaired  Goal: Optimal Gas Exchange  Outcome: No Change  Intervention: Optimize Oxygenation and Ventilation  Recent Flowsheet Documentation  Taken 2/27/2021 2000 by Isatu Lim  Head of Bed (HOB) Positioning: HOB at 20-30 degrees     Problem: Sleep Disturbance (Delirium)  Goal: Improved Sleep  Outcome: No Change     "

## 2021-03-01 ENCOUNTER — APPOINTMENT (OUTPATIENT)
Dept: OCCUPATIONAL THERAPY | Facility: CLINIC | Age: 71
DRG: 207 | End: 2021-03-01
Attending: INTERNAL MEDICINE
Payer: MEDICARE

## 2021-03-01 ENCOUNTER — APPOINTMENT (OUTPATIENT)
Dept: LAB | Facility: CLINIC | Age: 71
End: 2021-03-01
Attending: INTERNAL MEDICINE
Payer: MEDICARE

## 2021-03-01 ENCOUNTER — APPOINTMENT (OUTPATIENT)
Dept: PHYSICAL THERAPY | Facility: CLINIC | Age: 71
DRG: 207 | End: 2021-03-01
Attending: INTERNAL MEDICINE
Payer: MEDICARE

## 2021-03-01 LAB
ALBUMIN SERPL-MCNC: 2.1 G/DL (ref 3.4–5)
ALP SERPL-CCNC: 87 U/L (ref 40–150)
ALT SERPL W P-5'-P-CCNC: 36 U/L (ref 0–70)
ANION GAP SERPL CALCULATED.3IONS-SCNC: 4 MMOL/L (ref 3–14)
AST SERPL W P-5'-P-CCNC: 29 U/L (ref 0–45)
BASOPHILS # BLD AUTO: 0 10E9/L (ref 0–0.2)
BASOPHILS NFR BLD AUTO: 0.2 %
BILIRUB DIRECT SERPL-MCNC: 0.1 MG/DL (ref 0–0.2)
BILIRUB SERPL-MCNC: 1.3 MG/DL (ref 0.2–1.3)
BUN SERPL-MCNC: 24 MG/DL (ref 7–30)
CALCIUM SERPL-MCNC: 8.5 MG/DL (ref 8.5–10.1)
CHLORIDE SERPL-SCNC: 108 MMOL/L (ref 94–109)
CO2 SERPL-SCNC: 26 MMOL/L (ref 20–32)
CREAT SERPL-MCNC: 0.72 MG/DL (ref 0.66–1.25)
CRP SERPL-MCNC: 44 MG/L (ref 0–8)
DIFFERENTIAL METHOD BLD: ABNORMAL
EOSINOPHIL # BLD AUTO: 0.1 10E9/L (ref 0–0.7)
EOSINOPHIL NFR BLD AUTO: 0.9 %
ERYTHROCYTE [DISTWIDTH] IN BLOOD BY AUTOMATED COUNT: 18.5 % (ref 10–15)
GFR SERPL CREATININE-BSD FRML MDRD: >90 ML/MIN/{1.73_M2}
GLUCOSE SERPL-MCNC: 98 MG/DL (ref 70–99)
HCT VFR BLD AUTO: 28.7 % (ref 40–53)
HGB BLD-MCNC: 9.3 G/DL (ref 13.3–17.7)
IMM GRANULOCYTES # BLD: 0 10E9/L (ref 0–0.4)
IMM GRANULOCYTES NFR BLD: 0.5 %
INR PPP: 1.12 (ref 0.86–1.14)
LYMPHOCYTES # BLD AUTO: 1.9 10E9/L (ref 0.8–5.3)
LYMPHOCYTES NFR BLD AUTO: 30.4 %
MCH RBC QN AUTO: 31.8 PG (ref 26.5–33)
MCHC RBC AUTO-ENTMCNC: 32.4 G/DL (ref 31.5–36.5)
MCV RBC AUTO: 98 FL (ref 78–100)
MONOCYTES # BLD AUTO: 0.5 10E9/L (ref 0–1.3)
MONOCYTES NFR BLD AUTO: 7.5 %
NEUTROPHILS # BLD AUTO: 3.9 10E9/L (ref 1.6–8.3)
NEUTROPHILS NFR BLD AUTO: 60.5 %
NRBC # BLD AUTO: 0.1 10*3/UL
NRBC BLD AUTO-RTO: 1 /100
PLATELET # BLD AUTO: 210 10E9/L (ref 150–450)
POTASSIUM SERPL-SCNC: 3.9 MMOL/L (ref 3.4–5.3)
PROT SERPL-MCNC: 5.8 G/DL (ref 6.8–8.8)
RBC # BLD AUTO: 2.92 10E12/L (ref 4.4–5.9)
SODIUM SERPL-SCNC: 138 MMOL/L (ref 133–144)
WBC # BLD AUTO: 6.4 10E9/L (ref 4–11)

## 2021-03-01 PROCEDURE — G0463 HOSPITAL OUTPT CLINIC VISIT: HCPCS

## 2021-03-01 PROCEDURE — 97116 GAIT TRAINING THERAPY: CPT | Mod: GP | Performed by: REHABILITATION PRACTITIONER

## 2021-03-01 PROCEDURE — 250N000013 HC RX MED GY IP 250 OP 250 PS 637: Performed by: INTERNAL MEDICINE

## 2021-03-01 PROCEDURE — 36415 COLL VENOUS BLD VENIPUNCTURE: CPT | Performed by: INTERNAL MEDICINE

## 2021-03-01 PROCEDURE — 250N000013 HC RX MED GY IP 250 OP 250 PS 637: Performed by: PHYSICIAN ASSISTANT

## 2021-03-01 PROCEDURE — 80048 BASIC METABOLIC PNL TOTAL CA: CPT | Performed by: INTERNAL MEDICINE

## 2021-03-01 PROCEDURE — 999N000128 HC STATISTIC PERIPHERAL IV START W/O US GUIDANCE

## 2021-03-01 PROCEDURE — 97530 THERAPEUTIC ACTIVITIES: CPT | Mod: GP | Performed by: REHABILITATION PRACTITIONER

## 2021-03-01 PROCEDURE — 250N000012 HC RX MED GY IP 250 OP 636 PS 637: Performed by: STUDENT IN AN ORGANIZED HEALTH CARE EDUCATION/TRAINING PROGRAM

## 2021-03-01 PROCEDURE — 250N000013 HC RX MED GY IP 250 OP 250 PS 637: Performed by: NURSE PRACTITIONER

## 2021-03-01 PROCEDURE — 85025 COMPLETE CBC W/AUTO DIFF WBC: CPT | Performed by: INTERNAL MEDICINE

## 2021-03-01 PROCEDURE — 86140 C-REACTIVE PROTEIN: CPT | Performed by: INTERNAL MEDICINE

## 2021-03-01 PROCEDURE — 206N000001 HC R&B BMT UMMC

## 2021-03-01 PROCEDURE — 97110 THERAPEUTIC EXERCISES: CPT | Mod: GO

## 2021-03-01 PROCEDURE — 97530 THERAPEUTIC ACTIVITIES: CPT | Mod: GO

## 2021-03-01 PROCEDURE — 99233 SBSQ HOSP IP/OBS HIGH 50: CPT | Performed by: INTERNAL MEDICINE

## 2021-03-01 PROCEDURE — 85610 PROTHROMBIN TIME: CPT | Performed by: INTERNAL MEDICINE

## 2021-03-01 PROCEDURE — 80076 HEPATIC FUNCTION PANEL: CPT | Performed by: INTERNAL MEDICINE

## 2021-03-01 PROCEDURE — 250N000011 HC RX IP 250 OP 636: Performed by: PHYSICIAN ASSISTANT

## 2021-03-01 RX ORDER — ONDANSETRON 4 MG/1
4 TABLET, ORALLY DISINTEGRATING ORAL EVERY 6 HOURS PRN
DISCHARGE
Start: 2021-03-01 | End: 2021-03-02

## 2021-03-01 RX ORDER — ACETAMINOPHEN 325 MG/1
650 TABLET ORAL EVERY 6 HOURS
DISCHARGE
Start: 2021-03-01 | End: 2021-03-02

## 2021-03-01 RX ORDER — SERTRALINE HYDROCHLORIDE 100 MG/1
100 TABLET, FILM COATED ORAL DAILY
DISCHARGE
Start: 2021-03-02 | End: 2021-03-02

## 2021-03-01 RX ORDER — PREDNISONE 10 MG/1
10 TABLET ORAL DAILY
DISCHARGE
Start: 2021-03-02 | End: 2021-03-02

## 2021-03-01 RX ORDER — LANOLIN ALCOHOL/MO/W.PET/CERES
3 CREAM (GRAM) TOPICAL AT BEDTIME
DISCHARGE
Start: 2021-03-01 | End: 2021-03-02

## 2021-03-01 RX ORDER — TRIAMCINOLONE ACETONIDE 1 MG/G
OINTMENT TOPICAL 2 TIMES DAILY
DISCHARGE
Start: 2021-03-01 | End: 2021-03-02

## 2021-03-01 RX ORDER — MULTIVITAMIN,THERAPEUTIC
1 TABLET ORAL DAILY
DISCHARGE
Start: 2021-03-02

## 2021-03-01 RX ORDER — PANTOPRAZOLE SODIUM 40 MG/1
40 TABLET, DELAYED RELEASE ORAL
DISCHARGE
Start: 2021-03-02 | End: 2021-04-07

## 2021-03-01 RX ORDER — SERTRALINE HYDROCHLORIDE 100 MG/1
100 TABLET, FILM COATED ORAL DAILY
Status: DISCONTINUED | OUTPATIENT
Start: 2021-03-02 | End: 2021-03-03 | Stop reason: HOSPADM

## 2021-03-01 RX ORDER — OXYCODONE HYDROCHLORIDE 10 MG/1
10 TABLET ORAL EVERY 6 HOURS PRN
Refills: 0 | Status: SHIPPED | DISCHARGE
Start: 2021-03-01 | End: 2021-03-02

## 2021-03-01 RX ORDER — CARBOXYMETHYLCELLULOSE SODIUM 5 MG/ML
1 SOLUTION/ DROPS OPHTHALMIC 4 TIMES DAILY PRN
DISCHARGE
Start: 2021-03-01 | End: 2021-03-02

## 2021-03-01 RX ADMIN — ENOXAPARIN SODIUM 50 MG: 100 INJECTION SUBCUTANEOUS at 21:23

## 2021-03-01 RX ADMIN — ACETAMINOPHEN 650 MG: 325 TABLET, FILM COATED ORAL at 11:24

## 2021-03-01 RX ADMIN — ACETAMINOPHEN 650 MG: 325 TABLET, FILM COATED ORAL at 04:34

## 2021-03-01 RX ADMIN — RUXOLITINIB 5 MG: 5 TABLET ORAL at 07:44

## 2021-03-01 RX ADMIN — RUXOLITINIB 5 MG: 5 TABLET ORAL at 21:22

## 2021-03-01 RX ADMIN — ACYCLOVIR 800 MG: 800 TABLET ORAL at 21:23

## 2021-03-01 RX ADMIN — MELATONIN TAB 3 MG 3 MG: 3 TAB at 21:23

## 2021-03-01 RX ADMIN — ACETAMINOPHEN 650 MG: 325 TABLET, FILM COATED ORAL at 16:48

## 2021-03-01 RX ADMIN — FLUCONAZOLE 100 MG: 100 TABLET ORAL at 07:48

## 2021-03-01 RX ADMIN — SERTRALINE HYDROCHLORIDE 50 MG: 50 TABLET ORAL at 07:48

## 2021-03-01 RX ADMIN — LEVOFLOXACIN 250 MG: 250 TABLET, FILM COATED ORAL at 07:48

## 2021-03-01 RX ADMIN — THERA TABS 1 TABLET: TAB at 07:48

## 2021-03-01 RX ADMIN — PREDNISONE 10 MG: 5 TABLET ORAL at 07:48

## 2021-03-01 RX ADMIN — ACYCLOVIR 800 MG: 800 TABLET ORAL at 07:48

## 2021-03-01 RX ADMIN — Medication 1 TABLET: at 11:24

## 2021-03-01 RX ADMIN — ENOXAPARIN SODIUM 50 MG: 100 INJECTION SUBCUTANEOUS at 11:24

## 2021-03-01 RX ADMIN — SULFAMETHOXAZOLE AND TRIMETHOPRIM 1 TABLET: 800; 160 TABLET ORAL at 08:01

## 2021-03-01 RX ADMIN — PANTOPRAZOLE SODIUM 40 MG: 40 TABLET, DELAYED RELEASE ORAL at 07:48

## 2021-03-01 ASSESSMENT — ACTIVITIES OF DAILY LIVING (ADL)
ADLS_ACUITY_SCORE: 16

## 2021-03-01 ASSESSMENT — MIFFLIN-ST. JEOR: SCORE: 1703.37

## 2021-03-01 NOTE — PROGRESS NOTES
[facility] Nurse Inpatient Pressure Injury Assessment   Reason for consultation: Evaluate and treat bridge of nose, left internal nostril, gluteal cleft, and left buttock wounds  Re consult: come by and see if there is something other then dressing when remove causes bleeding      ASSESSMENT    Gluteal cleft wound due to MASD moisture associated skin damage  Status: healed 3/1    Left buttock wound due to skin tear  Status:healed 3/1    Right shin and left arm wound due to GVH vs delayed healing  Status: follow up assessment, no longer bleeding with removal of dressing.      TREATMENT PLAN  Bridge of nose prevention   Apply Gecko pad or small strip of mepilex lite over bridge of nose to prevent skin breakdown from oxygen face mask, and assess under mask and straps each shift.    Right shin and left arm wounds: Q3D and PRN gently remove dressing, may need to moisten with saline if attached to wound to prevent bleeding. Cleanse with wound cleanser and pat dry. Apply thin layer of Aquaphor to 2x2 PolyMem dressing(order#[id number]). Date dressing.     Orders Written  WO Nurse follow-up plan:weekly .  Nursing to notify the Provider(s) and re-consult the [facility] Nurse if wound(s) deteriorates or new skin concern.    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 Transferred to MICU on 2/10 for worsening respiratory status requiring intubation 2/13-2/19.  - transferred to  2/21    Objective Data  Containment of urine/stool: Incontinence Protocol    Current Diet/ Nutrition:  Orders Placed This Encounter      High Kcal/High Protein Diet, ADULT    Output:   I/O last 3 completed shifts:  In: 560 [P.O.:560]  Out: 525 [Urine:525]    Risk Assessment:   Sensory Perception: 4-->no impairment  Moisture: 3-->occasionally moist  Activity:  3-->walks occasionally  Mobility: 3-->slightly limited  Nutrition: 3-->adequate  Friction and Shear: 3-->no apparent problem  Sven Score: 19    Labs:   Recent Labs   Lab 03/01/21  0625   ALBUMIN 2.1*   HGB 9.3*   INR 1.12   WBC 6.4   CRP 44.0*       Physical Exam  Skin inspection: focused legs, arms, buttock  Patient is high risk for pressure injury development secondary to history of pressure injury     Skin inspection: Gluteal cleft and left buttock: healed 3/1    Gluteal cleft 2/22    Left lower buttock 2/22  Wound History: Wound noted by RN upon transfer to floor from MICU.       Wound Location:  Right shin    Date of last photo 3/1  Wound History: prior wound site from >5 years ago, slow healing  3/1: wound dressing placed by [facility] was still in place from 2/25, no signs of bleeding upon removal of dressing.   Wound Base: 100 % superficial scab      Palpation of the wound bed: firm     Drainage: none     Description of drainage: none     Measurements (length x width x depth, in cm) 1.3  x 1.4  x  +0.3 cm      Tunneling N/A     Undermining N/A  Periwound skin: intact      Color: normal and consistent with surrounding tissue      Temperature: normal   Odor: none  Pain: no grimacing or signs of discomfort, none    Wound Location:  Left arm    Date of last photo 3/1  Wound History: prior skin tear site, pt on blood thinners  Wound Base: 100 % superficial scab      Palpation of the wound bed: normal      Drainage: copious     Description of drainage: bloody     Measurements (length x width x depth, in cm) 0.8  x 0.3  x  +0.2 cm      Tunneling N/A     Undermining N/A  Periwound skin: ecchymosis      Color: purple      Temperature: normal   Odor: none  Pain: during dressing change, tender    Interventions  Current support surface: Standard  Atmos Air mattress  Current off-loading measures: Pillows  Repositioning aid: Pillows  Visual inspection of wound(s) completed   Tube Securement: bridle  Wound Care: was done per  plan of care.  Supplies: discussed with RN and discussed with patient  Educated provided: importance of repositioning, plan of care, wound progress and Off-loading pressure  Education provided to: patient  and nurse  Discussed importance of:repositioning every 2 hours, off-loading pressure to wound, their role in pressure injury prevention and moisture management  Discussed plan of care with Patient and Nurse    Aranza Stafford RN , CWOCN

## 2021-03-01 NOTE — PROGRESS NOTES
Care Management Follow Up    Length of Stay (days): 21    Expected Discharge Date: 03/01/21     Concerns to be Addressed: discharge planning     Patient plan of care discussed at interdisciplinary rounds: Yes    Anticipated Discharge Disposition: Transitional Care     Anticipated Discharge Services: None  Anticipated Discharge DME: Oxygen    Patient/family educated on Medicare website which has current facility and service quality ratings: yes  Education Provided on the Discharge Plan:    Patient/Family in Agreement with the Plan: yes    Referrals Placed by CM/SW: Post Acute Facilities  Private pay costs discussed: private room/amenity fees    Additional Information:  ANICETO spoke with Re[facility] Guerrero who shared that they are unable to accept the pt now due to his positive COVID test from 2/28/21. ANICETO spoke with the pt's wife Racquel regarding this change. She expressed understanding and noted that the pt and her both agree that TCU is needed. Racquel is aware that we will now need to find a TCU that takes COVID+ pt's which is a smaller number of options. [facility]:  [facility]    Racquel shared that the pt is very frustrated that he will not be able to get out of the hospital at this point. Discussed that the pt is medically stable for discharge, so once we find a bed we will be ready to send the pt. Racquel expressed understanding.      ADDENDUM:  [facility]- Unable to accept due to cost of [facility] and [facility]- Unable to accept due to cost of [facility]- declined  [facility]- Referral sent      JAYLON Moreno, [facility]  Phone [phone]  Pager [phone]

## 2021-03-01 NOTE — PROGRESS NOTES
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 Transferred to MICU on 2/10 for worsening respiratory status requiring intubation 2/13-2/19.  - transferred to  2/21    HPI: Again very awake this morning. Is sating 97% on 1-2 liters.  Sating 94% on room air at rest but once he was sitting up sats dropped to 87% this am.  He said he felt sob with acitivy. No cough. No fevers.  No n,v,d. He is passing quite a bit of gas.  Mouth and eyes are very dry but stable. No new skin changes.  Resolved bleeding on the left arm with removal of dressing. He says he is depressed today.  Wants to go home one day before going to transitional care.  He knows he is very weak but thought one day his son could come over and help.      ROS: 10 point ROS neg other than the symptoms noted above in the HPI.    Exam:    Blood pressure 132/88, pulse 71, temperature 97.8  F (36.6  C), temperature source Axillary, resp. rate 18, height 1.753 m (5' 9\"), weight 96.3 kg (212 lb 4.8 oz), SpO2 97 %.  Constitutional: very pleasant, in NAD  Oral:  Mucous membranes are dry with some ridging  ENT: dry MM, neck is supple, removed dressing on right neck where line removed- no erythema, but has a scab  Respiratory: CTAB, normal effort  Cardiovascular: nl s1/s2 no MRGs  GI: abdomen is soft, NT, +BS  Skin: warm, dry, small wound RLE, covered with dressing, no visible blood, small wound L forearm no bleeding  Has hyperpigmented skin on right LE and some hyperpigmented skin on back   Musculoskeletal: trace edema bilat lower extremities  Neurologic: awake not sleepy at all today, speech is clear, answers questions appropriately, very appropriate today  Left peripheral IV:  Dry and not bleeding.  Some trace edema in left arm       Labs:  Lab Results   Component Value Date    " WBC 6.4 03/01/2021    ANEU 3.9 03/01/2021    HGB 9.3 (L) 03/01/2021    HCT 28.7 (L) 03/01/2021     03/01/2021     03/01/2021    POTASSIUM 3.9 03/01/2021    CHLORIDE 108 03/01/2021    CO2 26 03/01/2021    GLC 98 03/01/2021    BUN 24 03/01/2021    CR 0.72 03/01/2021    MAG 2.2 02/22/2021    INR 1.12 03/01/2021    BILITOTAL 1.3 03/01/2021    AST 29 03/01/2021    ALT 36 03/01/2021    ALKPHOS 87 03/01/2021    PROTTOTAL 5.8 (L) 03/01/2021    ALBUMIN 2.1 (L) 03/01/2021     Imaging:  CXR (2/15): Overall slight decrease in patchy opacifications in the left lung base. Continued small left effusion. Atherosclerosis. Endotracheal intubation. Continued patchy opacities in right lung base.      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 Transferred to MICU on 2/10 for worsening respiratory status requiring intubation 2/13-2/19.  - transferred to  2/21     1. Pulmonary:  Acute hypoxic respiratory failure, presumed  secondary to COVID-19 pneumonia, rhinovirus, and mild pulmonary edema  Acute respiratory distress syndrome  Intubated 2/13 with initiation of proning. Extubated 2/19. Weaning O2, now 1-2L. Sats drop to 94% on room air in bed today at rest but with activity sats drop to 87, improving slowly.  Does have sob with acitivity  Has received intermittent diuresis to maintain net negative daily, earlier this hospital course, monitor closely. Weight down, no diuresis for the last 3 days  -- Will likely need 02 upon discharge, RN coordinator aware  2. ID: afebrile.  Elevated CRP, unclear etiology   cont to trendCRP= down to 44 today( 2/2726=659.0)    COVID-19 pneumonia (+1/30; neg 2/21), Repeat Covid 2/28=pos  + Rhinovirus on 2/9/2021  Remdesivir course completed on 2/12. Was considered for tocilizumab.  He did complete a course of zithromax  for possible superimposed bacterial pneumonia 2/9-2/11  - ID signed off  - s/p 10-day course of dexamethasone (2/8-2/17) completed  - Enoxaparin 50 mg BID given elevated D-dimer;  Pharm checked on coverage, patient able to get Eliquis for $26.03/30 day supply.    Recommended dosing of eliquis=2.5 mg po bid, will start tomorrow since already got lovenox today      Prophy:   Acyclovir, fluconazole, Levaquin( while on prednisone), Bactrim    3.  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  Neuro:  Sleep: stop seraquel now and see how he does. (was on for a couple of days in ICU); melatonin at hs    Agitated Delirium - resolved  Off Precedex x2/21am    Mood  - Continue PTA sertraline, can increase zoloft today from 50 mg.  Says still depressed will increase zoloft today     5.  GI: NG tube pulled on 2/24, now eating well  - Protonix for GI prophy  #frequent stools: C.dif neg 2/21. Possibly due to TF,now resolved.   - says he had a stool yesterday, but remains on narcotics, bowel regimen ordered but prn    6. CGVH:  Skin, eyes, mouth, stable  -Jakafi 5mg twice daily & Pred 10 mg every day( increased during this admission from 5 mg daily prior to admission.)  Will continue this dose for now.    7.  Renal/FEN: weight below admit weight  Lytes wnl.  mild malnutrition in the context of acute illness: was on TF, now with adequate po intake and sore from NG tube left nostril  - SLP assessed, now signed off. Regular diet  -#SARAH, presumed prerenal 2/2 to poor PO intake, dehydration. Resolved.     8.  Endocrine:  Hyperglycemia - BG generally <140. Stopped Novolog sliding scale 2/22. Bg=98 today     9. Hematology:  Counts stable.  MDS/ s/p BMT 2014       No transfusions     10.  Deconditioning: Due acute illness, long hospital stay, ICU,  "steroids. Cont PT/OT. Currently rec TCU, RN coordinator and SW aware and working on placement, patient had a repeat Covid test this weekend to aid in discharge planning.  Unable to let him go home for a day before rehab.  Explained the reasons why    11.  MS: Chronic left groin and bilateral knee pain.  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  His pain has been ongoing for \"years\" and is unchanged.  He usually uses motrin at home.    - Rx icy hot patch, oxycodone prn, pain well controlled  -scheduled tylenol ordered to minimize need for narcotics in anticipation of possible discharge next week    11.  Wounds: wound on right lower shin and gluteal cleft and left buttock:  (?mild trauma vs GVH), no signs of infection  -WOC consult, seen by wound on 2/25- asked if wound would come by and see if there is a dressing that when removed will not cause bleeding on 3/1.    Dispo: PT currently recommending rehab discharge Will continue PT/OT Likely discharge to  TCU/rehab, sometime this week -discussed with SW note during rounds today, looking for bed placement with a facility that will take a COVID+ patient that is recovering. Today physical therapy says he is progressing but needs help with transfer and gait.  He needs help with stairs.  He needs improvement in conditioning because he is quite short of breath with any activity.        Radha Nina PA-C  [phone]  3/1/2021  "

## 2021-03-01 NOTE — PLAN OF CARE
"No acute events overnight. Afebrile, vitals stable. Oxy x1 at HS for chronic pain. Denies any other pain, nausea. Endorses dyspnea on exertion, stable on 2L facemask when sleeping. Wound dressings c/d/I. Encouraged pt to be up in chair for an hour before bed, up with 1/walker. No BM. Continue to optimize oxygenation and mobility.   /83 (BP Location: Left arm)   Pulse 70   Temp 98  F (36.7  C) (Axillary)   Resp 18   Ht 1.753 m (5' 9\")   Wt 97.5 kg (215 lb)   SpO2 98%   BMI 31.75 kg/m      Problem: Adult Inpatient Plan of Care  Goal: Absence of Hospital-Acquired Illness or Injury  Intervention: Identify and Manage Fall Risk  Recent Flowsheet Documentation  Taken 2/28/2021 2200 by Isatu Lim  Safety Promotion/Fall Prevention:   bed alarm on   clutter free environment maintained   nonskid shoes/slippers when out of bed   patient and family education     Problem: Adult Inpatient Plan of Care  Goal: Absence of Hospital-Acquired Illness or Injury  Intervention: Prevent Infection  Recent Flowsheet Documentation  Taken 2/28/2021 2200 by Isatu Lim  Infection Prevention:   rest/sleep promoted   single patient room provided   personal protective equipment utilized   hand hygiene promoted   equipment surfaces disinfected   cohorting utilized   environmental surveillance performed   visitors restricted/screened     Problem: Gas Exchange Impaired  Goal: Optimal Gas Exchange  Intervention: Optimize Oxygenation and Ventilation  Recent Flowsheet Documentation  Taken 2/28/2021 2200 by Isatu Lim  Head of Bed (HOB) Positioning: HOB at 20-30 degrees     "

## 2021-03-01 NOTE — PROGRESS NOTES
Status improved, VS baseline, pain is under controled with PO medication, fair oral food intake, after encourage from MD patient is going bathroom with walker and 2L O2 admitted, O2 sat kept middle to upper 90% with 2 L/min administration, L arm dressing intact and no bleeing was noted

## 2021-03-01 NOTE — PLAN OF CARE
AVSS. Pt on 2L O2 via facemask. Able to maintain sats on room air when at rest in bed or chair. He does get dyspneic with activity and desats. Up with assist of one with gaitbelt and walker. Pain has been controlled with scheduled tylenol. Denies nausea. Good appetite. Ate 100% of breakfast and had a burger and fries for dinner. WOC came to reassess wounds. Discontinued gluteal/buttock orders as bottom now WDL. Wound orders changed for right shin and left arm wounds. Cares completed this shift. Pt up in chair for most of day. Voiding well.    Patient worked with PT this afternoon and he is really hoping to discharge home. Pt spoke with [employer] and would like a wheelchair for home. Per pt, he needs a provider order for a wheelchair so that insurance will cover it. He also is wondering how to get a handicapped parking sign.     Problem: Adult Inpatient Plan of Care  Goal: Plan of Care Review  Outcome: No Change  Goal: Optimal Comfort and Wellbeing  Outcome: No Change     Problem: COPD Comorbidity  Goal: Maintenance of COPD Symptom Control  Outcome: No Change     Problem: Obstructive Sleep Apnea Risk or Actual (Comorbidity Management)  Goal: Unobstructed Breathing During Sleep  Outcome: No Change     Problem: Sleep Disturbance (Delirium)  Goal: Improved Sleep  Outcome: No Change     Problem: Gas Exchange Impaired  Goal: Optimal Gas Exchange  Outcome: Improving     Problem: Attention and Thought Clarity Impairment (Delirium)  Goal: Improved Attention and Thought Clarity  Outcome: Improving

## 2021-03-02 ENCOUNTER — APPOINTMENT (OUTPATIENT)
Dept: PHYSICAL THERAPY | Facility: CLINIC | Age: 71
DRG: 207 | End: 2021-03-02
Attending: INTERNAL MEDICINE
Payer: MEDICARE

## 2021-03-02 ENCOUNTER — APPOINTMENT (OUTPATIENT)
Dept: OCCUPATIONAL THERAPY | Facility: CLINIC | Age: 71
DRG: 207 | End: 2021-03-02
Attending: INTERNAL MEDICINE
Payer: MEDICARE

## 2021-03-02 ENCOUNTER — DOCUMENTATION ONLY (OUTPATIENT)
Dept: ONCOLOGY | Facility: CLINIC | Age: 71
End: 2021-03-02

## 2021-03-02 DIAGNOSIS — U07.1 COVID-19 VIRUS INFECTION: Primary | ICD-10-CM

## 2021-03-02 DIAGNOSIS — U07.1 COVID-19 VIRUS INFECTION: ICD-10-CM

## 2021-03-02 DIAGNOSIS — R53.81 PHYSICAL DECONDITIONING: ICD-10-CM

## 2021-03-02 DIAGNOSIS — D46.9 MDS (MYELODYSPLASTIC SYNDROME) (H): Primary | ICD-10-CM

## 2021-03-02 DIAGNOSIS — Z94.81 STATUS POST BONE MARROW TRANSPLANT (H): ICD-10-CM

## 2021-03-02 LAB
ANION GAP SERPL CALCULATED.3IONS-SCNC: 6 MMOL/L (ref 3–14)
BASOPHILS # BLD AUTO: 0 10E9/L (ref 0–0.2)
BASOPHILS NFR BLD AUTO: 0.2 %
BUN SERPL-MCNC: 20 MG/DL (ref 7–30)
CALCIUM SERPL-MCNC: 8.4 MG/DL (ref 8.5–10.1)
CHLORIDE SERPL-SCNC: 108 MMOL/L (ref 94–109)
CO2 SERPL-SCNC: 24 MMOL/L (ref 20–32)
CREAT SERPL-MCNC: 0.7 MG/DL (ref 0.66–1.25)
CRP SERPL-MCNC: 25 MG/L (ref 0–8)
DIFFERENTIAL METHOD BLD: ABNORMAL
EOSINOPHIL # BLD AUTO: 0.1 10E9/L (ref 0–0.7)
EOSINOPHIL NFR BLD AUTO: 1.2 %
ERYTHROCYTE [DISTWIDTH] IN BLOOD BY AUTOMATED COUNT: 18.6 % (ref 10–15)
GFR SERPL CREATININE-BSD FRML MDRD: >90 ML/MIN/{1.73_M2}
GLUCOSE SERPL-MCNC: 92 MG/DL (ref 70–99)
HCT VFR BLD AUTO: 28.3 % (ref 40–53)
HGB BLD-MCNC: 9.2 G/DL (ref 13.3–17.7)
IMM GRANULOCYTES # BLD: 0 10E9/L (ref 0–0.4)
IMM GRANULOCYTES NFR BLD: 0.5 %
LYMPHOCYTES # BLD AUTO: 2.1 10E9/L (ref 0.8–5.3)
LYMPHOCYTES NFR BLD AUTO: 35.8 %
MCH RBC QN AUTO: 31.9 PG (ref 26.5–33)
MCHC RBC AUTO-ENTMCNC: 32.5 G/DL (ref 31.5–36.5)
MCV RBC AUTO: 98 FL (ref 78–100)
MONOCYTES # BLD AUTO: 0.5 10E9/L (ref 0–1.3)
MONOCYTES NFR BLD AUTO: 7.6 %
NEUTROPHILS # BLD AUTO: 3.2 10E9/L (ref 1.6–8.3)
NEUTROPHILS NFR BLD AUTO: 54.7 %
NRBC # BLD AUTO: 0.1 10*3/UL
NRBC BLD AUTO-RTO: 2 /100
PLATELET # BLD AUTO: 231 10E9/L (ref 150–450)
POTASSIUM SERPL-SCNC: 4 MMOL/L (ref 3.4–5.3)
RBC # BLD AUTO: 2.88 10E12/L (ref 4.4–5.9)
SODIUM SERPL-SCNC: 138 MMOL/L (ref 133–144)
WBC # BLD AUTO: 5.9 10E9/L (ref 4–11)

## 2021-03-02 PROCEDURE — 206N000001 HC R&B BMT UMMC

## 2021-03-02 PROCEDURE — 97530 THERAPEUTIC ACTIVITIES: CPT | Mod: GO

## 2021-03-02 PROCEDURE — 97535 SELF CARE MNGMENT TRAINING: CPT | Mod: GO

## 2021-03-02 PROCEDURE — 86140 C-REACTIVE PROTEIN: CPT | Performed by: INTERNAL MEDICINE

## 2021-03-02 PROCEDURE — 85025 COMPLETE CBC W/AUTO DIFF WBC: CPT | Performed by: INTERNAL MEDICINE

## 2021-03-02 PROCEDURE — 250N000013 HC RX MED GY IP 250 OP 250 PS 637: Performed by: PHYSICIAN ASSISTANT

## 2021-03-02 PROCEDURE — 99233 SBSQ HOSP IP/OBS HIGH 50: CPT | Performed by: INTERNAL MEDICINE

## 2021-03-02 PROCEDURE — 97530 THERAPEUTIC ACTIVITIES: CPT | Mod: GP | Performed by: REHABILITATION PRACTITIONER

## 2021-03-02 PROCEDURE — 80048 BASIC METABOLIC PNL TOTAL CA: CPT | Performed by: INTERNAL MEDICINE

## 2021-03-02 PROCEDURE — 36415 COLL VENOUS BLD VENIPUNCTURE: CPT | Performed by: INTERNAL MEDICINE

## 2021-03-02 PROCEDURE — 250N000013 HC RX MED GY IP 250 OP 250 PS 637: Performed by: STUDENT IN AN ORGANIZED HEALTH CARE EDUCATION/TRAINING PROGRAM

## 2021-03-02 PROCEDURE — 250N000013 HC RX MED GY IP 250 OP 250 PS 637: Performed by: NURSE PRACTITIONER

## 2021-03-02 PROCEDURE — 250N000012 HC RX MED GY IP 250 OP 636 PS 637: Performed by: STUDENT IN AN ORGANIZED HEALTH CARE EDUCATION/TRAINING PROGRAM

## 2021-03-02 PROCEDURE — 250N000013 HC RX MED GY IP 250 OP 250 PS 637: Performed by: INTERNAL MEDICINE

## 2021-03-02 PROCEDURE — 97116 GAIT TRAINING THERAPY: CPT | Mod: GP | Performed by: REHABILITATION PRACTITIONER

## 2021-03-02 RX ORDER — MULTIVIT-MIN/IRON/FOLIC ACID/K 18-600-40
1000 CAPSULE ORAL DAILY
COMMUNITY
Start: 2021-03-02

## 2021-03-02 RX ORDER — PREDNISONE 5 MG/1
10 TABLET ORAL DAILY
COMMUNITY
Start: 2021-03-02 | End: 2021-06-21

## 2021-03-02 RX ORDER — OXYCODONE HYDROCHLORIDE 10 MG/1
10 TABLET ORAL EVERY 6 HOURS PRN
Qty: 30 TABLET | Refills: 0 | Status: SHIPPED | OUTPATIENT
Start: 2021-03-02 | End: 2021-03-03

## 2021-03-02 RX ORDER — SENNOSIDES 8.6 MG
1-2 TABLET ORAL 2 TIMES DAILY PRN
COMMUNITY
Start: 2021-03-02 | End: 2021-03-02

## 2021-03-02 RX ORDER — ACETAMINOPHEN 325 MG/1
650 TABLET ORAL EVERY 6 HOURS PRN
COMMUNITY
Start: 2021-03-02

## 2021-03-02 RX ORDER — TRIAMCINOLONE ACETONIDE 1 MG/G
OINTMENT TOPICAL 2 TIMES DAILY
COMMUNITY
Start: 2021-03-02 | End: 2021-11-26

## 2021-03-02 RX ORDER — SERTRALINE HYDROCHLORIDE 100 MG/1
100 TABLET, FILM COATED ORAL DAILY
Qty: 30 TABLET | Refills: 1 | Status: SHIPPED | OUTPATIENT
Start: 2021-03-02 | End: 2021-03-03

## 2021-03-02 RX ORDER — LANOLIN ALCOHOL/MO/W.PET/CERES
3 CREAM (GRAM) TOPICAL AT BEDTIME
COMMUNITY
Start: 2021-03-02 | End: 2021-11-26

## 2021-03-02 RX ADMIN — PANTOPRAZOLE SODIUM 40 MG: 40 TABLET, DELAYED RELEASE ORAL at 09:27

## 2021-03-02 RX ADMIN — ACETAMINOPHEN 650 MG: 325 TABLET, FILM COATED ORAL at 09:25

## 2021-03-02 RX ADMIN — Medication 1 DROP: at 23:12

## 2021-03-02 RX ADMIN — SULFAMETHOXAZOLE AND TRIMETHOPRIM 1 TABLET: 800; 160 TABLET ORAL at 09:27

## 2021-03-02 RX ADMIN — THERA TABS 1 TABLET: TAB at 09:24

## 2021-03-02 RX ADMIN — PREDNISONE 10 MG: 5 TABLET ORAL at 09:24

## 2021-03-02 RX ADMIN — RUXOLITINIB 5 MG: 5 TABLET ORAL at 20:06

## 2021-03-02 RX ADMIN — ACYCLOVIR 800 MG: 800 TABLET ORAL at 09:24

## 2021-03-02 RX ADMIN — SERTRALINE HYDROCHLORIDE 100 MG: 100 TABLET ORAL at 09:27

## 2021-03-02 RX ADMIN — ACETAMINOPHEN 650 MG: 325 TABLET, FILM COATED ORAL at 04:34

## 2021-03-02 RX ADMIN — APIXABAN 2.5 MG: 2.5 TABLET, FILM COATED ORAL at 09:27

## 2021-03-02 RX ADMIN — Medication 1 TABLET: at 15:42

## 2021-03-02 RX ADMIN — LEVOFLOXACIN 250 MG: 250 TABLET, FILM COATED ORAL at 09:25

## 2021-03-02 RX ADMIN — ACYCLOVIR 800 MG: 800 TABLET ORAL at 20:14

## 2021-03-02 RX ADMIN — FLUCONAZOLE 100 MG: 100 TABLET ORAL at 09:27

## 2021-03-02 RX ADMIN — APIXABAN 2.5 MG: 2.5 TABLET, FILM COATED ORAL at 20:14

## 2021-03-02 RX ADMIN — RUXOLITINIB 5 MG: 5 TABLET ORAL at 10:09

## 2021-03-02 ASSESSMENT — ACTIVITIES OF DAILY LIVING (ADL)
ADLS_ACUITY_SCORE: 16

## 2021-03-02 ASSESSMENT — MIFFLIN-ST. JEOR: SCORE: 1718.34

## 2021-03-02 NOTE — PROGRESS NOTES
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 Transferred to MICU on 2/10 for worsening respiratory status requiring intubation 2/13-2/19.  - transferred to  2/21.    A: temp: 98.1 axillary, Pulse 88, BP. 114/78 LARM, RR. 16. Skin: bruises on both shins but right shin seem more pronounced than the left. New onset of bleeding on right shin during 1600 assessment, dressing applied. Right knee also appeared more bruised than the left.  Patient stated that since he had cellulitis on the right shin, it has been bruised and not healed. Normal sinus rhythm on auscultation. Breath sounds normal except expiratory wheezes in LLL.  Patient refused tylenol because he felt there was no need for it. Denies pain. On oxygen at 2L. Patient will discharge tomorrow per provider orders. Patient ambulates with walker and A1.     R:  Continue to monitor

## 2021-03-02 NOTE — PLAN OF CARE
"/78 (BP Location: Left arm)   Pulse 88   Temp 98.1  F (36.7  C) (Axillary)   Resp 16   Ht 1.753 m (5' 9\")   Wt 97.8 kg (215 lb 9.6 oz)   SpO2 94%   BMI 31.84 kg/m       VSS on 0-2L O2, gets dyspneic with activity. A/O x4, denies pain, n/v, has good urine output and had one bowel movement. Patient is a SBA with a walker. He got up to the shower with OT and worked with PT today. New dressings were applied to his abdomen, shin, and left forearm. He will be discharging home tomorrow with home care in place. Continue with POC and notify team with changes.      Problem: Adult Inpatient Plan of Care  Goal: Plan of Care Review  Outcome: Improving  Goal: Patient-Specific Goal (Individualized)  Outcome: Improving  Goal: Absence of Hospital-Acquired Illness or Injury  Outcome: Improving  Intervention: Prevent and Manage VTE (Venous Thromboembolism) Risk  Recent Flowsheet Documentation  Taken 3/2/2021 0940 by Aby Bryan RN  VTE Prevention/Management: ambulation promoted  Intervention: Prevent Infection  Recent Flowsheet Documentation  Taken 3/2/2021 0940 by Aby Bryan, RN  Infection Prevention:   cohorting utilized   environmental surveillance performed   equipment surfaces disinfected   hand hygiene promoted   personal protective equipment utilized   rest/sleep promoted   single patient room provided   visitors restricted/screened  Goal: Optimal Comfort and Wellbeing  Outcome: Improving  Goal: Readiness for Transition of Care  Outcome: Improving     Problem: COPD Comorbidity  Goal: Maintenance of COPD Symptom Control  Outcome: Improving     Problem: Obstructive Sleep Apnea Risk or Actual (Comorbidity Management)  Goal: Unobstructed Breathing During Sleep  Outcome: Improving     Problem: Gas Exchange Impaired  Goal: Optimal Gas Exchange  Outcome: Improving     Problem: Attention and Thought Clarity Impairment (Delirium)  Goal: Improved Attention and Thought Clarity  Outcome: Improving     Problem: Sleep " Disturbance (Delirium)  Goal: Improved Sleep  Outcome: Improving     Problem: Restraint for Non-Violent/Non-Self-Destructive Behavior  Goal: Prevent/Manage Potential Problems  Description: Maintain safety of patient and others during period of restraint.  Promote psychological and physical wellbeing.  Prevent injury to skin and involved body parts.  Promote nutrition, hydration, and elimination.  Outcome: Improving     Problem: OT General Care Plan  Goal: Toilet Transfer/Toileting (OT)  Description: Toilet Transfer/Toileting (OT)  Outcome: Improving  Goal: Cognitive (OT)  Description: Cognitive (OT)  Outcome: Improving     Problem: Discharge Planning  Goal: Discharge Planning (Adult, OB, Behavioral, Peds)  Outcome: Improving

## 2021-03-02 NOTE — PROGRESS NOTES
Received intake call for home oxygen at 11:36AM. Reviewed patient's chart; we do not have a signed order or notes yet, but the SATs are in the chart.   12:06PM- Called Janie, care coordinator. I let her know that we need the signed order and notes. She said that she will pend the order now and let the provider know to enter the notes.  12:15PM- Called Deejay to offer choice. He has had oxygen in the past through [facility], but currently has his CPAP and supplies from [facility] so he would prefer to go through them if they take straight Medicare. I told him I will call [facility] and see if they will accept his insurance and if they do I will send everything over to them. He agreed with this plan.  12:33PM- Called [facility] [phone] and spoke with Oralia. She confirmed they can set up straight Medicare oxygen patients. I got their fax number: [phone] and faxed everything over to them. I put on the cover sheet for them to call me once they get the fax.   12:40PM- Called Janie and let her know I have everything and sent it over to [facility].

## 2021-03-02 NOTE — SUMMARY OF CARE
Deejay Dior   Home Medication Instructions TIFFANIE:[id number]    Printed on:03/02/21 5753   Medication Information                      acetaminophen (TYLENOL) 325 MG tablet  Take 2 tablets (650 mg) by mouth every 6 hours for pain to minimize need for narcotics             acyclovir (ZOVIRAX) 800 MG tablet  TAKE ONE TABLET BY MOUTH TWICE DAILY              apixaban ANTICOAGULANT (ELIQUIS) 2.5 MG tablet  Take 1 tablet (2.5 mg) by mouth 2 times daily             calcium carbonate-vitamin D (OSCAL W/D) 500-200 MG-UNIT tablet  Take 1 tablet by mouth daily             fluconazole (DIFLUCAN) 100 MG tablet  Take 1 tablet (100 mg) by mouth daily             levofloxacin (LEVAQUIN) 250 MG tablet  Take 1 tablet (250 mg) by mouth daily             melatonin 3 MG tablet  Take 1 tablet (3 mg) by mouth At Bedtime             menthol (ICY HOT) 5 % PTCH  Apply 1 patch topically every 8 hours as needed for muscle soreness             multivitamin, therapeutic (THERA-VIT) TABS tablet  Take 1 tablet by mouth daily             oxyCODONE IR (ROXICODONE) 10 MG tablet  Take 1 tablet (10 mg) by mouth every 6 hours as needed for moderate to severe pain             pantoprazole (PROTONIX) 40 MG EC tablet  Take 1 tablet (40 mg) by mouth every morning (before breakfast)             predniSONE (DELTASONE) 5 MG tablet  Take 1 tablet by mouth daily             ruxolitinib (JAKAFI) 5 MG TABS tablet  Take 1 tablet (5 mg) by mouth 2 times daily             sertraline (ZOLOFT) 100 MG tablet  Take 1 tablet (100 mg) by mouth daily             sulfamethoxazole-trimethoprim (BACTRIM DS) 800-160 MG tablet  Take one tablet by mouth twice daily on Mondays and Tuesdays only.             triamcinolone (KENALOG) 0.1 % external ointment  Apply topically 2 times daily as needed. Apply to right lower leg, avoid open areas where skin is not intact             Vitamin D, Cholecalciferol, 25 MCG (1000 UT) TABS  Take 1 tablet (1,000 Units) by mouth daily

## 2021-03-02 NOTE — SUMMARY OF CARE
BMT Summary of Care    March 3, 2021 10:00 am  Deejay Dior  MRN: [id number]    Discharge Date: 3/3/21    BMT Primary Physician: Dr. Collado    BMT Nurse Coordinator: Mauro Baxter    Discharge Diagnosis: S/P readmission for COVID-19 with acute hypoxic respiratory failure.    Discharge To: Home with 24hrs assist     Activity: As Tolerted    Nutrition: Regular diet as tolerated    Intravenous Electrolyte Replacement:  Potassium  chloride (give only if serum creatinine < 2)     3-3.3  20mEq/hr  over 1 hour x 2 doses     <3      20mEq/hr  over 1 hour x 3 doses  Magnesium sulfate 1.3-1.7     2 grams over 1 hour x1 dose                                     <1.3         2 grams over 2 hours x1 dose    Laboratory Tests: RN to please draw CBC/CMP on Thurs 3/11, prior to next-day video visit with BMT MD  CBC  CMP    Support Services: Home PT/OT/RN (wound care + lab draw) through [facility] [phone]     1.) Occupational Therapy Consult (Evaluate and Treat)-separate order must be sent to department     2.) Physical Therapy Consult  (Evaluate and Treat)-separate order must be sent to department    3.) Wound Care:   Bridge of nose prevention   Apply Gecko pad or small strip of mepilex lite over bridge of nose to prevent skin breakdown from oxygen face mask, and assess under mask and straps each shift.     Right shin and left arm wounds: Q3D and PRN gently remove dressing, may need to moisten with saline if attached to wound to prevent bleeding. Cleanse with wound cleanser and pat dry. Apply thin layer of Aquaphor to 2x2 PolyMem dressing(order#[id number]). Date dressing.     4.) Home O2:  4-5L continuous O2 via nasal cannula (for dx: Covid-19) through [facility]              Ph. [phone]              Fax. [phone]      Appointments:   1.) BMT Clinic (date, time, provider): Video visit with Dr. Collado Friday 3/12/21 at 9:00am     Radha Nina PA-C    BMT Contact  Information:-   For issues including fevers 100.5 or more:  Please call during the week: Monday through Friday between hours of 8:00 am and 4:30 pm- Call BMT office- [phone]  After hours/Weekends: Please call [facility] : [phone] and ask for BMT physician on call and the  will have the BMT physician call you back.

## 2021-03-02 NOTE — PROGRESS NOTES
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 Transferred to MICU on 2/10 for worsening respiratory status requiring intubation 2/13-2/19.  - transferred to  2/21.    HPI: Finishing breakfast sating 100% on 2L. Frustrated with continued discharge planning for days. Anxious to move forward. Would like to shift focus to discharge home instead of TCU if able. Will work with PT/OT today. Wife (Racquel) on phone during conversation this am. Also interested in COVID vaccination plan.      ROS: 10 point ROS neg other than the symptoms noted above in the HPI.    Exam:    Blood pressure 133/68, pulse 82, temperature 97.6  F (36.4  C), temperature source Axillary, resp. rate 16, height 1.753 m (5' 9\"), weight 97.8 kg (215 lb 9.6 oz), SpO2 95 %.  Constitutional: very pleasant, in NAD  Oral:  Mucous membranes are dry with some ridging  ENT: dry MM  Respiratory: CTAB, normal effort  Cardiovascular: nl s1/s2 no MRGs  Skin: warm, dry, small wound RLE, covered with dressing, no visible blood, small wound L forearm covered as well. Has hyperpigmented skin on right LE and some hyperpigmented skin on back   Musculoskeletal: trace edema bilat lower extremities  Neurologic: awake, alert, oriented  Peripheral IV:  intact      Labs:  Lab Results   Component Value Date    WBC 5.9 03/02/2021    ANEU 3.2 03/02/2021    HGB 9.2 (L) 03/02/2021    HCT 28.3 (L) 03/02/2021     03/02/2021     03/02/2021    POTASSIUM 4.0 03/02/2021    CHLORIDE 108 03/02/2021    CO2 24 03/02/2021    GLC 92 03/02/2021    BUN 20 03/02/2021    CR 0.70 03/02/2021    MAG 2.2 02/22/2021    INR 1.12 03/01/2021    BILITOTAL 1.3 03/01/2021    AST 29 03/01/2021    ALT 36 03/01/2021    ALKPHOS 87 03/01/2021    PROTTOTAL 5.8 (L) 03/01/2021    ALBUMIN 2.1 (L) 03/01/2021 "     Imaging:  CXR (2/15): Overall slight decrease in patchy opacifications in the left lung base. Continued small left effusion. Atherosclerosis. Endotracheal intubation. Continued patchy opacities in right lung base.    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 Transferred to MICU on 2/10 for worsening respiratory status requiring intubation 2/13-2/19.  - transferred to  2/21.     1. Pulmonary: Hypoxia/O2 needed d/t COVID-19  Acute hypoxic respiratory failure, presumed  secondary to COVID-19 pneumonia, rhinovirus, and mild pulmonary edema  Acute respiratory distress syndrome  - Intubated 2/13 with initiation of proning. Extubated 2/19.   - Weaning O2, now 1-4L. Does have sob with acitivity and drops sats.   - Will need 02 upon discharge, RN coordinator aware    I certify that this patient, Deejay Dior has been under my care (or a nurse practitioner or physican's assistant working with me). This is the face-to-face encounter for oxygen medical necessity.      Deejay Dior is now in a chronic stable state and continues to require supplemental oxygen. Patient has continued oxygen desaturation due to COVID-19.    Alternative treatment(s) tried or considered and deemed clinically infective for treatment of COVID-19 include steroids and remdesivir and antibiotics.  If portability is ordered, is the patient mobile within the home? yes    **Patients who qualify for home O2 coverage under the CMS guidelines require ABG tests or O2 sat readings obtained closest to, but no earlier than 2 days prior to the discharge, as evidence of the need for home oxygen therapy. Testing must be performed while patient is in the chronic stable state. See notes for O2 sats.**    2. ID:     COVID-19 pneumonia (+1/30; neg 2/21), Repeat Covid 2/28=pos  +  "Rhinovirus on 2/9/2021  Remdesivir course completed on 2/12. He did complete a course of zithromax for possible superimposed bacterial pneumonia 2/9-2/11  - ID signed off  - s/p 10-day course of dexamethasone (2/8-2/17) completed  - Enoxaparin 50 mg BID given elevated D-dimer;  Pharm checked on coverage, patient able to get Eliquis for $26.03/30 day supply. Recommended dosing of eliquis=2.5 mg po bid, starting today.       Prophy:   Acyclovir, fluconazole, Levaquin( while on prednisone), Bactrim    3.  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  Neuro:  - Insomnia: melatonin at hs    Mood  - Zoloft 100mg.     5.  GI:   - Protonix for GI prophy  - Diarrhea: C.dif neg 2/21. Possibly due to TF,now resolved.     6. CGVH:  Skin, eyes, mouth, stable  - Jakafi 5mg twice daily & Pred 10 mg every day( increased during this admission from 5 mg daily prior to admission). Will continue this dose for now.    7. Renal/FEN:   - Lytes/Cr wnl.   - mild malnutrition in the context of acute illness: was on TF, now with adequate po intake.  - SLP assessed, now signed off. Regular diet  - SRAAH, presumed prerenal 2/2 to poor PO intake, dehydration. Resolved.      8. Hematology: Counts stable.  - MDS/ s/p BMT 2014   - No transfusions     9. Deconditioning: Due acute illness, long hospital stay, ICU, steroids.  - PT/OT: Currently rec TCU but Deejay is showing progress with the potential to discharge home with 24hr caregiver, home PT/OT, skilled RN for wound care. Have not been able to find placement at TCU secondary to COVID+ and Jakafi. SW and RN coordinator aware also continuing to look into TCU options.     10.  MS: Chronic left groin and bilateral knee pain.  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  His pain has been ongoing for \"years\" and is unchanged.    - Rx icy hot " patch, oxycodone prn, pain well controlled  -scheduled tylenol ordered to minimize need for narcotics     11.  Wounds: wound on right lower shin and gluteal cleft and left buttock:  (?mild trauma vs GVH), no signs of infection.  -WOC following: see regimen from 3/1. Has home care RN coverage.     Dispo: PT/OT currently recommending rehab discharge but making progress towards possibility of discharge to home with 24hr caregiver, PT/OT, RN wound care, and home O2.     Ilene Borges PA-C  #[id number]

## 2021-03-02 NOTE — PLAN OF CARE
AVSS. Pt is intermittently on 2L via oxy mask but is able to maintain O2 status on room air when sitting up right and awake. Pt put mask back on when going to sleep. Denied pain, N/V. Appetite and oral intake are good. Assist x 1 with gait belt and walker due to dyspnea on exertion and de sating, but used the urinal at bedside over night. Pt refused scheduled tylenol except his 0430 dosage. Pt emphasized multiple times wanting to be discharged home. Continue plan of care.     Pt also discussed needing an order from the provider for home oxygen, a wheel chair and a letter to receive a handicap parking permit. Pt is currently receiving CPAP supplies from [facility] and contacted them earlier and confirmed he could receive items listed above through them.       Problem: Adult Inpatient Plan of Care  Goal: Plan of Care Review  Outcome: No Change  Goal: Patient-Specific Goal (Individualized)  Outcome: No Change  Goal: Absence of Hospital-Acquired Illness or Injury  Outcome: No Change  Intervention: Identify and Manage Fall Risk  Recent Flowsheet Documentation  Taken 3/1/2021 2200 by Minoo Mtz RN  Safety Promotion/Fall Prevention:    activity supervised    assistive device/personal items within reach    bed alarm on  Intervention: Prevent Skin Injury  Recent Flowsheet Documentation  Taken 3/1/2021 2200 by Minoo Mtz RN  Body Position: position changed independently  Intervention: Prevent and Manage VTE (Venous Thromboembolism) Risk  Recent Flowsheet Documentation  Taken 3/1/2021 2200 by Minoo Mtz RN  VTE Prevention/Management:    ambulation promoted    fluids promoted  Intervention: Prevent Infection  Recent Flowsheet Documentation  Taken 3/1/2021 2200 by Minoo Mtz RN  Infection Prevention: hand hygiene promoted     Problem: COPD Comorbidity  Goal: Maintenance of COPD Symptom Control  Outcome: No Change     Problem: Obstructive Sleep Apnea Risk or Actual (Comorbidity  Management)  Goal: Unobstructed Breathing During Sleep  Outcome: No Change     Problem: OT General Care Plan  Goal: Toilet Transfer/Toileting (OT)  Description: Toilet Transfer/Toileting (OT)  Outcome: No Change  Goal: Cognitive (OT)  Description: Cognitive (OT)  Outcome: No Change

## 2021-03-02 NOTE — PROGRESS NOTES
2:38PM- Called [facility] and spoke with Chelsey. Chelsey was able to pull up Deejay's account and confirm they got the fax with notes, SATs, and rx. I told her if they need any further documentation to contact . They understood.  2:40PM- Called Em, let her know [facility] confirmed they got the fax. She will touch base with them to arrange for set up time.

## 2021-03-02 NOTE — PLAN OF CARE
Patient has been assessed for Home Oxygen by a credentialed professional during activity/exercise and in a chronic stable state.   (Activity/Exercise should mimic patient s home activity/exercise and ADLs)     Resting saturation on Room Air: 86  Resting saturation on O2: 5 on 97 LPM   Resting saturation on 4 LPM O2: 97(Required if> 4 LPM is prescribed or required to keep sats >88%)     Activity/Exercise saturation on Room Air: 86  Activity/Exercise saturation on O2: 90 on 5 LPM   Activity/Exercise saturation on 4 LPM O2: 88 (Required if> 4 LPM is prescribed or required to keep sats > 88%)     Please notify patient s RN Care Coordinator when you ve completed this SmartPhrase.

## 2021-03-02 NOTE — PROGRESS NOTES
Care Management Follow Up    Length of Stay (days): 22    Expected Discharge Date: 03/02/21     Concerns to be Addressed: discharge planning     Patient plan of care discussed at interdisciplinary rounds: Yes    Anticipated Discharge Disposition: Transitional Care vs Home with Services     Anticipated Discharge Services: None  Anticipated Discharge DME: Oxygen    Patient/family educated on Medicare website which has current facility and service quality ratings: yes  Education Provided on the Discharge Plan:    Patient/Family in Agreement with the Plan: yes    Referrals Placed by CM/SW: Post Acute Facilities  Private pay costs discussed: private room/amenity fees    Additional Information:  SW spoke with the pt's wife Racquel regarding the change in discharge recommendations for the pt to return home. Racquel feels comfortable with the pt returning home, she expressed some worry about the TCU plan knowing she would have little control over the location he could go to. Racquel feels she will be able to meet the pt's needs at home and has asked her friends and family to help out as needed as well. Racquel shared that the pt is anxious to leave the hospital and is happy to have a plan in place now.   CSW encouraged Racquel to reach out if any questions or concerns arise. CSW explained that the RN CC Em would call to discuss arranging home care and O2 for the pt.       JAYLON Moreno, [facility]  Phone [phone]  Pager [phone]

## 2021-03-02 NOTE — PLAN OF CARE
Care Coordination  Discharge Planning     Line Company:  NA - no central line   Referral made for line care:  No  IV medications needed at discharge:  None   Pharmacy concerns:  Medicare pt    PT/OT/therapies recommended:  Home PT/OT/RN (wound care + lab draw) through [facility] [phone]    **Home Care RN to please draw CBC/CMP on Thurs 3/11, prior to next-day video visit with BMT MD  Referral made for PT/OT/therapies:  Yes, Home Care Referral placed 3/2    Home O2:  4-5L continuous O2 via nasal cannula (for dx: Covid-19) - ordered on 3/2, through:              [facility]              Ph. [phone]              Fax. [phone]   **Patient already owns a fingertip pulse oximeter    D/c location:  Home - [place], [place]  Has placement need been communicated to Social Work?  NA    [place] Teaching time arranged with family:  Called patient and wife-Racquel via conference call on 3/2. Discussed home care services being ordered (see above), as well as home O2 order and plan for supplies to be delivered for discharge. Patient and wife were agreeable to plan. All questions answered.   Notify nurse to schedule line care class/ DM teaching, prior to d/c:  NA    Caregiver:  Racquel (wife)       Janie Lazo, RN, BSN  RN Care Coordinator - Inpatient BMT, Unit [address]  Ph [phone]   [phone]

## 2021-03-03 ENCOUNTER — APPOINTMENT (OUTPATIENT)
Dept: PHYSICAL THERAPY | Facility: CLINIC | Age: 71
DRG: 207 | End: 2021-03-03
Attending: INTERNAL MEDICINE
Payer: MEDICARE

## 2021-03-03 ENCOUNTER — MYC MEDICAL ADVICE (OUTPATIENT)
Dept: FAMILY MEDICINE | Facility: CLINIC | Age: 71
End: 2021-03-03

## 2021-03-03 VITALS
HEART RATE: 101 BPM | BODY MASS INDEX: 31.93 KG/M2 | OXYGEN SATURATION: 98 % | WEIGHT: 215.6 LBS | TEMPERATURE: 97.7 F | SYSTOLIC BLOOD PRESSURE: 119 MMHG | RESPIRATION RATE: 18 BRPM | HEIGHT: 69 IN | DIASTOLIC BLOOD PRESSURE: 71 MMHG

## 2021-03-03 LAB
ANION GAP SERPL CALCULATED.3IONS-SCNC: 5 MMOL/L (ref 3–14)
BASOPHILS # BLD AUTO: 0 10E9/L (ref 0–0.2)
BASOPHILS NFR BLD AUTO: 0.2 %
BUN SERPL-MCNC: 21 MG/DL (ref 7–30)
CALCIUM SERPL-MCNC: 8.2 MG/DL (ref 8.5–10.1)
CHLORIDE SERPL-SCNC: 108 MMOL/L (ref 94–109)
CO2 SERPL-SCNC: 24 MMOL/L (ref 20–32)
CREAT SERPL-MCNC: 0.74 MG/DL (ref 0.66–1.25)
DIFFERENTIAL METHOD BLD: ABNORMAL
EOSINOPHIL # BLD AUTO: 0.1 10E9/L (ref 0–0.7)
EOSINOPHIL NFR BLD AUTO: 1.4 %
ERYTHROCYTE [DISTWIDTH] IN BLOOD BY AUTOMATED COUNT: 19.4 % (ref 10–15)
GFR SERPL CREATININE-BSD FRML MDRD: >90 ML/MIN/{1.73_M2}
GLUCOSE SERPL-MCNC: 99 MG/DL (ref 70–99)
HCT VFR BLD AUTO: 29.1 % (ref 40–53)
HGB BLD-MCNC: 9.3 G/DL (ref 13.3–17.7)
IMM GRANULOCYTES # BLD: 0 10E9/L (ref 0–0.4)
IMM GRANULOCYTES NFR BLD: 0.5 %
LYMPHOCYTES # BLD AUTO: 2 10E9/L (ref 0.8–5.3)
LYMPHOCYTES NFR BLD AUTO: 34.7 %
MCH RBC QN AUTO: 31.4 PG (ref 26.5–33)
MCHC RBC AUTO-ENTMCNC: 32 G/DL (ref 31.5–36.5)
MCV RBC AUTO: 98 FL (ref 78–100)
MONOCYTES # BLD AUTO: 0.6 10E9/L (ref 0–1.3)
MONOCYTES NFR BLD AUTO: 10.3 %
NEUTROPHILS # BLD AUTO: 3 10E9/L (ref 1.6–8.3)
NEUTROPHILS NFR BLD AUTO: 52.9 %
NRBC # BLD AUTO: 0.1 10*3/UL
NRBC BLD AUTO-RTO: 2 /100
PLATELET # BLD AUTO: 250 10E9/L (ref 150–450)
POTASSIUM SERPL-SCNC: 3.7 MMOL/L (ref 3.4–5.3)
RBC # BLD AUTO: 2.96 10E12/L (ref 4.4–5.9)
SODIUM SERPL-SCNC: 136 MMOL/L (ref 133–144)
WBC # BLD AUTO: 5.7 10E9/L (ref 4–11)

## 2021-03-03 PROCEDURE — 97530 THERAPEUTIC ACTIVITIES: CPT | Mod: GP | Performed by: REHABILITATION PRACTITIONER

## 2021-03-03 PROCEDURE — 36415 COLL VENOUS BLD VENIPUNCTURE: CPT | Performed by: INTERNAL MEDICINE

## 2021-03-03 PROCEDURE — 250N000013 HC RX MED GY IP 250 OP 250 PS 637: Performed by: PHYSICIAN ASSISTANT

## 2021-03-03 PROCEDURE — 250N000013 HC RX MED GY IP 250 OP 250 PS 637: Performed by: STUDENT IN AN ORGANIZED HEALTH CARE EDUCATION/TRAINING PROGRAM

## 2021-03-03 PROCEDURE — 80048 BASIC METABOLIC PNL TOTAL CA: CPT | Performed by: INTERNAL MEDICINE

## 2021-03-03 PROCEDURE — 250N000013 HC RX MED GY IP 250 OP 250 PS 637: Performed by: NURSE PRACTITIONER

## 2021-03-03 PROCEDURE — 99239 HOSP IP/OBS DSCHRG MGMT >30: CPT | Performed by: INTERNAL MEDICINE

## 2021-03-03 PROCEDURE — 85025 COMPLETE CBC W/AUTO DIFF WBC: CPT | Performed by: INTERNAL MEDICINE

## 2021-03-03 PROCEDURE — 97110 THERAPEUTIC EXERCISES: CPT | Mod: GP | Performed by: REHABILITATION PRACTITIONER

## 2021-03-03 PROCEDURE — 97116 GAIT TRAINING THERAPY: CPT | Mod: GP | Performed by: REHABILITATION PRACTITIONER

## 2021-03-03 PROCEDURE — 250N000013 HC RX MED GY IP 250 OP 250 PS 637: Performed by: INTERNAL MEDICINE

## 2021-03-03 PROCEDURE — 250N000012 HC RX MED GY IP 250 OP 636 PS 637: Performed by: STUDENT IN AN ORGANIZED HEALTH CARE EDUCATION/TRAINING PROGRAM

## 2021-03-03 RX ORDER — OXYCODONE HYDROCHLORIDE 10 MG/1
10 TABLET ORAL EVERY 6 HOURS PRN
Qty: 20 TABLET | Refills: 0 | Status: SHIPPED | OUTPATIENT
Start: 2021-03-03 | End: 2021-12-06

## 2021-03-03 RX ORDER — PREDNISONE 5 MG/1
5 TABLET ORAL DAILY
Status: DISCONTINUED | OUTPATIENT
Start: 2021-03-04 | End: 2021-03-03 | Stop reason: HOSPADM

## 2021-03-03 RX ORDER — SERTRALINE HYDROCHLORIDE 100 MG/1
100 TABLET, FILM COATED ORAL DAILY
Qty: 30 TABLET | Refills: 1 | Status: SHIPPED | OUTPATIENT
Start: 2021-03-03 | End: 2021-08-03

## 2021-03-03 RX ADMIN — ACYCLOVIR 800 MG: 800 TABLET ORAL at 08:08

## 2021-03-03 RX ADMIN — Medication 1 DROP: at 01:40

## 2021-03-03 RX ADMIN — Medication 1 TABLET: at 10:47

## 2021-03-03 RX ADMIN — PREDNISONE 10 MG: 5 TABLET ORAL at 08:08

## 2021-03-03 RX ADMIN — LEVOFLOXACIN 250 MG: 250 TABLET, FILM COATED ORAL at 08:08

## 2021-03-03 RX ADMIN — APIXABAN 2.5 MG: 2.5 TABLET, FILM COATED ORAL at 08:09

## 2021-03-03 RX ADMIN — PANTOPRAZOLE SODIUM 40 MG: 40 TABLET, DELAYED RELEASE ORAL at 08:08

## 2021-03-03 RX ADMIN — MELATONIN TAB 3 MG 3 MG: 3 TAB at 01:44

## 2021-03-03 RX ADMIN — FLUCONAZOLE 100 MG: 100 TABLET ORAL at 08:08

## 2021-03-03 RX ADMIN — ACETAMINOPHEN 650 MG: 325 TABLET, FILM COATED ORAL at 10:48

## 2021-03-03 RX ADMIN — SERTRALINE HYDROCHLORIDE 100 MG: 100 TABLET ORAL at 08:08

## 2021-03-03 RX ADMIN — RUXOLITINIB 5 MG: 5 TABLET ORAL at 08:14

## 2021-03-03 RX ADMIN — THERA TABS 1 TABLET: TAB at 08:08

## 2021-03-03 ASSESSMENT — ACTIVITIES OF DAILY LIVING (ADL)
ADLS_ACUITY_SCORE: 16

## 2021-03-03 NOTE — PLAN OF CARE
OT/5C: Cancel- Pt with planned discharge this afternoon. No immediate OT needs prior to discharge. OT recommending Home PT/OT to progress strength and IND with ADLs and functional mobility.     Occupational Therapy Discharge Summary    Reason for therapy discharge:    Discharged to home with home therapy.    Progress towards therapy goal(s). See goals on Care Plan in [facility] electronic health record for goal details.  Goals partially met.  Barriers to achieving goals:   discharge from facility.    Therapy recommendation(s):    Continued therapy is recommended.  Rationale/Recommendations:  See above. .

## 2021-03-03 NOTE — PROGRESS NOTES
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 Transferred to MICU on 2/10 for worsening respiratory status requiring intubation 2/13-2/19.  - transferred to  2/21.    HPI: Deejay is ready for discharge today. Per Physical therapy flow sheet yesterday he is ok to go home and not go to TCU.  He was able to manage stairs with his walker.  He denies cough today.  He is eating and talking and his sats are 91% on 2 liters today in the room. Per PT yesterday he is needing 4 liters with exertion as his sats drop to 88 with activity.  He says he is having 2-3 stools daily that are soft. No nausea or emesis.  No abdominal pain. Mouth and eyes are stable dry.  The skin on his right leg looks better.     ROS: 10 point ROS neg other than the symptoms noted above in the HPI.    Exam:    Blood pressure 130/70, pulse 70, temperature 97.6  F (36.4  C), temperature source Axillary, resp. rate 18, height 1.753 m (5' 9\"), weight 97.8 kg (215 lb 9.6 oz), SpO2 97 %.  Constitutional: NAD  Oral:  Mucous membranes are dry with some ridging  Respiratory: CTAB, normal effort  Cardiovascular: nl s1/s2 no MRGs  Skin: warm, dry, small wound RLE, covered with dressing, no visible blood, small wound L forearm covered as well. Has hyperpigmented skin on right LE and some hyperpigmented skin on back   Musculoskeletal: trace edema bilat lower extremities  Neurologic: awake, alert, oriented  Peripheral IV:  intact      Labs:  Lab Results   Component Value Date    WBC 5.7 03/03/2021    ANEU 3.0 03/03/2021    HGB 9.3 (L) 03/03/2021    HCT 29.1 (L) 03/03/2021     03/03/2021     03/03/2021    POTASSIUM 3.7 03/03/2021    CHLORIDE 108 03/03/2021    CO2 24 03/03/2021    GLC 99 03/03/2021    BUN 21 03/03/2021    CR 0.74 03/03/2021    MAG 2.2 02/22/2021    INR " 1.12 03/01/2021    BILITOTAL 1.3 03/01/2021    AST 29 03/01/2021    ALT 36 03/01/2021    ALKPHOS 87 03/01/2021    PROTTOTAL 5.8 (L) 03/01/2021    ALBUMIN 2.1 (L) 03/01/2021     Imaging:  CXR (2/15): Overall slight decrease in patchy opacifications in the left lung base. Continued small left effusion. Atherosclerosis. Endotracheal intubation. Continued patchy opacities in right lung base.    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 Transferred to MICU on 2/10 for worsening respiratory status requiring intubation 2/13-2/19.  - transferred to  2/21.     1. Pulmonary: Hypoxia/O2 needed d/t COVID-19  Acute hypoxic respiratory failure, presumed  secondary to COVID-19 pneumonia, rhinovirus, and mild pulmonary edema  Acute respiratory distress syndrome  - Intubated 2/13 with initiation of proning. Extubated 2/19.   - Weaning O2, now 1-4L, more oxygen required with acitivity. Does have sob with acitivity and drops sats.   - Will need 02 upon discharge, RN coordinator aware    I certify that this patient, Deejay Dior has been under my care (or a nurse practitioner or physican's assistant working with me). This is the face-to-face encounter for oxygen medical necessity.      Deejay Dior is now in a chronic stable state and continues to require supplemental oxygen. Patient has continued oxygen desaturation due to COVID-19.    Alternative treatment(s) tried or considered and deemed clinically infective for treatment of COVID-19 include steroids and remdesivir and antibiotics.  If portability is ordered, is the patient mobile within the home? yes    **Patients who qualify for home O2 coverage under the CMS guidelines require ABG tests or O2 sat readings obtained closest to, but no earlier than 2 days prior to the discharge, as evidence  of the need for home oxygen therapy. Testing must be performed while patient is in the chronic stable state. See notes for documentation of O2 sats.**    2. ID:  Afebrile.    COVID-19 pneumonia (+1/30; neg 2/21), Repeat Covid 2/28=pos- will get repeat COVID on 3/11 by home health nurse.  -Decreasing CRP=25 today.  + Rhinovirus on 2/9/2021  Remdesivir course completed on 2/12. He did complete a course of zithromax for possible superimposed bacterial pneumonia 2/9-2/11  - ID signed off  - s/p 10-day course of dexamethasone (2/8-2/17) completed  - Enoxaparin 50 mg BID given elevated D-dimer;  Pharm checked on coverage, patient able to get Eliquis for $26.03/30 day supply. Recommended dosing of eliquis=2.5 mg po bid, started on 3/2/2021.  Will continue for 30 days and then stop.       Prophy:   Acyclovir, fluconazole, Levaquin( while on prednisone), Bactrim    3.  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  Neuro:  - Insomnia: melatonin at hs    Mood  - Zoloft 100mg.Increased dose on 3/1 as patient having some issues with increased depression, situational.     5.  GI:   - Protonix for GI prophy  - Diarrhea: C.dif neg 2/21. Possibly due to TF,now resolved. Now having some loose stools.    6. CGVH:  Skin, eyes, mouth, stable  - Jakafi 5mg twice daily & Pred 10 mg every day( increased during this admission from 5 mg daily prior to admission).Will decrease to prednisone 5mg daily on discharge.  Skin on right leg around wounds looks better today.    7. Renal/FEN:   - Lytes/Cr wnl.   - mild malnutrition in the context of acute illness: was on TF, now with adequate po intake.  - SLP assessed, now signed off. Regular diet  - SARAH, presumed prerenal 2/2 to poor PO intake, dehydration. Resolved.      8. Hematology: Counts stable.  - MDS/ s/p BMT 2014   - No transfusions     9. Deconditioning: Due  "to acute illness, long hospital stay, ICU, steroids.  - PT/OT: Previously rec TCU but Deejay was then showing progress that we discharge home with 24hr caregiver, home PT/OT, skilled RN for wound care. Have not been able to find placement at TCU secondary to COVID+( 2/28) and Jakafi.  Sending home with the above plan.  Deejay says he managed stairs with PT and his walker.    10.  MS: Chronic left groin and bilateral knee pain.  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  His pain has been ongoing for \"years\" and is unchanged.    - Rx icy hot patch, oxycodone prn, pain well controlled  -Previously during admission was on scheduled tylenol ordered to minimize need for narcotics     11.  Wounds: wound on right lower shin and gluteal cleft and left buttock:  (?mild trauma-thin skin with chronic prednisone and less likely GVH), no signs of infection.  -WOC following: see regimen from 3/1. Has home care RN coverage and will have them care for his skin    Dispo: PT/OT now recommending  discharge to home with 24hr caregiver, PT/OT, RN wound care, and home O2. All the services have been arranged by discharge coordinator. Spoke with Deejay's wife today and she is comfortable taking him home.  Discharge meds all done 3/2 so could be discharged today before 11 am. Discussed discharge meds with Deejay today.  Plan is to  eliquis from discharge pharmacy and  zoloft and oxycodone from CUB in [place] where apparently the copay is less.  Has video visit with Dr Collado on 3/12/2021.  Will go home with oxygen.    ADRIAN ManzanaresC  [phone]  "

## 2021-03-03 NOTE — PLAN OF CARE
Physical Therapy Discharge Summary  Reason for discharge:   Discharge from hospital to home  Progress towards goals: See goals on Care Plan in [facility] electronic health record for goal details.  Goals partially met. Close SBA for transfers and gait with FWW Barriers to achieving goals: limited tolerance, fatigue, MERIDA, LE weakness  Recommendation(s):   Continued therapy is recommended. Rationale/Recommendations: Home with assist and home PT to increase safety and independence with basic functional mobility, and to address unmet goals, and progress to OP RI once finished with home PT to progress activity tolerance & pulmonary function.

## 2021-03-03 NOTE — PLAN OF CARE
"/71 (BP Location: Left arm)   Pulse 101   Temp 97.7  F (36.5  C) (Axillary)   Resp 18   Ht 1.753 m (5' 9\")   Wt 97.8 kg (215 lb 9.6 oz)   SpO2 98%   BMI 31.84 kg/m         VSS on 2L O2 at rest and 4L with activity. A/O x4, denies pain, n/v, has good urine output and had one bowel movement. Patient is a SBA with a walker and steady on his feet. Patient discharged at 4pm today on 4L O2. Discharge was  delayed d/t the home oxygen company not able to get to the hospital until later afternoon.      "

## 2021-03-03 NOTE — PLAN OF CARE
Care Coordination  Discharge Planning     Line Company:  NA - no central line   Referral made for line care:  No  IV medications needed at discharge:  None   Pharmacy concerns:  Medicare pt    PT/OT/therapies recommended:  Home PT/OT/RN (wound care + lab draw) through [facility] [phone]    **RN to please draw CBC/CMP on Thurs 3/11, prior to next-day video visit with BMT MD  Referral made for PT/OT/therapies:  No, will place Home Care Referral later today 3/2    Home O2:  4-5L continuous O2 via nasal cannula (for dx: Covid-19) - ordered on 3/2, through:              [facility]              Ph. [phone]              Fax. [phone]   **Patient already owns a fingertip pulse oximeter    D/c location:  Home - [place], [place]  Has placement need been communicated to Social Work?  NA    NC Teaching time arranged with family:  Called patient and wife-Racquel via conference call on 3/2. Discussed home care services being ordered (see above), as well as home O2 order and plan for supplies to be delivered for discharge. Patient and wife were agreeable to plan. Handicap/disability parking permit application completed and provided to patient to mail in. All questions answered.   Notify nurse to schedule line care class/ DM teaching, prior to d/c:  NA    Caregiver:  Racquel (wife)         **Provided the following information to patient at discharge**    Who to Call:        - Outpatient/clinic Nurse Coordinator: Mauro Baxter (Oralia Gardiner), [phone]        - Inpatient/hospital Nurse Coordinator: Janie Lazo, [phone]        - For any symptoms:   - Do not call Herb for symptoms    - Mon-Fri 8-430 - call [phone] and tell them you're having a symptom    - Evenings/weekends/overnights - call [phone] and ask for the Adult BMT Fellow     Home Oxygen:        - [facility], [phone]       - Transport oxygen to be delivered to hospital before discharge       - Additional supplies to  be delivered to home after arriving home    Home Therapies:        - [facility], [phone]        - Physical Therapy        - Occupational Therapy        - Home Nursing    - Wound care supplies and education    - Home labs on 3/11, prior to next-day video visit with Dr. Collado     Disability Parking Pass:        - Please complete the top portion and sign/date below it        - Mail to the address at the top of page 2     Covid Guidelines:        - See additional documents attached:    - Discharge Instructions for Covid-19 Patients (from [facility])   - What to Do if You Are Sick ([facility])   - Caring for Someone Sick at Home ([facility])   - When You Can Be Around Others ([facility])      Janie Lazo, RN, BSN  RN Care Coordinator - Inpatient BMT, Unit 5C  Ph [phone]  Pg [phone]

## 2021-03-03 NOTE — PROGRESS NOTES
[facility] Care   Spoke with patient to discuss plans for home care. Patient to be discharged home 3/3/21 and has agreed to have [facility] follow with services of RN, PT and OT. Patient care support center processing referral.  Patient verbalized understanding that initial visit is scheduled for 3/5/21.  Provided 24 hour phone number for [facility] for any questions or concerns.    Rosa Fox RN BSN  [facility] Care Liaison  [phone]

## 2021-03-03 NOTE — PROGRESS NOTES
Care Coordination  Discharge Planning     Spoke with [facility] this AM. Per -Chelsey, the home oxygen order had not yet been initiated. Chelsey stated she sent the information to the O2 team during our call, and also notified them that patient will be discharging early afternoon (estimate).    Provided Chelsey with contact information for writer (pager, phone), patient (hospital room number [phone]), and pt's wife-Racquel (cell [phone]). Updated bedside RN of plan for O2 delivery around noon or early afternoon.      Janie Lazo, RN, BSN  RN Care Coordinator - Inpatient BMT, Unit 5C  Ph [phone]  Pg [phone]

## 2021-03-03 NOTE — PLAN OF CARE
Afebrile. BP and HR stable. Intermittently on 2-3L O2 via oxy mask, specifically when up to the toilet or when going to bed. When awake and at rest pt's O2 status is stable on room air. Pt does experience dyspnea on exertion and is assist x 1 with walker but appears to be improving and the only assist needed is helping with O2 tubing. Denied pain, N/V. Appetite and oral intake are good. BM x 2.  Plan for the day is to discharge home. Continue plan of care.     Problem: Adult Inpatient Plan of Care  Goal: Plan of Care Review  Outcome: No Change  Goal: Patient-Specific Goal (Individualized)  Outcome: No Change  Goal: Absence of Hospital-Acquired Illness or Injury  Outcome: No Change  Intervention: Identify and Manage Fall Risk  Recent Flowsheet Documentation  Taken 3/2/2021 2320 by Minoo Mtz RN  Safety Promotion/Fall Prevention: activity supervised  Intervention: Prevent Skin Injury  Recent Flowsheet Documentation  Taken 3/2/2021 2320 by Minoo Mtz RN  Body Position: position changed independently  Intervention: Prevent and Manage VTE (Venous Thromboembolism) Risk  Recent Flowsheet Documentation  Taken 3/2/2021 2320 by Minoo Mtz RN  VTE Prevention/Management:    ambulation promoted    fluids promoted  Intervention: Prevent Infection  Recent Flowsheet Documentation  Taken 3/2/2021 2320 by Minoo Mtz RN  Infection Prevention: cohorting utilized     Problem: COPD Comorbidity  Goal: Maintenance of COPD Symptom Control  Outcome: No Change     Problem: Obstructive Sleep Apnea Risk or Actual (Comorbidity Management)  Goal: Unobstructed Breathing During Sleep  Outcome: No Change     Problem: Discharge Planning  Goal: Discharge Planning (Adult, OB, Behavioral, Peds)  Outcome: No Change

## 2021-03-04 ENCOUNTER — TELEPHONE (OUTPATIENT)
Dept: ONCOLOGY | Facility: CLINIC | Age: 71
End: 2021-03-04

## 2021-03-04 ENCOUNTER — CARE COORDINATION (OUTPATIENT)
Dept: TRANSPLANT | Facility: CLINIC | Age: 71
End: 2021-03-04

## 2021-03-04 ENCOUNTER — CARE COORDINATION (OUTPATIENT)
Dept: ONCOLOGY | Facility: CLINIC | Age: 71
End: 2021-03-04

## 2021-03-04 NOTE — PLAN OF CARE
Occupational Therapy Discharge Summary    Reason for therapy discharge:    Discharged to home with home therapy.    Progress towards therapy goal(s). See goals on Care Plan in [facility] electronic health record for goal details.  Goals partially met.  Barriers to achieving goals:   discharge from facility.    Therapy recommendation(s):    Continued therapy is recommended.  Rationale/Recommendations:  Pt would benefit from further therapy to progress functional independence with all mobility and ADLs within home environment.

## 2021-03-04 NOTE — PROGRESS NOTES
Received phone call from April, RN with [facility]. She notes that patient attempted to get covid vaccine today and site told him he is ineligible until >90 days post covid.

## 2021-03-04 NOTE — TELEPHONE ENCOUNTER
POST HOSPITAL DISCHARGE FOLLOW-UP PHONE CALL    Follow-Up Type:  Hospital Discharge - Follow-Up Call  Date of Admission:  2/8/21  Date of Discharge:  3/3/21  Discharge Diagnosis:     1.COVID-19 pneumonia   2. Acute hypoxic Respiratory failure secondary to COVID-19 requiring  mechanical ventilation   3.Rhinovirus, upper respiratory tract  Next BMT Follow-up Visit:  Fri 3/12 video visit with Dr. Collado  Discharging Provider:  Carlos Eduardo Baez  Primary BMT Physician:  Suzanne Collado    Summary of Encounter:  Contacted patient via phone to conduct follow-up assessment post recent hospital discharge. [facility]. Called patient's wife-Racquel, who is very involved in care as well. Racquel verbalized that patient received and understands written post discharge care instructions, has a current medication list, and contact phone numbers. Patient understands to call BMT office @ [phone] during office hours Mon-Fri 8am-4:30pm, and [phone] after hours to report symptoms. Racquel verbalized that patient has all necessary medications, has no questions regarding their administration instructions, and is currently taking them without difficulty. Racquel stated that home oxygen setup was delivered last evening without issue, and home care RN made first visit today. Racquel was reminded of next follow-up visit in BMT clinic as listed above, did not report pt having any new symptoms or concerns, and had no further questions at this time.      Janie Lazo, RN, BSN  RN Care Coordinator - Inpatient BMT, Unit 5C  Ph [phone]  Pg [phone]

## 2021-03-04 NOTE — PROGRESS NOTES
Care Coordination - Discharge Note     Line Company:  NA - no central line   Referral made for line care:  No  IV medications needed at discharge:  None   Pharmacy concerns:  Medicare pt    PT/OT/therapies recommended:  Home PT/OT/RN (wound care + lab draw) through [facility] [phone]    **RN to please draw CBC/CMP on Thurs 3/11, prior to next-day video visit with BMT MD  Referral made for PT/OT/therapies:  No, will place Home Care Referral later today 3/2    Home O2:  4-5L continuous O2 via nasal cannula (for dx: Covid-19) - ordered on 3/2, through:              [facility]              Ph. [phone]              Fax. [phone]   **Patient already owns a fingertip pulse oximeter    D/c location:  Home - [place], [place]  Has placement need been communicated to Social Work?  NA    NC Teaching time completed with pt/caregiver:  Called patient and wife-Racquel via conference call on 3/2. Discussed home care services being ordered (see above), as well as home O2 order and plan for supplies to be delivered for discharge. Patient and wife were agreeable to plan. Handicap/disability parking permit application completed and provided to patient to mail in. Provided patient with written document including contact information for OP RNCC, instructions on how to call for symptoms, as well as printed copies of the following discharge information on Covid-19:   - Discharge Instructions for Covid-19 Patients ([facility])   - What to Do if You Are Sick ([facility])   - Caring for Someone Sick at Home ([facility])   - When You Can Be Around Others ([facility])  All questions answered.  Notify nurse to schedule line care class/ DM teaching, prior to d/c:  NA    Caregiver:  Racquel (wife) [phone]    Outpatient Nurse Coordinator notified of patient discharge.       Janie Lazo, RN, BSN  RN Care Coordinator - Inpatient BMT, Unit 5C  Ph [phone]   [phone]

## 2021-03-10 ENCOUNTER — TELEPHONE (OUTPATIENT)
Dept: TRANSPLANT | Facility: CLINIC | Age: 71
End: 2021-03-10

## 2021-03-10 DIAGNOSIS — Z86.16 HISTORY OF COVID-19: ICD-10-CM

## 2021-03-10 DIAGNOSIS — J96.21 ACUTE AND CHRONIC RESPIRATORY FAILURE WITH HYPOXIA (H): Primary | ICD-10-CM

## 2021-03-10 NOTE — TELEPHONE ENCOUNTER
[facility] now requests orders and shares plan of care/discharge summaries for some patients through [employer].  Please REPLY TO THIS MESSAGE OR ROUTE BACK TO THE AUTHOR in order to give authorization for orders when needed.  This is considered a verbal order, you will still receive a faxed copy of orders for signature.  Thank you for your assistance in improving collaboration for our patients.    ORDER  PT for gait, transfers, strength, balance 1w1,2w2    Ct George, OLGA  [facility]  [phone]

## 2021-03-12 ENCOUNTER — VIRTUAL VISIT (OUTPATIENT)
Dept: TRANSPLANT | Facility: CLINIC | Age: 71
End: 2021-03-12
Attending: INTERNAL MEDICINE
Payer: MEDICARE

## 2021-03-12 DIAGNOSIS — Z94.81 STATUS POST BONE MARROW TRANSPLANT (H): Primary | ICD-10-CM

## 2021-03-12 DIAGNOSIS — D89.813 GVHD AS COMPLICATION OF BONE MARROW TRANSPLANT (H): Primary | ICD-10-CM

## 2021-03-12 DIAGNOSIS — T86.09 GVHD AS COMPLICATION OF BONE MARROW TRANSPLANT (H): Primary | ICD-10-CM

## 2021-03-12 PROCEDURE — 99442 PR PHYSICIAN TELEPHONE EVALUATION 11-20 MIN: CPT | Mod: 95 | Performed by: INTERNAL MEDICINE

## 2021-03-12 PROCEDURE — 999N001193 HC VIDEO/TELEPHONE VISIT; NO CHARGE

## 2021-03-12 NOTE — PROGRESS NOTES
Mr. Dior was seen via a telephone visit.  ID:  Mr. 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  On pred 5mg every day and Jakafi 5 mg bid.    He admitted on 2/8/2021 for acute hypoxic respiratory failure secondary to COVID-19 pneumonia. Also rhino virus positive. Transferred to MICU on 2/10 for worsening respiratory status requiring intubation 2/13-2/19. Discharged on 3/3 with home O2. Treated with remdesevir, dexamethasone and now on eliquis 2.5 bid (through 3/30). Still has fatigue. Getting PT/OT. Needs home O2 when exerting, not needing it at rest. Very dry eyes, using artificial tears 5-6 times/ days. Also has developed macular degeneration. S/P R hip replacement, now with minimal pain.  Eating well with no N/V/D.  Had a basal cell cancer lesion removed (Moh's resection) close to left eye. (In the past has had a basal and squamous cell removed)    His COVID PCR was positive on 2/28. Repeat has not been done. Also the person who came to draw his labs yesterday could not get them  Remainder of 10 pt ROS negative      ASSESSMENT AND PLAN:  Mr. Dior is a 71 y/o male,  6 years and 8 month s/p NMA allo sib PBSCT for MDS. 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 Total cell dose (from 7/1 & 7/2) 6.53 x 10^6 CD34+ cells/kg.   - 1 year anniversary (July 2015): 30% cellular, trilineage hematopoiesis, no abnormal blasts by morphology or flow , no dysplasia, 0-1 fibrosis, 100% donor (BM, CD3, CD15). CR  - 2 year anniversary (July 2016): 20-30% cellular, trilineage hematopoiesis, no abnormal blasts by morphology or flow , no dysplasia, No fibrosis, 100% donor in BM (PB not sent). ISCN: //46,XX[20] Complete Remission.     HEME: No transfusion needs, counts stable     - Was on anticoagulation after his hip replacment, this is now complete.      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 Had been off prednisone since summer 2017 and briefly tapered off Sirolimus (january 2018), however resumed with flare in February. 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 Sirolimus was stopped on 9/27 & Pred 90mg was started. Seen by Dr. Sandoval on 10/2, please see his note. He does not think his symptoms or CT findings are c/w Sirolimus or GVH & he was in favor of tapering Prednisone. Recently he has worsening skin thickening & desquamation to the RLE, c/w GVH.   Started Jakafi 10/22 at 5 mg BID with symptom improvement in lower extremities. 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   Burst pred 40mg a day on 1/3 with significant systemic arthralgic pain, tapered back to 10/0 eod after 1 week, near resolution of symptoms noted 1/17, returned to 10 mg every other day; then dropped to 5mg q day in April     Currently on Jakafi 5mg twice daily & Pred 5mg every day.    ID:  Afebrile no signs/sx of infection.   - IgG 1/24 =1130  - Prophy: HD ACV (renal dosing), Fluconazole and  Bactrim.         GI:  protonix (has heartburn)     FEN/Renal:  Lytes & creat stable.     Fatigue:  stable  - History of testosterone deficiency, rechecked and modestly low. He previously did testosterone injections & didn't think it made him feel any better.    - TSH normal 2/28 and again on repeat 9/27 at 1.01.  - counseled that moderate exercise is really the only known way to combat fatigue, and acknowledged how difficult it is to balance too much with not enough activity.      Derm:   Venous stasis: see below-most recently had sclerotherapy to left LE     Vasc:   10/15 Right leg US with small (technically DVT) clot in posterior tibial vein: started reg dose aspirin daily 325mg and recheck US in 2 weeks or symptomatically. 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  H/o chronic venous statis: s/p sclerotherapy to the L leg.  kelfex day prior and 4 days following.      MS: avascular necrosis of hip.  S/p replacement surgery    11.  Wounds: wound on right lower shin and gluteal cleft and left buttock:  (?mild trauma-thin skin with chronic prednisone and less likely GVH), no signs of infection.  -WOC following: see regimen from 3/1. Has home care RN coverage and will have them care for his skin     Dispo: PT/OT now recommending  discharge to home with 24hr caregiver, PT/OT, RN wound care, and home O2. All the services have been arranged by discharge coordinator. Spoke with Deejay's wife today and she is comfortable taking him home.  Discharge meds all done 3/2 so could be discharged today before 11 am. Discussed discharge meds with Deejay today.  Plan is to  eliquis from discharge pharmacy and  zoloft and oxycodone from [facility] in [place] where apparently the copay is less.    COVID-19 pneumonia (+1/30; neg 2/21), Repeat Covid 2/28=pos- needs a repeat COVID and labs  -Decreasing CRP=25 today.  + Rhinovirus on 2/9/2021  Remdesivir course completed on 2/12. He did complete a course of zithromax for possible superimposed bacterial pneumonia 2/9-2/11    - s/p 10-day course of dexamethasone (2/8-2/17) completed  - Enoxaparin 50 mg BID given elevated D-dimer;  eliquis=2.5 mg po bid, started on 3/2/2021.  Will continue for 30 days and then stop.      Needs covid testing arranged. Needs labs drawn next week  RTC in 1 month to see me   Total time spent on phone visit 16 minutes    Suzanne Collado

## 2021-03-12 NOTE — PROGRESS NOTES
Deejay is a 70 year old who is being evaluated via a billable telephone visit.      What phone number would you like to be contacted at? [phone]  How would you like to obtain your AVS? Yumiko  Phone call duration: 16 minutes    Jesica Ayala CMA on 3/12/2021 at 8:43 AM

## 2021-03-13 DIAGNOSIS — Z94.81 STATUS POST BONE MARROW TRANSPLANT (H): ICD-10-CM

## 2021-03-13 LAB
SARS-COV-2 RNA RESP QL NAA+PROBE: NORMAL
SPECIMEN SOURCE: NORMAL

## 2021-03-13 PROCEDURE — U0003 INFECTIOUS AGENT DETECTION BY NUCLEIC ACID (DNA OR RNA); SEVERE ACUTE RESPIRATORY SYNDROME CORONAVIRUS 2 (SARS-COV-2) (CORONAVIRUS DISEASE [COVID-19]), AMPLIFIED PROBE TECHNIQUE, MAKING USE OF HIGH THROUGHPUT TECHNOLOGIES AS DESCRIBED BY CMS-2020-01-R: HCPCS | Performed by: INTERNAL MEDICINE

## 2021-03-13 PROCEDURE — U0005 INFEC AGEN DETEC AMPLI PROBE: HCPCS | Performed by: INTERNAL MEDICINE

## 2021-03-14 LAB
LABORATORY COMMENT REPORT: NORMAL
SARS-COV-2 RNA RESP QL NAA+PROBE: NEGATIVE
SPECIMEN SOURCE: NORMAL

## 2021-03-17 ENCOUNTER — CARE COORDINATION (OUTPATIENT)
Dept: TRANSPLANT | Facility: CLINIC | Age: 71
End: 2021-03-17

## 2021-03-17 ENCOUNTER — HOSPITAL ENCOUNTER (OUTPATIENT)
Dept: LAB | Facility: CLINIC | Age: 71
Discharge: HOME | End: 2021-03-17
Attending: INTERNAL MEDICINE | Admitting: INTERNAL MEDICINE
Payer: MEDICARE

## 2021-03-17 ENCOUNTER — TELEPHONE (OUTPATIENT)
Dept: TRANSPLANT | Facility: CLINIC | Age: 71
End: 2021-03-17

## 2021-03-17 DIAGNOSIS — Z94.81 STATUS POST BONE MARROW TRANSPLANT (H): ICD-10-CM

## 2021-03-17 LAB
ALBUMIN SERPL-MCNC: 2.9 G/DL (ref 3.4–5)
ALP SERPL-CCNC: 76 U/L (ref 40–150)
ALT SERPL W P-5'-P-CCNC: 45 U/L (ref 0–70)
ANION GAP SERPL CALCULATED.3IONS-SCNC: 5 MMOL/L (ref 3–14)
AST SERPL W P-5'-P-CCNC: 35 U/L (ref 0–45)
BASOPHILS # BLD AUTO: 0 10E9/L (ref 0–0.2)
BASOPHILS NFR BLD AUTO: 0.3 %
BILIRUB SERPL-MCNC: 0.4 MG/DL (ref 0.2–1.3)
BUN SERPL-MCNC: 17 MG/DL (ref 7–30)
CALCIUM SERPL-MCNC: 9.3 MG/DL (ref 8.5–10.1)
CHLORIDE SERPL-SCNC: 106 MMOL/L (ref 94–109)
CO2 SERPL-SCNC: 28 MMOL/L (ref 20–32)
CREAT SERPL-MCNC: 0.83 MG/DL (ref 0.66–1.25)
DIFFERENTIAL METHOD BLD: ABNORMAL
EOSINOPHIL # BLD AUTO: 0 10E9/L (ref 0–0.7)
EOSINOPHIL NFR BLD AUTO: 0.4 %
ERYTHROCYTE [DISTWIDTH] IN BLOOD BY AUTOMATED COUNT: 20.8 % (ref 10–15)
GFR SERPL CREATININE-BSD FRML MDRD: 88 ML/MIN/{1.73_M2}
GLUCOSE SERPL-MCNC: 93 MG/DL (ref 70–99)
HCT VFR BLD AUTO: 34.3 % (ref 40–53)
HGB BLD-MCNC: 10.8 G/DL (ref 13.3–17.7)
IMM GRANULOCYTES # BLD: 0 10E9/L (ref 0–0.4)
IMM GRANULOCYTES NFR BLD: 0.5 %
LYMPHOCYTES # BLD AUTO: 2.4 10E9/L (ref 0.8–5.3)
LYMPHOCYTES NFR BLD AUTO: 30.8 %
MCH RBC QN AUTO: 32.1 PG (ref 26.5–33)
MCHC RBC AUTO-ENTMCNC: 31.5 G/DL (ref 31.5–36.5)
MCV RBC AUTO: 102 FL (ref 78–100)
MONOCYTES # BLD AUTO: 1.1 10E9/L (ref 0–1.3)
MONOCYTES NFR BLD AUTO: 14.1 %
NEUTROPHILS # BLD AUTO: 4.1 10E9/L (ref 1.6–8.3)
NEUTROPHILS NFR BLD AUTO: 52.9 %
NRBC # BLD AUTO: 0.1 10*3/UL
NRBC BLD AUTO-RTO: 1 /100
PLATELET # BLD AUTO: 322 10E9/L (ref 150–450)
POTASSIUM SERPL-SCNC: 4 MMOL/L (ref 3.4–5.3)
PROT SERPL-MCNC: 6.6 G/DL (ref 6.8–8.8)
RBC # BLD AUTO: 3.36 10E12/L (ref 4.4–5.9)
SODIUM SERPL-SCNC: 139 MMOL/L (ref 133–144)
WBC # BLD AUTO: 7.6 10E9/L (ref 4–11)

## 2021-03-17 PROCEDURE — 36415 COLL VENOUS BLD VENIPUNCTURE: CPT | Performed by: INTERNAL MEDICINE

## 2021-03-17 PROCEDURE — 80053 COMPREHEN METABOLIC PANEL: CPT | Performed by: INTERNAL MEDICINE

## 2021-03-17 PROCEDURE — 85025 COMPLETE CBC W/AUTO DIFF WBC: CPT | Performed by: INTERNAL MEDICINE

## 2021-03-17 NOTE — TELEPHONE ENCOUNTER
Update regarding O2    ON 3 LO2 via mask:   HR   94  with act with 123            O2 Sats 99%  on 3L,with activity 90% on 3L      Ct George, PT  [facility]  [phone]

## 2021-03-17 NOTE — PROGRESS NOTES
Patient's wife called to request smaller O2 tanks for patient.  Patient states he now only uses 2-3L of oxygen whereas at time of discharge he was using 5-6L.  Patient was working with home PT at the time of phone call.  Relayed need for walking and resting sats to PT provider, she will document this in an Epic note today.  A new order for oxygen through [facility] will be placed dependent upon these results.  Also expressed to PT ok to discontinue COVID precautions as most recent covid test was negative and provided verbal authorization to wean off oxygen as tolerated.

## 2021-03-18 DIAGNOSIS — U07.1 INFECTION DUE TO 2019 NOVEL CORONAVIRUS: Primary | ICD-10-CM

## 2021-03-19 ENCOUNTER — CARE COORDINATION (OUTPATIENT)
Dept: TRANSPLANT | Facility: CLINIC | Age: 71
End: 2021-03-19

## 2021-03-19 NOTE — PROGRESS NOTES
Submitted new oxygen orders via fax to [facility] for titrated oxygen needs of now 3L oxygen continuous.  He no longer requires 5-6L.

## 2021-03-31 DIAGNOSIS — D89.813 GVHD AS COMPLICATION OF BONE MARROW TRANSPLANT (H): ICD-10-CM

## 2021-03-31 DIAGNOSIS — T86.09 GVHD AS COMPLICATION OF BONE MARROW TRANSPLANT (H): ICD-10-CM

## 2021-03-31 DIAGNOSIS — Z94.81 STATUS POST BONE MARROW TRANSPLANT (H): ICD-10-CM

## 2021-04-01 ENCOUNTER — HOSPITAL ENCOUNTER (OUTPATIENT)
Dept: CARDIAC REHAB | Facility: CLINIC | Age: 71
End: 2021-04-01
Attending: INTERNAL MEDICINE
Payer: MEDICARE

## 2021-04-01 PROCEDURE — G0238 OTH RESP PROC, INDIV: HCPCS

## 2021-04-01 PROCEDURE — G0237 THERAPEUTIC PROCD STRG ENDUR: HCPCS

## 2021-04-01 RX ORDER — PANTOPRAZOLE SODIUM 20 MG/1
TABLET, DELAYED RELEASE ORAL
Qty: 30 TABLET | Refills: 0 | OUTPATIENT
Start: 2021-04-01

## 2021-04-07 DIAGNOSIS — U07.1 COVID-19 VIRUS INFECTION: ICD-10-CM

## 2021-04-07 DIAGNOSIS — D89.813 GRAFT-VERSUS-HOST DISEASE (H): ICD-10-CM

## 2021-04-07 RX ORDER — PANTOPRAZOLE SODIUM 40 MG/1
40 TABLET, DELAYED RELEASE ORAL
Qty: 30 TABLET | Refills: 3 | Status: SHIPPED | OUTPATIENT
Start: 2021-04-07 | End: 2021-07-26

## 2021-04-13 ENCOUNTER — HOSPITAL ENCOUNTER (OUTPATIENT)
Dept: CARDIAC REHAB | Facility: CLINIC | Age: 71
End: 2021-04-13
Attending: INTERNAL MEDICINE
Payer: MEDICARE

## 2021-04-13 PROCEDURE — G0238 OTH RESP PROC, INDIV: HCPCS

## 2021-04-13 PROCEDURE — G0237 THERAPEUTIC PROCD STRG ENDUR: HCPCS

## 2021-04-15 ENCOUNTER — HOSPITAL ENCOUNTER (OUTPATIENT)
Dept: CARDIAC REHAB | Facility: CLINIC | Age: 71
End: 2021-04-15
Attending: INTERNAL MEDICINE
Payer: MEDICARE

## 2021-04-15 PROCEDURE — G0239 OTH RESP PROC, GROUP: HCPCS

## 2021-04-16 ENCOUNTER — VIRTUAL VISIT (OUTPATIENT)
Dept: TRANSPLANT | Facility: CLINIC | Age: 71
End: 2021-04-16
Attending: INTERNAL MEDICINE
Payer: MEDICARE

## 2021-04-16 DIAGNOSIS — D89.813 GRAFT-VERSUS-HOST DISEASE (H): Primary | ICD-10-CM

## 2021-04-16 PROCEDURE — 99442 PR PHYSICIAN TELEPHONE EVALUATION 11-20 MIN: CPT | Mod: 95 | Performed by: INTERNAL MEDICINE

## 2021-04-16 NOTE — PROGRESS NOTES
Deejay is a 70 year old who is being evaluated via a billable video visit.      How would you like to obtain your AVS? MyChart  If the video visit is dropped, the invitation should be resent by: Text to cell phone: [phone]  Will anyone else be joining your video visit? No      The video visit was changed to phone visit as the patient was unable to connect    Mr. Dior was seen via a telephone visit.  ID:  Mr. 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  On pred 5mg every day and Jakafi 5 mg bid.    He admitted on 2/8/2021 for acute hypoxic respiratory failure secondary to COVID-19 pneumonia. Also rhino virus positive. Transferred to MICU on 2/10 for worsening respiratory status requiring intubation 2/13-2/19. Discharged on 3/3 with home O2. Treated with remdesevir, dexamethasone and  eliquis 2.5 bid (through 3/30). Still has fatigue. Getting PT/OT. Now he does not need home O2. Very dry eyes, using artificial tears 5-6 times/ days. Also has developed macular degeneration. S/P R hip replacement, now with minimal pain.  Eating well with no N/V/D.  Had a basal cell cancer lesion removed (Moh's resection) close to left eye. (In the past has had a basal and squamous cell removed)     His COVID PCR was positive on 2/28. Repeat on March 13th was negative. He has received one dose of the vaccine, second dose is next week.  Remainder of 10 pt ROS negative        ASSESSMENT AND PLAN:  Mr. Dior is a 69 y/o male,  6 years and 9 month s/p NMA allo sib PBSCT for MDS. 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 Total cell dose (from 7/1 & 7/2) 6.53 x 10^6 CD34+ cells/kg.   - 1 year anniversary (July 2015): 30% cellular, trilineage hematopoiesis, no abnormal blasts by morphology or flow , no dysplasia, 0-1 fibrosis, 100% donor (BM, CD3, CD15). CR  - 2 year anniversary (July 2016):  20-30% cellular, trilineage hematopoiesis, no abnormal blasts by morphology or flow , no dysplasia, No fibrosis, 100% donor in BM (PB not sent). ISCN: //46,XX[20] Complete Remission.     HEME: No transfusion needs, counts stable     - Was on anticoagulation after his hip replacment, this is now complete.      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 Had been off prednisone since summer 2017 and briefly tapered off Sirolimus (january 2018), however resumed with flare in February. 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 Sirolimus was stopped on 9/27 & Pred 90mg was started. Seen by Dr. Sandoval on 10/2, please see his note. He does not think his symptoms or CT findings are c/w Sirolimus or GVH & he was in favor of tapering Prednisone. Recently he has worsening skin thickening & desquamation to the RLE, c/w GVH.   Started Jakafi 10/22 at 5 mg BID with symptom improvement in lower extremities. 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   Burst pred 40mg a day on 1/3 with significant systemic arthralgic pain, tapered back to 10/0 eod after 1 week, near resolution of symptoms noted 1/17, returned to 10 mg every other day; then dropped to 5mg q day in April     Currently on Jakafi 5mg twice daily & Pred 5mg every day.     ID:  Afebrile no signs/sx of infection.   - IgG 1/24 =1130  - Prophy: HD ACV (renal dosing), Fluconazole and  Bactrim.         GI:  protonix (has heartburn)     FEN/Renal:  Lytes & creat stable.     Fatigue:  stable  - History of testosterone deficiency, rechecked and modestly low. He previously did testosterone injections & didn't think it made him feel any better.    - TSH normal 2/28 and again on repeat 9/27 at 1.01.  - counseled that moderate exercise is really the only known way to combat fatigue, and acknowledged  how difficult it is to balance too much with not enough activity.      Derm:   Venous stasis: see below-most recently had sclerotherapy to left LE     Vasc:   10/15 Right leg US with small (technically DVT) clot in posterior tibial vein: started reg dose aspirin daily 325mg and recheck US in 2 weeks or symptomatically. 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  H/o chronic venous statis: s/p sclerotherapy to the L leg.  kelfex day prior and 4 days following.      MS: avascular necrosis of hip.  S/p replacement surgery     11.  Wounds: wound on right lower shin and gluteal cleft and left buttock:  (?mild trauma-thin skin with chronic prednisone and less likely GVH), no signs of infection.  -WOC following: see regimen from 3/1. Has home care RN coverage and will have them care for his skin     Dispo: PT/OT now recommending  discharge to home with 24hr caregiver, PT/OT, RN wound care, and home O2. All the services have been arranged by discharge coordinator. Spoke with Deejay's wife today and she is comfortable taking him home.  Discharge meds all done 3/2 so could be discharged today before 11 am. Discussed discharge meds with Deejay today.  Plan is to  eliquis from discharge pharmacy and  zoloft and oxycodone from [facility] in [place] where apparently the copay is less.     COVID-19 pneumonia (+1/30; neg 2/21), Repeat Covid 2/28=pos- needs a repeat COVID and labs  -Decreasing CRP=25 today.  + Rhinovirus on 2/9/2021  Remdesivir course completed on 2/12. He did complete a course of zithromax for possible superimposed bacterial pneumonia 2/9-2/11     - s/p 10-day course of dexamethasone (2/8-2/17) completed  - Enoxaparin 50 mg BID given elevated D-dimer;  eliquis=2.5 mg po bid, started on 3/2/2021.  ContinueD for 30 days, and can now stop.       Needs labs drawn next week  RTC in 3 months to see me (in person visit)   Total time spent on phone visit 16 minutes

## 2021-04-16 NOTE — LETTER
4/16/2021         RE: Deejay Dior  [address]  [facility] [address]        Dear Colleague,    Thank you for referring your patient, Deejay Dior, to the [facility] [place]. Please see a copy of my visit note below.    Deejay is a 70 year old who is being evaluated via a billable video visit.      How would you like to obtain your AVS? MyChart  If the video visit is dropped, the invitation should be resent by: Text to cell phone: [phone]  Will anyone else be joining your video visit? No      The video visit was changed to phone visit as the patient was unable to connect    Mr. Dior was seen via a telephone visit.  ID:  Mr. 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  On pred 5mg every day and Jakafi 5 mg bid.    He admitted on 2/8/2021 for acute hypoxic respiratory failure secondary to COVID-19 pneumonia. Also rhino virus positive. Transferred to MICU on 2/10 for worsening respiratory status requiring intubation 2/13-2/19. Discharged on 3/3 with home O2. Treated with remdesevir, dexamethasone and  eliquis 2.5 bid (through 3/30). Still has fatigue. Getting PT/OT. Now he does not need home O2. Very dry eyes, using artificial tears 5-6 times/ days. Also has developed macular degeneration. S/P R hip replacement, now with minimal pain.  Eating well with no N/V/D.  Had a basal cell cancer lesion removed (Moh's resection) close to left eye. (In the past has had a basal and squamous cell removed)     His COVID PCR was positive on 2/28. Repeat on March 13th was negative. He has received one dose of the vaccine, second dose is next week.  Remainder of 10 pt ROS negative        ASSESSMENT AND PLAN:  Mr. Dior is a 69 y/o male,  6 years and 9 month s/p NMA allo sib PBSCT for MDS. 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 Total cell dose (from 7/1 & 7/2) 6.53 x 10^6 CD34+ cells/kg.   - 1 year anniversary (July 2015): 30% cellular, trilineage hematopoiesis, no abnormal blasts by morphology or flow , no dysplasia, 0-1 fibrosis, 100% donor (BM, CD3, CD15). CR  - 2 year anniversary (July 2016): 20-30% cellular, trilineage hematopoiesis, no abnormal blasts by morphology or flow , no dysplasia, No fibrosis, 100% donor in BM (PB not sent). ISCN: //46,XX[20] Complete Remission.     HEME: No transfusion needs, counts stable     - Was on anticoagulation after his hip replacment, this is now complete.      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 Had been off prednisone since summer 2017 and briefly tapered off Sirolimus (january 2018), however resumed with flare in February. 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 Sirolimus was stopped on 9/27 & Pred 90mg was started. Seen by Dr. Sandoval on 10/2, please see his note. He does not think his symptoms or CT findings are c/w Sirolimus or GVH & he was in favor of tapering Prednisone. Recently he has worsening skin thickening & desquamation to the RLE, c/w GVH.   Started Jakafi 10/22 at 5 mg BID with symptom improvement in lower extremities. 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   Burst pred 40mg a day on 1/3 with significant systemic arthralgic pain, tapered back to 10/0 eod after 1 week, near resolution of symptoms noted 1/17, returned to 10 mg every other day; then dropped to 5mg q day in April     Currently on Jakafi 5mg twice daily & Pred 5mg every day.     ID:  Afebrile no signs/sx of infection.   - IgG 1/24 =1130  - Prophy: HD ACV (renal dosing), Fluconazole and  Bactrim.         GI:  protonix (has heartburn)     FEN/Renal:  Lytes & creat stable.     Fatigue:  stable  - History of  testosterone deficiency, rechecked and modestly low. He previously did testosterone injections & didn't think it made him feel any better.    - TSH normal 2/28 and again on repeat 9/27 at 1.01.  - counseled that moderate exercise is really the only known way to combat fatigue, and acknowledged how difficult it is to balance too much with not enough activity.      Derm:   Venous stasis: see below-most recently had sclerotherapy to left LE     Vasc:   10/15 Right leg US with small (technically DVT) clot in posterior tibial vein: started reg dose aspirin daily 325mg and recheck US in 2 weeks or symptomatically. 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  H/o chronic venous statis: s/p sclerotherapy to the L leg.  kelfex day prior and 4 days following.      MS: avascular necrosis of hip.  S/p replacement surgery     11.  Wounds: wound on right lower shin and gluteal cleft and left buttock:  (?mild trauma-thin skin with chronic prednisone and less likely GVH), no signs of infection.  -WOC following: see regimen from 3/1. Has home care RN coverage and will have them care for his skin     Dispo: PT/OT now recommending  discharge to home with 24hr caregiver, PT/OT, RN wound care, and home O2. All the services have been arranged by discharge coordinator. Spoke with Deejay's wife today and she is comfortable taking him home.  Discharge meds all done 3/2 so could be discharged today before 11 am. Discussed discharge meds with Deejay today.  Plan is to  eliquis from discharge pharmacy and  [facility] [place] [facility].     COVID-19 pneumonia (+1/30; neg 2/21), Repeat Covid 2/28=pos- needs a repeat COVID and labs  -Decreasing CRP=25 today.  + Rhinovirus on  2/9/2021  Remdesivir course completed on 2/12. He did complete a course of zithromax for possible superimposed bacterial pneumonia 2/9-2/11     - s/p 10-day course of dexamethasone (2/8-2/17) completed  - Enoxaparin 50 mg BID given elevated D-dimer;  eliquis=2.5 mg po bid, started on 3/2/2021. ContinueD for 30 days, and can now stop.       Needs labs drawn next week  RTC in 3 months to see me (in person visit)   Total time spent on phone visit 16 minutes             Again, thank you for allowing me to participate in the care of your patient.        Sincerely,        Suzanne Collado MD

## 2021-04-20 ENCOUNTER — HOSPITAL ENCOUNTER (OUTPATIENT)
Dept: CARDIAC REHAB | Facility: CLINIC | Age: 71
End: 2021-04-20
Attending: INTERNAL MEDICINE
Payer: MEDICARE

## 2021-04-20 PROCEDURE — G0239 OTH RESP PROC, GROUP: HCPCS

## 2021-04-21 NOTE — TELEPHONE ENCOUNTER
Read below message to pt, he will stop Testosterone for now and see MD 4/10/18 at 1400  ----- Message from Rd Chairez MD sent at 12/20/2017  9:37 AM CST -----  Regarding: FW: requesting a call back  Contact: [phone]  He could stop testosterone if he feels it is not helping with fatigue.   I would suggest f/u in 4-6 months to see how he is doing off testosterone and also to discuss other potential longterms effects of low testosterone like it's impact on bone density etc. If he would like remain off testosterone in the long term .     ----- Message -----     From: Shasta Joshi RN     Sent: 12/18/2017  11:14 AM       To: Rd Chairez MD  Subject: FW: requesting a call back                       Cancelled appt tomorrow. Has questions below. .  ----- Message -----     From: Ai Peraza     Sent: 12/18/2017  11:03 AM       To: [facility] Triage-  Subject: requesting a call back                           Patient called to cancel his appointment tomorrow. Patient is requesting a call back from a nurse or . Patient states he is taking injections for testosterone to help with his fatigue. Patient states his numbers are up to normal ranges now but the fatigue is still there. Patient would like to stop the injection since they aren't helping with the fatigue. Patient wants to know if he can just stop taking the injections. Patient also wants to know if he needs a follow up appointment. Please follow up with patient at [phone].    Thank you,    Ai  Call Center         [facility] left a voice message for patient to return my call to [phone] .

## 2021-04-22 ENCOUNTER — TELEPHONE (OUTPATIENT)
Dept: TRANSPLANT | Facility: CLINIC | Age: 71
End: 2021-04-22

## 2021-04-22 ENCOUNTER — HOSPITAL ENCOUNTER (OUTPATIENT)
Dept: CARDIAC REHAB | Facility: CLINIC | Age: 71
End: 2021-04-22
Attending: INTERNAL MEDICINE
Payer: MEDICARE

## 2021-04-22 PROCEDURE — G0239 OTH RESP PROC, GROUP: HCPCS

## 2021-04-22 NOTE — TELEPHONE ENCOUNTER
To: [facility]  Ph. [phone]  Fx. [phone]     From: [facility]  Primary/Ordering BMT MD: Dr. Suzanne Collado     Patient: Deejay Dior  : 1950  Phone: [phone]   Spouse: [phone] (Racquel)       Deejay is no longer requiring portable oxygen services through your company. Please discontinue services and contact the patient to arrange for pickup.     Please call with any questions or concerns.     Thanks,  Mauro, BMT RNCC  [phone]

## 2021-04-23 ENCOUNTER — TELEPHONE (OUTPATIENT)
Dept: ONCOLOGY | Facility: CLINIC | Age: 71
End: 2021-04-23

## 2021-04-24 ENCOUNTER — HEALTH MAINTENANCE LETTER (OUTPATIENT)
Age: 71
End: 2021-04-24

## 2021-04-26 DIAGNOSIS — Z94.81 STATUS POST BONE MARROW TRANSPLANT (H): ICD-10-CM

## 2021-04-26 DIAGNOSIS — U07.1 COVID-19 VIRUS INFECTION: Primary | ICD-10-CM

## 2021-04-27 ENCOUNTER — HOSPITAL ENCOUNTER (OUTPATIENT)
Dept: CARDIAC REHAB | Facility: CLINIC | Age: 71
End: 2021-04-27
Attending: INTERNAL MEDICINE
Payer: MEDICARE

## 2021-04-27 ENCOUNTER — HOSPITAL ENCOUNTER (OUTPATIENT)
Dept: LAB | Facility: CLINIC | Age: 71
Discharge: HOME | End: 2021-04-27
Attending: PHYSICIAN ASSISTANT | Admitting: PHYSICIAN ASSISTANT
Payer: MEDICARE

## 2021-04-27 DIAGNOSIS — U07.1 COVID-19 VIRUS INFECTION: ICD-10-CM

## 2021-04-27 LAB
ALBUMIN SERPL-MCNC: 3.3 G/DL (ref 3.4–5)
ALP SERPL-CCNC: 55 U/L (ref 40–150)
ALT SERPL W P-5'-P-CCNC: 22 U/L (ref 0–70)
ANION GAP SERPL CALCULATED.3IONS-SCNC: 6 MMOL/L (ref 3–14)
AST SERPL W P-5'-P-CCNC: 20 U/L (ref 0–45)
BASOPHILS # BLD AUTO: 0 10E9/L (ref 0–0.2)
BASOPHILS NFR BLD AUTO: 0.3 %
BILIRUB SERPL-MCNC: 0.4 MG/DL (ref 0.2–1.3)
BUN SERPL-MCNC: 22 MG/DL (ref 7–30)
CALCIUM SERPL-MCNC: 8.6 MG/DL (ref 8.5–10.1)
CHLORIDE SERPL-SCNC: 110 MMOL/L (ref 94–109)
CO2 SERPL-SCNC: 24 MMOL/L (ref 20–32)
CREAT SERPL-MCNC: 1.07 MG/DL (ref 0.66–1.25)
DIFFERENTIAL METHOD BLD: ABNORMAL
EOSINOPHIL # BLD AUTO: 0.1 10E9/L (ref 0–0.7)
EOSINOPHIL NFR BLD AUTO: 1.7 %
ERYTHROCYTE [DISTWIDTH] IN BLOOD BY AUTOMATED COUNT: 17.7 % (ref 10–15)
GFR SERPL CREATININE-BSD FRML MDRD: 69 ML/MIN/{1.73_M2}
GLUCOSE SERPL-MCNC: 88 MG/DL (ref 70–99)
HCT VFR BLD AUTO: 34.4 % (ref 40–53)
HGB BLD-MCNC: 10.9 G/DL (ref 13.3–17.7)
IMM GRANULOCYTES # BLD: 0 10E9/L (ref 0–0.4)
IMM GRANULOCYTES NFR BLD: 0.3 %
LYMPHOCYTES # BLD AUTO: 1.9 10E9/L (ref 0.8–5.3)
LYMPHOCYTES NFR BLD AUTO: 30.1 %
MCH RBC QN AUTO: 33.1 PG (ref 26.5–33)
MCHC RBC AUTO-ENTMCNC: 31.7 G/DL (ref 31.5–36.5)
MCV RBC AUTO: 105 FL (ref 78–100)
MONOCYTES # BLD AUTO: 0.9 10E9/L (ref 0–1.3)
MONOCYTES NFR BLD AUTO: 14.2 %
NEUTROPHILS # BLD AUTO: 3.4 10E9/L (ref 1.6–8.3)
NEUTROPHILS NFR BLD AUTO: 53.4 %
NRBC # BLD AUTO: 0 10*3/UL
NRBC BLD AUTO-RTO: 0 /100
PLATELET # BLD AUTO: 295 10E9/L (ref 150–450)
POTASSIUM SERPL-SCNC: 4.1 MMOL/L (ref 3.4–5.3)
PROT SERPL-MCNC: 6.5 G/DL (ref 6.8–8.8)
RBC # BLD AUTO: 3.29 10E12/L (ref 4.4–5.9)
SODIUM SERPL-SCNC: 140 MMOL/L (ref 133–144)
WBC # BLD AUTO: 6.3 10E9/L (ref 4–11)

## 2021-04-27 PROCEDURE — G0239 OTH RESP PROC, GROUP: HCPCS

## 2021-04-27 PROCEDURE — 80053 COMPREHEN METABOLIC PANEL: CPT | Performed by: PHYSICIAN ASSISTANT

## 2021-04-27 PROCEDURE — 85025 COMPLETE CBC W/AUTO DIFF WBC: CPT | Performed by: PHYSICIAN ASSISTANT

## 2021-04-27 PROCEDURE — 36415 COLL VENOUS BLD VENIPUNCTURE: CPT | Performed by: PHYSICIAN ASSISTANT

## 2021-04-29 ENCOUNTER — HOSPITAL ENCOUNTER (OUTPATIENT)
Dept: CARDIAC REHAB | Facility: CLINIC | Age: 71
End: 2021-04-29
Attending: INTERNAL MEDICINE
Payer: MEDICARE

## 2021-04-29 PROCEDURE — G0239 OTH RESP PROC, GROUP: HCPCS

## 2021-05-04 ENCOUNTER — HOSPITAL ENCOUNTER (OUTPATIENT)
Dept: CARDIAC REHAB | Facility: CLINIC | Age: 71
End: 2021-05-04
Attending: INTERNAL MEDICINE
Payer: MEDICARE

## 2021-05-04 PROCEDURE — G0239 OTH RESP PROC, GROUP: HCPCS

## 2021-05-06 ENCOUNTER — HOSPITAL ENCOUNTER (OUTPATIENT)
Dept: CARDIAC REHAB | Facility: CLINIC | Age: 71
End: 2021-05-06
Attending: INTERNAL MEDICINE
Payer: MEDICARE

## 2021-05-06 PROCEDURE — G0239 OTH RESP PROC, GROUP: HCPCS

## 2021-05-10 ENCOUNTER — HOSPITAL ENCOUNTER (OUTPATIENT)
Facility: CLINIC | Age: 71
End: 2021-05-10
Attending: ORTHOPAEDIC SURGERY | Admitting: ORTHOPAEDIC SURGERY
Payer: MEDICARE

## 2021-05-10 ENCOUNTER — HOSPITAL ENCOUNTER (OUTPATIENT)
Dept: CARDIAC REHAB | Facility: CLINIC | Age: 71
End: 2021-05-10
Attending: INTERNAL MEDICINE
Payer: MEDICARE

## 2021-05-10 PROCEDURE — G0237 THERAPEUTIC PROCD STRG ENDUR: HCPCS

## 2021-05-10 PROCEDURE — G0238 OTH RESP PROC, INDIV: HCPCS

## 2021-05-13 ENCOUNTER — HOSPITAL ENCOUNTER (OUTPATIENT)
Dept: CARDIAC REHAB | Facility: CLINIC | Age: 71
End: 2021-05-13
Attending: INTERNAL MEDICINE
Payer: MEDICARE

## 2021-05-13 PROCEDURE — G0239 OTH RESP PROC, GROUP: HCPCS

## 2021-05-18 ENCOUNTER — HOSPITAL ENCOUNTER (OUTPATIENT)
Dept: CARDIAC REHAB | Facility: CLINIC | Age: 71
End: 2021-05-18
Attending: INTERNAL MEDICINE
Payer: MEDICARE

## 2021-05-18 PROCEDURE — G0239 OTH RESP PROC, GROUP: HCPCS

## 2021-05-20 ENCOUNTER — HOSPITAL ENCOUNTER (OUTPATIENT)
Dept: CARDIAC REHAB | Facility: CLINIC | Age: 71
End: 2021-05-20
Attending: INTERNAL MEDICINE
Payer: MEDICARE

## 2021-05-20 PROCEDURE — G0239 OTH RESP PROC, GROUP: HCPCS

## 2021-05-21 ENCOUNTER — TELEPHONE (OUTPATIENT)
Dept: ONCOLOGY | Facility: CLINIC | Age: 71
End: 2021-05-21

## 2021-05-21 NOTE — TELEPHONE ENCOUNTER
Jurgen Moreno CPhT  [facility]  Oncology Pharmacy Liaison   [email or web address]  [phone] (phone  [phone] (fax

## 2021-05-24 DIAGNOSIS — H35.3132 INTERMEDIATE STAGE NONEXUDATIVE AGE-RELATED MACULAR DEGENERATION OF BOTH EYES: Primary | ICD-10-CM

## 2021-05-25 DIAGNOSIS — U07.1 COVID-19 VIRUS INFECTION: ICD-10-CM

## 2021-05-25 DIAGNOSIS — Z94.81 STATUS POST BONE MARROW TRANSPLANT (H): ICD-10-CM

## 2021-05-25 PROCEDURE — 86769 SARS-COV-2 COVID-19 ANTIBODY: CPT | Mod: 59 | Performed by: INTERNAL MEDICINE

## 2021-05-25 PROCEDURE — 36415 COLL VENOUS BLD VENIPUNCTURE: CPT | Performed by: INTERNAL MEDICINE

## 2021-05-25 PROCEDURE — 86769 SARS-COV-2 COVID-19 ANTIBODY: CPT | Performed by: INTERNAL MEDICINE

## 2021-05-26 LAB
SARS-COV-2 AB PNL SERPL IA: POSITIVE
SARS-COV-2 IGG SERPL IA-ACNC: NORMAL

## 2021-05-27 ENCOUNTER — HOSPITAL ENCOUNTER (OUTPATIENT)
Dept: CARDIAC REHAB | Facility: CLINIC | Age: 71
End: 2021-05-27
Attending: INTERNAL MEDICINE
Payer: MEDICARE

## 2021-05-27 PROCEDURE — G0239 OTH RESP PROC, GROUP: HCPCS

## 2021-05-31 DIAGNOSIS — Z94.81 STATUS POST BONE MARROW TRANSPLANT (H): ICD-10-CM

## 2021-06-01 ENCOUNTER — HOSPITAL ENCOUNTER (OUTPATIENT)
Dept: CARDIAC REHAB | Facility: CLINIC | Age: 71
End: 2021-06-01
Attending: INTERNAL MEDICINE
Payer: MEDICARE

## 2021-06-01 PROCEDURE — G0239 OTH RESP PROC, GROUP: HCPCS

## 2021-06-01 RX ORDER — LEVOFLOXACIN 250 MG/1
TABLET, FILM COATED ORAL
Qty: 90 TABLET | Refills: 0 | Status: SHIPPED | OUTPATIENT
Start: 2021-06-01 | End: 2021-09-07

## 2021-06-03 ENCOUNTER — HOSPITAL ENCOUNTER (OUTPATIENT)
Dept: CARDIAC REHAB | Facility: CLINIC | Age: 71
End: 2021-06-03
Attending: INTERNAL MEDICINE
Payer: MEDICARE

## 2021-06-03 PROCEDURE — G0239 OTH RESP PROC, GROUP: HCPCS

## 2021-06-08 ENCOUNTER — HOSPITAL ENCOUNTER (OUTPATIENT)
Dept: CARDIAC REHAB | Facility: CLINIC | Age: 71
End: 2021-06-08
Attending: INTERNAL MEDICINE
Payer: MEDICARE

## 2021-06-08 ENCOUNTER — OFFICE VISIT (OUTPATIENT)
Dept: OPHTHALMOLOGY | Facility: CLINIC | Age: 71
End: 2021-06-08
Attending: OPHTHALMOLOGY
Payer: MEDICARE

## 2021-06-08 DIAGNOSIS — H35.3132 INTERMEDIATE STAGE NONEXUDATIVE AGE-RELATED MACULAR DEGENERATION OF BOTH EYES: ICD-10-CM

## 2021-06-08 PROCEDURE — 99213 OFFICE O/P EST LOW 20 MIN: CPT | Performed by: OPHTHALMOLOGY

## 2021-06-08 PROCEDURE — 92134 CPTRZ OPH DX IMG PST SGM RTA: CPT | Performed by: OPHTHALMOLOGY

## 2021-06-08 PROCEDURE — G0239 OTH RESP PROC, GROUP: HCPCS

## 2021-06-08 PROCEDURE — G0463 HOSPITAL OUTPT CLINIC VISIT: HCPCS

## 2021-06-08 PROCEDURE — G0238 OTH RESP PROC, INDIV: HCPCS

## 2021-06-08 ASSESSMENT — VISUAL ACUITY
OD_PH_SC: 20/30-2/+
OD_SC: 20/40+2
METHOD: SNELLEN - LINEAR
OS_SC: 20/25-2

## 2021-06-08 ASSESSMENT — SLIT LAMP EXAM - LIDS
COMMENTS: MGD
COMMENTS: MGD

## 2021-06-08 ASSESSMENT — CUP TO DISC RATIO
OD_RATIO: 0.35
OS_RATIO: 0.35

## 2021-06-08 ASSESSMENT — CONF VISUAL FIELD
METHOD: COUNTING FINGERS
OS_NORMAL: 1
OD_NORMAL: 1

## 2021-06-08 ASSESSMENT — TONOMETRY
IOP_METHOD: TONOPEN
OD_IOP_MMHG: 13
OS_IOP_MMHG: 14

## 2021-06-08 ASSESSMENT — EXTERNAL EXAM - LEFT EYE: OS_EXAM: NORMAL

## 2021-06-08 ASSESSMENT — EXTERNAL EXAM - RIGHT EYE: OD_EXAM: NORMAL

## 2021-06-08 NOTE — PROGRESS NOTES
CC -   Dry AMD    INTERVAL HISTORY - no changes, using artificial tears, did not start Xidra, no tacrolimus bill  Using AREDS      PMH -   Deejay Dior is a  70 year old year-old patient with history of dry AMD  Has AML s/p BMT 7/2014 and GVHD.  No DM      PAST OCULAR SURGERY  CE/IOL OU 2015    RETINAL IMAGING:  OCT 6-8-21  OD - mild focal RPE irregular, ?nasal macula small FVPED   OS - mild ERM, subfoveal deposits, no fluid, PVD      ASSESSMENT & PLAN    #. Dry AMD OU intermediate   - may have pattern component   - OCT-A done 6/2020  No CNVM   - AREDS/[facility]   - f/u 6 months    # PVD OU   - advised S/Sx RD 6/2021      #. GVHD   - TABITHA        # TABITHA    - very bothered   - did not tolerate restasis   - using PFATs very frequently   - saw Lisandra 12/2021      # PCO OU   - mild    - could consider YAG   - follows with Lisandra      return to clinic: 6 months, OCT OU, DFE OU    ATTESTATION     Attending Attestation:     Complete documentation of historical and exam elements from today's encounter can be found in the full encounter summary report (not reduplicated in this progress note).  I personally obtained the chief complaint(s) and history of present illness.  I confirmed and edited as necessary the review of systems, past medical/surgical history, family history, social history, and examination findings as documented by others; and I examined the patient myself.  I personally reviewed the relevant tests, images, and reports as documented above.  I formulated and edited as necessary the assessment and plan and discussed the findings and management plan with the patient and family    Marisol Hector MD, PhD  , Vitreoretinal Surgery  Department of Ophthalmology  [facility]

## 2021-06-11 ENCOUNTER — TELEPHONE (OUTPATIENT)
Dept: OPHTHALMOLOGY | Facility: CLINIC | Age: 71
End: 2021-06-11

## 2021-06-11 NOTE — TELEPHONE ENCOUNTER
----- Message from Tyrone Fry MD sent at 6/9/2021  8:18 AM CDT -----  Regarding: RE: tacrolimus Bill  Called and left a message for the patient telling he can use it in the eye.    [place]    ----- Message -----  From: Marisol Hector MD  Sent: 6/8/2021   2:03 PM CDT  To: Tyrone Fry MD, Danita Sanchez, COA  Subject: tacrolimus Bill                                   Hello,    He isn't using the tacrolimus bill, its label says not for use in eye and he's concerned.  Can you please contact him to discuss (if I recall, that is what the label says despite its established use).    Marisol

## 2021-06-11 NOTE — TELEPHONE ENCOUNTER
6/11/21:    Pt called w/ concerns re: the use of tacrolimus ointment the eye as the packaging advised against.  Left voicemail 6/11/21 4:50 pm explaining that it's okay to use in eye safely and to reach out with any further questions or concerns.    Jason Goldberg, MD  Cornea & External Disease Fellow  Department of Ophthalmology and Visual Neurosciences

## 2021-06-16 DIAGNOSIS — Z11.59 ENCOUNTER FOR SCREENING FOR OTHER VIRAL DISEASES: ICD-10-CM

## 2021-06-21 DIAGNOSIS — D89.813 GRAFT-VERSUS-HOST DISEASE (H): ICD-10-CM

## 2021-06-21 DIAGNOSIS — U07.1 COVID-19 VIRUS INFECTION: ICD-10-CM

## 2021-06-21 RX ORDER — PREDNISONE 5 MG/1
TABLET ORAL
Qty: 90 TABLET | Refills: 0 | Status: SHIPPED | OUTPATIENT
Start: 2021-06-21 | End: 2021-10-01

## 2021-07-23 ENCOUNTER — APPOINTMENT (OUTPATIENT)
Dept: LAB | Facility: CLINIC | Age: 71
End: 2021-07-23
Attending: INTERNAL MEDICINE
Payer: MEDICARE

## 2021-07-23 ENCOUNTER — ONCOLOGY VISIT (OUTPATIENT)
Dept: TRANSPLANT | Facility: CLINIC | Age: 71
End: 2021-07-23
Attending: INTERNAL MEDICINE
Payer: MEDICARE

## 2021-07-23 VITALS
HEART RATE: 62 BPM | SYSTOLIC BLOOD PRESSURE: 144 MMHG | TEMPERATURE: 97.7 F | BODY MASS INDEX: 32.92 KG/M2 | DIASTOLIC BLOOD PRESSURE: 81 MMHG | OXYGEN SATURATION: 97 % | RESPIRATION RATE: 16 BRPM | WEIGHT: 222.9 LBS

## 2021-07-23 DIAGNOSIS — D89.813 GRAFT-VERSUS-HOST DISEASE (H): Primary | ICD-10-CM

## 2021-07-23 LAB
ALBUMIN SERPL-MCNC: 3 G/DL (ref 3.4–5)
ALP SERPL-CCNC: 77 U/L (ref 40–150)
ALT SERPL W P-5'-P-CCNC: 28 U/L (ref 0–70)
ANION GAP SERPL CALCULATED.3IONS-SCNC: 5 MMOL/L (ref 3–14)
AST SERPL W P-5'-P-CCNC: 32 U/L (ref 0–45)
BASOPHILS # BLD AUTO: 0 10E3/UL (ref 0–0.2)
BASOPHILS NFR BLD AUTO: 0 %
BILIRUB SERPL-MCNC: 0.3 MG/DL (ref 0.2–1.3)
BUN SERPL-MCNC: 22 MG/DL (ref 7–30)
CALCIUM SERPL-MCNC: 9 MG/DL (ref 8.5–10.1)
CHLORIDE BLD-SCNC: 114 MMOL/L (ref 94–109)
CO2 SERPL-SCNC: 21 MMOL/L (ref 20–32)
CREAT SERPL-MCNC: 0.94 MG/DL (ref 0.66–1.25)
EOSINOPHIL # BLD AUTO: 0.1 10E3/UL (ref 0–0.7)
EOSINOPHIL NFR BLD AUTO: 2 %
ERYTHROCYTE [DISTWIDTH] IN BLOOD BY AUTOMATED COUNT: 18.9 % (ref 10–15)
GFR SERPL CREATININE-BSD FRML MDRD: 81 ML/MIN/1.73M2
GLUCOSE BLD-MCNC: 80 MG/DL (ref 70–99)
HCT VFR BLD AUTO: 36.9 % (ref 40–53)
HGB BLD-MCNC: 11.4 G/DL (ref 13.3–17.7)
IMM GRANULOCYTES # BLD: 0 10E3/UL
IMM GRANULOCYTES NFR BLD: 0 %
LYMPHOCYTES # BLD AUTO: 1.7 10E3/UL (ref 0.8–5.3)
LYMPHOCYTES NFR BLD AUTO: 46 %
MCH RBC QN AUTO: 30 PG (ref 26.5–33)
MCHC RBC AUTO-ENTMCNC: 30.9 G/DL (ref 31.5–36.5)
MCV RBC AUTO: 97 FL (ref 78–100)
MONOCYTES # BLD AUTO: 0.6 10E3/UL (ref 0–1.3)
MONOCYTES NFR BLD AUTO: 16 %
NEUTROPHILS # BLD AUTO: 1.3 10E3/UL (ref 1.6–8.3)
NEUTROPHILS NFR BLD AUTO: 36 %
NRBC # BLD AUTO: 0 10E3/UL
NRBC BLD AUTO-RTO: 1 /100
PLATELET # BLD AUTO: 229 10E3/UL (ref 150–450)
POTASSIUM BLD-SCNC: 4.3 MMOL/L (ref 3.4–5.3)
PROT SERPL-MCNC: 6.8 G/DL (ref 6.8–8.8)
RBC # BLD AUTO: 3.8 10E6/UL (ref 4.4–5.9)
SODIUM SERPL-SCNC: 140 MMOL/L (ref 133–144)
WBC # BLD AUTO: 3.8 10E3/UL (ref 4–11)

## 2021-07-23 PROCEDURE — 36415 COLL VENOUS BLD VENIPUNCTURE: CPT | Performed by: INTERNAL MEDICINE

## 2021-07-23 PROCEDURE — 85025 COMPLETE CBC W/AUTO DIFF WBC: CPT | Performed by: INTERNAL MEDICINE

## 2021-07-23 PROCEDURE — 80053 COMPREHEN METABOLIC PANEL: CPT | Performed by: INTERNAL MEDICINE

## 2021-07-23 PROCEDURE — 99214 OFFICE O/P EST MOD 30 MIN: CPT | Performed by: INTERNAL MEDICINE

## 2021-07-23 PROCEDURE — G0463 HOSPITAL OUTPT CLINIC VISIT: HCPCS

## 2021-07-23 ASSESSMENT — PAIN SCALES - GENERAL: PAINLEVEL: NO PAIN (0)

## 2021-07-23 NOTE — PROGRESS NOTES
ID:  Mr. 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  On pred 5mg every day and Jakafi 5 mg bid.    He admitted on 2/8/2021 for acute hypoxic respiratory failure secondary to COVID-19 pneumonia. Also rhino virus positive. Transferred to MICU on 2/10 for worsening respiratory status requiring intubation 2/13-2/19. Discharged on 3/3 with home O2. Treated with remdesevir, dexamethasone and  eliquis 2.5 bid (through 3/30). Needed home O2 for a few weeks, but now not needing it. Very dry eyes, using artificial tears 5-6 times/ days. Dry mouth. Also has developed macular degeneration. S/P R hip replacement, now with minimal pain.  Eating well with no N/V/D.  Had a basal cell cancer lesion removed (Moh's resection) close to left eye. (In the past has had a basal and squamous cell removed)     Received COVID vaccine  Remainder of 10 pt ROS negative    On exam:  Blood pressure (!) 144/81, pulse 62, temperature 97.7  F (36.5  C), temperature source Oral, resp. rate 16, weight 101.1 kg (222 lb 14.4 oz), SpO2 97 %.  HEENT: PERRL, EOMI , dry oral mucosa  Chest: CTAB  CVS: S1 S2 RRR, no murmurs or gallops  PA: soft, non tender  CNS: non focal        ASSESSMENT AND PLAN:  Mr. Dior is a 71 y/o male,  7 years s/p NMA allo sib PBSCT for MDS. 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 Total cell dose (from 7/1 & 7/2) 6.53 x 10^6 CD34+ cells/kg.   - 1 year anniversary (July 2015): 30% cellular, trilineage hematopoiesis, no abnormal blasts by morphology or flow , no dysplasia, 0-1 fibrosis, 100% donor (BM, CD3, CD15). CR  - 2 year anniversary (July 2016): 20-30% cellular, trilineage hematopoiesis, no abnormal blasts by morphology or flow , no dysplasia, No fibrosis, 100% donor in BM (PB not sent). ISCN: //46,XX[20] Complete Remission.     HEME: No transfusion needs, counts stable     - Was on  anticoagulation after his hip replacment, this is now complete.      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 Had been off prednisone since summer 2017 and briefly tapered off Sirolimus (january 2018), however resumed with flare in February. 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 Sirolimus was stopped on 9/27 & Pred 90mg was started. Seen by Dr. Sandoval on 10/2, please see his note. He does not think his symptoms or CT findings are c/w Sirolimus or GVH & he was in favor of tapering Prednisone. Recently he has worsening skin thickening & desquamation to the RLE, c/w GVH.   Started Jakafi 10/22 at 5 mg BID with symptom improvement in lower extremities. 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   Burst pred 40mg a day on 1/3 with significant systemic arthralgic pain, tapered back to 10/0 eod after 1 week, near resolution of symptoms noted 1/17, returned to 10 mg every other day; then dropped to 5mg q day in April     Currently on Jakafi 5mg twice daily & Pred 5mg every day.     ID:  Afebrile no signs/sx of infection.   - IgG 1/24 =1130  - Prophy: HD ACV (renal dosing), Fluconazole and  Bactrim.         GI:  protonix (has heartburn)     FEN/Renal:  Lytes & creat stable.     Fatigue:  stable  - History of testosterone deficiency, rechecked and modestly low. He previously did testosterone injections & didn't think it made him feel any better.    - TSH normal 2/28 and again on repeat 9/27 at 1.01.  - counseled that moderate exercise is really the only known way to combat fatigue, and acknowledged how difficult it is to balance too much with not enough activity.      Derm:   Venous stasis: see below-most recently had sclerotherapy to left LE     Vasc:   10/15 Right leg US with small (technically DVT) clot in posterior tibial vein:  started reg dose aspirin daily 325mg and recheck US in 2 weeks or symptomatically. 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  H/o chronic venous statis: s/p sclerotherapy to the L leg.  kelfex day prior and 4 days following.      MS: avascular necrosis of hip.  S/p replacement surgery     11.  Wounds: wound on right lower shin and gluteal cleft and left buttock:  (?mild trauma-thin skin with chronic prednisone and less likely GVH), no signs of infection.  -WOC following: see regimen from 3/1. Has home care RN coverage and will have them care for his skin       RTC in 3 months to see me (in person visit)     Suzanne Collado

## 2021-07-23 NOTE — LETTER
7/23/2021         RE: Deejay Dior  [address]  [facility] [address]        Dear Colleague,    Thank you for referring your patient, Deejay Dior, to the [facility] [place]. Please see a copy of my visit note below.      ID:  Mr. 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  On pred 5mg every day and Jakafi 5 mg bid.    He admitted on 2/8/2021 for acute hypoxic respiratory failure secondary to COVID-19 pneumonia. Also rhino virus positive. Transferred to MICU on 2/10 for worsening respiratory status requiring intubation 2/13-2/19. Discharged on 3/3 with home O2. Treated with remdesevir, dexamethasone and  eliquis 2.5 bid (through 3/30). Needed home O2 for a few weeks, but now not needing it. Very dry eyes, using artificial tears 5-6 times/ days. Dry mouth. Also has developed macular degeneration. S/P R hip replacement, now with minimal pain.  Eating well with no N/V/D.  Had a basal cell cancer lesion removed (Moh's resection) close to left eye. (In the past has had a basal and squamous cell removed)     Received COVID vaccine  Remainder of 10 pt ROS negative    On exam:  Blood pressure (!) 144/81, pulse 62, temperature 97.7  F (36.5  C), temperature source Oral, resp. rate 16, weight 101.1 kg (222 lb 14.4 oz), SpO2 97 %.  HEENT: PERRL, EOMI , dry oral mucosa  Chest: CTAB  CVS: S1 S2 RRR, no murmurs or gallops  PA: soft, non tender  CNS: non focal        ASSESSMENT AND PLAN:  Mr. Dior is a 71 y/o male,  7 years s/p NMA allo sib PBSCT for MDS. 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 Total cell dose (from 7/1 & 7/2) 6.53 x 10^6 CD34+ cells/kg.   - 1 year anniversary (July 2015): 30% cellular, trilineage hematopoiesis, no abnormal blasts by morphology or flow , no dysplasia, 0-1 fibrosis, 100% donor (BM, CD3, CD15).  CR  - 2 year anniversary (July 2016): 20-30% cellular, trilineage hematopoiesis, no abnormal blasts by morphology or flow , no dysplasia, No fibrosis, 100% donor in BM (PB not sent). ISCN: //46,XX[20] Complete Remission.     HEME: No transfusion needs, counts stable     - Was on anticoagulation after his hip replacment, this is now complete.      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 Had been off prednisone since summer 2017 and briefly tapered off Sirolimus (january 2018), however resumed with flare in February. 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 Sirolimus was stopped on 9/27 & Pred 90mg was started. Seen by Dr. Sandoval on 10/2, please see his note. He does not think his symptoms or CT findings are c/w Sirolimus or GVH & he was in favor of tapering Prednisone. Recently he has worsening skin thickening & desquamation to the RLE, c/w GVH.   Started Jakafi 10/22 at 5 mg BID with symptom improvement in lower extremities. 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   Burst pred 40mg a day on 1/3 with significant systemic arthralgic pain, tapered back to 10/0 eod after 1 week, near resolution of symptoms noted 1/17, returned to 10 mg every other day; then dropped to 5mg q day in April     Currently on Jakafi 5mg twice daily & Pred 5mg every day.     ID:  Afebrile no signs/sx of infection.   - IgG 1/24 =1130  - Prophy: HD ACV (renal dosing), Fluconazole and  Bactrim.         GI:  protonix (has heartburn)     FEN/Renal:  Lytes & creat stable.     Fatigue:  stable  - History of testosterone deficiency, rechecked and modestly low. He previously did testosterone injections & didn't think it made him feel any better.    - TSH normal 2/28 and again on repeat 9/27 at 1.01.  - counseled that moderate exercise is really the only known way  to combat fatigue, and acknowledged how difficult it is to balance too much with not enough activity.      Derm:   Venous stasis: see below-most recently had sclerotherapy to left LE     Vasc:   10/15 Right leg US with small (technically DVT) clot in posterior tibial vein: started reg dose aspirin daily 325mg and recheck US in 2 weeks or symptomatically. 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  H/o chronic venous statis: s/p sclerotherapy to the L leg.  kelfex day prior and 4 days following.      MS: avascular necrosis of hip.  S/p replacement surgery     11.  Wounds: wound on right lower shin and gluteal cleft and left buttock:  (?mild trauma-thin skin with chronic prednisone and less likely GVH), no signs of infection.  -WOC following: see regimen from 3/1. Has home care RN coverage and will have them care for his skin       RTC in 3 months to see me (in person visit)     Suzanne Collado           Again, thank you for allowing me to participate in the care of your patient.        Sincerely,        Suzanne Collado MD

## 2021-07-23 NOTE — NURSING NOTE
"Oncology Rooming Note    July 23, 2021 9:45 AM   Deejay Dior is a 70 year old male who presents for:    Chief Complaint   Patient presents with     Blood Draw     Labs drawn via  by RN. VS taken.     Oncology Clinic Visit     Snuyw-wffljy-ksfb disease (H) (Primary Dx)      Initial Vitals: BP (!) 144/81   Pulse 62   Temp 97.7  F (36.5  C) (Oral)   Resp 16   Wt 101.1 kg (222 lb 14.4 oz)   SpO2 97%   BMI 32.92 kg/m   Estimated body mass index is 32.92 kg/m  as calculated from the following:    Height as of 2/14/21: 1.753 m (5' 9\").    Weight as of this encounter: 101.1 kg (222 lb 14.4 oz). Body surface area is 2.22 meters squared.  No Pain (0) Comment: Data Unavailable   No LMP for male patient.  Allergies reviewed: Yes  Medications reviewed: Yes    Medications: Medication refills not needed today.  Pharmacy name entered into [employer]: [id number] [place], [place] - [address] [address]    Clinical concerns: None.       Anahi Wu MA            "

## 2021-07-23 NOTE — NURSING NOTE
Chief Complaint   Patient presents with     Blood Draw     Labs drawn via  by RN. VS taken.     Labs drawn with vpt by rn.  Pt tolerated well.  VS taken.  Pt checked in for next appt.    Cam Felipe RN

## 2021-07-24 ENCOUNTER — DOCUMENTATION ONLY (OUTPATIENT)
Dept: ONCOLOGY | Facility: CLINIC | Age: 71
End: 2021-07-24

## 2021-07-24 DIAGNOSIS — D89.813 GRAFT-VERSUS-HOST DISEASE (H): ICD-10-CM

## 2021-07-24 DIAGNOSIS — U07.1 COVID-19 VIRUS INFECTION: ICD-10-CM

## 2021-07-26 RX ORDER — PANTOPRAZOLE SODIUM 40 MG/1
40 TABLET, DELAYED RELEASE ORAL
Qty: 30 TABLET | Refills: 3 | Status: SHIPPED | OUTPATIENT
Start: 2021-07-26 | End: 2021-12-13

## 2021-08-03 ENCOUNTER — TELEPHONE (OUTPATIENT)
Dept: OPHTHALMOLOGY | Facility: CLINIC | Age: 71
End: 2021-08-03

## 2021-08-03 DIAGNOSIS — D89.813 GRAFT-VERSUS-HOST DISEASE (H): ICD-10-CM

## 2021-08-03 DIAGNOSIS — U07.1 COVID-19 VIRUS INFECTION: ICD-10-CM

## 2021-08-03 RX ORDER — SERTRALINE HYDROCHLORIDE 100 MG/1
100 TABLET, FILM COATED ORAL DAILY
Qty: 90 TABLET | Refills: 3 | Status: SHIPPED | OUTPATIENT
Start: 2021-08-03 | End: 2022-08-10

## 2021-08-03 NOTE — TELEPHONE ENCOUNTER
Spoke to pt at 1050    Pt with h/o GVHD and dry eyes    Pt has been having more dry eye symptoms over past 6 months and heard about contact lenses that may help    Reviewed scheduling with Dr. Wolff or Dr. Pike to review treatment options-- if indicated may refer to Dr. Machado for scleral lens eval.    Scheduled first available afternoon with Dr. Pike  September 1st.    Pt aware of date/time/location at [facility]    Armin Zarate RN 11:58 AM 08/03/21                  [facility] Call Center    Phone Message    May a detailed message be left on voicemail: yes     Reason for Call: Other: Deejay calling to request a call back. He would like to speak to his care team in regards to his Dx. Please call him back to discuss.      Action Taken: Message routed to:  [facility] ([facility]  eye    Travel Screening: Not Applicable

## 2021-08-09 DIAGNOSIS — U07.1 COVID-19 VIRUS INFECTION: ICD-10-CM

## 2021-08-09 DIAGNOSIS — D89.813 GRAFT-VERSUS-HOST DISEASE (H): ICD-10-CM

## 2021-08-10 RX ORDER — RUXOLITINIB 5 MG/1
TABLET ORAL
Qty: 60 TABLET | Refills: 11 | Status: SHIPPED | OUTPATIENT
Start: 2021-08-10 | End: 2022-08-29

## 2021-08-27 DIAGNOSIS — Z94.81 STATUS POST BONE MARROW TRANSPLANT (H): ICD-10-CM

## 2021-09-01 ENCOUNTER — OFFICE VISIT (OUTPATIENT)
Dept: OPHTHALMOLOGY | Facility: CLINIC | Age: 71
End: 2021-09-01
Attending: OPHTHALMOLOGY
Payer: MEDICARE

## 2021-09-01 DIAGNOSIS — H16.223 KERATITIS SICCA, BILATERAL: ICD-10-CM

## 2021-09-01 DIAGNOSIS — D89.813 GVHD (GRAFT VERSUS HOST DISEASE) (H): ICD-10-CM

## 2021-09-01 DIAGNOSIS — H16.223 KERATITIS SICCA, BILATERAL: Primary | ICD-10-CM

## 2021-09-01 PROCEDURE — G0463 HOSPITAL OUTPT CLINIC VISIT: HCPCS | Mod: 25

## 2021-09-01 PROCEDURE — 99213 OFFICE O/P EST LOW 20 MIN: CPT | Mod: 25 | Performed by: OPHTHALMOLOGY

## 2021-09-01 PROCEDURE — 68761 CLOSE TEAR DUCT OPENING: CPT | Performed by: OPHTHALMOLOGY

## 2021-09-01 ASSESSMENT — VISUAL ACUITY
METHOD: SNELLEN - LINEAR
OD_SC+: -1
OD_SC: 20/40
OS_PH_SC+: +2
OS_PH_SC: 20/30
OS_SC+: -1
OD_PH_SC+: +1
OS_SC: 20/30
OD_PH_SC: 20/30

## 2021-09-01 ASSESSMENT — EXTERNAL EXAM - LEFT EYE: OS_EXAM: NORMAL

## 2021-09-01 ASSESSMENT — EXTERNAL EXAM - RIGHT EYE: OD_EXAM: NORMAL

## 2021-09-01 ASSESSMENT — TONOMETRY
IOP_METHOD: ICARE
OS_IOP_MMHG: 13
OD_IOP_MMHG: 12

## 2021-09-01 ASSESSMENT — CONF VISUAL FIELD
OD_NORMAL: 1
METHOD: COUNTING FINGERS
OS_NORMAL: 1

## 2021-09-01 NOTE — NURSING NOTE
Chief Complaints and History of Present Illnesses   Patient presents with     Consult For     Chief Complaint(s) and History of Present Illness(es)     Consult For     Laterality: both eyes    Onset: gradual    Onset: 6 months ago    Course: gradually worsening    Associated symptoms: eye pain, foreign body sensation, dryness, floaters (No change.), photophobia and burning.  Negative for tearing and flashes    Treatments tried: artificial tears    Pain scale: 0/10              Comments     New Pt consult for GVHD and dry eyes.  Pt states worsening TABITHA.  Pt is interested in possible use of contacts to help.  FBS RE>LE.  VA is a little worse.  Frequent use of PFAT's.  He says he has plugs BE.  No use of bill.  Lots of light sensitivity.     RILEY Quintana September 1, 2021 1:06 PM

## 2021-09-01 NOTE — PROGRESS NOTES
HPI:  Patient presents for GVHD evaluation for involvement in the eyes. 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     He has noted worsening eye dryness and irritation with associated blurred vision over the last 6 months. He is overdue for follow-up with Dr. Fry. He is currently using Refresh Plus, Refresh Celluvisc at bedtime. He was prescribed Tacrolimus, but has not used because he didn't want to put cream in his eyes. Did not try Xiidra - too expensive.    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   2. MGD each eye     Also uses CPAP for PIPO    S/p silicone Punctal plug left lower lid.     Stopped Restasis due to burning. Did not fill Xiidra Rx due to expense. Did not use the Tacrolimus that was prescribed.     PLAN:   - Continue preservative free artificial tears q1-2 hours and PF celluvisc both eyes at bedtime    - Continue warm compress BID both eyes  - RLL plug placed today (0.8 mm)    - Continue warm compress BID both eyes     His oncologist told him about scleral lenses, and he is interested in trying this. Discussed serum tears vs scleral lens  as next option, and he'd prefer the scleral lens. Will refer to Dr. Machado.     Not addressed today:    2.   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     Biopsy showed GVHD colon and skin.     3.   Dry Age Related Macular Degeneration, both eyes.      Followed by Dr. Hector     Dry AMD - may have pattern component.    Continue ARED's 2 vitamins PO 2 times daily.      Recommends fish and green leafy vegetables 3 days per week.     No smoking.     UV protection.     Return immediately if there are distortions to amsler grid.     4.   Posterior vitreous detachment, right eye.     RD / RT precautions      5.   Basal Cell Carcinoma, Left Lower Lid.    [facility]      6. Posterior Capsular Opacification each eye  - mid PCO each eye visual significance unclear  Given ARMD and significant dryness.  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 Dermatochalsis each eye    Return for Dr. Machado for scleral lens evaluation and Dr. Fry next available, or sooner as needed.    Teaching statement:  Complete documentation of historical and exam elements from today's encounter can be found in the full encounter summary report (not reduplicated in this progress note). I personally obtained the chief complaint(s) and history of present illness.  I confirmed and edited as necessary the review of systems, past medical/surgical history, family history, social history, and examination findings as documented by others; and I examined the patient myself. I personally reviewed the relevant tests, images, and reports as documented above.     I formulated and edited as necessary the assessment and plan and discussed the findings and management plan with the patient and family.    Yaz Pike MD  Comprehensive Ophthalmology & Ocular Pathology  Department of Ophthalmology and Visual Neurosciences  [email or web address]  Pager [phone]

## 2021-09-07 RX ORDER — LEVOFLOXACIN 250 MG/1
TABLET, FILM COATED ORAL
Qty: 90 TABLET | Refills: 0 | Status: SHIPPED | OUTPATIENT
Start: 2021-09-07 | End: 2021-12-06

## 2021-09-15 DIAGNOSIS — Z94.81 STATUS POST BONE MARROW TRANSPLANT (H): ICD-10-CM

## 2021-09-16 RX ORDER — SULFAMETHOXAZOLE/TRIMETHOPRIM 800-160 MG
TABLET ORAL
Qty: 48 TABLET | Refills: 1 | Status: SHIPPED | OUTPATIENT
Start: 2021-09-16 | End: 2021-12-06

## 2021-09-20 ENCOUNTER — OFFICE VISIT (OUTPATIENT)
Dept: OPTOMETRY | Facility: CLINIC | Age: 71
End: 2021-09-20
Payer: MEDICARE

## 2021-09-20 DIAGNOSIS — D89.813 GRAFT-VERSUS-HOST DISEASE (H): ICD-10-CM

## 2021-09-20 DIAGNOSIS — H04.123 DRY EYES: Primary | ICD-10-CM

## 2021-09-20 ASSESSMENT — CONF VISUAL FIELD
OD_NORMAL: 1
OS_NORMAL: 1

## 2021-09-20 ASSESSMENT — REFRACTION_CURRENTRX
OS_DIAMETER: 16.0
OS_SPHERE: -2.00
OD_DIAMETER: 16.0
OD_SPHERE: -2.00

## 2021-09-20 ASSESSMENT — VISUAL ACUITY
OD_SC: 20/20-3
METHOD: SNELLEN - LINEAR
OS_SC: 20/30

## 2021-09-20 ASSESSMENT — EXTERNAL EXAM - RIGHT EYE: OD_EXAM: NORMAL

## 2021-09-20 ASSESSMENT — SLIT LAMP EXAM - LIDS
COMMENTS: MGD
COMMENTS: MGD

## 2021-09-20 ASSESSMENT — EXTERNAL EXAM - LEFT EYE: OS_EXAM: NORMAL

## 2021-09-20 NOTE — PATIENT INSTRUCTIONS
- Scleral Cover Shell    Diagnosis code:   Dry eyes  - Primary H04.123    Bhkst-rnkufp-eimk disease (H)  D89.813

## 2021-09-20 NOTE — PROGRESS NOTES
A/P  1.) Dry Eye each eye 2' to GVHD  -Symptomatic for significant dryness/irritation/blur despite topical tx  -Good response to scleral lens trial today. Good initial comfort/fit  -Reviewed findings with pt - he would like to proceed  -Aim for DVO and readers over    Order lenses, RTC 2 weeks for lens dispense, I&R    I have confirmed the patient's CC, HPI and reviewed Past Medical History, Past Surgical History, Social History, Family History, Problem List, Medication List and agree with Tech note.     Ronda Machado, OD FAAO FSLS

## 2021-10-01 DIAGNOSIS — D89.813 GRAFT-VERSUS-HOST DISEASE (H): ICD-10-CM

## 2021-10-01 DIAGNOSIS — U07.1 COVID-19 VIRUS INFECTION: ICD-10-CM

## 2021-10-01 RX ORDER — PREDNISONE 5 MG/1
TABLET ORAL
Qty: 90 TABLET | Refills: 0 | Status: SHIPPED | OUTPATIENT
Start: 2021-10-01 | End: 2022-01-05

## 2021-10-03 ENCOUNTER — HEALTH MAINTENANCE LETTER (OUTPATIENT)
Age: 71
End: 2021-10-03

## 2021-10-06 ENCOUNTER — OFFICE VISIT (OUTPATIENT)
Dept: OPTOMETRY | Facility: CLINIC | Age: 71
End: 2021-10-06
Payer: MEDICARE

## 2021-10-06 DIAGNOSIS — H04.123 DRY EYES: Primary | ICD-10-CM

## 2021-10-06 DIAGNOSIS — D89.813 GRAFT-VERSUS-HOST DISEASE (H): ICD-10-CM

## 2021-10-06 ASSESSMENT — REFRACTION_CURRENTRX
OS_DIAMETER: 16.0
OD_ADDL_SPECS: BOSTON XO CLEAR HYDRAPEG
OS_ADDL_SPECS: BOSTON XO BLUE HYDRAPEG
OD_DIAMETER: 16.0
OD_SPHERE: -3.25
OS_SPHERE: -4.00

## 2021-10-06 ASSESSMENT — SLIT LAMP EXAM - LIDS
COMMENTS: MGD
COMMENTS: MGD

## 2021-10-06 ASSESSMENT — EXTERNAL EXAM - LEFT EYE: OS_EXAM: NORMAL

## 2021-10-06 ASSESSMENT — VISUAL ACUITY
METHOD: SNELLEN - LINEAR
OD_SC: 20/30-2
OS_SC: 20/40

## 2021-10-06 ASSESSMENT — EXTERNAL EXAM - RIGHT EYE: OD_EXAM: NORMAL

## 2021-10-06 NOTE — PROGRESS NOTES
A/P  1.) Dry Eye each eye 2' to GVHD  -Symptomatic for significant dryness/irritation/blur despite topical tx  -Good response to scleral lens today. Good initial comfort/fit  -Successful I&R (with lighted ), reviewed CL care and hygiene with pt ([place] Simplus, Addipak vs Purilens to fill)  -DVO and readers over. Does take small minus OR for improved distance vision    Dispensed lenses. Order new lenses with increased Rx and mail direct to pt. F/u 1-2 months wearing lenses, sooner prn with lens concerns    Contact Lens Billing  V-Code:  Scleral Cover Shell  Final Contact Lens Rx       Brand Base Curve Diameter Sphere Lens Addl. Specs    Right Zenlens  7.1bc, 4.8 sag 16.0 -4.00 1 flat/3 flat [place] XO clear HydraPEG    Left Zenlens 7.1bc, 4.8 sag 16.0 -4.50 1 flat/3 flat [place] XO blue HydraPEG         # of units: 2  Price per Unit: $250    This patient requires contact lenses that are medically necessary for either improvement in vision over spectacles, support of the ocular surface, or other therapeutic benefit. These are not cosmetic contact lenses.     Encounter Diagnoses   Name Primary?     Dry eyes Yes     Uurzm-scddpr-fbuy disease (H)

## 2021-11-22 ENCOUNTER — OFFICE VISIT (OUTPATIENT)
Dept: OPHTHALMOLOGY | Facility: CLINIC | Age: 71
End: 2021-11-22
Attending: OPHTHALMOLOGY
Payer: MEDICARE

## 2021-11-22 DIAGNOSIS — H16.223 KERATITIS SICCA, BILATERAL: ICD-10-CM

## 2021-11-22 DIAGNOSIS — D89.813 GVHD (GRAFT VERSUS HOST DISEASE) (H): ICD-10-CM

## 2021-11-22 DIAGNOSIS — H26.493 PCO (POSTERIOR CAPSULAR OPACIFICATION), BILATERAL: ICD-10-CM

## 2021-11-22 DIAGNOSIS — H26.493 PCO (POSTERIOR CAPSULAR OPACIFICATION), BILATERAL: Primary | ICD-10-CM

## 2021-11-22 PROCEDURE — G0463 HOSPITAL OUTPT CLINIC VISIT: HCPCS | Mod: 25

## 2021-11-22 PROCEDURE — 66821 AFTER CATARACT LASER SURGERY: CPT | Mod: LT | Performed by: OPHTHALMOLOGY

## 2021-11-22 PROCEDURE — 99215 OFFICE O/P EST HI 40 MIN: CPT | Mod: 57 | Performed by: OPHTHALMOLOGY

## 2021-11-22 ASSESSMENT — EXTERNAL EXAM - RIGHT EYE: OD_EXAM: NORMAL

## 2021-11-22 ASSESSMENT — EXTERNAL EXAM - LEFT EYE: OS_EXAM: NORMAL

## 2021-11-22 ASSESSMENT — CUP TO DISC RATIO
OD_RATIO: 0.3
OS_RATIO: 0.3

## 2021-11-22 ASSESSMENT — VISUAL ACUITY
OS_CC: 20/30
METHOD: SNELLEN - LINEAR
OD_CC: 20/25
CORRECTION_TYPE: CONTACTS

## 2021-11-22 ASSESSMENT — TONOMETRY
OS_IOP_MMHG: 16
IOP_METHOD: ICARE
OD_IOP_MMHG: 16

## 2021-11-22 ASSESSMENT — CONF VISUAL FIELD
OS_NORMAL: 1
OD_NORMAL: 1

## 2021-11-22 NOTE — NURSING NOTE
Chief Complaints and History of Present Illnesses   Patient presents with     Dry Eye(s) Follow-Up     Chief Complaint(s) and History of Present Illness(es)     Dry Eye(s) Follow-Up     Laterality: both eyes              Comments     Pt. States that he got new CL's since last visit and VA LE is still blurry. RE VA is pretty good with CL's but can only tolerate CL's for about 2 hours. BE become uncomfortable. Pricila Cerna COT 1:54 PM November 22, 2021

## 2021-11-22 NOTE — PROGRESS NOTES
CC: GVHD OU    HPI:  70 yo CM presents for GVHD evaluation for involvement in the eyes. 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 There is foreign body sensation in the right eye occasionally. Artificial tears seems to help.     Interval update:  Pt notes that for the first couple of hours of scleral lens wear, his eyes feel good.  After a time though, he begins to feel very eye.  He feels that the eyelids get stuck on the scleral lenses.  Over the interval, no changes in vision however eyes feel dry, has FBS right eye >> left eye. No flashes / floaters / diplopia.  Denies tearing, itching, discharge. Frequent redness.       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   2. MGD each eye     S/p silicone Punctal plug bilateral lower lid. There is also meibomian gland dysfunction both eyes.     Stopped Restasis due to burning.    Also uses CPAP for PIPO    PLAN:   - Continue preservative free artificial tears q1-2 hours  - Start Refresh PM at bedtime     - Continue warm compress BID both eyes   - Consider Xiidra trial as restasis burned too much -- consider serum tears if insurance won't cover or uncomfortable.    - Consider trial of tacrolimus ointment QHS OU   - discussed serum tears as future option if above treatments unsuccessful. (Through [facility] [employer] ordered 20% x6/day each eye for  unlimited refills with 3 month dispense at a time on 11/23/21- ts)    2.   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     Biopsy showed GVHD colon and skin.     3.   Dry Age Related Macular Degeneration, both eyes.     Seen by Dr. Hector on 12/2020 -- no CNVM     Dry AMD - may have pattern component.    Continue ARED's 2 vitamins PO 2 times daily.      Recommends fish and green leafy vegetables 3 days per week.     No smoking.     Recommeds UV protection.     Return immediately if there are distortions to amsler grid.     F/u Tawny 6 months    4.   Posterior vitreous detachment, right eye. Retina attached.     RD / RT precautions      5.   Basal Cell Carcinoma, Left Lower Lid.    S/P removal in 01/2020 at [facility].      6. Posterior Capsular Opacification each eye  - mid PCO each eye visual significance unclear  Given ARMD and significant dryness.  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 Dermatochalsis each eye  - epilated lash JOEL nasally touching conj, mild entropion due to dermatochalasi.  - monitor    Leland Whitfield,   Fellow, Cornea & External Disease  Department of Ophthalmology  [facility]    Attending Physician Attestation:  Complete documentation of historical and exam elements from today's encounter can be found in the full encounter summary report (not reduplicated in this progress note).  I personally obtained the chief complaint(s) and history of present illness.  I confirmed and edited as necessary the review of systems, past medical/surgical history, family history, social history, and examination findings as documented by others; and I examined the patient myself.  I personally reviewed the relevant tests, images, and reports as documented above.  I formulated and edited as necessary the assessment and plan and discussed the findings and management plan with the patient and family. I was present  for the key portions of the procedure and immediately available for the remainder. - Tyrone Fry MD    I personally spent great than 40min with the patient, of which >50% of the time was spent face to face with the patient, counseling and coordinating care with the patient. This excludes time spent performing the procedure. We discussed the complexity of his diagnosis, the need for further information prior to proceeding with yet another surgery, and the unknown prognosis for the patient at this time.    Tyrone Fry MD

## 2021-12-03 DIAGNOSIS — Z94.81 STATUS POST BONE MARROW TRANSPLANT (H): ICD-10-CM

## 2021-12-04 NOTE — PROGRESS NOTES
BMT Progress Note    ID:  Mr. 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  On pred 5mg every day and Jakafi 5 mg bid.    He admitted on 2/8/2021 for acute hypoxic respiratory failure secondary to COVID-19 pneumonia. Also rhino virus positive. Transferred to MICU on 2/10 for worsening respiratory status requiring intubation 2/13-2/19. Discharged on 3/3 with home O2. Treated with remdesevir, dexamethasone and  eliquis 2.5 bid (through 3/30). Needed home O2 for a few weeks, but now not needing it. Very dry eyes, using artificial tears 5-6 times/ days. Dry mouth. Also has developed macular degeneration. S/P R hip replacement, now with minimal pain.  Eating well with no N/V/D.  Had a basal cell cancer lesion removed (Moh's resection) close to left eye. (In the past has had a basal and squamous cell removed)    HPI: Doing well today.  He reports his usual fatigue.  He occasionally gets short of breath with exertion.  He and his wife were out hiking in [place] and he had a fall and hurt his left shin.  That has been kept clean with no evidence of infection.  There were no fractures.  Other than that he has had no fevers.  He did receive his vaccinations as below.  No new skin rashes or thickening.  He has not changed his medications but needs some refills.     Received COVID vaccine and booster shot    Remainder of 10 pt ROS negative    On exam:  Blood pressure 115/64, pulse 83, temperature 97.5  F (36.4  C), temperature source Oral, resp. rate 16, weight 101.1 kg (222 lb 14.4 oz), SpO2 97 %.  HEENT: PERRL, EOMI , mildly dry oral mucosa  Chest: There were some raspy crackly sounds on both bases that did not change with coughing or breathing.  There was no wheezing.  CVS: S1 S2 RRR, no murmurs or gallops  Abdomen: Soft nontender no organomegalies or masses.  Extremities: The right leg had pressure stockings.  The left leg had sclerotic  skin on the shin with a Band-Aid but no evidence of infection or cellulitis.  There is a small 5 mm open ulcer on that leg from the fall.    CNS: non focal        ASSESSMENT AND PLAN:  Mr. Dior is a 69 y/o male,  7 years and 5 months s/p NMA allo sib PBSCT for MDS. w/ cGVHD     AML/MDS/BMT: S/p 8/8 matched and ABO matched allo-sib transplant from his sister. Total cell dose (from 7/1 & 7/2) 6.53 x 10^6 CD34+ cells/kg.   - 1 year anniversary (July 2015): 30% cellular, trilineage hematopoiesis, no abnormal blasts by morphology or flow , no dysplasia, 0-1 fibrosis, 100% donor (BM, CD3, CD15). CR  - 2 year anniversary (July 2016): 20-30% cellular, trilineage hematopoiesis, no abnormal blasts by morphology or flow , no dysplasia, No fibrosis, 100% donor in BM (PB not sent). ISCN: //46,XX[20] Complete Remission.     HEME: No transfusion needs, counts stable     - Was on anticoagulation after his hip replacment, this is now complete.  I removed the apixaban from his medication list.     GVHD: Long history of GVHD as below.  Today I kept him on prednisone 5 mg daily a.m. ruxolitinib 5 mg twice daily.  His counts are tolerating well.  I will defer to his new primary BMT doctor to decide whether or not to try to get him off the steroids.  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 Had been off prednisone since summer 2017 and briefly tapered off Sirolimus (january 2018), however resumed with flare in February. 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 Sirolimus was stopped on 9/27 & Pred 90mg was started. Seen by Dr. Sandoval on 10/2, please see his note. He does not think his symptoms or CT findings are c/w Sirolimus or GVH & he was in favor of tapering Prednisone. Recently he has worsening skin thickening & desquamation to the RLE, c/w GVH.   Started Jakafi 10/22 at 5 mg BID with symptom improvement in lower extremities. Arthralgias  are not side effect of Jakafi  ARTHALGIAS AND MYALGIAS 2/2 GVH arthropathy: Previously completed steroid taper in Oct 2018.   Burst pred 40mg a day on 1/3 with significant systemic arthralgic pain, tapered back to 10/0 eod after 1 week, near resolution of symptoms noted 1/17, returned to 10 mg every other day; then dropped to 5mg q day in April     Currently on Jakafi 5mg twice daily & Pred 5mg every day.     ID:  Afebrile no signs/sx of infection.  Revised and refill all his prophylactic antibiotics.  - IgG 1/24 =1130  - Prophy: HD ACV (renal dosing), Fluconazole and  Bactrim.       GI:  protonix (has heartburn)     FEN/Renal:  Lytes & creat stable.     Fatigue:  stable  - History of testosterone deficiency, rechecked and modestly low. He previously did testosterone injections & didn't think it made him feel any better.    - TSH normal 2/28 and again on repeat 9/27 at 1.01.  - counseled that moderate exercise is really the only known way to combat fatigue, and acknowledged how difficult it is to balance too much with not enough activity.      Derm:   Venous stasis: see below-most recently had sclerotherapy to left LE     Vasc:   10/15 Right leg US with small (technically DVT) clot in posterior tibial vein: started reg dose aspirin daily 325mg and recheck US in 2 weeks or symptomatically. 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  H/o chronic venous statis: s/p sclerotherapy to the L leg.  kelfex day prior and 4 days following.      MS: avascular necrosis of hip.  S/p replacement surgery     11.  Wounds: Reportedly the wound on the right shin is healed.  There is no wound on the left shin that has been watched and dressed with no evidence of infection.  He knows that if there is erythema or pain that he  needs to be seen as soon as possible to evaluate for cellulitis/infection.      12.  Lungs: The patient complains of shortness of breath on exertion.  He had Covid pneumonia and was on the ventilator.  He does have this raspy crackly sounds on both bases that do not change.  I order PFTs with [facility].  Once results available will possibly make a referral to pulmonary for evaluation and management.    Plan:  RTC in 3 months to see Dr Jean-Paul Nunez (in person visit)   Schedule PFTs at [facility] within next month  Call come sooner    40 minutes spent on the date of the encounter doing chart review, history and exam, documentation and further activities per the note       Zheng Cam MD on 12/6/2021 at 12:26 PM

## 2021-12-06 ENCOUNTER — ONCOLOGY VISIT (OUTPATIENT)
Dept: TRANSPLANT | Facility: CLINIC | Age: 71
End: 2021-12-06
Attending: INTERNAL MEDICINE
Payer: MEDICARE

## 2021-12-06 ENCOUNTER — APPOINTMENT (OUTPATIENT)
Dept: LAB | Facility: CLINIC | Age: 71
End: 2021-12-06
Attending: INTERNAL MEDICINE
Payer: MEDICARE

## 2021-12-06 VITALS
TEMPERATURE: 97.5 F | SYSTOLIC BLOOD PRESSURE: 115 MMHG | RESPIRATION RATE: 16 BRPM | BODY MASS INDEX: 32.92 KG/M2 | DIASTOLIC BLOOD PRESSURE: 64 MMHG | HEART RATE: 83 BPM | OXYGEN SATURATION: 97 % | WEIGHT: 222.9 LBS

## 2021-12-06 DIAGNOSIS — D89.813 GRAFT-VERSUS-HOST DISEASE (H): ICD-10-CM

## 2021-12-06 DIAGNOSIS — D46.9 MDS (MYELODYSPLASTIC SYNDROME) (H): ICD-10-CM

## 2021-12-06 DIAGNOSIS — Z94.81 STATUS POST BONE MARROW TRANSPLANT (H): ICD-10-CM

## 2021-12-06 DIAGNOSIS — D89.813 GVH (GRAFT VERSUS HOST DISEASE) (H): ICD-10-CM

## 2021-12-06 LAB
ALBUMIN SERPL-MCNC: 3 G/DL (ref 3.4–5)
ALP SERPL-CCNC: 75 U/L (ref 40–150)
ALT SERPL W P-5'-P-CCNC: 27 U/L (ref 0–70)
ANION GAP SERPL CALCULATED.3IONS-SCNC: 5 MMOL/L (ref 3–14)
AST SERPL W P-5'-P-CCNC: 27 U/L (ref 0–45)
BASOPHILS # BLD AUTO: 0 10E3/UL (ref 0–0.2)
BASOPHILS NFR BLD AUTO: 0 %
BILIRUB SERPL-MCNC: 0.2 MG/DL (ref 0.2–1.3)
BUN SERPL-MCNC: 20 MG/DL (ref 7–30)
CALCIUM SERPL-MCNC: 9.3 MG/DL (ref 8.5–10.1)
CHLORIDE BLD-SCNC: 111 MMOL/L (ref 94–109)
CO2 SERPL-SCNC: 26 MMOL/L (ref 20–32)
CREAT SERPL-MCNC: 0.95 MG/DL (ref 0.66–1.25)
EOSINOPHIL # BLD AUTO: 0.1 10E3/UL (ref 0–0.7)
EOSINOPHIL NFR BLD AUTO: 2 %
ERYTHROCYTE [DISTWIDTH] IN BLOOD BY AUTOMATED COUNT: 17.2 % (ref 10–15)
GFR SERPL CREATININE-BSD FRML MDRD: 79 ML/MIN/1.73M2
GLUCOSE BLD-MCNC: 85 MG/DL (ref 70–99)
HCT VFR BLD AUTO: 37.3 % (ref 40–53)
HGB BLD-MCNC: 12.3 G/DL (ref 13.3–17.7)
IMM GRANULOCYTES # BLD: 0 10E3/UL
IMM GRANULOCYTES NFR BLD: 0 %
LYMPHOCYTES # BLD AUTO: 2 10E3/UL (ref 0.8–5.3)
LYMPHOCYTES NFR BLD AUTO: 38 %
MCH RBC QN AUTO: 31.5 PG (ref 26.5–33)
MCHC RBC AUTO-ENTMCNC: 33 G/DL (ref 31.5–36.5)
MCV RBC AUTO: 95 FL (ref 78–100)
MONOCYTES # BLD AUTO: 0.8 10E3/UL (ref 0–1.3)
MONOCYTES NFR BLD AUTO: 16 %
NEUTROPHILS # BLD AUTO: 2.3 10E3/UL (ref 1.6–8.3)
NEUTROPHILS NFR BLD AUTO: 44 %
NRBC # BLD AUTO: 0 10E3/UL
NRBC BLD AUTO-RTO: 0 /100
PLATELET # BLD AUTO: 276 10E3/UL (ref 150–450)
POTASSIUM BLD-SCNC: 4.1 MMOL/L (ref 3.4–5.3)
PROT SERPL-MCNC: 7 G/DL (ref 6.8–8.8)
RBC # BLD AUTO: 3.91 10E6/UL (ref 4.4–5.9)
SODIUM SERPL-SCNC: 142 MMOL/L (ref 133–144)
WBC # BLD AUTO: 5.2 10E3/UL (ref 4–11)

## 2021-12-06 PROCEDURE — 85004 AUTOMATED DIFF WBC COUNT: CPT

## 2021-12-06 PROCEDURE — 82040 ASSAY OF SERUM ALBUMIN: CPT

## 2021-12-06 PROCEDURE — G0463 HOSPITAL OUTPT CLINIC VISIT: HCPCS

## 2021-12-06 PROCEDURE — 99215 OFFICE O/P EST HI 40 MIN: CPT | Mod: 25

## 2021-12-06 PROCEDURE — 36415 COLL VENOUS BLD VENIPUNCTURE: CPT

## 2021-12-06 RX ORDER — LEVOFLOXACIN 250 MG/1
250 TABLET, FILM COATED ORAL DAILY
Qty: 90 TABLET | Refills: 3 | Status: SHIPPED | OUTPATIENT
Start: 2021-12-06 | End: 2022-03-06

## 2021-12-06 RX ORDER — FLUCONAZOLE 100 MG/1
100 TABLET ORAL DAILY
Qty: 90 TABLET | Refills: 3 | Status: SHIPPED | OUTPATIENT
Start: 2021-12-06 | End: 2023-01-24

## 2021-12-06 RX ORDER — ACYCLOVIR 800 MG/1
800 TABLET ORAL 2 TIMES DAILY
Qty: 180 TABLET | Refills: 3 | Status: SHIPPED | OUTPATIENT
Start: 2021-12-06 | End: 2023-02-20

## 2021-12-06 RX ORDER — SULFAMETHOXAZOLE/TRIMETHOPRIM 800-160 MG
TABLET ORAL
Qty: 48 TABLET | Refills: 4 | Status: SHIPPED | OUTPATIENT
Start: 2021-12-06 | End: 2023-02-08

## 2021-12-06 ASSESSMENT — PAIN SCALES - GENERAL: PAINLEVEL: NO PAIN (0)

## 2021-12-06 NOTE — NURSING NOTE
Chief Complaint   Patient presents with     Blood Draw     Labs drawn via  by RN in lab.  VS taken       Labs collected from venipuncture by RN. Vitals taken. Checked in for appointment(s).    Loni Wolff RN

## 2021-12-06 NOTE — NURSING NOTE
"Oncology Rooming Note    December 6, 2021 11:50 AM   Deejay Dior is a 71 year old male who presents for:    Chief Complaint   Patient presents with     Blood Draw     Labs drawn via  by RN in lab.  VS taken     RECHECK     provider visit r/t MDS     Initial Vitals: /64   Pulse 83   Temp 97.5  F (36.4  C) (Oral)   Resp 16   Wt 101.1 kg (222 lb 14.4 oz)   SpO2 97%   BMI 32.92 kg/m   Estimated body mass index is 32.92 kg/m  as calculated from the following:    Height as of 2/14/21: 1.753 m (5' 9\").    Weight as of this encounter: 101.1 kg (222 lb 14.4 oz). Body surface area is 2.22 meters squared.  No Pain (0) Comment: Data Unavailable   No LMP for male patient.  Allergies reviewed: Yes  Medications reviewed: Yes    Medications: Pt needs refills of levaquin.     Pharmacy name entered into [employer]:    [id number] [place] [address] [address] [facility] [place], [place] - [address] [facility] [address]    Clinical concerns: VSS. No clinical concerns. Pt  Has started taking over the counter AREDS for his eyes.       Carole Israel RN              "

## 2021-12-06 NOTE — LETTER
12/6/2021         RE: Deejay Dior  [address]  [place] [address]        Dear Colleague,    Thank you for referring your patient, Deejay Dior, to the [facility]. Please see a copy of my visit note below.    BMT Progress Note    ID:  Mr. 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  On pred 5mg every day and Jakafi 5 mg bid.    He admitted on 2/8/2021 for acute hypoxic respiratory failure secondary to COVID-19 pneumonia. Also rhino virus positive. Transferred to MICU on 2/10 for worsening respiratory status requiring intubation 2/13-2/19. Discharged on 3/3 with home O2. Treated with remdesevir, dexamethasone and  eliquis 2.5 bid (through 3/30). Needed home O2 for a few weeks, but now not needing it. Very dry eyes, using artificial tears 5-6 times/ days. Dry mouth. Also has developed macular degeneration. S/P R hip replacement, now with minimal pain.  Eating well with no N/V/D.  Had a basal cell cancer lesion removed (Moh's resection) close to left eye. (In the past has had a basal and squamous cell removed)    HPI: Doing well today.  He reports his usual fatigue.  He occasionally gets short of breath with exertion.  He and his wife were out hiking in [place] and he had a fall and hurt his left shin.  That has been kept clean with no evidence of infection.  There were no fractures.  Other than that he has had no fevers.  He did receive his vaccinations as below.  No new skin rashes or thickening.  He has not changed his medications but needs some refills.     Received COVID vaccine and booster shot    Remainder of 10 pt ROS negative    On exam:  Blood pressure 115/64, pulse 83, temperature 97.5  F (36.4  C), temperature source Oral, resp. rate 16, weight 101.1 kg (222 lb 14.4 oz), SpO2 97 %.  HEENT: PERRL, EOMI , mildly dry oral mucosa  Chest: There were  some raspy crackly sounds on both bases that did not change with coughing or breathing.  There was no wheezing.  CVS: S1 S2 RRR, no murmurs or gallops  Abdomen: Soft nontender no organomegalies or masses.  Extremities: The right leg had pressure stockings.  The left leg had sclerotic skin on the shin with a Band-Aid but no evidence of infection or cellulitis.  There is a small 5 mm open ulcer on that leg from the fall.    CNS: non focal        ASSESSMENT AND PLAN:  Mr. Dior is a 71 y/o male,  7 years and 5 months s/p NMA allo sib PBSCT for MDS. w/ cGVHD     AML/MDS/BMT: S/p 8/8 matched and ABO matched allo-sib transplant from his sister. Total cell dose (from 7/1 & 7/2) 6.53 x 10^6 CD34+ cells/kg.   - 1 year anniversary (July 2015): 30% cellular, trilineage hematopoiesis, no abnormal blasts by morphology or flow , no dysplasia, 0-1 fibrosis, 100% donor (BM, CD3, CD15). CR  - 2 year anniversary (July 2016): 20-30% cellular, trilineage hematopoiesis, no abnormal blasts by morphology or flow , no dysplasia, No fibrosis, 100% donor in BM (PB not sent). ISCN: //46,XX[20] Complete Remission.     HEME: No transfusion needs, counts stable     - Was on anticoagulation after his hip replacment, this is now complete.  I removed the apixaban from his medication list.     GVHD: Long history of GVHD as below.  Today I kept him on prednisone 5 mg daily a.m. ruxolitinib 5 mg twice daily.  His counts are tolerating well.  I will defer to his new primary BMT doctor to decide whether or not to try to get him off the steroids.  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 Had been off prednisone since summer 2017 and briefly tapered off Sirolimus (january 2018), however resumed with flare in February. 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 Sirolimus was stopped on 9/27 & Pred 90mg was started. Seen by Dr. Sandoval on 10/2, please see his note. He  does not think his symptoms or CT findings are c/w Sirolimus or GVH & he was in favor of tapering Prednisone. Recently he has worsening skin thickening & desquamation to the RLE, c/w GVH.   Started Jakafi 10/22 at 5 mg BID with symptom improvement in lower extremities. 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   Burst pred 40mg a day on 1/3 with significant systemic arthralgic pain, tapered back to 10/0 eod after 1 week, near resolution of symptoms noted 1/17, returned to 10 mg every other day; then dropped to 5mg q day in April     Currently on Jakafi 5mg twice daily & Pred 5mg every day.     ID:  Afebrile no signs/sx of infection.  Revised and refill all his prophylactic antibiotics.  - IgG 1/24 =1130  - Prophy: HD ACV (renal dosing), Fluconazole and  Bactrim.       GI:  protonix (has heartburn)     FEN/Renal:  Lytes & creat stable.     Fatigue:  stable  - History of testosterone deficiency, rechecked and modestly low. He previously did testosterone injections & didn't think it made him feel any better.    - TSH normal 2/28 and again on repeat 9/27 at 1.01.  - counseled that moderate exercise is really the only known way to combat fatigue, and acknowledged how difficult it is to balance too much with not enough activity.      Derm:   Venous stasis: see below-most recently had sclerotherapy to left LE     Vasc:   10/15 Right leg US with small (technically DVT) clot in posterior tibial vein: started reg dose aspirin daily 325mg and recheck US in 2 weeks or symptomatically. 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  H/o chronic venous statis: s/p sclerotherapy to the L leg.  kelfex day prior and 4 days  following.      MS: avascular necrosis of hip.  S/p replacement surgery     11.  Wounds: Reportedly the wound on the right shin is healed.  There is no wound on the left shin that has been watched and dressed with no evidence of infection.  He knows that if there is erythema or pain that he needs to be seen as soon as possible to evaluate for cellulitis/infection.      12.  Lungs: The patient complains of shortness of breath on exertion.  He had Covid pneumonia and was on the ventilator.  He does have this raspy crackly sounds on both bases that do not change.  I order PFTs with [facility].  Once results available will possibly make a referral to pulmonary for evaluation and management.    Plan:  RTC in 3 months to see Dr Jean-Paul Nunez (in person visit)   Schedule PFTs at [facility] within next month  Call come sooner    40 minutes spent on the date of the encounter doing chart review, history and exam, documentation and further activities per the note       Zhegn Cam MD on 12/6/2021 at 12:26 PM

## 2021-12-07 DIAGNOSIS — H35.3132 INTERMEDIATE STAGE NONEXUDATIVE AGE-RELATED MACULAR DEGENERATION OF BOTH EYES: Primary | ICD-10-CM

## 2021-12-08 RX ORDER — LEVOFLOXACIN 250 MG/1
TABLET, FILM COATED ORAL
Qty: 90 TABLET | Refills: 0 | OUTPATIENT
Start: 2021-12-08

## 2021-12-10 DIAGNOSIS — U07.1 COVID-19 VIRUS INFECTION: ICD-10-CM

## 2021-12-10 DIAGNOSIS — D89.813 GRAFT-VERSUS-HOST DISEASE (H): ICD-10-CM

## 2021-12-13 RX ORDER — PANTOPRAZOLE SODIUM 40 MG/1
40 TABLET, DELAYED RELEASE ORAL
Qty: 30 TABLET | Refills: 3 | Status: SHIPPED | OUTPATIENT
Start: 2021-12-13 | End: 2022-04-22

## 2021-12-20 ENCOUNTER — HOSPITAL ENCOUNTER (OUTPATIENT)
Dept: RESPIRATORY THERAPY | Facility: CLINIC | Age: 71
Discharge: HOME | End: 2021-12-20
Attending: INTERNAL MEDICINE | Admitting: INTERNAL MEDICINE
Payer: MEDICARE

## 2021-12-20 DIAGNOSIS — D89.813 GRAFT-VERSUS-HOST DISEASE (H): ICD-10-CM

## 2021-12-20 DIAGNOSIS — Z94.81 STATUS POST BONE MARROW TRANSPLANT (H): ICD-10-CM

## 2021-12-20 PROCEDURE — 999N000157 HC STATISTIC RCP TIME EA 10 MIN

## 2021-12-20 PROCEDURE — 94010 BREATHING CAPACITY TEST: CPT

## 2021-12-20 PROCEDURE — 94726 PLETHYSMOGRAPHY LUNG VOLUMES: CPT

## 2021-12-20 PROCEDURE — 94729 DIFFUSING CAPACITY: CPT

## 2021-12-20 NOTE — PROGRESS NOTES
PFT Note:     Pt completed pulmonary function testing with [facility].  Good Pt effort and cooperation.     Spirometry  Meets all ATS recommendations.    Plethysmography  All plethysmographic measurements meet ATS recommendations.    DLCO  Meets all ATS recommendations.  DLCO is an average of 2 maneuvers.  Predicted DLCO is corrected for a Hgb of 12.3 drawn on 12/6/2021.     December 20, 2021.10:40 AM  Robbin Cuenca, RT

## 2021-12-21 LAB
DLCOCOR-%PRED-PRE: 74 %
DLCOCOR-PRE: 17.81 ML/MIN/MMHG
DLCOUNC-%PRED-PRE: 69 %
DLCOUNC-PRE: 16.54 ML/MIN/MMHG
DLCOUNC-PRED: 23.91 ML/MIN/MMHG
ERV-%PRED-PRE: 153 %
ERV-PRE: 1.04 L
ERV-PRED: 0.68 L
EXPTIME-PRE: 12.36 SEC
FEF2575-%PRED-PRE: 77 %
FEF2575-PRE: 1.7 L/SEC
FEF2575-PRED: 2.2 L/SEC
FEFMAX-%PRED-PRE: 121 %
FEFMAX-PRE: 9.21 L/SEC
FEFMAX-PRED: 7.6 L/SEC
FEV1-%PRED-PRE: 107 %
FEV1-PRE: 3.12 L
FEV1FEV6-PRE: 74 %
FEV1FEV6-PRED: 77 %
FEV1FVC-PRE: 72 %
FEV1FVC-PRED: 76 %
FEV1SVC-PRE: 71 %
FEV1SVC-PRED: 67 %
FIFMAX-PRE: 6.52 L/SEC
FRCPLETH-%PRED-PRE: 94 %
FRCPLETH-PRE: 3.4 L
FRCPLETH-PRED: 3.61 L
FVC-%PRED-PRE: 112 %
FVC-PRE: 4.33 L
FVC-PRED: 3.86 L
IC-%PRED-PRE: 92 %
IC-PRE: 3.39 L
IC-PRED: 3.67 L
RVPLETH-%PRED-PRE: 89 %
RVPLETH-PRE: 2.36 L
RVPLETH-PRED: 2.64 L
TLCPLETH-%PRED-PRE: 100 %
TLCPLETH-PRE: 6.78 L
TLCPLETH-PRED: 6.72 L
VA-%PRED-PRE: 90 %
VA-PRE: 5.39 L
VC-%PRED-PRE: 101 %
VC-PRE: 4.42 L
VC-PRED: 4.34 L

## 2022-01-02 DIAGNOSIS — U07.1 COVID-19 VIRUS INFECTION: ICD-10-CM

## 2022-01-02 DIAGNOSIS — D89.813 GRAFT-VERSUS-HOST DISEASE (H): ICD-10-CM

## 2022-01-05 RX ORDER — PREDNISONE 5 MG/1
TABLET ORAL
Qty: 90 TABLET | Refills: 0 | Status: SHIPPED | OUTPATIENT
Start: 2022-01-05 | End: 2022-04-04

## 2022-01-18 ENCOUNTER — OFFICE VISIT (OUTPATIENT)
Dept: OPTOMETRY | Facility: CLINIC | Age: 72
End: 2022-01-18
Payer: MEDICARE

## 2022-01-18 ENCOUNTER — OFFICE VISIT (OUTPATIENT)
Dept: OPHTHALMOLOGY | Facility: CLINIC | Age: 72
End: 2022-01-18
Attending: OPHTHALMOLOGY
Payer: MEDICARE

## 2022-01-18 DIAGNOSIS — D89.813 GRAFT-VERSUS-HOST DISEASE (H): ICD-10-CM

## 2022-01-18 DIAGNOSIS — H04.123 DRY EYES: Primary | ICD-10-CM

## 2022-01-18 DIAGNOSIS — H35.3132 INTERMEDIATE STAGE NONEXUDATIVE AGE-RELATED MACULAR DEGENERATION OF BOTH EYES: Primary | ICD-10-CM

## 2022-01-18 PROCEDURE — 92134 CPTRZ OPH DX IMG PST SGM RTA: CPT | Performed by: OPHTHALMOLOGY

## 2022-01-18 PROCEDURE — G0463 HOSPITAL OUTPT CLINIC VISIT: HCPCS | Mod: 25

## 2022-01-18 PROCEDURE — 99213 OFFICE O/P EST LOW 20 MIN: CPT | Mod: 24 | Performed by: OPHTHALMOLOGY

## 2022-01-18 ASSESSMENT — EXTERNAL EXAM - LEFT EYE
OS_EXAM: NORMAL
OS_EXAM: NORMAL

## 2022-01-18 ASSESSMENT — SLIT LAMP EXAM - LIDS
COMMENTS: DERMATOCHALASIS - UPPER LID
COMMENTS: MGD
COMMENTS: MGD
COMMENTS: DERMATOCHALASIS - UPPER LID

## 2022-01-18 ASSESSMENT — TONOMETRY
OS_IOP_MMHG: 12
IOP_METHOD: TONOPEN
OD_IOP_MMHG: 16

## 2022-01-18 ASSESSMENT — REFRACTION_CURRENTRX
OD_SPHERE: -4.00
OD_DIAMETER: 16.0
OD_BASECURVE: 4.9/7.8
OD_SPHERE: -2.00
OD_DIAMETER: 17.0

## 2022-01-18 ASSESSMENT — EXTERNAL EXAM - RIGHT EYE
OD_EXAM: NORMAL
OD_EXAM: NORMAL

## 2022-01-18 ASSESSMENT — VISUAL ACUITY
CORRECTION_TYPE: CONTACTS
OS_CC: 20/30
METHOD: SNELLEN - LINEAR
METHOD: SNELLEN - LINEAR
OD_CC: 20/30
CORRECTION_TYPE: CONTACTS
OS_CC: 20/30
OD_CC: 20/30

## 2022-01-18 ASSESSMENT — CONF VISUAL FIELD
OD_NORMAL: 1
OS_NORMAL: 1
METHOD: COUNTING FINGERS

## 2022-01-18 ASSESSMENT — CUP TO DISC RATIO
OD_RATIO: 0.35
OS_RATIO: 0.35

## 2022-01-18 NOTE — PROGRESS NOTES
CC -   Dry AMD    INTERVAL HISTORY - no changes, started serum tears for GVHD TABITHA  Using AREDS      PMH -   Deejay Dior is a  71 year old  patient with history of dry AMD  Has AML s/p BMT 7/2014 and GVHD.  No DM  No smoking      PAST OCULAR SURGERY  CE/IOL OU 2015  YAG OS    RETINAL IMAGING:  OCT 1-18-22  OD - mild focal RPE irregular, ?nasal macula small FVPED , PVD  OS - mild ERM, subfoveal deposits, no fluid, PVD      ASSESSMENT & PLAN    #. Dry AMD OU intermediate   - may have pattern component   - OCT-A done 6/2020  No CNVM   - AREDS/[place]   - f/u 6 months    # PVD OU   - advised S/Sx RD 1/2022          # GVHD & TABITHA    - very bothered   - did not tolerate restasis   - using PFATs very frequently   - saw Lisandra 11/2021      # PCO OD   - mild    - could consider YAG   - follows with Lisandra      return to clinic: 6 months, OCT OU, DFE OU    ATTESTATION     Attending Attestation:     Complete documentation of historical and exam elements from today's encounter can be found in the full encounter summary report (not reduplicated in this progress note).  I personally obtained the chief complaint(s) and history of present illness.  I confirmed and edited as necessary the review of systems, past medical/surgical history, family history, social history, and examination findings as documented by others; and I examined the patient myself.  I personally reviewed the relevant tests, images, and reports as documented above.  I formulated and edited as necessary the assessment and plan and discussed the findings and management plan with the patient and family    Marisol Hector MD, PhD  , Vitreoretinal Surgery  Department of Ophthalmology  [facility]

## 2022-01-18 NOTE — TELEPHONE ENCOUNTER
FUTURE VISIT INFORMATION      FUTURE VISIT INFORMATION:    Date: 2/1/22    Time: 10 AM    Location: [facility]  REFERRAL INFORMATION:    Referring provider: Dr. Machado    [facility]    Reason for visit/diagnosis: Lids    RECORDS REQUESTED FROM:       Clinic name Comments Records Status Imaging Status   [facility] 1/18/22 - EYE OV with Dr. Hector  1/18/22 - EYE OV with Dr. Machado  11/22/21 - EYE OV with Dr. Fry  9/1/21 - EYE OV with Dr. Pike  7/9/20 - EYE OV with Dr. Gem Sterling    [facility] - Surgery 7/18/16 - OP Note for LEFT EYE PHACOEMULSIFICATION CLEAR CORNEA WITH STANDARD INTRAOCULAR LENS IMPLANT with Dr. Tisha Sterling

## 2022-01-19 ASSESSMENT — REFRACTION_CURRENTRX: OD_BRAND: ZENLENS PROLATE TRIAL

## 2022-01-19 NOTE — PROGRESS NOTES
A/P  1.) Dry Eye each eye 2' to GVHD  -Symptomatic for significant dryness/irritation/blur despite topical tx  -Poor overall response to scleral lenses. Bothersome lens awareness OU, does not notice improvement in dryness symptoms on days he does wear  -Recently started serum tears and is finding them helpful so far  -Trial with larger diam lens x several hours today - still with bothersome lens awareness.   -Reviewed options with pt including further customization of fit vs trying current lenses with serum tears vs holding on lens wear. He would like to trial serum tears with current lenses    Seeing Dr. Fry in 2 months, can further discuss other treatments vs continuing with trying scleral lens at that time    I have confirmed the patient's CC, HPI and reviewed Past Medical History, Past Surgical History, Social History, Family History, Problem List, Medication List and agree with Tech note.     Ronda Machado, OD FAAO FSLS

## 2022-01-25 ASSESSMENT — REFRACTION_CURRENTRX
OD_ADDL_SPECS: BOSTON XO CLEAR HYDRAPEG
OS_SPHERE: -4.50
OS_DIAMETER: 16.0
OS_ADDL_SPECS: BOSTON XO BLUE HYDRAPEG

## 2022-01-31 ENCOUNTER — TELEPHONE (OUTPATIENT)
Dept: ONCOLOGY | Facility: CLINIC | Age: 72
End: 2022-01-31
Payer: MEDICARE

## 2022-01-31 NOTE — TELEPHONE ENCOUNTER
Jurgen Moreno CPhT  [facility]  Oncology Pharmacy Liaison   [email or web address]  [phone] (phone  [phone] (fax

## 2022-02-01 ENCOUNTER — TELEPHONE (OUTPATIENT)
Dept: OPHTHALMOLOGY | Facility: CLINIC | Age: 72
End: 2022-02-01

## 2022-02-01 ENCOUNTER — OFFICE VISIT (OUTPATIENT)
Dept: OPHTHALMOLOGY | Facility: CLINIC | Age: 72
End: 2022-02-01
Payer: MEDICARE

## 2022-02-01 ENCOUNTER — PRE VISIT (OUTPATIENT)
Dept: OPHTHALMOLOGY | Facility: CLINIC | Age: 72
End: 2022-02-01

## 2022-02-01 DIAGNOSIS — H02.132 SENILE ECTROPION OF BOTH LOWER EYELIDS: ICD-10-CM

## 2022-02-01 DIAGNOSIS — H57.813 BROW PTOSIS, BILATERAL: ICD-10-CM

## 2022-02-01 DIAGNOSIS — H02.403 INVOLUTIONAL PTOSIS, ACQUIRED, BILATERAL: Primary | ICD-10-CM

## 2022-02-01 DIAGNOSIS — H02.135 SENILE ECTROPION OF BOTH LOWER EYELIDS: ICD-10-CM

## 2022-02-01 DIAGNOSIS — D89.813 GVHD (GRAFT VERSUS HOST DISEASE) (H): ICD-10-CM

## 2022-02-01 DIAGNOSIS — H02.831 DERMATOCHALASIS OF BOTH UPPER EYELIDS: ICD-10-CM

## 2022-02-01 DIAGNOSIS — H02.834 DERMATOCHALASIS OF BOTH UPPER EYELIDS: ICD-10-CM

## 2022-02-01 PROCEDURE — 92285 EXTERNAL OCULAR PHOTOGRAPHY: CPT | Mod: GC | Performed by: OPHTHALMOLOGY

## 2022-02-01 PROCEDURE — 92081 LIMITED VISUAL FIELD XM: CPT | Mod: GC | Performed by: OPHTHALMOLOGY

## 2022-02-01 PROCEDURE — 99214 OFFICE O/P EST MOD 30 MIN: CPT | Mod: 24 | Performed by: OPHTHALMOLOGY

## 2022-02-01 ASSESSMENT — EXTERNAL EXAM - RIGHT EYE: OD_EXAM: NORMAL

## 2022-02-01 ASSESSMENT — MARGIN REFLEX DISTANCE
OS_MRD1: 1
OD_MRD1: 1

## 2022-02-01 ASSESSMENT — CONF VISUAL FIELD
OD_NORMAL: 1
METHOD: COUNTING FINGERS
OS_NORMAL: 1

## 2022-02-01 ASSESSMENT — LEVATOR FUNCTION
OD_LEVATOR: 15
OS_LEVATOR: 15

## 2022-02-01 ASSESSMENT — VISUAL ACUITY
METHOD: SNELLEN - LINEAR
OS_CC: J1+
OD_SC+: +1
OS_SC+: -2
OD_SC: 20/30
OS_SC: 20/20 SLOW
OD_CC: J1+

## 2022-02-01 ASSESSMENT — EXTERNAL EXAM - LEFT EYE: OS_EXAM: NORMAL

## 2022-02-01 ASSESSMENT — TONOMETRY
IOP_METHOD: ICARE
OS_IOP_MMHG: 11
OD_IOP_MMHG: 11

## 2022-02-01 ASSESSMENT — LAGOPHTHALMOS
OS_LAGOPHTHALMOS: 0
OD_LAGOPHTHALMOS: 0

## 2022-02-01 NOTE — LETTER
2022         RE:  :  MRN: Deejay Dior  1950  [id number]     Dear Dr. Machado,    Thank you for asking me to see your patient, Deejay Dior, for an oculoplastic   consultation.  My assessment and plan are below.  For further details, please see my attached clinic note.      Chief Complaint(s) and History of Present Illness(es)     Droopy Eye Lid Evaluation     Laterality: right upper lid and left upper lid    Duration: years    Associated signs and symptoms: dry eyes.  Negative for lid lesion, lid   swelling and eye pain    Treatments tried: eye drops    Pain scale: 0/10              Comments     Pt here today for assessment of bilateral drooping lids.  Pt has had BULB x2, brow lift. Pt does not want to pay out of pocket for   procedure.  Pt denies any bumps or pain in relation to lids.  Pt denies pain but is managing general irritation and discomfort due to   GVHD.    [place] [employer]A, PIPO 9:58 AM 2022                 Pt with history of ocular GVHD, using serum tear, PFATs. Pt notes FBS, but when he lifts up his lids the sensation improves, so was sent to our clinic for droopy lid eval.      Pertinent Medical History:    AML s/p BMT 2014    Adrenal insufficiency    Hypogonadism     DVT    GHD    Myelodysplastic Syndrome    Sensorineural hearing loss - S/P cerumen removal by ENT 2020    Ocular History:    Cataract surgery both eyes     Dry Eye Syndrome    Basal Cell Carcinoma, LLL. S/P removal     Dry ARMD  - intermediate each eye     PVD each eye    PCO each eye    S/p BULB and brow lift x 2, 20 years ago Dr. De La Fuente did the lid and Dr. Moser (plastic surgery) did the brow lift.       FUNCTIONAL COMPLAINTS RELATED TO DROOPY EYELIDS/BROWS:  Deejay Dior describes upper lids interfering with superior visual field and interfering with activities of daily living including reading, driving and watching television.     EXAM:   Dominant eye left    MRD1:  Right eye 1   Left eye 1  Dermatochalasis with excess skin touching eyelashes yes  Brow ptosis with brow resting below superior orbital rim yes  Both lower eyelid ectropion and retraction with lid laxity.     VISUAL FIELD:  Right eye untaped:30 degrees Right eye taped:45 degrees  Left eye untaped:20 degrees Left eye taped:50 degrees    Right eye visual field improves by: 15 degrees  Left eye visual field improves by: 30 degrees    Assessment & Plan     Deejay Dior is a 71 year old male with the following diagnoses:   Encounter Diagnoses   Name Primary?     Involutional ptosis, acquired, bilateral Yes     Dermatochalasis of both upper eyelids      Brow ptosis, bilateral      Senile ectropion of both lower eyelids      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) - Both Eyes       Conservative approach given history of GVHD  Patient feels relief when he lifts up his upper lids, but also has significant lower eyelid ectropion/lower eyelid retraction.   He is mostly interested in surgery to help his ocular GVHD symptoms and to decrease the need for such frequent tears.    PLAN:  Bilateral upper lids ptosis repair - small skin, especially laterally, levator adv, small NF) - conservative due to dry eye. [id number]  Bilateral lower lid ectropion repair - lateral tarsal strip 24384    *not on anticoagulation    Maritza Osei MD  Oculoplastics Fellow    Attending Physician Attestation: Complete documentation of historical and exam elements from today's encounter can be found in the full encounter summary report (not reduplicated in this progress note). I personally obtained the chief complaint(s) and history of present illness. I confirmed and edited as necessary the review of systems, past medical/surgical history, family history, social history, and examination findings as documented by others; and I examined the patient myself. I personally reviewed the relevant tests, images, and reports as documented above. I formulated and  edited as necessary the assessment and plan and discussed the findings and management plan with the patient and his wife.   -Florina Solis MD    Today with Deejay Dior  and his wife, I reviewed the indications, risks, benefits, and alternatives of the proposed surgical procedure including, but not limited to, failure obtain the desired result  and need for additional surgery, bleeding, infection, loss of vision, loss of the eye, and the remote possibility of permanent damage to any organ system or death with the use of anesthesia.  I provided multiple opportunities for the questions, answered all questions to the best of my ability, and confirmed that my answers and my discussion were understood.   Florina Solis MD            Again, thank you for allowing me to participate in the care of your patient.      Sincerely,    Florina Solis MD  Department of Ophthalmology and Visual Neurosciences  [facility]    CC: Ronda Machado, OD  [address]  [facility] [address]  Via In Basket

## 2022-02-01 NOTE — PROGRESS NOTES
Chief Complaint(s) and History of Present Illness(es)     Droopy Eye Lid Evaluation     Laterality: right upper lid and left upper lid    Duration: years    Associated signs and symptoms: dry eyes.  Negative for lid lesion, lid   swelling and eye pain    Treatments tried: eye drops    Pain scale: 0/10              Comments     Pt here today for assessment of bilateral drooping lids.  Pt has had BULB x2, brow lift. Pt does not want to pay out of pocket for   procedure.  Pt denies any bumps or pain in relation to lids.  Pt denies pain but is managing general irritation and discomfort due to   GVHD.    [place] COA, PIPO 9:58 AM 02/01/2022                 Pt with history of ocular GVHD, using serum tear, PFATs. Pt notes FBS, but when he lifts up his lids the sensation improves, so was sent to our clinic for droopy lid eval.      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 S/P removal 2020    Dry ARMD  - intermediate each eye     PVD each eye    PCO each eye    S/p BULB and brow lift x 2, 20 years ago Dr. De La Fuente did the lid and Dr. Moser (plastic surgery) did the brow lift.       FUNCTIONAL COMPLAINTS RELATED TO DROOPY EYELIDS/BROWS:  MikhailDEREK Dior describes upper lids interfering with superior visual field and interfering with activities of daily living including reading, driving and watching television.     EXAM:   Dominant eye left    MRD1: Right eye 1   Left eye 1  Dermatochalasis with excess skin touching eyelashes yes  Brow ptosis with brow resting below superior orbital rim yes  Both lower eyelid ectropion and retraction with lid laxity.     VISUAL FIELD:  Right eye untaped:30 degrees Right eye taped:45 degrees  Left eye  untaped:20 degrees Left eye taped:50 degrees    Right eye visual field improves by: 15 degrees  Left eye visual field improves by: 30 degrees    Assessment & Plan     Deejay Dior is a 71 year old male with the following diagnoses:   Encounter Diagnoses   Name Primary?     Involutional ptosis, acquired, bilateral Yes     Dermatochalasis of both upper eyelids      Brow ptosis, bilateral      Senile ectropion of both lower eyelids      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) - Both Eyes       Conservative approach given history of GVHD  Patient feels relief when he lifts up his upper lids, but also has significant lower eyelid ectropion/lower eyelid retraction.   He is mostly interested in surgery to help his ocular GVHD symptoms and to decrease the need for such frequent tears.    PLAN:  Bilateral upper lids ptosis repair - small skin, especially laterally, levator adv, small NF) - conservative due to dry eye. [id number]  Bilateral lower lid ectropion repair - lateral tarsal strip 24692    *not on anticoagulation    Maritza Osei MD  Oculoplastics Fellow    Attending Physician Attestation: Complete documentation of historical and exam elements from today's encounter can be found in the full encounter summary report (not reduplicated in this progress note). I personally obtained the chief complaint(s) and history of present illness. I confirmed and edited as necessary the review of systems, past medical/surgical history, family history, social history, and examination findings as documented by others; and I examined the patient myself. I personally reviewed the relevant tests, images, and reports as documented above. I formulated and edited as necessary the assessment and plan and discussed the findings and management plan with the patient and his wife.   -Florina Solis MD    Today with Deejay Dior  and his wife, I reviewed the indications, risks, benefits, and alternatives of the proposed surgical procedure  including, but not limited to, failure obtain the desired result  and need for additional surgery, bleeding, infection, loss of vision, loss of the eye, and the remote possibility of permanent damage to any organ system or death with the use of anesthesia.  I provided multiple opportunities for the questions, answered all questions to the best of my ability, and confirmed that my answers and my discussion were understood.   Florina Solis MD

## 2022-02-01 NOTE — NURSING NOTE
Chief Complaints and History of Present Illnesses   Patient presents with     Droopy Eye Lid Evaluation     Chief Complaint(s) and History of Present Illness(es)     Droopy Eye Lid Evaluation     Laterality: right upper lid and left upper lid    Duration: years    Associated signs and symptoms: dry eyes.  Negative for lid lesion, lid swelling and eye pain    Treatments tried: eye drops    Pain scale: 0/10              Comments     Pt here today for assessment of bilateral drooping lids.  Pt denies any bumps or pain in relation to lids.  Pt denies pain but is managing general irritation and discomfort due to GVHD.    [place] COA, PIPO 9:58 AM 02/01/2022

## 2022-02-01 NOTE — TELEPHONE ENCOUNTER
Met with patient to schedule surgery with Dr. Solis    Surgery was scheduled on 2/24 at [facility]  Patient will have H&P at [facility] WITH Vivek Callejas MD    Patient is aware a COVID-19 test is needed before their procedure. The test should be with-in 4 days of their procedure.   Test Details: Date 2/21 Location [facility]    Post-Op visit was scheduled on 3/8  Patient is aware a / is needed day of surgery.   Surgery packet was given 2/1, patient has my direct contact information for any further questions.

## 2022-02-01 NOTE — PATIENT INSTRUCTIONS
BLEPHAROPLASTY    Your eyes are often the first thing people notice about you and are an important aspect of your overall appearance. As we age, the tone and shape of our eyelids can loosen and sag. Heredity and sun exposure also contribute to this process. This excess, puffy or lax skin can make you appear more tired or appear older. Eyelid surgery or blepharoplasty (pronounced  hqpf-t-ic-plasty ) can give the eyes a more youthful look by removing excess skin, bulging fat, and lax muscle from the upper or lower eyelids. If the sagging upper eyelid skin obstructs peripheral vision, blepharoplasty can eliminate the obstruction and expand the visual field.     Upper Blepharoplasty     For the upper eyelids, excess skin and fat are removed through an incision hidden in the natural eyelid crease. If the lid is droopy (ptosis), the muscle that raises the upper eyelid can be tightened. The incision is then closed with fine sutures.     Lower Blepharoplasty     Fat in the lower eyelids can be removed or repositioned through an incision hidden on the inner surface of the eyelid.  If there is excessive skin in the lower lid, the skin can be removed through incision is made just below the lashes. Fat can be removed or repositioned through this incision, and the excess skin removed. The incision is then closed with fine sutures.     Upper and Lower Blepharoplasty     Upper and lower blepharoplasty can be performed together and also can be combined with other procedures such as eyebrow or forehead lift, or midface lift.  The procedures are typically performed as an outpatient procedure and typically take 45 min to 1.5 hours to perform.  Most patients can return to normal activities within 10-14 days. Makeup may be worn to camouflage any bruising after 10 days.       Who Should Perform A Blepharoplasty?     When choosing a surgeon to perform blepharoplasty, look for a cosmetic and reconstructive surgeon who specializes in the  "eyelids, orbit, and tear drain system. Dr. Solis's membership in the American Society of Ophthalmic Plastic and Reconstructive Surgery (ASOPRS) indicates he is not only a board certified ophthalmologist who knows the anatomy and structure of the eyelids and orbit, but also has had extensive training in ophthalmic plastic reconstructive and cosmetic surgery.    Ptosis (Drooping Eyelids)    Eyelid ptosis (pronounced \"jaja-sis\") is a condition in which the upper eyelid droops or sags. It can affect one or both eyes. Sometimes the eyelid droops enough to obstruct the upper field of vision and/or side vision, requiring correction.Ptosis Repair is a surgical procedure that can correct drooping eyelid(s). Depending upon the degree and cause, repair involves either resection (shortening) of a muscle in the eyelid or suspension with a muscle of the brow. Typically, the levator muscle (the major muscle responsible for elevating the upper eyelid) is shortened though an incision made along the natural crease of the lid. Excess skin weighing down the eyelid may also be removed.     Congenital Ptosis  Present from birth, the most common cause of congenital ptosis is the improper development of the levator muscle. Children may need tilt their head back or lift their eyelid with a finger to see. They may also develop amblyopia (\"lazy eye\"), strabismus (eyes that are not properly aligned), astigmatism, or blurred vision. Repair for mild to moderate congenital ptosis is generally performed between ages 3 and 5. Severe visual obstruction may require earlier treatment. Repair is usually performed in an outpatient surgical facility under general anesthesia so the child will not become anxious or restless during the procedure.     Acquired Ptosis  Most commonly due to age-related weakening of the levator muscle, acquired ptosis may also be caused by injury, trauma, or procedures, such as cataract surgery, which can cause weak " "tendons to stretch. Acquired ptosis may also be the first sign of some diseases, such as myasthenia gravis (a disorder in which the muscles become weak), or Sheri's syndrome (a neurological condition that indicates injury to part of the sympathetic nervous system).Ptosis Repair is usually performed in an outpatient surgical facility under anesthesia that induces a \"twilight\" state. Sedated consciousness is preferred so that Dr. Solis can accurately adjust the eyelids.     Who Should Perform The Surgery?   When choosing a surgeon to perform ptosis surgery, look for a cosmetic and reconstructive surgeon who specializes in the eyelids, orbit, and tear drain system. Dr. Solis's membership in the American Society of Ophthalmic Plastic and Reconstructive Surgery (ASOPRS) indicates he or she is not only a board certified ophthalmologist who knows the anatomy and structure of the eyelids and orbit, but also has had extensive training in ophthalmic plastic reconstructive and cosmetic surgery.    ECTROPION     Ectropion means that the lower eyelid is  rolled out  away from the eye, or is sagging away from the eye. The sagging lower eyelid leaves the eye exposed and dry. If ectropion is not treated, the condition can lead to chronic tearing, eye irritation, redness, pain, a gritty feeling, crusting of the eyelid, mucous discharge, and breakdown of the cornea due to excessive exposure.     What Causes Ectropion?     Generally the condition is the result of tissue relaxation associated with aging, although it may also occur as a result of facial nerve paralysis (due to Bell s palsy,stroke or other nurologic conditions), trauma, scarring, previous surgeries or skin cancer.     What Are The Symptoms?     The wet, inner, conjunctival surface is exposed and visible. Normally, the upper and lower eyelids close tightly, protecting the eye from damage and preventing tear evaporation. If the edge of one eyelid turns " outward, the two eyelids cannot meet properly and tears are not spread evenly over the eye. Symptoms may include excessive tearing, chronic irritation, redness, pain, a gritty feeling, crusting of the eyelid and mucous discharge.     Can Ectropion Be Repaired?     Yes, ectropion can be repaired surgically. Most patients experience immediate resolution of the problem once surgery is completed with little, if any, post-operative discomfort. After your eyelid heals, your eye will feel comfortable and be protected from corneal scarring, infection, and loss of vision.     Who Should Perform The Surgery?     When choosing a surgeon to perform ectropion repair, look for a cosmetic and reconstructive surgeon who specializes in the eyelids, orbit, and tear drain system. Dr. Solis's membership in the American Society of Ophthalmic Plastic and Reconstructive Surgery (ASOPRS) indicates that he is not only a board certified ophthalmologist who knows the anatomy and structure of the eyelids and orbit, but also has had extensive training in ophthalmic plastic reconstructive and cosmetic surgery.

## 2022-02-07 DIAGNOSIS — Z11.59 ENCOUNTER FOR SCREENING FOR OTHER VIRAL DISEASES: Primary | ICD-10-CM

## 2022-02-23 ENCOUNTER — ANESTHESIA EVENT (OUTPATIENT)
Dept: SURGERY | Facility: AMBULATORY SURGERY CENTER | Age: 72
End: 2022-02-23
Payer: MEDICARE

## 2022-02-23 NOTE — ANESTHESIA PREPROCEDURE EVALUATION
Anesthesia Pre-Procedure Evaluation    Patient: Deejay Dior   MRN: [id number] : 1950        Procedure : Procedure(s):  Bilateral upper eyelid ptosis repair  bilateral lower lid ectropion repair          Past Medical History:   Diagnosis Date     Acute deep vein thrombosis (DVT) of distal end of right lower extremity (H)      Clotting disorder (H)      Depression, major      Glucose intolerance      H/O bone marrow transplant (H)      Head injury 1964     Hearing problem Hearing aids      History of blood transfusion 3/2014     History of radiation therapy 2014     [place] (hard of hearing)     bilateral     Immunosuppression (H) Sirolimus daily     Lymphedema of both lower extremities      MDS (myelodysplastic syndrome) (H)      MDS/MPN (myelodysplastic/myeloproliferative neoplasms) (H)      Mixed hyperlipidemia      Numbness and tingling     feet     Obstructive sleep apnea      Pneumonia      Reduced vision 2016    Cataract surgery     Sleep apnea      Transplant 2014    Stem Cells      Past Surgical History:   Procedure Laterality Date     ARTHROPLASTY HIP ANTERIOR Right 9/3/2019    Procedure: RIGHT TOTAL HIP ARTHROPLASTY, DIRECT ANTERIOR APPROACH;  Surgeon: Yong Diaz MD;  Location:  OR     BACK SURGERY       BIOPSY       BMT CELL PRODUCT INFUSION       CATARACT IOL, RT/LT Bilateral ?     ESOPHAGOSCOPY, GASTROSCOPY, DUODENOSCOPY (EGD), COMBINED N/A 2015     ESOPHAGOSCOPY, GASTROSCOPY, DUODENOSCOPY (EGD), COMBINED Left 2015    Procedure: COMBINED ESOPHAGOSCOPY, GASTROSCOPY, DUODENOSCOPY (EGD), BIOPSY SINGLE OR MULTIPLE;  Surgeon: Amber Francis MD;  Location: [facility]     EXCISE LESION LIP N/A 2017    Procedure: EXCISE LESION LIP;  Surgeon: Tim Yanez MD;  Location:  OR     EYE SURGERY      blepharoplasty     FLEXIBLE SIGMOIDOSCOPY  2/2/15     HEMORRHOIDECTOMY       IR FOLLOW UP VISIT OUTPATIENT  2019      PHACOEMULSIFICATION CLEAR CORNEA WITH STANDARD INTRAOCULAR LENS IMPLANT Left 2016    Procedure: PHACOEMULSIFICATION CLEAR CORNEA WITH STANDARD INTRAOCULAR LENS IMPLANT;  Surgeon: Randolph Giles MD;  Location:  [facility]     TONSILLECTOMY       TRANSPLANT  2014    Stem Cell Transplant [facility] BMT     VASCULAR SURGERY      varicose vein surgery      Allergies   Allergen Reactions     Blood Transfusion Related (Informational Only) Other (See Comments)     Patient has a history of a clinically significant antibody against RBC antigens.  A delay in compatible RBCs may occur.      Social History     Tobacco Use     Smoking status: Former Smoker     Packs/day: 1.00     Years: 34.00     Pack years: 34.00     Types: Cigarettes     Start date: 1967     Quit date: 2001     Years since quittin.9     Smokeless tobacco: Never Used     Tobacco comment: quit in    Substance Use Topics     Alcohol use: Yes     Alcohol/week: 4.2 standard drinks     Comment: Seldom      Wt Readings from Last 1 Encounters:   21 101.1 kg (222 lb 14.4 oz)           Physical Exam    Airway        Mallampati: II   TM distance: > 3 FB   Neck ROM: full   Mouth opening: > 3 cm    Respiratory Devices and Support         Dental           Cardiovascular   cardiovascular exam normal          Pulmonary   pulmonary exam normal                OUTSIDE LABS:  CBC:   Lab Results   Component Value Date    WBC 5.2 2021    WBC 3.8 (L) 2021    HGB 12.3 (L) 2021    HGB 11.4 (L) 2021    HCT 37.3 (L) 2021    HCT 36.9 (L) 2021     2021     2021     BMP:   Lab Results   Component Value Date     2021     2021    POTASSIUM 4.1 2021    POTASSIUM 4.3 2021    CHLORIDE 111 (H) 2021    CHLORIDE 114 (H) 2021    CO2 26 2021    CO2 21 2021    BUN 20 2021    BUN 22 2021    CR 0.95 2021    CR 0.94 2021     GLC 85 12/06/2021    GLC 80 07/23/2021     COAGS:   Lab Results   Component Value Date    PTT 29 02/14/2021    INR 1.12 03/01/2021    FIBR 302 02/19/2021     POC:   Lab Results   Component Value Date     (H) 02/22/2021     HEPATIC:   Lab Results   Component Value Date    ALBUMIN 3.0 (L) 12/06/2021    PROTTOTAL 7.0 12/06/2021    ALT 27 12/06/2021    AST 27 12/06/2021     (H) 03/29/2016    ALKPHOS 75 12/06/2021    BILITOTAL 0.2 12/06/2021    RUDY 11 03/25/2016     OTHER:   Lab Results   Component Value Date    PH 7.40 02/17/2021    LACT 1.1 02/08/2021    JOE 9.3 12/06/2021    PHOS 3.5 02/22/2021    MAG 2.2 02/22/2021    LIPASE 158 09/27/2018    AMYLASE 46 02/14/2019    TSH 1.01 09/27/2018    T4 1.03 06/30/2016    CRP 25.0 (H) 03/02/2021    SED 40 (H) 11/30/2014       Anesthesia Plan    ASA Status:  3   NPO Status:  NPO Appropriate    Anesthesia Type: MAC.     - Reason for MAC: straight local not clinically adequate   Induction: Intravenous, Propofol.   Maintenance: TIVA.        Consents    Anesthesia Plan(s) and associated risks, benefits, and realistic alternatives discussed. Questions answered and patient/representative(s) expressed understanding.    - Discussed:     - Discussed with:  Patient      - Extended Intubation/Ventilatory Support Discussed: No.      - Patient is DNR/DNI Status: No    Use of blood products discussed: No .     Postoperative Care    Pain management: IV analgesics, Oral pain medications, Multi-modal analgesia.   PONV prophylaxis: Ondansetron (or other 5HT-3), Background Propofol Infusion, Dexamethasone or Solumedrol     Comments:                Iván Loomis MD

## 2022-02-24 ENCOUNTER — HOSPITAL ENCOUNTER (OUTPATIENT)
Facility: AMBULATORY SURGERY CENTER | Age: 72
End: 2022-02-24
Attending: OPHTHALMOLOGY
Payer: MEDICARE

## 2022-02-24 ENCOUNTER — ANESTHESIA (OUTPATIENT)
Dept: SURGERY | Facility: AMBULATORY SURGERY CENTER | Age: 72
End: 2022-02-24
Payer: MEDICARE

## 2022-02-24 VITALS
SYSTOLIC BLOOD PRESSURE: 130 MMHG | OXYGEN SATURATION: 95 % | DIASTOLIC BLOOD PRESSURE: 74 MMHG | HEART RATE: 70 BPM | WEIGHT: 225 LBS | RESPIRATION RATE: 16 BRPM | BODY MASS INDEX: 34.1 KG/M2 | HEIGHT: 68 IN | TEMPERATURE: 97 F

## 2022-02-24 DIAGNOSIS — H02.834 DERMATOCHALASIS OF BOTH UPPER EYELIDS: ICD-10-CM

## 2022-02-24 DIAGNOSIS — H57.813 BROW PTOSIS, BILATERAL: ICD-10-CM

## 2022-02-24 DIAGNOSIS — H02.831 DERMATOCHALASIS OF BOTH UPPER EYELIDS: ICD-10-CM

## 2022-02-24 DIAGNOSIS — H02.132 SENILE ECTROPION OF BOTH LOWER EYELIDS: ICD-10-CM

## 2022-02-24 DIAGNOSIS — H02.135 SENILE ECTROPION OF BOTH LOWER EYELIDS: ICD-10-CM

## 2022-02-24 PROCEDURE — 67917 REPAIR EYELID DEFECT: CPT | Mod: E4

## 2022-02-24 PROCEDURE — 67917 REPAIR EYELID DEFECT: CPT | Mod: E4 | Performed by: OPHTHALMOLOGY

## 2022-02-24 PROCEDURE — 67904 REPAIR EYELID DEFECT: CPT | Mod: LT

## 2022-02-24 PROCEDURE — 67904 REPAIR EYELID DEFECT: CPT | Mod: 50 | Performed by: OPHTHALMOLOGY

## 2022-02-24 RX ORDER — ERYTHROMYCIN 5 MG/G
OINTMENT OPHTHALMIC
Qty: 3.5 G | Refills: 0 | Status: SHIPPED | OUTPATIENT
Start: 2022-02-24 | End: 2022-03-08

## 2022-02-24 RX ORDER — SODIUM CHLORIDE, SODIUM LACTATE, POTASSIUM CHLORIDE, CALCIUM CHLORIDE 600; 310; 30; 20 MG/100ML; MG/100ML; MG/100ML; MG/100ML
INJECTION, SOLUTION INTRAVENOUS CONTINUOUS
Status: DISCONTINUED | OUTPATIENT
Start: 2022-02-24 | End: 2022-02-25 | Stop reason: HOSPADM

## 2022-02-24 RX ORDER — NEOMYCIN POLYMYXIN B SULFATES AND DEXAMETHASONE 3.5; 10000; 1 MG/ML; [USP'U]/ML; MG/ML
1 SUSPENSION/ DROPS OPHTHALMIC 4 TIMES DAILY
Qty: 2 ML | Refills: 0 | Status: SHIPPED | OUTPATIENT
Start: 2022-02-24 | End: 2022-03-06

## 2022-02-24 RX ORDER — ONDANSETRON 2 MG/ML
4 INJECTION INTRAMUSCULAR; INTRAVENOUS EVERY 30 MIN PRN
Status: DISCONTINUED | OUTPATIENT
Start: 2022-02-24 | End: 2022-02-25 | Stop reason: HOSPADM

## 2022-02-24 RX ORDER — FENTANYL CITRATE 50 UG/ML
25 INJECTION, SOLUTION INTRAMUSCULAR; INTRAVENOUS EVERY 5 MIN PRN
Status: DISCONTINUED | OUTPATIENT
Start: 2022-02-24 | End: 2022-02-24 | Stop reason: HOSPADM

## 2022-02-24 RX ORDER — ERYTHROMYCIN 5 MG/G
OINTMENT OPHTHALMIC PRN
Status: DISCONTINUED | OUTPATIENT
Start: 2022-02-24 | End: 2022-02-24 | Stop reason: HOSPADM

## 2022-02-24 RX ORDER — ONDANSETRON 4 MG/1
4 TABLET, ORALLY DISINTEGRATING ORAL EVERY 30 MIN PRN
Status: DISCONTINUED | OUTPATIENT
Start: 2022-02-24 | End: 2022-02-25 | Stop reason: HOSPADM

## 2022-02-24 RX ORDER — LIDOCAINE 40 MG/G
CREAM TOPICAL
Status: DISCONTINUED | OUTPATIENT
Start: 2022-02-24 | End: 2022-02-24 | Stop reason: HOSPADM

## 2022-02-24 RX ORDER — TETRACAINE HYDROCHLORIDE 5 MG/ML
SOLUTION OPHTHALMIC PRN
Status: DISCONTINUED | OUTPATIENT
Start: 2022-02-24 | End: 2022-02-24 | Stop reason: HOSPADM

## 2022-02-24 RX ORDER — FENTANYL CITRATE 50 UG/ML
25 INJECTION, SOLUTION INTRAMUSCULAR; INTRAVENOUS
Status: DISCONTINUED | OUTPATIENT
Start: 2022-02-24 | End: 2022-02-25 | Stop reason: HOSPADM

## 2022-02-24 RX ORDER — LIDOCAINE HYDROCHLORIDE 20 MG/ML
INJECTION, SOLUTION INFILTRATION; PERINEURAL PRN
Status: DISCONTINUED | OUTPATIENT
Start: 2022-02-24 | End: 2022-02-24

## 2022-02-24 RX ORDER — PROPOFOL 10 MG/ML
INJECTION, EMULSION INTRAVENOUS PRN
Status: DISCONTINUED | OUTPATIENT
Start: 2022-02-24 | End: 2022-02-24

## 2022-02-24 RX ORDER — SODIUM CHLORIDE, SODIUM LACTATE, POTASSIUM CHLORIDE, CALCIUM CHLORIDE 600; 310; 30; 20 MG/100ML; MG/100ML; MG/100ML; MG/100ML
INJECTION, SOLUTION INTRAVENOUS CONTINUOUS
Status: DISCONTINUED | OUTPATIENT
Start: 2022-02-24 | End: 2022-02-24 | Stop reason: HOSPADM

## 2022-02-24 RX ORDER — MEPERIDINE HYDROCHLORIDE 25 MG/ML
12.5 INJECTION INTRAMUSCULAR; INTRAVENOUS; SUBCUTANEOUS
Status: DISCONTINUED | OUTPATIENT
Start: 2022-02-24 | End: 2022-02-25 | Stop reason: HOSPADM

## 2022-02-24 RX ORDER — OXYCODONE HYDROCHLORIDE 5 MG/1
5 TABLET ORAL EVERY 4 HOURS PRN
Status: DISCONTINUED | OUTPATIENT
Start: 2022-02-24 | End: 2022-02-25 | Stop reason: HOSPADM

## 2022-02-24 RX ORDER — ACETAMINOPHEN 325 MG/1
975 TABLET ORAL ONCE
Status: COMPLETED | OUTPATIENT
Start: 2022-02-24 | End: 2022-02-24

## 2022-02-24 RX ADMIN — ACETAMINOPHEN 975 MG: 325 TABLET ORAL at 07:26

## 2022-02-24 RX ADMIN — PROPOFOL 50 MG: 10 INJECTION, EMULSION INTRAVENOUS at 08:10

## 2022-02-24 RX ADMIN — SODIUM CHLORIDE, SODIUM LACTATE, POTASSIUM CHLORIDE, CALCIUM CHLORIDE: 600; 310; 30; 20 INJECTION, SOLUTION INTRAVENOUS at 07:27

## 2022-02-24 RX ADMIN — PROPOFOL 50 MG: 10 INJECTION, EMULSION INTRAVENOUS at 08:11

## 2022-02-24 RX ADMIN — OXYCODONE HYDROCHLORIDE 5 MG: 5 TABLET ORAL at 09:02

## 2022-02-24 RX ADMIN — LIDOCAINE HYDROCHLORIDE 100 MG: 20 INJECTION, SOLUTION INFILTRATION; PERINEURAL at 08:08

## 2022-02-24 NOTE — ANESTHESIA CARE TRANSFER NOTE
Patient: Deejay Dior    Procedure: Procedure(s):  Bilateral upper eyelid ptosis repair  bilateral lower lid ectropion repair       Diagnosis: Dermatochalasis of both upper eyelids [H02.831, H02.834]  Brow ptosis, bilateral [H57.813]  Senile ectropion of both lower eyelids [H02.132, H02.135]  Diagnosis Additional Information: No value filed.    Anesthesia Type:   MAC     Note:    Oropharynx: oropharynx clear of all foreign objects  Level of Consciousness: awake  Oxygen Supplementation: room air    Independent Airway: airway patency satisfactory and stable  Dentition: dentition unchanged  Vital Signs Stable: post-procedure vital signs reviewed and stable  Report to RN Given: handoff report given  Patient transferred to: Phase II  Comments: VSS and WNL, comfortable, no PONV, report to Oz RN  Handoff Report: Identifed the Patient, Identified the Reponsible Provider, Reviewed the pertinent medical history, Discussed the surgical course, Reviewed Intra-OP anesthesia mangement and issues during anesthesia, Set expectations for post-procedure period and Allowed opportunity for questions and acknowledgement of understanding      Vitals:  Vitals Value Taken Time   /77 02/24/22 0854   Temp 36.1  C (97  F) 02/24/22 0854   Pulse 67 02/24/22 0854   Resp 16 02/24/22 0854   SpO2 95 % 02/24/22 0854       Electronically Signed By: BEE Evans CRNA  February 24, 2022  8:56 AM

## 2022-02-24 NOTE — BRIEF OP NOTE
[facility]    Brief Operative Note    Pre-operative diagnosis: Dermatochalasis of both upper eyelids [H02.831, H02.834]  Brow ptosis, bilateral [H57.813]  Senile ectropion of both lower eyelids [H02.132, H02.135]  Post-operative diagnosis Same as pre-operative diagnosis    Procedure: Procedure(s):  Bilateral upper eyelid ptosis repair  bilateral lower lid ectropion repair  Surgeon: Surgeon(s) and Role:     * Florina Solis MD - Primary  Anesthesia: Monitor Anesthesia Care   Estimated Blood Loss: Minimal    Drains: None  Specimens: * No specimens in log *  Findings:   as expected.  Complications: None.  Implants: * No implants in log *

## 2022-02-24 NOTE — OP NOTE
PREOPERATIVE DIAGNOSES:   1. Bilateral upper eyelid dermatochalasis.   2. Bilateral upper eyelid ptosis.   3. Bilateral lower eyelid ectropion.  POSTOPERATIVE DIAGNOSES:   1. Bilateral upper eyelid dermatochalasis.   2. Bilateral upper eyelid ptosis.   3. Bilateral lower eyelid ectropion.  PROCEDURES:  1. Bilateral upper eyelid ptosis repair by external levator resection.  2. Bilateral lower eyelid ectropion repair.  SURGEON: Florina Solis MD   ASSISTANT: Maritza Osei MD  ANESTHESIA: Monitored with local infiltration of a 50/50 mixture of 2% lidocaine with epinephrine and 0.5% Marcaine.   COMPLICATIONS: None.   ESTIMATED BLOOD LOSS: Less than 5 mL.   HISTORY: Deejay Dior presented with upper eyelid drooping secondary to dermatochalasis and ptosis of the upper eyelids leading to blockage of the superior visual field and interfering with activities of daily living.  Deejay Dior also has tearing and dry eyes due to ectropion of the lower eyelids. After the risks, benefits and alternatives to the proposed procedure were explained, informed consent was obtained.   PROCEDURE: The patient was brought to the operating room and placed supine on the operating table. IV sedation was given. Each upper lid crease and the excess upper eyelid skin  was marked in a blepharoplasty ellipse with a marking pen.  These areas and the lower lids and lateral canthi were all infiltrated with local anesthetic and  was prepped and draped in the typical sterile fashion for oculoplastic surgery.  Attention was directed to the right side. An 8 mm lateral canthal incision was made with a 15 blade and dissection carried down to the orbicularis with high temperature cautery. Lateral canthotomy and inferior cantholysis was performed with the cautery. A lateral tarsal strip was fashioned with the high temperature cautery and the katia scissors. The tarsal strip was secured to the lateral orbital rim periosteum with a  double-armed 5-0 Vicryl suture in a horizontal mattress fashion. Lateral canthal angle was closed with a gray line to gray line suture of 5-0 Vicryl tied internally. Skin was closed with interrupted 6-0 plain gut sutures.  The patient tolerated the procedure well. Attention was directed to the right side where the same procedure was performed.    Attention was directed to the right side. The skin was incised following the marked lines. The skin flap was excised with a Zack scissors. Hemostasis was obtained with high temperature cautery. The orbicularis and orbital septum were opened horizontally and nasal fat was conservatively debulked. Levator aponeurosis identified and dissected from the superior tarsal border. A strip of pretarsal orbicularis was removed. An ellipse of levator aponeurosis was excised and advanced with a 6-0 vicryl suture. Attention was directed to the left side where the same procedure was performed. The sutures were adjusted for symmetry then tied in a permanent fashion.  Each skin incision was closed with a running 6-0 plain gut suture. Ophthalmic antibiotic ointment was applied to the incisions and into both eyes. The patient tolerated the procedure well and left the operating room in stable condition.   Florina Solis MD

## 2022-02-24 NOTE — ANESTHESIA POSTPROCEDURE EVALUATION
Patient: Deejay Dior    Procedure: Procedure(s):  Bilateral upper eyelid ptosis repair  bilateral lower lid ectropion repair       Anesthesia Type:  MAC    Note:  Disposition: Outpatient   Postop Pain Control: Uneventful            Sign Out: Well controlled pain   PONV:    Neuro/Psych: Uneventful            Sign Out: Acceptable/Baseline neuro status   Airway/Respiratory: Uneventful            Sign Out: Acceptable/Baseline resp. status   CV/Hemodynamics: Uneventful            Sign Out: Acceptable CV status; No obvious hypovolemia; No obvious fluid overload   Other NRE:    DID A NON-ROUTINE EVENT OCCUR?            Last vitals:  Vitals Value Taken Time   /74 02/24/22 0909   Temp 36.1  C (97  F) 02/24/22 0909   Pulse 70 02/24/22 0909   Resp 16 02/24/22 0909   SpO2 95 % 02/24/22 0909       Electronically Signed By: Iván Loomis MD  February 24, 2022  2:56 PM

## 2022-02-24 NOTE — DISCHARGE INSTRUCTIONS
Post-operative Instructions  Ophthalmic Plastic and Reconstructive Surgery    Florina Solis M.D.     All instructions apply to the operated eye(s) or eyelid(s).    Wound care and personal care  ? If a patch or bandage has been placed, please leave this in place until seen by your physician. Ensure that the bandage does not get wet when you take a shower.  ? Apply ice compresses 15 minutes of every hour while awake for 2 days. As long as there is no further bleeding, after two days, switch to warm water compresses for five minutes, four times a day until seen by your physician. Instead of warm water compresses, you can use a cup of dry uncooked rice in a clean cotton sock. Place sock in microwave for 30 seconds to one minute. Next place the warm sock in a plastic bag, and place it over a clean warm wet washcloth over operated eye.    ? You may shower or wash your hair the day after surgery. Do not go swimming for at least 2 weeks to prevent contamination of your wounds.  ? Do not apply make-up to the eyes or eyelids for 2 weeks after surgery.  ? Expect some swelling, bruising, black eye (even into the lower eyelids and cheeks). Also expect serum caking, crusting and discharge from the eye and/or incisions. A small amount of surface bleeding, and depending on the type of surgery, bleeding from the inside of the eyelid, is normal for the first 48 hours.  ? Avoid straining, bending at the waist, or lifting more than 15 pounds for 10 days. Activities that raise your blood pressure can worsen swelling, cause bleeding, and breaking of sutures. Like wise, sleeping with your head slightly elevated for the first several days can help swelling resolve more quickly.   ? Do continue to ambulate (walk) as you normally would - being sedentary after surgery can cause blood clots.   ? Your eye(s) and eyelid(s) may be painful and tender. This is normal after surgery.      Contact information and follow-up  ? Return to the Eye  Clinic for a follow-up appointment with your physician as scheduled. If no appointment has been scheduled:   - [facility] eye clinic: [phone] for an appointment with Dr. Solis within 1 to 2 weeks from your date of surgery.   -  [facility] eye clinic: [phone] for an appointment with Dr. Solis within 1 to 2 weeks from your date of surgery.   Please email a few photos of your eye(s) or other operative site(s) to [email or web address] prior to your follow up visit.    ? For severe pain, bleeding, or loss of vision, call the [facility] at [phone] or [facility] at [phone].     After hours or on weekends and holidays, call [phone] and ask to speak with the ophthalmologist on call.    An on call person can be reached after hours for concerns. The on call doctor should not call in medication refill requests after hours or on weekends, so please plan accordingly. An effort has been made to provide adequate pain medications following every surgery, and refills will not be provided in most instances. Narcotic pain medications cannot be called in.     Activity restrictions and driving  ? Avoid heavy lifting, bending, exercise or strenuous activity for 1 week after surgery.  You may resume other activities and return to work as tolerated.    Medications  ? Restart all regular home medications and eye drops. If you take Plavix or  Aspirin on a regular basis, wait for 72 hours after your surgery before restarting these in order to decrease the risk of bleeding complications.  ? Avoid aspirin and aspirin-like medications (Motrin, Aleve, Ibuprofen, [place] etc) for 72 hours to reduce the risk of bleeding. You may take Tylenol (acetaminophen) for pain.  ? In addition to your home medications, take the following post-operative medications as prescribed by your physician.    ? Apply antibiotic ointment to all sutures three times a  day, and into the operated eye(s) at night.  ? Instill eye drops 3 times a day for 10 days.   ? In many cases, postoperative discomfort can be managed with Tylenol alone.     ? WARNING: Most prescription pain medications contain Tylenol  (acetaminophen). You must not take more than 4,000 mg of acetaminophen per  24-hour period. This is equivalent to 6 tablets of Darvocet, 12 tablets of Norco, Percocet or Tylenol #3. If you take other over-the-counter medications containing acetaminophen, you must take the amount of acetaminophen into account and reduce the number of prescribed pain pills accordingly.  ? Prescribed narcotic medications may make you drowsy. You must not drive a car, operate heavy machinery or drink alcohol while taking them.  ? Prescribed narcotic pain medications may cause constipation and nausea. Take them with some food to prevent a stomach upset.  [facility]  Home Care Following Anesthesia  For 24 hours after surgery:  1. Get plenty of rest.  A responsible adult must stay with you for at least 24 hours after you leave the surgery center.  2. Do not drive or use heavy equipment.  If you have weakness or tingling, don't drive or use heavy equipment until this feeling goes away.   3. Do not drink alcohol.   4. Avoid strenuous or risky activities.  Ask for help when climbing stairs.  5. You may feel lightheaded.  IF so, sit for a few minutes before standing.  Have someone help you get up.   6. If you have nausea (feel sick to your stomach): Drink only clear liquids such as apple juice, ginger ale, broth or 7-Up.  Rest may also help.  Be sure to drink enough fluids.  Move to a regular diet as you feel able.   7. You may have a slight fever.  Call the doctor if your fever is over 100 F (37.7 C) (taken under the tongue) or lasts longer than 24 hours.  8. You may have a dry mouth, a sore throat, muscle aches or trouble sleeping. These should go away after 24  "hours.  9. Do not make important or legal decisions.   10. It is recommended to avoid smoking.        Today you received a Marcaine or bupivacaine block to numb the nerves near your surgery site.  This is a block using local anesthetic or \"numbing\" medication injected around the nerves to anesthetize or \"numb\" the area supplied by those nerves.  This block is injected into the muscle layer near your surgical site.  The medication may numb the location where you had surgery for 6-18 hours, but may last up to 24 hours.  If your surgical site is an arm or leg you should be careful with your affected limb, since it is possible to injure your limb without being aware of it due to the numbing.  Until full feeling returns, you should guard against bumping or hitting your limb, and avoid extreme hot or cold temperatures on the skin.  As the block wears off, the feeling will return as a tingling or prickly sensation near your surgical site.  You will experience more discomfort from your incision as the feeling returns.  You may want to take a pain pill (a narcotic or Tylenol if this was prescribed by your surgeon) when you start to experience mild pain before the pain beccomes more severe.  If your pain medications do not control your pain you should notifiy your surgeon.    Tips for taking pain medications  To get the best pain relief possible, remember these points:    Take pain medications as directed, before pain becomes severe.    Pain medication can upset your stomach: taking it with food may help.    Constipation is a common side effect of pain medication. Drink plenty of  fluids.    Eat foods high in fiber. Take a stool softener if recommended by your doctor or pharmacist.    Do not drink alcohol, drive or operate machinery while taking pain medications.    Ask about other ways to control pain, such as with heat, ice or relaxation.    Tylenol/Acetaminophen Consumption  To help encourage the safe use of acetaminophen, " the makers of TYLENOL  have lowered the maximum daily dose for single-ingredient Extra Strength TYLENOL  (acetaminophen) products sold in the [place] from 8 pills per day (4,000 mg) to 6 pills per day (3,000 mg). The dosing interval has also changed from 2 pills every 4-6 hours to 2 pills every 6 hours.    If you feel your pain relief is insufficient, you may take Tylenol/Acetaminophen in addition to your narcotic pain medication.     Be careful not to exceed 3,000 mg of Tylenol/Acetaminophen in a 24 hour period from all sources.    If you are taking extra strength Tylenol/acetaminophen (500 mg), the maximum dose is 6 tablets in 24 hours.    If you are taking regular strength acetaminophen (325 mg), the maximum dose is 9 tablets in 24 hours.    Call a doctor for any of the followin. Signs of infection (fever, growing tenderness at the surgery site, a large amount of drainage or bleeding, severe pain, foul-smelling drainage, redness, swelling).  2. It has been over 8 to 10 hours since surgery and you are still not able to urinate (pass water).  3. Headache for over 24 hours.  4. Signs of Covid-19 infection (temperature over 100 degrees, shortness of breath, cough, loss of taste/smell, generalized body aches, persistent headache, chills, sore throat, nausea/vomiting/diarrhea)  Your doctor is:       Dr. Florina Solis, Ophthalmology: [phone]               Or dial [phone] and ask for the resident on call for:  Ophthalmology  For emergency care, call the:  [place] [facility]:  [phone] (TTY for hearing impaired: [phone])

## 2022-03-04 ENCOUNTER — RESEARCH ENCOUNTER (OUTPATIENT)
Dept: TRANSPLANT | Facility: CLINIC | Age: 72
End: 2022-03-04
Payer: MEDICARE

## 2022-03-04 NOTE — PROGRESS NOTES
[id number]: Informed Consent Note     The consent form, including purpose, risks, and benefits, was discussed with the patient, and all questions were answered. The voluntary nature of the study was explained. The patient verbalized understanding and would like to participate in the study. A copy of the signed e-consent form was provided to the patient.    Study Protocol: [id number] / [id number]  Consent Version Date: August 4, 2020  Consent obtained by: Vanessa Escobar    Date: March 4, 2022  HIPAA authorization signed?: Yes  HIPAA authorization version date: February 10, 2021    Vanessa Escobar  Clinical Research Coordinator

## 2022-03-07 DIAGNOSIS — D63.8 ANEMIA IN OTHER CHRONIC DISEASES CLASSIFIED ELSEWHERE: ICD-10-CM

## 2022-03-07 DIAGNOSIS — D89.811 CHRONIC GRAFT-VERSUS-HOST DISEASE (H): ICD-10-CM

## 2022-03-07 DIAGNOSIS — E07.89 OTHER SPECIFIED DISORDERS OF THYROID: ICD-10-CM

## 2022-03-07 DIAGNOSIS — Z94.81 STATUS POST BONE MARROW TRANSPLANT (H): Primary | ICD-10-CM

## 2022-03-07 NOTE — PROGRESS NOTES
BMT Progress Note    ID:  Mr. 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 (In the past has had a basal and squamous cell removed), Assumed new BMT care by Jean-Paul Nunez on 3/7/2022    cGVHD: currently On pred 5mg every day and Jakafi 5 mg bid.      He admitted on 2/8/2021 for acute hypoxic respiratory failure secondary to COVID-19 pneumonia. Also rhino virus positive. Transferred to MICU on 2/10/2021 for worsening respiratory status requiring intubation 2/13-2/19/21. Discharged on 3/3/21 with home O2. Treated with remdesevir, dexamethasone and  eliquis 2.5 bid (through 3/30). Needed home O2 for a few weeks, now off.    HPI:  Still same concern with eye GVHD, saw ophthalmology today. Added celluvisc today, using artificial tears 5-6 times/ days.   Still with same Dry mouth, no ulcers, biotin helps temporarily.   Doing well today otherwise.  He reports his usual fatigue- stable.  He occasionally gets short of breath with exertion. No infectious concerns., no fevers. No new skin rashes or thickening.       Remainder of 10 pt ROS negative    On exam:  /76   Pulse 67   Temp 98  F (36.7  C) (Oral)   Resp 16   Wt 103.5 kg (228 lb 3.2 oz)   SpO2 96%   BMI 34.70 kg/m    HEENT: PERRL, EOMI , dry oral mucosa with no ulcers  Chest: Mild bibasilar crackles  CVS: S1 S2 RRR, no murmurs or gallops  Abdomen: Soft nontender no organomegalies or masses.  Extremities: The left leg had sclerotic skin on the shin, no evidence of infection or cellulitis, no open ulcers.  No other sclerotic changes of the skin.  Of note this is my first time meeting the patient and he reports that the skin changes have been since previous cellulitis post  transplantation.  CNS: non focal     Most Recent [facility] Pulmonary Function Testing    FVC-Pred   Date Value Ref Range Status   12/20/2021 3.86 L    10/02/2018 3.97 L    06/01/2016 4.16 L      FVC-Pre   Date Value Ref Range Status   12/20/2021 4.33 L    10/02/2018 4.56 L    06/01/2016 4.18 L      FVC-%Pred-Pre   Date Value Ref Range Status   12/20/2021 112 %    10/02/2018 114 %    06/01/2016 100 %      FEV1-Pre   Date Value Ref Range Status   12/20/2021 3.12 L    10/02/2018 3.58 L    06/01/2016 3.03 L      FEV1-%Pred-Pre   Date Value Ref Range Status   12/20/2021 107 %    10/02/2018 118 %    06/01/2016 95 %      FEV1FVC-Pred   Date Value Ref Range Status   12/20/2021 76 %    10/02/2018 74 %    06/01/2016 77 %      FEV1FVC-Pre   Date Value Ref Range Status   12/20/2021 72 %    10/02/2018 79 %    06/01/2016 72 %      No results found for: 16783  FEFMax-Pred   Date Value Ref Range Status   12/20/2021 7.60 L/sec    10/02/2018 7.92 L/sec    06/01/2016 8.31 L/sec      FEFMax-Pre   Date Value Ref Range Status   12/20/2021 9.21 L/sec    10/02/2018 10.21 L/sec    06/01/2016 9.85 L/sec      FEFMax-%Pred-Pre   Date Value Ref Range Status   12/20/2021 121 %    10/02/2018 128 %    06/01/2016 118 %      ExpTime-Pre   Date Value Ref Range Status   12/20/2021 12.36 sec    10/02/2018 11.75 sec    06/01/2016 9.90 sec      FIFMax-Pre   Date Value Ref Range Status   12/20/2021 6.52 L/sec    10/02/2018 8.04 L/sec    06/01/2016 7.34 L/sec      FEV1FEV6-Pred   Date Value Ref Range Status   12/20/2021 77 %    10/02/2018 78 %    06/01/2016 78 %      FEV1FEV6-Pre   Date Value Ref Range Status   12/20/2021 74 %    10/02/2018 81 %    06/01/2016 75 %      No results found for: 20055       ASSESSMENT AND PLAN:  Mr. 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 Total cell dose (from 7/1 & 7/2) 6.53 x 10^6 CD34+  cells/kg.   - 1 year anniversary (July 2015): 30% cellular, trilineage hematopoiesis, no abnormal blasts by morphology or flow , no dysplasia, 0-1 fibrosis, 100% donor (BM, CD3, CD15). CR  - 2 year anniversary (July 2016): 20-30% cellular, trilineage hematopoiesis, no abnormal blasts by morphology or flow , no dysplasia, No fibrosis, 100% donor in BM (PB not sent). ISCN: //46,XX[20] Complete Remission.     HEME: No transfusion needs, counts stable, 3/2022 ferritin 44    GVHD: Long history of GVHD as below.   12/2021: kept him on prednisone 5 mg daily, ruxolitinib 5 mg twice daily.  His counts are tolerating well.  I will defer to his new primary BMT doctor to decide whether or not to try to get him off the steroids.    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 Had been off prednisone since summer 2017 and briefly tapered off Sirolimus (january 2018), however resumed with flare in February. 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 Sirolimus was stopped on 9/27/2018 & Pred 90mg was started. Seen by Dr. Sandoval, he does not think his symptoms or CT findings are c/w Sirolimus or GVH & he was in favor of tapering Prednisone. Developed worsening skin thickening & desquamation to the RLE, c/w GVH.   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   Required Burst pred 40mg a day on 1/3/20 with significant systemic arthralgic pain, tapered back to 10/0 eod after 1 week, near resolution of symptoms noted 1/17, returned to 10 mg every other day; then dropped to 5mg q day in April 2021 (this is best dates estimated on chart review)        ID:  Afebrile no signs/sx of infection.    - IgG 1/24 =1130  -  Prophy: HD ACV (renal dosing), Fluconazole and  Bactrim.       GI:  protonix (has heartburn)     FEN/Renal: Mild increase in creatinine today encouraged fluid intake, will avoid NSAIDs for knee pain     Fatigue:  stable  - History of testosterone deficiency, rechecked and modestly low. He previously did testosterone injections & didn't think it made him feel any better.    - TSH normal 2/28 and again on repeat 9/27 at 1.01.  - counseled that moderate exercise is really the only known way to combat fatigue, and acknowledged how difficult it is to balance too much with not enough activity.      Vascular:   10/15/18 Right leg US with small (technically DVT) clot in posterior tibial vein: started reg dose aspirin daily 325mg and recheck US in 2 weeks or symptomatically. 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      MSK: avascular necrosis of hip.  S/p replacement surgery     Wounds: No open wounds today.  He knows that if there is erythema or pain that he needs to be seen as soon as possible to evaluate for cellulitis/infection.      Lungs: He reports improved dyspnea on exertion.  PFTs obtained December 2021 stable compared to 3 years ago %, FEV1 107% DL CO 74% predicted.  Of note patient had Covid pneumonia and was on the ventilator.  It has been previously noted that he does have this raspy crackly sounds on both bases that do not change.  He has not had a CT chest since February 2021.  He wants to defer referral to pulmonary at this time.    Healthcare maintenance March 2022 TSH within normal, IgG within normal, vitamin D deficiency screening normal testosterone mildly low, he will follow up with PCP locally, encourage dermatology evaluation yearly, DEXA scan to be obtained locally, discussed age-appropriate cancer  screening    Plan:  RTC in 2 months to see Dr Jean-Paul Nunez (in person visit)     60 minutes spent on the date of the encounter doing chart review, history and exam, documentation and further activities per the note     Roselia Nunez MD

## 2022-03-08 ENCOUNTER — APPOINTMENT (OUTPATIENT)
Dept: TRANSPLANT | Facility: CLINIC | Age: 72
End: 2022-03-08
Payer: MEDICARE

## 2022-03-08 ENCOUNTER — OFFICE VISIT (OUTPATIENT)
Dept: OPHTHALMOLOGY | Facility: CLINIC | Age: 72
End: 2022-03-08
Payer: MEDICARE

## 2022-03-08 ENCOUNTER — APPOINTMENT (OUTPATIENT)
Dept: LAB | Facility: CLINIC | Age: 72
End: 2022-03-08
Payer: MEDICARE

## 2022-03-08 ENCOUNTER — ONCOLOGY VISIT (OUTPATIENT)
Dept: TRANSPLANT | Facility: CLINIC | Age: 72
End: 2022-03-08
Payer: MEDICARE

## 2022-03-08 VITALS
BODY MASS INDEX: 34.7 KG/M2 | SYSTOLIC BLOOD PRESSURE: 138 MMHG | WEIGHT: 228.2 LBS | OXYGEN SATURATION: 96 % | HEART RATE: 67 BPM | RESPIRATION RATE: 16 BRPM | DIASTOLIC BLOOD PRESSURE: 76 MMHG | TEMPERATURE: 98 F

## 2022-03-08 DIAGNOSIS — E07.89 OTHER SPECIFIED DISORDERS OF THYROID: ICD-10-CM

## 2022-03-08 DIAGNOSIS — H02.135 SENILE ECTROPION OF BOTH LOWER EYELIDS: ICD-10-CM

## 2022-03-08 DIAGNOSIS — H02.831 DERMATOCHALASIS OF BOTH UPPER EYELIDS: ICD-10-CM

## 2022-03-08 DIAGNOSIS — D89.813 GVHD (GRAFT VERSUS HOST DISEASE) (H): ICD-10-CM

## 2022-03-08 DIAGNOSIS — H02.403 INVOLUTIONAL PTOSIS, ACQUIRED, BILATERAL: Primary | ICD-10-CM

## 2022-03-08 DIAGNOSIS — Z94.81 STATUS POST BONE MARROW TRANSPLANT (H): ICD-10-CM

## 2022-03-08 DIAGNOSIS — H02.132 SENILE ECTROPION OF BOTH LOWER EYELIDS: ICD-10-CM

## 2022-03-08 DIAGNOSIS — D63.8 ANEMIA IN OTHER CHRONIC DISEASES CLASSIFIED ELSEWHERE: ICD-10-CM

## 2022-03-08 DIAGNOSIS — D89.811 CHRONIC GRAFT-VERSUS-HOST DISEASE (H): ICD-10-CM

## 2022-03-08 DIAGNOSIS — H02.834 DERMATOCHALASIS OF BOTH UPPER EYELIDS: ICD-10-CM

## 2022-03-08 LAB
ALBUMIN SERPL-MCNC: 3.4 G/DL (ref 3.4–5)
ALP SERPL-CCNC: 58 U/L (ref 40–150)
ALT SERPL W P-5'-P-CCNC: 30 U/L (ref 0–70)
ANION GAP SERPL CALCULATED.3IONS-SCNC: 9 MMOL/L (ref 3–14)
AST SERPL W P-5'-P-CCNC: 26 U/L (ref 0–45)
BASOPHILS # BLD AUTO: 0 10E3/UL (ref 0–0.2)
BASOPHILS NFR BLD AUTO: 0 %
BILIRUB SERPL-MCNC: 0.2 MG/DL (ref 0.2–1.3)
BUN SERPL-MCNC: 25 MG/DL (ref 7–30)
CALCIUM SERPL-MCNC: 8.8 MG/DL (ref 8.5–10.1)
CHLORIDE BLD-SCNC: 108 MMOL/L (ref 94–109)
CO2 SERPL-SCNC: 23 MMOL/L (ref 20–32)
CREAT SERPL-MCNC: 1.28 MG/DL (ref 0.66–1.25)
EOSINOPHIL # BLD AUTO: 0.1 10E3/UL (ref 0–0.7)
EOSINOPHIL NFR BLD AUTO: 1 %
ERYTHROCYTE [DISTWIDTH] IN BLOOD BY AUTOMATED COUNT: 17.9 % (ref 10–15)
FERRITIN SERPL-MCNC: 44 NG/ML (ref 26–388)
GFR SERPL CREATININE-BSD FRML MDRD: 59 ML/MIN/1.73M2
GLUCOSE BLD-MCNC: 95 MG/DL (ref 70–99)
HCT VFR BLD AUTO: 36.1 % (ref 40–53)
HGB BLD-MCNC: 11.9 G/DL (ref 13.3–17.7)
IMM GRANULOCYTES # BLD: 0 10E3/UL
IMM GRANULOCYTES NFR BLD: 0 %
LYMPHOCYTES # BLD AUTO: 1.7 10E3/UL (ref 0.8–5.3)
LYMPHOCYTES NFR BLD AUTO: 35 %
MCH RBC QN AUTO: 30.3 PG (ref 26.5–33)
MCHC RBC AUTO-ENTMCNC: 33 G/DL (ref 31.5–36.5)
MCV RBC AUTO: 92 FL (ref 78–100)
MONOCYTES # BLD AUTO: 0.7 10E3/UL (ref 0–1.3)
MONOCYTES NFR BLD AUTO: 14 %
NEUTROPHILS # BLD AUTO: 2.4 10E3/UL (ref 1.6–8.3)
NEUTROPHILS NFR BLD AUTO: 50 %
NRBC # BLD AUTO: 0 10E3/UL
NRBC BLD AUTO-RTO: 0 /100
PLATELET # BLD AUTO: 222 10E3/UL (ref 150–450)
POTASSIUM BLD-SCNC: 4.2 MMOL/L (ref 3.4–5.3)
PROT SERPL-MCNC: 7 G/DL (ref 6.8–8.8)
RBC # BLD AUTO: 3.93 10E6/UL (ref 4.4–5.9)
SODIUM SERPL-SCNC: 140 MMOL/L (ref 133–144)
TSH SERPL DL<=0.005 MIU/L-ACNC: 0.64 MU/L (ref 0.4–4)
WBC # BLD AUTO: 4.8 10E3/UL (ref 4–11)

## 2022-03-08 PROCEDURE — 85025 COMPLETE CBC W/AUTO DIFF WBC: CPT | Performed by: INTERNAL MEDICINE

## 2022-03-08 PROCEDURE — 84403 ASSAY OF TOTAL TESTOSTERONE: CPT | Performed by: INTERNAL MEDICINE

## 2022-03-08 PROCEDURE — G0463 HOSPITAL OUTPT CLINIC VISIT: HCPCS

## 2022-03-08 PROCEDURE — 82784 ASSAY IGA/IGD/IGG/IGM EACH: CPT | Performed by: INTERNAL MEDICINE

## 2022-03-08 PROCEDURE — 82728 ASSAY OF FERRITIN: CPT | Performed by: INTERNAL MEDICINE

## 2022-03-08 PROCEDURE — 80053 COMPREHEN METABOLIC PANEL: CPT | Performed by: INTERNAL MEDICINE

## 2022-03-08 PROCEDURE — 84443 ASSAY THYROID STIM HORMONE: CPT | Performed by: INTERNAL MEDICINE

## 2022-03-08 PROCEDURE — 99024 POSTOP FOLLOW-UP VISIT: CPT | Performed by: OPHTHALMOLOGY

## 2022-03-08 PROCEDURE — 99215 OFFICE O/P EST HI 40 MIN: CPT | Mod: 25 | Performed by: INTERNAL MEDICINE

## 2022-03-08 PROCEDURE — 36415 COLL VENOUS BLD VENIPUNCTURE: CPT | Performed by: INTERNAL MEDICINE

## 2022-03-08 PROCEDURE — 82306 VITAMIN D 25 HYDROXY: CPT | Performed by: INTERNAL MEDICINE

## 2022-03-08 RX ORDER — ACETAMINOPHEN 325 MG/1
325-650 TABLET ORAL EVERY 6 HOURS PRN
COMMUNITY
End: 2022-08-30

## 2022-03-08 ASSESSMENT — PAIN SCALES - GENERAL: PAINLEVEL: SEVERE PAIN (7)

## 2022-03-08 ASSESSMENT — VISUAL ACUITY
OS_SC+: -3
OD_PH_SC+: -3
OD_PH_SC: 20/25
OD_SC+: -2
OD_SC: 20/30
METHOD: SNELLEN - LINEAR
OS_SC: 20/20

## 2022-03-08 ASSESSMENT — TONOMETRY
OD_IOP_MMHG: 10
IOP_METHOD: ICARE
OS_IOP_MMHG: 11

## 2022-03-08 NOTE — PROGRESS NOTES
Chief Complaint(s) and History of Present Illness(es)     Follow Up     Laterality: both eyes    Associated symptoms: eye pain and discharge.  Negative for tearing    Pain scale: 7/10    Comments: S/P Bilateral upper eyelid ptosis repair/ bilateral lower lid   ectropion repair 2/24/22                    Comments     each burning has been really bad. It was not bad the first few days after   surgery but then seemed to start in. Bothers at all times. Tried various   drops but has not found anything that stops the burning. Vision is fine   but hard to keep each eye open long enough to be certain.   Waking mucus discharge with each eye but not stuck shut. Just debris in   lashes. No issues with tearing. Has used EES ointment but only once daily   and not as recommended due to burning each eye. Feels pressure behind each   eye when lying on either side at night.   Serum tears.   Refresh tears.   Systane gtts.   Stopped EES ointment daily each eye because it burned.     Emmasa Monroe, COT COT 2:30 PM March 8, 2022   Overall healing well, mild nasal peak right eye  Has significant dry eye with significant interpalpebral punctate epithelial erosions. Prior Graft versus host disease (GVHD).    Patient Instructions   Refresh Celluvisc every 1-2 hours   Artificial tear ointment such as Refresh PM at bedtime     Massage the inner corner of the right upper eyelid in a downward direction several times daily.             Complete documentation of historical and exam elements from today's encounter can be found in the full encounter summary report (not reduplicated in this progress note). I personally obtained the chief complaint(s) and history of present illness.  I confirmed and edited as necessary the review of systems, past medical/surgical history, family history, social history, and examination findings as documented by others; and I examined the patient myself. I personally reviewed the relevant tests, images, and reports  as documented above. I formulated and edited as necessary the assessment and plan and discussed the findings and management plan with the patient and family. - Florina Solis MD

## 2022-03-08 NOTE — NURSING NOTE
Chief Complaints and History of Present Illnesses   Patient presents with     Follow Up     S/P Bilateral upper eyelid ptosis repair/ bilateral lower lid ectropion repair 2/24/22           Chief Complaint(s) and History of Present Illness(es)     Follow Up     Laterality: both eyes    Associated symptoms: eye pain and discharge.  Negative for tearing    Pain scale: 7/10    Comments: S/P Bilateral upper eyelid ptosis repair/ bilateral lower lid ectropion repair 2/24/22                    Comments     each burning has been really bad. It was not bad the first few days after surgery but then seemed to start in. Bothers at all times. Tried various drops but has not found anything that stops the burning. Vision is fine but hard to keep each eye open long enough to be certain.   Waking mucus discharge with each eye but not stuck shut. Just debris in lashes. No issues with tearing. Has used EES ointment but only once daily and not as recommended due to burning each eye. Feels pressure behind each eye when lying on either side at night.   Serum tears.   Refresh tears.   Systane gtts.   EES ointment daily each eye.     RILEY Herzog COT 2:30 PM March 8, 2022

## 2022-03-08 NOTE — NURSING NOTE
"Oncology Rooming Note    March 8, 2022 3:18 PM   Deejay Dior is a 71 year old male who presents for:    Chief Complaint   Patient presents with     Oncology Clinic Visit     Myelodysplastic syndrome      Blood Draw     Labs drawn via  by RN in lab. VS taken     Initial Vitals: /76   Pulse 67   Temp 98  F (36.7  C) (Oral)   Resp 16   Wt 103.5 kg (228 lb 3.2 oz)   SpO2 96%   BMI 34.70 kg/m   Estimated body mass index is 34.7 kg/m  as calculated from the following:    Height as of 2/24/22: 1.727 m (5' 8\").    Weight as of this encounter: 103.5 kg (228 lb 3.2 oz). Body surface area is 2.23 meters squared.  Severe Pain (7) Comment: Data Unavailable   No LMP for male patient.  Allergies reviewed: Yes  Medications reviewed: Yes    Medications: Medication refills not needed today.  Pharmacy name entered into [employer]:    [id number] [place] [id number] [address] [facility] [place], [place] - [address] [facility] [id number]    Clinical concerns: None       Sima Singleton LPN            "

## 2022-03-08 NOTE — PATIENT INSTRUCTIONS
Refresh Celluvisc every 1-2 hours   Artificial tear ointment such as Refresh PM at bedtime   Use warm compresses 2-3 x daily for 1 minute each    Massage the inner corner of the right upper eyelid in a downward direction several times daily.

## 2022-03-08 NOTE — NURSING NOTE
Chief Complaint   Patient presents with     Oncology Clinic Visit     Myelodysplastic syndrome      Blood Draw     Labs drawn via  by RN in lab. VS taken     Labs collected from venipuncture by RN. Vitals taken. Checked in for next appointment.      Barbara Zepeda RN

## 2022-03-09 LAB
DEPRECATED CALCIDIOL+CALCIFEROL SERPL-MC: 40 UG/L (ref 20–75)
IGG SERPL-MCNC: 904 MG/DL (ref 610–1616)

## 2022-03-10 LAB — TESTOST SERPL-MCNC: 203 NG/DL (ref 240–950)

## 2022-03-11 DIAGNOSIS — Z94.81 STATUS POST BONE MARROW TRANSPLANT (H): Primary | ICD-10-CM

## 2022-03-11 DIAGNOSIS — D89.811 CHRONIC GRAFT-VERSUS-HOST DISEASE (H): ICD-10-CM

## 2022-03-11 DIAGNOSIS — Z79.52 LONG TERM (CURRENT) USE OF SYSTEMIC STEROIDS: ICD-10-CM

## 2022-03-23 ENCOUNTER — TELEPHONE (OUTPATIENT)
Dept: OPHTHALMOLOGY | Facility: CLINIC | Age: 72
End: 2022-03-23
Payer: MEDICARE

## 2022-03-23 NOTE — TELEPHONE ENCOUNTER
Spoke with patient regarding rescheduled appointment due to provider is out of clinic. Rescheduled patient for the week before and [facility]Per Patient

## 2022-04-04 ENCOUNTER — OFFICE VISIT (OUTPATIENT)
Dept: OPHTHALMOLOGY | Facility: CLINIC | Age: 72
End: 2022-04-04
Attending: OPHTHALMOLOGY
Payer: MEDICARE

## 2022-04-04 DIAGNOSIS — H16.223 KERATITIS SICCA, BILATERAL: Primary | ICD-10-CM

## 2022-04-04 DIAGNOSIS — U07.1 COVID-19 VIRUS INFECTION: ICD-10-CM

## 2022-04-04 DIAGNOSIS — D89.813 GRAFT-VERSUS-HOST DISEASE (H): ICD-10-CM

## 2022-04-04 PROCEDURE — 99215 OFFICE O/P EST HI 40 MIN: CPT | Mod: 24 | Performed by: OPHTHALMOLOGY

## 2022-04-04 PROCEDURE — G0463 HOSPITAL OUTPT CLINIC VISIT: HCPCS | Mod: 25

## 2022-04-04 RX ORDER — ERYTHROMYCIN 5 MG/G
0.5 OINTMENT OPHTHALMIC AT BEDTIME
Qty: 3.5 G | Refills: 4 | Status: SHIPPED | OUTPATIENT
Start: 2022-04-04 | End: 2023-03-13

## 2022-04-04 RX ORDER — OFLOXACIN 3 MG/ML
1 SOLUTION/ DROPS OPHTHALMIC 4 TIMES DAILY
Qty: 5 ML | Refills: 1 | Status: SHIPPED | OUTPATIENT
Start: 2022-04-04 | End: 2023-03-13

## 2022-04-04 RX ORDER — PREDNISONE 5 MG/1
5 TABLET ORAL DAILY
Qty: 90 TABLET | Refills: 1 | Status: SHIPPED | OUTPATIENT
Start: 2022-04-04 | End: 2022-09-29

## 2022-04-04 ASSESSMENT — TONOMETRY
IOP_METHOD: ICARE
OD_IOP_MMHG: 13
OS_IOP_MMHG: 14

## 2022-04-04 ASSESSMENT — VISUAL ACUITY
METHOD: SNELLEN - LINEAR
OD_SC+: -2
OS_SC+: -1
OS_SC: 20/20
OD_SC: 20/30

## 2022-04-04 ASSESSMENT — REFRACTION_MANIFEST
OS_SPHERE: PLANO
OD_ADD: +2.75
OS_ADD: +2.75
OD_CYLINDER: +1.00
OD_SPHERE: PLANO
OD_AXIS: 026
OS_CYLINDER: SPHERE

## 2022-04-04 ASSESSMENT — CONF VISUAL FIELD
OS_NORMAL: 1
OD_NORMAL: 1

## 2022-04-04 ASSESSMENT — EXTERNAL EXAM - LEFT EYE: OS_EXAM: NORMAL

## 2022-04-04 ASSESSMENT — EXTERNAL EXAM - RIGHT EYE: OD_EXAM: NORMAL

## 2022-04-04 NOTE — PATIENT INSTRUCTIONS
- Increase serum tears to every two hours in both eyes   - If unable to do serum tears every two hours, then okay to use preservative free artificial tears instead    - Start ofloxacin drop four times daily right eye     - Start erythromycin ointment at bedtime in the right eye     - Continue warm compress BID both eyes

## 2022-04-04 NOTE — NURSING NOTE
"Chief Complaints and History of Present Illnesses   Patient presents with     Follow Up     Chief Complaint(s) and History of Present Illness(es)     Follow Up               Comments     Pt states that he has been compliant with his medications since he was last seen. He says that he feels like \"sometimes they work, sometimes they don't\". Pt reports that in the morning his eyes are \"stuck shut\" and that in the evenings his eyes are still so dry he says he has to constantly put ATs. Ultimately pt says that his eyes are not in a stable, good place.  Additionally, he says that he has noticed an increase in light sensitivity recently.    Pt also requests a new .  Chetan Cassidy OT 1:55 PM April 4, 2022                "

## 2022-04-04 NOTE — PROGRESS NOTES
CC: GVHD OU    HPI:  70 yo CM presents for GVHD evaluation for involvement in the eyes. 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 There is foreign body sensation in the right eye occasionally. Artificial tears seems to help.     Interval update:  Patient notes stable vision since last visit. He notes intermittent dryness, improved compared to prior, relieved moderately with serum tears. He underwent bilateral upper lid ptosis repair and lower lid ectropion repair with Dr. Solis (2/24/22).     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 S/P removal 2020    ARMD  - intermediate, dry each eye     PVD each eye    PCO each eye    Bilateral upper lid ptosis repair and lower lid ectropion repair with Dr. Solis (2/24/22).     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   2. MGD each eye     S/p silicone Punctal plug bilateral lower lid. There is also meibomian gland dysfunction both eyes.     Stopped Restasis due to burning.    Also uses CPAP for PIPO    New epi defect right eye 4/4/22    PLAN:  - Increase serum tears q2h OU - Vital Tears ordered 20% x6/day each eye for unlimited refills with 3 month dispense at a time on 11/23/21  - If unable to do serum tears q2h, then okay to use AT   - Start ofloxacin QID right eye     - Start erythromycin ointment at bedtime OD    - Continue warm compress BID both eyes   - Consider trial of tacrolimus ointment QHS OU      2.   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     Biopsy showed GVHD colon and skin.     3.   Dry Age Related Macular  Degeneration, both eyes.     Seen by Dr. Hector on 1/2020 -- no CNVM     Dry AMD - may have pattern component.    Continue ARED's 2 vitamins PO 2 times daily.      Recommends fish and green leafy vegetables 3 days per week.     No smoking.     Recommeds UV protection.     Return immediately if there are distortions to amsler grid.     F/u Tawny 6 months    4.   Posterior vitreous detachment, right eye. Retina attached.     RD / RT precautions      5.   Basal Cell Carcinoma, Left Lower Lid.    S/P removal in 01/2020 at [facility].      6. Posterior Capsular Opacification each eye  - mid PCO each eye visual significance unclear  Given ARMD and significant dryness.  - s/p YAG capsulotomy OS     7. Bilateral ptosis and lower eyelid ectropion  8. Trichiasis JOEL   - bilateral upper lid ptosis repair and lower lid ectropion repair with Dr. Solis (2/24/22).     RTC:     Leonora Cramer MD  Ophthalmology Resident, PGY-3    Attending Physician Attestation:  Complete documentation of historical and exam elements from today's encounter can be found in the full encounter summary report (not reduplicated in this progress note).  I personally obtained the chief complaint(s) and history of present illness.  I confirmed and edited as necessary the review of systems, past medical/surgical history, family history, social history, and examination findings as documented by others; and I examined the patient myself.  I personally reviewed the relevant tests, images, and reports as documented above.  I formulated and edited as necessary the assessment and plan and discussed the findings and management plan with the patient and family. - Tyrone Fry MD    I personally spent great than 40min with the patient, of which >50% of the time was spent face to face with the patient, counseling and coordinating care with the patient. We discussed the complexity of his diagnosis, the need for further information, close  monitoring, and aggressive treatment, and the unknown prognosis for the patient at this time.    Tyrone Fry MD

## 2022-04-21 DIAGNOSIS — D89.813 GRAFT-VERSUS-HOST DISEASE (H): ICD-10-CM

## 2022-04-21 DIAGNOSIS — U07.1 COVID-19 VIRUS INFECTION: ICD-10-CM

## 2022-04-22 DIAGNOSIS — D89.813 GRAFT-VERSUS-HOST DISEASE (H): ICD-10-CM

## 2022-04-22 DIAGNOSIS — U07.1 COVID-19 VIRUS INFECTION: ICD-10-CM

## 2022-04-22 RX ORDER — PANTOPRAZOLE SODIUM 40 MG/1
40 TABLET, DELAYED RELEASE ORAL
Qty: 30 TABLET | Refills: 3 | Status: SHIPPED | OUTPATIENT
Start: 2022-04-22 | End: 2022-06-09

## 2022-04-26 ENCOUNTER — OFFICE VISIT (OUTPATIENT)
Dept: OPHTHALMOLOGY | Facility: CLINIC | Age: 72
End: 2022-04-26
Payer: MEDICARE

## 2022-04-26 DIAGNOSIS — H02.132 SENILE ECTROPION OF BOTH LOWER EYELIDS: ICD-10-CM

## 2022-04-26 DIAGNOSIS — H02.834 DERMATOCHALASIS OF BOTH UPPER EYELIDS: ICD-10-CM

## 2022-04-26 DIAGNOSIS — H02.831 DERMATOCHALASIS OF BOTH UPPER EYELIDS: ICD-10-CM

## 2022-04-26 DIAGNOSIS — H02.135 SENILE ECTROPION OF BOTH LOWER EYELIDS: ICD-10-CM

## 2022-04-26 DIAGNOSIS — H18.30 CORNEAL EPITHELIAL DEFECT: ICD-10-CM

## 2022-04-26 DIAGNOSIS — H02.403 INVOLUTIONAL PTOSIS, ACQUIRED, BILATERAL: Primary | ICD-10-CM

## 2022-04-26 PROCEDURE — 99024 POSTOP FOLLOW-UP VISIT: CPT | Mod: GC | Performed by: OPHTHALMOLOGY

## 2022-04-26 RX ORDER — ERYTHROMYCIN 5 MG/G
0.5 OINTMENT OPHTHALMIC AT BEDTIME
Qty: 7 G | Refills: 1 | Status: CANCELLED | OUTPATIENT
Start: 2022-04-26

## 2022-04-26 ASSESSMENT — LAGOPHTHALMOS
OS_LAGOPHTHALMOS: 0
OD_LAGOPHTHALMOS: 0

## 2022-04-26 ASSESSMENT — TONOMETRY
OS_IOP_MMHG: 11
IOP_METHOD: ICARE
OD_IOP_MMHG: 12

## 2022-04-26 ASSESSMENT — CONF VISUAL FIELD
METHOD: COUNTING FINGERS
OS_NORMAL: 1
OD_NORMAL: 1

## 2022-04-26 ASSESSMENT — VISUAL ACUITY
OD_SC+: -2
OD_SC: 20/25
OS_SC: 20/25
METHOD: SNELLEN - LINEAR

## 2022-04-26 ASSESSMENT — EXTERNAL EXAM - LEFT EYE: OS_EXAM: NORMAL

## 2022-04-26 ASSESSMENT — LEVATOR FUNCTION
OD_LEVATOR: 15
OS_LEVATOR: 15

## 2022-04-26 ASSESSMENT — EXTERNAL EXAM - RIGHT EYE: OD_EXAM: NORMAL

## 2022-04-26 ASSESSMENT — MARGIN REFLEX DISTANCE
OS_MRD1: 2
OD_MRD1: 2.5

## 2022-04-26 NOTE — PROGRESS NOTES
Oculoplastic Clinic Progress Note    Patient: Deejay Dior MRN# [id number]   YOB: 1950 Age: 71 year old   Date of Visit: Apr 26, 2022    CC: Post-operative visit          HPI:     Deejay Dior is a 71 year old male POM#2 s/p BL levator external resection and BL LL ectropion repair 02/24/2022    Interval Apr 26, 2022:     Patient states following surgery he had a scratch on right eye cornea   4/4/22. Patient states eyes have improved each eye. Patient states foreign   body sensation that returned right eye. Patient states vision has   improved. Patient states lids are comfortable. Patient denies lid issues   each eye.     BIANCA Olguin April 26, 2022 11:09 AM                 Assessment & Plan     Deejay Dior is a 71 year old male with the following diagnoses:   Encounter Diagnoses   Name Primary?     Involutional ptosis, acquired, bilateral Yes     Senile ectropion of both lower eyelids      Dermatochalasis of both upper eyelids      Corneal epithelial defect    - Ptosis improved MRD1 2.5mm and 2mm from 1mm each eye  - Ectropion resolved now good lid positioning and helping retain tears with adequate tear lake   - Linear parecentral epithelial defect 1.5mm still present since the one seen by Dr. Fry 04/04/2022. There is no infiltrate and no significant corneal abnormalities in the left eye.     Plan  - Ofloxacin QID and erythromycin ointment at bedtime right eye  - RTC to re-evaluate since pt has no planned upcoming appts with cornea - was told 2 weeks but was unable to schedule.   - ASEDs and ATs per Dr. rFy for GVHD  - Follows with Retina for non-exud AMD    Patient disposition:   Return in about 3 weeks (around 5/17/2022) for 3-4 weeks Dr. Fry cornea follow up .          My privilege to be part of your care,  Leobardo Avery MD, MSc  Ophthalmology PGY-2 resident physician  Pager: [phone]    Attending Physician Attestation: Complete documentation of historical and exam elements from  today's encounter can be found in the full encounter summary report (not reduplicated in this progress note). I personally obtained the chief complaint(s) and history of present illness. I confirmed and edited as necessary the review of systems, past medical/surgical history, family history, social history, and examination findings as documented by others; and I examined the patient myself. I personally reviewed the relevant tests, images, and reports as documented above. I formulated and edited as necessary the assessment and plan and discussed the findings and management plan with the patient.  -Florina Solis MD

## 2022-04-28 ENCOUNTER — TELEPHONE (OUTPATIENT)
Dept: OPHTHALMOLOGY | Facility: CLINIC | Age: 72
End: 2022-04-28
Payer: MEDICARE

## 2022-04-28 NOTE — TELEPHONE ENCOUNTER
Called and spoke to Deejay Villalba stated Dr. Heaton saw his eye on 4/26 and it looks as though his eye was scratched and is healing . Deejay feels like he does not feel like he need to come in to the eye clinic for an acute or to see Dr. Fry.     Deejay was wondering how long he has to be on vital tears? He has one more refill.     Per Sunita Funez,   This is Sunita covering for Elyssa. This patient saw Dr Solis yesterday and he needs to be seen with for Return in about 3 weeks (around 5/17/2022) for 3-4 weeks Dr. Fry cornea follow up. Looks like Dr Fry has no availability until late July, is there a time the patient could be seen sooner?      Thanks,     alcira

## 2022-05-04 NOTE — PROGRESS NOTES
BMT Progress Note    ID:  Mr. 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 (In the past has had a basal and squamous cell removed), Assumed new BMT care by Jaen-Paul Nunez on 3/7/2022    cGVHD: currently On pred 5mg every day and Jakafi 5 mg bid.      He admitted on 2/8/2021 for acute hypoxic respiratory failure secondary to COVID-19 pneumonia. Also rhino virus positive. Transferred to MICU on 2/10/2021 for worsening respiratory status requiring intubation 2/13-2/19/21. Discharged on 3/3/21 with home O2. Treated with remdesevir, dexamethasone and  eliquis 2.5 bid (through 3/30). Needed home O2 for a few weeks, now off.    HPI:  He reports feeling well. No new concerns.   Still same concern with eye GVHD, 2 months of serum tears ofloxacin and erythromycin and warm compressors, seeing ophthalmology frequently, admits to not do erythromycin as instucted. Artificial tears 5-6 times/ days.   Still with same Dry mouth, no ulcers, biotin helps temporarily.   Doing well today otherwise.  He reports his usual fatigue- stable and fluctuates.  He occasionally gets short of breath with exertion. No infectious concerns., no fevers. No new skin rashes or thickening.       Remainder of 10 pt ROS negative    On exam:  BP (!) 147/83   Pulse 59   Temp 97.7  F (36.5  C) (Oral)   Resp 18   Wt 105.1 kg (231 lb 12.8 oz)   SpO2 99%   BMI 35.25 kg/m      HEENT: PERRL, EOMI , dry oral mucosa with no ulcers  Chest: Mild bibasilar crackles  CVS: S1 S2 RRR, no murmurs or gallops  Abdomen: Soft nontender no organomegalies or masses.  Extremities: The left leg had sclerotic skin on the shin, no evidence of infection or cellulitis, no open ulcers.  No other  sclerotic changes of the skin.  Of note this is my first time meeting the patient and he reports that the skin changes have been since previous cellulitis post transplantation. No ulcer.  CNS: non focal     Most Recent [facility] Pulmonary Function Testing    FVC-Pred   Date Value Ref Range Status   12/20/2021 3.86 L    10/02/2018 3.97 L    06/01/2016 4.16 L      FVC-Pre   Date Value Ref Range Status   12/20/2021 4.33 L    10/02/2018 4.56 L    06/01/2016 4.18 L      FVC-%Pred-Pre   Date Value Ref Range Status   12/20/2021 112 %    10/02/2018 114 %    06/01/2016 100 %      FEV1-Pre   Date Value Ref Range Status   12/20/2021 3.12 L    10/02/2018 3.58 L    06/01/2016 3.03 L      FEV1-%Pred-Pre   Date Value Ref Range Status   12/20/2021 107 %    10/02/2018 118 %    06/01/2016 95 %      FEV1FVC-Pred   Date Value Ref Range Status   12/20/2021 76 %    10/02/2018 74 %    06/01/2016 77 %      FEV1FVC-Pre   Date Value Ref Range Status   12/20/2021 72 %    10/02/2018 79 %    06/01/2016 72 %      No results found for: 20029  FEFMax-Pred   Date Value Ref Range Status   12/20/2021 7.60 L/sec    10/02/2018 7.92 L/sec    06/01/2016 8.31 L/sec      FEFMax-Pre   Date Value Ref Range Status   12/20/2021 9.21 L/sec    10/02/2018 10.21 L/sec    06/01/2016 9.85 L/sec      FEFMax-%Pred-Pre   Date Value Ref Range Status   12/20/2021 121 %    10/02/2018 128 %    06/01/2016 118 %      ExpTime-Pre   Date Value Ref Range Status   12/20/2021 12.36 sec    10/02/2018 11.75 sec    06/01/2016 9.90 sec      FIFMax-Pre   Date Value Ref Range Status   12/20/2021 6.52 L/sec    10/02/2018 8.04 L/sec    06/01/2016 7.34 L/sec      FEV1FEV6-Pred   Date Value Ref Range Status   12/20/2021 77 %    10/02/2018 78 %    06/01/2016 78 %      FEV1FEV6-Pre   Date Value Ref Range Status   12/20/2021 74 %    10/02/2018 81 %    06/01/2016 75 %      No results found for: 20055       ASSESSMENT AND PLAN:  Mr. Dior is a 70 y/o male, 7 years and 10 months s/p NMA allo  sib PBSCT for MDS w/ cGVHD     AML/MDS/BMT: S/p 8/8 matched and ABO matched allo-sib transplant from his sister. Total cell dose (from 7/1 & 7/2) 6.53 x 10^6 CD34+ cells/kg.   - 1 year anniversary (July 2015): 30% cellular, trilineage hematopoiesis, no abnormal blasts by morphology or flow , no dysplasia, 0-1 fibrosis, 100% donor (BM, CD3, CD15). CR  - 2 year anniversary (July 2016): 20-30% cellular, trilineage hematopoiesis, no abnormal blasts by morphology or flow , no dysplasia, No fibrosis, 100% donor in BM (PB not sent). ISCN: //46,XX[20] Complete Remission.     HEME: No transfusion needs, counts stable, 3/2022 ferritin 44    GVHD: Long history of GVHD as below.   12/2021: kept him on prednisone 5 mg daily (reports that cGVHD flared on lower dose would like to stay at same dose), ruxolitinib 5 mg twice daily.  His counts are tolerating well.      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 Had been off prednisone since summer 2017 and briefly tapered off Sirolimus (january 2018), however resumed with flare in February. 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 Sirolimus was stopped on 9/27/2018 & Pred 90mg was started. Seen by Dr. Sandoval, he does not think his symptoms or CT findings are c/w Sirolimus or GVH & he was in favor of tapering Prednisone. Developed worsening skin thickening & desquamation to the RLE, c/w GVH.   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   Required Burst pred 40mg a day on 1/3/20 with significant systemic arthralgic pain, tapered back to 10/0 eod after 1 week, near resolution of symptoms noted 1/17, returned to 10 mg every other day; then dropped to 5mg q day  in April 2021 (this is best dates estimated on chart review)        ID:  Afebrile no signs/sx of infection.    - IgG 1/24 =1130  - Prophy: HD ACV (renal dosing), Fluconazole and  Bactrim.     Talked to patient and evaluated by me. We discussed the risks and benefits of receiving EVUSHELD, as well as alternative treatments. The Emergency Use Administration (EUA) was provided to the patient for review and an opportunity for questions was provided. Patient wishes to proceed with EVUSHELD.      GI:  protonix (has heartburn)     FEN/Renal: Mild increase in creatinine today encouraged fluid intake, will avoid NSAIDs for knee pain     Fatigue:  stable  - History of testosterone deficiency, rechecked and modestly low. He previously did testosterone injections & didn't think it made him feel any better.    - TSH normal 2/28 and again on repeat 9/27 at 1.01.  - counseled that moderate exercise is really the only known way to combat fatigue, and acknowledged how difficult it is to balance too much with not enough activity.      Vascular:   10/15/18 Right leg US with small (technically DVT) clot in posterior tibial vein: started reg dose aspirin daily 325mg and recheck US in 2 weeks or symptomatically. 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      MSK: avascular necrosis of hip.  S/p replacement surgery     Wounds: No open wounds today.  He knows that if there is erythema or pain that he needs to be seen as soon as possible to evaluate for cellulitis/infection.      Lungs: He reports improved dyspnea on exertion.  PFTs obtained December 2021 stable compared to 3 years ago %, FEV1 107% DL CO 74% predicted.  Of note patient had Covid pneumonia and was on the ventilator.  It has been previously noted that he does have this raspy crackly  sounds on both bases that do not change.  He has not had a CT chest since February 2021.  He wants to defer referral to pulmonary at this time.    Healthcare maintenance March 2022 TSH within normal, IgG within normal, vitamin D deficiency screening normal testosterone mildly low, he will follow up with PCP locally, encourage dermatology evaluation yearly, DEXA scan completed today, discussed age-appropriate cancer screening. Seeing dermatology in September.     Plan:  [place]  Follow up with derm and ophthalmology  RTC in 2 months to see Dr Jean-Paul Nunez (in person visit)     40 minutes spent on the date of the encounter doing chart review, history and exam, documentation and further activities per the note     Roselia Nunez MD

## 2022-05-06 ENCOUNTER — APPOINTMENT (OUTPATIENT)
Dept: LAB | Facility: CLINIC | Age: 72
End: 2022-05-06
Attending: INTERNAL MEDICINE
Payer: MEDICARE

## 2022-05-06 ENCOUNTER — ANCILLARY PROCEDURE (OUTPATIENT)
Dept: BONE DENSITY | Facility: CLINIC | Age: 72
End: 2022-05-06
Attending: INTERNAL MEDICINE
Payer: MEDICARE

## 2022-05-06 ENCOUNTER — ONCOLOGY VISIT (OUTPATIENT)
Dept: TRANSPLANT | Facility: CLINIC | Age: 72
End: 2022-05-06
Attending: INTERNAL MEDICINE
Payer: MEDICARE

## 2022-05-06 VITALS
WEIGHT: 231.8 LBS | HEART RATE: 59 BPM | OXYGEN SATURATION: 99 % | TEMPERATURE: 97.7 F | RESPIRATION RATE: 18 BRPM | SYSTOLIC BLOOD PRESSURE: 147 MMHG | BODY MASS INDEX: 35.25 KG/M2 | DIASTOLIC BLOOD PRESSURE: 83 MMHG

## 2022-05-06 DIAGNOSIS — D89.811 CHRONIC GRAFT-VERSUS-HOST DISEASE (H): Primary | ICD-10-CM

## 2022-05-06 DIAGNOSIS — U07.1 COVID-19 VIRUS INFECTION: Primary | ICD-10-CM

## 2022-05-06 DIAGNOSIS — Z79.52 LONG TERM (CURRENT) USE OF SYSTEMIC STEROIDS: ICD-10-CM

## 2022-05-06 DIAGNOSIS — D89.811 CHRONIC GRAFT-VERSUS-HOST DISEASE (H): ICD-10-CM

## 2022-05-06 DIAGNOSIS — U07.1 COVID-19 VIRUS INFECTION: ICD-10-CM

## 2022-05-06 DIAGNOSIS — Z94.81 STATUS POST BONE MARROW TRANSPLANT (H): ICD-10-CM

## 2022-05-06 LAB
ALBUMIN SERPL-MCNC: 3.4 G/DL (ref 3.4–5)
ALP SERPL-CCNC: 49 U/L (ref 40–150)
ALT SERPL W P-5'-P-CCNC: 33 U/L (ref 0–70)
ANION GAP SERPL CALCULATED.3IONS-SCNC: 8 MMOL/L (ref 3–14)
AST SERPL W P-5'-P-CCNC: 33 U/L (ref 0–45)
BASOPHILS # BLD AUTO: 0 10E3/UL (ref 0–0.2)
BASOPHILS NFR BLD AUTO: 0 %
BILIRUB SERPL-MCNC: 0.3 MG/DL (ref 0.2–1.3)
BUN SERPL-MCNC: 23 MG/DL (ref 7–30)
CALCIUM SERPL-MCNC: 9.1 MG/DL (ref 8.5–10.1)
CHLORIDE BLD-SCNC: 108 MMOL/L (ref 94–109)
CO2 SERPL-SCNC: 25 MMOL/L (ref 20–32)
CREAT SERPL-MCNC: 0.91 MG/DL (ref 0.66–1.25)
EOSINOPHIL # BLD AUTO: 0.1 10E3/UL (ref 0–0.7)
EOSINOPHIL NFR BLD AUTO: 1 %
ERYTHROCYTE [DISTWIDTH] IN BLOOD BY AUTOMATED COUNT: 19.2 % (ref 10–15)
GFR SERPL CREATININE-BSD FRML MDRD: 89 ML/MIN/1.73M2
GLUCOSE BLD-MCNC: 96 MG/DL (ref 70–99)
HCT VFR BLD AUTO: 34.9 % (ref 40–53)
HGB BLD-MCNC: 11.6 G/DL (ref 13.3–17.7)
IMM GRANULOCYTES # BLD: 0 10E3/UL
IMM GRANULOCYTES NFR BLD: 0 %
LYMPHOCYTES # BLD AUTO: 1.9 10E3/UL (ref 0.8–5.3)
LYMPHOCYTES NFR BLD AUTO: 40 %
MCH RBC QN AUTO: 31.2 PG (ref 26.5–33)
MCHC RBC AUTO-ENTMCNC: 33.2 G/DL (ref 31.5–36.5)
MCV RBC AUTO: 94 FL (ref 78–100)
MONOCYTES # BLD AUTO: 0.7 10E3/UL (ref 0–1.3)
MONOCYTES NFR BLD AUTO: 16 %
NEUTROPHILS # BLD AUTO: 2 10E3/UL (ref 1.6–8.3)
NEUTROPHILS NFR BLD AUTO: 43 %
NRBC # BLD AUTO: 0 10E3/UL
NRBC BLD AUTO-RTO: 0 /100
PLATELET # BLD AUTO: 190 10E3/UL (ref 150–450)
POTASSIUM BLD-SCNC: 4.3 MMOL/L (ref 3.4–5.3)
PROT SERPL-MCNC: 6.8 G/DL (ref 6.8–8.8)
RBC # BLD AUTO: 3.72 10E6/UL (ref 4.4–5.9)
SODIUM SERPL-SCNC: 141 MMOL/L (ref 133–144)
WBC # BLD AUTO: 4.6 10E3/UL (ref 4–11)

## 2022-05-06 PROCEDURE — 85025 COMPLETE CBC W/AUTO DIFF WBC: CPT | Performed by: INTERNAL MEDICINE

## 2022-05-06 PROCEDURE — 80053 COMPREHEN METABOLIC PANEL: CPT | Performed by: INTERNAL MEDICINE

## 2022-05-06 PROCEDURE — 96372 THER/PROPH/DIAG INJ SC/IM: CPT | Performed by: INTERNAL MEDICINE

## 2022-05-06 PROCEDURE — 77080 DXA BONE DENSITY AXIAL: CPT

## 2022-05-06 PROCEDURE — G0463 HOSPITAL OUTPT CLINIC VISIT: HCPCS | Mod: 25

## 2022-05-06 PROCEDURE — 250N000011 HC RX IP 250 OP 636: Performed by: INTERNAL MEDICINE

## 2022-05-06 PROCEDURE — 99215 OFFICE O/P EST HI 40 MIN: CPT | Mod: 24 | Performed by: INTERNAL MEDICINE

## 2022-05-06 PROCEDURE — M0220 HC INJECTION TIXAGEVIMAB & CILGAVIMAB (EVUSHELD): HCPCS

## 2022-05-06 PROCEDURE — 36415 COLL VENOUS BLD VENIPUNCTURE: CPT | Performed by: INTERNAL MEDICINE

## 2022-05-06 RX ORDER — DIPHENHYDRAMINE HYDROCHLORIDE 50 MG/ML
50 INJECTION INTRAMUSCULAR; INTRAVENOUS
Status: CANCELLED
Start: 2022-05-06

## 2022-05-06 RX ORDER — EPINEPHRINE 1 MG/ML
0.3 INJECTION, SOLUTION INTRAMUSCULAR; SUBCUTANEOUS EVERY 5 MIN PRN
Status: CANCELLED | OUTPATIENT
Start: 2022-05-06

## 2022-05-06 RX ORDER — ALBUTEROL SULFATE 0.83 MG/ML
2.5 SOLUTION RESPIRATORY (INHALATION)
Status: CANCELLED | OUTPATIENT
Start: 2022-05-06

## 2022-05-06 RX ORDER — METHYLPREDNISOLONE SODIUM SUCCINATE 125 MG/2ML
125 INJECTION, POWDER, LYOPHILIZED, FOR SOLUTION INTRAMUSCULAR; INTRAVENOUS
Status: CANCELLED
Start: 2022-05-06

## 2022-05-06 RX ORDER — ALBUTEROL SULFATE 90 UG/1
1-2 AEROSOL, METERED RESPIRATORY (INHALATION)
Status: CANCELLED
Start: 2022-05-06

## 2022-05-06 RX ORDER — MEPERIDINE HYDROCHLORIDE 25 MG/ML
25 INJECTION INTRAMUSCULAR; INTRAVENOUS; SUBCUTANEOUS EVERY 30 MIN PRN
Status: CANCELLED | OUTPATIENT
Start: 2022-05-06

## 2022-05-06 RX ORDER — NALOXONE HYDROCHLORIDE 0.4 MG/ML
0.2 INJECTION, SOLUTION INTRAMUSCULAR; INTRAVENOUS; SUBCUTANEOUS
Status: CANCELLED | OUTPATIENT
Start: 2022-05-06

## 2022-05-06 RX ADMIN — AZD7442 6 ML: KIT at 11:51

## 2022-05-06 ASSESSMENT — PAIN SCALES - GENERAL: PAINLEVEL: NO PAIN (0)

## 2022-05-06 NOTE — LETTER
5/6/2022         RE: Deejay Dior  [address]  [place] [address]        Dear Colleague,    Thank you for referring your patient, Deejay Dior, to the [facility] BLOOD AND MARROW TRANSPLANT PROGRAM [place]. Please see a copy of my visit note below.    BMT Progress Note    ID:  Mr. 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 (In the past has had a basal and squamous cell removed), Assumed new BMT care by Jean-Paul Nunez on 3/7/2022    cGVHD: currently On pred 5mg every day and Jakafi 5 mg bid.      He admitted on 2/8/2021 for acute hypoxic respiratory failure secondary to COVID-19 pneumonia. Also rhino virus positive. Transferred to MICU on 2/10/2021 for worsening respiratory status requiring intubation 2/13-2/19/21. Discharged on 3/3/21 with home O2. Treated with remdesevir, dexamethasone and  eliquis 2.5 bid (through 3/30). Needed home O2 for a few weeks, now off.    HPI:  He reports feeling well. No new concerns.   Still same concern with eye GVHD, 2 months of serum tears ofloxacin and erythromycin and warm compressors, seeing ophthalmology frequently, admits to not do erythromycin as instucted. Artificial tears 5-6 times/ days.   Still with same Dry mouth, no ulcers, biotin helps temporarily.   Doing well today otherwise.  He reports his usual fatigue- stable and fluctuates.  He occasionally gets short of breath with exertion. No infectious concerns., no fevers. No new skin rashes or thickening.       Remainder of 10 pt ROS negative    On exam:  BP (!) 147/83   Pulse 59   Temp 97.7  F (36.5  C) (Oral)   Resp 18   Wt 105.1 kg (231 lb 12.8 oz)   SpO2 99%   BMI 35.25 kg/m      HEENT: PERRL, EOMI ,  dry oral mucosa with no ulcers  Chest: Mild bibasilar crackles  CVS: S1 S2 RRR, no murmurs or gallops  Abdomen: Soft nontender no organomegalies or masses.  Extremities: The left leg had sclerotic skin on the shin, no evidence of infection or cellulitis, no open ulcers.  No other sclerotic changes of the skin.  Of note this is my first time meeting the patient and he reports that the skin changes have been since previous cellulitis post transplantation. No ulcer.  CNS: non focal     Most Recent [place] Pulmonary Function Testing    FVC-Pred   Date Value Ref Range Status   12/20/2021 3.86 L    10/02/2018 3.97 L    06/01/2016 4.16 L      FVC-Pre   Date Value Ref Range Status   12/20/2021 4.33 L    10/02/2018 4.56 L    06/01/2016 4.18 L      FVC-%Pred-Pre   Date Value Ref Range Status   12/20/2021 112 %    10/02/2018 114 %    06/01/2016 100 %      FEV1-Pre   Date Value Ref Range Status   12/20/2021 3.12 L    10/02/2018 3.58 L    06/01/2016 3.03 L      FEV1-%Pred-Pre   Date Value Ref Range Status   12/20/2021 107 %    10/02/2018 118 %    06/01/2016 95 %      FEV1FVC-Pred   Date Value Ref Range Status   12/20/2021 76 %    10/02/2018 74 %    06/01/2016 77 %      FEV1FVC-Pre   Date Value Ref Range Status   12/20/2021 72 %    10/02/2018 79 %    06/01/2016 72 %      No results found for: 20029  FEFMax-Pred   Date Value Ref Range Status   12/20/2021 7.60 L/sec    10/02/2018 7.92 L/sec    06/01/2016 8.31 L/sec      FEFMax-Pre   Date Value Ref Range Status   12/20/2021 9.21 L/sec    10/02/2018 10.21 L/sec    06/01/2016 9.85 L/sec      FEFMax-%Pred-Pre   Date Value Ref Range Status   12/20/2021 121 %    10/02/2018 128 %    06/01/2016 118 %      ExpTime-Pre   Date Value Ref Range Status   12/20/2021 12.36 sec    10/02/2018 11.75 sec    06/01/2016 9.90 sec      FIFMax-Pre   Date Value Ref Range Status   12/20/2021 6.52 L/sec    10/02/2018 8.04 L/sec    06/01/2016 7.34 L/sec      FEV1FEV6-Pred   Date Value Ref Range Status    12/20/2021 77 %    10/02/2018 78 %    06/01/2016 78 %      FEV1FEV6-Pre   Date Value Ref Range Status   12/20/2021 74 %    10/02/2018 81 %    06/01/2016 75 %      No results found for: 20055       ASSESSMENT AND PLAN:  Mr. 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 Total cell dose (from 7/1 & 7/2) 6.53 x 10^6 CD34+ cells/kg.   - 1 year anniversary (July 2015): 30% cellular, trilineage hematopoiesis, no abnormal blasts by morphology or flow , no dysplasia, 0-1 fibrosis, 100% donor (BM, CD3, CD15). CR  - 2 year anniversary (July 2016): 20-30% cellular, trilineage hematopoiesis, no abnormal blasts by morphology or flow , no dysplasia, No fibrosis, 100% donor in BM (PB not sent). ISCN: //46,XX[20] Complete Remission.     HEME: No transfusion needs, counts stable, 3/2022 ferritin 44    GVHD: Long history of GVHD as below.   12/2021: kept him on prednisone 5 mg daily (reports that cGVHD flared on lower dose would like to stay at same dose), ruxolitinib 5 mg twice daily.  His counts are tolerating well.      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 Had been off prednisone since summer 2017 and briefly tapered off Sirolimus (january 2018), however resumed with flare in February. 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 Sirolimus was stopped on 9/27/2018 & Pred 90mg was started. Seen by Dr. Sandoval, he does not think his symptoms or CT findings are c/w Sirolimus or GVH & he was in favor of tapering Prednisone. Developed worsening skin thickening & desquamation to the RLE, c/w GVH.   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   Required Burst pred 40mg a day on 1/3/20 with significant systemic arthralgic pain, tapered back to 10/0 eod after 1 week, near resolution of symptoms noted 1/17, returned to 10 mg every other day; then dropped to 5mg q day in April 2021 (this is best dates estimated on chart review)        ID:  Afebrile no signs/sx of infection.    - IgG 1/24 =1130  - Prophy: HD ACV (renal dosing), Fluconazole and  Bactrim.     Talked to patient and evaluated by me. We discussed the risks and benefits of receiving EVUSHELD, as well as alternative treatments. The Emergency Use Administration (EUA) was provided to the patient for review and an opportunity for questions was provided. Patient wishes to proceed with EVUSHELD.      GI:  protonix (has heartburn)     FEN/Renal: Mild increase in creatinine today encouraged fluid intake, will avoid NSAIDs for knee pain     Fatigue:  stable  - History of testosterone deficiency, rechecked and modestly low. He previously did testosterone injections & didn't think it made him feel any better.    - TSH normal 2/28 and again on repeat 9/27 at 1.01.  - counseled that moderate exercise is really the only known way to combat fatigue, and acknowledged how difficult it is to balance too much with not enough activity.      Vascular:   10/15/18 Right leg US with small (technically DVT) clot in posterior tibial vein: started reg dose aspirin daily 325mg and recheck US in 2 weeks or symptomatically. 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      MSK: avascular necrosis of hip.  S/p replacement surgery     Wounds: No open wounds today.  He knows that if there is erythema or pain that he needs to be seen as soon as possible to evaluate for  cellulitis/infection.      Lungs: He reports improved dyspnea on exertion.  PFTs obtained December 2021 stable compared to 3 years ago %, FEV1 107% DL CO 74% predicted.  Of note patient had Covid pneumonia and was on the ventilator.  It has been previously noted that he does have this raspy crackly sounds on both bases that do not change.  He has not had a CT chest since February 2021.  He wants to defer referral to pulmonary at this time.    Healthcare maintenance March 2022 TSH within normal, IgG within normal, vitamin D deficiency screening normal testosterone mildly low, he will follow up with PCP locally, encourage dermatology evaluation yearly, DEXA scan completed today, discussed age-appropriate cancer screening. Seeing dermatology in September.     Plan:  [place]  Follow up with derm and ophthalmology  RTC in 2 months to see Dr Jean-Paul Nunez (in person visit)     40 minutes spent on the date of the encounter doing chart review, history and exam, documentation and further activities per the note     Roselia Nunez MD

## 2022-05-06 NOTE — NURSING NOTE
EVUSHELD Administration Note:  Deejay Dior presents today for EVUSHELD.   present during visit today: Not Applicable.    Consent:   Informed Consent confirmed in chart, obtained on this date: 5/6/22.  The following medication was given:     MEDICATION: Evusheld  ROUTE: IM  SITE: Juan Pablo Ventrogluteal  DOSE: 300mg/6ml  LOT #: [id number]  :  [employer]  EXPIRATION DATE:  07/2022  NDC#: [id number]     Post Injection Assessment:   Patient tolerated injection without incident.      Patient was observed in the room for a minimum of 10 minutes after injection per standard, then remained in the buidling for a total 60 minute observation period.         Discharge Plan:    Patient discharged in stable condition accompanied by: wife.  Departure Mode: Ambulatory.     .memd

## 2022-05-06 NOTE — NURSING NOTE
Chief Complaint   Patient presents with     Blood Draw     VPT blood draw and vitals     Venipuncture labs drawn from left arm and provider visit arrived    Aby Richards RN

## 2022-05-06 NOTE — NURSING NOTE
"Oncology Rooming Note    May 6, 2022 10:59 AM   Deejay Dior is a 71 year old male who presents for:    Chief Complaint   Patient presents with     Blood Draw     VPT blood draw and vitals     Oncology Clinic Visit     MDS     Initial Vitals: BP (!) 147/83   Pulse 59   Temp 97.7  F (36.5  C) (Oral)   Resp 18   Wt 105.1 kg (231 lb 12.8 oz)   SpO2 99%   BMI 35.25 kg/m   Estimated body mass index is 35.25 kg/m  as calculated from the following:    Height as of 2/24/22: 1.727 m (5' 8\").    Weight as of this encounter: 105.1 kg (231 lb 12.8 oz). Body surface area is 2.25 meters squared.  No Pain (0) Comment: Data Unavailable   No LMP for male patient.  Allergies reviewed: Yes  Medications reviewed: Yes    Medications: Medication refills not needed today.  Pharmacy name entered into [employer]:    [id number] [place], [place] - [address] [address] [facility] [place], [place] - [phone]    Clinical concerns: none       Regis Scales            "

## 2022-05-08 ENCOUNTER — DOCUMENTATION ONLY (OUTPATIENT)
Dept: ONCOLOGY | Facility: CLINIC | Age: 72
End: 2022-05-08
Payer: MEDICARE

## 2022-05-15 ENCOUNTER — HEALTH MAINTENANCE LETTER (OUTPATIENT)
Age: 72
End: 2022-05-15

## 2022-06-09 DIAGNOSIS — U07.1 COVID-19 VIRUS INFECTION: ICD-10-CM

## 2022-06-09 DIAGNOSIS — D89.813 GRAFT-VERSUS-HOST DISEASE (H): ICD-10-CM

## 2022-06-09 RX ORDER — PANTOPRAZOLE SODIUM 40 MG/1
40 TABLET, DELAYED RELEASE ORAL
Qty: 30 TABLET | Refills: 3 | Status: SHIPPED | OUTPATIENT
Start: 2022-06-09 | End: 2022-10-03

## 2022-06-09 RX ORDER — PANTOPRAZOLE SODIUM 40 MG/1
40 TABLET, DELAYED RELEASE ORAL
Qty: 30 TABLET | Refills: 0 | OUTPATIENT
Start: 2022-06-09

## 2022-07-05 ENCOUNTER — ONCOLOGY VISIT (OUTPATIENT)
Dept: TRANSPLANT | Facility: CLINIC | Age: 72
End: 2022-07-05
Attending: INTERNAL MEDICINE
Payer: MEDICARE

## 2022-07-05 ENCOUNTER — APPOINTMENT (OUTPATIENT)
Dept: LAB | Facility: CLINIC | Age: 72
End: 2022-07-05
Attending: INTERNAL MEDICINE
Payer: MEDICARE

## 2022-07-05 ENCOUNTER — TELEPHONE (OUTPATIENT)
Dept: OPHTHALMOLOGY | Facility: CLINIC | Age: 72
End: 2022-07-05

## 2022-07-05 VITALS
RESPIRATION RATE: 16 BRPM | BODY MASS INDEX: 35.12 KG/M2 | SYSTOLIC BLOOD PRESSURE: 123 MMHG | TEMPERATURE: 97.5 F | DIASTOLIC BLOOD PRESSURE: 69 MMHG | WEIGHT: 231 LBS | HEART RATE: 69 BPM | OXYGEN SATURATION: 97 %

## 2022-07-05 DIAGNOSIS — D89.811 CHRONIC GRAFT-VERSUS-HOST DISEASE (H): ICD-10-CM

## 2022-07-05 LAB
ALBUMIN SERPL-MCNC: 3.4 G/DL (ref 3.4–5)
ALP SERPL-CCNC: 54 U/L (ref 40–150)
ALT SERPL W P-5'-P-CCNC: 35 U/L (ref 0–70)
ANION GAP SERPL CALCULATED.3IONS-SCNC: 11 MMOL/L (ref 3–14)
AST SERPL W P-5'-P-CCNC: 38 U/L (ref 0–45)
BASOPHILS # BLD AUTO: 0 10E3/UL (ref 0–0.2)
BASOPHILS NFR BLD AUTO: 0 %
BILIRUB SERPL-MCNC: 0.4 MG/DL (ref 0.2–1.3)
BUN SERPL-MCNC: 21 MG/DL (ref 7–30)
CALCIUM SERPL-MCNC: 9.1 MG/DL (ref 8.5–10.1)
CHLORIDE BLD-SCNC: 108 MMOL/L (ref 94–109)
CO2 SERPL-SCNC: 25 MMOL/L (ref 20–32)
CREAT SERPL-MCNC: 0.94 MG/DL (ref 0.66–1.25)
EOSINOPHIL # BLD AUTO: 0.1 10E3/UL (ref 0–0.7)
EOSINOPHIL NFR BLD AUTO: 1 %
ERYTHROCYTE [DISTWIDTH] IN BLOOD BY AUTOMATED COUNT: 18.6 % (ref 10–15)
GFR SERPL CREATININE-BSD FRML MDRD: 86 ML/MIN/1.73M2
GLUCOSE BLD-MCNC: 96 MG/DL (ref 70–99)
HCT VFR BLD AUTO: 36.1 % (ref 40–53)
HGB BLD-MCNC: 12.1 G/DL (ref 13.3–17.7)
IMM GRANULOCYTES # BLD: 0 10E3/UL
IMM GRANULOCYTES NFR BLD: 0 %
LYMPHOCYTES # BLD AUTO: 2.6 10E3/UL (ref 0.8–5.3)
LYMPHOCYTES NFR BLD AUTO: 45 %
MCH RBC QN AUTO: 32.1 PG (ref 26.5–33)
MCHC RBC AUTO-ENTMCNC: 33.5 G/DL (ref 31.5–36.5)
MCV RBC AUTO: 96 FL (ref 78–100)
MONOCYTES # BLD AUTO: 1 10E3/UL (ref 0–1.3)
MONOCYTES NFR BLD AUTO: 17 %
NEUTROPHILS # BLD AUTO: 2.1 10E3/UL (ref 1.6–8.3)
NEUTROPHILS NFR BLD AUTO: 37 %
NRBC # BLD AUTO: 0 10E3/UL
NRBC BLD AUTO-RTO: 1 /100
PLATELET # BLD AUTO: 200 10E3/UL (ref 150–450)
POTASSIUM BLD-SCNC: 4 MMOL/L (ref 3.4–5.3)
PROT SERPL-MCNC: 6.9 G/DL (ref 6.8–8.8)
RBC # BLD AUTO: 3.77 10E6/UL (ref 4.4–5.9)
SODIUM SERPL-SCNC: 144 MMOL/L (ref 133–144)
WBC # BLD AUTO: 5.7 10E3/UL (ref 4–11)

## 2022-07-05 PROCEDURE — 36415 COLL VENOUS BLD VENIPUNCTURE: CPT | Performed by: INTERNAL MEDICINE

## 2022-07-05 PROCEDURE — G0463 HOSPITAL OUTPT CLINIC VISIT: HCPCS

## 2022-07-05 PROCEDURE — 85025 COMPLETE CBC W/AUTO DIFF WBC: CPT | Performed by: INTERNAL MEDICINE

## 2022-07-05 PROCEDURE — 99215 OFFICE O/P EST HI 40 MIN: CPT | Performed by: INTERNAL MEDICINE

## 2022-07-05 PROCEDURE — 84132 ASSAY OF SERUM POTASSIUM: CPT | Performed by: INTERNAL MEDICINE

## 2022-07-05 ASSESSMENT — PAIN SCALES - GENERAL: PAINLEVEL: NO PAIN (0)

## 2022-07-05 NOTE — LETTER
7/5/2022         RE: Deejay Dior  [address]  [place] [address]        Dear Colleague,    Thank you for referring your patient, Deejay Dior, to the [facility] BLOOD AND MARROW TRANSPLANT PROGRAM [place]. Please see a copy of my visit note below.    BMT Progress Note    ID:  Mr. 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 (In the past has had a basal and squamous cell removed), Assumed new BMT care by eJan-Paul Nunez on 3/7/2022    cGVHD: currently On pred 5mg every day and Jakafi 5 mg bid.      He admitted on 2/8/2021 for acute hypoxic respiratory failure secondary to COVID-19 pneumonia. Also rhino virus positive. Transferred to MICU on 2/10/2021 for worsening respiratory status requiring intubation 2/13-2/19/21. Discharged on 3/3/21 with home O2. Treated with remdesevir, dexamethasone and  eliquis 2.5 bid (through 3/30). Needed home O2 for a few weeks, now off.    HPI:  He reports feeling well. No new concerns. Glad he is where he is today, 8 years post transplant. No new concerns.  Reports eyes better with serum tears, ofloxacin and erythromycin and warm compressors, seeing ophthalmology but not scheduled since April (does not want to come and wait etc, mostly for convenience), admits to not do erythromycin as instucted. Artificial tears 5-6 times/ days, uses the contact lenses as well for occular GVHD.   Still with same Dry mouth, no ulcers, biotin helps temporarily.   Doing well today otherwise.  He reports his usual fatigue- stable and fluctuates.  He occasionally gets short of breath with exertion. No infectious concerns, no fevers. No new skin rashes or thickening.       Remainder of 10 pt ROS  negative    On exam:  /69   Pulse 69   Temp 97.5  F (36.4  C)   Resp 16   Wt 104.8 kg (231 lb)   SpO2 97%   BMI 35.12 kg/m      HEENT: PERRL, EOMI , dry oral mucosa with no ulcers  Chest: Mild bibasilar crackles  CVS: S1 S2 RRR, no murmurs or gallops  Abdomen: Soft nontender no organomegalies or masses.  Extremities: The left leg had sclerotic skin on the shin, no evidence of infection or cellulitis, no open ulcers.  No other sclerotic changes of the skin.  Of note, since I first meet him the patient and he reports that the skin changes have been since previous cellulitis post transplantation. No ulcer. More prominent venous status stasis on left and more prominent sclerotic changes on right chin up to 2/3 and wraps around the ankle with no ulcers.Stable BSA. Nl ROM.  CNS: non focal     Most Recent Pulmonary Function Testing    FVC-Pred   Date Value Ref Range Status   12/20/2021 3.86 L    10/02/2018 3.97 L    06/01/2016 4.16 L      FVC-Pre   Date Value Ref Range Status   12/20/2021 4.33 L    10/02/2018 4.56 L    06/01/2016 4.18 L      FVC-%Pred-Pre   Date Value Ref Range Status   12/20/2021 112 %    10/02/2018 114 %    06/01/2016 100 %      FEV1-Pre   Date Value Ref Range Status   12/20/2021 3.12 L    10/02/2018 3.58 L    06/01/2016 3.03 L      FEV1-%Pred-Pre   Date Value Ref Range Status   12/20/2021 107 %    10/02/2018 118 %    06/01/2016 95 %      FEV1FVC-Pred   Date Value Ref Range Status   12/20/2021 76 %    10/02/2018 74 %    06/01/2016 77 %      FEV1FVC-Pre   Date Value Ref Range Status   12/20/2021 72 %    10/02/2018 79 %    06/01/2016 72 %      No results found for: 20029  FEFMax-Pred   Date Value Ref Range Status   12/20/2021 7.60 L/sec    10/02/2018 7.92 L/sec    06/01/2016 8.31 L/sec      FEFMax-Pre   Date Value Ref Range Status   12/20/2021 9.21 L/sec    10/02/2018 10.21 L/sec    06/01/2016 9.85 L/sec      FEFMax-%Pred-Pre   Date Value Ref Range Status   12/20/2021 121 %    10/02/2018 128 %     06/01/2016 118 %      ExpTime-Pre   Date Value Ref Range Status   12/20/2021 12.36 sec    10/02/2018 11.75 sec    06/01/2016 9.90 sec      FIFMax-Pre   Date Value Ref Range Status   12/20/2021 6.52 L/sec    10/02/2018 8.04 L/sec    06/01/2016 7.34 L/sec      FEV1FEV6-Pred   Date Value Ref Range Status   12/20/2021 77 %    10/02/2018 78 %    06/01/2016 78 %      FEV1FEV6-Pre   Date Value Ref Range Status   12/20/2021 74 %    10/02/2018 81 %    06/01/2016 75 %      No results found for: 20055       ASSESSMENT AND PLAN:  Mr. 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 Total cell dose (from 7/1 & 7/2) 6.53 x 10^6 CD34+ cells/kg.   - 1 year anniversary (July 2015): 30% cellular, trilineage hematopoiesis, no abnormal blasts by morphology or flow , no dysplasia, 0-1 fibrosis, 100% donor (BM, CD3, CD15). CR  - 2 year anniversary (July 2016): 20-30% cellular, trilineage hematopoiesis, no abnormal blasts by morphology or flow , no dysplasia, No fibrosis, 100% donor in BM (PB not sent). ISCN: //46,XX[20] Complete Remission.     HEME: No transfusion needs, counts stable, 3/2022 ferritin 44    GVHD: Long history of GVHD as below.   12/2021: kept him on prednisone 5 mg daily (reports that cGVHD flared on lower dose would like to stay at same dose), ruxolitinib 5 mg twice daily.  His counts are tolerating well.      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 Had been off prednisone since summer 2017 and briefly tapered off Sirolimus (january 2018), however resumed with flare in February. 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 Sirolimus was stopped on 9/27/2018 & Pred 90mg was started. Seen by Dr. Sandoval, he does not think his symptoms or CT findings are c/w Sirolimus or  GVH & he was in favor of tapering Prednisone. Developed worsening skin thickening & desquamation to the RLE, c/w GVH.   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   Required Burst pred 40mg a day on 1/3/20 with significant systemic arthralgic pain, tapered back to 10/0 eod after 1 week, near resolution of symptoms noted 1/17, returned to 10 mg every other day; then dropped to 5mg q day in April 2021 (this is best dates estimated on chart review)        ID:  Afebrile no signs/sx of infection.    - IgG 1/24 =1130  - Prophy: HD ACV (renal dosing), Fluconazole and  Bactrim.   - 5/6/2022 Evusheld completed      GI:  protonix (has heartburn)     FEN/Renal: Mild increase in creatinine 3/2022, then normalized 5/2022, encouraged fluid intake, will avoid NSAIDs for knee pain     Fatigue:  stable  - History of testosterone deficiency, rechecked and modestly low. He previously did testosterone injections & didn't think it made him feel any better.    - TSH normal 2/28 and again on repeat 9/27 at 1.01.  - counseled that moderate exercise is really the only known way to combat fatigue, and acknowledged how difficult it is to balance too much with not enough activity.      Vascular:   10/15/18 Right leg US with small (technically DVT) clot in posterior tibial vein: started reg dose aspirin daily 325mg and recheck US in 2 weeks or symptomatically. 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      MSK: avascular necrosis of hip.  S/p replacement surgery     Wounds: No open wounds today.  He knows that if there is erythema or pain that he needs to be seen as soon  as possible to evaluate for cellulitis/infection.      Lungs: He reports improved dyspnea on exertion.  PFTs obtained December 2021 stable compared to 3 years ago %, FEV1 107% DL CO 74% predicted.  Of note patient had Covid pneumonia and was on the ventilator.  It has been previously noted that he does have this raspy crackly sounds on both bases that do not change.  He has not had a CT chest since February 2021.  He wants to defer referral to pulmonary at this time.    Healthcare maintenance March 2022 TSH within normal, IgG within normal, vitamin D deficiency screening normal testosterone mildly low, he will follow up with PCP locally, encourage dermatology evaluation yearly, DEXA scan completed 5/6/2022 normal bone density repeat 2 years, discussed age-appropriate cancer screening. Seeing dermatology in September locally. Reminded 7/5 to see PCP locally for HCM and routine testing.    Plan:  Follow up with derm and ophthalmology, emphasized importance schedule an appointment with ophthalmology to coincide with my visit every 3 months, he will call schedule an appointment with general as his last visit was in April  Patient has had need for increased therapy for ocular GVHD over the last 3 to 6 months including eyelid surgery, and new eyedrops including serum eyedrops, therefore I will not change/decrease his chronic GVHD therapy at this time   RTC in 3 months to see Dr Jean-Paul Nunez (in person visit)     40 minutes spent on the date of the encounter doing chart review, history and exam, documentation and further activities per the note          Again, thank you for allowing me to participate in the care of your patient.      Sincerely,    Roselia Nunez MD

## 2022-07-05 NOTE — PROGRESS NOTES
BMT Progress Note    ID:  Mr. 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 (In the past has had a basal and squamous cell removed), Assumed new BMT care by Jean-Paul Nunez on 3/7/2022    cGVHD: currently On pred 5mg every day and Jakafi 5 mg bid.      He admitted on 2/8/2021 for acute hypoxic respiratory failure secondary to COVID-19 pneumonia. Also rhino virus positive. Transferred to MICU on 2/10/2021 for worsening respiratory status requiring intubation 2/13-2/19/21. Discharged on 3/3/21 with home O2. Treated with remdesevir, dexamethasone and  eliquis 2.5 bid (through 3/30). Needed home O2 for a few weeks, now off.    HPI:  He reports feeling well. No new concerns. Glad he is where he is today, 8 years post transplant. No new concerns.  Reports eyes better with serum tears, ofloxacin and erythromycin and warm compressors, seeing ophthalmology but not scheduled since April (does not want to come and wait etc, mostly for convenience), admits to not do erythromycin as instucted. Artificial tears 5-6 times/ days, uses the contact lenses as well for occular GVHD.   Still with same Dry mouth, no ulcers, biotin helps temporarily.   Doing well today otherwise.  He reports his usual fatigue- stable and fluctuates.  He occasionally gets short of breath with exertion. No infectious concerns, no fevers. No new skin rashes or thickening.       Remainder of 10 pt ROS negative    On exam:  /69   Pulse 69   Temp 97.5  F (36.4  C)   Resp 16   Wt 104.8 kg (231 lb)   SpO2 97%   BMI 35.12 kg/m      HEENT: PERRL, EOMI , dry oral mucosa with no ulcers  Chest: Mild bibasilar crackles  CVS: S1 S2 RRR, no murmurs or gallops  Abdomen: Soft nontender no  organomegalies or masses.  Extremities: The left leg had sclerotic skin on the shin, no evidence of infection or cellulitis, no open ulcers.  No other sclerotic changes of the skin.  Of note, since I first meet him the patient and he reports that the skin changes have been since previous cellulitis post transplantation. No ulcer. More prominent venous status stasis on left and more prominent sclerotic changes on right chin up to 2/3 and wraps around the ankle with no ulcers.Stable BSA. Nl ROM.  CNS: non focal     Most Recent Pulmonary Function Testing    FVC-Pred   Date Value Ref Range Status   12/20/2021 3.86 L    10/02/2018 3.97 L    06/01/2016 4.16 L      FVC-Pre   Date Value Ref Range Status   12/20/2021 4.33 L    10/02/2018 4.56 L    06/01/2016 4.18 L      FVC-%Pred-Pre   Date Value Ref Range Status   12/20/2021 112 %    10/02/2018 114 %    06/01/2016 100 %      FEV1-Pre   Date Value Ref Range Status   12/20/2021 3.12 L    10/02/2018 3.58 L    06/01/2016 3.03 L      FEV1-%Pred-Pre   Date Value Ref Range Status   12/20/2021 107 %    10/02/2018 118 %    06/01/2016 95 %      FEV1FVC-Pred   Date Value Ref Range Status   12/20/2021 76 %    10/02/2018 74 %    06/01/2016 77 %      FEV1FVC-Pre   Date Value Ref Range Status   12/20/2021 72 %    10/02/2018 79 %    06/01/2016 72 %      No results found for: 20029  FEFMax-Pred   Date Value Ref Range Status   12/20/2021 7.60 L/sec    10/02/2018 7.92 L/sec    06/01/2016 8.31 L/sec      FEFMax-Pre   Date Value Ref Range Status   12/20/2021 9.21 L/sec    10/02/2018 10.21 L/sec    06/01/2016 9.85 L/sec      FEFMax-%Pred-Pre   Date Value Ref Range Status   12/20/2021 121 %    10/02/2018 128 %    06/01/2016 118 %      ExpTime-Pre   Date Value Ref Range Status   12/20/2021 12.36 sec    10/02/2018 11.75 sec    06/01/2016 9.90 sec      FIFMax-Pre   Date Value Ref Range Status   12/20/2021 6.52 L/sec    10/02/2018 8.04 L/sec    06/01/2016 7.34 L/sec      FEV1FEV6-Pred   Date Value  Ref Range Status   12/20/2021 77 %    10/02/2018 78 %    06/01/2016 78 %      FEV1FEV6-Pre   Date Value Ref Range Status   12/20/2021 74 %    10/02/2018 81 %    06/01/2016 75 %      No results found for: 20055       ASSESSMENT AND PLAN:  Mr. 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 Total cell dose (from 7/1 & 7/2) 6.53 x 10^6 CD34+ cells/kg.   - 1 year anniversary (July 2015): 30% cellular, trilineage hematopoiesis, no abnormal blasts by morphology or flow , no dysplasia, 0-1 fibrosis, 100% donor (BM, CD3, CD15). CR  - 2 year anniversary (July 2016): 20-30% cellular, trilineage hematopoiesis, no abnormal blasts by morphology or flow , no dysplasia, No fibrosis, 100% donor in BM (PB not sent). ISCN: //46,XX[20] Complete Remission.     HEME: No transfusion needs, counts stable, 3/2022 ferritin 44    GVHD: Long history of GVHD as below.   12/2021: kept him on prednisone 5 mg daily (reports that cGVHD flared on lower dose would like to stay at same dose), ruxolitinib 5 mg twice daily.  His counts are tolerating well.      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 Had been off prednisone since summer 2017 and briefly tapered off Sirolimus (january 2018), however resumed with flare in February. 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 Sirolimus was stopped on 9/27/2018 & Pred 90mg was started. Seen by Dr. Sandoval, he does not think his symptoms or CT findings are c/w Sirolimus or GVH & he was in favor of tapering Prednisone. Developed worsening skin thickening & desquamation to the RLE, c/w GVH.   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   Required Burst pred 40mg a day on 1/3/20 with significant systemic arthralgic pain, tapered back to 10/0 eod after 1 week, near resolution of symptoms noted 1/17, returned to 10 mg every other day; then dropped to 5mg q day in April 2021 (this is best dates estimated on chart review)        ID:  Afebrile no signs/sx of infection.    - IgG 1/24 =1130  - Prophy: HD ACV (renal dosing), Fluconazole and  Bactrim.   - 5/6/2022 Evusheld completed      GI:  protonix (has heartburn)     FEN/Renal: Mild increase in creatinine 3/2022, then normalized 5/2022, encouraged fluid intake, will avoid NSAIDs for knee pain     Fatigue:  stable  - History of testosterone deficiency, rechecked and modestly low. He previously did testosterone injections & didn't think it made him feel any better.    - TSH normal 2/28 and again on repeat 9/27 at 1.01.  - counseled that moderate exercise is really the only known way to combat fatigue, and acknowledged how difficult it is to balance too much with not enough activity.      Vascular:   10/15/18 Right leg US with small (technically DVT) clot in posterior tibial vein: started reg dose aspirin daily 325mg and recheck US in 2 weeks or symptomatically. 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      MSK: avascular necrosis of hip.  S/p replacement surgery     Wounds: No open wounds today.  He knows that if there is erythema or pain that he needs to be seen as soon as possible to evaluate for cellulitis/infection.      Lungs: He reports improved dyspnea on exertion.  PFTs obtained December 2021 stable compared to 3 years ago %, FEV1 107% DL CO 74% predicted.  Of note patient had Covid pneumonia and was on the ventilator.  It has been  previously noted that he does have this raspy crackly sounds on both bases that do not change.  He has not had a CT chest since February 2021.  He wants to defer referral to pulmonary at this time.    Healthcare maintenance March 2022 TSH within normal, IgG within normal, vitamin D deficiency screening normal testosterone mildly low, he will follow up with PCP locally, encourage dermatology evaluation yearly, DEXA scan completed 5/6/2022 normal bone density repeat 2 years, discussed age-appropriate cancer screening. Seeing dermatology in September locally. Reminded 7/5 to see PCP locally for HCM and routine testing.    Plan:  Follow up with derm and ophthalmology, emphasized importance schedule an appointment with ophthalmology to coincide with my visit every 3 months, he will call schedule an appointment with general as his last visit was in April  Patient has had need for increased therapy for ocular GVHD over the last 3 to 6 months including eyelid surgery, and new eyedrops including serum eyedrops, therefore I will not change/decrease his chronic GVHD therapy at this time   RTC in 3 months to see Dr Jean-Paul Nunez (in person visit)     40 minutes spent on the date of the encounter doing chart review, history and exam, documentation and further activities per the note     Roselia Nunez MD

## 2022-07-05 NOTE — TELEPHONE ENCOUNTER
Health Call Center    Phone Message    May a detailed message be left on voicemail: yes     Reason for Call: Other: Corina with [employer] called in, requesting that Dr. Fry place an order for the [employer] for pt. She stated this can be done on the portal at [email or web address]. If any questions, please call  # [phone]. Thank you.     Action Taken: Message routed to:  [facility] ([facility] EYE    Travel Screening: Not Applicable

## 2022-07-05 NOTE — NURSING NOTE
Chief Complaint   Patient presents with     Blood Draw     Labs drawn by RN via , vitals taken.     Oncology Clinic Visit     Labs collected from venipuncture by RN. Vitals taken. Checked in for appointment(s).    Tali Boswell RN

## 2022-07-05 NOTE — NURSING NOTE
"Oncology Rooming Note    July 5, 2022 10:22 AM   Deejay Dior is a 71 year old male who presents for:    Chief Complaint   Patient presents with     Blood Draw     Labs drawn by RN via , vitals taken.     Oncology Clinic Visit     Chronic vdnsm-sxfyqj-whzr disease     Initial Vitals: /69   Pulse 69   Temp 97.5  F (36.4  C)   Resp 16   Wt 104.8 kg (231 lb)   SpO2 97%   BMI 35.12 kg/m   Estimated body mass index is 35.12 kg/m  as calculated from the following:    Height as of 2/24/22: 1.727 m (5' 8\").    Weight as of this encounter: 104.8 kg (231 lb). Body surface area is 2.24 meters squared.  No Pain (0) Comment: Data Unavailable   No LMP for male patient.  Allergies reviewed: Yes  Medications reviewed: Yes    Medications: Medication refills not needed today.  Pharmacy name entered into [employer]:    [id number] [place], [place] - [address] [address] [facility] [place], [place] - [address] [facility] [phone]    Clinical concerns: None       Bernardino Jacobo            "

## 2022-07-06 NOTE — TELEPHONE ENCOUNTER
[employer] is DONE and reordered.     Will call pt to make aware that he will hear from [employer] soon to schedule lab draw.     Thank You,     BIANCA Daugherty 11:12 AM July 6, 2022     [facility]   [address] [place] [address], [address], [place] [address]   PH: [phone] Fax: [phone]

## 2022-08-09 DIAGNOSIS — D89.813 GRAFT-VERSUS-HOST DISEASE (H): ICD-10-CM

## 2022-08-09 DIAGNOSIS — U07.1 COVID-19 VIRUS INFECTION: ICD-10-CM

## 2022-08-10 RX ORDER — SERTRALINE HYDROCHLORIDE 100 MG/1
100 TABLET, FILM COATED ORAL DAILY
Qty: 90 TABLET | Refills: 1 | Status: SHIPPED | OUTPATIENT
Start: 2022-08-10 | End: 2023-03-13

## 2022-08-17 ENCOUNTER — HOSPITAL ENCOUNTER (EMERGENCY)
Facility: CLINIC | Age: 72
Discharge: HOME | End: 2022-08-17
Attending: EMERGENCY MEDICINE | Admitting: EMERGENCY MEDICINE
Payer: MEDICARE

## 2022-08-17 VITALS
WEIGHT: 221 LBS | DIASTOLIC BLOOD PRESSURE: 75 MMHG | RESPIRATION RATE: 16 BRPM | HEIGHT: 68 IN | SYSTOLIC BLOOD PRESSURE: 119 MMHG | HEART RATE: 84 BPM | OXYGEN SATURATION: 99 % | BODY MASS INDEX: 33.49 KG/M2 | TEMPERATURE: 97.2 F

## 2022-08-17 DIAGNOSIS — S05.01XA ABRASION OF RIGHT CORNEA, INITIAL ENCOUNTER: ICD-10-CM

## 2022-08-17 PROCEDURE — 99282 EMERGENCY DEPT VISIT SF MDM: CPT

## 2022-08-17 RX ORDER — TETRACAINE HYDROCHLORIDE 5 MG/ML
SOLUTION OPHTHALMIC
Status: DISCONTINUED
Start: 2022-08-17 | End: 2022-08-17 | Stop reason: HOSPADM

## 2022-08-17 ASSESSMENT — ENCOUNTER SYMPTOMS
EYE PAIN: 1
EYE REDNESS: 0
ABDOMINAL PAIN: 0
EYE DISCHARGE: 0
PHOTOPHOBIA: 0
CHILLS: 0
FEVER: 0
VOMITING: 0
NAUSEA: 0
DIARRHEA: 0
EYE ITCHING: 0
HEADACHES: 0
SHORTNESS OF BREATH: 0
COUGH: 1

## 2022-08-17 NOTE — ED PROVIDER NOTES
History     Chief Complaint:    Eye Pain      HPI   Deejay Dior is a 71 year old male who presents with right eye pain.  Had a mishap with his glasses and poked himself in right eye PTA.  ALready has erythromycin ophth oinement form last corneal abrasion and already applied first dose.  Eye is waterry and blurred by tears and ointment but vision compleletely intact.  Has FB sensation but none reported.    Review of Systems   Constitutional: Negative for chills and fever.   Eyes: Positive for pain. Negative for photophobia, discharge, redness, itching and visual disturbance.   Respiratory: Positive for cough. Negative for shortness of breath.         Already on dual therapy for pneumonia no new complaints.   Cardiovascular: Negative for chest pain.   Gastrointestinal: Negative for abdominal pain, diarrhea, nausea and vomiting.   Neurological: Negative for headaches.   All other systems reviewed and are negative.        Allergies:  Blood Transfusion Related (Informational Only)      Medications:      acetaminophen (TYLENOL) 325 MG tablet  acetaminophen (TYLENOL) 325 MG tablet  acyclovir (ZOVIRAX) 800 MG tablet  calcium carbonate-vitamin D (OSCAL W/D) 500-200 MG-UNIT tablet  erythromycin (ROMYCIN) 5 MG/GM ophthalmic ointment  fluconazole (DIFLUCAN) 100 MG tablet  JAKAFI 5 MG TABS tablet  multivitamin, therapeutic (THERA-VIT) TABS tablet  ofloxacin (OCUFLOX) 0.3 % ophthalmic solution  pantoprazole (PROTONIX) 40 MG EC tablet  predniSONE (DELTASONE) 5 MG tablet  sertraline (ZOLOFT) 100 MG tablet  sulfamethoxazole-trimethoprim (BACTRIM DS) 800-160 MG tablet  Vitamin D, Cholecalciferol, 25 MCG (1000 UT) TABS        Past Medical History:      Past Medical History:   Diagnosis Date     Acute deep vein thrombosis (DVT) of distal end of right lower extremity (H)      Clotting disorder (H)      Depression, major      Glucose intolerance      H/O bone marrow transplant (H)      Head injury 1964     Hearing problem Hearing  aids 2015     History of blood transfusion 3/2014     History of radiation therapy June 2014     [place] (hard of hearing)      Immunosuppression (H) Sirolimus daily     Lymphedema of both lower extremities      MDS (myelodysplastic syndrome) (H)      MDS/MPN (myelodysplastic/myeloproliferative neoplasms) (H)      Mixed hyperlipidemia      Numbness and tingling      Obstructive sleep apnea 1999     Pneumonia      Reduced vision August 2016     Sleep apnea 1999     Transplant July 1, 2014     Patient Active Problem List    Diagnosis Date Noted     Dermatochalasis of both upper eyelids 02/01/2022     Priority: Medium     Added automatically from request for surgery [id number]       Brow ptosis, bilateral 02/01/2022     Priority: Medium     Added automatically from request for surgery [id number]       Senile ectropion of both lower eyelids 02/01/2022     Priority: Medium     Added automatically from request for surgery [id number]       Other acute kidney failure (H) 02/15/2021     Priority: Medium     COVID-19 virus infection 02/08/2021     Priority: Medium     Status post right hip replacement 09/03/2019     Priority: Medium     Primary hypogonadism in male 03/14/2017     Priority: Medium     Deep vein thrombosis (DVT) (H) 06/22/2016     Priority: Medium     Long-term (current) use of anticoagulants [Z79.01] 05/27/2016     Priority: Medium     Acute deep vein thrombosis (DVT) of distal vein of right lower extremity (H) 05/16/2016     Priority: Medium     Fever, unspecified 05/07/2016     Priority: Medium     Influenza A (H1N1) 03/18/2016     Priority: Medium     Fatigue 12/11/2015     Priority: Medium     Secondary adrenal insufficiency (H) 12/11/2015     Priority: Medium     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     Priority: Medium     Pneumonia 11/30/2014     Priority: Medium     MDS (myelodysplastic syndrome) (H) 06/11/2014     Priority: Medium        Past Surgical History:      Past Surgical History:   Procedure  Laterality Date     ARTHROPLASTY HIP ANTERIOR Right 9/3/2019    Procedure: RIGHT TOTAL HIP ARTHROPLASTY, DIRECT ANTERIOR APPROACH;  Surgeon: Yong Diaz MD;  Location:  OR     BACK SURGERY       BIOPSY       BMT CELL PRODUCT INFUSION       CATARACT IOL, RT/LT Bilateral 2015?     ESOPHAGOSCOPY, GASTROSCOPY, DUODENOSCOPY (EGD), COMBINED N/A 2/2/2015     ESOPHAGOSCOPY, GASTROSCOPY, DUODENOSCOPY (EGD), COMBINED Left 8/6/2015    Procedure: COMBINED ESOPHAGOSCOPY, GASTROSCOPY, DUODENOSCOPY (EGD), BIOPSY SINGLE OR MULTIPLE;  Surgeon: Amber Francis MD;  [facility]: [facility]     EXCISE LESION LIP N/A 1/2/2017    Procedure: EXCISE LESION LIP;  Surgeon: Tim Yanez MD;  Location:  OR     EYE SURGERY      blepharoplasty     FLEXIBLE SIGMOIDOSCOPY  2/2/15     HEMORRHOIDECTOMY  2001     IR FOLLOW UP VISIT OUTPATIENT  1/8/2019     PHACOEMULSIFICATION CLEAR CORNEA WITH STANDARD INTRAOCULAR LENS IMPLANT Left 7/18/2016    Procedure: PHACOEMULSIFICATION CLEAR CORNEA WITH STANDARD INTRAOCULAR LENS IMPLANT;  Surgeon: Randolph Giles MD;  Location:  [facility]     REPAIR ECTROPION BILATERAL Bilateral 2/24/2022    Procedure: bilateral lower lid ectropion repair;  Surgeon: Florina Solis MD;  Location: [facility] OR     REPAIR PTOSIS BILATERAL Bilateral 2/24/2022    Procedure: Bilateral upper eyelid ptosis repair;  Surgeon: Florina Solis MD;  Location: [facility] OR     TONSILLECTOMY  1953     TRANSPLANT  7-1-2014    Stem Cell Transplant [facility] BMT     VASCULAR SURGERY  2010    varicose vein surgery       Family History:      Family History   Problem Relation Age of Onset     Breast Cancer Mother      Cancer Mother         1977     Cerebrovascular Disease Mother         1977     Cancer Father         1987     Hypertension Brother      Heart Disease Brother      Hyperlipidemia Brother      Depression Brother      Heart Disease Brother         2016     Hypertension Brother         1985     Glaucoma No family hx of   "    Macular Degeneration No family hx of      Diabetes No family hx of        Social History:    Presents with family.    Physical Exam     Patient Vitals for the past 24 hrs:   BP Temp Temp src Pulse Resp SpO2 Height Weight   08/17/22 1749 119/75 97.2  F (36.2  C) Temporal 84 16 99 % 1.727 m (5' 8\") 100.2 kg (221 lb)       Physical Exam  Constitutional: Patient is well appearing. No distress.  Head: Atraumatic.  Eyes: Conjunctivae and EOM are normal. No scleral icterus.  Right aeye ant chamber normal as is pupil.  Fluorescin has uptake without globe rupture, between the 5 and 6 oclock overlying iris but not fovea of vision.  This is square like 2mm x 2mm.  Neck: Normal range of motion. Neck supple.   Cardiovascular: Normal rate, regular rhythm, normal heart sounds and intact distal perfusion.   Pulmonary/Chest: Breath sounds normal. No respiratory distress.  Abdominal: Soft. Bowel sounds are normal. No distension. No tenderness. No rebound or guarding.   Musculoskeletal: Normal range of motion. No edema or tenderness.   Neurological: Alert and orientated to person, place, and time. No observable focal neuro deficit  Skin: Warm and dry. No rash noted. Not diaphoretic.     Emergency Department Course   ECG:  ECG results from 02/08/21   EKG 12-lead, tracing only     Value    Interpretation ECG Click View Image link to view waveform and result     *Note: Due to a large number of results and/or encounters for the requested time period, some results have not been displayed. A complete set of results can be found in Results Review.       Imaging:  No orders to display     Report per radiology    Laboratory:  Labs Ordered and Resulted from Time of ED Arrival to Time of ED Departure - No data to display    Procedures:      Emergency Department Course:             Reviewed:    I reviewed nursing notes, vitals and past medical history    Assessments:   I obtained history and examined the patient as noted above.    I rechecked " the patient and explained findings.       Consults:            Interventions:    Medications   tetracaine (PONTOCAINE) 0.5 % ophthalmic solution (has no administration in time range)   fluorescein (FUL-KRISH) 1 MG ophthalmic strip (has no administration in time range)       Disposition:  The patient was discharged to home.     Impression & Plan             Medical Decision Makin with eye pain. Differential diagnosis for eye pain including conjunctivitis, foreign body, corneal abrasion, acute glaucoma, orbital or periorbital cellulitis, migraine, temporal arteritis, uveitis, or other intracranial abnormality. History, physical exam, and slit lamp exam do not support any of the above diagnoses with the exception of corneal abrasion, with an obvious small abrasion as noted above. No sign of globe rupture or sign of foreign body in the inverted eyelids or anywhere on the eye. Visual acuity and eye pressures were checked and were normal.  he already has a prescription for erythromycin ointment, pain medication, and instructions to follow up with opthalmology as soon as possible.    Covid-19  Deejay Dior was evaluated during a global COVID-19 pandemic, which necessitated consideration that the patient might be at risk for infection with the SARS-CoV-2 virus that causes COVID-19.   Applicable protocols for evaluation were followed during the patient's care.   COVID-19 was considered as part of the patient's evaluation.    Diagnosis:    ICD-10-CM    1. Abrasion of right cornea, initial encounter  S05.01XA        Discharge Medications:  New Prescriptions    No medications on file         Scribe Disclosure:  Vinod PEREZ MD, am serving as a scribe at 6:43 PM on 2022 to document services personally performed by Vinod Atkins MD based on my observations and the provider's statements to me.      Vinod Atkins MD  [phone]

## 2022-08-17 NOTE — ED TRIAGE NOTES
Pt was cleaning his glasses and inadvertently poked himself in the right eye with the bow of the glasses. Eye is painful. Pupils are equal and reactive. Eye is covered with sterile gauze and paper tape.

## 2022-08-22 DIAGNOSIS — D46.9 MDS (MYELODYSPLASTIC SYNDROME) (H): ICD-10-CM

## 2022-08-22 DIAGNOSIS — D89.813 GVH (GRAFT VERSUS HOST DISEASE) (H): Primary | ICD-10-CM

## 2022-08-29 DIAGNOSIS — D46.9 MDS (MYELODYSPLASTIC SYNDROME) (H): ICD-10-CM

## 2022-08-29 DIAGNOSIS — D89.813 GVH (GRAFT VERSUS HOST DISEASE) (H): Primary | ICD-10-CM

## 2022-08-30 ENCOUNTER — OFFICE VISIT (OUTPATIENT)
Dept: FAMILY MEDICINE | Facility: CLINIC | Age: 72
End: 2022-08-30
Payer: MEDICARE

## 2022-08-30 VITALS
WEIGHT: 228 LBS | SYSTOLIC BLOOD PRESSURE: 110 MMHG | HEART RATE: 80 BPM | TEMPERATURE: 97 F | DIASTOLIC BLOOD PRESSURE: 68 MMHG | BODY MASS INDEX: 34.56 KG/M2 | HEIGHT: 68 IN | OXYGEN SATURATION: 96 %

## 2022-08-30 DIAGNOSIS — F33.41 RECURRENT MAJOR DEPRESSIVE DISORDER, IN PARTIAL REMISSION (H): ICD-10-CM

## 2022-08-30 DIAGNOSIS — J18.9 PNEUMONIA DUE TO INFECTIOUS ORGANISM, UNSPECIFIED LATERALITY, UNSPECIFIED PART OF LUNG: Primary | ICD-10-CM

## 2022-08-30 DIAGNOSIS — E27.49 SECONDARY ADRENAL INSUFFICIENCY (H): ICD-10-CM

## 2022-08-30 PROCEDURE — 99203 OFFICE O/P NEW LOW 30 MIN: CPT | Performed by: FAMILY MEDICINE

## 2022-08-30 RX ORDER — LEVOFLOXACIN 250 MG/1
TABLET, FILM COATED ORAL
COMMUNITY
Start: 2022-06-08 | End: 2022-12-14

## 2022-08-30 NOTE — PROGRESS NOTES
"  Assessment & Plan   Pneumonia due to infectious organism, unspecified laterality, unspecified part of lung  Symptoms resolved after antibiotic course, feeling much better.  Will check x-ray in about 3 weeks.  - XR Chest 2 Views    Recurrent major depressive disorder, in partial remission (H)  Prior history and doing fine at the moment.    Secondary adrenal insufficiency (H)  Follows with specialist and will continue to monitor.    Return in about 6 months (around 2/23/2023) for wellness exam with fasting labs with Tim Baumann MD.      Tim Baumann MD      [address] [address]  [email or web address]   Office: [phone]       Whti Villalba is a 71 year old, presenting for the following health issues:  No chief complaint on file.        Reason for visit:  Follow up chest X-ray. Recent pneaumonia. And took Augmentin and doxycycline course  Symptom onset:  3-4 weeks ago  Symptoms include:  Short of breath, no energy  Symptom intensity:  Moderate  Symptom progression:  Improving  Had these symptoms before:  No  What makes it worse:  Exercise    He eats 2-3 servings of fruits and vegetables daily.He consumes 0 sweetened beverage(s) daily.He exercises with enough effort to increase his heart rate 10 to 19 minutes per day.  He exercises with enough effort to increase his heart rate 5 days per week.   He is taking medications regularly.       Review of Systems   Constitutional, HEENT, cardiovascular, pulmonary, gi and gu systems are negative, except as otherwise noted.      Objective    /68   Pulse 80   Temp 97  F (36.1  C)   Ht 1.727 m (5' 8\")   Wt 103.4 kg (228 lb)   SpO2 96%   BMI 34.67 kg/m    Body mass index is 34.67 kg/m .  Physical Exam   GENERAL: healthy, alert and no distress  NECK: no adenopathy, no asymmetry, masses, or scars and thyroid normal to palpation  RESP: lungs clear to auscultation - no rales, rhonchi or wheezes  CV: regular rate and " rhythm, normal S1 S2, no S3 or S4, no murmur, click or rub, no peripheral edema and peripheral pulses strong  ABDOMEN: soft, nontender, no hepatosplenomegaly, no masses and bowel sounds normal  MS: no gross musculoskeletal defects noted, bilateral to the midshin edema    CXR -ordered for 3 weeks from now which will be 6 weeks from previous x-ray.              .  ..

## 2022-09-10 ENCOUNTER — HEALTH MAINTENANCE LETTER (OUTPATIENT)
Age: 72
End: 2022-09-10

## 2022-09-13 ENCOUNTER — OFFICE VISIT (OUTPATIENT)
Dept: OPHTHALMOLOGY | Facility: CLINIC | Age: 72
End: 2022-09-13
Attending: OPHTHALMOLOGY
Payer: MEDICARE

## 2022-09-13 DIAGNOSIS — D89.813 GVHD (GRAFT VERSUS HOST DISEASE) (H): Primary | ICD-10-CM

## 2022-09-13 DIAGNOSIS — H16.223 KERATITIS SICCA, BILATERAL: ICD-10-CM

## 2022-09-13 PROCEDURE — 99214 OFFICE O/P EST MOD 30 MIN: CPT | Mod: GC | Performed by: OPHTHALMOLOGY

## 2022-09-13 PROCEDURE — G0463 HOSPITAL OUTPT CLINIC VISIT: HCPCS

## 2022-09-13 RX ORDER — TACROLIMUS 0.3 MG/G
OINTMENT TOPICAL
Qty: 30 G | Refills: 11 | Status: SHIPPED | OUTPATIENT
Start: 2022-09-13 | End: 2023-03-13

## 2022-09-13 ASSESSMENT — TONOMETRY
OS_IOP_MMHG: 11
OD_IOP_MMHG: 12
IOP_METHOD: ICARE

## 2022-09-13 ASSESSMENT — EXTERNAL EXAM - LEFT EYE: OS_EXAM: NORMAL

## 2022-09-13 ASSESSMENT — CONF VISUAL FIELD
METHOD: COUNTING FINGERS
OD_NORMAL: 1
OS_NORMAL: 1

## 2022-09-13 ASSESSMENT — VISUAL ACUITY
OD_SC: 20/30
OD_PH_SC: 20/25
OS_SC+: -2
OD_SC+: -2
OS_SC: 20/25
METHOD: SNELLEN - LINEAR

## 2022-09-13 ASSESSMENT — EXTERNAL EXAM - RIGHT EYE: OD_EXAM: NORMAL

## 2022-09-13 NOTE — PROGRESS NOTES
CC: GVHD OU    HPI:  72 yo CM presents for GVHD evaluation for involvement in the eyes. 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 There is foreign body sensation in the right eye occasionally. Artificial tears seems to help.     Interval update:  Using serum tears 5-7 times per day in addition to artificial tears. Still feels dryness especially in the morning. Was previously using erythromycin ointment but doesn't feel like this improves his dryness very much. No significant vision changes. No flashes, floater, curtains.     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 S/P removal 2020    ARMD  - intermediate, dry each eye     PVD each eye    PCO each eye    Bilateral upper lid ptosis repair and lower lid ectropion repair with Dr. Solis (2/24/22).     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   2. MGD each eye     S/p silicone Punctal plug bilateral lower lid (still in place). There is also meibomian gland dysfunction both eyes.     Stopped Restasis due to burning.    Also uses CPAP for PIPO    PLAN:  - Continue serum tears q2h OU  - Switch erythromycin bill to lubricating bill    - Re-start warm compress BID both eyes - not currently using   - trial of tacrolimus ointment QHS OU    2.   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     Biopsy showed GVHD colon and skin.     3.   Dry Age Related Macular Degeneration, both eyes.     Seen by Dr. Hector on 1/2020 -- no CNVM     Dry AMD - may have pattern component.    Continue ARED's 2 vitamins PO 2 times daily.      Recommends fish  and green leafy vegetables 3 days per week.     No smoking.     Recommeds UV protection.     Return immediately if there are distortions to amsler grid.     F/u Tawny as scheduled    4.   Posterior vitreous detachment, right eye. Retina attached.     RD / RT precautions      5.   Basal Cell Carcinoma, Left Lower Lid.    S/P removal in 01/2020 at [facility].      6. Posterior Capsular Opacification each eye  - mid PCO each eye visual significance unclear  Given ARMD and significant dryness.  - s/p YAG capsulotomy OS     7. Bilateral ptosis and lower eyelid ectropion  8. Trichiasis JOEL   - bilateral upper lid ptosis repair and lower lid ectropion repair with Dr. Solis (2/24/22).     RTC:  6 months w/ VT, call sooner if problems to clinic.    Myles Atkins MD  Fellow, Cornea, External Disease and Refractive Surgery  Department of Ophthalmology  [facility]      Attending Physician Attestation:  Complete documentation of historical and exam elements from today's encounter can be found in the full encounter summary report (not reduplicated in this progress note).  I personally obtained the chief complaint(s) and history of present illness.  I confirmed and edited as necessary the review of systems, past medical/surgical history, family history, social history, and examination findings as documented by others; and I examined the patient myself.  I personally reviewed the relevant tests, images, and reports as documented above.  I formulated and edited as necessary the assessment and plan and discussed the findings and management plan with the patient and family. - Butch Yoon MD

## 2022-09-13 NOTE — NURSING NOTE
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   MGD each eye               Comments     Pt states no change in VA since last visit  Pt using\"blood tears\" 5-7X per day, PFAT's 3-5X per day BE  States some burning and FB feeling  No eye pain or redness    Renu Wolff COT 2:05 PM September 13, 2022                     "

## 2022-09-20 ENCOUNTER — ANCILLARY PROCEDURE (OUTPATIENT)
Dept: GENERAL RADIOLOGY | Facility: CLINIC | Age: 72
End: 2022-09-20
Attending: FAMILY MEDICINE
Payer: MEDICARE

## 2022-09-20 DIAGNOSIS — J18.9 PNEUMONIA DUE TO INFECTIOUS ORGANISM, UNSPECIFIED LATERALITY, UNSPECIFIED PART OF LUNG: ICD-10-CM

## 2022-09-20 PROCEDURE — 71046 X-RAY EXAM CHEST 2 VIEWS: CPT | Mod: TC | Performed by: RADIOLOGY

## 2022-09-20 NOTE — RESULT ENCOUNTER NOTE
Dear Deejay,    Here is a summary of your recent test results:  Chest x-ray:  IMPRESSION: Minimal peripheral fibrotic changes. There are no acute  infiltrates (interpretation-no signs of pneumonia). The cardiac silhouette is not enlarged. Pulmonary vasculature is unremarkable.    For additional lab test information, www.testing.com is a very good reference.    In addition, here is a list of due or overdue Health Maintenance reminders:  Annual Wellness Visit Never done  Cholesterol Lab due on 02/06/2020  Colorectal Cancer Screening due on 12/15/2021  COVID-19 Vaccine(5 - Booster for Moderna series) due on 05/26/2022  Flu Vaccine(1) due on 09/01/2022    Please call us at [phone] (or use [employer]) to address the above recommendations if needed.           Thank you very much for trusting me and [facility].     Have a peaceful day.    Healthy regards,  Tim Baumann MD

## 2022-09-29 DIAGNOSIS — D89.813 GRAFT-VERSUS-HOST DISEASE (H): ICD-10-CM

## 2022-09-29 DIAGNOSIS — U07.1 COVID-19 VIRUS INFECTION: ICD-10-CM

## 2022-09-29 RX ORDER — PREDNISONE 5 MG/1
5 TABLET ORAL DAILY
Qty: 90 TABLET | Refills: 1 | Status: SHIPPED | OUTPATIENT
Start: 2022-09-29 | End: 2023-03-24

## 2022-10-03 DIAGNOSIS — U07.1 COVID-19 VIRUS INFECTION: ICD-10-CM

## 2022-10-03 DIAGNOSIS — D89.813 GRAFT-VERSUS-HOST DISEASE (H): ICD-10-CM

## 2022-10-03 RX ORDER — PANTOPRAZOLE SODIUM 40 MG/1
40 TABLET, DELAYED RELEASE ORAL
Qty: 30 TABLET | Refills: 3 | Status: SHIPPED | OUTPATIENT
Start: 2022-10-03 | End: 2023-02-17

## 2022-10-10 NOTE — PROGRESS NOTES
BMT Progress Note    ID:  Mr. 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 (In the past has had a basal and squamous cell removed), Assumed new BMT care by Jean-Paul Nunez on 3/7/2022    cGVHD: currently On pred 5mg every day and Jakafi 5 mg bid.      He admitted on 2/8/2021 for acute hypoxic respiratory failure secondary to COVID-19 pneumonia. Also rhino virus positive. Transferred to MICU on 2/10/2021 for worsening respiratory status requiring intubation 2/13-2/19/21. Discharged on 3/3/21 with home O2. Treated with remdesevir, dexamethasone and  eliquis 2.5 bid (through 3/30). Needed home O2 for a few weeks, now off.    HPI:  Seen by opthalmo in September, see below. He reports feeling well. No new concerns. Stable eye and dry mouth symptoms, no skin changes. Glad he is where he is today, 8 years 3months post transplant. Artificial tears 5-6 times/ days, uses the contact lenses as well for occular GVHD. Still with same Dry mouth, no ulcers, biotin helps temporarily. Doing well today otherwise.  He reports his usual fatigue- stable and fluctuates.  He occasionally gets short of breath with exertion.  He still declines physical therapy and rehab, and admits to not being as physically active as he should be even when his wife encourages him to walk.  No infectious concerns, no fevers. No new skin rashes or thickening.       Remainder of 10 pt ROS negative    On exam:  /72   Pulse 68   Temp 97.5  F (36.4  C)   Resp 16   Wt 103.9 kg (229 lb 1.6 oz)   SpO2 99%   BMI 34.83 kg/m      HEENT: PERRL, EOMI , dry oral mucosa with no ulcers  Chest: Mild bibasilar crackles  CVS: S1 S2 RRR, no murmurs or gallops  Abdomen: Soft nontender no  organomegalies or masses.  Extremities: The left leg had sclerotic skin on the shin, no evidence of infection or cellulitis, no open ulcers.  No other sclerotic changes of the skin.  Of note, since I first meet him the patient and he reports that the skin changes have been since previous cellulitis post transplantation. No ulcer. More prominent venous status stasis on left and more prominent sclerotic changes on right chin up to 2/3 and wraps around the ankle with no ulcers.Stable BSA.   Nl ROM.  CNS: non focal     Most Recent Pulmonary Function Testing    FVC-Pred   Date Value Ref Range Status   12/20/2021 3.86 L    10/02/2018 3.97 L    06/01/2016 4.16 L      FVC-Pre   Date Value Ref Range Status   12/20/2021 4.33 L    10/02/2018 4.56 L    06/01/2016 4.18 L      FVC-%Pred-Pre   Date Value Ref Range Status   12/20/2021 112 %    10/02/2018 114 %    06/01/2016 100 %      FEV1-Pre   Date Value Ref Range Status   12/20/2021 3.12 L    10/02/2018 3.58 L    06/01/2016 3.03 L      FEV1-%Pred-Pre   Date Value Ref Range Status   12/20/2021 107 %    10/02/2018 118 %    06/01/2016 95 %      FEV1FVC-Pred   Date Value Ref Range Status   12/20/2021 76 %    10/02/2018 74 %    06/01/2016 77 %      FEV1FVC-Pre   Date Value Ref Range Status   12/20/2021 72 %    10/02/2018 79 %    06/01/2016 72 %      No results found for: 20029  FEFMax-Pred   Date Value Ref Range Status   12/20/2021 7.60 L/sec    10/02/2018 7.92 L/sec    06/01/2016 8.31 L/sec      FEFMax-Pre   Date Value Ref Range Status   12/20/2021 9.21 L/sec    10/02/2018 10.21 L/sec    06/01/2016 9.85 L/sec      FEFMax-%Pred-Pre   Date Value Ref Range Status   12/20/2021 121 %    10/02/2018 128 %    06/01/2016 118 %      ExpTime-Pre   Date Value Ref Range Status   12/20/2021 12.36 sec    10/02/2018 11.75 sec    06/01/2016 9.90 sec      FIFMax-Pre   Date Value Ref Range Status   12/20/2021 6.52 L/sec    10/02/2018 8.04 L/sec    06/01/2016 7.34 L/sec      FEV1FEV6-Pred   Date Value  Ref Range Status   12/20/2021 77 %    10/02/2018 78 %    06/01/2016 78 %      FEV1FEV6-Pre   Date Value Ref Range Status   12/20/2021 74 %    10/02/2018 81 %    06/01/2016 75 %      No results found for: 20055       ASSESSMENT AND PLAN:  Mr. 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 Total cell dose (from 7/1 & 7/2) 6.53 x 10^6 CD34+ cells/kg.   - 1 year anniversary (July 2015): 30% cellular, trilineage hematopoiesis, no abnormal blasts by morphology or flow , no dysplasia, 0-1 fibrosis, 100% donor (BM, CD3, CD15). CR  - 2 year anniversary (July 2016): 20-30% cellular, trilineage hematopoiesis, no abnormal blasts by morphology or flow , no dysplasia, No fibrosis, 100% donor in BM (PB not sent). ISCN: //46,XX[20] Complete Remission.     HEME: No transfusion needs, counts stable, 3/2022 ferritin 44    GVHD: Long history of GVHD as below.   12/2021: kept him on prednisone 5 mg daily (reports that cGVHD flared on lower dose would like to stay at same dose), ruxolitinib 5 mg twice daily.  His counts are tolerating well.    --- Since taking over patient care he repeatedly expressed wanting to stay on the same therapy as he has failed tapering or changes in the past.    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 Had been off prednisone since summer 2017 and briefly tapered off Sirolimus (january 2018), however resumed with flare in February. 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 Sirolimus was stopped on 9/27/2018 & Pred 90mg was started. Seen by Dr. Sandoval, he does not think his symptoms or CT findings are c/w Sirolimus or GVH & he was in favor of tapering Prednisone. Developed worsening skin thickening & desquamation to the RLE, c/w GVH.   Started Jakafi  10/22/2018 at 5 mg BID with symptom improvement in lower extremities, has arthralgias not thought to be side effect of Jakafi  ARTHALGIAS AND MYALGIAS 2/2 GVH arthropathy: Previously completed steroid taper in Oct 2018.   Required Burst pred 40mg a day on 1/3/20 with significant systemic arthralgic pain, tapered back to 10/0 eod after 1 week, near resolution of symptoms noted 1/17, returned to 10 mg every other day; then dropped to 5mg q day in April 2021 (this is best dates estimated on chart review)     9/2022 Ophthalmo note:  1. 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   2. MGD each eye     S/p silicone Punctal plug bilateral lower lid (still in place). There is also meibomian gland dysfunction both eyes.     Stopped Restasis due to burning.  PLAN:  - Continue serum tears q2h OU  - Switch erythromycin bill to lubricating bill               - Re-start warm compress BID both eyes - not currently using              - trial of tacrolimus ointment QHS OU-- he has not filled this yet  2.   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     Biopsy showed GVHD colon and skin.   3.   Dry Age Related Macular Degeneration, both eyes.     Seen by Dr. Hector on 1/2020 -- no CNVM     Dry AMD - may have pattern component.    Continue ARED's 2 vitamins PO 2 times daily.      Recommends fish and green leafy vegetables 3 days per week.     No smoking.     Recommeds UV protection.     Return immediately if there are distortions to amsler grid.     F/u Tawny as scheduled  4.   Posterior vitreous detachment, right eye. Retina attached.     RD / RT precautions    5.   Basal Cell Carcinoma, Left Lower Lid.    S/P removal in 01/2020 at [facility].    6. Posterior Capsular Opacification each eye  - mid PCO each eye visual significance unclear  Given ARMD and significant dryness.  - s/p YAG capsulotomy OS   7. Bilateral ptosis and lower eyelid ectropion  8. Trichiasis JOEL   - bilateral upper lid ptosis repair and lower lid ectropion repair with  Dr. Solis (2/24/22).      ID:  Afebrile no signs/sx of infection.    - IgG 3/8/51254 =904  - Prophy: HD ACV (renal dosing), Fluconazole and  Bactrim.   - 5/6/2022 Gonzalez completed      GI:  protonix (has heartburn)     FEN/Renal: Mild increase in creatinine 3/2022, then normalized 5/2022, mild SARAH again 10/2022, encouraged fluid intake, will avoid NSAIDs for knee pain.  Discussed referral to on-call nephrology would like to hold off for now.  Consider this on next appointment.     Fatigue:  stable  - History of testosterone deficiency, rechecked and modestly low. He previously did testosterone injections & didn't think it made him feel any better.    - TSH normal 2/28 and again on repeat 9/27 at 1.01.  - counseled that moderate exercise is really the only known way to combat fatigue, and acknowledged how difficult it is to balance too much with not enough activity.  Today again he declined referral to physical therapy or rehab.  Still encouraged him to more more physically active.     Vascular:   10/15/18 Right leg US with small (technically DVT) clot in posterior tibial vein: started reg dose aspirin daily 325mg and recheck US in 2 weeks or symptomatically. 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      MSK: avascular necrosis of hip.  S/p replacement surgery     Wounds: No open wounds today.  He knows that if there is erythema or pain that he needs to be seen as soon as possible to evaluate for cellulitis/infection.      Lungs: He reports improved dyspnea on exertion.  PFTs obtained December 2021 stable compared to 3 years ago %, FEV1 107% DL CO 74% predicted.  Of note patient had Covid pneumonia and was on the ventilator.  It has been previously noted that he does have this raspy crackly sounds on both bases that  do not change.  He has not had a CT chest since February 2021.  He wants to defer referral to pulmonary at this time.    Healthcare maintenance March 2022 TSH within normal, IgG within normal, vitamin D deficiency screening normal testosterone mildly low, he will follow up with PCP locally, encourage dermatology evaluation yearly, DEXA scan completed 5/6/2022 normal bone density repeat 2 years, discussed age-appropriate cancer screening. Seeing dermatology in September locally. Reminded 7/5 to see PCP locally for HCM and routine testing, is following up.    Plan:  Follow up with derm and ophthalmology  Patient would like to stay on same chronic GVHD therapy with no changes at this time  RTC in 3 months to see CAROL[facility]'s in stable therapy for many months (in person visit), can see me subsequently 3 months later.  Patient is aware that I will be on leave in the interim and he knows to reach out to the office with any concerns or changes.    30 minutes spent on the date of the encounter doing chart review, history and exam, documentation and further activities per the note     Roselia Nunez MD

## 2022-10-11 ENCOUNTER — APPOINTMENT (OUTPATIENT)
Dept: LAB | Facility: CLINIC | Age: 72
End: 2022-10-11
Attending: INTERNAL MEDICINE
Payer: MEDICARE

## 2022-10-11 ENCOUNTER — ONCOLOGY VISIT (OUTPATIENT)
Dept: TRANSPLANT | Facility: CLINIC | Age: 72
End: 2022-10-11
Attending: INTERNAL MEDICINE
Payer: MEDICARE

## 2022-10-11 VITALS
WEIGHT: 229.1 LBS | TEMPERATURE: 97.5 F | OXYGEN SATURATION: 99 % | HEART RATE: 68 BPM | RESPIRATION RATE: 16 BRPM | SYSTOLIC BLOOD PRESSURE: 126 MMHG | BODY MASS INDEX: 34.83 KG/M2 | DIASTOLIC BLOOD PRESSURE: 72 MMHG

## 2022-10-11 DIAGNOSIS — D89.811 CHRONIC GRAFT-VERSUS-HOST DISEASE (H): ICD-10-CM

## 2022-10-11 LAB
ALBUMIN SERPL BCG-MCNC: 4 G/DL (ref 3.5–5.2)
ALP SERPL-CCNC: 59 U/L (ref 40–129)
ALT SERPL W P-5'-P-CCNC: 29 U/L (ref 10–50)
ANION GAP SERPL CALCULATED.3IONS-SCNC: 9 MMOL/L (ref 7–15)
AST SERPL W P-5'-P-CCNC: 38 U/L (ref 10–50)
BASOPHILS # BLD AUTO: 0 10E3/UL (ref 0–0.2)
BASOPHILS NFR BLD AUTO: 0 %
BILIRUB SERPL-MCNC: 0.2 MG/DL
BUN SERPL-MCNC: 26.6 MG/DL (ref 8–23)
CALCIUM SERPL-MCNC: 9.5 MG/DL (ref 8.8–10.2)
CHLORIDE SERPL-SCNC: 106 MMOL/L (ref 98–107)
CREAT SERPL-MCNC: 1.25 MG/DL (ref 0.67–1.17)
DEPRECATED HCO3 PLAS-SCNC: 24 MMOL/L (ref 22–29)
EOSINOPHIL # BLD AUTO: 0.1 10E3/UL (ref 0–0.7)
EOSINOPHIL NFR BLD AUTO: 2 %
ERYTHROCYTE [DISTWIDTH] IN BLOOD BY AUTOMATED COUNT: 18.5 % (ref 10–15)
GFR SERPL CREATININE-BSD FRML MDRD: 61 ML/MIN/1.73M2
GLUCOSE SERPL-MCNC: 115 MG/DL (ref 70–99)
HCT VFR BLD AUTO: 36.2 % (ref 40–53)
HGB BLD-MCNC: 12 G/DL (ref 13.3–17.7)
IMM GRANULOCYTES # BLD: 0 10E3/UL
IMM GRANULOCYTES NFR BLD: 0 %
LYMPHOCYTES # BLD AUTO: 2.2 10E3/UL (ref 0.8–5.3)
LYMPHOCYTES NFR BLD AUTO: 44 %
MCH RBC QN AUTO: 32.3 PG (ref 26.5–33)
MCHC RBC AUTO-ENTMCNC: 33.1 G/DL (ref 31.5–36.5)
MCV RBC AUTO: 97 FL (ref 78–100)
MONOCYTES # BLD AUTO: 0.8 10E3/UL (ref 0–1.3)
MONOCYTES NFR BLD AUTO: 15 %
NEUTROPHILS # BLD AUTO: 2 10E3/UL (ref 1.6–8.3)
NEUTROPHILS NFR BLD AUTO: 39 %
NRBC # BLD AUTO: 0 10E3/UL
NRBC BLD AUTO-RTO: 0 /100
PLATELET # BLD AUTO: 211 10E3/UL (ref 150–450)
POTASSIUM SERPL-SCNC: 3.9 MMOL/L (ref 3.4–5.3)
PROT SERPL-MCNC: 6.7 G/DL (ref 6.4–8.3)
RBC # BLD AUTO: 3.72 10E6/UL (ref 4.4–5.9)
SODIUM SERPL-SCNC: 139 MMOL/L (ref 136–145)
WBC # BLD AUTO: 5.1 10E3/UL (ref 4–11)

## 2022-10-11 PROCEDURE — 36415 COLL VENOUS BLD VENIPUNCTURE: CPT | Performed by: INTERNAL MEDICINE

## 2022-10-11 PROCEDURE — G0463 HOSPITAL OUTPT CLINIC VISIT: HCPCS

## 2022-10-11 PROCEDURE — 99214 OFFICE O/P EST MOD 30 MIN: CPT | Performed by: INTERNAL MEDICINE

## 2022-10-11 PROCEDURE — 82784 ASSAY IGA/IGD/IGG/IGM EACH: CPT | Performed by: INTERNAL MEDICINE

## 2022-10-11 PROCEDURE — 85025 COMPLETE CBC W/AUTO DIFF WBC: CPT | Performed by: INTERNAL MEDICINE

## 2022-10-11 PROCEDURE — 80053 COMPREHEN METABOLIC PANEL: CPT | Performed by: INTERNAL MEDICINE

## 2022-10-11 ASSESSMENT — PAIN SCALES - GENERAL: PAINLEVEL: NO PAIN (0)

## 2022-10-11 NOTE — NURSING NOTE
"Oncology Rooming Note    October 11, 2022 11:59 AM   Deejay Dior is a 71 year old male who presents for:    Chief Complaint   Patient presents with     Blood Draw     Labs drawn via  by rn in lab. VS taken.     RECHECK     RTN for MDS     Initial Vitals: Blood Pressure 126/72   Pulse 68   Temperature 97.5  F (36.4  C)   Respiration 16   Weight 103.9 kg (229 lb 1.6 oz)   Oxygen Saturation 99%   Body Mass Index 34.83 kg/m   Estimated body mass index is 34.83 kg/m  as calculated from the following:    Height as of 8/30/22: 1.727 m (5' 8\").    Weight as of this encounter: 103.9 kg (229 lb 1.6 oz). Body surface area is 2.23 meters squared.  No Pain (0) Comment: Data Unavailable   No LMP for male patient.  Allergies reviewed: Yes  Medications reviewed: Yes    Medications: Medication refills not needed today.  Pharmacy name entered into [employer]:    [id number] [place], [place] - [address] [address] [facility] [place], [place] - [address] [facility] [phone]    Clinical concerns: none       Elizabeth Forrester MA            "

## 2022-10-11 NOTE — NURSING NOTE
Chief Complaint   Patient presents with     Blood Draw     Labs drawn via  by rn in lab. VS taken.     Labs collected from venipuncture by RN. Vitals taken. Checked in for appointment(s).    Deejay Arrington RN

## 2022-10-11 NOTE — LETTER
10/11/2022         RE: Deejay Dior  [address]  [place] [address]        Dear Colleague,    Thank you for referring your patient, Deejay Dior, to the [facility]. Please see a copy of my visit note below.    BMT Progress Note    ID:  Mr. 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 (In the past has had a basal and squamous cell removed), Assumed new BMT care by Jean-Paul Nunez on 3/7/2022    cGVHD: currently On pred 5mg every day and Jakafi 5 mg bid.      He admitted on 2/8/2021 for acute hypoxic respiratory failure secondary to COVID-19 pneumonia. Also rhino virus positive. Transferred to MICU on 2/10/2021 for worsening respiratory status requiring intubation 2/13-2/19/21. Discharged on 3/3/21 with home O2. Treated with remdesevir, dexamethasone and  eliquis 2.5 bid (through 3/30). Needed home O2 for a few weeks, now off.    HPI:  Seen by opthalmo in September, see below. He reports feeling well. No new concerns. Stable eye and dry mouth symptoms, no skin changes. Glad he is where he is today, 8 years 3months post transplant. Artificial tears 5-6 times/ days, uses the contact lenses as well for occular GVHD. Still with same Dry mouth, no ulcers, biotin helps temporarily. Doing well today otherwise.  He reports his usual fatigue- stable and fluctuates.  He occasionally gets short of breath with exertion.  He still declines physical therapy and rehab, and admits to not being as physically active as he should be even when his wife encourages him to walk.  No infectious concerns, no fevers. No new skin rashes or thickening.       Remainder of 10 pt ROS negative    On  exam:  /72   Pulse 68   Temp 97.5  F (36.4  C)   Resp 16   Wt 103.9 kg (229 lb 1.6 oz)   SpO2 99%   BMI 34.83 kg/m      HEENT: PERRL, EOMI , dry oral mucosa with no ulcers  Chest: Mild bibasilar crackles  CVS: S1 S2 RRR, no murmurs or gallops  Abdomen: Soft nontender no organomegalies or masses.  Extremities: The left leg had sclerotic skin on the shin, no evidence of infection or cellulitis, no open ulcers.  No other sclerotic changes of the skin.  Of note, since I first meet him the patient and he reports that the skin changes have been since previous cellulitis post transplantation. No ulcer. More prominent venous status stasis on left and more prominent sclerotic changes on right chin up to 2/3 and wraps around the ankle with no ulcers.Stable BSA.   Nl ROM.  CNS: non focal     Most Recent Pulmonary Function Testing    FVC-Pred   Date Value Ref Range Status   12/20/2021 3.86 L    10/02/2018 3.97 L    06/01/2016 4.16 L      FVC-Pre   Date Value Ref Range Status   12/20/2021 4.33 L    10/02/2018 4.56 L    06/01/2016 4.18 L      FVC-%Pred-Pre   Date Value Ref Range Status   12/20/2021 112 %    10/02/2018 114 %    06/01/2016 100 %      FEV1-Pre   Date Value Ref Range Status   12/20/2021 3.12 L    10/02/2018 3.58 L    06/01/2016 3.03 L      FEV1-%Pred-Pre   Date Value Ref Range Status   12/20/2021 107 %    10/02/2018 118 %    06/01/2016 95 %      FEV1FVC-Pred   Date Value Ref Range Status   12/20/2021 76 %    10/02/2018 74 %    06/01/2016 77 %      FEV1FVC-Pre   Date Value Ref Range Status   12/20/2021 72 %    10/02/2018 79 %    06/01/2016 72 %      No results found for: 20029  FEFMax-Pred   Date Value Ref Range Status   12/20/2021 7.60 L/sec    10/02/2018 7.92 L/sec    06/01/2016 8.31 L/sec      FEFMax-Pre   Date Value Ref Range Status   12/20/2021 9.21 L/sec    10/02/2018 10.21 L/sec    06/01/2016 9.85 L/sec      FEFMax-%Pred-Pre   Date Value Ref Range Status   12/20/2021 121 %    10/02/2018 128 %     06/01/2016 118 %      ExpTime-Pre   Date Value Ref Range Status   12/20/2021 12.36 sec    10/02/2018 11.75 sec    06/01/2016 9.90 sec      FIFMax-Pre   Date Value Ref Range Status   12/20/2021 6.52 L/sec    10/02/2018 8.04 L/sec    06/01/2016 7.34 L/sec      FEV1FEV6-Pred   Date Value Ref Range Status   12/20/2021 77 %    10/02/2018 78 %    06/01/2016 78 %      FEV1FEV6-Pre   Date Value Ref Range Status   12/20/2021 74 %    10/02/2018 81 %    06/01/2016 75 %      No results found for: 20055       ASSESSMENT AND PLAN:  Mr. 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 Total cell dose (from 7/1 & 7/2) 6.53 x 10^6 CD34+ cells/kg.   - 1 year anniversary (July 2015): 30% cellular, trilineage hematopoiesis, no abnormal blasts by morphology or flow , no dysplasia, 0-1 fibrosis, 100% donor (BM, CD3, CD15). CR  - 2 year anniversary (July 2016): 20-30% cellular, trilineage hematopoiesis, no abnormal blasts by morphology or flow , no dysplasia, No fibrosis, 100% donor in BM (PB not sent). ISCN: //46,XX[20] Complete Remission.     HEME: No transfusion needs, counts stable, 3/2022 ferritin 44    GVHD: Long history of GVHD as below.   12/2021: kept him on prednisone 5 mg daily (reports that cGVHD flared on lower dose would like to stay at same dose), ruxolitinib 5 mg twice daily.  His counts are tolerating well.    --- Since taking over patient care he repeatedly expressed wanting to stay on the same therapy as he has failed tapering or changes in the past.    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 Had been off prednisone since summer 2017 and briefly tapered off Sirolimus (january 2018), however resumed with flare in February. 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  Sirolimus was stopped on 9/27/2018 & Pred 90mg was started. Seen by Dr. Sandoval, he does not think his symptoms or CT findings are c/w Sirolimus or GVH & he was in favor of tapering Prednisone. Developed worsening skin thickening & desquamation to the RLE, c/w GVH.   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   Required Burst pred 40mg a day on 1/3/20 with significant systemic arthralgic pain, tapered back to 10/0 eod after 1 week, near resolution of symptoms noted 1/17, returned to 10 mg every other day; then dropped to 5mg q day in April 2021 (this is best dates estimated on chart review)     9/2022 Ophthalmo note:  1. 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   2. MGD each eye     S/p silicone Punctal plug bilateral lower lid (still in place). There is also meibomian gland dysfunction both eyes.     Stopped Restasis due to burning.  PLAN:  - Continue serum tears q2h OU  - Switch erythromycin bill to lubricating bill               - Re-start warm compress BID both eyes - not currently using              - trial of tacrolimus ointment QHS OU-- he has not filled this yet  2.   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     Biopsy showed GVHD colon and skin.   3.   Dry Age Related Macular Degeneration, both eyes.     Seen by Dr. Hector on 1/2020 -- no CNVM     Dry AMD - may have pattern component.    Continue ARED's 2 vitamins PO 2 times daily.      Recommends fish and green leafy vegetables 3 days per week.     No smoking.     Recommeds UV protection.     Return immediately if there are distortions to amsler grid.     F/u Tawny as scheduled  4.   Posterior vitreous detachment, right eye. Retina attached.     RD / RT precautions    5.   Basal Cell Carcinoma, Left Lower Lid.    S/P removal in 01/2020 at [facility].    6. Posterior  Capsular Opacification each eye  - mid PCO each eye visual significance unclear  Given ARMD and significant dryness.  - s/p YAG capsulotomy OS   7. Bilateral ptosis and lower eyelid ectropion  8. Trichiasis JOEL   - bilateral upper lid ptosis repair and lower lid ectropion repair with Dr. Solis (2/24/22).      ID:  Afebrile no signs/sx of infection.    - IgG 3/8/23321 =904  - Prophy: HD ACV (renal dosing), Fluconazole and  Bactrim.   - 5/6/2022 Evusheld completed      GI:  protonix (has heartburn)     FEN/Renal: Mild increase in creatinine 3/2022, then normalized 5/2022, mild SARAH again 10/2022, encouraged fluid intake, will avoid NSAIDs for knee pain.  Discussed referral to on-call nephrology would like to hold off for now.  Consider this on next appointment.     Fatigue:  stable  - History of testosterone deficiency, rechecked and modestly low. He previously did testosterone injections & didn't think it made him feel any better.    - TSH normal 2/28 and again on repeat 9/27 at 1.01.  - counseled that moderate exercise is really the only known way to combat fatigue, and acknowledged how difficult it is to balance too much with not enough activity.  Today again he declined referral to physical therapy or rehab.  Still encouraged him to more more physically active.     Vascular:   10/15/18 Right leg US with small (technically DVT) clot in posterior tibial vein: started reg dose aspirin daily 325mg and recheck US in 2 weeks or symptomatically. 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      MSK: avascular necrosis of hip.  S/p replacement surgery     Wounds: No open wounds today.  He knows that if there is erythema or pain that he needs to be seen as soon as possible to evaluate for cellulitis/infection.      Lungs: He reports  improved dyspnea on exertion.  PFTs obtained December 2021 stable compared to 3 years ago %, FEV1 107% DL CO 74% predicted.  Of note patient had Covid pneumonia and was on the ventilator.  It has been previously noted that he does have this raspy crackly sounds on both bases that do not change.  He has not had a CT chest since February 2021.  He wants to defer referral to pulmonary at this time.    Healthcare maintenance March 2022 TSH within normal, IgG within normal, vitamin D deficiency screening normal testosterone mildly low, he will follow up with PCP locally, encourage dermatology evaluation yearly, DEXA scan completed 5/6/2022 normal bone density repeat 2 years, discussed age-appropriate cancer screening. Seeing dermatology in September locally. Reminded 7/5 to see PCP locally for HCM and routine testing, is following up.    Plan:  Follow up with derm and ophthalmology  Patient would like to stay on same chronic GVHD therapy with no changes at this time  RTC in 3 months to see CAROLINA's in stable therapy for many months (in person visit), can see me subsequently 3 months later.  Patient is aware that I will be on leave in the interim and he knows to reach out to the office with any concerns or changes.      30 minutes spent on the date of the encounter doing chart review, history and exam, documentation and further activities per the note       Roselia Nunez MD

## 2022-10-12 LAB
CMV DNA SPEC NAA+PROBE-ACNC: NOT DETECTED IU/ML
IGG SERPL-MCNC: 932 MG/DL (ref 610–1616)

## 2022-11-09 NOTE — PATIENT INSTRUCTIONS
Erythromycin ointment right eye at bedtime for 14 days    Please see Dr. Fry in 3-4 weeks  
[id number]

## 2022-12-14 ENCOUNTER — CARE COORDINATION (OUTPATIENT)
Dept: TRANSPLANT | Facility: CLINIC | Age: 72
End: 2022-12-14

## 2022-12-14 DIAGNOSIS — D89.811 CHRONIC GRAFT-VERSUS-HOST DISEASE (H): Primary | ICD-10-CM

## 2022-12-14 RX ORDER — LEVOFLOXACIN 250 MG/1
TABLET, FILM COATED ORAL
Qty: 30 TABLET | Refills: 3 | Status: SHIPPED | OUTPATIENT
Start: 2022-12-14 | End: 2023-04-11

## 2022-12-14 NOTE — PROGRESS NOTES
Received multiple requests from [facility] for refill of levofloxacin on 12/15/22.  In reviewing previous notes from Dr. Jean-Paul Nunez, I did not see any mention of him being on this medication in these notes.  I did have an CAROLINA review notes as well, and mentioned he may be on it due to his Prednisone use, but again no record of him taking the levofloxacin since 2021 in a provider note.  I relayed this information to the pharmacy, and then spoke with Deejay who stated he has been on the medication continuously for 9 years and needs it urgently as he has been out for 1 week.  Refill sent, and this RN will follow up with provider seeing him in January to confirm if he needs to continue on this medication on an ongoing basis.

## 2023-01-09 ENCOUNTER — TELEPHONE (OUTPATIENT)
Dept: ONCOLOGY | Facility: CLINIC | Age: 73
End: 2023-01-09

## 2023-01-09 NOTE — TELEPHONE ENCOUNTER
Amira Barraza CPhTOncology Pharmacy Liaison     [facility] [place] [address]  Office: [phone]  Fax: [phone]  [email or web address]

## 2023-01-09 NOTE — PROGRESS NOTES
BMT Clinic Note     ID:  Mr. 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  On pred 5mg every day and Jakafi 5 mg bid.  Ongoing profound fatigue.  No improvement with Ritalin.  He reports generalized myalgias & notes good relief of pain & fatigue when he takes Oxycodone. Extreme dry mouth, for which he has tried numerous solutions, none of which has worked very well. S/P R hip replacement, now with minimal pain.  Eating well with no N/V/D.    Review of Systems: 8 point ROS negative except as noted above.    Physical Exam:  /71 (BP Location: Right arm, Patient Position: Sitting, Cuff Size: Adult Regular)   Pulse 61   Temp 97.9  F (36.6  C) (Oral)   Resp 16   Wt 101.2 kg (223 lb 1.6 oz)   SpO2 98%   BMI 33.19 kg/m       Wt Readings from Last 4 Encounters:   11/21/19 101.2 kg (223 lb 1.6 oz)   10/10/19 101.7 kg (224 lb 4.8 oz)   09/03/19 103.9 kg (229 lb)   07/25/19 105 kg (231 lb 8 oz)     General: NAD, interactive, KPS 80%  Eyes: SARIKA, sclera anicteric  Nose/Mouth/Throat: OP clear, no ulcerations, very dry, upper plate, chronic lichenoid changes , no lip lesions   Lungs:CTA B, no crackles or wheezes   CV: RRR without murmurs rubs or gallops  Abd: S/NT/ND +BS  Skin:   RLE skin shiny with some reddened areas. Nontender. Trace edema L>R (chronic).    Neuro: A&O  Labs:  Lab Results   Component Value Date    WBC 6.8 11/21/2019    ANEU 2.3 11/21/2019    HGB 13.6 11/21/2019    HCT 40.6 11/21/2019     11/21/2019     11/21/2019    POTASSIUM 3.7 11/21/2019    CHLORIDE 110 (H) 11/21/2019    CO2 25 11/21/2019     (H) 11/21/2019    BUN 22 11/21/2019    CR 0.98 11/21/2019    MAG 2.0 11/21/2019    INR 0.98 02/14/2019     ASSESSMENT AND PLAN:  Mr. Dior is a 66 y/o male,  5+yr s/p NMA allo sib PBSCT for MDS. w/ cGVHD     AML/MDS/BMT: S/p 8/8 matched and ABO matched allo-sib transplant from  his sister. Total cell dose (from 7/1 & 7/2) 6.53 x 10^6 CD34+ cells/kg.   - 1 year anniversary (July 2015): 30% cellular, trilineage hematopoiesis, no abnormal blasts by morphology or flow , no dysplasia, 0-1 fibrosis, 100% donor (BM, CD3, CD15). CR  - 2 year anniversary (July 2016): 20-30% cellular, trilineage hematopoiesis, no abnormal blasts by morphology or flow , no dysplasia, No fibrosis, 100% donor in BM (PB not sent). ISCN: //46,XX[20] Complete Remission.    HEME: No transfusion needs, counts stable     - Was on anticoagulation after his hip replacment, this is now complete.     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 Had been off prednisone since summer 2017 and briefly tapered off Sirolimus (january 2018), however resumed with flare in February. 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 Sirolimus was stopped on 9/27 & Pred 90mg was started. Seen by Dr. Sandoval on 10/2, please see his note. He does not think his symptoms or CT findings are c/w Sirolimus or GVH & he was in favor of tapering Prednisone. Recently he has worsening skin thickening & desquamation to the RLE, c/w GVH.   Started Jakafi 10/22 at 5 mg BID with symptom improvement in lower extremities. 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   Burst pred 40mg a day on 1/3 with significant systemic arthralgic pain, tapered back to 10/0 eod after 1 week, near resolution of symptoms noted 1/17, returned to 10 mg every other day; then dropped to 5mg q day in April    Currently on Jakafi 5mg twice daily & Pred 5mg every day.  Has lost most of his teeth due to decay from xerosterma.  Not much relief with topical agents.     ID:  Afebrile no signs/sx of infection.   - IgG 1/24 =1130  - Prophy: HD ACV (renal dosing), Fluconazole and  Bactrim.   - Flu shot, Pneumvax  &Shingrix 10/10 & will need a booster in 8wk-6mo    GI:  protonix (has heartburn)    FEN/Renal:  Lytes & creat stable.    Fatigue:  This is his biggest complaint.  No relief with Ritalin.  Checking a B 12 today.  Takes Vit D  - History of testosterone deficiency, rechecked and modestly low. He previously did testosterone injections & didn't think it made him feel any better.    - TSH normal 2/28 and again on repeat 9/27 at 1.01.  - counseled that moderate exercise is really the only known way to combat fatigue, and acknowledged how difficult it is to balance too much with not enough activity.     Derm:   Venous statis: see below-most recently had sclerotherapy to left LE    Vasc:   10/15 Right leg US with small (technically DVT) clot in posterior tibial vein: per Millie, started reg dose aspirin daily 325mg and recheck US in 2 weeks or symptomatically. U/S on 11/27 without DVT  Off lymphedema wraps  Discomfort since edema/shakes: oxy 1-2 tab during day and ativan 6 hours away from oxy for sleep.  H/o chronic venous statis: s/p sclerotherapy to the L leg.  kelfex day prior and 4 days following.     MS: avascular necrosis of hip.  S/p replacement surgery    RTC 3 months    Jyoti Borjas   No

## 2023-01-17 ENCOUNTER — LAB (OUTPATIENT)
Dept: LAB | Facility: CLINIC | Age: 73
End: 2023-01-17
Attending: STUDENT IN AN ORGANIZED HEALTH CARE EDUCATION/TRAINING PROGRAM
Payer: MEDICARE

## 2023-01-17 ENCOUNTER — ONCOLOGY VISIT (OUTPATIENT)
Dept: TRANSPLANT | Facility: CLINIC | Age: 73
End: 2023-01-17
Attending: STUDENT IN AN ORGANIZED HEALTH CARE EDUCATION/TRAINING PROGRAM
Payer: MEDICARE

## 2023-01-17 VITALS
DIASTOLIC BLOOD PRESSURE: 77 MMHG | OXYGEN SATURATION: 95 % | BODY MASS INDEX: 34.71 KG/M2 | WEIGHT: 229 LBS | HEART RATE: 95 BPM | SYSTOLIC BLOOD PRESSURE: 152 MMHG | RESPIRATION RATE: 18 BRPM | HEIGHT: 68 IN | TEMPERATURE: 97.7 F

## 2023-01-17 DIAGNOSIS — D89.811 CHRONIC GRAFT-VERSUS-HOST DISEASE (H): Primary | ICD-10-CM

## 2023-01-17 DIAGNOSIS — D89.813 GVH (GRAFT VERSUS HOST DISEASE) (H): ICD-10-CM

## 2023-01-17 DIAGNOSIS — Z94.81 STATUS POST BONE MARROW TRANSPLANT (H): ICD-10-CM

## 2023-01-17 DIAGNOSIS — D46.9 MDS (MYELODYSPLASTIC SYNDROME) (H): ICD-10-CM

## 2023-01-17 LAB
ALBUMIN SERPL BCG-MCNC: 3.8 G/DL (ref 3.5–5.2)
ALP SERPL-CCNC: 54 U/L (ref 40–129)
ALT SERPL W P-5'-P-CCNC: 22 U/L (ref 10–50)
ANION GAP SERPL CALCULATED.3IONS-SCNC: 10 MMOL/L (ref 7–15)
AST SERPL W P-5'-P-CCNC: 36 U/L (ref 10–50)
BASOPHILS # BLD AUTO: 0 10E3/UL (ref 0–0.2)
BASOPHILS NFR BLD AUTO: 0 %
BILIRUB SERPL-MCNC: 0.3 MG/DL
BUN SERPL-MCNC: 20.1 MG/DL (ref 8–23)
CALCIUM SERPL-MCNC: 9.4 MG/DL (ref 8.8–10.2)
CHLORIDE SERPL-SCNC: 105 MMOL/L (ref 98–107)
CREAT SERPL-MCNC: 0.99 MG/DL (ref 0.67–1.17)
DEPRECATED HCO3 PLAS-SCNC: 23 MMOL/L (ref 22–29)
EOSINOPHIL # BLD AUTO: 0 10E3/UL (ref 0–0.7)
EOSINOPHIL NFR BLD AUTO: 1 %
ERYTHROCYTE [DISTWIDTH] IN BLOOD BY AUTOMATED COUNT: 17.8 % (ref 10–15)
GFR SERPL CREATININE-BSD FRML MDRD: 80 ML/MIN/1.73M2
GLUCOSE SERPL-MCNC: 91 MG/DL (ref 70–99)
HCT VFR BLD AUTO: 34.8 % (ref 40–53)
HGB BLD-MCNC: 11.7 G/DL (ref 13.3–17.7)
IMM GRANULOCYTES # BLD: 0 10E3/UL
IMM GRANULOCYTES NFR BLD: 0 %
LYMPHOCYTES # BLD AUTO: 2.4 10E3/UL (ref 0.8–5.3)
LYMPHOCYTES NFR BLD AUTO: 46 %
MCH RBC QN AUTO: 31.5 PG (ref 26.5–33)
MCHC RBC AUTO-ENTMCNC: 33.6 G/DL (ref 31.5–36.5)
MCV RBC AUTO: 94 FL (ref 78–100)
MONOCYTES # BLD AUTO: 0.8 10E3/UL (ref 0–1.3)
MONOCYTES NFR BLD AUTO: 17 %
NEUTROPHILS # BLD AUTO: 1.8 10E3/UL (ref 1.6–8.3)
NEUTROPHILS NFR BLD AUTO: 36 %
NRBC # BLD AUTO: 0 10E3/UL
NRBC BLD AUTO-RTO: 0 /100
PLATELET # BLD AUTO: 187 10E3/UL (ref 150–450)
POTASSIUM SERPL-SCNC: 4.2 MMOL/L (ref 3.4–5.3)
PROT SERPL-MCNC: 6.5 G/DL (ref 6.4–8.3)
RBC # BLD AUTO: 3.72 10E6/UL (ref 4.4–5.9)
SODIUM SERPL-SCNC: 138 MMOL/L (ref 136–145)
WBC # BLD AUTO: 5.1 10E3/UL (ref 4–11)

## 2023-01-17 PROCEDURE — 36415 COLL VENOUS BLD VENIPUNCTURE: CPT

## 2023-01-17 PROCEDURE — G0463 HOSPITAL OUTPT CLINIC VISIT: HCPCS

## 2023-01-17 PROCEDURE — 80053 COMPREHEN METABOLIC PANEL: CPT

## 2023-01-17 PROCEDURE — 85025 COMPLETE CBC W/AUTO DIFF WBC: CPT

## 2023-01-17 PROCEDURE — 99214 OFFICE O/P EST MOD 30 MIN: CPT

## 2023-01-17 ASSESSMENT — PAIN SCALES - GENERAL: PAINLEVEL: NO PAIN (0)

## 2023-01-17 NOTE — LETTER
"    1/17/2023         RE: Deejay Dior  [id number] [facility] [id number]        Dear Colleague,    Thank you for referring your patient, Deejay Dior, to the [facility] [place]. Please see a copy of my visit note below.    BMT Progress Note    ID:  Mr. 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 (In the past has had a basal and squamous cell removed). Assumed new BMT care by Jean-Paul Nunez on 3/7/2022    cGVHD: currently On pred 5mg every day and Jakafi 5 mg bid.        HPI: Deejay returns for follow up. Eye dryness is \"stable or slightly worse\". His mouth is dry, no ulcerations or sores. Ophtho told him they don't often see tac oint work and that it is expensive. He chose not to fill this. No n/v/d. Legs skin thickness is about the same. Fell and scrapped right shin slightly. Based on past skin tears he feels this will likely be resolved by next week. He denies pain, oozing or bleeding on the shin.     Remainder of 10 pt ROS negative    On exam:  BP (!) 152/77   Pulse 95   Temp 97.7  F (36.5  C) (Oral)   Resp 18   Ht 1.727 m (5' 7.99\")   Wt 103.9 kg (229 lb)   SpO2 95%   BMI 34.83 kg/m      HEENT: PERRL, EOMI , dry oral mucosa with no ulcers  Chest: Mild bibasilar crackles  CVS: S1 S2 RRR, no murmurs or gallops  Abdomen: Soft nontender no organomegalies or masses.  Extremities: The left leg had sclerotic skin on the shin, no evidence of infection or cellulitis. One dime size skin tear on right shin, no oozing or bleeding. No scab formed yet. No other sclerotic changes of the skin.  Of note, since I first meet him the patient and he reports that the skin changes have been " since previous cellulitis post transplantation. ore prominent venous status stasis on left and more prominent sclerotic changes on right shin.  CNS: non focal       ASSESSMENT AND PLAN:  Mr. 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 Total cell dose (from 7/1 & 7/2) 6.53 x 10^6 CD34+ cells/kg.   - 1 year anniversary (July 2015): 30% cellular, trilineage hematopoiesis, no abnormal blasts by morphology or flow , no dysplasia, 0-1 fibrosis, 100% donor (BM, CD3, CD15). CR  - 2 year anniversary (July 2016): 20-30% cellular, trilineage hematopoiesis, no abnormal blasts by morphology or flow , no dysplasia, No fibrosis, 100% donor in BM (PB not sent). ISCN: //46,XX[20] Complete Remission.     HEME: No transfusion needs, counts stable, 3/2022 ferritin 44    GVHD: Long history of GVHD as below.   12/2021: kept him on prednisone 5 mg daily (reports that cGVHD flared on lower dose would like to stay at same dose), ruxolitinib 5 mg twice daily.  His counts are tolerating well.    --- Since taking over patient care he repeatedly expressed wanting to stay on the same therapy as he has failed tapering or changes in the past.    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 Had been off prednisone since summer 2017 and briefly tapered off Sirolimus (january 2018), however resumed with flare in February. 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 Sirolimus was stopped on 9/27/2018 & Pred 90mg was started. Seen by Dr. Sandoval, he does not think his symptoms or CT findings are c/w Sirolimus or GVH & he was in favor of tapering Prednisone. Developed worsening skin thickening & desquamation to the RLE, c/w GVH.   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   Required Burst pred 40mg a day on 1/3/20 with significant systemic arthralgic pain, tapered back to 10/0 eod after 1 week, near resolution of symptoms noted 1/17, returned to 10 mg every other day; then dropped to 5mg q day in April 2021 (this is best dates estimated on chart review)     9/2022 Ophthalmo note:  1. 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   2. MGD each eye     S/p silicone Punctal plug bilateral lower lid (still in place). There is also meibomian gland dysfunction both eyes.     Stopped Restasis due to burning.  PLAN:  - Continue serum tears q2h OU  - Switch erythromycin bill to lubricating bill   - Continues with scleral lenses               - Re-start warm compress BID both eyes - not currently using              - trial of tacrolimus ointment QHS OU-- he has no picked thi up  2.   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     Biopsy showed GVHD colon and skin.   3.   Dry Age Related Macular Degeneration, both eyes.     Seen by Dr. Hector on 1/2020 -- no CNVM     Dry AMD - may have pattern component.    Continue ARED's 2 vitamins PO 2 times daily.      Recommends fish and green leafy vegetables 3 days per week.     No smoking.     Recommeds UV protection.     Return immediately if there are distortions to amsler grid.     F/u Tawny as scheduled  4.   Posterior vitreous detachment, right eye. Retina attached.     RD / RT precautions    5.   Basal Cell Carcinoma, Left Lower Lid.    S/P removal in 01/2020 at [facility].    6. Posterior Capsular Opacification each eye  - mid PCO each eye visual significance unclear  Given ARMD and significant dryness.  - s/p YAG capsulotomy OS   7. Bilateral ptosis and lower eyelid ectropion  8. Trichiasis JOEL   - bilateral upper lid ptosis repair and lower lid ectropion repair with Dr. Solis  (2/24/22).      ID:  Afebrile no signs/sx of infection.    - IgG 3/8/49342 =904  - Prophy: HD ACV (renal dosing), Fluconazole and  Bactrim.   - 5/6/2022 Gonzalez completed      GI:  protonix (has heartburn)     FEN/Renal: Mild increase in creatinine 3/2022, then normalized 5/2022, mild SARAH again 10/2022, encouraged fluid intake, will avoid NSAIDs for knee pain.  Discussed referral to on-call nephrology would like to hold off for now. Cr improved 0.9 1/17     Fatigue:  stable  - History of testosterone deficiency, rechecked and modestly low. He previously did testosterone injections & didn't think it made him feel any better.    - TSH normal 2/28 and again on repeat 9/27 at 1.01.  - counseled that moderate exercise is really the only known way to combat fatigue, and acknowledged how difficult it is to balance too much with not enough activity. Discussed 1/17 again he declined referral to physical therapy or rehab. Still encouraged him to more more physically active.     Vascular:   10/15/18 Right leg US with small (technically DVT) clot in posterior tibial vein: started reg dose aspirin daily 325mg and recheck US in 2 weeks or symptomatically. 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      MSK: avascular necrosis of hip.  S/p replacement surgery     Wounds: No open wounds today. He knows that if there is erythema or pain that he needs to be seen as soon as possible to evaluate for cellulitis/infection. *Reiterated this 1/17    Lungs: He reports improved dyspnea on exertion.  PFTs obtained December 2021 stable compared to 3 years ago %, FEV1 107% DL CO 74% predicted.  Of note patient had Covid pneumonia and was on the ventilator.  It has been previously noted that he does have this raspy crackly sounds on both bases that do  not change.  He has not had a CT chest since February 2021.  He wants to defer referral to pulmonary at this time.    Healthcare maintenance March 2022 TSH within normal, IgG within normal, vitamin D deficiency screening normal testosterone mildly low, he will follow up with PCP locally, encourage dermatology evaluation yearly, DEXA scan completed 5/6/2022 normal bone density repeat 2 years, discussed age-appropriate cancer screening. Seeing dermatology in September locally. Reminded 7/5 to see PCP locally for HCM and routine testing, is following up.    Plan:  No changes today.    RTC 3 months with Dr Jean-Paul Salinas    30 minutes spent on the date of the encounter doing chart review, history and exam, documentation and further activities per the note    NATAN Mckeon-C   [phone]

## 2023-01-17 NOTE — NURSING NOTE
"Oncology Rooming Note    January 17, 2023 11:12 AM   Deejay Dior is a 72 year old male who presents for:    Chief Complaint   Patient presents with     Blood Draw     Labs obtained via venipuncture 23 gauge butterfly needle, VS taken, checked into next appt     Oncology Clinic Visit     [facility] RETURN - MDS     Initial Vitals: BP (!) 152/77   Pulse 95   Temp 97.7  F (36.5  C) (Oral)   Resp 18   Ht 1.727 m (5' 7.99\")   Wt 103.9 kg (229 lb)   SpO2 95%   BMI 34.83 kg/m   Estimated body mass index is 34.83 kg/m  as calculated from the following:    Height as of this encounter: 1.727 m (5' 7.99\").    Weight as of this encounter: 103.9 kg (229 lb). Body surface area is 2.23 meters squared.  No Pain (0) Comment: Data Unavailable   No LMP for male patient.  Allergies reviewed: Yes  Medications reviewed: Yes    Medications: Medication refills not needed today.  Pharmacy name entered into [facility]:    [id number] [place], [place] - [address] [address] [facility] [place], [place] - [address] [place] [address] [phone]    Dusty Mcfadden LPN            "

## 2023-01-17 NOTE — PROGRESS NOTES
"BMT Progress Note    ID:  Mr. 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 (In the past has had a basal and squamous cell removed). Assumed new BMT care by Jean-Paul Nunez on 3/7/2022    cGVHD: currently On pred 5mg every day and Jakafi 5 mg bid.        HPI: Deejay returns for follow up. Eye dryness is \"stable or slightly worse\". His mouth is dry, no ulcerations or sores. Ophtho told him they don't often see tac oint work and that it is expensive. He chose not to fill this. No n/v/d. Legs skin thickness is about the same. Fell and scrapped right shin slightly. Based on past skin tears he feels this will likely be resolved by next week. He denies pain, oozing or bleeding on the shin.     Remainder of 10 pt ROS negative    On exam:  BP (!) 152/77   Pulse 95   Temp 97.7  F (36.5  C) (Oral)   Resp 18   Ht 1.727 m (5' 7.99\")   Wt 103.9 kg (229 lb)   SpO2 95%   BMI 34.83 kg/m      HEENT: PERRL, EOMI , dry oral mucosa with no ulcers  Chest: Mild bibasilar crackles  CVS: S1 S2 RRR, no murmurs or gallops  Abdomen: Soft nontender no organomegalies or masses.  Extremities: The left leg had sclerotic skin on the shin, no evidence of infection or cellulitis. One dime size skin tear on right shin, no oozing or bleeding. No scab formed yet. No other sclerotic changes of the skin.  Of note, since I first meet him the patient and he reports that the skin changes have been since previous cellulitis post transplantation. ore prominent venous status stasis on left and more prominent sclerotic changes on right shin.  CNS: non focal       ASSESSMENT AND PLAN:  Mr. 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 Total cell dose (from 7/1 & 7/2) 6.53 x 10^6 CD34+ cells/kg.   - 1 year anniversary (July 2015): 30% cellular, trilineage hematopoiesis, no abnormal blasts by morphology or flow , no dysplasia, 0-1 fibrosis, 100% donor (BM, CD3, CD15). CR  - 2 year anniversary (July 2016): 20-30% cellular, trilineage hematopoiesis, no abnormal blasts by morphology or flow , no dysplasia, No fibrosis, 100% donor in BM (PB not sent). ISCN: //46,XX[20] Complete Remission.     HEME: No transfusion needs, counts stable, 3/2022 ferritin 44    GVHD: Long history of GVHD as below.   12/2021: kept him on prednisone 5 mg daily (reports that cGVHD flared on lower dose would like to stay at same dose), ruxolitinib 5 mg twice daily.  His counts are tolerating well.    --- Since taking over patient care he repeatedly expressed wanting to stay on the same therapy as he has failed tapering or changes in the past.    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 Had been off prednisone since summer 2017 and briefly tapered off Sirolimus (january 2018), however resumed with flare in February. 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 Sirolimus was stopped on 9/27/2018 & Pred 90mg was started. Seen by Dr. Sandoval, he does not think his symptoms or CT findings are c/w Sirolimus or GVH & he was in favor of tapering Prednisone. Developed worsening skin thickening & desquamation to the RLE, c/w GVH.   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   Required Burst pred 40mg a day on 1/3/20 with significant systemic arthralgic pain, tapered back to 10/0  eod after 1 week, near resolution of symptoms noted 1/17, returned to 10 mg every other day; then dropped to 5mg q day in April 2021 (this is best dates estimated on chart review)     9/2022 Ophthalmo note:  1. 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   2. MGD each eye     S/p silicone Punctal plug bilateral lower lid (still in place). There is also meibomian gland dysfunction both eyes.     Stopped Restasis due to burning.  PLAN:  - Continue serum tears q2h OU  - Switch erythromycin bill to lubricating bill   - Continues with scleral lenses               - Re-start warm compress BID both eyes - not currently using              - trial of tacrolimus ointment QHS OU-- he has no picked thi up  2.   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     Biopsy showed GVHD colon and skin.   3.   Dry Age Related Macular Degeneration, both eyes.     Seen by Dr. Hector on 1/2020 -- no CNVM     Dry AMD - may have pattern component.    Continue ARED's 2 vitamins PO 2 times daily.      Recommends fish and green leafy vegetables 3 days per week.     No smoking.     Recommeds UV protection.     Return immediately if there are distortions to amsler grid.     F/u Tawny as scheduled  4.   Posterior vitreous detachment, right eye. Retina attached.     RD / RT precautions    5.   Basal Cell Carcinoma, Left Lower Lid.    S/P removal in 01/2020 at [facility].    6. Posterior Capsular Opacification each eye  - mid PCO each eye visual significance unclear  Given ARMD and significant dryness.  - s/p YAG capsulotomy OS   7. Bilateral ptosis and lower eyelid ectropion  8. Trichiasis JOEL   - bilateral upper lid ptosis repair and lower lid ectropion repair with Dr. Solis (2/24/22).      ID:  Afebrile no signs/sx of infection.    - IgG 3/8/59645 =904  - Prophy: HD ACV (renal dosing), Fluconazole and  Bactrim.   - 5/6/2022 Gonzalez completed      GI:  protonix (has heartburn)     FEN/Renal: Mild increase in creatinine 3/2022, then normalized  5/2022, mild SARAH again 10/2022, encouraged fluid intake, will avoid NSAIDs for knee pain.  Discussed referral to on-call nephrology would like to hold off for now. Cr improved 0.9 1/17     Fatigue:  stable  - History of testosterone deficiency, rechecked and modestly low. He previously did testosterone injections & didn't think it made him feel any better.    - TSH normal 2/28 and again on repeat 9/27 at 1.01.  - counseled that moderate exercise is really the only known way to combat fatigue, and acknowledged how difficult it is to balance too much with not enough activity. Discussed 1/17 again he declined referral to physical therapy or rehab. Still encouraged him to more more physically active.     Vascular:   10/15/18 Right leg US with small (technically DVT) clot in posterior tibial vein: started reg dose aspirin daily 325mg and recheck US in 2 weeks or symptomatically. 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      MSK: avascular necrosis of hip.  S/p replacement surgery     Wounds: No open wounds today. He knows that if there is erythema or pain that he needs to be seen as soon as possible to evaluate for cellulitis/infection. *Reiterated this 1/17    Lungs: He reports improved dyspnea on exertion.  PFTs obtained December 2021 stable compared to 3 years ago %, FEV1 107% DL CO 74% predicted.  Of note patient had Covid pneumonia and was on the ventilator.  It has been previously noted that he does have this raspy crackly sounds on both bases that do not change.  He has not had a CT chest since February 2021.  He wants to defer referral to pulmonary at this time.    Healthcare maintenance March 2022 TSH within normal, IgG within normal, vitamin D deficiency screening normal testosterone mildly low, he will follow up with  PCP locally, encourage dermatology evaluation yearly, DEXA scan completed 5/6/2022 normal bone density repeat 2 years, discussed age-appropriate cancer screening. Seeing dermatology in September locally. Reminded 7/5 to see PCP locally for HCM and routine testing, is following up.    Plan:  No changes today.    RTC 3 months with Dr Jean-Paul Salinas    30 minutes spent on the date of the encounter doing chart review, history and exam, documentation and further activities per the note    Tamar Ortiz, PA-C   [phone]

## 2023-01-24 RX ORDER — FLUCONAZOLE 100 MG/1
100 TABLET ORAL DAILY
Qty: 90 TABLET | Refills: 3 | Status: SHIPPED | OUTPATIENT
Start: 2023-01-24 | End: 2024-01-08

## 2023-02-07 DIAGNOSIS — Z94.81 STATUS POST BONE MARROW TRANSPLANT (H): ICD-10-CM

## 2023-02-08 RX ORDER — SULFAMETHOXAZOLE/TRIMETHOPRIM 800-160 MG
TABLET ORAL
Qty: 48 TABLET | Refills: 0 | Status: SHIPPED | OUTPATIENT
Start: 2023-02-08 | End: 2023-06-07

## 2023-02-17 DIAGNOSIS — D89.813 GRAFT-VERSUS-HOST DISEASE (H): ICD-10-CM

## 2023-02-17 DIAGNOSIS — U07.1 COVID-19 VIRUS INFECTION: ICD-10-CM

## 2023-02-17 RX ORDER — PANTOPRAZOLE SODIUM 40 MG/1
40 TABLET, DELAYED RELEASE ORAL
Qty: 30 TABLET | Refills: 3 | Status: SHIPPED | OUTPATIENT
Start: 2023-02-17 | End: 2023-03-13

## 2023-02-19 DIAGNOSIS — D89.813 GVH (GRAFT VERSUS HOST DISEASE) (H): ICD-10-CM

## 2023-02-19 DIAGNOSIS — D46.9 MDS (MYELODYSPLASTIC SYNDROME) (H): ICD-10-CM

## 2023-02-20 RX ORDER — ACYCLOVIR 800 MG/1
800 TABLET ORAL 2 TIMES DAILY
Qty: 180 TABLET | Refills: 0 | Status: SHIPPED | OUTPATIENT
Start: 2023-02-20 | End: 2023-05-23

## 2023-03-13 ENCOUNTER — OFFICE VISIT (OUTPATIENT)
Dept: FAMILY MEDICINE | Facility: CLINIC | Age: 73
End: 2023-03-13
Payer: MEDICARE

## 2023-03-13 VITALS
OXYGEN SATURATION: 98 % | SYSTOLIC BLOOD PRESSURE: 122 MMHG | HEART RATE: 83 BPM | BODY MASS INDEX: 36.57 KG/M2 | HEIGHT: 67 IN | WEIGHT: 233 LBS | TEMPERATURE: 97 F | DIASTOLIC BLOOD PRESSURE: 60 MMHG | RESPIRATION RATE: 16 BRPM

## 2023-03-13 DIAGNOSIS — D89.813 GRAFT-VERSUS-HOST DISEASE (H): ICD-10-CM

## 2023-03-13 DIAGNOSIS — D46.9 MDS (MYELODYSPLASTIC SYNDROME) (H): ICD-10-CM

## 2023-03-13 DIAGNOSIS — I82.4Z1 ACUTE DEEP VEIN THROMBOSIS (DVT) OF DISTAL VEIN OF RIGHT LOWER EXTREMITY (H): ICD-10-CM

## 2023-03-13 DIAGNOSIS — Z12.5 SCREENING FOR MALIGNANT NEOPLASM OF PROSTATE: ICD-10-CM

## 2023-03-13 DIAGNOSIS — Z94.81 H/O BONE MARROW TRANSPLANT (H): ICD-10-CM

## 2023-03-13 DIAGNOSIS — R12 HEARTBURN: ICD-10-CM

## 2023-03-13 DIAGNOSIS — R53.83 OTHER FATIGUE: ICD-10-CM

## 2023-03-13 DIAGNOSIS — Z13.220 SCREENING FOR HYPERLIPIDEMIA: ICD-10-CM

## 2023-03-13 DIAGNOSIS — Z00.00 ENCOUNTER FOR MEDICARE ANNUAL WELLNESS EXAM: Primary | ICD-10-CM

## 2023-03-13 DIAGNOSIS — E78.5 HYPERLIPIDEMIA LDL GOAL <100: ICD-10-CM

## 2023-03-13 DIAGNOSIS — Z12.11 SCREEN FOR COLON CANCER: ICD-10-CM

## 2023-03-13 DIAGNOSIS — F33.41 RECURRENT MAJOR DEPRESSIVE DISORDER, IN PARTIAL REMISSION (H): ICD-10-CM

## 2023-03-13 PROBLEM — H02.834 DERMATOCHALASIS OF BOTH UPPER EYELIDS: Status: RESOLVED | Noted: 2022-02-01 | Resolved: 2023-03-13

## 2023-03-13 PROBLEM — H02.831 DERMATOCHALASIS OF BOTH UPPER EYELIDS: Status: RESOLVED | Noted: 2022-02-01 | Resolved: 2023-03-13

## 2023-03-13 PROBLEM — H57.813 BROW PTOSIS, BILATERAL: Status: RESOLVED | Noted: 2022-02-01 | Resolved: 2023-03-13

## 2023-03-13 PROBLEM — H02.135 SENILE ECTROPION OF BOTH LOWER EYELIDS: Status: RESOLVED | Noted: 2022-02-01 | Resolved: 2023-03-13

## 2023-03-13 PROBLEM — H02.132 SENILE ECTROPION OF BOTH LOWER EYELIDS: Status: RESOLVED | Noted: 2022-02-01 | Resolved: 2023-03-13

## 2023-03-13 PROBLEM — U07.1 COVID-19 VIRUS INFECTION: Status: RESOLVED | Noted: 2021-02-08 | Resolved: 2023-03-13

## 2023-03-13 PROBLEM — Z96.641 STATUS POST RIGHT HIP REPLACEMENT: Status: RESOLVED | Noted: 2019-09-03 | Resolved: 2023-03-13

## 2023-03-13 PROBLEM — M34.9 SYSTEMIC SCLEROSIS, UNSPECIFIED (H): Status: ACTIVE | Noted: 2023-03-13

## 2023-03-13 PROCEDURE — G0438 PPPS, INITIAL VISIT: HCPCS | Performed by: FAMILY MEDICINE

## 2023-03-13 PROCEDURE — 99214 OFFICE O/P EST MOD 30 MIN: CPT | Mod: 25 | Performed by: FAMILY MEDICINE

## 2023-03-13 RX ORDER — PANTOPRAZOLE SODIUM 40 MG/1
40 TABLET, DELAYED RELEASE ORAL
Qty: 30 TABLET | Refills: 3 | COMMUNITY
Start: 2023-03-13 | End: 2023-07-10

## 2023-03-13 RX ORDER — SERTRALINE HYDROCHLORIDE 100 MG/1
100 TABLET, FILM COATED ORAL DAILY
Qty: 90 TABLET | Refills: 1 | COMMUNITY
Start: 2022-08-10 | End: 2023-10-04

## 2023-03-13 ASSESSMENT — ENCOUNTER SYMPTOMS
SHORTNESS OF BREATH: 0
DIARRHEA: 0
SORE THROAT: 0
DYSURIA: 0
PARESTHESIAS: 0
EYE PAIN: 1
HEMATOCHEZIA: 0
NERVOUS/ANXIOUS: 0
JOINT SWELLING: 0
COUGH: 0
CONSTIPATION: 0
WEAKNESS: 0
MYALGIAS: 0
NAUSEA: 0
ABDOMINAL PAIN: 0
CHILLS: 0
HEMATURIA: 0
FEVER: 0
FREQUENCY: 1
HEARTBURN: 0
DIZZINESS: 0
ARTHRALGIAS: 1
PALPITATIONS: 0
HEADACHES: 1

## 2023-03-13 ASSESSMENT — ANXIETY QUESTIONNAIRES
7. FEELING AFRAID AS IF SOMETHING AWFUL MIGHT HAPPEN: NOT AT ALL
GAD7 TOTAL SCORE: 1
5. BEING SO RESTLESS THAT IT IS HARD TO SIT STILL: NOT AT ALL
GAD7 TOTAL SCORE: 1
3. WORRYING TOO MUCH ABOUT DIFFERENT THINGS: NOT AT ALL
1. FEELING NERVOUS, ANXIOUS, OR ON EDGE: NOT AT ALL
2. NOT BEING ABLE TO STOP OR CONTROL WORRYING: NOT AT ALL
IF YOU CHECKED OFF ANY PROBLEMS ON THIS QUESTIONNAIRE, HOW DIFFICULT HAVE THESE PROBLEMS MADE IT FOR YOU TO DO YOUR WORK, TAKE CARE OF THINGS AT HOME, OR GET ALONG WITH OTHER PEOPLE: NOT DIFFICULT AT ALL
6. BECOMING EASILY ANNOYED OR IRRITABLE: SEVERAL DAYS

## 2023-03-13 ASSESSMENT — PATIENT HEALTH QUESTIONNAIRE - PHQ9
5. POOR APPETITE OR OVEREATING: NOT AT ALL
SUM OF ALL RESPONSES TO PHQ QUESTIONS 1-9: 5
SUM OF ALL RESPONSES TO PHQ QUESTIONS 1-9: 5
10. IF YOU CHECKED OFF ANY PROBLEMS, HOW DIFFICULT HAVE THESE PROBLEMS MADE IT FOR YOU TO DO YOUR WORK, TAKE CARE OF THINGS AT HOME, OR GET ALONG WITH OTHER PEOPLE: SOMEWHAT DIFFICULT

## 2023-03-13 ASSESSMENT — ACTIVITIES OF DAILY LIVING (ADL): CURRENT_FUNCTION: NO ASSISTANCE NEEDED

## 2023-03-13 NOTE — PATIENT INSTRUCTIONS
Patient Education   Personalized Prevention Plan  You are due for the preventive services outlined below.  Your care team is available to assist you in scheduling these services.  If you have already completed any of these items, please share that information with your care team to update in your medical record.  Health Maintenance Due   Topic Date Due     Depression Action Plan  Never done     Cholesterol Lab  02/06/2020     Colorectal Cancer Screening  12/15/2021     Your Health Risk Assessment indicates you feel you are not in good health    A healthy lifestyle helps keep the body fit and the mind alert. It helps protect you from disease, helps you fight disease, and helps prevent chronic disease (disease that doesn't go away) from getting worse. This is important as you get older and begin to notice twinges in muscles and joints and a decline in the strength and stamina you once took for granted. A healthy lifestyle includes good healthcare, good nutrition, weight control, recreation, and regular exercise. Avoid harmful substances and do what you can to keep safe. Another part of a healthy lifestyle is stay mentally active and socially involved.    Good healthcare     Have a wellness visit every year.     If you have new symptoms, let us know right away. Don't wait until the next checkup.     Take medicines exactly as prescribed and keep your medicines in a safe place. Tell us if your medicine causes problems.   Healthy diet and weight control     Eat 3 or 4 small, nutritious, low-fat, high-fiber meals a day. Include a variety of fruits, vegetables, and whole-grain foods.     Make sure you get enough calcium in your diet. Calcium, vitamin D, and exercise help prevent osteoporosis (bone thinning).     If you live alone, try eating with others when you can. That way you get a good meal and have company while you eat it.     Try to keep a healthy weight. If you eat more calories than your body uses for energy, it  will be stored as fat and you will gain weight.     Recreation   Recreation is not limited to sports and team events. It includes any activity that provides relaxation, interest, enjoyment, and exercise. Recreation provides an outlet for physical, mental, and social energy. It can give a sense of worth and achievement. It can help you stay healthy.    Mental Exercise and Social Involvement  Mental and emotional health is as important as physical health. Keep in touch with friends and family. Stay as active as possible. Continue to learn and challenge yourself.   Things you can do to stay mentally active are:    Learn something new, like a foreign language or musical instrument.     Play SCRABBLE or do crossword puzzles. If you cannot find people to play these games with you at home, you can play them with others on your computer through the Internet.     Join a games club--anything from card games to chess or checkers or lawn bowling.     Start a new hobby.     Go back to school.     Volunteer.     Read.   Keep up with world events.   Patient Education   Alcohol Use     Many people can enjoy a glass of wine or beer without any negative consequences to their health. According to the Centers for Disease Control and Prevention (CDC), having one or fewer drinks per day for women and two or fewer per day for men is considered moderate drinking.     When people drink more than moderately, it can become concerning. Excessive drinking is defined as consuming 15 drinks or more per week for men and 8 drinks or more per week for women. There are various health problems associated with excessive drinking, which include:    Damage to vital organs like the heart, brain, liver and pancreas    Harm to the digestive tract    Weaken the immune system    Higher risk for heart disease and cancer    There are many resources available to people who are addicted to alcohol. A counselor or other health care provider can give you support.  So can a , , or rabbi who is trained in substance abuse counseling. Friends and family may also help once you are connected with professionals.           Understanding USDA MyPlate  The USDA has guidelines to help you make healthy food choices. These are called MyPlate. MyPlate shows the food groups that make up healthy meals using the image of a place setting. Before you eat, think about the healthiest choices for what to put on your plate or in your cup or bowl. To learn more about building a healthy plate, visit www.choosemyplate.gov.    The food groups    Fruits. Any fruit or 100% fruit juice counts as part of the Fruit Group. Fruits may be fresh, canned, frozen, or dried, and may be whole, cut-up, or pureed. Make 1/2 of your plate fruits and vegetables.    Vegetables. Any vegetable or 100% vegetable juice counts as a member of the Vegetable Group. Vegetables may be fresh, frozen, canned, or dried. They can be served raw or cooked and may be whole, cut-up, or mashed. Make 1/2 of your plate fruits and vegetables.    Grains. All foods made from grains are part of the Grains Group. These include wheat, rice, oats, cornmeal, and barley. Grains are often used to make foods such as bread, pasta, oatmeal, cereal, tortillas, and grits. Grains should be no more than 1/4 of your plate. At least half of your grains should be whole grains.    Protein. This group includes meat, poultry, seafood, beans and peas, eggs, processed soy products (such as tofu), nuts (including nut butters), and seeds. Make protein choices no more than 1/4 of your plate. Meat and poultry choices should be lean or low fat.    Dairy. The Dairy Group includes all fluid milk products and foods made from milk that contain calcium, such as yogurt and cheese. (Foods that have little calcium, such as cream, butter, and cream cheese, are not part of this group.) Most dairy choices should be low-fat or fat-free.    Oils. Oils aren't a food  group, but they do contain essential nutrients. However it's important to watch your intake of oils. These are fats that are liquid at room temperature. They include canola, corn, olive, soybean, vegetable, and sunflower oil. Foods that are mainly oil include mayonnaise, certain salad dressings, and soft margarines. You likely already get your daily oil allowance from the foods you eat.  Things to limit  Eating healthy also means limiting these things in your diet:       Salt (sodium). Many processed foods have a lot of sodium. To keep sodium intake down, eat fresh vegetables, meats, poultry, and seafood when possible. Purchase low-sodium, reduced-sodium, or no-salt-added food products at the store. And don't add salt to your meals at home. Instead, season them with herbs and spices such as dill, oregano, cumin, and paprika. Or try adding flavor with lemon or lime zest and juice.    Saturated fat. Saturated fats are most often found in animal products such as beef, pork, and chicken. They are often solid at room temperature, such as butter. To reduce your saturated fat intake, choose leaner cuts of meat and poultry. And try healthier cooking methods such as grilling, broiling, roasting, or baking. For a simple lower-fat swap, use plain nonfat yogurt instead of mayonnaise when making potato salad or macaroni salad.    Added sugars. These are sugars added to foods. They are in foods such as ice cream, candy, soda, fruit drinks, sports drinks, energy drinks, cookies, pastries, jams, and syrups. Cut down on added sugars by sharing sweet treats with a family member or friend. You can also choose fruit for dessert, and drink water or other unsweetened beverages.     [employer] last reviewed this educational content on 6/1/2020 2000-2021 [employer]. All rights reserved. This information is not intended as a substitute for professional medical care. Always follow your healthcare professional's  instructions.          Signs of Hearing Loss      Hearing much better with one ear can be a sign of hearing loss.   Hearing loss is a problem shared by many people. In fact, it is one of the most common health problems, particularly as people age. Most people age 65 and older have some hearing loss. By age 80, almost everyone does. Hearing loss often occurs slowly over the years. So you may not realize your hearing has gotten worse.  Have your hearing checked  Call your healthcare provider if you:    Have to strain to hear normal conversation    Have to watch other people s faces very carefully to follow what they re saying    Need to ask people to repeat what they ve said    Often misunderstand what people are saying    Turn the volume of the television or radio up so high that others complain    Feel that people are mumbling when they re talking to you    Find that the effort to hear leaves you feeling tired and irritated    Notice, when using the phone, that you hear better with one ear than the other  [employer] last reviewed this educational content on 1/1/2020 2000-2021 [employer]. All rights reserved. This information is not intended as a substitute for professional medical care. Always follow your healthcare professional's instructions.          Depression and Suicide in Older Adults    Nearly 2 million older Americans have some type of depression. Some of them even take their own lives. Yet depression among older adults is often ignored. Learn the warning signs. You may help spare a loved one needless pain. You may also save a life.   What is depression?  Depression is a common and serious illness that affects the way you think and feel. It is not a normal part of aging, nor is it a sign of weakness, a character flaw, or something you can snap out of. Most people with depression need treatment to get better. The most common symptom is a feeling of deep sadness. People who are depressed also  "may seem tired and listless. And nothing seems to give them pleasure. It s normal to grieve or be sad sometimes. But sadness lessens or passes with time. Depression rarely goes away or improves on its own. A person with clinical depression can't \"snap out of it.\" Other symptoms of depression are:     Sleeping more or less than normal    Eating more or less than normal    Having headaches, stomachaches, or other pains that don t go away    Feeling nervous,  empty,  or worthless    Crying a great deal    Thinking or talking about suicide or death    Loss of interest in activities previously enjoyed    Social isolation    Feeling confused or forgetful  What causes it?  The causes of depression aren t fully known. But it is thought to result from a complex blend of these factors:     Biochemistry. Certain chemicals in the brain play a role.    Genes. Depression does run in families.    Life stress. Life stresses can also trigger depression in some people. Older adults often face many stressors, such as death of friends or a spouse, health problems, and financial concerns.    Chronic conditions. This includes conditions such as diabetes, heart disease, or cancer. These can cause symptoms of depression. Medicine side effects can cause changes in thoughts and behaviors.  How you can help  Often, depressed people may not want to ask for help. When they do, they may be ignored. Or, they may receive the wrong treatment. You can help by showing parents and older friends love and support. If they seem depressed, don t lecture the person, ignore the symptoms, or discount the symptoms as a  normal  part of aging -which they are not. Get involved, listen, and show interest and support.   Help them understand that depression is a treatable illness. Tell them you can help them find the right treatment. Offer to go to their healthcare provider's appointment with them for support when the symptoms are discussed. With their approval, " contact a local mental health center, social service agency, or hospital about services.   You can be an advocate for him or her at healthcare appointments. Many older adults have chronic illnesses that can cause symptoms of depression. Medicine side effects can change thoughts and behaviors. You can help make sure that the healthcare provider looks at all of these factors. He or she should refer your family member or friend to a mental healthcare provider when needed. in some cases, untreated depression can lead to a misdiagnosis. A person may be diagnosed with a brain disorder such as dementia. If the healthcare provider does not take the issue of depression seriously, help your family member or friend to find another provider.   Don't be afraid to ask  If you think an older person you care about could be suicidal, ask,  Have you thought about suicide?  Most people will tell you the truth. If they say  yes,  they may already have a plan for how and when they will attempt it. Find out as much as you can. The more detailed the plan, and the easier it is to carry out, the more danger the person is in right now. Tell the person you are there for them and do not want them to harm him or herself. Don't wait to get help for the person. Call the person's healthcare provider, local hospital, or emergency services.   To learn more    National Suicide Prevention Lifeline (crisis hotline) 174-471-LHII (952-528-8820)    National [place] of Mental Adeffh931-599-8270pwv.[facility].nih.gov    National [place] on Mental Fycqvqh674-225-4028pmb.lucila.org    Mental Health Cpiwlbv998-571-0669ogv.[facility].org    National Suicide Gylsoyk041-DGPVTAQ (751-496-1496)    Call 911  Never leave the person alone. A person who is actively suicidal needs psychiatric care right away. They will need constant supervision. Never leave the person out of sight. Call 911 or the national 24-hour suicide crisis hotline at 897-485-ZZCS (126-205-6453). You can also  take the person to the closest emergency room.   [employer] last reviewed this educational content on 5/1/2020 2000-2021 [employer]. All rights reserved. This information is not intended as a substitute for professional medical care. Always follow your healthcare professional's instructions.          Preventing Falls at Home  A person can fall for many reasons. Older adults may fall because reaction time slows down as we age. Your muscles and joints may get stiff, weak, or less flexible because of illness, medicines, or a physical condition.   Other health problems that make falls more likely include:     Arthritis    Dizziness or lightheadedness when you stand up (orthostatic hypotension)    History of a stroke    Dizziness    Anemia    Certain medicines taken for mental illness or to control blood pressure.    Problems with balance or gait    Bladder or urinary problems    History of falling    Changes in vision (vision impairment)    Changes in thinking skills and memory (cognitive impairment)  Injuries from a fall can include serious injuries such as broken bones, dislocated joints, internal bleeding and cuts. Injuries like these can limit your independence.   Prevention tips  To help prevent falls and fall-related injuries, follow the tips below.    Floors  To make floors safer:     Put nonskid pads under area rugs.    Remove small rugs.    Replace worn floor coverings.    Tack carpets firmly to each step on carpeted stairs. Put nonskid strips on the edges of uncarpeted stairs.    Keep floors and stairs free of clutter and cords.    Arrange furniture so there are clear pathways.    Clean up any spills right away.  Bathrooms    To make bathrooms safer:     Install grab bars in the tub or shower.    Apply nonskid strips or put a nonskid rubber mat in the tub or shower.    Sit on a bath chair to bathe.    Use bathmats with nonskid backing.  Lighting  To improve visibility in your home:      Keep a  flashlight in each room. Or put a lamp next to the bed within easy reach.    Put nightlights in the bedrooms, hallways, kitchen, and bathrooms.    Make sure all stairways have good lighting.    Take your time when going up and down stairs.    Put handrails on both sides of stairs and in walkways for more support. To prevent injury to your wrist or arm, don t use handrails to pull yourself up.    Install grab bars to pull yourself up.    Move or rearrange items that you use often. This will make them easier to find or reach.    Look at your home to find any safety hazards. Especially look at doorways, walkways, and the driveway. Remove or repair any safety problems that you find.  Other changes to make    Look around to find any safety hazards. Look closely at doorways, walkways, and the driveway. Remove or repair any safety problems that you find.    Wear shoes that fit well.    Take your time when going up and down stairs.    Put handrails on both sides of stairs and in walkways for more support. To prevent injury to your wrist or arm, don t use handrails to pull yourself up.    Install grab bars wherever needed to pull yourself up.    Arrange items that you use often. This will make them easier to find or reach.    [employer] last reviewed this educational content on 3/1/2020    6876-6032 [employer]. All rights reserved. This information is not intended as a substitute for professional medical care. Always follow your healthcare professional's instructions.

## 2023-03-13 NOTE — PROGRESS NOTES
The patient was provided with suggestions to help him develop a healthy physical lifestyle.  The patient reports that he drinks more than one alcoholic drink per day but denies binge or excessive drinking. He was counseled and given information about possible harmful effects of excessive alcohol intake.  The patient was counseled and encouraged to consider modifying their diet and eating habits. He was provided with information on recommended healthy diet options.  The patient was provided with written information regarding signs of hearing loss.  The patient's PHQ-9 score is consistent with mild depression. He was provided with information regarding depression   He is at risk for falling and has been provided with information to reduce the risk of falling at home.

## 2023-03-13 NOTE — PROGRESS NOTES
"SUBJECTIVE:   Deejay is a 72 year old who presents for Preventive Visit.  Patient has been advised of split billing requirements and indicates understanding: Yes  Are you in the first 12 months of your Medicare coverage?  No    Healthy Habits:     In general, how would you rate your overall health?  Fair    Frequency of exercise:  4-5 days/week    Duration of exercise:  15-30 minutes    Do you usually eat at least 4 servings of fruit and vegetables a day, include whole grains    & fiber and avoid regularly eating high fat or \"junk\" foods?  No    Taking medications regularly:  Yes    Medication side effects:  None    Ability to successfully perform activities of daily living:  No assistance needed    Home Safety:  No safety concerns identified    Hearing Impairment:  Difficulty following a conversation in a noisy restaurant or crowded room and find that men's voices are easier to understand than woman's    In the past 6 months, have you been bothered by leaking of urine?  No    In general, how would you rate your overall mental or emotional health?  Good      PHQ-2 Total Score: 0    Additional concerns today:  No    ongoing fatigue - walks 30-40 minutes daily - tired afterward.  Nocturia x 2-4 times per night. 3 beers per day  No snoring that he knows of - previous obstructive sleep apnea diagnosis. Tried Provigil and CPAP and not helpful     Have you ever done Advance Care Planning? (For example, a Health Directive, POLST, or a discussion with a medical provider or your loved ones about your wishes): Yes, advance care planning is on file.    Fall risk  Fallen 2 or more times in the past year?: Yes  Any fall with injury in the past year?: Yes    Cognitive Screening   1) Repeat 3 items (Leader, Season, Table)    2) Clock draw: NORMAL  3) 3 item recall: Recalls 2 objects   Results: NORMAL clock, 1-2 items recalled: COGNITIVE IMPAIRMENT LESS LIKELY    Mini-CogTM Copyright S Luiz. Licensed by the author for use in " [facility]; reprinted with permission (doreen@[facility]). All rights reserved.      Do you have sleep apnea, excessive snoring or daytime drowsiness?: yes    Reviewed and updated as needed this visit by clinical staff   Tobacco  Allergies  Meds  Problems  Med Hx  Surg Hx  Fam Hx        Reviewed and updated as needed this visit by Provider   Tobacco  Allergies  Meds  Problems  Med Hx  Surg Hx  Fam Hx         Social History     Tobacco Use     Smoking status: Former     Packs/day: 1.00     Years: 34.00     Pack years: 34.00     Types: Cigarettes     Start date: 1967     Quit date: 2001     Years since quittin.9     Smokeless tobacco: Never     Tobacco comments:     quit in    Substance Use Topics     Alcohol use: Yes     Alcohol/week: 4.2 standard drinks     Comment: Seldom         Alcohol Use 3/13/2023   Prescreen: >3 drinks/day or >7 drinks/week? Yes   AUDIT SCORE  6     AUDIT - Alcohol Use Disorders Identification Test - Reproduced from the World Health Organization Audit 2001 (Second Edition) 3/13/2023   1.  How often do you have a drink containing alcohol? 4 or more times a week   2.  How many drinks containing alcohol do you have on a typical day when you are drinking? 3 or 4   3.  How often do you have five or more drinks on one occasion? Less than monthly   4.  How often during the last year have you found that you were not able to stop drinking once you had started? Never   5.  How often during the last year have you failed to do what was normally expected of you because of drinking? Never   6.  How often during the last year have you needed a first drink in the morning to get yourself going after a heavy drinking session? Never   7.  How often during the last year have you had a feeling of guilt or remorse after drinking? Never   8.  How often during the last year have you been unable to remember what happened the night before because of your drinking? Never   9.   Have you or someone else been injured because of your drinking? No   10. Has a relative, friend, doctor or other health care worker been concerned about your drinking or suggested you cut down? No   TOTAL SCORE 6       Depression Followup    How are you doing with your depression since your last visit? No change    Are you having other symptoms that might be associated with depression? No    Have you had a significant life event?  No     Are you feeling anxious or having panic attacks?   No    Do you have any concerns with your use of alcohol or other drugs? No    Social History     Tobacco Use     Smoking status: Former     Packs/day: 1.00     Years: 34.00     Pack years: 34.00     Types: Cigarettes     Start date: 1967     Quit date: 2001     Years since quittin.9     Smokeless tobacco: Never     Tobacco comments:     quit in    Vaping Use     Vaping Use: Never used   Substance Use Topics     Alcohol use: Yes     Alcohol/week: 4.2 standard drinks     Comment: Seldom     Drug use: No     PHQ 3/13/2023   PHQ-9 Total Score 5   Q9: Thoughts of better off dead/self-harm past 2 weeks Not at all     KWAN-7 SCORE 3/13/2023   Total Score 1     Last PHQ-9 3/13/2023   1.  Little interest or pleasure in doing things 0   2.  Feeling down, depressed, or hopeless 0   3.  Trouble falling or staying asleep, or sleeping too much 0   4.  Feeling tired or having little energy 3   5.  Poor appetite or overeating 2   6.  Feeling bad about yourself 0   7.  Trouble concentrating 0   8.  Moving slowly or restless 0   Q9: Thoughts of better off dead/self-harm past 2 weeks 0   PHQ-9 Total Score 5     KWAN-7  3/13/2023   1. Feeling nervous, anxious, or on edge 0   2. Not being able to stop or control worrying 0   3. Worrying too much about different things 0   4. Trouble relaxing 0   5. Being so restless that it is hard to sit still 0   6. Becoming easily annoyed or irritable 1   7. Feeling afraid, as if something awful might  happen 0   KWAN-7 Total Score 1   If you checked any problems, how difficult have they made it for you to do your work, take care of things at home, or get along with other people? Not difficult at all       Suicide Assessment Five-step Evaluation and Treatment (SAFE-T)      Current providers sharing in care for this patient include:   Patient Care Team:  Jona Baumann MD as PCP - General (Family Medicine)  Tabatha Mckeon as Referring Physician (Hematology & Oncology)  Josey Means MD as MD (Ophthalmology)  Kenia Westbrook MD as MD (INTERNAL MEDICINE - ENDOCRINOLOGY, DIABETES & METABOLISM)  Kya Arellano MD as Resident  Julisa Hernandez [facility] as   Aston Dumont MD as Resident (Radiology)  Marisol Hector MD as MD (Ophthalmology)  Tyrone Fry MD as Assigned PCP  Care, [facility] (HOME HEALTH AGENCY ([facility]), ([place]))  Roselia Rene MD as BMT Physician (Transplant)  Ann-Marie Benavidez, RN as BMT Nurse Coordinator  Ronda Machado OD (Optometry)  [facility] RN as BMT Nurse Coordinator  Florina Solis MD as Assigned Surgical Provider  Roselia Rene MD as Assigned Cancer Care Provider    The following health maintenance items are reviewed in Epic and correct as of today:  Health Maintenance   Topic Date Due     DEPRESSION ACTION PLAN  Never done     LIPID  02/06/2016     COLORECTAL CANCER SCREENING  12/15/2021     DTAP/TDAP/TD IMMUNIZATION (2 - Td or Tdap) 07/18/2023     ANNUAL REVIEW OF HM ORDERS  08/30/2023     PHQ-9  09/13/2023     MEDICARE ANNUAL WELLNESS VISIT  03/13/2024     FALL RISK ASSESSMENT  03/13/2024     ADVANCE CARE PLANNING  03/13/2028     HEPATITIS C SCREENING  Completed     INFLUENZA VACCINE  Completed     Pneumococcal Vaccine: 65+ Years  Completed     ZOSTER IMMUNIZATION  Completed     AORTIC ANEURYSM SCREENING (SYSTEM ASSIGNED)  Completed     COVID-19 Vaccine  Completed     IPV IMMUNIZATION  Aged Out  "    MENINGITIS IMMUNIZATION  Aged Out       Review of Systems   Constitutional: Negative for chills and fever.   HENT: Positive for congestion. Negative for ear pain, hearing loss and sore throat.    Eyes: Positive for pain. Negative for visual disturbance.   Respiratory: Negative for cough and shortness of breath.    Cardiovascular: Positive for peripheral edema. Negative for chest pain and palpitations.   Gastrointestinal: Negative for abdominal pain, constipation, diarrhea, heartburn, hematochezia and nausea.   Genitourinary: Positive for frequency and impotence. Negative for dysuria, genital sores, hematuria, penile discharge and urgency.   Musculoskeletal: Positive for arthralgias. Negative for joint swelling and myalgias.   Skin: Negative for rash.   Neurological: Positive for headaches - nots Aura - 3-4/mo He thinks some of his headaches may be form his eyes.. Negative for dizziness, weakness and paresthesias.   Psychiatric/Behavioral: Negative for mood changes. The patient is not nervous/anxious.      OBJECTIVE:   /60 (BP Location: Right arm, Patient Position: Sitting, Cuff Size: Adult Large)   Pulse 83   Temp 97  F (36.1  C) (Tympanic)   Resp 16   Ht 1.702 m (5' 7\")   Wt 105.7 kg (233 lb)   SpO2 98%   BMI 36.49 kg/m   Estimated body mass index is 36.49 kg/m  as calculated from the following:    Height as of this encounter: 1.702 m (5' 7\").    Weight as of this encounter: 105.7 kg (233 lb).  EXAM:   GENERAL: healthy, alert and no distress  EYES: Eyes grossly normal to inspection, PERRL and conjunctivae and sclerae normal  HENT: ear canals and TM's normal, nose and mouth without ulcers or lesions  NECK: no adenopathy, no asymmetry, masses, or scars and thyroid normal to palpation  RESP: lungs clear to auscultation - no rales, rhonchi or wheezes  BREAST: normal without masses, tenderness or nipple discharge and no palpable axillary masses or adenopathy  CV: regular rate and rhythm, normal S1 S2, " "no S3 or S4, no murmur, click or rub, no peripheral edema and peripheral pulses strong  ABDOMEN: soft, nontender, no hepatosplenomegaly, no masses and bowel sounds normal   (male): normal male genitalia without lesions or urethral discharge, no hernia  MS: no gross musculoskeletal defects noted, no edema  SKIN: no suspicious lesions or rashes  NEURO: Normal strength and tone, mentation intact and speech normal  PSYCH: mentation appears normal, affect normal/bright  LYMPH: no cervical, supraclavicular, axillary, or inguinal adenopathy  RECTAL: declined exam      ASSESSMENT / PLAN:   Encounter for Medicare annual wellness exam    H/O bone marrow transplant (H)  H/o MDS (myelodysplastic syndrome) (H)  Leerg-ryjtxa-gesw disease (H)  Ongoing issues, labs monitored by specialist and will continue to follow-up with them as well.    h/o Acute deep vein thrombosis (DVT) of distal vein of right lower extremity (H)  No recent symptoms,    Recurrent major depressive disorder, in partial remission (H)  Stable - continue medication(s).  - sertraline (ZOLOFT) 100 MG tablet  Dispense: 90 tablet; Refill: 1    Screen for colon cancer  - Colonoscopy Screening  Referral    Screening for hyperlipidemia  - Lipid panel reflex to direct LDL Non-fasting    Heartburn  Prior history of \"all of my medications\" and will continue pantoprazole which has been helpful:  - pantoprazole (PROTONIX) 40 MG EC tablet  Dispense: 30 tablet; Refill: 3      Other fatigue  Ongoing, tried sleep apnea treatment and unsuccessful, tried Provigil and also unsuccessful.  Optimizing sleep and okay to take naps which she says does not help a whole lot unfortunately.  With similar symptoms last year TSH was checked and was normal.  Testosterone is somewhat low.    Screening for malignant neoplasm of prostate  - PSA, screen    End of Life Planning:  Patient currently has an advanced directive: Yes.  Practitioner is supportive of " "decision.    COUNSELING:  Reviewed preventive health counseling, as reflected in patient instructions    Estimated body mass index is 36.49 kg/m  as calculated from the following:    Height as of this encounter: 1.702 m (5' 7\").    Weight as of this encounter: 105.7 kg (233 lb).  Weight management plan: Discussed healthy diet and exercise guidelines     reports that he quit smoking about 21 years ago. His smoking use included cigarettes. He started smoking about 55 years ago. He has a 34.00 pack-year smoking history. He has never used smokeless tobacco.      Appropriate preventive services were discussed with this patient, including applicable screening as appropriate for cardiovascular disease, diabetes, osteopenia/osteoporosis, and glaucoma.  As appropriate for age/gender, discussed screening for colorectal cancer, prostate cancer, breast cancer, and cervical cancer. Checklist reviewing preventive services available has been given to the patient.    Reviewed patients plan of care and provided an AVS. The Basic Care Plan (routine screening as documented in Health Maintenance) for Deejay meets the Care Plan requirement. This Care Plan has been established and reviewed with the Patient.    Return in about 53 weeks (around 3/18/2024) for Annual Wellness Visit.             Tim Baumann MD     [address] [address]  [email or web address]     Office: [phone]       Identified Health Risks:  Answers for HPI/ROS submitted by the patient on 3/13/2023  If you checked off any problems, how difficult have these problems made it for you to do your work, take care of things at home, or get along with other people?: Somewhat difficult  PHQ9 TOTAL SCORE: 5      "

## 2023-03-14 ENCOUNTER — OFFICE VISIT (OUTPATIENT)
Dept: OPHTHALMOLOGY | Facility: CLINIC | Age: 73
End: 2023-03-14
Attending: OPHTHALMOLOGY
Payer: MEDICARE

## 2023-03-14 DIAGNOSIS — D89.813 GVHD (GRAFT VERSUS HOST DISEASE) (H): Primary | ICD-10-CM

## 2023-03-14 DIAGNOSIS — H16.223 KERATITIS SICCA, BILATERAL: ICD-10-CM

## 2023-03-14 PROCEDURE — G0463 HOSPITAL OUTPT CLINIC VISIT: HCPCS | Performed by: OPHTHALMOLOGY

## 2023-03-14 PROCEDURE — 99214 OFFICE O/P EST MOD 30 MIN: CPT | Mod: GC | Performed by: OPHTHALMOLOGY

## 2023-03-14 PROCEDURE — G0463 HOSPITAL OUTPT CLINIC VISIT: HCPCS

## 2023-03-14 RX ORDER — LOTEPREDNOL ETABONATE 2 MG/ML
1 SUSPENSION/ DROPS OPHTHALMIC 2 TIMES DAILY
Qty: 10 ML | Refills: 11 | Status: SHIPPED | OUTPATIENT
Start: 2023-03-14 | End: 2024-03-11

## 2023-03-14 ASSESSMENT — VISUAL ACUITY
METHOD: SNELLEN - LINEAR
OS_SC+: -3
OD_SC: 20/30
OD_SC+: -2
OS_SC: 20/20

## 2023-03-14 ASSESSMENT — CONF VISUAL FIELD
OS_INFERIOR_TEMPORAL_RESTRICTION: 0
METHOD: COUNTING FINGERS
OS_SUPERIOR_TEMPORAL_RESTRICTION: 0
OD_INFERIOR_TEMPORAL_RESTRICTION: 0
OS_INFERIOR_NASAL_RESTRICTION: 0
OD_SUPERIOR_TEMPORAL_RESTRICTION: 0
OD_SUPERIOR_NASAL_RESTRICTION: 0
OD_NORMAL: 1
OD_INFERIOR_NASAL_RESTRICTION: 0
OS_NORMAL: 1
OS_SUPERIOR_NASAL_RESTRICTION: 0

## 2023-03-14 ASSESSMENT — TONOMETRY
OD_IOP_MMHG: 11
IOP_METHOD: TONOPEN
OS_IOP_MMHG: 10

## 2023-03-14 ASSESSMENT — EXTERNAL EXAM - LEFT EYE: OS_EXAM: NORMAL

## 2023-03-14 ASSESSMENT — EXTERNAL EXAM - RIGHT EYE: OD_EXAM: NORMAL

## 2023-03-14 NOTE — NURSING NOTE
Chief Complaints and History of Present Illnesses   Patient presents with     Follow Up     Chief Complaint(s) and History of Present Illness(es)     Follow Up            Laterality: both eyes    Associated symptoms: headache and floaters.  Negative for eye pain and flashes    Treatments tried: eye drops          Comments    Here for corneal follow up. Vision is about the same. Notes some migraine auras. Stable floaters without flashes. No eye pain. Uses drops as instructed.    Dave LIN 12:26 PM March 14, 2023

## 2023-03-14 NOTE — PROGRESS NOTES
"CC: GVHD OU    HPI:  73 yo CM presents for GVHD evaluation for involvement in the eyes. 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 There is foreign body sensation in the right eye occasionally. Artificial tears seems to help.     Interval update:  Overall he reports that he continues to feel dryness and foreign body sensation, most prominent in the morning. Notes that he uses serum tears about 6x/day and PFATs about every hour. Previously was doing warm compresses but did not notice \"any long term benefit\" and discontinued.  No flashes, floaters, curtains.     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 S/P removal 2020    ARMD  - intermediate, dry each eye     PVD each eye    PCO each eye    Bilateral upper lid ptosis repair and lower lid ectropion repair with Dr. Solis (2/24/22).     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   2. MGD each eye     S/p silicone Punctal plug bilateral lower lid (still in place). There is also meibomian gland dysfunction both eyes.     Stopped Restasis due to burning.    Also uses CPAP for PIPO    PLAN:  - Continue serum tears q2h OU   - Re-start warm compress BID both eyes - not currently using; educated on proper technique   - Did not like using ointment; continue frequent PFATs   - Humidifier in bedroom    - lotemax (or FML) each eye bid    2.   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     Biopsy showed GVHD colon and skin.     3.   Dry Age Related Macular Degeneration, both eyes.     Previously seen with Dr. Hector    Dry AMD - may have " pattern component.    Continue ARED's 2 vitamins PO 2 times daily.      Recommends fish and green leafy vegetables 3 days per week.     No smoking.     Recommeds UV protection.     Return immediately if there are distortions to amsler grid.     F/u Tawny as scheduled    4.   Posterior vitreous detachment, right eye. Retina attached.     RD / RT precautions      5.   Basal Cell Carcinoma, Left Lower Lid.    S/P removal in 01/2020 at [facility].      6. Posterior Capsular Opacification each eye  - mid PCO each eye visual significance unclear  Given ARMD and significant dryness.  - s/p YAG capsulotomy OS     7. Bilateral ptosis and lower eyelid ectropion  8. Trichiasis JOEL   - bilateral upper lid ptosis repair and lower lid ectropion repair with Dr. Solis (2/24/22).   - no trichiatic lashes today     RTC:  8-12  months w/ VT, call sooner if problems to clinic.    Myles Atkins MD  Fellow, Cornea, External Disease and Refractive Surgery  Department of Ophthalmology  [facility]    Attending Physician Attestation:  Complete documentation of historical and exam elements from today's encounter can be found in the full encounter summary report (not reduplicated in this progress note).  I personally obtained the chief complaint(s) and history of present illness.  I confirmed and edited as necessary the review of systems, past medical/surgical history, family history, social history, and examination findings as documented by others; and I examined the patient myself.  I personally reviewed the relevant tests, images, and reports as documented above.  I formulated and edited as necessary the assessment and plan and discussed the findings and management plan with the patient and family. - Butch Yoon MD

## 2023-03-24 ENCOUNTER — LAB (OUTPATIENT)
Dept: LAB | Facility: CLINIC | Age: 73
End: 2023-03-24
Payer: MEDICARE

## 2023-03-24 DIAGNOSIS — U07.1 COVID-19 VIRUS INFECTION: ICD-10-CM

## 2023-03-24 DIAGNOSIS — Z13.220 SCREENING FOR HYPERLIPIDEMIA: ICD-10-CM

## 2023-03-24 DIAGNOSIS — Z12.5 SCREENING FOR MALIGNANT NEOPLASM OF PROSTATE: ICD-10-CM

## 2023-03-24 DIAGNOSIS — D89.813 GRAFT-VERSUS-HOST DISEASE (H): ICD-10-CM

## 2023-03-24 LAB
CHOLEST SERPL-MCNC: 333 MG/DL
HDLC SERPL-MCNC: 63 MG/DL
LDLC SERPL CALC-MCNC: 230 MG/DL
NONHDLC SERPL-MCNC: 270 MG/DL
PSA SERPL DL<=0.01 NG/ML-MCNC: 1.08 NG/ML (ref 0–6.5)
TRIGL SERPL-MCNC: 201 MG/DL

## 2023-03-24 PROCEDURE — 80061 LIPID PANEL: CPT

## 2023-03-24 PROCEDURE — 36415 COLL VENOUS BLD VENIPUNCTURE: CPT

## 2023-03-24 PROCEDURE — G0103 PSA SCREENING: HCPCS

## 2023-03-24 RX ORDER — PREDNISONE 5 MG/1
5 TABLET ORAL DAILY
Qty: 90 TABLET | Refills: 1 | Status: SHIPPED | OUTPATIENT
Start: 2023-03-24 | End: 2023-09-20

## 2023-03-25 PROBLEM — E78.5 HYPERLIPIDEMIA LDL GOAL <100: Status: ACTIVE | Noted: 2023-03-25

## 2023-03-25 RX ORDER — ATORVASTATIN CALCIUM 10 MG/1
10 TABLET, FILM COATED ORAL DAILY
Qty: 90 TABLET | Refills: 3 | Status: SHIPPED | OUTPATIENT
Start: 2023-03-25 | End: 2023-04-19

## 2023-03-25 NOTE — RESULT ENCOUNTER NOTE
Dear Deejay,    Here is a summary of your recent test results:  -PSA (prostate specific antigen) test is normal.  This indicates a low likelihood of prostate cancer.  ADVISE: rechecking this in 1 year.    -LDL(bad) cholesterol level is elevated, and your triglycerides are elevated which can increase your heart disease risk.  A diet high in fat and simple carbohydrates, genetics and being overweight can contribute to this. ADVISE: exercising 150 minutes of aerobic exercise per week (30 minutes for 5 days per week or 50 minutes for 3 days per week are options), eating a low saturated fat/low carbohydrate diet, and omega-3 fatty acids (fish oil) 7837-0525 mg daily are helpful to improve this. Current guidelines from the American Heart Association support starting a cholesterol lowering medication to lower your heart and stroke disease risk.      I am sending a prescription to your pharmacy: atorvastatin(Lipitor) 10 mg each evening.  You can call your pharmacy to let them know you would like your prescription filled and if you have concerns about this recommendation then please contact me. In 3 months, you should recheck your fasting cholesterol panel by scheduling a lab-only appointment.    For additional lab test information, www.testing.com is a very good reference.    In addition, here is a list of due or overdue Health Maintenance reminders:    Colorectal Cancer Screening due on 12/15/2021    Please call us at [phone] (or use [facility]) to address the above recommendations if needed.           Thank you very much for trusting me and [facility].     Have a peaceful day.    Healthy regards,  Tim Baumann MD

## 2023-04-11 DIAGNOSIS — D89.811 CHRONIC GRAFT-VERSUS-HOST DISEASE (H): ICD-10-CM

## 2023-04-11 RX ORDER — LEVOFLOXACIN 250 MG/1
TABLET, FILM COATED ORAL
Qty: 30 TABLET | Refills: 3 | Status: SHIPPED | OUTPATIENT
Start: 2023-04-11 | End: 2023-08-08

## 2023-04-18 ENCOUNTER — APPOINTMENT (OUTPATIENT)
Dept: LAB | Facility: CLINIC | Age: 73
End: 2023-04-18
Payer: MEDICARE

## 2023-04-18 ENCOUNTER — ONCOLOGY VISIT (OUTPATIENT)
Dept: TRANSPLANT | Facility: CLINIC | Age: 73
End: 2023-04-18
Attending: INTERNAL MEDICINE
Payer: MEDICARE

## 2023-04-18 VITALS
WEIGHT: 230.8 LBS | RESPIRATION RATE: 16 BRPM | OXYGEN SATURATION: 97 % | SYSTOLIC BLOOD PRESSURE: 154 MMHG | HEART RATE: 60 BPM | DIASTOLIC BLOOD PRESSURE: 78 MMHG | TEMPERATURE: 97.8 F | BODY MASS INDEX: 36.15 KG/M2

## 2023-04-18 DIAGNOSIS — E78.5 HYPERLIPIDEMIA LDL GOAL <100: ICD-10-CM

## 2023-04-18 DIAGNOSIS — D89.811 CHRONIC GRAFT-VERSUS-HOST DISEASE (H): ICD-10-CM

## 2023-04-18 DIAGNOSIS — R79.89 OTHER SPECIFIED ABNORMAL FINDINGS OF BLOOD CHEMISTRY: ICD-10-CM

## 2023-04-18 DIAGNOSIS — D89.811 CHRONIC GRAFT-VERSUS-HOST DISEASE (H): Primary | ICD-10-CM

## 2023-04-18 LAB
ALBUMIN SERPL BCG-MCNC: 3.8 G/DL (ref 3.5–5.2)
ALP SERPL-CCNC: 58 U/L (ref 40–129)
ALT SERPL W P-5'-P-CCNC: 23 U/L (ref 10–50)
ANION GAP SERPL CALCULATED.3IONS-SCNC: 9 MMOL/L (ref 7–15)
AST SERPL W P-5'-P-CCNC: 35 U/L (ref 10–50)
BASOPHILS # BLD AUTO: 0 10E3/UL (ref 0–0.2)
BASOPHILS NFR BLD AUTO: 0 %
BILIRUB SERPL-MCNC: 0.3 MG/DL
BUN SERPL-MCNC: 18.8 MG/DL (ref 8–23)
CALCIUM SERPL-MCNC: 9.1 MG/DL (ref 8.8–10.2)
CHLORIDE SERPL-SCNC: 108 MMOL/L (ref 98–107)
CHOLEST SERPL-MCNC: 208 MG/DL
CREAT SERPL-MCNC: 1.12 MG/DL (ref 0.67–1.17)
DEPRECATED HCO3 PLAS-SCNC: 23 MMOL/L (ref 22–29)
EOSINOPHIL # BLD AUTO: 0.1 10E3/UL (ref 0–0.7)
EOSINOPHIL NFR BLD AUTO: 1 %
ERYTHROCYTE [DISTWIDTH] IN BLOOD BY AUTOMATED COUNT: 18.3 % (ref 10–15)
GFR SERPL CREATININE-BSD FRML MDRD: 69 ML/MIN/1.73M2
GLUCOSE SERPL-MCNC: 94 MG/DL (ref 70–99)
HCT VFR BLD AUTO: 34 % (ref 40–53)
HDLC SERPL-MCNC: 62 MG/DL
HGB BLD-MCNC: 11.3 G/DL (ref 13.3–17.7)
IMM GRANULOCYTES # BLD: 0 10E3/UL
IMM GRANULOCYTES NFR BLD: 0 %
IRON BINDING CAPACITY (ROCHE): 357 UG/DL (ref 240–430)
IRON SATN MFR SERPL: 25 % (ref 15–46)
IRON SERPL-MCNC: 89 UG/DL (ref 61–157)
LDLC SERPL CALC-MCNC: 115 MG/DL
LYMPHOCYTES # BLD AUTO: 2.3 10E3/UL (ref 0.8–5.3)
LYMPHOCYTES NFR BLD AUTO: 37 %
MCH RBC QN AUTO: 31.6 PG (ref 26.5–33)
MCHC RBC AUTO-ENTMCNC: 33.2 G/DL (ref 31.5–36.5)
MCV RBC AUTO: 95 FL (ref 78–100)
MONOCYTES # BLD AUTO: 1 10E3/UL (ref 0–1.3)
MONOCYTES NFR BLD AUTO: 16 %
NEUTROPHILS # BLD AUTO: 2.9 10E3/UL (ref 1.6–8.3)
NEUTROPHILS NFR BLD AUTO: 46 %
NONHDLC SERPL-MCNC: 146 MG/DL
NRBC # BLD AUTO: 0 10E3/UL
NRBC BLD AUTO-RTO: 0 /100
PLATELET # BLD AUTO: 190 10E3/UL (ref 150–450)
POTASSIUM SERPL-SCNC: 4 MMOL/L (ref 3.4–5.3)
PROT SERPL-MCNC: 6.5 G/DL (ref 6.4–8.3)
RBC # BLD AUTO: 3.58 10E6/UL (ref 4.4–5.9)
SODIUM SERPL-SCNC: 140 MMOL/L (ref 136–145)
TRIGL SERPL-MCNC: 154 MG/DL
VIT B12 SERPL-MCNC: 492 PG/ML (ref 232–1245)
WBC # BLD AUTO: 6.3 10E3/UL (ref 4–11)

## 2023-04-18 PROCEDURE — 36415 COLL VENOUS BLD VENIPUNCTURE: CPT | Performed by: INTERNAL MEDICINE

## 2023-04-18 PROCEDURE — 99214 OFFICE O/P EST MOD 30 MIN: CPT | Performed by: INTERNAL MEDICINE

## 2023-04-18 PROCEDURE — 85025 COMPLETE CBC W/AUTO DIFF WBC: CPT | Performed by: INTERNAL MEDICINE

## 2023-04-18 PROCEDURE — 80053 COMPREHEN METABOLIC PANEL: CPT | Performed by: INTERNAL MEDICINE

## 2023-04-18 PROCEDURE — G0463 HOSPITAL OUTPT CLINIC VISIT: HCPCS | Performed by: INTERNAL MEDICINE

## 2023-04-18 PROCEDURE — 82607 VITAMIN B-12: CPT | Performed by: INTERNAL MEDICINE

## 2023-04-18 PROCEDURE — 83550 IRON BINDING TEST: CPT | Performed by: INTERNAL MEDICINE

## 2023-04-18 PROCEDURE — 80061 LIPID PANEL: CPT | Performed by: INTERNAL MEDICINE

## 2023-04-18 ASSESSMENT — PAIN SCALES - GENERAL: PAINLEVEL: NO PAIN (0)

## 2023-04-18 NOTE — NURSING NOTE
"Oncology Rooming Note    April 18, 2023 11:48 AM   Deejay Dior is a 72 year old male who presents for:    Chief Complaint   Patient presents with     Blood Draw     Labs drawn via  by RN in lab.  VS taken      Oncology Clinic Visit     RTN: Myelodysplastic syndrome     Initial Vitals: BP (!) 154/78   Pulse 60   Temp 97.8  F (36.6  C) (Oral)   Resp 16   Wt 104.7 kg (230 lb 12.8 oz)   SpO2 97%   BMI 36.15 kg/m   Estimated body mass index is 36.15 kg/m  as calculated from the following:    Height as of 3/13/23: 1.702 m (5' 7\").    Weight as of this encounter: 104.7 kg (230 lb 12.8 oz). Body surface area is 2.22 meters squared.  No Pain (0) Comment: Data Unavailable   No LMP for male patient.  Allergies reviewed: Yes  Medications reviewed: Yes    Medications: Medication refills not needed today.  Pharmacy name entered into [facility]:    [id number] [place], [place] - [address] [address] [facility] [place], [place] - [address] [facility] [phone]    Clinical concerns: none   Cassandra Hennessy              "

## 2023-04-18 NOTE — PROGRESS NOTES
BMT Progress Note    ID:  Mr. 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 (In the past has had a basal and squamous cell removed). Assumed new BMT care by Jean-Paul Nunez on 3/7/2022    cGVHD: currently On pred 5mg every day and Jakafi 5 mg bid.        HPI: Deejay returns for follow up.   Eye dryness is stable but not optimally controlled, he is using refresh and serum eyedrops >8 times per day. He is not doing warm compressors and did not like scleral lenses. His mouth is dry-stable, no ulcerations or sores, no choking or other concerns. Ophtho seen march 2023 RTC 8-12 month recommended. No n/v/d. Legs skin thickness is about the same, no open sores or ulcers, He denies pain, oozing or bleeding. No change or decrease in ROM.     Remainder of 10 pt ROS negative    On exam:  BP (!) 154/78   Pulse 60   Temp 97.8  F (36.6  C) (Oral)   Resp 16   Wt 104.7 kg (230 lb 12.8 oz)   SpO2 97%   BMI 36.15 kg/m      HEENT: PERRL, EOMI , dry oral mucosa with no ulcers  Chest: Mild bibasilar crackles  CVS: S1 S2 RRR, no murmurs or gallops  Abdomen: Soft nontender no organomegalies or masses.  Extremities: The left leg has stable sclerotic skin on the shin, no evidence of infection or cellulitis. No other sclerotic changes of the skin.  Of note, since I first meet him the patient and he reports that the skin changes have been since previous cellulitis post transplantation. prominent venous status stasis on left and more prominent sclerotic changes on right shin.  CNS: non focal       ASSESSMENT AND PLAN:  Mr. 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 Total cell dose (from 7/1 & 7/2) 6.53 x 10^6 CD34+ cells/kg.   - 1 year anniversary (July 2015): 30% cellular, trilineage hematopoiesis, no abnormal blasts by morphology or flow , no dysplasia, 0-1 fibrosis, 100% donor (BM, CD3, CD15). CR  - 2 year anniversary (July 2016): 20-30% cellular, trilineage hematopoiesis, no abnormal blasts by morphology or flow , no dysplasia, No fibrosis, 100% donor in BM (PB not sent). ISCN: //46,XX[20] Complete Remission.     HEME: No transfusion needs, counts stable, 3/2022 ferritin 44    GVHD: Long history of GVHD as below.   12/2021: kept him on prednisone 5 mg daily (reports that cGVHD flared on lower dose would like to stay at same dose), ruxolitinib 5 mg twice daily.  His counts are tolerating well.    --- Since taking over patient care he repeatedly expressed wanting to stay on the same therapy as he has failed tapering or changes in the past. 4/18/2023 this was readdressed and I also discussed with the patient whether he would like to consider altering/changing his immunosuppression regiment and concerns of potentially Jakafi exacerbating his fatigue.  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 Had been off prednisone since summer 2017 and briefly tapered off Sirolimus (january 2018), however resumed with flare in February. 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 Sirolimus was stopped on 9/27/2018 & Pred 90mg was started. Seen by Dr. Sandoval, he does not think his symptoms or CT findings are c/w Sirolimus or GVH & he was in favor of tapering Prednisone. Developed worsening skin thickening & desquamation to the " MARIAELENA, c/w GVH.   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   Required Burst pred 40mg a day on 1/3/20 with significant systemic arthralgic pain, tapered back to 10/0 eod after 1 week, near resolution of symptoms noted 1/17, returned to 10 mg every other day; then dropped to 5mg q day in April 2021 (this is best dates estimated on chart review)     9/2022 Ophthalmo note:  1. 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   2. MGD each eye     S/p silicone Punctal plug bilateral lower lid (still in place). There is also meibomian gland dysfunction both eyes.     Stopped Restasis due to burning.  PLAN:  - Continue serum tears q2h OU  - Switch erythromycin bill to lubricating bill   - Continues with scleral lenses               - Re-start warm compress BID both eyes - not currently using              - trial of tacrolimus ointment QHS OU-- he has no picked thi up  3/14/2023  1. 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   2. MGD each eye     S/p silicone Punctal plug bilateral lower lid (still in place). There is also meibomian gland dysfunction both eyes.     Stopped Restasis due to burning.    Also uses CPAP for PIPO     PLAN:  - Continue serum tears q2h OU              - Re-start warm compress BID both eyes - not currently using; educated on proper technique              - Did not like using ointment; continue frequent PFATs              - Humidifier in bedroom               - lotemax (or FML) each eye bid       ID:  Afebrile no signs/sx of infection.    - IgG 3/8/08888 =904  - Prophy: HD ACV (renal dosing), Fluconazole and  Bactrim.   - 5/6/2022 Gonzalez completed  -Discussed again being up-to-date on COVID vaccines and boosters.      GI:  protonix (has heartburn)     FEN/Renal: Mild increase in creatinine 3/2022, then normalized  5/2022, mild SARAH again 10/2022, encouraged fluid intake, will avoid NSAIDs for knee pain.  Discussed referral to on-call nephrology would like to hold off for now. Cr improved 0.9 1/17     Fatigue:  stable  - History of testosterone deficiency, rechecked and modestly low. He previously did testosterone injections & didn't think it made him feel any better.    - TSH normal 2/28 and again on repeat 9/27 at 1.01.  - counseled that moderate exercise is really the only known way to combat fatigue, and acknowledged how difficult it is to balance too much with not enough activity. Discussed 1/17 again he declined referral to physical therapy or rehab. Still encouraged him to more more physically active.     Vascular:   10/15/18 Right leg US with small (technically DVT) clot in posterior tibial vein: started reg dose aspirin daily 325mg and recheck US in 2 weeks or symptomatically. 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      MSK: avascular necrosis of hip.  S/p replacement surgery     Wounds: No open wounds today. He knows that if there is erythema or pain that he needs to be seen as soon as possible to evaluate for cellulitis/infection. *Reiterated this 1/17    Lungs: He reports improved dyspnea on exertion.  PFTs obtained December 2021 stable compared to 3 years ago %, FEV1 107% DL CO 74% predicted.  Of note patient had Covid pneumonia and was on the ventilator.  It has been previously noted that he does have this raspy crackly sounds on both bases that do not change.  He has not had a CT chest since February 2021.  He wants to defer referral to pulmonary at this time.    Healthcare maintenance March 2022 TSH within normal, IgG within normal, vitamin D deficiency screening normal testosterone mildly low, he will follow up with  PCP locally, encourage dermatology evaluation yearly, DEXA scan completed 5/6/2022 normal bone density repeat 2 years, discussed age-appropriate cancer screening. Seeing dermatology in September locally. Reminded to see PCP locally for HCM and routine testing, is following up.    Plan:  No changes today per patient's request.    RTC 6 months with Dr Jean-Paul Nunez    30 minutes spent on the date of the encounter doing chart review, history and exam, documentation and further activities per the note    Roselia Nunez MD

## 2023-04-18 NOTE — LETTER
4/18/2023         RE: Deejay Dior  [address]  [place] [address]        Dear Colleague,    Thank you for referring your patient, Deejay Dior, to the [facility]. Please see a copy of my visit note below.    BMT Progress Note    ID:  Mr. 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 (In the past has had a basal and squamous cell removed). Assumed new BMT care by Jean-Paul Nunez on 3/7/2022    cGVHD: currently On pred 5mg every day and Jakafi 5 mg bid.        HPI: Deejay returns for follow up.   Eye dryness is stable but not optimally controlled, he is using refresh and serum eyedrops >8 times per day. He is not doing warm compressors and did not like scleral lenses. His mouth is dry-stable, no ulcerations or sores, no choking or other concerns. Ophtho seen march 2023 RTC 8-12 month recommended. No n/v/d. Legs skin thickness is about the same, no open sores or ulcers, He denies pain, oozing or bleeding. No change or decrease in ROM.     Remainder of 10 pt ROS negative    On exam:  BP (!) 154/78   Pulse 60   Temp 97.8  F (36.6  C) (Oral)   Resp 16   Wt 104.7 kg (230 lb 12.8 oz)   SpO2 97%   BMI 36.15 kg/m      HEENT: PERRL, EOMI , dry oral mucosa with no ulcers  Chest: Mild bibasilar crackles  CVS: S1 S2 RRR, no murmurs or gallops  Abdomen: Soft nontender no organomegalies or masses.  Extremities: The left leg has stable sclerotic skin on the shin, no evidence of infection or cellulitis. No other sclerotic changes of the skin.  Of note, since I first meet him the patient and he reports that the skin changes have been since previous cellulitis post transplantation.  "prominent venous status stasis on left and more prominent sclerotic changes on right shin.  CNS: non focal       ASSESSMENT AND PLAN:  Mr. 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 Total cell dose (from 7/1 & 7/2) 6.53 x 10^6 CD34+ cells/kg.   - 1 year anniversary (July 2015): 30% cellular, trilineage hematopoiesis, no abnormal blasts by morphology or flow , no dysplasia, 0-1 fibrosis, 100% donor (BM, CD3, CD15). CR  - 2 year anniversary (July 2016): 20-30% cellular, trilineage hematopoiesis, no abnormal blasts by morphology or flow , no dysplasia, No fibrosis, 100% donor in BM (PB not sent). ISCN: //46,XX[20] Complete Remission.     HEME: No transfusion needs, counts stable, 3/2022 ferritin 44    GVHD: Long history of GVHD as below.   12/2021: kept him on prednisone 5 mg daily (reports that cGVHD flared on lower dose would like to stay at same dose), ruxolitinib 5 mg twice daily.  His counts are tolerating well.    --- Since taking over patient care he repeatedly expressed wanting to stay on the same therapy as he has failed tapering or changes in the past. 4/18/2023 this was readdressed and I also discussed with the patient whether he would like to consider altering/changing his immunosuppression regiment and concerns of potentially Jakafi exacerbating his fatigue.  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 Had been off prednisone since summer 2017 and briefly tapered off Sirolimus (january 2018), however resumed with flare in February. There was some concern that he had a flare of pulm GVH or " sirolimus lung toxicity. Sirolimus was stopped on 9/27/2018 & Pred 90mg was started. Seen by Dr. Sandoval, he does not think his symptoms or CT findings are c/w Sirolimus or GVH & he was in favor of tapering Prednisone. Developed worsening skin thickening & desquamation to the RLE, c/w GVH.   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   Required Burst pred 40mg a day on 1/3/20 with significant systemic arthralgic pain, tapered back to 10/0 eod after 1 week, near resolution of symptoms noted 1/17, returned to 10 mg every other day; then dropped to 5mg q day in April 2021 (this is best dates estimated on chart review)     9/2022 Ophthalmo note:  1. 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   2. MGD each eye   S/p silicone Punctal plug bilateral lower lid (still in place). There is also meibomian gland dysfunction both eyes.   Stopped Restasis due to burning.  PLAN:  - Continue serum tears q2h OU  - Switch erythromycin bill to lubricating bill   - Continues with scleral lenses               - Re-start warm compress BID both eyes - not currently using              - trial of tacrolimus ointment QHS OU-- he has no picked thi up  3/14/2023  1. 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   2. MGD each eye   S/p silicone Punctal plug bilateral lower lid (still in place). There is also meibomian gland dysfunction both eyes.   Stopped Restasis due to burning.  Also uses CPAP for PIPO     PLAN:  - Continue serum tears q2h OU              - Re-start warm compress BID both eyes - not currently using; educated on proper technique              - Did not like using ointment; continue frequent PFATs              - Humidifier in bedroom               - lotemax (or FML) each eye bid       ID:  Afebrile no signs/sx of infection.    - IgG 3/8/20340  =904  - Prophy: HD ACV (renal dosing), Fluconazole and  Bactrim.   - 5/6/2022 Gonzalez completed  -Discussed again being up-to-date on COVID vaccines and boosters.      GI:  protonix (has heartburn)     FEN/Renal: Mild increase in creatinine 3/2022, then normalized 5/2022, mild SARAH again 10/2022, encouraged fluid intake, will avoid NSAIDs for knee pain.  Discussed referral to on-call nephrology would like to hold off for now. Cr improved 0.9 1/17     Fatigue:  stable  - History of testosterone deficiency, rechecked and modestly low. He previously did testosterone injections & didn't think it made him feel any better.    - TSH normal 2/28 and again on repeat 9/27 at 1.01.  - counseled that moderate exercise is really the only known way to combat fatigue, and acknowledged how difficult it is to balance too much with not enough activity. Discussed 1/17 again he declined referral to physical therapy or rehab. Still encouraged him to more more physically active.     Vascular:   10/15/18 Right leg US with small (technically DVT) clot in posterior tibial vein: started reg dose aspirin daily 325mg and recheck US in 2 weeks or symptomatically. 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      MSK: avascular necrosis of hip.  S/p replacement surgery     Wounds: No open wounds today. He knows that if there is erythema or pain that he needs to be seen as soon as possible to evaluate for cellulitis/infection. *Reiterated this 1/17    Lungs: He reports improved dyspnea on exertion.  PFTs obtained December 2021 stable compared to 3 years ago %, FEV1 107% DL CO 74% predicted.  Of note patient had Covid pneumonia and was on the ventilator.  It has been previously noted that he does have this raspy crackly sounds on both bases that do not  change.  He has not had a CT chest since February 2021.  He wants to defer referral to pulmonary at this time.    Healthcare maintenance March 2022 TSH within normal, IgG within normal, vitamin D deficiency screening normal testosterone mildly low, he will follow up with PCP locally, encourage dermatology evaluation yearly, DEXA scan completed 5/6/2022 normal bone density repeat 2 years, discussed age-appropriate cancer screening. Seeing dermatology in September locally. Reminded to see PCP locally for HCM and routine testing, is following up.    Plan:  No changes today per patient's request.    RTC 6 months with Dr Jean-Paul Nunez    30 minutes spent on the date of the encounter doing chart review, history and exam, documentation and further activities per the note    Roselia Nunez MD

## 2023-04-19 DIAGNOSIS — E78.5 HYPERLIPIDEMIA LDL GOAL <100: Primary | ICD-10-CM

## 2023-04-19 DIAGNOSIS — E78.5 HYPERLIPIDEMIA LDL GOAL <100: ICD-10-CM

## 2023-04-19 RX ORDER — ATORVASTATIN CALCIUM 20 MG/1
20 TABLET, FILM COATED ORAL DAILY
Qty: 90 TABLET | Refills: 3 | Status: SHIPPED | OUTPATIENT
Start: 2023-04-19 | End: 2024-06-26

## 2023-04-19 NOTE — RESULT ENCOUNTER NOTE
Dear Deejay,    Here is a summary of your recent test results:  -Cholesterol levels are improved but still above your goal levels (lead LDL less than 100).  ADVISE: Increasing the dose of your atorvastatin to 20 mg (it is okay to take tbo of the 10 mg tablets to equal 20 mg to use up your supply), a regular exercise program with at least 150 minutes of aerobic exercise per week, and eating a low saturated fat/low carbohydrate diet.  Also, you should recheck this fasting cholesterol panel in 3 months.    For additional lab test information, www.testing.com is a very good reference.    In addition, here is a list of due or overdue Health Maintenance reminders:    Colorectal Cancer Screening due on 12/15/2021    Please call us at [phone] (or use [employer]) to address the above recommendations if needed.           Thank you very much for trusting me and [facility].     Have a peaceful day.    Healthy regards,  Tim Baumann MD

## 2023-05-23 DIAGNOSIS — D46.9 MDS (MYELODYSPLASTIC SYNDROME) (H): ICD-10-CM

## 2023-05-23 DIAGNOSIS — D89.813 GVH (GRAFT VERSUS HOST DISEASE) (H): ICD-10-CM

## 2023-05-23 RX ORDER — ACYCLOVIR 800 MG/1
800 TABLET ORAL 2 TIMES DAILY
Qty: 180 TABLET | Refills: 1 | Status: SHIPPED | OUTPATIENT
Start: 2023-05-23 | End: 2023-11-20

## 2023-06-07 DIAGNOSIS — Z94.81 STATUS POST BONE MARROW TRANSPLANT (H): ICD-10-CM

## 2023-06-07 RX ORDER — SULFAMETHOXAZOLE/TRIMETHOPRIM 800-160 MG
TABLET ORAL
Qty: 48 TABLET | Refills: 0 | Status: SHIPPED | OUTPATIENT
Start: 2023-06-07 | End: 2023-10-04

## 2023-06-30 ENCOUNTER — TELEPHONE (OUTPATIENT)
Dept: ONCOLOGY | Facility: CLINIC | Age: 73
End: 2023-06-30

## 2023-06-30 NOTE — TELEPHONE ENCOUNTER
ALFREDITO APPROVED    Medication: JAKAFI 5 MG PO TABS  Amount $: $8,900  Foundation Name: PAN [facility] Phone: [phone]  [facility] Fax:   Foundation Effective Date: 4/1/2023  Foundation Expiration Date: 6/29/2024  Additional Information: N/A  Patient Notified: Yes    Alfredito could not be completed online, enrolled the pt into the alfredito over the phone

## 2023-07-06 DIAGNOSIS — R12 HEARTBURN: ICD-10-CM

## 2023-07-06 NOTE — TELEPHONE ENCOUNTER
Pharmacy is calling to report that they have been sending a refill request for the patient's pantoprazole, and is following up over the past week. Writer does not see refill request on file. Please fill as able. Pharmacy desired attached.

## 2023-07-10 RX ORDER — PANTOPRAZOLE SODIUM 40 MG/1
40 TABLET, DELAYED RELEASE ORAL
Qty: 30 TABLET | Refills: 3 | Status: SHIPPED | OUTPATIENT
Start: 2023-07-10 | End: 2023-11-10

## 2023-07-10 NOTE — TELEPHONE ENCOUNTER
Prescription approved per [facility] Refill Protocol.    Valerie Baltazar RN  [facility]

## 2023-08-08 DIAGNOSIS — D89.811 CHRONIC GRAFT-VERSUS-HOST DISEASE (H): ICD-10-CM

## 2023-08-08 RX ORDER — LEVOFLOXACIN 250 MG/1
TABLET, FILM COATED ORAL
Qty: 30 TABLET | Refills: 3 | Status: SHIPPED | OUTPATIENT
Start: 2023-08-08 | End: 2023-12-15

## 2023-08-28 ENCOUNTER — OFFICE VISIT (OUTPATIENT)
Dept: OPTOMETRY | Facility: CLINIC | Age: 73
End: 2023-08-28
Payer: MEDICARE

## 2023-08-28 DIAGNOSIS — D89.813 GVHD (GRAFT VERSUS HOST DISEASE) (H): ICD-10-CM

## 2023-08-28 DIAGNOSIS — H04.123 DRY EYES: ICD-10-CM

## 2023-08-28 DIAGNOSIS — H35.3131 EARLY DRY STAGE NONEXUDATIVE AGE-RELATED MACULAR DEGENERATION OF BOTH EYES: ICD-10-CM

## 2023-08-28 DIAGNOSIS — H16.229 KERATITIS SICCA: ICD-10-CM

## 2023-08-28 DIAGNOSIS — H53.10 SUBJECTIVE VISUAL DISTURBANCE: ICD-10-CM

## 2023-08-28 DIAGNOSIS — G44.52 NEW DAILY PERSISTENT HEADACHE: Primary | ICD-10-CM

## 2023-08-28 RX ORDER — NEOMYCIN SULFATE, POLYMYXIN B SULFATE, AND DEXAMETHASONE 3.5; 10000; 1 MG/G; [USP'U]/G; MG/G
0.5 OINTMENT OPHTHALMIC 2 TIMES DAILY
Qty: 3.5 G | Refills: 1 | Status: SHIPPED | OUTPATIENT
Start: 2023-08-28 | End: 2024-03-11

## 2023-08-28 ASSESSMENT — CUP TO DISC RATIO
OD_RATIO: 0.35
OS_RATIO: 0.35

## 2023-08-28 ASSESSMENT — TONOMETRY
OS_IOP_MMHG: 13
IOP_METHOD: ICARE
OD_IOP_MMHG: 15

## 2023-08-28 ASSESSMENT — CONF VISUAL FIELD
OS_INFERIOR_TEMPORAL_RESTRICTION: 0
OS_INFERIOR_NASAL_RESTRICTION: 0
OS_SUPERIOR_NASAL_RESTRICTION: 0
OS_SUPERIOR_TEMPORAL_RESTRICTION: 0
OD_INFERIOR_TEMPORAL_RESTRICTION: 0
OD_SUPERIOR_TEMPORAL_RESTRICTION: 0
OD_SUPERIOR_NASAL_RESTRICTION: 0
OD_INFERIOR_NASAL_RESTRICTION: 0

## 2023-08-28 ASSESSMENT — VISUAL ACUITY
OS_SC: 20/25+1
OD_SC: 20/30-1/+2
METHOD: SNELLEN - LINEAR

## 2023-08-28 ASSESSMENT — EXTERNAL EXAM - LEFT EYE: OS_EXAM: NORMAL

## 2023-08-28 ASSESSMENT — EXTERNAL EXAM - RIGHT EYE: OD_EXAM: NORMAL

## 2023-08-28 NOTE — Clinical Note
Hello,  This patient has new daily headache x 1 month, mild-moderate in severity. He initially came to us thinking it was eye related but everything checks out good on our end. Would this be something you guys want to get further testing on or would it be better to go through his PCP to workup? He is scheduled with you in October.  Thanks!

## 2023-08-28 NOTE — PROGRESS NOTES
A/P  1.) Headache without ophthalmic cause  -New x 1 month, occurring daily. Often occurs more at night  -Resolves on its own or with OTC meds  -Occurring top of the head, sometimes radiating backwards  -Visual fields, optic nerve, color vision, pupils and acuity all normal today. No ophthalmic cause    2.) GVHD with significant dry eye OU  -Failed: scleral lens, Restasis, lubricating ointment  -Currently using: plugs, serum tears, Refresh. Continue all - he may need more serum tears as he just ran out. He will check with [facility] and they can contact us if needing new Rx  -Left lower lid irritation with keratinization - start Maxitrol bill to affected area bid    3.) Dry AMD OU  -Largely stable to last exams  -He is already on AREDS 2 formula, continue  -Monitor    I do not find an ocular cause for his headaches. I will reach out to BMT to see if they would like further workup or rec routing through PCP    I have confirmed the patient's CC, HPI and reviewed Past Medical History, Past Surgical History, Social History, Family History, Problem List, Medication List and agree with Tech note.     Ronda Machado, ROSALINE BUTTO ISELAS

## 2023-08-30 DIAGNOSIS — D89.813 GRAFT-VERSUS-HOST DISEASE (H): Primary | ICD-10-CM

## 2023-08-30 DIAGNOSIS — D46.9 MDS (MYELODYSPLASTIC SYNDROME) (H): ICD-10-CM

## 2023-08-30 DIAGNOSIS — D46.9 MDS (MYELODYSPLASTIC SYNDROME) (H): Primary | ICD-10-CM

## 2023-08-30 DIAGNOSIS — D89.813 GRAFT-VERSUS-HOST DISEASE (H): ICD-10-CM

## 2023-09-20 DIAGNOSIS — U07.1 COVID-19 VIRUS INFECTION: ICD-10-CM

## 2023-09-20 DIAGNOSIS — D89.813 GRAFT-VERSUS-HOST DISEASE (H): ICD-10-CM

## 2023-09-20 RX ORDER — PREDNISONE 5 MG/1
5 TABLET ORAL DAILY
Qty: 90 TABLET | Refills: 1 | Status: SHIPPED | OUTPATIENT
Start: 2023-09-20 | End: 2024-04-02

## 2023-09-29 NOTE — PROGRESS NOTES
[facility] ENCOUNTER          PHYSICIAN ASSISTANT NOTE       Date of evaluation: 9/29/2023    Chief Complaint     Abdominal Pain (Abdominal pain/cramping, states she has had pain for a couple days but became severe around 0730 this morning. +nausea/vomiting. )      History of Present Illness     Juan Rubio is a 26 y.o. female with a history of anxiety, depression who presents with abdominal pain that began today at 7:30 am. She describes the pain as an intense cramping across her diffuse abdomen, with the highest intensity around the umbilical region. She reports vomiting 4-5 times prior to coming to the ER and tenesmus with no bowel moments today. Prior to this, was having normal bowel movements. Denies abdominal surgical history, vaginal bleeding or discharge, or fever. She reports some burning with urination.  reports that she drinks multiple energy drinks and cups of coffee in a day. She denies chance of pregnancy. She also endorses bilateral flank pain that started after the other symptoms had started, for which she attributes to muscle soreness from consistently rocking back and forth. She has never had these symptoms before. Review of Systems   Constitutional:  Negative for chills, fatigue and fever. HENT:  Negative for congestion, rhinorrhea and sore throat. Eyes:  Negative for pain, discharge and itching. Respiratory:  Negative for cough, shortness of breath and wheezing. Cardiovascular:  Negative for chest pain, palpitations and leg swelling. Gastrointestinal:  Positive for abdominal pain, nausea and vomiting. Negative for abdominal distention, constipation and diarrhea. Genitourinary:  Negative for dysuria, flank pain, frequency, hematuria, urgency, vaginal bleeding and vaginal discharge. Musculoskeletal:  Negative for back pain, joint swelling and neck pain. Skin:  Negative for rash.    Neurological:  Negative for syncope, facial asymmetry Interventional Radiology Intra-procedural Nursing Note    Patient Name: Deejay Dior  Medical Record Number: [id number]  Today's Date: January 8, 2019    Procedure: three week check of left leg sclerotherapy sites    Proceduralist: Dr Godwin     D: Patient brought to IR room 2 at 1030. Verified Patient's ID using two identifiers. Patient denied having questions or concerns.  A: Patient was positioned supine. Dr Godwin in to see Patient.. Left leg exam completed. Patient instructed to follow up in clinic in 1 week. Dr Godwin's clinic RN Maite will make arrangements. Patient given IR schedulers number.   P: Patient returned to the [facility] waiting room and was discharged home to self care.    Claudette Alvarez, DON  [phone]

## 2023-10-04 DIAGNOSIS — Z94.81 STATUS POST BONE MARROW TRANSPLANT (H): ICD-10-CM

## 2023-10-04 DIAGNOSIS — F33.41 RECURRENT MAJOR DEPRESSIVE DISORDER, IN PARTIAL REMISSION (H): ICD-10-CM

## 2023-10-04 RX ORDER — SERTRALINE HYDROCHLORIDE 100 MG/1
100 TABLET, FILM COATED ORAL DAILY
Qty: 90 TABLET | Refills: 0 | Status: SHIPPED | OUTPATIENT
Start: 2023-10-04 | End: 2023-10-17

## 2023-10-04 RX ORDER — SULFAMETHOXAZOLE/TRIMETHOPRIM 800-160 MG
TABLET ORAL
Qty: 48 TABLET | Refills: 0 | Status: SHIPPED | OUTPATIENT
Start: 2023-10-04 | End: 2024-01-16

## 2023-10-05 DIAGNOSIS — Z94.81 STATUS POST BONE MARROW TRANSPLANT (H): Primary | ICD-10-CM

## 2023-10-09 NOTE — PROGRESS NOTES
BMT Progress Note    ID:  Mr. 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 (In the past has had a basal and squamous cell removed). Assumed new BMT care by Jean-Paul Nunez on 3/7/2022    cGVHD: currently On pred 5mg every day and Jakafi 5 mg bid.        HPI: Deejay returns for follow up.  His biggest concern is again fatigue.  He also reports new dyspnea on exertion, denies any cough or other respiratory concerns.  He has not establish care with PCP.  Recently seen by ophthalmology and ocular symptoms are stable even though not optimally controlled, he is using refresh and serum eyedrops >8 times per day. He is not doing warm compressors and did not like scleral lenses. His mouth is dry-stable for many years, no ulcerations or sores, no choking or other concerns. No n/v/d. Legs skin thickness is about the same and stable for years, no open sores or ulcers, He denies pain, oozing or bleeding. No change or decrease in ROM.     Remainder of 10 pt ROS negative    On exam:  /70   Pulse 73   Temp 97.7  F (36.5  C) (Oral)   Resp 16   Wt 105.8 kg (233 lb 4.8 oz)   SpO2 98%   BMI 36.54 kg/m      HEENT: PERRL, EOMI , dry oral mucosa with no ulcers  Chest: Mild bibasilar crackles  CVS: S1 S2 RRR, no murmurs or gallops  Abdomen: Soft nontender no organomegalies or masses.  Extremities: The left leg has stable sclerotic skin on the shin, no evidence of infection or cellulitis. No other sclerotic changes of the skin.  Of note, since I first meet him the patient and he reports that the skin changes have been since previous cellulitis post transplantation. prominent venous status stasis on left and more prominent sclerotic changes on right  "shin.  CNS: non focal    ASSESSMENT AND PLAN:  Mr. 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 Total cell dose (from 7/1 & 7/2) 6.53 x 10^6 CD34+ cells/kg.   - 1 year anniversary (July 2015): 30% cellular, trilineage hematopoiesis, no abnormal blasts by morphology or flow , no dysplasia, 0-1 fibrosis, 100% donor (BM, CD3, CD15). CR  - 2 year anniversary (July 2016): 20-30% cellular, trilineage hematopoiesis, no abnormal blasts by morphology or flow , no dysplasia, No fibrosis, 100% donor in BM (PB not sent). ISCN: //46,XX[20] Complete Remission.     HEME: No transfusion needs, counts stable, 3/2022 ferritin 44    GVHD: Long history of GVHD as below.   12/2021: kept him on prednisone 5 mg daily (reports that cGVHD flared on lower dose would like to stay at same dose), ruxolitinib 5 mg twice daily.  His counts are tolerating well.    --- Since taking over patient care he repeatedly expressed wanting to stay on the same therapy as he has failed tapering or changes in the past. 4/18/2023 this was readdressed and I also discussed with the patient whether he would like to consider altering/changing his immunosuppression regiment and concerns of potentially Jakafi exacerbating his fatigue.  However both him and his wife expressed strongly that they would like to stay on the current regiment and the patient specifically said that he would rather not \"experiment\" with changing therapies.  No changes today.  However we will pursue further work-up of his dyspnea on exertion including possibility of pulmonary GVHD.  If this is the case management will be changed accordingly.    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 Had been off prednisone since summer 2017 and briefly tapered off Sirolimus (january " 2018), however resumed with flare in February. 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 Sirolimus was stopped on 9/27/2018 & Pred 90mg was started. Seen by Dr. Sandoval, he does not think his symptoms or CT findings are c/w Sirolimus or GVH & he was in favor of tapering Prednisone. Developed worsening skin thickening & desquamation to the RLE, c/w GVH.   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   Required Burst pred 40mg a day on 1/3/20 with significant systemic arthralgic pain, tapered back to 10/0 eod after 1 week, near resolution of symptoms noted 1/17, returned to 10 mg every other day; then dropped to 5mg q day in April 2021 (this is best dates estimated on chart review)     Ophthalmo notes:  9/2022   1. 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   2. MGD each eye   S/p silicone Punctal plug bilateral lower lid (still in place). There is also meibomian gland dysfunction both eyes.   Stopped Restasis due to burning.  PLAN:  - Continue serum tears q2h OU  - Switch erythromycin bill to lubricating bill   - Continues with scleral lenses               - Re-start warm compress BID both eyes - not currently using              - trial of tacrolimus ointment QHS OU-- he has no picked thi up  3/14/2023  1. 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   2. MGD each eye   S/p silicone Punctal plug bilateral lower lid (still in place). There is also meibomian gland dysfunction both eyes.   Stopped Restasis due to burning.  Also uses CPAP for PIPO     PLAN:  - Continue serum tears q2h OU              - Re-start warm compress BID both eyes - not currently using; educated on proper technique              - Did not like using ointment; continue frequent PFATs              - Humidifier in bedroom                - lotemax (or FML) each eye bid    8/28/2023  GVHD with significant dry eye OU  -Failed: scleral lens, Restasis, lubricating ointment  -Currently using: plugs, serum tears, Refresh. Continue all - he may need more serum tears as he just ran out. He will check with [facility] and they can contact us if needing new Rx  -Left lower lid irritation with keratinization - start Maxitrol bill to affected area bid     ID:  Afebrile no signs/sx of infection.    - IgG 3/8/51359 =[id number]  Thank you  - 5/6/2022 Gonzalez completed  - Discussed again being up-to-date on COVID vaccines and boosters. 10/10/2023 received COVID booster today in clinic.  He received earlier this month for both influenza and RSV vaccines locally      GI:  protonix (has heartburn)     FEN/Renal: Mild increase in creatinine 3/2022, then normalized 5/2022, mild SARAH again 10/2022, encouraged fluid intake, will avoid NSAIDs for knee pain.  Discussed referral to on-call nephrology would like to hold off for now. Cr improved 0.9 1/17     Fatigue:  stable  - History of testosterone deficiency, rechecked and modestly low. He previously did testosterone injections & didn't think it made him feel any better.    - TSH normal 2/28 and again on repeat 9/27 at 1.01.  - counseled that moderate exercise is really the only known way to combat fatigue, and acknowledged how difficult it is to balance too much with not enough activity. Discussed 1/17 again he declined referral to physical therapy or rehab. Still encouraged him to more more physically active.     Vascular:   10/15/18 Right leg US with small (technically DVT) clot in posterior tibial vein: started reg dose aspirin daily 325mg and recheck US in 2 weeks or symptomatically. 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       MSK: avascular necrosis of hip.  S/p replacement surgery     Wounds: No open wounds today. He knows that if there is erythema or pain that he needs to be seen as soon as possible to evaluate for cellulitis/infection. *Reiterated this 1/17    Lungs: He reports improved dyspnea on exertion.  PFTs obtained December 2021 stable compared to 3 years ago %, FEV1 107% DL CO 74% predicted.  Of note patient had Covid pneumonia and was on the ventilator.  It has been previously noted that he does have this raspy crackly sounds on both bases that do not change.  He has not had a CT chest since February 2021.  He previously deferred referral to pulmonary. 10/10/2023 we discussed given dyspnea on exertion that he will need CT inspiratory and expiratory for them to look for airway disease in addition to PFTs to differentiate pulmonary GVHD from COPD given his longstanding smoking history and other pulmonary pathology.  We will refer him again to Dr. Sandoval.    Healthcare maintenance March 2022 TSH within normal, IgG within normal, vitamin D deficiency screening normal testosterone mildly low, reminded again today to follow up with PCP locally recheck testosterone in case this is contributing to fatigue, encourage dermatology evaluation yearly, DEXA scan completed 5/6/2022 normal bone density repeat 2 years, discussed age-appropriate cancer screening. Seeing dermatology in September locally. Reminded to see PCP locally for HCM and routine testing, is following up.    Plan:  No changes today per patient's request to immunosuppression therapy  Will refer to Dr. Sandoval and order CT scans and PFTs to be completed the same day per patient's request    RTC 6 months with Dr Jean-Paul Nunez or earlier as needed depending on pulmonary work-up    35 minutes spent on the date of the encounter doing chart review, history and exam, documentation and further activities per the note    Roselia Nunez MD

## 2023-10-10 ENCOUNTER — ONCOLOGY VISIT (OUTPATIENT)
Dept: TRANSPLANT | Facility: CLINIC | Age: 73
End: 2023-10-10
Attending: INTERNAL MEDICINE
Payer: MEDICARE

## 2023-10-10 ENCOUNTER — APPOINTMENT (OUTPATIENT)
Dept: LAB | Facility: CLINIC | Age: 73
End: 2023-10-10
Attending: INTERNAL MEDICINE
Payer: MEDICARE

## 2023-10-10 VITALS
WEIGHT: 233.3 LBS | RESPIRATION RATE: 16 BRPM | SYSTOLIC BLOOD PRESSURE: 125 MMHG | OXYGEN SATURATION: 98 % | HEART RATE: 73 BPM | TEMPERATURE: 97.7 F | BODY MASS INDEX: 36.54 KG/M2 | DIASTOLIC BLOOD PRESSURE: 70 MMHG

## 2023-10-10 DIAGNOSIS — Z94.81 STATUS POST BONE MARROW TRANSPLANT (H): ICD-10-CM

## 2023-10-10 DIAGNOSIS — Z94.81 STATUS POST BONE MARROW TRANSPLANT (H): Primary | ICD-10-CM

## 2023-10-10 LAB
ALBUMIN SERPL BCG-MCNC: 4 G/DL (ref 3.5–5.2)
ALP SERPL-CCNC: 66 U/L (ref 40–129)
ALT SERPL W P-5'-P-CCNC: 24 U/L (ref 0–70)
ANION GAP SERPL CALCULATED.3IONS-SCNC: 10 MMOL/L (ref 7–15)
AST SERPL W P-5'-P-CCNC: 31 U/L (ref 0–45)
BASO+EOS+MONOS # BLD AUTO: ABNORMAL 10*3/UL
BASO+EOS+MONOS NFR BLD AUTO: ABNORMAL %
BASOPHILS # BLD AUTO: 0 10E3/UL (ref 0–0.2)
BASOPHILS NFR BLD AUTO: 0 %
BILIRUB SERPL-MCNC: 0.3 MG/DL
BUN SERPL-MCNC: 24 MG/DL (ref 8–23)
CALCIUM SERPL-MCNC: 9.2 MG/DL (ref 8.8–10.2)
CHLORIDE SERPL-SCNC: 106 MMOL/L (ref 98–107)
CREAT SERPL-MCNC: 1.49 MG/DL (ref 0.67–1.17)
DEPRECATED HCO3 PLAS-SCNC: 24 MMOL/L (ref 22–29)
EGFRCR SERPLBLD CKD-EPI 2021: 49 ML/MIN/1.73M2
EOSINOPHIL # BLD AUTO: 0.1 10E3/UL (ref 0–0.7)
EOSINOPHIL NFR BLD AUTO: 1 %
ERYTHROCYTE [DISTWIDTH] IN BLOOD BY AUTOMATED COUNT: 18 % (ref 10–15)
GLUCOSE SERPL-MCNC: 89 MG/DL (ref 70–99)
HCT VFR BLD AUTO: 36.5 % (ref 40–53)
HGB BLD-MCNC: 12.1 G/DL (ref 13.3–17.7)
IMM GRANULOCYTES # BLD: 0 10E3/UL
IMM GRANULOCYTES NFR BLD: 0 %
LYMPHOCYTES # BLD AUTO: 3 10E3/UL (ref 0.8–5.3)
LYMPHOCYTES NFR BLD AUTO: 46 %
MCH RBC QN AUTO: 32.1 PG (ref 26.5–33)
MCHC RBC AUTO-ENTMCNC: 33.2 G/DL (ref 31.5–36.5)
MCV RBC AUTO: 97 FL (ref 78–100)
MONOCYTES # BLD AUTO: 1.1 10E3/UL (ref 0–1.3)
MONOCYTES NFR BLD AUTO: 17 %
NEUTROPHILS # BLD AUTO: 2.4 10E3/UL (ref 1.6–8.3)
NEUTROPHILS NFR BLD AUTO: 36 %
NRBC # BLD AUTO: 0 10E3/UL
NRBC BLD AUTO-RTO: 0 /100
PLATELET # BLD AUTO: 224 10E3/UL (ref 150–450)
POTASSIUM SERPL-SCNC: 3.9 MMOL/L (ref 3.4–5.3)
PROT SERPL-MCNC: 7 G/DL (ref 6.4–8.3)
RBC # BLD AUTO: 3.77 10E6/UL (ref 4.4–5.9)
SODIUM SERPL-SCNC: 140 MMOL/L (ref 135–145)
WBC # BLD AUTO: 6.7 10E3/UL (ref 4–11)

## 2023-10-10 PROCEDURE — 91320 SARSCV2 VAC 30MCG TRS-SUC IM: CPT | Performed by: INTERNAL MEDICINE

## 2023-10-10 PROCEDURE — 80053 COMPREHEN METABOLIC PANEL: CPT | Performed by: INTERNAL MEDICINE

## 2023-10-10 PROCEDURE — 90480 ADMN SARSCOV2 VAC 1/ONLY CMP: CPT | Performed by: INTERNAL MEDICINE

## 2023-10-10 PROCEDURE — 250N000011 HC RX IP 250 OP 636: Performed by: INTERNAL MEDICINE

## 2023-10-10 PROCEDURE — 99214 OFFICE O/P EST MOD 30 MIN: CPT | Performed by: INTERNAL MEDICINE

## 2023-10-10 PROCEDURE — 85025 COMPLETE CBC W/AUTO DIFF WBC: CPT | Performed by: INTERNAL MEDICINE

## 2023-10-10 PROCEDURE — 36415 COLL VENOUS BLD VENIPUNCTURE: CPT | Performed by: INTERNAL MEDICINE

## 2023-10-10 PROCEDURE — G0463 HOSPITAL OUTPT CLINIC VISIT: HCPCS | Mod: 25 | Performed by: INTERNAL MEDICINE

## 2023-10-10 RX ORDER — DIPHENHYDRAMINE HYDROCHLORIDE 50 MG/ML
50 INJECTION INTRAMUSCULAR; INTRAVENOUS
Status: DISCONTINUED | OUTPATIENT
Start: 2023-10-10 | End: 2023-10-10 | Stop reason: HOSPADM

## 2023-10-10 RX ORDER — DIPHENHYDRAMINE HCL 25 MG
50 CAPSULE ORAL
Status: DISCONTINUED | OUTPATIENT
Start: 2023-10-10 | End: 2023-10-10 | Stop reason: HOSPADM

## 2023-10-10 RX ORDER — EPINEPHRINE 1 MG/ML
0.3 INJECTION, SOLUTION INTRAMUSCULAR; SUBCUTANEOUS EVERY 5 MIN PRN
Status: DISCONTINUED | OUTPATIENT
Start: 2023-10-10 | End: 2023-10-10 | Stop reason: HOSPADM

## 2023-10-10 RX ADMIN — COVID-19 VACCINE, MRNA 30 MCG: 0.05 INJECTION, SUSPENSION INTRAMUSCULAR at 12:27

## 2023-10-10 ASSESSMENT — PAIN SCALES - GENERAL: PAINLEVEL: NO PAIN (0)

## 2023-10-10 NOTE — NURSING NOTE
"Oncology Rooming Note    October 10, 2023 11:18 AM   Deejay Dior is a 72 year old male who presents for:    Chief Complaint   Patient presents with    Blood Draw     Labs drawn via  by RN in lab.  VS taken    Oncology Clinic Visit     MDS (myelodysplastic syndrome)     Initial Vitals: /70   Pulse 73   Temp 97.7  F (36.5  C) (Oral)   Resp 16   Wt 105.8 kg (233 lb 4.8 oz)   SpO2 98%   BMI 36.54 kg/m   Estimated body mass index is 36.54 kg/m  as calculated from the following:    Height as of 3/13/23: 1.702 m (5' 7\").    Weight as of this encounter: 105.8 kg (233 lb 4.8 oz). Body surface area is 2.24 meters squared.  No Pain (0) Comment: Data Unavailable   No LMP for male patient.  Allergies reviewed: Yes  Medications reviewed: Yes    Medications: Medication refills not needed today.  Pharmacy name entered into [facility]:    [id number] [id number] [address] PHARMACY [place], [place] - [address] [facility] [phone]    Clinical concerns: none      Daniella Feng, EMT  10/10/2023              "

## 2023-10-11 ENCOUNTER — TELEPHONE (OUTPATIENT)
Dept: PULMONOLOGY | Facility: CLINIC | Age: 73
End: 2023-10-11
Payer: MEDICARE

## 2023-10-11 NOTE — TELEPHONE ENCOUNTER
Sunita called patient to schedule his referral with Dr Sandoval on 11/07 same day as his chest CT and PFT. Patient really did not want to stay in clinic the entire day so he refused scheduling for now. Patient would prefer Dr Chadwick to review his testing results first and eval whether or not he should come see him, if the results are of concern. Message is routed to Dr Sandoval on his thoughts about this.

## 2023-10-12 NOTE — TELEPHONE ENCOUNTER
"Per Dr Sandoval, \"As his BMT physician referred him back to clinic to see me, I would recommend that he makes an appointment to see me so that we can assess his symptoms in the context of his test results.  His provider was concerned regarding his symptoms of shortness of breath.\"     Sunita called pt to relay that Dr Sandoval would like him to make an appt. Patient is scheduled for 11/14 at 3PM.   "

## 2023-10-17 ENCOUNTER — OFFICE VISIT (OUTPATIENT)
Dept: FAMILY MEDICINE | Facility: CLINIC | Age: 73
End: 2023-10-17
Payer: MEDICARE

## 2023-10-17 VITALS
OXYGEN SATURATION: 96 % | WEIGHT: 231 LBS | TEMPERATURE: 97.2 F | HEART RATE: 81 BPM | BODY MASS INDEX: 36.18 KG/M2 | RESPIRATION RATE: 15 BRPM | DIASTOLIC BLOOD PRESSURE: 70 MMHG | SYSTOLIC BLOOD PRESSURE: 139 MMHG

## 2023-10-17 DIAGNOSIS — F33.41 RECURRENT MAJOR DEPRESSIVE DISORDER, IN PARTIAL REMISSION (H): ICD-10-CM

## 2023-10-17 DIAGNOSIS — E66.01 SEVERE OBESITY (BMI 35.0-35.9 WITH COMORBIDITY) (H): Primary | ICD-10-CM

## 2023-10-17 DIAGNOSIS — E78.5 HYPERLIPIDEMIA LDL GOAL <100: ICD-10-CM

## 2023-10-17 PROBLEM — F33.42 RECURRENT MAJOR DEPRESSION IN COMPLETE REMISSION (H): Status: ACTIVE | Noted: 2018-08-27

## 2023-10-17 LAB
ANION GAP SERPL CALCULATED.3IONS-SCNC: 11 MMOL/L (ref 7–15)
BUN SERPL-MCNC: 23.6 MG/DL (ref 8–23)
CALCIUM SERPL-MCNC: 9.1 MG/DL (ref 8.8–10.2)
CHLORIDE SERPL-SCNC: 106 MMOL/L (ref 98–107)
CHOLEST SERPL-MCNC: 206 MG/DL
CREAT SERPL-MCNC: 1.11 MG/DL (ref 0.67–1.17)
DEPRECATED HCO3 PLAS-SCNC: 23 MMOL/L (ref 22–29)
EGFRCR SERPLBLD CKD-EPI 2021: 70 ML/MIN/1.73M2
GLUCOSE SERPL-MCNC: 89 MG/DL (ref 70–99)
HDLC SERPL-MCNC: 62 MG/DL
LDLC SERPL CALC-MCNC: 116 MG/DL
NONHDLC SERPL-MCNC: 144 MG/DL
POTASSIUM SERPL-SCNC: 4.4 MMOL/L (ref 3.4–5.3)
SODIUM SERPL-SCNC: 140 MMOL/L (ref 135–145)
TRIGL SERPL-MCNC: 142 MG/DL

## 2023-10-17 PROCEDURE — 80061 LIPID PANEL: CPT | Performed by: FAMILY MEDICINE

## 2023-10-17 PROCEDURE — 36415 COLL VENOUS BLD VENIPUNCTURE: CPT | Performed by: FAMILY MEDICINE

## 2023-10-17 PROCEDURE — 80048 BASIC METABOLIC PNL TOTAL CA: CPT | Performed by: FAMILY MEDICINE

## 2023-10-17 PROCEDURE — 99213 OFFICE O/P EST LOW 20 MIN: CPT | Performed by: FAMILY MEDICINE

## 2023-10-17 ASSESSMENT — ANXIETY QUESTIONNAIRES
6. BECOMING EASILY ANNOYED OR IRRITABLE: NOT AT ALL
2. NOT BEING ABLE TO STOP OR CONTROL WORRYING: NOT AT ALL
4. TROUBLE RELAXING: NOT AT ALL
3. WORRYING TOO MUCH ABOUT DIFFERENT THINGS: NOT AT ALL
GAD7 TOTAL SCORE: 0
5. BEING SO RESTLESS THAT IT IS HARD TO SIT STILL: NOT AT ALL
1. FEELING NERVOUS, ANXIOUS, OR ON EDGE: NOT AT ALL
GAD7 TOTAL SCORE: 0
7. FEELING AFRAID AS IF SOMETHING AWFUL MIGHT HAPPEN: NOT AT ALL

## 2023-10-17 ASSESSMENT — PATIENT HEALTH QUESTIONNAIRE - PHQ9
SUM OF ALL RESPONSES TO PHQ QUESTIONS 1-9: 3
SUM OF ALL RESPONSES TO PHQ QUESTIONS 1-9: 3
10. IF YOU CHECKED OFF ANY PROBLEMS, HOW DIFFICULT HAVE THESE PROBLEMS MADE IT FOR YOU TO DO YOUR WORK, TAKE CARE OF THINGS AT HOME, OR GET ALONG WITH OTHER PEOPLE: SOMEWHAT DIFFICULT

## 2023-10-17 ASSESSMENT — ENCOUNTER SYMPTOMS: HEADACHES: 1

## 2023-10-17 NOTE — PROGRESS NOTES
"  Assessment & Plan   Severe obesity (BMI 35.0-35.9 with comorbidity) (H)-depression and hyperlipidemia  Healthy diet and activity as tolerated.  - Basic metabolic panel  (Ca, Cl, CO2, Creat, Gluc, K, Na, BUN)  - Basic metabolic panel  (Ca, Cl, CO2, Creat, Gluc, K, Na, BUN)    Recurrent major depressive disorder, in partial remission (H24)  Controlled -he has been taking a half of his 100 mg tablet and will send in 50 mg pills for his next refill.  (Profiled).  - sertraline (ZOLOFT) 50 MG tablet  Dispense: 90 tablet; Refill: 3    Hyperlipidemia LDL goal <100  Continue to monitor, continue medications.  - Lipid panel reflex to direct LDL Fasting  - Lipid panel reflex to direct LDL Fasting    Headache-improved at this time.    BMI:   Estimated body mass index is 36.18 kg/m  as calculated from the following:    Height as of 3/13/23: 1.702 m (5' 7\").    Weight as of this encounter: 104.8 kg (231 lb).   Weight management plan: Discussed healthy diet and exercise guidelines          Return in about 7 months (around 5/25/2024) for wellness exam with fasting labs with Tim Baumann MD.          Tim Baumann MD      [address] [address]  [email or web address]   Office: [phone]       Whit Villalba is a 72 year old, presenting for the following health issues:  Headache (F/U) and LAB REQUEST (Cholesterol)    History of Present Illness       Hyperlipidemia:  He presents for follow up of hyperlipidemia.   He is taking medication to lower cholesterol. He is not having myalgia or other side effects to statin medications.    He eats 2-3 servings of fruits and vegetables daily.He consumes 0 sweetened beverage(s) daily.He exercises with enough effort to increase his heart rate 20 to 29 minutes per day.  He exercises with enough effort to increase his heart rate 5 days per week.   He is taking medications regularly.     Migraine/Headaches  Since your last clinic visit, how have " your headaches changed?  Improved  How often are you getting headaches or migraines? 1-2  Are you able to do normal daily activities when you have a migraine? No  Are you taking rescue/relief medications? (Select all that apply) ibuprofen (Advil, Motrin) and Excedrin  How helpful is your rescue/relief medication?  I get total relief  Are you taking any medications to prevent migraines? (Select all that apply)  No  In the past 4 weeks, how often have you gone to urgent care or the emergency room because of your headaches?  0        3/13/2023    11:03 AM 10/17/2023    10:07 AM   PHQ   PHQ-9 Total Score 5 3   Q9: Thoughts of better off dead/self-harm past 2 weeks Not at all Not at all         3/13/2023    11:22 AM 10/17/2023    10:07 AM   KWAN-7 SCORE   Total Score  0 (minimal anxiety)   Total Score 1 0       He originally scheduled his appointment because he was having some headaches. He thought it had something to do with his eyes. He has dry eyes and bad dry mouth as a side effect from his transplant treatments.  He has been taking the drugs for almost 10 years. His headache only happened when he was laying down. He felt like it was coming from behind his eyes, but it would go up to top of his head. It was a pressure sensation. He denies any neck problems. He gets occasional migraines with aura. He takes Advil and this will help..     His cholesterol was high the last time he was here, so he was put on Lipitor. He is curious to know if it is doing what it is supposed to do.    Review of Systems   Neurological:  Positive for headaches.          Objective    /70 (BP Location: Left arm, Patient Position: Sitting, Cuff Size: Adult Large)   Pulse 81   Temp 97.2  F (36.2  C) (Tympanic)   Resp 15   Wt 104.8 kg (231 lb)   SpO2 96%   BMI 36.18 kg/m    Body mass index is 36.18 kg/m .  Physical Exam   GENERAL: healthy, alert and no distress  NECK: no adenopathy, no asymmetry, masses, or scars and thyroid normal to  palpation  RESP: lungs clear to auscultation - no rales, rhonchi or wheezes  CV: regular rate and rhythm, normal S1 S2, no S3 or S4, no murmur, click or rub, no peripheral edema and peripheral pulses strong  ABDOMEN: soft, nontender, no hepatosplenomegaly, no masses and bowel sounds normal  MS: no gross musculoskeletal defects noted, left > right ankle edema  SKIN: no suspicious lesions or rashes  NEURO: Normal strength and tone, mentation intact and speech normal  BACK: no CVA tenderness, no paralumbar tenderness

## 2023-10-18 NOTE — CONFIDENTIAL NOTE
RECORDS RECEIVED FROM: internal    DATE RECEIVED: 11.14.23    NOTES STATUS DETAILS   OFFICE NOTE from referring provider internal  Roselia Rene MD    OFFICE NOTE from other specialist     DISCHARGE SUMMARY from hospital     DISCHARGE REPORT from the ER     MEDICATION LIST internal     IMAGING  (NEED IMAGES AND REPORTS)     CT SCAN internal  Scheduled 11.14.23    CHEST XRAY (CXR) internal  9.20.22, 8.16.22   TESTS     PULMONARY FUNCTION TESTING (PFT) internal  Scheduled 11.14.23

## 2023-10-18 NOTE — RESULT ENCOUNTER NOTE
Dear Deejay,    Here is a summary of your recent test results:  -Cholesterol levels are at your goal levels.  ADVISE: continuing your medication, a regular exercise program with at least 150 minutes of aerobic exercise per week, and eating a low saturated fat/low carbohydrate diet.  Also, you should recheck this fasting cholesterol panel in 12 months.  -Kidney function is normal (Cr, GFR), Sodium is normal, Potassium is normal, Calcium is normal, Glucose is normal.     For additional lab test information, [email or web address] is a very good reference.    In addition, here is a list of due or overdue Health Maintenance reminders:  Depression Action Plan Never done  Colorectal Cancer Screening due on 12/15/2021  Diptheria Tetanus Pertussis (DTAP/TDAP/TD) Vaccine(2 - Td or Tdap) due on 07/18/2023    Please call us at [phone] (or use [employer]) to address the above recommendations if needed.           Thank you very much for trusting me and [facility].     Have a peaceful day.    Healthy regards,  Tim Baumann MD

## 2023-11-07 ENCOUNTER — OFFICE VISIT (OUTPATIENT)
Dept: PULMONOLOGY | Facility: CLINIC | Age: 73
End: 2023-11-07
Attending: INTERNAL MEDICINE
Payer: MEDICARE

## 2023-11-07 ENCOUNTER — ANCILLARY PROCEDURE (OUTPATIENT)
Dept: CT IMAGING | Facility: CLINIC | Age: 73
End: 2023-11-07
Attending: INTERNAL MEDICINE
Payer: MEDICARE

## 2023-11-07 DIAGNOSIS — Z94.81 STATUS POST BONE MARROW TRANSPLANT (H): ICD-10-CM

## 2023-11-07 PROCEDURE — 71250 CT THORAX DX C-: CPT | Mod: MG | Performed by: RADIOLOGY

## 2023-11-07 PROCEDURE — 94729 DIFFUSING CAPACITY: CPT | Performed by: INTERNAL MEDICINE

## 2023-11-07 PROCEDURE — 94375 RESPIRATORY FLOW VOLUME LOOP: CPT | Performed by: INTERNAL MEDICINE

## 2023-11-07 PROCEDURE — G1010 CDSM STANSON: HCPCS | Performed by: RADIOLOGY

## 2023-11-07 PROCEDURE — 99207 PR NO CHARGE LOS: CPT

## 2023-11-07 PROCEDURE — 94726 PLETHYSMOGRAPHY LUNG VOLUMES: CPT | Performed by: INTERNAL MEDICINE

## 2023-11-07 NOTE — PROGRESS NOTES
Deejay Dior comes into clinic today at the request of Dr. Jean-Paul Nunez Ordering Provider for PFT      This service provided today was under the supervising provider of the day Dr. Sandoval, who was available if needed.    Rosalia Erickson

## 2023-11-08 LAB
DLCOUNC-%PRED-PRE: 81 %
DLCOUNC-PRE: 19.37 ML/MIN/MMHG
DLCOUNC-PRED: 23.68 ML/MIN/MMHG
ERV-%PRED-PRE: 81 %
ERV-PRE: 1.06 L
ERV-PRED: 1.29 L
EXPTIME-PRE: 7.05 SEC
FEF2575-%PRED-PRE: 124 %
FEF2575-PRE: 2.5 L/SEC
FEF2575-PRED: 2.01 L/SEC
FEFMAX-%PRED-PRE: 122 %
FEFMAX-PRE: 9.06 L/SEC
FEFMAX-PRED: 7.38 L/SEC
FEV1-%PRED-PRE: 120 %
FEV1-PRE: 3.2 L
FEV1FEV6-PRE: 76 %
FEV1FEV6-PRED: 77 %
FEV1FVC-PRE: 76 %
FEV1FVC-PRED: 76 %
FEV1SVC-PRE: 74 %
FEV1SVC-PRED: 69 %
FIFMAX-PRE: 7.11 L/SEC
FRCPLETH-%PRED-PRE: 89 %
FRCPLETH-PRE: 3.24 L
FRCPLETH-PRED: 3.63 L
FVC-%PRED-PRE: 121 %
FVC-PRE: 4.24 L
FVC-PRED: 3.49 L
IC-%PRED-PRE: 128 %
IC-PRE: 3.3 L
IC-PRED: 2.58 L
RVPLETH-%PRED-PRE: 81 %
RVPLETH-PRE: 2.19 L
RVPLETH-PRED: 2.68 L
TLCPLETH-%PRED-PRE: 97 %
TLCPLETH-PRE: 6.54 L
TLCPLETH-PRED: 6.72 L
VA-%PRED-PRE: 102 %
VA-PRE: 6.09 L
VC-%PRED-PRE: 113 %
VC-PRE: 4.35 L
VC-PRED: 3.85 L

## 2023-11-09 DIAGNOSIS — R12 HEARTBURN: ICD-10-CM

## 2023-11-10 RX ORDER — PANTOPRAZOLE SODIUM 40 MG/1
40 TABLET, DELAYED RELEASE ORAL
Qty: 90 TABLET | Refills: 2 | Status: SHIPPED | OUTPATIENT
Start: 2023-11-10 | End: 2024-08-14

## 2023-11-14 ENCOUNTER — OFFICE VISIT (OUTPATIENT)
Dept: PULMONOLOGY | Facility: CLINIC | Age: 73
End: 2023-11-14
Attending: INTERNAL MEDICINE
Payer: MEDICARE

## 2023-11-14 ENCOUNTER — PRE VISIT (OUTPATIENT)
Dept: PULMONOLOGY | Facility: CLINIC | Age: 73
End: 2023-11-14
Payer: MEDICARE

## 2023-11-14 VITALS — SYSTOLIC BLOOD PRESSURE: 142 MMHG | HEART RATE: 61 BPM | OXYGEN SATURATION: 97 % | DIASTOLIC BLOOD PRESSURE: 82 MMHG

## 2023-11-14 DIAGNOSIS — R06.09 DYSPNEA ON EXERTION: Primary | ICD-10-CM

## 2023-11-14 PROCEDURE — G0463 HOSPITAL OUTPT CLINIC VISIT: HCPCS | Performed by: INTERNAL MEDICINE

## 2023-11-14 PROCEDURE — 99204 OFFICE O/P NEW MOD 45 MIN: CPT | Mod: 25 | Performed by: INTERNAL MEDICINE

## 2023-11-14 ASSESSMENT — PAIN SCALES - GENERAL: PAINLEVEL: NO PAIN (0)

## 2023-11-14 NOTE — PROGRESS NOTES
Reason for Visit  Deejay Dior is a 73 year old year old male who is being seen for No chief complaint on file.    Pulmonary HPI  74 YO male with history of MDS s/p NMA allo sib BMT (Day + 707) who is referred to the Pulmonary BMT clinic by Dr. Pinto for evaluation of SOB and abnormal chest CT.  Developed influenza A (H1N1) in 3-16 with significant SOB/MERIDA requiring hospital admission with ICU stay and use of Hi-flow. 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  After discharge he was slowly feeling better- could initially walk 40-50 feet without oxygen ( feet with oxygen) but improved to 3-4 blocks without oxygen at end of April. Early May developed weakness, fatigue and increased SOB.  Diagnosed with Influenza B; not hospitalized. After that episode had continued SOB/MERIDA, using oxygen at home.  CT chest in 5-16 showed bilateral GGO, repeat scan on 6-1 showed a decrease in GGO.   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  Since that time he feels 70-80% better with decreased MERIDA.  URI with nasal drainage and congestion the last two days, not much cough.  No prior history of lung disease as youth or young adult; no asthma.  Episode of pneumonia 11-14 hospitalized for 2 days.  No tobacco x 16 years, 32-48 pack year history.   + second hand smoke.  Denies significant mold exposure.    Last seen almost 5 years ago.  Was referred back to the clinic for SOB.  Was admitted from 2-8-21 to 3-3-21 for COVID pneumonia, required intubation but was eventually discharged on 1-4L of supplemental oxygen.  States he has completely resolved from a strength and SOB perspective.  Was seen in BMT clinic and commented that he has continued fatigue since his transplant, does not think  it has been progressive over time.  Also notices intermittent MERIDA when walking the hill in his  backyard or with one flight of stairs.  Is otherwise active, no exercise limitation.  Denies cough.  CT chest from 11-7-23 was personally reviewed in clinic- bilateral scattered sub-pleural interstitial changes but no nodular or GGO, compared to 2-21 the diffuse infiltrates have completely resolved.    The patient was seen and examined by Arsh Sandoval MD           Current Outpatient Medications   Medication    acetaminophen (TYLENOL) 325 MG tablet    acyclovir (ZOVIRAX) 800 MG tablet    atorvastatin (LIPITOR) 20 MG tablet    calcium carbonate-vitamin D (OSCAL W/D) 500-200 MG-UNIT tablet    fluconazole (DIFLUCAN) 100 MG tablet    levofloxacin (LEVAQUIN) 250 MG tablet    loteprednol (LOTEMAX) 0.2 % SUSP ophthalmic susp    multivitamin, therapeutic (THERA-VIT) TABS tablet    neomycin-polymyxin-dexAMETHasone (MAXITROL) 3.5-26788-0.1 ophthalmic ointment    pantoprazole (PROTONIX) 40 MG EC tablet    predniSONE (DELTASONE) 5 MG tablet    ruxolitinib (JAKAFI) 5 MG TABS tablet    sertraline (ZOLOFT) 50 MG tablet    sulfamethoxazole-trimethoprim (BACTRIM DS) 800-160 MG tablet    Vitamin D, Cholecalciferol, 25 MCG (1000 UT) TABS     No current facility-administered medications for this visit.     Allergies   Allergen Reactions    Blood Transfusion Related (Informational Only) Other (See Comments)     Patient has a history of a clinically significant antibody against RBC antigens.  A delay in compatible RBCs may occur.     Past Medical History:   Diagnosis Date    Acute deep vein thrombosis (DVT) of distal end of right lower extremity (H)     Acute deep vein thrombosis (DVT) of distal vein of right lower extremity (H) 05/16/2016    Brow ptosis, bilateral 02/01/2022    Added automatically from request for surgery [id number]    Clotting disorder (H24)     Depression, major     Dermatochalasis of both upper eyelids 02/01/2022    Added  automatically from request for surgery [id number]    Glucose intolerance     H/O bone marrow transplant (H)     h/o Deep vein thrombosis (DVT) (H)     Head injury 1964    Hearing problem Hearing aids 2015    History of blood transfusion 03/2014    History of radiation therapy 06/2014    [place] (hard of hearing)     bilateral    Immunosuppression (H24) Sirolimus daily    Lymphedema of both lower extremities     MDS (myelodysplastic syndrome) (H)     MDS/MPN (myelodysplastic/myeloproliferative neoplasms) (H)     Mixed hyperlipidemia     Numbness and tingling     feet    Obstructive sleep apnea 1999    Pneumonia     Recurrent major depressive disorder, in partial remission (H24) 08/27/2018    Reduced vision 08/2016    Cataract surgery    Senile ectropion of both lower eyelids 02/01/2022    Added automatically from request for surgery [id number]    Sleep apnea 1999    Status post right hip replacement 09/03/2019    Transplant 07/01/2014    Stem Cells       Past Surgical History:   Procedure Laterality Date    ARTHROPLASTY HIP ANTERIOR Right 9/3/2019    Procedure: RIGHT TOTAL HIP ARTHROPLASTY, DIRECT ANTERIOR APPROACH;  Surgeon: Yong Diaz MD;  Location:  OR    BACK SURGERY      BIOPSY      BMT CELL PRODUCT INFUSION      CATARACT IOL, RT/LT Bilateral 2015?    ESOPHAGOSCOPY, GASTROSCOPY, DUODENOSCOPY (EGD), COMBINED N/A 2/2/2015    ESOPHAGOSCOPY, GASTROSCOPY, DUODENOSCOPY (EGD), COMBINED Left 8/6/2015    Procedure: COMBINED ESOPHAGOSCOPY, GASTROSCOPY, DUODENOSCOPY (EGD), BIOPSY SINGLE OR MULTIPLE;  Surgeon: Amber Francis MD;  Location: [facility]    EXCISE LESION LIP N/A 1/2/2017    Procedure: EXCISE LESION LIP;  Surgeon: Tim Yanez MD;  Location: [facility]    EYE SURGERY      blepharoplasty    FLEXIBLE SIGMOIDOSCOPY  2/2/15    HEMORRHOIDECTOMY  2001    IR FOLLOW UP VISIT OUTPATIENT  1/8/2019    PHACOEMULSIFICATION CLEAR CORNEA WITH STANDARD INTRAOCULAR LENS IMPLANT Left 7/18/2016    Procedure:  PHACOEMULSIFICATION CLEAR CORNEA WITH STANDARD INTRAOCULAR LENS IMPLANT;  Surgeon: Randolph Giles MD;  Location:  [facility]    REPAIR ECTROPION BILATERAL Bilateral 2022    Procedure: bilateral lower lid ectropion repair;  Surgeon: Florina Solis MD;  Location: [facility]    REPAIR PTOSIS BILATERAL Bilateral 2022    Procedure: Bilateral upper eyelid ptosis repair;  Surgeon: Florina Solis MD;  Location: [facility] OR    TONSILLECTOMY      TRANSPLANT  2014    Stem Cell Transplant [facility] BMT    VASCULAR SURGERY      varicose vein surgery       Social History     Socioeconomic History    Marital status:      Spouse name: Not on file    Number of children: Not on file    Years of education: Not on file    Highest education level: Not on file   Occupational History    Not on file   Tobacco Use    Smoking status: Former     Packs/day: 1.00     Years: 34.00     Additional pack years: 0.00     Total pack years: 34.00     Types: Cigarettes     Start date: 1967     Quit date: 2001     Years since quittin.6    Smokeless tobacco: Never    Tobacco comments:     quit in    Vaping Use    Vaping Use: Never used   Substance and Sexual Activity    Alcohol use: Yes     Alcohol/week: 4.2 standard drinks of alcohol     Comment: Seldom    Drug use: No    Sexual activity: Not Currently     Partners: Female     Birth control/protection: Male Surgical, Female Surgical   Other Topics Concern    Parent/sibling w/ CABG, MI or angioplasty before 65F 55M? Not Asked   Social History Narrative    Not on file     Social Determinants of Health     Financial Resource Strain: Low Risk  (10/12/2023)    Financial Resource Strain     Within the past 12 months, have you or your family members you live with been unable to get utilities (heat, electricity) when it was really needed?: No   Food Insecurity: Low Risk  (10/12/2023)    Food Insecurity     Within the past 12 months, did you worry that your food would  run out before you got money to buy more?: No     Within the past 12 months, did the food you bought just not last and you didn t have money to get more?: No   Transportation Needs: Low Risk  (10/12/2023)    Transportation Needs     Within the past 12 months, has lack of transportation kept you from medical appointments, getting your medicines, non-medical meetings or appointments, work, or from getting things that you need?: No   Physical Activity: Not on file   Stress: Not on file   Social Connections: Not on file   Interpersonal Safety: Low Risk  (10/17/2023)    Interpersonal Safety     Do you feel physically and emotionally safe where you currently live?: Yes     Within the past 12 months, have you been hit, slapped, kicked or otherwise physically hurt by someone?: No     Within the past 12 months, have you been humiliated or emotionally abused in other ways by your partner or ex-partner?: No   Housing Stability: Low Risk  (10/12/2023)    Housing Stability     Do you have housing? : Yes     Are you worried about losing your housing?: No       ROS Pulmonary  A complete ROS was otherwise negative except as noted in the HPI.  There were no vitals taken for this visit.  Exam:   GENERAL APPEARANCE: Well developed, well nourished, alert, and in no apparent distress.  EYES: PERRL, EOMI  HENT: Nasal mucosa with no edema and no hyperemia. No nasal polyps.  EARS: Canals clear, TMs normal  MOUTH: Oral mucosa is moist, without any lesions, no tonsillar enlargement, no oropharyngeal exudate.  NECK: supple, no masses, no thyromegaly.  LYMPHATICS: No significant axillary, cervical, or supraclavicular nodes.  RESP:  Good air flow throughout.  No crackles. No rhonchi. No wheezes.  CV: Normal S1, S2, regular rhythm, normal rate. No murmur.  No rub. No gallop. No LE edema.   ABDOMEN:  Bowel sounds normal, soft, nontender, no HSM or masses.   MS: extremities normal. No clubbing. No cyanosis.  SKIN: no rash on limited exam  NEURO:  Mentation intact, speech normal, normal strength and tone, normal gait and stance  PSYCH: mentation appears normal. and affect normal/bright  Results:  Pulmonary function tests were personally reviewed  FVC 4.24 (121%), FEV-1 3.20 (120%), FV-1/FVC 76%  FRC 89%, RV 81%, TLC 97%  DLCO 81%  No airflow limitation.  Normal lung volumes.  Normal diffusion capacity.  Compared to 12-20-21, the FVC and FEV-1 have not significantly changed, compared to 2018 the FEV-1 and FVC are decreased but not significantly  No results found for this or any previous visit (from the past 168 hour(s)).    Assessment and plan:   72 YO with past history of Influenza A and B infections 6 weeks apart with continued significant MERIDA at his initial visit.  CT chest at that time had the presence of GGO with air trapping and ? Small airway inflammation.  No change in spirometry compared to pre-transplant testing. Alhough spirometry shows mild airflow limitation, I did not think he had cGVHD of the lung (NOE) due to no change in spirometry compared to pre-transplant, with only a small drop in lung function compared to normal (usual drop with NOE is 30-50%). A viral infection with GGO on CT was an alternative explanation for his recent SOB/MERIDA.  Findings and clinical course were most consistent with bronchiolitis/organizing pneumonia post H1N1 infection; this was a common occurrence that year in post-BMT patients who contracted H1N1. Since his initial evaluation in 2016, he developed an acute respiratory illness one month prior to his repeat evaluation in 2018 with systems all resolved at the time of that visit.  CT findings were likely due to resolving pneumonia.  He has frequent URI's but no respiratory symptoms between episodes and no chronic sputum production with no symptoms suggestive of bronchiectasis.  No evidence of lung cGvHD based on symptoms or spirometry.  Unlikely sirolimus toxicity with GGO only in LLL, would expect to be diffuse.  Is  now off Sirolimus.  Developed a severe case of COVID required mechanical ventilation and prolonged hospitalization.  Overall the acute changes on his CT compared to 2021 have completely resolved except for some mild sub-pleural interstitial changes.  These are most likely related to COVID.  Mild increase in SOB but not limiting.     RTC prn.  Patient to call with any questions or concerns prior to his next clinic visit.

## 2023-11-14 NOTE — LETTER
11/14/2023         RE: Deejay Dior  [address]  [place] [facility] [address]        Dear Colleague,    Thank you for referring your patient, Deejay Dior, to the [facility] [place]. Please see a copy of my visit note below.    Reason for Visit  Deejay Dior is a 73 year old year old male who is being seen for No chief complaint on file.    Pulmonary HPI  72 YO male with history of MDS s/p NMA allo sib BMT (Day + 707) who is referred to the Pulmonary BMT clinic by Dr. Pinto for evaluation of SOB and abnormal chest CT.  Developed influenza A (H1N1) in 3-16 with significant SOB/MERIDA requiring hospital admission with ICU stay and use of Hi-flow. 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  After discharge he was slowly feeling better- could initially walk 40-50 feet without oxygen ( feet with oxygen) but improved to 3-4 blocks without oxygen at end of April. Early May developed weakness, fatigue and increased SOB.  Diagnosed with Influenza B; not hospitalized. After that episode had continued SOB/MERIDA, using oxygen at home.  CT chest in 5-16 showed bilateral GGO, repeat scan on 6-1 showed a decrease in GGO.   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  Since that time he feels 70-80% better with decreased MERIDA.  URI with nasal drainage and congestion the last two days, not much cough.  No prior history of lung disease as youth or young adult; no asthma.  Episode of pneumonia 11-14 hospitalized for 2 days.  No tobacco x 16 years, 32-48 pack year history.   + second hand smoke.  Denies significant mold exposure.    Last seen almost 5 years ago.  Was referred back to the clinic for SOB.  Was admitted from  2-8-21 to 3-3-21 for COVID pneumonia, required intubation but was eventually discharged on 1-4L of supplemental oxygen.  States he has completely resolved from a strength and SOB perspective.  Was seen in BMT clinic and commented that he has continued fatigue since his transplant, does not think it has been progressive over time.  Also notices intermittent MERIDA when walking the hill in his  backyard or with one flight of stairs.  Is otherwise active, no exercise limitation.  Denies cough.  CT chest from 11-7-23 was personally reviewed in clinic- bilateral scattered sub-pleural interstitial changes but no nodular or GGO, compared to 2-21 the diffuse infiltrates have completely resolved.    The patient was seen and examined by Arsh Sandoval MD           Current Outpatient Medications   Medication    acetaminophen (TYLENOL) 325 MG tablet    acyclovir (ZOVIRAX) 800 MG tablet    atorvastatin (LIPITOR) 20 MG tablet    calcium carbonate-vitamin D (OSCAL W/D) 500-200 MG-UNIT tablet    fluconazole (DIFLUCAN) 100 MG tablet    levofloxacin (LEVAQUIN) 250 MG tablet    loteprednol (LOTEMAX) 0.2 % SUSP ophthalmic susp    multivitamin, therapeutic (THERA-VIT) TABS tablet    neomycin-polymyxin-dexAMETHasone (MAXITROL) 3.5-69515-5.1 ophthalmic ointment    pantoprazole (PROTONIX) 40 MG EC tablet    predniSONE (DELTASONE) 5 MG tablet    ruxolitinib (JAKAFI) 5 MG TABS tablet    sertraline (ZOLOFT) 50 MG tablet    sulfamethoxazole-trimethoprim (BACTRIM DS) 800-160 MG tablet    Vitamin D, Cholecalciferol, 25 MCG (1000 UT) TABS     No current facility-administered medications for this visit.     Allergies   Allergen Reactions    Blood Transfusion Related (Informational Only) Other (See Comments)     Patient has a history of a clinically significant antibody against RBC antigens.  A delay in compatible RBCs may occur.     Past Medical History:   Diagnosis Date    Acute deep vein thrombosis (DVT) of distal end of right lower extremity  (H)     Acute deep vein thrombosis (DVT) of distal vein of right lower extremity (H) 05/16/2016    Brow ptosis, bilateral 02/01/2022    Added automatically from request for surgery [id number]    Clotting disorder (H24)     Depression, major     Dermatochalasis of both upper eyelids 02/01/2022    Added automatically from request for surgery [id number]    Glucose intolerance     H/O bone marrow transplant (H)     h/o Deep vein thrombosis (DVT) (H)     Head injury 1964    Hearing problem Hearing aids 2015    History of blood transfusion 03/2014    History of radiation therapy 06/2014    [place]arrow (hard of hearing)     bilateral    Immunosuppression (H24) Sirolimus daily    Lymphedema of both lower extremities     MDS (myelodysplastic syndrome) (H)     MDS/MPN (myelodysplastic/myeloproliferative neoplasms) (H)     Mixed hyperlipidemia     Numbness and tingling     feet    Obstructive sleep apnea 1999    Pneumonia     Recurrent major depressive disorder, in partial remission (H24) 08/27/2018    Reduced vision 08/2016    Cataract surgery    Senile ectropion of both lower eyelids 02/01/2022    Added automatically from request for surgery [id number]    Sleep apnea 1999    Status post right hip replacement 09/03/2019    Transplant 07/01/2014    Stem Cells       Past Surgical History:   Procedure Laterality Date    ARTHROPLASTY HIP ANTERIOR Right 9/3/2019    Procedure: RIGHT TOTAL HIP ARTHROPLASTY, DIRECT ANTERIOR APPROACH;  Surgeon: Yong Diaz MD;  Location:  OR    BACK SURGERY      BIOPSY      BMT CELL PRODUCT INFUSION      CATARACT IOL, RT/LT Bilateral 2015?    ESOPHAGOSCOPY, GASTROSCOPY, DUODENOSCOPY (EGD), COMBINED N/A 2/2/2015    ESOPHAGOSCOPY, GASTROSCOPY, DUODENOSCOPY (EGD), COMBINED Left 8/6/2015    Procedure: COMBINED ESOPHAGOSCOPY, GASTROSCOPY, DUODENOSCOPY (EGD), BIOPSY SINGLE OR MULTIPLE;  Surgeon: Amber Francis MD;  Location:  GI    EXCISE LESION LIP N/A 1/2/2017    Procedure: EXCISE LESION LIP;   Surgeon: Tim Yanez MD;  Location: [facility] OR    EYE SURGERY      blepharoplasty    FLEXIBLE SIGMOIDOSCOPY  2/2/15    HEMORRHOIDECTOMY      IR FOLLOW UP VISIT OUTPATIENT  2019    PHACOEMULSIFICATION CLEAR CORNEA WITH STANDARD INTRAOCULAR LENS IMPLANT Left 2016    Procedure: PHACOEMULSIFICATION CLEAR CORNEA WITH STANDARD INTRAOCULAR LENS IMPLANT;  Surgeon: Randolph Giles MD;  Location: [facility]    REPAIR ECTROPION BILATERAL Bilateral 2022    Procedure: bilateral lower lid ectropion repair;  Surgeon: Florina Solis MD;  Location: [facility] OR    REPAIR PTOSIS BILATERAL Bilateral 2022    Procedure: Bilateral upper eyelid ptosis repair;  Surgeon: Florina Solis MD;  Location: [facility] OR    TONSILLECTOMY      TRANSPLANT  2014    Stem Cell Transplant [facility] BMT    VASCULAR SURGERY      varicose vein surgery       Social History     Socioeconomic History    Marital status:      Spouse name: Not on file    Number of children: Not on file    Years of education: Not on file    Highest education level: Not on file   Occupational History    Not on file   Tobacco Use    Smoking status: Former     Packs/day: 1.00     Years: 34.00     Additional pack years: 0.00     Total pack years: 34.00     Types: Cigarettes     Start date: 1967     Quit date: 2001     Years since quittin.6    Smokeless tobacco: Never    Tobacco comments:     quit in    Vaping Use    Vaping Use: Never used   Substance and Sexual Activity    Alcohol use: Yes     Alcohol/week: 4.2 standard drinks of alcohol     Comment: Seldom    Drug use: No    Sexual activity: Not Currently     Partners: Female     Birth control/protection: Male Surgical, Female Surgical   Other Topics Concern    Parent/sibling w/ CABG, MI or angioplasty before 65F 55M? Not Asked   Social History Narrative    Not on file     Social Determinants of Health     Financial Resource Strain: Low Risk  (10/12/2023)    Financial Resource  Strain     Within the past 12 months, have you or your family members you live with been unable to get utilities (heat, electricity) when it was really needed?: No   Food Insecurity: Low Risk  (10/12/2023)    Food Insecurity     Within the past 12 months, did you worry that your food would run out before you got money to buy more?: No     Within the past 12 months, did the food you bought just not last and you didn t have money to get more?: No   Transportation Needs: Low Risk  (10/12/2023)    Transportation Needs     Within the past 12 months, has lack of transportation kept you from medical appointments, getting your medicines, non-medical meetings or appointments, work, or from getting things that you need?: No   Physical Activity: Not on file   Stress: Not on file   Social Connections: Not on file   Interpersonal Safety: Low Risk  (10/17/2023)    Interpersonal Safety     Do you feel physically and emotionally safe where you currently live?: Yes     Within the past 12 months, have you been hit, slapped, kicked or otherwise physically hurt by someone?: No     Within the past 12 months, have you been humiliated or emotionally abused in other ways by your partner or ex-partner?: No   Housing Stability: Low Risk  (10/12/2023)    Housing Stability     Do you have housing? : Yes     Are you worried about losing your housing?: No       ROS Pulmonary  A complete ROS was otherwise negative except as noted in the HPI.  There were no vitals taken for this visit.  Exam:   GENERAL APPEARANCE: Well developed, well nourished, alert, and in no apparent distress.  EYES: PERRL, EOMI  HENT: Nasal mucosa with no edema and no hyperemia. No nasal polyps.  EARS: Canals clear, TMs normal  MOUTH: Oral mucosa is moist, without any lesions, no tonsillar enlargement, no oropharyngeal exudate.  NECK: supple, no masses, no thyromegaly.  LYMPHATICS: No significant axillary, cervical, or supraclavicular nodes.  RESP:  Good air flow throughout.   No crackles. No rhonchi. No wheezes.  CV: Normal S1, S2, regular rhythm, normal rate. No murmur.  No rub. No gallop. No LE edema.   ABDOMEN:  Bowel sounds normal, soft, nontender, no HSM or masses.   MS: extremities normal. No clubbing. No cyanosis.  SKIN: no rash on limited exam  NEURO: Mentation intact, speech normal, normal strength and tone, normal gait and stance  PSYCH: mentation appears normal. and affect normal/bright  Results:  Pulmonary function tests were personally reviewed  FVC 4.24 (121%), FEV-1 3.20 (120%), FV-1/FVC 76%  FRC 89%, RV 81%, TLC 97%  DLCO 81%  No airflow limitation.  Normal lung volumes.  Normal diffusion capacity.  Compared to 12-20-21, the FVC and FEV-1 have not significantly changed, compared to 2018 the FEV-1 and FVC are decreased but not significantly  No results found for this or any previous visit (from the past 168 hour(s)).    Assessment and plan:   74 YO with past history of Influenza A and B infections 6 weeks apart with continued significant MERIDA at his initial visit.  CT chest at that time had the presence of GGO with air trapping and ? Small airway inflammation.  No change in spirometry compared to pre-transplant testing. Alhough spirometry shows mild airflow limitation, I did not think he had cGVHD of the lung (NOE) due to no change in spirometry compared to pre-transplant, with only a small drop in lung function compared to normal (usual drop with NOE is 30-50%). A viral infection with GGO on CT was an alternative explanation for his recent SOB/MERIDA.  Findings and clinical course were most consistent with bronchiolitis/organizing pneumonia post H1N1 infection; this was a common occurrence that year in post-BMT patients who contracted H1N1. Since his initial evaluation in 2016, he developed an acute respiratory illness one month prior to his repeat evaluation in 2018 with systems all resolved at the time of that visit.  CT findings were likely due to resolving pneumonia.  He  has frequent URI's but no respiratory symptoms between episodes and no chronic sputum production with no symptoms suggestive of bronchiectasis.  No evidence of lung cGvHD based on symptoms or spirometry.  Unlikely sirolimus toxicity with GGO only in LLL, would expect to be diffuse.  Is now off Sirolimus.  Developed a severe case of COVID required mechanical ventilation and prolonged hospitalization.  Overall the acute changes on his CT compared to 2021 have completely resolved except for some mild sub-pleural interstitial changes.  These are most likely related to COVID.  Mild increase in SOB but not limiting.     RTC prn.  Patient to call with any questions or concerns prior to his next clinic visit.      Sincerely,        Arsh Sandoval MD

## 2023-11-14 NOTE — NURSING NOTE
Chief Complaint   Patient presents with    New Patient     New Pulm      Vitals were taken and medications were reconciled.     Elyse Burrows RMA  2:54 PM

## 2023-11-17 ENCOUNTER — E-VISIT (OUTPATIENT)
Dept: FAMILY MEDICINE | Facility: CLINIC | Age: 73
End: 2023-11-17
Payer: MEDICARE

## 2023-11-17 DIAGNOSIS — J02.9 SORE THROAT: Primary | ICD-10-CM

## 2023-11-17 PROCEDURE — 99421 OL DIG E/M SVC 5-10 MIN: CPT | Performed by: FAMILY MEDICINE

## 2023-11-17 NOTE — PATIENT INSTRUCTIONS
Deejay,    Thank you for choosing us for your care. I have placed an order for a lab test(s). View your full visit summary for details by clicking on the link below. You can schedule a lab only appointment right here in [facility], or by calling [phone].    You will receive your lab results and next steps via [facility] when the lab results return.    Sincerely,  Jona Baumann MD

## 2023-11-20 DIAGNOSIS — D89.813 GVH (GRAFT VERSUS HOST DISEASE) (H): ICD-10-CM

## 2023-11-20 DIAGNOSIS — D46.9 MDS (MYELODYSPLASTIC SYNDROME) (H): ICD-10-CM

## 2023-11-20 RX ORDER — ACYCLOVIR 800 MG/1
800 TABLET ORAL 2 TIMES DAILY
Qty: 180 TABLET | Refills: 1 | Status: SHIPPED | OUTPATIENT
Start: 2023-11-20 | End: 2024-05-29

## 2023-12-15 DIAGNOSIS — D89.811 CHRONIC GRAFT-VERSUS-HOST DISEASE (H): ICD-10-CM

## 2023-12-15 RX ORDER — LEVOFLOXACIN 250 MG/1
TABLET, FILM COATED ORAL
Qty: 30 TABLET | Refills: 3 | Status: SHIPPED | OUTPATIENT
Start: 2023-12-15 | End: 2024-01-16

## 2023-12-18 ENCOUNTER — TELEPHONE (OUTPATIENT)
Dept: ONCOLOGY | Facility: CLINIC | Age: 73
End: 2023-12-18
Payer: MEDICARE

## 2023-12-18 NOTE — TELEPHONE ENCOUNTER
WINTER APPROVED    Medication: JAKAFI 20 MG PO TABS  Amount: $ 10,000  [facility] Name: [facility] Phone: [phone]  [facility] Fax:   Member ID:   BIN:   PCN:  Group:   Foundation Effective Date: 12/10/2023  Foundation Expiration Date: 12/9/2024  Additional Information:   Patient Notified:         Amira Barraza CPhTOncology Pharmacy [facility] [place] [address]  Office: [phone]  Fax: [phone]  [email or web address]

## 2024-01-08 DIAGNOSIS — Z94.81 STATUS POST BONE MARROW TRANSPLANT (H): ICD-10-CM

## 2024-01-08 RX ORDER — FLUCONAZOLE 100 MG/1
100 TABLET ORAL DAILY
Qty: 90 TABLET | Refills: 3 | Status: SHIPPED | OUTPATIENT
Start: 2024-01-08 | End: 2024-01-16

## 2024-01-16 DIAGNOSIS — Z94.81 STATUS POST BONE MARROW TRANSPLANT (H): ICD-10-CM

## 2024-01-16 DIAGNOSIS — F33.41 RECURRENT MAJOR DEPRESSIVE DISORDER, IN PARTIAL REMISSION (H): ICD-10-CM

## 2024-01-16 DIAGNOSIS — D89.811 CHRONIC GRAFT-VERSUS-HOST DISEASE (H): ICD-10-CM

## 2024-01-16 RX ORDER — FLUCONAZOLE 100 MG/1
100 TABLET ORAL DAILY
Qty: 90 TABLET | Refills: 3 | Status: SHIPPED | OUTPATIENT
Start: 2024-01-16

## 2024-01-16 RX ORDER — LEVOFLOXACIN 250 MG/1
TABLET, FILM COATED ORAL
Qty: 90 TABLET | Refills: 3 | Status: SHIPPED | OUTPATIENT
Start: 2024-01-16

## 2024-01-16 RX ORDER — SULFAMETHOXAZOLE/TRIMETHOPRIM 800-160 MG
TABLET ORAL
Qty: 48 TABLET | Refills: 0 | Status: SHIPPED | OUTPATIENT
Start: 2024-01-16 | End: 2024-04-02

## 2024-02-12 ENCOUNTER — TRANSFERRED RECORDS (OUTPATIENT)
Dept: HEALTH INFORMATION MANAGEMENT | Facility: CLINIC | Age: 74
End: 2024-02-12
Payer: MEDICARE

## 2024-03-11 ENCOUNTER — HOSPITAL ENCOUNTER (OUTPATIENT)
Facility: CLINIC | Age: 74
Setting detail: OBSERVATION
Discharge: HOME | End: 2024-03-12
Attending: EMERGENCY MEDICINE | Admitting: STUDENT IN AN ORGANIZED HEALTH CARE EDUCATION/TRAINING PROGRAM
Payer: MEDICARE

## 2024-03-11 DIAGNOSIS — I48.91 NEW ONSET ATRIAL FIBRILLATION (H): ICD-10-CM

## 2024-03-11 DIAGNOSIS — R79.89 ELEVATED TROPONIN: ICD-10-CM

## 2024-03-11 LAB
ANION GAP SERPL CALCULATED.3IONS-SCNC: 13 MMOL/L (ref 7–15)
ATRIAL RATE - MUSE: NORMAL BPM
BASOPHILS # BLD AUTO: 0 10E3/UL (ref 0–0.2)
BASOPHILS NFR BLD AUTO: 0 %
BUN SERPL-MCNC: 28.6 MG/DL (ref 8–23)
CALCIUM SERPL-MCNC: 9.3 MG/DL (ref 8.8–10.2)
CHLORIDE SERPL-SCNC: 105 MMOL/L (ref 98–107)
CREAT SERPL-MCNC: 1.1 MG/DL (ref 0.67–1.17)
DEPRECATED HCO3 PLAS-SCNC: 22 MMOL/L (ref 22–29)
DIASTOLIC BLOOD PRESSURE - MUSE: NORMAL MMHG
EGFRCR SERPLBLD CKD-EPI 2021: 70 ML/MIN/1.73M2
EOSINOPHIL # BLD AUTO: 0.1 10E3/UL (ref 0–0.7)
EOSINOPHIL NFR BLD AUTO: 1 %
ERYTHROCYTE [DISTWIDTH] IN BLOOD BY AUTOMATED COUNT: 16.6 % (ref 10–15)
GLUCOSE BLDC GLUCOMTR-MCNC: 104 MG/DL (ref 70–99)
GLUCOSE SERPL-MCNC: 89 MG/DL (ref 70–99)
HCT VFR BLD AUTO: 38 % (ref 40–53)
HGB BLD-MCNC: 12.9 G/DL (ref 13.3–17.7)
HOLD SPECIMEN: NORMAL
IMM GRANULOCYTES # BLD: 0 10E3/UL
IMM GRANULOCYTES NFR BLD: 0 %
INTERPRETATION ECG - MUSE: NORMAL
LYMPHOCYTES # BLD AUTO: 2 10E3/UL (ref 0.8–5.3)
LYMPHOCYTES NFR BLD AUTO: 30 %
MAGNESIUM SERPL-MCNC: 1.9 MG/DL (ref 1.7–2.3)
MCH RBC QN AUTO: 32.2 PG (ref 26.5–33)
MCHC RBC AUTO-ENTMCNC: 33.9 G/DL (ref 31.5–36.5)
MCV RBC AUTO: 95 FL (ref 78–100)
MONOCYTES # BLD AUTO: 0.7 10E3/UL (ref 0–1.3)
MONOCYTES NFR BLD AUTO: 11 %
NEUTROPHILS # BLD AUTO: 4 10E3/UL (ref 1.6–8.3)
NEUTROPHILS NFR BLD AUTO: 58 %
NRBC # BLD AUTO: 0 10E3/UL
NRBC BLD AUTO-RTO: 0 /100
P AXIS - MUSE: NORMAL DEGREES
PLATELET # BLD AUTO: 262 10E3/UL (ref 150–450)
POTASSIUM SERPL-SCNC: 3.9 MMOL/L (ref 3.4–5.3)
PR INTERVAL - MUSE: NORMAL MS
QRS DURATION - MUSE: 76 MS
QT - MUSE: 346 MS
QTC - MUSE: 437 MS
R AXIS - MUSE: 3 DEGREES
RBC # BLD AUTO: 4.01 10E6/UL (ref 4.4–5.9)
SODIUM SERPL-SCNC: 140 MMOL/L (ref 135–145)
SYSTOLIC BLOOD PRESSURE - MUSE: NORMAL MMHG
T AXIS - MUSE: 41 DEGREES
TROPONIN T SERPL HS-MCNC: 79 NG/L
TROPONIN T SERPL HS-MCNC: 80 NG/L
TSH SERPL DL<=0.005 MIU/L-ACNC: 1.36 UIU/ML (ref 0.3–4.2)
VENTRICULAR RATE- MUSE: 96 BPM
WBC # BLD AUTO: 6.9 10E3/UL (ref 4–11)

## 2024-03-11 PROCEDURE — 96372 THER/PROPH/DIAG INJ SC/IM: CPT | Mod: XS | Performed by: EMERGENCY MEDICINE

## 2024-03-11 PROCEDURE — 99222 1ST HOSP IP/OBS MODERATE 55: CPT | Mod: AI | Performed by: STUDENT IN AN ORGANIZED HEALTH CARE EDUCATION/TRAINING PROGRAM

## 2024-03-11 PROCEDURE — 84443 ASSAY THYROID STIM HORMONE: CPT | Performed by: EMERGENCY MEDICINE

## 2024-03-11 PROCEDURE — 96374 THER/PROPH/DIAG INJ IV PUSH: CPT

## 2024-03-11 PROCEDURE — 80048 BASIC METABOLIC PNL TOTAL CA: CPT | Performed by: EMERGENCY MEDICINE

## 2024-03-11 PROCEDURE — 84484 ASSAY OF TROPONIN QUANT: CPT | Performed by: EMERGENCY MEDICINE

## 2024-03-11 PROCEDURE — 36415 COLL VENOUS BLD VENIPUNCTURE: CPT | Performed by: EMERGENCY MEDICINE

## 2024-03-11 PROCEDURE — 250N000011 HC RX IP 250 OP 636: Performed by: EMERGENCY MEDICINE

## 2024-03-11 PROCEDURE — 82962 GLUCOSE BLOOD TEST: CPT

## 2024-03-11 PROCEDURE — 83735 ASSAY OF MAGNESIUM: CPT | Performed by: EMERGENCY MEDICINE

## 2024-03-11 PROCEDURE — 250N000013 HC RX MED GY IP 250 OP 250 PS 637: Performed by: EMERGENCY MEDICINE

## 2024-03-11 PROCEDURE — 99285 EMERGENCY DEPT VISIT HI MDM: CPT | Mod: 25

## 2024-03-11 PROCEDURE — G0378 HOSPITAL OBSERVATION PER HR: HCPCS

## 2024-03-11 PROCEDURE — 85025 COMPLETE CBC W/AUTO DIFF WBC: CPT | Performed by: EMERGENCY MEDICINE

## 2024-03-11 PROCEDURE — 93005 ELECTROCARDIOGRAM TRACING: CPT

## 2024-03-11 RX ORDER — ONDANSETRON 4 MG/1
4 TABLET, ORALLY DISINTEGRATING ORAL EVERY 6 HOURS PRN
Status: DISCONTINUED | OUTPATIENT
Start: 2024-03-11 | End: 2024-03-12 | Stop reason: HOSPADM

## 2024-03-11 RX ORDER — METOPROLOL TARTRATE 50 MG
50 TABLET ORAL ONCE
Status: COMPLETED | OUTPATIENT
Start: 2024-03-11 | End: 2024-03-11

## 2024-03-11 RX ORDER — AMOXICILLIN 250 MG
2 CAPSULE ORAL 2 TIMES DAILY PRN
Status: DISCONTINUED | OUTPATIENT
Start: 2024-03-11 | End: 2024-03-12 | Stop reason: HOSPADM

## 2024-03-11 RX ORDER — CYANOCOBALAMIN (VITAMIN B-12) 500 MCG
400 LOZENGE ORAL DAILY
COMMUNITY

## 2024-03-11 RX ORDER — AMOXICILLIN 250 MG
1 CAPSULE ORAL 2 TIMES DAILY PRN
Status: DISCONTINUED | OUTPATIENT
Start: 2024-03-11 | End: 2024-03-12 | Stop reason: HOSPADM

## 2024-03-11 RX ORDER — ENOXAPARIN SODIUM 100 MG/ML
1 INJECTION SUBCUTANEOUS ONCE
Qty: 1 ML | Refills: 0 | Status: COMPLETED | OUTPATIENT
Start: 2024-03-11 | End: 2024-03-11

## 2024-03-11 RX ORDER — CARBOXYMETHYLCELLULOSE SODIUM 5 MG/ML
2 SOLUTION/ DROPS OPHTHALMIC 4 TIMES DAILY PRN
COMMUNITY

## 2024-03-11 RX ORDER — DILTIAZEM HYDROCHLORIDE 5 MG/ML
25 INJECTION INTRAVENOUS ONCE
Status: COMPLETED | OUTPATIENT
Start: 2024-03-11 | End: 2024-03-11

## 2024-03-11 RX ORDER — ONDANSETRON 2 MG/ML
4 INJECTION INTRAMUSCULAR; INTRAVENOUS EVERY 6 HOURS PRN
Status: DISCONTINUED | OUTPATIENT
Start: 2024-03-11 | End: 2024-03-12 | Stop reason: HOSPADM

## 2024-03-11 RX ADMIN — METOPROLOL TARTRATE 50 MG: 50 TABLET, FILM COATED ORAL at 16:10

## 2024-03-11 RX ADMIN — ENOXAPARIN SODIUM 100 MG: 100 INJECTION SUBCUTANEOUS at 19:28

## 2024-03-11 RX ADMIN — DILTIAZEM HYDROCHLORIDE 25 MG: 5 INJECTION, SOLUTION INTRAVENOUS at 17:06

## 2024-03-11 ASSESSMENT — COLUMBIA-SUICIDE SEVERITY RATING SCALE - C-SSRS
2. HAVE YOU ACTUALLY HAD ANY THOUGHTS OF KILLING YOURSELF IN THE PAST MONTH?: NO
1. IN THE PAST MONTH, HAVE YOU WISHED YOU WERE DEAD OR WISHED YOU COULD GO TO SLEEP AND NOT WAKE UP?: NO
6. HAVE YOU EVER DONE ANYTHING, STARTED TO DO ANYTHING, OR PREPARED TO DO ANYTHING TO END YOUR LIFE?: NO

## 2024-03-11 ASSESSMENT — ACTIVITIES OF DAILY LIVING (ADL)
ADLS_ACUITY_SCORE: 40
ADLS_ACUITY_SCORE: 38
ADLS_ACUITY_SCORE: 40

## 2024-03-11 NOTE — H&P
[facility]    History and Physical - Hospitalist Service       Date of Admission:  3/11/2024    Assessment & Plan      Deejay Dior is a 73 year old male admitted on 3/11/2024. He has a history of myelodysplastic syndrome s/p stem cell transplant and history of dadkw-vwtddq-mbqt disease, chronic immunosuppression with prednisone who presents to the ED with shortness of breath and palpitations.  Symptoms began earlier in the day prior to arrival at ED.  Pulse ox at home showed heart rate was in the 140s.  Continue to have shortness of breath, lightheadedness with changes in position, prompting visit to ED.  EKG in ED showed atrial fibrillation, heart rate in the mid 90s up to the 120s.  Initially received p.o. metoprolol tartrate with no improvement in heart rates, then received 25 mg IV diltiazem which drastically reduced heart rate to the 40s.  Given the lability in heart rates, admitting to observation, will obtain TTE and monitor on telemetry to determine rate control measures and discuss anticoagulation options moving forward.    New onset atrial fibrillation, paroxysmal  Started feeling palpitations and shortness of breath earlier in day prior to admission.  Had felt at his baseline the day prior, no recent illness.  Used pulse oximeter at home and noticed heart rate was into the 140s.  Felt very lightheaded with changes in position, ongoing shortness of breath prompting ED visit.  Rate was initially in the mid 90s to 120s in the ED, EKG confirmed atrial fibrillation.  Was given a dose of oral metoprolol without much improvement in heart rate, was then given dose of IV diltiazem with significant decrease in heart rate into the 40s, but initially still in atrial fibrillation.  Shortly after arriving to the observation floor, telemetry indicated patient seemed to be out of A-fib, EKG confirmed sinus bradycardia with rate in the 40s and significant sinus arrhythmia.  Remained  "hemodynamically stable, no palpitations or chest discomfort, feeling back to his baseline.  TTE ordered.  If he goes back into afib, will likely need trial of rate control medication prior to discharge given lability in rhythm and rate following beta-blocker and calcium channel blocker in ED.  Had previously been on anticoagulation for DVT, would be agreeable to anticoagulation again.  [id number] 2 was 2 based on age alone, no prior cardiac history.  - TTE ordered  - Holding additional rate control medications for now given bradycardia  - Cardiac telemetry  - Received 1 dose therapeutic Lovenox, pharmacy liaison consulted for DOAC coverage.  - Consider cardiology consult if continued lability of heart rate and rhythm    Myelodysplastic syndrome s/p stem cell transplant  Hx cbrwq-tnyjzo-jtrc disease  Chronic steroid use  Diagnosed about 9 years ago, at that time received bone marrow transplant and has had multisite cuhce-tvinzd-pmbn disease over the years.  Currently immunosuppressed with prednisone, Jakafi.  Takes prophylactic antimicrobials daily as well.  Will restart after pharmacy med reconciliation.       Observation Goals: -diagnostic tests and consults completed and resulted, -vital signs normal or at patient baseline, -safe disposition plan has been identified, Nurse to notify provider when observation goals have been met and patient is ready for discharge.  Diet: NPO per Anesthesia Guidelines for Procedure/Surgery Except for: MedsRegular diet  DVT Prophylaxis: Therapeutic enoxaparin, possible transition to p.o. AC  Molina Catheter: Not present  Lines: None     Cardiac Monitoring: ACTIVE order. Indication: Tachyarrhythmias, acute (48 hours)  Code Status: Full CodeFull code    Clinically Significant Risk Factors Present on Admission                       # Obesity: Estimated body mass index is 35.98 kg/m  as calculated from the following:    Height as of this encounter: 1.702 m (5' 7\").    Weight as of this " encounter: 104.2 kg (229 lb 11.5 oz).              Disposition Plan      Expected Discharge Date: 03/12/2024                  Hank Maldonado MD  Hospitalist Service  [facility]  Securely message with "[email or web address] (more info)  Text page via [employer] Paging/Directory     ______________________________________________________________________    Chief Complaint   Palpitations, lightheadedness, shortness of breath    History is obtained from the patient    History of Present Illness   Deejay Dior is a 73 year old male who has a history of MDS s/p bone marrow transplant, chronic immunosuppression on prednisone who presented to the ED with palpitations and shortness of breath.  Symptoms started the day of admission, was not doing anything particular when symptoms onset.  No recent illnesses, felt at his baseline day prior to admission.  Took pulse at home and it was in the 140s.  Thought this was just improved, so went about his day but continued to have shortness of breath, palpitations, and was lightheaded with changes in position, prompting visit to ED.  Is never had anything like this happen before, no cardiac history.    In the ED BMP was unremarkable, and CBC relatively unremarkable.  TSH within normal limits.  Troponin mildly elevated to 79 then 80.  EKG obtained showed atrial fibrillation, rates oscillated between the 90s and 120s.  Hemodynamically stable otherwise.  Received p.o. metoprolol with no improvement in heart rate, then received IV diltiazem with drastic reduction in heart rate to the 40s.  Was hopeful to discharge home, but with lability of heart rate admitted to observation for additional workup and cardiac monitoring to determine rate control plan.      Past Medical History    Past Medical History:   Diagnosis Date    Acute deep vein thrombosis (DVT) of distal end of right lower extremity (H)     Acute deep vein thrombosis (DVT) of distal vein of right lower extremity (H) 05/16/2016     Brow ptosis, bilateral 02/01/2022    Added automatically from request for surgery [id number]    Clotting disorder (H24)     Depression, major     Dermatochalasis of both upper eyelids 02/01/2022    Added automatically from request for surgery [id number]    Glucose intolerance     H/O bone marrow transplant (H)     h/o Deep vein thrombosis (DVT) (H)     Head injury 1964    Hearing problem Hearing aids 2015    History of blood transfusion 03/2014    History of radiation therapy 06/2014    [place] (hard of hearing)     bilateral    Immunosuppression (H24) Sirolimus daily    Lymphedema of both lower extremities     MDS (myelodysplastic syndrome) (H)     MDS/MPN (myelodysplastic/myeloproliferative neoplasms) (H)     Mixed hyperlipidemia     Numbness and tingling     feet    Obstructive sleep apnea 1999    Pneumonia     Recurrent major depressive disorder, in partial remission (H24) 08/27/2018    Reduced vision 08/2016    Cataract surgery    Senile ectropion of both lower eyelids 02/01/2022    Added automatically from request for surgery [id number]    Sleep apnea 1999    Status post right hip replacement 09/03/2019    Transplant 07/01/2014    Stem Cells       Past Surgical History   Past Surgical History:   Procedure Laterality Date    ARTHROPLASTY HIP ANTERIOR Right 9/3/2019    Procedure: RIGHT TOTAL HIP ARTHROPLASTY, DIRECT ANTERIOR APPROACH;  Surgeon: Yong Diaz MD;  Location:  OR    BACK SURGERY      BIOPSY      BMT CELL PRODUCT INFUSION      CATARACT IOL, RT/LT Bilateral 2015?    ESOPHAGOSCOPY, GASTROSCOPY, DUODENOSCOPY (EGD), COMBINED N/A 2/2/2015    ESOPHAGOSCOPY, GASTROSCOPY, DUODENOSCOPY (EGD), COMBINED Left 8/6/2015    Procedure: COMBINED ESOPHAGOSCOPY, GASTROSCOPY, DUODENOSCOPY (EGD), BIOPSY SINGLE OR MULTIPLE;  Surgeon: Amber Francis MD;  [facility]    EXCISE LESION LIP N/A 1/2/2017    Procedure: EXCISE LESION LIP;  Surgeon: Tim Yanez MD;  Location:  OR    EYE SURGERY       blepharoplasty    FLEXIBLE SIGMOIDOSCOPY  2/2/15    HEMORRHOIDECTOMY  2001    IR FOLLOW UP VISIT OUTPATIENT  1/8/2019    PHACOEMULSIFICATION CLEAR CORNEA WITH STANDARD INTRAOCULAR LENS IMPLANT Left 7/18/2016    Procedure: PHACOEMULSIFICATION CLEAR CORNEA WITH STANDARD INTRAOCULAR LENS IMPLANT;  Surgeon: Randolph Giles MD;  Location:  EC    REPAIR ECTROPION BILATERAL Bilateral 2/24/2022    Procedure: bilateral lower lid ectropion repair;  Surgeon: Florina Solis MD;  Location: [facility] OR    REPAIR PTOSIS BILATERAL Bilateral 2/24/2022    Procedure: Bilateral upper eyelid ptosis repair;  Surgeon: Florina Solis MD;  Location: [facility] OR    TONSILLECTOMY  1953    TRANSPLANT  7-1-2014    Stem Cell Transplant [facility]    VASCULAR SURGERY  2010    varicose vein surgery       Prior to Admission Medications   Prior to Admission Medications   Prescriptions Last Dose Informant Patient Reported? Taking?   Cyanocobalamin (VITAMIN B-12 PO) 3/11/2024 at am  Yes Yes   Sig: Take 1 tablet by mouth daily   NONFORMULARY   Yes Yes   Sig: Blood serum tears   Vitamin D, Cholecalciferol, 25 MCG (1000 UT) TABS 3/11/2024 at am  Yes Yes   Sig: Take 1 tablet (1,000 Units) by mouth daily   acetaminophen (TYLENOL) 325 MG tablet Unknown  Yes Yes   Sig: Take 650 mg by mouth every 6 hours as needed   acyclovir (ZOVIRAX) 800 MG tablet 3/11/2024 at x1  No Yes   Sig: Take 1 tablet (800 mg) by mouth 2 times daily   atorvastatin (LIPITOR) 20 MG tablet 3/10/2024 at hs  No Yes   Sig: Take 1 tablet (20 mg) by mouth daily   calcium carbonate-vitamin D (OSCAL W/D) 500-200 MG-UNIT tablet 3/11/2024 at am  Yes Yes   Sig: Take 1 tablet by mouth daily   carboxymethylcellulose PF (REFRESH PLUS) 0.5 % ophthalmic solution 3/11/2024  Yes Yes   Sig: Place 2 drops into both eyes 4 times daily as needed for dry eyes   fluconazole (DIFLUCAN) 100 MG tablet 3/11/2024 at am  No Yes   Sig: Take 1 tablet (100 mg) by mouth daily   levofloxacin (LEVAQUIN) 250 MG  tablet 3/11/2024 at m  No Yes   Sig: Take 1 tablet (250 mg) by mouth daily Strength: 250 mg   multivitamin, therapeutic (THERA-VIT) TABS tablet 3/11/2024 at am  No Yes   Sig: Take 1 tablet by mouth daily   pantoprazole (PROTONIX) 40 MG EC tablet 3/11/2024 at am  No Yes   Sig: Take 1 tablet (40 mg) by mouth every morning (before breakfast)   predniSONE (DELTASONE) 5 MG tablet 3/11/2024 at am  No Yes   Sig: Take 1 tablet (5 mg) by mouth daily   ruxolitinib (JAKAFI) 5 MG TABS tablet 3/11/2024 at x1  No Yes   Sig: Take 1 tablet (5 mg) by mouth 2 times daily   sertraline (ZOLOFT) 50 MG tablet 3/11/2024 at am  No Yes   Sig: Take 1 tablet (50 mg) by mouth daily   sulfamethoxazole-trimethoprim (BACTRIM DS) 800-160 MG tablet 3/11/2024 at x1  No Yes   Sig: Take one tablet by mouth twice daily on Mondays and Tuesdays only   vitamin E 400 units TABS 3/11/2024 at am  Yes Yes   Sig: Take 400 Units by mouth daily      Facility-Administered Medications: None        Review of Systems    The 10 point Review of Systems is negative other than noted in the HPI or here.      Physical Exam   Vital Signs: Temp: 97  F (36.1  C) Temp src: Temporal BP: 110/68 Pulse: 58   Resp: 16 SpO2: 97 %      Weight: 229 lbs 11.51 oz    Constitutional: Well-appearing, ambulating in room, no acute distress  Respiratory: Lungs clear, normal respiratory effort  Cardiovascular: Irregularly irregular rhythm, bradycardia, no peripheral edema  GI: Soft, nontender, nondistended  Neurologic: AAOx3, mentating clearly    Medical Decision Making       65 MINUTES SPENT BY ME on the date of service doing chart review, history, exam, documentation & further activities per the note.      Data     I have personally reviewed the following data over the past 24 hrs:    6.9  \   12.9 (L)   / 262     140 105 28.6 (H) /  89   3.9 22 1.10 \     Trop: 80 (H) BNP: N/A     TSH: 1.36 T4: N/A A1C: N/A       Imaging results reviewed over the past 24 hrs:   No results found for this or  any previous visit (from the past 24 hour(s)).

## 2024-03-11 NOTE — ED TRIAGE NOTES
Patient complains of shakiness, palpitations, shortness of breath that started today. Patient reports that his heart rate was 148 earlier today. In triage, respirations are labored, sats 97% on room air. Hx of BMT Tx.  in triage.    0

## 2024-03-11 NOTE — ED NOTES
"Cardizem given per orders. Pt heart rate in 30s-40s. Pt appears comfortable, denies dizziness, lightheadedness, chest pain. States \"I feel fine.\" MD notified. No additional orders received at this time.  "

## 2024-03-11 NOTE — ED PROVIDER NOTES
History     Chief Complaint:  Palpitations and Shortness of Breath       The history is provided by the patient.      Deejay Dior is a 73 year old male with MDS status post stem cell transplant on 5 mg prednisone and Jakafi as well as prophylactic acyclovir, Diflucan, Levaquin, and Bactrim who presents with his wife for palpitations and shortness of breath.  He felt at his baseline yesterday and denies recent illness including fever, cough, vomiting, and diarrhea.  He denies any recent change to activity, medications, caffeine or alcohol intake, or travel.  However, today he noticed palpitations with home pulse oximeter reading heart rates up to 140s.  He thought it would improve so he went outside and tried to work.  When he would bend over and stand up he would feel very lightheaded and was afraid he might pass out.  He felt short of breath with exertion.  He did not have any chest pain or change in lower extremity edema.  He ultimately told his wife who suggested they seek care.  He has no history of cardiac disease or arrhythmia.    Independent Historian:    As above    Review of External Notes:  Pulmonology visit November 2023.    Medications:    acetaminophen (TYLENOL) 325 MG tablet  acyclovir (ZOVIRAX) 800 MG tablet  atorvastatin (LIPITOR) 20 MG tablet  calcium carbonate-vitamin D (OSCAL W/D) 500-200 MG-UNIT tablet  fluconazole (DIFLUCAN) 100 MG tablet  levofloxacin (LEVAQUIN) 250 MG tablet  loteprednol (LOTEMAX) 0.2 % SUSP ophthalmic susp  multivitamin, therapeutic (THERA-VIT) TABS tablet  neomycin-polymyxin-dexAMETHasone (MAXITROL) 3.5-73484-6.1 ophthalmic ointment  pantoprazole (PROTONIX) 40 MG EC tablet  predniSONE (DELTASONE) 5 MG tablet  ruxolitinib (JAKAFI) 5 MG TABS tablet  sertraline (ZOLOFT) 50 MG tablet  sulfamethoxazole-trimethoprim (BACTRIM DS) 800-160 MG tablet  Vitamin D, Cholecalciferol, 25 MCG (1000 UT) TABS    Past Medical History:    Past Medical History:   Diagnosis Date    Acute  deep vein thrombosis (DVT) of distal end of right lower extremity (H)     Acute deep vein thrombosis (DVT) of distal vein of right lower extremity (H) 05/16/2016    Brow ptosis, bilateral 02/01/2022    Clotting disorder (H24)     Depression, major     Dermatochalasis of both upper eyelids 02/01/2022    Glucose intolerance     H/O bone marrow transplant (H)     h/o Deep vein thrombosis (DVT) (H)     Head injury 1964    Hearing problem Hearing aids 2015    History of blood transfusion 03/2014    History of radiation therapy 06/2014    [place] (hard of hearing)     Immunosuppression (H24) Sirolimus daily    Lymphedema of both lower extremities     MDS (myelodysplastic syndrome) (H)     MDS/MPN (myelodysplastic/myeloproliferative neoplasms) (H)     Mixed hyperlipidemia     Numbness and tingling     Obstructive sleep apnea 1999    Pneumonia     Recurrent major depressive disorder, in partial remission (H24) 08/27/2018    Reduced vision 08/2016    Senile ectropion of both lower eyelids 02/01/2022    Sleep apnea 1999    Status post right hip replacement 09/03/2019    Transplant 07/01/2014     Past Surgical History:    Past Surgical History:   Procedure Laterality Date    ARTHROPLASTY HIP ANTERIOR Right 9/3/2019    Procedure: RIGHT TOTAL HIP ARTHROPLASTY, DIRECT ANTERIOR APPROACH;  Surgeon: Yong Diaz MD;  Location:  OR    BACK SURGERY      BIOPSY      BMT CELL PRODUCT INFUSION      CATARACT IOL, RT/LT Bilateral 2015?    ESOPHAGOSCOPY, GASTROSCOPY, DUODENOSCOPY (EGD), COMBINED N/A 2/2/2015    ESOPHAGOSCOPY, GASTROSCOPY, DUODENOSCOPY (EGD), COMBINED Left 8/6/2015    Procedure: COMBINED ESOPHAGOSCOPY, GASTROSCOPY, DUODENOSCOPY (EGD), BIOPSY SINGLE OR MULTIPLE;  Surgeon: Amber Francis MD;  [facility]    EXCISE LESION LIP N/A 1/2/2017    Procedure: EXCISE LESION LIP;  Surgeon: Tim Yanez MD;  [facility]    EYE SURGERY      blepharoplasty    FLEXIBLE SIGMOIDOSCOPY  2/2/15     HEMORRHOIDECTOMY  2001    IR FOLLOW UP VISIT OUTPATIENT  1/8/2019    PHACOEMULSIFICATION CLEAR CORNEA WITH STANDARD INTRAOCULAR LENS IMPLANT Left 7/18/2016    Procedure: PHACOEMULSIFICATION CLEAR CORNEA WITH STANDARD INTRAOCULAR LENS IMPLANT;  Surgeon: Randolph Giles MD;  Location:  [facility]    REPAIR ECTROPION BILATERAL Bilateral 2/24/2022    Procedure: bilateral lower lid ectropion repair;  Surgeon: Florina Solis MD;  Location: [facility] OR    REPAIR PTOSIS BILATERAL Bilateral 2/24/2022    Procedure: Bilateral upper eyelid ptosis repair;  Surgeon: Florina Solis MD;  Location: [facility] OR    TONSILLECTOMY  1953    TRANSPLANT  7-1-2014    St[facility] Cell Transplant [facility]    VASCULAR SURGERY  2010    varicose vein surgery      Physical Exam   Patient Vitals for the past 24 hrs:   BP Temp Temp src Pulse Resp SpO2 Weight   03/11/24 1958 118/80 -- -- 56 16 97 % --   03/11/24 1929 121/79 -- -- 55 20 97 % --   03/11/24 1915 118/80 -- -- 57 18 98 % --   03/11/24 1854 -- -- -- -- -- -- 104.1 kg (229 lb 8 oz)   03/11/24 1845 122/74 -- -- 56 -- 96 % --   03/11/24 1831 -- -- -- -- 14 97 % --   03/11/24 1830 94/67 -- -- -- -- -- --   03/11/24 1821 -- -- -- (!) 42 15 97 % --   03/11/24 1820 99/66 -- -- -- 16 97 % --   03/11/24 1805 94/63 -- -- (!) 40 10 97 % --   03/11/24 1801 95/79 -- -- (!) 46 17 99 % --   03/11/24 1749 91/59 -- -- (!) 47 11 96 % --   03/11/24 1720 95/61 -- -- (!) 48 13 99 % --   03/11/24 1630 133/84 -- -- 110 -- 100 % --   03/11/24 1615 115/88 -- -- 101 -- 100 % --   03/11/24 1600 115/84 -- -- 110 -- 98 % --   03/11/24 1545 115/87 -- -- 84 -- 97 % --   03/11/24 1530 110/79 -- -- 82 -- 95 % --   03/11/24 1519 135/84 -- -- 87 11 100 % --   03/11/24 1501 98/82 97  F (36.1  C) Temporal 108 24 97 % --      Physical Exam  General: Well-developed and well-nourished. Well appearing elderly  man. Cooperative.  Head:  Atraumatic.  Eyes:  Conjunctivae, lids, and sclerae are normal.  ENT:    Normal nose. Moist  mucous membranes.  Neck:  Supple. Normal range of motion.  CV:  Tachycardic rate and irregularly irregular rhythm. Normal heart sounds with no murmurs, rubs, or gallops detected.  Resp:  No respiratory distress. Clear to auscultation bilaterally without decreased breath sounds, wheezing, rales, or rhonchi.  GI:  Soft. Non-distended. Non-tender.    MS:  Normal ROM.  Trace bilateral lower extremity edema.  Skin:  Warm. Non-diaphoretic. No pallor.  Neuro:  Awake. A&Ox3. Normal strength.  Psych: Normal mood and affect. Normal speech.  Vitals reviewed.    Emergency Department Course   EKG #1  Indication: palpitations  Time: 1526  Rate 96 bpm. QRS duration 76. QT/QTc 346/437.   Atrial fibrillation  Nonspecific ST abnormality  Abnormal ECG  No acute ST changes.  Atrial fibrillation replaces sinus rhythm as compared to prior, dated 2/8/21.    EKG #2  Indication: rhythm change  Time: 1921  Rate 53 bpm. CO interval 180. QRS duration 76. QT/QTc 430/403.   Sinus bradycardia with marked sinus arrhythmia  Otherwise normal ECG  No acute ST changes.  Sinus rhythm replaces atrial fibrillation as compared to prior, dated today (as above).    Laboratory:  Labs Ordered and Resulted from Time of ED Arrival to Time of ED Departure   GLUCOSE BY METER - Abnormal       Result Value    GLUCOSE BY METER POCT 104 (*)    BASIC METABOLIC PANEL - Abnormal    Sodium 140      Potassium 3.9      Chloride 105      Carbon Dioxide (CO2) 22      Anion Gap 13      Urea Nitrogen 28.6 (*)     Creatinine 1.10      GFR Estimate 70      Calcium 9.3      Glucose 89     TROPONIN T, HIGH SENSITIVITY - Abnormal    Troponin T, High Sensitivity 79 (*)    CBC WITH PLATELETS AND DIFFERENTIAL - Abnormal    WBC Count 6.9      RBC Count 4.01 (*)     Hemoglobin 12.9 (*)     Hematocrit 38.0 (*)     MCV 95      MCH 32.2      MCHC 33.9      RDW 16.6 (*)     Platelet Count 262      % Neutrophils 58      % Lymphocytes 30      % Monocytes 11      % Eosinophils 1      %  Basophils 0      % Immature Granulocytes 0      NRBCs per 100 WBC 0      Absolute Neutrophils 4.0      Absolute Lymphocytes 2.0      Absolute Monocytes 0.7      Absolute Eosinophils 0.1      Absolute Basophils 0.0      Absolute Immature Granulocytes 0.0      Absolute NRBCs 0.0     TROPONIN T, HIGH SENSITIVITY - Abnormal    Troponin T, High Sensitivity 80 (*)    MAGNESIUM - Normal    Magnesium 1.9     TSH WITH FREE T4 REFLEX - Normal    TSH 1.36          Emergency Department Course & Assessments:  Interventions:  metoprolol tartrate (LOPRESSOR) tablet 50 mg (50 mg Oral $Given 3/11/24 1610)   diltiazem (CARDIZEM) injection 25 mg (25 mg Intravenous $Given 3/11/24 1706)   enoxaparin ANTICOAGULANT (LOVENOX) injection 100 mg (100 mg Subcutaneous $Given 3/11/24 1928)      Assessments:  1805 I updated the patient and his wife. He is comfortable with admission.     Independent Interpretation (X-rays, CTs, rhythm strip):  Not applicable    Consultations/Discussion of Management or Tests:  1837 I consulted with Dr. Maldonado, hospitalist, who accepts admission. He recommends Lovenox.     Social Determinants of Health affecting care:  Supportive wife     Disposition:  The patient was admitted to the hospital under the care of Dr. Maldonado.    Impression & Plan    Medical Decision Making:  Deejay is a 73 year old man with a history of MDS status post stem cell transplant who developed palpitations today associated with dyspnea on exertion and near syncope.  He denies any other symptoms is well-appearing on exam though tachycardic with rates typically between 97 bpm and 124 bpm and an irregularly irregular rhythm.  EKG confirmed atrial fibrillation.  This is a new diagnosis for Dejeay.    Laboratory studies are significant primarily for elevated troponin at 79 with repeat of 80.  There are no meaningful electrolyte derangements and TSH is normal.  He was given oral metoprolol with virtually no change in his rate.  He was then given a  bolus of Cardizem.  He had marked improvement in his rate with bradycardia down to the 30s.  He remained in atrial fibrillation for quite some time although just prior to admission he did convert to sinus rhythm.    This man requires hospitalization for attention to his atrial fibrillation.  Rate control strategy may be difficult given his severe bradycardia with Cardizem.  Discussion of anticoagulation will also need to be undertaken and he warrants echocardiogram.  Troponin elevation is likely due to demand but an ischemic evaluation may be initiated.  I updated Deejay and his wife on findings and plan and answered all their questions.  They verbalized understanding and are amenable.  I discussed the patient's case with Dr. Maldonado, hospitalist, who accepts admission and requests Lovenox which was administered to the emergency department.    Diagnosis:    ICD-10-CM    1. New onset atrial fibrillation (H)  I48.91       2. Elevated troponin  R79.89            3/11/2024   Stefania Orr MD Dixson, Kylie S, MD  03/11/24 2034

## 2024-03-11 NOTE — ED NOTES
[facility]  ED Nurse Handoff Report    ED Chief complaint: Palpitations and Shortness of Breath  . ED Diagnosis:   Final diagnoses:   None       Allergies:   Allergies   Allergen Reactions    Blood Transfusion Related (Informational Only) Other (See Comments)     Patient has a history of a clinically significant antibody against RBC antigens.  A delay in compatible RBCs may occur.       Code Status: Full Code    Activity level - Baseline/Home:  independent.  Activity Level - Current:   standby.   Lift room needed: No.   Bariatric: No   Needed: No   Isolation: No.   Infection: Not Applicable.     Respiratory status: Room air    Vital Signs (within 30 minutes):   Vitals:    03/11/24 1820 03/11/24 1821 03/11/24 1830 03/11/24 1831   BP: 99/66  94/67    Pulse:  (!) 42     Resp: 16 15  14   Temp:       TempSrc:       SpO2: 97% 97%  97%       Cardiac Rhythm:  ,   Cardiac  Cardiac Rhythm: Atrial fibrillation  Pain level:    Patient confused: No.   Patient Falls Risk: nonskid shoes/slippers when out of bed, patient and family education, and activity supervised.   Elimination Status: Has voided     Patient Report - Initial Complaint: Shortness of breath, heart palpitations.   Focused Assessment: Deejay Dior is a 73 year old male with MDS status post stem cell transplant on 5 mg prednisone and Jakafi as well as prophylactic acyclovir, Diflucan, Levaquin, and Bactrim who presents with his wife for palpitations and shortness of breath.  He felt at his baseline yesterday and denies recent illness including fever, cough, vomiting, and diarrhea.  He denies any recent change to activity, medications, caffeine or alcohol intake, or travel.  However, today he noticed palpitations with home pulse oximeter reading heart rates up to 140s.  He thought it would improve so he went outside and tried to work.  When he would bend over and stand up he would feel very lightheaded and was afraid he might  pass out.  He felt short of breath with exertion.  He did not have any chest pain or change in lower extremity edema.  He ultimately told his wife who suggested they seek care.  He has no history of cardiac disease or arrhythmia.      Abnormal Results:   Labs Ordered and Resulted from Time of ED Arrival to Time of ED Departure   GLUCOSE BY METER - Abnormal       Result Value    GLUCOSE BY METER POCT 104 (*)    BASIC METABOLIC PANEL - Abnormal    Sodium 140      Potassium 3.9      Chloride 105      Carbon Dioxide (CO2) 22      Anion Gap 13      Urea Nitrogen 28.6 (*)     Creatinine 1.10      GFR Estimate 70      Calcium 9.3      Glucose 89     TROPONIN T, HIGH SENSITIVITY - Abnormal    Troponin T, High Sensitivity 79 (*)    CBC WITH PLATELETS AND DIFFERENTIAL - Abnormal    WBC Count 6.9      RBC Count 4.01 (*)     Hemoglobin 12.9 (*)     Hematocrit 38.0 (*)     MCV 95      MCH 32.2      MCHC 33.9      RDW 16.6 (*)     Platelet Count 262      % Neutrophils 58      % Lymphocytes 30      % Monocytes 11      % Eosinophils 1      % Basophils 0      % Immature Granulocytes 0      NRBCs per 100 WBC 0      Absolute Neutrophils 4.0      Absolute Lymphocytes 2.0      Absolute Monocytes 0.7      Absolute Eosinophils 0.1      Absolute Basophils 0.0      Absolute Immature Granulocytes 0.0      Absolute NRBCs 0.0     TROPONIN T, HIGH SENSITIVITY - Abnormal    Troponin T, High Sensitivity 80 (*)    MAGNESIUM - Normal    Magnesium 1.9     TSH WITH FREE T4 REFLEX - Normal    TSH 1.36          Echocardiogram Complete    (Results Pending)       Treatments provided: See MAR  Family Comments: Wife at bedside  OBS brochure/video discussed/provided to patient:  Yes  ED Medications:   Medications   enoxaparin ANTICOAGULANT (LOVENOX) injection 100 mg (has no administration in time range)   senna-docusate (SENOKOT-S/PERICOLACE) 8.6-50 MG per tablet 1 tablet (has no administration in time range)     Or   senna-docusate (SENOKOT-S/PERICOLACE)  8.6-50 MG per tablet 2 tablet (has no administration in time range)   ondansetron (ZOFRAN ODT) ODT tab 4 mg (has no administration in time range)     Or   ondansetron (ZOFRAN) injection 4 mg (has no administration in time range)   Patient is already receiving anticoagulation with heparin, enoxaparin (LOVENOX), warfarin (COUMADIN)  or other anticoagulant medication (has no administration in time range)   metoprolol tartrate (LOPRESSOR) tablet 50 mg (50 mg Oral $Given 3/11/24 1610)   diltiazem (CARDIZEM) injection 25 mg (25 mg Intravenous $Given 3/11/24 1706)       Drips infusing:  No  For the majority of the shift this patient was Green.   Interventions performed were N/A.    Sepsis treatment initiated: No    Cares/treatment/interventions/medications to be completed following ED care: See inpatient orders.    ED Nurse Name: Valerie Bravo RN  6:46 PM     RECEIVING UNIT ED HANDOFF REVIEW    Above ED Nurse Handoff Report was reviewed: Yes  Reviewed by: Lizet Holman RN on March 11, 2024 at 7:25 PM

## 2024-03-12 ENCOUNTER — ORDERS ONLY (AUTO-RELEASED) (OUTPATIENT)
Dept: MEDSURG UNIT | Facility: CLINIC | Age: 74
End: 2024-03-12
Payer: MEDICARE

## 2024-03-12 ENCOUNTER — APPOINTMENT (OUTPATIENT)
Dept: CARDIOLOGY | Facility: CLINIC | Age: 74
End: 2024-03-12
Attending: STUDENT IN AN ORGANIZED HEALTH CARE EDUCATION/TRAINING PROGRAM
Payer: MEDICARE

## 2024-03-12 VITALS
WEIGHT: 229.72 LBS | OXYGEN SATURATION: 97 % | BODY MASS INDEX: 36.06 KG/M2 | HEART RATE: 61 BPM | SYSTOLIC BLOOD PRESSURE: 125 MMHG | DIASTOLIC BLOOD PRESSURE: 66 MMHG | HEIGHT: 67 IN | TEMPERATURE: 98.1 F | RESPIRATION RATE: 16 BRPM

## 2024-03-12 DIAGNOSIS — I48.91 NEW ONSET ATRIAL FIBRILLATION (H): ICD-10-CM

## 2024-03-12 LAB
ANION GAP SERPL CALCULATED.3IONS-SCNC: 10 MMOL/L (ref 7–15)
ATRIAL RATE - MUSE: 53 BPM
BUN SERPL-MCNC: 24.7 MG/DL (ref 8–23)
CALCIUM SERPL-MCNC: 8.7 MG/DL (ref 8.8–10.2)
CHLORIDE SERPL-SCNC: 105 MMOL/L (ref 98–107)
CREAT SERPL-MCNC: 1.01 MG/DL (ref 0.67–1.17)
DEPRECATED HCO3 PLAS-SCNC: 23 MMOL/L (ref 22–29)
DIASTOLIC BLOOD PRESSURE - MUSE: NORMAL MMHG
EGFRCR SERPLBLD CKD-EPI 2021: 78 ML/MIN/1.73M2
ERYTHROCYTE [DISTWIDTH] IN BLOOD BY AUTOMATED COUNT: 17 % (ref 10–15)
GLUCOSE SERPL-MCNC: 82 MG/DL (ref 70–99)
HCT VFR BLD AUTO: 37.4 % (ref 40–53)
HGB BLD-MCNC: 12.3 G/DL (ref 13.3–17.7)
INTERPRETATION ECG - MUSE: NORMAL
LVEF ECHO: NORMAL
MCH RBC QN AUTO: 31.5 PG (ref 26.5–33)
MCHC RBC AUTO-ENTMCNC: 32.9 G/DL (ref 31.5–36.5)
MCV RBC AUTO: 96 FL (ref 78–100)
P AXIS - MUSE: 41 DEGREES
PLATELET # BLD AUTO: 246 10E3/UL (ref 150–450)
POTASSIUM SERPL-SCNC: 3.9 MMOL/L (ref 3.4–5.3)
PR INTERVAL - MUSE: 180 MS
QRS DURATION - MUSE: 76 MS
QT - MUSE: 430 MS
QTC - MUSE: 403 MS
R AXIS - MUSE: 2 DEGREES
RBC # BLD AUTO: 3.9 10E6/UL (ref 4.4–5.9)
SODIUM SERPL-SCNC: 138 MMOL/L (ref 135–145)
SYSTOLIC BLOOD PRESSURE - MUSE: NORMAL MMHG
T AXIS - MUSE: 35 DEGREES
VENTRICULAR RATE- MUSE: 53 BPM
WBC # BLD AUTO: 5.9 10E3/UL (ref 4–11)

## 2024-03-12 PROCEDURE — 999N000208 ECHOCARDIOGRAM COMPLETE

## 2024-03-12 PROCEDURE — G0378 HOSPITAL OBSERVATION PER HR: HCPCS

## 2024-03-12 PROCEDURE — 99239 HOSP IP/OBS DSCHRG MGMT >30: CPT | Performed by: STUDENT IN AN ORGANIZED HEALTH CARE EDUCATION/TRAINING PROGRAM

## 2024-03-12 PROCEDURE — 250N000012 HC RX MED GY IP 250 OP 636 PS 637: Performed by: STUDENT IN AN ORGANIZED HEALTH CARE EDUCATION/TRAINING PROGRAM

## 2024-03-12 PROCEDURE — 85027 COMPLETE CBC AUTOMATED: CPT | Performed by: STUDENT IN AN ORGANIZED HEALTH CARE EDUCATION/TRAINING PROGRAM

## 2024-03-12 PROCEDURE — 255N000002 HC RX 255 OP 636: Performed by: STUDENT IN AN ORGANIZED HEALTH CARE EDUCATION/TRAINING PROGRAM

## 2024-03-12 PROCEDURE — 250N000013 HC RX MED GY IP 250 OP 250 PS 637: Performed by: STUDENT IN AN ORGANIZED HEALTH CARE EDUCATION/TRAINING PROGRAM

## 2024-03-12 PROCEDURE — 93306 TTE W/DOPPLER COMPLETE: CPT | Mod: 26 | Performed by: INTERNAL MEDICINE

## 2024-03-12 PROCEDURE — 36415 COLL VENOUS BLD VENIPUNCTURE: CPT | Performed by: STUDENT IN AN ORGANIZED HEALTH CARE EDUCATION/TRAINING PROGRAM

## 2024-03-12 PROCEDURE — 80048 BASIC METABOLIC PNL TOTAL CA: CPT | Performed by: STUDENT IN AN ORGANIZED HEALTH CARE EDUCATION/TRAINING PROGRAM

## 2024-03-12 RX ORDER — ACYCLOVIR 400 MG/1
800 TABLET ORAL 2 TIMES DAILY
Status: DISCONTINUED | OUTPATIENT
Start: 2024-03-12 | End: 2024-03-12 | Stop reason: HOSPADM

## 2024-03-12 RX ORDER — CARBOXYMETHYLCELLULOSE SODIUM 5 MG/ML
1 SOLUTION/ DROPS OPHTHALMIC
Status: DISCONTINUED | OUTPATIENT
Start: 2024-03-12 | End: 2024-03-12 | Stop reason: HOSPADM

## 2024-03-12 RX ORDER — PREDNISONE 5 MG/1
5 TABLET ORAL DAILY
Status: DISCONTINUED | OUTPATIENT
Start: 2024-03-12 | End: 2024-03-12 | Stop reason: HOSPADM

## 2024-03-12 RX ORDER — FLUCONAZOLE 100 MG/1
100 TABLET ORAL DAILY
Status: DISCONTINUED | OUTPATIENT
Start: 2024-03-12 | End: 2024-03-12 | Stop reason: HOSPADM

## 2024-03-12 RX ORDER — LEVOFLOXACIN 250 MG/1
250 TABLET, FILM COATED ORAL DAILY
Status: DISCONTINUED | OUTPATIENT
Start: 2024-03-12 | End: 2024-03-12 | Stop reason: HOSPADM

## 2024-03-12 RX ORDER — ACETAMINOPHEN 325 MG/1
650 TABLET ORAL EVERY 6 HOURS PRN
Status: DISCONTINUED | OUTPATIENT
Start: 2024-03-12 | End: 2024-03-12 | Stop reason: HOSPADM

## 2024-03-12 RX ORDER — PANTOPRAZOLE SODIUM 40 MG/1
40 TABLET, DELAYED RELEASE ORAL
Status: DISCONTINUED | OUTPATIENT
Start: 2024-03-12 | End: 2024-03-12 | Stop reason: HOSPADM

## 2024-03-12 RX ORDER — ATORVASTATIN CALCIUM 20 MG/1
20 TABLET, FILM COATED ORAL AT BEDTIME
Status: DISCONTINUED | OUTPATIENT
Start: 2024-03-12 | End: 2024-03-12 | Stop reason: HOSPADM

## 2024-03-12 RX ORDER — SULFAMETHOXAZOLE/TRIMETHOPRIM 800-160 MG
1 TABLET ORAL
Status: DISCONTINUED | OUTPATIENT
Start: 2024-03-12 | End: 2024-03-12 | Stop reason: HOSPADM

## 2024-03-12 RX ADMIN — HUMAN ALBUMIN MICROSPHERES AND PERFLUTREN 6 ML: 10; .22 INJECTION, SOLUTION INTRAVENOUS at 10:53

## 2024-03-12 RX ADMIN — FLUCONAZOLE 100 MG: 100 TABLET ORAL at 08:09

## 2024-03-12 RX ADMIN — LEVOFLOXACIN 250 MG: 250 TABLET, FILM COATED ORAL at 08:00

## 2024-03-12 RX ADMIN — SULFAMETHOXAZOLE AND TRIMETHOPRIM 1 TABLET: 800; 160 TABLET ORAL at 07:59

## 2024-03-12 RX ADMIN — PANTOPRAZOLE SODIUM 40 MG: 40 TABLET, DELAYED RELEASE ORAL at 08:00

## 2024-03-12 RX ADMIN — PREDNISONE 5 MG: 5 TABLET ORAL at 08:00

## 2024-03-12 RX ADMIN — ACYCLOVIR 800 MG: 400 TABLET ORAL at 08:00

## 2024-03-12 RX ADMIN — APIXABAN 5 MG: 5 TABLET, FILM COATED ORAL at 14:39

## 2024-03-12 ASSESSMENT — ACTIVITIES OF DAILY LIVING (ADL)
ADLS_ACUITY_SCORE: 38

## 2024-03-12 NOTE — PLAN OF CARE
"PRIMARY DIAGNOSIS: A. Fib  OUTPATIENT/OBSERVATION GOALS TO BE MET BEFORE DISCHARGE:  1. Diagnostic testing complete & at baseline neurologic testing: No, ECHO in AM    2. Cleared by consultants (if involved): No    3. Interpretation of cardiac rhythm per telemetry tech: SB 50's with PAC's    4. Tolerating adequate PO diet and medications: Yes    5. Return to near baseline physical activity or neurologic status: Yes    Discharge Planner Nurse   Safe discharge environment identified: Yes  Barriers to discharge: Yes       Entered by: Lizet Holman RN 03/12/2024 4:51 AM  Pt AO x4. VSS on RA. LS clear, BS active. Pt up with SBA due to lightheadedness. Tolerating regular diet. PIV SL. Pt denies pain. Plan for ECHO. Will continue to monitor and provide supportive cares.  /75 (BP Location: Right arm)   Pulse 58   Temp 97.9  F (36.6  C) (Oral)   Resp 16   Ht 1.702 m (5' 7\")   Wt 104.2 kg (229 lb 11.5 oz)   SpO2 95%   BMI 35.98 kg/m    Please review provider order for any additional goals.   Nurse to notify provider when observation goals have been met and patient is ready for discharge.  "

## 2024-03-12 NOTE — CONSULTS
SPIRITUAL HEALTH SERVICES - Consult Note  Obs 2    Referral Source/ Reason for Visit: Emotional support.    Summary and Recommendations -     Reflectively listened to patient and wife describe his onset of A Fib 3/11 that precipitated his admission to .      Plan: He is being discharged to home today with an oupatient follow-up plan.  No spiritual needs.    Rev. ELZA Peck.  Staff     SHS available 24/7 for emergent requests/ referrals, either by paging the on-call  or by entering an ASAP/STAT consult in [facility], which will also page the on-call .      Assessment    Saw pt Deejay Dior regarding staff consult for emotional support.    Patient/Family Understanding of Illness and Goals of Care - Patient understands that he was admitted to  due to A Fib.    Distress and Loss - He describes the sudden and severe onset of his symptoms as frightening; especially the extreme weakness and fatigue.    Strengths, Coping and Resources - His wife was at bedside; offering love and support.    Meaning, Beliefs and Spirituality - He states that he is Hinduism.  He did not name a home parish.

## 2024-03-12 NOTE — PLAN OF CARE
PRIMARY DIAGNOSIS: A. Fib  OUTPATIENT/OBSERVATION GOALS TO BE MET BEFORE DISCHARGE:  1. Diagnostic testing complete & at baseline neurologic testing: No, ECHO in AM    2. Cleared by consultants (if involved): No    3. Interpretation of cardiac rhythm per telemetry tech: SB 50's    4. Tolerating adequate PO diet and medications: Yes    5. Return to near baseline physical activity or neurologic status: Yes    Discharge Planner Nurse   Safe discharge environment identified: Yes  Barriers to discharge: Yes       Entered by: Lizet Holman RN 03/12/2024   Pt AO x4. VSS on RA. LS clear, BS active. Pt up with SBA due to lightheadedness. Tolerating regular diet. PIV SL. Pt denies pain. Plan for ECHO. Will continue to monitor and provide supportive cares.  Please review provider order for any additional goals.   Nurse to notify provider when observation goals have been met and patient is ready for discharge.

## 2024-03-12 NOTE — CONSULTS
Patient has Medicare D through [employer].    Xarelto/Eliquis:  $0/mo for the remainder of 2024.    Destiny Vargas  Pharmacy Technician/Liaison, Discharge Pharmacy   [phone] (voice or text)  [email or web address]  Available on Vocera and Teams

## 2024-03-12 NOTE — DISCHARGE SUMMARY
"[facility]  Hospitalist Discharge Summary      Date of Admission:  3/11/2024  Date of Discharge:  3/12/2024  2:41 PM  Discharging Provider: Elaine Connor DO  Discharge Service: Hospitalist Service    Discharge Diagnoses   New onset paroxysmal atrial fibrillation  Myelodysplastic syndrome s/p stem cell transplant  Hx cgxir-dybmfx-ywcy disease  Chronic steroid use    Clinically Significant Risk Factors     # Obesity: Estimated body mass index is 35.98 kg/m  as calculated from the following:    Height as of this encounter: 1.702 m (5' 7\").    Weight as of this encounter: 104.2 kg (229 lb 11.5 oz).       Follow-ups Needed After Discharge   Follow-up Appointments     Follow-up and recommended labs and tests       Follow up with primary care provider, Jona Baumann, within 7 days for   hospital follow- up.            Discharge Disposition   Discharged to home  Condition at discharge: Stable    Hospital Course   Deejay Dior is a 73 year old male admitted on 3/11/2024. He has a history of myelodysplastic syndrome s/p stem cell transplant and history of akpoc-tkagus-aouo disease, chronic immunosuppression with prednisone who presents to the ED with shortness of breath and palpitations.  Symptoms began earlier in the day prior to arrival at ED.  Pulse ox at home showed heart rate was in the 140s.  Continue to have shortness of breath, lightheadedness with changes in position, prompting visit to ED.  EKG in ED showed atrial fibrillation, heart rate in the mid 90s up to the 120s.  Initially received p.o. metoprolol tartrate with no improvement in heart rates, then received 25 mg IV diltiazem which drastically reduced heart rate to the 40s. Monitored overnight with improvement of HR to 70s. Patient remained in NSR and asymptomatic so was discharged with a zio patch ordered for follow up.      New onset atrial fibrillation, paroxysmal  Started feeling palpitations and shortness of breath " earlier in day prior to admission.  Had felt at his baseline the day prior, no recent illness.  Used pulse oximeter at home and noticed heart rate was into the 140s.  Felt very lightheaded with changes in position, ongoing shortness of breath prompting ED visit.  Rate was initially in the mid 90s to 120s in the ED, EKG confirmed atrial fibrillation.  Was given a dose of oral metoprolol without much improvement in heart rate, was then given dose of IV diltiazem with significant decrease in heart rate into the 40s, but initially still in atrial fibrillation.  Shortly after arriving to the observation floor, telemetry indicated patient seemed to be out of A-fib, EKG confirmed sinus bradycardia with rate in the 40s and significant sinus arrhythmia.  Remained hemodynamically stable, no palpitations or chest discomfort, feeling back to his baseline.  TTE showed no valvular disease, normal EF, and biatrial enlargement. Had previously been on anticoagulation for DVT, would be agreeable to anticoagulation again.  [id number] 2 was 2 based on age alone, no prior cardiac history.   Remained in NSR on day of discharge. No further palpitations or lightheadedness.  - Eliquis  - Zio patch ordered     Myelodysplastic syndrome s/p stem cell transplant  Hx nbtcq-rgcpfo-sabs disease  Chronic steroid use  Diagnosed about 9 years ago, at that time received bone marrow transplant and has had multisite ystir-kopash-gfce disease over the years.  Currently immunosuppressed with prednisone, Jakafi.  Takes prophylactic antimicrobials daily as well.      Consultations This Hospital Stay   PHARMACY LIAISON FOR MEDICATION COVERAGE CONSULT  SPIRITUAL HEALTH SERVICES IP CONSULT    Code Status   Prior    Time Spent on this Encounter   I, Elaine Connor DO, personally saw the patient today and spent greater than 30 minutes discharging this patient.       Elaine Connor DO  [facility] OBSERVATION DEPT  [address] [facility] [address]  [facility] [address]  Phone: [phone]  ______________________________________________________________________    Physical Exam   Vital Signs: Temp: 98.1  F (36.7  C) Temp src: Oral BP: 125/66 Pulse: 61   Resp: 16 SpO2: 97 % O2 Device: None (Room air)    Weight: 229 lbs 11.51 oz  Constitutional: Awake, alert, no distress, and cooperative  Cardiovascular: Regular rate and rhythm, normal S1 and S2, no S3 or S4, and no murmur noted  Respiratory: No increased work of breathing, good air exchange, clear to auscultation bilaterally, no crackles or wheezing  Gastrointestinal: Abdomen soft, non-tender, non-distended. BS normal. No masses, organomegaly        Primary Care Physician   Jona Baumann    Discharge Orders      Reason for your hospital stay    You were in the hospital for new onset atrial fibrillation (fast abnormal heart rhythm). You were given medication to slow the heart rate, which helped. Your heart went back into normal rhythm. You are at risk for further episodes of atrial fibrillation, so it is recommended that you start a blood thinner to prevent a stroke. You will also receive a heart rhythm monitor in the mail to wear for 2 weeks to monitor for further episodes.     Follow-up and recommended labs and tests     Follow up with primary care provider, Jona Baumann, within 7 days for hospital follow- up.     Activity    Your activity upon discharge: activity as tolerated     Diet    Follow this diet upon discharge: Orders Placed This Encounter      Regular Diet Adult     ZIO PATCH MAIL OUT       Significant Results and Procedures   Results for orders placed or performed during the hospital encounter of 24   Echocardiogram Complete     Value    LVEF  55-60%    Narrative    [id number]  [id number]  [id number]^JORGE^[facility]  Echocardiography Laboratory  [address] [place] [address]     Name: TOM CARRASCO  MRN: [id number]  :  1950  Study Date: 03/12/2024 10:35 AM  Age: 73 yrs  Gender: Male  Patient Location: [facility]  Reason For Study: Atrial Fibrillation  Ordering Physician: FARSHAD PATEL  Performed By: ALEX Linn     BSA: 2.1 m2  Height: 67 in  Weight: 229 lb  HR: 67  BP: 94/67 mmHg  ______________________________________________________________________________  Procedure  Complete Portable Echo Adult. Optison (NDC #4731-1364) given intravenously.  ______________________________________________________________________________  Interpretation Summary     The visual ejection fraction is 55-60%.  Sinus rhythm was noted.  There is mild-moderate biatrial enlargement.  ______________________________________________________________________________  Left Ventricle  The left ventricle is normal in structure, function and size. The visual  ejection fraction is 55-60%. Grade II or moderate diastolic dysfunction.     Right Ventricle  The right ventricle is normal in structure, function and size.     Atria  There is mild-moderate biatrial enlargement.     Mitral Valve  There is mild mitral annular calcification. The mitral valve leaflets are  mildly thickened.     Tricuspid Valve  Normal tricuspid valve. There is trace tricuspid regurgitation.     Aortic Valve  There is moderate trileaflet aortic sclerosis.     Pulmonic Valve  Normal pulmonic valve.     Vessels  The aortic root is normal size. The inferior vena cava is normal.     Pericardium  There is no pericardial effusion.     Rhythm  Sinus rhythm was noted.  ______________________________________________________________________________  MMode/2D Measurements & Calculations     IVSd: 1.0 cm  LVIDd: 4.9 cm  LVIDs: 3.5 cm  LVPWd: 0.98 cm  FS: 27.7 %  LV mass(C)d: 176.0 grams  LV mass(C)dI: 82.2 grams/m2  Ao root diam: 3.5 cm  asc Aorta Diam: 3.8 cm  LVOT diam: 2.1 cm  LVOT area: 3.4 cm2  Ao root diam index Ht(cm/m): 2.1  Ao root diam index BSA (cm/m2): 1.6  Asc Ao diam index BSA  (cm/m2): 1.8  Asc Ao diam index Ht(cm/m): 2.2  LA Volume (BP): 77.8 ml     LA Volume Index (BP): 36.4 ml/m2  RV Base: 4.0 cm  RWT: 0.41  TAPSE: 2.3 cm     Doppler Measurements & Calculations  MV E max miguel: 85.9 cm/sec  MV A max miguel: 89.6 cm/sec  MV E/A: 0.96  MV max PG: 3.4 mmHg  MV mean P.6 mmHg  MV V2 VTI: 29.6 cm  MV dec time: 0.23 sec  PA V2 max: 121.5 cm/sec  PA max P.9 mmHg  PA acc time: 0.15 sec  TR max miguel: 261.8 cm/sec  TR max P.4 mmHg  E/E' avg: 10.7  Lateral E/e': 10.8  Medial E/e': 10.7     RV S Miguel: 11.1 cm/sec     ______________________________________________________________________________  Report approved by: Brennen Phan 2024 11:53 AM           *Note: Due to a large number of results and/or encounters for the requested time period, some results have not been displayed. A complete set of results can be found in Results Review.       Discharge Medications   Discharge Medication List as of 3/12/2024  1:15 PM        START taking these medications    Details   apixaban ANTICOAGULANT (ELIQUIS) 5 MG tablet Take 1 tablet (5 mg) by mouth 2 times daily, Disp-60 tablet, R-0, E-Prescribe           CONTINUE these medications which have NOT CHANGED    Details   acetaminophen (TYLENOL) 325 MG tablet Take 650 mg by mouth every 6 hours as needed, Historical      acyclovir (ZOVIRAX) 800 MG tablet Take 1 tablet (800 mg) by mouth 2 times daily, Disp-180 tablet, R-1, E-Prescribe      atorvastatin (LIPITOR) 20 MG tablet Take 1 tablet (20 mg) by mouth daily, Disp-90 tablet, R-3, E-PrescribeProfile Rx: patient will contact pharmacy when needed.      calcium carbonate-vitamin D (OSCAL W/D) 500-200 MG-UNIT tablet Take 1 tablet by mouth daily, Historical      carboxymethylcellulose PF (REFRESH PLUS) 0.5 % ophthalmic solution Place 2 drops into both eyes 4 times daily as needed for dry eyes, Historical      Cyanocobalamin (VITAMIN B-12 PO) Take 1 tablet by mouth daily, Historical      fluconazole  (DIFLUCAN) 100 MG tablet Take 1 tablet (100 mg) by mouth daily, Disp-90 tablet, R-3, E-Prescribe      levofloxacin (LEVAQUIN) 250 MG tablet Take 1 tablet (250 mg) by mouth daily Strength: 250 mg, Disp-90 tablet, R-3, E-Prescribe      multivitamin, therapeutic (THERA-VIT) TABS tablet Take 1 tablet by mouth daily, Transitional      NONFORMULARY Blood serum tears, Historical      pantoprazole (PROTONIX) 40 MG EC tablet Take 1 tablet (40 mg) by mouth every morning (before breakfast), Disp-90 tablet, R-2, E-Prescribe      predniSONE (DELTASONE) 5 MG tablet Take 1 tablet (5 mg) by mouth daily, Disp-90 tablet, R-1, E-Prescribe      ruxolitinib (JAKAFI) 5 MG TABS tablet Take 1 tablet (5 mg) by mouth 2 times daily, Disp-60 tablet, R-11, E-Prescribe      sertraline (ZOLOFT) 50 MG tablet Take 1 tablet (50 mg) by mouth daily, Disp-90 tablet, R-3, E-PrescribeProfile Rx: patient will contact pharmacy when needed.      sulfamethoxazole-trimethoprim (BACTRIM DS) 800-160 MG tablet Take one tablet by mouth twice daily on Mondays and Tuesdays only, Disp-48 tablet, R-0, E-Prescribe      Vitamin D, Cholecalciferol, 25 MCG (1000 UT) TABS Take 1 tablet (1,000 Units) by mouth daily, Historical      vitamin E 400 units TABS Take 400 Units by mouth daily, Historical           Allergies   Allergies   Allergen Reactions    Blood Transfusion Related (Informational Only) Other (See Comments)     Patient has a history of a clinically significant antibody against RBC antigens.  A delay in compatible RBCs may occur.

## 2024-03-12 NOTE — PLAN OF CARE
"PRIMARY DIAGNOSIS: New Onset A.Fib  OUTPATIENT/OBSERVATION GOALS TO BE MET BEFORE DISCHARGE:  1. Ruled out acute coronary syndrome (negative or stable Troponin):  Yes  2. Pain Status: Pain free.  - Interpretation of cardiac rhythm per telemetry tech: SR/SB: 58-60    3. Cleared by Consultants (if applicable):N/A  4. Return to near baseline physical activity:  progressing - IND  Discharge Planner Nurse   Safe discharge environment identified: Yes  Barriers to discharge: Yes       Entered by: Rosalia Toth RN 03/12/2024    A&Ox4, VSS . Denies pain. Tolerating PO meds. IV is SL. ECHO completed. Pt able to make needs known. Pt is anticipating discharging.     Please review provider order for any additional goals. Nurse to notify provider when observation goals have been met and patient is ready for discharge.    Problem: Adult Inpatient Plan of Care  Goal: Plan of Care Review    3/12/2024 1248 by Rosalia Toth RN  Outcome: Adequate for Care Transition  3/12/2024 1246 by Rosalia Toth RN  Outcome: Progressing  Flowsheets (Taken 3/12/2024 1246)  Plan of Care Reviewed With: patient  Overall Patient Progress: improving  3/12/2024 1027 by Rosalia Toth RN  Outcome: Progressing  3/12/2024 1025 by Rosalia Toth RN  Outcome: Progressing  Flowsheets (Taken 3/12/2024 1025)  Plan of Care Reviewed With: patient  Overall Patient Progress: improving  Goal: Patient-Specific Goal (Individualized)  Description: You can add care plan individualizations to a care plan. Examples of Individualization might be:  \"Parent requests to be called daily at 9am for status\", \"I have a hard time hearing out of my right ear\", or \"Do not touch me to wake me up as it startles  me\".  3/12/2024 1248 by Rosalia Toth RN  Outcome: Adequate for Care Transition  3/12/2024 1246 by Rosalia Toth RN  Outcome: Progressing  3/12/2024 1027 by " Rosalia Toth RN  Outcome: Progressing  3/12/2024 1025 by Rosalia Toth RN  Outcome: Progressing  Goal: Absence of Hospital-Acquired Illness or Injury  3/12/2024 1248 by Rosalia Toth RN  Outcome: Adequate for Care Transition  3/12/2024 1246 by Rosalia Toth RN  Outcome: Progressing  3/12/2024 1027 by Rosalia Toth RN  Outcome: Progressing  3/12/2024 1025 by Rosalia Toth RN  Outcome: Progressing  Intervention: Identify and Manage Fall Risk  Recent Flowsheet Documentation  Taken 3/12/2024 1027 by Rosalia Toth RN  Safety Promotion/Fall Prevention:   activity supervised   clutter free environment maintained   nonskid shoes/slippers when out of bed   patient and family education   safety round/check completed  Taken 3/12/2024 0801 by Rosalia Toth RN  Safety Promotion/Fall Prevention:   activity supervised   assistive device/personal items within reach   clutter free environment maintained   nonskid shoes/slippers when out of bed   patient and family education   room near nurse's station   room organization consistent   safety round/check completed  Intervention: Prevent Skin Injury  Recent Flowsheet Documentation  Taken 3/12/2024 0801 by Rosalia Toth RN  Body Position:   position changed independently   weight shifting  Intervention: Prevent Infection  Recent Flowsheet Documentation  Taken 3/12/2024 0801 by Rosalia Toth RN  Infection Prevention:   single patient room provided   personal protective equipment utilized   hand hygiene promoted   equipment surfaces disinfected  Goal: Optimal Comfort and Wellbeing  3/12/2024 1248 by Rosalia Toth RN  Outcome: Adequate for Care Transition  3/12/2024 1246 by Rosalia Toth RN  Outcome: Progressing  3/12/2024 1027 by Rosalia Toth RN  Outcome:  Progressing  3/12/2024 1025 by Rosalia Toth RN  Outcome: Progressing  Goal: Readiness for Transition of Care  3/12/2024 1248 by Rosalia Toth RN  Outcome: Adequate for Care Transition  3/12/2024 1246 by Rosalia Toth RN  Outcome: Progressing  3/12/2024 1027 by Rosalia Toth RN  Outcome: Progressing  3/12/2024 1025 by Rosalia Toth RN  Outcome: Progressing     Problem: Fall Injury Risk  Goal: Absence of Fall and Fall-Related Injury  3/12/2024 1248 by Rosalia Toth RN  Outcome: Adequate for Care Transition  3/12/2024 1246 by Rosalia Toth RN  Outcome: Progressing  3/12/2024 1027 by Rosalia Toth RN  Outcome: Progressing  Intervention: Identify and Manage Contributors  Flowsheets  Taken 3/12/2024 1027  Self-Care Promotion: independence encouraged  Medication Review/Management: medications reviewed  Taken 3/12/2024 0801  Medication Review/Management: medications reviewed  Intervention: Promote Injury-Free Environment  Flowsheets  Taken 3/12/2024 1027  Safety Promotion/Fall Prevention:   activity supervised   clutter free environment maintained   nonskid shoes/slippers when out of bed   patient and family education   safety round/check completed  Taken 3/12/2024 0801  Safety Promotion/Fall Prevention:   activity supervised   assistive device/personal items within reach   clutter free environment maintained   nonskid shoes/slippers when out of bed   patient and family education   room near nurse's station   room organization consistent   safety round/check completed

## 2024-03-12 NOTE — DISCHARGE SUMMARY
Patient's After Visit Summary was reviewed with patient and spouse  Patient verbalized understanding of After Visit Summary, recommended follow up and was given an opportunity to ask questions.   Discharge medications sent home with patient/family: Not applicable   Discharged with spouse    Pt educated on AVS, getting ziopatch monitor mailed to home, and picking up new medication eliquis at pharmacy of choice.   Removed tele and IV. Given first dose of Eliquis here, pt to take second dose today at evening.     OBSERVATION patient END time: 1441

## 2024-03-12 NOTE — PLAN OF CARE
"PRIMARY DIAGNOSIS: New Onset A.Fib  OUTPATIENT/OBSERVATION GOALS TO BE MET BEFORE DISCHARGE:  1. Ruled out acute coronary syndrome (negative or stable Troponin):  Yes  2. Pain Status: Pain free.  - Interpretation of cardiac rhythm per telemetry tech: SR, hr: 62    3. Cleared by Consultants (if applicable):N/A  4. Return to near baseline physical activity:  progressing - SBA  Discharge Planner Nurse   Safe discharge environment identified: Yes  Barriers to discharge: Yes       Entered by: Rosalia Toth RN 03/12/2024    A&Ox4, VSS except elevated BP. Denies pain/CP/SOB/Dizziness when up. Tele: SR: 62. Tolerating PO meds. LS and heart sounds WDL. IV is SL. Awaiting ECHO to be completed. Reporting of dry eyes - requesting eye drops. Pt able to make needs known.    Please review provider order for any additional goals. Nurse to notify provider when observation goals have been met and patient is ready for discharge.    Problem: Adult Inpatient Plan of Care  Goal: Plan of Care Review  Description: The Plan of Care Review/Shift note should be completed every shift.  The Outcome Evaluation is a brief statement about your assessment that the patient is improving, declining, or no change.  This information will be displayed automatically on your shift  note.  3/12/2024 1027 by Rosalia Toth RN  Outcome: Progressing  3/12/2024 1025 by Rosalia Toth RN  Outcome: Progressing  Flowsheets (Taken 3/12/2024 1025)  Plan of Care Reviewed With: patient  Overall Patient Progress: improving  Goal: Patient-Specific Goal (Individualized)  Description: You can add care plan individualizations to a care plan. Examples of Individualization might be:  \"Parent requests to be called daily at 9am for status\", \"I have a hard time hearing out of my right ear\", or \"Do not touch me to wake me up as it startles  me\".  3/12/2024 1027 by Rosalia Toth RN  Outcome: " Progressing  3/12/2024 1025 by Rosalia Toth RN  Outcome: Progressing  Goal: Absence of Hospital-Acquired Illness or Injury  3/12/2024 1027 by Rosalia Toth RN  Outcome: Progressing  3/12/2024 1025 by Rosalia Toth RN  Outcome: Progressing  Intervention: Identify and Manage Fall Risk  Recent Flowsheet Documentation  Taken 3/12/2024 1027 by Rosalia Toth RN  Safety Promotion/Fall Prevention:   activity supervised   clutter free environment maintained   nonskid shoes/slippers when out of bed   patient and family education   safety round/check completed  Goal: Optimal Comfort and Wellbeing  3/12/2024 1027 by Rosalia Toth RN  Outcome: Progressing  3/12/2024 1025 by Rosalia Toth RN  Outcome: Progressing  Goal: Readiness for Transition of Care  3/12/2024 1027 by Rosalia Toth RN  Outcome: Progressing  3/12/2024 1025 by Rosalia Toth RN  Outcome: Progressing     Problem: Fall Injury Risk  Goal: Absence of Fall and Fall-Related Injury  Outcome: Progressing  Intervention: Identify and Manage Contributors  Flowsheets (Taken 3/12/2024 1027)  Self-Care Promotion: independence encouraged  Medication Review/Management: medications reviewed  Intervention: Promote Injury-Free Environment  Flowsheets (Taken 3/12/2024 1027)  Safety Promotion/Fall Prevention:   activity supervised   clutter free environment maintained   nonskid shoes/slippers when out of bed   patient and family education   safety round/check completed

## 2024-03-12 NOTE — PHARMACY-ADMISSION MEDICATION HISTORY
Pharmacist Admission Medication History    Admission medication history is complete. The information provided in this note is only as accurate as the sources available at the time of the update.    Information Source(s): Patient and CareEverywhere/SureScripts via in-person    Pertinent Information: patient can have the blood serum tear eye drops + Jakafi brought to the hospital if needed.    Changes made to PTA medication list:  Added: Refresh Plus eye drops, blood serum tears eye drops, vitamin B12, vitamin E  Deleted: Lotemax, Maxitrol  Changed: None    Allergies reviewed with patient and updates made in EHR: yes    Medication History Completed By: Damian Avery [facility] 3/11/2024 8:04 PM    PTA Med List   Medication Sig Last Dose    acetaminophen (TYLENOL) 325 MG tablet Take 650 mg by mouth every 6 hours as needed Unknown    acyclovir (ZOVIRAX) 800 MG tablet Take 1 tablet (800 mg) by mouth 2 times daily 3/11/2024 at x1    atorvastatin (LIPITOR) 20 MG tablet Take 1 tablet (20 mg) by mouth daily 3/10/2024 at hs    calcium carbonate-vitamin D (OSCAL W/D) 500-200 MG-UNIT tablet Take 1 tablet by mouth daily 3/11/2024 at am    carboxymethylcellulose PF (REFRESH PLUS) 0.5 % ophthalmic solution Place 2 drops into both eyes 4 times daily as needed for dry eyes 3/11/2024    Cyanocobalamin (VITAMIN B-12 PO) Take 1 tablet by mouth daily 3/11/2024 at am    fluconazole (DIFLUCAN) 100 MG tablet Take 1 tablet (100 mg) by mouth daily 3/11/2024 at am    levofloxacin (LEVAQUIN) 250 MG tablet Take 1 tablet (250 mg) by mouth daily Strength: 250 mg 3/11/2024 at m    multivitamin, therapeutic (THERA-VIT) TABS tablet Take 1 tablet by mouth daily 3/11/2024 at am    NONFORMULARY Blood serum tears     pantoprazole (PROTONIX) 40 MG EC tablet Take 1 tablet (40 mg) by mouth every morning (before breakfast) 3/11/2024 at am    predniSONE (DELTASONE) 5 MG tablet Take 1 tablet (5 mg) by mouth daily 3/11/2024 at am    ruxolitinib (JAKAFI) 5 MG TABS  tablet Take 1 tablet (5 mg) by mouth 2 times daily 3/11/2024 at x1    sertraline (ZOLOFT) 50 MG tablet Take 1 tablet (50 mg) by mouth daily 3/11/2024 at am    sulfamethoxazole-trimethoprim (BACTRIM DS) 800-160 MG tablet Take one tablet by mouth twice daily on Mondays and Tuesdays only 3/11/2024 at x1    Vitamin D, Cholecalciferol, 25 MCG (1000 UT) TABS Take 1 tablet (1,000 Units) by mouth daily 3/11/2024 at am    vitamin E 400 units TABS Take 400 Units by mouth daily 3/11/2024 at am

## 2024-03-12 NOTE — PLAN OF CARE
ROOM # 230    Living Situation (if not independent, order SW consult): Home with spouse  Facility name:  : [facility]    Activity level at baseline: Ind  Activity level on admit: SBA    Who will be transporting you at discharge: Spouse    Patient registered to observation; given Patient Bill of Rights; given the opportunity to ask questions about observation status and their plan of care.  Patient has been oriented to the observation room, bathroom and call light is in place.    Discussed discharge goals and expectations with patient/family.

## 2024-03-13 ENCOUNTER — PATIENT OUTREACH (OUTPATIENT)
Dept: FAMILY MEDICINE | Facility: CLINIC | Age: 74
End: 2024-03-13
Payer: MEDICARE

## 2024-03-13 NOTE — TELEPHONE ENCOUNTER
"    Called #   Telephone Information:   Mobile [phone]   ED/Discharge Protocol    \"Hi, my name is Rosalia Garcia RN, a registered nurse, and I am calling on behalf of Dr. Baumann's office at [facility].  I am calling to follow up and see how things are going for you after your recent visit.\"    \"I see that you were in the (ER/UC/IP) on 3/11/2024.    How are you doing now that you are home?\" I am okay just tired     Is patient experiencing symptoms that may require a hospital visit?  None     Discharge Instructions    \"Let's review your discharge instructions.  What is/are the follow-up recommendations?  Pt. Response: I need to to see my pcp after     \"Were you instructed to make a follow-up appointment?\"  Pt. Response: Yes.  Has appointment been made?   Yes      \"When you see the provider, I would recommend that you bring your discharge instructions with you.    Medications    \"How many new medications are you on since your hospitalization/ED visit?\"    0-1  \"How many of your current medicines changed (dose, timing, name, etc.) while you were in the hospital/ED visit?\"   0-1  \"Do you have questions about your medications?\"   No  \"Were you newly diagnosed with heart failure, COPD, diabetes or did you have a heart attack?\"   No  For patients on insulin: \"Did you start on insulin in the hospital or did you have your insulin dose changed?\"   No  Post Discharge Medication Reconciliation Status: discharge medications reconciled and changed, per note/orders.    Was MTM referral placed (*Make sure to put transitions as reason for referral)?   No    Call Summary    \"Do you have any questions or concerns about your condition or care plan at the moment?\"    No  Triage nurse advice given: if tings change please call the clinic     Patient was in ER 1 in the past year (assess appropriateness of ER visits.)      \"If you have questions or things don't continue to improve, we encourage you contact us through the main " "clinic number,  [phone].  Even if the clinic is not open, triage nurses are available 24/7 to help you.     We would like you to know that our clinic has extended hours (provide information).  We also have urgent care (provide details on closest location and hours/contact info)\"      \"Thank you for your time and take care!\"            "

## 2024-03-14 ENCOUNTER — OFFICE VISIT (OUTPATIENT)
Dept: OPHTHALMOLOGY | Facility: CLINIC | Age: 74
End: 2024-03-14
Attending: OPHTHALMOLOGY
Payer: MEDICARE

## 2024-03-14 DIAGNOSIS — H16.223 KERATITIS SICCA, BILATERAL: ICD-10-CM

## 2024-03-14 DIAGNOSIS — H01.00A BLEPHARITIS OF UPPER AND LOWER EYELIDS OF BOTH EYES, UNSPECIFIED TYPE: ICD-10-CM

## 2024-03-14 DIAGNOSIS — H01.00B BLEPHARITIS OF UPPER AND LOWER EYELIDS OF BOTH EYES, UNSPECIFIED TYPE: ICD-10-CM

## 2024-03-14 DIAGNOSIS — D89.813 GVHD (GRAFT VERSUS HOST DISEASE) (H): Primary | ICD-10-CM

## 2024-03-14 PROCEDURE — G0463 HOSPITAL OUTPT CLINIC VISIT: HCPCS | Performed by: OPHTHALMOLOGY

## 2024-03-14 PROCEDURE — 99214 OFFICE O/P EST MOD 30 MIN: CPT | Mod: GC | Performed by: OPHTHALMOLOGY

## 2024-03-14 ASSESSMENT — VISUAL ACUITY
OS_SC: 20/20
METHOD: SNELLEN - LINEAR
OS_SC+: -1
OD_SC: 20/25
OD_SC+: -1

## 2024-03-14 ASSESSMENT — CONF VISUAL FIELD
OS_INFERIOR_NASAL_RESTRICTION: 0
OD_SUPERIOR_TEMPORAL_RESTRICTION: 0
OD_INFERIOR_NASAL_RESTRICTION: 0
OS_INFERIOR_TEMPORAL_RESTRICTION: 0
OS_SUPERIOR_NASAL_RESTRICTION: 0
OS_SUPERIOR_TEMPORAL_RESTRICTION: 0
OD_SUPERIOR_NASAL_RESTRICTION: 0
METHOD: COUNTING FINGERS
OD_INFERIOR_TEMPORAL_RESTRICTION: 0
OS_NORMAL: 1
OD_NORMAL: 1

## 2024-03-14 ASSESSMENT — EXTERNAL EXAM - RIGHT EYE: OD_EXAM: NORMAL

## 2024-03-14 ASSESSMENT — TONOMETRY
OS_IOP_MMHG: 12
OD_IOP_MMHG: 11
IOP_METHOD: ICARE

## 2024-03-14 ASSESSMENT — EXTERNAL EXAM - LEFT EYE: OS_EXAM: NORMAL

## 2024-03-14 NOTE — PROGRESS NOTES
CC: GVHD OU    HPI:  73 yo CM presents for GVHD evaluation for involvement in the eyes. 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 There is foreign body sensation in the right eye occasionally. Artificial tears seems to help.     Interval update 3/14/24:  Overall he reports that he continues to feel dryness and foreign body sensation, most prominent in the morning. He thinks that the dryness is getting worse. Not using lotemax or fml.       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 S/P removal 2020  ARMD  - intermediate, dry each eye   PVD each eye  PCO each eye  Bilateral upper lid ptosis repair and lower lid ectropion repair with Dr. Solis (2/24/22).     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   2. MGD each eye   S/p silicone Punctal plug bilateral lower lid (still in place). There is also meibomian gland dysfunction both eyes.   Stopped Restasis due to burning.  Also uses CPAP for PIPO  Failed scleral contact lenses  Disc lid hygiene and compresses    PLAN:  - eyes look good today continue present management, epilated RUL lashes today 3/14/24  - Continue serum tears q2h OU   - Did not like using ointment; continue frequent PFATs   - Humidifier in bedroom    - lotemax (or FML) each eye bid - NOT USING but optional   - Disc lid hygiene and compresses    2.   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   Biopsy showed GVHD colon and skin.     3.   Dry Age Related Macular Degeneration, both eyes.   Previously seen with Dr. Hector  Dry AMD - may have pattern component.  Continue ARED's 2  vitamins PO 2 times daily.    Recommends fish and green leafy vegetables 3 days per week.   No smoking.   Recommeds UV protection.   Return immediately if there are distortions to amsler grid.   F/u Tawny as scheduled    4.   Posterior vitreous detachment, right eye. Retina attached.   RD / RT precautions      5.   Basal Cell Carcinoma, Left Lower Lid.  S/P removal in 01/2020 at [facility].      6. Posterior Capsular Opacification each eye  - mid PCO each eye visual significance unclear  Given ARMD and significant dryness.  - s/p YAG capsulotomy OS     7. Bilateral ptosis and lower eyelid ectropion  8. Trichiasis JOEL   - bilateral upper lid ptosis repair and lower lid ectropion repair with Dr. Solis (2/24/22).   - no trichiatic lashes today     RTC:  8-12  months w/ VT, call sooner if problems to clinic.  Needs to schedule with retina for AMD    Alison Means MD  Cornea and External Disease Fellow  [facility]     Attending Physician Attestation:  Complete documentation of historical and exam elements from today's encounter can be found in the full encounter summary report (not reduplicated in this progress note).  I personally obtained the chief complaint(s) and history of present illness.  I confirmed and edited as necessary the review of systems, past medical/surgical history, family history, social history, and examination findings as documented by others; and I examined the patient myself.  I personally reviewed the relevant tests, images, and reports as documented above.  I formulated and edited as necessary the assessment and plan and discussed the findings and management plan with the patient and family. - Butch Yoon MD

## 2024-03-14 NOTE — NURSING NOTE
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               Comments    Patient dry eyes have worsening right eye>left eye and drops(refresh plus and Blood serum tears) only seem to help for short period of time. No pain of light sensitivity.    Zo Regalado V 12:34 PM March 14, 2024

## 2024-03-19 ENCOUNTER — OFFICE VISIT (OUTPATIENT)
Dept: FAMILY MEDICINE | Facility: CLINIC | Age: 74
End: 2024-03-19
Payer: MEDICARE

## 2024-03-19 VITALS
TEMPERATURE: 98.3 F | WEIGHT: 230 LBS | SYSTOLIC BLOOD PRESSURE: 130 MMHG | OXYGEN SATURATION: 97 % | HEART RATE: 76 BPM | DIASTOLIC BLOOD PRESSURE: 74 MMHG | BODY MASS INDEX: 36.02 KG/M2 | RESPIRATION RATE: 16 BRPM

## 2024-03-19 DIAGNOSIS — E78.5 HYPERLIPIDEMIA LDL GOAL <100: ICD-10-CM

## 2024-03-19 DIAGNOSIS — Z94.81 H/O BONE MARROW TRANSPLANT (H): ICD-10-CM

## 2024-03-19 DIAGNOSIS — F33.42 RECURRENT MAJOR DEPRESSION IN COMPLETE REMISSION (H): ICD-10-CM

## 2024-03-19 DIAGNOSIS — I48.91 NEW ONSET ATRIAL FIBRILLATION (H): Primary | ICD-10-CM

## 2024-03-19 DIAGNOSIS — D46.9 MDS (MYELODYSPLASTIC SYNDROME) (H): ICD-10-CM

## 2024-03-19 PROBLEM — M34.9 SYSTEMIC SCLEROSIS, UNSPECIFIED (H): Status: RESOLVED | Noted: 2024-03-19 | Resolved: 2024-03-19

## 2024-03-19 PROBLEM — M34.9 SYSTEMIC SCLEROSIS, UNSPECIFIED (H): Status: ACTIVE | Noted: 2024-03-19

## 2024-03-19 PROCEDURE — 99495 TRANSJ CARE MGMT MOD F2F 14D: CPT | Performed by: FAMILY MEDICINE

## 2024-03-19 ASSESSMENT — ANXIETY QUESTIONNAIRES
GAD7 TOTAL SCORE: 0
1. FEELING NERVOUS, ANXIOUS, OR ON EDGE: NOT AT ALL
GAD7 TOTAL SCORE: 0
2. NOT BEING ABLE TO STOP OR CONTROL WORRYING: NOT AT ALL
7. FEELING AFRAID AS IF SOMETHING AWFUL MIGHT HAPPEN: NOT AT ALL
5. BEING SO RESTLESS THAT IT IS HARD TO SIT STILL: NOT AT ALL
4. TROUBLE RELAXING: NOT AT ALL
GAD7 TOTAL SCORE: 0
3. WORRYING TOO MUCH ABOUT DIFFERENT THINGS: NOT AT ALL
7. FEELING AFRAID AS IF SOMETHING AWFUL MIGHT HAPPEN: NOT AT ALL
6. BECOMING EASILY ANNOYED OR IRRITABLE: NOT AT ALL

## 2024-03-19 ASSESSMENT — PATIENT HEALTH QUESTIONNAIRE - PHQ9
SUM OF ALL RESPONSES TO PHQ QUESTIONS 1-9: 3
SUM OF ALL RESPONSES TO PHQ QUESTIONS 1-9: 3

## 2024-03-19 NOTE — PROGRESS NOTES
Assessment & Plan   New onset atrial fibrillation (H)  Doing better, no recurrence of symptoms.  Was in sinus rhythm on discharge and continues to be sore on exam today.  Has Zio patch on currently, and will continue Eliquis and referred to cardiology  - apixaban ANTICOAGULANT (ELIQUIS) 5 MG tablet  Dispense: 180 tablet; Refill: 4  - Adult Cardiology Eval  Referral    Hyperlipidemia LDL goal <100  Controlled - continue medication(s).    Recurrent depression in complete remission (H24)  Controlled - continue medication(s).  (Sertraline)    MDS (myelodysplastic syndrome) (H)  H/O bone marrow transplant (H)  Follows with hematology/oncology, continue labs and medications.      MED REC REQUIRED  Post Medication Reconciliation Status: discharge medications reconciled, continue medications without change    No follow-ups on file.   Follow-up Visit   Expected date:  Sep 19, 2024 (Approximate)      Follow Up Appointment Details:     Follow-up with whom?: Me    Follow-Up for what?: Medicare Wellness    Welcome or Annual?: Annual Wellness    How?: In Person    Is this an as-needed follow-up?: No                         Tim Baumann MD      [address] [address]  [email or web address]   Office: [phone]         MED REC REQUIRED  Post Medication Reconciliation Status: discharge medications reconciled, continue medications without change    No follow-ups on file.   Follow-up Visit   Expected date:  Sep 19, 2024 (Approximate)      Follow Up Appointment Details:     Follow-up with whom?: Me    Follow-Up for what?: Medicare Wellness    Welcome or Annual?: Annual Wellness    How?: In Person    Is this an as-needed follow-up?: No                     Rama Ellis MS3, has participated in the care of this patient.     Provider Disclosure:  I agree with above History, Review of Systems, Physical exam and Plan.  I have reviewed the content of the documentation and have  LM via , unable to contact, letter sent. "edited it as needed. I have personally performed the services documented here and the documentation accurately represents those services and the decisions I have made.      Electronically signed by:          Tim Baumann M.D.     [address] [address]  [employer]   Office: [phone]       Whit Villalba is a 73 year old, presenting for the following health issues:  Hospital F/U    History of Present Illness   He eats 2-3 servings of fruits and vegetables daily.He consumes 1 sweetened beverage(s) daily.He exercises with enough effort to increase his heart rate 9 or less minutes per day.  He exercises with enough effort to increase his heart rate 3 or less days per week. He is missing 1 dose(s) of medications per week.  He is not taking prescribed medications regularly due to remembering to take.     Hospital Follow-up Visit:  See discharge summary excerpt below.     \"Started feeling palpitations and shortness of breath earlier in day prior to admission. Used pulse oximeter at home and noticed heart rate was into the 140s.  Felt very lightheaded with changes in position, ongoing shortness of breath prompting ED visit.  Rate was initially in the mid 90s to 120s in the ED, EKG confirmed atrial fibrillation.  Was given a dose of oral metoprolol without much improvement in heart rate, was then given dose of IV diltiazem with significant decrease in heart rate into the 40s, but initially still in atrial fibrillation.  Shortly after arriving to the observation floor, telemetry indicated patient seemed to be out of A-fib, EKG confirmed sinus bradycardia with rate in the 40s and significant sinus arrhythmia.  Remained hemodynamically stable, no palpitations or chest discomfort, feeling back to his baseline.  TTE showed no valvular disease, normal EF, and biatrial enlargement. Remained in NSR on day of discharge. No further palpitations or " "lightheadedness.\"    Hospital/Nursing Home/IP Rehab Facility: [facility]  Date of Admission: 03/11/2024  Date of Discharge: 03/12/2024  Reason(s) for Admission: New onset paroxysmal atrial fibrillation    Was your hospitalization related to COVID-19? No   Problems taking medications regularly:  None  Medication changes since discharge: None  Problems adhering to non-medication therapy:  None    Summary of hospitalization:  [facility] discharge summary reviewed  Diagnostic Tests/Treatments reviewed.  Follow up needed: cardiology  Other Healthcare Providers Involved in Patient s Care:         None  Update since discharge: improved.     Plan of care communicated with patient           Objective    /74   Pulse 76   Temp 98.3  F (36.8  C) (Tympanic)   Resp 16   Wt 104.3 kg (230 lb)   SpO2 97%   BMI 36.02 kg/m    Body mass index is 36.02 kg/m .  Physical Exam   GENERAL: healthy, alert and no distress  EYES: Eyes grossly normal to inspection, PERRL and conjunctivae and sclerae normal  HENT: ear canals and TM's normal, nose and mouth without ulcers or lesions  NECK: no adenopathy, no asymmetry, masses, or scars and thyroid normal to palpation  RESP: lungs clear to auscultation - no rales, rhonchi or wheezes  BREAST: normal without masses, tenderness or nipple discharge and no palpable axillary masses or adenopathy  CV: regular rate and rhythm, normal S1 S2, no S3 or S4, no murmur, click or rub, no peripheral edema and peripheral pulses strong  ABDOMEN: soft, nontender, no hepatosplenomegaly, no masses and bowel sounds normal   (male): normal male genitalia without lesions or urethral discharge, no hernia  MS: no gross musculoskeletal defects noted, no edema  SKIN: no suspicious lesions or rashes  NEURO: Normal strength and tone, mentation intact and speech normal  PSYCH: mentation appears normal, affect normal/bright  LYMPH: no cervical, supraclavicular, axillary, or " inguinal adenopathy        Signed Electronically by: Jona Baumann MD

## 2024-03-26 DIAGNOSIS — Z94.81 STATUS POST BONE MARROW TRANSPLANT (H): Primary | ICD-10-CM

## 2024-04-01 NOTE — PROGRESS NOTES
BMT Progress Note    ID:  Mr. 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 (In the past has had a basal and squamous cell removed). Assumed new BMT care by Jean-Paul Nunez on 3/7/2022.    cGVHD: currently on pred 5mg every day and Jakafi 5 mg bid.        Interval History:  Deejay returns with his wife today. He is about the same as during his last visit. He continues to have significant dry eyes, and is using royce drops (serum and lubricating) about 10 times per day. His mouth remains dry. He continues to have some dyspnea on exertion, but he was recently diagnosed with A fib. He cannot feel when he's in A fib most of the time, and his heart rates have been normal at home.     His fatigue is stable. He has no joint or muscle aches, aside from his arthritic knee, which is stable. No new skin changes.      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  Yes - Skin, eyes, mouth  Current Systemic Immunosuppression:  pred 5mg every day and Jakafi 5 mg bid.      Chronic GVHD NIH Score At Today's Visit   Score 0 Score 1 Score 2 Score 3   % 80-90% 60-70% <60%          Skin No sclerotic features Superficial sclerotic features Deep sclerotic features (hidebound)    No skin changes BSA 1-18% BSA 19-50% BSA >50%          Mouth No symptoms Mild, PO intake not limited Moderate, partial limitation in PO intake Severe, major limitation in PO intake          Eyes No symptoms Mild dry eyes Moderate, partially affecting ADL Severe, significantly affecting ADL          GI Tract No symptoms Symptoms without significant weight loss (<5%)  Mild-mod weight loss (5-15%) OR diarrhea without significant impact in ADL Severe weight loss (>15%) OR esophageal dilatation required OR severe diarrhea impacting ADL          Liver Normal total bilirubin and transaminases < 3x ULN Normal total bilirubin with ALT/AST ? 3-5x ULN OR ALP ? 3x ULN Elevated total bilirubin but <3 mg/dl OR ALT >5x ULN Elevated total bilirubin > 3 mg/dl          Lungs No symptoms Mild dyspnea with exertion Moderate dyspnea (walking on flat ground) Severe dyspnea (requiring O2)    FEV1?80% FEV1 60-79% FEV1 40-59% FEV1 <39%          Joints/Fascia No symptoms Mild tightness and decreased ROM not affecting ADL Moderate tightness and decreased ROM affecting ADL Contractures with significant limitation of ADL          Genital No symptoms Mild signs/symptoms Moderate signs/symptoms Severe signs/symptoms          Other Indicators Ascites Pericardial Effusion Pleural Effusion Nephrotic Syndrome           Myasthenia Gravis Peripheral Neuropathy Polymyositis Weight loss >5% without GI sxs           Eosinophilia >500 Platelets <100 Other (specify) Other (specify)          Overall NIH Chronic GVHD Score All scores = 0 Lung score = 0 PLUS   1 or 2 organs with score =1 Lung score = 1  OR  At least 1 organ with   score of 2  OR  3 organs with score = 1 Lung score = 2 or 3  OR  At least 1 other organ   with score = 3    No cGVHD Mild Moderate Severe                Current Outpatient Medications   Medication Sig Dispense Refill     acetaminophen (TYLENOL) 325 MG tablet Take 650 mg by mouth every 6 hours as needed       acyclovir (ZOVIRAX) 800 MG tablet Take 1 tablet (800 mg) by mouth 2 times daily 180 tablet 1     apixaban ANTICOAGULANT (ELIQUIS) 5 MG tablet Take 1 tablet (5 mg) by mouth 2 times daily 180 tablet 4     atorvastatin (LIPITOR) 20 MG tablet Take 1 tablet (20 mg) by mouth daily 90 tablet 3     calcium carbonate-vitamin D (OSCAL W/D) 500-200 MG-UNIT tablet Take 1 tablet by mouth daily        carboxymethylcellulose PF (REFRESH PLUS) 0.5 % ophthalmic solution Place 2 drops into both eyes 4 times daily as needed for dry eyes       Cyanocobalamin (VITAMIN B-12 PO) Take 1 tablet by mouth daily       fluconazole (DIFLUCAN) 100 MG tablet Take 1 tablet (100 mg) by mouth daily 90 tablet 3     levofloxacin (LEVAQUIN) 250 MG tablet Take 1 tablet (250 mg) by mouth daily Strength: 250 mg 90 tablet 3     multivitamin, therapeutic (THERA-VIT) TABS tablet Take 1 tablet by mouth daily       NONFORMULARY Blood serum tears       pantoprazole (PROTONIX) 40 MG EC tablet Take 1 tablet (40 mg) by mouth every morning (before breakfast) 90 tablet 2     predniSONE (DELTASONE) 5 MG tablet Take 1 tablet (5 mg) by mouth daily 90 tablet 1     ruxolitinib (JAKAFI) 5 MG TABS tablet Take 1 tablet (5 mg) by mouth 2 times daily 60 tablet 11     sertraline (ZOLOFT) 50 MG tablet Take 1 tablet (50 mg) by mouth daily 90 tablet 3     sulfamethoxazole-trimethoprim (BACTRIM DS) 800-160 MG tablet Take one tablet by mouth twice daily on Mondays and Tuesdays only 48 tablet 0     Vitamin D, Cholecalciferol, 25 MCG (1000 UT) TABS Take 1 tablet (1,000 Units) by mouth daily       vitamin E 400 units TABS Take 400 Units by mouth daily       No current facility-administered medications for this visit.       Physical exam  BP (!) 144/80 (BP Location: Right arm, Patient Position: Sitting, Cuff Size: Adult Regular)   Pulse 83   Temp 97.5  F (36.4  C) (Oral)   Resp 16   Wt 104.7 kg (230 lb 14.4 oz)   SpO2 96%   BMI 36.16 kg/m    Gen: Well appearing, in NAD  HEENT: EOMI, PERRL, mmm, oropharynx clear  LAD: no palpable cervical, supraclavicular, axillary or inguinal lymphadenopathy.  CV: Normal rate, irregularly irregular. No m/r/g  Pulm: CTAB, no wheezing, normal work of breathing  Abd: Soft, nt/nd, no rebound/guarding  Ext: Warm and well perfused. No lower extremity edema. Bilateral nail bed destruction noted  Skin: The right leg has stable sclerotic  skin on the shin with hyperpigmentation, but no evidence of infection or cellulitis. No other sclerotic changes of the skin.  Prominent venous status stasis on left and more prominent sclerotic changes on right shin.  Neuro: Alert and answering questions appropriately. CNII-XII grossly intact. Moving all extremities without issue or focal neurologic deficits.         Labs/Imaging:  Recent Labs   Lab Test 04/02/24  1207 03/12/24  0552 03/11/24  1519 10/10/23  1059 07/23/21  0920 04/27/21  1338 03/17/21  1205 03/03/21  0346   WBC 7.0 5.9 6.9 6.7   < > 6.3 7.6 5.7   HGB 12.3* 12.3* 12.9* 12.1*   < > 10.9* 10.8* 9.3*    246 262 224   < > 295 322 250   ANEU  --   --   --   --   --  3.4 4.1 3.0   ANEUTAUTO 3.6  --  4.0 2.4   < >  --   --   --    ALYM  --   --   --   --   --  1.9 2.4 2.0   ALYMPAUTO 2.3  --  2.0 3.0   < >  --   --   --     < > = values in this interval not displayed.     Recent Labs   Lab Test 04/02/24  1207 03/12/24  0552 03/11/24  1519 02/23/21  0348 02/22/21  0317 02/17/21  1131 02/17/21  0349 02/15/21  0431 02/14/21  0637 02/14/21  0326 02/09/21  0637 02/08/21  0910 03/26/16  0750 03/25/16  2128    138 140   < > 138   < > 145*   < >  --  138   < > 139   < >  --    POTASSIUM 3.9 3.9 3.9   < > 4.3   < > 5.0   < >  --  4.1   < > 4.0   < >  --    CHLORIDE 106 105 105   < > 103   < > 115*   < >  --  107   < > 111*   < >  --    CO2 22 23 22   < > 30   < > 28   < >  --  25   < > 23   < >  --    BUN 23.3* 24.7* 28.6*   < > 38*   < > 59*   < >  --  39*   < > 25   < >  --    CR 1.10 1.01 1.10   < > 0.88   < > 0.92   < >  --  0.96   < > 0.88   < >  --    JOE 9.2 8.7* 9.3   < > 8.9   < > 8.5   < >  --  8.5   < > 8.4*   < >  --    MAG  --   --  1.9  --  2.2  --  2.8*  --   --  2.4*  --  1.7   < >  --    PHOS  --   --   --   --  3.5  --  2.2*  --  4.5  --   --   --    < >  --    LDH  --   --   --   --   --   --   --   --   --  783*  --  483*  --  806*    < > = values in this interval not displayed.  "    Recent Labs   Lab Test 04/02/24  1207 10/10/23  1059 04/18/23  1132 03/17/21  1205 03/01/21  0625 02/26/21  0626 02/23/21  0348   AST 34 31 35   < > 29 30 26   ALT 26 24 23   < > 36 38 42   ALKPHOS 69 66 58   < > 87 76 65   BILITOTAL 0.3 0.3 0.3   < > 1.3 0.5 0.6   INR  --   --   --   --  1.12 1.13 1.05    < > = values in this interval not displayed.         ASSESSMENT AND PLAN:  Mr. Dior is a 73 y/o male, 9 years 9 mo s/p NMA allo sib PBSCT for MDS w/course complicated by cGVHD.    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 Total cell dose (from 7/1 & 7/2) 6.53 x 10^6 CD34+ cells/kg.   - 1 year anniversary (July 2015): 30% cellular, trilineage hematopoiesis, no abnormal blasts by morphology or flow , no dysplasia, 0-1 fibrosis, 100% donor (BM, CD3, CD15). CR  - 2 year anniversary (July 2016): 20-30% cellular, trilineage hematopoiesis, no abnormal blasts by morphology or flow , no dysplasia, No fibrosis, 100% donor in BM (PB not sent). ISCN: //46,XX[20] Complete Remission.     GVHD: Long history of GVHD as below.   He has remained on on prednisone 5 mg daily (reports that cGVHD flared on lower dose would like to stay at same dose), ruxolitinib 5 mg twice daily since 10/2018.  --- Since taking over patient care he repeatedly expressed wanting to stay on the same therapy as he has failed tapering or changes in the past. 4/18/2023 this was readdressed and I also discussed with the patient whether he would like to consider altering/changing his immunosuppression regiment and concerns of potentially Jakafi exacerbating his fatigue.  However both him and his wife expressed strongly that they would like to stay on the current regiment and the patient specifically said that he would rather not \"experiment\" with changing therapies.  No changes today.  However we will pursue further work-up of his dyspnea on exertion including possibility of pulmonary GVHD.  If this is the case " management will be changed accordingly.    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 Had been off prednisone since summer 2017 and briefly tapered off Sirolimus (january 2018), however resumed with flare in February. 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 Sirolimus was stopped on 9/27/2018 & Pred 90mg was started. Seen by Dr. Sandoval, he does not think his symptoms or CT findings are c/w Sirolimus or GVH & he was in favor of tapering Prednisone. Developed worsening skin thickening & desquamation to the RLE, c/w GVH. Started Jakafi 10/22/2018 at 5 mg BID with symptom improvement in lower extremities, has arthralgias not thought to be side effect of Jakafi.  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 Previously completed steroid taper in Oct 2018. Required Burst pred 40mg a day on 1/3/20 with significant systemic arthralgic pain, tapered back to 10/0 eod after 1 week, near resolution of symptoms noted 1/17, returned to 10 mg every other day; then dropped to 5mg q day in April 2021 (this is best dates estimated on chart review)  - Optho: Last seen 3/14/24, with no change to management. Previously no improvement with scleral lenses. Current regiment below. We discussed topical progesterone, and he was willing to try that. We also discussed Tyrvaya as an additional alternative if the topical progesterone was ineffective or not covered by insurance.    - Continue serum tears q2h OU               - Did not like using ointment; continue frequent PFATs               - Humidifier in bedroom                - lotemax (or FML) each eye bid - NOT USING but optional               - Disc lid hygiene and compresses  - Lungs: PFTs obtained December 2021 stable compared to 3 prior - %, FEV1 107% DL CO 74% predicted.  Of note patient had  Covid pneumonia and was on the ventilator.  It has been previously noted that he does have this raspy crackly sounds on both bases that do not change. He previously deferred referral to pulmonary. Repeat PFTs 11/2023 showed normal parameters, and follow-up with Dr. Sandoval, without concern for pulmonary GVHD; now diagnosed with A fib, which may explain some of his dyspnea on exertion. He appeared to be in A fib today, but with normal rate    - Wounds: No open wounds today. He knows that if there is erythema or pain that he needs to be seen as soon as possible to evaluate for cellulitis/infection. *Reiterated this 1    ID:  Afebrile no signs/sx of infection.    - IgG 3/8/44383 =904  - 5/6/2022 Gonzalez completed  - Discussed again being up-to-date on COVID vaccines and boosters.   - 10/2023 received COVID booster, influenza and RSV vaccines        Fatigue:  stable  - History of testosterone deficiency, rechecked and modestly low. He previously did testosterone injections & didn't think it made him feel any better.    - TSH normal 2/28/23 and again on repeat 9/27/23 at 1.01.  - counseled that moderate exercise is really the only known way to combat fatigue, and acknowledged how difficult it is to balance too much with not enough activity. Previously declined PT, encouraged activity.     Vascular/CARDS  - New A fib dx early March 2024. Now on apixaban 5mg BID for this. Rate controlled deferred to to bradycardia while inpatient and on rate control.   - 10/15/18 Right leg US with small (technically DVT) clot in posterior tibial vein: started reg dose aspirin daily 325mg and recheck US in 2 weeks or symptomatically. 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      MSK: avascular necrosis of hip.  S/p replacement surgery     GI:  protonix (has  heartburn)    Healthcare maintenance March 2022 TSH within normal, IgG within normal, vitamin D deficiency screening normal testosterone mildly low, reminded again today to follow up with PCP locally recheck testosterone in case this is contributing to fatigue, encourage dermatology evaluation yearly, DEXA scan completed 5/6/2022 normal bone density and is due for repeat currently. Also, discussed age-appropriate cancer screening. Reminded to see PCP locally for HCM and routine testing, is following up.    Summary: No changes to prednisone or Jakafi today. Add topical progesterone, pending prior auth and insurance coverage. Consider Tyrvaya in the future. Encouraged him to RV with his PCP and derm (significant skin cancer history), as well as establishing with cardiology for A fib management.     I spent 58 minutes in the care of this patient today, which included time necessary for preparation for the visit, obtaining history, ordering medications/tests/procedures as medically indicated, review of pertinent medical literature, counseling of the patient, communication of recommendations to the care team, and documentation time.    Patient seen and staffed with Dr. Jean-Paul Nunez who agrees with the above assessment and plan.     Shubham Joyce MD PhD  Advanced Fellow in Heme/Onc/Transplant    BMT Staff:  The patient was evaluated, seen and examined by me. I was present with Dr. Rose during this visit. pertinent labs and imaging were reviewed. I agree with the above note and have been responsible for the care plan and interpretation of progress.  Patient aware of transition of care to Dr. Collier.    Roselia Nunez MD

## 2024-04-02 ENCOUNTER — APPOINTMENT (OUTPATIENT)
Dept: LAB | Facility: CLINIC | Age: 74
End: 2024-04-02
Attending: INTERNAL MEDICINE
Payer: MEDICARE

## 2024-04-02 ENCOUNTER — ONCOLOGY VISIT (OUTPATIENT)
Dept: TRANSPLANT | Facility: CLINIC | Age: 74
End: 2024-04-02
Attending: INTERNAL MEDICINE
Payer: MEDICARE

## 2024-04-02 VITALS
BODY MASS INDEX: 36.16 KG/M2 | OXYGEN SATURATION: 96 % | DIASTOLIC BLOOD PRESSURE: 80 MMHG | HEART RATE: 83 BPM | RESPIRATION RATE: 16 BRPM | TEMPERATURE: 97.5 F | SYSTOLIC BLOOD PRESSURE: 144 MMHG | WEIGHT: 230.9 LBS

## 2024-04-02 DIAGNOSIS — Z94.81 STATUS POST BONE MARROW TRANSPLANT (H): ICD-10-CM

## 2024-04-02 DIAGNOSIS — D89.811 CHRONIC GRAFT-VERSUS-HOST DISEASE (H): Primary | ICD-10-CM

## 2024-04-02 DIAGNOSIS — D89.813 GRAFT-VERSUS-HOST DISEASE (H): ICD-10-CM

## 2024-04-02 DIAGNOSIS — U07.1 COVID-19 VIRUS INFECTION: ICD-10-CM

## 2024-04-02 LAB
ALBUMIN SERPL BCG-MCNC: 3.9 G/DL (ref 3.5–5.2)
ALP SERPL-CCNC: 69 U/L (ref 40–150)
ALT SERPL W P-5'-P-CCNC: 26 U/L (ref 0–70)
ANION GAP SERPL CALCULATED.3IONS-SCNC: 12 MMOL/L (ref 7–15)
AST SERPL W P-5'-P-CCNC: 34 U/L (ref 0–45)
BASOPHILS # BLD AUTO: 0 10E3/UL (ref 0–0.2)
BASOPHILS NFR BLD AUTO: 0 %
BILIRUB SERPL-MCNC: 0.3 MG/DL
BUN SERPL-MCNC: 23.3 MG/DL (ref 8–23)
CALCIUM SERPL-MCNC: 9.2 MG/DL (ref 8.8–10.2)
CHLORIDE SERPL-SCNC: 106 MMOL/L (ref 98–107)
CREAT SERPL-MCNC: 1.1 MG/DL (ref 0.67–1.17)
DEPRECATED HCO3 PLAS-SCNC: 22 MMOL/L (ref 22–29)
EGFRCR SERPLBLD CKD-EPI 2021: 70 ML/MIN/1.73M2
EOSINOPHIL # BLD AUTO: 0.1 10E3/UL (ref 0–0.7)
EOSINOPHIL NFR BLD AUTO: 1 %
ERYTHROCYTE [DISTWIDTH] IN BLOOD BY AUTOMATED COUNT: 16.6 % (ref 10–15)
GLUCOSE SERPL-MCNC: 94 MG/DL (ref 70–99)
HCT VFR BLD AUTO: 37.2 % (ref 40–53)
HGB BLD-MCNC: 12.3 G/DL (ref 13.3–17.7)
IMM GRANULOCYTES # BLD: 0 10E3/UL
IMM GRANULOCYTES NFR BLD: 0 %
LYMPHOCYTES # BLD AUTO: 2.3 10E3/UL (ref 0.8–5.3)
LYMPHOCYTES NFR BLD AUTO: 32 %
MCH RBC QN AUTO: 31.2 PG (ref 26.5–33)
MCHC RBC AUTO-ENTMCNC: 33.1 G/DL (ref 31.5–36.5)
MCV RBC AUTO: 94 FL (ref 78–100)
MONOCYTES # BLD AUTO: 1 10E3/UL (ref 0–1.3)
MONOCYTES NFR BLD AUTO: 14 %
NEUTROPHILS # BLD AUTO: 3.6 10E3/UL (ref 1.6–8.3)
NEUTROPHILS NFR BLD AUTO: 53 %
NRBC # BLD AUTO: 0 10E3/UL
NRBC BLD AUTO-RTO: 0 /100
PLATELET # BLD AUTO: 267 10E3/UL (ref 150–450)
POTASSIUM SERPL-SCNC: 3.9 MMOL/L (ref 3.4–5.3)
PROT SERPL-MCNC: 6.9 G/DL (ref 6.4–8.3)
RBC # BLD AUTO: 3.94 10E6/UL (ref 4.4–5.9)
SODIUM SERPL-SCNC: 140 MMOL/L (ref 135–145)
WBC # BLD AUTO: 7 10E3/UL (ref 4–11)

## 2024-04-02 PROCEDURE — 80053 COMPREHEN METABOLIC PANEL: CPT | Performed by: INTERNAL MEDICINE

## 2024-04-02 PROCEDURE — 99417 PROLNG OP E/M EACH 15 MIN: CPT | Performed by: INTERNAL MEDICINE

## 2024-04-02 PROCEDURE — 85025 COMPLETE CBC W/AUTO DIFF WBC: CPT | Performed by: INTERNAL MEDICINE

## 2024-04-02 PROCEDURE — 99215 OFFICE O/P EST HI 40 MIN: CPT | Mod: GC | Performed by: INTERNAL MEDICINE

## 2024-04-02 PROCEDURE — 36415 COLL VENOUS BLD VENIPUNCTURE: CPT | Performed by: INTERNAL MEDICINE

## 2024-04-02 PROCEDURE — G0463 HOSPITAL OUTPT CLINIC VISIT: HCPCS | Performed by: INTERNAL MEDICINE

## 2024-04-02 RX ORDER — SULFAMETHOXAZOLE/TRIMETHOPRIM 800-160 MG
TABLET ORAL
Qty: 48 TABLET | Refills: 3 | Status: SHIPPED | OUTPATIENT
Start: 2024-04-02

## 2024-04-02 RX ORDER — PREDNISONE 5 MG/1
5 TABLET ORAL DAILY
Qty: 90 TABLET | Refills: 2 | Status: SHIPPED | OUTPATIENT
Start: 2024-04-02

## 2024-04-02 ASSESSMENT — PAIN SCALES - GENERAL: PAINLEVEL: MODERATE PAIN (5)

## 2024-04-02 NOTE — NURSING NOTE
"Oncology Rooming Note    April 2, 2024 12:25 PM   Deejay Dior is a 73 year old male who presents for:    Chief Complaint   Patient presents with    Blood Draw     Labs drawn with  by RN. Vitals taken. Pt checked into next appointment.      Oncology Clinic Visit     Myelodysplastic syndrome      Initial Vitals: BP (!) 144/80 (BP Location: Right arm, Patient Position: Sitting, Cuff Size: Adult Regular)   Pulse 83   Temp 97.5  F (36.4  C) (Oral)   Resp 16   Wt 104.7 kg (230 lb 14.4 oz)   SpO2 96%   BMI 36.16 kg/m   Estimated body mass index is 36.16 kg/m  as calculated from the following:    Height as of 3/11/24: 1.702 m (5' 7\").    Weight as of this encounter: 104.7 kg (230 lb 14.4 oz). Body surface area is 2.22 meters squared.  Moderate Pain (5) Comment: Data Unavailable   No LMP for male patient.  Allergies reviewed: Yes  Medications reviewed: Yes    Medications: MEDICATION REFILLS NEEDED TODAY. Provider was notified.  Pharmacy name entered into [facility]:    [facility] [place], [place] - [address] [place] [address] [phone]  [facility] [address] [place] [address] [id number] [facility]    Frailty Screening:   Is the patient here for a new oncology consult visit in cancer care? 2. No      Clinical concerns: needs refill for prednisone and bactrim.      Pt went to family physician and was told he had afib and wonders if he should go to cardiologist. Wonders if there are any cardiologists in his area that could be recommenced.     Tyrone Wright              "

## 2024-04-02 NOTE — NURSING NOTE
Chief Complaint   Patient presents with    Blood Draw     Labs drawn with  by RN. Vitals taken. Pt checked into next appointment.       Rosalia Mattson RN

## 2024-04-02 NOTE — LETTER
4/2/2024         RE: Deejay Dior  [id number]  [place] [address]        Dear Colleague,    Thank you for referring your patient, Deejay Dior, to the [facility] [place]. Please see a copy of my visit note below.    BMT Progress Note    ID:  Mr. 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 (In the past has had a basal and squamous cell removed). Assumed new BMT care by Jean-Paul Nunez on 3/7/2022.    cGVHD: currently on pred 5mg every day and Jakafi 5 mg bid.        Interval History:  Deejay returns with his wife today. He is about the same as during his last visit. He continues to have significant dry eyes, and is using royce drops (serum and lubricating) about 10 times per day. His mouth remains dry. He continues to have some dyspnea on exertion, but he was recently diagnosed with A fib. He cannot feel when he's in A fib most of the time, and his heart rates have been normal at home.     His fatigue is stable. He has no joint or muscle aches, aside from his arthritic knee, which is stable. No new skin changes.      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  Yes - Skin, eyes, mouth  Current Systemic Immunosuppression:  pred 5mg every day and Jakafi 5 mg bid.      Chronic GVHD NIH Score At Today's Visit   Score 0 Score 1 Score 2 Score 3   % 80-90% 60-70% <60%          Skin No sclerotic features Superficial sclerotic features Deep sclerotic features (hidebound)    No skin changes BSA 1-18% BSA 19-50% BSA >50%          Mouth No symptoms Mild, PO  intake not limited Moderate, partial limitation in PO intake Severe, major limitation in PO intake          Eyes No symptoms Mild dry eyes Moderate, partially affecting ADL Severe, significantly affecting ADL          GI Tract No symptoms Symptoms without significant weight loss (<5%) Mild-mod weight loss (5-15%) OR diarrhea without significant impact in ADL Severe weight loss (>15%) OR esophageal dilatation required OR severe diarrhea impacting ADL          Liver Normal total bilirubin and transaminases < 3x ULN Normal total bilirubin with ALT/AST ? 3-5x ULN OR ALP ? 3x ULN Elevated total bilirubin but <3 mg/dl OR ALT >5x ULN Elevated total bilirubin > 3 mg/dl          Lungs No symptoms Mild dyspnea with exertion Moderate dyspnea (walking on flat ground) Severe dyspnea (requiring O2)    FEV1?80% FEV1 60-79% FEV1 40-59% FEV1 <39%          Joints/Fascia No symptoms Mild tightness and decreased ROM not affecting ADL Moderate tightness and decreased ROM affecting ADL Contractures with significant limitation of ADL          Genital No symptoms Mild signs/symptoms Moderate signs/symptoms Severe signs/symptoms          Other Indicators Ascites Pericardial Effusion Pleural Effusion Nephrotic Syndrome           Myasthenia Gravis Peripheral Neuropathy Polymyositis Weight loss >5% without GI sxs           Eosinophilia >500 Platelets <100 Other (specify) Other (specify)          Overall NIH Chronic GVHD Score All scores = 0 Lung score = 0 PLUS   1 or 2 organs with score =1 Lung score = 1  OR  At least 1 organ with   score of 2  OR  3 organs with score = 1 Lung score = 2 or 3  OR  At least 1 other organ   with score = 3    No cGVHD Mild Moderate Severe                Current Outpatient Medications   Medication Sig Dispense Refill    acetaminophen (TYLENOL) 325 MG tablet Take 650 mg by mouth every 6 hours as needed      acyclovir (ZOVIRAX) 800 MG tablet Take 1 tablet (800 mg) by mouth 2 times daily 180 tablet 1    apixaban  ANTICOAGULANT (ELIQUIS) 5 MG tablet Take 1 tablet (5 mg) by mouth 2 times daily 180 tablet 4    atorvastatin (LIPITOR) 20 MG tablet Take 1 tablet (20 mg) by mouth daily 90 tablet 3    calcium carbonate-vitamin D (OSCAL W/D) 500-200 MG-UNIT tablet Take 1 tablet by mouth daily      carboxymethylcellulose PF (REFRESH PLUS) 0.5 % ophthalmic solution Place 2 drops into both eyes 4 times daily as needed for dry eyes      Cyanocobalamin (VITAMIN B-12 PO) Take 1 tablet by mouth daily      fluconazole (DIFLUCAN) 100 MG tablet Take 1 tablet (100 mg) by mouth daily 90 tablet 3    levofloxacin (LEVAQUIN) 250 MG tablet Take 1 tablet (250 mg) by mouth daily Strength: 250 mg 90 tablet 3    multivitamin, therapeutic (THERA-VIT) TABS tablet Take 1 tablet by mouth daily      NONFORMULARY Blood serum tears      pantoprazole (PROTONIX) 40 MG EC tablet Take 1 tablet (40 mg) by mouth every morning (before breakfast) 90 tablet 2    predniSONE (DELTASONE) 5 MG tablet Take 1 tablet (5 mg) by mouth daily 90 tablet 1    ruxolitinib (JAKAFI) 5 MG TABS tablet Take 1 tablet (5 mg) by mouth 2 times daily 60 tablet 11    sertraline (ZOLOFT) 50 MG tablet Take 1 tablet (50 mg) by mouth daily 90 tablet 3    sulfamethoxazole-trimethoprim (BACTRIM DS) 800-160 MG tablet Take one tablet by mouth twice daily on Mondays and Tuesdays only 48 tablet 0    Vitamin D, Cholecalciferol, 25 MCG (1000 UT) TABS Take 1 tablet (1,000 Units) by mouth daily      vitamin E 400 units TABS Take 400 Units by mouth daily       No current facility-administered medications for this visit.       Physical exam  BP (!) 144/80 (BP Location: Right arm, Patient Position: Sitting, Cuff Size: Adult Regular)   Pulse 83   Temp 97.5  F (36.4  C) (Oral)   Resp 16   Wt 104.7 kg (230 lb 14.4 oz)   SpO2 96%   BMI 36.16 kg/m    Gen: Well appearing, in NAD  HEENT: EOMI, PERRL, mmm, oropharynx clear  LAD: no palpable cervical, supraclavicular, axillary or inguinal lymphadenopathy.  CV:  Normal rate, irregularly irregular. No m/r/g  Pulm: CTAB, no wheezing, normal work of breathing  Abd: Soft, nt/nd, no rebound/guarding  Ext: Warm and well perfused. No lower extremity edema. Bilateral nail bed destruction noted  Skin: The right leg has stable sclerotic skin on the shin with hyperpigmentation, but no evidence of infection or cellulitis. No other sclerotic changes of the skin.  Prominent venous status stasis on left and more prominent sclerotic changes on right shin.  Neuro: Alert and answering questions appropriately. CNII-XII grossly intact. Moving all extremities without issue or focal neurologic deficits.         Labs/Imaging:  Recent Labs   Lab Test 04/02/24  1207 03/12/24  0552 03/11/24  1519 10/10/23  1059 07/23/21  0920 04/27/21  1338 03/17/21  1205 03/03/21  0346   WBC 7.0 5.9 6.9 6.7   < > 6.3 7.6 5.7   HGB 12.3* 12.3* 12.9* 12.1*   < > 10.9* 10.8* 9.3*    246 262 224   < > 295 322 250   ANEU  --   --   --   --   --  3.4 4.1 3.0   ANEUTAUTO 3.6  --  4.0 2.4   < >  --   --   --    ALYM  --   --   --   --   --  1.9 2.4 2.0   ALYMPAUTO 2.3  --  2.0 3.0   < >  --   --   --     < > = values in this interval not displayed.     Recent Labs   Lab Test 04/02/24  1207 03/12/24  0552 03/11/24  1519 02/23/21  0348 02/22/21  0317 02/17/21  1131 02/17/21  0349 02/15/21  0431 02/14/21  0637 02/14/21  0326 02/09/21  0637 02/08/21  0910 03/26/16  0750 03/25/16  2128    138 140   < > 138   < > 145*   < >  --  138   < > 139   < >  --    POTASSIUM 3.9 3.9 3.9   < > 4.3   < > 5.0   < >  --  4.1   < > 4.0   < >  --    CHLORIDE 106 105 105   < > 103   < > 115*   < >  --  107   < > 111*   < >  --    CO2 22 23 22   < > 30   < > 28   < >  --  25   < > 23   < >  --    BUN 23.3* 24.7* 28.6*   < > 38*   < > 59*   < >  --  39*   < > 25   < >  --    CR 1.10 1.01 1.10   < > 0.88   < > 0.92   < >  --  0.96   < > 0.88   < >  --    JOE 9.2 8.7* 9.3   < > 8.9   < > 8.5   < >  --  8.5   < > 8.4*   < >  --    MAG   --   --  1.9  --  2.2  --  2.8*  --   --  2.4*  --  1.7   < >  --    PHOS  --   --   --   --  3.5  --  2.2*  --  4.5  --   --   --    < >  --    LDH  --   --   --   --   --   --   --   --   --  783*  --  483*  --  806*    < > = values in this interval not displayed.     Recent Labs   Lab Test 04/02/24  1207 10/10/23  1059 04/18/23  1132 03/17/21  1205 03/01/21  0625 02/26/21  0626 02/23/21  0348   AST 34 31 35   < > 29 30 26   ALT 26 24 23   < > 36 38 42   ALKPHOS 69 66 58   < > 87 76 65   BILITOTAL 0.3 0.3 0.3   < > 1.3 0.5 0.6   INR  --   --   --   --  1.12 1.13 1.05    < > = values in this interval not displayed.         ASSESSMENT AND PLAN:  Mr. Dior is a 73 y/o male, 9 years 9 mo s/p NMA allo sib PBSCT for MDS w/course complicated by cGVHD.    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 Total cell dose (from 7/1 & 7/2) 6.53 x 10^6 CD34+ cells/kg.   - 1 year anniversary (July 2015): 30% cellular, trilineage hematopoiesis, no abnormal blasts by morphology or flow , no dysplasia, 0-1 fibrosis, 100% donor (BM, CD3, CD15). CR  - 2 year anniversary (July 2016): 20-30% cellular, trilineage hematopoiesis, no abnormal blasts by morphology or flow , no dysplasia, No fibrosis, 100% donor in BM (PB not sent). ISCN: //46,XX[20] Complete Remission.     GVHD: Long history of GVHD as below.   He has remained on on prednisone 5 mg daily (reports that cGVHD flared on lower dose would like to stay at same dose), ruxolitinib 5 mg twice daily since 10/2018.  --- Since taking over patient care he repeatedly expressed wanting to stay on the same therapy as he has failed tapering or changes in the past. 4/18/2023 this was readdressed and I also discussed with the patient whether he would like to consider altering/changing his immunosuppression regiment and concerns of potentially Jakafi exacerbating his fatigue.  However both him and his wife expressed strongly that they would like to stay  "on the current regiment and the patient specifically said that he would rather not \"experiment\" with changing therapies.  No changes today.  However we will pursue further work-up of his dyspnea on exertion including possibility of pulmonary GVHD.  If this is the case management will be changed accordingly.    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 Had been off prednisone since summer 2017 and briefly tapered off Sirolimus (january 2018), however resumed with flare in February. 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 Sirolimus was stopped on 9/27/2018 & Pred 90mg was started. Seen by Dr. Sandoval, he does not think his symptoms or CT findings are c/w Sirolimus or GVH & he was in favor of tapering Prednisone. Developed worsening skin thickening & desquamation to the RLE, c/w GVH. Started Jakafi 10/22/2018 at 5 mg BID with symptom improvement in lower extremities, has arthralgias not thought to be side effect of Jakafi.  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 Previously completed steroid taper in Oct 2018. Required Burst pred 40mg a day on 1/3/20 with significant systemic arthralgic pain, tapered back to 10/0 eod after 1 week, near resolution of symptoms noted 1/17, returned to 10 mg every other day; then dropped to 5mg q day in April 2021 (this is best dates estimated on chart review)  - Optho: Last seen 3/14/24, with no change to management. Previously no improvement with scleral lenses. Current regiment below. We discussed topical progesterone, and he was willing to try that. We also discussed Tyrvaya as an additional alternative if the topical progesterone was ineffective or not covered by insurance.    - Continue serum tears q2h OU               - Did not like using ointment; continue frequent PFATs               - " Humidifier in bedroom                - lotemax (or FML) each eye bid - NOT USING but optional               - Disc lid hygiene and compresses  - Lungs: PFTs obtained December 2021 stable compared to 3 prior - %, FEV1 107% DL CO 74% predicted.  Of note patient had Covid pneumonia and was on the ventilator.  It has been previously noted that he does have this raspy crackly sounds on both bases that do not change. He previously deferred referral to pulmonary. Repeat PFTs 11/2023 showed normal parameters, and follow-up with Dr. Sandoval, without concern for pulmonary GVHD; now diagnosed with A fib, which may explain some of his dyspnea on exertion. He appeared to be in A fib today, but with normal rate    - Wounds: No open wounds today. He knows that if there is erythema or pain that he needs to be seen as soon as possible to evaluate for cellulitis/infection. *Reiterated this 1    ID:  Afebrile no signs/sx of infection.    - IgG 3/8/64683 =904  - 5/6/2022 Gonzalez completed  - Discussed again being up-to-date on COVID vaccines and boosters.   - 10/2023 received COVID booster, influenza and RSV vaccines        Fatigue:  stable  - History of testosterone deficiency, rechecked and modestly low. He previously did testosterone injections & didn't think it made him feel any better.    - TSH normal 2/28/23 and again on repeat 9/27/23 at 1.01.  - counseled that moderate exercise is really the only known way to combat fatigue, and acknowledged how difficult it is to balance too much with not enough activity. Previously declined PT, encouraged activity.     Vascular/CARDS  - New A fib dx early March 2024. Now on apixaban 5mg BID for this. Rate controlled deferred to to bradycardia while inpatient and on rate control.   - 10/15/18 Right leg US with small (technically DVT) clot in posterior tibial vein: started reg dose aspirin daily 325mg and recheck US in 2 weeks or symptomatically. 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      MSK: avascular necrosis of hip.  S/p replacement surgery     GI:  protonix (has heartburn)    Healthcare maintenance March 2022 TSH within normal, IgG within normal, vitamin D deficiency screening normal testosterone mildly low, reminded again today to follow up with PCP locally recheck testosterone in case this is contributing to fatigue, encourage dermatology evaluation yearly, DEXA scan completed 5/6/2022 normal bone density and is due for repeat currently. Also, discussed age-appropriate cancer screening. Reminded to see PCP locally for HCM and routine testing, is following up.    Summary: No changes to prednisone or Jakafi today. Add topical progesterone, pending prior auth and insurance coverage. Consider Tyrvaya in the future. Encouraged him to RV with his PCP and derm (significant skin cancer history), as well as establishing with cardiology for A fib management.     I spent 58 minutes in the care of this patient today, which included time necessary for preparation for the visit, obtaining history, ordering medications/tests/procedures as medically indicated, review of pertinent medical literature, counseling of the patient, communication of recommendations to the care team, and documentation time.    Patient seen and staffed with Dr. Jean-Paul Nunez who agrees with the above assessment and plan.     Shubham Joyce MD PhD  Advanced Fellow in Heme/Onc/Transplant    BMT Staff:  The patient was evaluated, seen and examined by me. I was present with Dr. Rose during this visit. pertinent labs and imaging were reviewed. I agree with the above note and have been responsible for the care plan and interpretation of progress.  Patient aware of transition of care to Dr. Collier.    Roselia Nunez MD

## 2024-04-04 ENCOUNTER — TELEPHONE (OUTPATIENT)
Dept: TRANSPLANT | Facility: CLINIC | Age: 74
End: 2024-04-04
Payer: MEDICARE

## 2024-04-04 NOTE — TELEPHONE ENCOUNTER
A prior authorization is needed for the following compounded medications prescribed.  Please complete a prior authorization with the information included below.    Medication:PROGESTERONE 10mg/g cream    Ingredients                                                                  NDCs                                                Quantities                       PROGESTERONE WETTABLE                                [id number]                                           0.6 g  HRT HEAVY CREAM                                                 [id number]                                              59.4 g                                                                                                    RX #:[id number]  Reason for Rejection:PRODUCT / SERV NOT COVERED    Pharmacy Insurance plan:[place] [employer] D  BIN #:[id number]  ID #:[id number]  PCN #:[id number]  Phone #:[phone]      Pharmacy NPI:[id number]      Please advise the pharmacy when the prior authorization is approved or denied.     Thank you for your time.        Jatin Denise    [facility]   [address] [place] [id number]   Phone: [phone]  Fax: [phone]

## 2024-04-05 PROCEDURE — 93248 EXT ECG>7D<15D REV&INTERPJ: CPT | Performed by: INTERNAL MEDICINE

## 2024-04-08 ENCOUNTER — OFFICE VISIT (OUTPATIENT)
Dept: CARDIOLOGY | Facility: CLINIC | Age: 74
End: 2024-04-08
Attending: FAMILY MEDICINE
Payer: MEDICARE

## 2024-04-08 VITALS
BODY MASS INDEX: 36.73 KG/M2 | SYSTOLIC BLOOD PRESSURE: 136 MMHG | HEIGHT: 67 IN | DIASTOLIC BLOOD PRESSURE: 82 MMHG | WEIGHT: 234 LBS | HEART RATE: 68 BPM

## 2024-04-08 DIAGNOSIS — I48.91 NEW ONSET ATRIAL FIBRILLATION (H): ICD-10-CM

## 2024-04-08 DIAGNOSIS — E66.01 CLASS 2 SEVERE OBESITY DUE TO EXCESS CALORIES WITH SERIOUS COMORBIDITY AND BODY MASS INDEX (BMI) OF 36.0 TO 36.9 IN ADULT (H): ICD-10-CM

## 2024-04-08 DIAGNOSIS — E66.812 CLASS 2 SEVERE OBESITY DUE TO EXCESS CALORIES WITH SERIOUS COMORBIDITY AND BODY MASS INDEX (BMI) OF 36.0 TO 36.9 IN ADULT (H): ICD-10-CM

## 2024-04-08 DIAGNOSIS — I48.0 PAF (PAROXYSMAL ATRIAL FIBRILLATION) (H): Primary | ICD-10-CM

## 2024-04-08 PROCEDURE — 99203 OFFICE O/P NEW LOW 30 MIN: CPT | Performed by: INTERNAL MEDICINE

## 2024-04-08 NOTE — PROGRESS NOTES
"Cardiology Consultation      Deejay Dior MRN# [id number]   YOB: 1950 Age: 73 year old   Date of Visit 04/08/2024     Reason for consult: Paroxysmal atrial fibrillation           Assessment and Plan:     Paroxysmal atrial fibrillation, low burden on monitor  Continue anticoagulation as his atrial fibrillation is likely to recur  Could consider antiarrhythmic in the future but high potential for drug drug interaction with his current immunosuppressive regimen and prophylactic regimen  Follow-up in 6 months with CAROLINA    30 minutes spent today in review of past medical record, discussion with patient and postvisit charting    This note was transcribed using electronic voice recognition software, typographical errors may be present.                Chief Complaint:   Consult ( afib)           History of Present Illness:   This patient is a very pleasant 73 year old male who has had stem cell transplant and history of eptlw-wnkgqi-uutw disease and is on suppression with ruxolitinib which may actually decrease arrhythmia who presented with rapid atrial fibrillation 3/11/2024 and shortness of breath, spontaneously converted back to sinus in the hospital.  Minimal troponin elevation and normal echocardiogram.  He had follow-up ZIO on discharge that had less than 1% burden and longest episode 3 minutes.    He is on anticoagulation at this point.  He feels pretty well.           Physical Exam:     Vitals: /82   Pulse 68   Ht 1.702 m (5' 7\")   Wt 106.1 kg (234 lb)   BMI 36.65 kg/m    Constitutional:  cooperative, alert and oriented, well developed, well nourished, in no acute distress        Skin:  warm and dry to the touch, no apparent skin lesions or masses noted        Head:  normocephalic, no masses or lesions        Eyes:  pupils equal and round, conjunctivae and lids unremarkable, sclera white, no xanthalasma, EOMS intact, no nystagmus        ENT:  no pallor or cyanosis        Neck:  JVP " normal        Chest:  normal symmetry        Cardiac: regular rhythm;normal S1 and S2                  Abdomen:  BS normoactive        Extremities and Back:  no deformities, clubbing, cyanosis, erythema observed;no edema        Neurological:  no gross motor deficits;affect appropriate                      Past Medical History:   I have reviewed this patient's past medical history  Past Medical History:   Diagnosis Date    Acute deep vein thrombosis (DVT) of distal end of right lower extremity (H)     Acute deep vein thrombosis (DVT) of distal vein of right lower extremity (H) 05/16/2016    Brow ptosis, bilateral 02/01/2022    Added automatically from request for surgery [id number]    Clotting disorder (H24)     Depression, major     Dermatochalasis of both upper eyelids 02/01/2022    Added automatically from request for surgery [id number]    Glucose intolerance     H/O bone marrow transplant (H)     h/o Deep vein thrombosis (DVT) (H)     Head injury 1964    Hearing problem Hearing aids 2015    History of blood transfusion 03/2014    History of radiation therapy 06/2014    [place] (hard of hearing)     bilateral    Immunosuppression (H24) Sirolimus daily    Lymphedema of both lower extremities     MDS (myelodysplastic syndrome) (H)     MDS/MPN (myelodysplastic/myeloproliferative neoplasms) (H)     Mixed hyperlipidemia     Numbness and tingling     feet    Obstructive sleep apnea 1999    Pneumonia     Recurrent major depressive disorder, in partial remission (H24) 08/27/2018    Reduced vision 08/2016    Cataract surgery    Senile ectropion of both lower eyelids 02/01/2022    Added automatically from request for surgery [id number]    Sleep apnea 1999    Status post right hip replacement 09/03/2019    Transplant 07/01/2014    Stem Cells             Past Surgical History:   I have reviewed this patient's past surgical history  Past Surgical History:   Procedure Laterality Date    ARTHROPLASTY HIP ANTERIOR Right 9/3/2019    Procedure:  RIGHT TOTAL HIP ARTHROPLASTY, DIRECT ANTERIOR APPROACH;  Surgeon: Yong Diaz MD;  Location:  OR    BACK SURGERY      BIOPSY      BMT CELL PRODUCT INFUSION      CATARACT IOL, RT/LT Bilateral ?    ESOPHAGOSCOPY, GASTROSCOPY, DUODENOSCOPY (EGD), COMBINED N/A 2015    ESOPHAGOSCOPY, GASTROSCOPY, DUODENOSCOPY (EGD), COMBINED Left 2015    Procedure: COMBINED ESOPHAGOSCOPY, GASTROSCOPY, DUODENOSCOPY (EGD), BIOPSY SINGLE OR MULTIPLE;  Surgeon: Amber Francis MD;  Location: [facility]    EXCISE LESION LIP N/A 2017    Procedure: EXCISE LESION LIP;  Surgeon: Tim Yanez MD;  Location: [facility]    EYE SURGERY      blepharoplasty    FLEXIBLE SIGMOIDOSCOPY  2/2/15    HEMORRHOIDECTOMY      IR FOLLOW UP VISIT OUTPATIENT  2019    PHACOEMULSIFICATION CLEAR CORNEA WITH STANDARD INTRAOCULAR LENS IMPLANT Left 2016    Procedure: PHACOEMULSIFICATION CLEAR CORNEA WITH STANDARD INTRAOCULAR LENS IMPLANT;  Surgeon: Randolph Giles MD;  Location:  [facility]    REPAIR ECTROPION BILATERAL Bilateral 2022    Procedure: bilateral lower lid ectropion repair;  Surgeon: Florina Solis MD;  Location: [facility] [place] [facility] OR    REPAIR PTOSIS BILATERAL Bilateral 2022    Procedure: Bilateral upper eyelid ptosis repair;  Surgeon: Florina Slois MD;  Location: [facility] [place] [facility] OR    TONSILLECTOMY      TRANSPLANT  2014    Stem Cell Transplant [facility] BMT    VASCULAR SURGERY      varicose vein surgery               Social History:   I have reviewed this patient's social history  Social History     Tobacco Use    Smoking status: Former     Packs/day: 1.00     Years: 34.00     Additional pack years: 0.00     Total pack years: 34.00     Types: Cigarettes     Start date: 1967     Quit date: 2001     Years since quittin.0    Smokeless tobacco: Never    Tobacco comments:     quit in    Substance Use Topics    Alcohol use: Yes     Alcohol/week: 4.2 standard drinks of alcohol     Comment: Seldom              Family History:   I have reviewed this patient's family history  Family History   Problem Relation Age of Onset    Breast Cancer Mother     Cancer Mother         1977    Cerebrovascular Disease Mother         1977    Cancer Father         1987    Hypertension Brother     Heart Disease Brother     Hyperlipidemia Brother     Depression Brother     Heart Disease Brother         2016    Hypertension Brother         1985    Glaucoma No family hx of     Macular Degeneration No family hx of     Diabetes No family hx of              Allergies:     Allergies   Allergen Reactions    Blood Transfusion Related (Informational Only) Other (See Comments)     Patient has a history of a clinically significant antibody against RBC antigens.  A delay in compatible RBCs may occur.             Medications:   I have reviewed this patient's current medications  Current Outpatient Medications   Medication Sig Dispense Refill    acetaminophen (TYLENOL) 325 MG tablet Take 650 mg by mouth every 6 hours as needed      acyclovir (ZOVIRAX) 800 MG tablet Take 1 tablet (800 mg) by mouth 2 times daily 180 tablet 1    apixaban ANTICOAGULANT (ELIQUIS) 5 MG tablet Take 1 tablet (5 mg) by mouth 2 times daily 180 tablet 4    atorvastatin (LIPITOR) 20 MG tablet Take 1 tablet (20 mg) by mouth daily 90 tablet 3    calcium carbonate-vitamin D (OSCAL W/D) 500-200 MG-UNIT tablet Take 1 tablet by mouth daily      carboxymethylcellulose PF (REFRESH PLUS) 0.5 % ophthalmic solution Place 2 drops into both eyes 4 times daily as needed for dry eyes      Cyanocobalamin (VITAMIN B-12 PO) Take 1 tablet by mouth daily      fluconazole (DIFLUCAN) 100 MG tablet Take 1 tablet (100 mg) by mouth daily 90 tablet 3    levofloxacin (LEVAQUIN) 250 MG tablet Take 1 tablet (250 mg) by mouth daily Strength: 250 mg 90 tablet 3    multivitamin, therapeutic (THERA-VIT) TABS tablet Take 1 tablet by mouth daily      NONFORMULARY Blood serum tears      pantoprazole  (PROTONIX) 40 MG EC tablet Take 1 tablet (40 mg) by mouth every morning (before breakfast) 90 tablet 2    predniSONE (DELTASONE) 5 MG tablet Take 1 tablet (5 mg) by mouth daily 90 tablet 2    progesterone 1% compounded topical gel Apply 1 Application topically 2 times daily to the forehead for ocular GVHD 60 g 3    ruxolitinib (JAKAFI) 5 MG TABS tablet Take 1 tablet (5 mg) by mouth 2 times daily 60 tablet 11    sertraline (ZOLOFT) 50 MG tablet Take 1 tablet (50 mg) by mouth daily 90 tablet 3    sulfamethoxazole-trimethoprim (BACTRIM DS) 800-160 MG tablet Take one tablet by mouth twice daily on Mondays and Tuesdays only 48 tablet 3    Vitamin D, Cholecalciferol, 25 MCG (1000 UT) TABS Take 1 tablet (1,000 Units) by mouth daily      vitamin E 400 units TABS Take 400 Units by mouth daily                 Review of Systems:       Review of Systems:  Skin:        Eyes:       ENT:       Respiratory:  Negative    Cardiovascular:    edema;Positive for;fatigue  Gastroenterology:      Genitourinary:       Musculoskeletal:       Neurologic:       Psychiatric:       Heme/Lymph/Imm:       Endocrine:                          Data:   All available laboratory data reviewed  Lab Results   Component Value Date    CHOL 206 10/17/2023    CHOL 119 02/06/2015     Lab Results   Component Value Date    HDL 62 10/17/2023    HDL 23 02/06/2015     Lab Results   Component Value Date     10/17/2023    LDL 62 02/06/2015     Lab Results   Component Value Date    TRIG 142 10/17/2023    TRIG 286 04/04/2016     Lab Results   Component Value Date    CHOLHDLRATIO 5.1 02/06/2015     TSH   Date Value Ref Range Status   03/11/2024 1.36 0.30 - 4.20 uIU/mL Final   03/08/2022 0.64 0.40 - 4.00 mU/L Final   09/27/2018 1.01 0.40 - 4.00 mU/L Final     Last Basic Metabolic Panel:  Lab Results   Component Value Date     04/02/2024     04/27/2021      Lab Results   Component Value Date    POTASSIUM 3.9 04/02/2024    POTASSIUM 4.0 07/05/2022     POTASSIUM 4.1 04/27/2021     Lab Results   Component Value Date    CHLORIDE 106 04/02/2024    CHLORIDE 108 07/05/2022    CHLORIDE 110 04/27/2021     Lab Results   Component Value Date    JOE 9.2 04/02/2024    JOE 8.6 04/27/2021     Lab Results   Component Value Date    CO2 22 04/02/2024    CO2 25 07/05/2022    CO2 24 04/27/2021     Lab Results   Component Value Date    BUN 23.3 04/02/2024    BUN 21 07/05/2022    BUN 22 04/27/2021     Lab Results   Component Value Date    CR 1.10 04/02/2024    CR 1.07 04/27/2021     Lab Results   Component Value Date    GLC 94 04/02/2024     03/11/2024    GLC 96 07/05/2022    GLC 88 04/27/2021     Lab Results   Component Value Date    WBC 7.0 04/02/2024    WBC 6.3 04/27/2021     Lab Results   Component Value Date    RBC 3.94 04/02/2024    RBC 3.29 04/27/2021     Lab Results   Component Value Date    HGB 12.3 04/02/2024    HGB 10.9 04/27/2021     Lab Results   Component Value Date    HCT 37.2 04/02/2024    HCT 34.4 04/27/2021     Lab Results   Component Value Date    MCV 94 04/02/2024     04/27/2021     Lab Results   Component Value Date    MCH 31.2 04/02/2024    MCH 33.1 04/27/2021     Lab Results   Component Value Date    MCHC 33.1 04/02/2024    MCHC 31.7 04/27/2021     Lab Results   Component Value Date    RDW 16.6 04/02/2024    RDW 17.7 04/27/2021     Lab Results   Component Value Date     04/02/2024     04/27/2021

## 2024-04-08 NOTE — LETTER
"4/8/2024    Jona Baumann MD  [address] [facility] [address]    RE: Deejay Dior       Dear Colleague,     I had the pleasure of seeing Deejay Dior in the [facility].  Cardiology Consultation      Deejay Dior MRN# [id number]   YOB: 1950 Age: 73 year old   Date of Visit 04/08/2024     Reason for consult: Paroxysmal atrial fibrillation           Assessment and Plan:     Paroxysmal atrial fibrillation, low burden on monitor  Continue anticoagulation as his atrial fibrillation is likely to recur  Could consider antiarrhythmic in the future but high potential for drug drug interaction with his current immunosuppressive regimen and prophylactic regimen  Follow-up in 6 months with CAROLINA    30 minutes spent today in review of past medical record, discussion with patient and postvisit charting    This note was transcribed using electronic voice recognition software, typographical errors may be present.                Chief Complaint:   Consult ( afib)           History of Present Illness:   This patient is a very pleasant 73 year old male who has had stem cell transplant and history of gjlfh-xkahnr-prhg disease and is on suppression with ruxolitinib which may actually decrease arrhythmia who presented with rapid atrial fibrillation 3/11/2024 and shortness of breath, spontaneously converted back to sinus in the hospital.  Minimal troponin elevation and normal echocardiogram.  He had follow-up ZIO on discharge that had less than 1% burden and longest episode 3 minutes.    He is on anticoagulation at this point.  He feels pretty well.           Physical Exam:     Vitals: /82   Pulse 68   Ht 1.702 m (5' 7\")   Wt 106.1 kg (234 lb)   BMI 36.65 kg/m    Constitutional:  cooperative, alert and oriented, well developed, well nourished, in no acute distress        Skin:  warm and dry to the touch, no apparent skin lesions or masses noted        Head:  " normocephalic, no masses or lesions        Eyes:  pupils equal and round, conjunctivae and lids unremarkable, sclera white, no xanthalasma, EOMS intact, no nystagmus        ENT:  no pallor or cyanosis        Neck:  JVP normal        Chest:  normal symmetry        Cardiac: regular rhythm;normal S1 and S2                  Abdomen:  BS normoactive        Extremities and Back:  no deformities, clubbing, cyanosis, erythema observed;no edema        Neurological:  no gross motor deficits;affect appropriate                      Past Medical History:   I have reviewed this patient's past medical history  Past Medical History:   Diagnosis Date    Acute deep vein thrombosis (DVT) of distal end of right lower extremity (H)     Acute deep vein thrombosis (DVT) of distal vein of right lower extremity (H) 05/16/2016    Brow ptosis, bilateral 02/01/2022    Added automatically from request for surgery [id number]    Clotting disorder (H24)     Depression, major     Dermatochalasis of both upper eyelids 02/01/2022    Added automatically from request for surgery [id number]    Glucose intolerance     H/O bone marrow transplant (H)     h/o Deep vein thrombosis (DVT) (H)     Head injury 1964    Hearing problem Hearing aids 2015    History of blood transfusion 03/2014    History of radiation therapy 06/2014    [place] (hard of hearing)     bilateral    Immunosuppression (H24) Sirolimus daily    Lymphedema of both lower extremities     MDS (myelodysplastic syndrome) (H)     MDS/MPN (myelodysplastic/myeloproliferative neoplasms) (H)     Mixed hyperlipidemia     Numbness and tingling     feet    Obstructive sleep apnea 1999    Pneumonia     Recurrent major depressive disorder, in partial remission (H24) 08/27/2018    Reduced vision 08/2016    Cataract surgery    Senile ectropion of both lower eyelids 02/01/2022    Added automatically from request for surgery [id number]    Sleep apnea 1999    Status post right hip replacement 09/03/2019    Transplant  07/01/2014    Stem Cells             Past Surgical History:   I have reviewed this patient's past surgical history  Past Surgical History:   Procedure Laterality Date    ARTHROPLASTY HIP ANTERIOR Right 9/3/2019    Procedure: RIGHT TOTAL HIP ARTHROPLASTY, DIRECT ANTERIOR APPROACH;  Surgeon: Yong Diaz MD;  Location:  OR    BACK SURGERY      BIOPSY      BMT CELL PRODUCT INFUSION      CATARACT IOL, RT/LT Bilateral 2015?    ESOPHAGOSCOPY, GASTROSCOPY, DUODENOSCOPY (EGD), COMBINED N/A 2/2/2015    ESOPHAGOSCOPY, GASTROSCOPY, DUODENOSCOPY (EGD), COMBINED Left 8/6/2015    Procedure: COMBINED ESOPHAGOSCOPY, GASTROSCOPY, DUODENOSCOPY (EGD), BIOPSY SINGLE OR MULTIPLE;  Surgeon: Amber Francis MD;  Location:  GI    EXCISE LESION LIP N/A 1/2/2017    Procedure: EXCISE LESION LIP;  Surgeon: Tim Yanez MD;  Location:  OR    EYE SURGERY      blepharoplasty    FLEXIBLE SIGMOIDOSCOPY  2/2/15    HEMORRHOIDECTOMY  2001    IR FOLLOW UP VISIT OUTPATIENT  1/8/2019    PHACOEMULSIFICATION CLEAR CORNEA WITH STANDARD INTRAOCULAR LENS IMPLANT Left 7/18/2016    Procedure: PHACOEMULSIFICATION CLEAR CORNEA WITH STANDARD INTRAOCULAR LENS IMPLANT;  Surgeon: Randolph Giles MD;  Location:  [facility]    REPAIR ECTROPION BILATERAL Bilateral 2/24/2022    Procedure: bilateral lower lid ectropion repair;  Surgeon: Florina Solis MD;  [place] [facility] [place]    REPAIR PTOSIS BILATERAL Bilateral 2/24/2022    Procedure: Bilateral upper eyelid ptosis repair;  Surgeon: Florina Solis MD;  [place] [facility]    TONSILLECTOMY  1953    TRANSPLANT  7-1-2014    [facility]    VASCULAR SURGERY  2010    varicose vein surgery               Social History:   I have reviewed this patient's social history  Social History     Tobacco Use    Smoking status: Former     Packs/day: 1.00     Years: 34.00     Additional pack years: 0.00     Total pack years: 34.00     Types: Cigarettes     Start date: 12/1/1967     Quit  date: 2001     Years since quittin.0    Smokeless tobacco: Never    Tobacco comments:     quit in    Substance Use Topics    Alcohol use: Yes     Alcohol/week: 4.2 standard drinks of alcohol     Comment: Seldom             Family History:   I have reviewed this patient's family history  Family History   Problem Relation Age of Onset    Breast Cancer Mother     Cancer Mother             Cerebrovascular Disease Mother             Cancer Father             Hypertension Brother     Heart Disease Brother     Hyperlipidemia Brother     Depression Brother     Heart Disease Brother         2016    Hypertension Brother         1985    Glaucoma No family hx of     Macular Degeneration No family hx of     Diabetes No family hx of              Allergies:     Allergies   Allergen Reactions    Blood Transfusion Related (Informational Only) Other (See Comments)     Patient has a history of a clinically significant antibody against RBC antigens.  A delay in compatible RBCs may occur.             Medications:   I have reviewed this patient's current medications  Current Outpatient Medications   Medication Sig Dispense Refill    acetaminophen (TYLENOL) 325 MG tablet Take 650 mg by mouth every 6 hours as needed      acyclovir (ZOVIRAX) 800 MG tablet Take 1 tablet (800 mg) by mouth 2 times daily 180 tablet 1    apixaban ANTICOAGULANT (ELIQUIS) 5 MG tablet Take 1 tablet (5 mg) by mouth 2 times daily 180 tablet 4    atorvastatin (LIPITOR) 20 MG tablet Take 1 tablet (20 mg) by mouth daily 90 tablet 3    calcium carbonate-vitamin D (OSCAL W/D) 500-200 MG-UNIT tablet Take 1 tablet by mouth daily      carboxymethylcellulose PF (REFRESH PLUS) 0.5 % ophthalmic solution Place 2 drops into both eyes 4 times daily as needed for dry eyes      Cyanocobalamin (VITAMIN B-12 PO) Take 1 tablet by mouth daily      fluconazole (DIFLUCAN) 100 MG tablet Take 1 tablet (100 mg) by mouth daily 90 tablet 3    levofloxacin (LEVAQUIN)  250 MG tablet Take 1 tablet (250 mg) by mouth daily Strength: 250 mg 90 tablet 3    multivitamin, therapeutic (THERA-VIT) TABS tablet Take 1 tablet by mouth daily      NONFORMULARY Blood serum tears      pantoprazole (PROTONIX) 40 MG EC tablet Take 1 tablet (40 mg) by mouth every morning (before breakfast) 90 tablet 2    predniSONE (DELTASONE) 5 MG tablet Take 1 tablet (5 mg) by mouth daily 90 tablet 2    progesterone 1% compounded topical gel Apply 1 Application topically 2 times daily to the forehead for ocular GVHD 60 g 3    ruxolitinib (JAKAFI) 5 MG TABS tablet Take 1 tablet (5 mg) by mouth 2 times daily 60 tablet 11    sertraline (ZOLOFT) 50 MG tablet Take 1 tablet (50 mg) by mouth daily 90 tablet 3    sulfamethoxazole-trimethoprim (BACTRIM DS) 800-160 MG tablet Take one tablet by mouth twice daily on Mondays and Tuesdays only 48 tablet 3    Vitamin D, Cholecalciferol, 25 MCG (1000 UT) TABS Take 1 tablet (1,000 Units) by mouth daily      vitamin E 400 units TABS Take 400 Units by mouth daily                 Review of Systems:       Review of Systems:  Skin:        Eyes:       ENT:       Respiratory:  Negative    Cardiovascular:    edema;Positive for;fatigue  Gastroenterology:      Genitourinary:       Musculoskeletal:       Neurologic:       Psychiatric:       Heme/Lymph/Imm:       Endocrine:                          Data:   All available laboratory data reviewed  Lab Results   Component Value Date    CHOL 206 10/17/2023    CHOL 119 02/06/2015     Lab Results   Component Value Date    HDL 62 10/17/2023    HDL 23 02/06/2015     Lab Results   Component Value Date     10/17/2023    LDL 62 02/06/2015     Lab Results   Component Value Date    TRIG 142 10/17/2023    TRIG 286 04/04/2016     Lab Results   Component Value Date    CHOLHDLRATIO 5.1 02/06/2015     TSH   Date Value Ref Range Status   03/11/2024 1.36 0.30 - 4.20 uIU/mL Final   03/08/2022 0.64 0.40 - 4.00 mU/L Final   09/27/2018 1.01 0.40 - 4.00 mU/L  Final     Last Basic Metabolic Panel:  Lab Results   Component Value Date     04/02/2024     04/27/2021      Lab Results   Component Value Date    POTASSIUM 3.9 04/02/2024    POTASSIUM 4.0 07/05/2022    POTASSIUM 4.1 04/27/2021     Lab Results   Component Value Date    CHLORIDE 106 04/02/2024    CHLORIDE 108 07/05/2022    CHLORIDE 110 04/27/2021     Lab Results   Component Value Date    JOE 9.2 04/02/2024    JOE 8.6 04/27/2021     Lab Results   Component Value Date    CO2 22 04/02/2024    CO2 25 07/05/2022    CO2 24 04/27/2021     Lab Results   Component Value Date    BUN 23.3 04/02/2024    BUN 21 07/05/2022    BUN 22 04/27/2021     Lab Results   Component Value Date    CR 1.10 04/02/2024    CR 1.07 04/27/2021     Lab Results   Component Value Date    GLC 94 04/02/2024     03/11/2024    GLC 96 07/05/2022    GLC 88 04/27/2021     Lab Results   Component Value Date    WBC 7.0 04/02/2024    WBC 6.3 04/27/2021     Lab Results   Component Value Date    RBC 3.94 04/02/2024    RBC 3.29 04/27/2021     Lab Results   Component Value Date    HGB 12.3 04/02/2024    HGB 10.9 04/27/2021     Lab Results   Component Value Date    HCT 37.2 04/02/2024    HCT 34.4 04/27/2021     Lab Results   Component Value Date    MCV 94 04/02/2024     04/27/2021     Lab Results   Component Value Date    MCH 31.2 04/02/2024    MCH 33.1 04/27/2021     Lab Results   Component Value Date    MCHC 33.1 04/02/2024    MCHC 31.7 04/27/2021     Lab Results   Component Value Date    RDW 16.6 04/02/2024    RDW 17.7 04/27/2021     Lab Results   Component Value Date     04/02/2024     04/27/2021   Thank you for allowing me to participate in the care of your patient.      Sincerely,     Hardeep Yeboah MD     [facility]  cc:   Jona Baumann MD  [address] [place] [address]

## 2024-04-15 ENCOUNTER — MYC MEDICAL ADVICE (OUTPATIENT)
Dept: FAMILY MEDICINE | Facility: CLINIC | Age: 74
End: 2024-04-15
Payer: MEDICARE

## 2024-04-15 DIAGNOSIS — I48.91 NEW ONSET ATRIAL FIBRILLATION (H): ICD-10-CM

## 2024-04-15 NOTE — TELEPHONE ENCOUNTER
Medication Question or Refill    apixaban ANTICOAGULANT (ELIQUIS) 5 MG tablet     What medication are you calling about (include dose and sig)?: Pt is calling for his rx on Eliquis that he has been taking for several months.      Preferred Pharmacy:         [facility] [address] [place] [address]  [address]  [place] [address]  Phone: [phone] Fax: [phone]        Controlled Substance Agreement on file:   CSA -- Patient Level:    CSA: None found at the patient level.       Who prescribed the medication?: [facility]    Do you need a refill? Yes    When did you use the medication last? This week    Patient offered an appointment? No    Do you have any questions or concerns?  No      Could we send this information to you in [facility] or would you prefer to receive a phone call?:   Patient would prefer a phone call   Okay to leave a detailed message?: Yes at Cell number on file:    Telephone Information:   Mobile [phone]     Judi MADRIGAL

## 2024-04-17 NOTE — TELEPHONE ENCOUNTER
Retail Pharmacy Prior Authorization Team   Phone: [phone]    [place] Initiation    Medication: COMPOUNDED NON-CONTROLLED (CMPD RX) - PHARMACY TO MIX COMPOUNDED MEDICATION  Insurance Company: [employer] - Phone [phone] Fax [phone]  Pharmacy Filling the Rx: [facility] - [place], [place] - [address]  Filling Pharmacy Phone:    Filling Pharmacy Fax:    Start Date: 4/17/2024

## 2024-04-18 NOTE — TELEPHONE ENCOUNTER
PRIOR AUTHORIZATION DENIED    Medication: COMPOUNDED NON-CONTROLLED (CMPD RX) - PHARMACY TO MIX COMPOUNDED MEDICATION  Insurance Company: [employer] - Phone [phone] Fax [phone]  Denial Date: 4/18/2024  Denial Reason(s): One or more ingredients in the compound are excluded under Medicare        Appeal Information:

## 2024-04-19 NOTE — TELEPHONE ENCOUNTER
Medication Appeal Initiation    Medication: COMPOUNDED NON-CONTROLLED (CMPD RX) - PHARMACY TO MIX COMPOUNDED MEDICATION  Appeal Start Date:  4/19/2024  Insurance Company: [employer]  Insurance Phone: [phone]  Insurance Fax: [phone]  Comments:

## 2024-04-25 NOTE — TELEPHONE ENCOUNTER
MEDICATION APPEAL DENIED    Medication: COMPOUNDED NON-CONTROLLED (CMPD RX) - PHARMACY TO MIX COMPOUNDED MEDICATION  Insurance Company: [employer]   Denial Date: 4/25/2024  Denial Reason(s):           Second Level Appeal Information:         Patient Notified: no  Central Prior Authorization Team ONLY: Second level appeals will be managed by the clinic staff and provider. Please contact the [facility] Prior Authorization Team if additional information about the denial is needed.

## 2024-04-28 ENCOUNTER — HEALTH MAINTENANCE LETTER (OUTPATIENT)
Age: 74
End: 2024-04-28

## 2024-05-29 DIAGNOSIS — D46.9 MDS (MYELODYSPLASTIC SYNDROME) (H): ICD-10-CM

## 2024-05-29 DIAGNOSIS — D89.813 GVH (GRAFT VERSUS HOST DISEASE) (H): ICD-10-CM

## 2024-05-29 RX ORDER — ACYCLOVIR 800 MG/1
800 TABLET ORAL 2 TIMES DAILY
Qty: 180 TABLET | Refills: 3 | Status: SHIPPED | OUTPATIENT
Start: 2024-05-29

## 2024-06-01 NOTE — MR AVS SNAPSHOT
After Visit Summary   10/22/2018    Deejay Dior    MRN: [id number]           Patient Information     Date Of Birth          1950        Visit Information        Provider Department      10/22/2018 12:30 PM  BMT [facility]OL[facility] #4 [facility] Blood and Marrow Transplant        Today's Diagnoses     MDS (myelodysplastic syndrome) (H)    -  1          [facility] ([facility])  [address] [place] [address]  Phone: [phone]  Clinic Hours:   Monday-Thursday:7am to 7pm   Friday: 7am to 5pm   Weekends and holidays:    8am to noon (in general)  If your fever is 100.5  or greater,   call the clinic.  After hours call the   hospital at [phone] or   [phone]. Ask for the BMT   fellow on-call            Follow-ups after your visit        Your next 10 appointments already scheduled     Oct 24, 2018 10:00 AM CDT   (Arrive by 9:45 AM)   New Patient Visit with Edu Corral MD   [facility] Dermatology ([facility])    [address] [place] [address]  3rd Floor  [facility] [address]   [phone]            Oct 24, 2018 11:00 AM CDT   Masonic Lab Draw with  MASONIC LAB DRAW   [facility] Masonic Lab Draw ([facility])    [address] [place] [address]  [address] [place] [address]   [phone]            Nov 15, 2018  1:00 PM CST   Masonic Lab Draw with  MASONIC LAB DRAW   [facility] Masonic Lab Draw ([facility])    [address] [place] [address]  [address] [place] [address]   [phone]            Nov 15, 2018  1:30 PM CST   Return with Aby Pinto MD   [facility] Blood and Marrow Transplant ([facility])    [address] [place] [address]  [address] [place] [address]   [phone]            Nov 16, 2018 10:30 AM CST   Procedure 5.5 hour with U2A ROOM 10   Unit [address] [facility] ([facility])    [address] [facility] [address]                Nov 16, 2018 12:00 PM CST   IR STAB PHLEBECTOMY < 10 STABS with UUIR5   [facility] [place] Interventional Radiology ([facility])    [address] [facility] [address] [phone]           The day before the exam:   You may eat your regular diet.   You are encouraged to drink at least 8 eight ounce glasses of clear liquids.   Drink no alcoholic beverages for 24 hours before or after the exam.  The day of the exam:   Do not eat any solid food or milk products for 6 hours prior to the exam. You may drink clear liquids until 2 hours prior to the exam. Clear liquids include the following: water, Jell-O, clear broth, apple juice or any non-carbonated drink that you can see through (no pop!)   The morning of the exam you may take medications as directed with a sip of water.  Please wear loose clothing, such as a sweat suit or jogging clothes. Avoid snaps, zippers and other metal. We may ask you to undress and put on a hospital gown.  Please bring any scans or X-rays taken at other hospitals, if similar tests were done. Also bring a list of your medicines, including vitamins, minerals and over-the-counter drugs. It is safest to leave personal items at home.  Someone will need to drive you to and from the hospital.  Tell your doctor in advance:   If you have allergies to x-ray contrast or iodine.   If you are or may be pregnant.   If you are taking Coumadin (or any other blood thinners) 5 days prior to the exam for any special instructions.   If you are diabetic to determine if your insulin needs have to be adjusted for the exam.  Your doctor will:   Need to do a history and physical within 30 days before this procedure.   Obtain necessary laboratory tests prior to the exam (CBCP, INR and PTT).  If you were given sedation, you cannot drive for 24 hours after the procedure, and an adult must be with you until then.  If you have any questions, please call the  Imaging Department where you will have your exam. Directions, parking instructions, and other information are available on our website, [email or web address].            Nov 23, 2018 11:00 AM CST   US LOWER EXTREMITY VENOUS DUPLEX LEFT with UCUSV2   [facility] US ([facility])    [address] [place] [id number] [phone]           How do I prepare for my exam? (Food and drink instructions) No Food and Drink Restrictions.  How do I prepare for my exam? (Other instructions) You do not need to do anything special to prepare for your exam.  What should I wear: Wear comfortable clothes.  How long does the exam take: Most ultrasounds take 30 to 60 minutes.  What should I bring: Bring a list of your medicines, including vitamins, minerals and over-the-counter drugs. It is safest to leave personal items at home.  Do I need a :  No  is needed.  What do I need to tell my doctor: Tell your doctor about any allergies you may have.  What should I do after the exam: No restrictions, You may resume normal activities.  What is this test: An ultrasound uses sound waves to make pictures of the body. Sound waves do not cause pain. The only discomfort may be the pressure of the wand against your skin or full bladder.  Who should I call with questions: If you have any questions, please call the Imaging Department where you will have your exam. Directions, parking instructions, and other information is available on our website, [email or web address].            Dec 04, 2018  1:20 PM CST   CT CHEST W/O CONTRAST with UCCT1   [facility] CT ([facility])    [address] [address] [phone]           How do I prepare for my exam? (Food and drink instructions) No Food and Drink Restrictions.  How do I prepare for my exam? (Other instructions) You do not need to do anything special to  prepare for this exam. For a sinus scan: Use your nose spray (nasal decongestant spray) as directed.  What should I wear: Please wear loose clothing, such as a sweat suit or jogging clothes. Avoid snaps, zippers and other metal. We may ask you to undress and put on a hospital gown.  How long does the exam take: Most scans take less than 20 minutes.  What should I bring: Please bring any scans or X-rays taken at other hospitals, if similar tests were done. Also bring a list of your medicines, including vitamins, minerals and over-the-counter drugs. It is safest to leave personal items at home.  Do I need a : No  is needed.  What do I need to tell my doctor? Be sure to tell your doctor: * If you have any allergies. * If there s any chance you are pregnant. * If you are breastfeeding.  What should I do after the exam: No restrictions, You may resume normal activities.  What is this test: A CT (computed tomography) scan is a series of pictures that allows us to look inside your body. The scanner creates images of the body in cross sections, much like slices of bread. This helps us see any problems more clearly.  Who should I call with questions: If you have any questions, please call the Imaging Department where you will have your exam. Directions, parking instructions, and other information is available on our website, [email or web address].            Dec 04, 2018  3:00 PM CST   (Arrive by 2:45 PM)   Return Visit with Arsh Sandoval MD   [facility] ([facility])    [address] [place] [address]  [address] [place] [address] [phone]              Who to contact     If you have questions or need follow up information about today's clinic visit or your schedule please contact [facility] BLOOD AND MARROW TRANSPLANT directly at [phone].  Normal or non-critical lab and imaging results will be communicated to you by MyChart, letter or phone within 4 business  "days after the clinic has received the results. If you do not hear from us within 7 days, please contact the clinic through [facility] [employer] or phone. If you have a critical or abnormal lab result, we will notify you by phone as soon as possible.  Submit refill requests through [facility] or call your pharmacy and they will forward the refill request to us. Please allow 3 business days for your refill to be completed.          Additional Information About Your Visit        [facility] [employer] Information     [facility] gives you secure access to your electronic health record. If you see a primary care provider, you can also send messages to your care team and make appointments. If you have questions, please call your primary care clinic.  If you do not have a primary care provider, please call [phone] and they will assist you.        Care EveryWhere ID     This is your Care EveryWhere ID. This could be used by other organizations to access your [facility] medical records  [id number]        Your Vitals Were     Pulse Temperature Respirations Height Pulse Oximetry BMI (Body Mass Index)    92 97.8  F (36.6  C) 16 1.727 m (5' 7.99\") 95% 31.71 kg/m2       Blood Pressure from Last 3 Encounters:   10/22/18 129/70   10/17/18 125/67   10/16/18 139/83    Weight from Last 3 Encounters:   10/22/18 94.6 kg (208 lb 8 oz)   10/17/18 94.8 kg (209 lb)   10/16/18 98.6 kg (217 lb 6.4 oz)              We Performed the Following     CBC with platelets differential     Comprehensive metabolic panel        Recent Review Flowsheet Data     BMT Recent Results Latest Ref Rng & Units 8/16/2018 9/27/2018 10/1/2018 10/11/2018 10/16/2018 10/17/2018 10/22/2018    WBC 4.0 - 11.0 10e9/L 6.1 6.2 5.4 10.7 11.1(H) 12.7(H) 10.7    Hemoglobin 13.3 - 17.7 g/dL 15.1 13.8 14.4 14.2 14.4 13.8 14.8    Platelet Count 150 - 450 10e9/L 172 223 266 180 163 148(L) 167    Neutrophils (Absolute) 1.6 - 8.3 10e9/L 2.7 2.6 4.0 9.0(H) 9.2(H) 11.2(H) 6.8    INR 0.86 - 1.14 - - - - - - -    " Sodium 133 - 144 mmol/L 139 138 137 135 135 135 137    Potassium 3.4 - 5.3 mmol/L 4.0 4.0 4.0 4.3 4.5 4.2 3.4    Chloride 94 - 109 mmol/L 108 108 106 104 106 104 105    Glucose 70 - 99 mg/dL 92 108(H) 125(H) 152(H) 111(H) 103(H) 93    Urea Nitrogen 7 - 30 mg/dL 18 22 27 34(H) 26 31(H) 29    Creatinine 0.66 - 1.25 mg/dL 0.92 1.03 0.92 1.18 1.14 1.22 1.06    Calcium (Total) 8.5 - 10.1 mg/dL 8.6 8.5 8.6 8.0(L) 8.1(L) 8.2(L) 8.2(L)    Protein (Total) 6.8 - 8.8 g/dL 7.2 6.6(L) - 6.3(L) - 6.2(L) 6.5(L)    Albumin 3.4 - 5.0 g/dL 3.1(L) 2.7(L) - 2.8(L) - 2.8(L) 2.9(L)    Bilirubin (Direct) 0.0 - 0.2 mg/dL - - - - - - -    Alkaline Phosphatase 40 - 150 U/L 109 101 - 72 - 65 72    AST 0 - 45 U/L Canceled, Test credited 39 - 21 - 20 25    ALT 0 - 70 U/L 30 28 - 33 - 36 37    MCV 78 - 100 fl 87 86 87 88 88 88 90               Primary Care Provider Office Phone # Fax #    Vivek Rafael Callejas [phone]       [facility] [address] [facility] [address]        Equal Access to Services     [facility] AH: Hadii lisa Vanegas, waairamda luqadaha, qaybta kaalmada adeegyada, corinna adamson. [facility] [phone].    ATENCIÓN: Si habla español, tiene a del toro disposición servicios gratuitos de asistencia lingüística. Llame al [phone].    We comply with applicable federal civil rights laws and [place] laws. We do not discriminate on the basis of race, color, national origin, age, disability, sex, sexual orientation, or gender identity.            Thank you!     Thank you for choosing [facility] BLOOD AND MARROW TRANSPLANT  for your care. Our goal is always to provide you with excellent care. Hearing back from our patients is one way we can continue to improve our services. Please take a few minutes to complete the written survey that you may receive in the mail after your visit with us. Thank you!             Your Updated Medication List - Protect others around you: Learn  how to safely use, store and throw away your medicines at www.disposemymeds.org.          This list is accurate as of 10/22/18  1:47 PM.  Always use your most recent med list.                   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), MDS (myelodysplastic syndrome) (H)       amoxicillin 500 MG capsule    AMOXIL    16 capsule    Take 4 pills (2 grams) day of dental work    MDS (myelodysplastic syndrome) (H)       aspirin 325 MG tablet     30 tablet    Take 1 tablet (325 mg) by mouth daily    MDS (myelodysplastic syndrome) (H)       bumetanide 1 MG tablet    BUMEX    60 tablet    Take 1 tablet (1 mg) by mouth every other day Take 1 tablet 1mg by mouth daily 30 minutes prior to bumex    MDS (myelodysplastic syndrome) (H)       calcium carbonate-vitamin D 500-400 MG-UNIT Tabs per tablet     180 tablet    Take 1 tablet by mouth daily 2000 mg    MDS (myelodysplastic syndrome) (H), Acute hegmp-erdejf-tykp disease (H), 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)       cephALEXin 500 MG capsule    KEFLEX    14 capsule    Take 1 capsule (500 mg) by mouth 2 times daily    MDS (myelodysplastic syndrome) (H)       fluconazole 100 MG tablet    DIFLUCAN    30 tablet    Take 1 tablet (100 mg) by mouth daily    Status post bone marrow transplant (H)       levofloxacin 250 MG tablet    LEVAQUIN    30 tablet    Take 1 tablet (250 mg) by mouth daily    MDS (myelodysplastic syndrome) (H)       LORazepam 1 MG tablet    ATIVAN    30 tablet    Take 1 tablet (1 mg) by mouth every 6 hours as needed for anxiety    MDS (myelodysplastic syndrome) (H)       order for DME     1 Device    Please provide a NOVA cane offset with strap item number [id number]    MDS (myelodysplastic syndrome) (H)       order for DME     1 each    Please measure and distribute 1 pair of 20mmHg - 30mmHg thigh high open or closed toe  compression stockings. Jobst ultrasheer or equivalent.    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), Status post bone marrow transplant (H)       * predniSONE 20 MG tablet    DELTASONE    60 tablet    Take 1 tablet (20 mg) by mouth daily    SOB (shortness of breath)       * predniSONE 50 MG tablet    DELTASONE    30 tablet    Take 1 tablet (50 mg) by mouth daily Take a combination of 1 if the 50 mg and 2 of the 20 mg tablets for a total of 90 mg prednisone by mouth daily    SOB (shortness of breath)       ruxolitinib 5 MG Tabs tablet CHEMO    JAKAFI    60 tablet    Take 1 tablet (5 mg) by mouth 2 times daily    MDS (myelodysplastic syndrome) (H)       sertraline 100 MG tablet    ZOLOFT    30 tablet    Take 1 tablet (100 mg) by mouth daily    MDS (myelodysplastic syndrome) (H), 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 Read the directions carefully, and ask your doctor or other care provider to review them with you.       (4) walks frequently

## 2024-06-26 DIAGNOSIS — E78.5 HYPERLIPIDEMIA LDL GOAL <100: ICD-10-CM

## 2024-06-26 RX ORDER — ATORVASTATIN CALCIUM 20 MG/1
20 TABLET, FILM COATED ORAL DAILY
Qty: 90 TABLET | Refills: 0 | Status: SHIPPED | OUTPATIENT
Start: 2024-06-26 | End: 2024-09-19

## 2024-08-14 DIAGNOSIS — R12 HEARTBURN: ICD-10-CM

## 2024-08-14 RX ORDER — PANTOPRAZOLE SODIUM 40 MG/1
40 TABLET, DELAYED RELEASE ORAL DAILY
Qty: 90 TABLET | Refills: 1 | Status: SHIPPED | OUTPATIENT
Start: 2024-08-14

## 2024-08-17 DIAGNOSIS — D89.813 GRAFT-VERSUS-HOST DISEASE (H): ICD-10-CM

## 2024-08-17 DIAGNOSIS — D46.9 MDS (MYELODYSPLASTIC SYNDROME) (H): Primary | ICD-10-CM

## 2024-08-21 NOTE — PROGRESS NOTES
BMT Progress Note    ID:  Mr. 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 (In the past has had a basal and squamous cell removed). Previously a patient of Dr. Jean-Paul Busch.     cGVHD: currently on pred 5mg every day and Jakafi 5 mg bid.        Interval History:  Deejay returns today with his wife. Overall, he is feeling fairly well. He has continued, albeit stable fatigue, which is unchanged from our previous visits. He continues to use eye drops ~10x/day, with some dry mouth, but no dysphagia or difficulty eating. His eyes are bothersome, but do not negatively affect his daily life.     He had his R knee replaced about 4 weeks ago, and his rehabbing still, but the rest of his joints are functioning well with no reduced ROM or joint/muscle pain or aches. His R shin skin remains tight, but unchanged. No other skin tightness     ROS: Pertinent positive and negative systems described in HPI; the remainder of the 14 systems are negative      Has Deejay Dior been diagnosed with chronic GVHD?  Yes - Skin, eyes, mouth  Current Systemic Immunosuppression:  pred 5mg every day and Jakafi 5 mg bid.      Chronic GVHD NIH Score At Today's Visit   Score 0 Score 1 Score 2 Score 3   % 80-90% 60-70% <60%          Skin No sclerotic features Superficial sclerotic features Deep sclerotic features (hidebound)    No skin changes BSA 1-18% BSA 19-50% BSA >50%          Mouth No symptoms Mild, PO intake not limited Moderate, partial limitation in PO intake Severe, major limitation in PO intake          Eyes No symptoms Mild dry eyes Moderate, partially affecting ADL Severe, significantly affecting ADL          GI Tract No symptoms Symptoms  without significant weight loss (<5%) Mild-mod weight loss (5-15%) OR diarrhea without significant impact in ADL Severe weight loss (>15%) OR esophageal dilatation required OR severe diarrhea impacting ADL          Liver Normal total bilirubin and transaminases < 3x ULN Normal total bilirubin with ALT/AST ? 3-5x ULN OR ALP ? 3x ULN Elevated total bilirubin but <3 mg/dl OR ALT >5x ULN Elevated total bilirubin > 3 mg/dl          Lungs No symptoms Mild dyspnea with exertion Moderate dyspnea (walking on flat ground) Severe dyspnea (requiring O2)    FEV1?80% FEV1 60-79% FEV1 40-59% FEV1 <39%          Joints/Fascia No symptoms Mild tightness and decreased ROM not affecting ADL Moderate tightness and decreased ROM affecting ADL Contractures with significant limitation of ADL          Genital No symptoms Mild signs/symptoms Moderate signs/symptoms Severe signs/symptoms          Other Indicators Ascites Pericardial Effusion Pleural Effusion Nephrotic Syndrome           Myasthenia Gravis Peripheral Neuropathy Polymyositis Weight loss >5% without GI sxs           Eosinophilia >500 Platelets <100 Other (specify) Other (specify)          Overall NIH Chronic GVHD Score All scores = 0 Lung score = 0 PLUS   1 or 2 organs with score =1 Lung score = 1  OR  At least 1 organ with   score of 2  OR  3 organs with score = 1 Lung score = 2 or 3  OR  At least 1 other organ   with score = 3    No cGVHD Mild Moderate Severe                Current Outpatient Medications   Medication Sig Dispense Refill    acetaminophen (TYLENOL) 325 MG tablet Take 650 mg by mouth every 6 hours as needed      acyclovir (ZOVIRAX) 800 MG tablet Take 1 tablet (800 mg) by mouth 2 times daily 180 tablet 3    apixaban ANTICOAGULANT (ELIQUIS) 5 MG tablet Take 1 tablet (5 mg) by mouth 2 times daily 180 tablet 4    atorvastatin (LIPITOR) 20 MG tablet Take 1 tablet (20 mg) by mouth daily 90 tablet 0    calcium carbonate-vitamin D (OSCAL W/D) 500-200 MG-UNIT tablet Take 1  tablet by mouth daily      carboxymethylcellulose PF (REFRESH PLUS) 0.5 % ophthalmic solution Place 2 drops into both eyes 4 times daily as needed for dry eyes      Cyanocobalamin (VITAMIN B-12 PO) Take 1 tablet by mouth daily      fluconazole (DIFLUCAN) 100 MG tablet Take 1 tablet (100 mg) by mouth daily 90 tablet 3    levofloxacin (LEVAQUIN) 250 MG tablet Take 1 tablet (250 mg) by mouth daily Strength: 250 mg 90 tablet 3    multivitamin, therapeutic (THERA-VIT) TABS tablet Take 1 tablet by mouth daily      NONFORMULARY Blood serum tears      pantoprazole (PROTONIX) 40 MG EC tablet Take 1 tablet (40 mg) by mouth daily 90 tablet 1    predniSONE (DELTASONE) 5 MG tablet Take 1 tablet (5 mg) by mouth daily 90 tablet 2    progesterone 1% compounded topical gel Apply 1 Application topically 2 times daily to the forehead for ocular GVHD 60 g 3    ruxolitinib (JAKAFI) 5 MG TABS tablet Take 1 tablet (5 mg) by mouth 2 times daily 60 tablet 11    sertraline (ZOLOFT) 50 MG tablet Take 1 tablet (50 mg) by mouth daily 90 tablet 3    sulfamethoxazole-trimethoprim (BACTRIM DS) 800-160 MG tablet Take one tablet by mouth twice daily on Mondays and Tuesdays only 48 tablet 3    Vitamin D, Cholecalciferol, 25 MCG (1000 UT) TABS Take 1 tablet (1,000 Units) by mouth daily      vitamin E 400 units TABS Take 400 Units by mouth daily       No current facility-administered medications for this visit.       Physical exam  /69 (BP Location: Right arm, Patient Position: Sitting, Cuff Size: Adult Large)   Pulse 68   Temp 97.5  F (36.4  C) (Oral)   Resp 16   Wt 104.7 kg (230 lb 12.8 oz)   SpO2 98%   BMI 36.15 kg/m    Gen: Well appearing, in NAD  HEENT: EOMI, PERRL, mmm, oropharynx clear  LAD: no palpable cervical, supraclavicular, axillary or inguinal lymphadenopathy.  CV: Normal rate, regular. No m/r/g  Pulm: CTAB, no wheezing, normal work of breathing  Abd: Soft, nt/nd, no rebound/guarding  Ext: Warm and well perfused. No lower  extremity edema. Bilateral nail bed destruction noted (stable, unchanged)  Skin: The right leg has stable sclerotic skin on the shin with hyperpigmentation, but no evidence of infection or cellulitis. No other sclerotic changes of the skin.    Neuro: Alert and answering questions appropriately. CNII-XII grossly intact. Moving all extremities without issue or focal neurologic deficits.       Labs/Imaging:  Recent Labs   Lab Test 08/28/24  1424 04/02/24  1207 03/12/24  0552 03/11/24  1519 07/23/21  0920 04/27/21  1338 03/17/21  1205 03/03/21  0346   WBC 7.4 7.0 5.9 6.9   < > 6.3 7.6 5.7   HGB 11.4* 12.3* 12.3* 12.9*   < > 10.9* 10.8* 9.3*    267 246 262   < > 295 322 250   ANEU  --   --   --   --   --  3.4 4.1 3.0   ANEUTAUTO 4.0 3.6  --  4.0   < >  --   --   --    ALYM  --   --   --   --   --  1.9 2.4 2.0   ALYMPAUTO 2.3 2.3  --  2.0   < >  --   --   --     < > = values in this interval not displayed.     Recent Labs   Lab Test 08/28/24  1424 04/02/24  1207 03/12/24  0552 03/11/24  1519 02/23/21  0348 02/22/21  0317 02/17/21  1131 02/17/21  0349 02/15/21  0431 02/14/21  0637 02/14/21  0326 02/09/21  0637 02/08/21  0910    140 138 140   < > 138   < > 145*   < >  --  138   < > 139   POTASSIUM 4.3 3.9 3.9 3.9   < > 4.3   < > 5.0   < >  --  4.1   < > 4.0   CHLORIDE 105 106 105 105   < > 103   < > 115*   < >  --  107   < > 111*   CO2 23 22 23 22   < > 30   < > 28   < >  --  25   < > 23   BUN 23.1* 23.3* 24.7* 28.6*   < > 38*   < > 59*   < >  --  39*   < > 25   CR 1.15 1.10 1.01 1.10   < > 0.88   < > 0.92   < >  --  0.96   < > 0.88   JOE 9.3 9.2 8.7* 9.3   < > 8.9   < > 8.5   < >  --  8.5   < > 8.4*   MAG  --   --   --  1.9  --  2.2  --  2.8*  --   --  2.4*  --  1.7   PHOS  --   --   --   --   --  3.5  --  2.2*  --  4.5  --   --   --    LDH  --   --   --   --   --   --   --   --   --   --  783*  --  483*    < > = values in this interval not displayed.     Recent Labs   Lab Test 08/28/24  1424 04/02/24  7617  "10/10/23  1059 03/17/21  1205 03/01/21  0625 02/26/21  0626 02/23/21  0348   AST 35 34 31   < > 29 30 26   ALT 27 26 24   < > 36 38 42   ALKPHOS 88 69 66   < > 87 76 65   BILITOTAL 0.4 0.3 0.3   < > 1.3 0.5 0.6   INR  --   --   --   --  1.12 1.13 1.05    < > = values in this interval not displayed.           ASSESSMENT AND PLAN:  Mr. Dior is a 73 y/o male, 9 years 9 mo s/p NMA allo sib PBSCT for MDS w/course complicated by cGVHD.    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 Total cell dose (from 7/1 & 7/2) 6.53 x 10^6 CD34+ cells/kg.   - 1 year anniversary (July 2015): 30% cellular, trilineage hematopoiesis, no abnormal blasts by morphology or flow , no dysplasia, 0-1 fibrosis, 100% donor (BM, CD3, CD15). CR  - 2 year anniversary (July 2016): 20-30% cellular, trilineage hematopoiesis, no abnormal blasts by morphology or flow , no dysplasia, No fibrosis, 100% donor in BM (PB not sent). ISCN: //46,XX[20] Complete Remission.     #GVHD: NIH score of moderate today (8/28/24)   He has remained on on prednisone 5 mg daily (reports that cGVHD flared on lower dose would like to stay at same dose), ruxolitinib 5 mg twice daily since 10/2018. Multiple discussions in the last few years (with myself and Dr. Jean-Paul Nunez) about modifying his chronic GHVD regimen, but he has remained steadfast that he would like to continue without changing. His symptoms remain stable, and he says he feels \"normal\" at this point. We again discussed that there area additional options that have been approved in recent years - including belumosudil and axitilimab - that could help his symptoms, as well as clinical trial options, should he want. We will discuss belumosudil more explicitly at the next visit, as it could provide additional benefit without significant side effects.      Brief history of his GVHD: oral, eyes, possibly lung, skin, MSK  Hx of biopsy proven acute GVHD of colon and skin, cGVHD " (fatigue, weakness, mouth, SOB). Had been off prednisone since summer 2017 and briefly tapered off Sirolimus (january 2018), however resumed with flare in February. 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 Sirolimus was stopped on 9/27/2018 & Pred 90mg was started. Seen by Dr. Sandoval, he does not think his symptoms or CT findings are c/w Sirolimus or GVH & he was in favor of tapering Prednisone. Developed worsening skin thickening & desquamation to the RLE, c/w GVH. Started Jakafi 10/22/2018 at 5 mg BID with symptom improvement in lower extremities.  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 Previously completed steroid taper in Oct 2018. Required Burst pred 40mg a day on 1/3/20 with significant systemic arthralgic pain, tapered back to 10/0 eod after 1 week, near resolution of symptoms noted 1/17, returned to 10 mg every other day; then dropped to 5mg q day in April 2021 (this is best dates estimated on chart review)  - Optho: Last seen 3/14/24, with no change to management. Previously no improvement with scleral lenses. Current regiment below. Topical progesterone was not covered by insurance, so this was not pursued. Discussed Tyrvaya as an option, but deferred today.    - Continue serum tears q2h OU               - Did not like using ointment; continue frequent PFATs               - Humidifier in bedroom                - lotemax (or FML) each eye bid - NOT USING but optional               - Disc lid hygiene and compresses  - Lungs: PFTs obtained December 2021 stable compared to 3 prior - %, FEV1 107% DL CO 74% predicted.  Of note patient had Covid pneumonia and was on the ventilator.  It has been previously noted that he does have this raspy crackly sounds on both bases that do not change. He previously deferred referral to pulmonary. Repeat PFTs 11/2023 showed normal parameters, and follow-up with Dr. Sandoval,  without concern for pulmonary GVHD. A fib diagnosis may be contributing to baseline dyspnea on exertion, with unlikely contribution of chronic GVHD given PFTs. Plan for PFTs roughly yearly.     - Wounds: No open wounds today. He knows that if there is erythema or pain that he needs to be seen as soon as possible to evaluate for cellulitis/infection.     ID:     - IgG 3/8/92513 =904  - 5/6/2022 Gonzalez completed  - Up to date with post-transplant immunizations, minus live-vaccines (on immunosuppression)  - 10/2023 received COVID booster, influenza and RSV vaccines   - Encouraged to get influenza and COVID boosters in October again this year. Probably should also get Prevnar 20.       Fatigue:  stable  - History of testosterone deficiency, rechecked and modestly low. He previously did testosterone injections & didn't think it made him feel any better.    - TSH normal 2/28/23 and again on repeat 9/27/23 at 1.01.  - counseled that moderate exercise is really the only known way to combat fatigue, and acknowledged how difficult it is to balance too much with not enough activity. Previously declined PT, encouraged activity.     Vascular/CARDS  -  A fib dx early March 2024. Now on apixaban 5mg BID for this. Rate controlled deferred to to bradycardia while inpatient and on rate control.   - 10/15/18 Right leg US with small (technically DVT) clot in posterior tibial vein: started reg dose aspirin daily 325mg and recheck US in 2 weeks or symptomatically. 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      MSK: avascular necrosis of hip.  S/p replacement surgery     GI:  protonix (has heartburn)    Healthcare maintenance March 2022 TSH within normal, IgG within normal, vitamin D deficiency screening normal testosterone mildly low, reminded again today to follow up with  PCP locally recheck testosterone in case this is contributing to fatigue, encourage dermatology evaluation yearly, DEXA scan completed 5/6/2022 normal bone density and is due for repeat currently. Also, discussed age-appropriate cancer screening. Reminded to see PCP locally for HCM and routine testing, is following up. Continues to see Derm.     Summary: No changes to prednisone or Jakafi today. Consider Tyrvaya in the future for dry eyes, and belumosudil for skin, but deferred today. Has PCP and Derm follow-up planned. Encouraged influenze and covid boosters in October. Should get the Prevnar 20 as well. Plan for RV in 4 months, and will discuss repeat PFTs at that time.     The longitudinal plan of care for the diagnoses/conditions as documented were addressed during this visit. Due to the added complexity in care, I will continue to support Deejay in the subsequent management and with ongoing continuity of care.    I spent 51 minutes in the care of this patient today, which included time necessary for preparation for the visit, obtaining history, ordering medications/tests/procedures as medically indicated, review of pertinent medical literature, counseling of the patient, communication of recommendations to the care team, and documentation time.    Shubham Joyce MD PhD

## 2024-08-28 ENCOUNTER — TELEPHONE (OUTPATIENT)
Dept: TRANSPLANT | Facility: CLINIC | Age: 74
End: 2024-08-28

## 2024-08-28 ENCOUNTER — ONCOLOGY VISIT (OUTPATIENT)
Dept: TRANSPLANT | Facility: CLINIC | Age: 74
End: 2024-08-28
Attending: STUDENT IN AN ORGANIZED HEALTH CARE EDUCATION/TRAINING PROGRAM
Payer: MEDICARE

## 2024-08-28 ENCOUNTER — APPOINTMENT (OUTPATIENT)
Dept: LAB | Facility: CLINIC | Age: 74
End: 2024-08-28
Attending: STUDENT IN AN ORGANIZED HEALTH CARE EDUCATION/TRAINING PROGRAM
Payer: MEDICARE

## 2024-08-28 VITALS
RESPIRATION RATE: 16 BRPM | TEMPERATURE: 97.5 F | HEART RATE: 68 BPM | BODY MASS INDEX: 36.15 KG/M2 | SYSTOLIC BLOOD PRESSURE: 125 MMHG | OXYGEN SATURATION: 98 % | DIASTOLIC BLOOD PRESSURE: 69 MMHG | WEIGHT: 230.8 LBS

## 2024-08-28 DIAGNOSIS — D46.9 MDS (MYELODYSPLASTIC SYNDROME) (H): ICD-10-CM

## 2024-08-28 DIAGNOSIS — D89.813 GRAFT-VERSUS-HOST DISEASE (H): Primary | ICD-10-CM

## 2024-08-28 LAB
ALBUMIN SERPL BCG-MCNC: 4 G/DL (ref 3.5–5.2)
ALP SERPL-CCNC: 88 U/L (ref 40–150)
ALT SERPL W P-5'-P-CCNC: 27 U/L (ref 0–70)
ANION GAP SERPL CALCULATED.3IONS-SCNC: 10 MMOL/L (ref 7–15)
AST SERPL W P-5'-P-CCNC: 35 U/L (ref 0–45)
BASOPHILS # BLD AUTO: 0 10E3/UL (ref 0–0.2)
BASOPHILS NFR BLD AUTO: 0 %
BILIRUB SERPL-MCNC: 0.4 MG/DL
BUN SERPL-MCNC: 23.1 MG/DL (ref 8–23)
CALCIUM SERPL-MCNC: 9.3 MG/DL (ref 8.8–10.4)
CHLORIDE SERPL-SCNC: 105 MMOL/L (ref 98–107)
CREAT SERPL-MCNC: 1.15 MG/DL (ref 0.67–1.17)
EGFRCR SERPLBLD CKD-EPI 2021: 66 ML/MIN/1.73M2
EOSINOPHIL # BLD AUTO: 0.1 10E3/UL (ref 0–0.7)
EOSINOPHIL NFR BLD AUTO: 1 %
ERYTHROCYTE [DISTWIDTH] IN BLOOD BY AUTOMATED COUNT: 17.2 % (ref 10–15)
GLUCOSE SERPL-MCNC: 92 MG/DL (ref 70–99)
HCO3 SERPL-SCNC: 23 MMOL/L (ref 22–29)
HCT VFR BLD AUTO: 34.4 % (ref 40–53)
HGB BLD-MCNC: 11.4 G/DL (ref 13.3–17.7)
IMM GRANULOCYTES # BLD: 0 10E3/UL
IMM GRANULOCYTES NFR BLD: 0 %
LYMPHOCYTES # BLD AUTO: 2.3 10E3/UL (ref 0.8–5.3)
LYMPHOCYTES NFR BLD AUTO: 30 %
MCH RBC QN AUTO: 31.2 PG (ref 26.5–33)
MCHC RBC AUTO-ENTMCNC: 33.1 G/DL (ref 31.5–36.5)
MCV RBC AUTO: 94 FL (ref 78–100)
MONOCYTES # BLD AUTO: 1 10E3/UL (ref 0–1.3)
MONOCYTES NFR BLD AUTO: 14 %
NEUTROPHILS # BLD AUTO: 4 10E3/UL (ref 1.6–8.3)
NEUTROPHILS NFR BLD AUTO: 55 %
NRBC # BLD AUTO: 0 10E3/UL
NRBC BLD AUTO-RTO: 0 /100
PLATELET # BLD AUTO: 269 10E3/UL (ref 150–450)
POTASSIUM SERPL-SCNC: 4.3 MMOL/L (ref 3.4–5.3)
PROT SERPL-MCNC: 6.8 G/DL (ref 6.4–8.3)
RBC # BLD AUTO: 3.65 10E6/UL (ref 4.4–5.9)
SODIUM SERPL-SCNC: 138 MMOL/L (ref 135–145)
WBC # BLD AUTO: 7.4 10E3/UL (ref 4–11)

## 2024-08-28 PROCEDURE — G2211 COMPLEX E/M VISIT ADD ON: HCPCS | Performed by: STUDENT IN AN ORGANIZED HEALTH CARE EDUCATION/TRAINING PROGRAM

## 2024-08-28 PROCEDURE — G0463 HOSPITAL OUTPT CLINIC VISIT: HCPCS | Performed by: STUDENT IN AN ORGANIZED HEALTH CARE EDUCATION/TRAINING PROGRAM

## 2024-08-28 PROCEDURE — 36415 COLL VENOUS BLD VENIPUNCTURE: CPT | Performed by: STUDENT IN AN ORGANIZED HEALTH CARE EDUCATION/TRAINING PROGRAM

## 2024-08-28 PROCEDURE — 84075 ASSAY ALKALINE PHOSPHATASE: CPT | Performed by: STUDENT IN AN ORGANIZED HEALTH CARE EDUCATION/TRAINING PROGRAM

## 2024-08-28 PROCEDURE — 85004 AUTOMATED DIFF WBC COUNT: CPT | Performed by: STUDENT IN AN ORGANIZED HEALTH CARE EDUCATION/TRAINING PROGRAM

## 2024-08-28 PROCEDURE — 99215 OFFICE O/P EST HI 40 MIN: CPT | Performed by: STUDENT IN AN ORGANIZED HEALTH CARE EDUCATION/TRAINING PROGRAM

## 2024-08-28 ASSESSMENT — PAIN SCALES - GENERAL: PAINLEVEL: MILD PAIN (3)

## 2024-08-28 NOTE — LETTER
8/28/2024      Deejay Dior  [address]  [place] [address]      Dear Colleague,    Thank you for referring your patient, Deejay Dior, to the [facility]OD AND [facility]OW [facility]ANSPLANT PRO[facility] [place]. Please see a copy of my visit note below.    BMT Progress Note    ID:  Mr. 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 (In the past has had a basal and squamous cell removed). Previously a patient of Dr. Jean-Paul Busch.     cGVHD: currently on pred 5mg every day and Jakafi 5 mg bid.        Interval History:  Deejay returns today with his wife. Overall, he is feeling fairly well. He has continued, albeit stable fatigue, which is unchanged from our previous visits. He continues to use eye drops ~10x/day, with some dry mouth, but no dysphagia or difficulty eating. His eyes are bothersome, but do not negatively affect his daily life.     He had his R knee replaced about 4 weeks ago, and his rehabbing still, but the rest of his joints are functioning well with no reduced ROM or joint/muscle pain or aches. His R shin skin remains tight, but unchanged. No other skin tightness     ROS: Pertinent positive and negative systems described in HPI; the remainder of the 14 systems are negative      Has Deejay Dior been diagnosed with chronic GVHD?  Yes - Skin, eyes, mouth  Current Systemic Immunosuppression:  pred 5mg every day and Jakafi 5 mg bid.      Chronic GVHD NIH Score At Today's Visit   Score 0 Score 1 Score 2 Score 3   % 80-90% 60-70% <60%          Skin No sclerotic features Superficial sclerotic features Deep sclerotic features (hidebound)    No skin changes BSA 1-18% BSA 19-50% BSA >50%          Mouth  No symptoms Mild, PO intake not limited Moderate, partial limitation in PO intake Severe, major limitation in PO intake          Eyes No symptoms Mild dry eyes Moderate, partially affecting ADL Severe, significantly affecting ADL          GI Tract No symptoms Symptoms without significant weight loss (<5%) Mild-mod weight loss (5-15%) OR diarrhea without significant impact in ADL Severe weight loss (>15%) OR esophageal dilatation required OR severe diarrhea impacting ADL          Liver Normal total bilirubin and transaminases < 3x ULN Normal total bilirubin with ALT/AST = 3-5x ULN OR ALP = 3x ULN Elevated total bilirubin but <3 mg/dl OR ALT >5x ULN Elevated total bilirubin > 3 mg/dl          Lungs No symptoms Mild dyspnea with exertion Moderate dyspnea (walking on flat ground) Severe dyspnea (requiring O2)    FEV1=80% FEV1 60-79% FEV1 40-59% FEV1 <39%          Joints/Fascia No symptoms Mild tightness and decreased ROM not affecting ADL Moderate tightness and decreased ROM affecting ADL Contractures with significant limitation of ADL          Genital No symptoms Mild signs/symptoms Moderate signs/symptoms Severe signs/symptoms          Other Indicators Ascites Pericardial Effusion Pleural Effusion Nephrotic Syndrome           Myasthenia Gravis Peripheral Neuropathy Polymyositis Weight loss >5% without GI sxs           Eosinophilia >500 Platelets <100 Other (specify) Other (specify)          Overall NIH Chronic GVHD Score All scores = 0 Lung score = 0 PLUS   1 or 2 organs with score =1 Lung score = 1  OR  At least 1 organ with   score of 2  OR  3 organs with score = 1 Lung score = 2 or 3  OR  At least 1 other organ   with score = 3    No cGVHD Mild Moderate Severe                Current Outpatient Medications   Medication Sig Dispense Refill     acetaminophen (TYLENOL) 325 MG tablet Take 650 mg by mouth every 6 hours as needed       acyclovir (ZOVIRAX) 800 MG tablet Take 1 tablet (800 mg) by mouth 2 times daily 180  tablet 3     apixaban ANTICOAGULANT (ELIQUIS) 5 MG tablet Take 1 tablet (5 mg) by mouth 2 times daily 180 tablet 4     atorvastatin (LIPITOR) 20 MG tablet Take 1 tablet (20 mg) by mouth daily 90 tablet 0     calcium carbonate-vitamin D (OSCAL W/D) 500-200 MG-UNIT tablet Take 1 tablet by mouth daily       carboxymethylcellulose PF (REFRESH PLUS) 0.5 % ophthalmic solution Place 2 drops into both eyes 4 times daily as needed for dry eyes       Cyanocobalamin (VITAMIN B-12 PO) Take 1 tablet by mouth daily       fluconazole (DIFLUCAN) 100 MG tablet Take 1 tablet (100 mg) by mouth daily 90 tablet 3     levofloxacin (LEVAQUIN) 250 MG tablet Take 1 tablet (250 mg) by mouth daily Strength: 250 mg 90 tablet 3     multivitamin, therapeutic (THERA-VIT) TABS tablet Take 1 tablet by mouth daily       NONFORMULARY Blood serum tears       pantoprazole (PROTONIX) 40 MG EC tablet Take 1 tablet (40 mg) by mouth daily 90 tablet 1     predniSONE (DELTASONE) 5 MG tablet Take 1 tablet (5 mg) by mouth daily 90 tablet 2     progesterone 1% compounded topical gel Apply 1 Application topically 2 times daily to the forehead for ocular GVHD 60 g 3     ruxolitinib (JAKAFI) 5 MG TABS tablet Take 1 tablet (5 mg) by mouth 2 times daily 60 tablet 11     sertraline (ZOLOFT) 50 MG tablet Take 1 tablet (50 mg) by mouth daily 90 tablet 3     sulfamethoxazole-trimethoprim (BACTRIM DS) 800-160 MG tablet Take one tablet by mouth twice daily on Mondays and Tuesdays only 48 tablet 3     Vitamin D, Cholecalciferol, 25 MCG (1000 UT) TABS Take 1 tablet (1,000 Units) by mouth daily       vitamin E 400 units TABS Take 400 Units by mouth daily       No current facility-administered medications for this visit.       Physical exam  /69 (BP Location: Right arm, Patient Position: Sitting, Cuff Size: Adult Large)   Pulse 68   Temp 97.5  F (36.4  C) (Oral)   Resp 16   Wt 104.7 kg (230 lb 12.8 oz)   SpO2 98%   BMI 36.15 kg/m    Gen: Well appearing, in  NAD  HEENT: EOMI, PERRL, mmm, oropharynx clear  LAD: no palpable cervical, supraclavicular, axillary or inguinal lymphadenopathy.  CV: Normal rate, regular. No m/r/g  Pulm: CTAB, no wheezing, normal work of breathing  Abd: Soft, nt/nd, no rebound/guarding  Ext: Warm and well perfused. No lower extremity edema. Bilateral nail bed destruction noted (stable, unchanged)  Skin: The right leg has stable sclerotic skin on the shin with hyperpigmentation, but no evidence of infection or cellulitis. No other sclerotic changes of the skin.    Neuro: Alert and answering questions appropriately. CNII-XII grossly intact. Moving all extremities without issue or focal neurologic deficits.       Labs/Imaging:  Recent Labs   Lab Test 08/28/24  1424 04/02/24  1207 03/12/24  0552 03/11/24  1519 07/23/21  0920 04/27/21  1338 03/17/21  1205 03/03/21  0346   WBC 7.4 7.0 5.9 6.9   < > 6.3 7.6 5.7   HGB 11.4* 12.3* 12.3* 12.9*   < > 10.9* 10.8* 9.3*    267 246 262   < > 295 322 250   ANEU  --   --   --   --   --  3.4 4.1 3.0   ANEUTAUTO 4.0 3.6  --  4.0   < >  --   --   --    ALYM  --   --   --   --   --  1.9 2.4 2.0   ALYMPAUTO 2.3 2.3  --  2.0   < >  --   --   --     < > = values in this interval not displayed.     Recent Labs   Lab Test 08/28/24  1424 04/02/24  1207 03/12/24  0552 03/11/24  1519 02/23/21  0348 02/22/21  0317 02/17/21  1131 02/17/21  0349 02/15/21  0431 02/14/21  0637 02/14/21  0326 02/09/21  0637 02/08/21  0910    140 138 140   < > 138   < > 145*   < >  --  138   < > 139   POTASSIUM 4.3 3.9 3.9 3.9   < > 4.3   < > 5.0   < >  --  4.1   < > 4.0   CHLORIDE 105 106 105 105   < > 103   < > 115*   < >  --  107   < > 111*   CO2 23 22 23 22   < > 30   < > 28   < >  --  25   < > 23   BUN 23.1* 23.3* 24.7* 28.6*   < > 38*   < > 59*   < >  --  39*   < > 25   CR 1.15 1.10 1.01 1.10   < > 0.88   < > 0.92   < >  --  0.96   < > 0.88   JOE 9.3 9.2 8.7* 9.3   < > 8.9   < > 8.5   < >  --  8.5   < > 8.4*   MAG  --   --   --   "1.9  --  2.2  --  2.8*  --   --  2.4*  --  1.7   PHOS  --   --   --   --   --  3.5  --  2.2*  --  4.5  --   --   --    LDH  --   --   --   --   --   --   --   --   --   --  783*  --  483*    < > = values in this interval not displayed.     Recent Labs   Lab Test 08/28/24  1424 04/02/24  1207 10/10/23  1059 03/17/21  1205 03/01/21  0625 02/26/21  0626 02/23/21  0348   AST 35 34 31   < > 29 30 26   ALT 27 26 24   < > 36 38 42   ALKPHOS 88 69 66   < > 87 76 65   BILITOTAL 0.4 0.3 0.3   < > 1.3 0.5 0.6   INR  --   --   --   --  1.12 1.13 1.05    < > = values in this interval not displayed.           ASSESSMENT AND PLAN:  Mr. Dior is a 71 y/o male, 9 years 9 mo s/p NMA allo sib PBSCT for MDS w/course complicated by cGVHD.    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 Total cell dose (from 7/1 & 7/2) 6.53 x 10^6 CD34+ cells/kg.   - 1 year anniversary (July 2015): 30% cellular, trilineage hematopoiesis, no abnormal blasts by morphology or flow , no dysplasia, 0-1 fibrosis, 100% donor (BM, CD3, CD15). CR  - 2 year anniversary (July 2016): 20-30% cellular, trilineage hematopoiesis, no abnormal blasts by morphology or flow , no dysplasia, No fibrosis, 100% donor in BM (PB not sent). ISCN: //46,XX[20] Complete Remission.     #GVHD: NIH score of moderate today (8/28/24)   He has remained on on prednisone 5 mg daily (reports that cGVHD flared on lower dose would like to stay at same dose), ruxolitinib 5 mg twice daily since 10/2018. Multiple discussions in the last few years (with myself and Dr. Jean-Paul Nunez) about modifying his chronic GHVD regimen, but he has remained steadfast that he would like to continue without changing. His symptoms remain stable, and he says he feels \"normal\" at this point. We again discussed that there area additional options that have been approved in recent years - including belumosudil and axitilimab - that could help his symptoms, as well as clinical trial " options, should he want. We will discuss belumosudil more explicitly at the next visit, as it could provide additional benefit without significant side effects.      Brief history of his GVHD: oral, eyes, possibly lung, skin, MSK  Hx of biopsy proven acute GVHD of colon and skin, cGVHD (fatigue, weakness, mouth, SOB). Had been off prednisone since summer 2017 and briefly tapered off Sirolimus (january 2018), however resumed with flare in February. 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 Sirolimus was stopped on 9/27/2018 & Pred 90mg was started. Seen by Dr. Sandoval, he does not think his symptoms or CT findings are c/w Sirolimus or GVH & he was in favor of tapering Prednisone. Developed worsening skin thickening & desquamation to the RLE, c/w GVH. Started Jakafi 10/22/2018 at 5 mg BID with symptom improvement in lower extremities.  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 Previously completed steroid taper in Oct 2018. Required Burst pred 40mg a day on 1/3/20 with significant systemic arthralgic pain, tapered back to 10/0 eod after 1 week, near resolution of symptoms noted 1/17, returned to 10 mg every other day; then dropped to 5mg q day in April 2021 (this is best dates estimated on chart review)  - Optho: Last seen 3/14/24, with no change to management. Previously no improvement with scleral lenses. Current regiment below. Topical progesterone was not covered by insurance, so this was not pursued. Discussed Tyrvaya as an option, but deferred today.    - Continue serum tears q2h OU               - Did not like using ointment; continue frequent PFATs               - Humidifier in bedroom                - lotemax (or FML) each eye bid - NOT USING but optional               - Disc lid hygiene and compresses  - Lungs: PFTs obtained December 2021 stable compared to 3 prior - %, FEV1 107% DL CO 74% predicted.  Of note  patient had Covid pneumonia and was on the ventilator.  It has been previously noted that he does have this raspy crackly sounds on both bases that do not change. He previously deferred referral to pulmonary. Repeat PFTs 11/2023 showed normal parameters, and follow-up with Dr. Sandoval, without concern for pulmonary GVHD. A fib diagnosis may be contributing to baseline dyspnea on exertion, with unlikely contribution of chronic GVHD given PFTs. Plan for PFTs roughly yearly.     - Wounds: No open wounds today. He knows that if there is erythema or pain that he needs to be seen as soon as possible to evaluate for cellulitis/infection.     ID:     - IgG 3/8/33804 =904  - 5/6/2022 Gonzalez completed  - Up to date with post-transplant immunizations, minus live-vaccines (on immunosuppression)  - 10/2023 received COVID booster, influenza and RSV vaccines   - Encouraged to get influenza and COVID boosters in October again this year. Probably should also get Prevnar 20.       Fatigue:  stable  - History of testosterone deficiency, rechecked and modestly low. He previously did testosterone injections & didn't think it made him feel any better.    - TSH normal 2/28/23 and again on repeat 9/27/23 at 1.01.  - counseled that moderate exercise is really the only known way to combat fatigue, and acknowledged how difficult it is to balance too much with not enough activity. Previously declined PT, encouraged activity.     Vascular/CARDS  -  A fib dx early March 2024. Now on apixaban 5mg BID for this. Rate controlled deferred to to bradycardia while inpatient and on rate control.   - 10/15/18 Right leg US with small (technically DVT) clot in posterior tibial vein: started reg dose aspirin daily 325mg and recheck US in 2 weeks or symptomatically. 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      MSK: avascular necrosis of hip.  S/p replacement surgery     GI:  protonix (has heartburn)    Healthcare maintenance March 2022 TSH within normal, IgG within normal, vitamin D deficiency screening normal testosterone mildly low, reminded again today to follow up with PCP locally recheck testosterone in case this is contributing to fatigue, encourage dermatology evaluation yearly, DEXA scan completed 5/6/2022 normal bone density and is due for repeat currently. Also, discussed age-appropriate cancer screening. Reminded to see PCP locally for HCM and routine testing, is following up. Continues to see Derm.     Summary: No changes to prednisone or Jakafi today. Consider Tyrvaya in the future for dry eyes, and belumosudil for skin, but deferred today. Has PCP and Derm follow-up planned. Encouraged influenze and covid boosters in October. Should get the Prevnar 20 as well. Plan for RV in 4 months, and will discuss repeat PFTs at that time.     The longitudinal plan of care for the diagnoses/conditions as documented were addressed during this visit. Due to the added complexity in care, I will continue to support Deejay in the subsequent management and with ongoing continuity of care.    I spent 51 minutes in the care of this patient today, which included time necessary for preparation for the visit, obtaining history, ordering medications/tests/procedures as medically indicated, review of pertinent medical literature, counseling of the patient, communication of recommendations to the care team, and documentation time.    Shubham Carlos MD PhD          Again, thank you for allowing me to participate in the care of your patient.        Sincerely,        Shubham Carlos MD

## 2024-08-28 NOTE — NURSING NOTE
"Oncology Rooming Note    August 28, 2024 2:34 PM   Deejay Dior is a 73 year old male who presents for:    Chief Complaint   Patient presents with    Blood Draw     Labs drawn with  by rn.  VS taken.    Oncology Clinic Visit     MDS (myelodysplastic syndrome)      Initial Vitals: /69 (BP Location: Right arm, Patient Position: Sitting, Cuff Size: Adult Large)   Pulse 68   Temp 97.5  F (36.4  C) (Oral)   Resp 16   Wt 104.7 kg (230 lb 12.8 oz)   SpO2 98%   BMI 36.15 kg/m   Estimated body mass index is 36.15 kg/m  as calculated from the following:    Height as of 4/8/24: 1.702 m (5' 7\").    Weight as of this encounter: 104.7 kg (230 lb 12.8 oz). Body surface area is 2.22 meters squared.  Mild Pain (3) Comment: Data Unavailable   No LMP for male patient.  Allergies reviewed: Yes  Medications reviewed: Yes    Medications: Medication refills not needed today.  Pharmacy name entered into Vid[employer]:    [place], [place] - [address] [place] [address] [id number]  [facility] [address] [place] [address] [address] [place] [address] PHARMACY - [place], [place] - [facility] [address]    Frailty Screening:   Is the patient here for a new oncology consult visit in cancer care? 2. No      Clinical concerns: Pt reports no new concerns.       Norma Goldsmith, EMT     "

## 2024-08-28 NOTE — NURSING NOTE
Chief Complaint   Patient presents with    Blood Draw     Labs drawn with  by rn.  VS taken.     Labs drawn with  by rn.  Pt tolerated well.  VS taken.  Pt checked in for next appt.    Saloni Ortega RN

## 2024-09-10 DIAGNOSIS — D89.813 GRAFT-VERSUS-HOST DISEASE (H): ICD-10-CM

## 2024-09-10 DIAGNOSIS — D46.9 MDS (MYELODYSPLASTIC SYNDROME) (H): Primary | ICD-10-CM

## 2024-09-11 DIAGNOSIS — D46.9 MDS (MYELODYSPLASTIC SYNDROME) (H): Primary | ICD-10-CM

## 2024-09-11 DIAGNOSIS — D89.813 GRAFT-VERSUS-HOST DISEASE (H): ICD-10-CM

## 2024-09-19 ENCOUNTER — TELEPHONE (OUTPATIENT)
Dept: FAMILY MEDICINE | Facility: CLINIC | Age: 74
End: 2024-09-19

## 2024-09-19 PROBLEM — I48.0 PAROXYSMAL ATRIAL FIBRILLATION (H): Status: ACTIVE | Noted: 2024-03-11

## 2024-09-19 PROBLEM — Z96.651 STATUS POST TOTAL RIGHT KNEE REPLACEMENT: Chronic | Status: ACTIVE | Noted: 2024-07-29

## 2024-09-19 NOTE — TELEPHONE ENCOUNTER
Patient waited for over an hour and he had to leave.  I called him and will refill his atorvastatin.  He mentioned he has been fatigued for a long time and mood has been stable and will try lowering the sertraline dose to 25 mg daily by taking half of his 50 mg tablet.  New prescription for 25 mg tabs are profiled at the pharmacy to fill when needed.  Recommended wellness check in about 2 months to reassess all of the above.

## 2024-09-20 DIAGNOSIS — E78.5 HYPERLIPIDEMIA LDL GOAL <100: ICD-10-CM

## 2024-09-20 RX ORDER — ATORVASTATIN CALCIUM 20 MG/1
20 TABLET, FILM COATED ORAL DAILY
Qty: 90 TABLET | Refills: 0 | OUTPATIENT
Start: 2024-09-20

## 2024-09-27 ENCOUNTER — OFFICE VISIT (OUTPATIENT)
Dept: CARDIOLOGY | Facility: CLINIC | Age: 74
End: 2024-09-27
Attending: INTERNAL MEDICINE
Payer: MEDICARE

## 2024-09-27 VITALS
OXYGEN SATURATION: 99 % | HEART RATE: 80 BPM | WEIGHT: 228.5 LBS | HEIGHT: 67 IN | BODY MASS INDEX: 35.87 KG/M2 | SYSTOLIC BLOOD PRESSURE: 122 MMHG | DIASTOLIC BLOOD PRESSURE: 58 MMHG

## 2024-09-27 DIAGNOSIS — I48.0 PAF (PAROXYSMAL ATRIAL FIBRILLATION) (H): Primary | ICD-10-CM

## 2024-09-27 PROCEDURE — 99214 OFFICE O/P EST MOD 30 MIN: CPT | Performed by: NURSE PRACTITIONER

## 2024-09-27 NOTE — PATIENT INSTRUCTIONS
Today's Recommendations    Keep monitoring your heart rates and for episodes of atrial fibrillation, let us know if your heart rates are running high or you are having more atrial fibrillation  I recommend you reduce your alcohol consumption  Continue all medications without changes.  Please follow up with Dr. Yeboah in 6 months.    Please send a Scout message or call [phone] to the RN team with questions or concerns.     Scheduling number [phone]  BEE Mcclendon, CNP

## 2024-09-27 NOTE — LETTER
9/27/2024    Jona Baumann MD  [address] [facility] [id number]    RE: Deejay Dior       Dear Colleague,     I had the [facility].    Cardiology Clinic Progress Note  Deejay Dior MRN# [id number]   YOB: 1950 Age: 73 year old   Primary Cardiologist:  Reason for visit: 6 month follow up            Assessment and Plan:       1.  Paroxysmal atrial fibrillation, [id number]  -4/2024 Zio patch monitor showed a 1% atrial fibrillation burden longest episode lasting 3 minutes  -High risk for drug-drug interaction with use of antiarrythmic agents  -Recent episodes of paroxysm following knee surgery, however resolved with time    Plan:  Continue anticoagulation with Eliquis 5 mg twice daily  Patient encouraged to keep monitoring his heart rates and episodes of atrial fibrillation, consider repeat monitor  Recommended he cut back on his alcohol use and caffeine consumption, suspect this will improve his sleep quality and nighttime voiding as well as reduce risk of triggering atrial fibrillation    Follow up: Follow up with Dr. 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     He presented to [facility] in March of 2024 with rapid atrial fibrillation, dizziness, and shortness of breath. He converted back to sinus rhtyhm while in the hospital. He had a minimal elevation in his troponin and echocardiogram showed a normal left ventricular ejection fraction 55 to 60%, mild to moderate biatrial enlargement, no significant valvular disease, mild aortic dimensions. A post discharge zio patch monitor worn for 7 days showed a 1% atrial fibrillation burden, the longest lasting 3  minutes with associated RVR and a 3% PAC burden.  Patient activations correlated with sinus rhythm.    He underwent a right total knee replacement in late July following which he called our office and reported he was feeling more episodes of atrial fibrillation.  The frequency of the episodes improved the further out he was from surgery.    Patient is here today for a 6-month follow-up of his atrial fibrillation. Patient reports feeling generally fatigued, present since his transplant, unchanged. He monitors his heart rates at home with his apple watch and pulse oximeter which has been regular and rates in the normal range.     Patient denies chest pain or chest tightness. Denies dizziness, lightheadedness or other presyncopal symptoms.     Most recent labs from 24 show sodium 138, potassium 4.3, BUN 23.1, creatinine 1.15, GFR 66, ALT 27 hemoglobin of 9.4, platelet 269.     Blood pressure 122/58 and HR 80 in clinic today. Denies significant bleeding issues with eliquis. He drinks 2-3 drinks per night.  Also drinks 2 to 3 cups of coffee per day.  He gets up 2-3 times per night to void        Recent Hospitalizations   2024 Right total knee replacement           Social History    , has 2 sons, 6 grandchildren   Social History     Socioeconomic History     Marital status:      Spouse name: Not on file     Number of children: Not on file     Years of education: Not on file     Highest education level: Not on file   Occupational History     Not on file   Tobacco Use     Smoking status: Former     Current packs/day: 0.00     Average packs/day: 1 pack/day for 34.0 years (34.0 ttl pk-yrs)     Types: Cigarettes     Start date: 1967     Quit date: 2001     Years since quittin.5     Smokeless tobacco: Never     Tobacco comments:     quit in    Vaping Use     Vaping status: Never Used   Substance and Sexual Activity     Alcohol use: Yes     Alcohol/week: 4.2 standard drinks of alcohol      Comment: Seldom     Drug use: No     Sexual activity: Not Currently     Partners: Female     Birth control/protection: Male Surgical, Female Surgical   Other Topics Concern     Parent/sibling w/ CABG, MI or angioplasty before 65F 55M? Not Asked   Social History Narrative     Not on file     Social Determinants of Health     Financial Resource Strain: Low Risk  (9/14/2024)    Financial Resource Strain      Within the past 12 months, have you or your family members you live with been unable to get utilities (heat, electricity) when it was really needed?: No   Food Insecurity: Low Risk  (9/14/2024)    Food Insecurity      Within the past 12 months, did you worry that your food would run out before you got money to buy more?: No      Within the past 12 months, did the food you bought just not last and you didn t have money to get more?: No   Transportation Needs: Low Risk  (9/14/2024)    Transportation Needs      Within the past 12 months, has lack of transportation kept you from medical appointments, getting your medicines, non-medical meetings or appointments, work, or from getting things that you need?: No   Physical Activity: Insufficiently Active (9/14/2024)    Exercise Vital Sign      Days of Exercise per Week: 3 days      Minutes of Exercise per Session: 30 min   Stress: No Stress Concern Present (9/14/2024)    [employer] [place] [employer] - Occupational Stress Questionnaire      Feeling of Stress : Not at all   Social Connections: Unknown (9/14/2024)    Social Connection and Isolation Panel [NHANES]      Frequency of Communication with Friends and Family: Not on file      Frequency of Social Gatherings with Friends and Family: Twice a week      Attends Judaism Services: Not on file      Active Member of Clubs or Organizations: Not on file      Attends Club or Organization Meetings: Not on file      Marital Status: Not on file   Interpersonal Safety: Low Risk  (9/19/2024)    Interpersonal Safety     "  Do you feel physically and emotionally safe where you currently live?: Yes      Within the past 12 months, have you been hit, slapped, kicked or otherwise physically hurt by someone?: No      Within the past 12 months, have you been humiliated or emotionally abused in other ways by your partner or ex-partner?: No   Housing Stability: Low Risk  (9/14/2024)    Housing Stability      Do you have housing? : Yes      Are you worried about losing your housing?: No            Review of Systems:   Skin:  not assessed     Eyes:  not assessed    ENT:  Positive for hearing loss  Respiratory:  Positive for dyspnea on exertion;shortness of breath  Cardiovascular:  Negative    Gastroenterology: not assessed    Genitourinary:  not assessed    Musculoskeletal:  not assessed    Neurologic:  not assessed    Psychiatric:  not assessed    Heme/Lymph/Imm:  not assessed    Endocrine:  not assessed           Physical Exam:   Vitals: /58 (BP Location: Right arm, Patient Position: Sitting, Cuff Size: Adult Regular)   Pulse 80   Ht 1.702 m (5' 7\")   Wt 103.6 kg (228 lb 8 oz)   SpO2 99%   BMI 35.79 kg/m     Wt Readings from Last 4 Encounters:   09/27/24 103.6 kg (228 lb 8 oz)   09/19/24 103 kg (227 lb)   08/28/24 104.7 kg (230 lb 12.8 oz)   04/08/24 106.1 kg (234 lb)     GEN: well nourished, in no acute distress.  HEENT:  Pupils equal, round. Sclerae nonicteric.   NECK: Supple, no masses appreciated.   C/V:  Regular rate and rhythm, no murmur, rub or gallop.    RESP: Respirations are unlabored. Clear to auscultation bilaterally without wheezing, rales, or rhonchi.  GI: Abdomen soft, nontender.  EXTREM: Trace bilateral LE edema.Scar on right knee  NEURO: Alert and oriented, cooperative.  SKIN: Warm and dry.        Data:     LIPID RESULTS:  Lab Results   Component Value Date    CHOL 206 (H) 10/17/2023    CHOL 119 02/06/2015    HDL 62 10/17/2023    HDL 23 (L) 02/06/2015     (H) 10/17/2023    LDL 62 02/06/2015    TRIG 142 " "10/17/2023    TRIG 286 (H) 04/04/2016    CHOLHDLRATIO 5.1 (H) 02/06/2015     LIVER ENZYME RESULTS:  Lab Results   Component Value Date    AST 35 08/28/2024    AST 20 04/27/2021    ALT 27 08/28/2024    ALT 22 04/27/2021     CBC RESULTS:  Lab Results   Component Value Date    WBC 7.4 08/28/2024    WBC 6.3 04/27/2021    RBC 3.65 (L) 08/28/2024    RBC 3.29 (L) 04/27/2021    HGB 11.4 (L) 08/28/2024    HGB 10.9 (L) 04/27/2021    HCT 34.4 (L) 08/28/2024    HCT 34.4 (L) 04/27/2021    MCV 94 08/28/2024     (H) 04/27/2021    MCH 31.2 08/28/2024    MCH 33.1 (H) 04/27/2021    MCHC 33.1 08/28/2024    MCHC 31.7 04/27/2021    RDW 17.2 (H) 08/28/2024    RDW 17.7 (H) 04/27/2021     08/28/2024     04/27/2021     BMP RESULTS:  Lab Results   Component Value Date     08/28/2024     04/27/2021    POTASSIUM 4.3 08/28/2024    POTASSIUM 4.0 07/05/2022    POTASSIUM 4.1 04/27/2021    CHLORIDE 105 08/28/2024    CHLORIDE 108 07/05/2022    CHLORIDE 110 (H) 04/27/2021    CO2 23 08/28/2024    CO2 25 07/05/2022    CO2 24 04/27/2021    ANIONGAP 10 08/28/2024    ANIONGAP 11 07/05/2022    ANIONGAP 6 04/27/2021    GLC 92 08/28/2024     (H) 03/11/2024    GLC 96 07/05/2022    GLC 88 04/27/2021    BUN 23.1 (H) 08/28/2024    BUN 21 07/05/2022    BUN 22 04/27/2021    CR 1.15 08/28/2024    CR 1.07 04/27/2021    GFRESTIMATED 66 08/28/2024    GFRESTIMATED 69 04/27/2021    GFRESTBLACK 80 04/27/2021    JOE 9.3 08/28/2024    JOE 8.6 04/27/2021      A1C RESULTS:  No results found for: \"A1C\"  INR RESULTS:  Lab Results   Component Value Date    INR 1.12 03/01/2021    INR 1.13 02/26/2021            Medications     Current Outpatient Medications   Medication Sig Dispense Refill     acyclovir (ZOVIRAX) 800 MG tablet Take 1 tablet (800 mg) by mouth 2 times daily 180 tablet 3     apixaban ANTICOAGULANT (ELIQUIS) 5 MG tablet Take 1 tablet (5 mg) by mouth 2 times daily 180 tablet 4     atorvastatin (LIPITOR) 20 MG tablet Take 1 " tablet (20 mg) by mouth daily. 90 tablet 1     calcium carbonate-vitamin D (OSCAL W/D) 500-200 MG-UNIT tablet Take 1 tablet by mouth daily       carboxymethylcellulose PF (REFRESH PLUS) 0.5 % ophthalmic solution Place 2 drops into both eyes 4 times daily as needed for dry eyes       Cyanocobalamin (VITAMIN B-12 PO) Take 1 tablet by mouth daily       fluconazole (DIFLUCAN) 100 MG tablet Take 1 tablet (100 mg) by mouth daily 90 tablet 3     levofloxacin (LEVAQUIN) 250 MG tablet Take 1 tablet (250 mg) by mouth daily Strength: 250 mg 90 tablet 3     multivitamin, therapeutic (THERA-VIT) TABS tablet Take 1 tablet by mouth daily       NONFORMULARY Blood serum tears       pantoprazole (PROTONIX) 40 MG EC tablet Take 1 tablet (40 mg) by mouth daily 90 tablet 1     predniSONE (DELTASONE) 5 MG tablet Take 1 tablet (5 mg) by mouth daily 90 tablet 2     ruxolitinib (JAKAFI) 5 MG TABS tablet Take 1 tablet (5 mg) by mouth 2 times daily 60 tablet 11     sertraline (ZOLOFT) 25 MG tablet Take 1 tablet (25 mg) by mouth daily. 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 (Patient not taking: Reported on 9/19/2024) 60 g 3          Past Medical History     Past Medical History:   Diagnosis Date     Acute deep vein thrombosis (DVT) of distal end of right lower extremity (H)      Acute deep vein thrombosis (DVT) of distal vein of right lower extremity (H) 05/16/2016     Brow ptosis, bilateral 02/01/2022    Added automatically  from request for surgery [id number]     Clotting disorder (H)      Depression, major      Dermatochalasis of both upper eyelids 02/01/2022    Added automatically from request for surgery [id number]     Glucose intolerance      H/O bone marrow transplant (H)      h/o Deep vein thrombosis (DVT) (H)      Head injury 1964     Hearing problem Hearing aids 2015     History of blood transfusion 03/2014     History of radiation therapy 06/2014     [place] (hard of hearing)     bilateral     Immunosuppression (H) Sirolimus daily     Lymphedema of both lower extremities      MDS (myelodysplastic syndrome) (H)      MDS/MPN (myelodysplastic/myeloproliferative neoplasms) (H)      Mixed hyperlipidemia      Numbness and tingling     feet     Obstructive sleep apnea 1999     Pneumonia      Recurrent major depressive disorder, in partial remission (H) 08/27/2018     Reduced vision 08/2016    Cataract surgery     Senile ectropion of both lower eyelids 02/01/2022    Added automatically from request for surgery [id number]     Sleep apnea 1999     Status post right hip replacement 09/03/2019     Transplant 07/01/2014    Stem Cells     Past Surgical History:   Procedure Laterality Date     ARTHROPLASTY HIP ANTERIOR Right 9/3/2019    Procedure: RIGHT TOTAL HIP ARTHROPLASTY, DIRECT ANTERIOR APPROACH;  Surgeon: Yong Diaz MD;  Location:  OR     BACK SURGERY       BIOPSY       BMT CELL PRODUCT INFUSION       CATARACT IOL, RT/LT Bilateral 2015?     ESOPHAGOSCOPY, GASTROSCOPY, DUODENOSCOPY (EGD), COMBINED N/A 2/2/2015     ESOPHAGOSCOPY, GASTROSCOPY, DUODENOSCOPY (EGD), COMBINED Left 8/6/2015    Procedure: COMBINED ESOPHAGOSCOPY, GASTROSCOPY, DUODENOSCOPY (EGD), BIOPSY SINGLE OR MULTIPLE;  Surgeon: Amber Francis MD;  Location: [facility]     EXCISE LESION LIP N/A 1/2/2017    Procedure: EXCISE LESION LIP;  Surgeon: Tim Yanez MD;  Location:  OR     EYE SURGERY      blepharoplasty     FLEXIBLE SIGMOIDOSCOPY  2/2/15      HEMORRHOIDECTOMY  2001     IR FOLLOW UP VISIT OUTPATIENT  1/8/2019     PHACOEMULSIFICATION CLEAR CORNEA WITH STANDARD INTRAOCULAR LENS IMPLANT Left 7/18/2016    Procedure: PHACOEMULSIFICATION CLEAR CORNEA WITH STANDARD INTRAOCULAR LENS IMPLANT;  Surgeon: Randolph Giles MD;  Location:  [facility]     REPAIR ECTROPION BILATERAL Bilateral 2/24/2022    Procedure: bilateral lower lid ectropion repair;  Surgeon: Florina Solis MD;  Location: [facility] OR     REPAIR PTOSIS BILATERAL Bilateral 2/24/2022    Procedure: Bilateral upper eyelid ptosis repair;  Surgeon: Florina Solis MD;  Location: [facility] OR     TONSILLECTOMY  1953     TRANSPLANT  7-1-2014    Stem Cell Transplant [facility] BMT     VASCULAR SURGERY  2010    varicose vein surgery     Family History   Problem Relation Age of Onset     Breast Cancer Mother      Cancer Mother         1977     Cerebrovascular Disease Mother         1977     Cancer Father         1987     Hypertension Brother      Heart Disease Brother      Hyperlipidemia Brother      Depression Brother      Heart Disease Brother         2016     Hypertension Brother         1985     Glaucoma No family hx of      Macular Degeneration No family hx of      Diabetes No family hx of             Allergies   Blood transfusion related (informational only)        Barbara Reyes NP  [facility]  Pager: [phone]       Thank you for allowing me to participate in the care of your patient.      Sincerely,     Barbara Reyes NP     [facility]  cc:   Hardeep Yeboah MD  [address] [place] [id number]

## 2024-09-27 NOTE — PROGRESS NOTES
Cardiology Clinic Progress Note  Deejay Dior MRN# [id number]   YOB: 1950 Age: 73 year old   Primary Cardiologist:  Reason for visit: 6 month follow up            Assessment and Plan:       1.  Paroxysmal atrial fibrillation, [id number]  -4/2024 Zio patch monitor showed a 1% atrial fibrillation burden longest episode lasting 3 minutes  -High risk for drug-drug interaction with use of antiarrythmic agents  -Recent episodes of paroxysm following knee surgery, however resolved with time    Plan:  Continue anticoagulation with Eliquis 5 mg twice daily  Patient encouraged to keep monitoring his heart rates and episodes of atrial fibrillation, consider repeat monitor  Recommended he cut back on his alcohol use and caffeine consumption, suspect this will improve his sleep quality and nighttime voiding as well as reduce risk of triggering atrial fibrillation    Follow up: Follow up with Dr. 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     He presented to [facility] in March of 2024 with rapid atrial fibrillation, dizziness, and shortness of breath. He converted back to sinus rhtyhm while in the hospital. He had a minimal elevation in his troponin and echocardiogram showed a normal left ventricular ejection fraction 55 to 60%, mild to moderate biatrial enlargement, no significant valvular disease, mild aortic dimensions. A post discharge zio patch monitor worn for 7 days showed a 1% atrial fibrillation burden, the longest lasting 3 minutes with associated RVR and a 3% PAC burden.  Patient activations correlated with sinus rhythm.    He underwent a right total knee replacement in late July following which he called our office and reported he was  feeling more episodes of atrial fibrillation.  The frequency of the episodes improved the further out he was from surgery.    Patient is here today for a 6-month follow-up of his atrial fibrillation. Patient reports feeling generally fatigued, present since his transplant, unchanged. He monitors his heart rates at home with his apple watch and pulse oximeter which has been regular and rates in the normal range.     Patient denies chest pain or chest tightness. Denies dizziness, lightheadedness or other presyncopal symptoms.     Most recent labs from 24 show sodium 138, potassium 4.3, BUN 23.1, creatinine 1.15, GFR 66, ALT 27 hemoglobin of 9.4, platelet 269.     Blood pressure 122/58 and HR 80 in clinic today. Denies significant bleeding issues with eliquis. He drinks 2-3 drinks per night.  Also drinks 2 to 3 cups of coffee per day.  He gets up 2-3 times per night to void        Recent Hospitalizations   2024 Right total knee replacement           Social History    , has 2 sons, 6 grandchildren   Social History     Socioeconomic History    Marital status:      Spouse name: Not on file    Number of children: Not on file    Years of education: Not on file    Highest education level: Not on file   Occupational History    Not on file   Tobacco Use    Smoking status: Former     Current packs/day: 0.00     Average packs/day: 1 pack/day for 34.0 years (34.0 ttl pk-yrs)     Types: Cigarettes     Start date: 1967     Quit date: 2001     Years since quittin.5    Smokeless tobacco: Never    Tobacco comments:     quit in    Vaping Use    Vaping status: Never Used   Substance and Sexual Activity    Alcohol use: Yes     Alcohol/week: 4.2 standard drinks of alcohol     Comment: Seldom    Drug use: No    Sexual activity: Not Currently     Partners: Female     Birth control/protection: Male Surgical, Female Surgical   Other Topics Concern    Parent/sibling w/ CABG, MI or angioplasty before 65F  55M? Not Asked   Social History Narrative    Not on file     Social Determinants of Health     Financial Resource Strain: Low Risk  (9/14/2024)    Financial Resource Strain     Within the past 12 months, have you or your family members you live with been unable to get utilities (heat, electricity) when it was really needed?: No   Food Insecurity: Low Risk  (9/14/2024)    Food Insecurity     Within the past 12 months, did you worry that your food would run out before you got money to buy more?: No     Within the past 12 months, did the food you bought just not last and you didn t have money to get more?: No   Transportation Needs: Low Risk  (9/14/2024)    Transportation Needs     Within the past 12 months, has lack of transportation kept you from medical appointments, getting your medicines, non-medical meetings or appointments, work, or from getting things that you need?: No   Physical Activity: Insufficiently Active (9/14/2024)    Exercise Vital Sign     Days of Exercise per Week: 3 days     Minutes of Exercise per Session: 30 min   Stress: No Stress Concern Present (9/14/2024)    [employer] - Occupational Stress Questionnaire     Feeling of Stress : Not at all   Social Connections: Unknown (9/14/2024)    Social Connection and Isolation Panel [NHANES]     Frequency of Communication with Friends and Family: Not on file     Frequency of Social Gatherings with Friends and Family: Twice a week     Attends Orthodox Services: Not on file     Active Member of Clubs or Organizations: Not on file     Attends Club or Organization Meetings: Not on file     Marital Status: Not on file   Interpersonal Safety: Low Risk  (9/19/2024)    Interpersonal Safety     Do you feel physically and emotionally safe where you currently live?: Yes     Within the past 12 months, have you been hit, slapped, kicked or otherwise physically hurt by someone?: No     Within the past 12 months, have you been humiliated  "or emotionally abused in other ways by your partner or ex-partner?: No   Housing Stability: Low Risk  (9/14/2024)    Housing Stability     Do you have housing? : Yes     Are you worried about losing your housing?: No            Review of Systems:   Skin:  not assessed     Eyes:  not assessed    ENT:  Positive for hearing loss  Respiratory:  Positive for dyspnea on exertion;shortness of breath  Cardiovascular:  Negative    Gastroenterology: not assessed    Genitourinary:  not assessed    Musculoskeletal:  not assessed    Neurologic:  not assessed    Psychiatric:  not assessed    Heme/Lymph/Imm:  not assessed    Endocrine:  not assessed           Physical Exam:   Vitals: /58 (BP Location: Right arm, Patient Position: Sitting, Cuff Size: Adult Regular)   Pulse 80   Ht 1.702 m (5' 7\")   Wt 103.6 kg (228 lb 8 oz)   SpO2 99%   BMI 35.79 kg/m     Wt Readings from Last 4 Encounters:   09/27/24 103.6 kg (228 lb 8 oz)   09/19/24 103 kg (227 lb)   08/28/24 104.7 kg (230 lb 12.8 oz)   04/08/24 106.1 kg (234 lb)     GEN: well nourished, in no acute distress.  HEENT:  Pupils equal, round. Sclerae nonicteric.   NECK: Supple, no masses appreciated.   C/V:  Regular rate and rhythm, no murmur, rub or gallop.    RESP: Respirations are unlabored. Clear to auscultation bilaterally without wheezing, rales, or rhonchi.  GI: Abdomen soft, nontender.  EXTREM: Trace bilateral LE edema.Scar on right knee  NEURO: Alert and oriented, cooperative.  SKIN: Warm and dry.        Data:     LIPID RESULTS:  Lab Results   Component Value Date    CHOL 206 (H) 10/17/2023    CHOL 119 02/06/2015    HDL 62 10/17/2023    HDL 23 (L) 02/06/2015     (H) 10/17/2023    LDL 62 02/06/2015    TRIG 142 10/17/2023    TRIG 286 (H) 04/04/2016    CHOLHDLRATIO 5.1 (H) 02/06/2015     LIVER ENZYME RESULTS:  Lab Results   Component Value Date    AST 35 08/28/2024    AST 20 04/27/2021    ALT 27 08/28/2024    ALT 22 04/27/2021     CBC RESULTS:  Lab Results " "  Component Value Date    WBC 7.4 08/28/2024    WBC 6.3 04/27/2021    RBC 3.65 (L) 08/28/2024    RBC 3.29 (L) 04/27/2021    HGB 11.4 (L) 08/28/2024    HGB 10.9 (L) 04/27/2021    HCT 34.4 (L) 08/28/2024    HCT 34.4 (L) 04/27/2021    MCV 94 08/28/2024     (H) 04/27/2021    MCH 31.2 08/28/2024    MCH 33.1 (H) 04/27/2021    MCHC 33.1 08/28/2024    MCHC 31.7 04/27/2021    RDW 17.2 (H) 08/28/2024    RDW 17.7 (H) 04/27/2021     08/28/2024     04/27/2021     BMP RESULTS:  Lab Results   Component Value Date     08/28/2024     04/27/2021    POTASSIUM 4.3 08/28/2024    POTASSIUM 4.0 07/05/2022    POTASSIUM 4.1 04/27/2021    CHLORIDE 105 08/28/2024    CHLORIDE 108 07/05/2022    CHLORIDE 110 (H) 04/27/2021    CO2 23 08/28/2024    CO2 25 07/05/2022    CO2 24 04/27/2021    ANIONGAP 10 08/28/2024    ANIONGAP 11 07/05/2022    ANIONGAP 6 04/27/2021    GLC 92 08/28/2024     (H) 03/11/2024    GLC 96 07/05/2022    GLC 88 04/27/2021    BUN 23.1 (H) 08/28/2024    BUN 21 07/05/2022    BUN 22 04/27/2021    CR 1.15 08/28/2024    CR 1.07 04/27/2021    GFRESTIMATED 66 08/28/2024    GFRESTIMATED 69 04/27/2021    GFRESTBLACK 80 04/27/2021    JOE 9.3 08/28/2024    JOE 8.6 04/27/2021      A1C RESULTS:  No results found for: \"A1C\"  INR RESULTS:  Lab Results   Component Value Date    INR 1.12 03/01/2021    INR 1.13 02/26/2021            Medications     Current Outpatient Medications   Medication Sig Dispense Refill    acyclovir (ZOVIRAX) 800 MG tablet Take 1 tablet (800 mg) by mouth 2 times daily 180 tablet 3    apixaban ANTICOAGULANT (ELIQUIS) 5 MG tablet Take 1 tablet (5 mg) by mouth 2 times daily 180 tablet 4    atorvastatin (LIPITOR) 20 MG tablet Take 1 tablet (20 mg) by mouth daily. 90 tablet 1    calcium carbonate-vitamin D (OSCAL W/D) 500-200 MG-UNIT tablet Take 1 tablet by mouth daily      carboxymethylcellulose PF (REFRESH PLUS) 0.5 % ophthalmic solution Place 2 drops into both eyes 4 times daily as " needed for dry eyes      Cyanocobalamin (VITAMIN B-12 PO) Take 1 tablet by mouth daily      fluconazole (DIFLUCAN) 100 MG tablet Take 1 tablet (100 mg) by mouth daily 90 tablet 3    levofloxacin (LEVAQUIN) 250 MG tablet Take 1 tablet (250 mg) by mouth daily Strength: 250 mg 90 tablet 3    multivitamin, therapeutic (THERA-VIT) TABS tablet Take 1 tablet by mouth daily      NONFORMULARY Blood serum tears      pantoprazole (PROTONIX) 40 MG EC tablet Take 1 tablet (40 mg) by mouth daily 90 tablet 1    predniSONE (DELTASONE) 5 MG tablet Take 1 tablet (5 mg) by mouth daily 90 tablet 2    ruxolitinib (JAKAFI) 5 MG TABS tablet Take 1 tablet (5 mg) by mouth 2 times daily 60 tablet 11    sertraline (ZOLOFT) 25 MG tablet Take 1 tablet (25 mg) by mouth daily. 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 (Patient not taking: Reported on 9/19/2024) 60 g 3          Past Medical History     Past Medical History:   Diagnosis Date    Acute deep vein thrombosis (DVT) of distal end of right lower extremity (H)     Acute deep vein thrombosis (DVT) of distal vein of right lower extremity (H) 05/16/2016    Brow ptosis, bilateral 02/01/2022    Added automatically from request for surgery [id number]    Clotting disorder (H)     Depression, major     Dermatochalasis of both upper eyelids 02/01/2022    Added automatically from request for surgery [id number]    Glucose intolerance     H/O bone marrow transplant (H)     h/o Deep vein thrombosis  (DVT) (H)     Head injury 1964    Hearing problem Hearing aids 2015    History of blood transfusion 03/2014    History of radiation therapy 06/2014    [place] (hard of hearing)     bilateral    Immunosuppression (H) Sirolimus daily    Lymphedema of both lower extremities     MDS (myelodysplastic syndrome) (H)     MDS/MPN (myelodysplastic/myeloproliferative neoplasms) (H)     Mixed hyperlipidemia     Numbness and tingling     feet    Obstructive sleep apnea 1999    Pneumonia     Recurrent major depressive disorder, in partial remission (H) 08/27/2018    Reduced vision 08/2016    Cataract surgery    Senile ectropion of both lower eyelids 02/01/2022    Added automatically from request for surgery [id number]    Sleep apnea 1999    Status post right hip replacement 09/03/2019    Transplant 07/01/2014    Stem Cells     Past Surgical History:   Procedure Laterality Date    ARTHROPLASTY HIP ANTERIOR Right 9/3/2019    Procedure: RIGHT TOTAL HIP ARTHROPLASTY, DIRECT ANTERIOR APPROACH;  Surgeon: Yong Diaz MD;  Location:  OR    BACK SURGERY      BIOPSY      BMT CELL PRODUCT INFUSION      CATARACT IOL, RT/LT Bilateral 2015?    ESOPHAGOSCOPY, GASTROSCOPY, DUODENOSCOPY (EGD), COMBINED N/A 2/2/2015    ESOPHAGOSCOPY, GASTROSCOPY, DUODENOSCOPY (EGD), COMBINED Left 8/6/2015    Procedure: COMBINED ESOPHAGOSCOPY, GASTROSCOPY, DUODENOSCOPY (EGD), BIOPSY SINGLE OR MULTIPLE;  Surgeon: Amber Francis MD;  Location:  GI    EXCISE LESION LIP N/A 1/2/2017    Procedure: EXCISE LESION LIP;  Surgeon: Tim Yanez MD;  Location:  OR    EYE SURGERY      blepharoplasty    FLEXIBLE SIGMOIDOSCOPY  2/2/15    HEMORRHOIDECTOMY  2001    IR FOLLOW UP VISIT OUTPATIENT  1/8/2019    PHACOEMULSIFICATION CLEAR CORNEA WITH STANDARD INTRAOCULAR LENS IMPLANT Left 7/18/2016    Procedure: PHACOEMULSIFICATION CLEAR CORNEA WITH STANDARD INTRAOCULAR LENS IMPLANT;  Surgeon: Randolph Giles MD;  Location:  EC    REPAIR ECTROPION  BILATERAL Bilateral 2/24/2022    Procedure: bilateral lower lid ectropion repair;  Surgeon: Florina Solis MD;  [place] [facility] [place]    REPAIR PTOSIS BILATERAL Bilateral 2/24/2022    Procedure: Bilateral upper eyelid ptosis repair;  Surgeon: Florina Solis MD;  [place] [facility] [place]    TONSILLECTOMY  1953    TRANSPLANT  7-1-2014    Stem Cell Transplant [facility] BMT    VASCULAR SURGERY  2010    varicose vein surgery     Family History   Problem Relation Age of Onset    Breast Cancer Mother     Cancer Mother         1977    Cerebrovascular Disease Mother         1977    Cancer Father         1987    Hypertension Brother     Heart Disease Brother     Hyperlipidemia Brother     Depression Brother     Heart Disease Brother         2016    Hypertension Brother         1985    Glaucoma No family hx of     Macular Degeneration No family hx of     Diabetes No family hx of             Allergies   Blood transfusion related (informational only)        Barbara Reyes NP  [facility]  Pager: [phone]

## 2024-10-02 ENCOUNTER — TELEPHONE (OUTPATIENT)
Dept: FAMILY MEDICINE | Facility: CLINIC | Age: 74
End: 2024-10-02
Payer: MEDICARE

## 2024-10-02 DIAGNOSIS — E78.5 HYPERLIPIDEMIA LDL GOAL <100: ICD-10-CM

## 2024-10-02 NOTE — TELEPHONE ENCOUNTER
Patient called [facility] in [place] for the refill of his atorvastatin, it was sent on 9/19 for 90 days and has one refill.    Dont understand why [facility] is confused.  Is this one of those to much confusion.    Called pharmacy and had to leave a message.    Please look into this for this patient

## 2024-10-02 NOTE — TELEPHONE ENCOUNTER
Called [facility], was disconnected and then called back and LVM.     Tanja Templeton RN [facility]

## 2024-10-03 RX ORDER — ATORVASTATIN CALCIUM 20 MG/1
20 TABLET, FILM COATED ORAL DAILY
Qty: 90 TABLET | Refills: 1 | Status: SHIPPED | OUTPATIENT
Start: 2024-10-03

## 2024-10-16 DIAGNOSIS — F33.42 RECURRENT MAJOR DEPRESSION IN COMPLETE REMISSION (H): Primary | ICD-10-CM

## 2024-11-20 ENCOUNTER — TELEPHONE (OUTPATIENT)
Dept: FAMILY MEDICINE | Facility: CLINIC | Age: 74
End: 2024-11-20
Payer: MEDICARE

## 2024-11-20 NOTE — TELEPHONE ENCOUNTER
General Call    Contacts       Contact Date/Time Type Contact Phone/Fax    11/20/2024 04:16 PM CST Phone (Incoming) Deejay Dior (Self) [phone] (M)          Reason for Call:  Appointment    What are your questions or concerns:  Patient is concern that it must be urgent that what patient's annual wellness is scheduled for 8 AM on 11/26/2024. Patient is requesting for a call back from provider team.    Date of last appointment with provider: 09/19/2024    Could we [facility] this information to you [facility] or would you prefer to receive a phone call?:   Patient would prefer a phone call   Okay to leave a detailed message?: Yes at Cell number on file:    Telephone Information:   Mobile [phone]

## 2024-11-22 SDOH — HEALTH STABILITY: PHYSICAL HEALTH: ON AVERAGE, HOW MANY MINUTES DO YOU ENGAGE IN EXERCISE AT THIS LEVEL?: 40 MIN

## 2024-11-22 SDOH — HEALTH STABILITY: PHYSICAL HEALTH: ON AVERAGE, HOW MANY DAYS PER WEEK DO YOU ENGAGE IN MODERATE TO STRENUOUS EXERCISE (LIKE A BRISK WALK)?: 4 DAYS

## 2024-11-22 ASSESSMENT — SOCIAL DETERMINANTS OF HEALTH (SDOH): HOW OFTEN DO YOU GET TOGETHER WITH FRIENDS OR RELATIVES?: ONCE A WEEK

## 2024-11-25 ENCOUNTER — OFFICE VISIT (OUTPATIENT)
Dept: FAMILY MEDICINE | Facility: CLINIC | Age: 74
End: 2024-11-25
Payer: MEDICARE

## 2024-11-25 VITALS
SYSTOLIC BLOOD PRESSURE: 132 MMHG | OXYGEN SATURATION: 96 % | BODY MASS INDEX: 35.94 KG/M2 | WEIGHT: 229 LBS | HEIGHT: 67 IN | DIASTOLIC BLOOD PRESSURE: 62 MMHG | HEART RATE: 72 BPM | TEMPERATURE: 98.2 F | RESPIRATION RATE: 16 BRPM

## 2024-11-25 DIAGNOSIS — Z00.00 ENCOUNTER FOR MEDICARE ANNUAL WELLNESS EXAM: Primary | ICD-10-CM

## 2024-11-25 DIAGNOSIS — E78.5 HYPERLIPIDEMIA LDL GOAL <100: ICD-10-CM

## 2024-11-25 DIAGNOSIS — E66.01 SEVERE OBESITY (BMI 35.0-35.9 WITH COMORBIDITY) (H): ICD-10-CM

## 2024-11-25 DIAGNOSIS — R12 HEARTBURN: ICD-10-CM

## 2024-11-25 DIAGNOSIS — I48.0 PAROXYSMAL ATRIAL FIBRILLATION (H): ICD-10-CM

## 2024-11-25 DIAGNOSIS — F33.41 RECURRENT MAJOR DEPRESSIVE DISORDER, IN PARTIAL REMISSION (H): ICD-10-CM

## 2024-11-25 PROCEDURE — 91320 SARSCV2 VAC 30MCG TRS-SUC IM: CPT | Performed by: FAMILY MEDICINE

## 2024-11-25 PROCEDURE — G0439 PPPS, SUBSEQ VISIT: HCPCS | Performed by: FAMILY MEDICINE

## 2024-11-25 PROCEDURE — 90480 ADMN SARSCOV2 VAC 1/ONLY CMP: CPT | Performed by: FAMILY MEDICINE

## 2024-11-25 RX ORDER — ATORVASTATIN CALCIUM 20 MG/1
20 TABLET, FILM COATED ORAL DAILY
Qty: 90 TABLET | Refills: 1 | Status: CANCELLED | OUTPATIENT
Start: 2024-11-25

## 2024-11-25 RX ORDER — PANTOPRAZOLE SODIUM 40 MG/1
40 TABLET, DELAYED RELEASE ORAL DAILY
Qty: 90 TABLET | Refills: 1 | Status: CANCELLED | OUTPATIENT
Start: 2024-11-25

## 2024-11-25 RX ORDER — SERTRALINE HYDROCHLORIDE 25 MG/1
25 TABLET, FILM COATED ORAL DAILY
Qty: 90 TABLET | Refills: 1 | Status: CANCELLED | OUTPATIENT
Start: 2024-11-25

## 2024-11-25 ASSESSMENT — PATIENT HEALTH QUESTIONNAIRE - PHQ9
SUM OF ALL RESPONSES TO PHQ QUESTIONS 1-9: 3
SUM OF ALL RESPONSES TO PHQ QUESTIONS 1-9: 3
10. IF YOU CHECKED OFF ANY PROBLEMS, HOW DIFFICULT HAVE THESE PROBLEMS MADE IT FOR YOU TO DO YOUR WORK, TAKE CARE OF THINGS AT HOME, OR GET ALONG WITH OTHER PEOPLE: NOT DIFFICULT AT ALL

## 2024-11-25 ASSESSMENT — ANXIETY QUESTIONNAIRES
GAD7 TOTAL SCORE: 0
5. BEING SO RESTLESS THAT IT IS HARD TO SIT STILL: NOT AT ALL
4. TROUBLE RELAXING: NOT AT ALL
GAD7 TOTAL SCORE: 0
7. FEELING AFRAID AS IF SOMETHING AWFUL MIGHT HAPPEN: NOT AT ALL
IF YOU CHECKED OFF ANY PROBLEMS ON THIS QUESTIONNAIRE, HOW DIFFICULT HAVE THESE PROBLEMS MADE IT FOR YOU TO DO YOUR WORK, TAKE CARE OF THINGS AT HOME, OR GET ALONG WITH OTHER PEOPLE: NOT DIFFICULT AT ALL
1. FEELING NERVOUS, ANXIOUS, OR ON EDGE: NOT AT ALL
7. FEELING AFRAID AS IF SOMETHING AWFUL MIGHT HAPPEN: NOT AT ALL
6. BECOMING EASILY ANNOYED OR IRRITABLE: NOT AT ALL
2. NOT BEING ABLE TO STOP OR CONTROL WORRYING: NOT AT ALL
8. IF YOU CHECKED OFF ANY PROBLEMS, HOW DIFFICULT HAVE THESE MADE IT FOR YOU TO DO YOUR WORK, TAKE CARE OF THINGS AT HOME, OR GET ALONG WITH OTHER PEOPLE?: NOT DIFFICULT AT ALL
3. WORRYING TOO MUCH ABOUT DIFFERENT THINGS: NOT AT ALL
GAD7 TOTAL SCORE: 0

## 2024-11-25 NOTE — PATIENT INSTRUCTIONS
Patient Education   Preventive Care Advice   This is general advice given by our system to help you stay healthy. However, your care team may have specific advice just for you. Please talk to your care team about your preventive care needs.  Nutrition  Eat 5 or more servings of fruits and vegetables each day.  Try wheat bread, brown rice and whole grain pasta (instead of white bread, rice, and pasta).  Get enough calcium and vitamin D. Check the label on foods and aim for 100% of the RDA (recommended daily allowance).  Lifestyle  Exercise at least 150 minutes each week  (30 minutes a day, 5 days a week).  Do muscle strengthening activities 2 days a week. These help control your weight and prevent disease.  No smoking.  Wear sunscreen to prevent skin cancer.  Have a dental exam and cleaning every 6 months.  Yearly exams  See your health care team every year to talk about:  Any changes in your health.  Any medicines your care team has prescribed.  Preventive care, family planning, and ways to prevent chronic diseases.  Shots (vaccines)   HPV shots (up to age 26), if you've never had them before.  Hepatitis B shots (up to age 59), if you've never had them before.  COVID-19 shot: Get this shot when it's due.  Flu shot: Get a flu shot every year.  Tetanus shot: Get a tetanus shot every 10 years.  Pneumococcal, hepatitis A, and RSV shots: Ask your care team if you need these based on your risk.  Shingles shot (for age 50 and up)  General health tests  Diabetes screening:  Starting at age 35, Get screened for diabetes at least every 3 years.  If you are younger than age 35, ask your care team if you should be screened for diabetes.  Cholesterol test: At age 39, start having a cholesterol test every 5 years, or more often if advised.  Bone density scan (DEXA): At age 50, ask your care team if you should have this scan for osteoporosis (brittle bones).  Hepatitis C: Get tested at least once in your life.  STIs (sexually  transmitted infections)  Before age 24: Ask your care team if you should be screened for STIs.  After age 24: Get screened for STIs if you're at risk. You are at risk for STIs (including HIV) if:  You are sexually active with more than one person.  You don't use condoms every time.  You or a partner was diagnosed with a sexually transmitted infection.  If you are at risk for HIV, ask about PrEP medicine to prevent HIV.  Get tested for HIV at least once in your life, whether you are at risk for HIV or not.  Cancer screening tests  Cervical cancer screening: If you have a cervix, begin getting regular cervical cancer screening tests starting at age 21.  Breast cancer scan (mammogram): If you've ever had breasts, begin having regular mammograms starting at age 40. This is a scan to check for breast cancer.  Colon cancer screening: It is important to start screening for colon cancer at age 45.  Have a colonoscopy test every 10 years (or more often if you're at risk) Or, ask your provider about stool tests like a FIT test every year or Cologuard test every 3 years.  To learn more about your testing options, visit:   .  For help making a decision, visit:   https://bit.ly/ya42373.  Prostate cancer screening test: If you have a prostate, ask your care team if a prostate cancer screening test (PSA) at age 55 is right for you.  Lung cancer screening: If you are a current or former smoker ages 50 to 80, ask your care team if ongoing lung cancer screenings are right for you.  For informational purposes only. Not to replace the advice of your health care provider. Copyright   2023 [facility] [employer]. All rights reserved. Clinically reviewed by the [facility]. [place] [id number] - REV 01/24.  Hearing Loss: Care Instructions  Overview     Hearing loss is a sudden or slow decrease in how well you hear. It can range from slight to profound. Permanent hearing loss can occur with aging. It also can  happen when you are exposed long-term to loud noise. Examples include listening to loud music, riding motorcycles, or being around other loud machines.  Hearing loss can affect your work and home life. It can make you feel lonely or depressed. You may feel that you have lost your independence. But hearing aids and other devices can help you hear better and feel connected to others.  Follow-up care is a key part of your treatment and safety. Be sure to make and go to all appointments, and call your doctor if you are having problems. It's also a good idea to know your test results and keep a list of the medicines you take.  How can you care for yourself at home?  Avoid loud noises whenever possible. This helps keep your hearing from getting worse.  Always wear hearing protection around loud noises.  Wear a hearing aid as directed.  A professional can help you pick a hearing aid that will work best for you.  You can also get hearing aids over the counter for mild to moderate hearing loss.  Have hearing tests as your doctor suggests. They can show whether your hearing has changed. Your hearing aid may need to be adjusted.  Use other devices as needed. These may include:  Telephone amplifiers and hearing aids that can connect to a television, stereo, radio, or microphone.  Devices that use lights or vibrations. These alert you to the doorbell, a ringing telephone, or a baby monitor.  Television closed-captioning. This shows the words at the bottom of the screen. Most new TVs can do this.  TTY (text telephone). This lets you type messages back and forth on the telephone instead of talking or listening. These devices are also called TDD. When messages are typed on the keyboard, they are sent over the phone line to a receiving TTY. The message is shown on a monitor.  Use text messaging, social media, and email if it is hard for you to communicate by telephone.  Try to learn a listening technique called speechreading. It is  "not lipreading. You pay attention to people's gestures, expressions, posture, and tone of voice. These clues can help you understand what a person is saying. Face the person you are talking to, and have them face you. Make sure the lighting is good. You need to see the other person's face clearly.  Think about counseling if you need help to adjust to your hearing loss.  When should you call for help?  Watch closely for changes in your health, and be sure to contact your doctor if:    You think your hearing is getting worse.     You have new symptoms, such as dizziness or nausea.   Where can you learn more?  Go to https://www.fypio.net/patiented  Enter R798 in the search box to learn more about \"Hearing Loss: Care Instructions.\"  Current as of: September 27, 2023  Content Version: 14.2    2024 [employer].   Care instructions adapted under license by your healthcare professional. If you have questions about a medical condition or this instruction, always ask your healthcare professional. [employer] disclaims any warranty or liability for your use of this information.    Learning About Sleeping Well  What does sleeping well mean?     Sleeping well means getting enough sleep to feel good and stay healthy. How much sleep is enough varies among people.  The number of hours you sleep and how you feel when you wake up are both important. If you do not feel refreshed, you probably need more sleep. Another sign of not getting enough sleep is feeling tired during the day.  Experts recommend that adults get at least 7 or more hours of sleep per day. Children and older adults need more sleep.  Why is getting enough sleep important?  Getting enough quality sleep is a basic part of good health. When your sleep suffers, your physical health, mood, and your thoughts can suffer too. You may find yourself feeling more grumpy or stressed. Not getting enough sleep also can lead to serious problems, " "including injury, accidents, anxiety, and depression.  What might cause poor sleeping?  Many things can cause sleep problems, including:  Changes to your sleep schedule.  Stress. Stress can be caused by fear about a single event, such as giving a speech. Or you may have ongoing stress, such as worry about work or school.  Depression, anxiety, and other mental or emotional conditions.  Changes in your sleep habits or surroundings. This includes changes that happen where you sleep, such as noise, light, or sleeping in a different bed. It also includes changes in your sleep pattern, such as having jet lag or working a late shift.  Health problems, such as pain, breathing problems, and restless legs syndrome.  Lack of regular exercise.  Using alcohol, nicotine, or caffeine before bed.  How can you help yourself?  Here are some tips that may help you sleep more soundly and wake up feeling more refreshed.  Your sleeping area   Use your bedroom only for sleeping and sex. A bit of light reading may help you fall asleep. But if it doesn't, do your reading elsewhere in the house. Try not to use your TV, computer, smartphone, or tablet while you are in bed.  Be sure your bed is big enough to stretch out comfortably, especially if you have a sleep partner.  Keep your bedroom quiet, dark, and cool. Use curtains, blinds, or a sleep mask to block out light. To block out noise, use earplugs, soothing music, or a \"white noise\" machine.  Your evening and bedtime routine   Create a relaxing bedtime routine. You might want to take a warm shower or bath, or listen to soothing music.  Go to bed at the same time every night. And get up at the same time every morning, even if you feel tired.  What to avoid   Limit caffeine (coffee, tea, caffeinated sodas) during the day, and don't have any for at least 6 hours before bedtime.  Avoid drinking alcohol before bedtime. Alcohol can cause you to wake up more often during the night.  Try not to " "smoke or use tobacco, especially in the evening. Nicotine can keep you awake.  Limit naps during the day, especially close to bedtime.  Avoid lying in bed awake for too long. If you can't fall asleep or if you wake up in the middle of the night and can't get back to sleep within about 20 minutes, get out of bed and go to another room until you feel sleepy.  Avoid taking medicine right before bed that may keep you awake or make you feel hyper or energized. Your doctor can tell you if your medicine may do this and if you can take it earlier in the day.  If you can't sleep   Imagine yourself in a peaceful, pleasant scene. Focus on the details and feelings of being in a place that is relaxing.  Get up and do a quiet or boring activity until you feel sleepy.  Avoid drinking any liquids before going to bed to help prevent waking up often to use the bathroom.  Where can you learn more?  Go to https://www.Hobzy.net/patiented  Enter J942 in the search box to learn more about \"Learning About Sleeping Well.\"  Current as of: July 10, 2023  Content Version: 14.2 2024 [facility] [email or web address].   Care instructions adapted under license by your healthcare professional. If you have questions about a medical condition or this instruction, always ask your healthcare professional. [employer] disclaims any warranty or liability for your use of this information.    9 Ways to Cut Back on Drinking  Maybe you've found yourself drinking more alcohol than you'd prefer. If you want to cut back, here are some ideas to try.    Think before you drink.  Do you really want a drink, or is it just a habit? If you're used to having a drink at a certain time, try doing something else then.     Look for substitutes.  Find some no-alcohol drinks that you enjoy, like flavored seltzer water, tea with honey, or tonic with a slice of lime. Or try alcohol-free beer or \"virgin\" cocktails (without the alcohol).     Drink more water.  Use " "water to quench your thirst. Drink a glass of water before you have any alcohol. Have another glass along with every drink or between drinks.     Shrink your drink.  For example, have a bottle of beer instead of a pint. Use a smaller glass for wine. Choose drinks with lower alcohol content (ABV%). Or use less liquor and more mixer in cocktails.     Slow down.  It's easy to drink quickly and without thinking about it. Pay attention, and make each drink last longer.     Do the math.  Total up how much you spend on alcohol each month. How much is that a year? If you cut back, what could you do with the money you save?     Take a break.  Choose a day or two each week when you won't drink at all. Notice how you feel on those days, physically and emotionally. How did you sleep? Do you feel better? Over time, add more break days.     Count calories.  Would you like to lose some weight? For some people that's a good motivator for cutting back. Figure out how many calories are in each drink. How many does that add up to in a day? In a week? In a month?     Practice saying no.  Be ready when someone offers you a drink. Try: \"Thanks, I've had enough.\" Or \"Thanks, but I'm cutting back.\" Or \"No, thanks. I feel better when I drink less.\"   Current as of: November 15, 2023  Content Version: 14.2 2024 [facility] [employer]   Care instructions adapted under license by your healthcare professional. If you have questions about a medical condition or this instruction, always ask your healthcare professional. [employer] disclaims any warranty or liability for your use of this information.     "

## 2024-11-25 NOTE — PROGRESS NOTES
"Preventive Care Visit  [facility]  Jona Baumann MD, Family Medicine  Nov 25, 2024        Assessment & Plan     Encounter for Medicare annual wellness exam      Hyperlipidemia LDL goal <100  Controlled - continue medication(s).    Recurrent major depressive disorder, in partial remission (H)  Stable on current medications and no changes desired nor does he need refills currently    Heartburn  On pantoprazole and not in need of refills today.    Paroxysmal atrial fibrillation (H)  On Eliquis, followed by cardiology, no changes needed.    Severe obesity (BMI 35.0-35.9 with comorbidity) (H)  Healthy diet and activity as able        BMI  Estimated body mass index is 35.87 kg/m  as calculated from the following:    Height as of this encounter: 1.702 m (5' 7\").    Weight as of this encounter: 103.9 kg (229 lb).   Weight management plan: Discussed healthy diet and exercise guidelines      Counseling  Appropriate preventive services were addressed with this patient via screening, questionnaire, or discussion as appropriate for fall prevention, nutrition, physical activity, social engagement, weight loss and cognition.  Checklist reviewing preventive services available has been given to the patient.  Reviewed patient's diet, addressing concerns and/or questions.     No follow-ups on file.    Follow-up Visit   Expected date:  Dec 02, 2025 (Approximate)      Follow Up Appointment Details:     Follow-up with whom?: PCP    Follow-Up for what?: Medicare Wellness    Welcome or Annual?: Annual Wellness    How?: In Person                     Tim Baumann MD     [address] [address]  [email or web address]     Office: [phone]           Whit Villalba is a 74 year old, presenting for the following:  Physical        11/25/2024     1:08 PM   Additional Questions   Roomed by ROOSEVELT Yi  History of Present Illness              Hyperlipidemia " Follow-Up    Are you regularly taking any medication or supplement to lower your cholesterol?   Yes- atorvastatin 20 MG  Are you having muscle aches or other side effects that you think could be caused by your cholesterol lowering medication?  Yes- unsure if medication is causing it.    Depression   How are you doing with your depression since your last visit? No change  Are you having other symptoms that might be associated with depression? No  Have you had a significant life event?  No   Are you feeling anxious or having panic attacks?   No  Do you have any concerns with your use of alcohol or other drugs? No    Social History     Tobacco Use    Smoking status: Former     Current packs/day: 0.00     Average packs/day: 1 pack/day for 34.0 years (34.0 ttl pk-yrs)     Types: Cigarettes     Start date: 1967     Quit date: 2001     Years since quittin.6    Smokeless tobacco: Never    Tobacco comments:     quit in    Vaping Use    Vaping status: Never Used   Substance Use Topics    Alcohol use: Yes     Alcohol/week: 4.2 standard drinks of alcohol     Comment: Mostly daily    Drug use: No         3/19/2024    10:04 AM 2024    11:04 AM 2024     1:06 PM   PHQ   PHQ-9 Total Score 3 4 3    Q9: Thoughts of better off dead/self-harm past 2 weeks Not at all  Not at all  Not at all        Patient-reported         3/19/2024    10:05 AM 2024    11:05 AM 2024     1:06 PM   KWAN-7 SCORE   Total Score 0 (minimal anxiety) 0 (minimal anxiety) 0 (minimal anxiety)   Total Score 0 0 0        Patient-reported         2024     1:06 PM   Last PHQ-9   1.  Little interest or pleasure in doing things 0    2.  Feeling down, depressed, or hopeless 0    3.  Trouble falling or staying asleep, or sleeping too much 0    4.  Feeling tired or having little energy 3    5.  Poor appetite or overeating 0    6.  Feeling bad about yourself 0    7.  Trouble concentrating 0    8.  Moving slowly or restless 0    Q9:  Thoughts of better off dead/self-harm past 2 weeks 0    PHQ-9 Total Score 3        Patient-reported         11/25/2024     1:06 PM   KWAN-7    1. Feeling nervous, anxious, or on edge 0    2. Not being able to stop or control worrying 0    3. Worrying too much about different things 0    4. Trouble relaxing 0    5. Being so restless that it is hard to sit still 0    6. Becoming easily annoyed or irritable 0    7. Feeling afraid, as if something awful might happen 0    KWAN-7 Total Score 0    If you checked any problems, how difficult have they made it for you to do your work, take care of things at home, or get along with other people? Not difficult at all        Patient-reported       Suicide Assessment Five-step Evaluation and Treatment (SAFE-T)    Health Care Directive  Patient has a Health Care Directive on file  Advance care planning document is on file and is current.      11/22/2024   General Health   How would you rate your overall physical health? (!) FAIR   Feel stress (tense, anxious, or unable to sleep) Not at all            11/22/2024   Nutrition   Diet: Regular (no restrictions)            11/22/2024   Exercise   Days per week of moderate/strenous exercise 4 days   Average minutes spent exercising at this level 40 min            11/22/2024   Social Factors   Frequency of gathering with friends or relatives Once a week   Worry food won't last until get money to buy more No   Food not last or not have enough money for food? No   Do you have housing? (Housing is defined as stable permanent housing and does not include staying ouside in a car, in a tent, in an abandoned building, in an overnight shelter, or couch-surfing.) Yes   Are you worried about losing your housing? No   Lack of transportation? No   Unable to get utilities (heat,electricity)? No            11/22/2024   Fall Risk   Fallen 2 or more times in the past year? No    Trouble with walking or balance? No        Patient-reported           11/22/2024   Activities of Daily Living- Home Safety   Needs help with the following daily activites None of the above   Safety concerns in the home None of the above            11/22/2024   Dental   Dentist two times every year? Yes            11/22/2024   Hearing Screening   Hearing concerns? (!) IT'S HARDER TO UNDERSTAND WOMEN'S VOICES THAN MEN'S VOICES.    (!) TROUBLE UNDERSTANDING SOFT OR WHISPERED SPEECH.       Multiple values from one day are sorted in reverse-chronological order         11/22/2024   Driving Risk Screening   Patient/family members have concerns about driving No            11/22/2024   General Alertness/Fatigue Screening   Have you been more tired than usual lately? (!) YES            11/22/2024   Urinary Incontinence Screening   Bothered by leaking urine in past 6 months No            9/14/2024   TB Screening   Were you born outside of the [place]? No          Today's PHQ-9 Score:       11/25/2024     1:06 PM   PHQ-9 SCORE   PHQ-9 Total Score MyChart 3 (Minimal depression)   PHQ-9 Total Score 3        Patient-reported         11/22/2024   Substance Use   Alcohol more than 3/day or more than 7/wk Yes   How often do you have a drink containing alcohol 4 or more times a week   How many alcohol drinks on typical day 3 or 4   How often do you have 5+ drinks at one occasion Less than monthly   Audit 2/3 Score 2   How often not able to stop drinking once started Never   How often failed to do what normally expected Never   How often needed first drink in am after a heavy drinking session Never   How often feeling of guilt or remorse after drinking Never   How often unable to remember what happened the night before Never   Have you or someone else been injured because of your drinking No   Has anyone been concerned or suggested you cut down on drinking No   TOTAL SCORE - AUDIT 6   Do you have a current opioid prescription? No   How severe/bad is pain from 1 to 10? 1/10   Do you use any other substances  recreationally? No        Social History     Tobacco Use    Smoking status: Former     Current packs/day: 0.00     Average packs/day: 1 pack/day for 34.0 years (34.0 ttl pk-yrs)     Types: Cigarettes     Start date: 1967     Quit date: 2001     Years since quittin.6    Smokeless tobacco: Never    Tobacco comments:     quit in    Vaping Use    Vaping status: Never Used   Substance Use Topics    Alcohol use: Yes     Alcohol/week: 4.2 standard drinks of alcohol     Comment: Mostly daily    Drug use: No       ASCVD Risk   The 10-year ASCVD risk score (Dilma DALLAS, et al., 2019) is: 26.1%    Values used to calculate the score:      Age: 74 years      Sex: Male      Is Non- : No      Diabetic: No      Tobacco smoker: No      Systolic Blood Pressure: 132 mmHg      Is BP treated: No      HDL Cholesterol: 62 mg/dL      Total Cholesterol: 206 mg/dL            Reviewed and updated as needed this visit by Provider   Tobacco  Allergies  Meds  Problems  Med Hx  Surg Hx  Fam Hx          Current providers sharing in care for this patient include:  Patient Care Team:  Jona Baumann MD as PCP - General (Family Medicine)  Tabatha Mckeon MD as Referring Physician (Hematology & Oncology)  Josey Means MD as MD (Ophthalmology)  Kenia Westbrook MD as MD (INTERNAL MEDICINE - ENDOCRINOLOGY, DIABETES & METABOLISM)  Kya Arellano MD as Resident  Aston Dumont MD as Resident (Radiology)  Marisol Hector MD as MD (Ophthalmology)  [facility]  Ronda Machado OD (Optometry)  Jona Baumann MD as Assigned PCP  Butch Yoon MD as Assigned Surgical Provider  Hardeep Yeboah MD as MD (Cardiovascular Disease)  Comfort Lemus, RN as BMT Nurse Coordinator (Transplant)  Shubham Joyce MD as BMT Physician (Transplant)  Barbara Reyes NP as Assigned Heart and  "Vascular Provider  Shubham Joyce MD as Assigned Cancer Care Provider    The following health maintenance items are reviewed in Epic and correct as of today:  Health Maintenance   Topic Date Due    DEPRESSION ACTION PLAN  Never done    DTAP/TDAP/TD IMMUNIZATION (2 - Td or Tdap) 07/18/2023    LIPID  06/15/2025 (Originally 10/17/2024)    COVID-19 Vaccine (8 - 2024-25 season) 01/20/2025    PHQ-9  05/25/2025    MEDICARE ANNUAL WELLNESS VISIT  11/25/2025    ANNUAL REVIEW OF HM ORDERS  11/25/2025    FALL RISK ASSESSMENT  11/25/2025    GLUCOSE  08/28/2027    ADVANCE CARE PLANNING  11/25/2029    HEPATITIS C SCREENING  Completed    INFLUENZA VACCINE  Completed    Pneumococcal Vaccine: 65+ Years  Completed    ZOSTER IMMUNIZATION  Completed    RSV VACCINE  Completed    HPV IMMUNIZATION  Aged Out    MENINGITIS IMMUNIZATION  Aged Out    RSV MONOCLONAL ANTIBODY  Aged Out    COLORECTAL CANCER SCREENING  Discontinued      Objective    Exam  /62   Pulse 72   Temp 98.2  F (36.8  C) (Tympanic)   Resp 16   Ht 1.702 m (5' 7\")   Wt 103.9 kg (229 lb)   SpO2 96%   BMI 35.87 kg/m     Estimated body mass index is 35.87 kg/m  as calculated from the following:    Height as of this encounter: 1.702 m (5' 7\").    Weight as of this encounter: 103.9 kg (229 lb).    Physical Exam  GENERAL: healthy, alert and no distress  EYES: Eyes grossly normal to inspection, PERRL and conjunctivae and sclerae normal  HENT: ear canals and TM's normal, nose and mouth without ulcers or lesions  NECK: no adenopathy, no asymmetry, masses, or scars and thyroid normal to palpation  RESP: lungs clear to auscultation - no rales, rhonchi or wheezes  BREAST: normal without masses, tenderness or nipple discharge and no palpable axillary masses or adenopathy  CV: regular rate and rhythm, normal S1 S2, no S3 or S4, no murmur, click or rub, no peripheral edema and peripheral pulses strong  ABDOMEN: soft, nontender, no hepatosplenomegaly, no masses and " bowel sounds normal   (male): normal male genitalia without lesions or urethral discharge, no hernia  MS: no gross musculoskeletal defects noted, no edema  SKIN: no suspicious lesions or rashes  NEURO: Normal strength and tone, mentation intact and speech normal  PSYCH: mentation appears normal, affect normal/bright  LYMPH: no cervical, supraclavicular, axillary, or inguinal adenopathy   RECTAL: declined exam             11/25/2024   Mini Cog   Clock Draw Score 2 Normal   3 Item Recall 3 objects recalled   Mini Cog Total Score 5                 Signed Electronically by: Jona Baumann MD

## 2024-12-23 DIAGNOSIS — D89.813 GRAFT-VERSUS-HOST DISEASE (H): ICD-10-CM

## 2024-12-23 DIAGNOSIS — U07.1 COVID-19 VIRUS INFECTION: ICD-10-CM

## 2024-12-23 RX ORDER — PREDNISONE 5 MG/1
5 TABLET ORAL DAILY
Qty: 90 TABLET | Refills: 2 | Status: SHIPPED | OUTPATIENT
Start: 2024-12-23

## 2025-01-02 DIAGNOSIS — Z94.81 STATUS POST BONE MARROW TRANSPLANT (H): ICD-10-CM

## 2025-01-02 RX ORDER — FLUCONAZOLE 100 MG/1
100 TABLET ORAL DAILY
Qty: 90 TABLET | Refills: 3 | Status: SHIPPED | OUTPATIENT
Start: 2025-01-02

## 2025-01-16 DIAGNOSIS — D89.811 CHRONIC GRAFT-VERSUS-HOST DISEASE (H): ICD-10-CM

## 2025-01-16 RX ORDER — LEVOFLOXACIN 250 MG/1
TABLET, FILM COATED ORAL
Qty: 90 TABLET | Refills: 3 | Status: SHIPPED | OUTPATIENT
Start: 2025-01-16

## (undated) DEVICE — Device

## (undated) DEVICE — SU STRATAFIX PDS PLUS 0 CT-1 30CM SXPP1B450

## (undated) DEVICE — BLADE SAW SAGITTAL STRK 19.5X95X1.27MM 2108-109-000S15

## (undated) DEVICE — SOLUTION WOUND CLEANSING 3/4OZ 10% PVP EA-L3011FB-50

## (undated) DEVICE — ESU EYE HIGH TEMP 65410-183

## (undated) DEVICE — GLOVE PROTEXIS POWDER FREE 7.5 ORTHOPEDIC 2D73ET75

## (undated) DEVICE — DRAPE SHEET REV FOLD 3/4 9349

## (undated) DEVICE — BLADE KNIFE SURG 10 371110

## (undated) DEVICE — NDL 21GA 1.5"

## (undated) DEVICE — DRAPE IOBAN INCISE 23X17" 6650EZ

## (undated) DEVICE — GLOVE PROTEXIS MICRO 7.5  2D73PM75

## (undated) DEVICE — GLOVE PROTEXIS W/NEU-THERA 6.5  2D73TE65

## (undated) DEVICE — WIPES FOLEY CARE SURESTEP PROVON DFC100

## (undated) DEVICE — LINEN TOWEL PACK X5 5464

## (undated) DEVICE — ESU GROUND PAD UNIVERSAL W/O CORD

## (undated) DEVICE — EYE PREP BETADINE 5% SOLUTION 30ML 0065-0411-30

## (undated) DEVICE — HOOD FLYTE W/PEELAWAY 408-800-100

## (undated) DEVICE — ESU BIPOLAR SEALER AQUAMANTYS 6MM 23-112-1

## (undated) DEVICE — ESU PENCIL W/SMOKE EVAC NEPTUNE STRYKER 0703-046-000

## (undated) DEVICE — GLOVE PROTEXIS BLUE W/NEU-THERA 8.0  2D73EB80

## (undated) DEVICE — SOL BENZOIN 0.5OZ

## (undated) DEVICE — SUCTION TIP YANKAUER STR K87

## (undated) DEVICE — PREP CHLORAPREP 26ML TINTED ORANGE  260815

## (undated) DEVICE — BONE CLEANING TIP INTERPULSE  0210-010-000

## (undated) DEVICE — SOL WATER IRRIG 500ML BOTTLE 2F7113

## (undated) DEVICE — SU STRATAFIX PDS PLUS 2-0 SPIRAL CT-1 30CM SXPP1B410

## (undated) DEVICE — SU VICRYL 0 CTX CR 8X18" J764D

## (undated) DEVICE — SOL NACL 0.9% INJ 250ML BAG 2B1322Q

## (undated) DEVICE — SU ETHIBOND 2 V-37 4X30" MX69G

## (undated) DEVICE — GLOVE PROTEXIS W/NEU-THERA 8.0  2D73TE80

## (undated) DEVICE — SUCTION IRR SYSTEM W/O TIP INTERPULSE HANDPIECE 0210-100-000

## (undated) DEVICE — DRAPE STERI U 1015

## (undated) DEVICE — KIT PATIENT CARE HANA TABLE PROFX SUPINE 6855

## (undated) DEVICE — CATH TRAY FOLEY COUDE SURESTEP 16FR W/URNE MTR STLK A304716A

## (undated) DEVICE — GLOVE PROTEXIS W/NEU-THERA 8.5  2D73TE85

## (undated) DEVICE — DRAPE IOBAN ISOLATION VERTICAL 320X21CM 6617

## (undated) DEVICE — SYR 50ML LL W/O NDL 309653

## (undated) DEVICE — PACK MINOR EYE CUSTOM ASC

## (undated) DEVICE — DRAPE CONVERTORS U-DRAPE 60X72" 8476

## (undated) DEVICE — MANIFOLD NEPTUNE 4 PORT 700-20

## (undated) DEVICE — SOL WATER IRRIG 1000ML BOTTLE 2F7114

## (undated) DEVICE — PACK TOTAL HIP W/U DRAPE SOP15HUFSC

## (undated) DEVICE — GLOVE PROTEXIS BLUE W/NEU-THERA 7.0  2D73EB70

## (undated) DEVICE — SU MONOCRYL 3-0 PS-2 27" Y427H

## (undated) DEVICE — GLOVE PROTEXIS MICRO 6.5  2D73PM65

## (undated) DEVICE — CLOSURE SYS SKIN PREMIERPRO EXOFIN FUSION 4X22CM STRL 3472

## (undated) DEVICE — SU VICRYL 2-0 CP-1 27" UND J266H

## (undated) DEVICE — GLOVE PROTEXIS POWDER FREE 8.5 ORTHOPEDIC 2D73ET85

## (undated) DEVICE — SOL NACL 0.9% IRRIG 1000ML BOTTLE 2F7124

## (undated) DEVICE — DRAPE C-ARM 60X42" 1013

## (undated) RX ORDER — LIDOCAINE HYDROCHLORIDE 20 MG/ML
INJECTION, SOLUTION EPIDURAL; INFILTRATION; INTRACAUDAL; PERINEURAL
Status: DISPENSED
Start: 2022-02-24

## (undated) RX ORDER — LIDOCAINE HYDROCHLORIDE 10 MG/ML
INJECTION, SOLUTION EPIDURAL; INFILTRATION; INTRACAUDAL; PERINEURAL
Status: DISPENSED
Start: 2018-12-14

## (undated) RX ORDER — DIAZEPAM 5 MG
TABLET ORAL
Status: DISPENSED
Start: 2018-10-04

## (undated) RX ORDER — OXYCODONE HYDROCHLORIDE 5 MG/1
TABLET ORAL
Status: DISPENSED
Start: 2022-02-24

## (undated) RX ORDER — PREGABALIN 150 MG/1
CAPSULE ORAL
Status: DISPENSED
Start: 2019-09-03

## (undated) RX ORDER — HYDROMORPHONE HYDROCHLORIDE 1 MG/ML
INJECTION, SOLUTION INTRAMUSCULAR; INTRAVENOUS; SUBCUTANEOUS
Status: DISPENSED
Start: 2019-09-03

## (undated) RX ORDER — LIDOCAINE HYDROCHLORIDE 10 MG/ML
INJECTION, SOLUTION EPIDURAL; INFILTRATION; INTRACAUDAL; PERINEURAL
Status: DISPENSED
Start: 2018-11-20

## (undated) RX ORDER — TETRACAINE HYDROCHLORIDE 5 MG/ML
SOLUTION OPHTHALMIC
Status: DISPENSED
Start: 2022-08-17

## (undated) RX ORDER — FENTANYL CITRATE 50 UG/ML
INJECTION, SOLUTION INTRAMUSCULAR; INTRAVENOUS
Status: DISPENSED
Start: 2019-09-03

## (undated) RX ORDER — PROPOFOL 10 MG/ML
INJECTION, EMULSION INTRAVENOUS
Status: DISPENSED
Start: 2019-09-03

## (undated) RX ORDER — GLYCOPYRROLATE 0.2 MG/ML
INJECTION, SOLUTION INTRAMUSCULAR; INTRAVENOUS
Status: DISPENSED
Start: 2019-09-03

## (undated) RX ORDER — CELECOXIB 200 MG/1
CAPSULE ORAL
Status: DISPENSED
Start: 2019-09-03

## (undated) RX ORDER — CEFAZOLIN SODIUM 1 G/3ML
INJECTION, POWDER, FOR SOLUTION INTRAMUSCULAR; INTRAVENOUS
Status: DISPENSED
Start: 2019-09-03

## (undated) RX ORDER — ONDANSETRON 2 MG/ML
INJECTION INTRAMUSCULAR; INTRAVENOUS
Status: DISPENSED
Start: 2019-09-03

## (undated) RX ORDER — LIDOCAINE HYDROCHLORIDE 10 MG/ML
INJECTION, SOLUTION INFILTRATION; PERINEURAL
Status: DISPENSED
Start: 2018-10-04

## (undated) RX ORDER — ACETAMINOPHEN 325 MG/1
TABLET ORAL
Status: DISPENSED
Start: 2022-02-24

## (undated) RX ORDER — DIAZEPAM 5 MG
TABLET ORAL
Status: DISPENSED
Start: 2018-11-20

## (undated) RX ORDER — DEXAMETHASONE SODIUM PHOSPHATE 4 MG/ML
INJECTION, SOLUTION INTRA-ARTICULAR; INTRALESIONAL; INTRAMUSCULAR; INTRAVENOUS; SOFT TISSUE
Status: DISPENSED
Start: 2019-09-03

## (undated) RX ORDER — ACETAMINOPHEN 500 MG
TABLET ORAL
Status: DISPENSED
Start: 2019-09-03

## (undated) RX ORDER — LIDOCAINE HYDROCHLORIDE 20 MG/ML
INJECTION, SOLUTION EPIDURAL; INFILTRATION; INTRACAUDAL; PERINEURAL
Status: DISPENSED
Start: 2019-09-03

## (undated) RX ORDER — VECURONIUM BROMIDE 1 MG/ML
INJECTION, POWDER, LYOPHILIZED, FOR SOLUTION INTRAVENOUS
Status: DISPENSED
Start: 2019-09-03